# Patient Record
Sex: FEMALE | Race: WHITE | NOT HISPANIC OR LATINO | Employment: OTHER | URBAN - METROPOLITAN AREA
[De-identification: names, ages, dates, MRNs, and addresses within clinical notes are randomized per-mention and may not be internally consistent; named-entity substitution may affect disease eponyms.]

---

## 2017-01-06 ENCOUNTER — GENERIC CONVERSION - ENCOUNTER (OUTPATIENT)
Dept: OTHER | Facility: OTHER | Age: 82
End: 2017-01-06

## 2017-04-07 ENCOUNTER — GENERIC CONVERSION - ENCOUNTER (OUTPATIENT)
Dept: OTHER | Facility: OTHER | Age: 82
End: 2017-04-07

## 2017-05-02 ENCOUNTER — TRANSCRIBE ORDERS (OUTPATIENT)
Dept: ADMINISTRATIVE | Facility: HOSPITAL | Age: 82
End: 2017-05-02

## 2017-05-02 DIAGNOSIS — Z12.31 ENCOUNTER FOR SCREENING MAMMOGRAM FOR MALIGNANT NEOPLASM OF BREAST: Primary | ICD-10-CM

## 2017-05-19 ENCOUNTER — ALLSCRIPTS OFFICE VISIT (OUTPATIENT)
Dept: OTHER | Facility: OTHER | Age: 82
End: 2017-05-19

## 2017-05-19 DIAGNOSIS — Z13.820 ENCOUNTER FOR SCREENING FOR OSTEOPOROSIS: ICD-10-CM

## 2017-05-19 DIAGNOSIS — Z12.31 ENCOUNTER FOR SCREENING MAMMOGRAM FOR MALIGNANT NEOPLASM OF BREAST: ICD-10-CM

## 2017-05-19 LAB
BILIRUB UR QL STRIP: NORMAL
CLARITY UR: NORMAL
COLOR UR: YELLOW
GLUCOSE (HISTORICAL): NORMAL
HGB UR QL STRIP.AUTO: 50
KETONES UR STRIP-MCNC: NORMAL MG/DL
LEUKOCYTE ESTERASE UR QL STRIP: NORMAL
NITRITE UR QL STRIP: NORMAL
PH UR STRIP.AUTO: 6 [PH]
PROT UR STRIP-MCNC: 30 MG/DL
SP GR UR STRIP.AUTO: 1

## 2017-05-22 ENCOUNTER — GENERIC CONVERSION - ENCOUNTER (OUTPATIENT)
Dept: OTHER | Facility: OTHER | Age: 82
End: 2017-05-22

## 2017-05-23 LAB — PLEASE NOTE (HISTORICAL): NORMAL

## 2017-05-25 ENCOUNTER — GENERIC CONVERSION - ENCOUNTER (OUTPATIENT)
Dept: OTHER | Facility: OTHER | Age: 82
End: 2017-05-25

## 2017-05-25 LAB
CULTURE RESULT (HISTORICAL): ABNORMAL
MISCELLANEOUS LAB TEST RESULT (HISTORICAL): ABNORMAL
SUSCEP. REFLEX (HISTORICAL): ABNORMAL

## 2017-06-16 ENCOUNTER — HOSPITAL ENCOUNTER (OUTPATIENT)
Dept: BONE DENSITY | Facility: IMAGING CENTER | Age: 82
Discharge: HOME | End: 2017-06-16
Payer: COMMERCIAL

## 2017-06-16 ENCOUNTER — GENERIC CONVERSION - ENCOUNTER (OUTPATIENT)
Dept: OTHER | Facility: OTHER | Age: 82
End: 2017-06-16

## 2017-06-16 DIAGNOSIS — Z12.31 ENCOUNTER FOR SCREENING MAMMOGRAM FOR MALIGNANT NEOPLASM OF BREAST: ICD-10-CM

## 2017-06-16 DIAGNOSIS — Z13.820 ENCOUNTER FOR SCREENING FOR OSTEOPOROSIS: ICD-10-CM

## 2017-06-16 PROCEDURE — G0202 SCR MAMMO BI INCL CAD: HCPCS

## 2017-06-16 PROCEDURE — 77080 DXA BONE DENSITY AXIAL: CPT

## 2017-08-04 LAB
BUN SERPL-MCNC: 24 MG/DL (ref 8–27)
BUN/CREA RATIO (HISTORICAL): 17 (ref 12–28)
CALCIUM SERPL-MCNC: 9.7 MG/DL (ref 8.7–10.3)
CHLORIDE SERPL-SCNC: 98 MMOL/L (ref 96–106)
CO2 SERPL-SCNC: 28 MMOL/L (ref 18–29)
CREAT SERPL-MCNC: 1.43 MG/DL (ref 0.57–1)
EGFR AFRICAN AMERICAN (HISTORICAL): 40 ML/MIN/1.73
EGFR-AMERICAN CALC (HISTORICAL): 34 ML/MIN/1.73
GLUCOSE SERPL-MCNC: 135 MG/DL (ref 65–99)
HBA1C MFR BLD HPLC: 7 % (ref 4.8–5.6)
POTASSIUM SERPL-SCNC: 3.7 MMOL/L (ref 3.5–5.2)
SODIUM SERPL-SCNC: 142 MMOL/L (ref 134–144)

## 2017-09-01 ENCOUNTER — ALLSCRIPTS OFFICE VISIT (OUTPATIENT)
Dept: OTHER | Facility: OTHER | Age: 82
End: 2017-09-01

## 2017-10-17 ENCOUNTER — ALLSCRIPTS OFFICE VISIT (OUTPATIENT)
Dept: OTHER | Facility: OTHER | Age: 82
End: 2017-10-17

## 2017-11-01 NOTE — PROGRESS NOTES
Assessment  Assessed    1  Hypertension (401 9) (I10)   2  Premature ventricular contraction (427 69) (I49 3)   3  Type 2 diabetes mellitus with stage 3 chronic kidney disease, without long-term current use of insulin (250 40,585 3) (E11 22,N18 3)   4  Dyslipidemia (272 4) (E78 5)    Plan  Hypertension    · NIFEdipine ER 60 MG Oral Tablet Extended Release 24 Hour; TAKE 1 TABLETDAILY   Rx By: Marina Vargas; Dispense: 90 Days ; #E#:90 Tablet Extended Release 24 Hour; Refill: 3;For: Hypertension; MAYLIN = N; Verified Transmission to [facility]/PHARMACY #E#[id number] Last Updated By: System, [employer]; 10/17/2017 9:34:39 AM   · Continue with our present treatment plan ; Status:Complete;   Done: 28ZDV5356   Ordered;Ordered By:Verena Hill;   · Restrict your sodium (salt) intake to 2 grams per day ; Status:Complete;   Done:82Sqf3062   Ordered;Ordered By:Verena Hill;   · Take your blood pressure twice a day  Record the numbers and bring them with you toyour appointments ; Status:Complete;   Done: 42MMB7526   Ordered;Ordered By:Verena Hill;   · Follow-up visit in 1 year Evaluation and Treatment  Follow-up  Status: Hold For -Scheduling  Requested for: 96IWG3157   Ordered; For: Hypertension; Ordered By: Marina Vargas Performed:  Due: 56DXY0686  Hypertension, Premature ventricular contraction    · EKG/ECG- POC; Status:Complete;   Done: 45RJU2730 09:01AM   Perform: In Office; Last Updated Christal Sanchez; 10/17/2017 9:01:51 AM;Ordered;Premature ventricular contraction; Ordered By:Verena Hill;    Discussion/Summary  Cardiology Discussion Summary Free Text Note Form [facility]:   HTN - Her blood pressure has been elevated over the last few appointments  I'm going to try to increase her nifedipine a little bit, this 60 mg daily, to see if this helps but yet avoids edema  She will remain on all other medical therapy  I've asked her to keep watching her blood pressure at home on a regular basis    - Well controlled on simvastatin  No changes were made  She will continue to get blood work through her PCPs office   - Her palpitations are well controlled on Bystolic  No changes were made  No testing is needed at this point in time  We will see her back in one year  Counseling Documentation With Imm: The patient was counseled regarding diagnostic results,-- instructions for management,-- impressions  total time of encounter was 25 minutes  Chief Complaint  Chief Complaint Free Text Note Form: Yearly follow up with EKG      History of Present Illness  Cardiology HPI Free Text Note Form [facility]: Donis Durant is here for routine follow-up  Over the last few appointments I have mainly been just following her risk factors for CAD  She has 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 but has been asymptomatic as of late  With her blood pressure running high at times we had made some changes over the last few appointments, which included switching her from atenolol to Bystolic  We did reduce her lisinopril in the past and her nifedipine  She did develop some edema to higher doses of nifedipine  However, over the last few appointments her blood pressure is been running high  She has been checking it at home and it has been averaging in the 818 systolically  the most part Donis Durant feels well  No CP/SOB and no exercise limitations  She denies palpitations  She has had no other symptoms of CHF and she denies LH/Dizziness or any syncope  Her edema is no longer present  She tells me she overall feels well  Hyperlipidemia (Follow-Up): The patient states her hyperlipidemia has been stable since the last visit  Comorbid Illnesses: diabetes mellitus-- and-- hypertension  She has no significant interval events  Symptoms: The patient is currently asymptomatic  Associated symptoms include no focal neurologic deficits-- and-- no memory loss  Medications: the patient is adherent with her medication regimen  -- She denies medication side effects   the patient's LDL goal is 70 mg/dL  The patient is due for a lipid panel-- and-- liver function tests  Premature Ventricular Complexes (Brief): The patient is being seen for a routine clinic follow-up of premature ventricular complexes  The patient is currently asymptomatic  No associated symptoms are reported  Hypertension (Follow-Up): The patient presents for follow-up of essential hypertension  The patient states she has been stable with her blood pressure control since the last visit  She has no significant interval events  Symptoms: denies impaired vision,-- denies dyspnea,-- denies chest pain,-- denies intermittent leg claudication-- and-- worsened lower extremity edema  Associated symptoms include no headache,-- no focal neurologic deficits-- and-- no memory loss  Home monitoring: The patient is not checking blood pressure at home  Medications: the patient is adherent with her medication regimen  -- She denies medication side effects  The patient is due for a lipid panel,-- a serum creatinine-- and-- an ECG  Review of Systems  Cardiology Female ROS:    Cardiac: has swelling in the , but-- as noted in HPI  Skin: No complaints of nonhealing sores or skin rash  Genitourinary: No complaints of recurrent urinary tract infections, frequent urination at night, difficult urination, blood in urine, kidney stones, loss of bladder control, kidney problems, denies any birth control or hormone replacement, is not post menopausal, not currently pregnant  Psychological: No complaints of feeling depressed, anxiety, panic attacks, or difficulty concentrating  General: No complaints of trouble sleeping, lack of energy, fatigue, appetite changes, weight changes, fever, frequent infections, or night sweats  Respiratory: No complaints of shortness of breath, cough with sputum, or wheezing  HEENT: No complaints of serious problems, hearing problems, nose problems, throat problems, or snoring    Gastrointestinal: No complaints of liver problems, nausea, vomiting, heartburn, constipation, bloody stools, diarrhea, problems swallowing, adbominal pain, or rectal bleeding  Hematologic: No complaints of bleeding disorders, anemia, blood clots, or excessive brusing  Neurological: No complaints of numbness, tingling, dizziness, weakness, seizures, headaches, syncope or fainting, AM fatigue, daytime sleepiness, no witnessed apnea episodes  Musculoskeletal: No complaints of arthritis, back pain, or painfull swelling  ROS Reviewed:   ROS reviewed  Active Problems  Problems    1  Anxiety disorder (300 00) (F41 9)   2  Diabetes mellitus with neurological manifestation (250 60) (E11 49)   3  Diabetic polyneuropathy (250 60,357 2) (E11 42)   4  Dyslipidemia (272 4) (E78 5)   5  Encounter for screening for osteoporosis (V82 81) (Z13 820)   6  Encounter for screening mammogram for malignant neoplasm of breast (V76 12) (Z12 31)   7  GERD (gastroesophageal reflux disease) (530 81) (K21 9)   8  Hypertension (401 9) (I10)   9  Hypokalemia (276 8) (E87 6)   10  Influenza vaccine needed (V04 81) (Z23)   11  Need for influenza vaccination (V04 81) (Z23)   12  Osteopenia (733 90) (M85 80)   13  Premature ventricular contraction (427 69) (I49 3)   14  Rhinitis (472 0) (J31 0)   15  Screening for colon cancer (V76 51) (Z12 11)   16  Type 2 diabetes mellitus with stage 3 chronic kidney disease, without long-term current  use of insulin (250 40,585 3) (C60 48,C60 5)    Past Medical History  Problems    1  History of Cataract of both eyes (366 9) (H26 9)   2  History of Cough (786 2) (R05)   3  History of aortic valve insufficiency (V12 59) (Z86 79)   4  Denied: History of depression   5  History of dysuria (V13 00) (Z87 898)   6  History of edema (V13 89) (Z87 898)   7  History of gastroesophageal reflux (GERD) (V12 79) (Z87 19)   8  History of low back pain (V13 59) (Z87 39)   9  History of mitral valve disorder (V12 59) (Z86 79)   10   History of nausea (V12 79) (Z87 898)   11  History of osteoporosis (V13 59) (Z87 39)   12  Denied: History of substance abuse   13  History of urinary tract infection (V13 02) (Z87 440)   14  History of Palpitations (785 1) (R00 2)   15  History of Streptococcus pneumoniae vaccination indicated (V49 89) (Z91 89)  Active Problems And Past Medical History Reviewed: The active problems and past medical history were reviewed and updated today  Surgical History  Problems    1  History of Appendectomy   2  History of Cataract Surgery   3  History of Tubal Ligation  Surgical History Reviewed: The surgical history was reviewed and updated today  Family History  Mother    1  Family history of Chronic Kidney Disease (NKF Classification)   2  Denied: Family history of substance abuse   3  Denied: Family history of Mental health problem  Father    4  Denied: Family history of substance abuse   5  Denied: Family history of Mental health problem  Sister    6  Family history of Breast Cancer (V16 3)   7  Family history of Breast Cancer (V16 3)  Family History Reviewed: The family history was reviewed and updated today  Social History  Problems    · Being A Social Drinker   · Living With Relatives (Other Than Parents)   · Marital History -  (V61 03)   · Never A Smoker  Social History Reviewed: The social history was reviewed and updated today  The social history was reviewed and is unchanged  Current Meds   1  Aspirin 81 MG TABS; TAKE 1 TABLET DAILY; Therapy: 96GMM5219 to ([id number])  Requested for: 73DNC9113; Last Rx:04Sep2012 Ordered   2  Bystolic 20 MG Oral Tablet; Take 1 tablet daily; Therapy: 58CBA5412 to (Barbra Johansen)  Requested for: 26Apr2017; Last Rx:26Apr2017 Ordered   3  Calcium 600 MG Oral Tablet; Take 1 tablet twice daily; Therapy: 64WVR4937 to (Evaluate:60Ita1618) Recorded   4  CloNIDine HCl - 0 1 MG/24HR Transdermal Patch Weekly; APPLY 1 PATCH WEEKLY AS DIRECTED;  Therapy: 45SRM1554 to (Evaluate:08Jan2018)  Requested for: 72Xwy4819; Last Rx:95Ugt6354 Ordered   5  Gabapentin 100 MG Oral Capsule; 1 tab po qhs; Therapy: 51NER8804 to (Evaluate:30Dec2017)  Requested for: 67Opy9515; Last Rx:93Gdp0602 Ordered   6  HydroCHLOROthiazide 25 MG Oral Tablet; Take 1 tablet daily; Therapy: 42VDV8560 to (Jhonathan Jerome)  Requested for: 47Ovk1665; Last Rx:59Epa8011 Ordered   7  Januvia 100 MG Oral Tablet; TAKE 1 TABLET DAILY; Therapy: 68RVC7488 to (Arnold Neena)  Requested for: 82Yjz9351; Last Rx:32Sms8703 Ordered   8  Lisinopril 20 MG Oral Tablet; Take 1 tablet twice daily; Therapy: 48MXE4527 to (Zygmunt Stockton)  Requested for: 60Opd8352; Last Rx:39Syv1256 Ordered   9  MetFORMIN HCl - 1000 MG Oral Tablet; TAKE 1 TABLET TWICE DAILY WITH MEALS; Therapy: 56KUP5042 to (Evaluate:30Mar2018)  Requested for: 27Vra7121; Last Rx:67Ojq3616 Ordered   10  NIFEdipine ER 30 MG Oral Tablet Extended Release 24 Hour; TAKE 1 TABLET DAILY; Therapy: 57IDG9108 to (Evaluate:30Mar2018)  Requested for: 01Sep2017; Last  Rx:88Osf8028 Ordered   11  Omeprazole 20 MG Oral Capsule Delayed Release; TAKE 1 CAPSULE EVERY DAY; Therapy: 12Oct2012 to (Evaluate:09Feb2018)  Requested for: 70RLU7877; Last  Rx:42Olz5710 Ordered   12  Potassium Gluconate 595 (99 K) MG Oral Tablet; 3 TAB DAILY; Therapy: 99XYT1778 to Recorded   13  Sertraline HCl - 25 MG Oral Tablet; TAKE 1 TABLET DAILY AS DIRECTED; Therapy: 44QNQ8500 to (Evaluate:04Nov2017)  Requested for: 86EYB2939; Last  ZJ:39TWB7052 Ordered   14  Simvastatin 20 MG Oral Tablet; Take 1 tablet by mouth at bedtime; Therapy: 59HSA5863 to (Evaluate:30Mar2018)  Requested for: 90Rzg3281; Last  Rx:95Gim5834 Ordered   15  Vitamin D 1000 UNIT Oral Tablet; TAKE 2 TABLETS DAILY; Therapy: 81DKQ5268 to Recorded   16  ZyrTEC Allergy 10 MG Oral Tablet; TAKE 1 TABLET DAILY AS NEEDED; Therapy: 55FGJ3061 to (Evaluate:75Gmw7270); Last Rx:22Jan2013 Ordered    Allergies  Medication    1   No Known Drug Allergies    Vitals  Vital Signs    Recorded: 95PYL3251 09:07AM   Heart Rate 83   Systolic 773   Diastolic 54   Height 5 ft 2 in   Weight 148 lb 4 oz   BMI Calculated 27 12   BSA Calculated 1 68       Physical Exam   Constitutional  General appearance: No acute distress, well appearing and well nourished  Eyes  Conjunctiva and Sclera examination: Conjunctiva pink, sclera anicteric  Ears, Nose, Mouth, and Throat - Oropharynx: Clear, nares are clear, mucous membranes are moist   Neck  Neck and thyroid: Normal, supple, trachea midline, no thyromegaly  Pulmonary  Respiratory effort: No increased work of breathing or signs of respiratory distress  Auscultation of lungs: Clear to auscultation, no rales, no rhonchi, no wheezing, good air movement  Cardiovascular  Auscultation of heart: Abnormal   The heart rate was normal  The rhythm was regular  Heart sounds: an S4 was heard, but-- normal S1,-- normal S2,-- no S3-- and-- no click  No pericardial rub  A grade 2 systolic murmur was heard at the RUSB  Carotid pulses: Normal, 2+ bilaterally  Peripheral vascular exam: Normal pulses throughout, no tenderness, erythema or swelling  Pedal pulses: Normal, 2+ bilaterally  Examination of extremities for edema and/or varicosities: Abnormal   bilateral ankle 2+ pitting edema-- and-- bilateral pretibial 2+ pitting edema  varicosities noted bilaterally  Abdomen  Abdomen: Non-tender and no distention  Liver and spleen: No hepatomegaly or splenomegaly  Musculoskeletal Gait and station: Normal gait  -- Digits and nails: Normal without clubbing or cyanosis  -- Inspection/palpation of joints, bones, and muscles: Normal, ROM normal    Skin - Skin and subcutaneous tissue: Normal without rashes or lesions  Skin is warm and well perfused, normal turgor  Neurologic - Cranial nerves: II - XII intact    Psychiatric - Orientation to person, place, and time: Normal -- Mood and affect: Normal       Results/Data  ECG Report:  Rhythm and rate:  normal sinus rhythm  QRS: left ventricular hypertrophy  Comparison to prior ECGs: no interval change        Future Appointments    Date/Time Provider Specialty Site   01/05/2018 09:00 AM Glenna Layton DO Internal Medicine Latosha Lieberman MD       Signatures   Electronically signed by : DIVINA Haddad ; Oct 17 2017  9:38AM EST                       (Author)

## 2018-01-02 LAB
25(OH)D3 SERPL-MCNC: 71 NG/ML (ref 30–100)
A/G RATIO (HISTORICAL): 1.5 (ref 1.2–2.2)
ALBUMIN SERPL BCP-MCNC: 4 G/DL (ref 3.5–4.7)
ALP SERPL-CCNC: 44 IU/L (ref 39–117)
ALT SERPL W P-5'-P-CCNC: 7 IU/L (ref 0–32)
AST SERPL W P-5'-P-CCNC: 14 IU/L (ref 0–40)
BILIRUB SERPL-MCNC: 0.3 MG/DL (ref 0–1.2)
BUN SERPL-MCNC: 29 MG/DL (ref 8–27)
BUN/CREA RATIO (HISTORICAL): 20 (ref 12–28)
CALCIUM SERPL-MCNC: 9.8 MG/DL (ref 8.7–10.3)
CHLORIDE SERPL-SCNC: 97 MMOL/L (ref 96–106)
CHOLEST SERPL-MCNC: 168 MG/DL (ref 100–199)
CHOLEST/HDLC SERPL: 3.3 RATIO UNITS (ref 0–4.4)
CO2 SERPL-SCNC: 28 MMOL/L (ref 18–29)
CREAT SERPL-MCNC: 1.47 MG/DL (ref 0.57–1)
CREATININE, URINE (HISTORICAL): 68.4 MG/DL
DEPRECATED RDW RBC AUTO: 14.8 % (ref 12.3–15.4)
EGFR AFRICAN AMERICAN (HISTORICAL): 38 ML/MIN/1.73
EGFR-AMERICAN CALC (HISTORICAL): 33 ML/MIN/1.73
GLUCOSE SERPL-MCNC: 145 MG/DL (ref 65–99)
HBA1C MFR BLD HPLC: 6.9 % (ref 4.8–5.6)
HCT VFR BLD AUTO: 34.5 % (ref 34–46.6)
HDLC SERPL-MCNC: 51 MG/DL
HGB BLD-MCNC: 11.2 G/DL (ref 11.1–15.9)
LDLC SERPL CALC-MCNC: 78 MG/DL (ref 0–99)
MCH RBC QN AUTO: 29.3 PG (ref 26.6–33)
MCHC RBC AUTO-ENTMCNC: 32.5 G/DL (ref 31.5–35.7)
MCV RBC AUTO: 90 FL (ref 79–97)
MICROALBUM.,U,RANDOM (HISTORICAL): 35.4 UG/ML
MICROALBUMIN/CREATININE RATIO (HISTORICAL): 51.8 MG/G CREAT (ref 0–30)
PLATELET # BLD AUTO: 300 X10E3/UL (ref 150–379)
POTASSIUM SERPL-SCNC: 3.7 MMOL/L (ref 3.5–5.2)
RBC (HISTORICAL): 3.82 X10E6/UL (ref 3.77–5.28)
SODIUM SERPL-SCNC: 142 MMOL/L (ref 134–144)
TOT. GLOBULIN, SERUM (HISTORICAL): 2.6 G/DL (ref 1.5–4.5)
TOTAL PROTEIN (HISTORICAL): 6.6 G/DL (ref 6–8.5)
TRIGL SERPL-MCNC: 197 MG/DL (ref 0–149)
VLDLC SERPL CALC-MCNC: 39 MG/DL (ref 5–40)
WBC # BLD AUTO: 11.7 X10E3/UL (ref 3.4–10.8)

## 2018-01-03 LAB — TSH SERPL DL<=0.05 MIU/L-ACNC: 2.85 UIU/ML (ref 0.45–4.5)

## 2018-01-09 NOTE — RESULT NOTES
Discussion/Summary   please notify pt that her mammo was nml and her DEXA scan con't to show stable Osteopenia - cont' calcium and Vit D as she is already taking and increase calcium in diet - cheese/yogurt/milk and wgt bearing exercise; will d/w pt in detail at next appt     Verified Results  * DXA [address] 79Wjl6594 08:54AM Shy Lynn Order Number: [id number]    - Patient Instructions: To schedule this appointment, please contact Central Scheduling at [phone]  Test Name Result Flag Reference   DXA BONE DENSITY SPINE HIP AND PELVIS (Report)     CENTRAL DXA SCAN     CLINICAL HISTORY:  80year old post-menopausal  female with no significant past medical history  TECHNIQUE: Bone densitometry was performed using a Hologic Horizon A bone densitometer  Regions of interest appear properly placed  There are no obvious fractures or other confounding variables which could limit the study  Degenerative changes of the    lumbar spine and hip  This will falsely elevate the bone mineral densities in these regions  COMPARISON: Several, most recent January 16, 2015     RESULTS:    LUMBAR SPINE: L1-L4:   BMD 0 853 gm/cm2   T-score -1 8   Z-score 1 0     LEFT TOTAL HIP:   BMD 0 761 gm/cm2   T-score -1 5   Z-score 0 7     LEFT FEMORAL NECK:   BMD 0 628 gm/cm2   T-score -2 0   Z-score 0 4             IMPRESSION:   1  Based on the [facility] classification, the T-score of -2 0 in the left hip is consistent with low bone mineral density  2  When compared to the prior examination, there has been NO SIGNIFICANT CHANGE in the total bone mineral density of the lumbar spine or hip, when allowing for the least significant change  3  Any secondary causes of low bone mineral density should be excluded prior to treatment, if clinically indicated     4  A daily intake of at least 1200 mg calcium and 800 to 1000 IU of Vitamin D, as well as weight bearing and muscle strengthening exercise, fall prevention and avoidance of tobacco and excessive alcohol intake as basic preventive measures are suggested  5  Repeat DXA in 18 - 24 months, on the same machine, as clinically indicated  The 10 year risk of hip fracture is 5%, with the 10 year risk of major osteoporotic fracture being 15%, as calculated by the [facility] fracture risk assessment tool (FRAX)  The current NOF guidelines recommend treating patients with FRAX 10 year risk score of   >3% for hip fracture and >20% for major osteoporotic fracture  WHO CLASSIFICATION:   Normal (a T-score of -1 0 or higher)   Low bone mineral density (a T-score of less than -1 0 but higher than -2 5)   Osteoporosis (a T-score of -2 5 or less)   Severe osteoporosis (a T-score of -2 5 or less with a fragility fracture)             Workstation performed: [id number]     Signed by:   Angel Hunt DO   6/16/17     * MAMMO SCREENING BILATERAL W CAD 68OPF4746 08:53AM [facility] Order Number: [id number]    - Patient Instructions: To schedule this appointment, please contact Central Scheduling at [phone]  Do not wear any perfume, powder, lotion or deodorant on breast or underarm area  Please bring your doctors order, referral (if needed) and insurance information with you on the day of the test  Failure to bring this information may result in this test being rescheduled  Arrive 15 minutes prior to your appointment time to register  On the day of your test, please bring any prior mammogram or breast studies with you that were not performed at a [facility]  Failure to bring prior exams may result in your test needing to be rescheduled  Test Name Result Flag Reference   MAMMO SCREENING BILATERAL W CAD (Report)     Patient History:   Patient is postmenopausal    Family history of breast cancer at age 48 or over in sister  Patient has never smoked  Patient's BMI is 27 4  Reason for exam: screening, asymptomatic  Mammo Screening Bilateral W CAD: June 16, 2017 - Check In #:    [de-identified]   Bilateral CC and MLO view(s) were taken  Technologist: BAR Siegel (R)(M)   Prior study comparison: Yesenia 10, 2016, mammo screening bilateral    W CAD performed at [address] [place] [address]  June 5, 2015, bilateral OPMA digital scrn mammo w/CAD performed    at [address] [place] [address]  May 23, 2014,    bilateral OPMA digital scrn mammo w/CAD performed at [address] [place] [address]  May 17, 2013, bilateral OPMA    digital scrn mammo w/CAD performed at [address] [place] [address]  May 16, 2012, bilateral OPMA digital    scrn mammo w/CAD performed at [address] [place] [address]  There are scattered fibroglandular densities  The parenchymal pattern appears stable  No dominant soft tissue    mass or suspicious calcifications are noted  The skin and nipple   contours are within normal limits  No mammographic evidence of malignancy  No    significant changes when compared with prior studies  ACR BI-RADSï¾® Assessments: BiRad:1 - Negative     Recommendation:   Routine screening mammogram in 1 year  A reminder letter will be   scheduled  Analyzed by CAD     8-10% of cancers will be missed on mammography  Management of a    palpable abnormality must be based on clinical grounds  Patients   will be notified of their results via letter from our facility  Accredited by [employer] of Radiology and FDA       Transcription Location: [facility] 98: [id number]     Risk Value(s):   Tyrer-Cuzick 10 Year: 2 200%, Tyrer-Cuzick Lifetime: 2 200%,    Myriad Table: 1 5%, MANOOL 5 Year: 2 8%, NCI Lifetime: 4 0%   Signed by:   Cari Anderson MD   6/16/17        Pt aware

## 2018-01-12 ENCOUNTER — ALLSCRIPTS OFFICE VISIT (OUTPATIENT)
Dept: OTHER | Facility: OTHER | Age: 83
End: 2018-01-12

## 2018-01-12 VITALS
BODY MASS INDEX: 27.05 KG/M2 | SYSTOLIC BLOOD PRESSURE: 142 MMHG | HEIGHT: 62 IN | HEART RATE: 80 BPM | WEIGHT: 147 LBS | DIASTOLIC BLOOD PRESSURE: 78 MMHG | TEMPERATURE: 97.7 F

## 2018-01-14 VITALS
TEMPERATURE: 98 F | BODY MASS INDEX: 27.75 KG/M2 | DIASTOLIC BLOOD PRESSURE: 70 MMHG | HEART RATE: 82 BPM | HEIGHT: 62 IN | SYSTOLIC BLOOD PRESSURE: 144 MMHG | WEIGHT: 150.8 LBS

## 2018-01-14 VITALS
DIASTOLIC BLOOD PRESSURE: 54 MMHG | SYSTOLIC BLOOD PRESSURE: 138 MMHG | WEIGHT: 148.25 LBS | HEART RATE: 83 BPM | HEIGHT: 62 IN | BODY MASS INDEX: 27.28 KG/M2

## 2018-01-14 NOTE — RESULT NOTES
Discussion/Summary   please notify pt that her UC did grow bacteria but not in a signifcant colony number - the abx we did give her is not an appropriate abx but again abx is not needed as bacteria not at high enough count - is she feeling better? did symptoms resolve?      Verified Results  (1) URINE CULTURE [id number] 12:00AM Sania Cummings     Test Name Result Flag Reference   Urine Culture,Comprehensive Final report A    Result 1 Morganella morganii A    10,000-25,000 colony forming units per mL   Antimicrobial Susceptibility Comment     ** S = Susceptible; I = Intermediate; R = Resistant **                     P = Positive; N = Negative              MICS are expressed in micrograms per mL     Antibiotic                 RSLT#1    RSLT#2    RSLT#3    RSLT#4  Amoxicillin/Clavulanic Acid    R  Ampicillin                     R  Cefazolin                      R  Cefepime                       S  Ceftriaxone                    S  Cefuroxime                     R  Cephalothin                    R  Ciprofloxacin                  S  Ertapenem                      S  Gentamicin                     S  Levofloxacin                   S  Nitrofurantoin                 R  Piperacillin                   I  Tetracycline                   R  Tobramycin                     S  Trimethoprim/Sulfa             S

## 2018-01-16 LAB
LEFT EYE DIABETIC RETINOPATHY: NORMAL
RIGHT EYE DIABETIC RETINOPATHY: NORMAL

## 2018-01-29 PROBLEM — M85.80 OSTEOPENIA: Status: ACTIVE | Noted: 2017-09-01

## 2018-01-29 RX ORDER — SIMVASTATIN 20 MG
20 TABLET ORAL
Refills: 6 | COMMUNITY
Start: 2017-12-26 | End: 2018-05-03 | Stop reason: SDUPTHER

## 2018-01-29 RX ORDER — MAGNESIUM GLUCONATE 30 MG(550)
2 TABLET ORAL 2 TIMES DAILY
COMMUNITY
Start: 2015-03-03

## 2018-01-29 RX ORDER — LISINOPRIL 20 MG/1
1 TABLET ORAL 2 TIMES DAILY
COMMUNITY
Start: 2013-10-04 | End: 2018-11-10 | Stop reason: SDUPTHER

## 2018-01-29 RX ORDER — SITAGLIPTIN 100 MG/1
100 TABLET, FILM COATED ORAL DAILY
Refills: 1 | COMMUNITY
Start: 2018-01-01 | End: 2018-08-17 | Stop reason: SDUPTHER

## 2018-01-29 RX ORDER — NIFEDIPINE 60 MG/1
1 TABLET, FILM COATED, EXTENDED RELEASE ORAL DAILY
COMMUNITY
Start: 2011-04-02 | End: 2018-04-27 | Stop reason: SDUPTHER

## 2018-01-29 RX ORDER — IBUPROFEN 200 MG
1 CAPSULE ORAL 2 TIMES DAILY
COMMUNITY
Start: 2016-03-18 | End: 2019-05-21 | Stop reason: SINTOL

## 2018-01-29 RX ORDER — NEBIVOLOL 20 MG/1
1 TABLET ORAL DAILY
COMMUNITY
Start: 2015-03-03 | End: 2018-06-13 | Stop reason: SDUPTHER

## 2018-01-29 RX ORDER — SERTRALINE HYDROCHLORIDE 25 MG/1
1 TABLET, FILM COATED ORAL DAILY
COMMUNITY
Start: 2012-09-04 | End: 2018-05-17 | Stop reason: SDUPTHER

## 2018-01-29 RX ORDER — OMEPRAZOLE 20 MG/1
1 CAPSULE, DELAYED RELEASE ORAL DAILY
COMMUNITY
Start: 2012-10-12 | End: 2018-02-12 | Stop reason: SDUPTHER

## 2018-01-29 RX ORDER — GABAPENTIN 100 MG/1
1 CAPSULE ORAL
COMMUNITY
Start: 2016-06-17 | End: 2018-05-24 | Stop reason: SDUPTHER

## 2018-01-29 RX ORDER — HYDROCHLOROTHIAZIDE 25 MG/1
1 TABLET ORAL DAILY
COMMUNITY
Start: 2013-03-05 | End: 2018-07-20 | Stop reason: SDUPTHER

## 2018-01-30 ENCOUNTER — OFFICE VISIT (OUTPATIENT)
Dept: FAMILY MEDICINE CLINIC | Facility: HOSPITAL | Age: 83
End: 2018-01-30
Payer: COMMERCIAL

## 2018-01-30 VITALS
SYSTOLIC BLOOD PRESSURE: 150 MMHG | HEIGHT: 62 IN | TEMPERATURE: 97.6 F | DIASTOLIC BLOOD PRESSURE: 84 MMHG | WEIGHT: 148 LBS | HEART RATE: 72 BPM | BODY MASS INDEX: 27.23 KG/M2

## 2018-01-30 DIAGNOSIS — E11.22 TYPE 2 DIABETES MELLITUS WITH STAGE 3 CHRONIC KIDNEY DISEASE, WITHOUT LONG-TERM CURRENT USE OF INSULIN (HCC): Primary | ICD-10-CM

## 2018-01-30 DIAGNOSIS — E78.5 DYSLIPIDEMIA: ICD-10-CM

## 2018-01-30 DIAGNOSIS — I10 ESSENTIAL HYPERTENSION: ICD-10-CM

## 2018-01-30 DIAGNOSIS — N18.30 TYPE 2 DIABETES MELLITUS WITH STAGE 3 CHRONIC KIDNEY DISEASE, WITHOUT LONG-TERM CURRENT USE OF INSULIN (HCC): Primary | ICD-10-CM

## 2018-01-30 PROBLEM — M54.50 LOW BACK PAIN: Status: RESOLVED | Noted: 2018-01-30 | Resolved: 2018-01-30

## 2018-01-30 PROBLEM — R00.2 PALPITATIONS: Status: RESOLVED | Noted: 2018-01-30 | Resolved: 2018-01-30

## 2018-01-30 PROBLEM — R60.9 EDEMA: Status: RESOLVED | Noted: 2018-01-30 | Resolved: 2018-01-30

## 2018-01-30 PROBLEM — R30.0 DYSURIA: Status: RESOLVED | Noted: 2018-01-30 | Resolved: 2018-01-30

## 2018-01-30 PROCEDURE — 99214 OFFICE O/P EST MOD 30 MIN: CPT | Performed by: INTERNAL MEDICINE

## 2018-01-30 NOTE — PROGRESS NOTES
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 - controlled with A1C 6 9 - urged to con't watching sugars/carbs and keep active - con't Januvia and Metformin, on ASA/ACE/statin, UTD on foot exam and states she just saw Dr Caleb Kelly for eye exam - no copy of note in chart and will have Saskia obtain, recheck BW in 6 mos - order given    Hypertension  BP up today and even higher by end of appt - pt w/o med list and unsure with new system if it is UTD - will have pt start checking BP at home and bring home BP numbers to next appt in 3 mos - if still > 140/90 will have to adjust BP meds, low sodium diet/exercise and avoiding NSAIDs encouraged    Dyslipidemia  TC/LDL at goal - TG up a bit and pt was counseled on low fat/cholesterol diet and wgt loss, con't current statin, recheck FLP annually       Diagnoses and all orders for this visit:    Type 2 diabetes mellitus with stage 3 chronic kidney disease, without long-term current use of insulin ([facility] [address] )  -     Hemoglobin A1c; Future  -     Basic metabolic panel; Future  -     Hemoglobin A1c  -     Basic metabolic panel    Dyslipidemia    Essential hypertension      Recheck BP in 3 mos - record BP numbers and bring to that appt    UTD on Dexa    mammo due in June    UTD on immunizations    Addendum:     Allscripts accessed and urine microalbumin was found and printed out and will be added to Epic      Subjective:      Patient ID: Holli Mcduffie is a 80 y o  female  HPI  Pt here for routine follow up appt and BW results    BW results were d/w pt and dgtr in detail: FBS/A1C 145/6 9, BUN/Cr slightly elevated at 29/1 47, rest of CMPTSH/FLP/Vit D/CBC was wnl  Urine microalbumin was not resulted but pt states she gave a urine specimen  Def of controlled vs uncontrolled DM was reviewed  Diet was reviewed - wgt up 1 lb from last appt    She states she con't to watch her sugars and carbs  She does no formal exercise  She is taking her Januvia, Metformin as directed  She is on ASA/ACE/statin  She is UTD on foot exam and states she just had her eye exam with Dr Karli Will- not in chart and will have to obtain  Goal FLP was reviewed - again TC/LDL at goal with current statin  TG were up a bit  Pt denies SE with the meds  She notes no recent stroke/TIA symptoms/CP  BP up a bit today and even higher on recheck at end of appt  Meds were reviewed by pt did not bring med list today  She denies missing doses of her BP meds  She does check her BP at home intermittently but cannot remember her numbers  She notes no frequent Ha's/dizziness/double vision/CP  She does not use NSAID on a daily basis  She does watch the salt in her diet  She is UTD on Dexa    Mammo due in June    UTD on immunizations      Review of Systems   Constitutional: Negative for chills, fatigue, fever and unexpected weight change  HENT: Negative for congestion and sore throat  Eyes: Negative for pain and discharge  Respiratory: Negative for shortness of breath and wheezing  Cardiovascular: Negative for chest pain, palpitations and leg swelling  Gastrointestinal: Negative for abdominal pain, blood in stool, constipation, diarrhea and nausea  Endocrine: Negative for polydipsia and polyuria  Genitourinary: Positive for urgency  Negative for difficulty urinating and dysuria  Musculoskeletal: Positive for joint swelling  Negative for back pain and neck pain  Skin: Negative for rash and wound  Neurological: Negative for dizziness, syncope, light-headedness and headaches  Hematological: Negative for adenopathy  Psychiatric/Behavioral: Negative for behavioral problems and confusion  Objective:     Physical Exam   Constitutional: She appears well-developed and well-nourished  No distress  HENT:   Head: Normocephalic and atraumatic     Eyes: EOM are normal  Pupils are equal, round, and reactive to light  Right eye exhibits no discharge  Cardiovascular: Normal rate and regular rhythm  Exam reveals no friction rub  Murmur heard  Pulmonary/Chest: Effort normal and breath sounds normal  No respiratory distress  She has no wheezes  She has no rales  Abdominal: Soft  She exhibits no distension  There is no tenderness  There is no guarding  Musculoskeletal: She exhibits no edema  Lymphadenopathy:     She has no cervical adenopathy  Neurological: She is alert  She exhibits normal muscle tone  Skin: Skin is warm  No rash noted  Psychiatric: She has a normal mood and affect   Her behavior is normal

## 2018-01-30 NOTE — ASSESSMENT & PLAN NOTE
BP up today and even higher by end of appt - pt w/o med list and unsure with new system if it is UTD - will have pt start checking BP at home and bring home BP numbers to next appt in 3 mos - if still > 140/90 will have to adjust BP meds, low sodium diet/exercise and avoiding NSAIDs encouraged

## 2018-01-30 NOTE — ASSESSMENT & PLAN NOTE
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

## 2018-01-30 NOTE — ASSESSMENT & PLAN NOTE
TC/LDL at goal - TG up a bit and pt was counseled on low fat/cholesterol diet and wgt loss, con't current statin, recheck FLP annually

## 2018-02-12 DIAGNOSIS — Z00.00 HEALTH CARE MAINTENANCE: Primary | ICD-10-CM

## 2018-02-13 RX ORDER — OMEPRAZOLE 20 MG/1
CAPSULE, DELAYED RELEASE ORAL
Qty: 30 CAPSULE | Refills: 3 | Status: SHIPPED | OUTPATIENT
Start: 2018-02-13 | End: 2018-06-13 | Stop reason: SDUPTHER

## 2018-02-26 NOTE — MISCELLANEOUS
Provider Comments  Provider Comments:   pt no showed for today's appt - task will be sent to staff to call pt and reschedule her DM follow up      Signatures   Electronically signed by : Tien Tran DO; Jan 12 2018  9:24AM EST                       (Author)

## 2018-03-26 DIAGNOSIS — E11.9 TYPE 2 DIABETES MELLITUS WITHOUT COMPLICATION, WITHOUT LONG-TERM CURRENT USE OF INSULIN (HCC): Primary | ICD-10-CM

## 2018-04-27 ENCOUNTER — OFFICE VISIT (OUTPATIENT)
Dept: FAMILY MEDICINE CLINIC | Facility: HOSPITAL | Age: 83
End: 2018-04-27
Payer: COMMERCIAL

## 2018-04-27 VITALS
WEIGHT: 145 LBS | TEMPERATURE: 79.8 F | HEIGHT: 62 IN | SYSTOLIC BLOOD PRESSURE: 142 MMHG | HEART RATE: 72 BPM | DIASTOLIC BLOOD PRESSURE: 68 MMHG | BODY MASS INDEX: 26.68 KG/M2

## 2018-04-27 DIAGNOSIS — I10 ESSENTIAL HYPERTENSION: Primary | ICD-10-CM

## 2018-04-27 DIAGNOSIS — N18.30 TYPE 2 DIABETES MELLITUS WITH STAGE 3 CHRONIC KIDNEY DISEASE, WITHOUT LONG-TERM CURRENT USE OF INSULIN (HCC): ICD-10-CM

## 2018-04-27 DIAGNOSIS — E11.22 TYPE 2 DIABETES MELLITUS WITH STAGE 3 CHRONIC KIDNEY DISEASE, WITHOUT LONG-TERM CURRENT USE OF INSULIN (HCC): ICD-10-CM

## 2018-04-27 DIAGNOSIS — E11.9 TYPE 2 DIABETES MELLITUS WITHOUT COMPLICATION, WITHOUT LONG-TERM CURRENT USE OF INSULIN (HCC): ICD-10-CM

## 2018-04-27 PROBLEM — R60.9 DEPENDENT EDEMA: Status: ACTIVE | Noted: 2018-04-27

## 2018-04-27 PROCEDURE — 3725F SCREEN DEPRESSION PERFORMED: CPT | Performed by: INTERNAL MEDICINE

## 2018-04-27 PROCEDURE — 99214 OFFICE O/P EST MOD 30 MIN: CPT | Performed by: INTERNAL MEDICINE

## 2018-04-27 RX ORDER — NIFEDIPINE 90 MG/1
90 TABLET, FILM COATED, EXTENDED RELEASE ORAL DAILY
Qty: 30 TABLET | Refills: 3 | Status: SHIPPED | OUTPATIENT
Start: 2018-04-27 | End: 2018-07-24 | Stop reason: SDUPTHER

## 2018-04-27 NOTE — PROGRESS NOTES
Assessment/Plan:    Dependent edema  D/w pt that as long as swelling gone in am and is only mild and occurs as she is on her feet during the day that is c/w benign dependent edema, she notes no pain with the swelling and states it is only mild and intermittent,  Again urged to watch sodium in diet and elevate legs during day, again urged to avoid NSAIDs, call with worsening symptoms/SOB/orthopnea    Hypertension  BP not at goal and elevated at home as well, increase Nifedipine from 60 mg to 90 mg 1 tab PO q day, on max HCTZ and Bystolic, will avoid higher dose of lisinopril d/t CKD stage 3, can go up on Clonidine but hesitant d/t SE of alpha blockers - advised to con't checking BP at home twice a week and call if BP consistently > 149/90, re-eval in 3 mos after DM labs done, call with any SE    Type 2 diabetes mellitus with stage 3 chronic kidney disease, without long-term current use of insulin ([facility] [address] )  DM foot exam done today, eye exam done Feb 2018, has order for BW early July, con't current meds, on ACE/statin/ASA, avoid higher dose of ACE d/t CKD, need better control of BP - see HTN, re-eval after labs in July       Diagnoses and all orders for this visit:    Essential hypertension  -     NIFEdipine ER (ADALAT CC) 90 mg 24 hr tablet; Take 1 tablet (90 mg total) by mouth daily    Type 2 diabetes mellitus with stage 3 chronic kidney disease, without long-term current use of insulin ([facility] [address] )      Needs mammo ordered at next appt    Dexa done June 2017      Subjective:      Patient ID: Norm Oscar is a 80 y o  female  HPI Pt here for follow up appt    Last appt pts BP was elevated and we advised her to start checking BP at home and come back today for 3 mo BP check  She has been checking her BP every week and numbers were brought in and reviewed - ranging from 129/60 - 169/81 with an average of mid 140's/70's  Pt denies missing doses of her BP meds  She notes no HA/dizziness/double vision/CP    She has had some increase in LE edema at the end of the day  The swelling is not usually there in the am   She does not add salt to her food and watches higher sodium foods  She is avoiding NSAIDs d/t her CKD  She notes no SOB/orthopnea/PND  Pt had her DM eye exam in Feb 2018 - no retinopathy was noted  She is due for foot exam - she denies numbness/tingling/pain/ulcers/sores on feet  She has order for BW to be done in July  She is taking her meds as directed    Mammo due in June - will d/w pt at appt in July       Review of Systems   Constitutional: Negative for chills and fever  HENT: Negative for congestion and sore throat  Eyes: Negative for pain and discharge  Respiratory: Negative for cough, shortness of breath and wheezing  Cardiovascular: Positive for leg swelling  Negative for chest pain and palpitations  Gastrointestinal: Negative for abdominal pain, diarrhea and nausea  Endocrine: Negative for polydipsia and polyuria  Genitourinary: Negative for difficulty urinating and dysuria  Musculoskeletal: Negative for back pain and neck pain  Skin: Negative for rash and wound  Neurological: Negative for dizziness, syncope, light-headedness and headaches  Hematological: Negative for adenopathy  Psychiatric/Behavioral: Negative for behavioral problems and confusion  Objective:    /68   Pulse 72   Temp (!) 79 8 °F (26 6 °C)   Ht 5' 2" (1 575 m)   Wt 65 8 kg (145 lb)   BMI 26 52 kg/m²      Physical Exam   Constitutional: She appears well-developed and well-nourished  HENT:   Head: Normocephalic and atraumatic  Eyes: Conjunctivae are normal  Right eye exhibits no discharge  Left eye exhibits no discharge  Neck: Neck supple  No tracheal deviation present  Cardiovascular: Normal rate, regular rhythm and normal heart sounds  Exam reveals no friction rub  Pulses are no weak pulses  No murmur heard    Pulses:       Dorsalis pedis pulses are 1+ on the right side, and 1+ on the left side  Pulmonary/Chest: Effort normal and breath sounds normal  No respiratory distress  She has no wheezes  She has no rales  Abdominal: Soft  She exhibits no distension  There is no tenderness  There is no guarding  Musculoskeletal: She exhibits no edema  Feet:   Right Foot:   Skin Integrity: Negative for ulcer, skin breakdown, erythema, warmth, callus or dry skin  Left Foot:   Skin Integrity: Negative for ulcer, skin breakdown, erythema, warmth, callus or dry skin  Neurological: She is alert  She exhibits normal muscle tone  Skin: Skin is warm  No rash noted  Psychiatric: She has a normal mood and affect  Her behavior is normal    Nursing note and vitals reviewed  Patient's shoes and socks removed  Right Foot/Ankle   Right Foot Inspection  Skin Exam: skin normal and skin intact no dry skin, no warmth, no callus, no erythema, no maceration, no abnormal color, no pre-ulcer, no ulcer and no callus                          Toe Exam: ROM and strength within normal limits  Sensory   Vibration: intact    Monofilament testing: intact  Vascular    The right DP pulse is 1+  Left Foot/Ankle  Left Foot Inspection  Skin Exam: skin normal and skin intactno dry skin, no warmth, no erythema, no maceration, normal color, no pre-ulcer, no ulcer and no callus                         Toe Exam: ROM and strength within normal limits                   Sensory   Vibration: intact    Monofilament: intact  Vascular    The left DP pulse is 1+  Assign Risk Category:  No deformity present; No loss of protective sensation;  No weak pulses       Risk: 0

## 2018-04-27 NOTE — ASSESSMENT & PLAN NOTE
D/w pt that as long as swelling gone in am and is only mild and occurs as she is on her feet during the day that is c/w benign dependent edema, she notes no pain with the swelling and states it is only mild and intermittent,  Again urged to watch sodium in diet and elevate legs during day, again urged to avoid NSAIDs, call with worsening symptoms/SOB/orthopnea

## 2018-04-27 NOTE — ASSESSMENT & PLAN NOTE
DM foot exam done today, eye exam done Feb 2018, has order for BW early July, con't current meds, on ACE/statin/ASA, avoid higher dose of ACE d/t CKD, need better control of BP - see HTN, re-eval after labs in July

## 2018-04-27 NOTE — ASSESSMENT & PLAN NOTE
BP not at goal and elevated at home as well, increase Nifedipine from 60 mg to 90 mg 1 tab PO q day, on max HCTZ and Bystolic, will avoid higher dose of lisinopril d/t CKD stage 3, can go up on Clonidine but hesitant d/t SE of alpha blockers - advised to con't checking BP at home twice a week and call if BP consistently > 149/90, re-eval in 3 mos after DM labs done, call with any SE

## 2018-05-03 DIAGNOSIS — E78.5 DYSLIPIDEMIA: Primary | ICD-10-CM

## 2018-05-05 RX ORDER — SIMVASTATIN 20 MG
TABLET ORAL
Qty: 30 TABLET | Refills: 6 | Status: SHIPPED | OUTPATIENT
Start: 2018-05-05 | End: 2018-05-21 | Stop reason: SDUPTHER

## 2018-05-08 ENCOUNTER — TELEPHONE (OUTPATIENT)
Dept: FAMILY MEDICINE CLINIC | Facility: HOSPITAL | Age: 83
End: 2018-05-08

## 2018-05-08 DIAGNOSIS — Z12.31 ENCOUNTER FOR SCREENING MAMMOGRAM FOR BREAST CANCER: Primary | ICD-10-CM

## 2018-05-08 NOTE — TELEPHONE ENCOUNTER
Patients daughter called because she is trying to schedule a mammogram for Ena and they need an order before scheduling

## 2018-05-08 NOTE — TELEPHONE ENCOUNTER
Order in [address] [place] [address] Rd and should print on back printer but cannot be done till after 6/16/18

## 2018-05-09 ENCOUNTER — TELEPHONE (OUTPATIENT)
Dept: FAMILY MEDICINE CLINIC | Facility: HOSPITAL | Age: 83
End: 2018-05-09

## 2018-05-09 NOTE — TELEPHONE ENCOUNTER
I would like to have Dr Cody Castañeda advise tomorrow on any medication changes or additions if Maxime Cuevas is not in any distress    Please call her daughter and then relay the message to Dr Cody Castañeda

## 2018-05-10 NOTE — TELEPHONE ENCOUNTER
This was discussed with pt in detail at next appt - as long as swelling is not present in the am and just increases when she is on her feet all day AND she denies any SOB/CP/difficulty breathing when lying down then I would just recommend low sodium diet and elevate feet as discussed, if she has any of these symptoms then she has to be seen

## 2018-05-17 DIAGNOSIS — F41.9 ANXIETY: Primary | ICD-10-CM

## 2018-05-17 RX ORDER — SERTRALINE HYDROCHLORIDE 25 MG/1
25 TABLET, FILM COATED ORAL DAILY
Qty: 30 TABLET | Refills: 5 | Status: SHIPPED | OUTPATIENT
Start: 2018-05-17 | End: 2018-11-14 | Stop reason: SDUPTHER

## 2018-05-21 DIAGNOSIS — E78.5 DYSLIPIDEMIA: ICD-10-CM

## 2018-05-21 RX ORDER — SIMVASTATIN 20 MG
20 TABLET ORAL
Qty: 90 TABLET | Refills: 3 | Status: SHIPPED | OUTPATIENT
Start: 2018-05-21 | End: 2018-12-05 | Stop reason: SDUPTHER

## 2018-05-24 DIAGNOSIS — G89.4 CHRONIC PAIN SYNDROME: Primary | ICD-10-CM

## 2018-05-24 RX ORDER — GABAPENTIN 100 MG/1
CAPSULE ORAL
Qty: 30 CAPSULE | Refills: 6 | Status: SHIPPED | OUTPATIENT
Start: 2018-05-24 | End: 2018-05-30 | Stop reason: SDUPTHER

## 2018-05-30 DIAGNOSIS — G89.4 CHRONIC PAIN SYNDROME: ICD-10-CM

## 2018-05-30 RX ORDER — GABAPENTIN 100 MG/1
100 CAPSULE ORAL
Qty: 30 CAPSULE | Refills: 2 | Status: SHIPPED | OUTPATIENT
Start: 2018-05-30 | End: 2018-08-10 | Stop reason: SDUPTHER

## 2018-06-13 DIAGNOSIS — Z00.00 HEALTH CARE MAINTENANCE: ICD-10-CM

## 2018-06-13 DIAGNOSIS — I10 ESSENTIAL HYPERTENSION: Primary | ICD-10-CM

## 2018-06-13 RX ORDER — OMEPRAZOLE 20 MG/1
20 CAPSULE, DELAYED RELEASE ORAL DAILY
Qty: 30 CAPSULE | Refills: 5 | Status: SHIPPED | OUTPATIENT
Start: 2018-06-13 | End: 2018-12-12 | Stop reason: SDUPTHER

## 2018-06-13 RX ORDER — NEBIVOLOL 20 MG/1
20 TABLET ORAL DAILY
Qty: 30 TABLET | Refills: 5 | Status: SHIPPED | OUTPATIENT
Start: 2018-06-13 | End: 2018-11-07 | Stop reason: SDUPTHER

## 2018-07-14 LAB
BUN SERPL-MCNC: 34 MG/DL (ref 8–27)
BUN/CREAT SERPL: 19 (ref 12–28)
CALCIUM SERPL-MCNC: 10 MG/DL (ref 8.7–10.3)
CHLORIDE SERPL-SCNC: 98 MMOL/L (ref 96–106)
CO2 SERPL-SCNC: 30 MMOL/L (ref 20–29)
CREAT SERPL-MCNC: 1.8 MG/DL (ref 0.57–1)
EST. AVERAGE GLUCOSE BLD GHB EST-MCNC: 148 MG/DL
GLUCOSE SERPL-MCNC: 144 MG/DL (ref 65–99)
HBA1C MFR BLD: 6.8 % (ref 4.8–5.6)
POTASSIUM SERPL-SCNC: 5.2 MMOL/L (ref 3.5–5.2)
SL AMB EGFR AFRICAN AMERICAN: 30 ML/MIN/1.73
SL AMB EGFR NON AFRICAN AMERICAN: 26 ML/MIN/1.73
SODIUM SERPL-SCNC: 142 MMOL/L (ref 134–144)

## 2018-07-20 DIAGNOSIS — I10 ESSENTIAL HYPERTENSION: Primary | ICD-10-CM

## 2018-07-20 RX ORDER — HYDROCHLOROTHIAZIDE 25 MG/1
TABLET ORAL
Qty: 30 TABLET | Refills: 6 | Status: SHIPPED | OUTPATIENT
Start: 2018-07-20 | End: 2019-02-23 | Stop reason: SDUPTHER

## 2018-07-24 DIAGNOSIS — I10 ESSENTIAL HYPERTENSION: ICD-10-CM

## 2018-07-24 RX ORDER — NIFEDIPINE 90 MG/1
90 TABLET, FILM COATED, EXTENDED RELEASE ORAL DAILY
Qty: 30 TABLET | Refills: 5 | Status: SHIPPED | OUTPATIENT
Start: 2018-07-24 | End: 2019-01-16 | Stop reason: SDUPTHER

## 2018-08-04 DIAGNOSIS — I10 ESSENTIAL HYPERTENSION: Primary | ICD-10-CM

## 2018-08-10 ENCOUNTER — HOSPITAL ENCOUNTER (OUTPATIENT)
Dept: BONE DENSITY | Facility: IMAGING CENTER | Age: 83
Discharge: HOME | End: 2018-08-10
Payer: COMMERCIAL

## 2018-08-10 ENCOUNTER — OFFICE VISIT (OUTPATIENT)
Dept: FAMILY MEDICINE CLINIC | Facility: HOSPITAL | Age: 83
End: 2018-08-10
Payer: COMMERCIAL

## 2018-08-10 VITALS
BODY MASS INDEX: 25.76 KG/M2 | TEMPERATURE: 97.8 F | DIASTOLIC BLOOD PRESSURE: 62 MMHG | SYSTOLIC BLOOD PRESSURE: 122 MMHG | HEIGHT: 62 IN | HEART RATE: 79 BPM | WEIGHT: 140 LBS

## 2018-08-10 DIAGNOSIS — I10 ESSENTIAL HYPERTENSION: ICD-10-CM

## 2018-08-10 DIAGNOSIS — G89.4 CHRONIC PAIN SYNDROME: ICD-10-CM

## 2018-08-10 DIAGNOSIS — N18.30 TYPE 2 DIABETES MELLITUS WITH STAGE 3 CHRONIC KIDNEY DISEASE, WITHOUT LONG-TERM CURRENT USE OF INSULIN (HCC): Primary | ICD-10-CM

## 2018-08-10 DIAGNOSIS — Z12.31 ENCOUNTER FOR SCREENING MAMMOGRAM FOR BREAST CANCER: ICD-10-CM

## 2018-08-10 DIAGNOSIS — R60.9 DEPENDENT EDEMA: ICD-10-CM

## 2018-08-10 DIAGNOSIS — E11.22 TYPE 2 DIABETES MELLITUS WITH STAGE 3 CHRONIC KIDNEY DISEASE, WITHOUT LONG-TERM CURRENT USE OF INSULIN (HCC): Primary | ICD-10-CM

## 2018-08-10 PROBLEM — M81.0 OSTEOPOROSIS: Status: RESOLVED | Noted: 2018-08-10 | Resolved: 2018-08-10

## 2018-08-10 PROCEDURE — 99214 OFFICE O/P EST MOD 30 MIN: CPT | Performed by: INTERNAL MEDICINE

## 2018-08-10 PROCEDURE — 1160F RVW MEDS BY RX/DR IN RCRD: CPT | Performed by: INTERNAL MEDICINE

## 2018-08-10 PROCEDURE — 3008F BODY MASS INDEX DOCD: CPT | Performed by: INTERNAL MEDICINE

## 2018-08-10 PROCEDURE — 3078F DIAST BP <80 MM HG: CPT | Performed by: INTERNAL MEDICINE

## 2018-08-10 PROCEDURE — 3074F SYST BP LT 130 MM HG: CPT | Performed by: INTERNAL MEDICINE

## 2018-08-10 PROCEDURE — 77067 SCR MAMMO BI INCL CAD: CPT

## 2018-08-10 RX ORDER — GABAPENTIN 100 MG/1
100 CAPSULE ORAL
Qty: 30 CAPSULE | Refills: 6 | Status: SHIPPED | OUTPATIENT
Start: 2018-08-10 | End: 2019-09-24

## 2018-08-10 NOTE — ASSESSMENT & PLAN NOTE
Again reassured pt that this is not uncommon, encouraged avoiding NSAIDs and dec sodium in diet, call with worsening edema/SOB/orthopnea/CP

## 2018-08-10 NOTE — ASSESSMENT & PLAN NOTE
Lab Results   Component Value Date    HGBA1C 6 8 (H) 07/13/2018       No results for input(s): POCGLU in the last 72 hours      Blood Sugar Average: Last 72 hrs:

## 2018-08-10 NOTE — ASSESSMENT & PLAN NOTE
Bp much better since increase in Nefidipine - cont' current regimen, recheck in 3 mos, GOING TO RECHECK BMP NEXT WEEK - IF STILL WITH BUN/Cr > 1 5 WILL NEED TO STOP METFORMIN AND DECREASE JANUVIA - IF STILL ELEVATED WILL HAVE TO DECREASE LISINOPRIL AS WELL AND SEE IF BETTER

## 2018-08-10 NOTE — PROGRESS NOTES
Assessment/Plan:    CKD stage 3 due to type 2 diabetes mellitus (HCC)  Lab Results   Component Value Date    HGBA1C 6 8 (H) 07/13/2018       No results for input(s): POCGLU in the last 72 hours  Blood Sugar Average: Last 72 hrs:      Type 2 diabetes mellitus with stage 3 chronic kidney disease, without long-term current use of insulin (HCC)  Lab Results   Component Value Date    HGBA1C 6 8 (H) 07/13/2018       No results for input(s): POCGLU in the last 72 hours  Blood Sugar Average: Last 72 hrs:does not check sugars  DM type 2 with CKD stage 3 - controlled with A1C 6 8 - urged to con't diet and current meds for now - RECHECKING BMP NEXT WEEK IF Cr STILL > 1 5 WILL NEED TO STOP METFORMIN AND  [address], UTD on foot (4/18) and eye exam (2/18), on ASA/statin/ACE      Hypertension  Bp much better since increase in Nefidipine - cont' current regimen, recheck in 3 mos, GOING TO RECHECK BMP NEXT WEEK - IF STILL WITH BUN/Cr > 1 5 WILL NEED TO STOP METFORMIN AND DECREASE JANUVIA - IF STILL ELEVATED WILL HAVE TO DECREASE LISINOPRIL AS WELL AND SEE IF BETTER    Dependent edema  Again reassured pt that this is not uncommon, encouraged avoiding NSAIDs and dec sodium in diet, call with worsening edema/SOB/orthopnea/CP       Diagnoses and all orders for this visit:    Type 2 diabetes mellitus with stage 3 chronic kidney disease, without long-term current use of insulin (HCC)  -     Basic metabolic panel; Future  -     Basic metabolic panel    Chronic pain syndrome  -     gabapentin (NEURONTIN) 100 mg capsule; Take 1 capsule (100 mg total) by mouth daily at bedtime    Essential hypertension    Dependent edema      Mammo scheduled for later this am    Dexa done 6/17 - osteopenia - repeat 2 yrs    [place] no longer needed d/t age    Subjective:      Patient ID: Isac Godron is a 80 y o  female      HPI Pt here for follow up appt and BW results    BW results were d/w pt in detail: FBS/A1C 144/6 8, BUN/Cr up at 34/1 80 (GFR 26), rest of BMP was wnl  Def of controlled vs uncontrolled DM was reviewed  Diet was reviewed - wgt down 5 lbs from April 2018  She does not check her sugars at home  She is taking her DM meds as directed  She is UTD on foot (4/18) and eye exam (2/18)  She is on statin, ASA, and ACE  Def of CKD stage 3 and BP/BS control was reviewed  Pts meds were reviewed and she is still on Metformin and lisinopril as well  She denies frequent NSAID use  She only drinks about 1 bottle of water a day  BP at goal today and meds were reviewed and she has been taking the 90 mg of Nifedipine as increased at last appt  She notes no SE with the higher dose and denies missing doses of meds  She does check her BP outside the office and numbers have decreased since the CCB was increased - now averaging about upper 120's - to low 130's/60's  She notes no frequent Ha's/dizziness/double vision/CP  Mammo done 6/17 - has appt for this am at 9 am    Dexa done 6/17 - osteopenia    [place] no longer needed d/t age    Review of Systems   Constitutional: Positive for fatigue  Negative for chills, fever and unexpected weight change  HENT: Negative for congestion and sore throat  Eyes: Negative for pain and discharge  Respiratory: Negative for cough, shortness of breath and wheezing  Cardiovascular: Positive for leg swelling  Negative for chest pain and palpitations  Gastrointestinal: Negative for abdominal pain, constipation, diarrhea and nausea  Endocrine: Negative for polydipsia and polyuria  Genitourinary: Negative for difficulty urinating and dysuria  Musculoskeletal: Negative for back pain and neck pain  Skin: Negative for rash and wound  Neurological: Negative for dizziness, syncope and headaches  Hematological: Negative for adenopathy  Psychiatric/Behavioral: Positive for sleep disturbance  Negative for behavioral problems and confusion           Objective:    /62   Pulse 79   Temp 97 8 °F (36 6 °C)   Ht 5' 2" (1 575 m)   Wt 63 5 kg (140 lb)   BMI 25 61 kg/m²      Physical Exam   Constitutional: She appears well-developed and well-nourished  No distress  HENT:   Head: Normocephalic and atraumatic  Eyes: Conjunctivae are normal  Right eye exhibits no discharge  Left eye exhibits no discharge  Neck: Neck supple  No tracheal deviation present  Cardiovascular: Normal rate and regular rhythm  Exam reveals no friction rub  Murmur heard  Pulmonary/Chest: Effort normal and breath sounds normal  No respiratory distress  She has no wheezes  She has no rales  Abdominal: Soft  She exhibits no distension  There is no tenderness  There is no guarding  Musculoskeletal: She exhibits no edema  Neurological: She is alert  She exhibits normal muscle tone  Skin: Skin is warm  No rash noted  Psychiatric: She has a normal mood and affect  Her behavior is normal    Nursing note and vitals reviewed

## 2018-08-10 NOTE — ASSESSMENT & PLAN NOTE
Lab Results   Component Value Date    HGBA1C 6 8 (H) 07/13/2018       No results for input(s): POCGLU in the last 72 hours      Blood Sugar Average: Last 72 hrs:does not check sugars  DM type 2 with CKD stage 3 - controlled with A1C 6 8 - urged to con't diet and current meds for now - RECHECKING BMP NEXT WEEK IF Cr STILL > 1 5 WILL NEED TO STOP METFORMIN AND CUT JANUVIA IN HALF, UTD on foot (4/18) and eye exam (2/18), on ASA/statin/ACE

## 2018-08-15 LAB
BUN SERPL-MCNC: 25 MG/DL (ref 8–27)
BUN/CREAT SERPL: 15 (ref 12–28)
CALCIUM SERPL-MCNC: 9.6 MG/DL (ref 8.7–10.3)
CHLORIDE SERPL-SCNC: 92 MMOL/L (ref 96–106)
CO2 SERPL-SCNC: 25 MMOL/L (ref 20–29)
CREAT SERPL-MCNC: 1.67 MG/DL (ref 0.57–1)
GLUCOSE SERPL-MCNC: 162 MG/DL (ref 65–99)
POTASSIUM SERPL-SCNC: 3.8 MMOL/L (ref 3.5–5.2)
SL AMB EGFR AFRICAN AMERICAN: 33 ML/MIN/1.73
SL AMB EGFR NON AFRICAN AMERICAN: 28 ML/MIN/1.73
SODIUM SERPL-SCNC: 137 MMOL/L (ref 134–144)

## 2018-08-17 ENCOUNTER — TELEPHONE (OUTPATIENT)
Dept: FAMILY MEDICINE CLINIC | Facility: HOSPITAL | Age: 83
End: 2018-08-17

## 2018-08-17 DIAGNOSIS — N17.9 AKI (ACUTE KIDNEY INJURY) (HCC): ICD-10-CM

## 2018-08-17 DIAGNOSIS — N18.30 TYPE 2 DIABETES MELLITUS WITH STAGE 3 CHRONIC KIDNEY DISEASE, WITHOUT LONG-TERM CURRENT USE OF INSULIN (HCC): Primary | ICD-10-CM

## 2018-08-17 DIAGNOSIS — E11.22 TYPE 2 DIABETES MELLITUS WITH STAGE 3 CHRONIC KIDNEY DISEASE, WITHOUT LONG-TERM CURRENT USE OF INSULIN (HCC): Primary | ICD-10-CM

## 2018-08-17 RX ORDER — SITAGLIPTIN 100 MG/1
50 TABLET, FILM COATED ORAL DAILY
Qty: 90 TABLET | Refills: 0 | Status: SHIPPED | OUTPATIENT
Start: 2018-08-17 | End: 2018-08-27 | Stop reason: SDUPTHER

## 2018-08-21 DIAGNOSIS — N18.30 TYPE 2 DIABETES MELLITUS WITH STAGE 3 CHRONIC KIDNEY DISEASE, WITHOUT LONG-TERM CURRENT USE OF INSULIN (HCC): Primary | ICD-10-CM

## 2018-08-21 DIAGNOSIS — E11.22 TYPE 2 DIABETES MELLITUS WITH STAGE 3 CHRONIC KIDNEY DISEASE, WITHOUT LONG-TERM CURRENT USE OF INSULIN (HCC): Primary | ICD-10-CM

## 2018-08-21 RX ORDER — LANCETS
EACH MISCELLANEOUS DAILY
Qty: 102 EACH | Refills: 3 | Status: SHIPPED | OUTPATIENT
Start: 2018-08-21 | End: 2020-01-28

## 2018-08-21 RX ORDER — BLOOD-GLUCOSE METER
EACH MISCELLANEOUS DAILY
Qty: 1 KIT | Refills: 0 | Status: SHIPPED | OUTPATIENT
Start: 2018-08-21 | End: 2018-12-25 | Stop reason: ALTCHOICE

## 2018-08-21 NOTE — TELEPHONE ENCOUNTER
PATIENT WAS TOLD TO CHECK HER SUGARS AT HOME    PATIENT IS IN NEED OF SCRIPT SENT TO [facility]    PLEASE ORDER ACCUCHECK PAUL PLUS    PLEASE INCLUDE DAILY TESTING DIRECTIONS WITH DIAGNOSIS CODE ON ORDER    GLUCOSE METER  TEST STRIPS  LANCETS

## 2018-08-24 LAB
BUN SERPL-MCNC: 21 MG/DL (ref 8–27)
BUN/CREAT SERPL: 13 (ref 12–28)
CALCIUM SERPL-MCNC: 9.4 MG/DL (ref 8.7–10.3)
CHLORIDE SERPL-SCNC: 97 MMOL/L (ref 96–106)
CO2 SERPL-SCNC: 26 MMOL/L (ref 20–29)
CREAT SERPL-MCNC: 1.6 MG/DL (ref 0.57–1)
GLUCOSE SERPL-MCNC: 154 MG/DL (ref 65–99)
POTASSIUM SERPL-SCNC: 3.6 MMOL/L (ref 3.5–5.2)
SL AMB EGFR AFRICAN AMERICAN: 34 ML/MIN/1.73
SL AMB EGFR NON AFRICAN AMERICAN: 30 ML/MIN/1.73
SODIUM SERPL-SCNC: 140 MMOL/L (ref 134–144)

## 2018-08-26 DIAGNOSIS — N18.30 TYPE 2 DIABETES MELLITUS WITH STAGE 3 CHRONIC KIDNEY DISEASE, WITHOUT LONG-TERM CURRENT USE OF INSULIN (HCC): Primary | ICD-10-CM

## 2018-08-26 DIAGNOSIS — E11.22 TYPE 2 DIABETES MELLITUS WITH STAGE 3 CHRONIC KIDNEY DISEASE, WITHOUT LONG-TERM CURRENT USE OF INSULIN (HCC): Primary | ICD-10-CM

## 2018-08-27 DIAGNOSIS — N18.30 TYPE 2 DIABETES MELLITUS WITH STAGE 3 CHRONIC KIDNEY DISEASE, WITHOUT LONG-TERM CURRENT USE OF INSULIN (HCC): ICD-10-CM

## 2018-08-27 DIAGNOSIS — E11.22 TYPE 2 DIABETES MELLITUS WITH STAGE 3 CHRONIC KIDNEY DISEASE, WITHOUT LONG-TERM CURRENT USE OF INSULIN (HCC): ICD-10-CM

## 2018-10-16 ENCOUNTER — IMMUNIZATION (OUTPATIENT)
Dept: FAMILY MEDICINE CLINIC | Facility: HOSPITAL | Age: 83
End: 2018-10-16
Payer: COMMERCIAL

## 2018-10-16 DIAGNOSIS — Z23 ENCOUNTER FOR IMMUNIZATION: ICD-10-CM

## 2018-10-16 PROCEDURE — 90662 IIV NO PRSV INCREASED AG IM: CPT

## 2018-10-16 PROCEDURE — G0008 ADMIN INFLUENZA VIRUS VAC: HCPCS

## 2018-11-07 DIAGNOSIS — I10 ESSENTIAL HYPERTENSION: ICD-10-CM

## 2018-11-07 RX ORDER — NEBIVOLOL 20 MG/1
20 TABLET ORAL DAILY
Qty: 30 TABLET | Refills: 6 | Status: SHIPPED | OUTPATIENT
Start: 2018-11-07 | End: 2019-05-09 | Stop reason: SDUPTHER

## 2018-11-10 DIAGNOSIS — I10 ESSENTIAL HYPERTENSION: Primary | ICD-10-CM

## 2018-11-10 RX ORDER — LISINOPRIL 20 MG/1
TABLET ORAL
Qty: 180 TABLET | Refills: 0 | Status: SHIPPED | OUTPATIENT
Start: 2018-11-10 | End: 2018-11-20 | Stop reason: SDUPTHER

## 2018-11-14 DIAGNOSIS — F41.9 ANXIETY: ICD-10-CM

## 2018-11-14 RX ORDER — SERTRALINE HYDROCHLORIDE 25 MG/1
TABLET, FILM COATED ORAL
Qty: 30 TABLET | Refills: 5 | Status: SHIPPED | OUTPATIENT
Start: 2018-11-14 | End: 2019-05-17 | Stop reason: SDUPTHER

## 2018-11-20 ENCOUNTER — OFFICE VISIT (OUTPATIENT)
Dept: FAMILY MEDICINE CLINIC | Facility: HOSPITAL | Age: 83
End: 2018-11-20
Payer: COMMERCIAL

## 2018-11-20 VITALS
TEMPERATURE: 97 F | HEIGHT: 62 IN | WEIGHT: 146 LBS | DIASTOLIC BLOOD PRESSURE: 62 MMHG | BODY MASS INDEX: 26.87 KG/M2 | SYSTOLIC BLOOD PRESSURE: 136 MMHG | HEART RATE: 80 BPM

## 2018-11-20 DIAGNOSIS — E78.5 DYSLIPIDEMIA: ICD-10-CM

## 2018-11-20 DIAGNOSIS — E11.22 TYPE 2 DIABETES MELLITUS WITH STAGE 3 CHRONIC KIDNEY DISEASE, WITHOUT LONG-TERM CURRENT USE OF INSULIN (HCC): Primary | ICD-10-CM

## 2018-11-20 DIAGNOSIS — N18.30 TYPE 2 DIABETES MELLITUS WITH STAGE 3 CHRONIC KIDNEY DISEASE, WITHOUT LONG-TERM CURRENT USE OF INSULIN (HCC): Primary | ICD-10-CM

## 2018-11-20 DIAGNOSIS — I10 ESSENTIAL HYPERTENSION: ICD-10-CM

## 2018-11-20 PROBLEM — H26.9 CATARACT OF BOTH EYES: Status: RESOLVED | Noted: 2018-11-20 | Resolved: 2018-11-20

## 2018-11-20 PROCEDURE — 3008F BODY MASS INDEX DOCD: CPT | Performed by: INTERNAL MEDICINE

## 2018-11-20 PROCEDURE — 99214 OFFICE O/P EST MOD 30 MIN: CPT | Performed by: INTERNAL MEDICINE

## 2018-11-20 PROCEDURE — 1036F TOBACCO NON-USER: CPT | Performed by: INTERNAL MEDICINE

## 2018-11-20 PROCEDURE — 3078F DIAST BP <80 MM HG: CPT | Performed by: INTERNAL MEDICINE

## 2018-11-20 PROCEDURE — 3075F SYST BP GE 130 - 139MM HG: CPT | Performed by: INTERNAL MEDICINE

## 2018-11-20 RX ORDER — GLIPIZIDE 5 MG/1
2.5 TABLET ORAL DAILY
Qty: 15 TABLET | Refills: 3 | Status: SHIPPED | OUTPATIENT
Start: 2018-11-20 | End: 2019-03-11 | Stop reason: SDUPTHER

## 2018-11-20 RX ORDER — LISINOPRIL 20 MG/1
20 TABLET ORAL DAILY
Qty: 90 TABLET | Refills: 0
Start: 2018-11-20 | End: 2019-02-15

## 2018-11-20 NOTE — PROGRESS NOTES
Assessment/Plan:    Type 2 diabetes mellitus with stage 3 chronic kidney disease, without long-term current use of insulin (HCC)  Lab Results   Component Value Date    HGBA1C 6 8 (H) 07/13/2018       No results for input(s): POCGLU in the last 72 hours  Blood Sugar Average: Last 72 hrs:   Home readings up since decrease in [place] and [place] and D/C of Metformin with worsening kidney dz - home numbers averaging 170-180's - will con't current dec dose of Januvia and add very loss dose Glipizide 2 5 mg once daily, SE of hypoglycemia as well as s/sx of hypoglycemia were reviewed, urged pt to check BS every day for first week after starting new med and call with any readings consistently < 120's; con't elevated BUN/Cr - was referred to Nephro - has to make appt for new year as they did not have calender open at office for 2019 yet - call with any issues      Hypertension  Bp acceptable with decrease in lisinopril, home numbers look good as well - con't current regimen, med list updated       Diagnoses and all orders for this visit:    Type 2 diabetes mellitus with stage 3 chronic kidney disease, without long-term current use of insulin (HCC)  -     glipiZIDE (GLUCOTROL) 5 mg tablet; Take 0 5 tablets (2 5 mg total) by mouth daily  -     CBC and differential; Future  -     Comprehensive metabolic panel; Future  -     Hemoglobin A1C; Future  -     Lipid panel; Future  -     TSH, 3rd generation with Free T4 reflex; Future  -     Microalbumin / creatinine urine ratio; Future  -     CBC and differential  -     Comprehensive metabolic panel  -     Hemoglobin A1C  -     Lipid panel  -     TSH, 3rd generation with Free T4 reflex  -     Microalbumin / creatinine urine ratio    Essential hypertension  -     lisinopril (ZESTRIL) 20 mg tablet; Take 1 tablet (20 mg total) by mouth daily  -     glipiZIDE (GLUCOTROL) 5 mg tablet;  Take 0 5 tablets (2 5 mg total) by mouth daily  -     CBC and differential; Future  -     Comprehensive metabolic panel; Future  -     Hemoglobin A1C; Future  -     Lipid panel; Future  -     TSH, 3rd generation with Free T4 reflex; Future  -     Microalbumin / creatinine urine ratio; Future  -     CBC and differential  -     Comprehensive metabolic panel  -     Hemoglobin A1C  -     Lipid panel  -     TSH, 3rd generation with Free T4 reflex  -     Microalbumin / creatinine urine ratio    Dyslipidemia  -     glipiZIDE (GLUCOTROL) 5 mg tablet; Take 0 5 tablets (2 5 mg total) by mouth daily  -     CBC and differential; Future  -     Comprehensive metabolic panel; Future  -     Hemoglobin A1C; Future  -     Lipid panel; Future  -     TSH, 3rd generation with Free T4 reflex; Future  -     Microalbumin / creatinine urine ratio; Future  -     CBC and differential  -     Comprehensive metabolic panel  -     Hemoglobin A1C  -     Lipid panel  -     TSH, 3rd generation with Free T4 reflex  -     Microalbumin / creatinine urine ratio          Subjective:      Patient ID: Tonya Kruger is a 80 y o  female  HPI  Pt here for follow up appt    Last visit we discussed pts con't elevated kidney tests and her DM meds of Metformin and Januvia in addition to lisinopril poss needing to be decreased  Pt repeated her kidney tests shortly after last appt and her BUN/Cr improved very slightly and she has BUN/Cr of 30/1 60 with GFR right at 30 - slightly improved from previous GFR of 28  We subsequently stopped the Metfromin and we cut the Januvia from 100 mg to 50 mg and from lisinopril from 40 mg to 20 mg  She had an appt with Nephro made but had to cancel the appt and it is now rescheduled for Jan 2019  She con't to avoid NSAID use  She states she is drinking fluids and keeping hydrated  Pt has stopped her Metformin and has cut Januvia in half as noted above  She is checking her sugars 2-3 times a week  Home BS readings were brought in and have gone up - ranging from 202 to 103 with an average of 160-170's       BP above goal again today and meds were reviewed and no changes have occurred  She admits she has not taken her BP meds yet today as she has not eaten yet for the am   She denies missing doses of meds or SE with the meds  She does check her BP outside the office and numbers were brought in and reviewed today in detail - ranging from 118/53 to 143/66 with an average of mid 130's/60's  She notes no frequent Ha's/dizziness/double vision/CP  Review of Systems   Constitutional: Negative for chills and fever  HENT: Negative for congestion and sore throat  Eyes: Negative for pain and visual disturbance  Respiratory: Negative for cough, shortness of breath and wheezing  Cardiovascular: Negative for chest pain, palpitations and leg swelling  Gastrointestinal: Negative for abdominal pain, diarrhea, nausea and vomiting  Endocrine: Negative for polydipsia and polyuria  Genitourinary: Negative for difficulty urinating and dysuria  Musculoskeletal: Negative for back pain and neck pain  Skin: Negative for rash and wound  Neurological: Negative for dizziness, syncope and headaches  Hematological: Negative for adenopathy  Psychiatric/Behavioral: Negative for behavioral problems and confusion  Objective:    /62   Pulse 80   Temp (!) 97 °F (36 1 °C)   Ht 5' 2" (1 575 m)   Wt 66 2 kg (146 lb)   BMI 26 70 kg/m²      Physical Exam   Constitutional: She appears well-developed and well-nourished  No distress  HENT:   Head: Normocephalic and atraumatic  Eyes: Conjunctivae are normal  Right eye exhibits no discharge  Left eye exhibits no discharge  Neck: Neck supple  No tracheal deviation present  Cardiovascular: Normal rate, regular rhythm and normal heart sounds  Exam reveals no friction rub  No murmur heard  Pulmonary/Chest: Effort normal and breath sounds normal  No respiratory distress  She has no wheezes  She has no rales  Musculoskeletal: She exhibits no edema     Neurological: She is alert  She exhibits normal muscle tone  Skin: Skin is warm and dry  No rash noted  Psychiatric: She has a normal mood and affect  Her behavior is normal    Nursing note and vitals reviewed

## 2018-11-20 NOTE — ASSESSMENT & PLAN NOTE
Bp acceptable with decrease in lisinopril, home numbers look good as well - con't current regimen, med list updated

## 2018-11-20 NOTE — ASSESSMENT & PLAN NOTE
Lab Results   Component Value Date    HGBA1C 6 8 (H) 07/13/2018       No results for input(s): POCGLU in the last 72 hours      Blood Sugar Average: Last 72 hrs:   Home readings up since decrease in [place] and [place] and D/C of Metformin with worsening kidney dz - home numbers averaging 170-180's - will con't current dec dose of Januvia and add very loss dose Glipizide 2 5 mg once daily, SE of hypoglycemia as well as s/sx of hypoglycemia were reviewed, urged pt to check BS every day for first week after starting new med and call with any readings consistently < 120's; con't elevated BUN/Cr - was referred to Nephro - has to make appt for new year as they did not have calender open at office for 2019 yet - call with any issues

## 2018-12-05 DIAGNOSIS — E78.5 DYSLIPIDEMIA: ICD-10-CM

## 2018-12-05 DIAGNOSIS — I10 ESSENTIAL HYPERTENSION: ICD-10-CM

## 2018-12-05 DIAGNOSIS — I10 ESSENTIAL HYPERTENSION: Primary | ICD-10-CM

## 2018-12-05 RX ORDER — CLONIDINE 0.1 MG/24H
1 PATCH, EXTENDED RELEASE TRANSDERMAL WEEKLY
Qty: 4 PATCH | Refills: 0 | Status: SHIPPED | OUTPATIENT
Start: 2018-12-05 | End: 2018-12-07 | Stop reason: SDUPTHER

## 2018-12-05 RX ORDER — SIMVASTATIN 20 MG
20 TABLET ORAL
Qty: 30 TABLET | Refills: 3 | Status: SHIPPED | OUTPATIENT
Start: 2018-12-05 | End: 2019-05-17 | Stop reason: SDUPTHER

## 2018-12-05 NOTE — TELEPHONE ENCOUNTER
Patient is out of medications  She needs the clonidine patches called in and I can't order it  Can you please take care of it?

## 2018-12-07 ENCOUNTER — OFFICE VISIT (OUTPATIENT)
Dept: CARDIOLOGY CLINIC | Facility: CLINIC | Age: 83
End: 2018-12-07
Payer: COMMERCIAL

## 2018-12-07 VITALS
HEART RATE: 74 BPM | WEIGHT: 147.2 LBS | SYSTOLIC BLOOD PRESSURE: 166 MMHG | DIASTOLIC BLOOD PRESSURE: 72 MMHG | BODY MASS INDEX: 27.09 KG/M2 | HEIGHT: 62 IN

## 2018-12-07 DIAGNOSIS — I49.3 PVC (PREMATURE VENTRICULAR CONTRACTION): ICD-10-CM

## 2018-12-07 DIAGNOSIS — I10 ESSENTIAL HYPERTENSION: Primary | ICD-10-CM

## 2018-12-07 DIAGNOSIS — E78.5 DYSLIPIDEMIA: ICD-10-CM

## 2018-12-07 PROCEDURE — 93000 ELECTROCARDIOGRAM COMPLETE: CPT | Performed by: INTERNAL MEDICINE

## 2018-12-07 PROCEDURE — 99214 OFFICE O/P EST MOD 30 MIN: CPT | Performed by: INTERNAL MEDICINE

## 2018-12-07 NOTE — PATIENT INSTRUCTIONS
Low-Sodium Diet   AMBULATORY CARE:   A low-sodium diet  limits foods that are high in sodium (salt)  You will need to follow a low-sodium diet if you have high blood pressure, kidney disease, or heart failure  You may also need to follow this diet if you have a condition that is causing your body to retain (hold) extra fluid  You may need to limit the amount of sodium you eat to 1,500 mg  Ask your healthcare provider how much sodium you can have each day  How to use food labels to choose foods that are low in sodium:  Read food labels to find the amount of sodium they contain  The amount of sodium is listed in milligrams (mg)  The % Daily Value (DV) column tells you how much of your daily needs are met by 1 serving of the food for each nutrient listed  Choose foods that have less than 5% of the DV of sodium  These foods are considered low in sodium  Foods that have 20% or more of the DV of sodium are considered high in sodium  Some food labels may also list any of the following terms that tell you about the sodium content in the food:  · Sodium-free:  Less than 5 mg in each serving    · Very low sodium:  35 mg of sodium or less in each serving    · Low sodium:  140 mg of sodium or less in each serving    · Reduced sodium: At least 25% less sodium in each serving than the regular type    · Light in sodium:  50% less sodium in each serving    · Unsalted or no added salt:  No extra salt is added during processing (the food may still contain sodium)  Foods to avoid:  Salty foods are high in sodium   You should avoid the following:  · Processed foods:      ¨ Mixes for cornbread, biscuits, cake, and pudding     ¨ Instant foods, such as potatoes, cereals, noodles, and rice     ¨ Packaged foods, such as bread stuffing, rice and pasta mixes, snack dip mixes, and macaroni and cheese     ¨ Canned foods, such as canned vegetables, soups, broths, sauces, and vegetable or tomato juice    ¨ Snack foods, such as salted chips, popcorn, pretzels, pork rinds, salted crackers, and salted nuts    ¨ Frozen foods, such as dinners, entrees, vegetables with sauces, and breaded meats    ¨ Sauerkraut, pickled vegetables, and other foods prepared in brine    · Meats and cheeses:      ¨ Smoked or cured meat, such as corned beef, piper, ham, hot dogs, and sausage    ¨ Canned meats or spreads, such as potted meats, sardines, anchovies, and imitation seafood    ¨ Deli or lunch meats, such as bologna, ham, turkey, and roast beef    ¨ Processed cheese, such as American cheese and cheese spreads    · Condiments, sauces, and seasonings:      ¨ Salt (¼ teaspoon of salt contains 575 mg of sodium)    ¨ Seasonings made with salt, such as garlic salt, celery salt, onion salt, and seasoned salt    ¨ Regular soy sauce, barbecue sauce, teriyaki sauce, steak sauce, Worcestershire sauce, and most flavored vinegars    ¨ Canned gravy and mixes     ¨ Regular condiments, such as mustard, ketchup, and salad dressings    ¨ Pickles and olives    ¨ Meat tenderizers and monosodium glutamate (MSG)  Foods to include:  Read the food label to find the exact amount of sodium in each serving  · Bread and cereal:  Try to choose breads with less than 80 mg of sodium per serving  ¨ Bread, roll, damon, tortilla, or unsalted crackers  ¨ Ready-to-eat cereals with less than 5% DV of sodium (examples include shredded wheat and puffed rice)    ¨ Pasta    · Vegetables and fruits:      ¨ Unsalted fresh, frozen, or canned vegetables    ¨ Fresh, frozen, or canned fruits    ¨ Fruit juice    · Dairy:  One serving has about 150 mg of sodium  ¨ Milk, all types    ¨ Yogurt    ¨ Hard cheese, such as cheddar, Swiss, [employer], or mozzarella    · Meat and other protein foods:  Some raw meats may have added sodium       ¨ Plain meats, fish, and poultry     ¨ Eggs    · Other foods:      ¨ Homemade pudding    ¨ Unsalted nuts, popcorn, or pretzels    ¨ Unsalted butter or margarine  Ways to decrease sodium:   · Add spices and herbs to foods instead of salt during cooking  Use salt-free seasonings to add flavor to foods  Examples include onion powder, garlic powder, basil, desai powder, paprika, and parsley  Try lemon or lime juice or vinegar to give foods a tart flavor  Use hot peppers, pepper, or cayenne pepper to add a spicy flavor to foods  · Do not keep a salt shaker at your kitchen table  This may help keep you from adding salt to food at the table  It may take time to get used to enjoying the natural flavor of food instead of adding salt  Talk to your healthcare provider before you use salt substitutes  Some salt substitutes have a high amount of potassium and need to be avoided if you have kidney disease  · Choose low-sodium foods at restaurants  Meals from restaurants are often high in sodium  Some restaurants have nutrition information on the menu that tells you the amount of sodium in their foods  If possible, ask for your food to be prepared with less, or no salt  · Shop for unsalted or low-sodium foods and snacks at the grocery store  Examples include unsalted or low-sodium broths, soups, and canned vegetables  Choose fresh or frozen vegetables instead  Choose unsalted nuts or seeds or fresh fruits or vegetables as snacks  Read food labels and choose salt-free, very low-sodium, or low-sodium foods  © 2017 [address] [employer] Information is for End User's use only and may not be sold, redistributed or otherwise used for commercial purposes  All illustrations and images included in CareNotes® are the copyrighted property of A D A M [employer] Inc  or [employer]  The above information is an  only  It is not intended as medical advice for individual conditions or treatments  Talk to your doctor, nurse or pharmacist before following any medical regimen to see if it is safe and effective for you

## 2018-12-07 NOTE — PROGRESS NOTES
Cardiology Follow Up    Tonya Saskia  1935  [address] [facility] [phone]    1  Essential hypertension     2  PVC (premature ventricular contraction)  POCT ECG   3  Dyslipidemia         Interval History:     Jorge Luis Batista is here for routine follow-up  She has 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 but has been asymptomatic as of late  With her blood pressure running high at times we had made some changes over the last few appointments, which included switching her from atenolol to Bystolic  We tried decreasing her nifedipine due to lower extremity edema, but had to go back up on this with her blood pressure accelerating  At some point she was also started on a clonidine patch  At home now her blood pressures are normal, although the they are elevated today  Jorge Luis Batista feels well  No CP/SOB and no exercise limitations  She denies palpitations  She has had no other symptoms of CHF and she denies LH/Dizziness or any syncope  She does get intermittent lower extremity edema, that is not bothersome        Patient Active Problem List   Diagnosis    Type 2 diabetes mellitus with stage 3 chronic kidney disease, without long-term current use of insulin ([facility])    Rhinitis    Premature ventricular contraction    Osteopenia    Hypokalemia    Hypertension    GERD (gastroesophageal reflux disease)    Dyslipidemia    Diabetic polyneuropathy ([address] )    Anxiety disorder    Dependent edema     Past Medical History:   Diagnosis Date    Aortic valve insufficiency     Cataract of both eyes     Dysuria     last assessed - [id number]    Edema     last assessed - [id number]    GERD (gastroesophageal reflux disease)     last assessed - [id number]    Low back pain     last assessed - [id number]    Mitral valve disorder     Osteoporosis     last assessed - [id number]   Adan Pert Palpitations      Social History     Social History    Marital status: Single     Spouse name: N/A    Number of children: N/A    Years of education: N/A     Occupational History    Not on file       Social History Main Topics    Smoking status: Never Smoker    Smokeless tobacco: Never Used    Alcohol use Yes      Comment: social drinker    Drug use: Unknown    Sexual activity: Not on file     Other Topics Concern    Not on file     Social History Narrative    Living with relatives (other than parents)    Marital History -       Family History   Problem Relation Age of Onset    Kidney disease Mother         Chronic    Breast cancer Sister     Breast cancer Sister      Past Surgical History:   Procedure Laterality Date    APPENDECTOMY      CATARACT EXTRACTION      TUBAL LIGATION         Current Outpatient Prescriptions:     ACCU-CHEK FASTCLIX LANCETS MISC, by Does not apply route daily, Disp: 102 each, Rfl: 3    aspirin 81 MG tablet, Take 1 tablet by mouth daily, Disp: , Rfl:     Blood Glucose Monitoring Suppl (ACCU-CHEK PAUL PLUS) w/Device KIT, by Does not apply route daily, Disp: 1 kit, Rfl: 0    Calcium 600 MG tablet, Take 1 tablet by mouth 2 (two) times a day, Disp: , Rfl:     Cetirizine HCl (ZYRTEC ALLERGY) 10 MG CAPS, Take 1 tablet by mouth daily as needed, Disp: , Rfl:     cholecalciferol (VITAMIN D3) 1,000 units tablet, Take 2 tablets by mouth daily, Disp: , Rfl:     cloNIDine (CATAPRES-TTS-1) 0 1 mg/24 hr, APPLY 1 PATCH WEEKLY AS DIRECTED , Disp: 4 patch, Rfl: 3    gabapentin (NEURONTIN) 100 mg capsule, Take 1 capsule (100 mg total) by mouth daily at bedtime, Disp: 30 capsule, Rfl: 6    glipiZIDE (GLUCOTROL) 5 mg tablet, Take 0 5 tablets (2 5 mg total) by mouth daily, Disp: 15 tablet, Rfl: 3    glucose blood (ACCU-CHEK PAUL PLUS) test strip, Use as instructed, Disp: 100 each, Rfl: 3    hydrochlorothiazide (HYDRODIURIL) 25 mg tablet, TAKE 1 TABLET BY MOUTH EVERY DAY, Disp: 30 tablet, Rfl: 6    lisinopril (ZESTRIL) 20 mg tablet, Take 1 tablet (20 mg total) by mouth daily, Disp: 90 tablet, Rfl: 0    nebivolol (BYSTOLIC) 20 MG tablet, Take 1 tablet (20 mg total) by mouth daily, Disp: 30 tablet, Rfl: 6    NIFEdipine (ADALAT CC) 90 mg 24 hr tablet, Take 1 tablet (90 mg total) by mouth daily, Disp: 30 tablet, Rfl: 5    omeprazole (PriLOSEC) 20 mg delayed release capsule, Take 1 capsule (20 mg total) by mouth daily, Disp: 30 capsule, Rfl: 5    Potassium Gluconate 595 (99 K) MG TABS, Take 1 tablet by mouth 3 (three) times a day  , Disp: , Rfl:     sertraline (ZOLOFT) 25 mg tablet, TAKE 1 TABLET BY MOUTH EVERY DAY, Disp: 30 tablet, Rfl: 5    simvastatin (ZOCOR) 20 mg tablet, Take 1 tablet (20 mg total) by mouth daily at bedtime, Disp: 30 tablet, Rfl: 3    sitaGLIPtin (JANUVIA) 50 mg tablet, Take 1 tablet (50 mg total) by mouth daily, Disp: 90 tablet, Rfl: 1  No Known Allergies    Labs:  Lab Results   Component Value Date     01/02/2018    K 3 6 08/24/2018    CL 97 08/24/2018    CO2 26 08/24/2018    BUN 21 08/24/2018    CREATININE 1 47 (H) 01/02/2018    GLUCOSE 145 (H) 01/02/2018    CALCIUM 9 8 01/02/2018     Lab Results   Component Value Date    WBC 11 7 (H) 01/02/2018    HGB 11 2 01/02/2018    HCT 34 5 01/02/2018    MCV 90 01/02/2018     01/02/2018     Lab Results   Component Value Date    CHOL 168 01/02/2018    TRIG 197 (H) 01/02/2018    HDL 51 01/02/2018     Imaging: ECG today shows NSR with borderline LVH  Review of Systems:  Review of Systems   Constitutional: Negative  HENT: Negative  Eyes: Negative  Respiratory: Negative  Cardiovascular: Positive for leg swelling  Gastrointestinal: Negative  Musculoskeletal: Negative  Skin: Negative  Allergic/Immunologic: Negative  Neurological: Negative  Hematological: Negative  Psychiatric/Behavioral: Negative  All other systems reviewed and are negative        Physical Exam:  Physical Exam Constitutional: She is oriented to person, place, and time  She appears well-developed and well-nourished  HENT:   Head: Normocephalic and atraumatic  Eyes: Pupils are equal, round, and reactive to light  EOM are normal  Right eye exhibits no discharge  Left eye exhibits no discharge  No scleral icterus  Neck: Normal range of motion  Neck supple  No JVD present  No thyromegaly present  Cardiovascular: Normal rate, regular rhythm, S1 normal, S2 normal, normal heart sounds, intact distal pulses and normal pulses  No extrasystoles are present  Exam reveals no gallop and no friction rub  No murmur heard  Pulmonary/Chest: Effort normal and breath sounds normal  No respiratory distress  She has no wheezes  She has no rales  Abdominal: Soft  Bowel sounds are normal  She exhibits no distension  There is no tenderness  Musculoskeletal: Normal range of motion  She exhibits no edema, tenderness or deformity  Neurological: She is alert and oriented to person, place, and time  No cranial nerve deficit  Skin: Skin is warm and dry  No rash noted  Psychiatric: She has a normal mood and affect  Judgment and thought content normal    Nursing note and vitals reviewed  Discussion/Summary:    1  HTN  - Her blood pressure is elevated today, but her checks at home have been normal   She also correlated her blood pressure cuff in the past make sure was accurate  No changes were made today  I have encouraged her to continue checking her blood pressure at home  We went over adhering to a low-sodium diet, using compression stockings and elevating her legs when she can to help her edema  If her edema becomes more bothersome we will change her therapy around and decrease her Nifedipine  2  Dyslipidemia - Controlled on simvastatin  She has blood work followed regularly by her PCP  3  PVCs - Asymptomatic as of late    ECG today is for the most part normal       Counseling / Coordination of Care  Total office / unit time spent today 25 minutes  Greater than 50% of total time was spent with the patient and / or family counseling and / or coordination of care

## 2018-12-12 DIAGNOSIS — Z00.00 HEALTH CARE MAINTENANCE: ICD-10-CM

## 2018-12-12 DIAGNOSIS — I10 ESSENTIAL HYPERTENSION: ICD-10-CM

## 2018-12-12 RX ORDER — OMEPRAZOLE 20 MG/1
20 CAPSULE, DELAYED RELEASE ORAL DAILY
Qty: 30 CAPSULE | Refills: 6 | Status: SHIPPED | OUTPATIENT
Start: 2018-12-12 | End: 2018-12-15

## 2018-12-12 RX ORDER — CLONIDINE 0.1 MG/24H
1 PATCH, EXTENDED RELEASE TRANSDERMAL WEEKLY
Qty: 4 PATCH | Refills: 6 | Status: SHIPPED | OUTPATIENT
Start: 2018-12-12 | End: 2019-06-22 | Stop reason: SDUPTHER

## 2018-12-12 RX ORDER — OMEPRAZOLE 20 MG/1
20 CAPSULE, DELAYED RELEASE ORAL DAILY
Qty: 30 CAPSULE | Refills: 0 | OUTPATIENT
Start: 2018-12-12

## 2018-12-14 ENCOUNTER — TELEPHONE (OUTPATIENT)
Dept: FAMILY MEDICINE CLINIC | Facility: HOSPITAL | Age: 83
End: 2018-12-14

## 2018-12-14 NOTE — TELEPHONE ENCOUNTER
Honora Mormon calling about her mother-in-law  She had a "episode" yesterday  She got up and was dizzy, weak legs  Neck was hurting a little  A couple nights ago she was eating and she was very "slow in swallowing"  Since yesterday, all the symptoms went away  You did start her on a new med  Could this be from that or should she make an appt? She is feeling fine now  Please advise  Please call Cherelle Díaz back at [phone]  Thanks  Her sugar this morning was 129--which was OK for her

## 2018-12-14 NOTE — TELEPHONE ENCOUNTER
Not able to bring her in today--made appt for tomorrow morning  I told her if any symptoms return, go right to ER

## 2018-12-15 ENCOUNTER — OFFICE VISIT (OUTPATIENT)
Dept: FAMILY MEDICINE CLINIC | Facility: HOSPITAL | Age: 83
End: 2018-12-15
Payer: COMMERCIAL

## 2018-12-15 VITALS
OXYGEN SATURATION: 95 % | TEMPERATURE: 96.8 F | WEIGHT: 144.4 LBS | DIASTOLIC BLOOD PRESSURE: 60 MMHG | BODY MASS INDEX: 26.57 KG/M2 | SYSTOLIC BLOOD PRESSURE: 140 MMHG | HEIGHT: 62 IN | HEART RATE: 76 BPM

## 2018-12-15 DIAGNOSIS — R13.10 DYSPHAGIA, UNSPECIFIED TYPE: ICD-10-CM

## 2018-12-15 DIAGNOSIS — K21.00 GASTROESOPHAGEAL REFLUX DISEASE WITH ESOPHAGITIS: ICD-10-CM

## 2018-12-15 DIAGNOSIS — H81.10 BENIGN PAROXYSMAL POSITIONAL VERTIGO, UNSPECIFIED LATERALITY: Primary | ICD-10-CM

## 2018-12-15 DIAGNOSIS — Z00.00 HEALTH CARE MAINTENANCE: ICD-10-CM

## 2018-12-15 PROCEDURE — 1036F TOBACCO NON-USER: CPT | Performed by: INTERNAL MEDICINE

## 2018-12-15 PROCEDURE — 1101F PT FALLS ASSESS-DOCD LE1/YR: CPT | Performed by: INTERNAL MEDICINE

## 2018-12-15 PROCEDURE — 3008F BODY MASS INDEX DOCD: CPT | Performed by: INTERNAL MEDICINE

## 2018-12-15 PROCEDURE — 4040F PNEUMOC VAC/ADMIN/RCVD: CPT | Performed by: INTERNAL MEDICINE

## 2018-12-15 PROCEDURE — 99214 OFFICE O/P EST MOD 30 MIN: CPT | Performed by: INTERNAL MEDICINE

## 2018-12-15 PROCEDURE — 1160F RVW MEDS BY RX/DR IN RCRD: CPT | Performed by: INTERNAL MEDICINE

## 2018-12-15 RX ORDER — RANITIDINE 150 MG/1
150 CAPSULE ORAL 2 TIMES DAILY
Qty: 60 CAPSULE | Refills: 3 | Status: SHIPPED | OUTPATIENT
Start: 2018-12-15 | End: 2019-08-16 | Stop reason: ALTCHOICE

## 2018-12-15 NOTE — ASSESSMENT & PLAN NOTE
Still with symptoms despite low dose Omeprazole daily, will stop Omeprazole and try a trial of Ranitidine 150 mg bid - lifestyle/dietary triggers reviewed and urged to avoid if able to identify, may need GI if persists/worsens - call if this occurs

## 2018-12-15 NOTE — PROGRESS NOTES
Assessment/Plan:    GERD (gastroesophageal reflux disease)  Still with symptoms despite low dose Omeprazole daily, will stop Omeprazole and try a trial of Ranitidine 150 mg bid - lifestyle/dietary triggers reviewed and urged to avoid if able to identify, may need GI if persists/worsens - call if this occurs       Diagnoses and all orders for this visit:    Benign paroxysmal positional vertigo, unspecified laterality  Comments:  Reassured pt that symptoms and exam are c/w BPPV - urged to keep hydrated and changes positions slowly, orthostatics neg, red flag Neuro symptoms reviewed and urged to call/go to ER if they occur, pt denies low BS readings but also encouraged to check BS if symptoms reoccur as well and call with any readings < 100    Dysphagia, unspecified type  Comments:  Switch PPI to H2 blocker bid, avoid triggers of GERD, check swallow study, may need further GI eval, call with new/worse symptoms  Orders:  -     ranitidine (ZANTAC) 150 MG capsule; Take 1 capsule (150 mg total) by mouth 2 (two) times a day  -     FL barium swallow; Future    Gastroesophageal reflux disease with esophagitis  -     ranitidine (ZANTAC) 150 MG capsule; Take 1 capsule (150 mg total) by mouth 2 (two) times a day    Health care maintenance          Subjective:      Patient ID: Chuck Bolanos is a 80 y o  female  HPI Pt here with dgtr for concern over an "episode" 2 days  She was sitting in living room watching TV and got up to go in to the kitchen and she "felt like I was going to faint"  She states she felt like her legs were weak  She denies actual dizziness or spinning sensation  She had no actual LOC  She notes no associated slurred speech/word finding difficulty/vision loss or changes/focal weakness or numbness or tingling  She states she was eating and drinking well but did not check her sugars  She was just started on Glipizide the end of Nov and denies any significant low readings    She had some associated back pain but then states this is not new/worse or different then her chronic back pain  She notes the day prior on Wednesday she had some pain in the bottom jaw while she was chewing and noted while eating mashed potatoes and beef and felt like "it didn't go down smoothly, like it was slower going down"  She notes no actual pain with the swallowing  She denies abd pain/wgt loss/N/V/D/C/blood in the stool/change in voice  She notes no reoccurrence of jaw pain or swallowing sensation  She has been having some issues with indigestion recently - burning in throat - despite using omeprazole q day  Review of Systems   Constitutional: Negative for appetite change, chills, fever and unexpected weight change  HENT: Negative for congestion, hearing loss and sore throat  Eyes: Negative for pain and visual disturbance  Respiratory: Negative for cough, shortness of breath and wheezing  Cardiovascular: Negative for chest pain and palpitations  Gastrointestinal: Negative for abdominal pain, blood in stool, constipation, diarrhea and nausea  Genitourinary: Negative for difficulty urinating and dysuria  Musculoskeletal: Positive for back pain  Negative for neck pain  Skin: Negative for rash and wound  Neurological: Positive for dizziness  Negative for syncope, light-headedness, numbness and headaches  Hematological: Negative for adenopathy  Psychiatric/Behavioral: Negative for behavioral problems and confusion  Objective:    /60 (Patient Position: Sitting, Cuff Size: Standard)   Pulse 76   Temp (!) 96 8 °F (36 °C) (Tympanic)   Ht 5' 2" (1 575 m)   Wt 65 5 kg (144 lb 6 4 oz)   SpO2 95%   BMI 26 41 kg/m²      Physical Exam   Constitutional: She is oriented to person, place, and time  She appears well-developed and well-nourished  No distress  HENT:   Head: Normocephalic and atraumatic     Right Ear: External ear normal    Left Ear: External ear normal    Mouth/Throat: Oropharynx is clear and moist    Eyes: Conjunctivae are normal  Right eye exhibits no discharge  Left eye exhibits no discharge  Neck: Neck supple  No tracheal deviation present  Cardiovascular: Normal rate, regular rhythm and normal heart sounds  Exam reveals no friction rub  No murmur heard  Neg carotid bruits B/L   Pulmonary/Chest: Effort normal and breath sounds normal  No respiratory distress  She has no wheezes  She has no rales  Abdominal: Soft  She exhibits no distension  There is no tenderness  There is no guarding  Musculoskeletal: She exhibits no edema  Nml gait, 5/5 global muscle strength   Lymphadenopathy:     She has no cervical adenopathy  Neurological: She is alert and oriented to person, place, and time  She exhibits normal muscle tone  Coordination normal    CN II- XII intact, sensation intact to light touch globally, 2/4 patellar reflexes B/L LE, nml FN and HS, neg Rhomberg   Skin: Skin is warm and dry  No rash noted  Psychiatric: She has a normal mood and affect  Her behavior is normal    Nursing note and vitals reviewed      Orthostatics checked and BP/HR:   Lying 142/58 HR 72  Sitting 140/60 HR 72  Standing 136/60 HR 80

## 2018-12-25 ENCOUNTER — HOSPITAL ENCOUNTER (EMERGENCY)
Facility: HOSPITAL | Age: 83
Discharge: HOME | End: 2018-12-25
Attending: EMERGENCY MEDICINE
Payer: COMMERCIAL

## 2018-12-25 VITALS
DIASTOLIC BLOOD PRESSURE: 98 MMHG | WEIGHT: 144.4 LBS | RESPIRATION RATE: 20 BRPM | BODY MASS INDEX: 26.41 KG/M2 | OXYGEN SATURATION: 98 % | TEMPERATURE: 98.1 F | HEART RATE: 72 BPM | SYSTOLIC BLOOD PRESSURE: 189 MMHG

## 2018-12-25 DIAGNOSIS — K04.7 DENTAL ABSCESS: Primary | ICD-10-CM

## 2018-12-25 PROCEDURE — 99282 EMERGENCY DEPT VISIT SF MDM: CPT

## 2018-12-25 RX ORDER — AMOXICILLIN AND CLAVULANATE POTASSIUM 875; 125 MG/1; MG/1
1 TABLET, FILM COATED ORAL ONCE
Status: COMPLETED | OUTPATIENT
Start: 2018-12-25 | End: 2018-12-25

## 2018-12-25 RX ORDER — AMOXICILLIN AND CLAVULANATE POTASSIUM 875; 125 MG/1; MG/1
1 TABLET, FILM COATED ORAL EVERY 12 HOURS SCHEDULED
Qty: 14 TABLET | Refills: 0 | Status: SHIPPED | OUTPATIENT
Start: 2018-12-25 | End: 2019-01-01

## 2018-12-25 RX ORDER — LIDOCAINE HYDROCHLORIDE AND EPINEPHRINE 10; 10 MG/ML; UG/ML
5 INJECTION, SOLUTION INFILTRATION; PERINEURAL ONCE
Status: COMPLETED | OUTPATIENT
Start: 2018-12-25 | End: 2018-12-25

## 2018-12-25 RX ADMIN — AMOXICILLIN AND CLAVULANATE POTASSIUM 1 TABLET: 875; 125 TABLET, FILM COATED ORAL at 12:03

## 2018-12-25 RX ADMIN — LIDOCAINE HYDROCHLORIDE,EPINEPHRINE BITARTRATE 5 ML: 10; .01 INJECTION, SOLUTION INFILTRATION; PERINEURAL at 12:03

## 2018-12-25 NOTE — ED PROVIDER NOTES
History  Chief Complaint   Patient presents with    Oral Pain     To Ed with c/o pain and swelling in right lower jaw since Sunday  Denies any fever or chills  79 yo female w/ hx of NIDDM, GERD and HTN presents to the Emergency Department for evaluation of R lower gingival swelling and dental pain beginning 2d ago  She began taking an antibiotic given to her by her daughter, 875mg amoxicillin  Took one dose last PM and one dose this AM  No associated fever, chills, sweats, dizziness, jaw pain, neck pain or dysphagia  She has a dentist but has not yet contacted them for follow up  Pain is rated 5/10, non radiating, w/o modifying factors  Prior to Admission Medications   Prescriptions Last Dose Informant Patient Reported? Taking?    ACCU-CHEK FASTCLIX LANCETS MISC   No No   Sig: by Does not apply route daily   Calcium 600 MG tablet   Yes No   Sig: Take 1 tablet by mouth 2 (two) times a day   Cetirizine HCl (ZYRTEC ALLERGY) 10 MG CAPS   Yes No   Sig: Take 1 tablet by mouth daily as needed   NIFEdipine (ADALAT CC) 90 mg 24 hr tablet   No No   Sig: Take 1 tablet (90 mg total) by mouth daily   Potassium Gluconate 595 (99 K) MG TABS  Self Yes No   Sig: Take 1 tablet by mouth 3 (three) times a day     aspirin 81 MG tablet   Yes No   Sig: Take 1 tablet by mouth daily   cholecalciferol (VITAMIN D3) 1,000 units tablet   Yes No   Sig: Take 2 tablets by mouth daily   cloNIDine (CATAPRES-TTS-1) 0 1 mg/24 hr   No No   Sig: Place 1 patch (0 1 mg total) on the skin once a week   gabapentin (NEURONTIN) 100 mg capsule   No No   Sig: Take 1 capsule (100 mg total) by mouth daily at bedtime   glipiZIDE (GLUCOTROL) 5 mg tablet   No No   Sig: Take 0 5 tablets (2 5 mg total) by mouth daily   glucose blood (ACCU-CHEK PAUL PLUS) test strip   No No   Sig: Use as instructed   hydrochlorothiazide (HYDRODIURIL) 25 mg tablet   No No   Sig: TAKE 1 TABLET BY MOUTH EVERY DAY   lisinopril (ZESTRIL) 20 mg tablet   No No   Sig: Take 1 tablet (20 mg total) by mouth daily   nebivolol (BYSTOLIC) 20 MG tablet   No No   Sig: Take 1 tablet (20 mg total) by mouth daily   ranitidine (ZANTAC) 150 MG capsule   No No   Sig: Take 1 capsule (150 mg total) by mouth 2 (two) times a day   sertraline (ZOLOFT) 25 mg tablet   No No   Sig: TAKE 1 TABLET BY MOUTH EVERY DAY   simvastatin (ZOCOR) 20 mg tablet   No No   Sig: Take 1 tablet (20 mg total) by mouth daily at bedtime   sitaGLIPtin (JANUVIA) 50 mg tablet   No No   Sig: Take 1 tablet (50 mg total) by mouth daily      Facility-Administered Medications: None       Past Medical History:   Diagnosis Date    Aortic valve insufficiency     Cataract of both eyes     Dysuria     last assessed - [id number]    Edema     last assessed - [id number]    GERD (gastroesophageal reflux disease)     last assessed - [id number]    Low back pain     last assessed - [id number]    Mitral valve disorder     Osteoporosis     last assessed - [id number]    Palpitations        Past Surgical History:   Procedure Laterality Date    APPENDECTOMY      CATARACT EXTRACTION      TUBAL LIGATION         Family History   Problem Relation Age of Onset    Kidney disease Mother         Chronic    Breast cancer Sister     Breast cancer Sister      I have reviewed and agree with the history as documented  Social History   Substance Use Topics    Smoking status: Never Smoker    Smokeless tobacco: Never Used    Alcohol use Yes      Comment: social drinker        Review of Systems   Constitutional: Negative for chills, diaphoresis and fever  HENT: Positive for dental problem  Negative for facial swelling, sore throat, trouble swallowing and voice change  Respiratory: Negative for shortness of breath  Cardiovascular: Negative for chest pain  Gastrointestinal: Negative for nausea and vomiting  Musculoskeletal: Negative for arthralgias, myalgias, neck pain and neck stiffness  Skin: Negative for color change  Allergic/Immunologic: Negative for immunocompromised state  Neurological: Negative for dizziness and headaches  Physical Exam  Physical Exam   Constitutional: She is oriented to person, place, and time  She appears well-developed and well-nourished  No distress  HENT:   Head: Normocephalic and atraumatic  Mouth/Throat: Oropharynx is clear and moist    3cm dental abscess to the R inferior, anterior gingiva, teeth 25-27, w/ scant active purulent discharge   Eyes: Pupils are equal, round, and reactive to light  No scleral icterus  Neck: No JVD present  Cardiovascular: Normal rate and regular rhythm  Exam reveals no gallop and no friction rub  No murmur heard  Pulmonary/Chest: No respiratory distress  She has no wheezes  She has no rales  Lymphadenopathy:     She has no cervical adenopathy  Neurological: She is alert and oriented to person, place, and time  Skin: Skin is dry  Capillary refill takes less than 2 seconds  She is not diaphoretic  Psychiatric: She has a normal mood and affect  Her behavior is normal    Vitals reviewed        Vital Signs  ED Triage Vitals [12/25/18 1135]   Temperature Pulse Respirations Blood Pressure SpO2   98 1 °F (36 7 °C) 72 20 (!) 189/98 98 %      Temp Source Heart Rate Source Patient Position - Orthostatic VS BP Location FiO2 (%)   Tympanic Monitor Sitting Right arm --      Pain Score       5           Vitals:    12/25/18 1135   BP: (!) 189/98   Pulse: 72   Patient Position - Orthostatic VS: Sitting       Visual Acuity      ED Medications  Medications   lidocaine-epinephrine (XYLOCAINE/EPINEPHRINE) 1 %-1:100,000 injection 5 mL (5 mL Infiltration Given 12/25/18 1203)   amoxicillin-clavulanate (AUGMENTIN) 875-125 mg per tablet 1 tablet (1 tablet Oral Given 12/25/18 1203)       Diagnostic Studies  Results Reviewed     None                 No orders to display              Procedures  Incision/Drainage  Date/Time: 12/25/2018 12:18 PM  Performed by: Reena Mckenzie JEANNETTE  Authorized by: Braeden Franklin     Patient location:  ED  Other Assisting Provider: No    Consent:     Consent obtained:  Verbal    Consent given by:  Patient    Risks discussed:  Bleeding, incomplete drainage, pain and damage to other organs    Alternatives discussed:  No treatment and referral  Universal protocol:     Procedure explained and questions answered to patient or proxy's satisfaction: yes      Relevant documents present and verified: yes      Test results available and properly labeled: yes      Radiology Images displayed and confirmed  If images not available, report reviewed: yes      Immediately prior to procedure a time out was called: yes      Patient identity confirmed:  Verbally with patient  Location:     Type:  Abscess    Location:  Mouth    Mouth location:  Vestibule of mouth  Anesthesia (see MAR for exact dosages): Anesthesia method:  Nerve block    Block needle gauge:  25 G    Block anesthetic:  Lidocaine 1% WITH epi    Block technique:  R infra-alveolar block    Block injection procedure:  Anatomic landmarks identified, anatomic landmarks palpated, introduced needle, negative aspiration for blood and incremental injection    Block outcome:  Anesthesia achieved  Procedure details:     Complexity:  Simple    Incision types:  Stab incision    Scalpel blade:  11    Approach:  Open    Incision depth:  Submucosal    Drainage:  Purulent    Drainage amount: Moderate    Wound treatment:  Wound left open    Packing material: dental packing  Post-procedure details:     Patient tolerance of procedure: Tolerated well, no immediate complications           Phone Contacts  ED Phone Contact    ED Course                               MDM  Number of Diagnoses or Management Options  Dental abscess:   Diagnosis management comments: Simple dental abscess to the oral vestibule w/o e/o Anuel's angina, deep space neck abscess or mandibular osteitis  Will start on Augmentin and advise close dental f/u  Dental packing placed, advised to remove in 6-8 hours, given extra packing to replace should bleeding resume       Amount and/or Complexity of Data Reviewed  Review and summarize past medical records: yes      CritCare Time    Disposition  Final diagnoses:   Dental abscess     Time reflects when diagnosis was documented in both MDM as applicable and the Disposition within this note     Time User Action Codes Description Comment    12/25/2018 12:22 PM Fanta Ingram [K04 7] Dental abscess       ED Disposition     ED Disposition Condition Comment    Discharge  [place] discharge to home/self care      Condition at discharge: Good        Follow-up Information     Follow up With Specialties Details Why Contact Info Additional Information    [address] [facility] [place], [place] Emergency Department Emergency Medicine  If symptoms worsen [address]  [address] [facility] [address] ED, [place] [address], [place], [place], [address]          Discharge Medication List as of 12/25/2018 12:23 PM      START taking these medications    Details   amoxicillin-clavulanate (AUGMENTIN) 875-125 mg per tablet Take 1 tablet by mouth every 12 (twelve) hours for 7 days, Starting Tue 12/25/2018, Until Tue 1/1/2019, Print         CONTINUE these medications which have NOT CHANGED    Details   ACCU-CHEK FASTCLIX LANCETS MISC by Does not apply route daily, Starting Tue 8/21/2018, Normal      aspirin 81 MG tablet Take 1 tablet by mouth daily, Starting Tue 9/4/2012, Historical Med      Calcium 600 MG tablet Take 1 tablet by mouth 2 (two) times a day, Starting Fri 3/18/2016, Historical Med      Cetirizine HCl (ZYRTEC ALLERGY) 10 MG CAPS Take 1 tablet by mouth daily as needed, Starting Tue 1/22/2013, Historical Med      cholecalciferol (VITAMIN D3) 1,000 units tablet Take 2 tablets by mouth daily, Starting Fri 9/21/2012, Historical Med      cloNIDine (CATAPRES-TTS-1) 0 1 mg/24 hr Place 1 patch (0 1 mg total) on the skin once a week, Starting Wed 12/12/2018, Normal      gabapentin (NEURONTIN) 100 mg capsule Take 1 capsule (100 mg total) by mouth daily at bedtime, Starting Fri 8/10/2018, Normal      glipiZIDE (GLUCOTROL) 5 mg tablet Take 0 5 tablets (2 5 mg total) by mouth daily, Starting Tue 11/20/2018, Normal      glucose blood (ACCU-CHEK PAUL PLUS) test strip Use as instructed, Normal      hydrochlorothiazide (HYDRODIURIL) 25 mg tablet TAKE 1 TABLET BY MOUTH EVERY DAY, Normal      lisinopril (ZESTRIL) 20 mg tablet Take 1 tablet (20 mg total) by mouth daily, Starting Tue 11/20/2018, No Print      nebivolol (BYSTOLIC) 20 MG tablet Take 1 tablet (20 mg total) by mouth daily, Starting Wed 11/7/2018, Normal      NIFEdipine (ADALAT CC) 90 mg 24 hr tablet Take 1 tablet (90 mg total) by mouth daily, Starting Tue 7/24/2018, Normal      Potassium Gluconate 595 (99 K) MG TABS Take 1 tablet by mouth 3 (three) times a day  , Starting Tue 3/3/2015, Historical Med      ranitidine (ZANTAC) 150 MG capsule Take 1 capsule (150 mg total) by mouth 2 (two) times a day, Starting Sat 12/15/2018, Normal      sertraline (ZOLOFT) 25 mg tablet TAKE 1 TABLET BY MOUTH EVERY DAY, Normal      simvastatin (ZOCOR) 20 mg tablet Take 1 tablet (20 mg total) by mouth daily at bedtime, Starting Wed 12/5/2018, Normal      sitaGLIPtin (JANUVIA) 50 mg tablet Take 1 tablet (50 mg total) by mouth daily, Starting Mon 8/27/2018, Normal           No discharge procedures on file      ED Provider  Electronically Signed by           Kj Vivas PA-C  12/25/18 6038

## 2018-12-25 NOTE — DISCHARGE INSTRUCTIONS
Call your dentist tomorrow morning to schedule a follow up appointment      Dental Abscess   WHAT YOU NEED TO KNOW:   A dental abscess is a collection of pus in or around a tooth  A dental abscess is caused by bacteria  The bacteria usually enter the tooth when the enamel (outer part of the tooth) is damaged by tooth decay  Bacteria may also enter the tooth through a break or chip in the tooth, or a cut in the gum  Food particles that are stuck between the teeth for a long time may also lead to an abscess  DISCHARGE INSTRUCTIONS:   Return to the emergency department if:   · You have severe pain  · You have trouble breathing because of pain or swelling  Contact your healthcare provider if:   · Your symptoms get worse, even after treatment  · Your mouth is bleeding  · You cannot eat or drink because of pain or swelling  · Your abscess returns  · You have an injury that causes a crack in your tooth  · You have questions or concerns about your condition or care  Medicines: You may  need any of the following:  · Antibiotics  help treat a bacterial infection  · NSAIDs , such as ibuprofen, help decrease swelling, pain, and fever  This medicine is available with or without a doctor's order  NSAIDs can cause stomach bleeding or kidney problems in certain people  If you take blood thinner medicine, always ask your healthcare provider if NSAIDs are safe for you  Always read the medicine label and follow directions  · Acetaminophen  decreases pain and fever  It is available without a doctor's order  Ask how much to take and how often to take it  Follow directions  Read the labels of all other medicines you are using to see if they also contain acetaminophen, or ask your doctor or pharmacist  Acetaminophen can cause liver damage if not taken correctly  Do not use more than 4 grams (4,000 milligrams) total of acetaminophen in one day  · Prescription pain medicine  may be given   Ask your healthcare provider how to take this medicine safely  Some prescription pain medicines contain acetaminophen  Do not take other medicines that contain acetaminophen without talking to your healthcare provider  Too much acetaminophen may cause liver damage  Prescription pain medicine may cause constipation  Ask your healthcare provider how to prevent or treat constipation  · Take your medicine as directed  Contact your healthcare provider if you think your medicine is not helping or if you have side effects  Tell him of her if you are allergic to any medicine  Keep a list of the medicines, vitamins, and herbs you take  Include the amounts, and when and why you take them  Bring the list or the pill bottles to follow-up visits  Carry your medicine list with you in case of an emergency  Self-care:   · Rinse your mouth every 2 hours with salt water  This will help keep the area clean  · Gently brush your teeth twice a day with a soft tooth brush  This will help keep the area clean  · Eat soft foods as directed  Soft foods may cause less pain  Examples include applesauce, yogurt, and cooked pasta  Ask your healthcare provider how long to follow this instruction  · Apply a warm compress to your tooth or gum  Use a cotton ball or gauze soaked in warm water  Remove the compress in 10 minutes or when it becomes cool  Repeat 3 times a day  Prevent another abscess:   · Brush your teeth at least 2 times a day with fluoride toothpaste  · Use dental floss to clean between your teeth at least once a day  · Rinse your mouth with water or mouthwash after meals and snacks  · Chew sugarless gum after meals and snacks  · Limit foods that are sticky and high in sugar such as raisons  Also limit drinks high in sugar, such as soda  · See your dentist every 6 months for dental cleanings and oral exams  Follow up with your healthcare provider in 24 hours:   Your healthcare provider will need to check your teeth and gums  Write down your questions so you remember to ask them during your visits  © 2017 [employer] [employer] Information is for End User's use only and may not be sold, redistributed or otherwise used for commercial purposes  All illustrations and images included in CareNotes® are the copyrighted property of [employer]  or [employer]  The above information is an  only  It is not intended as medical advice for individual conditions or treatments  Talk to your doctor, nurse or pharmacist before following any medical regimen to see if it is safe and effective for you

## 2019-01-08 LAB
LEFT EYE DIABETIC RETINOPATHY: NORMAL
RIGHT EYE DIABETIC RETINOPATHY: NORMAL

## 2019-01-15 ENCOUNTER — CONSULT (OUTPATIENT)
Dept: NEPHROLOGY | Facility: HOSPITAL | Age: 84
End: 2019-01-15
Payer: COMMERCIAL

## 2019-01-15 VITALS
SYSTOLIC BLOOD PRESSURE: 130 MMHG | WEIGHT: 148.5 LBS | RESPIRATION RATE: 16 BRPM | HEIGHT: 62 IN | BODY MASS INDEX: 27.33 KG/M2 | HEART RATE: 72 BPM | DIASTOLIC BLOOD PRESSURE: 68 MMHG

## 2019-01-15 DIAGNOSIS — I10 ESSENTIAL HYPERTENSION: ICD-10-CM

## 2019-01-15 DIAGNOSIS — E11.22 TYPE 2 DIABETES MELLITUS WITH STAGE 3 CHRONIC KIDNEY DISEASE, WITHOUT LONG-TERM CURRENT USE OF INSULIN (HCC): Primary | ICD-10-CM

## 2019-01-15 DIAGNOSIS — R60.9 DEPENDENT EDEMA: ICD-10-CM

## 2019-01-15 DIAGNOSIS — E87.6 HYPOKALEMIA: ICD-10-CM

## 2019-01-15 DIAGNOSIS — N18.30 TYPE 2 DIABETES MELLITUS WITH STAGE 3 CHRONIC KIDNEY DISEASE, WITHOUT LONG-TERM CURRENT USE OF INSULIN (HCC): Primary | ICD-10-CM

## 2019-01-15 LAB
CLARITY, POC: CLEAR
COLOR, POC: YELLOW
EXT BILIRUBIN, UA: NORMAL
EXT BLOOD URINE: NORMAL
EXT GLUCOSE, UA: NORMAL
EXT KETONES: NORMAL
EXT NITRITE, UA: NORMAL
EXT PH, UA: 5
EXT PROTEIN, UA: NORMAL
EXT SPECIFIC GRAVITY, UA: 1.01
EXT UROBILINOGEN: NORMAL
WBC # BLD EST: NORMAL 10*3/UL

## 2019-01-15 PROCEDURE — 81002 URINALYSIS NONAUTO W/O SCOPE: CPT | Performed by: INTERNAL MEDICINE

## 2019-01-15 PROCEDURE — 99205 OFFICE O/P NEW HI 60 MIN: CPT | Performed by: INTERNAL MEDICINE

## 2019-01-15 RX ORDER — OMEPRAZOLE 20 MG/1
20 CAPSULE, DELAYED RELEASE ORAL DAILY
COMMUNITY
End: 2019-02-15

## 2019-01-15 NOTE — LETTER
January 15, 2019     Devang Perez, [address]  [address] [facility]  [address] [address] [address]    Patient: Cristela Thomas   YOB: 1935   Date of Visit: 1/15/2019       Dear Dr Demarco Arcos: Thank you for referring Tricia Maya to me for evaluation  Below are my notes for this consultation  If you have questions, please do not hesitate to call me  I look forward to following your patient along with you  Sincerely,        Christian Miranda, DO        CC: No Recipients  Christian Miranda, DO  1/15/2019  8:54 AM  Sign at close encounter  [address] [facility] 80 y o  female MRN: [id number]  Date: 1/15/2019  Reason for consultation:   Chief Complaint   Patient presents with    Consult    Chronic Kidney Disease       Patient instructions:  Patient Instructions   1  Elevated serum Cr likely due to chronic kidney disease in the setting of Diabetes mellitus type 2 as well as hypertensive nephrosclerosis +/- physiologic aging of the kidney   -in office UA shows trace protein and moderate leukocytes, small bilirubin, small ketones  -obtain renal ultrasound with PVR to rule out obstruction  -last sCr 1 6 as of August 2018  -obtain CMP and will obtain phos/PTH to assess bone mineral metabolism  -would consider discontinuing omeprazole(prilosec)  -avoid nonsteroidals (ibuprofen, advil, motrin, aleve)  -please stay well hydrated  2  Microalbuminuria in setting of type 2 DM - microalb:Cr 51 8, grade A2 as of 1/2/18  -repeat microalbumin:Cr   -currently on lisinopril 20mg daily  -microalbumin in the urine can also be seen with physiologic aging of the kidney    3  HTN - BP well controlled for age  -continue clonidine 0 1mg weekly patch, HCTZ 25mg daily, lisinopril 37YY daily, bystolic 42OO daily, nifedipine 90mg daily    4  DM2 - on januvia, glucotrol, last A1C 6 8    5  Hypokalemia - needs repeat BMP as still on potassium supplement and no labs since August 2018   Will also obtain magnesium level  Is on HCTZ which can contribute    6  Dependent edema - will check CMP to evaluate albumin level as this can contribute to edema at times  Continue HCTZ 25mg daily  Previous echo in 2015 did show grade 1 diastolic dysfunction    RTC in 3-4 months  Shira Martin was seen today for consult and chronic kidney disease  Diagnoses and all orders for this visit:    Type 2 diabetes mellitus with stage 3 chronic kidney disease, without long-term current use of insulin ([facility] [address] )  -     Ambulatory referral to Nephrology  -     Comprehensive metabolic panel; Future  -     Microalbumin / creatinine urine ratio; Future  -     PTH, intact; Future  -     Phosphorus; Future  -     Magnesium; Future  -     Comprehensive metabolic panel  -     PTH, intact  -     Phosphorus  -     Magnesium    Essential hypertension    Hypokalemia  -     Magnesium; Future  -     Magnesium    Dependent edema    Other orders  -     POCT urinalysis dipstick        ASSESSMENT and PLAN:  1  Elevated serum Cr likely due to chronic kidney disease in the setting of Diabetes mellitus type 2 as well as hypertensive nephrosclerosis +/- physiologic aging of the kidney   -in office UA shows trace protein and moderate leukocytes, small bilirubin, small ketones  -obtain renal ultrasound with PVR to rule out obstruction  -last sCr 1 6 as of August 2018  -obtain CMP and will obtain phos/PTH to assess bone mineral metabolism  -would consider discontinuing omeprazole(prilosec)  -avoid nonsteroidals (ibuprofen, advil, motrin, aleve)  -please stay well hydrated  2  Microalbuminuria in setting of type 2 DM - microalb:Cr 51 8, grade A2 as of 1/2/18  -repeat microalbumin:Cr   -currently on lisinopril 20mg daily  -microalbumin in the urine can also be seen with physiologic aging of the kidney    3   HTN - BP well controlled for age  -continue clonidine 0 1mg weekly patch, HCTZ 25mg daily, lisinopril 25QW daily, bystolic 55CT daily, nifedipine 90mg daily    4  DM2 - on januvia, glucotrol, last A1C 6 8    5  Hypokalemia - needs repeat BMP as still on potassium supplement and no labs since August 2018  Will also obtain magnesium level  Is on HCTZ which can contribute    6  Dependent edema - will check CMP to evaluate albumin level as this can contribute to edema at times  Continue HCTZ 25mg daily  Previous echo in 2015 did show grade 1 diastolic dysfunction    Vitamin D level normal as of 1/2/18  HISTORY OF PRESENT ILLNESS:  Requesting Physician: DO Josep Mojica Sadi is a 80 y o  female with history of DM2, HTN who presents in consultation for CKD  Denies history of recent NSAID use, herbal/OTC medications, history of UTIs or nephrolithiasis  Denies history of prior known kidney disease  DM2 x 5-10 years - does not require insulin   HTN x 20+ years - never very uncontrolled  Does not drink much water      PAST MEDICAL HISTORY:  Past Medical History:   Diagnosis Date    Aortic valve insufficiency     Cataract of both eyes     Chronic kidney disease     Dysuria     last assessed - [id number]    Edema     last assessed - [id number]    GERD (gastroesophageal reflux disease)     last assessed - [id number]    Hyperlipidemia     Hypertension     Low back pain     last assessed - [id number]    Mitral valve disorder     Osteoporosis     last assessed - [id number]    Palpitations        PAST SURGICAL HISTORY:  Past Surgical History:   Procedure Laterality Date    APPENDECTOMY      CATARACT EXTRACTION      COLONOSCOPY      TUBAL LIGATION         ALLERGIES:  No Known Allergies    SOCIAL HISTORY:  History   Alcohol Use No     History   Drug use: Unknown     History   Smoking Status    Never Smoker   Smokeless Tobacco    Never Used       FAMILY HISTORY:  Family History   Problem Relation Age of Onset    Kidney disease Mother         Chronic    Breast cancer Sister     Diabetes Sister     Breast cancer Sister     Hypertension Sister        MEDICATIONS:    Current Outpatient Prescriptions:     ACCU-CHEK FASTCLIX LANCETS MISC, by Does not apply route daily, Disp: 102 each, Rfl: 3    aspirin 81 MG tablet, Take 1 tablet by mouth daily, Disp: , Rfl:     Calcium 600 MG tablet, Take 1 tablet by mouth 2 (two) times a day, Disp: , Rfl:     Cetirizine HCl (ZYRTEC ALLERGY) 10 MG CAPS, Take 1 tablet by mouth daily as needed, Disp: , Rfl:     cholecalciferol (VITAMIN D3) 1,000 units tablet, Take 2 tablets by mouth daily, Disp: , Rfl:     cloNIDine (CATAPRES-TTS-1) 0 1 mg/24 hr, Place 1 patch (0 1 mg total) on the skin once a week, Disp: 4 patch, Rfl: 6    gabapentin (NEURONTIN) 100 mg capsule, Take 1 capsule (100 mg total) by mouth daily at bedtime, Disp: 30 capsule, Rfl: 6    glipiZIDE (GLUCOTROL) 5 mg tablet, Take 0 5 tablets (2 5 mg total) by mouth daily, Disp: 15 tablet, Rfl: 3    glucose blood (ACCU-CHEK PAUL PLUS) test strip, Use as instructed, Disp: 100 each, Rfl: 3    hydrochlorothiazide (HYDRODIURIL) 25 mg tablet, TAKE 1 TABLET BY MOUTH EVERY DAY, Disp: 30 tablet, Rfl: 6    lisinopril (ZESTRIL) 20 mg tablet, Take 1 tablet (20 mg total) by mouth daily, Disp: 90 tablet, Rfl: 0    nebivolol (BYSTOLIC) 20 MG tablet, Take 1 tablet (20 mg total) by mouth daily, Disp: 30 tablet, Rfl: 6    NIFEdipine (ADALAT CC) 90 mg 24 hr tablet, Take 1 tablet (90 mg total) by mouth daily, Disp: 30 tablet, Rfl: 5    omeprazole (PriLOSEC) 20 mg delayed release capsule, Take 20 mg by mouth daily, Disp: , Rfl:     Potassium Gluconate 595 (99 K) MG TABS, Take 1 tablet by mouth 3 (three) times a day  , Disp: , Rfl:     ranitidine (ZANTAC) 150 MG capsule, Take 1 capsule (150 mg total) by mouth 2 (two) times a day, Disp: 60 capsule, Rfl: 3    sertraline (ZOLOFT) 25 mg tablet, TAKE 1 TABLET BY MOUTH EVERY DAY, Disp: 30 tablet, Rfl: 5    simvastatin (ZOCOR) 20 mg tablet, Take 1 tablet (20 mg total) by mouth daily at bedtime, Disp: 30 tablet, Rfl: 3   sitaGLIPtin (JANUVIA) 50 mg tablet, Take 1 tablet (50 mg total) by mouth daily, Disp: 90 tablet, Rfl: 1    REVIEW OF SYSTEMS:  Review of Systems   Constitutional: Negative for chills and fever  HENT: Negative for sore throat and trouble swallowing  Eyes: Negative for visual disturbance  Respiratory: Negative for cough and shortness of breath  Cardiovascular: Positive for leg swelling (improved with elevation)  Negative for chest pain  Gastrointestinal: Negative for abdominal pain, constipation, diarrhea, nausea and vomiting  Endocrine: Negative for polyuria  Genitourinary: Negative for difficulty urinating, dysuria and hematuria  Musculoskeletal: Negative for back pain and neck pain  Skin: Negative for rash  Neurological: Negative for dizziness, light-headedness and numbness  Hematological: Negative for adenopathy  Does not bruise/bleed easily  Psychiatric/Behavioral: The patient is not nervous/anxious  PHYSICAL EXAM:   Vitals:    01/15/19 0803   BP: 130/68   BP Location: Left arm   Patient Position: Sitting   Cuff Size: Standard   Pulse: 72   Resp: 16   Weight: 67 4 kg (148 lb 8 oz)   Height: 5' 2" (1 575 m)     Body mass index is 27 16 kg/m²  Physical Exam   Constitutional: She appears well-developed and well-nourished  No distress  HENT:   Head: Normocephalic and atraumatic  Mouth/Throat: No oropharyngeal exudate  Eyes: Right eye exhibits no discharge  Left eye exhibits no discharge  No scleral icterus  Neck: Normal range of motion  Neck supple  No thyromegaly present  Cardiovascular: Normal rate and regular rhythm  No murmur heard  Pulmonary/Chest: Effort normal and breath sounds normal  No respiratory distress  She has no wheezes  Abdominal: Soft  Bowel sounds are normal  She exhibits no distension  Musculoskeletal: She exhibits no edema  Neurological: She is alert  She exhibits normal muscle tone  awake   Skin: Skin is warm and dry  No rash noted   She is not diaphoretic  Psychiatric: She has a normal mood and affect  Her behavior is normal    Nursing note and vitals reviewed  Laboratory results:   Lab Results   Component Value Date    GLUCOSE 145 (H) 01/02/2018    CALCIUM 9 8 01/02/2018     01/02/2018    K 3 6 08/24/2018    CO2 26 08/24/2018    CL 97 08/24/2018    BUN 21 08/24/2018    CREATININE 1 47 (H) 01/02/2018        Imaging: none relevant on file    Portions of the record may have been created with voice recognition software   Occasional wrong word or "sound a like" substitutions may have occurred due to the inherent limitations of voice recognition software   Read the chart carefully and recognize, using context, where substitutions have occurred

## 2019-01-15 NOTE — PROGRESS NOTES
NEPHROLOGY OUTPATIENT CONSULTATION   Haroldo Adam 80 y o  female MRN: [id number]  Date: 1/15/2019  Reason for consultation:   Chief Complaint   Patient presents with    Consult    Chronic Kidney Disease       Patient instructions:  Patient Instructions   1  Elevated serum Cr likely due to chronic kidney disease in the setting of Diabetes mellitus type 2 as well as hypertensive nephrosclerosis +/- physiologic aging of the kidney   -in office UA shows trace protein and moderate leukocytes, small bilirubin, small ketones  -obtain renal ultrasound with PVR to rule out obstruction  -last sCr 1 6 as of August 2018  -obtain CMP and will obtain phos/PTH to assess bone mineral metabolism  -would consider discontinuing omeprazole(prilosec)  -avoid nonsteroidals (ibuprofen, advil, motrin, aleve)  -please stay well hydrated  2  Microalbuminuria in setting of type 2 DM - microalb:Cr 51 8, grade A2 as of 1/2/18  -repeat microalbumin:Cr   -currently on lisinopril 20mg daily  -microalbumin in the urine can also be seen with physiologic aging of the kidney    3  HTN - BP well controlled for age  -continue clonidine 0 1mg weekly patch, HCTZ 25mg daily, lisinopril 01LG daily, bystolic 61ZI daily, nifedipine 90mg daily    4  DM2 - on januvia, glucotrol, last A1C 6 8    5  Hypokalemia - needs repeat BMP as still on potassium supplement and no labs since August 2018  Will also obtain magnesium level  Is on HCTZ which can contribute    6  Dependent edema - will check CMP to evaluate albumin level as this can contribute to edema at times  Continue HCTZ 25mg daily  Previous echo in 2015 did show grade 1 diastolic dysfunction    RTC in 3-4 months  Herminio Jean Baptiste was seen today for consult and chronic kidney disease      Diagnoses and all orders for this visit:    Type 2 diabetes mellitus with stage 3 chronic kidney disease, without long-term current use of insulin ([facility] [address] )  -     Ambulatory referral to Nephrology  - Comprehensive metabolic panel; Future  -     Microalbumin / creatinine urine ratio; Future  -     PTH, intact; Future  -     Phosphorus; Future  -     Magnesium; Future  -     Comprehensive metabolic panel  -     PTH, intact  -     Phosphorus  -     Magnesium    Essential hypertension    Hypokalemia  -     Magnesium; Future  -     Magnesium    Dependent edema    Other orders  -     POCT urinalysis dipstick        ASSESSMENT and PLAN:  1  Elevated serum Cr likely due to chronic kidney disease in the setting of Diabetes mellitus type 2 as well as hypertensive nephrosclerosis +/- physiologic aging of the kidney   -in office UA shows trace protein and moderate leukocytes, small bilirubin, small ketones  -obtain renal ultrasound with PVR to rule out obstruction  -last sCr 1 6 as of August 2018  -obtain CMP and will obtain phos/PTH to assess bone mineral metabolism  -would consider discontinuing omeprazole(prilosec)  -avoid nonsteroidals (ibuprofen, advil, motrin, aleve)  -please stay well hydrated  2  Microalbuminuria in setting of type 2 DM - microalb:Cr 51 8, grade A2 as of 1/2/18  -repeat microalbumin:Cr   -currently on lisinopril 20mg daily  -microalbumin in the urine can also be seen with physiologic aging of the kidney    3  HTN - BP well controlled for age  -continue clonidine 0 1mg weekly patch, HCTZ 25mg daily, lisinopril 98SU daily, bystolic 44LF daily, nifedipine 90mg daily    4  DM2 - on januvia, glucotrol, last A1C 6 8    5  Hypokalemia - needs repeat BMP as still on potassium supplement and no labs since August 2018  Will also obtain magnesium level  Is on HCTZ which can contribute    6  Dependent edema - will check CMP to evaluate albumin level as this can contribute to edema at times  Continue HCTZ 25mg daily  Previous echo in 2015 did show grade 1 diastolic dysfunction    Vitamin D level normal as of 1/2/18       HISTORY OF PRESENT ILLNESS:  Requesting Physician: John Bermeo, [address] [facility]  Timur Gomes is a 80 y o  female with history of DM2, HTN who presents in consultation for CKD  Denies history of recent NSAID use, herbal/OTC medications, history of UTIs or nephrolithiasis  Denies history of prior known kidney disease  DM2 x 5-10 years - does not require insulin   HTN x 20+ years - never very uncontrolled  Does not drink much water      PAST MEDICAL HISTORY:  Past Medical History:   Diagnosis Date    Aortic valve insufficiency     Cataract of both eyes     Chronic kidney disease     Dysuria     last assessed - [id number]    Edema     last assessed - [id number]    GERD (gastroesophageal reflux disease)     last assessed - [id number]    Hyperlipidemia     Hypertension     Low back pain     last assessed - [id number]    Mitral valve disorder     Osteoporosis     last assessed - [id number]    Palpitations        PAST SURGICAL HISTORY:  Past Surgical History:   Procedure Laterality Date    APPENDECTOMY      CATARACT EXTRACTION      COLONOSCOPY      TUBAL LIGATION         ALLERGIES:  No Known Allergies    SOCIAL HISTORY:  History   Alcohol Use No     History   Drug use: Unknown     History   Smoking Status    Never Smoker   Smokeless Tobacco    Never Used       FAMILY HISTORY:  Family History   Problem Relation Age of Onset    Kidney disease Mother         Chronic    Breast cancer Sister     Diabetes Sister     Breast cancer Sister     Hypertension Sister        MEDICATIONS:    Current Outpatient Prescriptions:     ACCU-CHEK FASTCLIX LANCETS MISC, by Does not apply route daily, Disp: 102 each, Rfl: 3    aspirin 81 MG tablet, Take 1 tablet by mouth daily, Disp: , Rfl:     Calcium 600 MG tablet, Take 1 tablet by mouth 2 (two) times a day, Disp: , Rfl:     Cetirizine HCl (ZYRTEC ALLERGY) 10 MG CAPS, Take 1 tablet by mouth daily as needed, Disp: , Rfl:     cholecalciferol (VITAMIN D3) 1,000 units tablet, Take 2 tablets by mouth daily, Disp: , Rfl:     cloNIDine (CATAPRES-TTS-1) 0 1 mg/24 hr, Place 1 patch (0 1 mg total) on the skin once a week, Disp: 4 patch, Rfl: 6    gabapentin (NEURONTIN) 100 mg capsule, Take 1 capsule (100 mg total) by mouth daily at bedtime, Disp: 30 capsule, Rfl: 6    glipiZIDE (GLUCOTROL) 5 mg tablet, Take 0 5 tablets (2 5 mg total) by mouth daily, Disp: 15 tablet, Rfl: 3    glucose blood (ACCU-CHEK PAUL PLUS) test strip, Use as instructed, Disp: 100 each, Rfl: 3    hydrochlorothiazide (HYDRODIURIL) 25 mg tablet, TAKE 1 TABLET BY MOUTH EVERY DAY, Disp: 30 tablet, Rfl: 6    lisinopril (ZESTRIL) 20 mg tablet, Take 1 tablet (20 mg total) by mouth daily, Disp: 90 tablet, Rfl: 0    nebivolol (BYSTOLIC) 20 MG tablet, Take 1 tablet (20 mg total) by mouth daily, Disp: 30 tablet, Rfl: 6    NIFEdipine (ADALAT CC) 90 mg 24 hr tablet, Take 1 tablet (90 mg total) by mouth daily, Disp: 30 tablet, Rfl: 5    omeprazole (PriLOSEC) 20 mg delayed release capsule, Take 20 mg by mouth daily, Disp: , Rfl:     Potassium Gluconate 595 (99 K) MG TABS, Take 1 tablet by mouth 3 (three) times a day  , Disp: , Rfl:     ranitidine (ZANTAC) 150 MG capsule, Take 1 capsule (150 mg total) by mouth 2 (two) times a day, Disp: 60 capsule, Rfl: 3    sertraline (ZOLOFT) 25 mg tablet, TAKE 1 TABLET BY MOUTH EVERY DAY, Disp: 30 tablet, Rfl: 5    simvastatin (ZOCOR) 20 mg tablet, Take 1 tablet (20 mg total) by mouth daily at bedtime, Disp: 30 tablet, Rfl: 3    sitaGLIPtin (JANUVIA) 50 mg tablet, Take 1 tablet (50 mg total) by mouth daily, Disp: 90 tablet, Rfl: 1    REVIEW OF SYSTEMS:  Review of Systems   Constitutional: Negative for chills and fever  HENT: Negative for sore throat and trouble swallowing  Eyes: Negative for visual disturbance  Respiratory: Negative for cough and shortness of breath  Cardiovascular: Positive for leg swelling (improved with elevation)  Negative for chest pain  Gastrointestinal: Negative for abdominal pain, constipation, diarrhea, nausea and vomiting  Endocrine: Negative for polyuria  Genitourinary: Negative for difficulty urinating, dysuria and hematuria  Musculoskeletal: Negative for back pain and neck pain  Skin: Negative for rash  Neurological: Negative for dizziness, light-headedness and numbness  Hematological: Negative for adenopathy  Does not bruise/bleed easily  Psychiatric/Behavioral: The patient is not nervous/anxious  PHYSICAL EXAM:   Vitals:    01/15/19 0803   BP: 130/68   BP Location: Left arm   Patient Position: Sitting   Cuff Size: Standard   Pulse: 72   Resp: 16   Weight: 67 4 kg (148 lb 8 oz)   Height: 5' 2" (1 575 m)     Body mass index is 27 16 kg/m²  Physical Exam   Constitutional: She appears well-developed and well-nourished  No distress  HENT:   Head: Normocephalic and atraumatic  Mouth/Throat: No oropharyngeal exudate  Eyes: Right eye exhibits no discharge  Left eye exhibits no discharge  No scleral icterus  Neck: Normal range of motion  Neck supple  No thyromegaly present  Cardiovascular: Normal rate and regular rhythm  No murmur heard  Pulmonary/Chest: Effort normal and breath sounds normal  No respiratory distress  She has no wheezes  Abdominal: Soft  Bowel sounds are normal  She exhibits no distension  Musculoskeletal: She exhibits no edema  Neurological: She is alert  She exhibits normal muscle tone  awake   Skin: Skin is warm and dry  No rash noted  She is not diaphoretic  Psychiatric: She has a normal mood and affect  Her behavior is normal    Nursing note and vitals reviewed          Laboratory results:   Lab Results   Component Value Date    GLUCOSE 145 (H) 01/02/2018    CALCIUM 9 8 01/02/2018     01/02/2018    K 3 6 08/24/2018    CO2 26 08/24/2018    CL 97 08/24/2018    BUN 21 08/24/2018    CREATININE 1 47 (H) 01/02/2018        Imaging: none relevant on file    Portions of the record may have been created with voice recognition software   Occasional wrong word or "sound a like" substitutions may have occurred due to the inherent limitations of voice recognition software   Read the chart carefully and recognize, using context, where substitutions have occurred

## 2019-01-15 NOTE — PATIENT INSTRUCTIONS
1  Elevated serum Cr likely due to chronic kidney disease in the setting of Diabetes mellitus type 2 as well as hypertensive nephrosclerosis +/- physiologic aging of the kidney   -in office UA shows trace protein and moderate leukocytes, small bilirubin, small ketones  -obtain renal ultrasound with PVR to rule out obstruction  -last sCr 1 6 as of August 2018  -obtain CMP and will obtain phos/PTH to assess bone mineral metabolism  -would consider discontinuing omeprazole(prilosec)  -avoid nonsteroidals (ibuprofen, advil, motrin, aleve)  -please stay well hydrated  2  Microalbuminuria in setting of type 2 DM - microalb:Cr 51 8, grade A2 as of 1/2/18  -repeat microalbumin:Cr   -currently on lisinopril 20mg daily  -microalbumin in the urine can also be seen with physiologic aging of the kidney    3  HTN - BP well controlled for age  -continue clonidine 0 1mg weekly patch, HCTZ 25mg daily, lisinopril 18SQ daily, bystolic 56DU daily, nifedipine 90mg daily    4  DM2 - on januvia, glucotrol, last A1C 6 8    5  Hypokalemia - needs repeat BMP as still on potassium supplement and no labs since August 2018  Will also obtain magnesium level  Is on HCTZ which can contribute    6  Dependent edema - will check CMP to evaluate albumin level as this can contribute to edema at times  Continue HCTZ 25mg daily  Previous echo in 2015 did show grade 1 diastolic dysfunction    RTC in 3-4 months

## 2019-01-16 DIAGNOSIS — I10 ESSENTIAL HYPERTENSION: ICD-10-CM

## 2019-01-16 RX ORDER — NIFEDIPINE 90 MG/1
90 TABLET, FILM COATED, EXTENDED RELEASE ORAL DAILY
Qty: 5 TABLET | Refills: 0 | Status: SHIPPED | OUTPATIENT
Start: 2019-01-16 | End: 2019-01-28 | Stop reason: SDUPTHER

## 2019-01-16 NOTE — TELEPHONE ENCOUNTER
Pts daughter leaves mon for [place], gets back next Sunday, could we send an emergency supply-about 2 or 3 pills of niphedipine er 90 mg tabs to [facility]?  She only has 8 left, she says it wont carry her until the end of the trip

## 2019-01-17 ENCOUNTER — HOSPITAL ENCOUNTER (OUTPATIENT)
Dept: ULTRASOUND IMAGING | Facility: HOSPITAL | Age: 84
Discharge: HOME | End: 2019-01-17
Attending: INTERNAL MEDICINE
Payer: COMMERCIAL

## 2019-01-17 DIAGNOSIS — N18.30 TYPE 2 DIABETES MELLITUS WITH STAGE 3 CHRONIC KIDNEY DISEASE, WITHOUT LONG-TERM CURRENT USE OF INSULIN (HCC): ICD-10-CM

## 2019-01-17 DIAGNOSIS — E11.22 TYPE 2 DIABETES MELLITUS WITH STAGE 3 CHRONIC KIDNEY DISEASE, WITHOUT LONG-TERM CURRENT USE OF INSULIN (HCC): ICD-10-CM

## 2019-01-17 PROCEDURE — 51798 US URINE CAPACITY MEASURE: CPT

## 2019-01-19 LAB
ALBUMIN SERPL-MCNC: 3.8 G/DL (ref 3.5–4.7)
ALBUMIN SERPL-MCNC: 3.8 G/DL (ref 3.5–4.7)
ALBUMIN/CREAT UR: 26.6 MG/G CREAT (ref 0–30)
ALBUMIN/GLOB SERPL: 1.4 {RATIO} (ref 1.2–2.2)
ALBUMIN/GLOB SERPL: 1.6 {RATIO} (ref 1.2–2.2)
ALP SERPL-CCNC: 55 IU/L (ref 39–117)
ALP SERPL-CCNC: 56 IU/L (ref 39–117)
ALT SERPL-CCNC: 18 IU/L (ref 0–32)
ALT SERPL-CCNC: 18 IU/L (ref 0–32)
AST SERPL-CCNC: 28 IU/L (ref 0–40)
AST SERPL-CCNC: 29 IU/L (ref 0–40)
BASOPHILS # BLD AUTO: 0 X10E3/UL (ref 0–0.2)
BASOPHILS NFR BLD AUTO: 0 %
BILIRUB SERPL-MCNC: <0.2 MG/DL (ref 0–1.2)
BILIRUB SERPL-MCNC: <0.2 MG/DL (ref 0–1.2)
BUN SERPL-MCNC: 41 MG/DL (ref 8–27)
BUN SERPL-MCNC: 41 MG/DL (ref 8–27)
BUN/CREAT SERPL: 20 (ref 12–28)
BUN/CREAT SERPL: 20 (ref 12–28)
CALCIUM SERPL-MCNC: 9.4 MG/DL (ref 8.7–10.3)
CALCIUM SERPL-MCNC: 9.6 MG/DL (ref 8.7–10.3)
CHLORIDE SERPL-SCNC: 101 MMOL/L (ref 96–106)
CHLORIDE SERPL-SCNC: 101 MMOL/L (ref 96–106)
CHOLEST SERPL-MCNC: 146 MG/DL (ref 100–199)
CHOLEST/HDLC SERPL: 2.9 RATIO (ref 0–4.4)
CO2 SERPL-SCNC: 28 MMOL/L (ref 20–29)
CO2 SERPL-SCNC: 29 MMOL/L (ref 20–29)
CREAT SERPL-MCNC: 2.06 MG/DL (ref 0.57–1)
CREAT SERPL-MCNC: 2.1 MG/DL (ref 0.57–1)
CREAT UR-MCNC: 70.6 MG/DL
EOSINOPHIL # BLD AUTO: 1 X10E3/UL (ref 0–0.4)
EOSINOPHIL NFR BLD AUTO: 11 %
ERYTHROCYTE [DISTWIDTH] IN BLOOD BY AUTOMATED COUNT: 15.7 % (ref 12.3–15.4)
EST. AVERAGE GLUCOSE BLD GHB EST-MCNC: 157 MG/DL
GLOBULIN SER-MCNC: 2.4 G/DL (ref 1.5–4.5)
GLOBULIN SER-MCNC: 2.8 G/DL (ref 1.5–4.5)
GLUCOSE SERPL-MCNC: 126 MG/DL (ref 65–99)
GLUCOSE SERPL-MCNC: 127 MG/DL (ref 65–99)
HBA1C MFR BLD: 7.1 % (ref 4.8–5.6)
HCT VFR BLD AUTO: 32.7 % (ref 34–46.6)
HDLC SERPL-MCNC: 50 MG/DL
HGB BLD-MCNC: 10.6 G/DL (ref 11.1–15.9)
IMM GRANULOCYTES # BLD: 0 X10E3/UL (ref 0–0.1)
IMM GRANULOCYTES NFR BLD: 0 %
LDLC SERPL CALC-MCNC: 79 MG/DL (ref 0–99)
LYMPHOCYTES # BLD AUTO: 4.1 X10E3/UL (ref 0.7–3.1)
LYMPHOCYTES NFR BLD AUTO: 44 %
MAGNESIUM SERPL-MCNC: 2.1 MG/DL (ref 1.6–2.3)
MCH RBC QN AUTO: 29.3 PG (ref 26.6–33)
MCHC RBC AUTO-ENTMCNC: 32.4 G/DL (ref 31.5–35.7)
MCV RBC AUTO: 90 FL (ref 79–97)
MICROALBUMIN UR-MCNC: 18.8 UG/ML
MONOCYTES # BLD AUTO: 0.8 X10E3/UL (ref 0.1–0.9)
MONOCYTES NFR BLD AUTO: 8 %
NEUTROPHILS # BLD AUTO: 3.5 X10E3/UL (ref 1.4–7)
NEUTROPHILS NFR BLD AUTO: 37 %
PHOSPHATE SERPL-MCNC: 4.8 MG/DL (ref 2.5–4.5)
PLATELET # BLD AUTO: 231 X10E3/UL (ref 150–379)
POTASSIUM SERPL-SCNC: 3.9 MMOL/L (ref 3.5–5.2)
POTASSIUM SERPL-SCNC: 4 MMOL/L (ref 3.5–5.2)
PROT SERPL-MCNC: 6.2 G/DL (ref 6–8.5)
PROT SERPL-MCNC: 6.6 G/DL (ref 6–8.5)
PTH-INTACT SERPL-MCNC: 41 PG/ML (ref 15–65)
RBC # BLD AUTO: 3.62 X10E6/UL (ref 3.77–5.28)
SL AMB EGFR AFRICAN AMERICAN: 25 ML/MIN/1.73
SL AMB EGFR AFRICAN AMERICAN: 25 ML/MIN/1.73
SL AMB EGFR NON AFRICAN AMERICAN: 21 ML/MIN/1.73
SL AMB EGFR NON AFRICAN AMERICAN: 22 ML/MIN/1.73
SL AMB VLDL CHOLESTEROL CALC: 17 MG/DL (ref 5–40)
SODIUM SERPL-SCNC: 144 MMOL/L (ref 134–144)
SODIUM SERPL-SCNC: 144 MMOL/L (ref 134–144)
TRIGL SERPL-MCNC: 84 MG/DL (ref 0–149)
TSH SERPL DL<=0.005 MIU/L-ACNC: 4.18 UIU/ML (ref 0.45–4.5)
WBC # BLD AUTO: 9.4 X10E3/UL (ref 3.4–10.8)

## 2019-01-23 ENCOUNTER — TELEPHONE (OUTPATIENT)
Dept: NEPHROLOGY | Facility: CLINIC | Age: 84
End: 2019-01-23

## 2019-01-23 DIAGNOSIS — N17.9 AKI (ACUTE KIDNEY INJURY) (HCC): Primary | ICD-10-CM

## 2019-01-23 NOTE — TELEPHONE ENCOUNTER
----- Message from Abran Barnett DO sent at 1/22/2019 11:59 AM EST -----  Her Cr is higher at 2 1  I have concern she has acute kidney injury vs progressive chronic kidney disease  I was unable to reach the patient to discuss her lab results or ultrasound results  Please try to reach the patient to discuss the need for repeat bloodwork as soon as possible as her creatinine is elevated  Discourage nonsteroidal use  Encourage hydration  I note she is on lisinopril 20mg daily and HCTZ 25mg daily  Please inquire about her home BP readings  If BP < 140/90, may stop lisinopril as lower BP could also lead to rise in serum Cr

## 2019-01-24 DIAGNOSIS — N28.1 COMPLEX RENAL CYST: Primary | ICD-10-CM

## 2019-01-28 DIAGNOSIS — I10 ESSENTIAL HYPERTENSION: ICD-10-CM

## 2019-01-28 RX ORDER — NIFEDIPINE 90 MG/1
90 TABLET, FILM COATED, EXTENDED RELEASE ORAL DAILY
Qty: 90 TABLET | Refills: 2 | Status: SHIPPED | OUTPATIENT
Start: 2019-01-28 | End: 2019-07-29 | Stop reason: SDUPTHER

## 2019-01-28 NOTE — TELEPHONE ENCOUNTER
Pt daughter called back, I informed her of the above information  She can take her for the repeat blood work  What tests would you like for her to repeat? She is not sure if her mothers BP is lower than 140/90, she will inquire with her about that and get back to us

## 2019-02-02 LAB
BUN SERPL-MCNC: 28 MG/DL (ref 8–27)
BUN/CREAT SERPL: 15 (ref 12–28)
CALCIUM SERPL-MCNC: 9.9 MG/DL (ref 8.7–10.3)
CHLORIDE SERPL-SCNC: 97 MMOL/L (ref 96–106)
CO2 SERPL-SCNC: 29 MMOL/L (ref 20–29)
CREAT SERPL-MCNC: 1.89 MG/DL (ref 0.57–1)
GLUCOSE SERPL-MCNC: 132 MG/DL (ref 65–99)
POTASSIUM SERPL-SCNC: 3.7 MMOL/L (ref 3.5–5.2)
SL AMB EGFR AFRICAN AMERICAN: 28 ML/MIN/1.73
SL AMB EGFR NON AFRICAN AMERICAN: 24 ML/MIN/1.73
SODIUM SERPL-SCNC: 144 MMOL/L (ref 134–144)

## 2019-02-15 ENCOUNTER — OFFICE VISIT (OUTPATIENT)
Dept: FAMILY MEDICINE CLINIC | Facility: HOSPITAL | Age: 84
End: 2019-02-15
Payer: COMMERCIAL

## 2019-02-15 VITALS
SYSTOLIC BLOOD PRESSURE: 132 MMHG | HEART RATE: 76 BPM | WEIGHT: 148 LBS | BODY MASS INDEX: 27.23 KG/M2 | HEIGHT: 62 IN | DIASTOLIC BLOOD PRESSURE: 68 MMHG | TEMPERATURE: 97.8 F

## 2019-02-15 DIAGNOSIS — E78.5 DYSLIPIDEMIA: ICD-10-CM

## 2019-02-15 DIAGNOSIS — Z00.00 MEDICARE ANNUAL WELLNESS VISIT, SUBSEQUENT: ICD-10-CM

## 2019-02-15 DIAGNOSIS — D64.9 NORMOCYTIC ANEMIA: Primary | ICD-10-CM

## 2019-02-15 DIAGNOSIS — I10 ESSENTIAL HYPERTENSION: ICD-10-CM

## 2019-02-15 DIAGNOSIS — K21.00 GASTROESOPHAGEAL REFLUX DISEASE WITH ESOPHAGITIS: ICD-10-CM

## 2019-02-15 DIAGNOSIS — N18.30 TYPE 2 DIABETES MELLITUS WITH STAGE 3 CHRONIC KIDNEY DISEASE, WITHOUT LONG-TERM CURRENT USE OF INSULIN (HCC): ICD-10-CM

## 2019-02-15 DIAGNOSIS — N18.30 STAGE 3 CHRONIC KIDNEY DISEASE (HCC): ICD-10-CM

## 2019-02-15 DIAGNOSIS — E11.22 TYPE 2 DIABETES MELLITUS WITH STAGE 3 CHRONIC KIDNEY DISEASE, WITHOUT LONG-TERM CURRENT USE OF INSULIN (HCC): ICD-10-CM

## 2019-02-15 PROBLEM — N18.9 CHRONIC KIDNEY DISEASE: Status: ACTIVE | Noted: 2019-02-15

## 2019-02-15 PROCEDURE — 99214 OFFICE O/P EST MOD 30 MIN: CPT | Performed by: INTERNAL MEDICINE

## 2019-02-15 PROCEDURE — 1160F RVW MEDS BY RX/DR IN RCRD: CPT | Performed by: INTERNAL MEDICINE

## 2019-02-15 PROCEDURE — 1170F FXNL STATUS ASSESSED: CPT | Performed by: INTERNAL MEDICINE

## 2019-02-15 PROCEDURE — G0439 PPPS, SUBSEQ VISIT: HCPCS | Performed by: INTERNAL MEDICINE

## 2019-02-15 PROCEDURE — 1036F TOBACCO NON-USER: CPT | Performed by: INTERNAL MEDICINE

## 2019-02-15 PROCEDURE — 1125F AMNT PAIN NOTED PAIN PRSNT: CPT | Performed by: INTERNAL MEDICINE

## 2019-02-15 NOTE — ASSESSMENT & PLAN NOTE
CKD stage 3 d/t DM and HTN - Slight bump up in BUN/Cr resulted in pt being taken off her ACE, had repeat BW with slight improvement, was told to f/u with Nephro in early spring, again urged no NSAIDs and keep active, will stop Omeprazole

## 2019-02-15 NOTE — ASSESSMENT & PLAN NOTE
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

## 2019-02-15 NOTE — PROGRESS NOTES
Assessment and Plan:  Problem List Items Addressed This Visit        Digestive    GERD (gastroesophageal reflux disease)     Urged to con't Ranitidine but stop Omeprazole - call if symptoms relapse            Endocrine    Type 2 diabetes mellitus with stage 3 chronic kidney disease, without long-term current use of insulin (HCC)     Lab Results   Component Value Date    HGBA1C 7 1 (H) 01/18/2019       No results for input(s): POCGLU in the last 72 hours      Blood Sugar Average: Last 72 hrs:130-140   Dm type 2 with CKD stage 3 and neuropathy - borderline uncontrolled at 7 1 - urged to get on track with diet/exercise and keep active, cont' current meds and recheck BW in 3-4 mos - order given, on statin but no longer on ACE d/t worsening kidney tests, UTD on foot exam ( 4/18) and eye exam (1/19)           Relevant Orders    Glucose, fasting    Hemoglobin A1C       Cardiovascular and Mediastinum    Hypertension     BP at goal despite D/C of lisinopril, med list updated, cont' current meds            Genitourinary    Chronic kidney disease     CKD stage 3 d/t DM and HTN - Slight bump up in BUN/Cr resulted in pt being taken off her ACE, had repeat BW with slight improvement, was told to f/u with Nephro in early spring, again urged no NSAIDs and keep active, will stop Omeprazole            Other    Dyslipidemia     FLP at goal, con't current statin         Normocytic anemia - Primary     New anemia - very mild and asymptomatic, check iron studies although is normocytic and likely AOCD with CKD         Relevant Orders    CBC and differential    Iron    Ferritin      Other Visit Diagnoses     Medicare annual wellness visit, subsequent            Health Maintenance Due   Topic Date Due    Medicare Annual Wellness Visit (AWV)  1935    CRC Screening: Colonoscopy  1935    BMI: Followup Plan  09/26/1953    HEPATITIS B VACCINES (1 of 3 - Risk 3-dose series) 09/26/1954    DTaP,Tdap,and Td Vaccines (1 - Tdap) 10/27/2012         HPI:  Patient Active Problem List   Diagnosis    Type 2 diabetes mellitus with stage 3 chronic kidney disease, without long-term current use of insulin (HCC)    Rhinitis    Premature ventricular contraction    Osteopenia    Hypokalemia    Hypertension    GERD (gastroesophageal reflux disease)    Dyslipidemia    Diabetic polyneuropathy (HCC)    Anxiety disorder    Dependent edema    Normocytic anemia    Chronic kidney disease     Past Medical History:   Diagnosis Date    Aortic valve insufficiency     Cataract of both eyes     Chronic kidney disease     Dysuria     last assessed - [id number]    Edema     last assessed - [id number]    GERD (gastroesophageal reflux disease)     last assessed - [id number]    Hyperlipidemia     Hypertension     Low back pain     last assessed - [id number]    Mitral valve disorder     Osteoporosis     last assessed - [id number]    Palpitations      Past Surgical History:   Procedure Laterality Date    APPENDECTOMY      CATARACT EXTRACTION      COLONOSCOPY      TUBAL LIGATION       Family History   Problem Relation Age of Onset    Kidney disease Mother         Chronic    Breast cancer Sister     Diabetes Sister     Breast cancer Sister     Hypertension Sister      Social History     Tobacco Use   Smoking Status Never Smoker   Smokeless Tobacco Never Used     Social History     Substance and Sexual Activity   Alcohol Use No      Social History     Substance and Sexual Activity   Drug Use Not on file         Current Outpatient Medications   Medication Sig Dispense Refill    ACCU-CHEK FASTCLIX LANCETS MISC by Does not apply route daily 102 each 3    aspirin 81 MG tablet Take 1 tablet by mouth daily      Calcium 600 MG tablet Take 1 tablet by mouth 2 (two) times a day      Cetirizine HCl (ZYRTEC ALLERGY) 10 MG CAPS Take 1 tablet by mouth daily as needed      cholecalciferol (VITAMIN D3) 1,000 units tablet Take 2 tablets by mouth daily      cloNIDine (CATAPRES-TTS-1) 0 1 mg/24 hr Place 1 patch (0 1 mg total) on the skin once a week 4 patch 6    gabapentin (NEURONTIN) 100 mg capsule Take 1 capsule (100 mg total) by mouth daily at bedtime 30 capsule 6    glipiZIDE (GLUCOTROL) 5 mg tablet Take 0 5 tablets (2 5 mg total) by mouth daily 15 tablet 3    glucose blood (ACCU-CHEK PAUL PLUS) test strip Use as instructed 100 each 3    hydrochlorothiazide (HYDRODIURIL) 25 mg tablet TAKE 1 TABLET BY MOUTH EVERY DAY 30 tablet 6    nebivolol (BYSTOLIC) 20 MG tablet Take 1 tablet (20 mg total) by mouth daily 30 tablet 6    NIFEdipine (ADALAT CC) 90 mg 24 hr tablet Take 1 tablet (90 mg total) by mouth daily 90 tablet 2    Potassium Gluconate 595 (99 K) MG TABS Take 1 tablet by mouth 3 (three) times a day        ranitidine (ZANTAC) 150 MG capsule Take 1 capsule (150 mg total) by mouth 2 (two) times a day 60 capsule 3    sertraline (ZOLOFT) 25 mg tablet TAKE 1 TABLET BY MOUTH EVERY DAY 30 tablet 5    simvastatin (ZOCOR) 20 mg tablet Take 1 tablet (20 mg total) by mouth daily at bedtime 30 tablet 3    sitaGLIPtin (JANUVIA) 50 mg tablet Take 1 tablet (50 mg total) by mouth daily 90 tablet 1     No current facility-administered medications for this visit  No Known Allergies  Immunization History   Administered Date(s) Administered    INFLUENZA 10/30/2015, 10/13/2016, 10/09/2017    Influenza Split High Dose Preservative Free IM 09/06/2012, 10/29/2013, 10/22/2014, 10/30/2015, 10/13/2016, 10/09/2017    Influenza, high dose seasonal 0 5 mL 10/16/2018    Pneumococcal Conjugate 13-Valent 11/13/2015    Pneumococcal Polysaccharide PPV23 10/29/2013    TD (adult) Preservative Free 10/26/2012       Patient Care Team:  Annabelle Blanton DO as PCP - General  DO Darrian Siegel MD    Medicare Screening Tests and Risk Assessments:  Abraham Rooney is here for her Subsequent Wellness visit    Last Medicare Wellness visit information reviewed, patient interviewed and updates made to the record today  Health Risk Assessment:  Patient rates overall health as good  Patient feels that their physical health rating is Same  Eyesight was rated as Same  Hearing was rated as Same  Patient feels that their emotional and mental health rating is Same  Pain experienced by patient in the last 7 days has been None  Patient states that she has experienced no weight loss or gain in last 6 months  Emotional/Mental Health:  Patient has been feeling nervous/anxious  PHQ-9 Depression Screening:    Frequency of the following problems over the past two weeks:      1  Little interest or pleasure in doing things: 0 - not at all      2  Feeling down, depressed, or hopeless: 0 - not at all  PHQ-2 Score: 0          Broken Bones/Falls: Fall Risk Assessment:    In the past year, patient has experienced: No history of falling in past year  visual disturbance    Bladder/Bowel:  Patient has not leaked urine accidently in the last six months  Patient reports no loss of bowel control  Immunizations:  Patient has had a flu vaccination within the last year  Patient has received a pneumonia shot  Patient has not received a shingles shot  Patient has received tetanus/diphtheria shot  Date of tetanus/diphtheria shot: 10/26/2012    Home Safety:  Patient does not have trouble with stairs inside or outside of their home  Patient currently reports that there are no safety hazards present in home, working smoke alarms, working carbon monoxide detectors  Preventative Screenings:   Breast cancer screening performed, 8/10/2018  no colon cancer screen completed, cholesterol screen completed, 1/18/2019  glaucoma eye exam completed,     Nutrition:  Current diet: Limited junk food, No Added Salt and Diabetic with servings of the following:    Medications:  Patient is currently taking over-the-counter supplements       List of OTC medications includes: calcuim, potassium  Patient is able to manage medications  Lifestyle Choices:  Patient reports no tobacco use  Patient has not smoked or used tobacco in the past   Patient reports no alcohol use  Patient does not drive a vehicle  Patient wears seat belt  Current level of exercise of physical activity described by patient as: no formal exercise  Activities of Daily Living:  Can get out of bed by his or her self, able to dress self, able to make own meals, able to do own shopping, able to bathe self, can do own laundry/housekeeping, can manage own money, pay bills and track expenses    Previous Hospitalizations:  Hospitalization or ED visit in past 12 months  Number of hospitalizations within the last year: 1-2        Advanced Directives:  Patient has decided on a power of   Patient has spoken to designated power of   Patient has not completed advanced directive          Preventative Screening/Counseling:      Cardiovascular:      General: Risks and Benefits Discussed and Screening Current      Counseling: Healthy Diet, Healthy Weight and Improve Exercise Tolerance          Diabetes:      General: Risks and Benefits Discussed and Screening Current      Counseling: Healthy Diet, Healthy Weight and Improve Physical Activity          Colorectal Cancer:      General: Risks and Benefits Discussed and Screening Not Indicated          Breast Cancer:      General: Risks and Benefits Discussed and Screening Current          Cervical Cancer:      General: Risks and Benefits Discussed and Screening Not Indicated          Osteoporosis:      General: Risks and Benefits Discussed and Screening Current      Counseling: Calcium and Vitamin D Intake and Regular Weightbearing Exercise          AAA:      General: Risks and Benefits Discussed and Screening Not Indicated          Glaucoma:      General: Risks and Benefits Discussed and Screening Current          HIV:      General: Risks and Benefits Discussed and Patient Declines Hepatitis C:      General: Risks and Benefits Discussed and Patient Declines        Advanced Directives:   Patient has no living will for healthcare, has durable POA for healthcare, 5 wishes given  Immunizations:      Influenza: Influenza UTD This Year      Pneumococcal: Risks & Benefits Discussed and Lifetime Vaccine Completed      Shingrix: Risks & Benefits Discussed and Shingrix Vaccine Needed Today      Hepatitis B (Medium to high risk patients): Risks & Benefits Discussed and Vaccine Status Unknown      Zostavax: Risks & Benefits Discussed and Patient Declines      TD: Risks & Benefits Discussed and Td Vaccine UTD      Other Preventative Counseling (Non-Medicare): Increase physical activity and Car/seat belt/driving safety reviewed           Visual Acuity Screening    Right eye Left eye Both eyes   Without correction:      With correction: 20/40 20/40 20/30       Physical Exam:  Review of Systems   Constitutional: Negative for chills, fatigue, fever and unexpected weight change  HENT: Negative for congestion and sore throat  Eyes: Negative for pain, discharge and visual disturbance  Respiratory: Negative for shortness of breath and wheezing  Cardiovascular: Negative for chest pain, palpitations and leg swelling  Gastrointestinal: Negative for abdominal pain, blood in stool, bowel incontinence, constipation, diarrhea and nausea  Endocrine: Negative for polydipsia and polyuria  Genitourinary: Negative for difficulty urinating and dysuria  Musculoskeletal: Negative for back pain and neck pain  Skin: Negative for rash and wound  Neurological: Negative for dizziness, syncope, light-headedness and headaches  Hematological: Negative for adenopathy  Psychiatric/Behavioral: Negative for behavioral problems and confusion  The patient is not nervous/anxious          Vitals:    02/15/19 0844   BP: 132/68   BP Location: Right arm   Patient Position: Sitting   Cuff Size: Standard   Pulse: 76 Temp: 97 8 °F (36 6 °C)   Weight: 67 1 kg (148 lb)   Height: 5' 1 5" (1 562 m)   Body mass index is 27 51 kg/m²  Physical Exam   Constitutional: She appears well-developed and well-nourished  No distress  HENT:   Head: Atraumatic  Right Ear: External ear normal    Left Ear: External ear normal    Mouth/Throat: Oropharynx is clear and moist    Eyes: Conjunctivae are normal  Right eye exhibits no discharge  Left eye exhibits no discharge  Neck: Neck supple  No tracheal deviation present  No thyromegaly present  Cardiovascular: Normal rate and regular rhythm  Murmur heard  Neg carotid bruits B/L   Pulmonary/Chest: Effort normal and breath sounds normal  No respiratory distress  She has no wheezes  She has no rales  Abdominal: Soft  There is no tenderness  There is no guarding  Musculoskeletal: She exhibits no edema or deformity  Lymphadenopathy:     She has no cervical adenopathy  Neurological: She is alert  She exhibits normal muscle tone  Skin: Skin is warm and dry  No rash noted  Psychiatric: She has a normal mood and affect  Her behavior is normal  Judgment normal    Nursing note and vitals reviewed

## 2019-02-15 NOTE — PROGRESS NOTES
Assessment/Plan:    Type 2 diabetes mellitus with stage 3 chronic kidney disease, without long-term current use of insulin ([facility])  Lab Results   Component Value Date    HGBA1C 7 1 (H) 01/18/2019       No results for input(s): POCGLU in the last 72 hours  Blood Sugar Average: Last 72 hrs:130-140   Dm type 2 with CKD stage 3 and neuropathy - borderline uncontrolled at 7 1 - urged to get on track with diet/exercise and keep active, cont' current meds and recheck BW in 3-4 mos - order given, on statin but no longer on ACE d/t worsening kidney tests, UTD on foot exam ( 4/18) and eye exam (1/19)      Normocytic anemia  New anemia - very mild and asymptomatic, check iron studies although is normocytic and likely AOCD with CKD    Dyslipidemia  FLP at goal, con't current statin    Chronic kidney disease  CKD stage 3 d/t DM and HTN - Slight bump up in BUN/Cr resulted in pt being taken off her ACE, had repeat BW with slight improvement, was told to f/u with Nephro in early spring, again urged no NSAIDs and keep active, will stop Omeprazole    Hypertension  BP at goal despite D/C of lisinopril, med list updated, cont' current meds    GERD (gastroesophageal reflux disease)  Urged to con't Ranitidine but stop Omeprazole - call if symptoms relapse       Diagnoses and all orders for this visit:    Normocytic anemia  -     CBC and differential; Future  -     Iron; Future  -     Ferritin; Future  -     CBC and differential  -     Iron  -     Ferritin    Type 2 diabetes mellitus with stage 3 chronic kidney disease, without long-term current use of insulin ([facility])  -     Glucose, fasting;  Future  -     Hemoglobin A1C; Future  -     Glucose, fasting  -     Hemoglobin A1C    Dyslipidemia    Essential hypertension    Stage 3 chronic kidney disease (HCC)    Gastroesophageal reflux disease with esophagitis      [place] - no longer needed d/t age    mammo 8/18    Dexa 6/17 - osteopenia    Subjective:      Patient ID: Jolie Cifuentes is a 80 y o  female  HPI  Pt here for follow up appt/BW results and AWV (subsequent)    Bw results were d/w pt and dgtr in detail: FBS/A1C 126/7 1, BUN/Cr 41/2 06 (GFR 22), CBC with H/H low at 10 6/32 7 with MCV 90 and RDW 15 7,  Phos was elevated but PTH wnl, rest of CMP/CBC/Mg/TSH/FLP/Urine microalbumin:Cr ratio were wnl  Def of controlled vs uncontrolled DM was reviewed  Diet was reviewed - wgt up 2 lbs from Nov 2018  She is taking her DM meds as directed  She checks her sugars just about every day - average BS is 130-140's  She is UTD on foot (4/18) and eye exam (1/19)  She is on statin and ACE  Goal FLP was d/w pt in detail  Diet/exercise reviewed as noted above  She is taking her Simvastatin daily as directed w/o Se  She notes no stroke/TIA symptoms/CP  Pt was referred to Nephro d/t her slowly progressing CKD and she saw Dr Lyndsay Sky in Jan 2019  She had her BMP repeated in early Feb d/t a bump in BUN/Cr as noted above in Jan - BUN/Cr in Feb came down a bit to 28/1 89 (GFR 24)  Pt was notified to stop her lisinopril after that  BP at goal today and meds were reviewed and med list was updated (lisinopril stopped)  She denies missing doses of meds or SE with the meds  She does check her BP outside the office - readings reviewed and range from 110-140's/60's with an average of mid 130's/60's  She notes no frequent Ha's/dizziness/double vision/CP  She was told to f/u with Nephro in 3-4 mos  Pt con't to take Omeprazole OTC as well as Ranitidine  She notes no current reflux symptoms/abd pain/N/V/dark stools/blood in stool  [place] - no longer needed d/t age    mammo 8/18    Dexa 6/17 - osteopenia    Review of Systems   Constitutional: Negative for chills, fatigue, fever and unexpected weight change  HENT: Negative for congestion and sore throat  Eyes: Negative for pain and visual disturbance  Respiratory: Negative for cough, shortness of breath and wheezing      Cardiovascular: Negative for chest pain, palpitations and leg swelling  Gastrointestinal: Negative for abdominal pain, blood in stool, constipation, diarrhea, nausea and vomiting  Endocrine: Negative for polydipsia and polyuria  Genitourinary: Negative for difficulty urinating and dysuria  Musculoskeletal: Negative for back pain and neck pain  Skin: Negative for rash and wound  Neurological: Negative for dizziness, syncope, light-headedness and headaches  Hematological: Negative for adenopathy  Psychiatric/Behavioral: Negative for behavioral problems and confusion  Objective:    /68 (BP Location: Right arm, Patient Position: Sitting, Cuff Size: Standard)   Pulse 76   Temp 97 8 °F (36 6 °C)   Ht 5' 1 5" (1 562 m)   Wt 67 1 kg (148 lb)   BMI 27 51 kg/m²      Physical Exam   Constitutional: She appears well-developed and well-nourished  No distress  HENT:   Head: Normocephalic and atraumatic  Right Ear: External ear normal    Left Ear: External ear normal    Mouth/Throat: Oropharynx is clear and moist    Eyes: Conjunctivae are normal  Right eye exhibits no discharge  Left eye exhibits no discharge  Neck: Neck supple  No tracheal deviation present  Cardiovascular: Normal rate and regular rhythm  Exam reveals no friction rub  Murmur heard  Neg carotid bruits B/L   Pulmonary/Chest: Effort normal and breath sounds normal  No respiratory distress  She has no wheezes  She has no rales  Abdominal: Soft  She exhibits no distension  There is no tenderness  There is no guarding  Musculoskeletal: She exhibits no edema  Lymphadenopathy:     She has no cervical adenopathy  Neurological: She is alert  She exhibits normal muscle tone  Skin: Skin is warm and dry  No rash noted  Psychiatric: She has a normal mood and affect  Her behavior is normal    Nursing note and vitals reviewed

## 2019-02-15 NOTE — PATIENT INSTRUCTIONS
Obesity   AMBULATORY CARE:   Obesity  is when your body mass index (BMI) is greater than 30  Your healthcare provider will use your height and weight to measure your BMI  The risks of obesity include  many health problems, such as injuries or physical disability  You may need tests to check for the following:  · Diabetes     · High blood pressure or high cholesterol     · Heart disease     · Gallbladder or liver disease     · Cancer of the colon, breast, prostate, liver, or kidney     · Sleep apnea     · Arthritis or gout  Seek care immediately if:   · You have a severe headache, confusion, or difficulty speaking  · You have weakness on one side of your body  · You have chest pain, sweating, or shortness of breath  Contact your healthcare provider if:   · You have symptoms of gallbladder or liver disease, such as pain in your upper abdomen  · You have knee or hip pain and discomfort while walking  · You have symptoms of diabetes, such as intense hunger and thirst, and frequent urination  · You have symptoms of sleep apnea, such as snoring or daytime sleepiness  · You have questions or concerns about your condition or care  Treatment for obesity  focuses on helping you lose weight to improve your health  Even a small decrease in BMI can reduce the risk for many health problems  Your healthcare provider will help you set a weight-loss goal   · Lifestyle changes  are the first step in treating obesity  These include making healthy food choices and getting regular physical activity  Your healthcare provider may suggest a weight-loss program that involves coaching, education, and therapy  · Medicine  may help you lose weight when it is used with a healthy diet and physical activity  · Surgery  can help you lose weight if you are very obese and have other health problems  There are several types of weight-loss surgery  Ask your healthcare provider for more information    Be successful losing weight:   · Set small, realistic goals  An example of a small goal is to walk for 20 minutes 5 days a week  Anther goal is to lose 5% of your body weight  · Tell friends, family members, and coworkers about your goals  and ask for their support  Ask a friend to lose weight with you, or join a weight-loss support group  · Identify foods or triggers that may cause you to overeat , and find ways to avoid them  Remove tempting high-calorie foods from your home and workplace  Place a bowl of fresh fruit on your kitchen counter  If stress causes you to eat, then find other ways to cope with stress  · Keep a diary to track what you eat and drink  Also write down how many minutes of physical activity you do each day  Weigh yourself once a week and record it in your diary  Eating changes: You will need to eat 500 to 1,000 fewer calories each day than you currently eat to lose 1 to 2 pounds a week  The following changes will help you cut calories:  · Eat smaller portions  Use small plates, no larger than 9 inches in diameter  Fill your plate half full of fruits and vegetables  Measure your food using measuring cups until you know what a serving size looks like  · Eat 3 meals and 1 or 2 snacks each day  Plan your meals in advance  Fatimah Granados and eat at home most of the time  Eat slowly  · Eat fruits and vegetables at every meal   They are low in calories and high in fiber, which makes you feel full  Do not add butter, margarine, or cream sauce to vegetables  Use herbs to season steamed vegetables  · Eat less fat and fewer fried foods  Eat more baked or grilled chicken and fish  These protein sources are lower in calories and fat than red meat  Limit fast food  Dress your salads with olive oil and vinegar instead of bottled dressing  · Limit the amount of sugar you eat  Do not drink sugary beverages  Limit alcohol  Activity changes:  Physical activity is good for your body in many ways   It helps you burn calories and build strong muscles  It decreases stress and depression, and improves your mood  It can also help you sleep better  Talk to your healthcare provider before you begin an exercise program   · Exercise for at least 30 minutes 5 days a week  Start slowly  Set aside time each day for physical activity that you enjoy and that is convenient for you  It is best to do both weight training and an activity that increases your heart rate, such as walking, bicycling, or swimming  · Find ways to be more active  Do yard work and housecleaning  Walk up the stairs instead of using elevators  Spend your leisure time going to events that require walking, such as outdoor festivals or fairs  This extra physical activity can help you lose weight and keep it off  Follow up with your healthcare provider as directed: You may need to meet with a dietitian  Write down your questions so you remember to ask them during your visits  © 2017 [employer] Information is for End User's use only and may not be sold, redistributed or otherwise used for commercial purposes  All illustrations and images included in CareNotes® are the copyrighted property of [employer]  or Roberto Carlos Gilbert  The above information is an  only  It is not intended as medical advice for individual conditions or treatments  Talk to your doctor, nurse or pharmacist before following any medical regimen to see if it is safe and effective for you  Urinary Incontinence   WHAT YOU NEED TO KNOW:   What is urinary incontinence? Urinary incontinence (UI) is when you lose control of your bladder  What causes UI? UI occurs because your bladder cannot store or empty urine properly  The following are the most common types of UI:  · Stress incontinence  is when you leak urine due to increased bladder pressure  This may happen when you cough, sneeze, or exercise       · Urge incontinence  is when you feel the need to urinate right away and leak urine accidentally  · Mixed incontinence  is when you have both stress and urge UI  What are the signs and symptoms of UI?   · You feel like your bladder does not empty completely when you urinate  · You urinate often and need to urinate immediately  · You leak urine when you sleep, or you wake up with the urge to urinate  · You leak urine when you cough, sneeze, exercise, or laugh  How is UI diagnosed? Your healthcare provider will ask how often you leak urine and whether you have stress or urge symptoms  Tell him which medicines you take, how often you urinate, and how much liquid you drink each day  You may need any of the following tests:  · Urine tests  may show infection or kidney function  · A pelvic exam  may be done to check for blockages  A pelvic exam will also show if your bladder, uterus, or other organs have moved out of place  · An x-ray, ultrasound, or CT  may show problems with parts of your urinary system  You may be given contrast liquid to help your organs show up better in the pictures  Tell the healthcare provider if you have ever had an allergic reaction to contrast liquid  Do not enter the MRI room with anything metal  Metal can cause serious injury  Tell the healthcare provider if you have any metal in or on your body  · A bladder scan  will show how much urine is left in your bladder after you urinate  You will be asked to urinate and then healthcare providers will use a small ultrasound machine to check the urine left in your bladder  · Cystometry  is used to check the function of your urinary system  Your healthcare provider checks the pressure in your bladder while filling it with fluid  Your bladder pressure may also be tested when your bladder is full and while you urinate  How is UI treated? · Medicines  can help strengthen your bladder control      · Electrical stimulation  is used to send a small amount of electrical energy to your pelvic floor muscles  This helps control your bladder function  Electrodes may be placed outside your body or in your rectum  For women, the electrodes may be placed in the vagina  · A bulking agent  may be injected into the wall of your urethra to make it thicker  This helps keep your urethra closed and decreases urine leakage  · Devices  such as a clamp, pessary, or tampon may help stop urine leaks  Ask your healthcare provider for more information about these and other devices  · Surgery  may be needed if other treatments do not work  Several types of surgery can help improve your bladder control  Ask your healthcare provider for more information about the surgery you may need  How can I manage my symptoms? · Do pelvic muscle exercises often  Your pelvic muscles help you stop urinating  Squeeze these muscles tight for 5 seconds, then relax for 5 seconds  Gradually work up to squeezing for 10 seconds  Do 3 sets of 15 repetitions a day, or as directed  This will help strengthen your pelvic muscles and improve bladder control  · A catheter  may be used to help empty your bladder  A catheter is a tiny, plastic tube that is put into your bladder to drain your urine  Your healthcare provider may tell you to use a catheter to prevent your bladder from getting too full and leaking urine  · Keep a UI record  Write down how often you leak urine and how much you leak  Make a note of what you were doing when you leaked urine  · Train your bladder  Go to the bathroom at set times, such as every 2 hours, even if you do not feel the urge to go  You can also try to hold your urine when you feel the urge to go  For example, hold your urine for 5 minutes when you feel the urge to go  As that becomes easier, hold your urine for 10 minutes  · Drink liquids as directed  Ask your healthcare provider how much liquid to drink each day and which liquids are best for you   You may need to limit the amount of liquid you drink to help control your urine leakage  Limit or do not have drinks that contain caffeine or alcohol  Do not drink any liquid right before you go to bed  · Prevent constipation  Eat a variety of high-fiber foods  Good examples are high-fiber cereals, beans, vegetables, and whole-grain breads  Prune juice may help make your bowel movement softer  Walking is the best way to trigger your intestines to have a bowel movement  · Exercise regularly and maintain a healthy weight  Ask your healthcare provider how much you should weigh and about the best exercise plan for you  Weight loss and exercise will decrease pressure on your bladder and help you control your leakage  Ask him to help you create a weight loss plan if you are overweight  When should I seek immediate care? · You have severe pain  · You are confused or cannot think clearly  When should I contact my healthcare provider? · You have a fever  · You see blood in your urine  · You have pain when you urinate  · You have new or worse pain, even after treatment  · Your mouth feels dry or you have vision changes  · Your urine is cloudy or smells bad  · You have questions or concerns about your condition or care  CARE AGREEMENT:   You have the right to help plan your care  Learn about your health condition and how it may be treated  Discuss treatment options with your caregivers to decide what care you want to receive  You always have the right to refuse treatment  The above information is an  only  It is not intended as medical advice for individual conditions or treatments  Talk to your doctor, nurse or pharmacist before following any medical regimen to see if it is safe and effective for you  © 2017 [employer] Information is for End User's use only and may not be sold, redistributed or otherwise used for commercial purposes   All illustrations and images included in CareNotes® are the copyrighted property of [employer]  or Roberto Carlos Gilbert  Cigarette Smoking and Your Health   AMBULATORY CARE:   Risks to your health if you smoke:  Nicotine and other chemicals found in tobacco damage every cell in your body  Even if you are a light smoker, you have an increased risk for cancer, heart disease, and lung disease  If you are pregnant or have diabetes, smoking increases your risk for complications  Benefits to your health if you stop smoking:   · You decrease respiratory symptoms such as coughing, wheezing, and shortness of breath  · You reduce your risk for cancers of the lung, mouth, throat, kidney, bladder, pancreas, stomach, and cervix  If you already have cancer, you increase the benefits of chemotherapy  You also reduce your risk for cancer returning or a second cancer from developing  · You reduce your risk for heart disease, blood clots, heart attack, and stroke  · You reduce your risk for lung infections, and diseases such as pneumonia, asthma, chronic bronchitis, and emphysema  · Your circulation improves  More oxygen can be delivered to your body  If you have diabetes, you lower your risk for complications, such as kidney, artery, and eye diseases  You also lower your risk for nerve damage  Nerve damage can lead to amputations, poor vision, and blindness  · You improve your body's ability to heal and to fight infections  Benefits to the health of others if you stop smoking:  Tobacco is harmful to nonsmokers who breathe in your secondhand smoke  The following are ways the health of others around you may improve when you stop smoking:  · You lower the risks for lung cancer and heart disease in nonsmoking adults  · If you are pregnant, you lower the risk for miscarriage, early delivery, low birth weight, and stillbirth  You also lower your baby's risk for SIDS, obesity, developmental delay, and neurobehavioral problems, such as ADHD  · If you have children, you lower their risk for ear infections, colds, pneumonia, bronchitis, and asthma  For more information and support to stop smoking:   · Smokefree  gov  Phone: 9- 680 - 251-5476  Web Address: www smokefree  gov  Follow up with your healthcare provider as directed:  Write down your questions so you remember to ask them during your visits  © 2017 [address] [employer] Information is for End User's use only and may not be sold, redistributed or otherwise used for commercial purposes  All illustrations and images included in CareNotes® are the copyrighted property of [employer]  or Roberto Carlos Gilbert  The above information is an  only  It is not intended as medical advice for individual conditions or treatments  Talk to your doctor, nurse or pharmacist before following any medical regimen to see if it is safe and effective for you  Fall Prevention   AMBULATORY CARE:   Fall prevention  includes ways to make your home and other areas safer  It also includes ways you can move more carefully to prevent a fall  Health conditions that cause changes in your blood pressure, vision, or muscle strength and coordination may increase your risk for falls  Medicines may also increase your risk for falls if they make you dizzy, weak, or sleepy  Call 911 or have someone else call if:   · You have fallen and are unconscious  · You have fallen and cannot move part of your body  Contact your healthcare provider if:   · You have fallen and have pain or a headache  · You have questions or concerns about your condition or care  Fall prevention tips:   · Stand or sit up slowly  This may help you keep your balance and prevent falls  · Use assistive devices as directed  Your healthcare provider may suggest that you use a cane or walker to help you keep your balance  You may need to have grab bars put in your bathroom near the toilet or in the shower      · Wear shoes that fit well and have soles that   Wear shoes both inside and outside  Use slippers with good   Do not wear shoes with high heels  · Wear a personal alarm  This is a device that allows you to call 911 if you fall and need help  Ask your healthcare provider for more information  · Stay active  Exercise can help strengthen your muscles and improve your balance  Your healthcare provider may recommend water aerobics or walking  He or she may also recommend physical therapy to improve your coordination  Never start an exercise program without talking to your healthcare provider first      · Manage your medical conditions  Keep all appointments with your healthcare providers  Visit your eye doctor as directed  Home safety tips:   · Add items to prevent falls in the bathroom  Put nonslip strips on your bath or shower floor to prevent you from slipping  Use a bath mat if you do not have carpet in the bathroom  This will prevent you from falling when you step out of the bath or shower  Use a shower seat so you do not need to stand while you shower  Sit on the toilet or a chair in your bathroom to dry yourself and put on clothing  This will prevent you from losing your balance from drying or dressing yourself while you are standing  · Keep paths clear  Remove books, shoes, and other objects from walkways and stairs  Place cords for telephones and lamps out of the way so that you do not need to walk over them  Tape them down if you cannot move them  Remove small rugs  If you cannot remove a rug, secure it with double-sided tape  This will prevent you from tripping  · Install bright lights in your home  Use night lights to help light paths to the bathroom or kitchen  Always turn on the light before you start walking  · Keep items you use often on shelves within reach  Do not use a step stool to help you reach an item  · Paint or place reflective tape on the edges of your stairs    This will help you see the stairs better  Follow up with your healthcare provider as directed:  Write down your questions so you remember to ask them during your visits  © 2017 [facility] Information is for End User's use only and may not be sold, redistributed or otherwise used for commercial purposes  All illustrations and images included in CareNotes® are the copyrighted property of [employer]  or Roberto Carlos Gilbert  The above information is an  only  It is not intended as medical advice for individual conditions or treatments  Talk to your doctor, nurse or pharmacist before following any medical regimen to see if it is safe and effective for you  Advance Directives   WHAT YOU NEED TO KNOW:   What are advance directives? Advance directives are legal documents that state your wishes and plans for medical care  These plans are made ahead of time in case you lose your ability to make decisions for yourself  Advance directives can apply to any medical decision, such as the treatments you want, and if you want to donate organs  What are the types of advance directives? There are many types of advance directives, and each state has rules about how to use them  You may choose a combination of any of the following:  · Living will: This is a written record of the treatment you want  You can also choose which treatments you do not want, which to limit, and which to stop at a certain time  This includes surgery, medicine, IV fluid, and tube feedings  · Durable power of  for healthcare [place] [facility]): This is a written record that states who you want to make healthcare choices for you when you are unable to make them for yourself  This person, called a proxy, is usually a family member or a friend  You may choose more than 1 proxy  · Do not resuscitate (DNR) order:  A DNR order is used in case your heart stops beating or you stop breathing   It is a request not to have certain forms of treatment, such as CPR  A DNR order may be included in other types of advance directives  · Medical directive: This covers the care that you want if you are in a coma, near death, or unable to make decisions for yourself  You can list the treatments you want for each condition  Treatment may include pain medicine, surgery, blood transfusions, dialysis, IV or tube feedings, and a ventilator (breathing machine)  · Values history: This document has questions about your views, beliefs, and how you feel and think about life  This information can help others choose the care that you would choose  Why are advance directives important? An advance directive helps you control your care  Although spoken wishes may be used, it is better to have your wishes written down  Spoken wishes can be misunderstood, or not followed  Treatments may be given even if you do not want them  An advance directive may make it easier for your family to make difficult choices about your care  How do I decide what to put in my advance directives? · Make informed decisions:  Make sure you fully understand treatments or care you may receive  Think about the benefits and problems your decisions could cause for you or your family  Talk to healthcare providers if you have concerns or questions before you write down your wishes  You may also want to talk with your [facility] or , or a   Check your state laws to make sure that what you put in your advance directive is legal      · Sign all forms:  Sign and date your advance directive when you have finished  You may also need 2 witnesses to sign the forms  Witnesses cannot be your doctor or his staff, your spouse, heirs or beneficiaries, people you owe money to, or your chosen proxy  Talk to your family, proxy, and healthcare providers about your advance directive  Give each person a copy, and keep one for yourself in a place you can get to easily   Do not keep it hidden or locked away  · Review and revise your plans: You can revise your advance directive at any time, as long as you are able to make decisions  Review your plan every year, and when there are changes in your life, or your health  When you make changes, let your family, proxy, and healthcare providers know  Give each a new copy  Where can I find more information? · [employer]  [address] [place] , [address]  Phone: 2- [phone] - [phone]  Phone: 4- 769 - [phone]  Web Address: http://www  aafp org  · [address] [facility])  [address] [address] [facility], [address] [facility] , [address] [place] [address]  Phone: [phone]  Phone: [phone]  Web Address: Ramses morales  CARE AGREEMENT:   You have the right to help plan your care  To help with this plan, you must learn about your health condition and treatment options  You must also learn about advance directives and how they are used  Work with your healthcare providers to decide what care will be used to treat you  You always have the right to refuse treatment  The above information is an  only  It is not intended as medical advice for individual conditions or treatments  Talk to your doctor, nurse or pharmacist before following any medical regimen to see if it is safe and effective for you  © 2017 [address] [employer] Information is for End User's use only and may not be sold, redistributed or otherwise used for commercial purposes  All illustrations and images included in CareNotes® are the copyrighted property of [employer]  or Roberto Carlos Gilbert

## 2019-02-23 DIAGNOSIS — I10 ESSENTIAL HYPERTENSION: ICD-10-CM

## 2019-02-24 RX ORDER — HYDROCHLOROTHIAZIDE 25 MG/1
TABLET ORAL
Qty: 30 TABLET | Refills: 6 | Status: SHIPPED | OUTPATIENT
Start: 2019-02-24 | End: 2019-08-29 | Stop reason: SDUPTHER

## 2019-03-11 DIAGNOSIS — E11.22 TYPE 2 DIABETES MELLITUS WITH STAGE 3 CHRONIC KIDNEY DISEASE, WITHOUT LONG-TERM CURRENT USE OF INSULIN (HCC): ICD-10-CM

## 2019-03-11 DIAGNOSIS — I10 ESSENTIAL HYPERTENSION: ICD-10-CM

## 2019-03-11 DIAGNOSIS — E78.5 DYSLIPIDEMIA: ICD-10-CM

## 2019-03-11 DIAGNOSIS — N18.30 TYPE 2 DIABETES MELLITUS WITH STAGE 3 CHRONIC KIDNEY DISEASE, WITHOUT LONG-TERM CURRENT USE OF INSULIN (HCC): ICD-10-CM

## 2019-03-11 RX ORDER — GLIPIZIDE 5 MG/1
TABLET ORAL
Qty: 15 TABLET | Refills: 3 | Status: SHIPPED | OUTPATIENT
Start: 2019-03-11 | End: 2019-05-31 | Stop reason: SDUPTHER

## 2019-03-11 RX ORDER — SITAGLIPTIN 50 MG/1
TABLET, FILM COATED ORAL
Qty: 90 TABLET | Refills: 1 | Status: SHIPPED | OUTPATIENT
Start: 2019-03-11 | End: 2019-08-02 | Stop reason: SDUPTHER

## 2019-03-22 ENCOUNTER — OFFICE VISIT (OUTPATIENT)
Dept: FAMILY MEDICINE CLINIC | Facility: HOSPITAL | Age: 84
End: 2019-03-22
Payer: COMMERCIAL

## 2019-03-22 VITALS
WEIGHT: 151 LBS | HEIGHT: 62 IN | SYSTOLIC BLOOD PRESSURE: 130 MMHG | DIASTOLIC BLOOD PRESSURE: 70 MMHG | BODY MASS INDEX: 27.79 KG/M2 | TEMPERATURE: 97.8 F | HEART RATE: 81 BPM

## 2019-03-22 DIAGNOSIS — M79.601 PAIN OF RIGHT UPPER EXTREMITY: Primary | ICD-10-CM

## 2019-03-22 PROCEDURE — 99213 OFFICE O/P EST LOW 20 MIN: CPT | Performed by: INTERNAL MEDICINE

## 2019-03-22 NOTE — PROGRESS NOTES
Assessment/Plan:     Diagnoses and all orders for this visit:    Pain of right upper extremity  Comments:  Pain intermittent and already improving - not able to provoke the pain on exam today - reassured pt that neurologically there is no sign of radicular symptoms arising from cervical spine, her R shoulder exam was normal and no sign of rotator cuff injury, she has no muscle pain at this time and strength is normal, encouraged not to carry her groceries just on the R arm and to con't with ROM exercises/stretches and if pain reoccurs to try heat and Tylenol or low dose NSAID prn, call with weakness/numbness/tingling/dropping objects/swelling/redness/warmth          Subjective:      Patient ID: Terrence Fajardo is a 80 y o  female  HPI Pt here with c/o R arm pain x 2 -3 wks  She hit her funny bone a few mos ago (dgtr states it was a few wks ago - pt poor historian) but is not sure if that has anything to do with it  She states the pain is intermittent and occurs primarily when she lifts her arm  The pain is primarily at her shoulder and bicep region and also feels like someone is sitting on her neck  She notes the pain also shoots down to her lower back  She notes no numbness/tingling/weakness/dropping objects  She notes no redness or swelling  She has tried an OTC muscle rub w/some much benefit  Arm is actually feeling better today  Review of Systems   Constitutional: Negative for chills and fever  Respiratory: Negative for cough and shortness of breath  Cardiovascular: Negative for chest pain and palpitations  Gastrointestinal: Negative for diarrhea and nausea  Musculoskeletal: Positive for arthralgias and neck pain  Negative for joint swelling  Skin: Negative for rash and wound  Neurological: Negative for weakness and numbness  Hematological: Negative for adenopathy  Psychiatric/Behavioral: Negative for behavioral problems and confusion           Objective:    /70 (BP Location: Right arm, Patient Position: Sitting, Cuff Size: Standard)   Pulse 81   Temp 97 8 °F (36 6 °C)   Ht 5' 1 5" (1 562 m)   Wt 68 5 kg (151 lb)   BMI 28 07 kg/m²      Physical Exam   Constitutional: She appears well-developed and well-nourished  No distress  HENT:   Head: Normocephalic and atraumatic  Eyes: Conjunctivae are normal  Right eye exhibits no discharge  Left eye exhibits no discharge  Neck: Neck supple  No tracheal deviation present  Cardiovascular: Normal rate and regular rhythm  Exam reveals no friction rub  Murmur heard  Pulmonary/Chest: Effort normal and breath sounds normal  No respiratory distress  She has no wheezes  She has no rales  Musculoskeletal: She exhibits no edema or deformity  Nml gait  C spine: no pain with palp of spinous processes, nml AROM with flex/ext and rotation  R shoulder: nml abduction/extension/internal rotation with AROM, neg drop arm test  5/5 B/L UE muscle strength   Neurological: She is alert  No sensory deficit  She exhibits normal muscle tone  Skin: Skin is warm and dry  No rash noted  Psychiatric: She has a normal mood and affect  Her behavior is normal    Nursing note and vitals reviewed

## 2019-04-05 ENCOUNTER — OFFICE VISIT (OUTPATIENT)
Dept: FAMILY MEDICINE CLINIC | Facility: HOSPITAL | Age: 84
End: 2019-04-05
Payer: COMMERCIAL

## 2019-04-05 VITALS
HEART RATE: 73 BPM | SYSTOLIC BLOOD PRESSURE: 140 MMHG | BODY MASS INDEX: 28.34 KG/M2 | HEIGHT: 62 IN | DIASTOLIC BLOOD PRESSURE: 72 MMHG | WEIGHT: 154 LBS | TEMPERATURE: 97.8 F

## 2019-04-05 DIAGNOSIS — E11.22 TYPE 2 DIABETES MELLITUS WITH STAGE 3 CHRONIC KIDNEY DISEASE, WITHOUT LONG-TERM CURRENT USE OF INSULIN (HCC): ICD-10-CM

## 2019-04-05 DIAGNOSIS — N18.30 TYPE 2 DIABETES MELLITUS WITH STAGE 3 CHRONIC KIDNEY DISEASE, WITHOUT LONG-TERM CURRENT USE OF INSULIN (HCC): ICD-10-CM

## 2019-04-05 DIAGNOSIS — R30.0 DYSURIA: Primary | ICD-10-CM

## 2019-04-05 LAB
SL AMB  POCT GLUCOSE, UA: ABNORMAL
SL AMB LEUKOCYTE ESTERASE,UA: ABNORMAL
SL AMB POCT BILIRUBIN,UA: ABNORMAL
SL AMB POCT BLOOD,UA: 50
SL AMB POCT CLARITY,UA: CLEAR
SL AMB POCT COLOR,UA: YELLOW
SL AMB POCT KETONES,UA: ABNORMAL
SL AMB POCT NITRITE,UA: ABNORMAL
SL AMB POCT PH,UA: 5
SL AMB POCT SPECIFIC GRAVITY,UA: 1
SL AMB POCT URINE PROTEIN: ABNORMAL
SL AMB POCT UROBILINOGEN: ABNORMAL

## 2019-04-05 PROCEDURE — 1160F RVW MEDS BY RX/DR IN RCRD: CPT | Performed by: INTERNAL MEDICINE

## 2019-04-05 PROCEDURE — 1036F TOBACCO NON-USER: CPT | Performed by: INTERNAL MEDICINE

## 2019-04-05 PROCEDURE — 99214 OFFICE O/P EST MOD 30 MIN: CPT | Performed by: INTERNAL MEDICINE

## 2019-04-05 PROCEDURE — 81002 URINALYSIS NONAUTO W/O SCOPE: CPT | Performed by: INTERNAL MEDICINE

## 2019-04-05 RX ORDER — SULFAMETHOXAZOLE AND TRIMETHOPRIM 800; 160 MG/1; MG/1
1 TABLET ORAL 2 TIMES DAILY
Qty: 6 TABLET | Refills: 0 | Status: CANCELLED | OUTPATIENT
Start: 2019-04-05 | End: 2019-04-08

## 2019-04-05 RX ORDER — SULFAMETHOXAZOLE AND TRIMETHOPRIM 400; 80 MG/1; MG/1
1 TABLET ORAL EVERY 12 HOURS SCHEDULED
Qty: 14 TABLET | Refills: 0 | Status: SHIPPED | OUTPATIENT
Start: 2019-04-05 | End: 2019-04-12

## 2019-04-09 LAB
BACTERIA UR CULT: ABNORMAL
Lab: ABNORMAL
SL AMB ANTIMICROBIAL SUSCEPTIBILITY: ABNORMAL

## 2019-04-12 ENCOUNTER — TELEPHONE (OUTPATIENT)
Dept: FAMILY MEDICINE CLINIC | Facility: HOSPITAL | Age: 84
End: 2019-04-12

## 2019-04-12 DIAGNOSIS — R30.0 DYSURIA: Primary | ICD-10-CM

## 2019-04-29 ENCOUNTER — TELEPHONE (OUTPATIENT)
Dept: FAMILY MEDICINE CLINIC | Facility: HOSPITAL | Age: 84
End: 2019-04-29

## 2019-05-09 DIAGNOSIS — I10 ESSENTIAL HYPERTENSION: ICD-10-CM

## 2019-05-09 RX ORDER — NEBIVOLOL HYDROCHLORIDE 20 MG/1
TABLET ORAL
Qty: 30 TABLET | Refills: 5 | Status: SHIPPED | OUTPATIENT
Start: 2019-05-09 | End: 2019-11-13 | Stop reason: SDUPTHER

## 2019-05-11 LAB
BASOPHILS # BLD AUTO: 0 X10E3/UL (ref 0–0.2)
BASOPHILS NFR BLD AUTO: 0 %
EOSINOPHIL # BLD AUTO: 0.9 X10E3/UL (ref 0–0.4)
EOSINOPHIL NFR BLD AUTO: 8 %
ERYTHROCYTE [DISTWIDTH] IN BLOOD BY AUTOMATED COUNT: 15.3 % (ref 12.3–15.4)
EST. AVERAGE GLUCOSE BLD GHB EST-MCNC: 160 MG/DL
FERRITIN SERPL-MCNC: 21 NG/ML (ref 15–150)
GLUCOSE SERPL-MCNC: 156 MG/DL (ref 65–99)
HBA1C MFR BLD: 7.2 % (ref 4.8–5.6)
HCT VFR BLD AUTO: 36.1 % (ref 34–46.6)
HGB BLD-MCNC: 11.7 G/DL (ref 11.1–15.9)
IMM GRANULOCYTES # BLD: 0 X10E3/UL (ref 0–0.1)
IMM GRANULOCYTES NFR BLD: 0 %
IRON SERPL-MCNC: 76 UG/DL (ref 27–139)
LYMPHOCYTES # BLD AUTO: 5.1 X10E3/UL (ref 0.7–3.1)
LYMPHOCYTES NFR BLD AUTO: 47 %
MCH RBC QN AUTO: 29.9 PG (ref 26.6–33)
MCHC RBC AUTO-ENTMCNC: 32.4 G/DL (ref 31.5–35.7)
MCV RBC AUTO: 92 FL (ref 79–97)
MONOCYTES # BLD AUTO: 0.7 X10E3/UL (ref 0.1–0.9)
MONOCYTES NFR BLD AUTO: 7 %
NEUTROPHILS # BLD AUTO: 4.1 X10E3/UL (ref 1.4–7)
NEUTROPHILS NFR BLD AUTO: 38 %
PLATELET # BLD AUTO: 215 X10E3/UL (ref 150–379)
RBC # BLD AUTO: 3.91 X10E6/UL (ref 3.77–5.28)
WBC # BLD AUTO: 10.8 X10E3/UL (ref 3.4–10.8)

## 2019-05-16 ENCOUNTER — TELEPHONE (OUTPATIENT)
Dept: NEPHROLOGY | Facility: HOSPITAL | Age: 84
End: 2019-05-16

## 2019-05-16 DIAGNOSIS — N18.30 STAGE 3 CHRONIC KIDNEY DISEASE (HCC): Primary | ICD-10-CM

## 2019-05-16 DIAGNOSIS — E87.6 HYPOKALEMIA: ICD-10-CM

## 2019-05-17 ENCOUNTER — OFFICE VISIT (OUTPATIENT)
Dept: FAMILY MEDICINE CLINIC | Facility: HOSPITAL | Age: 84
End: 2019-05-17
Payer: COMMERCIAL

## 2019-05-17 VITALS
BODY MASS INDEX: 27.97 KG/M2 | DIASTOLIC BLOOD PRESSURE: 70 MMHG | HEIGHT: 62 IN | TEMPERATURE: 97.8 F | HEART RATE: 68 BPM | WEIGHT: 152 LBS | SYSTOLIC BLOOD PRESSURE: 138 MMHG

## 2019-05-17 DIAGNOSIS — D64.9 NORMOCYTIC ANEMIA: ICD-10-CM

## 2019-05-17 DIAGNOSIS — E11.22 TYPE 2 DIABETES MELLITUS WITH STAGE 3 CHRONIC KIDNEY DISEASE, WITHOUT LONG-TERM CURRENT USE OF INSULIN (HCC): Primary | ICD-10-CM

## 2019-05-17 DIAGNOSIS — F41.9 ANXIETY DISORDER, UNSPECIFIED TYPE: ICD-10-CM

## 2019-05-17 DIAGNOSIS — N18.30 TYPE 2 DIABETES MELLITUS WITH STAGE 3 CHRONIC KIDNEY DISEASE, WITHOUT LONG-TERM CURRENT USE OF INSULIN (HCC): Primary | ICD-10-CM

## 2019-05-17 DIAGNOSIS — R79.0 LOW FERRITIN: ICD-10-CM

## 2019-05-17 DIAGNOSIS — I10 ESSENTIAL HYPERTENSION: ICD-10-CM

## 2019-05-17 DIAGNOSIS — F41.9 ANXIETY: ICD-10-CM

## 2019-05-17 DIAGNOSIS — E78.5 DYSLIPIDEMIA: ICD-10-CM

## 2019-05-17 DIAGNOSIS — E66.3 OVERWEIGHT (BMI 25.0-29.9): ICD-10-CM

## 2019-05-17 PROCEDURE — 99214 OFFICE O/P EST MOD 30 MIN: CPT | Performed by: INTERNAL MEDICINE

## 2019-05-17 RX ORDER — FERROUS SULFATE TAB EC 324 MG (65 MG FE EQUIVALENT) 324 (65 FE) MG
324 TABLET DELAYED RESPONSE ORAL
Qty: 30 TABLET | Refills: 5 | Status: SHIPPED | OUTPATIENT
Start: 2019-05-17 | End: 2019-11-04 | Stop reason: SDUPTHER

## 2019-05-17 RX ORDER — SIMVASTATIN 20 MG
20 TABLET ORAL
Qty: 30 TABLET | Refills: 5 | Status: SHIPPED | OUTPATIENT
Start: 2019-05-17 | End: 2019-11-18 | Stop reason: SDUPTHER

## 2019-05-17 RX ORDER — SERTRALINE HYDROCHLORIDE 25 MG/1
25 TABLET, FILM COATED ORAL DAILY
Qty: 30 TABLET | Refills: 5 | Status: SHIPPED | OUTPATIENT
Start: 2019-05-17 | End: 2019-10-18 | Stop reason: SDUPTHER

## 2019-05-18 LAB
BUN SERPL-MCNC: 45 MG/DL (ref 8–27)
BUN/CREAT SERPL: 22 (ref 12–28)
CALCIUM SERPL-MCNC: 10.4 MG/DL (ref 8.7–10.3)
CHLORIDE SERPL-SCNC: 98 MMOL/L (ref 96–106)
CO2 SERPL-SCNC: 28 MMOL/L (ref 20–29)
CREAT SERPL-MCNC: 2.06 MG/DL (ref 0.57–1)
GLUCOSE SERPL-MCNC: 150 MG/DL (ref 65–99)
MAGNESIUM SERPL-MCNC: 2.2 MG/DL (ref 1.6–2.3)
PHOSPHATE SERPL-MCNC: 4.8 MG/DL (ref 2.5–4.5)
POTASSIUM SERPL-SCNC: 3.8 MMOL/L (ref 3.5–5.2)
SL AMB EGFR AFRICAN AMERICAN: 25 ML/MIN/1.73
SL AMB EGFR NON AFRICAN AMERICAN: 22 ML/MIN/1.73
SODIUM SERPL-SCNC: 144 MMOL/L (ref 134–144)

## 2019-05-21 ENCOUNTER — OFFICE VISIT (OUTPATIENT)
Dept: NEPHROLOGY | Facility: HOSPITAL | Age: 84
End: 2019-05-21
Payer: COMMERCIAL

## 2019-05-21 VITALS
DIASTOLIC BLOOD PRESSURE: 52 MMHG | RESPIRATION RATE: 16 BRPM | HEIGHT: 62 IN | BODY MASS INDEX: 28.96 KG/M2 | SYSTOLIC BLOOD PRESSURE: 108 MMHG | HEART RATE: 70 BPM | WEIGHT: 157.38 LBS

## 2019-05-21 DIAGNOSIS — E11.22 TYPE 2 DIABETES MELLITUS WITH STAGE 3 CHRONIC KIDNEY DISEASE, WITHOUT LONG-TERM CURRENT USE OF INSULIN (HCC): Primary | ICD-10-CM

## 2019-05-21 DIAGNOSIS — N28.1 COMPLEX RENAL CYST: ICD-10-CM

## 2019-05-21 DIAGNOSIS — D64.9 NORMOCYTIC ANEMIA: ICD-10-CM

## 2019-05-21 DIAGNOSIS — N18.30 TYPE 2 DIABETES MELLITUS WITH STAGE 3 CHRONIC KIDNEY DISEASE, WITHOUT LONG-TERM CURRENT USE OF INSULIN (HCC): Primary | ICD-10-CM

## 2019-05-21 DIAGNOSIS — I10 ESSENTIAL HYPERTENSION: ICD-10-CM

## 2019-05-21 DIAGNOSIS — E87.6 HYPOKALEMIA: ICD-10-CM

## 2019-05-21 DIAGNOSIS — E83.52 HYPERCALCEMIA: ICD-10-CM

## 2019-05-21 PROCEDURE — 99214 OFFICE O/P EST MOD 30 MIN: CPT | Performed by: INTERNAL MEDICINE

## 2019-05-22 ENCOUNTER — TRANSCRIBE ORDERS (OUTPATIENT)
Dept: ADMINISTRATIVE | Facility: HOSPITAL | Age: 84
End: 2019-05-22

## 2019-05-31 DIAGNOSIS — E78.5 DYSLIPIDEMIA: ICD-10-CM

## 2019-05-31 DIAGNOSIS — N18.30 TYPE 2 DIABETES MELLITUS WITH STAGE 3 CHRONIC KIDNEY DISEASE, WITHOUT LONG-TERM CURRENT USE OF INSULIN (HCC): ICD-10-CM

## 2019-05-31 DIAGNOSIS — I10 ESSENTIAL HYPERTENSION: ICD-10-CM

## 2019-05-31 DIAGNOSIS — E11.22 TYPE 2 DIABETES MELLITUS WITH STAGE 3 CHRONIC KIDNEY DISEASE, WITHOUT LONG-TERM CURRENT USE OF INSULIN (HCC): ICD-10-CM

## 2019-05-31 RX ORDER — GLIPIZIDE 5 MG/1
5 TABLET ORAL DAILY
Qty: 90 TABLET | Refills: 1 | Status: SHIPPED | OUTPATIENT
Start: 2019-05-31 | End: 2019-07-22

## 2019-06-21 ENCOUNTER — TELEPHONE (OUTPATIENT)
Dept: FAMILY MEDICINE CLINIC | Facility: HOSPITAL | Age: 84
End: 2019-06-21

## 2019-06-22 ENCOUNTER — TELEPHONE (OUTPATIENT)
Dept: FAMILY MEDICINE CLINIC | Facility: HOSPITAL | Age: 84
End: 2019-06-22

## 2019-06-22 DIAGNOSIS — I10 ESSENTIAL HYPERTENSION: ICD-10-CM

## 2019-06-22 RX ORDER — CLONIDINE 0.1 MG/24H
1 PATCH, EXTENDED RELEASE TRANSDERMAL WEEKLY
Qty: 4 PATCH | Refills: 0 | Status: SHIPPED | OUTPATIENT
Start: 2019-06-22 | End: 2019-07-15 | Stop reason: SDUPTHER

## 2019-06-27 DIAGNOSIS — E11.22 TYPE 2 DIABETES MELLITUS WITH STAGE 3 CHRONIC KIDNEY DISEASE, WITHOUT LONG-TERM CURRENT USE OF INSULIN (HCC): ICD-10-CM

## 2019-06-27 DIAGNOSIS — N18.30 TYPE 2 DIABETES MELLITUS WITH STAGE 3 CHRONIC KIDNEY DISEASE, WITHOUT LONG-TERM CURRENT USE OF INSULIN (HCC): ICD-10-CM

## 2019-06-27 DIAGNOSIS — E78.5 DYSLIPIDEMIA: ICD-10-CM

## 2019-06-27 DIAGNOSIS — I10 ESSENTIAL HYPERTENSION: ICD-10-CM

## 2019-06-27 RX ORDER — GLIPIZIDE 5 MG/1
5 TABLET ORAL DAILY
Qty: 30 TABLET | Refills: 5 | Status: SHIPPED | OUTPATIENT
Start: 2019-06-27 | End: 2019-09-24

## 2019-07-02 LAB — HEMOCCULT STL QL IA: NEGATIVE

## 2019-07-08 ENCOUNTER — TELEPHONE (OUTPATIENT)
Dept: FAMILY MEDICINE CLINIC | Facility: HOSPITAL | Age: 84
End: 2019-07-08

## 2019-07-08 NOTE — TELEPHONE ENCOUNTER
I talked to Talon--she'll wait until next week  If it gets bad she'll bring her in this week--It has been going on for awhile

## 2019-07-08 NOTE — TELEPHONE ENCOUNTER
I dont know her clinical situation to switch any medication  She should be seen by any provider before end of this week

## 2019-07-08 NOTE — TELEPHONE ENCOUNTER
DAUGHTER IN LAW STATES THAT BOTH FEET ARE RETAINING WATER AND SWELLING - SHOULD HER MEDS BE ADJUSTED?   PLEASE CALL BACK

## 2019-07-08 NOTE — TELEPHONE ENCOUNTER
Please advise  Should she make appt to come in? Meds adjusted? Wait until Dr Darci Perla comes back next week?

## 2019-07-15 DIAGNOSIS — I10 ESSENTIAL HYPERTENSION: ICD-10-CM

## 2019-07-15 RX ORDER — CLONIDINE 0.1 MG/24H
PATCH, EXTENDED RELEASE TRANSDERMAL
Qty: 4 PATCH | Refills: 0 | OUTPATIENT
Start: 2019-07-15

## 2019-07-15 RX ORDER — CLONIDINE 0.1 MG/24H
1 PATCH, EXTENDED RELEASE TRANSDERMAL WEEKLY
Qty: 4 PATCH | Refills: 5 | Status: SHIPPED | OUTPATIENT
Start: 2019-07-15 | End: 2020-01-27

## 2019-07-22 ENCOUNTER — OFFICE VISIT (OUTPATIENT)
Dept: FAMILY MEDICINE CLINIC | Facility: HOSPITAL | Age: 84
End: 2019-07-22
Payer: COMMERCIAL

## 2019-07-22 VITALS
SYSTOLIC BLOOD PRESSURE: 144 MMHG | WEIGHT: 162 LBS | TEMPERATURE: 98.4 F | HEIGHT: 62 IN | BODY MASS INDEX: 29.81 KG/M2 | DIASTOLIC BLOOD PRESSURE: 70 MMHG | HEART RATE: 76 BPM

## 2019-07-22 DIAGNOSIS — R60.9 DEPENDENT EDEMA: Primary | ICD-10-CM

## 2019-07-22 DIAGNOSIS — I51.89 DIASTOLIC DYSFUNCTION: ICD-10-CM

## 2019-07-22 DIAGNOSIS — N18.30 STAGE 3 CHRONIC KIDNEY DISEASE (HCC): ICD-10-CM

## 2019-07-22 PROCEDURE — 99214 OFFICE O/P EST MOD 30 MIN: CPT | Performed by: INTERNAL MEDICINE

## 2019-07-22 NOTE — PROGRESS NOTES
Assessment/Plan:    Chronic kidney disease  Following with Nephro, has renal US scheduled for tomorrow, has regular BW and f/u with Nephro - d/w pt that with advanced CKD there is usually more benefit with Lasix then HCTZ and this may be an option to change HCTZ to that may help with BP/edema - would d/w Nephro prior to this change if no improvement with stockings    Dependent edema  Acute on chronic LE edema the past few weeks - no known trigger, no CV symptoms, no Echo in over 4 yrs and did have diastolic dysfunction so will recheck - order given, advised to elevate LE and to avoid adding salt to diet as well as avoid high sodium foods, rx for compression stockings given, Alb nml 1/19, d/w pt that edema can be seen with CCB and Gabepentin  - pt willing to try off Gabapentin and will stop the rx, may need to consider stopping CCB and/or changing HCTZ to lasix - would d/w Nephro prior to either changes - call with new/worse symptoms, red flag cardiac symptoms reviewed and urged to call asap if they occur       Diagnoses and all orders for this visit:    Dependent edema  -     Compression Stocking  -     Echo complete with contrast if indicated; Future    Diastolic dysfunction  Comments:  Order to repeat Echo given d/t increase LE edema and no echo in over 4 yrs - will call with results  Orders:  -     Echo complete with contrast if indicated; Future    Stage 3 chronic kidney disease (HCC)          Subjective:      Patient ID: Isac Gordon is a 80 y o  female  HPI Pt here with c/o LE edema worsening over the last 3-4 wks  She denies any new meds/change in diet and she states she only adds "a little bit" of salt to her diet  She denies having a lot of high sodium foods  She denies use of NSAIDs regularly  She notes no SOB/orthopnea/PND/CP/palp  She has had noted B/L LE edema for some time and was noted on A/P from office notes on 8/10/18 and 4/27/18  Echo last done 4/15 and EF was 60%   Alb was normal Tung 2019    She follows with Nephro for her CKD and has renal US tomorrow  She notes her swelling is better in the am and slowly increases during the day  She has not been given compression stockings in the past    She is on HCTZ  She is also on Nifedipine and Gabapentin but neither of them are new  Review of Systems   Constitutional: Negative for chills, fever and unexpected weight change  Eyes: Negative for pain and visual disturbance  Respiratory: Negative for cough, shortness of breath and wheezing  Cardiovascular: Positive for leg swelling  Negative for chest pain and palpitations  Gastrointestinal: Negative for abdominal pain, diarrhea and nausea  Endocrine: Negative for polydipsia and polyuria  Genitourinary: Negative for difficulty urinating and dysuria  Musculoskeletal: Positive for arthralgias  Negative for neck pain  Skin: Negative for rash and wound  Neurological: Negative for dizziness, light-headedness and headaches  Hematological: Negative for adenopathy  Psychiatric/Behavioral: Negative for behavioral problems and confusion  Objective:    /70   Pulse 76   Temp 98 4 °F (36 9 °C)   Ht 5' 1 5" (1 562 m)   Wt 73 5 kg (162 lb)   BMI 30 11 kg/m²      Physical Exam   Constitutional: She appears well-developed and well-nourished  No distress  HENT:   Head: Normocephalic and atraumatic  Eyes: Conjunctivae are normal  Right eye exhibits no discharge  Left eye exhibits no discharge  Neck: Neck supple  No JVD present  No tracheal deviation present  Cardiovascular: Normal rate, regular rhythm and normal heart sounds  Exam reveals no friction rub  No murmur heard   + B/L pitting edema to just below the patella   Pulmonary/Chest: Effort normal and breath sounds normal  No respiratory distress  She has no wheezes  She has no rales  Abdominal: Soft  She exhibits no distension  There is no tenderness  There is no rebound and no guarding     Musculoskeletal: She exhibits no deformity  Ambulates unassisted   Neurological: She is alert  She exhibits normal muscle tone  Skin: Skin is warm and dry  No rash noted  Psychiatric: She has a normal mood and affect  Her behavior is normal    Nursing note and vitals reviewed

## 2019-07-22 NOTE — ASSESSMENT & PLAN NOTE
Acute on chronic LE edema the past few weeks - no known trigger, no CV symptoms, no Echo in over 4 yrs and did have diastolic dysfunction so will recheck - order given, advised to elevate LE and to avoid adding salt to diet as well as avoid high sodium foods, rx for compression stockings given, Alb nml 1/19, d/w pt that edema can be seen with CCB and Gabepentin  - pt willing to try off Gabapentin and will stop the rx, may need to consider stopping CCB and/or changing HCTZ to lasix - would d/w Nephro prior to either changes - call with new/worse symptoms, red flag cardiac symptoms reviewed and urged to call asap if they occur

## 2019-07-22 NOTE — ASSESSMENT & PLAN NOTE
Following with Nephro, has renal US scheduled for tomorrow, has regular BW and f/u with Nephro - d/w pt that with advanced CKD there is usually more benefit with Lasix then HCTZ and this may be an option to change HCTZ to that may help with BP/edema - would d/w Nephro prior to this change if no improvement with stockings

## 2019-07-23 ENCOUNTER — TRANSCRIBE ORDERS (OUTPATIENT)
Dept: ADMINISTRATIVE | Facility: HOSPITAL | Age: 84
End: 2019-07-23

## 2019-07-23 ENCOUNTER — HOSPITAL ENCOUNTER (OUTPATIENT)
Dept: ULTRASOUND IMAGING | Facility: HOSPITAL | Age: 84
Discharge: HOME | End: 2019-07-23
Attending: INTERNAL MEDICINE
Payer: COMMERCIAL

## 2019-07-23 DIAGNOSIS — Z12.39 BREAST SCREENING, UNSPECIFIED: Primary | ICD-10-CM

## 2019-07-23 DIAGNOSIS — N28.1 COMPLEX RENAL CYST: ICD-10-CM

## 2019-07-23 PROCEDURE — 76770 US EXAM ABDO BACK WALL COMP: CPT

## 2019-07-26 LAB
ALBUMIN MFR UR ELPH: 38.5 %
ALBUMIN SERPL ELPH-MCNC: 3.7 G/DL (ref 2.9–4.4)
ALBUMIN SERPL-MCNC: 4.3 G/DL (ref 3.5–4.7)
ALBUMIN/GLOB SERPL: 1.2 {RATIO} (ref 0.7–1.7)
ALBUMIN/GLOB SERPL: 1.8 {RATIO} (ref 1.2–2.2)
ALP SERPL-CCNC: 58 IU/L (ref 39–117)
ALPHA1 GLOB MFR UR ELPH: 4.4 %
ALPHA1 GLOB SERPL ELPH-MCNC: 0.2 G/DL (ref 0–0.4)
ALPHA2 GLOB MFR UR ELPH: 16.2 %
ALPHA2 GLOB SERPL ELPH-MCNC: 0.9 G/DL (ref 0.4–1)
ALT SERPL-CCNC: 23 IU/L (ref 0–32)
AST SERPL-CCNC: 27 IU/L (ref 0–40)
B-GLOBULIN MFR UR ELPH: 25.9 %
B-GLOBULIN SERPL ELPH-MCNC: 0.9 G/DL (ref 0.7–1.3)
BILIRUB SERPL-MCNC: 0.3 MG/DL (ref 0–1.2)
BUN SERPL-MCNC: 31 MG/DL (ref 8–27)
BUN/CREAT SERPL: 18 (ref 12–28)
CALCIUM SERPL-MCNC: 9.8 MG/DL (ref 8.7–10.3)
CHLORIDE SERPL-SCNC: 98 MMOL/L (ref 96–106)
CO2 SERPL-SCNC: 26 MMOL/L (ref 20–29)
CREAT SERPL-MCNC: 1.73 MG/DL (ref 0.57–1)
GAMMA GLOB MFR UR ELPH: 14.9 %
GAMMA GLOB SERPL ELPH-MCNC: 0.9 G/DL (ref 0.4–1.8)
GLOBULIN SER CALC-MCNC: 3 G/DL (ref 2.2–3.9)
GLOBULIN SER-MCNC: 2.4 G/DL (ref 1.5–4.5)
GLUCOSE SERPL-MCNC: 164 MG/DL (ref 65–99)
KAPPA LC FREE SER-MCNC: 37.9 MG/L (ref 3.3–19.4)
KAPPA LC FREE/LAMBDA FREE SER: 1.85 {RATIO} (ref 0.26–1.65)
LABORATORY COMMENT REPORT: NORMAL
LABORATORY COMMENT REPORT: NORMAL
LAMBDA LC FREE SERPL-MCNC: 20.5 MG/L (ref 5.7–26.3)
M PROTEIN MFR UR ELPH: NORMAL %
M PROTEIN SERPL ELPH-MCNC: NORMAL G/DL
PHOSPHATE SERPL-MCNC: 4 MG/DL (ref 2.5–4.5)
POTASSIUM SERPL-SCNC: 4.3 MMOL/L (ref 3.5–5.2)
PROT SERPL-MCNC: 6.7 G/DL (ref 6–8.5)
PROT UR-MCNC: 15.3 MG/DL
PTH-INTACT SERPL-MCNC: 51 PG/ML (ref 15–65)
SL AMB EGFR AFRICAN AMERICAN: 31 ML/MIN/1.73
SL AMB EGFR NON AFRICAN AMERICAN: 27 ML/MIN/1.73
SODIUM SERPL-SCNC: 141 MMOL/L (ref 134–144)

## 2019-07-29 DIAGNOSIS — I10 ESSENTIAL HYPERTENSION: ICD-10-CM

## 2019-07-29 RX ORDER — NIFEDIPINE 90 MG/1
90 TABLET, FILM COATED, EXTENDED RELEASE ORAL DAILY
Qty: 90 TABLET | Refills: 2 | Status: SHIPPED | OUTPATIENT
Start: 2019-07-29 | End: 2020-02-03 | Stop reason: SDUPTHER

## 2019-08-02 DIAGNOSIS — N18.30 TYPE 2 DIABETES MELLITUS WITH STAGE 3 CHRONIC KIDNEY DISEASE, WITHOUT LONG-TERM CURRENT USE OF INSULIN (HCC): ICD-10-CM

## 2019-08-02 DIAGNOSIS — E11.22 TYPE 2 DIABETES MELLITUS WITH STAGE 3 CHRONIC KIDNEY DISEASE, WITHOUT LONG-TERM CURRENT USE OF INSULIN (HCC): ICD-10-CM

## 2019-08-02 RX ORDER — SITAGLIPTIN 50 MG/1
TABLET, FILM COATED ORAL
Qty: 30 TABLET | Refills: 5 | Status: SHIPPED | OUTPATIENT
Start: 2019-08-02 | End: 2020-02-03

## 2019-08-07 LAB
KAPPA LC FREE SER-MCNC: 37.9 MG/L
KAPPA LC FREE/LAMBDA FREE SER: 1.85 {RATIO}
LAMBDA LC FREE SERPL-MCNC: 20.5 MG/L

## 2019-08-16 ENCOUNTER — HOSPITAL ENCOUNTER (OUTPATIENT)
Dept: NON INVASIVE DIAGNOSTICS | Facility: HOSPITAL | Age: 84
Discharge: HOME | End: 2019-08-16
Payer: COMMERCIAL

## 2019-08-16 DIAGNOSIS — I51.89 DIASTOLIC DYSFUNCTION: ICD-10-CM

## 2019-08-16 DIAGNOSIS — R60.9 DEPENDENT EDEMA: ICD-10-CM

## 2019-08-16 PROCEDURE — 93306 TTE W/DOPPLER COMPLETE: CPT

## 2019-08-16 PROCEDURE — 93306 TTE W/DOPPLER COMPLETE: CPT | Performed by: INTERNAL MEDICINE

## 2019-08-20 ENCOUNTER — OFFICE VISIT (OUTPATIENT)
Dept: NEPHROLOGY | Facility: HOSPITAL | Age: 84
End: 2019-08-20
Payer: COMMERCIAL

## 2019-08-20 VITALS
HEIGHT: 62 IN | SYSTOLIC BLOOD PRESSURE: 124 MMHG | HEART RATE: 78 BPM | WEIGHT: 161.38 LBS | BODY MASS INDEX: 29.7 KG/M2 | RESPIRATION RATE: 16 BRPM | DIASTOLIC BLOOD PRESSURE: 56 MMHG

## 2019-08-20 DIAGNOSIS — D64.9 NORMOCYTIC ANEMIA: ICD-10-CM

## 2019-08-20 DIAGNOSIS — E11.22 TYPE 2 DIABETES MELLITUS WITH STAGE 3 CHRONIC KIDNEY DISEASE, WITHOUT LONG-TERM CURRENT USE OF INSULIN (HCC): Primary | ICD-10-CM

## 2019-08-20 DIAGNOSIS — N18.30 TYPE 2 DIABETES MELLITUS WITH STAGE 3 CHRONIC KIDNEY DISEASE, WITHOUT LONG-TERM CURRENT USE OF INSULIN (HCC): Primary | ICD-10-CM

## 2019-08-20 DIAGNOSIS — E87.6 HYPOKALEMIA: ICD-10-CM

## 2019-08-20 DIAGNOSIS — E83.52 HYPERCALCEMIA: ICD-10-CM

## 2019-08-20 DIAGNOSIS — N28.1 COMPLEX RENAL CYST: ICD-10-CM

## 2019-08-20 DIAGNOSIS — I10 ESSENTIAL HYPERTENSION: ICD-10-CM

## 2019-08-20 DIAGNOSIS — R60.9 DEPENDENT EDEMA: ICD-10-CM

## 2019-08-20 PROCEDURE — 99213 OFFICE O/P EST LOW 20 MIN: CPT | Performed by: INTERNAL MEDICINE

## 2019-08-20 PROCEDURE — 3074F SYST BP LT 130 MM HG: CPT | Performed by: INTERNAL MEDICINE

## 2019-08-20 NOTE — PATIENT INSTRUCTIONS
1  Elevated serum Cr likely due to chronic kidney disease in the setting of Diabetes mellitus type 2 as well as hypertensive nephrosclerosis +/- physiologic aging of the kidney   -UA shows low specific gravity with +blood, +leukocytes, trace protein on last check  -renal ultrasound shows mildly complex cyst in left kidney    -last sCr 1 73 as of 7/23/19  Has been stable since January 2019    -monitor BMP  -avoid nonsteroidals (ibuprofen, advil, motrin, aleve, naproxen, indomethacin, celebrex, toradol)  -please stay well hydrated       2  Microalbuminuria in setting of type 2 DM - microalb:Cr 26 6 as of January 2019    -now off lisinopril  -microalbumin in the urine can also be seen with physiologic aging of the kidney  -f/u repeat microalb:Cr     3  HTN - BP low normal for age in office  Goal -140s for renal perfusion    -continue clonidine 0 1mg weekly patch, HCTZ 97QK daily,  bystolic 54EL daily, nifedipine 90mg daily  -off lisinopril     4  DM2 - on januvia, glucotrol, last A1C 7 2     5  Hypokalemia - possibly due to HCTZ use, on potassium gluconate supplement with last K within normal range, Mag ok       6  Dependent edema - albumin normal  Continue HCTZ 25mg daily  Previous echo in 2015 did show grade 1 diastolic dysfunction     7  Complex cyst, left kidney - stable in size as of July 2019 ultrasound     8  Hypercalcemia - ana 9 8 on latest bloodwork  Resolved off calcium supplements  May continue 2000u vitamin D supplements  Monitor ana/phos levels  FLC 1 85 - acceptable ratio in light of CKD  SPEP negative  Possible band in UPEP       9  Low ferritin level with normal hemoglobin - recently started oral iron      RTC in 4 months

## 2019-08-20 NOTE — PROGRESS NOTES
NEPHROLOGY OUTPATIENT PROGRESS NOTE   Kimberley Gillette 80 y o  female MRN: [id number]  DATE: 8/20/2019  Reason for visit:   Chief Complaint   Patient presents with    Chronic Kidney Disease    Follow-up        Patient Instructions   1  Elevated serum Cr likely due to chronic kidney disease in the setting of Diabetes mellitus type 2 as well as hypertensive nephrosclerosis +/- physiologic aging of the kidney   -UA shows low specific gravity with +blood, +leukocytes, trace protein on last check  -renal ultrasound shows mildly complex cyst in left kidney    -last sCr 1 73 as of 7/23/19  Has been stable since January 2019    -monitor BMP  -avoid nonsteroidals (ibuprofen, advil, motrin, aleve, naproxen, indomethacin, celebrex, toradol)  -please stay well hydrated       2  Microalbuminuria in setting of type 2 DM - microalb:Cr 26 6 as of January 2019    -now off lisinopril  -microalbumin in the urine can also be seen with physiologic aging of the kidney  -f/u repeat microalb:Cr     3  HTN - BP low normal for age in office  Goal -140s for renal perfusion    -continue clonidine 0 1mg weekly patch, HCTZ 19MA daily,  bystolic 59QZ daily, nifedipine 90mg daily  -off lisinopril     4  DM2 - on januvia, glucotrol, last A1C 7 2     5  Hypokalemia - possibly due to HCTZ use, on potassium gluconate supplement with last K within normal range, Mag ok       6  Dependent edema - albumin normal  Continue HCTZ 25mg daily  Previous echo in 2015 did show grade 1 diastolic dysfunction     7  Complex cyst, left kidney - stable in size as of July 2019 ultrasound     8  Hypercalcemia - ana 9 8 on latest bloodwork  Resolved off calcium supplements  May continue 2000u vitamin D supplements  Monitor ana/phos levels  FLC 1 85 - acceptable ratio in light of CKD  SPEP negative  Possible band in UPEP       9  Low ferritin level with normal hemoglobin - recently started oral iron      RTC in 4 months          Nydia Patel was seen today for chronic kidney disease and follow-up  Diagnoses and all orders for this visit:    Type 2 diabetes mellitus with stage 3 chronic kidney disease, without long-term current use of insulin (HCC)    Essential hypertension    Complex renal cyst    Hypokalemia    Hypercalcemia    Normocytic anemia    Dependent edema        Assessment/Plan:  1  Elevated serum Cr likely due to chronic kidney disease in the setting of Diabetes mellitus type 2 as well as hypertensive nephrosclerosis +/- physiologic aging of the kidney   -UA shows low specific gravity with +blood, +leukocytes, trace protein on last check  -renal ultrasound shows mildly complex cyst in left kidney    -last sCr 1 73 as of 7/23/19  Has been stable since January 2019    -monitor BMP  -avoid nonsteroidals (ibuprofen, advil, motrin, aleve, naproxen, indomethacin, celebrex, toradol)  -please stay well hydrated       2  Microalbuminuria in setting of type 2 DM - microalb:Cr 26 6 as of January 2019    -now off lisinopril  -microalbumin in the urine can also be seen with physiologic aging of the kidney  -f/u repeat microalb:Cr     3  HTN - BP low normal for age in office  Goal -140s for renal perfusion    -continue clonidine 0 1mg weekly patch, HCTZ 58LA daily,  bystolic 57AZ daily, nifedipine 90mg daily  -off lisinopril     4  DM2 - on januvia, glucotrol, last A1C 7 2     5  Hypokalemia - possibly due to HCTZ use, on potassium gluconate supplement with last K within normal range, Mag ok       6  Dependent edema - albumin normal  Continue HCTZ 25mg daily  Previous echo in 2015 did show grade 1 diastolic dysfunction     7  Complex cyst, left kidney - stable in size as of July 2019 ultrasound     8  Hypercalcemia - ana 9 8 on latest bloodwork  Resolved off calcium supplements  May continue 2000u vitamin D supplements  Monitor ana/phos levels  FLC 1 85 - acceptable ratio in light of CKD  SPEP negative  Possible band in UPEP       9   Low ferritin level with normal hemoglobin - recently started oral iron      RTC in 4 months  SUBJECTIVE / INTERVAL HISTORY:  80 y o  female presents in follow up of CKD  Carolyn Amato denies any recent illness/hospitalizations  Off gabapentin  Still has LE edema  Denies NSAID use  Blood sugars 130-140s  BP has been well controlled  Review of Systems   Constitutional: Negative for chills and fever  HENT: Negative for sore throat and trouble swallowing  Eyes: Negative for visual disturbance  Respiratory: Negative for cough and shortness of breath  Cardiovascular: Positive for leg swelling  Negative for chest pain  Gastrointestinal: Negative for abdominal pain, constipation, diarrhea, nausea and vomiting  Endocrine: Negative for polyuria  Genitourinary: Negative for difficulty urinating, dysuria and hematuria  Musculoskeletal: Negative for back pain and neck pain  Skin: Negative for rash  Neurological: Negative for dizziness, light-headedness and numbness  Hematological: Negative for adenopathy  Does not bruise/bleed easily  Psychiatric/Behavioral: The patient is not nervous/anxious  OBJECTIVE:  /56 (BP Location: Left arm, Patient Position: Sitting, Cuff Size: Standard)   Pulse 78   Resp 16   Ht 5' 1 5" (1 562 m)   Wt 73 2 kg (161 lb 6 oz)   BMI 30 00 kg/m²  Body mass index is 30 kg/m²  Physical exam:  Physical Exam   Constitutional: She appears well-developed and well-nourished  No distress  HENT:   Head: Normocephalic and atraumatic  Mouth/Throat: No oropharyngeal exudate  Eyes: Right eye exhibits no discharge  Left eye exhibits no discharge  No scleral icterus  Neck: Normal range of motion  Neck supple  No thyromegaly present  Cardiovascular: Normal rate and regular rhythm  No murmur heard  Pulmonary/Chest: Effort normal and breath sounds normal  No respiratory distress  She has no wheezes  Abdominal: Soft  Bowel sounds are normal  She exhibits no distension  Musculoskeletal: She exhibits no edema  Neurological: She is alert  She exhibits normal muscle tone  awake   Skin: Skin is warm and dry  No rash noted  She is not diaphoretic  Psychiatric: She has a normal mood and affect  Her behavior is normal    Nursing note and vitals reviewed        Medications:    Current Outpatient Medications:     ACCU-CHEK FASTCLIX LANCETS MISC, by Does not apply route daily, Disp: 102 each, Rfl: 3    aspirin 81 MG tablet, Take 1 tablet by mouth daily, Disp: , Rfl:     BYSTOLIC 20 MG tablet, TAKE 1 TABLET BY MOUTH EVERY DAY, Disp: 30 tablet, Rfl: 5    Cetirizine HCl (ZYRTEC ALLERGY) 10 MG CAPS, Take 1 tablet by mouth daily as needed, Disp: , Rfl:     cholecalciferol (VITAMIN D3) 1,000 units tablet, Take 2 tablets by mouth daily, Disp: , Rfl:     cloNIDine (CATAPRES-TTS-1) 0 1 mg/24 hr, Place 1 patch (0 1 mg total) on the skin once a week, Disp: 4 patch, Rfl: 5    ferrous sulfate 324 (65 Fe) mg, Take 1 tablet (324 mg total) by mouth daily before breakfast, Disp: 30 tablet, Rfl: 5    glipiZIDE (GLUCOTROL) 5 mg tablet, Take 1 tablet (5 mg total) by mouth daily (Patient taking differently: Take 2 5 mg by mouth daily ), Disp: 30 tablet, Rfl: 5    glucose blood (ACCU-CHEK PAUL PLUS) test strip, Use as instructed, Disp: 100 each, Rfl: 3    hydrochlorothiazide (HYDRODIURIL) 25 mg tablet, TAKE 1 TABLET BY MOUTH EVERY DAY, Disp: 30 tablet, Rfl: 6    JANUVIA 50 MG tablet, TAKE 1 TABLET BY MOUTH EVERY DAY, Disp: 30 tablet, Rfl: 5    NIFEdipine (ADALAT CC) 90 mg 24 hr tablet, Take 1 tablet (90 mg total) by mouth daily, Disp: 90 tablet, Rfl: 2    Potassium Gluconate 595 (99 K) MG TABS, Take 1 tablet by mouth 3 (three) times a day  , Disp: , Rfl:     sertraline (ZOLOFT) 25 mg tablet, Take 1 tablet (25 mg total) by mouth daily, Disp: 30 tablet, Rfl: 5    simvastatin (ZOCOR) 20 mg tablet, Take 1 tablet (20 mg total) by mouth daily at bedtime, Disp: 30 tablet, Rfl: 5    gabapentin (NEURONTIN) 100 mg capsule, Take 1 capsule (100 mg total) by mouth daily at bedtime (Patient not taking: Reported on 8/20/2019), Disp: 30 capsule, Rfl: 6    Allergies: Allergies as of 08/20/2019    (No Known Allergies)       The following portions of the patient's history were reviewed and updated as appropriate: past family history, past surgical history and problem list     Laboratory Results:  Lab Results   Component Value Date    SODIUM 141 07/23/2019    K 4 3 07/23/2019    CL 98 07/23/2019    CO2 26 07/23/2019    BUN 31 (H) 07/23/2019    CREATININE 1 73 (H) 07/23/2019    GLUC 164 (H) 07/23/2019    CALCIUM 9 8 01/02/2018        Lab Results   Component Value Date    CALCIUM 9 8 01/02/2018       Portions of the record may have been created with voice recognition software   Occasional wrong word or "sound a like" substitutions may have occurred due to the inherent limitations of voice recognition software   Read the chart carefully and recognize, using context, where substitutions have occurred

## 2019-08-23 ENCOUNTER — HOSPITAL ENCOUNTER (OUTPATIENT)
Dept: BONE DENSITY | Facility: IMAGING CENTER | Age: 84
Discharge: HOME | End: 2019-08-23
Payer: COMMERCIAL

## 2019-08-23 VITALS — WEIGHT: 160 LBS | BODY MASS INDEX: 29.44 KG/M2 | HEIGHT: 62 IN

## 2019-08-23 DIAGNOSIS — Z12.39 BREAST SCREENING, UNSPECIFIED: ICD-10-CM

## 2019-08-23 PROCEDURE — 77067 SCR MAMMO BI INCL CAD: CPT

## 2019-08-29 DIAGNOSIS — I10 ESSENTIAL HYPERTENSION: ICD-10-CM

## 2019-08-29 RX ORDER — HYDROCHLOROTHIAZIDE 25 MG/1
25 TABLET ORAL DAILY
Qty: 30 TABLET | Refills: 5 | Status: SHIPPED | OUTPATIENT
Start: 2019-08-29 | End: 2020-01-03

## 2019-09-24 ENCOUNTER — OFFICE VISIT (OUTPATIENT)
Dept: FAMILY MEDICINE CLINIC | Facility: HOSPITAL | Age: 84
End: 2019-09-24
Payer: COMMERCIAL

## 2019-09-24 VITALS
HEART RATE: 77 BPM | WEIGHT: 162 LBS | DIASTOLIC BLOOD PRESSURE: 82 MMHG | HEIGHT: 62 IN | SYSTOLIC BLOOD PRESSURE: 138 MMHG | TEMPERATURE: 98.3 F | BODY MASS INDEX: 29.81 KG/M2

## 2019-09-24 DIAGNOSIS — R60.9 DEPENDENT EDEMA: ICD-10-CM

## 2019-09-24 DIAGNOSIS — E28.39 OVARIAN FAILURE: Primary | ICD-10-CM

## 2019-09-24 DIAGNOSIS — E78.5 DYSLIPIDEMIA: ICD-10-CM

## 2019-09-24 DIAGNOSIS — I10 ESSENTIAL HYPERTENSION: ICD-10-CM

## 2019-09-24 DIAGNOSIS — Z23 NEED FOR INFLUENZA VACCINATION: ICD-10-CM

## 2019-09-24 DIAGNOSIS — N18.30 TYPE 2 DIABETES MELLITUS WITH STAGE 3 CHRONIC KIDNEY DISEASE, WITHOUT LONG-TERM CURRENT USE OF INSULIN (HCC): ICD-10-CM

## 2019-09-24 DIAGNOSIS — N18.30 STAGE 3 CHRONIC KIDNEY DISEASE (HCC): ICD-10-CM

## 2019-09-24 DIAGNOSIS — E11.22 TYPE 2 DIABETES MELLITUS WITH STAGE 3 CHRONIC KIDNEY DISEASE, WITHOUT LONG-TERM CURRENT USE OF INSULIN (HCC): ICD-10-CM

## 2019-09-24 PROCEDURE — 3075F SYST BP GE 130 - 139MM HG: CPT | Performed by: INTERNAL MEDICINE

## 2019-09-24 PROCEDURE — 90662 IIV NO PRSV INCREASED AG IM: CPT | Performed by: INTERNAL MEDICINE

## 2019-09-24 PROCEDURE — 3079F DIAST BP 80-89 MM HG: CPT | Performed by: INTERNAL MEDICINE

## 2019-09-24 PROCEDURE — 99214 OFFICE O/P EST MOD 30 MIN: CPT | Performed by: INTERNAL MEDICINE

## 2019-09-24 PROCEDURE — G0008 ADMIN INFLUENZA VIRUS VAC: HCPCS | Performed by: INTERNAL MEDICINE

## 2019-09-24 RX ORDER — GLIPIZIDE 5 MG/1
2.5 TABLET ORAL DAILY
Start: 2019-09-24 | End: 2020-01-17 | Stop reason: SDUPTHER

## 2019-09-24 NOTE — ASSESSMENT & PLAN NOTE
Now also with wgt gain - again no current s/sx of HF and Echo from 8/19 reviewed, has stopped Gabapentin and no benefit in LE edema occurred, has decreased Glipizide and no benefit in LE edema has occurred, has picked up compression stockings but is not using regularly d/t difficulty putting the garment on, urged to watch symptoms closely, wear compression stockings and start more aerobic exercise with walking/stationary bike or swimming

## 2019-09-24 NOTE — ASSESSMENT & PLAN NOTE
Lab Results   Component Value Date    HGBA1C 7 2 (H) 05/10/2019       No results for input(s): POCGLU in the last 72 hours      Blood Sugar Average: Last 72 hrs:  DID not do Bw as order - reprinted order and urged to do this week before she leaves for [facility] - if A1C <7 - repeat in 6 mos and if A1C > 7 repeat in 3 mos, con't watching diet and increase aerobic activity, on statin but no ACE d/t renal fxn, UTD on foot exam (5/19) and eye exam (1/19)

## 2019-09-24 NOTE — ASSESSMENT & PLAN NOTE
Slowly progressed and has now established with Nephro - has BW order to be done and f/u regularly, again encouraged to watch sodium in diet and to avoid NSAIDs, con't meds and f/u as per Nephro

## 2019-09-24 NOTE — ASSESSMENT & PLAN NOTE
BP up a bit today - pt worked up over issues with review of med list - will con't current meds and re-eval in 3-6 mos, urged low sodium diet and avoid NSAIDs

## 2019-09-27 DIAGNOSIS — E11.22 TYPE 2 DIABETES MELLITUS WITH STAGE 3 CHRONIC KIDNEY DISEASE, WITHOUT LONG-TERM CURRENT USE OF INSULIN (HCC): Primary | ICD-10-CM

## 2019-09-27 DIAGNOSIS — D64.9 NORMOCYTIC ANEMIA: ICD-10-CM

## 2019-09-27 DIAGNOSIS — I10 ESSENTIAL HYPERTENSION: ICD-10-CM

## 2019-09-27 DIAGNOSIS — E78.5 DYSLIPIDEMIA: ICD-10-CM

## 2019-09-27 DIAGNOSIS — N18.30 TYPE 2 DIABETES MELLITUS WITH STAGE 3 CHRONIC KIDNEY DISEASE, WITHOUT LONG-TERM CURRENT USE OF INSULIN (HCC): Primary | ICD-10-CM

## 2019-09-27 LAB
EST. AVERAGE GLUCOSE BLD GHB EST-MCNC: 177 MG/DL
GLUCOSE SERPL-MCNC: 166 MG/DL (ref 65–99)
HBA1C MFR BLD: 7.8 % (ref 4.8–5.6)

## 2019-10-18 DIAGNOSIS — F41.9 ANXIETY: ICD-10-CM

## 2019-10-18 RX ORDER — SERTRALINE HYDROCHLORIDE 25 MG/1
TABLET, FILM COATED ORAL
Qty: 30 TABLET | Refills: 5 | Status: SHIPPED | OUTPATIENT
Start: 2019-10-18 | End: 2020-05-10

## 2019-11-04 DIAGNOSIS — R79.0 LOW FERRITIN: ICD-10-CM

## 2019-11-04 RX ORDER — FERROUS SULFATE TAB EC 324 MG (65 MG FE EQUIVALENT) 324 (65 FE) MG
324 TABLET DELAYED RESPONSE ORAL
Qty: 90 TABLET | Refills: 1 | Status: SHIPPED | OUTPATIENT
Start: 2019-11-04 | End: 2020-05-03

## 2019-11-13 DIAGNOSIS — I10 ESSENTIAL HYPERTENSION: ICD-10-CM

## 2019-11-13 RX ORDER — NEBIVOLOL HYDROCHLORIDE 20 MG/1
TABLET ORAL
Qty: 30 TABLET | Refills: 5 | Status: SHIPPED | OUTPATIENT
Start: 2019-11-13 | End: 2020-04-13 | Stop reason: SDUPTHER

## 2019-11-18 DIAGNOSIS — E78.5 DYSLIPIDEMIA: ICD-10-CM

## 2019-11-18 RX ORDER — SIMVASTATIN 20 MG
20 TABLET ORAL
Qty: 30 TABLET | Refills: 5 | Status: SHIPPED | OUTPATIENT
Start: 2019-11-18 | End: 2020-04-28

## 2019-12-06 ENCOUNTER — OFFICE VISIT (OUTPATIENT)
Dept: FAMILY MEDICINE CLINIC | Facility: HOSPITAL | Age: 84
End: 2019-12-06
Payer: COMMERCIAL

## 2019-12-06 VITALS
BODY MASS INDEX: 30 KG/M2 | TEMPERATURE: 97.8 F | SYSTOLIC BLOOD PRESSURE: 144 MMHG | HEART RATE: 77 BPM | DIASTOLIC BLOOD PRESSURE: 64 MMHG | WEIGHT: 163 LBS | HEIGHT: 62 IN

## 2019-12-06 DIAGNOSIS — K21.00 GASTROESOPHAGEAL REFLUX DISEASE WITH ESOPHAGITIS: ICD-10-CM

## 2019-12-06 DIAGNOSIS — F41.9 ANXIETY DISORDER, UNSPECIFIED TYPE: ICD-10-CM

## 2019-12-06 DIAGNOSIS — E11.22 TYPE 2 DIABETES MELLITUS WITH STAGE 3 CHRONIC KIDNEY DISEASE, WITHOUT LONG-TERM CURRENT USE OF INSULIN (HCC): Primary | ICD-10-CM

## 2019-12-06 DIAGNOSIS — I10 ESSENTIAL HYPERTENSION: ICD-10-CM

## 2019-12-06 DIAGNOSIS — N18.30 TYPE 2 DIABETES MELLITUS WITH STAGE 3 CHRONIC KIDNEY DISEASE, WITHOUT LONG-TERM CURRENT USE OF INSULIN (HCC): Primary | ICD-10-CM

## 2019-12-06 PROCEDURE — 99214 OFFICE O/P EST MOD 30 MIN: CPT | Performed by: INTERNAL MEDICINE

## 2019-12-06 RX ORDER — FAMOTIDINE 20 MG/1
20 TABLET, FILM COATED ORAL DAILY
Qty: 30 TABLET | Refills: 5 | Status: SHIPPED | OUTPATIENT
Start: 2019-12-06 | End: 2020-05-10

## 2019-12-06 NOTE — PROGRESS NOTES
Assessment/Plan:    Type 2 diabetes mellitus with stage 3 chronic kidney disease, without long-term current use of insulin ([facility])    Lab Results   Component Value Date    HGBA1C 7 8 (H) 09/26/2019   Dm type 2 with nephropathy/CKD stage 3 and hyperglycemia - uncontrolled with A1C 7 8- urged to get on track with diet and start some sort of formal exercise and get wgt down, con't current meds for now - recheck BW q 3 mo - order given, if not better will need to increase Glipizide, UTD on foot exam (5/19) and eye exam (1/19), on statin but no ACE d/t CKD    Hypertension  Bp above goal today and only minimally improved on recheck at end of appt - BP was low at nephro appt and pt reporting lower numbers at home as well, admittedly pt seemingly aggravated and arguing with dgtr today - will con't current  meds and recheck in 4-6 wks - will need to increase Nifedipine if needed    GERD (gastroesophageal reflux disease)  Still with intermittent GERD symptoms, lifestyle/dietary triggers reviewed and urged to avoid if able to id, will try trial of Pepcid q am and re-eval in 4-6 wks, call with abd pain/N/V/Blood in stool/Black stool    Anxiety disorder  Seemingly a bit short tempered and irritable today but pt deferring any changes in meds, con't current Sertraline, call with new/worse mood       Diagnoses and all orders for this visit:    Type 2 diabetes mellitus with stage 3 chronic kidney disease, without long-term current use of insulin ([address] )    Essential hypertension    Gastroesophageal reflux disease with esophagitis  -     famotidine (PEPCID) 20 mg tablet; Take 1 tablet (20 mg total) by mouth daily    Anxiety disorder, unspecified type      Fit testing 6/19    Mammo 8/19    Dexa 6/17 - osteopenia - order given at last appt    BW 9/19    Pt had flu vaccine this year already      Subjective:      Patient ID: Army Hernandez is a 80 y o  female      HPI Pt here for follow up appt    BW results from Sept 2019 were reviewed: FBS/A1C was up at 166/7 8  Def of controlled vs uncontrolled DM was reviewed  Diet was reviewed - wgt up 11 lbs in 6 mos  She is taking her DM meds as directed  She is UTD on foot (5/19) and eye exam (1/19)  She is on statin and ACE  BP again above goal today and meds were reviewed and no changes have occurred  She denies missing doses of meds or SE with the meds  She does  check her BP outside the office but not regularly  She thinks she is usually in the 130's  She notes no frequent Ha's/dizziness/double vision/CP  She con't to have intermittent heartburn symptoms  She states sometimes has daily symptoms and sometimes it is just a few times a week  She notes no abd pain/N/V/blood in stool/black in stool/regurgitation of food  She notes only pyrosis intermittently  She con't to take her Zoloft daily as directed w/o SE  She notes no down/depressed/sad mood  She denies feeling anxious/tense/irritable  She sleeps well most night  She wishes for no changes in meds  Fit testing 6/19    Mammo 8/19    Dexa 6/17 - osteopenia - order given at last appt    BW 9/19    Pt had flu vaccine this year already    Review of Systems   Constitutional: Positive for unexpected weight change  Negative for chills and fever  HENT: Negative for congestion and sore throat  Eyes: Negative for pain and visual disturbance  Respiratory: Negative for cough, shortness of breath and wheezing  Cardiovascular: Negative for chest pain and palpitations  Gastrointestinal: Negative for abdominal pain, blood in stool, constipation, diarrhea, nausea and vomiting  Endocrine: Negative for polydipsia and polyuria  Genitourinary: Negative for difficulty urinating and dysuria  Musculoskeletal: Negative for back pain and neck pain  Skin: Negative for rash and wound  Neurological: Negative for dizziness and headaches  Hematological: Does not bruise/bleed easily     Psychiatric/Behavioral: Positive for sleep disturbance  Negative for behavioral problems, confusion and dysphoric mood  The patient is not nervous/anxious  Objective:    /64   Pulse 77   Temp 97 8 °F (36 6 °C)   Ht 5' 2" (1 575 m)   Wt 73 9 kg (163 lb)   BMI 29 81 kg/m²      Physical Exam   Constitutional: She appears well-developed and well-nourished  No distress  HENT:   Head: Normocephalic and atraumatic  Eyes: Conjunctivae are normal  Right eye exhibits no discharge  Left eye exhibits no discharge  Neck: Neck supple  No tracheal deviation present  Cardiovascular: Normal rate, regular rhythm and normal heart sounds  Exam reveals no friction rub  No murmur heard  Pulmonary/Chest: Effort normal and breath sounds normal  No respiratory distress  She has no wheezes  She has no rales  Abdominal: Soft  She exhibits no distension  There is no tenderness  There is no rebound and no guarding  Musculoskeletal: She exhibits no edema  Neurological: She is alert  She exhibits normal muscle tone  Skin: Skin is warm and dry  No rash noted  Psychiatric: She has a normal mood and affect  Her behavior is normal    Nursing note and vitals reviewed

## 2019-12-06 NOTE — ASSESSMENT & PLAN NOTE
Seemingly a bit short tempered and irritable today but pt deferring any changes in meds, con't current Sertraline, call with new/worse mood

## 2019-12-06 NOTE — ASSESSMENT & PLAN NOTE
Bp above goal today and only minimally improved on recheck at end of appt - BP was low at nephro appt and pt reporting lower numbers at home as well, admittedly pt seemingly aggravated and arguing with dgtr today - will con't current  meds and recheck in 4-6 wks - will need to increase Nifedipine if needed

## 2019-12-06 NOTE — ASSESSMENT & PLAN NOTE
Still with intermittent GERD symptoms, lifestyle/dietary triggers reviewed and urged to avoid if able to id, will try trial of Pepcid q am and re-eval in 4-6 wks, call with abd pain/N/V/Blood in stool/Black stool

## 2019-12-06 NOTE — ASSESSMENT & PLAN NOTE
Lab Results   Component Value Date    HGBA1C 7 8 (H) 09/26/2019   Dm type 2 with nephropathy/CKD stage 3 and hyperglycemia - uncontrolled with A1C 7 8- urged to get on track with diet and start some sort of formal exercise and get wgt down, con't current meds for now - recheck BW q 3 mo - order given, if not better will need to increase Glipizide, UTD on foot exam (5/19) and eye exam (1/19), on statin but no ACE d/t CKD

## 2019-12-10 ENCOUNTER — HOSPITAL ENCOUNTER (OUTPATIENT)
Dept: BONE DENSITY | Facility: IMAGING CENTER | Age: 84
Discharge: HOME | End: 2019-12-10
Payer: COMMERCIAL

## 2019-12-10 DIAGNOSIS — E28.39 OVARIAN FAILURE: ICD-10-CM

## 2019-12-10 PROCEDURE — 77080 DXA BONE DENSITY AXIAL: CPT

## 2019-12-11 ENCOUNTER — TELEPHONE (OUTPATIENT)
Dept: NEPHROLOGY | Facility: CLINIC | Age: 84
End: 2019-12-11

## 2019-12-11 DIAGNOSIS — E87.6 HYPOKALEMIA: Primary | ICD-10-CM

## 2019-12-11 LAB
BUN SERPL-MCNC: 45 MG/DL (ref 8–27)
BUN/CREAT SERPL: 23 (ref 12–28)
CALCIUM SERPL-MCNC: 9.8 MG/DL (ref 8.7–10.3)
CHLORIDE SERPL-SCNC: 94 MMOL/L (ref 96–106)
CO2 SERPL-SCNC: 27 MMOL/L (ref 20–29)
CREAT SERPL-MCNC: 1.97 MG/DL (ref 0.57–1)
CREAT UR-MCNC: 110.1 MG/DL
GLUCOSE SERPL-MCNC: 176 MG/DL (ref 65–99)
POTASSIUM SERPL-SCNC: 3.4 MMOL/L (ref 3.5–5.2)
PROT UR-MCNC: 16.1 MG/DL
PROT/CREAT UR: 146 MG/G CREAT (ref 0–200)
SL AMB EGFR AFRICAN AMERICAN: 26 ML/MIN/1.73
SL AMB EGFR NON AFRICAN AMERICAN: 23 ML/MIN/1.73
SODIUM SERPL-SCNC: 138 MMOL/L (ref 134–144)

## 2019-12-11 NOTE — TELEPHONE ENCOUNTER
----- Message from Ioana Marques DO sent at 12/11/2019  9:41 AM EST -----  The patient's renal function is within baseline but serum potassium low  Please ensure she is eating enough high potassium foods  I request repeat BMP Monday 12/16 prior to office appointment along with Mag level, urine potassium, urine Cr, urine Na and urine osm for further evaluation  All studies have been ordered by me  Thanks

## 2019-12-12 NOTE — TELEPHONE ENCOUNTER
Pt daughter returned call and message was relayed, will mail out high potassium foods  Daughter doesn't feel that more tests should be done so soon so she'll wait until they see you to get them

## 2019-12-16 ENCOUNTER — TELEPHONE (OUTPATIENT)
Dept: NEPHROLOGY | Facility: CLINIC | Age: 84
End: 2019-12-16

## 2019-12-16 NOTE — TELEPHONE ENCOUNTER
----- Message from Luis Vital DO sent at 12/16/2019 11:51 AM EST -----  Regarding: overdue labs  This patient should have the following labs performed if he has not already done so within the past week: BMP, mag, Urine K, urine sodium, urine osm, urine cr  Thank you!    -Trena    ----- Message -----  From: SYSTEM  Sent: 12/16/2019  12:14 AM EST  To:  Luis Vital DO

## 2019-12-16 NOTE — TELEPHONE ENCOUNTER
Per previous conversation the patient's daughter had with Floridalma Ramos (notated in the chart), they don't feel more tests are necessary at this time and will wait until after her appointment with you on 12/19/19

## 2019-12-19 ENCOUNTER — OFFICE VISIT (OUTPATIENT)
Dept: NEPHROLOGY | Facility: HOSPITAL | Age: 84
End: 2019-12-19
Payer: COMMERCIAL

## 2019-12-19 VITALS
BODY MASS INDEX: 30 KG/M2 | HEIGHT: 62 IN | WEIGHT: 163 LBS | HEART RATE: 68 BPM | RESPIRATION RATE: 16 BRPM | DIASTOLIC BLOOD PRESSURE: 62 MMHG | SYSTOLIC BLOOD PRESSURE: 118 MMHG

## 2019-12-19 DIAGNOSIS — N28.1 COMPLEX RENAL CYST: ICD-10-CM

## 2019-12-19 DIAGNOSIS — D64.9 NORMOCYTIC ANEMIA: ICD-10-CM

## 2019-12-19 DIAGNOSIS — N18.30 TYPE 2 DIABETES MELLITUS WITH STAGE 3 CHRONIC KIDNEY DISEASE, WITHOUT LONG-TERM CURRENT USE OF INSULIN (HCC): Primary | ICD-10-CM

## 2019-12-19 DIAGNOSIS — E87.6 HYPOKALEMIA: ICD-10-CM

## 2019-12-19 DIAGNOSIS — I10 ESSENTIAL HYPERTENSION: ICD-10-CM

## 2019-12-19 DIAGNOSIS — E83.52 HYPERCALCEMIA: ICD-10-CM

## 2019-12-19 DIAGNOSIS — E11.22 TYPE 2 DIABETES MELLITUS WITH STAGE 3 CHRONIC KIDNEY DISEASE, WITHOUT LONG-TERM CURRENT USE OF INSULIN (HCC): Primary | ICD-10-CM

## 2019-12-19 DIAGNOSIS — R60.9 DEPENDENT EDEMA: ICD-10-CM

## 2019-12-19 PROCEDURE — 3078F DIAST BP <80 MM HG: CPT | Performed by: INTERNAL MEDICINE

## 2019-12-19 PROCEDURE — 3074F SYST BP LT 130 MM HG: CPT | Performed by: INTERNAL MEDICINE

## 2019-12-19 PROCEDURE — 99214 OFFICE O/P EST MOD 30 MIN: CPT | Performed by: INTERNAL MEDICINE

## 2019-12-19 NOTE — PROGRESS NOTES
NEPHROLOGY OUTPATIENT PROGRESS NOTE   Jak Moss 80 y o  female MRN: [id number]  DATE: 12/19/2019  Reason for visit:   Chief Complaint   Patient presents with    Chronic Kidney Disease    Follow-up        Patient Instructions   1  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 of Diabetes mellitus type 2 as well as hypertensive nephrosclerosis +/- physiologic aging of the kidney   -last sCr 1 97 as of 12/10/19  Has been stable in the 1 6-1 8 range since July 2018  More recent sCr elevated  This may be in part due to progression vs HCTZ use  -monitor BMP  -avoid nonsteroidals (ibuprofen, advil, motrin, aleve, naproxen, indomethacin, celebrex, toradol)  -may take tylenol  -please stay well hydrated       2  Microalbuminuria in setting of type 2 DM - microalb:Cr 26 6 as of January 2019  UpCr 146mg/g - acceptable  -off lisinopril  -microalbumin in the urine can also be seen with physiologic aging of the kidney  -monitor microalb:Cr     3  HTN - BP low normal for age in office  Goal blood pressure 130-140s/60-70s for renal perfusion    -continue clonidine 0 1mg weekly patch, HCTZ 55OG daily,  bystolic 61MT daily, nifedipine 90mg daily  -off lisinopril      4  DM2, uncontrolled - on januvia, glipizide, last A1C 7 8     5  Hypokalemia - likely due to HCTZ use, on potassium gluconate supplement, 4 tablets a day, Mag ok    -please obtain urine electrolytes along with repeat BMP to further evaluate your hypokalemia  -[employer] low potassium diet for more information      6  Dependent edema - albumin normal  Continue HCTZ 25mg daily  Previous echo in 2015 did show grade 1 diastolic dysfunction     7  Complex cyst, left kidney - stable in size as of July 2019 ultrasound     8  Hypercalcemia - ana 9 8 on latest bloodwork  Resolved off calcium supplements  May continue 2000u vitamin D supplements  Monitor ana/phos levels  FLC 1 85 - acceptable ratio in light of CKD  SPEP negative   Possible band in UPEP       9  Low ferritin level with normal hemoglobin - on oral iron, monitor CBC     RTC in 3-4 months  Please obtain BMP, magnesium, urine osmolality, urine creatinine, urine potassium, urine sodium in January 2020  Trevor Tucker was seen today for chronic kidney disease and follow-up  Diagnoses and all orders for this visit:    Type 2 diabetes mellitus with stage 3 chronic kidney disease, without long-term current use of insulin ([facility])  -     Basic metabolic panel; Future  -     Basic metabolic panel    Essential hypertension    Complex renal cyst    Hypokalemia  -     Basic metabolic panel; Future  -     Basic metabolic panel    Normocytic anemia    Dependent edema    Hypercalcemia  -     Basic metabolic panel; Future  -     Basic metabolic panel        Assessment/Plan:  1  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 of Diabetes mellitus type 2 as well as hypertensive nephrosclerosis +/- physiologic aging of the kidney   -last sCr 1 97 as of 12/10/19  Has been stable in the 1 6-1 8 range since July 2018  More recent sCr elevated  This may be in part due to progression vs HCTZ use  -monitor BMP  -avoid nonsteroidals (ibuprofen, advil, motrin, aleve, naproxen, indomethacin, celebrex, toradol)  -may take tylenol  -please stay well hydrated       2  Microalbuminuria in setting of type 2 DM - microalb:Cr 26 6 as of January 2019  UpCr 146mg/g - acceptable  -off lisinopril  -microalbumin in the urine can also be seen with physiologic aging of the kidney  -monitor microalb:Cr     3  HTN - BP low normal for age in office  Goal blood pressure 130-140s/60-70s for renal perfusion    -continue clonidine 0 1mg weekly patch, HCTZ 81IV daily,  bystolic 09FM daily, nifedipine 90mg daily  -off lisinopril      4  DM2, uncontrolled - on januvia, glipizide, last A1C 7 8     5   Hypokalemia - likely due to HCTZ use, on potassium gluconate supplement, 4 tablets a day, Mag ok    -please obtain urine electrolytes along with repeat BMP to further evaluate your hypokalemia  -[employer] low potassium diet for more information      6  Dependent edema - albumin normal  Continue HCTZ 25mg daily  Previous echo in 2015 did show grade 1 diastolic dysfunction     7  Complex cyst, left kidney - stable in size as of July 2019 ultrasound     8  Hypercalcemia - ana 9 8 on latest bloodwork  Resolved off calcium supplements  May continue 2000u vitamin D supplements  Monitor ana/phos levels  FLC 1 85 - acceptable ratio in light of CKD  SPEP negative  Possible band in UPEP       9  Low ferritin level with normal hemoglobin - on oral iron, monitor CBC     RTC in 3-4 months  Please obtain BMP, magnesium, urine osmolality, urine creatinine, urine potassium, urine sodium in January 2020           SUBJECTIVE / INTERVAL HISTORY:  80 y o  female presents in follow up of CKD  Josette Kee denies any recent illness/hospitalizations/medication changes since last office visit  Denies NSAID use  Has been on 4 tablets potassium a day since low potassium on bloodwork  BP has been a bit high at PCP office   at home  Blood pressure a bit high  No hypoglycemic events  Blood glucose this am 158  Review of Systems   Constitutional: Negative for chills and fever  HENT: Negative for sore throat and trouble swallowing  Eyes: Negative for visual disturbance  Respiratory: Negative for cough and shortness of breath  Cardiovascular: Positive for leg swelling (improves with leg elevation)  Negative for chest pain  Gastrointestinal: Negative for abdominal pain, constipation, diarrhea, nausea and vomiting  Endocrine: Negative for polyuria  Genitourinary: Negative for difficulty urinating, dysuria and hematuria  Musculoskeletal: Positive for back pain (any time of day, chronic for months)  Negative for neck pain  Skin: Negative for rash  Neurological: Negative for dizziness, light-headedness and numbness  Hematological: Negative for adenopathy  Does not bruise/bleed easily  Psychiatric/Behavioral: The patient is not nervous/anxious  OBJECTIVE:  /62 (BP Location: Left arm, Patient Position: Sitting, Cuff Size: Standard)   Pulse 68   Resp 16   Ht 5' 2" (1 575 m)   Wt 73 9 kg (163 lb)   BMI 29 81 kg/m²  Body mass index is 29 81 kg/m²  Physical exam:  Physical Exam   Constitutional: She appears well-developed and well-nourished  No distress  HENT:   Head: Normocephalic and atraumatic  Mouth/Throat: No oropharyngeal exudate  Eyes: Right eye exhibits no discharge  Left eye exhibits no discharge  No scleral icterus  Neck: Normal range of motion  Neck supple  No thyromegaly present  Cardiovascular: Normal rate and regular rhythm  No murmur heard  Pulmonary/Chest: Effort normal and breath sounds normal  No respiratory distress  She has no wheezes  Abdominal: Soft  Bowel sounds are normal  She exhibits no distension  Musculoskeletal: She exhibits edema (b/l LE)  Neurological: She is alert  She exhibits normal muscle tone  awake   Skin: Skin is warm and dry  No rash noted  She is not diaphoretic  Psychiatric: She has a normal mood and affect  Her behavior is normal    Nursing note and vitals reviewed        Medications:    Current Outpatient Medications:     ACCU-CHEK FASTCLIX LANCETS MISC, by Does not apply route daily, Disp: 102 each, Rfl: 3    aspirin 81 MG tablet, Take 1 tablet by mouth daily, Disp: , Rfl:     BYSTOLIC 20 MG tablet, TAKE 1 TABLET BY MOUTH EVERY DAY, Disp: 30 tablet, Rfl: 5    Cetirizine HCl (ZYRTEC ALLERGY) 10 MG CAPS, Take 1 tablet by mouth daily as needed, Disp: , Rfl:     cholecalciferol (VITAMIN D3) 1,000 units tablet, Take 2 tablets by mouth daily, Disp: , Rfl:     cloNIDine (CATAPRES-TTS-1) 0 1 mg/24 hr, Place 1 patch (0 1 mg total) on the skin once a week, Disp: 4 patch, Rfl: 5    famotidine (PEPCID) 20 mg tablet, Take 1 tablet (20 mg total) by mouth daily, Disp: 30 tablet, Rfl: 5    ferrous sulfate 324 (65 Fe) mg, TAKE 1 TABLET (324 MG TOTAL) BY MOUTH DAILY BEFORE BREAKFAST, Disp: 90 tablet, Rfl: 1    glipiZIDE (GLUCOTROL) 5 mg tablet, Take 0 5 tablets (2 5 mg total) by mouth daily, Disp: , Rfl:     glucose blood (ACCU-CHEK PAUL PLUS) test strip, Use as instructed, Disp: 100 each, Rfl: 3    hydrochlorothiazide (HYDRODIURIL) 25 mg tablet, Take 1 tablet (25 mg total) by mouth daily, Disp: 30 tablet, Rfl: 5    JANUVIA 50 MG tablet, TAKE 1 TABLET BY MOUTH EVERY DAY, Disp: 30 tablet, Rfl: 5    NIFEdipine (ADALAT CC) 90 mg 24 hr tablet, Take 1 tablet (90 mg total) by mouth daily, Disp: 90 tablet, Rfl: 2    Potassium Gluconate 595 (99 K) MG TABS, Take 1 tablet by mouth 3 (three) times a day  , Disp: , Rfl:     sertraline (ZOLOFT) 25 mg tablet, TAKE 1 TABLET BY MOUTH EVERY DAY, Disp: 30 tablet, Rfl: 5    simvastatin (ZOCOR) 20 mg tablet, Take 1 tablet (20 mg total) by mouth daily at bedtime, Disp: 30 tablet, Rfl: 5    Allergies: Allergies as of 12/19/2019    (No Known Allergies)       The following portions of the patient's history were reviewed and updated as appropriate: past family history, past surgical history and problem list     Laboratory Results:  Lab Results   Component Value Date    SODIUM 138 12/10/2019    K 3 4 (L) 12/10/2019    CL 94 (L) 12/10/2019    CO2 27 12/10/2019    BUN 45 (H) 12/10/2019    CREATININE 1 97 (H) 12/10/2019    GLUC 176 (H) 12/10/2019    CALCIUM 9 8 01/02/2018        Lab Results   Component Value Date    CALCIUM 9 8 01/02/2018       Portions of the record may have been created with voice recognition software   Occasional wrong word or "sound a like" substitutions may have occurred due to the inherent limitations of voice recognition software   Read the chart carefully and recognize, using context, where substitutions have occurred

## 2019-12-19 NOTE — PATIENT INSTRUCTIONS
1  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 of Diabetes mellitus type 2 as well as hypertensive nephrosclerosis +/- physiologic aging of the kidney   -last sCr 1 97 as of 12/10/19  Has been stable in the 1 6-1 8 range since July 2018  More recent sCr elevated  This may be in part due to progression vs HCTZ use  -monitor BMP  -avoid nonsteroidals (ibuprofen, advil, motrin, aleve, naproxen, indomethacin, celebrex, toradol)  -may take tylenol  -please stay well hydrated       2  Microalbuminuria in setting of type 2 DM - microalb:Cr 26 6 as of January 2019  UpCr 146mg/g - acceptable  -off lisinopril  -microalbumin in the urine can also be seen with physiologic aging of the kidney  -monitor microalb:Cr     3  HTN - BP low normal for age in office  Goal blood pressure 130-140s/60-70s for renal perfusion    -continue clonidine 0 1mg weekly patch, HCTZ 46OY daily,  bystolic 34IS daily, nifedipine 90mg daily  -off lisinopril      4  DM2, uncontrolled - on januvia, glipizide, last A1C 7 8     5  Hypokalemia - likely due to HCTZ use, on potassium gluconate supplement, 4 tablets a day, Mag ok    -please obtain urine electrolytes along with repeat BMP to further evaluate your hypokalemia  -[employer] low potassium diet for more information      6  Dependent edema - albumin normal  Continue HCTZ 25mg daily  Previous echo in 2015 did show grade 1 diastolic dysfunction     7  Complex cyst, left kidney - stable in size as of July 2019 ultrasound     8  Hypercalcemia - ana 9 8 on latest bloodwork  Resolved off calcium supplements  May continue 2000u vitamin D supplements  Monitor ana/phos levels  FLC 1 85 - acceptable ratio in light of CKD  SPEP negative  Possible band in UPEP       9  Low ferritin level with normal hemoglobin - on oral iron, monitor CBC     RTC in 3-4 months  Please obtain BMP, magnesium, urine osmolality, urine creatinine, urine potassium, urine sodium in January 2020

## 2020-01-03 ENCOUNTER — OFFICE VISIT (OUTPATIENT)
Dept: CARDIOLOGY CLINIC | Facility: CLINIC | Age: 85
End: 2020-01-03
Payer: COMMERCIAL

## 2020-01-03 VITALS
OXYGEN SATURATION: 94 % | DIASTOLIC BLOOD PRESSURE: 64 MMHG | WEIGHT: 164 LBS | BODY MASS INDEX: 30.18 KG/M2 | SYSTOLIC BLOOD PRESSURE: 146 MMHG | HEIGHT: 62 IN | HEART RATE: 78 BPM

## 2020-01-03 DIAGNOSIS — N18.30 TYPE 2 DIABETES MELLITUS WITH STAGE 3 CHRONIC KIDNEY DISEASE, WITHOUT LONG-TERM CURRENT USE OF INSULIN (HCC): ICD-10-CM

## 2020-01-03 DIAGNOSIS — I49.3 PVC (PREMATURE VENTRICULAR CONTRACTION): ICD-10-CM

## 2020-01-03 DIAGNOSIS — N18.30 STAGE 3 CHRONIC KIDNEY DISEASE (HCC): ICD-10-CM

## 2020-01-03 DIAGNOSIS — I10 ESSENTIAL HYPERTENSION: Primary | ICD-10-CM

## 2020-01-03 DIAGNOSIS — E78.5 DYSLIPIDEMIA: ICD-10-CM

## 2020-01-03 DIAGNOSIS — R60.9 DEPENDENT EDEMA: ICD-10-CM

## 2020-01-03 DIAGNOSIS — E11.22 TYPE 2 DIABETES MELLITUS WITH STAGE 3 CHRONIC KIDNEY DISEASE, WITHOUT LONG-TERM CURRENT USE OF INSULIN (HCC): ICD-10-CM

## 2020-01-03 PROCEDURE — 1160F RVW MEDS BY RX/DR IN RCRD: CPT | Performed by: INTERNAL MEDICINE

## 2020-01-03 PROCEDURE — 99214 OFFICE O/P EST MOD 30 MIN: CPT | Performed by: INTERNAL MEDICINE

## 2020-01-03 PROCEDURE — 93000 ELECTROCARDIOGRAM COMPLETE: CPT | Performed by: INTERNAL MEDICINE

## 2020-01-03 RX ORDER — TORSEMIDE 20 MG/1
20 TABLET ORAL DAILY
Qty: 30 TABLET | Refills: 6 | Status: SHIPPED | OUTPATIENT
Start: 2020-01-03 | End: 2020-06-30 | Stop reason: SDUPTHER

## 2020-01-03 NOTE — PROGRESS NOTES
Cardiology Follow Up    Jania Hamlin  1935  [address] [place] [address] [phone]    1  Essential hypertension  POCT ECG    torsemide (DEMADEX) 20 mg tablet   2  PVC (premature ventricular contraction)  POCT ECG   3  Type 2 diabetes mellitus with stage 3 chronic kidney disease, without long-term current use of insulin ([facility])     4  Stage 3 chronic kidney disease ([address] )     5  Dyslipidemia     6  Dependent edema  torsemide (DEMADEX) 20 mg tablet       Interval History:     Rendall Eisenmenger is here for routine follow-up  She has 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 but has been asymptomatic as of late  With her blood pressure running high at times we had made some changes over the last few appointments, which included switching her from atenolol to Bystolic  We tried decreasing her nifedipine due to lower extremity edema, but had to go back up on this with her blood pressure accelerating  At some point she was also started on a clonidine patch  She is also on hydrochlorothiazide 25 mg daily  She has developed worsening chronic kidney disease and follows with Nephrology closely  Her blood pressure has improved over the last few appointments, but still runs on average a little high  Her systolic blood pressures appear to average in the low 140s  Diastolic numbers are normal     Ena for the most part feels well  The only thing that is getting worse is or lower extremity edema  She says at times this will bother her when swollen too much  She denies CP/SOB and no exercise limitations  She denies palpitations  She has had no other symptoms of CHF and she denies LH/Dizziness or any syncope         Patient Active Problem List   Diagnosis    Type 2 diabetes mellitus with stage 3 chronic kidney disease, without long-term current use of insulin ([facility] )    Rhinitis    Premature ventricular contraction    Osteopenia    Hypokalemia    Hypertension    GERD (gastroesophageal reflux disease)    Dyslipidemia    Diabetic polyneuropathy (HCC)    Anxiety disorder    Dependent edema    Normocytic anemia    Chronic kidney disease    Hypercalcemia    Complex renal cyst     Past Medical History:   Diagnosis Date    Aortic valve insufficiency     Cataract of both eyes     Chronic kidney disease     Dysuria     last assessed - [id number]    Edema     last assessed - [id number]    GERD (gastroesophageal reflux disease)     last assessed - [id number]    Hyperlipidemia     Hypertension     Low back pain     last assessed - [id number]    Mitral valve disorder     Osteoporosis     last assessed - [id number]    Palpitations      Social History     Socioeconomic History    Marital status: Single     Spouse name: Not on file    Number of children: Not on file    Years of education: Not on file    Highest education level: Not on file   Occupational History    Not on file   Social Needs    Financial resource strain: Not on file    Food insecurity:     Worry: Not on file     Inability: Not on file    Transportation needs:     Medical: Not on file     Non-medical: Not on file   Tobacco Use    Smoking status: Never Smoker    Smokeless tobacco: Never Used   Substance and Sexual Activity    Alcohol use: Yes     Frequency: Monthly or less    Drug use: Never    Sexual activity: Not on file   Lifestyle    Physical activity:     Days per week: Not on file     Minutes per session: Not on file    Stress: Not on file   Relationships    Social connections:     Talks on phone: Not on file     Gets together: Not on file     Attends Mandaeism service: Not on file     Active member of club or organization: Not on file     Attends meetings of clubs or organizations: Not on file     Relationship status: Not on file    Intimate partner violence:     Fear of current or ex partner: Not on file     Emotionally abused: Not on file     Physically abused: Not on file     Forced sexual activity: Not on file   Other Topics Concern    Not on file   Social History Narrative    Living with relatives (other than parents)    Marital History -       Family History   Problem Relation Age of Onset    Kidney disease Mother         Chronic    Breast cancer Sister     Diabetes Sister     Breast cancer Sister     Hypertension Sister     No Known Problems Father     No Known Problems Daughter      Past Surgical History:   Procedure Laterality Date    APPENDECTOMY      CATARACT EXTRACTION      COLONOSCOPY      TUBAL LIGATION         Current Outpatient Medications:     ACCU-CHEK FASTCLIX LANCETS MISC, by Does not apply route daily, Disp: 102 each, Rfl: 3    aspirin 81 MG tablet, Take 1 tablet by mouth daily, Disp: , Rfl:     BYSTOLIC 20 MG tablet, TAKE 1 TABLET BY MOUTH EVERY DAY, Disp: 30 tablet, Rfl: 5    Cetirizine HCl (ZYRTEC ALLERGY) 10 MG CAPS, Take 1 tablet by mouth daily as needed, Disp: , Rfl:     cholecalciferol (VITAMIN D3) 1,000 units tablet, Take 2 tablets by mouth daily, Disp: , Rfl:     cloNIDine (CATAPRES-TTS-1) 0 1 mg/24 hr, Place 1 patch (0 1 mg total) on the skin once a week, Disp: 4 patch, Rfl: 5    famotidine (PEPCID) 20 mg tablet, Take 1 tablet (20 mg total) by mouth daily, Disp: 30 tablet, Rfl: 5    ferrous sulfate 324 (65 Fe) mg, TAKE 1 TABLET (324 MG TOTAL) BY MOUTH DAILY BEFORE BREAKFAST, Disp: 90 tablet, Rfl: 1    glipiZIDE (GLUCOTROL) 5 mg tablet, Take 0 5 tablets (2 5 mg total) by mouth daily, Disp: , Rfl:     glucose blood (ACCU-CHEK PAUL PLUS) test strip, Use as instructed, Disp: 100 each, Rfl: 3    JANUVIA 50 MG tablet, TAKE 1 TABLET BY MOUTH EVERY DAY, Disp: 30 tablet, Rfl: 5    NIFEdipine (ADALAT CC) 90 mg 24 hr tablet, Take 1 tablet (90 mg total) by mouth daily, Disp: 90 tablet, Rfl: 2    Potassium Gluconate 595 (99 K) MG TABS, Take 1 tablet by mouth 3 (three) times a day  , Disp: , Rfl:     sertraline (ZOLOFT) 25 mg tablet, TAKE 1 TABLET BY MOUTH EVERY DAY, Disp: 30 tablet, Rfl: 5    simvastatin (ZOCOR) 20 mg tablet, Take 1 tablet (20 mg total) by mouth daily at bedtime, Disp: 30 tablet, Rfl: 5    torsemide (DEMADEX) 20 mg tablet, Take 1 tablet (20 mg total) by mouth daily, Disp: 30 tablet, Rfl: 6  No Known Allergies    Labs:  Lab Results   Component Value Date     01/02/2018    K 3 4 (L) 12/10/2019    CL 94 (L) 12/10/2019    CO2 27 12/10/2019    BUN 45 (H) 12/10/2019    CREATININE 1 97 (H) 12/10/2019    CREATININE 1 47 (H) 01/02/2018    GLUCOSE 145 (H) 01/02/2018    CALCIUM 9 8 01/02/2018     Lab Results   Component Value Date    WBC 10 8 05/10/2019    WBC 11 7 (H) 01/02/2018    HGB 11 7 05/10/2019    HGB 11 2 01/02/2018    HCT 36 1 05/10/2019    HCT 34 5 01/02/2018    MCV 92 05/10/2019    MCV 90 01/02/2018     05/10/2019     01/02/2018     Lab Results   Component Value Date    CHOL 168 01/02/2018    TRIG 84 01/18/2019    HDL 50 01/18/2019     Imaging: ECG today shows NSR with poor R-wave progression    ECHO (8/2019):  LEFT VENTRICLE:  Systolic function was normal  Ejection fraction was estimated to be 60 %  There were no regional wall motion abnormalities      MITRAL VALVE:  There was mild regurgitation      AORTIC VALVE:  There was mild regurgitation      TRICUSPID VALVE:  There was mild regurgitation  Pulmonary artery systolic pressure was within the normal range  Review of Systems:  Review of Systems   Constitutional: Negative  HENT: Negative  Eyes: Negative  Respiratory: Negative  Cardiovascular: Positive for leg swelling  Gastrointestinal: Negative  Musculoskeletal: Negative  Skin: Negative  Allergic/Immunologic: Negative  Neurological: Negative  Hematological: Negative  Psychiatric/Behavioral: Negative  All other systems reviewed and are negative      Vitals:    01/03/20 0941   BP: 146/64 Pulse: 78   SpO2: 94%     Physical Exam:  Physical Exam   Constitutional: She is oriented to person, place, and time  She appears well-developed and well-nourished  HENT:   Head: Normocephalic and atraumatic  Eyes: Pupils are equal, round, and reactive to light  EOM are normal  Right eye exhibits no discharge  Left eye exhibits no discharge  No scleral icterus  Neck: Normal range of motion  Neck supple  No JVD present  No thyromegaly present  Cardiovascular: Normal rate, regular rhythm, S1 normal, S2 normal, intact distal pulses and normal pulses  No extrasystoles are present  Exam reveals no gallop and no friction rub  Murmur heard  Systolic murmur is present with a grade of 2/6  Pulmonary/Chest: Effort normal and breath sounds normal  No respiratory distress  She has no wheezes  She has no rales  Abdominal: Soft  Bowel sounds are normal  She exhibits no distension  There is no tenderness  Musculoskeletal: Normal range of motion  She exhibits edema  She exhibits no tenderness or deformity  Neurological: She is alert and oriented to person, place, and time  No cranial nerve deficit  Skin: Skin is warm and dry  No rash noted  Psychiatric: She has a normal mood and affect  Judgment and thought content normal    Nursing note and vitals reviewed  Discussion/Summary:    1  HTN - Her blood pressure is mildly elevated, and her systolic blood pressures at home do appear to average in the low 140s  Given this, her chronic kidney disease and her lower extremity edema I am going to change her diuretic therapy over to torsemide  We will stop HCTZ, which does not have much of an effect at her creatinine level  We will continue potassium supplementation, and follow blood work closely  Another option if her creatinine remains stable would be spironolactone  She will continue to follow her blood pressure at home  She continues to follow with Nephrology closely as well      2   Dyslipidemia - Controlled on simvastatin  She has blood work followed regularly by her PCP  3  PVCs - Asymptomatic as of late  ECG today is for the most part normal     4   Edema - This has been somewhat bothersome to her as of late  As stated we are changing her diuretic over to torsemide and following blood work closely  She should follow a low-sodium diet  She should also watch her weight  We will otherwise see her back in 6 months      Counseling / Coordination of Care  Total office / unit time spent today 25 minutes  Greater than 50% of total time was spent with the patient and / or family counseling and / or coordination of care

## 2020-01-03 NOTE — PATIENT INSTRUCTIONS
Low-Sodium Diet   AMBULATORY CARE:   A low-sodium diet  limits foods that are high in sodium (salt)  You will need to follow a low-sodium diet if you have high blood pressure, kidney disease, or heart failure  You may also need to follow this diet if you have a condition that is causing your body to retain (hold) extra fluid  You may need to limit the amount of sodium you eat to 1,500 mg  Ask your healthcare provider how much sodium you can have each day  How to use food labels to choose foods that are low in sodium:  Read food labels to find the amount of sodium they contain  The amount of sodium is listed in milligrams (mg)  The % Daily Value (DV) column tells you how much of your daily needs are met by 1 serving of the food for each nutrient listed  Choose foods that have less than 5% of the DV of sodium  These foods are considered low in sodium  Foods that have 20% or more of the DV of sodium are considered high in sodium  Some food labels may also list any of the following terms that tell you about the sodium content in the food:  · Sodium-free:  Less than 5 mg in each serving    · Very low sodium:  35 mg of sodium or less in each serving    · Low sodium:  140 mg of sodium or less in each serving    · Reduced sodium: At least 25% less sodium in each serving than the regular type    · Light in sodium:  50% less sodium in each serving    · Unsalted or no added salt:  No extra salt is added during processing (the food may still contain sodium)  Foods to avoid:  Salty foods are high in sodium   You should avoid the following:  · Processed foods:      ¨ Mixes for cornbread, biscuits, cake, and pudding     ¨ Instant foods, such as potatoes, cereals, noodles, and rice     ¨ Packaged foods, such as bread stuffing, rice and pasta mixes, snack dip mixes, and macaroni and cheese     ¨ Canned foods, such as canned vegetables, soups, broths, sauces, and vegetable or tomato juice    ¨ Snack foods, such as salted chips, popcorn, pretzels, pork rinds, salted crackers, and salted nuts    ¨ Frozen foods, such as dinners, entrees, vegetables with sauces, and breaded meats    ¨ Sauerkraut, pickled vegetables, and other foods prepared in brine    · Meats and cheeses:      ¨ Smoked or cured meat, such as corned beef, piper, ham, hot dogs, and sausage    ¨ Canned meats or spreads, such as potted meats, sardines, anchovies, and imitation seafood    ¨ Deli or lunch meats, such as bologna, ham, turkey, and roast beef    ¨ Processed cheese, such as American cheese and cheese spreads    · Condiments, sauces, and seasonings:      ¨ Salt (¼ teaspoon of salt contains 575 mg of sodium)    ¨ Seasonings made with salt, such as garlic salt, celery salt, onion salt, and seasoned salt    ¨ Regular soy sauce, barbecue sauce, teriyaki sauce, steak sauce, Worcestershire sauce, and most flavored vinegars    ¨ Canned gravy and mixes     ¨ Regular condiments, such as mustard, ketchup, and salad dressings    ¨ Pickles and olives    ¨ Meat tenderizers and monosodium glutamate (MSG)  Foods to include:  Read the food label to find the exact amount of sodium in each serving  · Bread and cereal:  Try to choose breads with less than 80 mg of sodium per serving  ¨ Bread, roll, damon, tortilla, or unsalted crackers  ¨ Ready-to-eat cereals with less than 5% DV of sodium (examples include shredded wheat and puffed rice)    ¨ Pasta    · Vegetables and fruits:      ¨ Unsalted fresh, frozen, or canned vegetables    ¨ Fresh, frozen, or canned fruits    ¨ Fruit juice    · Dairy:  One serving has about 150 mg of sodium  ¨ Milk, all types    ¨ Yogurt    ¨ Hard cheese, such as cheddar, Swiss, [employer], or mozzarella    · Meat and other protein foods:  Some raw meats may have added sodium       ¨ Plain meats, fish, and poultry     ¨ Eggs    · Other foods:      ¨ Homemade pudding    ¨ Unsalted nuts, popcorn, or pretzels    ¨ Unsalted butter or margarine  Ways to decrease sodium:   · Add spices and herbs to foods instead of salt during cooking  Use salt-free seasonings to add flavor to foods  Examples include onion powder, garlic powder, basil, desai powder, paprika, and parsley  Try lemon or lime juice or vinegar to give foods a tart flavor  Use hot peppers, pepper, or cayenne pepper to add a spicy flavor to foods  · Do not keep a salt shaker at your kitchen table  This may help keep you from adding salt to food at the table  It may take time to get used to enjoying the natural flavor of food instead of adding salt  Talk to your healthcare provider before you use salt substitutes  Some salt substitutes have a high amount of potassium and need to be avoided if you have kidney disease  · Choose low-sodium foods at restaurants  Meals from restaurants are often high in sodium  Some restaurants have nutrition information on the menu that tells you the amount of sodium in their foods  If possible, ask for your food to be prepared with less, or no salt  · Shop for unsalted or low-sodium foods and snacks at the grocery store  Examples include unsalted or low-sodium broths, soups, and canned vegetables  Choose fresh or frozen vegetables instead  Choose unsalted nuts or seeds or fresh fruits or vegetables as snacks  Read food labels and choose salt-free, very low-sodium, or low-sodium foods  © 2017 [address] [employer] [employer] Information is for End User's use only and may not be sold, redistributed or otherwise used for commercial purposes  All illustrations and images included in CareNotes® are the copyrighted property of A [employer] M [employer]  or [employer]  The above information is an  only  It is not intended as medical advice for individual conditions or treatments  Talk to your doctor, nurse or pharmacist before following any medical regimen to see if it is safe and effective for you

## 2020-01-09 LAB
LEFT EYE DIABETIC RETINOPATHY: NORMAL
RIGHT EYE DIABETIC RETINOPATHY: NORMAL

## 2020-01-11 LAB
ALBUMIN SERPL-MCNC: 4.5 G/DL (ref 3.5–4.7)
ALBUMIN/GLOB SERPL: 2 {RATIO} (ref 1.2–2.2)
ALP SERPL-CCNC: 63 IU/L (ref 39–117)
ALT SERPL-CCNC: 21 IU/L (ref 0–32)
AST SERPL-CCNC: 27 IU/L (ref 0–40)
BASOPHILS # BLD AUTO: 0 X10E3/UL (ref 0–0.2)
BASOPHILS NFR BLD AUTO: 0 %
BILIRUB SERPL-MCNC: 0.3 MG/DL (ref 0–1.2)
BUN SERPL-MCNC: 38 MG/DL (ref 8–27)
BUN SERPL-MCNC: 39 MG/DL (ref 8–27)
BUN/CREAT SERPL: 21 (ref 12–28)
BUN/CREAT SERPL: 22 (ref 12–28)
CALCIUM SERPL-MCNC: 10.1 MG/DL (ref 8.7–10.3)
CALCIUM SERPL-MCNC: 9.8 MG/DL (ref 8.7–10.3)
CHLORIDE SERPL-SCNC: 98 MMOL/L (ref 96–106)
CHLORIDE SERPL-SCNC: 98 MMOL/L (ref 96–106)
CHOLEST SERPL-MCNC: 174 MG/DL (ref 100–199)
CHOLEST/HDLC SERPL: 3.5 RATIO (ref 0–4.4)
CO2 SERPL-SCNC: 26 MMOL/L (ref 20–29)
CO2 SERPL-SCNC: 26 MMOL/L (ref 20–29)
CREAT SERPL-MCNC: 1.76 MG/DL (ref 0.57–1)
CREAT SERPL-MCNC: 1.82 MG/DL (ref 0.57–1)
EOSINOPHIL # BLD AUTO: 0.9 X10E3/UL (ref 0–0.4)
EOSINOPHIL NFR BLD AUTO: 9 %
ERYTHROCYTE [DISTWIDTH] IN BLOOD BY AUTOMATED COUNT: 13.6 % (ref 11.7–15.4)
EST. AVERAGE GLUCOSE BLD GHB EST-MCNC: 183 MG/DL
GLOBULIN SER-MCNC: 2.3 G/DL (ref 1.5–4.5)
GLUCOSE SERPL-MCNC: 193 MG/DL (ref 65–99)
GLUCOSE SERPL-MCNC: 194 MG/DL (ref 65–99)
HBA1C MFR BLD: 8 % (ref 4.8–5.6)
HCT VFR BLD AUTO: 39.3 % (ref 34–46.6)
HDLC SERPL-MCNC: 50 MG/DL
HGB BLD-MCNC: 13.3 G/DL (ref 11.1–15.9)
IMM GRANULOCYTES # BLD: 0 X10E3/UL (ref 0–0.1)
IMM GRANULOCYTES NFR BLD: 0 %
LDLC SERPL CALC-MCNC: 89 MG/DL (ref 0–99)
LYMPHOCYTES # BLD AUTO: 4.8 X10E3/UL (ref 0.7–3.1)
LYMPHOCYTES NFR BLD AUTO: 46 %
MCH RBC QN AUTO: 30.9 PG (ref 26.6–33)
MCHC RBC AUTO-ENTMCNC: 33.8 G/DL (ref 31.5–35.7)
MCV RBC AUTO: 91 FL (ref 79–97)
MONOCYTES # BLD AUTO: 0.8 X10E3/UL (ref 0.1–0.9)
MONOCYTES NFR BLD AUTO: 8 %
NEUTROPHILS # BLD AUTO: 4 X10E3/UL (ref 1.4–7)
NEUTROPHILS NFR BLD AUTO: 37 %
PLATELET # BLD AUTO: 246 X10E3/UL (ref 150–450)
POTASSIUM SERPL-SCNC: 3.8 MMOL/L (ref 3.5–5.2)
POTASSIUM SERPL-SCNC: 3.8 MMOL/L (ref 3.5–5.2)
PROT SERPL-MCNC: 6.8 G/DL (ref 6–8.5)
RBC # BLD AUTO: 4.31 X10E6/UL (ref 3.77–5.28)
SL AMB EGFR AFRICAN AMERICAN: 29 ML/MIN/1.73
SL AMB EGFR AFRICAN AMERICAN: 30 ML/MIN/1.73
SL AMB EGFR NON AFRICAN AMERICAN: 25 ML/MIN/1.73
SL AMB EGFR NON AFRICAN AMERICAN: 26 ML/MIN/1.73
SL AMB VLDL CHOLESTEROL CALC: 35 MG/DL (ref 5–40)
SODIUM SERPL-SCNC: 143 MMOL/L (ref 134–144)
SODIUM SERPL-SCNC: 144 MMOL/L (ref 134–144)
TRIGL SERPL-MCNC: 176 MG/DL (ref 0–149)
TSH SERPL DL<=0.005 MIU/L-ACNC: 3.65 UIU/ML (ref 0.45–4.5)
WBC # BLD AUTO: 10.6 X10E3/UL (ref 3.4–10.8)

## 2020-01-17 ENCOUNTER — OFFICE VISIT (OUTPATIENT)
Dept: FAMILY MEDICINE CLINIC | Facility: HOSPITAL | Age: 85
End: 2020-01-17
Payer: COMMERCIAL

## 2020-01-17 VITALS
WEIGHT: 162 LBS | TEMPERATURE: 98 F | HEIGHT: 62 IN | SYSTOLIC BLOOD PRESSURE: 132 MMHG | BODY MASS INDEX: 29.81 KG/M2 | DIASTOLIC BLOOD PRESSURE: 66 MMHG | HEART RATE: 77 BPM

## 2020-01-17 DIAGNOSIS — N18.30 STAGE 3 CHRONIC KIDNEY DISEASE (HCC): ICD-10-CM

## 2020-01-17 DIAGNOSIS — I10 ESSENTIAL HYPERTENSION: Primary | ICD-10-CM

## 2020-01-17 DIAGNOSIS — I49.3 PREMATURE VENTRICULAR CONTRACTION: ICD-10-CM

## 2020-01-17 DIAGNOSIS — N18.30 TYPE 2 DIABETES MELLITUS WITH STAGE 3 CHRONIC KIDNEY DISEASE, WITHOUT LONG-TERM CURRENT USE OF INSULIN (HCC): ICD-10-CM

## 2020-01-17 DIAGNOSIS — M85.80 OSTEOPENIA, UNSPECIFIED LOCATION: ICD-10-CM

## 2020-01-17 DIAGNOSIS — E78.5 DYSLIPIDEMIA: ICD-10-CM

## 2020-01-17 DIAGNOSIS — R60.9 DEPENDENT EDEMA: ICD-10-CM

## 2020-01-17 DIAGNOSIS — E11.22 TYPE 2 DIABETES MELLITUS WITH STAGE 3 CHRONIC KIDNEY DISEASE, WITHOUT LONG-TERM CURRENT USE OF INSULIN (HCC): ICD-10-CM

## 2020-01-17 PROCEDURE — 1160F RVW MEDS BY RX/DR IN RCRD: CPT | Performed by: INTERNAL MEDICINE

## 2020-01-17 PROCEDURE — 3075F SYST BP GE 130 - 139MM HG: CPT | Performed by: INTERNAL MEDICINE

## 2020-01-17 PROCEDURE — 3078F DIAST BP <80 MM HG: CPT | Performed by: INTERNAL MEDICINE

## 2020-01-17 PROCEDURE — 99215 OFFICE O/P EST HI 40 MIN: CPT | Performed by: INTERNAL MEDICINE

## 2020-01-17 PROCEDURE — 1036F TOBACCO NON-USER: CPT | Performed by: INTERNAL MEDICINE

## 2020-01-17 RX ORDER — GLIPIZIDE 5 MG/1
5 TABLET ORAL DAILY
Qty: 30 TABLET | Refills: 3 | Status: SHIPPED | OUTPATIENT
Start: 2020-01-17 | End: 2020-05-15 | Stop reason: SDUPTHER

## 2020-01-17 NOTE — ASSESSMENT & PLAN NOTE
Improved with Torsemide, con't current meds, avoid sodium in diet and elevate LE at rest, call with new/worse symptoms

## 2020-01-17 NOTE — ASSESSMENT & PLAN NOTE
Dexa stable, no calcium supplement d/t hypercalcemia, on Vit D supplement, encouraged to increase exercise, no further Dexa needed

## 2020-01-17 NOTE — ASSESSMENT & PLAN NOTE
TG a bit elevated but TC/LDL at goal, con't current statin, recheck FLP annually, diet/exercise/wgt loss encouraged

## 2020-01-17 NOTE — ASSESSMENT & PLAN NOTE
Lab Results   Component Value Date    HGBA1C 8 0 (H) 01/10/2020   DM type 2 with CKD stage 3 and hyperglycemia - uncontrolled with A1C 8 0 - increase Glipizide to 5 mg 1 tab daily (was at 0 5 tab), con't Januvia, watch diet and increase activity, recheck in 3 mos - order given, foot exam done 5/19 and eye exam done last week - will obtain copy, on statin but no ACE/ARB

## 2020-01-17 NOTE — ASSESSMENT & PLAN NOTE
BP at goal with HCTZ being switched to Torsemide, rest of BP meds were unchanged, have crept up a bit but good today, con't current meds and f/u with Nephro as directed

## 2020-01-17 NOTE — PROGRESS NOTES
Assessment/Plan:    Hypertension  BP at goal with HCTZ being switched to Torsemide, rest of BP meds were unchanged, have crept up a bit but good today, con't current meds and f/u with Nephro as directed    Chronic kidney disease  Stable despite switching HCTZ to Torsemide, K wnl, con't current meds and labs and f/u as per Renal    Premature ventricular contraction  No current s/sx and ECG w/o acute changes as recent Cardio appt, call with any CV symptoms    Type 2 diabetes mellitus with stage 3 chronic kidney disease, without long-term current use of insulin ([facility])    Lab Results   Component Value Date    HGBA1C 8 0 (H) 01/10/2020   DM type 2 with CKD stage 3 and hyperglycemia - uncontrolled with A1C 8 0 - increase Glipizide to 5 mg 1 tab daily (was at 0 5 tab), con't Januvia, watch diet and increase activity, recheck in 3 mos - order given, foot exam done 5/19 and eye exam done last week - will obtain copy, on statin but no ACE/ARB    Osteopenia  Dexa stable, no calcium supplement d/t hypercalcemia, on Vit D supplement, encouraged to increase exercise, no further Dexa needed    Dependent edema  Improved with Torsemide, con't current meds, avoid sodium in diet and elevate LE at rest, call with new/worse symptoms    Dyslipidemia  TG a bit elevated but TC/LDL at goal, con't current statin, recheck FLP annually, diet/exercise/wgt loss encouraged       Diagnoses and all orders for this visit:    Essential hypertension  -     glipiZIDE (GLUCOTROL) 5 mg tablet; Take 1 tablet (5 mg total) by mouth daily    Dependent edema    Premature ventricular contraction    Stage 3 chronic kidney disease (HCC)    Type 2 diabetes mellitus with stage 3 chronic kidney disease, without long-term current use of insulin ([facility])  -     glipiZIDE (GLUCOTROL) 5 mg tablet; Take 1 tablet (5 mg total) by mouth daily  -     Glucose, fasting;  Future  -     Hemoglobin A1C; Future  -     Glucose, fasting  -     Hemoglobin A1C    Dyslipidemia  - glipiZIDE (GLUCOTROL) 5 mg tablet; Take 1 tablet (5 mg total) by mouth daily    Osteopenia, unspecified location     FIT testing 6/19    Mammo 8/19    BW 1/20    DM eye exam - just done last week with Dr Funes Daily will obtain copy    Subjective:      Patient ID: Teo Cedeno is a 80 y o  female  HPI Pt here for follow up appt and BW results    Last visit in Dec pts BP was elevated and only minimally improved on recheck at end of appt  We made no changes to meds at that time and she is here today for BP check  Since last visit pt saw Neprho and Cardio and had some changes to her BP meds  HCTZ was stopped and Torsemide was started d/t her edema and dec renal function  She has been on the Torsemide since Jan 3rd  BP above goal again today and meds were reviewed and med list is UTD  She denies missing doses of meds or SE with the meds  She has been checking her BP outside the office - numbers were brought in and reviewed - she is ranging from 117/53 to 153/70 with an average of 138-140/60's  She notes no frequent Ha's/dizziness/double vision/CP  Wgt unchanged from Sept 2019  Pt saw Dr China Meehan in mid Dec for f/u CDK stage 3b/4  Poss stopping HCTZ was discussed but not done  BW and urine studies were ordered  She again was counseled on avoiding NSAIDs and controlling BP and BS  She then saw Cardio 1/3/20 for f/u HTN and PVC's  That was when HCTZ was stopped and Torsemide 20 mg 1 tab daily was started  ECG was done during that visit and no acute abnormality was noted  She was told to f/u with Cardio in 6 mos  Med list reviewed and is UTD  She notes no SE with the Torsemide  She notes her LE edema has improved  She had a BMP done 1/10/20 and her BUN/Cr were stable and K was wnl  FBS/A1C 194/8 0, BUN/Cr 39/1 82 (GFR 25), rest of CMP/CBC/TSH was wnl, FLP with elevated TG at 176, but TC/LDL/HDL at goal     Def of controlled vs uncontrolled DM was reviewed    Diet was reviewed - wgt unchanged from Sept 2019  She is taking his DM meds as directed  She is checking her sugars once daily in the am before eating - average sugars have increased from 120-130 to 150-160's  She is UTD on foot exam (5/19) and just had an eye exam (will obtain copy)  She is on a statin but no ARB/ ACE  Goal FLP was d/w pt in detail  Diet/exercise reviewed as noted above  She is taking her Simvastatin daily as directed w/o Se  She notes no stroke/TIA symptoms/CP  Dexa scan was done in Dec and was still c/w osteopenia with no significant change in BMD since last test   She is taking a Vit D supplement but not a calcium supplement d/t h/o hypercalcemia  FIT testing 6/19    Mammo 8/19    BW 1/20    DM eye exam - just done last week with Dr Aggie Bird will obtain copy    Review of Systems   Constitutional: Negative for chills, fever and unexpected weight change  HENT: Negative for congestion and sore throat  Eyes: Negative for pain and visual disturbance  Respiratory: Negative for cough, shortness of breath and wheezing  Cardiovascular: Positive for leg swelling  Negative for chest pain and palpitations  Gastrointestinal: Negative for abdominal pain, constipation, diarrhea, nausea and vomiting  Endocrine: Negative for polydipsia and polyuria  Genitourinary: Negative for difficulty urinating and dysuria  Musculoskeletal: Negative for back pain and neck pain  Skin: Negative for rash and wound  Neurological: Negative for dizziness, light-headedness and headaches  Hematological: Negative for adenopathy  Psychiatric/Behavioral: Negative for behavioral problems and confusion  Objective:    /66   Pulse 77   Temp 98 °F (36 7 °C)   Ht 5' 2" (1 575 m)   Wt 73 5 kg (162 lb)   BMI 29 63 kg/m²      Physical Exam   Constitutional: She appears well-developed and well-nourished  No distress  HENT:   Head: Normocephalic and atraumatic     Eyes: Conjunctivae are normal  Right eye exhibits no discharge  Left eye exhibits no discharge  Neck: Neck supple  No tracheal deviation present  Cardiovascular: Normal rate, regular rhythm and normal heart sounds  Exam reveals no friction rub  No murmur heard  Trace B/L pitting edema   Pulmonary/Chest: Effort normal and breath sounds normal  No respiratory distress  She has no wheezes  She has no rales  Abdominal: Soft  She exhibits no distension  There is no tenderness  There is no rebound and no guarding  Musculoskeletal: She exhibits no edema  Neurological: She is alert  She exhibits normal muscle tone  Skin: Skin is warm and dry  No rash noted  Psychiatric: She has a normal mood and affect  Her behavior is normal    Nursing note and vitals reviewed

## 2020-01-27 DIAGNOSIS — I10 ESSENTIAL HYPERTENSION: ICD-10-CM

## 2020-01-27 RX ORDER — CLONIDINE 0.1 MG/24H
1 PATCH, EXTENDED RELEASE TRANSDERMAL WEEKLY
Qty: 4 PATCH | Refills: 5 | Status: SHIPPED | OUTPATIENT
Start: 2020-01-27 | End: 2020-07-09

## 2020-01-28 DIAGNOSIS — E11.22 TYPE 2 DIABETES MELLITUS WITH STAGE 3 CHRONIC KIDNEY DISEASE, WITHOUT LONG-TERM CURRENT USE OF INSULIN (HCC): ICD-10-CM

## 2020-01-28 DIAGNOSIS — N18.30 TYPE 2 DIABETES MELLITUS WITH STAGE 3 CHRONIC KIDNEY DISEASE, WITHOUT LONG-TERM CURRENT USE OF INSULIN (HCC): ICD-10-CM

## 2020-01-28 RX ORDER — LANCETS
EACH MISCELLANEOUS
Qty: 100 EACH | Refills: 0 | Status: SHIPPED | OUTPATIENT
Start: 2020-01-28 | End: 2020-10-30

## 2020-01-29 DIAGNOSIS — I10 ESSENTIAL HYPERTENSION: ICD-10-CM

## 2020-01-29 RX ORDER — NIFEDIPINE 90 MG/1
90 TABLET, FILM COATED, EXTENDED RELEASE ORAL DAILY
Qty: 90 TABLET | Refills: 0 | Status: CANCELLED | OUTPATIENT
Start: 2020-01-29

## 2020-01-31 DIAGNOSIS — E11.22 TYPE 2 DIABETES MELLITUS WITH STAGE 3 CHRONIC KIDNEY DISEASE, WITHOUT LONG-TERM CURRENT USE OF INSULIN (HCC): ICD-10-CM

## 2020-01-31 DIAGNOSIS — N18.30 TYPE 2 DIABETES MELLITUS WITH STAGE 3 CHRONIC KIDNEY DISEASE, WITHOUT LONG-TERM CURRENT USE OF INSULIN (HCC): ICD-10-CM

## 2020-02-03 DIAGNOSIS — I10 ESSENTIAL HYPERTENSION: ICD-10-CM

## 2020-02-03 DIAGNOSIS — E11.22 TYPE 2 DIABETES MELLITUS WITH STAGE 3 CHRONIC KIDNEY DISEASE, WITHOUT LONG-TERM CURRENT USE OF INSULIN (HCC): ICD-10-CM

## 2020-02-03 DIAGNOSIS — N18.30 TYPE 2 DIABETES MELLITUS WITH STAGE 3 CHRONIC KIDNEY DISEASE, WITHOUT LONG-TERM CURRENT USE OF INSULIN (HCC): ICD-10-CM

## 2020-02-03 RX ORDER — SITAGLIPTIN 50 MG/1
TABLET, FILM COATED ORAL
Qty: 90 TABLET | Refills: 0 | Status: SHIPPED | OUTPATIENT
Start: 2020-02-03 | End: 2020-04-13 | Stop reason: SDUPTHER

## 2020-02-03 RX ORDER — NIFEDIPINE 90 MG/1
90 TABLET, FILM COATED, EXTENDED RELEASE ORAL DAILY
Qty: 90 TABLET | Refills: 0 | Status: SHIPPED | OUTPATIENT
Start: 2020-02-03 | End: 2020-04-13 | Stop reason: SDUPTHER

## 2020-02-03 NOTE — TELEPHONE ENCOUNTER
Previously sent to [facility], but they have informed the family that they will not be able to get it for several months - family has requested it be sent to [address] [place] [address] instead

## 2020-03-06 NOTE — PROGRESS NOTES
Assessment/Plan:    Chronic kidney disease  Slowly progressed and has now established with Nephro - has BW order to be done and f/u regularly, again encouraged to watch sodium in diet and to avoid NSAIDs, con't meds and f/u as per Nephro    Type 2 diabetes mellitus with stage 3 chronic kidney disease, without long-term current use of insulin ([facility])  Lab Results   Component Value Date    HGBA1C 7 2 (H) 05/10/2019       No results for input(s): POCGLU in the last 72 hours  Blood Sugar Average: Last 72 hrs:  DID not do Bw as order - reprinted order and urged to do this week before she leaves for [facility] - if A1C <7 - repeat in 6 mos and if A1C > 7 repeat in 3 mos, con't watching diet and increase aerobic activity, on statin but no ACE d/t renal fxn, UTD on foot exam (5/19) and eye exam (1/19)    Hypertension  BP up a bit today - pt worked up over issues with review of med list - will con't current meds and re-eval in 3-6 mos, urged low sodium diet and avoid NSAIDs    Dependent edema  Now also with wgt gain - again no current s/sx of HF and Echo from 8/19 reviewed, has stopped Gabapentin and no benefit in LE edema occurred, has decreased Glipizide and no benefit in LE edema has occurred, has picked up compression stockings but is not using regularly d/t difficulty putting the garment on, urged to watch symptoms closely, wear compression stockings and start more aerobic exercise with walking/stationary bike or swimming       Diagnoses and all orders for this visit:    Ovarian failure  -     DXA bone density spine hip and pelvis; Future    Stage 3 chronic kidney disease (HCC)    Type 2 diabetes mellitus with stage 3 chronic kidney disease, without long-term current use of insulin ([facility])  -     glipiZIDE (GLUCOTROL) 5 mg tablet; Take 0 5 tablets (2 5 mg total) by mouth daily    Essential hypertension  -     glipiZIDE (GLUCOTROL) 5 mg tablet;  Take 0 5 tablets (2 5 mg total) by mouth daily    Dyslipidemia  -     glipiZIDE (GLUCOTROL) 5 mg tablet; Take 0 5 tablets (2 5 mg total) by mouth daily    Dependent edema      Pt is agreeable to flu vaccine - given today    FIT testing neg 6/19    Mammo 8/19    Dexa 6/17 - osteopenia - order given    Subjective:      Patient ID: Razia Astudillo is a 80 y o  female  HPI Pt here for follow up appt    Pt saw Nephro in Aug shortly after our last visit  No meds were changed  She was given order for BW to be done and told to f/u in 4 mos  She has gained 10 lbs in 3 mos  She does not add salt to her food  She denies NSAID use OTC and does not think she has a high sodium diet  She notes no SOB/orthopnea/PND  Her LE edema is at baseline - worse at the end of the day  She is doing stretches for her exercise but no real CV exercise  She does not feel her dietary intake has increased  Her Gabapentin was stopped as it can cause swelling and her Glipizide was cut in half  She has had no improvement in her swelling with those med changes  Echo 8/19 showed EF 60% and no wall motion abnormality was present  Pt was given order for FBS/A1C to be done in Aug prior to this appt - chart reviewed and no results in chart  Diet was reviewed - wgt up 10 lbs from May 2019  She is taking her DM meds as directed  She is UTD on foot (5/19) and eye exam (1/19)  She is on statin but no ACE d/t progressive CKD  Pt is agreeable to flu vaccine    FIT testing neg 6/19    Mammo 8/19    Dexa 6/17 - osteopenia    Review of Systems   Constitutional: Positive for unexpected weight change  Negative for chills and fever  HENT: Negative for congestion and sore throat  Eyes: Negative for pain and visual disturbance  Respiratory: Negative for cough, shortness of breath and wheezing  Cardiovascular: Positive for leg swelling  Negative for chest pain and palpitations  Gastrointestinal: Negative for abdominal pain, blood in stool, constipation, diarrhea and nausea     Endocrine: Negative for polydipsia and polyuria  Genitourinary: Negative for difficulty urinating and dysuria  Musculoskeletal: Negative for back pain and neck pain  Skin: Negative for rash and wound  Neurological: Negative for dizziness, light-headedness and headaches  Hematological: Negative for adenopathy  Psychiatric/Behavioral: Negative for behavioral problems and confusion  Objective:    /82 (BP Location: Right arm, Patient Position: Sitting, Cuff Size: Standard)   Pulse 77   Temp 98 3 °F (36 8 °C)   Ht 5' 2" (1 575 m)   Wt 73 5 kg (162 lb)   BMI 29 63 kg/m²      Physical Exam   Constitutional: She appears well-developed and well-nourished  No distress  HENT:   Head: Normocephalic and atraumatic  Eyes: Conjunctivae are normal  Right eye exhibits no discharge  Left eye exhibits no discharge  Neck: Neck supple  No tracheal deviation present  Cardiovascular: Normal rate, regular rhythm and normal heart sounds  Exam reveals no friction rub  No murmur heard  + 1 B/L LE edema   Pulmonary/Chest: Effort normal and breath sounds normal  No respiratory distress  She has no wheezes  She has no rales  Abdominal: Soft  She exhibits no distension  There is no tenderness  There is no rebound and no guarding  Musculoskeletal: She exhibits no edema  Neurological: She is alert  She exhibits normal muscle tone  Skin: Skin is warm and dry  No rash noted  Psychiatric: She has a normal mood and affect  Her behavior is normal    Nursing note and vitals reviewed  eyeglasses

## 2020-04-13 ENCOUNTER — TELEPHONE (OUTPATIENT)
Dept: FAMILY MEDICINE CLINIC | Facility: HOSPITAL | Age: 85
End: 2020-04-13

## 2020-04-13 ENCOUNTER — TELEMEDICINE (OUTPATIENT)
Dept: FAMILY MEDICINE CLINIC | Facility: HOSPITAL | Age: 85
End: 2020-04-13
Payer: COMMERCIAL

## 2020-04-13 VITALS — SYSTOLIC BLOOD PRESSURE: 149 MMHG | DIASTOLIC BLOOD PRESSURE: 64 MMHG | HEIGHT: 62 IN | BODY MASS INDEX: 29.63 KG/M2

## 2020-04-13 DIAGNOSIS — Z00.00 MEDICARE ANNUAL WELLNESS VISIT, SUBSEQUENT: Primary | ICD-10-CM

## 2020-04-13 DIAGNOSIS — I10 ESSENTIAL HYPERTENSION: ICD-10-CM

## 2020-04-13 DIAGNOSIS — N18.30 TYPE 2 DIABETES MELLITUS WITH STAGE 3 CHRONIC KIDNEY DISEASE, WITHOUT LONG-TERM CURRENT USE OF INSULIN (HCC): ICD-10-CM

## 2020-04-13 DIAGNOSIS — E66.3 OVERWEIGHT (BMI 25.0-29.9): ICD-10-CM

## 2020-04-13 DIAGNOSIS — K21.00 GASTROESOPHAGEAL REFLUX DISEASE WITH ESOPHAGITIS: ICD-10-CM

## 2020-04-13 DIAGNOSIS — F41.9 ANXIETY DISORDER, UNSPECIFIED TYPE: ICD-10-CM

## 2020-04-13 DIAGNOSIS — E11.22 TYPE 2 DIABETES MELLITUS WITH STAGE 3 CHRONIC KIDNEY DISEASE, WITHOUT LONG-TERM CURRENT USE OF INSULIN (HCC): ICD-10-CM

## 2020-04-13 PROCEDURE — 3078F DIAST BP <80 MM HG: CPT | Performed by: INTERNAL MEDICINE

## 2020-04-13 PROCEDURE — 1036F TOBACCO NON-USER: CPT | Performed by: INTERNAL MEDICINE

## 2020-04-13 PROCEDURE — 3066F NEPHROPATHY DOC TX: CPT | Performed by: INTERNAL MEDICINE

## 2020-04-13 PROCEDURE — 3077F SYST BP >= 140 MM HG: CPT | Performed by: INTERNAL MEDICINE

## 2020-04-13 PROCEDURE — 99214 OFFICE O/P EST MOD 30 MIN: CPT | Performed by: INTERNAL MEDICINE

## 2020-04-13 PROCEDURE — G0439 PPPS, SUBSEQ VISIT: HCPCS | Performed by: INTERNAL MEDICINE

## 2020-04-13 PROCEDURE — 1160F RVW MEDS BY RX/DR IN RCRD: CPT | Performed by: INTERNAL MEDICINE

## 2020-04-13 PROCEDURE — 4040F PNEUMOC VAC/ADMIN/RCVD: CPT | Performed by: INTERNAL MEDICINE

## 2020-04-13 PROCEDURE — 2022F DILAT RTA XM EVC RTNOPTHY: CPT | Performed by: INTERNAL MEDICINE

## 2020-04-13 PROCEDURE — 1170F FXNL STATUS ASSESSED: CPT | Performed by: INTERNAL MEDICINE

## 2020-04-13 PROCEDURE — 1125F AMNT PAIN NOTED PAIN PRSNT: CPT | Performed by: INTERNAL MEDICINE

## 2020-04-13 RX ORDER — NIFEDIPINE 90 MG/1
90 TABLET, FILM COATED, EXTENDED RELEASE ORAL DAILY
Qty: 30 TABLET | Refills: 5 | Status: SHIPPED | OUTPATIENT
Start: 2020-04-13 | End: 2020-10-08

## 2020-04-13 RX ORDER — BLOOD SUGAR DIAGNOSTIC
STRIP MISCELLANEOUS
COMMUNITY
Start: 2020-01-31

## 2020-04-13 RX ORDER — LANCETS
EACH MISCELLANEOUS
COMMUNITY
Start: 2020-01-28

## 2020-04-13 RX ORDER — NEBIVOLOL 20 MG/1
20 TABLET ORAL DAILY
Qty: 30 TABLET | Refills: 5 | Status: SHIPPED | OUTPATIENT
Start: 2020-04-13 | End: 2020-10-28

## 2020-04-28 DIAGNOSIS — E78.5 DYSLIPIDEMIA: ICD-10-CM

## 2020-04-28 RX ORDER — SIMVASTATIN 20 MG
TABLET ORAL
Qty: 90 TABLET | Refills: 2 | Status: SHIPPED | OUTPATIENT
Start: 2020-04-28 | End: 2020-11-02 | Stop reason: SDUPTHER

## 2020-05-03 DIAGNOSIS — R79.0 LOW FERRITIN: ICD-10-CM

## 2020-05-03 RX ORDER — FERROUS SULFATE TAB EC 324 MG (65 MG FE EQUIVALENT) 324 (65 FE) MG
324 TABLET DELAYED RESPONSE ORAL
Qty: 90 TABLET | Refills: 1 | Status: SHIPPED | OUTPATIENT
Start: 2020-05-03 | End: 2020-11-12

## 2020-05-06 ENCOUNTER — TELEPHONE (OUTPATIENT)
Dept: NEPHROLOGY | Facility: CLINIC | Age: 85
End: 2020-05-06

## 2020-05-10 DIAGNOSIS — F41.9 ANXIETY: ICD-10-CM

## 2020-05-10 DIAGNOSIS — K21.00 GASTROESOPHAGEAL REFLUX DISEASE WITH ESOPHAGITIS: ICD-10-CM

## 2020-05-10 RX ORDER — FAMOTIDINE 20 MG/1
TABLET, FILM COATED ORAL
Qty: 30 TABLET | Refills: 5 | Status: SHIPPED | OUTPATIENT
Start: 2020-05-10 | End: 2020-10-08

## 2020-05-10 RX ORDER — SERTRALINE HYDROCHLORIDE 25 MG/1
TABLET, FILM COATED ORAL
Qty: 30 TABLET | Refills: 5 | Status: SHIPPED | OUTPATIENT
Start: 2020-05-10 | End: 2020-11-12

## 2020-05-15 DIAGNOSIS — E11.22 TYPE 2 DIABETES MELLITUS WITH STAGE 3 CHRONIC KIDNEY DISEASE, WITHOUT LONG-TERM CURRENT USE OF INSULIN (HCC): ICD-10-CM

## 2020-05-15 DIAGNOSIS — F41.9 ANXIETY: ICD-10-CM

## 2020-05-15 DIAGNOSIS — K21.00 GASTROESOPHAGEAL REFLUX DISEASE WITH ESOPHAGITIS: ICD-10-CM

## 2020-05-15 DIAGNOSIS — I10 ESSENTIAL HYPERTENSION: ICD-10-CM

## 2020-05-15 DIAGNOSIS — N18.30 TYPE 2 DIABETES MELLITUS WITH STAGE 3 CHRONIC KIDNEY DISEASE, WITHOUT LONG-TERM CURRENT USE OF INSULIN (HCC): ICD-10-CM

## 2020-05-15 DIAGNOSIS — E78.5 DYSLIPIDEMIA: ICD-10-CM

## 2020-05-15 RX ORDER — GLIPIZIDE 5 MG/1
5 TABLET ORAL DAILY
Qty: 30 TABLET | Refills: 3 | Status: SHIPPED | OUTPATIENT
Start: 2020-05-15 | End: 2020-09-15

## 2020-05-15 RX ORDER — FAMOTIDINE 20 MG/1
20 TABLET, FILM COATED ORAL DAILY
Qty: 30 TABLET | Refills: 5 | OUTPATIENT
Start: 2020-05-15

## 2020-05-15 RX ORDER — SERTRALINE HYDROCHLORIDE 25 MG/1
25 TABLET, FILM COATED ORAL DAILY
Qty: 30 TABLET | Refills: 5 | OUTPATIENT
Start: 2020-05-15

## 2020-06-27 LAB
EST. AVERAGE GLUCOSE BLD GHB EST-MCNC: 171 MG/DL
GLUCOSE SERPL-MCNC: 215 MG/DL (ref 65–99)
HBA1C MFR BLD: 7.6 % (ref 4.8–5.6)

## 2020-06-30 ENCOUNTER — OFFICE VISIT (OUTPATIENT)
Dept: FAMILY MEDICINE CLINIC | Facility: HOSPITAL | Age: 85
End: 2020-06-30
Payer: COMMERCIAL

## 2020-06-30 VITALS
HEIGHT: 62 IN | BODY MASS INDEX: 31.1 KG/M2 | TEMPERATURE: 97.7 F | SYSTOLIC BLOOD PRESSURE: 138 MMHG | HEART RATE: 82 BPM | WEIGHT: 169 LBS | DIASTOLIC BLOOD PRESSURE: 72 MMHG

## 2020-06-30 DIAGNOSIS — N18.30 TYPE 2 DIABETES MELLITUS WITH STAGE 3 CHRONIC KIDNEY DISEASE, WITHOUT LONG-TERM CURRENT USE OF INSULIN (HCC): Primary | ICD-10-CM

## 2020-06-30 DIAGNOSIS — M54.2 CERVICALGIA: ICD-10-CM

## 2020-06-30 DIAGNOSIS — R60.9 DEPENDENT EDEMA: ICD-10-CM

## 2020-06-30 DIAGNOSIS — E11.22 TYPE 2 DIABETES MELLITUS WITH STAGE 3 CHRONIC KIDNEY DISEASE, WITHOUT LONG-TERM CURRENT USE OF INSULIN (HCC): Primary | ICD-10-CM

## 2020-06-30 DIAGNOSIS — I10 ESSENTIAL HYPERTENSION: ICD-10-CM

## 2020-06-30 PROCEDURE — 2022F DILAT RTA XM EVC RTNOPTHY: CPT | Performed by: INTERNAL MEDICINE

## 2020-06-30 PROCEDURE — 3078F DIAST BP <80 MM HG: CPT | Performed by: INTERNAL MEDICINE

## 2020-06-30 PROCEDURE — 3075F SYST BP GE 130 - 139MM HG: CPT | Performed by: INTERNAL MEDICINE

## 2020-06-30 PROCEDURE — 99214 OFFICE O/P EST MOD 30 MIN: CPT | Performed by: INTERNAL MEDICINE

## 2020-06-30 PROCEDURE — 3008F BODY MASS INDEX DOCD: CPT | Performed by: INTERNAL MEDICINE

## 2020-06-30 PROCEDURE — 1036F TOBACCO NON-USER: CPT | Performed by: INTERNAL MEDICINE

## 2020-06-30 PROCEDURE — 3066F NEPHROPATHY DOC TX: CPT | Performed by: INTERNAL MEDICINE

## 2020-06-30 PROCEDURE — 1160F RVW MEDS BY RX/DR IN RCRD: CPT | Performed by: INTERNAL MEDICINE

## 2020-06-30 PROCEDURE — 4040F PNEUMOC VAC/ADMIN/RCVD: CPT | Performed by: INTERNAL MEDICINE

## 2020-06-30 RX ORDER — TORSEMIDE 20 MG/1
20 TABLET ORAL DAILY
Qty: 90 TABLET | Refills: 1 | Status: SHIPPED | OUTPATIENT
Start: 2020-06-30 | End: 2021-01-04

## 2020-07-08 ENCOUNTER — TELEPHONE (OUTPATIENT)
Dept: NEPHROLOGY | Facility: CLINIC | Age: 85
End: 2020-07-08

## 2020-07-08 NOTE — TELEPHONE ENCOUNTER
I spoke to the patient's son and he is aware the patient has an upcoming appointment with Dr Jerome Thompson on 7/14, and there is blood work to be done for the appointment

## 2020-07-09 DIAGNOSIS — I10 ESSENTIAL HYPERTENSION: ICD-10-CM

## 2020-07-09 RX ORDER — CLONIDINE 0.1 MG/24H
1 PATCH, EXTENDED RELEASE TRANSDERMAL WEEKLY
Qty: 4 PATCH | Refills: 5 | Status: SHIPPED | OUTPATIENT
Start: 2020-07-09 | End: 2020-10-27

## 2020-07-13 ENCOUNTER — TELEPHONE (OUTPATIENT)
Dept: NEPHROLOGY | Facility: CLINIC | Age: 85
End: 2020-07-13

## 2020-07-14 ENCOUNTER — DOCUMENTATION (OUTPATIENT)
Dept: NEPHROLOGY | Facility: HOSPITAL | Age: 85
End: 2020-07-14

## 2020-07-14 ENCOUNTER — OFFICE VISIT (OUTPATIENT)
Dept: NEPHROLOGY | Facility: HOSPITAL | Age: 85
End: 2020-07-14
Payer: COMMERCIAL

## 2020-07-14 VITALS
HEART RATE: 70 BPM | HEIGHT: 62 IN | DIASTOLIC BLOOD PRESSURE: 74 MMHG | SYSTOLIC BLOOD PRESSURE: 138 MMHG | RESPIRATION RATE: 16 BRPM | BODY MASS INDEX: 31.28 KG/M2 | TEMPERATURE: 97.7 F | WEIGHT: 170 LBS

## 2020-07-14 DIAGNOSIS — D64.9 NORMOCYTIC ANEMIA: ICD-10-CM

## 2020-07-14 DIAGNOSIS — E87.6 HYPOKALEMIA: ICD-10-CM

## 2020-07-14 DIAGNOSIS — N18.30 TYPE 2 DIABETES MELLITUS WITH STAGE 3 CHRONIC KIDNEY DISEASE, WITHOUT LONG-TERM CURRENT USE OF INSULIN (HCC): Primary | ICD-10-CM

## 2020-07-14 DIAGNOSIS — I10 ESSENTIAL HYPERTENSION: ICD-10-CM

## 2020-07-14 DIAGNOSIS — E83.52 HYPERCALCEMIA: ICD-10-CM

## 2020-07-14 DIAGNOSIS — E11.22 TYPE 2 DIABETES MELLITUS WITH STAGE 3 CHRONIC KIDNEY DISEASE, WITHOUT LONG-TERM CURRENT USE OF INSULIN (HCC): Primary | ICD-10-CM

## 2020-07-14 DIAGNOSIS — N28.1 COMPLEX RENAL CYST: ICD-10-CM

## 2020-07-14 LAB
CREAT UR-MCNC: 35.9 MG/DL
EXT GLUCOSE BLD: 194
EXTERNAL BUN: 30
EXTERNAL CALCIUM: 9.7
EXTERNAL CHLORIDE: 102
EXTERNAL CO2: 26
EXTERNAL CREATININE: 1.84
EXTERNAL POTASSIUM: 4
EXTERNAL SODIUM: 145
MAGNESIUM SERPL-MCNC: 2.3 MG/DL (ref 1.6–2.6)
OSMOLALITY UR: 343 MMOL/KG (ref 250–900)
POTASSIUM UR-SCNC: 42.4 MMOL/L
SODIUM 24H UR-SCNC: 82 MOL/L

## 2020-07-14 PROCEDURE — 3078F DIAST BP <80 MM HG: CPT | Performed by: INTERNAL MEDICINE

## 2020-07-14 PROCEDURE — 3008F BODY MASS INDEX DOCD: CPT | Performed by: INTERNAL MEDICINE

## 2020-07-14 PROCEDURE — 99214 OFFICE O/P EST MOD 30 MIN: CPT | Performed by: INTERNAL MEDICINE

## 2020-07-14 PROCEDURE — 2022F DILAT RTA XM EVC RTNOPTHY: CPT | Performed by: INTERNAL MEDICINE

## 2020-07-14 PROCEDURE — 1160F RVW MEDS BY RX/DR IN RCRD: CPT | Performed by: INTERNAL MEDICINE

## 2020-07-14 PROCEDURE — 4040F PNEUMOC VAC/ADMIN/RCVD: CPT | Performed by: INTERNAL MEDICINE

## 2020-07-14 PROCEDURE — 3066F NEPHROPATHY DOC TX: CPT | Performed by: INTERNAL MEDICINE

## 2020-07-14 PROCEDURE — 3075F SYST BP GE 130 - 139MM HG: CPT | Performed by: INTERNAL MEDICINE

## 2020-07-14 PROCEDURE — 1036F TOBACCO NON-USER: CPT | Performed by: INTERNAL MEDICINE

## 2020-07-14 NOTE — PATIENT INSTRUCTIONS
1  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 of Diabetes mellitus type 2 as well as hypertensive nephrosclerosis +/- physiologic aging of the kidney   -last sCr 1 84 as of 7/10/20  Has been stable in the 1 6-1 8 range since July 2018    -monitor BMP  -avoid nonsteroidals (ibuprofen, advil, motrin, aleve, naproxen, indomethacin, celebrex, toradol)  -may take tylenol  -please stay well hydrated       2  Microalbuminuria in setting of type 2 DM - microalb:Cr 26 6 as of January 2019  UpCr 146mg/g as of 2019 - acceptable  -off lisinopril  -microalbumin in the urine can also be seen with physiologic aging of the kidney  -repeat UpCr and microalb:Cr     3  HTN - BP acceptable for age  Goal blood pressure 130-140s/60-70s for renal perfusion    -continue clonidine 0 1mg weekly patch, bystolic 32OE daily, nifedipine 90mg daily, torsemide 20mg daily  -off lisinopril   -off HCTZ 25mg daily     4  DM2, uncontrolled - on januvia, glipizide, last A1C 7 6     5  Hypokalemia - off HCTZ, on potassium gluconate supplement, 2 tablets a day,   -please obtain urine electrolytes along with repeat BMP to further evaluate your hypokalemia in 3 months  -monitor mag level-last level normal at 2 3 as of 7/10/20     6  Dependent edema - albumin normal  Off HCTZ  Now on torsemide 20mg daily  Echo normal from Aug  2019     7  Complex cyst, left kidney - stable in size as of July 2019 ultrasound, monitor annual renal ultrasound     8  Hypercalcemia - ana 10 1 on latest bloodwork  Resolved off calcium supplements  May continue 2000u vitamin D supplements  Monitor ana/phos levels  FLC 1 85 - acceptable ratio in light of CKD  SPEP negative  Possible band in UPEP  Monitor this      9  Low ferritin level with normal hemoglobin - on oral iron, monitor CBC     10  Hypernatremia - sNa 145 on BMP 7/10/20 - encourage increased water intake     RTC in 4 months  Please obtain renal ultrasound as soon as you can    Recommend obtaining blood and urine testing ordered today in 3 months

## 2020-07-14 NOTE — PROGRESS NOTES
NEPHROLOGY OUTPATIENT PROGRESS NOTE   Donna Puckett 80 y o  female MRN: [id number]  DATE: 7/14/2020  Reason for visit:   Chief Complaint   Patient presents with    Chronic Kidney Disease    Follow-up        Patient Instructions   1  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 of Diabetes mellitus type 2 as well as hypertensive nephrosclerosis +/- physiologic aging of the kidney   -last sCr 1 84 as of 7/10/20  Has been stable in the 1 6-1 8 range since July 2018    -monitor BMP  -avoid nonsteroidals (ibuprofen, advil, motrin, aleve, naproxen, indomethacin, celebrex, toradol)  -may take tylenol  -please stay well hydrated       2  Microalbuminuria in setting of type 2 DM - microalb:Cr 26 6 as of January 2019  UpCr 146mg/g as of 2019 - acceptable  -off lisinopril  -microalbumin in the urine can also be seen with physiologic aging of the kidney  -repeat UpCr and microalb:Cr     3  HTN - BP acceptable for age  Goal blood pressure 130-140s/60-70s for renal perfusion    -continue clonidine 0 1mg weekly patch, bystolic 13UG daily, nifedipine 90mg daily, torsemide 20mg daily  -off lisinopril   -off HCTZ 25mg daily     4  DM2, uncontrolled - on januvia, glipizide, last A1C 7 6     5  Hypokalemia - off HCTZ, on potassium gluconate supplement, 2 tablets a day,   -please obtain urine electrolytes along with repeat BMP to further evaluate your hypokalemia in 3 months  -monitor mag level-last level normal at 2 3 as of 7/10/20     6  Dependent edema - albumin normal  Off HCTZ  Now on torsemide 20mg daily  Echo normal from Aug  2019     7  Complex cyst, left kidney - stable in size as of July 2019 ultrasound, monitor annual renal ultrasound     8  Hypercalcemia - ana 10 1 on latest bloodwork  Resolved off calcium supplements  May continue 2000u vitamin D supplements  Monitor ana/phos levels  FLC 1 85 - acceptable ratio in light of CKD  SPEP negative  Possible band in UPEP  Monitor this      9   Low ferritin level with normal hemoglobin - on oral iron, monitor CBC     10  Hypernatremia - sNa 145 on BMP 7/10/20 - encourage increased water intake     RTC in 4 months  Please obtain renal ultrasound as soon as you can  Recommend obtaining blood and urine testing ordered today in 3 months  Cristobal Velazquez was seen today for chronic kidney disease and follow-up  Diagnoses and all orders for this visit:    Type 2 diabetes mellitus with stage 3 chronic kidney disease, without long-term current use of insulin (HCC)  -     Microalbumin / creatinine urine ratio; Future  -     Basic metabolic panel; Future  -     Urinalysis with microscopic; Future  -     Protein / creatinine ratio, urine; Future  -     Magnesium; Future  -     US retroperitoneal complete; Future  -     Basic metabolic panel  -     Urinalysis with microscopic  -     Protein / creatinine ratio, urine  -     Magnesium    Essential hypertension    Complex renal cyst    Hypokalemia  -     Basic metabolic panel; Future  -     Basic metabolic panel    Normocytic anemia    Hypercalcemia  -     Basic metabolic panel; Future  -     Basic metabolic panel        Assessment/Plan:  1  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 of Diabetes mellitus type 2 as well as hypertensive nephrosclerosis +/- physiologic aging of the kidney   -last sCr 1 84 as of 7/10/20  Has been stable in the 1 6-1 8 range since July 2018    -monitor BMP  -avoid nonsteroidals (ibuprofen, advil, motrin, aleve, naproxen, indomethacin, celebrex, toradol)  -may take tylenol  -please stay well hydrated       2  Microalbuminuria in setting of type 2 DM - microalb:Cr 26 6 as of January 2019  UpCr 146mg/g as of 2019 - acceptable  -off lisinopril  -microalbumin in the urine can also be seen with physiologic aging of the kidney  -repeat UpCr and microalb:Cr     3  HTN - BP acceptable for age   Goal blood pressure 130-140s/60-70s for renal perfusion    -continue clonidine 0 1mg weekly patch, bystolic 73FQ daily, nifedipine 90mg daily, torsemide 20mg daily  -off lisinopril   -off HCTZ 25mg daily     4  DM2, uncontrolled - on januvia, glipizide, last A1C 7 6     5  Hypokalemia - off HCTZ, on potassium gluconate supplement, 2 tablets a day,   -please obtain urine electrolytes along with repeat BMP to further evaluate your hypokalemia in 3 months  -monitor mag level-last level normal at 2 3 as of 7/10/20     6  Dependent edema - albumin normal  Off HCTZ  Now on torsemide 20mg daily  Echo normal from Aug  2019     7  Complex cyst, left kidney - stable in size as of July 2019 ultrasound, monitor annual renal ultrasound     8  Hypercalcemia - ana 10 1 on latest bloodwork  Resolved off calcium supplements  May continue 2000u vitamin D supplements  Monitor ana/phos levels  FLC 1 85 - acceptable ratio in light of CKD  SPEP negative  Possible band in UPEP  Monitor this      9  Low ferritin level with normal hemoglobin - on oral iron, monitor CBC     10  Hypernatremia - sNa 145 on BMP 7/10/20 - encourage increased water intake     RTC in 4 months  Please obtain renal ultrasound as soon as you can  Recommend obtaining blood and urine testing ordered today in 3 months  SUBJECTIVE / INTERVAL HISTORY:  80 y o  female presents in follow up of CKD  Pamalejuan Done denies any recent illness/hospitalizations  Denies NSAID use  No longer on HCTZ  Now on torsemide 20mg daily and K gluconate 1 tab BID  BP at home well controlled on average  Review of Systems   Constitutional: Negative for chills and fever  HENT: Negative for sore throat and trouble swallowing  Eyes: Negative for visual disturbance  Respiratory: Negative for cough and shortness of breath  Cardiovascular: Positive for leg swelling (occasional)  Negative for chest pain  Gastrointestinal: Negative for constipation, diarrhea, nausea and vomiting  Endocrine: Negative for polyuria     Genitourinary: Negative for difficulty urinating, dysuria and hematuria  Musculoskeletal: Positive for neck pain (using an OTC muscle rub/tylenol - improving)  Negative for back pain  Skin: Negative for rash  Neurological: Negative for dizziness, light-headedness and numbness  Hematological: Negative for adenopathy  Does not bruise/bleed easily  Psychiatric/Behavioral: The patient is not nervous/anxious  OBJECTIVE:  /74 (BP Location: Left arm, Patient Position: Sitting, Cuff Size: Standard)   Pulse 70   Temp 97 7 °F (36 5 °C) (Oral)   Resp 16   Ht 5' 2" (1 575 m)   Wt 77 1 kg (170 lb)   BMI 31 09 kg/m²  Body mass index is 31 09 kg/m²  Physical exam:  Physical Exam   Constitutional: She appears well-developed and well-nourished  No distress  HENT:   Head: Normocephalic and atraumatic  Wearing mask   Eyes: Right eye exhibits no discharge  Left eye exhibits no discharge  No scleral icterus  Neck: Normal range of motion  Neck supple  No thyromegaly present  Cardiovascular: Normal rate and regular rhythm  No murmur heard  Pulmonary/Chest: Effort normal and breath sounds normal  No respiratory distress  She has no wheezes  Abdominal: Soft  Bowel sounds are normal  She exhibits no distension  Musculoskeletal: She exhibits edema (trace ankle b/l )  Neurological: She is alert  She exhibits normal muscle tone  awake   Skin: Skin is warm and dry  No rash noted  She is not diaphoretic  Psychiatric: She has a normal mood and affect  Her behavior is normal    Nursing note and vitals reviewed        Medications:    Current Outpatient Medications:     ACCU-CHEK PAUL PLUS test strip, , Disp: , Rfl:     ACCU-CHEK SOFTCLIX LANCETS lancets, USE 2 TIMES DAILY AS DIRECTED, Disp: 100 each, Rfl: 0    Accu-Chek Softclix Lancets lancets, , Disp: , Rfl:     aspirin 81 MG tablet, Take 1 tablet by mouth daily, Disp: , Rfl:     Cetirizine HCl (ZYRTEC ALLERGY) 10 MG CAPS, Take 1 tablet by mouth daily as needed, Disp: , Rfl:     cholecalciferol (VITAMIN D3) 1,000 units tablet, Take 2 tablets by mouth daily, Disp: , Rfl:     cloNIDine (CATAPRES-TTS-1) 0 1 mg/24 hr, PLACE 1 PATCH (0 1 MG TOTAL) ON THE SKIN ONCE A WEEK, Disp: 4 patch, Rfl: 5    famotidine (PEPCID) 20 mg tablet, TAKE 1 TABLET BY MOUTH EVERY DAY, Disp: 30 tablet, Rfl: 5    ferrous sulfate 324 (65 Fe) mg, TAKE 1 TABLET (324 MG TOTAL) BY MOUTH DAILY BEFORE BREAKFAST, Disp: 90 tablet, Rfl: 1    glipiZIDE (GLUCOTROL) 5 mg tablet, Take 1 tablet (5 mg total) by mouth daily, Disp: 30 tablet, Rfl: 3    glucose blood test strip, USE AS INSTRUCTED TWICE DAILY, Disp: 100 each, Rfl: 3    nebivolol (Bystolic) 20 MG tablet, Take 1 tablet (20 mg total) by mouth daily, Disp: 30 tablet, Rfl: 5    NIFEdipine (ADALAT CC) 90 mg 24 hr tablet, Take 1 tablet (90 mg total) by mouth daily, Disp: 30 tablet, Rfl: 5    Potassium Gluconate 595 (99 K) MG TABS, Take 1 tablet by mouth 2 (two) times a day , Disp: , Rfl:     sertraline (ZOLOFT) 25 mg tablet, TAKE 1 TABLET BY MOUTH EVERY DAY, Disp: 30 tablet, Rfl: 5    simvastatin (ZOCOR) 20 mg tablet, TAKE 1 TABLET BY MOUTH DAILY AT BEDTIME, Disp: 90 tablet, Rfl: 2    sitaGLIPtin (Januvia) 50 mg tablet, Take 1 tablet (50 mg total) by mouth daily, Disp: 30 tablet, Rfl: 5    torsemide (DEMADEX) 20 mg tablet, Take 1 tablet (20 mg total) by mouth daily, Disp: 90 tablet, Rfl: 1    Allergies:   Allergies as of 07/14/2020    (No Known Allergies)       The following portions of the patient's history were reviewed and updated as appropriate: past family history, past surgical history and problem list     Laboratory Results:  Lab Results   Component Value Date    SODIUM 143 01/10/2020    K 3 8 01/10/2020    CL 98 01/10/2020    CO2 26 01/10/2020    BUN 39 (H) 01/10/2020    CREATININE 1 82 (H) 01/10/2020    GLUC 215 (H) 06/26/2020    CALCIUM 9 8 01/02/2018        Lab Results   Component Value Date    CALCIUM 9 8 01/02/2018       Portions of the record may have been created with voice recognition software   Occasional wrong word or "sound a like" substitutions may have occurred due to the inherent limitations of voice recognition software   Read the chart carefully and recognize, using context, where substitutions have occurred

## 2020-07-23 ENCOUNTER — HOSPITAL ENCOUNTER (OUTPATIENT)
Dept: ULTRASOUND IMAGING | Facility: HOSPITAL | Age: 85
Discharge: HOME | End: 2020-07-23
Attending: INTERNAL MEDICINE
Payer: COMMERCIAL

## 2020-07-23 DIAGNOSIS — E11.22 TYPE 2 DIABETES MELLITUS WITH STAGE 3 CHRONIC KIDNEY DISEASE, WITHOUT LONG-TERM CURRENT USE OF INSULIN (HCC): ICD-10-CM

## 2020-07-23 DIAGNOSIS — N18.30 TYPE 2 DIABETES MELLITUS WITH STAGE 3 CHRONIC KIDNEY DISEASE, WITHOUT LONG-TERM CURRENT USE OF INSULIN (HCC): ICD-10-CM

## 2020-07-23 PROCEDURE — 76770 US EXAM ABDO BACK WALL COMP: CPT

## 2020-07-28 ENCOUNTER — TELEPHONE (OUTPATIENT)
Dept: NEPHROLOGY | Facility: CLINIC | Age: 85
End: 2020-07-28

## 2020-07-29 NOTE — TELEPHONE ENCOUNTER
I spoke to the patient's daughter in law and she is aware of the US results, and that there were no abnormalities  No further questions or concerns at this time

## 2020-08-04 ENCOUNTER — TRANSCRIBE ORDERS (OUTPATIENT)
Dept: ADMINISTRATIVE | Facility: HOSPITAL | Age: 85
End: 2020-08-04

## 2020-08-04 DIAGNOSIS — Z12.31 ENCOUNTER FOR SCREENING MAMMOGRAM FOR MALIGNANT NEOPLASM OF BREAST: Primary | ICD-10-CM

## 2020-09-15 DIAGNOSIS — E11.22 TYPE 2 DIABETES MELLITUS WITH STAGE 3 CHRONIC KIDNEY DISEASE, WITHOUT LONG-TERM CURRENT USE OF INSULIN (HCC): ICD-10-CM

## 2020-09-15 DIAGNOSIS — E78.5 DYSLIPIDEMIA: ICD-10-CM

## 2020-09-15 DIAGNOSIS — I10 ESSENTIAL HYPERTENSION: ICD-10-CM

## 2020-09-15 DIAGNOSIS — N18.30 TYPE 2 DIABETES MELLITUS WITH STAGE 3 CHRONIC KIDNEY DISEASE, WITHOUT LONG-TERM CURRENT USE OF INSULIN (HCC): ICD-10-CM

## 2020-09-15 RX ORDER — GLIPIZIDE 5 MG/1
TABLET ORAL
Qty: 30 TABLET | Refills: 5 | Status: SHIPPED | OUTPATIENT
Start: 2020-09-15 | End: 2020-10-02 | Stop reason: SDUPTHER

## 2020-09-24 LAB
EST. AVERAGE GLUCOSE BLD GHB EST-MCNC: 186 MG/DL
GLUCOSE SERPL-MCNC: 209 MG/DL (ref 65–99)
HBA1C MFR BLD: 8.1 % (ref 4.8–5.6)

## 2020-09-25 LAB
APPEARANCE UR: CLEAR
BACTERIA URNS QL MICRO: ABNORMAL
BILIRUB UR QL STRIP: NEGATIVE
BUN SERPL-MCNC: 31 MG/DL (ref 8–27)
BUN/CREAT SERPL: 16 (ref 12–28)
CALCIUM SERPL-MCNC: 9.3 MG/DL (ref 8.7–10.3)
CASTS URNS MICRO: ABNORMAL
CASTS URNS QL MICRO: PRESENT /LPF
CHLORIDE SERPL-SCNC: 101 MMOL/L (ref 96–106)
CO2 SERPL-SCNC: 26 MMOL/L (ref 20–29)
COLOR UR: YELLOW
CREAT SERPL-MCNC: 2 MG/DL (ref 0.57–1)
CREAT UR-MCNC: 151.4 MG/DL
EPI CELLS #/AREA URNS HPF: ABNORMAL /HPF (ref 0–10)
GLUCOSE SERPL-MCNC: 208 MG/DL (ref 65–99)
GLUCOSE UR QL: NEGATIVE
HGB UR QL STRIP: NEGATIVE
KETONES UR QL STRIP: NEGATIVE
LEUKOCYTE ESTERASE UR QL STRIP: ABNORMAL
MAGNESIUM SERPL-MCNC: 2.4 MG/DL (ref 1.6–2.3)
MICRO URNS: ABNORMAL
MUCOUS THREADS URNS QL MICRO: PRESENT
NITRITE UR QL STRIP: NEGATIVE
PH UR STRIP: 5.5 [PH] (ref 5–7.5)
POTASSIUM SERPL-SCNC: 4.3 MMOL/L (ref 3.5–5.2)
PROT UR QL STRIP: ABNORMAL
PROT UR-MCNC: 32.4 MG/DL
PROT/CREAT UR: 214 MG/G CREAT (ref 0–200)
RBC #/AREA URNS HPF: ABNORMAL /HPF (ref 0–2)
SL AMB EGFR AFRICAN AMERICAN: 26 ML/MIN/1.73
SL AMB EGFR NON AFRICAN AMERICAN: 22 ML/MIN/1.73
SODIUM SERPL-SCNC: 143 MMOL/L (ref 134–144)
SP GR UR: 1.02 (ref 1–1.03)
UROBILINOGEN UR STRIP-ACNC: 0.2 MG/DL (ref 0.2–1)
WBC #/AREA URNS HPF: ABNORMAL /HPF (ref 0–5)

## 2020-09-29 ENCOUNTER — HOSPITAL ENCOUNTER (OUTPATIENT)
Dept: BONE DENSITY | Facility: IMAGING CENTER | Age: 85
Discharge: HOME | End: 2020-09-29
Payer: COMMERCIAL

## 2020-09-29 VITALS — BODY MASS INDEX: 29.44 KG/M2 | HEIGHT: 62 IN | WEIGHT: 160 LBS

## 2020-09-29 DIAGNOSIS — Z12.31 ENCOUNTER FOR SCREENING MAMMOGRAM FOR MALIGNANT NEOPLASM OF BREAST: ICD-10-CM

## 2020-09-29 PROCEDURE — 77067 SCR MAMMO BI INCL CAD: CPT

## 2020-09-29 PROCEDURE — 77063 BREAST TOMOSYNTHESIS BI: CPT

## 2020-10-02 ENCOUNTER — OFFICE VISIT (OUTPATIENT)
Dept: FAMILY MEDICINE CLINIC | Facility: HOSPITAL | Age: 85
End: 2020-10-02
Payer: COMMERCIAL

## 2020-10-02 VITALS
HEART RATE: 72 BPM | BODY MASS INDEX: 31.39 KG/M2 | TEMPERATURE: 97.4 F | SYSTOLIC BLOOD PRESSURE: 142 MMHG | DIASTOLIC BLOOD PRESSURE: 82 MMHG | WEIGHT: 170.6 LBS | HEIGHT: 62 IN

## 2020-10-02 DIAGNOSIS — M54.9 CHRONIC BACK PAIN, UNSPECIFIED BACK LOCATION, UNSPECIFIED BACK PAIN LATERALITY: ICD-10-CM

## 2020-10-02 DIAGNOSIS — Z23 ENCOUNTER FOR IMMUNIZATION: ICD-10-CM

## 2020-10-02 DIAGNOSIS — I10 ESSENTIAL HYPERTENSION: ICD-10-CM

## 2020-10-02 DIAGNOSIS — Z13.29 SCREENING FOR THYROID DISORDER: ICD-10-CM

## 2020-10-02 DIAGNOSIS — G89.29 CHRONIC BACK PAIN, UNSPECIFIED BACK LOCATION, UNSPECIFIED BACK PAIN LATERALITY: ICD-10-CM

## 2020-10-02 DIAGNOSIS — N18.30 TYPE 2 DIABETES MELLITUS WITH STAGE 3 CHRONIC KIDNEY DISEASE, WITHOUT LONG-TERM CURRENT USE OF INSULIN (HCC): Primary | ICD-10-CM

## 2020-10-02 DIAGNOSIS — D64.9 NORMOCYTIC ANEMIA: ICD-10-CM

## 2020-10-02 DIAGNOSIS — E11.22 TYPE 2 DIABETES MELLITUS WITH STAGE 3 CHRONIC KIDNEY DISEASE, WITHOUT LONG-TERM CURRENT USE OF INSULIN (HCC): Primary | ICD-10-CM

## 2020-10-02 DIAGNOSIS — N18.30 STAGE 3 CHRONIC KIDNEY DISEASE, UNSPECIFIED WHETHER STAGE 3A OR 3B CKD (HCC): ICD-10-CM

## 2020-10-02 PROCEDURE — G0008 ADMIN INFLUENZA VIRUS VAC: HCPCS | Performed by: INTERNAL MEDICINE

## 2020-10-02 PROCEDURE — 90662 IIV NO PRSV INCREASED AG IM: CPT | Performed by: INTERNAL MEDICINE

## 2020-10-02 PROCEDURE — 1036F TOBACCO NON-USER: CPT | Performed by: INTERNAL MEDICINE

## 2020-10-02 PROCEDURE — 3077F SYST BP >= 140 MM HG: CPT | Performed by: INTERNAL MEDICINE

## 2020-10-02 PROCEDURE — 1160F RVW MEDS BY RX/DR IN RCRD: CPT | Performed by: INTERNAL MEDICINE

## 2020-10-02 PROCEDURE — 99214 OFFICE O/P EST MOD 30 MIN: CPT | Performed by: INTERNAL MEDICINE

## 2020-10-02 PROCEDURE — 3079F DIAST BP 80-89 MM HG: CPT | Performed by: INTERNAL MEDICINE

## 2020-10-02 RX ORDER — GLIPIZIDE 5 MG/1
5 TABLET ORAL
Qty: 60 TABLET | Refills: 5 | Status: SHIPPED | OUTPATIENT
Start: 2020-10-02 | End: 2021-01-15 | Stop reason: SDUPTHER

## 2020-10-08 DIAGNOSIS — I10 ESSENTIAL HYPERTENSION: ICD-10-CM

## 2020-10-08 DIAGNOSIS — K21.00 GASTROESOPHAGEAL REFLUX DISEASE WITH ESOPHAGITIS: ICD-10-CM

## 2020-10-08 RX ORDER — FAMOTIDINE 20 MG/1
TABLET, FILM COATED ORAL
Qty: 90 TABLET | Refills: 1 | Status: SHIPPED | OUTPATIENT
Start: 2020-10-08 | End: 2021-06-08

## 2020-10-08 RX ORDER — NIFEDIPINE 90 MG/1
TABLET, FILM COATED, EXTENDED RELEASE ORAL
Qty: 30 TABLET | Refills: 4 | Status: SHIPPED | OUTPATIENT
Start: 2020-10-08 | End: 2021-03-17

## 2020-10-26 DIAGNOSIS — I10 ESSENTIAL HYPERTENSION: ICD-10-CM

## 2020-10-27 RX ORDER — CLONIDINE 0.1 MG/24H
1 PATCH, EXTENDED RELEASE TRANSDERMAL WEEKLY
Qty: 12 PATCH | Refills: 1 | Status: SHIPPED | OUTPATIENT
Start: 2020-10-27 | End: 2021-04-29

## 2020-10-28 DIAGNOSIS — I10 ESSENTIAL HYPERTENSION: ICD-10-CM

## 2020-10-28 RX ORDER — NEBIVOLOL HYDROCHLORIDE 20 MG/1
TABLET ORAL
Qty: 90 TABLET | Refills: 1 | Status: SHIPPED | OUTPATIENT
Start: 2020-10-28 | End: 2021-03-29

## 2020-10-30 DIAGNOSIS — N18.30 TYPE 2 DIABETES MELLITUS WITH STAGE 3 CHRONIC KIDNEY DISEASE, WITHOUT LONG-TERM CURRENT USE OF INSULIN (HCC): ICD-10-CM

## 2020-10-30 DIAGNOSIS — E11.22 TYPE 2 DIABETES MELLITUS WITH STAGE 3 CHRONIC KIDNEY DISEASE, WITHOUT LONG-TERM CURRENT USE OF INSULIN (HCC): ICD-10-CM

## 2020-10-30 RX ORDER — LANCETS
EACH MISCELLANEOUS
Qty: 100 EACH | Refills: 3 | Status: SHIPPED | OUTPATIENT
Start: 2020-10-30 | End: 2021-08-29

## 2020-11-02 DIAGNOSIS — E78.5 DYSLIPIDEMIA: ICD-10-CM

## 2020-11-02 RX ORDER — SIMVASTATIN 20 MG
20 TABLET ORAL
Qty: 90 TABLET | Refills: 1 | Status: SHIPPED | OUTPATIENT
Start: 2020-11-02 | End: 2021-05-03

## 2020-11-03 DIAGNOSIS — E11.22 TYPE 2 DIABETES MELLITUS WITH STAGE 3 CHRONIC KIDNEY DISEASE, WITHOUT LONG-TERM CURRENT USE OF INSULIN (HCC): ICD-10-CM

## 2020-11-03 DIAGNOSIS — N18.30 TYPE 2 DIABETES MELLITUS WITH STAGE 3 CHRONIC KIDNEY DISEASE, WITHOUT LONG-TERM CURRENT USE OF INSULIN (HCC): ICD-10-CM

## 2020-11-03 RX ORDER — SITAGLIPTIN 50 MG/1
TABLET, FILM COATED ORAL
Qty: 30 TABLET | Refills: 5 | Status: SHIPPED | OUTPATIENT
Start: 2020-11-03 | End: 2021-05-20

## 2020-11-06 ENCOUNTER — OFFICE VISIT (OUTPATIENT)
Dept: CARDIOLOGY CLINIC | Facility: CLINIC | Age: 85
End: 2020-11-06
Payer: COMMERCIAL

## 2020-11-06 VITALS
BODY MASS INDEX: 31.65 KG/M2 | TEMPERATURE: 98 F | DIASTOLIC BLOOD PRESSURE: 60 MMHG | SYSTOLIC BLOOD PRESSURE: 132 MMHG | HEIGHT: 62 IN | HEART RATE: 71 BPM | WEIGHT: 172 LBS

## 2020-11-06 DIAGNOSIS — I49.3 PREMATURE VENTRICULAR CONTRACTION: ICD-10-CM

## 2020-11-06 DIAGNOSIS — E78.5 DYSLIPIDEMIA: Primary | ICD-10-CM

## 2020-11-06 DIAGNOSIS — N18.30 TYPE 2 DIABETES MELLITUS WITH STAGE 3 CHRONIC KIDNEY DISEASE, WITHOUT LONG-TERM CURRENT USE OF INSULIN, UNSPECIFIED WHETHER STAGE 3A OR 3B CKD (HCC): ICD-10-CM

## 2020-11-06 DIAGNOSIS — I10 ESSENTIAL HYPERTENSION: ICD-10-CM

## 2020-11-06 DIAGNOSIS — E11.22 TYPE 2 DIABETES MELLITUS WITH STAGE 3 CHRONIC KIDNEY DISEASE, WITHOUT LONG-TERM CURRENT USE OF INSULIN, UNSPECIFIED WHETHER STAGE 3A OR 3B CKD (HCC): ICD-10-CM

## 2020-11-06 PROCEDURE — 3075F SYST BP GE 130 - 139MM HG: CPT | Performed by: INTERNAL MEDICINE

## 2020-11-06 PROCEDURE — 3078F DIAST BP <80 MM HG: CPT | Performed by: INTERNAL MEDICINE

## 2020-11-06 PROCEDURE — 99214 OFFICE O/P EST MOD 30 MIN: CPT | Performed by: INTERNAL MEDICINE

## 2020-11-12 DIAGNOSIS — F41.9 ANXIETY: ICD-10-CM

## 2020-11-12 DIAGNOSIS — R79.0 LOW FERRITIN: ICD-10-CM

## 2020-11-12 RX ORDER — SERTRALINE HYDROCHLORIDE 25 MG/1
TABLET, FILM COATED ORAL
Qty: 90 TABLET | Refills: 1 | Status: SHIPPED | OUTPATIENT
Start: 2020-11-12 | End: 2021-05-19

## 2020-11-12 RX ORDER — FERROUS SULFATE TAB EC 324 MG (65 MG FE EQUIVALENT) 324 (65 FE) MG
TABLET DELAYED RESPONSE ORAL
Qty: 90 TABLET | Refills: 1 | Status: SHIPPED | OUTPATIENT
Start: 2020-11-12 | End: 2021-05-09

## 2020-11-17 ENCOUNTER — OFFICE VISIT (OUTPATIENT)
Dept: NEPHROLOGY | Facility: HOSPITAL | Age: 85
End: 2020-11-17
Payer: COMMERCIAL

## 2020-11-17 VITALS
RESPIRATION RATE: 16 BRPM | SYSTOLIC BLOOD PRESSURE: 138 MMHG | BODY MASS INDEX: 31.65 KG/M2 | HEIGHT: 62 IN | HEART RATE: 72 BPM | WEIGHT: 172 LBS | TEMPERATURE: 97.8 F | DIASTOLIC BLOOD PRESSURE: 68 MMHG

## 2020-11-17 DIAGNOSIS — E11.22 TYPE 2 DIABETES MELLITUS WITH STAGE 3 CHRONIC KIDNEY DISEASE, WITHOUT LONG-TERM CURRENT USE OF INSULIN, UNSPECIFIED WHETHER STAGE 3A OR 3B CKD (HCC): Primary | ICD-10-CM

## 2020-11-17 DIAGNOSIS — N18.30 TYPE 2 DIABETES MELLITUS WITH STAGE 3 CHRONIC KIDNEY DISEASE, WITHOUT LONG-TERM CURRENT USE OF INSULIN, UNSPECIFIED WHETHER STAGE 3A OR 3B CKD (HCC): Primary | ICD-10-CM

## 2020-11-17 DIAGNOSIS — E87.6 HYPOKALEMIA: ICD-10-CM

## 2020-11-17 DIAGNOSIS — I10 ESSENTIAL HYPERTENSION: ICD-10-CM

## 2020-11-17 DIAGNOSIS — E83.52 HYPERCALCEMIA: ICD-10-CM

## 2020-11-17 DIAGNOSIS — N28.1 COMPLEX RENAL CYST: ICD-10-CM

## 2020-11-17 PROCEDURE — 1160F RVW MEDS BY RX/DR IN RCRD: CPT | Performed by: INTERNAL MEDICINE

## 2020-11-17 PROCEDURE — 99214 OFFICE O/P EST MOD 30 MIN: CPT | Performed by: INTERNAL MEDICINE

## 2020-11-17 PROCEDURE — 1036F TOBACCO NON-USER: CPT | Performed by: INTERNAL MEDICINE

## 2020-12-16 LAB
APPEARANCE UR: CLEAR
BACTERIA URNS QL MICRO: ABNORMAL
BILIRUB UR QL STRIP: NEGATIVE
BUN SERPL-MCNC: 29 MG/DL (ref 8–27)
BUN/CREAT SERPL: 16 (ref 12–28)
CALCIUM SERPL-MCNC: 9.8 MG/DL (ref 8.7–10.3)
CASTS URNS MICRO: ABNORMAL
CASTS URNS QL MICRO: PRESENT /LPF
CHLORIDE SERPL-SCNC: 102 MMOL/L (ref 96–106)
CO2 SERPL-SCNC: 27 MMOL/L (ref 20–29)
COLOR UR: YELLOW
CREAT SERPL-MCNC: 1.87 MG/DL (ref 0.57–1)
CREAT UR-MCNC: 125.4 MG/DL
EPI CELLS #/AREA URNS HPF: ABNORMAL /HPF (ref 0–10)
GLUCOSE SERPL-MCNC: 176 MG/DL (ref 65–99)
GLUCOSE UR QL: NEGATIVE
HGB UR QL STRIP: NEGATIVE
KETONES UR QL STRIP: NEGATIVE
LEUKOCYTE ESTERASE UR QL STRIP: ABNORMAL
MICRO URNS: ABNORMAL
MUCOUS THREADS URNS QL MICRO: PRESENT
NITRITE UR QL STRIP: NEGATIVE
PH UR STRIP: 5.5 [PH] (ref 5–7.5)
POTASSIUM SERPL-SCNC: 4.3 MMOL/L (ref 3.5–5.2)
PROT UR QL STRIP: ABNORMAL
PROT UR-MCNC: 26 MG/DL
PROT/CREAT UR: 207 MG/G CREAT (ref 0–200)
RBC #/AREA URNS HPF: ABNORMAL /HPF (ref 0–2)
SL AMB EGFR AFRICAN AMERICAN: 28 ML/MIN/1.73
SL AMB EGFR NON AFRICAN AMERICAN: 24 ML/MIN/1.73
SODIUM SERPL-SCNC: 143 MMOL/L (ref 134–144)
SP GR UR: 1.02 (ref 1–1.03)
UROBILINOGEN UR STRIP-ACNC: 0.2 MG/DL (ref 0.2–1)
WBC #/AREA URNS HPF: ABNORMAL /HPF (ref 0–5)

## 2021-01-04 DIAGNOSIS — I10 ESSENTIAL HYPERTENSION: ICD-10-CM

## 2021-01-04 DIAGNOSIS — R60.9 DEPENDENT EDEMA: ICD-10-CM

## 2021-01-04 RX ORDER — TORSEMIDE 20 MG/1
TABLET ORAL
Qty: 90 TABLET | Refills: 1 | Status: SHIPPED | OUTPATIENT
Start: 2021-01-04 | End: 2021-06-18

## 2021-01-13 LAB
ALBUMIN SERPL-MCNC: 4.3 G/DL (ref 3.6–4.6)
ALBUMIN/GLOB SERPL: 1.8 {RATIO} (ref 1.2–2.2)
ALP SERPL-CCNC: 73 IU/L (ref 39–117)
ALT SERPL-CCNC: 12 IU/L (ref 0–32)
AST SERPL-CCNC: 16 IU/L (ref 0–40)
BASOPHILS # BLD AUTO: 0.1 X10E3/UL (ref 0–0.2)
BASOPHILS NFR BLD AUTO: 1 %
BILIRUB SERPL-MCNC: 0.3 MG/DL (ref 0–1.2)
BUN SERPL-MCNC: 37 MG/DL (ref 8–27)
BUN/CREAT SERPL: 20 (ref 12–28)
CALCIUM SERPL-MCNC: 9.5 MG/DL (ref 8.7–10.3)
CHLORIDE SERPL-SCNC: 101 MMOL/L (ref 96–106)
CHOLEST SERPL-MCNC: 160 MG/DL (ref 100–199)
CHOLEST/HDLC SERPL: 3.7 RATIO (ref 0–4.4)
CO2 SERPL-SCNC: 26 MMOL/L (ref 20–29)
CREAT SERPL-MCNC: 1.85 MG/DL (ref 0.57–1)
EOSINOPHIL # BLD AUTO: 1.2 X10E3/UL (ref 0–0.4)
EOSINOPHIL NFR BLD AUTO: 10 %
ERYTHROCYTE [DISTWIDTH] IN BLOOD BY AUTOMATED COUNT: 12.5 % (ref 11.7–15.4)
EST. AVERAGE GLUCOSE BLD GHB EST-MCNC: 186 MG/DL
GLOBULIN SER-MCNC: 2.4 G/DL (ref 1.5–4.5)
GLUCOSE SERPL-MCNC: 199 MG/DL (ref 65–99)
HBA1C MFR BLD: 8.1 % (ref 4.8–5.6)
HCT VFR BLD AUTO: 39.1 % (ref 34–46.6)
HDLC SERPL-MCNC: 43 MG/DL
HGB BLD-MCNC: 13.1 G/DL (ref 11.1–15.9)
IMM GRANULOCYTES # BLD: 0 X10E3/UL (ref 0–0.1)
IMM GRANULOCYTES NFR BLD: 0 %
LDLC SERPL CALC-MCNC: 91 MG/DL (ref 0–99)
LYMPHOCYTES # BLD AUTO: 3.9 X10E3/UL (ref 0.7–3.1)
LYMPHOCYTES NFR BLD AUTO: 33 %
MCH RBC QN AUTO: 31.4 PG (ref 26.6–33)
MCHC RBC AUTO-ENTMCNC: 33.5 G/DL (ref 31.5–35.7)
MCV RBC AUTO: 94 FL (ref 79–97)
MONOCYTES # BLD AUTO: 1.1 X10E3/UL (ref 0.1–0.9)
MONOCYTES NFR BLD AUTO: 9 %
NEUTROPHILS # BLD AUTO: 5.7 X10E3/UL (ref 1.4–7)
NEUTROPHILS NFR BLD AUTO: 47 %
PLATELET # BLD AUTO: 257 X10E3/UL (ref 150–450)
POTASSIUM SERPL-SCNC: 4.5 MMOL/L (ref 3.5–5.2)
PROT SERPL-MCNC: 6.7 G/DL (ref 6–8.5)
RBC # BLD AUTO: 4.17 X10E6/UL (ref 3.77–5.28)
SL AMB EGFR AFRICAN AMERICAN: 28 ML/MIN/1.73
SL AMB EGFR NON AFRICAN AMERICAN: 24 ML/MIN/1.73
SL AMB VLDL CHOLESTEROL CALC: 26 MG/DL (ref 5–40)
SODIUM SERPL-SCNC: 142 MMOL/L (ref 134–144)
TRIGL SERPL-MCNC: 148 MG/DL (ref 0–149)
TSH SERPL DL<=0.005 MIU/L-ACNC: 2.76 UIU/ML (ref 0.45–4.5)
WBC # BLD AUTO: 12 X10E3/UL (ref 3.4–10.8)

## 2021-01-15 ENCOUNTER — OFFICE VISIT (OUTPATIENT)
Dept: FAMILY MEDICINE CLINIC | Facility: HOSPITAL | Age: 86
End: 2021-01-15
Payer: COMMERCIAL

## 2021-01-15 VITALS
DIASTOLIC BLOOD PRESSURE: 78 MMHG | TEMPERATURE: 97.5 F | HEART RATE: 70 BPM | WEIGHT: 171.6 LBS | SYSTOLIC BLOOD PRESSURE: 144 MMHG | BODY MASS INDEX: 31.58 KG/M2 | HEIGHT: 62 IN

## 2021-01-15 DIAGNOSIS — E78.5 DYSLIPIDEMIA: ICD-10-CM

## 2021-01-15 DIAGNOSIS — N18.30 TYPE 2 DIABETES MELLITUS WITH STAGE 3 CHRONIC KIDNEY DISEASE, WITHOUT LONG-TERM CURRENT USE OF INSULIN (HCC): ICD-10-CM

## 2021-01-15 DIAGNOSIS — N18.30 STAGE 3 CHRONIC KIDNEY DISEASE, UNSPECIFIED WHETHER STAGE 3A OR 3B CKD (HCC): ICD-10-CM

## 2021-01-15 DIAGNOSIS — I10 ESSENTIAL HYPERTENSION: ICD-10-CM

## 2021-01-15 DIAGNOSIS — N18.30 CKD STAGE 3 DUE TO TYPE 2 DIABETES MELLITUS (HCC): ICD-10-CM

## 2021-01-15 DIAGNOSIS — E11.22 TYPE 2 DIABETES MELLITUS WITH STAGE 3 CHRONIC KIDNEY DISEASE, WITHOUT LONG-TERM CURRENT USE OF INSULIN (HCC): ICD-10-CM

## 2021-01-15 DIAGNOSIS — I49.3 PREMATURE VENTRICULAR CONTRACTION: ICD-10-CM

## 2021-01-15 DIAGNOSIS — E11.22 CKD STAGE 3 DUE TO TYPE 2 DIABETES MELLITUS (HCC): ICD-10-CM

## 2021-01-15 DIAGNOSIS — E11.22 TYPE 2 DIABETES MELLITUS WITH STAGE 3 CHRONIC KIDNEY DISEASE, WITHOUT LONG-TERM CURRENT USE OF INSULIN, UNSPECIFIED WHETHER STAGE 3A OR 3B CKD (HCC): Primary | ICD-10-CM

## 2021-01-15 DIAGNOSIS — N18.30 TYPE 2 DIABETES MELLITUS WITH STAGE 3 CHRONIC KIDNEY DISEASE, WITHOUT LONG-TERM CURRENT USE OF INSULIN, UNSPECIFIED WHETHER STAGE 3A OR 3B CKD (HCC): Primary | ICD-10-CM

## 2021-01-15 DIAGNOSIS — E66.9 OBESITY (BMI 30.0-34.9): ICD-10-CM

## 2021-01-15 PROCEDURE — 99214 OFFICE O/P EST MOD 30 MIN: CPT | Performed by: INTERNAL MEDICINE

## 2021-01-15 PROCEDURE — 1036F TOBACCO NON-USER: CPT | Performed by: INTERNAL MEDICINE

## 2021-01-15 PROCEDURE — 3077F SYST BP >= 140 MM HG: CPT | Performed by: INTERNAL MEDICINE

## 2021-01-15 PROCEDURE — 1160F RVW MEDS BY RX/DR IN RCRD: CPT | Performed by: INTERNAL MEDICINE

## 2021-01-15 PROCEDURE — 3078F DIAST BP <80 MM HG: CPT | Performed by: INTERNAL MEDICINE

## 2021-01-15 RX ORDER — GLIPIZIDE 5 MG/1
TABLET ORAL
Qty: 90 TABLET | Refills: 2 | Status: SHIPPED | OUTPATIENT
Start: 2021-01-15 | End: 2021-02-12

## 2021-01-15 NOTE — ASSESSMENT & PLAN NOTE
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

## 2021-01-15 NOTE — PATIENT INSTRUCTIONS
Obesity   AMBULATORY CARE:   Obesity  is when your body mass index (BMI) is greater than 30  Your healthcare provider will use your height and weight to measure your BMI  The risks of obesity include  many health problems, such as injuries or physical disability  You may need tests to check for the following:  · Diabetes    · High blood pressure or high cholesterol    · Heart disease    · Gallbladder or liver disease    · Cancer of the colon, breast, prostate, liver, or kidney    · Sleep apnea    · Arthritis or gout    Seek care immediately if:   · You have a severe headache, confusion, or difficulty speaking  · You have weakness on one side of your body  · You have chest pain, sweating, or shortness of breath  Contact your healthcare provider if:   · You have symptoms of gallbladder or liver disease, such as pain in your upper abdomen  · You have knee or hip pain and discomfort while walking  · You have symptoms of diabetes, such as intense hunger and thirst, and frequent urination  · You have symptoms of sleep apnea, such as snoring or daytime sleepiness  · You have questions or concerns about your condition or care  Treatment for obesity  focuses on helping you lose weight to improve your health  Even a small decrease in BMI can reduce the risk for many health problems  Your healthcare provider will help you set a weight-loss goal   · Lifestyle changes  are the first step in treating obesity  These include making healthy food choices and getting regular physical activity  Your healthcare provider may suggest a weight-loss program that involves coaching, education, and therapy  · Medicine  may help you lose weight when it is used with a healthy diet and physical activity  · Surgery  can help you lose weight if you are very obese and have other health problems  There are several types of weight-loss surgery  Ask your healthcare provider for more information      Be successful losing weight:   · Set small, realistic goals  An example of a small goal is to walk for 20 minutes 5 days a week  Anther goal is to lose 5% of your body weight  · Tell friends, family members, and coworkers about your goals  and ask for their support  Ask a friend to lose weight with you, or join a weight-loss support group  · Identify foods or triggers that may cause you to overeat , and find ways to avoid them  Remove tempting high-calorie foods from your home and workplace  Place a bowl of fresh fruit on your kitchen counter  If stress causes you to eat, then find other ways to cope with stress  · Keep a diary to track what you eat and drink  Also write down how many minutes of physical activity you do each day  Weigh yourself once a week and record it in your diary  Eating changes: You will need to eat 500 to 1,000 fewer calories each day than you currently eat to lose 1 to 2 pounds a week  The following changes will help you cut calories:  · Eat smaller portions  Use small plates, no larger than 9 inches in diameter  Fill your plate half full of fruits and vegetables  Measure your food using measuring cups until you know what a serving size looks like  · Eat 3 meals and 1 or 2 snacks each day  Plan your meals in advance  [employer] and eat at home most of the time  Eat slowly  Do not skip meals  Skipping meals can lead to overeating later in the day  This can make it harder for you to lose weight  Talk with a dietitian to help you make a meal plan and schedule that is right for you  · Eat fruits and vegetables at every meal   They are low in calories and high in fiber, which makes you feel full  Do not add butter, margarine, or cream sauce to vegetables  Use herbs to season steamed vegetables  · Eat less fat and fewer fried foods  Eat more baked or grilled chicken and fish  These protein sources are lower in calories and fat than red meat  Limit fast food   Dress your salads with olive oil and vinegar instead of bottled dressing  · Limit the amount of sugar you eat  Do not drink sugary beverages  Limit alcohol  Activity changes:  Physical activity is good for your body in many ways  It helps you burn calories and build strong muscles  It decreases stress and depression, and improves your mood  It can also help you sleep better  Talk to your healthcare provider before you begin an exercise program   · Exercise for at least 30 minutes 5 days a week  Start slowly  Set aside time each day for physical activity that you enjoy and that is convenient for you  It is best to do both weight training and an activity that increases your heart rate, such as walking, bicycling, or swimming  · Find ways to be more active  Do yard work and housecleaning  Walk up the stairs instead of using elevators  Spend your leisure time going to events that require walking, such as outdoor festivals or fairs  This extra physical activity can help you lose weight and keep it off  Follow up with your healthcare provider as directed: You may need to meet with a dietitian  Write down your questions so you remember to ask them during your visits  © Copyright [address] [place] [address] Drive Information is for End User's use only and may not be sold, redistributed or otherwise used for commercial purposes  All illustrations and images included in CareNotes® are the copyrighted property of A D A M [employer]  or [address] [place] [address]  The above information is an  only  It is not intended as medical advice for individual conditions or treatments  Talk to your doctor, nurse or pharmacist before following any medical regimen to see if it is safe and effective for you  Heart Healthy Diet   AMBULATORY CARE:   A heart healthy diet  is an eating plan low in unhealthy fats and sodium (salt)  The plan is high in healthy fats and fiber   A heart healthy diet helps improve your cholesterol levels and lowers your risk for heart disease and stroke  A dietitian will teach you how to read and understand food labels  Heart healthy diet guidelines to follow:   · Choose foods that contain healthy fats  ? Unsaturated fats  include monounsaturated and polyunsaturated fats  Unsaturated fat is found in foods such as soybean, canola, olive, corn, and safflower oils  It is also found in soft tub margarine that is made with liquid vegetable oil  ? Omega-3 fat  is found in certain fish, such as salmon, tuna, and trout, and in walnuts and flaxseed  Eat fish high in omega-3 fats at least 2 times a week  · Get 20 to 30 grams of fiber each day  Fruits, vegetables, whole-grain foods, and legumes (cooked beans) are good sources of fiber  · Limit or do not have unhealthy fats  ? Cholesterol  is found in animal foods, such as eggs and lobster, and in dairy products made from whole milk  Limit cholesterol to less than 200 mg each day  ? Saturated fat  is found in meats, such as piper and hamburger  It is also found in chicken or turkey skin, whole milk, and butter  Limit saturated fat to less than 7% of your total daily calories  ? Trans fat  is found in packaged foods, such as potato chips and cookies  It is also in hard margarine, some fried foods, and shortening  Do not eat foods that contain trans fats  · Limit sodium as directed  You may be told to limit sodium to 2,000 to 2,300 mg each day  Choose low-sodium or no-salt-added foods  Add little or no salt to food you prepare  Use herbs and spices in place of salt  Include the following in your heart healthy plan:  Ask your dietitian or healthcare provider how many servings to have from each of the following food groups:  · Grains:      ? Whole-wheat breads, cereals, and pastas, and brown rice    ? Low-fat, low-sodium crackers and chips    · Vegetables:      ? Broccoli, green beans, green peas, and spinach    ? Collards, kale, and lima beans    ?  Carrots, sweet potatoes, tomatoes, and peppers    ? Canned vegetables with no salt added    · Fruits:      ? Bananas, peaches, pears, and pineapple    ? Grapes, raisins, and dates    ? Oranges, tangerines, grapefruit, orange juice, and grapefruit juice    ? Apricots, mangoes, melons, and papaya    ? Raspberries and strawberries    ? Canned fruit with no added sugar    · Low-fat dairy:      ? Nonfat (skim) milk, 1% milk, and low-fat almond, cashew, or soy milks fortified with calcium    ? Low-fat cheese, regular or frozen yogurt, and cottage cheese    · Meats and proteins:      ? Lean cuts of beef and pork (loin, leg, round), skinless chicken and turkey    ? Legumes, soy products, egg whites, or nuts    Limit or do not include the following in your heart healthy plan:   · Unhealthy fats and oils:      ? Whole or 2% milk, cream cheese, sour cream, or cheese    ? High-fat cuts of beef (T-bone steaks, ribs), chicken or turkey with skin, and organ meats such as liver    ? Butter, stick margarine, shortening, and cooking oils such as coconut or palm oil    · Foods and liquids high in sodium:      ? Packaged foods, such as frozen dinners, cookies, macaroni and cheese, and cereals with more than 300 mg of sodium per serving    ? Vegetables with added sodium, such as instant potatoes, vegetables with added sauces, or regular canned vegetables    ? Cured or smoked meats, such as hot dogs, piper, and sausage    ? High-sodium ketchup, barbecue sauce, salad dressing, pickles, olives, soy sauce, or miso    · Foods and liquids high in sugar:      ? Candy, cake, cookies, pies, or doughnuts    ? Soft drinks (soda), sports drinks, or sweetened tea    ? Canned or dry mixes for cakes, soups, sauces, or gravies    Other healthy heart guidelines:   · Do not smoke  Nicotine and other chemicals in cigarettes and cigars can cause lung and heart damage  Ask your healthcare provider for information if you currently smoke and need help to quit   E-cigarettes or smokeless tobacco still contain nicotine  Talk to your healthcare provider before you use these products  · Limit or do not drink alcohol as directed  Alcohol can damage your heart and raise your blood pressure  Your healthcare provider may give you specific daily and weekly limits  The general recommended limit is 1 drink a day for women 21 or older and for men 72 or older  Do not have more than 3 drinks in a day or 7 in a week  The recommended limit is 2 drinks a day for men 24to 59years of age  Do not have more than 4 drinks in a day or 14 in a week  A drink of alcohol is 12 ounces of beer, 5 ounces of wine, or 1½ ounces of liquor  · Exercise regularly  Exercise can help you maintain a healthy weight and improve your blood pressure and cholesterol levels  Regular exercise can also decrease your risk for heart problems  Ask your healthcare provider about the best exercise plan for you  Do not start an exercise program without asking your healthcare provider  Follow up with your doctor or cardiologist as directed:  Write down your questions so you remember to ask them during your visits  © Copyright [address] Information is for End User's use only and may not be sold, redistributed or otherwise used for commercial purposes  All illustrations and images included in CareNotes® are the copyrighted property of [employer] M [employer]  or [address] [place] [address]  The above information is an  only  It is not intended as medical advice for individual conditions or treatments  Talk to your doctor, nurse or pharmacist before following any medical regimen to see if it is safe and effective for you

## 2021-01-15 NOTE — ADDENDUM NOTE
Addended by: Jessica Santoyo on: 1/15/2021 09:50 AM     Modules accepted: Level of Service, SmartSet

## 2021-01-15 NOTE — ASSESSMENT & PLAN NOTE
More in CKD stage 4 now - urged BP and BS control and keep hydrated and avoid NSAIDs, con't f/u and labs as per Nephro  Lab Results   Component Value Date    HGBA1C 8 1 (H) 01/12/2021

## 2021-01-15 NOTE — ASSESSMENT & PLAN NOTE
Lab Results   Component Value Date    CREATININE 1 85 (H) 01/12/2021    CREATININE 1 87 (H) 12/15/2020    CREATININE 2 00 (H) 09/24/2020   BP and BS control and keeping hydrated and avoiding  NSAIDs encouraged, con't f/u and BW as per Nephro

## 2021-01-15 NOTE — PROGRESS NOTES
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 - uncontrolled with A1C 8 1 - no improvement in A1C despite increase in Glipizide - will increase further from 5 mg bid to 5 mg am and 10 mg pm, con't current Januvia, recheck BW in 3 mo - order given, UTD on foot exam (6/20) and eye exam (1/20- 2 yrs), on statin but no ACE/ARB    CKD stage 3 due to type 2 diabetes mellitus ([facility] [address] )  More in CKD stage 4 now - urged BP and BS control and keep hydrated and avoid NSAIDs, con't f/u and labs as per Nephro  Lab Results   Component Value Date    HGBA1C 8 1 (H) 01/12/2021       Chronic kidney disease  Lab Results   Component Value Date    CREATININE 1 85 (H) 01/12/2021    CREATININE 1 87 (H) 12/15/2020    CREATININE 2 00 (H) 09/24/2020   BP and BS control and keeping hydrated and avoiding  NSAIDs encouraged, con't f/u and BW as per Nephro    Hypertension  Bp better by end of appt, still above where I'd like to see with DM but given her age and CKD it is acceptable, con't current regimen, healthy diet/exercise/mild wgt loss encouraged    Premature ventricular contraction  No recent symptoms, on Bystolic and Nifedipine, call with any CV symptoms       Diagnoses and all orders for this visit:    Type 2 diabetes mellitus with stage 3 chronic kidney disease, without long-term current use of insulin, unspecified whether stage 3a or 3b CKD ([facility] [address] )  -     Hemoglobin A1C; Future  -     Basic metabolic panel; Future  -     Hemoglobin A1C  -     Basic metabolic panel    Stage 3 chronic kidney disease, unspecified whether stage 3a or 3b CKD  -     Hemoglobin A1C; Future  -     Basic metabolic panel;  Future  -     Hemoglobin A1C  -     Basic metabolic panel    Dyslipidemia    Essential hypertension  -     glipiZIDE (GLUCOTROL) 5 mg tablet; 1 tab PO q am with breakfast and 2 tab q pm with dinner    Premature ventricular contraction    Type 2 diabetes mellitus with stage 3 chronic kidney disease, without long-term current use of insulin ([facility])  -     glipiZIDE (GLUCOTROL) 5 mg tablet; 1 tab PO q am with breakfast and 2 tab q pm with dinner    CKD stage 3 due to type 2 diabetes mellitus ([facility] [address] )    Obesity (BMI 30 0-34  9)    BMI 31 0-31 9,adult  Comments:  Diet/exercise/wgt loss encouraged      Mammo 9/20    Dexa 12/19 - osteopenia      Subjective:      Patient ID: Anat Cool is a 80 y o  female  HPI Pt here for follow up appt and BW results    BW results were d/w pt in detail: FBS/A1c 199/8 1 , BUN/Cr 37/1 85 (GFR 24), rest of CMP was wnl, WBC ct 12 0 with increased abs lymphocytes but rest of CBC was wnl, FLP with low HDL at 43 but TC/TG/LDL at goal, TSH wnl  Def of controlled vs uncontrolled DM was reviewed  Diet was reviewed - wgt up 1 lb from Oct 2020  She is taking her DM meds as directed - last visit Glipizide was increased to 5 mg bid  She states she does not eat a lot of sweets and she has cut back on carbs  She is trying to exercise daily - walks or does stationary bike for approx 30 min  She checks her sugar twice daily - am fasting sugars average 150's and afternoon sugars after eating lunch 170's  She is UTD on foot exam (6/20) and eye exam (1/20 - 2 yrs)  She is on statin but no ARB/ACE d/t her CKD  She saw Dr Terrance Spangler for her CKD 3b/4 in Nov - OV note reviewed  She had no meds changed  She was encouraged to keep hydrated and to avoid NSAIDs  Goal FLP was d/w pt in detail  Diet/exercise reviewed as noted above  She is taking her Simvastatin daily as directed w/o Se  She notes no stroke/TIA symptoms/CP  BP above goal today on presentation and meds were reviewed and no changes have occurred  She denies missing doses of meds or SE with the meds    She does check her BP outside the office has had 2 readings in low 779'G systolic but her average BP home reading is 140's/70's  She notes no frequent HA's/dizziness/double vision/CP  Pt saw Brooke Olivia in Nov for f/u PVC's and HTN  OV note reviewed - her Bystolic was switched to qhs to space out BP meds d/t lower readings with weakness in afternoon  Switching the meds around has helped and she no longer feels weakness in the afternoon  Mammo 9/20    Dexa 12/19 - osteopenia      Review of Systems   Constitutional: Negative for chills, fatigue, fever and unexpected weight change  HENT: Negative for congestion and sore throat  Eyes: Negative for pain and visual disturbance  Respiratory: Negative for cough, shortness of breath and wheezing  Cardiovascular: Negative for chest pain, palpitations and leg swelling  Gastrointestinal: Negative for abdominal pain, blood in stool, constipation, diarrhea, nausea and vomiting  Endocrine: Negative for polydipsia and polyuria  Genitourinary: Negative for difficulty urinating and dysuria  Musculoskeletal: Negative for back pain and neck pain  Skin: Negative for rash and wound  Neurological: Negative for dizziness, weakness, light-headedness and headaches  Hematological: Negative for adenopathy  Psychiatric/Behavioral: Negative for behavioral problems and confusion  Objective:    /78   Pulse 70   Temp 97 5 °F (36 4 °C) (Temporal)   Ht 5' 2" (1 575 m)   Wt 77 8 kg (171 lb 9 6 oz)   BMI 31 39 kg/m²      Physical Exam  Vitals signs and nursing note reviewed  Constitutional:       General: She is not in acute distress  Appearance: She is well-developed  She is not ill-appearing  HENT:      Head: Normocephalic and atraumatic  Eyes:      General:         Right eye: No discharge  Left eye: No discharge  Conjunctiva/sclera: Conjunctivae normal    Neck:      Musculoskeletal: Neck supple  Trachea: No tracheal deviation     Cardiovascular:      Rate and Rhythm: Normal rate and regular rhythm  Heart sounds: Normal heart sounds  No murmur  No friction rub  Pulmonary:      Effort: Pulmonary effort is normal  No respiratory distress  Breath sounds: Normal breath sounds  No wheezing, rhonchi or rales  Abdominal:      General: There is no distension  Palpations: Abdomen is soft  Tenderness: There is no abdominal tenderness  There is no guarding or rebound  Musculoskeletal:      Right lower leg: No edema  Left lower leg: No edema  Skin:     General: Skin is warm  Coloration: Skin is not pale  Findings: No rash  Neurological:      Mental Status: She is alert  Motor: No abnormal muscle tone  Psychiatric:         Mood and Affect: Mood normal          Behavior: Behavior normal          Thought Content: Thought content normal          Judgment: Judgment normal          BMI Counseling: Body mass index is 31 39 kg/m²  The BMI is above normal  Nutrition recommendations include reducing portion sizes, 3-5 servings of fruits/vegetables daily, consuming healthier snacks, moderation in carbohydrate intake, increasing intake of lean protein and reducing intake of saturated fat and trans fat  Exercise recommendations include exercising 3-5 times per week

## 2021-01-15 NOTE — ASSESSMENT & PLAN NOTE
Bp better by end of appt, still above where I'd like to see with DM but given her age and CKD it is acceptable, con't current regimen, healthy diet/exercise/mild wgt loss encouraged

## 2021-01-28 ENCOUNTER — IMMUNIZATIONS (OUTPATIENT)
Dept: FAMILY MEDICINE CLINIC | Facility: HOSPITAL | Age: 86
End: 2021-01-28

## 2021-01-28 DIAGNOSIS — Z23 ENCOUNTER FOR IMMUNIZATION: Primary | ICD-10-CM

## 2021-01-28 PROCEDURE — 0011A SARS-COV-2 / COVID-19 MRNA VACCINE (MODERNA) 100 MCG: CPT

## 2021-01-28 PROCEDURE — 91301 SARS-COV-2 / COVID-19 MRNA VACCINE (MODERNA) 100 MCG: CPT

## 2021-02-11 ENCOUNTER — TELEPHONE (OUTPATIENT)
Dept: NEPHROLOGY | Facility: CLINIC | Age: 86
End: 2021-02-11

## 2021-02-11 NOTE — TELEPHONE ENCOUNTER
Called patient and left message to schedule 4 month follow up with Dr Zeeshan Figueroa  Please schedule from recall

## 2021-02-12 DIAGNOSIS — N18.30 TYPE 2 DIABETES MELLITUS WITH STAGE 3 CHRONIC KIDNEY DISEASE, WITHOUT LONG-TERM CURRENT USE OF INSULIN (HCC): ICD-10-CM

## 2021-02-12 DIAGNOSIS — E11.22 TYPE 2 DIABETES MELLITUS WITH STAGE 3 CHRONIC KIDNEY DISEASE, WITHOUT LONG-TERM CURRENT USE OF INSULIN (HCC): ICD-10-CM

## 2021-02-12 DIAGNOSIS — I10 ESSENTIAL HYPERTENSION: ICD-10-CM

## 2021-02-12 RX ORDER — GLIPIZIDE 5 MG/1
TABLET ORAL
Qty: 270 TABLET | Refills: 1 | Status: SHIPPED | OUTPATIENT
Start: 2021-02-12 | End: 2021-08-17 | Stop reason: SDUPTHER

## 2021-02-12 NOTE — TELEPHONE ENCOUNTER
Called patient and left a message to schedule a 4 month follow up with Dr Lurdes Vaca  Due to this being the second attempt, a letter will be mailed out

## 2021-02-24 DIAGNOSIS — N18.30 TYPE 2 DIABETES MELLITUS WITH STAGE 3 CHRONIC KIDNEY DISEASE, WITHOUT LONG-TERM CURRENT USE OF INSULIN (HCC): ICD-10-CM

## 2021-02-24 DIAGNOSIS — E11.22 TYPE 2 DIABETES MELLITUS WITH STAGE 3 CHRONIC KIDNEY DISEASE, WITHOUT LONG-TERM CURRENT USE OF INSULIN (HCC): ICD-10-CM

## 2021-02-24 RX ORDER — BLOOD SUGAR DIAGNOSTIC
STRIP MISCELLANEOUS
Qty: 100 EACH | Refills: 7 | Status: SHIPPED | OUTPATIENT
Start: 2021-02-24

## 2021-02-26 ENCOUNTER — IMMUNIZATIONS (OUTPATIENT)
Dept: FAMILY MEDICINE CLINIC | Facility: HOSPITAL | Age: 86
End: 2021-02-26

## 2021-02-26 DIAGNOSIS — Z23 ENCOUNTER FOR IMMUNIZATION: Primary | ICD-10-CM

## 2021-02-26 PROCEDURE — 91301 SARS-COV-2 / COVID-19 MRNA VACCINE (MODERNA) 100 MCG: CPT

## 2021-02-26 PROCEDURE — 0012A SARS-COV-2 / COVID-19 MRNA VACCINE (MODERNA) 100 MCG: CPT

## 2021-03-10 LAB
BUN SERPL-MCNC: 40 MG/DL (ref 8–27)
BUN/CREAT SERPL: 19 (ref 12–28)
CALCIUM SERPL-MCNC: 9.4 MG/DL (ref 8.7–10.3)
CHLORIDE SERPL-SCNC: 104 MMOL/L (ref 96–106)
CO2 SERPL-SCNC: 27 MMOL/L (ref 20–29)
CREAT SERPL-MCNC: 2.09 MG/DL (ref 0.57–1)
GLUCOSE SERPL-MCNC: 200 MG/DL (ref 65–99)
POTASSIUM SERPL-SCNC: 4.4 MMOL/L (ref 3.5–5.2)
SL AMB EGFR AFRICAN AMERICAN: 24 ML/MIN/1.73
SL AMB EGFR NON AFRICAN AMERICAN: 21 ML/MIN/1.73
SODIUM SERPL-SCNC: 144 MMOL/L (ref 134–144)

## 2021-03-12 ENCOUNTER — OFFICE VISIT (OUTPATIENT)
Dept: NEPHROLOGY | Facility: HOSPITAL | Age: 86
End: 2021-03-12
Payer: COMMERCIAL

## 2021-03-12 VITALS
DIASTOLIC BLOOD PRESSURE: 68 MMHG | HEIGHT: 62 IN | HEART RATE: 68 BPM | WEIGHT: 177 LBS | BODY MASS INDEX: 32.57 KG/M2 | RESPIRATION RATE: 16 BRPM | SYSTOLIC BLOOD PRESSURE: 130 MMHG | TEMPERATURE: 97.7 F

## 2021-03-12 DIAGNOSIS — N18.32 TYPE 2 DIABETES MELLITUS WITH STAGE 3B CHRONIC KIDNEY DISEASE, WITHOUT LONG-TERM CURRENT USE OF INSULIN (HCC): Primary | ICD-10-CM

## 2021-03-12 DIAGNOSIS — E87.6 HYPOKALEMIA: ICD-10-CM

## 2021-03-12 DIAGNOSIS — E83.52 HYPERCALCEMIA: ICD-10-CM

## 2021-03-12 DIAGNOSIS — I10 ESSENTIAL HYPERTENSION: ICD-10-CM

## 2021-03-12 DIAGNOSIS — N28.1 COMPLEX RENAL CYST: ICD-10-CM

## 2021-03-12 DIAGNOSIS — D64.9 NORMOCYTIC ANEMIA: ICD-10-CM

## 2021-03-12 DIAGNOSIS — E11.22 TYPE 2 DIABETES MELLITUS WITH STAGE 3B CHRONIC KIDNEY DISEASE, WITHOUT LONG-TERM CURRENT USE OF INSULIN (HCC): Primary | ICD-10-CM

## 2021-03-12 PROCEDURE — 3078F DIAST BP <80 MM HG: CPT | Performed by: INTERNAL MEDICINE

## 2021-03-12 PROCEDURE — 1160F RVW MEDS BY RX/DR IN RCRD: CPT | Performed by: INTERNAL MEDICINE

## 2021-03-12 PROCEDURE — 3075F SYST BP GE 130 - 139MM HG: CPT | Performed by: INTERNAL MEDICINE

## 2021-03-12 PROCEDURE — 99214 OFFICE O/P EST MOD 30 MIN: CPT | Performed by: INTERNAL MEDICINE

## 2021-03-12 PROCEDURE — 1036F TOBACCO NON-USER: CPT | Performed by: INTERNAL MEDICINE

## 2021-03-12 NOTE — PROGRESS NOTES
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 of Diabetes mellitus type 2 as well as hypertensive nephrosclerosis +/- physiologic aging of the kidney   -last sCr 2 09 as of 3/9/21  Has been stable in the 1 6-1 8 range since July 2018    -monitor BMP  -avoid nonsteroidals (ibuprofen, advil, motrin, aleve, naproxen, indomethacin, celebrex, toradol)  -may take tylenol  -please stay well hydrated       2  Microalbuminuria in setting of type 2 DM - UpCr 207mg/g as of Dec  2020  -off lisinopril  -microalbumin in the urine can also be seen with physiologic aging of the kidney     3  HTN - BP acceptable for age  Goal blood pressure 130-140s/60-70s for renal perfusion    -continue clonidine 0 1mg weekly patch, bystolic 13KX after dinner, nifedipine 90mg daily, torsemide 20mg daily  -off lisinopril   -off HCTZ 25mg daily  -monitor BP at home as your are  Call office with readings if BP running too low or too high       4  DM2, uncontrolled - on januvia, glipizide, last A1C 8 1, needs improved sugar control to slow down progression of CKD      5  Hypokalemia - off HCTZ, K 4 4, on potassium gluconate supplement, 2 tablets a day     6  Dependent edema - albumin normal  Off HCTZ  Now on torsemide 20mg daily  Echo normal from Aug  2019  -monitor daily weight  Call office if weight gain > 3-5 lbs       7  Complex cyst, left kidney - stable in size as of July 2020 ultrasound     8  Hypercalcemia - ana 9 7 on latest bloodwork  Resolved off calcium supplements  May continue 2000u vitamin D supplements  Monitor ana/phos levels  FLC 1 85 - acceptable ratio in light of CKD  SPEP negative  Possible band in UPEP  Monitor this      9  High normal sodium - sNa 144   Recommend increased hydration     RTC in 4 months  Obtain blood and urine testing in 3 months  Rendall Eisenmenger was seen today for chronic kidney disease and follow-up  Diagnoses and all orders for this visit:    Type 2 diabetes mellitus with stage 3b chronic kidney disease, without long-term current use of insulin (HCC)  -     Urinalysis with microscopic; Future  -     Protein / creatinine ratio, urine; Future  -     Magnesium; Future  -     Comprehensive metabolic panel; Future  -     Urinalysis with microscopic  -     Protein / creatinine ratio, urine  -     Magnesium  -     Comprehensive metabolic panel    Essential hypertension    Complex renal cyst    Hypokalemia  -     Comprehensive metabolic panel; Future  -     Comprehensive metabolic panel    Normocytic anemia    Hypercalcemia  -     Comprehensive metabolic panel; Future  -     Comprehensive metabolic panel        Assessment/Plan:  1  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 of Diabetes mellitus type 2 as well as hypertensive nephrosclerosis +/- physiologic aging of the kidney   -last sCr 2 09 as of 3/9/21  Has been stable in the 1 6-1 8 range since July 2018    -monitor BMP  -avoid nonsteroidals (ibuprofen, advil, motrin, aleve, naproxen, indomethacin, celebrex, toradol)  -may take tylenol  -please stay well hydrated       2  Microalbuminuria in setting of type 2 DM - UpCr 207mg/g as of Dec  2020  -off lisinopril  -microalbumin in the urine can also be seen with physiologic aging of the kidney     3  HTN - BP acceptable for age  Goal blood pressure 130-140s/60-70s for renal perfusion    -continue clonidine 0 1mg weekly patch, bystolic 16FC after dinner, nifedipine 90mg daily, torsemide 20mg daily  -off lisinopril   -off HCTZ 25mg daily  -monitor BP at home as your are  Call office with readings if BP running too low or too high       4  DM2, uncontrolled - on januvia, glipizide, last A1C 8 1, needs improved sugar control to slow down progression of CKD      5   Hypokalemia - off HCTZ, K 4 4, on potassium gluconate supplement, 2 tablets a day     6  Dependent edema - albumin normal  Off HCTZ  Now on torsemide 20mg daily  Echo normal from Aug  2019  -monitor daily weight  Call office if weight gain > 3-5 lbs       7  Complex cyst, left kidney - stable in size as of July 2020 ultrasound     8  Hypercalcemia - ana 9 7 on latest bloodwork  Resolved off calcium supplements  May continue 2000u vitamin D supplements  Monitor ana/phos levels  FLC 1 85 - acceptable ratio in light of CKD  SPEP negative  Possible band in UPEP  Monitor this      9  High normal sodium - sNa 144  Recommend increased hydration     RTC in 4 months  Obtain blood and urine testing in 3 months  SUBJECTIVE / INTERVAL HISTORY:  80 y o  female presents in follow up of CKD  Anat Cool denies any recent illness/hospitalizations/medication changes since last office visit  Has received COVID vaccination series  Denies NSAID use  HTN - BP has been well controlled  DM2 - blood sugars lower in the afternoons  Has occasional LE edema  Resolves with leg elevation  Her bystolic was change to night time due to big dips in BP in the afternoon  Review of Systems   Constitutional: Negative for chills and fever  HENT: Negative for sore throat and trouble swallowing  Eyes: Negative for visual disturbance  Respiratory: Negative for cough and shortness of breath  Cardiovascular: Negative for chest pain and leg swelling  Gastrointestinal: Negative for abdominal pain, constipation, diarrhea, nausea and vomiting  Endocrine: Negative for polyuria  Genitourinary: Negative for difficulty urinating, dysuria and hematuria  Musculoskeletal: Positive for back pain (lower back, uses tylenol)  Negative for neck pain  Skin: Negative for rash  Neurological: Negative for dizziness, light-headedness and numbness  Psychiatric/Behavioral: The patient is not nervous/anxious          OBJECTIVE:  /68 (BP Location: Left arm, Patient Position: Sitting, Cuff Size: Standard)   Pulse 68   Temp 97 7 °F (36 5 °C) (Temporal)   Resp 16   Ht 5' 2" (1 575 m)   Wt 80 3 kg (177 lb)   BMI 32 37 kg/m²  Body mass index is 32 37 kg/m²  Physical exam:  Physical Exam  Vitals signs and nursing note reviewed  Constitutional:       General: She is not in acute distress  Appearance: She is well-developed  She is not diaphoretic  HENT:      Head: Normocephalic and atraumatic  Nose: Nose normal       Mouth/Throat:      Mouth: Mucous membranes are dry  Pharynx: No oropharyngeal exudate  Eyes:      General: No scleral icterus  Right eye: No discharge  Left eye: No discharge  Neck:      Musculoskeletal: Normal range of motion and neck supple  Thyroid: No thyromegaly  Cardiovascular:      Rate and Rhythm: Normal rate and regular rhythm  Heart sounds: No murmur  Pulmonary:      Effort: Pulmonary effort is normal  No respiratory distress  Breath sounds: Normal breath sounds  No wheezing  Abdominal:      General: Bowel sounds are normal  There is no distension  Palpations: Abdomen is soft  Musculoskeletal: Normal range of motion  General: Swelling (b/l LE edema) present  Skin:     General: Skin is warm and dry  Findings: No rash  Neurological:      General: No focal deficit present  Mental Status: She is alert  Motor: No abnormal muscle tone        Comments: awake   Psychiatric:         Mood and Affect: Mood normal          Behavior: Behavior normal          Medications:    Current Outpatient Medications:     ACCU-CHEK PAUL PLUS test strip, , Disp: , Rfl:     Accu-Chek Softclix Lancets lancets, , Disp: , Rfl:     Accu-Chek Softclix Lancets lancets, USE 2 TIMES DAILY AS DIRECTED, Disp: 100 each, Rfl: 3    aspirin 81 MG tablet, Take 1 tablet by mouth daily, Disp: , Rfl:     Bystolic 20 MG tablet, TAKE 1 TABLET BY MOUTH EVERY DAY, Disp: 90 tablet, Rfl: 1    Cetirizine HCl (ZYRTEC ALLERGY) 10 MG CAPS, Take 1 tablet by mouth daily as needed, Disp: , Rfl:     cholecalciferol (VITAMIN D3) 1,000 units tablet, Take 2 tablets by mouth daily, Disp: , Rfl:     cloNIDine (CATAPRES-TTS-1) 0 1 mg/24 hr, PLACE 1 PATCH (0 1 MG TOTAL) ON THE SKIN ONCE A WEEK, Disp: 12 patch, Rfl: 1    famotidine (PEPCID) 20 mg tablet, TAKE 1 TABLET BY MOUTH EVERY DAY, Disp: 90 tablet, Rfl: 1    ferrous sulfate 324 (65 Fe) mg, TAKE 1 TABLET BY MOUTH DAILY BEFORE BREAKFAST, Disp: 90 tablet, Rfl: 1    glipiZIDE (GLUCOTROL) 5 mg tablet, TAKE 1 TABLET BY MOUTH IN THE MORNING THEN 2 TABLETS IN THE EVENING WITH DINNER, Disp: 270 tablet, Rfl: 1    glucose blood (Accu-Chek Emma Plus) test strip, USE AS INSTRUCTED TWICE DAILY, Disp: 100 each, Rfl: 7    Januvia 50 MG tablet, TAKE 1 TABLET BY MOUTH EVERY DAY, Disp: 30 tablet, Rfl: 5    NIFEdipine (ADALAT CC) 90 mg 24 hr tablet, TAKE 1 TABLET BY MOUTH EVERY DAY, Disp: 30 tablet, Rfl: 4    Potassium Gluconate 595 (99 K) MG TABS, Take 1 tablet by mouth 2 (two) times a day , Disp: , Rfl:     sertraline (ZOLOFT) 25 mg tablet, TAKE 1 TABLET BY MOUTH EVERY DAY, Disp: 90 tablet, Rfl: 1    simvastatin (ZOCOR) 20 mg tablet, Take 1 tablet (20 mg total) by mouth daily at bedtime, Disp: 90 tablet, Rfl: 1    torsemide (DEMADEX) 20 mg tablet, TAKE 1 TABLET BY MOUTH EVERY DAY, Disp: 90 tablet, Rfl: 1    Allergies:   Allergies as of 03/12/2021    (No Known Allergies)       The following portions of the patient's history were reviewed and updated as appropriate: past family history, past surgical history and problem list     Laboratory Results:  Lab Results   Component Value Date    SODIUM 144 03/09/2021    K 4 4 03/09/2021     03/09/2021    CO2 27 03/09/2021    BUN 40 (H) 03/09/2021    CREATININE 2 09 (H) 03/09/2021    GLUC 200 (H) 03/09/2021    CALCIUM 9 7 07/10/2020        Lab Results   Component Value Date    CALCIUM 9 7 07/10/2020       Portions of the record may have been created with voice recognition software   Occasional wrong word or "sound a like" substitutions may have occurred due to the inherent limitations of voice recognition software   Read the chart carefully and recognize, using context, where substitutions have occurred

## 2021-03-12 NOTE — PATIENT INSTRUCTIONS
1  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 of Diabetes mellitus type 2 as well as hypertensive nephrosclerosis +/- physiologic aging of the kidney   -last sCr 2 09 as of 3/9/21  Has been stable in the 1 6-1 8 range since July 2018    -monitor BMP  -avoid nonsteroidals (ibuprofen, advil, motrin, aleve, naproxen, indomethacin, celebrex, toradol)  -may take tylenol  -please stay well hydrated       2  Microalbuminuria in setting of type 2 DM - UpCr 207mg/g as of Dec  2020  -off lisinopril  -microalbumin in the urine can also be seen with physiologic aging of the kidney     3  HTN - BP acceptable for age  Goal blood pressure 130-140s/60-70s for renal perfusion    -continue clonidine 0 1mg weekly patch, bystolic 80MC after dinner, nifedipine 90mg daily, torsemide 20mg daily  -off lisinopril   -off HCTZ 25mg daily  -monitor BP at home as your are  Call office with readings if BP running too low or too high       4  DM2, uncontrolled - on januvia, glipizide, last A1C 8 1, needs improved sugar control to slow down progression of CKD      5  Hypokalemia - off HCTZ, K 4 4, on potassium gluconate supplement, 2 tablets a day     6  Dependent edema - albumin normal  Off HCTZ  Now on torsemide 20mg daily  Echo normal from Aug  2019  -monitor daily weight  Call office if weight gain > 3-5 lbs       7  Complex cyst, left kidney - stable in size as of July 2020 ultrasound     8  Hypercalcemia - ana 9 7 on latest bloodwork  Resolved off calcium supplements  May continue 2000u vitamin D supplements  Monitor ana/phos levels  FLC 1 85 - acceptable ratio in light of CKD  SPEP negative  Possible band in UPEP  Monitor this      9  High normal sodium - sNa 144  Recommend increased hydration     RTC in 4 months  Obtain blood and urine testing in 3 months

## 2021-03-17 DIAGNOSIS — I10 ESSENTIAL HYPERTENSION: ICD-10-CM

## 2021-03-17 RX ORDER — NIFEDIPINE 90 MG/1
TABLET, FILM COATED, EXTENDED RELEASE ORAL
Qty: 30 TABLET | Refills: 4 | Status: SHIPPED | OUTPATIENT
Start: 2021-03-17 | End: 2021-08-17 | Stop reason: SDUPTHER

## 2021-03-28 DIAGNOSIS — I10 ESSENTIAL HYPERTENSION: ICD-10-CM

## 2021-03-29 RX ORDER — NEBIVOLOL HYDROCHLORIDE 20 MG/1
TABLET ORAL
Qty: 90 TABLET | Refills: 1 | Status: SHIPPED | OUTPATIENT
Start: 2021-03-29 | End: 2021-10-04

## 2021-04-16 LAB
BUN SERPL-MCNC: 28 MG/DL (ref 8–27)
BUN/CREAT SERPL: 15 (ref 12–28)
CALCIUM SERPL-MCNC: 9.4 MG/DL (ref 8.7–10.3)
CHLORIDE SERPL-SCNC: 103 MMOL/L (ref 96–106)
CO2 SERPL-SCNC: 27 MMOL/L (ref 20–29)
CREAT SERPL-MCNC: 1.82 MG/DL (ref 0.57–1)
EST. AVERAGE GLUCOSE BLD GHB EST-MCNC: 183 MG/DL
GLUCOSE SERPL-MCNC: 202 MG/DL (ref 65–99)
HBA1C MFR BLD: 8 % (ref 4.8–5.6)
POTASSIUM SERPL-SCNC: 4.2 MMOL/L (ref 3.5–5.2)
SL AMB EGFR AFRICAN AMERICAN: 29 ML/MIN/1.73
SL AMB EGFR NON AFRICAN AMERICAN: 25 ML/MIN/1.73
SODIUM SERPL-SCNC: 144 MMOL/L (ref 134–144)

## 2021-04-29 DIAGNOSIS — I10 ESSENTIAL HYPERTENSION: ICD-10-CM

## 2021-04-29 RX ORDER — CLONIDINE 0.1 MG/24H
1 PATCH, EXTENDED RELEASE TRANSDERMAL WEEKLY
Qty: 12 PATCH | Refills: 1 | Status: SHIPPED | OUTPATIENT
Start: 2021-04-29 | End: 2021-10-04

## 2021-05-01 DIAGNOSIS — E78.5 DYSLIPIDEMIA: ICD-10-CM

## 2021-05-03 RX ORDER — SIMVASTATIN 20 MG
TABLET ORAL
Qty: 90 TABLET | Refills: 1 | Status: SHIPPED | OUTPATIENT
Start: 2021-05-03 | End: 2021-11-17

## 2021-05-09 DIAGNOSIS — R79.0 LOW FERRITIN: ICD-10-CM

## 2021-05-09 RX ORDER — FERROUS SULFATE TAB EC 324 MG (65 MG FE EQUIVALENT) 324 (65 FE) MG
TABLET DELAYED RESPONSE ORAL
Qty: 90 TABLET | Refills: 1 | Status: SHIPPED | OUTPATIENT
Start: 2021-05-09 | End: 2021-11-06

## 2021-05-13 ENCOUNTER — OFFICE VISIT (OUTPATIENT)
Dept: FAMILY MEDICINE CLINIC | Facility: HOSPITAL | Age: 86
End: 2021-05-13
Payer: COMMERCIAL

## 2021-05-13 VITALS
BODY MASS INDEX: 31.28 KG/M2 | WEIGHT: 170 LBS | HEART RATE: 78 BPM | HEIGHT: 62 IN | TEMPERATURE: 97.9 F | DIASTOLIC BLOOD PRESSURE: 64 MMHG | SYSTOLIC BLOOD PRESSURE: 138 MMHG

## 2021-05-13 DIAGNOSIS — E11.22 TYPE 2 DIABETES MELLITUS WITH STAGE 3 CHRONIC KIDNEY DISEASE, WITHOUT LONG-TERM CURRENT USE OF INSULIN (HCC): ICD-10-CM

## 2021-05-13 DIAGNOSIS — I10 ESSENTIAL HYPERTENSION: ICD-10-CM

## 2021-05-13 DIAGNOSIS — N18.30 CKD STAGE 3 DUE TO TYPE 2 DIABETES MELLITUS (HCC): ICD-10-CM

## 2021-05-13 DIAGNOSIS — E11.22 TYPE 2 DIABETES MELLITUS WITH STAGE 3B CHRONIC KIDNEY DISEASE, WITHOUT LONG-TERM CURRENT USE OF INSULIN (HCC): Primary | ICD-10-CM

## 2021-05-13 DIAGNOSIS — Z00.00 MEDICARE ANNUAL WELLNESS VISIT, SUBSEQUENT: ICD-10-CM

## 2021-05-13 DIAGNOSIS — N18.4 CHRONIC RENAL DISEASE, STAGE IV (HCC): ICD-10-CM

## 2021-05-13 DIAGNOSIS — E11.22 CKD STAGE 3 DUE TO TYPE 2 DIABETES MELLITUS (HCC): ICD-10-CM

## 2021-05-13 DIAGNOSIS — R29.898 WEAKNESS OF BOTH LEGS: ICD-10-CM

## 2021-05-13 DIAGNOSIS — N18.30 TYPE 2 DIABETES MELLITUS WITH STAGE 3 CHRONIC KIDNEY DISEASE, WITHOUT LONG-TERM CURRENT USE OF INSULIN (HCC): ICD-10-CM

## 2021-05-13 DIAGNOSIS — N18.32 TYPE 2 DIABETES MELLITUS WITH STAGE 3B CHRONIC KIDNEY DISEASE, WITHOUT LONG-TERM CURRENT USE OF INSULIN (HCC): Primary | ICD-10-CM

## 2021-05-13 PROCEDURE — 1125F AMNT PAIN NOTED PAIN PRSNT: CPT | Performed by: INTERNAL MEDICINE

## 2021-05-13 PROCEDURE — 1170F FXNL STATUS ASSESSED: CPT | Performed by: INTERNAL MEDICINE

## 2021-05-13 PROCEDURE — 1101F PT FALLS ASSESS-DOCD LE1/YR: CPT | Performed by: INTERNAL MEDICINE

## 2021-05-13 PROCEDURE — 1160F RVW MEDS BY RX/DR IN RCRD: CPT | Performed by: INTERNAL MEDICINE

## 2021-05-13 PROCEDURE — 3725F SCREEN DEPRESSION PERFORMED: CPT | Performed by: INTERNAL MEDICINE

## 2021-05-13 PROCEDURE — 3288F FALL RISK ASSESSMENT DOCD: CPT | Performed by: INTERNAL MEDICINE

## 2021-05-13 PROCEDURE — 3075F SYST BP GE 130 - 139MM HG: CPT | Performed by: INTERNAL MEDICINE

## 2021-05-13 PROCEDURE — 99214 OFFICE O/P EST MOD 30 MIN: CPT | Performed by: INTERNAL MEDICINE

## 2021-05-13 PROCEDURE — 1036F TOBACCO NON-USER: CPT | Performed by: INTERNAL MEDICINE

## 2021-05-13 PROCEDURE — G0439 PPPS, SUBSEQ VISIT: HCPCS | Performed by: INTERNAL MEDICINE

## 2021-05-13 PROCEDURE — 3078F DIAST BP <80 MM HG: CPT | Performed by: INTERNAL MEDICINE

## 2021-05-13 NOTE — ASSESSMENT & PLAN NOTE
Lab Results   Component Value Date    HGBA1C 8 0 (H) 04/16/2021   DM type 2 with hyperglycemia and nephropathy/CKD stage 3b/4 - uncontrolled with A1C 8 0 - will not increase Glipizide any further d/t some LE edema, not able to take Metformin d/t CKD, on max Januvia for her renal function, could try Trulicity or Victoza or may need insulin if A1C con't to go up, recheck BW in 3 mo - order given, foot exam done today, UTD on eye exam 1/20- 2 yrs, on statin but no ACE/ARB d/t CKD

## 2021-05-13 NOTE — PATIENT INSTRUCTIONS
Medicare Preventive Visit Patient Instructions  Thank you for completing your Welcome to Medicare Visit or Medicare Annual Wellness Visit today  Your next wellness visit will be due in one year (5/14/2022)  The screening/preventive services that you may require over the next 5-10 years are detailed below  Some tests may not apply to you based off risk factors and/or age  Screening tests ordered at today's visit but not completed yet may show as past due  Also, please note that scanned in results may not display below  Preventive Screenings:  Service Recommendations Previous Testing/Comments   Colorectal Cancer Screening  * Colonoscopy    * Fecal Occult Blood Test (FOBT)/Fecal Immunochemical Test (FIT)  * Fecal DNA/Cologuard Test  * Flexible Sigmoidoscopy Age: 54-65 years old   Colonoscopy: every 10 years (may be performed more frequently if at higher risk)  OR  FOBT/FIT: every 1 year  OR  Cologuard: every 3 years  OR  Sigmoidoscopy: every 5 years  Screening may be recommended earlier than age 48 if at higher risk for colorectal cancer  Also, an individualized decision between you and your healthcare provider will decide whether screening between the ages of 74-80 would be appropriate  Colonoscopy: Not on file  FOBT/FIT: 06/30/2019  Cologuard: Not on file  Sigmoidoscopy: Not on file    Screening Not Indicated     Breast Cancer Screening Age: 36 years old  Frequency: every 1-2 years  Not required if history of left and right mastectomy Mammogram: 09/29/2020    Screening Current   Cervical Cancer Screening Between the ages of 21-29, pap smear recommended once every 3 years  Between the ages of 33-67, can perform pap smear with HPV co-testing every 5 years     Recommendations may differ for women with a history of total hysterectomy, cervical cancer, or abnormal pap smears in past  Pap Smear: Not on file    Screening Not Indicated   Hepatitis C Screening Once for adults born between 1945 and 1965  More frequently in patients at high risk for Hepatitis C Hep C Antibody: Not on file        Diabetes Screening 1-2 times per year if you're at risk for diabetes or have pre-diabetes Fasting glucose: No results in last 5 years   A1C: 8 0 %    Screening Not Indicated  History Diabetes   Cholesterol Screening Once every 5 years if you don't have a lipid disorder  May order more often based on risk factors  Lipid panel: 01/12/2021    Screening Current     Other Preventive Screenings Covered by Medicare:  1  Abdominal Aortic Aneurysm (AAA) Screening: covered once if your at risk  You're considered to be at risk if you have a family history of AAA  2  Lung Cancer Screening: covers low dose CT scan once per year if you meet all of the following conditions: (1) Age 50-69; (2) No signs or symptoms of lung cancer; (3) Current smoker or have quit smoking within the last 15 years; (4) You have a tobacco smoking history of at least 30 pack years (packs per day multiplied by number of years you smoked); (5) You get a written order from a healthcare provider  3  Glaucoma Screening: covered annually if you're considered high risk: (1) You have diabetes OR (2) Family history of glaucoma OR (3)  aged 48 and older OR (3)  American aged 72 and older  3  Osteoporosis Screening: covered every 2 years if you meet one of the following conditions: (1) You're estrogen deficient and at risk for osteoporosis based off medical history and other findings; (2) Have a vertebral abnormality; (3) On glucocorticoid therapy for more than 3 months; (4) Have primary hyperparathyroidism; (5) On osteoporosis medications and need to assess response to drug therapy  · Last bone density test (DXA Scan): 12/10/2019   5  HIV Screening: covered annually if you're between the age of 15-65  Also covered annually if you are younger than 13 and older than 72 with risk factors for HIV infection   For pregnant patients, it is covered up to 3 times per pregnancy  Immunizations:  Immunization Recommendations   Influenza Vaccine Annual influenza vaccination during flu season is recommended for all persons aged >= 6 months who do not have contraindications   Pneumococcal Vaccine (Prevnar and Pneumovax)  * Prevnar = PCV13  * Pneumovax = PPSV23   Adults 25-60 years old: 1-3 doses may be recommended based on certain risk factors  Adults 72 years old: Prevnar (PCV13) vaccine recommended followed by Pneumovax (PPSV23) vaccine  If already received PPSV23 since turning 65, then PCV13 recommended at least one year after PPSV23 dose  Hepatitis B Vaccine 3 dose series if at intermediate or high risk (ex: diabetes, end stage renal disease, liver disease)   Tetanus (Td) Vaccine - COST NOT COVERED BY MEDICARE PART B Following completion of primary series, a booster dose should be given every 10 years to maintain immunity against tetanus  Td may also be given as tetanus wound prophylaxis  Tdap Vaccine - COST NOT COVERED BY MEDICARE PART B Recommended at least once for all adults  For pregnant patients, recommended with each pregnancy  Shingles Vaccine (Shingrix) - COST NOT COVERED BY MEDICARE PART B  2 shot series recommended in those aged 48 and above     Health Maintenance Due:  There are no preventive care reminders to display for this patient  Immunizations Due:      Topic Date Due    DTaP,Tdap,and Td Vaccines (1 - Tdap) 09/26/1956     Advance Directives   What are advance directives? Advance directives are legal documents that state your wishes and plans for medical care  These plans are made ahead of time in case you lose your ability to make decisions for yourself  Advance directives can apply to any medical decision, such as the treatments you want, and if you want to donate organs  What are the types of advance directives? There are many types of advance directives, and each state has rules about how to use them   You may choose a combination of any of the following:  · Living will: This is a written record of the treatment you want  You can also choose which treatments you do not want, which to limit, and which to stop at a certain time  This includes surgery, medicine, IV fluid, and tube feedings  · Durable power of  for healthcare [facility]): This is a written record that states who you want to make healthcare choices for you when you are unable to make them for yourself  This person, called a proxy, is usually a family member or a friend  You may choose more than 1 proxy  · Do not resuscitate (DNR) order:  A DNR order is used in case your heart stops beating or you stop breathing  It is a request not to have certain forms of treatment, such as CPR  A DNR order may be included in other types of advance directives  · Medical directive: This covers the care that you want if you are in a coma, near death, or unable to make decisions for yourself  You can list the treatments you want for each condition  Treatment may include pain medicine, surgery, blood transfusions, dialysis, IV or tube feedings, and a ventilator (breathing machine)  · Values history: This document has questions about your views, beliefs, and how you feel and think about life  This information can help others choose the care that you would choose  Why are advance directives important? An advance directive helps you control your care  Although spoken wishes may be used, it is better to have your wishes written down  Spoken wishes can be misunderstood, or not followed  Treatments may be given even if you do not want them  An advance directive may make it easier for your family to make difficult choices about your care  Weight Management   Why it is important to manage your weight:  Being overweight increases your risk of health conditions such as heart disease, high blood pressure, type 2 diabetes, and certain types of cancer   It can also increase your risk for osteoarthritis, sleep apnea, and other respiratory problems  Aim for a slow, steady weight loss  Even a small amount of weight loss can lower your risk of health problems  How to lose weight safely:  A safe and healthy way to lose weight is to eat fewer calories and get regular exercise  You can lose up about 1 pound a week by decreasing the number of calories you eat by 500 calories each day  Healthy meal plan for weight management:  A healthy meal plan includes a variety of foods, contains fewer calories, and helps you stay healthy  A healthy meal plan includes the following:  · Eat whole-grain foods more often  A healthy meal plan should contain fiber  Fiber is the part of grains, fruits, and vegetables that is not broken down by your body  Whole-grain foods are healthy and provide extra fiber in your diet  Some examples of whole-grain foods are whole-wheat breads and pastas, oatmeal, brown rice, and bulgur  · Eat a variety of vegetables every day  Include dark, leafy greens such as spinach, kale, amrit greens, and mustard greens  Eat yellow and orange vegetables such as carrots, sweet potatoes, and winter squash  · Eat a variety of fruits every day  Choose fresh or canned fruit (canned in its own juice or light syrup) instead of juice  Fruit juice has very little or no fiber  · Eat low-fat dairy foods  Drink fat-free (skim) milk or 1% milk  Eat fat-free yogurt and low-fat cottage cheese  Try low-fat cheeses such as mozzarella and other reduced-fat cheeses  · Choose meat and other protein foods that are low in fat  Choose beans or other legumes such as split peas or lentils  Choose fish, skinless poultry (chicken or turkey), or lean cuts of red meat (beef or pork)  Before you cook meat or poultry, cut off any visible fat  · Use less fat and oil  Try baking foods instead of frying them  Add less fat, such as margarine, sour cream, regular salad dressing and mayonnaise to foods  Eat fewer high-fat foods   Some examples of high-fat foods include french fries, doughnuts, ice cream, and cakes  · Eat fewer sweets  Limit foods and drinks that are high in sugar  This includes candy, cookies, regular soda, and sweetened drinks  Exercise:  Exercise at least 30 minutes per day on most days of the week  Some examples of exercise include walking, biking, dancing, and swimming  You can also fit in more physical activity by taking the stairs instead of the elevator or parking farther away from stores  Ask your healthcare provider about the best exercise plan for you  © Copyright [employer] 2018 Information is for End User's use only and may not be sold, redistributed or otherwise used for commercial purposes  All illustrations and images included in CareNotes® are the copyrighted property of [employer]  or [facility] Preventive Visit Patient Instructions  Thank you for completing your Welcome to Medicare Visit or Medicare Annual Wellness Visit today  Your next wellness visit will be due in one year (5/14/2022)  The screening/preventive services that you may require over the next 5-10 years are detailed below  Some tests may not apply to you based off risk factors and/or age  Screening tests ordered at today's visit but not completed yet may show as past due  Also, please note that scanned in results may not display below  Preventive Screenings:  Service Recommendations Previous Testing/Comments   Colorectal Cancer Screening  * Colonoscopy    * Fecal Occult Blood Test (FOBT)/Fecal Immunochemical Test (FIT)  * Fecal DNA/Cologuard Test  * Flexible Sigmoidoscopy Age: 54-65 years old   Colonoscopy: every 10 years (may be performed more frequently if at higher risk)  OR  FOBT/FIT: every 1 year  OR  Cologuard: every 3 years  OR  Sigmoidoscopy: every 5 years  Screening may be recommended earlier than age 48 if at higher risk for colorectal cancer   Also, an individualized decision between you and your healthcare provider will decide whether screening between the ages of 74-80 would be appropriate  Colonoscopy: Not on file  FOBT/FIT: 06/30/2019  Cologuard: Not on file  Sigmoidoscopy: Not on file    Screening Not Indicated     Breast Cancer Screening Age: 36 years old  Frequency: every 1-2 years  Not required if history of left and right mastectomy Mammogram: 09/29/2020    Screening Current   Cervical Cancer Screening Between the ages of 21-29, pap smear recommended once every 3 years  Between the ages of 33-67, can perform pap smear with HPV co-testing every 5 years  Recommendations may differ for women with a history of total hysterectomy, cervical cancer, or abnormal pap smears in past  Pap Smear: Not on file    Screening Not Indicated   Hepatitis C Screening Once for adults born between 1945 and 1965  More frequently in patients at high risk for Hepatitis C Hep C Antibody: Not on file        Diabetes Screening 1-2 times per year if you're at risk for diabetes or have pre-diabetes Fasting glucose: No results in last 5 years   A1C: 8 0 %    Screening Not Indicated  History Diabetes   Cholesterol Screening Once every 5 years if you don't have a lipid disorder  May order more often based on risk factors  Lipid panel: 01/12/2021    Screening Current     Other Preventive Screenings Covered by Medicare:  6  Abdominal Aortic Aneurysm (AAA) Screening: covered once if your at risk  You're considered to be at risk if you have a family history of AAA  7  Lung Cancer Screening: covers low dose CT scan once per year if you meet all of the following conditions: (1) Age 50-69; (2) No signs or symptoms of lung cancer; (3) Current smoker or have quit smoking within the last 15 years; (4) You have a tobacco smoking history of at least 30 pack years (packs per day multiplied by number of years you smoked); (5) You get a written order from a healthcare provider    8  Glaucoma Screening: covered annually if you're considered high risk: (1) You have diabetes OR (2) Family history of glaucoma OR (3)  aged 48 and older OR (4)  American aged 72 and older  5  Osteoporosis Screening: covered every 2 years if you meet one of the following conditions: (1) You're estrogen deficient and at risk for osteoporosis based off medical history and other findings; (2) Have a vertebral abnormality; (3) On glucocorticoid therapy for more than 3 months; (4) Have primary hyperparathyroidism; (5) On osteoporosis medications and need to assess response to drug therapy  · Last bone density test (DXA Scan): 12/10/2019   10  HIV Screening: covered annually if you're between the age of 15-65  Also covered annually if you are younger than 13 and older than 72 with risk factors for HIV infection  For pregnant patients, it is covered up to 3 times per pregnancy  Immunizations:  Immunization Recommendations   Influenza Vaccine Annual influenza vaccination during flu season is recommended for all persons aged >= 6 months who do not have contraindications   Pneumococcal Vaccine (Prevnar and Pneumovax)  * Prevnar = PCV13  * Pneumovax = PPSV23   Adults 25-60 years old: 1-3 doses may be recommended based on certain risk factors  Adults 72 years old: Prevnar (PCV13) vaccine recommended followed by Pneumovax (PPSV23) vaccine  If already received PPSV23 since turning 65, then PCV13 recommended at least one year after PPSV23 dose  Hepatitis B Vaccine 3 dose series if at intermediate or high risk (ex: diabetes, end stage renal disease, liver disease)   Tetanus (Td) Vaccine - COST NOT COVERED BY MEDICARE PART B Following completion of primary series, a booster dose should be given every 10 years to maintain immunity against tetanus  Td may also be given as tetanus wound prophylaxis  Tdap Vaccine - COST NOT COVERED BY MEDICARE PART B Recommended at least once for all adults  For pregnant patients, recommended with each pregnancy     Shingles Vaccine (Shingrix) - COST NOT COVERED BY MEDICARE PART B  2 shot series recommended in those aged 48 and above     Health Maintenance Due:  There are no preventive care reminders to display for this patient  Immunizations Due:      Topic Date Due    DTaP,Tdap,and Td Vaccines (1 - Tdap) 09/26/1956     Advance Directives   What are advance directives? Advance directives are legal documents that state your wishes and plans for medical care  These plans are made ahead of time in case you lose your ability to make decisions for yourself  Advance directives can apply to any medical decision, such as the treatments you want, and if you want to donate organs  What are the types of advance directives? There are many types of advance directives, and each state has rules about how to use them  You may choose a combination of any of the following:  · Living will: This is a written record of the treatment you want  You can also choose which treatments you do not want, which to limit, and which to stop at a certain time  This includes surgery, medicine, IV fluid, and tube feedings  · Durable power of  for healthcare [facility]): This is a written record that states who you want to make healthcare choices for you when you are unable to make them for yourself  This person, called a proxy, is usually a family member or a friend  You may choose more than 1 proxy  · Do not resuscitate (DNR) order:  A DNR order is used in case your heart stops beating or you stop breathing  It is a request not to have certain forms of treatment, such as CPR  A DNR order may be included in other types of advance directives  · Medical directive: This covers the care that you want if you are in a coma, near death, or unable to make decisions for yourself  You can list the treatments you want for each condition   Treatment may include pain medicine, surgery, blood transfusions, dialysis, IV or tube feedings, and a ventilator (breathing machine)  · Values history: This document has questions about your views, beliefs, and how you feel and think about life  This information can help others choose the care that you would choose  Why are advance directives important? An advance directive helps you control your care  Although spoken wishes may be used, it is better to have your wishes written down  Spoken wishes can be misunderstood, or not followed  Treatments may be given even if you do not want them  An advance directive may make it easier for your family to make difficult choices about your care  Weight Management   Why it is important to manage your weight:  Being overweight increases your risk of health conditions such as heart disease, high blood pressure, type 2 diabetes, and certain types of cancer  It can also increase your risk for osteoarthritis, sleep apnea, and other respiratory problems  Aim for a slow, steady weight loss  Even a small amount of weight loss can lower your risk of health problems  How to lose weight safely:  A safe and healthy way to lose weight is to eat fewer calories and get regular exercise  You can lose up about 1 pound a week by decreasing the number of calories you eat by 500 calories each day  Healthy meal plan for weight management:  A healthy meal plan includes a variety of foods, contains fewer calories, and helps you stay healthy  A healthy meal plan includes the following:  · Eat whole-grain foods more often  A healthy meal plan should contain fiber  Fiber is the part of grains, fruits, and vegetables that is not broken down by your body  Whole-grain foods are healthy and provide extra fiber in your diet  Some examples of whole-grain foods are whole-wheat breads and pastas, oatmeal, brown rice, and bulgur  · Eat a variety of vegetables every day  Include dark, leafy greens such as spinach, kale, amrit greens, and mustard greens   Eat yellow and orange vegetables such as carrots, sweet potatoes, and winter squash  · Eat a variety of fruits every day  Choose fresh or canned fruit (canned in its own juice or light syrup) instead of juice  Fruit juice has very little or no fiber  · Eat low-fat dairy foods  Drink fat-free (skim) milk or 1% milk  Eat fat-free yogurt and low-fat cottage cheese  Try low-fat cheeses such as mozzarella and other reduced-fat cheeses  · Choose meat and other protein foods that are low in fat  Choose beans or other legumes such as split peas or lentils  Choose fish, skinless poultry (chicken or turkey), or lean cuts of red meat (beef or pork)  Before you cook meat or poultry, cut off any visible fat  · Use less fat and oil  Try baking foods instead of frying them  Add less fat, such as margarine, sour cream, regular salad dressing and mayonnaise to foods  Eat fewer high-fat foods  Some examples of high-fat foods include french fries, doughnuts, ice cream, and cakes  · Eat fewer sweets  Limit foods and drinks that are high in sugar  This includes candy, cookies, regular soda, and sweetened drinks  Exercise:  Exercise at least 30 minutes per day on most days of the week  Some examples of exercise include walking, biking, dancing, and swimming  You can also fit in more physical activity by taking the stairs instead of the elevator or parking farther away from stores  Ask your healthcare provider about the best exercise plan for you  © Copyright [employer] 2018 Information is for End User's use only and may not be sold, redistributed or otherwise used for commercial purposes   All illustrations and images included in CareNotes® are the copyrighted property of [employer]  or [address] [place] [address] [employer]

## 2021-05-13 NOTE — ASSESSMENT & PLAN NOTE
Tomasa 3b/4 - just saw Nephro, urged to con't BP/BS control and avoiding NSAIDs as well as keeping hydrated, con't meds and f/u as per Nephro  Lab Results   Component Value Date    HGBA1C 8 0 (H) 04/16/2021

## 2021-05-13 NOTE — PROGRESS NOTES
Assessment/Plan:    Type 2 diabetes mellitus with stage 3 chronic kidney disease, without long-term current use of insulin ([facility])    Lab Results   Component Value Date    HGBA1C 8 0 (H) 04/16/2021   DM type 2 with hyperglycemia and nephropathy/CKD stage 3b/4 - uncontrolled with A1C 8 0 - will not increase Glipizide any further d/t some LE edema, not able to take Metformin d/t CKD, on max Januvia for her renal function, could try Trulicity or Victoza or may need insulin if A1C con't to go up, recheck BW in 3 mo - order given, foot exam done today, UTD on eye exam 1/20- 2 yrs, on statin but no ACE/ARB d/t CKD    CKD stage 3 due to type 2 diabetes mellitus ([facility] [address] )  Stabe 3b/4 - just saw Nephro, urged to con't BP/BS control and avoiding NSAIDs as well as keeping hydrated, con't meds and f/u as per Nephro  Lab Results   Component Value Date    HGBA1C 8 0 (H) 04/16/2021       Hypertension  BP at goal, con't current meds, healthy diet/exercise/wgt loss encouraged       Diagnoses and all orders for this visit:    Type 2 diabetes mellitus with stage 3b chronic kidney disease, without long-term current use of insulin ([facility])  -     Glucose, fasting; Future  -     Hemoglobin A1C; Future  -     Glucose, fasting  -     Hemoglobin A1C  -     Glucometer    Type 2 diabetes mellitus with stage 3 chronic kidney disease, without long-term current use of insulin ([facility])  -     Glucometer    CKD stage 3 due to type 2 diabetes mellitus (HCC)    Chronic renal disease, stage IV ([facility])    Essential hypertension    Weakness of both legs  Comments:  Reviewed with pt that her exam was nml and muscle strength was 5/5, encouarged to consider PT - deferring for now, call with new/worse symptoms or with falls    Medicare annual wellness visit, subsequent      Colonoscopy - no longer needed d/t age    Mammo 9/20    Dexa 12/19 - osteopenia    She has had both COVID vaccine's        Subjective:      Patient ID: Yadira Mejia is a 80 y o  female      HPI Pt here for follow up appt/BW results and AWV    BW results were d/w pt in detail: FBS/A1C 202/8 0 , BUN/Cr 28/1 82 (GFR 25), rest of BMP was wnl  Def of controlled vs uncontrolled DM was reviewed  Diet was reviewed - wgt down 1 lb from Jan 2021  She is taking her DM meds as directed  She is checking her sugars 2 x's a day and average BS is 180's in am and low 100's in afternoon  She is UTD on foot (6/20) and eye exam (1/20 - 2 yrs)  She notes no numbness/tingling/pain in her feet  She denies sore/ulcers on her feet  She is on statin but no ARB/ACE d/t CKD  Pt saw Jorge Schwab) in March for f/u CKD stage 3b/4 - OV note reviewed  She was advised to con't current meds and to avoid NSAIDs and stay hydrated  BP goal 130-140/60-70's  BP acceptable today  Meds were reviewed  She denies any SE with the medication  She was told to do BW and f/u in 4 mos  She con't to c/o weakness in her legs  She has not had a fall  She notes no numbness/tingling/legs giving out  She has some back pain  She notes no loss of control of bowel or bladder function  Colonoscopy - no longer needed d/t age    [de-identified] 9/20    Dexa 12/19 - osteopenia      Review of Systems   Constitutional: Negative for chills, fever and unexpected weight change  HENT: Negative for congestion and sore throat  Eyes: Negative for pain and visual disturbance  Respiratory: Negative for cough, shortness of breath and wheezing  Cardiovascular: Positive for palpitations and leg swelling  Negative for chest pain  Gastrointestinal: Negative for abdominal pain, blood in stool, constipation, diarrhea, nausea and vomiting  Endocrine: Negative for polydipsia and polyuria  Genitourinary: Negative for difficulty urinating and dysuria  Musculoskeletal: Positive for back pain  Negative for neck pain  Skin: Negative for rash and wound  Neurological: Positive for weakness  Negative for dizziness, light-headedness and headaches  Hematological: Negative for adenopathy  Psychiatric/Behavioral: Negative for behavioral problems and confusion  Objective:    /64   Pulse 78   Temp 97 9 °F (36 6 °C) (Temporal)   Ht 5' 2" (1 575 m)   Wt 77 1 kg (170 lb)   BMI 31 09 kg/m²      Physical Exam  Vitals signs and nursing note reviewed  Constitutional:       General: She is not in acute distress  Appearance: She is well-developed  She is not ill-appearing  HENT:      Head: Normocephalic and atraumatic  Right Ear: Tympanic membrane normal  There is no impacted cerumen  Left Ear: Tympanic membrane normal  There is no impacted cerumen  Eyes:      General:         Right eye: No discharge  Left eye: No discharge  Conjunctiva/sclera: Conjunctivae normal    Neck:      Musculoskeletal: Neck supple  Vascular: No carotid bruit  Trachea: No tracheal deviation  Cardiovascular:      Rate and Rhythm: Normal rate and regular rhythm  Pulses: no weak pulses          Dorsalis pedis pulses are 1+ on the right side and 1+ on the left side  Heart sounds: Normal heart sounds  No murmur  No friction rub  Comments: Trace nonpitting B/L LE edema  Pulmonary:      Effort: Pulmonary effort is normal  No respiratory distress  Breath sounds: Normal breath sounds  No wheezing, rhonchi or rales  Abdominal:      General: There is no distension  Palpations: Abdomen is soft  Tenderness: There is no abdominal tenderness  There is no guarding or rebound  Musculoskeletal:         General: No tenderness or deformity  Comments: 5/5 B/L LE muscle strength, nml gait w/o assistance   Feet:      Right foot:      Skin integrity: No ulcer, skin breakdown, erythema, warmth, callus or dry skin  Left foot:      Skin integrity: No ulcer, skin breakdown, erythema, warmth, callus or dry skin  Skin:     General: Skin is warm  Coloration: Skin is not pale  Findings: No rash  Neurological:      General: No focal deficit present  Mental Status: She is alert  Mental status is at baseline  Motor: No abnormal muscle tone  Gait: Gait normal       Comments: 2/4 patellar reflexes B/L LE   Psychiatric:         Mood and Affect: Mood normal          Behavior: Behavior normal          Thought Content: Thought content normal          Judgment: Judgment normal        Patient's shoes and socks removed  Right Foot/Ankle   Right Foot Inspection  Skin Exam: skin normal and skin intact no dry skin, no warmth, no callus, no erythema, no maceration, no abnormal color, no pre-ulcer, no ulcer and no callus                          Toe Exam: ROM and strength within normal limits  Sensory   Vibration: intact    Monofilament testing: intact  Vascular    The right DP pulse is 1+  Left Foot/Ankle  Left Foot Inspection  Skin Exam: skin normal and skin intactno dry skin, no warmth, no erythema, no maceration, normal color, no pre-ulcer, no ulcer and no callus                         Toe Exam: ROM and strength within normal limits                   Sensory   Vibration: intact    Monofilament: intact  Vascular    The left DP pulse is 1+  Assign Risk Category:  No deformity present; No loss of protective sensation;  No weak pulses       Risk: 0

## 2021-05-13 NOTE — PROGRESS NOTES
Assessment and Plan:     Problem List Items Addressed This Visit        Endocrine    Type 2 diabetes mellitus with stage 3 chronic kidney disease, without long-term current use of insulin ([facility] [address] ) - Primary    CKD stage 3 due to type 2 diabetes mellitus ([facility] [address] )       Cardiovascular and Mediastinum    Hypertension      Other Visit Diagnoses     Medicare annual wellness visit, subsequent               Preventive health issues were discussed with patient, and age appropriate screening tests were ordered as noted in patient's After Visit Summary  Personalized health advice and appropriate referrals for health education or preventive services given if needed, as noted in patient's After Visit Summary       History of Present Illness:     Patient presents for Medicare Annual Wellness visit    Patient Care Team:  Tien Tran DO as PCP - General  MD Chase Florian DO (Nephrology)  Valente Stewart MD (Ophthalmology)  Jarrod Mckeon MD (Dermatology)     Problem List:     Patient Active Problem List   Diagnosis    Type 2 diabetes mellitus with stage 3 chronic kidney disease, without long-term current use of insulin ([facility] [address] )    Rhinitis    Premature ventricular contraction    Osteopenia    Hypokalemia    Hypertension    GERD (gastroesophageal reflux disease)    Dyslipidemia    Diabetic polyneuropathy ([facility] [address] )    Anxiety disorder    Dependent edema    Normocytic anemia    Chronic kidney disease    Hypercalcemia    Complex renal cyst    Chronic back pain    CKD stage 3 due to type 2 diabetes mellitus ([facility] [address] )      Past Medical and Surgical History:     Past Medical History:   Diagnosis Date    Aortic valve insufficiency     Cataract of both eyes     Chronic kidney disease     Dysuria     last assessed - [id number]    Edema     last assessed - [id number]    GERD (gastroesophageal reflux disease)     last assessed - [id number]    Hyperlipidemia     Hypertension     Low back pain     last assessed - [id number]    Mitral valve disorder     Osteoporosis     last assessed - [id number]    Palpitations      Past Surgical History:   Procedure Laterality Date    APPENDECTOMY      CATARACT EXTRACTION      COLONOSCOPY      TUBAL LIGATION        Family History:     Family History   Problem Relation Age of Onset    Kidney disease Mother         Chronic    Breast cancer Sister     Diabetes Sister     Breast cancer Sister     Hypertension Sister     No Known Problems Father     No Known Problems Daughter       Social History:     E-Cigarette/Vaping    E-Cigarette Use Never User      E-Cigarette/Vaping Substances    Nicotine No     THC No     CBD No     Flavoring No     Other No     Unknown No      Social History     Socioeconomic History    Marital status: Single     Spouse name: None    Number of children: None    Years of education: None    Highest education level: None   Occupational History    None   Social Needs    Financial resource strain: None    Food insecurity     Worry: None     Inability: None    Transportation needs     Medical: None     Non-medical: None   Tobacco Use    Smoking status: Never Smoker    Smokeless tobacco: Never Used   Substance and Sexual Activity    Alcohol use: Yes     Frequency: Monthly or less    Drug use: Never    Sexual activity: None   Lifestyle    Physical activity     Days per week: None     Minutes per session: None    Stress: None   Relationships    Social connections     Talks on phone: None     Gets together: None     Attends Faith service: None     Active member of club or organization: None     Attends meetings of clubs or organizations: None     Relationship status: None    Intimate partner violence     Fear of current or ex partner: None     Emotionally abused: None     Physically abused: None     Forced sexual activity: None   Other Topics Concern    None   Social History Narrative    Living with relatives (other than parents) Marital History -       Medications and Allergies:     Current Outpatient Medications   Medication Sig Dispense Refill    ACCU-CHEK PAUL PLUS test strip       Accu-Chek Softclix Lancets lancets       Accu-Chek Softclix Lancets lancets USE 2 TIMES DAILY AS DIRECTED 100 each 3    aspirin 81 MG tablet Take 1 tablet by mouth daily      Bystolic 20 MG tablet TAKE 1 TABLET BY MOUTH EVERY DAY 90 tablet 1    Cetirizine HCl (ZYRTEC ALLERGY) 10 MG CAPS Take 1 tablet by mouth daily as needed      cholecalciferol (VITAMIN D3) 1,000 units tablet Take 2 tablets by mouth daily      cloNIDine (CATAPRES-TTS-1) 0 1 mg/24 hr PLACE 1 PATCH (0 1 MG TOTAL) ON THE SKIN ONCE A WEEK 12 patch 1    famotidine (PEPCID) 20 mg tablet TAKE 1 TABLET BY MOUTH EVERY DAY 90 tablet 1    ferrous sulfate 324 (65 Fe) mg TAKE 1 TABLET BY MOUTH DAILY BEFORE BREAKFAST 90 tablet 1    glipiZIDE (GLUCOTROL) 5 mg tablet TAKE 1 TABLET BY MOUTH IN THE MORNING THEN 2 TABLETS IN THE EVENING WITH DINNER 270 tablet 1    glucose blood (Accu-Chek Paul Plus) test strip USE AS INSTRUCTED TWICE DAILY 100 each 7    Januvia 50 MG tablet TAKE 1 TABLET BY MOUTH EVERY DAY 30 tablet 5    NIFEdipine (ADALAT CC) 90 mg 24 hr tablet TAKE 1 TABLET BY MOUTH EVERY DAY 30 tablet 4    Potassium Gluconate 595 (99 K) MG TABS Take 1 tablet by mouth 2 (two) times a day       sertraline (ZOLOFT) 25 mg tablet TAKE 1 TABLET BY MOUTH EVERY DAY 90 tablet 1    simvastatin (ZOCOR) 20 mg tablet TAKE 1 TABLET BY MOUTH DAILY AT BEDTIME 90 tablet 1    torsemide (DEMADEX) 20 mg tablet TAKE 1 TABLET BY MOUTH EVERY DAY 90 tablet 1     No current facility-administered medications for this visit        No Known Allergies   Immunizations:     Immunization History   Administered Date(s) Administered    INFLUENZA 10/30/2015, 10/13/2016, 10/09/2017    Influenza Split High Dose Preservative Free IM 09/06/2012, 10/29/2013, 10/22/2014, 10/30/2015, 10/13/2016, 10/09/2017    Influenza, high dose seasonal 0 7 mL 10/16/2018, 09/24/2019, 10/02/2020    Pneumococcal Conjugate 13-Valent 11/13/2015    Pneumococcal Polysaccharide PPV23 10/29/2013    SARS-CoV-2 / COVID-19 mRNA IM ([facility] [facility]) 01/28/2021, 02/26/2021    TD (adult) Preservative Free 10/26/2012      Health Maintenance: There are no preventive care reminders to display for this patient  Topic Date Due    DTaP,Tdap,and Td Vaccines (1 - Tdap) 09/26/1956      Medicare Health Risk Assessment:     /64   Pulse 78   Temp 97 9 °F (36 6 °C) (Temporal)   Ht 5' 2" (1 575 m)   Wt 77 1 kg (170 lb)   BMI 31 09 kg/m²      Ena is here for her Subsequent Wellness visit  Last Medicare Wellness visit information reviewed, patient interviewed and updates made to the record today  Health Risk Assessment:   Patient rates overall health as good  Patient feels that their physical health rating is same  Patient is satisfied with their life  Eyesight was rated as same  Hearing was rated as same  Patient feels that their emotional and mental health rating is same  Patients states they are never, rarely angry  Patient states they are never, rarely unusually tired/fatigued  Pain experienced in the last 7 days has been none  Patient states that she has experienced no weight loss or gain in last 6 months  Depression Screening:   PHQ-2 Score: 0      Fall Risk Screening: In the past year, patient has experienced: no history of falling in past year      Urinary Incontinence Screening:   Patient has not leaked urine accidently in the last six months  Home Safety:  Patient does not have trouble with stairs inside or outside of their home  Patient has working smoke alarms and has working carbon monoxide detector  Home safety hazards include: none  Nutrition:   Current diet is Regular  Medications:   Patient is currently taking over-the-counter supplements   OTC medications include: see medication list  Patient is able to manage medications  Activities of Daily Living (ADLs)/Instrumental Activities of Daily Living (IADLs):   Walk and transfer into and out of bed and chair?: Yes  Dress and groom yourself?: Yes    Bathe or shower yourself?: Yes    Feed yourself? Yes  Do your laundry/housekeeping?: Yes  Manage your money, pay your bills and track your expenses?: Yes  Make your own meals?: Yes    Do your own shopping?: Yes    Previous Hospitalizations:   Any hospitalizations or ED visits within the last 12 months?: No      Advance Care Planning:   Living will: No    Durable POA for healthcare: Yes    Advanced directive: No      Cognitive Screening:   Provider or family/friend/caregiver concerned regarding cognition?: No    PREVENTIVE SCREENINGS      Cardiovascular Screening:    General: Screening Current and Risks and Benefits Discussed      Diabetes Screening:     General: History Diabetes, Risks and Benefits Discussed and Screening Current      Colorectal Cancer Screening:     General: Screening Not Indicated and Risks and Benefits Discussed      Breast Cancer Screening:     General: Screening Current and Risks and Benefits Discussed      Cervical Cancer Screening:    General: Screening Not Indicated      Osteoporosis Screening:    General: Risks and Benefits Discussed and Screening Current      Abdominal Aortic Aneurysm (AAA) Screening:        General: Risks and Benefits Discussed and Screening Not Indicated      Lung Cancer Screening:     General: Screening Not Indicated and Risks and Benefits Discussed      Hepatitis C Screening:    General: Risks and Benefits Discussed and Screening Not Indicated    Screening, Brief Intervention, and Referral to Treatment (SBIRT)    Screening  Typical number of drinks in a day: 0  Typical number of drinks in a week: 0  Interpretation: Low risk drinking behavior      Single Item Drug Screening:  How often have you used an illegal drug (including marijuana) or a prescription medication for non-medical reasons in the past year? never    Single Item Drug Screen Score: 0  Interpretation: Negative screen for possible drug use disorder    Other Counseling Topics:   Car/seat belt/driving safety and regular weightbearing exercise         Pao Boyd, DO

## 2021-05-19 DIAGNOSIS — N18.32 TYPE 2 DIABETES MELLITUS WITH STAGE 3B CHRONIC KIDNEY DISEASE, WITHOUT LONG-TERM CURRENT USE OF INSULIN (HCC): Primary | ICD-10-CM

## 2021-05-19 DIAGNOSIS — F41.9 ANXIETY: ICD-10-CM

## 2021-05-19 DIAGNOSIS — E11.22 TYPE 2 DIABETES MELLITUS WITH STAGE 3B CHRONIC KIDNEY DISEASE, WITHOUT LONG-TERM CURRENT USE OF INSULIN (HCC): Primary | ICD-10-CM

## 2021-05-19 RX ORDER — SERTRALINE HYDROCHLORIDE 25 MG/1
TABLET, FILM COATED ORAL
Qty: 90 TABLET | Refills: 1 | Status: SHIPPED | OUTPATIENT
Start: 2021-05-19 | End: 2021-11-11

## 2021-05-19 RX ORDER — BLOOD SUGAR DIAGNOSTIC
STRIP MISCELLANEOUS
Qty: 100 STRIP | Refills: 3 | Status: SHIPPED | OUTPATIENT
Start: 2021-05-19 | End: 2021-11-30

## 2021-05-20 DIAGNOSIS — N18.30 TYPE 2 DIABETES MELLITUS WITH STAGE 3 CHRONIC KIDNEY DISEASE, WITHOUT LONG-TERM CURRENT USE OF INSULIN (HCC): ICD-10-CM

## 2021-05-20 DIAGNOSIS — E11.22 TYPE 2 DIABETES MELLITUS WITH STAGE 3 CHRONIC KIDNEY DISEASE, WITHOUT LONG-TERM CURRENT USE OF INSULIN (HCC): ICD-10-CM

## 2021-05-20 RX ORDER — SITAGLIPTIN 50 MG/1
TABLET, FILM COATED ORAL
Qty: 90 TABLET | Refills: 1 | Status: SHIPPED | OUTPATIENT
Start: 2021-05-20 | End: 2021-11-12

## 2021-06-08 DIAGNOSIS — K21.00 GASTROESOPHAGEAL REFLUX DISEASE WITH ESOPHAGITIS: ICD-10-CM

## 2021-06-08 RX ORDER — FAMOTIDINE 20 MG/1
TABLET, FILM COATED ORAL
Qty: 90 TABLET | Refills: 1 | Status: SHIPPED | OUTPATIENT
Start: 2021-06-08 | End: 2021-12-04

## 2021-06-12 LAB
ALBUMIN SERPL-MCNC: 4.6 G/DL (ref 3.6–4.6)
ALBUMIN/GLOB SERPL: 2 {RATIO} (ref 1.2–2.2)
ALP SERPL-CCNC: 75 IU/L (ref 48–121)
ALT SERPL-CCNC: 15 IU/L (ref 0–32)
APPEARANCE UR: CLEAR
AST SERPL-CCNC: 17 IU/L (ref 0–40)
BACTERIA URNS QL MICRO: NORMAL
BILIRUB SERPL-MCNC: 0.3 MG/DL (ref 0–1.2)
BILIRUB UR QL STRIP: NEGATIVE
BUN SERPL-MCNC: 35 MG/DL (ref 8–27)
BUN/CREAT SERPL: 16 (ref 12–28)
CALCIUM SERPL-MCNC: 9.2 MG/DL (ref 8.7–10.3)
CASTS URNS QL MICRO: NORMAL /LPF
CHLORIDE SERPL-SCNC: 101 MMOL/L (ref 96–106)
CO2 SERPL-SCNC: 27 MMOL/L (ref 20–29)
COLOR UR: YELLOW
CREAT SERPL-MCNC: 2.16 MG/DL (ref 0.57–1)
CREAT UR-MCNC: 73.5 MG/DL
EPI CELLS #/AREA URNS HPF: NORMAL /HPF (ref 0–10)
GLOBULIN SER-MCNC: 2.3 G/DL (ref 1.5–4.5)
GLUCOSE SERPL-MCNC: 186 MG/DL (ref 65–99)
GLUCOSE UR QL: NEGATIVE
HGB UR QL STRIP: NEGATIVE
KETONES UR QL STRIP: NEGATIVE
LEUKOCYTE ESTERASE UR QL STRIP: ABNORMAL
MAGNESIUM SERPL-MCNC: 2.3 MG/DL (ref 1.6–2.3)
MICRO URNS: ABNORMAL
NITRITE UR QL STRIP: NEGATIVE
PH UR STRIP: 6.5 [PH] (ref 5–7.5)
POTASSIUM SERPL-SCNC: 4.1 MMOL/L (ref 3.5–5.2)
PROT SERPL-MCNC: 6.9 G/DL (ref 6–8.5)
PROT UR QL STRIP: ABNORMAL
PROT UR-MCNC: 11.2 MG/DL
PROT/CREAT UR: 152 MG/G CREAT (ref 0–200)
RBC #/AREA URNS HPF: NORMAL /HPF (ref 0–2)
SL AMB EGFR AFRICAN AMERICAN: 23 ML/MIN/1.73
SL AMB EGFR NON AFRICAN AMERICAN: 20 ML/MIN/1.73
SODIUM SERPL-SCNC: 142 MMOL/L (ref 134–144)
SP GR UR: 1.01 (ref 1–1.03)
UROBILINOGEN UR STRIP-ACNC: 0.2 MG/DL (ref 0.2–1)
WBC #/AREA URNS HPF: NORMAL /HPF (ref 0–5)

## 2021-06-14 ENCOUNTER — TELEPHONE (OUTPATIENT)
Dept: NEPHROLOGY | Facility: HOSPITAL | Age: 86
End: 2021-06-14

## 2021-06-14 NOTE — TELEPHONE ENCOUNTER
Left message for patient to return call to schedule a follow up appointment  Ngoc Sanders has openings in July in [facility]

## 2021-06-14 NOTE — TELEPHONE ENCOUNTER
----- Message from Raul Birmingham DO sent at 6/14/2021  9:52 AM EDT -----  Patient's sCr a bit higher at 2 16  Ensure she has follow up with me or an AP next month (seen in March, to see me in 4 months, don't see anything scheduled)  Thanks!

## 2021-06-18 DIAGNOSIS — I10 ESSENTIAL HYPERTENSION: ICD-10-CM

## 2021-06-18 DIAGNOSIS — R60.9 DEPENDENT EDEMA: ICD-10-CM

## 2021-06-18 RX ORDER — TORSEMIDE 20 MG/1
TABLET ORAL
Qty: 90 TABLET | Refills: 1 | Status: SHIPPED | OUTPATIENT
Start: 2021-06-18 | End: 2021-12-04

## 2021-08-07 LAB
EST. AVERAGE GLUCOSE BLD GHB EST-MCNC: 183 MG/DL
GLUCOSE SERPL-MCNC: 200 MG/DL (ref 65–99)
HBA1C MFR BLD: 8 % (ref 4.8–5.6)

## 2021-08-17 ENCOUNTER — OFFICE VISIT (OUTPATIENT)
Dept: FAMILY MEDICINE CLINIC | Facility: HOSPITAL | Age: 86
End: 2021-08-17
Payer: COMMERCIAL

## 2021-08-17 VITALS
DIASTOLIC BLOOD PRESSURE: 50 MMHG | TEMPERATURE: 96.7 F | WEIGHT: 169.6 LBS | BODY MASS INDEX: 31.21 KG/M2 | SYSTOLIC BLOOD PRESSURE: 116 MMHG | HEIGHT: 62 IN | HEART RATE: 76 BPM

## 2021-08-17 DIAGNOSIS — I10 ESSENTIAL HYPERTENSION: ICD-10-CM

## 2021-08-17 DIAGNOSIS — E11.22 TYPE 2 DIABETES MELLITUS WITH STAGE 3 CHRONIC KIDNEY DISEASE, WITHOUT LONG-TERM CURRENT USE OF INSULIN (HCC): Primary | ICD-10-CM

## 2021-08-17 DIAGNOSIS — N18.30 TYPE 2 DIABETES MELLITUS WITH STAGE 3 CHRONIC KIDNEY DISEASE, WITHOUT LONG-TERM CURRENT USE OF INSULIN (HCC): Primary | ICD-10-CM

## 2021-08-17 DIAGNOSIS — N18.30 CKD STAGE 3 DUE TO TYPE 2 DIABETES MELLITUS (HCC): ICD-10-CM

## 2021-08-17 DIAGNOSIS — E11.22 CKD STAGE 3 DUE TO TYPE 2 DIABETES MELLITUS (HCC): ICD-10-CM

## 2021-08-17 DIAGNOSIS — Z12.31 ENCOUNTER FOR SCREENING MAMMOGRAM FOR MALIGNANT NEOPLASM OF BREAST: ICD-10-CM

## 2021-08-17 DIAGNOSIS — E66.9 OBESITY (BMI 30.0-34.9): ICD-10-CM

## 2021-08-17 PROCEDURE — 1160F RVW MEDS BY RX/DR IN RCRD: CPT | Performed by: INTERNAL MEDICINE

## 2021-08-17 PROCEDURE — 3078F DIAST BP <80 MM HG: CPT | Performed by: INTERNAL MEDICINE

## 2021-08-17 PROCEDURE — 99214 OFFICE O/P EST MOD 30 MIN: CPT | Performed by: INTERNAL MEDICINE

## 2021-08-17 PROCEDURE — 3074F SYST BP LT 130 MM HG: CPT | Performed by: INTERNAL MEDICINE

## 2021-08-17 PROCEDURE — 1036F TOBACCO NON-USER: CPT | Performed by: INTERNAL MEDICINE

## 2021-08-17 RX ORDER — NIFEDIPINE 90 MG/1
90 TABLET, FILM COATED, EXTENDED RELEASE ORAL DAILY
Qty: 90 TABLET | Refills: 2 | Status: SHIPPED | OUTPATIENT
Start: 2021-08-17 | End: 2022-02-28 | Stop reason: SDUPTHER

## 2021-08-17 RX ORDER — GLIPIZIDE 5 MG/1
10 TABLET ORAL
Qty: 360 TABLET | Refills: 1 | Status: SHIPPED | OUTPATIENT
Start: 2021-08-17 | End: 2021-12-07 | Stop reason: SDUPTHER

## 2021-08-17 NOTE — PROGRESS NOTES
Assessment/Plan:    Type 2 diabetes mellitus with stage 3 chronic kidney disease, without long-term current use of insulin ([facility])    Lab Results   Component Value Date    HGBA1C 8 0 (H) 08/06/2021   DM type 2 with hyperglycemia/nephropathy with CKD stage 3 -uncontrolled with A1c 8 0 - home numbers average 190's which is c/w A1C today, healthy diet/exercise/wt loss encouraged, cannot increase Januvia d/t her CKD, defer Metformin d/t CKD, will increase Glipizide to 5 mg 2 tab PO bid (discussed SE of increase in LE edema and will call if occurs), s/sx of hypoglycemia reviewed - call if occurs, UTD on foot exam (5/21) and eye exam (1/20 - 2 yrs), on statin and ASA but no ACE/ARB d/t CKD, repeat BW in 3 mos - order given    CKD stage 3 due to type 2 diabetes mellitus ([facility] [address] )  CKD stable, again importance of BS/BP control/avoiding dehydration and avoiding NSAIDs reviewed, has f/u with renal in Sept, will follow  Lab Results   Component Value Date    HGBA1C 8 0 (H) 08/06/2021       Hypertension  BP great today, con't current meds, Nifedipine refilled upon request today, healthy diet/exercise/wgt loss encouraged       Diagnoses and all orders for this visit:    Type 2 diabetes mellitus with stage 3 chronic kidney disease, without long-term current use of insulin (HCC)  -     glipiZIDE (GLUCOTROL) 5 mg tablet; Take 2 tablets (10 mg total) by mouth 2 (two) times a day before meals  -     Hemoglobin A1C; Future  -     Basic metabolic panel; Future  -     Hemoglobin A1C  -     Basic metabolic panel    CKD stage 3 due to type 2 diabetes mellitus (HCC)  -     Basic metabolic panel; Future  -     Basic metabolic panel    Essential hypertension  -     NIFEdipine (ADALAT CC) 90 mg 24 hr tablet; Take 1 tablet (90 mg total) by mouth daily  -     glipiZIDE (GLUCOTROL) 5 mg tablet;  Take 2 tablets (10 mg total) by mouth 2 (two) times a day before meals    Encounter for screening mammogram for malignant neoplasm of breast  -     Mammo screening bilateral w 3d & cad; Future    Obesity (BMI 30 0-34  9)    BMI 31 0-31 9,adult      Colonoscopy - no longer needed d/t age    [de-identified] 9/20     Dexa 12/19 - osteopenia     She has had both COVID vaccine's        Subjective:      Patient ID: Mihai Zafar is a 80 y o  female  HPI Pt here for follow up appt and BW results    BW results were d/w pt in detail: FBS/A1c 200/8 0  Def of controlled vs uncontrolled DM was reviewed  Diet was reviewed - wgt down 1 lb from May 2021  She con't to check her sugars 2 times a day - home readings reviewed: am fasting 's and afternoon BS readings are averaging around 160's  She is taking her DM meds as directed  She is UTD on foot (5/21) and eye exam (1/20 - 2 yrs)  She is on statin and ASA but no ACE/ARb d/t CKD  CKD stage 3 reviewed  She has f/u with renal in Sept       BP at goal today and meds were reviewed and no changes have occurred  She denies missing doses of meds or SE with the meds  She does not check her BP outside the office  She notes no frequent Ha's/dizziness/double vision/CP  Notes some LE edema intermittently but gone in am and only really occurs at the end of the day  Denies SOB/wheezing/CP/palp/orthopnea/PND  Colonoscopy - no longer needed d/t age    [de-identified] 9/20     Dexa 12/19 - osteopenia     She has had both COVID vaccine's      Review of Systems   Constitutional: Negative for chills, fever and unexpected weight change  HENT: Negative for congestion and sore throat  Eyes: Negative for pain and visual disturbance  Respiratory: Negative for cough, shortness of breath and wheezing  Cardiovascular: Positive for leg swelling  Negative for chest pain and palpitations  Gastrointestinal: Negative for abdominal pain, constipation, diarrhea, nausea and vomiting  Genitourinary: Negative for difficulty urinating and dysuria  Musculoskeletal: Negative for back pain and neck pain  Skin: Negative for rash and wound  Neurological: Negative for dizziness, light-headedness and headaches  Hematological: Negative for adenopathy  Psychiatric/Behavioral: Negative for behavioral problems and confusion  Objective:    /50   Pulse 76   Temp (!) 96 7 °F (35 9 °C) (Tympanic)   Ht 5' 2" (1 575 m)   Wt 76 9 kg (169 lb 9 6 oz)   BMI 31 02 kg/m²      Physical Exam  Vitals and nursing note reviewed  Constitutional:       General: She is not in acute distress  Appearance: She is well-developed  She is not ill-appearing  HENT:      Head: Normocephalic and atraumatic  Eyes:      General:         Right eye: No discharge  Left eye: No discharge  Conjunctiva/sclera: Conjunctivae normal    Neck:      Trachea: No tracheal deviation  Cardiovascular:      Rate and Rhythm: Normal rate and regular rhythm  Heart sounds: Normal heart sounds  No murmur heard  No friction rub  Comments: Trace/minimal B/L LE edema  Pulmonary:      Effort: Pulmonary effort is normal  No respiratory distress  Breath sounds: Normal breath sounds  No wheezing, rhonchi or rales  Abdominal:      General: Abdomen is flat  There is no distension  Palpations: Abdomen is soft  Tenderness: There is no abdominal tenderness  There is no guarding or rebound  Musculoskeletal:         General: No deformity or signs of injury  Cervical back: Neck supple  Skin:     General: Skin is warm  Coloration: Skin is not pale  Findings: No rash  Neurological:      General: No focal deficit present  Mental Status: She is alert  Mental status is at baseline  Motor: No abnormal muscle tone  Gait: Gait normal    Psychiatric:         Mood and Affect: Mood normal          Behavior: Behavior normal          Thought Content: Thought content normal          Judgment: Judgment normal          BMI Counseling: Body mass index is 31 02 kg/m²   The BMI is above normal  Nutrition recommendations include reducing portion sizes, 3-5 servings of fruits/vegetables daily, consuming healthier snacks, moderation in carbohydrate intake, increasing intake of lean protein and reducing intake of saturated fat and trans fat  Exercise recommendations include exercising 3-5 times per week

## 2021-08-17 NOTE — PATIENT INSTRUCTIONS
Obesity   AMBULATORY CARE:   Obesity  is when your body mass index (BMI) is greater than 30  Your healthcare provider will use your height and weight to measure your BMI  The risks of obesity include  many health problems, such as injuries or physical disability  You may need tests to check for the following:  · Diabetes    · High blood pressure or high cholesterol    · Heart disease    · Gallbladder or liver disease    · Cancer of the colon, breast, prostate, liver, or kidney    · Sleep apnea    · Arthritis or gout    Seek care immediately if:   · You have a severe headache, confusion, or difficulty speaking  · You have weakness on one side of your body  · You have chest pain, sweating, or shortness of breath  Contact your healthcare provider if:   · You have symptoms of gallbladder or liver disease, such as pain in your upper abdomen  · You have knee or hip pain and discomfort while walking  · You have symptoms of diabetes, such as intense hunger and thirst, and frequent urination  · You have symptoms of sleep apnea, such as snoring or daytime sleepiness  · You have questions or concerns about your condition or care  Treatment for obesity  focuses on helping you lose weight to improve your health  Even a small decrease in BMI can reduce the risk for many health problems  Your healthcare provider will help you set a weight-loss goal   · Lifestyle changes  are the first step in treating obesity  These include making healthy food choices and getting regular physical activity  Your healthcare provider may suggest a weight-loss program that involves coaching, education, and therapy  · Medicine  may help you lose weight when it is used with a healthy diet and physical activity  · Surgery  can help you lose weight if you are very obese and have other health problems  There are several types of weight-loss surgery  Ask your healthcare provider for more information      Be successful losing weight:   · Set small, realistic goals  An example of a small goal is to walk for 20 minutes 5 days a week  Anther goal is to lose 5% of your body weight  · Tell friends, family members, and coworkers about your goals  and ask for their support  Ask a friend to lose weight with you, or join a weight-loss support group  · Identify foods or triggers that may cause you to overeat , and find ways to avoid them  Remove tempting high-calorie foods from your home and workplace  Place a bowl of fresh fruit on your kitchen counter  If stress causes you to eat, then find other ways to cope with stress  · Keep a diary to track what you eat and drink  Also write down how many minutes of physical activity you do each day  Weigh yourself once a week and record it in your diary  Eating changes: You will need to eat 500 to 1,000 fewer calories each day than you currently eat to lose 1 to 2 pounds a week  The following changes will help you cut calories:  · Eat smaller portions  Use small plates, no larger than 9 inches in diameter  Fill your plate half full of fruits and vegetables  Measure your food using measuring cups until you know what a serving size looks like  · Eat 3 meals and 1 or 2 snacks each day  Plan your meals in advance  Carolyn Vallejo and eat at home most of the time  Eat slowly  Do not skip meals  Skipping meals can lead to overeating later in the day  This can make it harder for you to lose weight  Talk with a dietitian to help you make a meal plan and schedule that is right for you  · Eat fruits and vegetables at every meal   They are low in calories and high in fiber, which makes you feel full  Do not add butter, margarine, or cream sauce to vegetables  Use herbs to season steamed vegetables  · Eat less fat and fewer fried foods  Eat more baked or grilled chicken and fish  These protein sources are lower in calories and fat than red meat  Limit fast food   Dress your salads with olive oil and vinegar instead of bottled dressing  · Limit the amount of sugar you eat  Do not drink sugary beverages  Limit alcohol  Activity changes:  Physical activity is good for your body in many ways  It helps you burn calories and build strong muscles  It decreases stress and depression, and improves your mood  It can also help you sleep better  Talk to your healthcare provider before you begin an exercise program   · Exercise for at least 30 minutes 5 days a week  Start slowly  Set aside time each day for physical activity that you enjoy and that is convenient for you  It is best to do both weight training and an activity that increases your heart rate, such as walking, bicycling, or swimming  · Find ways to be more active  Do yard work and housecleaning  Walk up the stairs instead of using elevators  Spend your leisure time going to events that require walking, such as outdoor festivals or fairs  This extra physical activity can help you lose weight and keep it off  Follow up with your healthcare provider as directed: You may need to meet with a dietitian  Write down your questions so you remember to ask them during your visits  © Copyright [employer] 2021 Information is for End User's use only and may not be sold, redistributed or otherwise used for commercial purposes  All illustrations and images included in CareNotes® are the copyrighted property of A D A M , Inc  or [facility] Joo Holman   The above information is an  only  It is not intended as medical advice for individual conditions or treatments  Talk to your doctor, nurse or pharmacist before following any medical regimen to see if it is safe and effective for you  Heart Healthy Diet   AMBULATORY CARE:   A heart healthy diet  is an eating plan low in unhealthy fats and sodium (salt)  The plan is high in healthy fats and fiber   A heart healthy diet helps improve your cholesterol levels and lowers your risk for heart disease and stroke  A dietitian will teach you how to read and understand food labels  Heart healthy diet guidelines to follow:   · Choose foods that contain healthy fats  ? Unsaturated fats  include monounsaturated and polyunsaturated fats  Unsaturated fat is found in foods such as soybean, canola, olive, corn, and safflower oils  It is also found in soft tub margarine that is made with liquid vegetable oil  ? Omega-3 fat  is found in certain fish, such as salmon, tuna, and trout, and in walnuts and flaxseed  Eat fish high in omega-3 fats at least 2 times a week  · Get 20 to 30 grams of fiber each day  Fruits, vegetables, whole-grain foods, and legumes (cooked beans) are good sources of fiber  · Limit or do not have unhealthy fats  ? Cholesterol  is found in animal foods, such as eggs and lobster, and in dairy products made from whole milk  Limit cholesterol to less than 200 mg each day  ? Saturated fat  is found in meats, such as piper and hamburger  It is also found in chicken or turkey skin, whole milk, and butter  Limit saturated fat to less than 7% of your total daily calories  ? Trans fat  is found in packaged foods, such as potato chips and cookies  It is also in hard margarine, some fried foods, and shortening  Do not eat foods that contain trans fats  · Limit sodium as directed  You may be told to limit sodium to 2,000 to 2,300 mg each day  Choose low-sodium or no-salt-added foods  Add little or no salt to food you prepare  Use herbs and spices in place of salt  Include the following in your heart healthy plan:  Ask your dietitian or healthcare provider how many servings to have from each of the following food groups:  · Grains:      ? Whole-wheat breads, cereals, and pastas, and brown rice    ? Low-fat, low-sodium crackers and chips    · Vegetables:      ? Broccoli, green beans, green peas, and spinach    ? Collards, kale, and lima beans    ?  Carrots, sweet potatoes, tomatoes, and peppers    ? Canned vegetables with no salt added    · Fruits:      ? Bananas, peaches, pears, and pineapple    ? Grapes, raisins, and dates    ? Oranges, tangerines, grapefruit, orange juice, and grapefruit juice    ? Apricots, mangoes, melons, and papaya    ? Raspberries and strawberries    ? Canned fruit with no added sugar    · Low-fat dairy:      ? Nonfat (skim) milk, 1% milk, and low-fat almond, cashew, or soy milks fortified with calcium    ? Low-fat cheese, regular or frozen yogurt, and cottage cheese    · Meats and proteins:      ? Lean cuts of beef and pork (loin, leg, round), skinless chicken and turkey    ? Legumes, soy products, egg whites, or nuts    Limit or do not include the following in your heart healthy plan:   · Unhealthy fats and oils:      ? Whole or 2% milk, cream cheese, sour cream, or cheese    ? High-fat cuts of beef (T-bone steaks, ribs), chicken or turkey with skin, and organ meats such as liver    ? Butter, stick margarine, shortening, and cooking oils such as coconut or palm oil    · Foods and liquids high in sodium:      ? Packaged foods, such as frozen dinners, cookies, macaroni and cheese, and cereals with more than 300 mg of sodium per serving    ? Vegetables with added sodium, such as instant potatoes, vegetables with added sauces, or regular canned vegetables    ? Cured or smoked meats, such as hot dogs, piper, and sausage    ? High-sodium ketchup, barbecue sauce, salad dressing, pickles, olives, soy sauce, or miso    · Foods and liquids high in sugar:      ? Candy, cake, cookies, pies, or doughnuts    ? Soft drinks (soda), sports drinks, or sweetened tea    ? Canned or dry mixes for cakes, soups, sauces, or gravies    Other healthy heart guidelines:   · Do not smoke  Nicotine and other chemicals in cigarettes and cigars can cause lung and heart damage  Ask your healthcare provider for information if you currently smoke and need help to quit   E-cigarettes or smokeless tobacco still contain nicotine  Talk to your healthcare provider before you use these products  · Limit or do not drink alcohol as directed  Alcohol can damage your heart and raise your blood pressure  Your healthcare provider may give you specific daily and weekly limits  The general recommended limit is 1 drink a day for women 21 or older and for men 72 or older  Do not have more than 3 drinks in a day or 7 in a week  The recommended limit is 2 drinks a day for men 24to 59years of age  Do not have more than 4 drinks in a day or 14 in a week  A drink of alcohol is 12 ounces of beer, 5 ounces of wine, or 1½ ounces of liquor  · Exercise regularly  Exercise can help you maintain a healthy weight and improve your blood pressure and cholesterol levels  Regular exercise can also decrease your risk for heart problems  Ask your healthcare provider about the best exercise plan for you  Do not start an exercise program without asking your healthcare provider  Follow up with your doctor or cardiologist as directed:  Write down your questions so you remember to ask them during your visits  © Copyright [employer] 2021 Information is for End User's use only and may not be sold, redistributed or otherwise used for commercial purposes  All illustrations and images included in CareNotes® are the copyrighted property of A D A M , Inc  or [facility] Joo Holman   The above information is an  only  It is not intended as medical advice for individual conditions or treatments  Talk to your doctor, nurse or pharmacist before following any medical regimen to see if it is safe and effective for you

## 2021-08-17 NOTE — ASSESSMENT & PLAN NOTE
CKD stable, again importance of BS/BP control/avoiding dehydration and avoiding NSAIDs reviewed, has f/u with renal in Sept, will follow  Lab Results   Component Value Date    HGBA1C 8 0 (H) 08/06/2021

## 2021-08-17 NOTE — ASSESSMENT & PLAN NOTE
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

## 2021-08-29 DIAGNOSIS — E11.22 TYPE 2 DIABETES MELLITUS WITH STAGE 3 CHRONIC KIDNEY DISEASE, WITHOUT LONG-TERM CURRENT USE OF INSULIN (HCC): ICD-10-CM

## 2021-08-29 DIAGNOSIS — N18.30 TYPE 2 DIABETES MELLITUS WITH STAGE 3 CHRONIC KIDNEY DISEASE, WITHOUT LONG-TERM CURRENT USE OF INSULIN (HCC): ICD-10-CM

## 2021-08-29 RX ORDER — LANCETS
EACH MISCELLANEOUS
Qty: 100 EACH | Refills: 3 | Status: SHIPPED | OUTPATIENT
Start: 2021-08-29

## 2021-09-03 ENCOUNTER — TELEPHONE (OUTPATIENT)
Dept: NEPHROLOGY | Facility: CLINIC | Age: 86
End: 2021-09-03

## 2021-09-03 DIAGNOSIS — E87.6 HYPOKALEMIA: ICD-10-CM

## 2021-09-03 DIAGNOSIS — N18.30 CKD STAGE 3 DUE TO TYPE 2 DIABETES MELLITUS (HCC): Primary | ICD-10-CM

## 2021-09-03 DIAGNOSIS — I10 ESSENTIAL HYPERTENSION: ICD-10-CM

## 2021-09-03 DIAGNOSIS — E11.22 CKD STAGE 3 DUE TO TYPE 2 DIABETES MELLITUS (HCC): Primary | ICD-10-CM

## 2021-09-03 NOTE — TELEPHONE ENCOUNTER
Left message for patient reminding her of appointment with Dr Junaid Liu on 9/9/21 and she should have labs done prior to appointment

## 2021-09-09 ENCOUNTER — OFFICE VISIT (OUTPATIENT)
Dept: NEPHROLOGY | Facility: HOSPITAL | Age: 86
End: 2021-09-09
Payer: COMMERCIAL

## 2021-09-09 VITALS
BODY MASS INDEX: 30.91 KG/M2 | HEIGHT: 62 IN | WEIGHT: 168 LBS | DIASTOLIC BLOOD PRESSURE: 64 MMHG | SYSTOLIC BLOOD PRESSURE: 118 MMHG | HEART RATE: 67 BPM | RESPIRATION RATE: 16 BRPM

## 2021-09-09 DIAGNOSIS — E87.6 HYPOKALEMIA: ICD-10-CM

## 2021-09-09 DIAGNOSIS — E83.52 HYPERCALCEMIA: ICD-10-CM

## 2021-09-09 DIAGNOSIS — N18.32 TYPE 2 DIABETES MELLITUS WITH STAGE 3B CHRONIC KIDNEY DISEASE, WITHOUT LONG-TERM CURRENT USE OF INSULIN (HCC): Primary | ICD-10-CM

## 2021-09-09 DIAGNOSIS — I10 ESSENTIAL HYPERTENSION: ICD-10-CM

## 2021-09-09 DIAGNOSIS — D64.9 NORMOCYTIC ANEMIA: ICD-10-CM

## 2021-09-09 DIAGNOSIS — E11.22 TYPE 2 DIABETES MELLITUS WITH STAGE 3B CHRONIC KIDNEY DISEASE, WITHOUT LONG-TERM CURRENT USE OF INSULIN (HCC): Primary | ICD-10-CM

## 2021-09-09 DIAGNOSIS — N28.1 COMPLEX RENAL CYST: ICD-10-CM

## 2021-09-09 PROBLEM — N18.30 CKD STAGE 3 DUE TO TYPE 2 DIABETES MELLITUS (HCC): Status: RESOLVED | Noted: 2021-01-15 | Resolved: 2021-09-09

## 2021-09-09 PROBLEM — N18.9 CHRONIC KIDNEY DISEASE: Status: RESOLVED | Noted: 2019-02-15 | Resolved: 2021-09-09

## 2021-09-09 PROCEDURE — 3074F SYST BP LT 130 MM HG: CPT | Performed by: INTERNAL MEDICINE

## 2021-09-09 PROCEDURE — 3078F DIAST BP <80 MM HG: CPT | Performed by: INTERNAL MEDICINE

## 2021-09-09 PROCEDURE — 99214 OFFICE O/P EST MOD 30 MIN: CPT | Performed by: INTERNAL MEDICINE

## 2021-09-09 PROCEDURE — 1036F TOBACCO NON-USER: CPT | Performed by: INTERNAL MEDICINE

## 2021-09-09 PROCEDURE — 1160F RVW MEDS BY RX/DR IN RCRD: CPT | Performed by: INTERNAL MEDICINE

## 2021-09-09 NOTE — PROGRESS NOTES
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 Diabetes mellitus type 2 as well as hypertensive nephrosclerosis +/- physiologic aging of the kidney   -last sCr 2 16 as of 6/11/21  Had been stable in the 1 6-1 8 range since July 2018, a bit higher as of late  -suspect low 2s new baseline sCr, eGFR 20 ml/min  -repeat BMP  -avoid nonsteroidals (ibuprofen, advil, motrin, aleve, naproxen, indomethacin, celebrex, toradol)  -may take tylenol  -please stay well hydrated       2  Microalbuminuria in setting of type 2 DM - UpCr improved to 152 as of June 2021  -off lisinopril  -microalbumin in the urine can also be seen with physiologic aging of the kidney  -monitor proteinuria     3  HTN - BP acceptable for age    -continue clonidine 0 1mg weekly patch, bystolic 52YH after dinner, nifedipine 90mg daily, torsemide 20mg daily  -off lisinopril   -off HCTZ 25mg daily  -monitor BP at home as your are  Call office with readings if BP running too low or too high       4  DM2, uncontrolled - on januvia, glipizide, last A1C 8, needs improved sugar control to slow down progression of CKD      5  Hypokalemia - off HCTZ, K 4 1, resolved on potassium gluconate supplement, 2 tablets a day     6  Dependent edema - albumin normal  Off HCTZ  Now on torsemide 20mg daily  Echo normal from Aug  2019  -monitor daily weight  Call office if weight gain > 3-5 lbs       7  Complex cyst, left kidney - stable in size as of July 2020 ultrasound     8  Hypercalcemia - ana 9 2 on latest bloodwork  Resolved off calcium supplements  May continue 2000u vitamin D supplements  Monitor ana/phos levels  FLC 1 85 - acceptable ratio in light of CKD  SPEP negative  Possible band in UPEP  Monitor this      RTC in 4 months  Obtain blood and urine testing now and again in 3 months  Sloane Hernandez was seen today for chronic kidney disease and follow-up  Diagnoses and all orders for this visit:    Type 2 diabetes mellitus with stage 3b chronic kidney disease, without long-term current use of insulin ([facility] [address] )  -     Basic metabolic panel; Future  -     Urinalysis with microscopic; Future  -     Protein / creatinine ratio, urine; Future  -     Basic metabolic panel  -     Urinalysis with microscopic  -     Protein / creatinine ratio, urine    Essential hypertension    Complex renal cyst    Hypercalcemia  -     Basic metabolic panel; Future  -     Basic metabolic panel    Hypokalemia  -     Basic metabolic panel; Future  -     Basic metabolic panel    Normocytic anemia        Assessment/Plan:  1  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 of Diabetes mellitus type 2 as well as hypertensive nephrosclerosis +/- physiologic aging of the kidney   -last sCr 2 16 as of 6/11/21  Had been stable in the 1 6-1 8 range since July 2018, a bit higher as of late  -suspect low 2s new baseline sCr, eGFR 20 ml/min  -repeat BMP  -avoid nonsteroidals (ibuprofen, advil, motrin, aleve, naproxen, indomethacin, celebrex, toradol)  -may take tylenol  -please stay well hydrated       2  Microalbuminuria in setting of type 2 DM - UpCr improved to 152 as of June 2021  -off lisinopril  -microalbumin in the urine can also be seen with physiologic aging of the kidney  -monitor proteinuria     3  HTN - BP acceptable for age    -continue clonidine 0 1mg weekly patch, bystolic 29YJ after dinner, nifedipine 90mg daily, torsemide 20mg daily  -off lisinopril   -off HCTZ 25mg daily  -monitor BP at home as your are  Call office with readings if BP running too low or too high       4  DM2, uncontrolled - on januvia, glipizide, last A1C 8, needs improved sugar control to slow down progression of CKD      5   Hypokalemia - off HCTZ, K 4 1, resolved on potassium gluconate supplement, 2 tablets a day     6  Dependent edema - albumin normal  Off HCTZ  Now on torsemide 20mg daily  Echo normal from Aug  2019  -monitor daily weight  Call office if weight gain > 3-5 lbs       7  Complex cyst, left kidney - stable in size as of July 2020 ultrasound     8  Hypercalcemia - ana 9 2 on latest bloodwork  Resolved off calcium supplements  May continue 2000u vitamin D supplements  Monitor ana/phos levels  FLC 1 85 - acceptable ratio in light of CKD  SPEP negative  Possible band in UPEP  Monitor this      RTC in 4 months  Obtain blood and urine testing now and again in 3 months  SUBJECTIVE / INTERVAL HISTORY:  80 y o  female presents in follow up of CKD  Blancokeenan Soto denies any recent illness/hospitalizations/medication changes since last office visit  Denies NSAID use  HTN - BP well controlled at home  DM2 - blood sugars elevated in the morning despite recent increase in glipizide dose  Does have swelling more at night, puts legs up and they improve    Review of Systems   Constitutional: Negative for chills and fever  HENT: Negative for sore throat and trouble swallowing  Eyes: Negative for visual disturbance  Respiratory: Negative for cough and shortness of breath  Cardiovascular: Negative for chest pain and leg swelling  Gastrointestinal: Negative for diarrhea, nausea and vomiting  Endocrine: Negative for polyuria  Genitourinary: Negative for difficulty urinating, dysuria and hematuria  Musculoskeletal: Negative for back pain and neck pain  Skin: Negative for rash  Neurological: Negative for dizziness, light-headedness and numbness  Psychiatric/Behavioral: The patient is not nervous/anxious  OBJECTIVE:  /64 (BP Location: Left arm, Patient Position: Sitting, Cuff Size: Standard)   Pulse 67   Resp 16   Ht 5' 2" (1 575 m)   Wt 76 2 kg (168 lb)   BMI 30 73 kg/m²  Body mass index is 30 73 kg/m²      Physical exam:  Physical Exam  Vitals and nursing note reviewed  Constitutional:       General: She is not in acute distress  Appearance: Normal appearance  She is well-developed  She is not diaphoretic  HENT:      Head: Normocephalic and atraumatic  Nose: Nose normal       Mouth/Throat:      Mouth: Mucous membranes are dry  Pharynx: No oropharyngeal exudate  Eyes:      General: No scleral icterus  Right eye: No discharge  Left eye: No discharge  Comments: eyeglasses   Neck:      Thyroid: No thyromegaly  Cardiovascular:      Rate and Rhythm: Normal rate and regular rhythm  Heart sounds: No murmur heard  Pulmonary:      Effort: Pulmonary effort is normal  No respiratory distress  Breath sounds: Normal breath sounds  No wheezing  Abdominal:      General: Bowel sounds are normal  There is no distension  Palpations: Abdomen is soft  Musculoskeletal:         General: Swelling (trace b/l LE) present  Normal range of motion  Cervical back: Normal range of motion and neck supple  Skin:     General: Skin is warm and dry  Findings: No rash  Neurological:      General: No focal deficit present  Mental Status: She is alert  Motor: No abnormal muscle tone        Comments: awake   Psychiatric:         Mood and Affect: Mood normal          Behavior: Behavior normal          Medications:    Current Outpatient Medications:     ACCU-CHEK PAUL PLUS test strip, , Disp: , Rfl:     Accu-Chek Softclix Lancets lancets, , Disp: , Rfl:     Accu-Chek Softclix Lancets lancets, USE 2 TIMES DAILY AS DIRECTED, Disp: 100 each, Rfl: 3    aspirin 81 MG tablet, Take 1 tablet by mouth daily, Disp: , Rfl:     Bystolic 20 MG tablet, TAKE 1 TABLET BY MOUTH EVERY DAY, Disp: 90 tablet, Rfl: 1    Cetirizine HCl (ZYRTEC ALLERGY) 10 MG CAPS, Take 1 tablet by mouth daily as needed, Disp: , Rfl:     cholecalciferol (VITAMIN D3) 1,000 units tablet, Take 2 tablets by mouth daily, Disp: , Rfl:    cloNIDine (CATAPRES-TTS-1) 0 1 mg/24 hr, PLACE 1 PATCH (0 1 MG TOTAL) ON THE SKIN ONCE A WEEK, Disp: 12 patch, Rfl: 1    famotidine (PEPCID) 20 mg tablet, TAKE 1 TABLET BY MOUTH EVERY DAY, Disp: 90 tablet, Rfl: 1    ferrous sulfate 324 (65 Fe) mg, TAKE 1 TABLET BY MOUTH DAILY BEFORE BREAKFAST, Disp: 90 tablet, Rfl: 1    glipiZIDE (GLUCOTROL) 5 mg tablet, Take 2 tablets (10 mg total) by mouth 2 (two) times a day before meals, Disp: 360 tablet, Rfl: 1    glucose blood (Accu-Chek Emma Plus) test strip, USE AS INSTRUCTED TWICE DAILY, Disp: 100 each, Rfl: 7    glucose blood (Accu-Chek Guide) test strip, 2 x daily, Disp: 100 strip, Rfl: 3    Januvia 50 MG tablet, TAKE 1 TABLET BY MOUTH EVERY DAY, Disp: 90 tablet, Rfl: 1    NIFEdipine (ADALAT CC) 90 mg 24 hr tablet, Take 1 tablet (90 mg total) by mouth daily, Disp: 90 tablet, Rfl: 2    Potassium Gluconate 595 (99 K) MG TABS, Take 1 tablet by mouth 2 (two) times a day , Disp: , Rfl:     sertraline (ZOLOFT) 25 mg tablet, TAKE 1 TABLET BY MOUTH EVERY DAY, Disp: 90 tablet, Rfl: 1    simvastatin (ZOCOR) 20 mg tablet, TAKE 1 TABLET BY MOUTH DAILY AT BEDTIME, Disp: 90 tablet, Rfl: 1    torsemide (DEMADEX) 20 mg tablet, TAKE 1 TABLET BY MOUTH EVERY DAY, Disp: 90 tablet, Rfl: 1    Allergies:   Allergies as of 09/09/2021    (No Known Allergies)       The following portions of the patient's history were reviewed and updated as appropriate: past family history, past surgical history and problem list     Laboratory Results:  Lab Results   Component Value Date    SODIUM 142 06/11/2021    K 4 1 06/11/2021     06/11/2021    CO2 27 06/11/2021    BUN 35 (H) 06/11/2021    CREATININE 2 16 (H) 06/11/2021    GLUC 200 (H) 08/06/2021    CALCIUM 9 7 07/10/2020        Lab Results   Component Value Date    CALCIUM 9 7 07/10/2020       Portions of the record may have been created with voice recognition software   Occasional wrong word or "sound a like" substitutions may have occurred due to the inherent limitations of voice recognition software   Read the chart carefully and recognize, using context, where substitutions have occurred

## 2021-09-09 NOTE — PATIENT INSTRUCTIONS
1  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 of Diabetes mellitus type 2 as well as hypertensive nephrosclerosis +/- physiologic aging of the kidney   -last sCr 2 16 as of 6/11/21  Had been stable in the 1 6-1 8 range since July 2018, a bit higher as of late  -suspect low 2s new baseline sCr, eGFR 20 ml/min  -repeat BMP  -avoid nonsteroidals (ibuprofen, advil, motrin, aleve, naproxen, indomethacin, celebrex, toradol)  -may take tylenol  -please stay well hydrated       2  Microalbuminuria in setting of type 2 DM - UpCr improved to 152 as of June 2021  -off lisinopril  -microalbumin in the urine can also be seen with physiologic aging of the kidney  -monitor proteinuria     3  HTN - BP acceptable for age    -continue clonidine 0 1mg weekly patch, bystolic 50WX after dinner, nifedipine 90mg daily, torsemide 20mg daily  -off lisinopril   -off HCTZ 25mg daily  -monitor BP at home as your are  Call office with readings if BP running too low or too high       4  DM2, uncontrolled - on januvia, glipizide, last A1C 8, needs improved sugar control to slow down progression of CKD      5  Hypokalemia - off HCTZ, K 4 1, resolved on potassium gluconate supplement, 2 tablets a day     6  Dependent edema - albumin normal  Off HCTZ  Now on torsemide 20mg daily  Echo normal from Aug  2019  -monitor daily weight  Call office if weight gain > 3-5 lbs       7  Complex cyst, left kidney - stable in size as of July 2020 ultrasound     8  Hypercalcemia - ana 9 2 on latest bloodwork  Resolved off calcium supplements  May continue 2000u vitamin D supplements  Monitor ana/phos levels  FLC 1 85 - acceptable ratio in light of CKD  SPEP negative  Possible band in UPEP  Monitor this      RTC in 4 months  Obtain blood and urine testing now and again in 3 months

## 2021-09-09 NOTE — LETTER
September 9, 2021     Kim Haney, [address]  [address] [facility]  [address] [address] [address]    Patient: Dustin Treviño   YOB: 1935   Date of Visit: 9/9/2021       Dear Dr Jeff Blanco: Thank you for referring Ethyl Pastures to me for evaluation  Below are my notes for this consultation  Blood sugars remain elevated despite dose increase in glipizide - wonder if patient will need injectable  If you have questions, please do not hesitate to call me  I look forward to following your patient along with you  Sincerely,        Marilin Jimenez DO        CC: No Recipients  Marilin Jimenez DO  9/9/2021 10:22 AM  Sign when Signing Visit  NEPHROLOGY OUTPATIENT PROGRESS NOTE   Dustin Treviño 80 y o  female MRN: [id number]  DATE: 9/9/2021  Reason for visit:   Chief Complaint   Patient presents with    Chronic Kidney Disease    Follow-up        Patient Instructions   1  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 of Diabetes mellitus type 2 as well as hypertensive nephrosclerosis +/- physiologic aging of the kidney   -last sCr 2 16 as of 6/11/21  Had been stable in the 1 6-1 8 range since July 2018, a bit higher as of late  -suspect low 2s new baseline sCr, eGFR 20 ml/min  -repeat BMP  -avoid nonsteroidals (ibuprofen, advil, motrin, aleve, naproxen, indomethacin, celebrex, toradol)  -may take tylenol  -please stay well hydrated       2  Microalbuminuria in setting of type 2 DM - UpCr improved to 152 as of June 2021  -off lisinopril  -microalbumin in the urine can also be seen with physiologic aging of the kidney  -monitor proteinuria     3  HTN - BP acceptable for age    -continue clonidine 0 1mg weekly patch, bystolic 40AL after dinner, nifedipine 90mg daily, torsemide 20mg daily  -off lisinopril   -off HCTZ 25mg daily  -monitor BP at home as your are   Call office with readings if BP running too low or too high       4  DM2, uncontrolled - on januvia, glipizide, last A1C 8, needs improved sugar control to slow down progression of CKD      5  Hypokalemia - off HCTZ, K 4 1, resolved on potassium gluconate supplement, 2 tablets a day     6  Dependent edema - albumin normal  Off HCTZ  Now on torsemide 20mg daily  Echo normal from Aug  2019  -monitor daily weight  Call office if weight gain > 3-5 lbs       7  Complex cyst, left kidney - stable in size as of July 2020 ultrasound     8  Hypercalcemia - ana 9 2 on latest bloodwork  Resolved off calcium supplements  May continue 2000u vitamin D supplements  Monitor ana/phos levels  FLC 1 85 - acceptable ratio in light of CKD  SPEP negative  Possible band in UPEP  Monitor this      RTC in 4 months  Obtain blood and urine testing now and again in 3 months  Kayy Brody was seen today for chronic kidney disease and follow-up  Diagnoses and all orders for this visit:    Type 2 diabetes mellitus with stage 3b chronic kidney disease, without long-term current use of insulin ([facility] [address] )  -     Basic metabolic panel; Future  -     Urinalysis with microscopic; Future  -     Protein / creatinine ratio, urine; Future  -     Basic metabolic panel  -     Urinalysis with microscopic  -     Protein / creatinine ratio, urine    Essential hypertension    Complex renal cyst    Hypercalcemia  -     Basic metabolic panel; Future  -     Basic metabolic panel    Hypokalemia  -     Basic metabolic panel; Future  -     Basic metabolic panel    Normocytic anemia        Assessment/Plan:  1  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 of Diabetes mellitus type 2 as well as hypertensive nephrosclerosis +/- physiologic aging of the kidney   -last sCr 2 16 as of 6/11/21  Had been stable in the 1 6-1 8 range since July 2018, a bit higher as of late  -suspect low 2s new baseline sCr, eGFR 20 ml/min  -repeat BMP  -avoid nonsteroidals (ibuprofen, advil, motrin, aleve, naproxen, indomethacin, celebrex, toradol)  -may take tylenol  -please stay well hydrated       2  Microalbuminuria in setting of type 2 DM - UpCr improved to 152 as of June 2021  -off lisinopril  -microalbumin in the urine can also be seen with physiologic aging of the kidney  -monitor proteinuria     3  HTN - BP acceptable for age    -continue clonidine 0 1mg weekly patch, bystolic 99WV after dinner, nifedipine 90mg daily, torsemide 20mg daily  -off lisinopril   -off HCTZ 25mg daily  -monitor BP at home as your are  Call office with readings if BP running too low or too high       4  DM2, uncontrolled - on januvia, glipizide, last A1C 8, needs improved sugar control to slow down progression of CKD      5  Hypokalemia - off HCTZ, K 4 1, resolved on potassium gluconate supplement, 2 tablets a day     6  Dependent edema - albumin normal  Off HCTZ  Now on torsemide 20mg daily  Echo normal from Aug  2019  -monitor daily weight  Call office if weight gain > 3-5 lbs       7  Complex cyst, left kidney - stable in size as of July 2020 ultrasound     8  Hypercalcemia - ana 9 2 on latest bloodwork  Resolved off calcium supplements  May continue 2000u vitamin D supplements  Monitor ana/phos levels  FLC 1 85 - acceptable ratio in light of CKD  SPEP negative  Possible band in UPEP  Monitor this      RTC in 4 months  Obtain blood and urine testing now and again in 3 months  SUBJECTIVE / INTERVAL HISTORY:  80 y o  female presents in follow up of CKD  Shruthi Lorenzo denies any recent illness/hospitalizations/medication changes since last office visit  Denies NSAID use  HTN - BP well controlled at home  DM2 - blood sugars elevated in the morning despite recent increase in glipizide dose  Does have swelling more at night, puts legs up and they improve    Review of Systems   Constitutional: Negative for chills and fever  HENT: Negative for sore throat and trouble swallowing  Eyes: Negative for visual disturbance  Respiratory: Negative for cough and shortness of breath      Cardiovascular: Negative for chest pain and leg swelling  Gastrointestinal: Negative for diarrhea, nausea and vomiting  Endocrine: Negative for polyuria  Genitourinary: Negative for difficulty urinating, dysuria and hematuria  Musculoskeletal: Negative for back pain and neck pain  Skin: Negative for rash  Neurological: Negative for dizziness, light-headedness and numbness  Psychiatric/Behavioral: The patient is not nervous/anxious  OBJECTIVE:  /64 (BP Location: Left arm, Patient Position: Sitting, Cuff Size: Standard)   Pulse 67   Resp 16   Ht 5' 2" (1 575 m)   Wt 76 2 kg (168 lb)   BMI 30 73 kg/m²  Body mass index is 30 73 kg/m²  Physical exam:  Physical Exam  Vitals and nursing note reviewed  Constitutional:       General: She is not in acute distress  Appearance: Normal appearance  She is well-developed  She is not diaphoretic  HENT:      Head: Normocephalic and atraumatic  Nose: Nose normal       Mouth/Throat:      Mouth: Mucous membranes are dry  Pharynx: No oropharyngeal exudate  Eyes:      General: No scleral icterus  Right eye: No discharge  Left eye: No discharge  Comments: eyeglasses   Neck:      Thyroid: No thyromegaly  Cardiovascular:      Rate and Rhythm: Normal rate and regular rhythm  Heart sounds: No murmur heard  Pulmonary:      Effort: Pulmonary effort is normal  No respiratory distress  Breath sounds: Normal breath sounds  No wheezing  Abdominal:      General: Bowel sounds are normal  There is no distension  Palpations: Abdomen is soft  Musculoskeletal:         General: Swelling (trace b/l LE) present  Normal range of motion  Cervical back: Normal range of motion and neck supple  Skin:     General: Skin is warm and dry  Findings: No rash  Neurological:      General: No focal deficit present  Mental Status: She is alert  Motor: No abnormal muscle tone        Comments: awake   Psychiatric:         Mood and Affect: Mood normal          Behavior: Behavior normal          Medications:    Current Outpatient Medications:     ACCU-CHEK PAUL PLUS test strip, , Disp: , Rfl:     Accu-Chek Softclix Lancets lancets, , Disp: , Rfl:     Accu-Chek Softclix Lancets lancets, USE 2 TIMES DAILY AS DIRECTED, Disp: 100 each, Rfl: 3    aspirin 81 MG tablet, Take 1 tablet by mouth daily, Disp: , Rfl:     Bystolic 20 MG tablet, TAKE 1 TABLET BY MOUTH EVERY DAY, Disp: 90 tablet, Rfl: 1    Cetirizine HCl (ZYRTEC ALLERGY) 10 MG CAPS, Take 1 tablet by mouth daily as needed, Disp: , Rfl:     cholecalciferol (VITAMIN D3) 1,000 units tablet, Take 2 tablets by mouth daily, Disp: , Rfl:     cloNIDine (CATAPRES-TTS-1) 0 1 mg/24 hr, PLACE 1 PATCH (0 1 MG TOTAL) ON THE SKIN ONCE A WEEK, Disp: 12 patch, Rfl: 1    famotidine (PEPCID) 20 mg tablet, TAKE 1 TABLET BY MOUTH EVERY DAY, Disp: 90 tablet, Rfl: 1    ferrous sulfate 324 (65 Fe) mg, TAKE 1 TABLET BY MOUTH DAILY BEFORE BREAKFAST, Disp: 90 tablet, Rfl: 1    glipiZIDE (GLUCOTROL) 5 mg tablet, Take 2 tablets (10 mg total) by mouth 2 (two) times a day before meals, Disp: 360 tablet, Rfl: 1    glucose blood (Accu-Chek Paul Plus) test strip, USE AS INSTRUCTED TWICE DAILY, Disp: 100 each, Rfl: 7    glucose blood (Accu-Chek Guide) test strip, 2 x daily, Disp: 100 strip, Rfl: 3    Januvia 50 MG tablet, TAKE 1 TABLET BY MOUTH EVERY DAY, Disp: 90 tablet, Rfl: 1    NIFEdipine (ADALAT CC) 90 mg 24 hr tablet, Take 1 tablet (90 mg total) by mouth daily, Disp: 90 tablet, Rfl: 2    Potassium Gluconate 595 (99 K) MG TABS, Take 1 tablet by mouth 2 (two) times a day , Disp: , Rfl:     sertraline (ZOLOFT) 25 mg tablet, TAKE 1 TABLET BY MOUTH EVERY DAY, Disp: 90 tablet, Rfl: 1    simvastatin (ZOCOR) 20 mg tablet, TAKE 1 TABLET BY MOUTH DAILY AT BEDTIME, Disp: 90 tablet, Rfl: 1    torsemide (DEMADEX) 20 mg tablet, TAKE 1 TABLET BY MOUTH EVERY DAY, Disp: 90 tablet, Rfl: 1    Allergies:   Allergies as of 09/09/2021    (No Known Allergies)       The following portions of the patient's history were reviewed and updated as appropriate: past family history, past surgical history and problem list     Laboratory Results:  Lab Results   Component Value Date    SODIUM 142 06/11/2021    K 4 1 06/11/2021     06/11/2021    CO2 27 06/11/2021    BUN 35 (H) 06/11/2021    CREATININE 2 16 (H) 06/11/2021    GLUC 200 (H) 08/06/2021    CALCIUM 9 7 07/10/2020        Lab Results   Component Value Date    CALCIUM 9 7 07/10/2020       Portions of the record may have been created with voice recognition software   Occasional wrong word or "sound a like" substitutions may have occurred due to the inherent limitations of voice recognition software   Read the chart carefully and recognize, using context, where substitutions have occurred

## 2021-09-13 ENCOUNTER — DOCUMENTATION (OUTPATIENT)
Dept: NEPHROLOGY | Facility: HOSPITAL | Age: 86
End: 2021-09-13

## 2021-09-13 ENCOUNTER — TELEPHONE (OUTPATIENT)
Dept: NEPHROLOGY | Facility: HOSPITAL | Age: 86
End: 2021-09-13

## 2021-09-13 LAB
CREAT ?TM UR-SCNC: 82 UMOL/L
EXT DIFF-ABS BASOPHILS: 0.1
EXT DIFF-ABS EOSINOPHILS: 0.9
EXT DIFF-ABS LYMPHOCYTES: 4.4
EXT DIFF-ABS MONOCYTES: 1
EXT DIFF-ABS NEUTROPHILS: 6.3
EXT GLUCOSE BLD: 197
EXT PROTEIN URINE: 15.9
EXTERNAL BUN: 32
EXTERNAL CALCIUM: 10
EXTERNAL CHLORIDE: 99
EXTERNAL CO2: 26
EXTERNAL CREATININE: 1.93
EXTERNAL EGFR: 23
EXTERNAL HEMATOCRIT: 42 %
EXTERNAL HEMOGLOBIN: 13.8 G/DL
EXTERNAL MCV: 94
EXTERNAL PLATELET COUNT: 220 K/ΜL
EXTERNAL POTASSIUM: 4.2
EXTERNAL RBC: 4.49
EXTERNAL RDW: 12.8
EXTERNAL SODIUM: 141
EXTERNAL WBC: 12.7
MAGNESIUM SERPL-MCNC: 2.1 MG/DL (ref 1.6–2.6)
PHOSPHATE SERPL-MCNC: 4 MG/DL (ref 2.3–4.1)
PROT/CREAT UR: 194 MG/G{CREAT}
PTH-INTACT SERPL-MCNC: 41 PG/ML

## 2021-09-13 NOTE — TELEPHONE ENCOUNTER
Left detailed message for patient to let her know that per Dr aJg Mitchell her labs are stable and there are no changes at this time

## 2021-10-01 ENCOUNTER — HOSPITAL ENCOUNTER (OUTPATIENT)
Dept: MAMMOGRAPHY | Facility: IMAGING CENTER | Age: 86
Discharge: HOME | End: 2021-10-01
Payer: COMMERCIAL

## 2021-10-01 VITALS — BODY MASS INDEX: 30.91 KG/M2 | HEIGHT: 62 IN | WEIGHT: 168 LBS

## 2021-10-01 DIAGNOSIS — Z12.31 VISIT FOR SCREENING MAMMOGRAM: ICD-10-CM

## 2021-10-01 DIAGNOSIS — Z12.31 ENCOUNTER FOR SCREENING MAMMOGRAM FOR MALIGNANT NEOPLASM OF BREAST: ICD-10-CM

## 2021-10-01 PROCEDURE — 77067 SCR MAMMO BI INCL CAD: CPT

## 2021-10-01 PROCEDURE — 77063 BREAST TOMOSYNTHESIS BI: CPT

## 2021-10-04 DIAGNOSIS — I10 ESSENTIAL HYPERTENSION: ICD-10-CM

## 2021-10-04 RX ORDER — NEBIVOLOL HYDROCHLORIDE 20 MG/1
TABLET ORAL
Qty: 90 TABLET | Refills: 1 | Status: SHIPPED | OUTPATIENT
Start: 2021-10-04 | End: 2021-11-26

## 2021-10-04 RX ORDER — CLONIDINE 0.1 MG/24H
1 PATCH, EXTENDED RELEASE TRANSDERMAL WEEKLY
Qty: 12 PATCH | Refills: 1 | Status: SHIPPED | OUTPATIENT
Start: 2021-10-04 | End: 2022-02-12

## 2021-11-03 ENCOUNTER — IMMUNIZATIONS (OUTPATIENT)
Dept: FAMILY MEDICINE CLINIC | Facility: HOSPITAL | Age: 86
End: 2021-11-03
Payer: COMMERCIAL

## 2021-11-03 DIAGNOSIS — Z23 ENCOUNTER FOR IMMUNIZATION: Primary | ICD-10-CM

## 2021-11-03 PROCEDURE — 90662 IIV NO PRSV INCREASED AG IM: CPT

## 2021-11-03 PROCEDURE — G0008 ADMIN INFLUENZA VIRUS VAC: HCPCS

## 2021-11-06 DIAGNOSIS — R79.0 LOW FERRITIN: ICD-10-CM

## 2021-11-06 RX ORDER — FERROUS SULFATE TAB EC 324 MG (65 MG FE EQUIVALENT) 324 (65 FE) MG
TABLET DELAYED RESPONSE ORAL
Qty: 90 TABLET | Refills: 1 | Status: SHIPPED | OUTPATIENT
Start: 2021-11-06 | End: 2022-03-27

## 2021-11-11 DIAGNOSIS — F41.9 ANXIETY: ICD-10-CM

## 2021-11-11 RX ORDER — SERTRALINE HYDROCHLORIDE 25 MG/1
TABLET, FILM COATED ORAL
Qty: 90 TABLET | Refills: 1 | Status: SHIPPED | OUTPATIENT
Start: 2021-11-11 | End: 2022-03-26

## 2021-11-12 DIAGNOSIS — E11.22 TYPE 2 DIABETES MELLITUS WITH STAGE 3 CHRONIC KIDNEY DISEASE, WITHOUT LONG-TERM CURRENT USE OF INSULIN (HCC): ICD-10-CM

## 2021-11-12 DIAGNOSIS — N18.30 TYPE 2 DIABETES MELLITUS WITH STAGE 3 CHRONIC KIDNEY DISEASE, WITHOUT LONG-TERM CURRENT USE OF INSULIN (HCC): ICD-10-CM

## 2021-11-12 RX ORDER — SITAGLIPTIN 50 MG/1
TABLET, FILM COATED ORAL
Qty: 90 TABLET | Refills: 1 | Status: SHIPPED | OUTPATIENT
Start: 2021-11-12 | End: 2022-05-06

## 2021-11-17 DIAGNOSIS — E78.5 DYSLIPIDEMIA: ICD-10-CM

## 2021-11-17 RX ORDER — SIMVASTATIN 20 MG
TABLET ORAL
Qty: 90 TABLET | Refills: 1 | Status: SHIPPED | OUTPATIENT
Start: 2021-11-17 | End: 2022-04-18

## 2021-11-19 LAB
BUN SERPL-MCNC: 28 MG/DL (ref 8–27)
BUN/CREAT SERPL: 15 (ref 12–28)
CALCIUM SERPL-MCNC: 9.3 MG/DL (ref 8.7–10.3)
CHLORIDE SERPL-SCNC: 102 MMOL/L (ref 96–106)
CO2 SERPL-SCNC: 25 MMOL/L (ref 20–29)
CREAT SERPL-MCNC: 1.84 MG/DL (ref 0.57–1)
EST. AVERAGE GLUCOSE BLD GHB EST-MCNC: 189 MG/DL
GLUCOSE SERPL-MCNC: 211 MG/DL (ref 65–99)
HBA1C MFR BLD: 8.2 % (ref 4.8–5.6)
POTASSIUM SERPL-SCNC: 4 MMOL/L (ref 3.5–5.2)
SL AMB EGFR AFRICAN AMERICAN: 28 ML/MIN/1.73
SL AMB EGFR NON AFRICAN AMERICAN: 24 ML/MIN/1.73
SODIUM SERPL-SCNC: 142 MMOL/L (ref 134–144)

## 2021-11-26 DIAGNOSIS — I10 ESSENTIAL HYPERTENSION: ICD-10-CM

## 2021-11-26 RX ORDER — NEBIVOLOL HYDROCHLORIDE 20 MG/1
TABLET ORAL
Qty: 90 TABLET | Refills: 2 | Status: SHIPPED | OUTPATIENT
Start: 2021-11-26 | End: 2021-12-28

## 2021-11-26 RX ORDER — NEBIVOLOL 20 MG/1
TABLET ORAL
Qty: 90 TABLET | Refills: 2 | OUTPATIENT
Start: 2021-11-26

## 2021-11-30 DIAGNOSIS — N18.32 TYPE 2 DIABETES MELLITUS WITH STAGE 3B CHRONIC KIDNEY DISEASE, WITHOUT LONG-TERM CURRENT USE OF INSULIN (HCC): ICD-10-CM

## 2021-11-30 DIAGNOSIS — E11.22 TYPE 2 DIABETES MELLITUS WITH STAGE 3B CHRONIC KIDNEY DISEASE, WITHOUT LONG-TERM CURRENT USE OF INSULIN (HCC): ICD-10-CM

## 2021-11-30 RX ORDER — BLOOD SUGAR DIAGNOSTIC
STRIP MISCELLANEOUS
Qty: 100 STRIP | Refills: 3 | Status: SHIPPED | OUTPATIENT
Start: 2021-11-30 | End: 2022-07-04

## 2021-12-03 ENCOUNTER — TELEPHONE (OUTPATIENT)
Dept: NEPHROLOGY | Facility: CLINIC | Age: 86
End: 2021-12-03

## 2021-12-04 DIAGNOSIS — I10 ESSENTIAL HYPERTENSION: ICD-10-CM

## 2021-12-04 DIAGNOSIS — K21.00 GASTROESOPHAGEAL REFLUX DISEASE WITH ESOPHAGITIS: ICD-10-CM

## 2021-12-04 DIAGNOSIS — R60.9 DEPENDENT EDEMA: ICD-10-CM

## 2021-12-04 RX ORDER — TORSEMIDE 20 MG/1
TABLET ORAL
Qty: 90 TABLET | Refills: 1 | Status: SHIPPED | OUTPATIENT
Start: 2021-12-04 | End: 2021-12-28

## 2021-12-04 RX ORDER — FAMOTIDINE 20 MG/1
TABLET, FILM COATED ORAL
Qty: 90 TABLET | Refills: 1 | Status: SHIPPED | OUTPATIENT
Start: 2021-12-04 | End: 2022-05-02

## 2021-12-07 ENCOUNTER — OFFICE VISIT (OUTPATIENT)
Dept: FAMILY MEDICINE CLINIC | Facility: HOSPITAL | Age: 86
End: 2021-12-07
Payer: COMMERCIAL

## 2021-12-07 VITALS
SYSTOLIC BLOOD PRESSURE: 136 MMHG | BODY MASS INDEX: 30.95 KG/M2 | WEIGHT: 168.2 LBS | DIASTOLIC BLOOD PRESSURE: 80 MMHG | HEIGHT: 62 IN | TEMPERATURE: 97.2 F | HEART RATE: 72 BPM

## 2021-12-07 DIAGNOSIS — E78.5 DYSLIPIDEMIA: ICD-10-CM

## 2021-12-07 DIAGNOSIS — N18.30 TYPE 2 DIABETES MELLITUS WITH STAGE 3 CHRONIC KIDNEY DISEASE, WITHOUT LONG-TERM CURRENT USE OF INSULIN (HCC): ICD-10-CM

## 2021-12-07 DIAGNOSIS — R60.9 DEPENDENT EDEMA: ICD-10-CM

## 2021-12-07 DIAGNOSIS — F41.9 ANXIETY DISORDER, UNSPECIFIED TYPE: ICD-10-CM

## 2021-12-07 DIAGNOSIS — I10 ESSENTIAL HYPERTENSION: ICD-10-CM

## 2021-12-07 DIAGNOSIS — N18.30 TYPE 2 DIABETES MELLITUS WITH STAGE 3 CHRONIC KIDNEY DISEASE, WITHOUT LONG-TERM CURRENT USE OF INSULIN (HCC): Primary | ICD-10-CM

## 2021-12-07 DIAGNOSIS — E11.22 TYPE 2 DIABETES MELLITUS WITH STAGE 3 CHRONIC KIDNEY DISEASE, WITHOUT LONG-TERM CURRENT USE OF INSULIN (HCC): ICD-10-CM

## 2021-12-07 DIAGNOSIS — I10 PRIMARY HYPERTENSION: ICD-10-CM

## 2021-12-07 DIAGNOSIS — E11.22 TYPE 2 DIABETES MELLITUS WITH STAGE 3 CHRONIC KIDNEY DISEASE, WITHOUT LONG-TERM CURRENT USE OF INSULIN (HCC): Primary | ICD-10-CM

## 2021-12-07 PROBLEM — K21.00 GASTROESOPHAGEAL REFLUX DISEASE WITH ESOPHAGITIS: Status: ACTIVE | Noted: 2021-12-07

## 2021-12-07 PROCEDURE — 99214 OFFICE O/P EST MOD 30 MIN: CPT | Performed by: INTERNAL MEDICINE

## 2021-12-07 RX ORDER — GLIPIZIDE 5 MG/1
TABLET ORAL
Qty: 450 TABLET | Refills: 1
Start: 2021-12-07 | End: 2021-12-07 | Stop reason: SDUPTHER

## 2021-12-07 RX ORDER — GLIPIZIDE 5 MG/1
TABLET ORAL
Qty: 450 TABLET | Refills: 1 | Status: SHIPPED | OUTPATIENT
Start: 2021-12-07 | End: 2021-12-10 | Stop reason: SDUPTHER

## 2021-12-10 RX ORDER — GLIPIZIDE 5 MG/1
TABLET ORAL
Qty: 450 TABLET | Refills: 1 | OUTPATIENT
Start: 2021-12-10

## 2021-12-18 LAB
APPEARANCE UR: CLEAR
BACTERIA URNS QL MICRO: NORMAL
BILIRUB UR QL STRIP: NEGATIVE
BUN SERPL-MCNC: 28 MG/DL (ref 8–27)
BUN/CREAT SERPL: 16 (ref 12–28)
CALCIUM SERPL-MCNC: 9.3 MG/DL (ref 8.7–10.3)
CASTS URNS QL MICRO: NORMAL /LPF
CHLORIDE SERPL-SCNC: 102 MMOL/L (ref 96–106)
CO2 SERPL-SCNC: 25 MMOL/L (ref 20–29)
COLOR UR: YELLOW
CREAT SERPL-MCNC: 1.78 MG/DL (ref 0.57–1)
CREAT UR-MCNC: 74.4 MG/DL
EPI CELLS #/AREA URNS HPF: NORMAL /HPF (ref 0–10)
GLUCOSE SERPL-MCNC: 205 MG/DL (ref 65–99)
GLUCOSE UR QL: NEGATIVE
HGB UR QL STRIP: NEGATIVE
KETONES UR QL STRIP: NEGATIVE
LEUKOCYTE ESTERASE UR QL STRIP: ABNORMAL
MICRO URNS: ABNORMAL
NITRITE UR QL STRIP: NEGATIVE
PH UR STRIP: 6.5 [PH] (ref 5–7.5)
POTASSIUM SERPL-SCNC: 4.2 MMOL/L (ref 3.5–5.2)
PROT UR QL STRIP: ABNORMAL
PROT UR-MCNC: 16.2 MG/DL
PROT/CREAT UR: 218 MG/G CREAT (ref 0–200)
RBC #/AREA URNS HPF: NORMAL /HPF (ref 0–2)
SL AMB EGFR AFRICAN AMERICAN: 29 ML/MIN/1.73
SL AMB EGFR NON AFRICAN AMERICAN: 25 ML/MIN/1.73
SODIUM SERPL-SCNC: 141 MMOL/L (ref 134–144)
SP GR UR: 1.01 (ref 1–1.03)
UROBILINOGEN UR STRIP-ACNC: 0.2 MG/DL (ref 0.2–1)
WBC #/AREA URNS HPF: NORMAL /HPF (ref 0–5)

## 2021-12-28 DIAGNOSIS — R60.9 DEPENDENT EDEMA: ICD-10-CM

## 2021-12-28 DIAGNOSIS — I10 ESSENTIAL HYPERTENSION: ICD-10-CM

## 2021-12-28 RX ORDER — TORSEMIDE 20 MG/1
TABLET ORAL
Qty: 90 TABLET | Refills: 2 | Status: SHIPPED | OUTPATIENT
Start: 2021-12-28 | End: 2022-04-28

## 2021-12-28 RX ORDER — NEBIVOLOL HYDROCHLORIDE 20 MG/1
TABLET ORAL
Qty: 90 TABLET | Refills: 3 | Status: SHIPPED | OUTPATIENT
Start: 2021-12-28 | End: 2022-02-03

## 2021-12-29 ENCOUNTER — OFFICE VISIT (OUTPATIENT)
Dept: NEPHROLOGY | Facility: HOSPITAL | Age: 86
End: 2021-12-29
Payer: COMMERCIAL

## 2021-12-29 VITALS
HEIGHT: 62 IN | HEART RATE: 82 BPM | BODY MASS INDEX: 30.91 KG/M2 | SYSTOLIC BLOOD PRESSURE: 136 MMHG | WEIGHT: 168 LBS | RESPIRATION RATE: 16 BRPM | DIASTOLIC BLOOD PRESSURE: 78 MMHG

## 2021-12-29 DIAGNOSIS — E87.6 HYPOKALEMIA: ICD-10-CM

## 2021-12-29 DIAGNOSIS — E83.52 HYPERCALCEMIA: ICD-10-CM

## 2021-12-29 DIAGNOSIS — I10 PRIMARY HYPERTENSION: ICD-10-CM

## 2021-12-29 DIAGNOSIS — E11.22 TYPE 2 DIABETES MELLITUS WITH STAGE 4 CHRONIC KIDNEY DISEASE, WITHOUT LONG-TERM CURRENT USE OF INSULIN (HCC): Primary | ICD-10-CM

## 2021-12-29 DIAGNOSIS — N28.1 COMPLEX RENAL CYST: ICD-10-CM

## 2021-12-29 DIAGNOSIS — N18.4 TYPE 2 DIABETES MELLITUS WITH STAGE 4 CHRONIC KIDNEY DISEASE, WITHOUT LONG-TERM CURRENT USE OF INSULIN (HCC): Primary | ICD-10-CM

## 2021-12-29 PROBLEM — N18.30 CKD STAGE 3 DUE TO TYPE 2 DIABETES MELLITUS (HCC): Status: RESOLVED | Noted: 2021-01-15 | Resolved: 2021-12-29

## 2021-12-29 PROCEDURE — 1160F RVW MEDS BY RX/DR IN RCRD: CPT | Performed by: INTERNAL MEDICINE

## 2021-12-29 PROCEDURE — 3078F DIAST BP <80 MM HG: CPT | Performed by: INTERNAL MEDICINE

## 2021-12-29 PROCEDURE — 99214 OFFICE O/P EST MOD 30 MIN: CPT | Performed by: INTERNAL MEDICINE

## 2021-12-29 PROCEDURE — 3075F SYST BP GE 130 - 139MM HG: CPT | Performed by: INTERNAL MEDICINE

## 2021-12-29 PROCEDURE — 1036F TOBACCO NON-USER: CPT | Performed by: INTERNAL MEDICINE

## 2022-02-03 DIAGNOSIS — I10 ESSENTIAL HYPERTENSION: ICD-10-CM

## 2022-02-03 RX ORDER — NEBIVOLOL HYDROCHLORIDE 20 MG/1
TABLET ORAL
Qty: 30 TABLET | Refills: 5 | Status: SHIPPED | OUTPATIENT
Start: 2022-02-03 | End: 2022-08-08 | Stop reason: SDUPTHER

## 2022-02-12 DIAGNOSIS — I10 ESSENTIAL HYPERTENSION: ICD-10-CM

## 2022-02-12 RX ORDER — CLONIDINE 0.1 MG/24H
1 PATCH, EXTENDED RELEASE TRANSDERMAL WEEKLY
Qty: 12 PATCH | Refills: 2 | Status: SHIPPED | OUTPATIENT
Start: 2022-02-12 | End: 2022-07-04

## 2022-02-28 DIAGNOSIS — I10 ESSENTIAL HYPERTENSION: ICD-10-CM

## 2022-02-28 RX ORDER — NIFEDIPINE 90 MG/1
90 TABLET, FILM COATED, EXTENDED RELEASE ORAL DAILY
Qty: 90 TABLET | Refills: 2 | Status: SHIPPED | OUTPATIENT
Start: 2022-02-28 | End: 2022-04-23

## 2022-03-05 LAB
ALBUMIN SERPL-MCNC: 4.4 G/DL (ref 3.6–4.6)
ALBUMIN/GLOB SERPL: 1.8 {RATIO} (ref 1.2–2.2)
ALP SERPL-CCNC: 63 IU/L (ref 44–121)
ALT SERPL-CCNC: 13 IU/L (ref 0–32)
AST SERPL-CCNC: 14 IU/L (ref 0–40)
BASOPHILS # BLD AUTO: 0.1 X10E3/UL (ref 0–0.2)
BASOPHILS NFR BLD AUTO: 0 %
BILIRUB SERPL-MCNC: 0.3 MG/DL (ref 0–1.2)
BUN SERPL-MCNC: 40 MG/DL (ref 8–27)
BUN/CREAT SERPL: 21 (ref 12–28)
CALCIUM SERPL-MCNC: 9.2 MG/DL (ref 8.7–10.3)
CHLORIDE SERPL-SCNC: 101 MMOL/L (ref 96–106)
CHOLEST SERPL-MCNC: 177 MG/DL (ref 100–199)
CHOLEST/HDLC SERPL: 3.9 RATIO (ref 0–4.4)
CO2 SERPL-SCNC: 23 MMOL/L (ref 20–29)
CREAT SERPL-MCNC: 1.87 MG/DL (ref 0.57–1)
EGFR: 26 ML/MIN/1.73
EOSINOPHIL # BLD AUTO: 0.7 X10E3/UL (ref 0–0.4)
EOSINOPHIL NFR BLD AUTO: 6 %
ERYTHROCYTE [DISTWIDTH] IN BLOOD BY AUTOMATED COUNT: 12.9 % (ref 11.7–15.4)
GLOBULIN SER-MCNC: 2.5 G/DL (ref 1.5–4.5)
GLUCOSE SERPL-MCNC: 219 MG/DL (ref 65–99)
HCT VFR BLD AUTO: 43.8 % (ref 34–46.6)
HDLC SERPL-MCNC: 45 MG/DL
HGB BLD-MCNC: 14.2 G/DL (ref 11.1–15.9)
IMM GRANULOCYTES # BLD: 0 X10E3/UL (ref 0–0.1)
IMM GRANULOCYTES NFR BLD: 0 %
LDLC SERPL CALC-MCNC: 102 MG/DL (ref 0–99)
LYMPHOCYTES # BLD AUTO: 4.3 X10E3/UL (ref 0.7–3.1)
LYMPHOCYTES NFR BLD AUTO: 37 %
MCH RBC QN AUTO: 30.7 PG (ref 26.6–33)
MCHC RBC AUTO-ENTMCNC: 32.4 G/DL (ref 31.5–35.7)
MCV RBC AUTO: 95 FL (ref 79–97)
MONOCYTES # BLD AUTO: 0.9 X10E3/UL (ref 0.1–0.9)
MONOCYTES NFR BLD AUTO: 7 %
NEUTROPHILS # BLD AUTO: 5.6 X10E3/UL (ref 1.4–7)
NEUTROPHILS NFR BLD AUTO: 50 %
PLATELET # BLD AUTO: 224 X10E3/UL (ref 150–450)
POTASSIUM SERPL-SCNC: 4.4 MMOL/L (ref 3.5–5.2)
PROT SERPL-MCNC: 6.9 G/DL (ref 6–8.5)
RBC # BLD AUTO: 4.63 X10E6/UL (ref 3.77–5.28)
SL AMB VLDL CHOLESTEROL CALC: 30 MG/DL (ref 5–40)
SODIUM SERPL-SCNC: 140 MMOL/L (ref 134–144)
TRIGL SERPL-MCNC: 172 MG/DL (ref 0–149)
TSH SERPL DL<=0.005 MIU/L-ACNC: 3.19 UIU/ML (ref 0.45–4.5)
WBC # BLD AUTO: 11.5 X10E3/UL (ref 3.4–10.8)

## 2022-03-15 ENCOUNTER — OFFICE VISIT (OUTPATIENT)
Dept: FAMILY MEDICINE CLINIC | Facility: HOSPITAL | Age: 87
End: 2022-03-15
Payer: COMMERCIAL

## 2022-03-15 VITALS
TEMPERATURE: 96.8 F | SYSTOLIC BLOOD PRESSURE: 136 MMHG | HEIGHT: 62 IN | DIASTOLIC BLOOD PRESSURE: 74 MMHG | WEIGHT: 170 LBS | BODY MASS INDEX: 31.28 KG/M2 | HEART RATE: 76 BPM

## 2022-03-15 DIAGNOSIS — E78.5 DYSLIPIDEMIA: ICD-10-CM

## 2022-03-15 DIAGNOSIS — E11.22 TYPE 2 DIABETES MELLITUS WITH STAGE 4 CHRONIC KIDNEY DISEASE, WITHOUT LONG-TERM CURRENT USE OF INSULIN (HCC): Primary | ICD-10-CM

## 2022-03-15 DIAGNOSIS — I10 PRIMARY HYPERTENSION: ICD-10-CM

## 2022-03-15 DIAGNOSIS — N18.4 TYPE 2 DIABETES MELLITUS WITH STAGE 4 CHRONIC KIDNEY DISEASE, WITHOUT LONG-TERM CURRENT USE OF INSULIN (HCC): Primary | ICD-10-CM

## 2022-03-15 PROBLEM — D64.9 NORMOCYTIC ANEMIA: Status: RESOLVED | Noted: 2019-02-15 | Resolved: 2022-03-15

## 2022-03-15 LAB — SL AMB POCT HEMOGLOBIN AIC: 7.9 (ref ?–6.5)

## 2022-03-15 PROCEDURE — 83036 HEMOGLOBIN GLYCOSYLATED A1C: CPT | Performed by: INTERNAL MEDICINE

## 2022-03-15 PROCEDURE — 99214 OFFICE O/P EST MOD 30 MIN: CPT | Performed by: INTERNAL MEDICINE

## 2022-03-15 PROCEDURE — 1036F TOBACCO NON-USER: CPT | Performed by: INTERNAL MEDICINE

## 2022-03-15 PROCEDURE — 1160F RVW MEDS BY RX/DR IN RCRD: CPT | Performed by: INTERNAL MEDICINE

## 2022-03-15 NOTE — PROGRESS NOTES
Assessment/Plan:    Type 2 diabetes mellitus with stage 4 chronic kidney disease, without long-term current use of insulin (HCC)    Lab Results   Component Value Date    HGBA1C 7 9 (A) 03/15/2022   Dm type 2 with CKD stage 4/polyneuropathy/hyperglycemia - uncontrolled but improved with A1C 7 9 - again encouraged low carb/sugar diet and increase activity and get wgt down, can increase Glipizide a bit cautiously but will hold for now, on max renal dosing for Tunguvia,  NO Metformin d/t CKD, may need Endo or discuss insulin - pt deferring for now, recheck BW in 3 mo - order given, foot exam done today, UTD on DM eye exam (6/21), on statin but no ACE/ARB d/t CKD stage 4    Dyslipidemia  LDL up just a bit but acceptable, con't current statin, again urged diet/exercise/wgt loss, recheck FLP annually    Hypertension  BP at goal, con't current meds, diet/exercise/wgt loss encouraged       Diagnoses and all orders for this visit:    Type 2 diabetes mellitus with stage 4 chronic kidney disease, without long-term current use of insulin (HCC)  -     POCT hemoglobin A1c  -     Hemoglobin A1C; Future  -     Basic metabolic panel; Future  -     Hemoglobin A1C  -     Basic metabolic panel    Dyslipidemia    Primary hypertension          Subjective:      Patient ID: Haroldo Adam is a 80 y o  female  HPI Pt here for follow up appt and BW results    BW results were d/w pt in detail: , POCT A1C done in office today 7 9, BUN/Cr 40/1 87 (GFR 26), rest of CMP/TSH was wnl, CBC with WBC 11 5 slight increase in absolute eosinophils and lymphocytes but rest of panel wnl, FLP with , HDL 45, and , TC good at 177  Def of controlled vs uncontrolled DM was reviewed  Diet was reviewed - wgt up 2 lbs  She states she avoids the sweets and does not feel she has a high carb diet as well  She does no formal exercise but does try to walk when it is nice outside  She is taking her DM meds as directed    She is UTD on DM foot exam (5/21) and DM eye exam (6/21)  She notes no pain in her feet and denies numbness/tingling/sores/ulcers  She is on statin but no  ARB/ACE d/t CKD stage 4  Pt saw Nephro (Dr Angella Kimbrough)  For her CKD stage 4 in Dec - OV note reviewed  She had no meds changes and was again urged better control of DM to protect kidney function  Goal FLP was d/w pt in detail  Diet/exercise reviewed as noted above  She is taking her Simvastatin daily as directed w/o Se  She notes no stroke/TIA symptoms/CP  BP at goal today and meds were reviewed and no changes have occurred  She denies missing doses of meds or SE with the meds  She does not check her BP outside the office  She notes no frequent Ha's/dizziness/double vision/CP  Review of Systems   Constitutional: Negative for chills and fever  HENT: Negative for congestion and trouble swallowing  Eyes: Positive for redness  Negative for pain and visual disturbance  Respiratory: Negative for cough, shortness of breath and wheezing  Cardiovascular: Positive for leg swelling  Negative for chest pain and palpitations  Gastrointestinal: Negative for abdominal pain, constipation, diarrhea, nausea and vomiting  Genitourinary: Negative for difficulty urinating and dysuria  Musculoskeletal: Negative for back pain and neck pain  Skin: Negative for rash and wound  Neurological: Negative for dizziness, light-headedness and headaches  Hematological: Does not bruise/bleed easily  Psychiatric/Behavioral: Negative for behavioral problems and confusion  Objective:    /74   Pulse 76   Temp (!) 96 8 °F (36 °C) (Tympanic)   Ht 5' 2" (1 575 m)   Wt 77 1 kg (170 lb)   BMI 31 09 kg/m²      Physical Exam  Vitals and nursing note reviewed  Constitutional:       General: She is not in acute distress  Appearance: She is well-developed  She is not ill-appearing  HENT:      Head: Normocephalic and atraumatic     Eyes:      General: Right eye: No discharge  Left eye: No discharge  Comments: R conjunctival hemorrhage   Neck:      Trachea: No tracheal deviation  Cardiovascular:      Rate and Rhythm: Normal rate and regular rhythm  Pulses: no weak pulses          Dorsalis pedis pulses are 1+ on the right side and 1+ on the left side  Heart sounds: Normal heart sounds  No murmur heard  No friction rub  Pulmonary:      Effort: Pulmonary effort is normal  No respiratory distress  Breath sounds: Normal breath sounds  No wheezing, rhonchi or rales  Abdominal:      General: There is no distension  Palpations: Abdomen is soft  Tenderness: There is no abdominal tenderness  There is no guarding or rebound  Musculoskeletal:         General: No deformity or signs of injury  Cervical back: Neck supple  Feet:      Right foot:      Skin integrity: No ulcer, skin breakdown, erythema, warmth, callus or dry skin  Left foot:      Skin integrity: No ulcer, skin breakdown, erythema, warmth, callus or dry skin  Skin:     General: Skin is warm  Coloration: Skin is not pale  Findings: No rash  Neurological:      General: No focal deficit present  Mental Status: She is alert  Mental status is at baseline  Motor: No abnormal muscle tone  Gait: Gait normal    Psychiatric:         Mood and Affect: Mood normal          Behavior: Behavior normal          Thought Content: Thought content normal          Judgment: Judgment normal        Patient's shoes and socks removed  Right Foot/Ankle   Right Foot Inspection  Skin Exam: skin normal and skin intact  No dry skin, no warmth, no callus, no erythema, no maceration, no abnormal color, no pre-ulcer, no ulcer and no callus  Toe Exam: ROM and strength within normal limits  Sensory   Vibration: intact  Monofilament testing: intact    Vascular  The right DP pulse is 1+       Left Foot/Ankle  Left Foot Inspection  Skin Exam: skin normal and skin intact  No dry skin, no warmth, no erythema, no maceration, normal color, no pre-ulcer, no ulcer and no callus  Toe Exam: ROM and strength within normal limits  Sensory   Vibration: intact  Monofilament testing: intact    Vascular  The left DP pulse is 1+       Assign Risk Category  No deformity present  No loss of protective sensation  No weak pulses  Risk: 0

## 2022-03-15 NOTE — ASSESSMENT & PLAN NOTE
Lab Results   Component Value Date    HGBA1C 7 9 (A) 03/15/2022   Dm type 2 with CKD stage 4/polyneuropathy/hyperglycemia - uncontrolled but improved with A1C 7 9 - again encouraged low carb/sugar diet and increase activity and get wgt down, can increase Glipizide a bit cautiously but will hold for now, on max renal dosing for Januvia,  NO Metformin d/t CKD, may need Endo or discuss insulin - pt deferring for now, recheck BW in 3 mo - order given, foot exam done today, UTD on DM eye exam (6/21), on statin but no ACE/ARB d/t CKD stage 4

## 2022-03-15 NOTE — PATIENT INSTRUCTIONS
10% - bad control"> 10% - bad control,Hemoglobin A1c (HbA1c) greater than 10% indicating poor diabetic control,Haemoglobin A1c greater than 10% indicating poor diabetic control">   Diabetes mellitus tipo 2 en adultos, cuidados ambulatorios   INFORMACIÓN GENERAL:   La diabetes mellitus tipo 2 en adultos  es mer enfermedad que afecta la forma en que el cuerpo utiliza la glucosa (azúcar)  La insulina ayuda a extraer el azúcar de la mary ann para que pueda usarse en la producción de energía  Generalmente, cuando el nivel de azúcar Kevin, el páncreas produce más insulina  La diabetes tipo 2 se desarrolla ya sea porque el cuerpo no puede producir suficiente insulina, o no la puede usar correctamente  Después de [employer], gaspar páncreas podría dejar de producir insulina  Síntomas comunes incluyen los siguientes:   · Más hambre o sed de la usual    · Necesidad frecuente de orinar     · Pérdida de peso sin tratar     · Visión borrosa  Busque cuidados inmediatos para los siguientes síntomas:   · Dolor abdominal severo o dolor que se propaga hacia gaspar espalda  Es probable que usted también vomite  · Dificultad para permanecer despierto o concentrado    · Temblores o sudoración    · Visión borrosa o doble    · Aliento con olor a frutas o polly    · Respiración profunda y dificultosa o rápida y superficial    · Ritmo cardíaco rápido y débil  El tratamiento para la diabetes mellitus tipo 2  incluye mantener gaspar nivel de azúcar en el mary ann a un rango normal  Usted debe comer los alimentos correctos y ejercitarse con regularidad  Además es probable que necesite medicamentos si no puede controlar gaspar nivel de azúcar en la mary ann con nutrición y ejercicio  Maneje la diabetes mellitus tipo 2:   · Revise gapsar nivel de azúcar en la mary ann  A usted le enseñarán cómo revisar mer pequeña gota de Minh davila en un medidor de glucosa  Pregúntele a gaspar proveedor de agustina cuándo y con cuánta frecuencia es necesario revisar shellie el día  También pregúntele a gaspar proveedor de agustina cuáles deberían ser dianne niveles de azúcar en la mary ann cuando usted se los Kayleefurt  · Mantenga un registro de los carbohidratos (azúcares y almidones)  Gaspar nivel de azúcar en la mary ann puede elevarse demasiado si usted come demasiados carbohidratos  Gaspar dietista le ayudará a planear comidas y meriendas que tengan la cantidad correcta de carbohidratos  · Consuma alimentos bajos en grasas  Aloma Mulders son el effie sin piel y la leche descremada  · Consuma menos sodio (sal)  Algunos ejemplos de alimentos altos en sodio que hay que limitar son la salsa de soya, las dick tostadas y la sopa  No le agregue sal a la comida que usted cocina  Limite gaspar uso de sal de aleman  · Coma alimentos altos en fibra  Alimentos que son buena enrique de fibra incluyen los vegetales, el pan integral y los frijoles  · Limite el alcohol  El alcohol afecta gaspar nivel de azúcar en la mary ann y puede dificultar el [place] de gaspar diabetes  Las mujeres deben limitar el consumo de alcohol a 1 bebida al día  Los hombres deben limitarlo a 2 debidas al día  Mer bebida de alcohol equivale a 12 onzas de cerveza, 5 onzas de vino o 1½ onzas de licor  · Ejercítese regularmente  El ejercicio puede ayudar a mantener estable gaspar nivel de azúcar en la mary ann, al mismo tiempo que disminuye gaspar riesgo de enfermedad cardíaca y le ayuda a perder peso  Ejercítese por al menos 30 minutos, 5 días a la semana  Aleksandr Carls de fortalecimiento muscular 2 días a la semana  Colabore con gaspar proveedor de agustina para crear un plan de ejercicios  · Revise dianne pies a diario  para teto si tienen heridas o llagas abiertas  Pregúntele a gaspar proveedor de [employer] que usted puede hacer si tiene mer llaga abierta  · Deje de fumar  Si usted fuma, nunca es tarde para dejar de hacerlo  El fumar puede [employer] problemas que puedan ocurrir con la diabetes   Pregúntele a gaspar proveedor de [employer] sobre información para aprender a dejar de fumar si usted tiene dificultad para hacerlo  · Pregunte sobre carlisle peso:  Pregúntele a dianne proveedores de agustina si usted necesita perder peso y cuánto debe perder  Pídales que le ayuden con un programa de perdida de [place]  Incluso perder unas 10 a 15 libras puede ayudarle a manejar carlisle nivel de azúcar en la mary ann  · Tenga a mano carlisle identificación de [employer]  Use un brazalete de alerta médica o lleve consigo mer tarjeta que diga que usted tiene diabetes  Pregúntele a carlisle proveedor de agustina dónde conseguir estos artículos  · Pregunte sobre vacunas  La diabetes lo pone a usted en riesgo de enfermedad seria si usted se resfría, tiene neumonía o hepatitis  Pregúntele a carlisle proveedor de agustina si usted debe ponerse las vacunas contra la gripe, neumonía o hepatitis B, y cuándo ponérselas  Programe mer iliana con carlisle proveedor de [employer] se le haya indicado: Anote dianne preguntas para que se acuerde de hacerlas shellie dianne visitas  ACUERDOS SOBRE CARLISLE CUIDADO:   Usted tiene el derecho de participar en la planificación de carlisle cuidado  Aprenda todo lo que pueda sobre carlisle condición y chun darle tratamiento  Discuta con dianne médicos dianne opciones de tratamiento para juntos decidir el cuidado que usted quiere recibir  Usted siempre tiene el derecho a rechazar carlisle tratamiento  Esta información es sólo para uso en educación  Carlisle intención no es darle un consejo médico sobre enfermedades o tratamientos  Colsulte con carlisle Lesleigh Renate farmacéutico antes de seguir cualquier régimen médico para saber si es seguro y efectivo para usted  © 2014 [address] is for End User's use only and may not be sold, redistributed or otherwise used for commercial purposes  All illustrations and images included in CareNotes® are the copyrighted property of A D A M , Inc  or Roberto Carlos Gilbert      What to Do if Your Blood Sugar is Low   AMBULATORY CARE:   Low blood sugar levels  (hypoglycemia) can happen with Type 1 and Type 2 diabetes  Low levels are more likely to happen if you use insulin  Hypoglycemia can cause you to have falls, accidents, and injuries  A blood sugar level that gets too low can lead to seizures, coma, and death  Learn to recognize the symptoms early so you can get treatment quickly  When your blood sugar is low you may feel:  · Sweaty    · Nervous or shaky    · Anxious or irritable    · Confused    · A fast, pounding heartbeat    · Extremely hungry    Have someone call your local emergency number (911 in the [address]) if:   · You cannot be woken  · You have a seizure  Call your doctor if:   · You have symptoms of a low blood sugar level, such as trouble thinking, sweating, or a pounding heartbeat  · Your blood sugar level is lower than normal and it does not improve with treatment  · You often have lower blood sugar levels than your target goals  · You have trouble coping with your illness, or you feel anxious or depressed  · You have questions or concerns about your condition or care  What to do if you have symptoms of low blood sugar:   · Check your blood sugar level, if possible  Your blood sugar level is too low if it is at or below 70 mg/dL  · Eat or drink 15 grams of fast-acting carbohydrate  Fast-acting carbohydrates will raise your blood sugar level quickly  Examples of 15 grams of fast-acting carbohydrates:     ? 4 ounces (½ cup) of fruit juice     ? 4 ounces of regular soda    ? 2 tablespoons of raisins     ? 1 tube of glucose gel or 3 to 4 glucose tablets       · Check your blood sugar level 15 minutes later  If the level is still low (less than 100 mg/dL), eat another 15 grams of carbohydrate  When the level returns to 100 mg/dL, eat a snack or meal that contains carbohydrates  This will help prevent another drop in blood sugar  · Teach people close to you how to use your glucagon kit    Your blood sugar may be too low for you to be awake  People need to know when and how to use your kit  Prevent low blood sugar levels:  Prevent low blood sugar by knowing what increases your risk  Ask your healthcare provider for ways to prevent low blood sugar levels  Any of the following can increase your risk of low blood sugar:  · Fasting for tests or procedures    · During or after intense exercise    · Late or postponed meals    · Sleeping (you may need a bedtime snack)     · Drinking alcohol if you use insulin or insulin releasing pills    Follow up with your doctor as directed:  Write down your questions so you remember to ask them during your visits  © Copyright [employer] 2022 Information is for End User's use only and may not be sold, redistributed or otherwise used for commercial purposes  All illustrations and images included in CareNotes® are the copyrighted property of A D A M , Inc  or [facility] [place] Joo Holman   The above information is an  only  It is not intended as medical advice for individual conditions or treatments  Talk to your doctor, nurse or pharmacist before following any medical regimen to see if it is safe and effective for you  10% - bad control"> 10% - bad control,Hemoglobin A1c (HbA1c) greater than 10% indicating poor diabetic control,Haemoglobin A1c greater than 10% indicating poor diabetic control">   Diabetes Mellitus Type 2 in Adults, Ambulatory Care   GENERAL INFORMATION:   Diabetes mellitus type 2  is a disease that affects how your body uses glucose (sugar)  Insulin helps move sugar out of the blood so it can be used for energy  Normally, when the blood sugar level increases, the pancreas makes more insulin  Type 2 diabetes develops because either the body cannot make enough insulin, or it cannot use the insulin correctly  After many years, your pancreas may stop making insulin    Common symptoms include the following:   · More hunger or thirst than usual     · Frequent urination · Weight loss without trying     · Blurred vision  Seek immediate care for the following symptoms:   · Severe abdominal pain, or pain that spreads to your back  You may also be vomiting  · Trouble staying awake or focusing    · Shaking or sweating    · Blurred or double vision    · Breath has a fruity, sweet smell    · Breathing is deep and labored, or rapid and shallow    · Heartbeat is fast and weak  Treatment for diabetes mellitus type 2  includes keeping your blood sugar at a normal level  You must eat the right foods, and exercise regularly  You may also need medicine if you cannot control your blood sugar level with nutrition and exercise  Manage diabetes mellitus type 2:   · Check your blood sugar level  You will be taught how to check a small drop of blood in a glucose monitor  Ask your healthcare provider when and how often to check during the day  Ask your healthcare provider what your blood sugar levels should be when you check them  · Keep track of carbohydrates (sugar and starchy foods)  Your blood sugar level can get too high if you eat too many carbohydrates  Your dietitian will help you plan meals and snacks that have the right amount of carbohydrates  · Eat low-fat foods  Some examples are skinless chicken and low-fat milk  · Eat less sodium (salt)  Some examples of high-sodium foods to limit are soy sauce, potato chips, and soup  Do not add salt to food you cook  Limit your use of table salt  · Eat high-fiber foods  Foods that are a good source of fiber include vegetables, whole grain bread, and beans  · Limit alcohol  Alcohol affects your blood sugar level and can make it harder to manage your diabetes  Women should limit alcohol to 1 drink a day  Men should limit alcohol to 2 drinks a day  A drink of alcohol is 12 ounces of beer, 5 ounces of wine, or 1½ ounces of liquor  · Get regular exercise    Exercise can help keep your blood sugar level steady, decrease your risk of heart disease, and help you lose weight  Exercise for at least 30 minutes, 5 days a week  Include muscle strengthening activities 2 days each week  Work with your healthcare provider to create an exercise plan  · Check your feet each day  for injuries or open sores  Ask your healthcare provider for activities you can do if you have an open sore  · Quit smoking  If you smoke, it is never too late to quit  Smoking can worsen the problems that may occur with diabetes  Ask your healthcare provider for information about how to stop smoking if you are having trouble quitting  · Ask about your weight:  Ask healthcare providers if you need to lose weight, and how much to lose  Ask them to help you with a weight loss program  Even a 10 to 15 pound weight loss can help you manage your blood sugar level  · Carry medical alert identification  Wear medical alert jewelry or carry a card that says you have diabetes  Ask your healthcare provider where to get these items  · Ask about vaccines  Diabetes puts you at risk of serious illness if you get the flu, pneumonia, or hepatitis  Ask your healthcare provider if you should get a flu, pneumonia, or hepatitis B vaccine, and when to get the vaccine  Follow up with your healthcare provider as directed:  Write down your questions so you remember to ask them during your visits  CARE AGREEMENT:   You have the right to help plan your care  Learn about your health condition and how it may be treated  Discuss treatment options with your caregivers to decide what care you want to receive  You always have the right to refuse treatment  The above information is an  only  It is not intended as medical advice for individual conditions or treatments  Talk to your doctor, nurse or pharmacist before following any medical regimen to see if it is safe and effective for you    © 2014 [address] Latisha Jones is for End User's use only and may not be sold, redistributed or otherwise used for commercial purposes  All illustrations and images included in CareNotes® are the copyrighted property of A D A M , Inc  or Roberto Carlos Gilbert  What to Do if Your Blood Sugar is Low   AMBULATORY CARE:   Low blood sugar levels  (hypoglycemia) can happen with Type 1 and Type 2 diabetes  Low levels are more likely to happen if you use insulin  Hypoglycemia can cause you to have falls, accidents, and injuries  A blood sugar level that gets too low can lead to seizures, coma, and death  Learn to recognize the symptoms early so you can get treatment quickly  When your blood sugar is low you may feel:  · Sweaty    · Nervous or shaky    · Anxious or irritable    · Confused    · A fast, pounding heartbeat    · Extremely hungry    Have someone call your local emergency number (911 in the [address]) if:   · You cannot be woken  · You have a seizure  Call your doctor if:   · You have symptoms of a low blood sugar level, such as trouble thinking, sweating, or a pounding heartbeat  · Your blood sugar level is lower than normal and it does not improve with treatment  · You often have lower blood sugar levels than your target goals  · You have trouble coping with your illness, or you feel anxious or depressed  · You have questions or concerns about your condition or care  What to do if you have symptoms of low blood sugar:   · Check your blood sugar level, if possible  Your blood sugar level is too low if it is at or below 70 mg/dL  · Eat or drink 15 grams of fast-acting carbohydrate  Fast-acting carbohydrates will raise your blood sugar level quickly  Examples of 15 grams of fast-acting carbohydrates:     ? 4 ounces (½ cup) of fruit juice     ? 4 ounces of regular soda    ? 2 tablespoons of raisins     ? 1 tube of glucose gel or 3 to 4 glucose tablets       · Check your blood sugar level 15 minutes later    If the level is still low (less than 100 mg/dL), eat another 15 grams of carbohydrate  When the level returns to 100 mg/dL, eat a snack or meal that contains carbohydrates  This will help prevent another drop in blood sugar  · Teach people close to you how to use your glucagon kit  Your blood sugar may be too low for you to be awake  People need to know when and how to use your kit  Prevent low blood sugar levels:  Prevent low blood sugar by knowing what increases your risk  Ask your healthcare provider for ways to prevent low blood sugar levels  Any of the following can increase your risk of low blood sugar:  · Fasting for tests or procedures    · During or after intense exercise    · Late or postponed meals    · Sleeping (you may need a bedtime snack)     · Drinking alcohol if you use insulin or insulin releasing pills    Follow up with your doctor as directed:  Write down your questions so you remember to ask them during your visits  © [employer] 2022 Information is for End User's use only and may not be sold, redistributed or otherwise used for commercial purposes  All illustrations and images included in CareNotes® are the copyrighted property of [employer] , Inc  or [address] [place], [place] [address]   The above information is an  only  It is not intended as medical advice for individual conditions or treatments  Talk to your doctor, nurse or pharmacist before following any medical regimen to see if it is safe and effective for you  Meal Planning with Diabetes Exchanges   AMBULATORY CARE:   Diabetes exchanges  are servings of food that contain similar amounts of carbohydrate, fat, protein, and calories within a food group  The exchanges can be used to develop a healthy meal plan that helps to keep your blood sugar within the recommended levels  A meal plan with the right amount of carbohydrates is especially important  Your blood sugar naturally rises after you eat carbohydrates   Too many carbohydrates in 1 meal or snack can raise your blood sugar level  Carbohydrates are found in starches, fruit, milk, yogurt, and sweets  Call your doctor if:   · You have high blood sugar levels during a certain time of day, or almost all of the time  · You often have low blood sugar levels  · You have questions or concerns about your condition or care  Create a meal plan with exchanges:  A dietitian will work with you to develop a healthy meal plan that is right for you  This meal plan will include the amount of exchanges you can have from each food group throughout the day  Follow your meal plan by keeping track of the amount of exchanges you eat for each meal and snack  Your meal plan will be based on your age, weight, blood sugar levels, medicine, and activity level  Starch food group exchanges:  Each exchange below contains about 15 grams of carbohydrate , 3 grams of protein, 1 gram of fat, and 80 calories  · 1 ounce of white, whole wheat or rye bread (1 slice)    · 1 ounce of bagel (about ¼ of a bagel)    · 1 6-inch flour or corn tortilla or 1 4-inch pancake (about ¼ inch thick)    · ? cup of cooked pasta or rice    · ¾ cup of dry, ready-to-eat cereal with no sugar added     · ½ cup of cooked cereal, such as oatmeal    · 3 adelina cracker squares or 8 animal crackers    · 6 saltine-type crackers or     · 3 cups of popcorn or ¾ ounce of pretzels     · Starchy vegetables and cooked legumes:      ? ½ cup of corn, green peas, sweet potatoes, or mashed potatoes     ? ¼ of a large baked potato     ? 1 cup of acorn, butternut squash, or pumpkin     ? ½ cup of beans, lentils, or peas (such as cooper, kidney, or black-eyed)    ? ? cup of lima beans    Fruit group exchanges:  Each exchange contains about 15 grams of carbohydrate  and 60 calories    · 1 small (4 ounce) apple, banana orange, or nectarine    · ½ cup of canned or fresh fruit    · ½ cup (4 ounces) of unsweetened fruit juice    · 2 tablespoons of dried fruit    Milk group exchanges:  Each exchange contains about 12 grams of carbohydrate  and 8 grams of protein  The amount of fat and calories in each serving depends on the type of milk (such as whole, low-fat, or fat-free)  · 1 cup fat-free or low-fat milk    · ¾ cup of plain, nonfat yogurt    · 1 cup fat-free, flavored yogurt with artificial (no calorie) sweetener    Non-starchy vegetable group exchanges:  Each exchange contains about 5 grams of carbohydrate , 2 grams of protein, and 25 calories  Examples include beets, broccoli, cabbage, carrots, cauliflower, cucumber, mushrooms, tomatoes, and zucchini  · ½ cup of cooked vegetables or 1 cup of raw vegetables     · ½ cup of vegetable juice    Meat and meat substitute group exchanges:  Each exchange of a lean meat  listed below contains about 7 grams of protein, 0 to 3 grams of fat, and 45 calories  The meat and meat substitutes food group does not contain any carbohydrates  Medium and high-fat meats have more calories  · 1 ounce of chicken or turkey without skin, or 1 ounce of fish (not breaded or fried)     · 1 ounce of lean beef, pork, or lamb     · 1-inch cube or 1 ounce of low-fat cheese     · 2 egg whites or ¼ cup of egg substitute     · ½ cup of tofu    Sweets, desserts, and other carbohydrate group exchanges:   · Sweets and other desserts:  Each exchange has about 15 grams of carbohydrate   ? 1 ounce of sonali food cake or 2-inch square cake (unfrosted)    ? 2 small cookies     ? ½ cup of sugar-free, fat-free ice cream    ? 1 tablespoon of syrup, jam, jelly, table sugar, or honey    · Combination foods:     ? 1 cup of an entrée, such as lasagna, spaghetti with meatballs, macaroni and cheese, and chili with beans (each serving counts as 2 carbohydrate exchanges )     ? 1 cup of tomato or vegetable beef soup (each serving counts as 1 carbohydrate exchange )    Fat group exchanges:  Each exchange contains 5 grams of fat and 45 calories    · 1 teaspoon of oil (such as canola, olive, or corn oil)     · 6 almonds or cashews, 10 peanuts, or 4 pecan halves     · 2 tablespoons of avocado     · ½ tablespoon of peanut butter     · 1 teaspoon of regular margarine or 2 teaspoons of low-fat margarine     · 1 teaspoon of regular butter or 1 tablespoon of low-fat butter     · 1 teaspoon of regular mayonnaise or 1 tablespoon of low-fat mayonnaise     · 1 tablespoon of regular salad dressing or 2 tablespoons of low-fat salad dressing    Free foods: The foods on this list are called free foods because they have very few calories  Free foods usually do not increase your blood sugar if you limit them  · 1 tablespoon of catsup or taco sauce     · ¼ cup of salsa     · 2 tablespoons of sugar-free syrup or 2 teaspoons of light jam or jelly     · 1 tablespoon of fat-free salad dressing     · 4 tablespoons of fat-free margarine or fat-free mayonnaise     · Sugar-free drinks: diet soda, sugar-free drink mixes, or mineral water     · Low-sodium bouillon or fat-free broth     · Mustard     · Seasonings such as spices, herbs, and garlic     · Sugar-free gelatin without added fruit    Other healthy nutrition guidelines:   · Limit drinks with sugar substitutes  Your dietitian or healthcare provider will encourage you to drink water  Water helps your kidneys to function properly  Ask how much water you should drink every day  · Eat more fiber  Choose foods that are good sources of fiber, such as fruits, vegetables, and whole grains  Cereals that contain 5 or more grams of fiber per serving are good sources of fiber  Legumes such as garbanzo, cooper beans, kidney beans, and lentils are also good sources  · Limit fat  Ask your dietitian or healthcare provider how much fat you should eat each day  Choose foods low in fat, saturated fat, trans fat, and cholesterol  Examples include turkey or chicken without the skin, fish, lean cuts of meat, and beans   Low-fat dairy foods, such as low-fat or fat-free milk and low-fat yogurt are also good choices  Omega-3 fatty acids are healthy fats that are found in canola oil, soybean oil and fatty fish  [place], albacore tuna, and sardines are good sources of omega 3 fatty acids  Eat 2 servings of these types of fish each week  Do not eat fried fish  · Limit sugar  Sugar and sweets must be counted toward the carbohydrate exchanges that you can have within your meal plan  Limit sugar and sweets because they are usually also high in calories and fat  Eat smaller portions of sweets by sharing a dessert or asking for a child-size portion at a restaurant  · Limit sodium  (salt) to about 2,300 mg per day  You may need to eat even less sodium if you have certain medical conditions  Foods high in sodium include soy sauce, potato chips, and soup  · Limit alcohol  Ask your healthcare provider if it is safe for you to drink alcohol  If alcohol is safe for you to have, eat a meal when you drink alcohol  If you drink alcohol on an empty stomach, your blood sugar may drop to a low level  Women 21 years or older and men 72 years or older should limit alcohol to 1 drink a day  Men aged 24 to 59 years should limit alcohol to 2 drinks a day  A drink of alcohol is 5 ounces of wine, 12 ounces of beer, or 1½ ounces of liquor  Other ways to manage your diabetes:   · Control your blood sugar level  Test your blood sugar level regularly and keep a record of the results  Ask your healthcare provider when and how often to test your blood sugar  You may need to check your blood sugar level at least 3 times each day  · Talk to your healthcare provider about your weight  Ask if you need to lose weight, and how much you need to lose  If you are overweight, you may need to make other changes to lose weight  Ask your healthcare provider to help you create a weight loss program      · Get regular physical activity  Physical activity can help decrease your blood sugar level   It can also help to decrease your risk for heart disease and help you lose weight  Adults should have moderate intensity physical activity for at least 150 minutes every week  Spread the amount of activity over at least 3 days a week  Do not skip more than 2 days in a row  Children should get at least 60 minutes of moderate physical activity on most days of the week  Examples of moderate physical activity include brisk walking, running, and swimming  Do not sit for longer than 30 minutes  Work with your healthcare provider to create a plan for physical activity  © Copyright [employer] 2022 Information is for End User's use only and may not be sold, redistributed or otherwise used for commercial purposes  All illustrations and images included in CareNotes® are the copyrighted property of A [employer] A M , Inc  or [address] [place], [place] [address]mack   The above information is an  only  It is not intended as medical advice for individual conditions or treatments  Talk to your doctor, nurse or pharmacist before following any medical regimen to see if it is safe and effective for you

## 2022-03-15 NOTE — ASSESSMENT & PLAN NOTE
LDL up just a bit but acceptable, con't current statin, again urged diet/exercise/wgt loss, recheck FLP annually

## 2022-03-18 ENCOUNTER — TELEPHONE (OUTPATIENT)
Dept: NEPHROLOGY | Facility: CLINIC | Age: 87
End: 2022-03-18

## 2022-03-18 NOTE — TELEPHONE ENCOUNTER
Called patient to schedule follow up  Patient's son stated he would be having his wife call to schedule an appointment for her

## 2022-03-26 DIAGNOSIS — R79.0 LOW FERRITIN: ICD-10-CM

## 2022-03-26 DIAGNOSIS — F41.9 ANXIETY: ICD-10-CM

## 2022-03-26 RX ORDER — SERTRALINE HYDROCHLORIDE 25 MG/1
TABLET, FILM COATED ORAL
Qty: 90 TABLET | Refills: 2 | Status: SHIPPED | OUTPATIENT
Start: 2022-03-26 | End: 2022-07-06

## 2022-03-27 RX ORDER — FERROUS SULFATE TAB EC 324 MG (65 MG FE EQUIVALENT) 324 (65 FE) MG
TABLET DELAYED RESPONSE ORAL
Qty: 90 TABLET | Refills: 1 | Status: SHIPPED | OUTPATIENT
Start: 2022-03-27

## 2022-04-16 LAB
APPEARANCE UR: CLEAR
BACTERIA URNS QL MICRO: ABNORMAL
BILIRUB UR QL STRIP: NEGATIVE
BUN SERPL-MCNC: 43 MG/DL (ref 8–27)
BUN/CREAT SERPL: 19 (ref 12–28)
CALCIUM SERPL-MCNC: 9.4 MG/DL (ref 8.7–10.3)
CASTS URNS QL MICRO: ABNORMAL /LPF
CHLORIDE SERPL-SCNC: 101 MMOL/L (ref 96–106)
CO2 SERPL-SCNC: 22 MMOL/L (ref 20–29)
COLOR UR: YELLOW
CREAT SERPL-MCNC: 2.24 MG/DL (ref 0.57–1)
CREAT UR-MCNC: 95.7 MG/DL
EGFR: 21 ML/MIN/1.73
EPI CELLS #/AREA URNS HPF: ABNORMAL /HPF (ref 0–10)
GLUCOSE SERPL-MCNC: 226 MG/DL (ref 65–99)
GLUCOSE UR QL: NEGATIVE
HGB UR QL STRIP: NEGATIVE
KETONES UR QL STRIP: NEGATIVE
LEUKOCYTE ESTERASE UR QL STRIP: ABNORMAL
MICRO URNS: ABNORMAL
NITRITE UR QL STRIP: NEGATIVE
PH UR STRIP: 5.5 [PH] (ref 5–7.5)
POTASSIUM SERPL-SCNC: 4.4 MMOL/L (ref 3.5–5.2)
PROT UR QL STRIP: ABNORMAL
PROT UR-MCNC: 18.3 MG/DL
PROT/CREAT UR: 191 MG/G CREAT (ref 0–200)
RBC #/AREA URNS HPF: ABNORMAL /HPF (ref 0–2)
SODIUM SERPL-SCNC: 141 MMOL/L (ref 134–144)
SP GR UR: 1.01 (ref 1–1.03)
UROBILINOGEN UR STRIP-ACNC: 0.2 MG/DL (ref 0.2–1)
WBC #/AREA URNS HPF: ABNORMAL /HPF (ref 0–5)

## 2022-04-18 DIAGNOSIS — E78.5 DYSLIPIDEMIA: ICD-10-CM

## 2022-04-18 RX ORDER — SIMVASTATIN 20 MG
TABLET ORAL
Qty: 90 TABLET | Refills: 2 | Status: SHIPPED | OUTPATIENT
Start: 2022-04-18 | End: 2022-07-12

## 2022-04-19 ENCOUNTER — TELEPHONE (OUTPATIENT)
Dept: NEPHROLOGY | Facility: CLINIC | Age: 87
End: 2022-04-19

## 2022-04-19 NOTE — TELEPHONE ENCOUNTER
Joslyn Go returned call  She states she saw the results in My Chart   If you need to speak to her before next weeks appt, call [phone] but after 5:00

## 2022-04-19 NOTE — TELEPHONE ENCOUNTER
----- Message from [place], [place] sent at 4/18/2022  3:21 PM EDT -----  Called patient  Asked her to call office back to review blood work with me  Please let me know when she calls back with good call back number  Thanks

## 2022-04-19 NOTE — TELEPHONE ENCOUNTER
I spoke with patient's daughter in-law Elsa Davies she stated she handles Ena's medical information she did receive Justina's voicemail and was going to c/b today  She can be reached at [phone] preferably in the morning

## 2022-04-23 DIAGNOSIS — I10 ESSENTIAL HYPERTENSION: ICD-10-CM

## 2022-04-23 RX ORDER — NIFEDIPINE 90 MG/1
TABLET, FILM COATED, EXTENDED RELEASE ORAL
Qty: 90 TABLET | Refills: 2 | Status: SHIPPED | OUTPATIENT
Start: 2022-04-23 | End: 2022-07-12

## 2022-04-27 PROBLEM — E83.52 HYPERCALCEMIA: Status: RESOLVED | Noted: 2019-05-21 | Resolved: 2022-04-27

## 2022-04-27 PROBLEM — N18.4 CKD (CHRONIC KIDNEY DISEASE), STAGE IV (HCC): Status: ACTIVE | Noted: 2019-02-15

## 2022-04-27 NOTE — PROGRESS NOTES
OFFICE FOLLOW UP - Nephrology   Maryann Masterson 80 y o  female MRN: [id number]       ASSESSMENT and PLAN:  Diagnoses and all orders for this visit:    CKD (chronic kidney disease), stage IV ([facility] )  -     Basic metabolic panel; Future  -     Basic metabolic panel; Future  -     Basic metabolic panel  -     Basic metabolic panel  -     Ambulatory Referral to CKD Education Program; Future    GERD (gastroesophageal reflux disease)    Diabetic polyneuropathy ([place] [address] )    Primary hypertension    Complex renal cyst    Anxiety disorder, unspecified type    Hypercalcemia    Hypokalemia    Type 2 diabetes mellitus with stage 4 chronic kidney disease, without long-term current use of insulin ([place] [address] )  -     Ambulatory Referral to Endocrinology; Future    Essential hypertension  -     torsemide (DEMADEX) 20 mg tablet; Take 0 5 tablets (10 mg total) by mouth daily    Dependent edema  -     torsemide (DEMADEX) 20 mg tablet; Take 0 5 tablets (10 mg total) by mouth daily        CKD stage IV: Etiology of chronic disease suspected secondary to diabetic nephropathy, hypertensive nephrosclerosis, age-related nephron loss  Baseline creatinine 1 6-1 8 since July 2018  Follows with Dr Dipak Minor    -most recent UA negative for blood or protein   -most recent renal ultrasound which showed increased cortical echogenicity suggesting medical renal disease, negative for hydro, nonsuspicious mass in left lower pole of kidney representing simple cysts  -most recent labs showed creatinine of 2 24 ([facility])  Noted hyperglycemia  -recommend avoiding nephrotoxins including ibuprofen, motrin , and advil    -encourage oral hydration    -check labs including BMP prior to next appointment    -will check BMP in 2 weeks  -refer to kidney smart class  Unsure at this time she would like to pursue dialysis if her renal function declines  History of complex left renal cysts:  Deferring further imaging in light of age      Micro albuminuria:  Suspected due to diabetes   -most recent urine protein to creatinine ratio 191 (218)  -currently not on ACE-inhibitor/Arb  Hypokalemia:  Suspected due to HCTZ use  Currently off of HCTZ  Most recent potassium level 4 4  Hypertension: Blood pressure currently 134/68 mmHg   -Current medications:  Clonidine 0 1 milligrams weekly, Bystolic 20 milligrams after dinner, nifedipine 90 milligrams daily, torsemide 20 milligrams daily  -medication changes:   -recommend low salt diet    -please check blood pressure daily and keep record  Volume status:  History of dependent edema  Normal albumin level  EF of 60%  -check weight daily  Call office for 2-3 lb weight gain in 1 day or 5 lbs in one week  Also, for increased LE edema or shortness of breathe    -current diuretic:  Torsemide 20 milligrams daily  Diabetes:  Most recent A1c 7 9  Currently being managed by PCP  Currently maintained on Januvia and glipizide   -referred to Endocrinology for diabetes control  Other:  Dyslipidemia  Patient will follow up in 3-4 months  with Dr Shani Okeefe  HPI: Zuri Richard is a 80 y o  female with history of CKD stage 4, micro albuminuria, hypertension, chronic lower extremity edema, diabetes, who is here for routine CKD follow-up  The patient reports doing well  She denies any recent illness  She denies any hospitalizations since the last time she was here  She denies chest discomfort or shortness of breath  She denies nausea, vomiting, diarrhea, or changes to urination  She denies NSAID use  She reports drinking 216 oz bottles of water per day  She states that she has had weight gain and attributes this to her diabetes medication  She does have some lower extremity edema  She reports that this is improved in the morning and gets worse throughout the day  She is currently on torsemide 20 mg daily  She reports her blood sugars have been in the mid to high 100s    Although her blood pressure sugar noted to be greater than 200 on most recent lab work  ROS:   A complete review of systems was done  Pertinent positives and negatives as noted in the HPI, otherwise the review of systems is negative  Allergies: Patient has no known allergies      Medications:   Current Outpatient Medications:     ACCU-CHEK PAUL PLUS test strip, , Disp: , Rfl:     Accu-Chek Softclix Lancets lancets, , Disp: , Rfl:     Accu-Chek Softclix Lancets lancets, USE 2 TIMES DAILY AS DIRECTED, Disp: 100 each, Rfl: 3    aspirin 81 MG tablet, Take 1 tablet by mouth daily, Disp: , Rfl:     Bystolic 20 MG tablet, TAKE 1 TABLET BY MOUTH EVERY DAY, Disp: 30 tablet, Rfl: 5    Cetirizine HCl (ZYRTEC ALLERGY) 10 MG CAPS, Take 1 tablet by mouth daily as needed, Disp: , Rfl:     cholecalciferol (VITAMIN D3) 1,000 units tablet, Take 2 tablets by mouth daily, Disp: , Rfl:     cloNIDine (CATAPRES-TTS-1) 0 1 mg/24 hr, PLACE 1 PATCH (0 1 MG TOTAL) ON THE SKIN ONCE A WEEK, Disp: 12 patch, Rfl: 2    famotidine (PEPCID) 20 mg tablet, TAKE 1 TABLET BY MOUTH EVERY DAY, Disp: 90 tablet, Rfl: 1    ferrous sulfate 324 (65 Fe) mg, TAKE 1 TABLET BY MOUTH DAILY BEFORE BREAKFAST, Disp: 90 tablet, Rfl: 1    glipiZIDE (GLUCOTROL) 10 mg tablet, 10 mg (1 tab) in am and 20 mg (2 tab) with dinner, Disp: 270 tablet, Rfl: 1    glucose blood (Accu-Chek Paul Plus) test strip, USE AS INSTRUCTED TWICE DAILY, Disp: 100 each, Rfl: 7    glucose blood (Accu-Chek Guide) test strip, USE TO TEST TWICE A DAY, Disp: 100 strip, Rfl: 3    Januvia 50 MG tablet, TAKE 1 TABLET BY MOUTH EVERY DAY, Disp: 90 tablet, Rfl: 1    NIFEdipine (ADALAT CC) 90 mg 24 hr tablet, TAKE 1 TABLET BY MOUTH EVERY DAY, Disp: 90 tablet, Rfl: 2    Potassium Gluconate 595 (99 K) MG TABS, Take 1 tablet by mouth 2 (two) times a day , Disp: , Rfl:     sertraline (ZOLOFT) 25 mg tablet, TAKE 1 TABLET BY MOUTH EVERY DAY, Disp: 90 tablet, Rfl: 2    simvastatin (ZOCOR) 20 mg tablet, TAKE 1 TABLET BY MOUTH EVERYDAY AT BEDTIME, Disp: 90 tablet, Rfl: 2    torsemide (DEMADEX) 20 mg tablet, Take 0 5 tablets (10 mg total) by mouth daily, Disp: 90 tablet, Rfl: 2    Past Medical History:   Diagnosis Date    Aortic valve insufficiency     Cataract of both eyes     Chronic kidney disease     Diabetes mellitus ([address] )     Dysuria     last assessed - [id number]    Edema     last assessed - [id number]    GERD (gastroesophageal reflux disease)     last assessed - [id number]    Hyperlipidemia     Hypertension     Low back pain     last assessed - [id number]    Mitral valve disorder     Osteoporosis     last assessed - [id number]    Palpitations      Past Surgical History:   Procedure Laterality Date    APPENDECTOMY      CATARACT EXTRACTION      COLONOSCOPY      TUBAL LIGATION       Family History   Problem Relation Age of Onset    Kidney disease Mother         Chronic    Breast cancer Sister     Diabetes Sister     Breast cancer Sister     Hypertension Sister     No Known Problems Father     No Known Problems Daughter       reports that she has never smoked  She has never used smokeless tobacco  She reports that she does not drink alcohol and does not use drugs  Physical Exam:   Vitals:    04/28/22 1305   BP: 134/68   BP Location: Left arm   Patient Position: Sitting   Cuff Size: Standard   Pulse: 68   Weight: 77 6 kg (171 lb)   Height: 5' 2" (1 575 m)     Body mass index is 31 28 kg/m²      General: no acute distress   Eyes: conjunctivae pink, anicteric sclerae  ENT: mucous membranes moist  Neck: supple, no JVD  Chest: clear to auscultation bilaterally with no wheezes, rale or rhochi  CVS: regular rate and rhythm   Abdomen: soft, non-tender, non-distended  Extremities: minimal B/L lower extremity edema   Skin: no rash  Neuro: awake and alert       Lab Results:  Results for orders placed or performed in visit on 03/15/22   POCT hemoglobin A1c   Result Value Ref Range    Hemoglobin A1C 7 9 (A) 6 5 Invalid input(s): ALBUMIN      Portions of the record may have been created with voice recognition software  Occasional wrong word or "sound a like" substitutions may have occurred due to the inherent limitations of voice recognition software  Read the chart carefully and recognize, using context, where substitutions have occurred  If you have any questions, please contact the dictating provider

## 2022-04-28 ENCOUNTER — TELEPHONE (OUTPATIENT)
Dept: FAMILY MEDICINE CLINIC | Facility: HOSPITAL | Age: 87
End: 2022-04-28

## 2022-04-28 ENCOUNTER — OFFICE VISIT (OUTPATIENT)
Dept: NEPHROLOGY | Facility: HOSPITAL | Age: 87
End: 2022-04-28
Payer: COMMERCIAL

## 2022-04-28 VITALS
BODY MASS INDEX: 31.47 KG/M2 | SYSTOLIC BLOOD PRESSURE: 134 MMHG | WEIGHT: 171 LBS | DIASTOLIC BLOOD PRESSURE: 68 MMHG | HEIGHT: 62 IN | HEART RATE: 68 BPM

## 2022-04-28 DIAGNOSIS — E11.42 DIABETIC POLYNEUROPATHY (HCC): ICD-10-CM

## 2022-04-28 DIAGNOSIS — I10 PRIMARY HYPERTENSION: ICD-10-CM

## 2022-04-28 DIAGNOSIS — N18.4 TYPE 2 DIABETES MELLITUS WITH STAGE 4 CHRONIC KIDNEY DISEASE, WITHOUT LONG-TERM CURRENT USE OF INSULIN (HCC): ICD-10-CM

## 2022-04-28 DIAGNOSIS — I10 ESSENTIAL HYPERTENSION: ICD-10-CM

## 2022-04-28 DIAGNOSIS — E83.52 HYPERCALCEMIA: ICD-10-CM

## 2022-04-28 DIAGNOSIS — E11.22 TYPE 2 DIABETES MELLITUS WITH STAGE 4 CHRONIC KIDNEY DISEASE, WITHOUT LONG-TERM CURRENT USE OF INSULIN (HCC): ICD-10-CM

## 2022-04-28 DIAGNOSIS — K21.9 GERD (GASTROESOPHAGEAL REFLUX DISEASE): ICD-10-CM

## 2022-04-28 DIAGNOSIS — N18.4 CKD (CHRONIC KIDNEY DISEASE), STAGE IV (HCC): Primary | ICD-10-CM

## 2022-04-28 DIAGNOSIS — E87.6 HYPOKALEMIA: ICD-10-CM

## 2022-04-28 DIAGNOSIS — F41.9 ANXIETY DISORDER, UNSPECIFIED TYPE: ICD-10-CM

## 2022-04-28 DIAGNOSIS — R60.9 DEPENDENT EDEMA: ICD-10-CM

## 2022-04-28 DIAGNOSIS — N28.1 COMPLEX RENAL CYST: ICD-10-CM

## 2022-04-28 PROCEDURE — 99214 OFFICE O/P EST MOD 30 MIN: CPT | Performed by: NURSE PRACTITIONER

## 2022-04-28 RX ORDER — TORSEMIDE 20 MG/1
10 TABLET ORAL DAILY
Qty: 90 TABLET | Refills: 2 | Status: SHIPPED | OUTPATIENT
Start: 2022-04-28 | End: 2022-07-06 | Stop reason: SDUPTHER

## 2022-04-28 NOTE — PATIENT INSTRUCTIONS
We are seeing you today for follow-up on your kidneys  Your kidney number is slightly higher   -I am referring you to endocrinology or diabetes specialist today to assist with your diabetes control   -I am Referring you to Kidney Smart class for Education in regards to your kidneys and the different types of dialysis  -we are reducing your water pill called torsemide to 10 mg daily  You may cut the pills you have in half and continue to take these until they run out  The new prescription has been sent to your pharmacy   -if your experiencing increased swelling in your legs or shortness of breath, please take a total of 20 mg of torsemide and continue to monitor for a response   -if you gain 2-3 lb in 1 day or 5 lb in 1 week, please call the office   -if the swelling in her legs get worse, or you have continued weight gain, or shortness of breath, please call the office despite taking additional water medication  -we will repeat a blood test in 2 weeks  -we will repeat blood test prior to next appointment in approximately 4 months   -please continue to avoid any Motrin products which include ibuprofen, Advil, and Aleve      Thank you

## 2022-04-28 NOTE — TELEPHONE ENCOUNTER
Spoke with pts daughter in law  She said that nephrology gave them a referral to endo, they made an appt, but not until August  Daughter in law is asking for something to have to use right now before she sees endo  Pt  would do better if it was something that we had suggested rather than a suggestion from family

## 2022-05-02 DIAGNOSIS — K21.00 GASTROESOPHAGEAL REFLUX DISEASE WITH ESOPHAGITIS: ICD-10-CM

## 2022-05-02 RX ORDER — FAMOTIDINE 20 MG/1
TABLET, FILM COATED ORAL
Qty: 90 TABLET | Refills: 2 | Status: SHIPPED | OUTPATIENT
Start: 2022-05-02 | End: 2022-06-10

## 2022-05-06 DIAGNOSIS — E11.22 TYPE 2 DIABETES MELLITUS WITH STAGE 3 CHRONIC KIDNEY DISEASE, WITHOUT LONG-TERM CURRENT USE OF INSULIN (HCC): ICD-10-CM

## 2022-05-06 DIAGNOSIS — N18.30 TYPE 2 DIABETES MELLITUS WITH STAGE 3 CHRONIC KIDNEY DISEASE, WITHOUT LONG-TERM CURRENT USE OF INSULIN (HCC): ICD-10-CM

## 2022-05-06 RX ORDER — SITAGLIPTIN 50 MG/1
TABLET, FILM COATED ORAL
Qty: 30 TABLET | Refills: 0 | Status: SHIPPED | OUTPATIENT
Start: 2022-05-06 | End: 2022-06-04

## 2022-05-14 LAB
BUN SERPL-MCNC: 34 MG/DL (ref 8–27)
BUN/CREAT SERPL: 18 (ref 12–28)
CALCIUM SERPL-MCNC: 9.3 MG/DL (ref 8.7–10.3)
CHLORIDE SERPL-SCNC: 100 MMOL/L (ref 96–106)
CO2 SERPL-SCNC: 22 MMOL/L (ref 20–29)
CREAT SERPL-MCNC: 1.91 MG/DL (ref 0.57–1)
EGFR: 25 ML/MIN/1.73
GLUCOSE SERPL-MCNC: 210 MG/DL (ref 65–99)
POTASSIUM SERPL-SCNC: 4.3 MMOL/L (ref 3.5–5.2)
SODIUM SERPL-SCNC: 139 MMOL/L (ref 134–144)

## 2022-05-15 DIAGNOSIS — I10 ESSENTIAL HYPERTENSION: ICD-10-CM

## 2022-05-15 DIAGNOSIS — E11.22 TYPE 2 DIABETES MELLITUS WITH STAGE 3 CHRONIC KIDNEY DISEASE, WITHOUT LONG-TERM CURRENT USE OF INSULIN (HCC): ICD-10-CM

## 2022-05-15 DIAGNOSIS — N18.30 TYPE 2 DIABETES MELLITUS WITH STAGE 3 CHRONIC KIDNEY DISEASE, WITHOUT LONG-TERM CURRENT USE OF INSULIN (HCC): ICD-10-CM

## 2022-05-15 RX ORDER — GLIPIZIDE 10 MG/1
TABLET ORAL
Qty: 270 TABLET | Refills: 2 | Status: SHIPPED | OUTPATIENT
Start: 2022-05-15 | End: 2022-07-08

## 2022-05-16 ENCOUNTER — TELEPHONE (OUTPATIENT)
Dept: NEPHROLOGY | Facility: CLINIC | Age: 87
End: 2022-05-16

## 2022-05-16 NOTE — TELEPHONE ENCOUNTER
----- Message from Hermila Atkins sent at 5/16/2022  1:17 PM EDT -----  Please call patient and let her know I have received her labs  Her renal function is slightly improved   Thanks   ----- Message -----  From: [facility] Lab Results In  Sent: 5/14/2022   8:06 AM EDT  To: Hermila Atkins

## 2022-06-04 DIAGNOSIS — E11.22 TYPE 2 DIABETES MELLITUS WITH STAGE 3 CHRONIC KIDNEY DISEASE, WITHOUT LONG-TERM CURRENT USE OF INSULIN (HCC): ICD-10-CM

## 2022-06-04 DIAGNOSIS — N18.30 TYPE 2 DIABETES MELLITUS WITH STAGE 3 CHRONIC KIDNEY DISEASE, WITHOUT LONG-TERM CURRENT USE OF INSULIN (HCC): ICD-10-CM

## 2022-06-04 LAB
BUN SERPL-MCNC: 26 MG/DL (ref 8–27)
BUN/CREAT SERPL: 14 (ref 12–28)
CALCIUM SERPL-MCNC: 9.5 MG/DL (ref 8.7–10.3)
CHLORIDE SERPL-SCNC: 101 MMOL/L (ref 96–106)
CO2 SERPL-SCNC: 23 MMOL/L (ref 20–29)
CREAT SERPL-MCNC: 1.8 MG/DL (ref 0.57–1)
EGFR: 27 ML/MIN/1.73
EST. AVERAGE GLUCOSE BLD GHB EST-MCNC: 183 MG/DL
GLUCOSE SERPL-MCNC: 203 MG/DL (ref 65–99)
HBA1C MFR BLD: 8 % (ref 4.8–5.6)
POTASSIUM SERPL-SCNC: 4.7 MMOL/L (ref 3.5–5.2)
SODIUM SERPL-SCNC: 140 MMOL/L (ref 134–144)

## 2022-06-04 RX ORDER — SITAGLIPTIN 50 MG/1
TABLET, FILM COATED ORAL
Qty: 30 TABLET | Refills: 0 | Status: SHIPPED | OUTPATIENT
Start: 2022-06-04 | End: 2022-07-03

## 2022-06-14 ENCOUNTER — OFFICE VISIT (OUTPATIENT)
Dept: FAMILY MEDICINE CLINIC | Facility: HOSPITAL | Age: 87
End: 2022-06-14
Payer: COMMERCIAL

## 2022-06-14 VITALS
TEMPERATURE: 97 F | WEIGHT: 166.2 LBS | DIASTOLIC BLOOD PRESSURE: 62 MMHG | SYSTOLIC BLOOD PRESSURE: 136 MMHG | HEART RATE: 68 BPM | BODY MASS INDEX: 30.59 KG/M2 | HEIGHT: 62 IN

## 2022-06-14 DIAGNOSIS — I10 PRIMARY HYPERTENSION: ICD-10-CM

## 2022-06-14 DIAGNOSIS — N18.4 TYPE 2 DIABETES MELLITUS WITH STAGE 4 CHRONIC KIDNEY DISEASE, WITHOUT LONG-TERM CURRENT USE OF INSULIN (HCC): Primary | ICD-10-CM

## 2022-06-14 DIAGNOSIS — E66.9 OBESITY (BMI 30.0-34.9): ICD-10-CM

## 2022-06-14 DIAGNOSIS — N18.4 CKD (CHRONIC KIDNEY DISEASE), STAGE IV (HCC): ICD-10-CM

## 2022-06-14 DIAGNOSIS — Z00.00 MEDICARE ANNUAL WELLNESS VISIT, SUBSEQUENT: ICD-10-CM

## 2022-06-14 DIAGNOSIS — E11.22 TYPE 2 DIABETES MELLITUS WITH STAGE 4 CHRONIC KIDNEY DISEASE, WITHOUT LONG-TERM CURRENT USE OF INSULIN (HCC): Primary | ICD-10-CM

## 2022-06-14 PROCEDURE — 1125F AMNT PAIN NOTED PAIN PRSNT: CPT | Performed by: INTERNAL MEDICINE

## 2022-06-14 PROCEDURE — G0439 PPPS, SUBSEQ VISIT: HCPCS | Performed by: INTERNAL MEDICINE

## 2022-06-14 PROCEDURE — 3725F SCREEN DEPRESSION PERFORMED: CPT | Performed by: INTERNAL MEDICINE

## 2022-06-14 PROCEDURE — 1160F RVW MEDS BY RX/DR IN RCRD: CPT | Performed by: INTERNAL MEDICINE

## 2022-06-14 PROCEDURE — 1170F FXNL STATUS ASSESSED: CPT | Performed by: INTERNAL MEDICINE

## 2022-06-14 PROCEDURE — 99214 OFFICE O/P EST MOD 30 MIN: CPT | Performed by: INTERNAL MEDICINE

## 2022-06-14 PROCEDURE — 3288F FALL RISK ASSESSMENT DOCD: CPT | Performed by: INTERNAL MEDICINE

## 2022-06-14 PROCEDURE — 1036F TOBACCO NON-USER: CPT | Performed by: INTERNAL MEDICINE

## 2022-06-14 PROCEDURE — 1101F PT FALLS ASSESS-DOCD LE1/YR: CPT | Performed by: INTERNAL MEDICINE

## 2022-06-14 NOTE — PROGRESS NOTES
Assessment/Plan:    Type 2 diabetes mellitus with stage 4 chronic kidney disease, without long-term current use of insulin ([facility])    Lab Results   Component Value Date    HGBA1C 8 0 (H) 06/03/2022   DM type 2 with polyneuropathy, nephropathy/CKD stage 4 and hyperglycemia - uncontrolled with A1C bouncing back up to 8 0 - has appt to establish with Endo in Aug, number for DM edu given (reluctant but agreeable), diet/exercise/wgt loss encouraged, can increase Glipizide a bit but already has LE edema, on renal doses Januvia and not able to use Metformin d/t CKD, will start Invokana 100 mg 1 tab PO q day, SE reviewed, recheck BW in 3 mos - order given, UTD on DM foot exam (3/22) and eye exam (6/21 - 2 yrs), on statin but no ACE/ARB d/t CKD stage 4    CKD (chronic kidney disease), stage IV ([facility])  Lab Results   Component Value Date    EGFR 27 (L) 06/03/2022    EGFR 25 (L) 05/13/2022    EGFR 21 (L) 04/15/2022    CREATININE 1 80 (H) 06/03/2022    CREATININE 1 91 (H) 05/13/2022    CREATININE 2 24 (H) 04/15/2022   stable, has established with renal, was referred to kidney smart classes but family feels this would be too overwhelming, urged BP/BS control and avoiding NSAIDs and dehydration, con't f/u as per Nephro    Hypertension  BP good today, con't current meds, healthy diet/exercise/wgt loss encouraged, recheck in 4 mos       Diagnoses and all orders for this visit:    Type 2 diabetes mellitus with stage 4 chronic kidney disease, without long-term current use of insulin ([facility] )  -     Ambulatory referral to Diabetic Education; Future  -     canagliflozin (Invokana) 100 mg; Take 1 tablet (100 mg total) by mouth daily before breakfast  -     Hemoglobin A1C; Future  -     Basic metabolic panel; Future  -     Hemoglobin A1C  -     Basic metabolic panel    CKD (chronic kidney disease), stage IV ([facility])  -     Basic metabolic panel;  Future  -     Basic metabolic panel    Primary hypertension    Medicare annual wellness visit, subsequent    Obesity (BMI 30 0-34  9)    BMI 30 0-30 9,adult  Comments:  diet/exercise/wgt loss encouraged      Mammo 10/21    Dexa 12/19 - osteopenia        Subjective:      Patient ID: Elizabeth Mora is a 80 y o  female  HPI Pt here for follow up appt/BW results and AWV    BW results were d/w pt in detail: FBS/A1C 203/8 0, BUN/Cr 26/1 8 (GFR 27)  Def of controlled vs uncontrolled DM was reviewed  Diet was reviewed - wgt down 2 lbs from Dec 21  She is taking her DM meds as directed  She is checking her sugars approx once a day a few days a week but not daily  Home numbers brought in and reviewed: 187, 93, 207, 197, 175, 141, 112, 136  She is UTD on DM foot exam (3/22) and eye exam (6/21 - 2 yrs)  She is on statin but no ARB/ACE d/t her CKD  CKD stage 4 reviewed with pt  She follows with Hernandez Almon[facility])  She saw Yesenia Mcclendon at [facility] office in April - OV note reviewed  She had no meds changed  She was referred to kidney smart classes  She has not made an appt for the classes yet  She was told to f/u with Dr Luís Coto in 3-4 mos with BW prior to appt  BP at goal today and meds were reviewed and no changes have occurred  She denies missing doses of meds or SE with the meds  She does check her BP outside the office and is usually 120-130's/70's  She notes no frequent Ha's/dizziness/double vision/CP  Review of Systems   Constitutional: Negative for chills, fever and unexpected weight change  HENT: Negative for congestion, hearing loss and trouble swallowing  Eyes: Negative for pain and visual disturbance  Respiratory: Negative for cough, shortness of breath and wheezing  Cardiovascular: Positive for leg swelling  Negative for chest pain and palpitations  Gastrointestinal: Negative for abdominal pain, blood in stool, constipation, diarrhea and nausea  Endocrine: Negative for polydipsia and polyuria     Genitourinary: Negative for difficulty urinating, dysuria, vaginal bleeding and vaginal pain  Musculoskeletal: Positive for arthralgias  Negative for back pain and neck pain  Skin: Negative for rash and wound  Neurological: Negative for dizziness, light-headedness and headaches  Hematological: Does not bruise/bleed easily  Psychiatric/Behavioral: Negative for dysphoric mood and sleep disturbance  Objective:    /62   Pulse 68   Temp (!) 97 °F (36 1 °C) (Tympanic)   Ht 5' 2" (1 575 m)   Wt 75 4 kg (166 lb 3 2 oz)   BMI 30 40 kg/m²      Physical Exam  Vitals and nursing note reviewed  Constitutional:       General: She is not in acute distress  Appearance: She is well-developed  She is not ill-appearing  HENT:      Head: Normocephalic and atraumatic  Right Ear: Tympanic membrane and external ear normal  There is no impacted cerumen  Left Ear: Tympanic membrane and external ear normal  There is no impacted cerumen  Eyes:      General:         Right eye: No discharge  Left eye: No discharge  Conjunctiva/sclera: Conjunctivae normal    Neck:      Vascular: No carotid bruit  Trachea: No tracheal deviation  Cardiovascular:      Rate and Rhythm: Normal rate and regular rhythm  Heart sounds: Normal heart sounds  No murmur heard  No friction rub  Pulmonary:      Effort: Pulmonary effort is normal  No respiratory distress  Breath sounds: Normal breath sounds  No wheezing, rhonchi or rales  Abdominal:      General: There is no distension  Palpations: Abdomen is soft  Tenderness: There is no abdominal tenderness  There is no guarding or rebound  Musculoskeletal:         General: No deformity or signs of injury  Cervical back: Neck supple  Skin:     General: Skin is warm  Coloration: Skin is not pale  Findings: No rash  Neurological:      General: No focal deficit present  Mental Status: She is alert  Mental status is at baseline  Motor: No abnormal muscle tone        Gait: Gait normal    Psychiatric:         Mood and Affect: Mood normal          Behavior: Behavior normal          Thought Content:  Thought content normal          Judgment: Judgment normal

## 2022-06-14 NOTE — PATIENT INSTRUCTIONS
Medicare Preventive Visit Patient Instructions  Thank you for completing your Welcome to Medicare Visit or Medicare Annual Wellness Visit today  Your next wellness visit will be due in one year (6/15/2023)  The screening/preventive services that you may require over the next 5-10 years are detailed below  Some tests may not apply to you based off risk factors and/or age  Screening tests ordered at today's visit but not completed yet may show as past due  Also, please note that scanned in results may not display below  Preventive Screenings:  Service Recommendations Previous Testing/Comments   Colorectal Cancer Screening  * Colonoscopy    * Fecal Occult Blood Test (FOBT)/Fecal Immunochemical Test (FIT)  * Fecal DNA/Cologuard Test  * Flexible Sigmoidoscopy Age: 54-65 years old   Colonoscopy: every 10 years (may be performed more frequently if at higher risk)  OR  FOBT/FIT: every 1 year  OR  Cologuard: every 3 years  OR  Sigmoidoscopy: every 5 years  Screening may be recommended earlier than age 48 if at higher risk for colorectal cancer  Also, an individualized decision between you and your healthcare provider will decide whether screening between the ages of 74-80 would be appropriate  Colonoscopy: Not on file  FOBT/FIT: 06/30/2019  Cologuard: Not on file  Sigmoidoscopy: Not on file    Screening Not Indicated     Breast Cancer Screening Age: 36 years old  Frequency: every 1-2 years  Not required if history of left and right mastectomy Mammogram: 10/01/2021    Screening Current   Cervical Cancer Screening Between the ages of 21-29, pap smear recommended once every 3 years  Between the ages of 33-67, can perform pap smear with HPV co-testing every 5 years     Recommendations may differ for women with a history of total hysterectomy, cervical cancer, or abnormal pap smears in past  Pap Smear: Not on file    Screening Not Indicated   Hepatitis C Screening Once for adults born between 1945 and 1965  More frequently in patients at high risk for Hepatitis C Hep C Antibody: Not on file        Diabetes Screening 1-2 times per year if you're at risk for diabetes or have pre-diabetes Fasting glucose: No results in last 5 years   A1C: 8 0 %    Screening Not Indicated  History Diabetes   Cholesterol Screening Once every 5 years if you don't have a lipid disorder  May order more often based on risk factors  Lipid panel: 03/04/2022    Screening Current     Other Preventive Screenings Covered by Medicare:  1  Abdominal Aortic Aneurysm (AAA) Screening: covered once if your at risk  You're considered to be at risk if you have a family history of AAA  2  Lung Cancer Screening: covers low dose CT scan once per year if you meet all of the following conditions: (1) Age 50-69; (2) No signs or symptoms of lung cancer; (3) Current smoker or have quit smoking within the last 15 years; (4) You have a tobacco smoking history of at least 30 pack years (packs per day multiplied by number of years you smoked); (5) You get a written order from a healthcare provider  3  Glaucoma Screening: covered annually if you're considered high risk: (1) You have diabetes OR (2) Family history of glaucoma OR (3)  aged 48 and older OR (3)  American aged 72 and older  3  Osteoporosis Screening: covered every 2 years if you meet one of the following conditions: (1) You're estrogen deficient and at risk for osteoporosis based off medical history and other findings; (2) Have a vertebral abnormality; (3) On glucocorticoid therapy for more than 3 months; (4) Have primary hyperparathyroidism; (5) On osteoporosis medications and need to assess response to drug therapy  · Last bone density test (DXA Scan): 12/10/2019   5  HIV Screening: covered annually if you're between the age of 15-65  Also covered annually if you are younger than 13 and older than 72 with risk factors for HIV infection   For pregnant patients, it is covered up to 3 times per pregnancy  Immunizations:  Immunization Recommendations   Influenza Vaccine Annual influenza vaccination during flu season is recommended for all persons aged >= 6 months who do not have contraindications   Pneumococcal Vaccine (Prevnar and Pneumovax)  * Prevnar = PCV13  * Pneumovax = PPSV23   Adults 25-60 years old: 1-3 doses may be recommended based on certain risk factors  Adults 72 years old: Prevnar (PCV13) vaccine recommended followed by Pneumovax (PPSV23) vaccine  If already received PPSV23 since turning 65, then PCV13 recommended at least one year after PPSV23 dose  Hepatitis B Vaccine 3 dose series if at intermediate or high risk (ex: diabetes, end stage renal disease, liver disease)   Tetanus (Td) Vaccine - COST NOT COVERED BY MEDICARE PART B Following completion of primary series, a booster dose should be given every 10 years to maintain immunity against tetanus  Td may also be given as tetanus wound prophylaxis  Tdap Vaccine - COST NOT COVERED BY MEDICARE PART B Recommended at least once for all adults  For pregnant patients, recommended with each pregnancy  Shingles Vaccine (Shingrix) - COST NOT COVERED BY MEDICARE PART B  2 shot series recommended in those aged 48 and above     Health Maintenance Due:  There are no preventive care reminders to display for this patient  Immunizations Due:      Topic Date Due    DTaP,Tdap,and Td Vaccines (1 - Tdap) 10/27/2012    COVID-19 Vaccine (3 - Booster for Katie Theodore series) 07/26/2021     Advance Directives   What are advance directives? Advance directives are legal documents that state your wishes and plans for medical care  These plans are made ahead of time in case you lose your ability to make decisions for yourself  Advance directives can apply to any medical decision, such as the treatments you want, and if you want to donate organs  What are the types of advance directives?   There are many types of advance directives, and each state has rules about how to use them  You may choose a combination of any of the following:  · Living will: This is a written record of the treatment you want  You can also choose which treatments you do not want, which to limit, and which to stop at a certain time  This includes surgery, medicine, IV fluid, and tube feedings  · Durable power of  for healthcare [facility]): This is a written record that states who you want to make healthcare choices for you when you are unable to make them for yourself  This person, called a proxy, is usually a family member or a friend  You may choose more than 1 proxy  · Do not resuscitate (DNR) order:  A DNR order is used in case your heart stops beating or you stop breathing  It is a request not to have certain forms of treatment, such as CPR  A DNR order may be included in other types of advance directives  · Medical directive: This covers the care that you want if you are in a coma, near death, or unable to make decisions for yourself  You can list the treatments you want for each condition  Treatment may include pain medicine, surgery, blood transfusions, dialysis, IV or tube feedings, and a ventilator (breathing machine)  · Values history: This document has questions about your views, beliefs, and how you feel and think about life  This information can help others choose the care that you would choose  Why are advance directives important? An advance directive helps you control your care  Although spoken wishes may be used, it is better to have your wishes written down  Spoken wishes can be misunderstood, or not followed  Treatments may be given even if you do not want them  An advance directive may make it easier for your family to make difficult choices about your care  Urinary Incontinence   Urinary incontinence (UI)  is when you lose control of your bladder  UI develops because your bladder cannot store or empty urine properly   The 3 most common types of UI are stress incontinence, urge incontinence, or both  Medicines:   · May be given to help strengthen your bladder control  Report any side effects of medication to your healthcare provider  Do pelvic muscle exercises often:  Your pelvic muscles help you stop urinating  Squeeze these muscles tight for 5 seconds, then relax for 5 seconds  Gradually work up to squeezing for 10 seconds  Do 3 sets of 15 repetitions a day, or as directed  This will help strengthen your pelvic muscles and improve bladder control  Train your bladder:  Go to the bathroom at set times, such as every 2 hours, even if you do not feel the urge to go  You can also try to hold your urine when you feel the urge to go  For example, hold your urine for 5 minutes when you feel the urge to go  As that becomes easier, hold your urine for 10 minutes  Self-care:   · Keep a UI record  Write down how often you leak urine and how much you leak  Make a note of what you were doing when you leaked urine  · Drink liquids as directed  You may need to limit the amount of liquid you drink to help control your urine leakage  Do not drink any liquid right before you go to bed  Limit or do not have drinks that contain caffeine or alcohol  · Prevent constipation  Eat a variety of high-fiber foods  Good examples are high-fiber cereals, beans, vegetables, and whole-grain breads  Walking is the best way to trigger your intestines to have a bowel movement  · Exercise regularly and maintain a healthy weight  Weight loss and exercise will decrease pressure on your bladder and help you control your leakage  · Use a catheter as directed  to help empty your bladder  A catheter is a tiny, plastic tube that is put into your bladder to drain your urine  · Go to behavior therapy as directed  Behavior therapy may be used to help you learn to control your urge to urinate      Weight Management   Why it is important to manage your weight:  Being overweight increases your risk of health conditions such as heart disease, high blood pressure, type 2 diabetes, and certain types of cancer  It can also increase your risk for osteoarthritis, sleep apnea, and other respiratory problems  Aim for a slow, steady weight loss  Even a small amount of weight loss can lower your risk of health problems  How to lose weight safely:  A safe and healthy way to lose weight is to eat fewer calories and get regular exercise  You can lose up about 1 pound a week by decreasing the number of calories you eat by 500 calories each day  Healthy meal plan for weight management:  A healthy meal plan includes a variety of foods, contains fewer calories, and helps you stay healthy  A healthy meal plan includes the following:  · Eat whole-grain foods more often  A healthy meal plan should contain fiber  Fiber is the part of grains, fruits, and vegetables that is not broken down by your body  Whole-grain foods are healthy and provide extra fiber in your diet  Some examples of whole-grain foods are whole-wheat breads and pastas, oatmeal, brown rice, and bulgur  · Eat a variety of vegetables every day  Include dark, leafy greens such as spinach, kale, amrit greens, and mustard greens  Eat yellow and orange vegetables such as carrots, sweet potatoes, and winter squash  · Eat a variety of fruits every day  Choose fresh or canned fruit (canned in its own juice or light syrup) instead of juice  Fruit juice has very little or no fiber  · Eat low-fat dairy foods  Drink fat-free (skim) milk or 1% milk  Eat fat-free yogurt and low-fat cottage cheese  Try low-fat cheeses such as mozzarella and other reduced-fat cheeses  · Choose meat and other protein foods that are low in fat  Choose beans or other legumes such as split peas or lentils  Choose fish, skinless poultry (chicken or turkey), or lean cuts of red meat (beef or pork)  Before you cook meat or poultry, cut off any visible fat  · Use less fat and oil    Try baking foods instead of frying them  Add less fat, such as margarine, sour cream, regular salad dressing and mayonnaise to foods  Eat fewer high-fat foods  Some examples of high-fat foods include french fries, doughnuts, ice cream, and cakes  · Eat fewer sweets  Limit foods and drinks that are high in sugar  This includes candy, cookies, regular soda, and sweetened drinks  Exercise:  Exercise at least 30 minutes per day on most days of the week  Some examples of exercise include walking, biking, dancing, and swimming  You can also fit in more physical activity by taking the stairs instead of the elevator or parking farther away from stores  Ask your healthcare provider about the best exercise plan for you  © Copyright [employer] 2018 Information is for End User's use only and may not be sold, redistributed or otherwise used for commercial purposes  All illustrations and images included in CareNotes® are the copyrighted property of A D A M , Inc  or [address]    10% - bad control"> 10% - bad control,Hemoglobin A1c (HbA1c) greater than 10% indicating poor diabetic control,Haemoglobin A1c greater than 10% indicating poor diabetic control">   Diabetes Mellitus Type 2 in Adults, Ambulatory Care   GENERAL INFORMATION:   Diabetes mellitus type 2  is a disease that affects how your body uses glucose (sugar)  Insulin helps move sugar out of the blood so it can be used for energy  Normally, when the blood sugar level increases, the pancreas makes more insulin  Type 2 diabetes develops because either the body cannot make enough insulin, or it cannot use the insulin correctly  After many years, your pancreas may stop making insulin  Common symptoms include the following:   · More hunger or thirst than usual     · Frequent urination     · Weight loss without trying     · Blurred vision  Seek immediate care for the following symptoms:   · Severe abdominal pain, or pain that spreads to your back   You may also be vomiting  · Trouble staying awake or focusing    · Shaking or sweating    · Blurred or double vision    · Breath has a fruity, sweet smell    · Breathing is deep and labored, or rapid and shallow    · Heartbeat is fast and weak  Treatment for diabetes mellitus type 2  includes keeping your blood sugar at a normal level  You must eat the right foods, and exercise regularly  You may also need medicine if you cannot control your blood sugar level with nutrition and exercise  Manage diabetes mellitus type 2:   · Check your blood sugar level  You will be taught how to check a small drop of blood in a glucose monitor  Ask your healthcare provider when and how often to check during the day  Ask your healthcare provider what your blood sugar levels should be when you check them  · Keep track of carbohydrates (sugar and starchy foods)  Your blood sugar level can get too high if you eat too many carbohydrates  Your dietitian will help you plan meals and snacks that have the right amount of carbohydrates  · Eat low-fat foods  Some examples are skinless chicken and low-fat milk  · Eat less sodium (salt)  Some examples of high-sodium foods to limit are soy sauce, potato chips, and soup  Do not add salt to food you cook  Limit your use of table salt  · Eat high-fiber foods  Foods that are a good source of fiber include vegetables, whole grain bread, and beans  · Limit alcohol  Alcohol affects your blood sugar level and can make it harder to manage your diabetes  Women should limit alcohol to 1 drink a day  Men should limit alcohol to 2 drinks a day  A drink of alcohol is 12 ounces of beer, 5 ounces of wine, or 1½ ounces of liquor  · Get regular exercise  Exercise can help keep your blood sugar level steady, decrease your risk of heart disease, and help you lose weight  Exercise for at least 30 minutes, 5 days a week  Include muscle strengthening activities 2 days each week   Work with your healthcare provider to create an exercise plan  · Check your feet each day  for injuries or open sores  Ask your healthcare provider for activities you can do if you have an open sore  · Quit smoking  If you smoke, it is never too late to quit  Smoking can worsen the problems that may occur with diabetes  Ask your healthcare provider for information about how to stop smoking if you are having trouble quitting  · Ask about your weight:  Ask healthcare providers if you need to lose weight, and how much to lose  Ask them to help you with a weight loss program  Even a 10 to 15 pound weight loss can help you manage your blood sugar level  · Carry medical alert identification  Wear medical alert jewelry or carry a card that says you have diabetes  Ask your healthcare provider where to get these items  · Ask about vaccines  Diabetes puts you at risk of serious illness if you get the flu, pneumonia, or hepatitis  Ask your healthcare provider if you should get a flu, pneumonia, or hepatitis B vaccine, and when to get the vaccine  Follow up with your healthcare provider as directed:  Write down your questions so you remember to ask them during your visits  CARE AGREEMENT:   You have the right to help plan your care  Learn about your health condition and how it may be treated  Discuss treatment options with your caregivers to decide what care you want to receive  You always have the right to refuse treatment  The above information is an  only  It is not intended as medical advice for individual conditions or treatments  Talk to your doctor, nurse or pharmacist before following any medical regimen to see if it is safe and effective for you  © 2014 [address] is for End User's use only and may not be sold, redistributed or otherwise used for commercial purposes   All illustrations and images included in CareNotes® are the copyrighted property of [employer]  or Roberto [employer] Gilbert  What to Do if Your Blood Sugar is Low   AMBULATORY CARE:   Low blood sugar levels  (hypoglycemia) can happen with Type 1 and Type 2 diabetes  Low levels are more likely to happen if you use insulin  Hypoglycemia can cause you to have falls, accidents, and injuries  A blood sugar level that gets too low can lead to seizures, coma, and death  Learn to recognize the symptoms early so you can get treatment quickly  When your blood sugar is low you may feel:  · Sweaty    · Nervous or shaky    · Anxious or irritable    · Confused    · A fast, pounding heartbeat    · Extremely hungry    Have someone call your local emergency number (911 in the [address]) if:   · You cannot be woken  · You have a seizure  Call your doctor if:   · You have symptoms of a low blood sugar level, such as trouble thinking, sweating, or a pounding heartbeat  · Your blood sugar level is lower than normal and it does not improve with treatment  · You often have lower blood sugar levels than your target goals  · You have trouble coping with your illness, or you feel anxious or depressed  · You have questions or concerns about your condition or care  What to do if you have symptoms of low blood sugar:   · Check your blood sugar level, if possible  Your blood sugar level is too low if it is at or below 70 mg/dL  · Eat or drink 15 grams of fast-acting carbohydrate  Fast-acting carbohydrates will raise your blood sugar level quickly  Examples of 15 grams of fast-acting carbohydrates:     ? 4 ounces (½ cup) of fruit juice     ? 4 ounces of regular soda    ? 2 tablespoons of raisins     ? 1 tube of glucose gel or 3 to 4 glucose tablets       · Check your blood sugar level 15 minutes later  If the level is still low (less than 100 mg/dL), eat another 15 grams of carbohydrate  When the level returns to 100 mg/dL, eat a snack or meal that contains carbohydrates   This will help prevent another drop in blood sugar     · Teach people close to you how to use your glucagon kit  Your blood sugar may be too low for you to be awake  People need to know when and how to use your kit  Prevent low blood sugar levels:  Prevent low blood sugar by knowing what increases your risk  Ask your healthcare provider for ways to prevent low blood sugar levels  Any of the following can increase your risk of low blood sugar:  · Fasting for tests or procedures    · During or after intense exercise    · Late or postponed meals    · Sleeping (you may need a bedtime snack)     · Drinking alcohol if you use insulin or insulin releasing pills    Follow up with your doctor as directed:  Write down your questions so you remember to ask them during your visits  © Copyright [employer] 2022 Information is for End User's use only and may not be sold, redistributed or otherwise used for commercial purposes  All illustrations and images included in CareNotes® are the copyrighted property of A D A M , Inc  or [employer]   The above information is an  only  It is not intended as medical advice for individual conditions or treatments  Talk to your doctor, nurse or pharmacist before following any medical regimen to see if it is safe and effective for you  10% - bad control"> 10% - bad control,Hemoglobin A1c (HbA1c) greater than 10% indicating poor diabetic control,Haemoglobin A1c greater than 10% indicating poor diabetic control">   Diabetes Mellitus Type 2 in Adults, Ambulatory Care   GENERAL INFORMATION:   Diabetes mellitus type 2  is a disease that affects how your body uses glucose (sugar)  Insulin helps move sugar out of the blood so it can be used for energy  Normally, when the blood sugar level increases, the pancreas makes more insulin  Type 2 diabetes develops because either the body cannot make enough insulin, or it cannot use the insulin correctly  After many years, your pancreas may stop making insulin    Common symptoms include the following:   · More hunger or thirst than usual     · Frequent urination     · Weight loss without trying     · Blurred vision  Seek immediate care for the following symptoms:   · Severe abdominal pain, or pain that spreads to your back  You may also be vomiting  · Trouble staying awake or focusing    · Shaking or sweating    · Blurred or double vision    · Breath has a fruity, sweet smell    · Breathing is deep and labored, or rapid and shallow    · Heartbeat is fast and weak  Treatment for diabetes mellitus type 2  includes keeping your blood sugar at a normal level  You must eat the right foods, and exercise regularly  You may also need medicine if you cannot control your blood sugar level with nutrition and exercise  Manage diabetes mellitus type 2:   · Check your blood sugar level  You will be taught how to check a small drop of blood in a glucose monitor  Ask your healthcare provider when and how often to check during the day  Ask your healthcare provider what your blood sugar levels should be when you check them  · Keep track of carbohydrates (sugar and starchy foods)  Your blood sugar level can get too high if you eat too many carbohydrates  Your dietitian will help you plan meals and snacks that have the right amount of carbohydrates  · Eat low-fat foods  Some examples are skinless chicken and low-fat milk  · Eat less sodium (salt)  Some examples of high-sodium foods to limit are soy sauce, potato chips, and soup  Do not add salt to food you cook  Limit your use of table salt  · Eat high-fiber foods  Foods that are a good source of fiber include vegetables, whole grain bread, and beans  · Limit alcohol  Alcohol affects your blood sugar level and can make it harder to manage your diabetes  Women should limit alcohol to 1 drink a day  Men should limit alcohol to 2 drinks a day  A drink of alcohol is 12 ounces of beer, 5 ounces of wine, or 1½ ounces of liquor  · Get regular exercise  Exercise can help keep your blood sugar level steady, decrease your risk of heart disease, and help you lose weight  Exercise for at least 30 minutes, 5 days a week  Include muscle strengthening activities 2 days each week  Work with your healthcare provider to create an exercise plan  · Check your feet each day  for injuries or open sores  Ask your healthcare provider for activities you can do if you have an open sore  · Quit smoking  If you smoke, it is never too late to quit  Smoking can worsen the problems that may occur with diabetes  Ask your healthcare provider for information about how to stop smoking if you are having trouble quitting  · Ask about your weight:  Ask healthcare providers if you need to lose weight, and how much to lose  Ask them to help you with a weight loss program  Even a 10 to 15 pound weight loss can help you manage your blood sugar level  · Carry medical alert identification  Wear medical alert jewelry or carry a card that says you have diabetes  Ask your healthcare provider where to get these items  · Ask about vaccines  Diabetes puts you at risk of serious illness if you get the flu, pneumonia, or hepatitis  Ask your healthcare provider if you should get a flu, pneumonia, or hepatitis B vaccine, and when to get the vaccine  Follow up with your healthcare provider as directed:  Write down your questions so you remember to ask them during your visits  CARE AGREEMENT:   You have the right to help plan your care  Learn about your health condition and how it may be treated  Discuss treatment options with your caregivers to decide what care you want to receive  You always have the right to refuse treatment  The above information is an  only  It is not intended as medical advice for individual conditions or treatments   Talk to your doctor, nurse or pharmacist before following any medical regimen to see if it is safe and effective for you  © 2014 [address] is for End User's use only and may not be sold, redistributed or otherwise used for commercial purposes  All illustrations and images included in CareNotes® are the copyrighted property of A D A M , Inc  or Roberto Carlos Gilbert  What to Do if Your Blood Sugar is Low   AMBULATORY CARE:   Low blood sugar levels  (hypoglycemia) can happen with Type 1 and Type 2 diabetes  Low levels are more likely to happen if you use insulin  Hypoglycemia can cause you to have falls, accidents, and injuries  A blood sugar level that gets too low can lead to seizures, coma, and death  Learn to recognize the symptoms early so you can get treatment quickly  When your blood sugar is low you may feel:  · Sweaty    · Nervous or shaky    · Anxious or irritable    · Confused    · A fast, pounding heartbeat    · Extremely hungry    Have someone call your local emergency number (911 in the [address]) if:   · You cannot be woken  · You have a seizure  Call your doctor if:   · You have symptoms of a low blood sugar level, such as trouble thinking, sweating, or a pounding heartbeat  · Your blood sugar level is lower than normal and it does not improve with treatment  · You often have lower blood sugar levels than your target goals  · You have trouble coping with your illness, or you feel anxious or depressed  · You have questions or concerns about your condition or care  What to do if you have symptoms of low blood sugar:   · Check your blood sugar level, if possible  Your blood sugar level is too low if it is at or below 70 mg/dL  · Eat or drink 15 grams of fast-acting carbohydrate  Fast-acting carbohydrates will raise your blood sugar level quickly  Examples of 15 grams of fast-acting carbohydrates:     ? 4 ounces (½ cup) of fruit juice     ? 4 ounces of regular soda    ? 2 tablespoons of raisins     ?  1 tube of glucose gel or 3 to 4 glucose tablets · Check your blood sugar level 15 minutes later  If the level is still low (less than 100 mg/dL), eat another 15 grams of carbohydrate  When the level returns to 100 mg/dL, eat a snack or meal that contains carbohydrates  This will help prevent another drop in blood sugar  · Teach people close to you how to use your glucagon kit  Your blood sugar may be too low for you to be awake  People need to know when and how to use your kit  Prevent low blood sugar levels:  Prevent low blood sugar by knowing what increases your risk  Ask your healthcare provider for ways to prevent low blood sugar levels  Any of the following can increase your risk of low blood sugar:  · Fasting for tests or procedures    · During or after intense exercise    · Late or postponed meals    · Sleeping (you may need a bedtime snack)     · Drinking alcohol if you use insulin or insulin releasing pills    Follow up with your doctor as directed:  Write down your questions so you remember to ask them during your visits  © [employer] 2022 Information is for End User's use only and may not be sold, redistributed or otherwise used for commercial purposes  All illustrations and images included in CareNotes® are the copyrighted property of A D A M , Inc  or [address] [place] [address]  The above information is an  only  It is not intended as medical advice for individual conditions or treatments  Talk to your doctor, nurse or pharmacist before following any medical regimen to see if it is safe and effective for you  Medicare Preventive Visit Patient Instructions  Thank you for completing your Welcome to Medicare Visit or Medicare Annual Wellness Visit today  Your next wellness visit will be due in one year (6/15/2023)  The screening/preventive services that you may require over the next 5-10 years are detailed below  Some tests may not apply to you based off risk factors and/or age   Screening tests ordered at today's visit but not completed yet may show as past due  Also, please note that scanned in results may not display below  Preventive Screenings:  Service Recommendations Previous Testing/Comments   Colorectal Cancer Screening  * Colonoscopy    * Fecal Occult Blood Test (FOBT)/Fecal Immunochemical Test (FIT)  * Fecal DNA/Cologuard Test  * Flexible Sigmoidoscopy Age: 54-65 years old   Colonoscopy: every 10 years (may be performed more frequently if at higher risk)  OR  FOBT/FIT: every 1 year  OR  Cologuard: every 3 years  OR  Sigmoidoscopy: every 5 years  Screening may be recommended earlier than age 48 if at higher risk for colorectal cancer  Also, an individualized decision between you and your healthcare provider will decide whether screening between the ages of 74-80 would be appropriate  Colonoscopy: Not on file  FOBT/FIT: 06/30/2019  Cologuard: Not on file  Sigmoidoscopy: Not on file    Screening Not Indicated     Breast Cancer Screening Age: 36 years old  Frequency: every 1-2 years  Not required if history of left and right mastectomy Mammogram: 10/01/2021    Screening Current   Cervical Cancer Screening Between the ages of 21-29, pap smear recommended once every 3 years  Between the ages of 33-67, can perform pap smear with HPV co-testing every 5 years  Recommendations may differ for women with a history of total hysterectomy, cervical cancer, or abnormal pap smears in past  Pap Smear: Not on file    Screening Not Indicated   Hepatitis C Screening Once for adults born between 1945 and 1965  More frequently in patients at high risk for Hepatitis C Hep C Antibody: Not on file        Diabetes Screening 1-2 times per year if you're at risk for diabetes or have pre-diabetes Fasting glucose: No results in last 5 years   A1C: 8 0 %    Screening Not Indicated  History Diabetes   Cholesterol Screening Once every 5 years if you don't have a lipid disorder  May order more often based on risk factors   Lipid panel: 03/04/2022    Screening Current     Other Preventive Screenings Covered by Medicare:  6  Abdominal Aortic Aneurysm (AAA) Screening: covered once if your at risk  You're considered to be at risk if you have a family history of AAA  7  Lung Cancer Screening: covers low dose CT scan once per year if you meet all of the following conditions: (1) Age 50-69; (2) No signs or symptoms of lung cancer; (3) Current smoker or have quit smoking within the last 15 years; (4) You have a tobacco smoking history of at least 30 pack years (packs per day multiplied by number of years you smoked); (5) You get a written order from a healthcare provider  8  Glaucoma Screening: covered annually if you're considered high risk: (1) You have diabetes OR (2) Family history of glaucoma OR (3)  aged 48 and older OR (3)  American aged 72 and older  5  Osteoporosis Screening: covered every 2 years if you meet one of the following conditions: (1) You're estrogen deficient and at risk for osteoporosis based off medical history and other findings; (2) Have a vertebral abnormality; (3) On glucocorticoid therapy for more than 3 months; (4) Have primary hyperparathyroidism; (5) On osteoporosis medications and need to assess response to drug therapy  · Last bone density test (DXA Scan): 12/10/2019   10  HIV Screening: covered annually if you're between the age of 15-65  Also covered annually if you are younger than 13 and older than 72 with risk factors for HIV infection  For pregnant patients, it is covered up to 3 times per pregnancy      Immunizations:  Immunization Recommendations   Influenza Vaccine Annual influenza vaccination during flu season is recommended for all persons aged >= 6 months who do not have contraindications   Pneumococcal Vaccine (Prevnar and Pneumovax)  * Prevnar = PCV13  * Pneumovax = PPSV23   Adults 25-60 years old: 1-3 doses may be recommended based on certain risk factors  Adults 72 years old: Prevnar (PCV13) vaccine recommended followed by Pneumovax (PPSV23) vaccine  If already received PPSV23 since turning 65, then PCV13 recommended at least one year after PPSV23 dose  Hepatitis B Vaccine 3 dose series if at intermediate or high risk (ex: diabetes, end stage renal disease, liver disease)   Tetanus (Td) Vaccine - COST NOT COVERED BY MEDICARE PART B Following completion of primary series, a booster dose should be given every 10 years to maintain immunity against tetanus  Td may also be given as tetanus wound prophylaxis  Tdap Vaccine - COST NOT COVERED BY MEDICARE PART B Recommended at least once for all adults  For pregnant patients, recommended with each pregnancy  Shingles Vaccine (Shingrix) - COST NOT COVERED BY MEDICARE PART B  2 shot series recommended in those aged 48 and above     Health Maintenance Due:  There are no preventive care reminders to display for this patient  Immunizations Due:      Topic Date Due    DTaP,Tdap,and Td Vaccines (1 - Tdap) 10/27/2012    COVID-19 Vaccine (3 - Booster for Jerome Maw series) 07/26/2021     Advance Directives   What are advance directives? Advance directives are legal documents that state your wishes and plans for medical care  These plans are made ahead of time in case you lose your ability to make decisions for yourself  Advance directives can apply to any medical decision, such as the treatments you want, and if you want to donate organs  What are the types of advance directives? There are many types of advance directives, and each state has rules about how to use them  You may choose a combination of any of the following:  · Living will: This is a written record of the treatment you want  You can also choose which treatments you do not want, which to limit, and which to stop at a certain time  This includes surgery, medicine, IV fluid, and tube feedings  · Durable power of  for healthcare [facility]):   This is a written record that states who you want to make healthcare choices for you when you are unable to make them for yourself  This person, called a proxy, is usually a family member or a friend  You may choose more than 1 proxy  · Do not resuscitate (DNR) order:  A DNR order is used in case your heart stops beating or you stop breathing  It is a request not to have certain forms of treatment, such as CPR  A DNR order may be included in other types of advance directives  · Medical directive: This covers the care that you want if you are in a coma, near death, or unable to make decisions for yourself  You can list the treatments you want for each condition  Treatment may include pain medicine, surgery, blood transfusions, dialysis, IV or tube feedings, and a ventilator (breathing machine)  · Values history: This document has questions about your views, beliefs, and how you feel and think about life  This information can help others choose the care that you would choose  Why are advance directives important? An advance directive helps you control your care  Although spoken wishes may be used, it is better to have your wishes written down  Spoken wishes can be misunderstood, or not followed  Treatments may be given even if you do not want them  An advance directive may make it easier for your family to make difficult choices about your care  Urinary Incontinence   Urinary incontinence (UI)  is when you lose control of your bladder  UI develops because your bladder cannot store or empty urine properly  The 3 most common types of UI are stress incontinence, urge incontinence, or both  Medicines:   · May be given to help strengthen your bladder control  Report any side effects of medication to your healthcare provider  Do pelvic muscle exercises often:  Your pelvic muscles help you stop urinating  Squeeze these muscles tight for 5 seconds, then relax for 5 seconds  Gradually work up to squeezing for 10 seconds   Do 3 sets of 15 repetitions a day, or as directed  This will help strengthen your pelvic muscles and improve bladder control  Train your bladder:  Go to the bathroom at set times, such as every 2 hours, even if you do not feel the urge to go  You can also try to hold your urine when you feel the urge to go  For example, hold your urine for 5 minutes when you feel the urge to go  As that becomes easier, hold your urine for 10 minutes  Self-care:   · Keep a UI record  Write down how often you leak urine and how much you leak  Make a note of what you were doing when you leaked urine  · Drink liquids as directed  You may need to limit the amount of liquid you drink to help control your urine leakage  Do not drink any liquid right before you go to bed  Limit or do not have drinks that contain caffeine or alcohol  · Prevent constipation  Eat a variety of high-fiber foods  Good examples are high-fiber cereals, beans, vegetables, and whole-grain breads  Walking is the best way to trigger your intestines to have a bowel movement  · Exercise regularly and maintain a healthy weight  Weight loss and exercise will decrease pressure on your bladder and help you control your leakage  · Use a catheter as directed  to help empty your bladder  A catheter is a tiny, plastic tube that is put into your bladder to drain your urine  · Go to behavior therapy as directed  Behavior therapy may be used to help you learn to control your urge to urinate  Weight Management   Why it is important to manage your weight:  Being overweight increases your risk of health conditions such as heart disease, high blood pressure, type 2 diabetes, and certain types of cancer  It can also increase your risk for osteoarthritis, sleep apnea, and other respiratory problems  Aim for a slow, steady weight loss  Even a small amount of weight loss can lower your risk of health problems    How to lose weight safely:  A safe and healthy way to lose weight is to eat fewer calories and get regular exercise  You can lose up about 1 pound a week by decreasing the number of calories you eat by 500 calories each day  Healthy meal plan for weight management:  A healthy meal plan includes a variety of foods, contains fewer calories, and helps you stay healthy  A healthy meal plan includes the following:  · Eat whole-grain foods more often  A healthy meal plan should contain fiber  Fiber is the part of grains, fruits, and vegetables that is not broken down by your body  Whole-grain foods are healthy and provide extra fiber in your diet  Some examples of whole-grain foods are whole-wheat breads and pastas, oatmeal, brown rice, and bulgur  · Eat a variety of vegetables every day  Include dark, leafy greens such as spinach, kale, amrit greens, and mustard greens  Eat yellow and orange vegetables such as carrots, sweet potatoes, and winter squash  · Eat a variety of fruits every day  Choose fresh or canned fruit (canned in its own juice or light syrup) instead of juice  Fruit juice has very little or no fiber  · Eat low-fat dairy foods  Drink fat-free (skim) milk or 1% milk  Eat fat-free yogurt and low-fat cottage cheese  Try low-fat cheeses such as mozzarella and other reduced-fat cheeses  · Choose meat and other protein foods that are low in fat  Choose beans or other legumes such as split peas or lentils  Choose fish, skinless poultry (chicken or turkey), or lean cuts of red meat (beef or pork)  Before you cook meat or poultry, cut off any visible fat  · Use less fat and oil  Try baking foods instead of frying them  Add less fat, such as margarine, sour cream, regular salad dressing and mayonnaise to foods  Eat fewer high-fat foods  Some examples of high-fat foods include french fries, doughnuts, ice cream, and cakes  · Eat fewer sweets  Limit foods and drinks that are high in sugar  This includes candy, cookies, regular soda, and sweetened drinks    Exercise: Exercise at least 30 minutes per day on most days of the week  Some examples of exercise include walking, biking, dancing, and swimming  You can also fit in more physical activity by taking the stairs instead of the elevator or parking farther away from stores  Ask your healthcare provider about the best exercise plan for you  © Copyright [employer] 2018 Information is for End User's use only and may not be sold, redistributed or otherwise used for commercial purposes   All illustrations and images included in CareNotes® are the copyrighted property of [employer]  or [address] [place] [address]

## 2022-06-14 NOTE — PROGRESS NOTES
Assessment and Plan:     Problem List Items Addressed This Visit        Endocrine    Type 2 diabetes mellitus with stage 4 chronic kidney disease, without long-term current use of insulin ([facility] [address] ) - Primary       Lab Results   Component Value Date    HGBA1C 8 0 (H) 06/03/2022   DM type 2 with polyneuropathy, nephropathy/CKD stage 4 and hyperglycemia - uncontrolled with A1C bouncing back up to 8 0 - has appt to establish with Endo in Aug, number for DM edu given (reluctant but agreeable), diet/exercise/wgt loss encouraged, can increase Glipizide a bit but already has LE edema, on renal doses Januvia and not able to use Metformin d/t CKD, will start Invokana 100 mg 1 tab PO q day, SE reviewed, recheck BW in 3 mos - order given, UTD on DM foot exam (3/22) and eye exam (6/21 - 2 yrs), on statin but no ACE/ARB d/t CKD stage 4           Relevant Medications    canagliflozin (Invokana) 100 mg    Other Relevant Orders    Ambulatory referral to Diabetic Education    Hemoglobin Q4F    Basic metabolic panel       Cardiovascular and Mediastinum    Hypertension     BP good today, con't current meds, healthy diet/exercise/wgt loss encouraged, recheck in 4 mos              Genitourinary    CKD (chronic kidney disease), stage IV (HCC)     Lab Results   Component Value Date    EGFR 27 (L) 06/03/2022    EGFR 25 (L) 05/13/2022    EGFR 21 (L) 04/15/2022    CREATININE 1 80 (H) 06/03/2022    CREATININE 1 91 (H) 05/13/2022    CREATININE 2 24 (H) 04/15/2022   stable, has established with renal, was referred to kidney smart classes but family feels this would be too overwhelming, urged BP/BS control and avoiding NSAIDs and dehydration, con't f/u as per Nephro           Relevant Orders    Basic metabolic panel      Other Visit Diagnoses     Medicare annual wellness visit, subsequent        Obesity (BMI 30 0-34  9)        BMI 30 0-30 9,adult        diet/exercise/wgt loss encouraged        BMI Counseling: Body mass index is 30 4 kg/m²   The BMI is above normal  Nutrition recommendations include decreasing portion sizes, encouraging healthy choices of fruits and vegetables, consuming healthier snacks, moderation in carbohydrate intake, increasing intake of lean protein, reducing intake of saturated and trans fat and reducing intake of cholesterol  Exercise recommendations include exercising 3-5 times per week  No pharmacotherapy was ordered  Rationale for BMI follow-up plan is due to patient being overweight or obese  Depression Screening and Follow-up Plan: Patient was screened for depression during today's encounter  They screened negative with a PHQ-2 score of 0  Preventive health issues were discussed with patient, and age appropriate screening tests were ordered as noted in patient's After Visit Summary  Personalized health advice and appropriate referrals for health education or preventive services given if needed, as noted in patient's After Visit Summary  History of Present Illness:     Patient presents for a Medicare Wellness Visit    HPI   Patient Care Team:  Umberto Miller DO as PCP - General  MD Lor Birmingham DO (Nephrology)  Silvio Smith MD (Ophthalmology)  Henok Cottrell MD (Dermatology)     Review of Systems:     Review of Systems   Constitutional: Negative for chills, fever and unexpected weight change  HENT: Negative for congestion, hearing loss and trouble swallowing  Eyes: Negative for pain and visual disturbance  Respiratory: Negative for cough, shortness of breath and wheezing  Cardiovascular: Positive for leg swelling  Negative for chest pain and palpitations  Gastrointestinal: Negative for abdominal pain, blood in stool, constipation, diarrhea, nausea and vomiting  Endocrine: Negative for polydipsia and polyuria  Genitourinary: Negative for difficulty urinating, dysuria, hematuria, vaginal bleeding and vaginal pain  Musculoskeletal: Positive for arthralgias   Negative for back pain and neck pain  Skin: Negative for rash and wound  Neurological: Negative for dizziness, light-headedness and headaches  Hematological: Does not bruise/bleed easily  Psychiatric/Behavioral: Negative for dysphoric mood and sleep disturbance          Problem List:     Patient Active Problem List   Diagnosis    Type 2 diabetes mellitus with stage 4 chronic kidney disease, without long-term current use of insulin ([facility])    Rhinitis    Premature ventricular contraction    Osteopenia    Hypokalemia    Hypertension    GERD (gastroesophageal reflux disease)    Dyslipidemia    Diabetic polyneuropathy ([facility])    Anxiety disorder    Dependent edema    CKD (chronic kidney disease), stage IV ([facility])    Complex renal cyst    Chronic back pain      Past Medical and Surgical History:     Past Medical History:   Diagnosis Date    Aortic valve insufficiency     Cataract of both eyes     Dysuria     last assessed - [id number]    Edema     last assessed - [id number]    GERD (gastroesophageal reflux disease)     last assessed - [id number]    Hyperlipidemia     Hypertension     Low back pain     last assessed - [id number]    Mitral valve disorder     Osteoporosis     last assessed - [id number]    Palpitations      Past Surgical History:   Procedure Laterality Date    APPENDECTOMY      CATARACT EXTRACTION      COLONOSCOPY      TUBAL LIGATION        Family History:     Family History   Problem Relation Age of Onset    Kidney disease Mother         Chronic    Breast cancer Sister     Diabetes Sister     Breast cancer Sister     Hypertension Sister     No Known Problems Father     No Known Problems Daughter       Social History:     Social History     Socioeconomic History    Marital status: Single     Spouse name: None    Number of children: None    Years of education: None    Highest education level: None   Occupational History    None   Tobacco Use    Smoking status: Never Smoker    Smokeless tobacco: Never Used   Vaping Use    Vaping Use: Never used   Substance and Sexual Activity    Alcohol use: Never    Drug use: Never    Sexual activity: Not Currently   Other Topics Concern    None   Social History Narrative    Living with relatives (other than parents)    Marital History -      Social Determinants of Health     Financial Resource Strain: Not on file   Food Insecurity: Not on file   Transportation Needs: Not on file   Physical Activity: Not on file   Stress: Not on file   Social Connections: Not on file   Intimate Partner Violence: Not on file   Housing Stability: Not on file      Medications and Allergies:     Current Outpatient Medications   Medication Sig Dispense Refill    canagliflozin (Invokana) 100 mg Take 1 tablet (100 mg total) by mouth daily before breakfast 90 tablet 1    ACCU-CHEK PAUL PLUS test strip       Accu-Chek Softclix Lancets lancets       Accu-Chek Softclix Lancets lancets USE 2 TIMES DAILY AS DIRECTED 100 each 3    aspirin 81 MG tablet Take 1 tablet by mouth daily      Bystolic 20 MG tablet TAKE 1 TABLET BY MOUTH EVERY DAY 30 tablet 5    Cetirizine HCl (ZYRTEC ALLERGY) 10 MG CAPS Take 1 tablet by mouth daily as needed      cholecalciferol (VITAMIN D3) 1,000 units tablet Take 2 tablets by mouth daily      cloNIDine (CATAPRES-TTS-1) 0 1 mg/24 hr PLACE 1 PATCH (0 1 MG TOTAL) ON THE SKIN ONCE A WEEK 12 patch 2    famotidine (PEPCID) 20 mg tablet TAKE 1 TABLET BY MOUTH EVERY DAY 90 tablet 3    ferrous sulfate 324 (65 Fe) mg TAKE 1 TABLET BY MOUTH DAILY BEFORE BREAKFAST 90 tablet 1    glipiZIDE (GLUCOTROL) 10 mg tablet TAKE 1 TABLET BY MOUTH IN THE MORNING THEN 2 TABS WITH DINNER 270 tablet 2    glucose blood (Accu-Chek Paul Plus) test strip USE AS INSTRUCTED TWICE DAILY 100 each 7    glucose blood (Accu-Chek Guide) test strip USE TO TEST TWICE A  strip 3    Januvia 50 MG tablet TAKE 1 TABLET BY MOUTH EVERY DAY 30 tablet 0    NIFEdipine (ADALAT CC) 90 mg 24 hr tablet TAKE 1 TABLET BY MOUTH EVERY DAY 90 tablet 2    Potassium Gluconate 595 (99 K) MG TABS Take 1 tablet by mouth 2 (two) times a day       sertraline (ZOLOFT) 25 mg tablet TAKE 1 TABLET BY MOUTH EVERY DAY 90 tablet 2    simvastatin (ZOCOR) 20 mg tablet TAKE 1 TABLET BY MOUTH EVERYDAY AT BEDTIME 90 tablet 2    torsemide (DEMADEX) 20 mg tablet Take 0 5 tablets (10 mg total) by mouth daily 90 tablet 2     No current facility-administered medications for this visit  No Known Allergies   Immunizations:     Immunization History   Administered Date(s) Administered    COVID-19 MODERNA VACC 0 5 ML IM 01/28/2021, 02/26/2021    INFLUENZA 10/30/2015, 10/13/2016, 10/09/2017    Influenza Split High Dose Preservative Free IM 09/06/2012, 10/29/2013, 10/22/2014, 10/30/2015, 10/13/2016, 10/09/2017    Influenza, high dose seasonal 0 7 mL 10/16/2018, 09/24/2019, 10/02/2020, 11/03/2021    Pneumococcal Conjugate 13-Valent 11/13/2015    Pneumococcal Polysaccharide PPV23 10/29/2013    TD (adult) Preservative Free 10/26/2012      Health Maintenance: There are no preventive care reminders to display for this patient  Topic Date Due    DTaP,Tdap,and Td Vaccines (1 - Tdap) 10/27/2012    COVID-19 Vaccine (3 - Booster for Moderna series) 07/26/2021      Medicare Screening Tests and Risk Assessments:     Amelia Ohara is here for her Subsequent Wellness visit  Last Medicare Wellness visit information reviewed, patient interviewed and updates made to the record today  Health Risk Assessment:   Patient rates overall health as good  Patient feels that their physical health rating is same  Patient is satisfied with their life  Eyesight was rated as same  Hearing was rated as same  Patient feels that their emotional and mental health rating is same  Patients states they are never, rarely angry  Patient states they are sometimes unusually tired/fatigued  Pain experienced in the last 7 days has been some  Patient's pain rating has been 6/10  Patient states that she has experienced no weight loss or gain in last 6 months  Depression Screening:   PHQ-2 Score: 0      Fall Risk Screening: In the past year, patient has experienced: no history of falling in past year      Urinary Incontinence Screening:   Patient has leaked urine accidently in the last six months  Stress and urge incontinence    Home Safety:  Patient does not have trouble with stairs inside or outside of their home  Patient has working smoke alarms and has working carbon monoxide detector  Home safety hazards include: none  Nutrition:   Current diet is Low Cholesterol and Diabetic  Medications:   Patient is currently taking over-the-counter supplements  OTC medications include: see medication list  Patient is able to manage medications  Activities of Daily Living (ADLs)/Instrumental Activities of Daily Living (IADLs):   Walk and transfer into and out of bed and chair?: Yes  Dress and groom yourself?: Yes    Bathe or shower yourself?: Yes    Feed yourself? Yes  Do your laundry/housekeeping?: Yes  Manage your money, pay your bills and track your expenses?: No  Make your own meals?: Yes    Do your own shopping?: Yes    Previous Hospitalizations:   Any hospitalizations or ED visits within the last 12 months?: No      Advance Care Planning:   Living will: No    Durable POA for healthcare:  Yes    Advanced directive: No      Cognitive Screening:   Provider or family/friend/caregiver concerned regarding cognition?: No    PREVENTIVE SCREENINGS      Cardiovascular Screening:    General: Screening Current and Risks and Benefits Discussed      Diabetes Screening:     General: History Diabetes, Risks and Benefits Discussed and Screening Current      Colorectal Cancer Screening:     General: Screening Not Indicated and Risks and Benefits Discussed      Breast Cancer Screening:     General: Screening Current and Risks and Benefits Discussed Cervical Cancer Screening:    General: Screening Not Indicated and Risks and Benefits Discussed      Osteoporosis Screening:    General: Risks and Benefits Discussed and Screening Current      Abdominal Aortic Aneurysm (AAA) Screening:        General: Risks and Benefits Discussed and Screening Not Indicated      Lung Cancer Screening:     General: Screening Not Indicated and Risks and Benefits Discussed      Hepatitis C Screening:    General: Risks and Benefits Discussed and Screening Not Indicated    Screening, Brief Intervention, and Referral to Treatment (SBIRT)    Screening  Typical number of drinks in a day: 0  Typical number of drinks in a week: 0  Interpretation: Low risk drinking behavior  Single Item Drug Screening:  How often have you used an illegal drug (including marijuana) or a prescription medication for non-medical reasons in the past year? never    Single Item Drug Screen Score: 0  Interpretation: Negative screen for possible drug use disorder    Other Counseling Topics:   Car/seat belt/driving safety and regular weightbearing exercise  Visual Acuity Screening    Right eye Left eye Both eyes   Without correction:      With correction: 20/50 20/40 20/25        Physical Exam:     /62   Pulse 68   Temp (!) 97 °F (36 1 °C) (Tympanic)   Ht 5' 2" (1 575 m)   Wt 75 4 kg (166 lb 3 2 oz)   BMI 30 40 kg/m²     Physical Exam  Vitals and nursing note reviewed  Constitutional:       General: She is not in acute distress  Appearance: She is well-developed  She is obese  She is not ill-appearing  HENT:      Head: Normocephalic and atraumatic  Right Ear: Tympanic membrane and external ear normal  There is no impacted cerumen  Left Ear: Tympanic membrane and external ear normal  There is no impacted cerumen  Eyes:      General:         Right eye: No discharge  Left eye: No discharge  Conjunctiva/sclera: Conjunctivae normal    Neck:      Thyroid: No thyromegaly  Vascular: No carotid bruit  Trachea: No tracheal deviation  Cardiovascular:      Rate and Rhythm: Normal rate and regular rhythm  Heart sounds: Normal heart sounds  No murmur heard  Pulmonary:      Effort: Pulmonary effort is normal  No respiratory distress  Breath sounds: Normal breath sounds  No wheezing, rhonchi or rales  Abdominal:      General: There is no distension  Palpations: Abdomen is soft  Tenderness: There is no abdominal tenderness  There is no guarding or rebound  Musculoskeletal:         General: No deformity or signs of injury  Cervical back: Neck supple  Lymphadenopathy:      Cervical: No cervical adenopathy  Skin:     General: Skin is warm and dry  Coloration: Skin is not pale  Findings: No rash  Neurological:      Mental Status: She is alert  Motor: No abnormal muscle tone  Gait: Gait normal    Psychiatric:         Mood and Affect: Mood normal          Behavior: Behavior normal          Thought Content:  Thought content normal          Judgment: Judgment normal           Claudia Pérez DO

## 2022-06-14 NOTE — ASSESSMENT & PLAN NOTE
Lab Results   Component Value Date    EGFR 27 (L) 06/03/2022    EGFR 25 (L) 05/13/2022    EGFR 21 (L) 04/15/2022    CREATININE 1 80 (H) 06/03/2022    CREATININE 1 91 (H) 05/13/2022    CREATININE 2 24 (H) 04/15/2022   stable, has established with renal, was referred to kidney smart classes but family feels this would be too overwhelming, urged BP/BS control and avoiding NSAIDs and dehydration, con't f/u as per Nephro

## 2022-06-14 NOTE — ASSESSMENT & PLAN NOTE
Lab Results   Component Value Date    HGBA1C 8 0 (H) 06/03/2022   DM type 2 with polyneuropathy, nephropathy/CKD stage 4 and hyperglycemia - uncontrolled with A1C bouncing back up to 8 0 - has appt to establish with Endo in Aug, number for DM edu given (reluctant but agreeable), diet/exercise/wgt loss encouraged, can increase Glipizide a bit but already has LE edema, on renal doses Januvia and not able to use Metformin d/t CKD, will start Invokana 100 mg 1 tab PO q day, SE reviewed, recheck BW in 3 mos - order given, UTD on DM foot exam (3/22) and eye exam (6/21 - 2 yrs), on statin but no ACE/ARB d/t CKD stage 4

## 2022-07-03 DIAGNOSIS — N18.30 TYPE 2 DIABETES MELLITUS WITH STAGE 3 CHRONIC KIDNEY DISEASE, WITHOUT LONG-TERM CURRENT USE OF INSULIN (HCC): ICD-10-CM

## 2022-07-03 DIAGNOSIS — E11.22 TYPE 2 DIABETES MELLITUS WITH STAGE 3 CHRONIC KIDNEY DISEASE, WITHOUT LONG-TERM CURRENT USE OF INSULIN (HCC): ICD-10-CM

## 2022-07-03 RX ORDER — SITAGLIPTIN 50 MG/1
TABLET, FILM COATED ORAL
Qty: 30 TABLET | Refills: 0 | Status: SHIPPED | OUTPATIENT
Start: 2022-07-03 | End: 2022-08-01

## 2022-07-04 DIAGNOSIS — E11.22 TYPE 2 DIABETES MELLITUS WITH STAGE 3B CHRONIC KIDNEY DISEASE, WITHOUT LONG-TERM CURRENT USE OF INSULIN (HCC): ICD-10-CM

## 2022-07-04 DIAGNOSIS — N18.32 TYPE 2 DIABETES MELLITUS WITH STAGE 3B CHRONIC KIDNEY DISEASE, WITHOUT LONG-TERM CURRENT USE OF INSULIN (HCC): ICD-10-CM

## 2022-07-04 DIAGNOSIS — I10 ESSENTIAL HYPERTENSION: ICD-10-CM

## 2022-07-04 RX ORDER — BLOOD SUGAR DIAGNOSTIC
STRIP MISCELLANEOUS
Qty: 100 STRIP | Refills: 3 | Status: SHIPPED | OUTPATIENT
Start: 2022-07-04

## 2022-07-04 RX ORDER — CLONIDINE 0.1 MG/24H
1 PATCH, EXTENDED RELEASE TRANSDERMAL WEEKLY
Qty: 12 PATCH | Refills: 3 | Status: SHIPPED | OUTPATIENT
Start: 2022-07-04 | End: 2022-08-22

## 2022-07-06 DIAGNOSIS — R60.9 DEPENDENT EDEMA: ICD-10-CM

## 2022-07-06 DIAGNOSIS — F41.9 ANXIETY: ICD-10-CM

## 2022-07-06 DIAGNOSIS — I10 ESSENTIAL HYPERTENSION: ICD-10-CM

## 2022-07-06 RX ORDER — TORSEMIDE 20 MG/1
10 TABLET ORAL DAILY
Qty: 90 TABLET | Refills: 2 | Status: SHIPPED | OUTPATIENT
Start: 2022-07-06 | End: 2022-09-01

## 2022-07-06 RX ORDER — SERTRALINE HYDROCHLORIDE 25 MG/1
TABLET, FILM COATED ORAL
Qty: 90 TABLET | Refills: 3 | Status: SHIPPED | OUTPATIENT
Start: 2022-07-06 | End: 2022-08-10

## 2022-07-07 ENCOUNTER — OFFICE VISIT (OUTPATIENT)
Dept: DIABETES SERVICES | Facility: HOSPITAL | Age: 87
End: 2022-07-07
Payer: COMMERCIAL

## 2022-07-07 VITALS — BODY MASS INDEX: 30.36 KG/M2 | HEIGHT: 62 IN | WEIGHT: 165 LBS

## 2022-07-07 DIAGNOSIS — N18.4 TYPE 2 DIABETES MELLITUS WITH STAGE 4 CHRONIC KIDNEY DISEASE, WITHOUT LONG-TERM CURRENT USE OF INSULIN (HCC): Primary | ICD-10-CM

## 2022-07-07 DIAGNOSIS — E11.22 TYPE 2 DIABETES MELLITUS WITH STAGE 4 CHRONIC KIDNEY DISEASE, WITHOUT LONG-TERM CURRENT USE OF INSULIN (HCC): Primary | ICD-10-CM

## 2022-07-07 PROCEDURE — 97802 MEDICAL NUTRITION INDIV IN: CPT | Performed by: DIETITIAN, REGISTERED

## 2022-07-07 NOTE — PROGRESS NOTES
Medical Nutrition Therapy     Assessment    Visit Type: Initial visit  Chief complaint:  Type 2 Diabetes     HPI:  Met with patient and her daughter for initial medical nutrition therapy  States diabetes 5-10 years, no previous education  Testing blood sugars a few times a week, usually in the morning before breakfast but after coffee  Fasting from memory 140-160, none in the 200s  Later in the day is higher, before dinner 200s, 200-220  Food recall shows primary issues:  She is frustrated with her blood sugars, feels like she does not eat poorly but her blood sugars are high  Discussed that diabetes progresses as time goes on, and combined with her declining kidney function and age, she might just need more or change in medication to be able to control things  She has a 1st appointment with Dr Kerri Lorenzo the endocrinologist in a few weeks  Discussed the importance of consistent carbohydrate throughout the day for better blood sugar control  Her daughter has just been concerned with sugar and not carbohydrates as a whole  Together we discussed what foods contain CHO, reading a food label, serving sizes, and set a carb goal of 30-45g CHO/meal to promote weight loss with 15g snacks  Put together sample meals for Ena's reference and evaluated Ena's current eating plan  Good understanding, Humza Mcgee will call with questions or for more education  Follow up as needed if not improving  Ht Readings from Last 1 Encounters:   07/07/22 5' 2" (1 575 m)     Wt Readings from Last 3 Encounters:   07/07/22 74 8 kg (165 lb)   06/14/22 75 4 kg (166 lb 3 2 oz)   04/28/22 77 6 kg (171 lb)        Body mass index is 30 18 kg/m²       Lab Results   Component Value Date    HGBA1C 8 0 (H) 06/03/2022    HGBA1C 7 9 (A) 03/15/2022    HGBA1C 8 2 (H) 11/19/2021       Lab Results   Component Value Date    CHOL 168 01/02/2018    CHOL 166 12/16/2016    CHOL 178 11/06/2015     Lab Results   Component Value Date    HDL 45 03/04/2022 HDL 43 01/12/2021    HDL 50 01/10/2020     Lab Results   Component Value Date    LDLCALC 102 (H) 03/04/2022    LDLCALC 91 01/12/2021    LDLCALC 89 01/10/2020     Lab Results   Component Value Date    TRIG 172 (H) 03/04/2022    TRIG 148 01/12/2021    TRIG 176 (H) 01/10/2020     Lab Results   Component Value Date    CHOLHDL 3 9 03/04/2022    CHOLHDL 3 7 01/12/2021    CHOLHDL 3 5 01/10/2020       Weight Change: No    Medical Diagnosis/ICD 10 Code:  E11 22    Barriers to Learning: no barriers    Do you follow any special diet presently?: No  Who shops: patient and daughter  Who cooks: patient    Food Log: Completed via the method of food recall- retired, used to [employer]: up around 7am/10am  Eats around 10am, coffee before that  Eggs scrambled and toast, some fruit; oatmeal from canister 3 spoons dry; cold cereal sometimes special K  Morning Snack: not much, rare fruit  Lunch:1-2pm: sandwich ham and cheese half sometimes fruit rare chips; leftovers   Afternoon Snack: not much, if so, fruit   Dinner: 5-6pm: meat starch veggie hot meal  Chicken and veggies  Salad  Beans and greens  Not much pasta, loves potatoes, sweet potatoes mostly, rice sometimes  Evening Snack: none  Bed 8-10pm   Beverages: water not enough, coffee with cream, no soda, juice in the morning little pouches once a day, hot tea with lemon and splenda not iced  Eating out/Take out: out to eat once a week maybe  Exercise ADL, walks or uses the bike       Nutrition Diagnosis:  Food and nutrition related knowledge deficit  related to Lack of prior exposure to accurate nutrition related information as evidenced by Verbalizes inaccurate or incomplete information    Intervention: plate method, label reading, behavior modification strategies, carbohydrate counting, individualized meal plan and monitoring portion control     Treatment Goals: Patient understands education and recommendations    Education Material Given  Mele Hernandez was provided the Portion Booklet and Planning Healthy Meals     Monitoring and evaluation:    Term code indicator  FH 1 6 3 Carbohydrate Intake Criteria: 30-45g CHO per meal, 15g CHO snacks    Patients Response to Instruction:  Melita Ferrer  Expected Compliancegood    Thank you for coming to the [facility] for education today  Please feel free to call with any questions or concerns      Francia Brain, [address] [place] [address]  [address] [facility] [address]  [address] [place] [phone]

## 2022-07-08 DIAGNOSIS — N18.30 TYPE 2 DIABETES MELLITUS WITH STAGE 3 CHRONIC KIDNEY DISEASE, WITHOUT LONG-TERM CURRENT USE OF INSULIN (HCC): ICD-10-CM

## 2022-07-08 DIAGNOSIS — E11.22 TYPE 2 DIABETES MELLITUS WITH STAGE 3 CHRONIC KIDNEY DISEASE, WITHOUT LONG-TERM CURRENT USE OF INSULIN (HCC): ICD-10-CM

## 2022-07-08 DIAGNOSIS — I10 ESSENTIAL HYPERTENSION: ICD-10-CM

## 2022-07-08 RX ORDER — GLIPIZIDE 10 MG/1
TABLET ORAL
Qty: 270 TABLET | Refills: 3 | Status: SHIPPED | OUTPATIENT
Start: 2022-07-08 | End: 2022-09-01

## 2022-07-12 DIAGNOSIS — K21.00 GASTROESOPHAGEAL REFLUX DISEASE WITH ESOPHAGITIS: ICD-10-CM

## 2022-07-12 DIAGNOSIS — I10 ESSENTIAL HYPERTENSION: ICD-10-CM

## 2022-07-12 DIAGNOSIS — E78.5 DYSLIPIDEMIA: ICD-10-CM

## 2022-07-12 RX ORDER — SIMVASTATIN 20 MG
TABLET ORAL
Qty: 90 TABLET | Refills: 3 | Status: SHIPPED | OUTPATIENT
Start: 2022-07-12 | End: 2022-09-01

## 2022-07-12 RX ORDER — NIFEDIPINE 90 MG/1
TABLET, FILM COATED, EXTENDED RELEASE ORAL
Qty: 90 TABLET | Refills: 3 | Status: SHIPPED | OUTPATIENT
Start: 2022-07-12 | End: 2022-10-10

## 2022-07-12 RX ORDER — FAMOTIDINE 20 MG/1
TABLET, FILM COATED ORAL
Qty: 90 TABLET | Refills: 4 | Status: SHIPPED | OUTPATIENT
Start: 2022-07-12 | End: 2022-08-10

## 2022-07-19 LAB
LEFT EYE DIABETIC RETINOPATHY: NORMAL
RIGHT EYE DIABETIC RETINOPATHY: NORMAL

## 2022-07-20 ENCOUNTER — TELEPHONE (OUTPATIENT)
Dept: NEPHROLOGY | Facility: CLINIC | Age: 87
End: 2022-07-20

## 2022-07-20 NOTE — TELEPHONE ENCOUNTER
Left message to schedule follow up appointment with Dr Oswaldo Reed in [place]  This is the second attempt

## 2022-07-30 LAB
BUN SERPL-MCNC: 33 MG/DL (ref 8–27)
BUN/CREAT SERPL: 17 (ref 12–28)
CALCIUM SERPL-MCNC: 9.9 MG/DL (ref 8.7–10.3)
CHLORIDE SERPL-SCNC: 102 MMOL/L (ref 96–106)
CO2 SERPL-SCNC: 24 MMOL/L (ref 20–29)
CREAT SERPL-MCNC: 1.93 MG/DL (ref 0.57–1)
EGFR: 25 ML/MIN/1.73
GLUCOSE SERPL-MCNC: 172 MG/DL (ref 65–99)
POTASSIUM SERPL-SCNC: 4.6 MMOL/L (ref 3.5–5.2)
SODIUM SERPL-SCNC: 141 MMOL/L (ref 134–144)

## 2022-08-08 ENCOUNTER — TELEPHONE (OUTPATIENT)
Dept: NEPHROLOGY | Facility: CLINIC | Age: 87
End: 2022-08-08

## 2022-08-08 DIAGNOSIS — I10 ESSENTIAL HYPERTENSION: ICD-10-CM

## 2022-08-08 RX ORDER — NEBIVOLOL 20 MG/1
20 TABLET ORAL DAILY
Qty: 30 TABLET | Refills: 5 | Status: SHIPPED | OUTPATIENT
Start: 2022-08-08 | End: 2022-09-01

## 2022-08-08 NOTE — TELEPHONE ENCOUNTER
Appointment Confirmation   Person confirmed appointment with  If not patient, name of the person Daughter in law   Date and time of appointment 8/9   Patient acknowledged and will be at appointment?  Yes   Did you advise the patient that they will need a urine sample if they are a new patient? n/a   Did you advise the patient to bring their current medications for verification? (including any OTC) yes   Additional Information

## 2022-08-09 ENCOUNTER — OFFICE VISIT (OUTPATIENT)
Dept: NEPHROLOGY | Facility: HOSPITAL | Age: 87
End: 2022-08-09
Payer: COMMERCIAL

## 2022-08-09 VITALS
DIASTOLIC BLOOD PRESSURE: 82 MMHG | HEART RATE: 65 BPM | WEIGHT: 161.6 LBS | BODY MASS INDEX: 29.74 KG/M2 | HEIGHT: 62 IN | SYSTOLIC BLOOD PRESSURE: 162 MMHG

## 2022-08-09 DIAGNOSIS — E11.22 TYPE 2 DIABETES MELLITUS WITH STAGE 4 CHRONIC KIDNEY DISEASE, WITHOUT LONG-TERM CURRENT USE OF INSULIN (HCC): Primary | ICD-10-CM

## 2022-08-09 DIAGNOSIS — N18.4 TYPE 2 DIABETES MELLITUS WITH STAGE 4 CHRONIC KIDNEY DISEASE, WITHOUT LONG-TERM CURRENT USE OF INSULIN (HCC): Primary | ICD-10-CM

## 2022-08-09 DIAGNOSIS — I10 PRIMARY HYPERTENSION: ICD-10-CM

## 2022-08-09 DIAGNOSIS — E87.6 HYPOKALEMIA: ICD-10-CM

## 2022-08-09 DIAGNOSIS — N28.1 COMPLEX RENAL CYST: ICD-10-CM

## 2022-08-09 DIAGNOSIS — R60.9 DEPENDENT EDEMA: ICD-10-CM

## 2022-08-09 PROCEDURE — 99214 OFFICE O/P EST MOD 30 MIN: CPT | Performed by: INTERNAL MEDICINE

## 2022-08-09 NOTE — PROGRESS NOTES
NEPHROLOGY OUTPATIENT PROGRESS NOTE   Danna Livingston 80 y o  female MRN: [id number]  DATE: 8/9/2022  Reason for visit:   Chief Complaint   Patient presents with    Chronic Kidney Disease    Follow-up        Patient Instructions   1  chronic kidney disease stage 4 in the setting of Diabetes mellitus type 2 as well as hypertensive nephrosclerosis +/- physiologic aging of the kidney   -last sCr 1 93 as of 7/29/22  Had been stable in the 1 6-1 8 range since July 2018, eGFR in mid 20s and stable  -latest UA with microscopy shows 2+ leukocytes, no red blood cells, 11-30 WBCs, no casts, trace protein  -monitor BMP, UA, UpCr, mag, phos, PTH levels  -avoid nonsteroidals (ibuprofen, advil, motrin, aleve, naproxen, indomethacin, celebrex, toradol)  -may take tylenol  -please stay well hydrated  2  Microalbuminuria in setting of type 2 DM - UpCr improved to 191 mg/g as of Apr 2022  -off lisinopril  -microalbumin in the urine can also be seen with physiologic aging of the kidney  -monitor proteinuria     3  HTN - BP elevated in office but recommend checking BP at home  -continue clonidine 0 1mg weekly patch, bystolic 77HD after dinner, nifedipine 90mg daily, torsemide 10mg daily  -off lisinopril   -off HCTZ 25mg daily  -monitor BP at home as your are  Call office with readings if BP running too low or too high  Goal BP < 150/90     4  DM2, uncontrolled - on januvia, glipizide, last A1C 8 as of June 2022, needs improved sugar control to slow down progression of CKD  5  Hypokalemia, likely diuretic induced - off HCTZ, K 4 6, resolved on potassium gluconate supplement, 2 tablets a day  Is on torsemide also  6  Dependent edema - resolved, albumin normal  Off HCTZ  Now on torsemide 10mg daily  Echo normal from Aug  2019  -monitor daily weight  Call office if weight gain > 3-5 lbs        7  Complex cyst, left kidney - stable in size as of July 2020 ultrasound, will defer further imaging in light of age     RTC in 3 months  Obtain blood and urine testing in 3 months prior to next office appointment  Renetta Fernández was seen today for chronic kidney disease and follow-up  Diagnoses and all orders for this visit:    Type 2 diabetes mellitus with stage 4 chronic kidney disease, without long-term current use of insulin (HCC)  -     Basic metabolic panel; Future  -     Urinalysis with microscopic; Future  -     Protein / creatinine ratio, urine; Future  -     Magnesium; Future  -     Phosphorus; Future  -     PTH, intact; Future  -     Basic metabolic panel  -     Urinalysis with microscopic  -     Protein / creatinine ratio, urine  -     Magnesium  -     Phosphorus  -     PTH, intact    Primary hypertension    Complex renal cyst    Hypokalemia  -     Basic metabolic panel; Future  -     Magnesium; Future  -     Basic metabolic panel  -     Magnesium    Dependent edema        Assessment/Plan:  1  chronic kidney disease stage 4 in the setting of Diabetes mellitus type 2 as well as hypertensive nephrosclerosis +/- physiologic aging of the kidney   -last sCr 1 93 as of 7/29/22  Had been stable in the 1 6-1 8 range since July 2018, eGFR in mid 20s and stable  -latest UA with microscopy shows 2+ leukocytes, no red blood cells, 11-30 WBCs, no casts, trace protein  -monitor BMP, UA, UpCr, mag, phos, PTH levels  -avoid nonsteroidals (ibuprofen, advil, motrin, aleve, naproxen, indomethacin, celebrex, toradol)  -may take tylenol  -please stay well hydrated       2  Microalbuminuria in setting of type 2 DM - UpCr improved to 191 mg/g as of Apr 2022  -off lisinopril  -microalbumin in the urine can also be seen with physiologic aging of the kidney  -monitor proteinuria     3  HTN - BP elevated in office but recommend checking BP at home  -continue clonidine 0 1mg weekly patch, bystolic 30XZ after dinner, nifedipine 90mg daily, torsemide 10mg daily  -off lisinopril   -off HCTZ 25mg daily  -monitor BP at home as your are   Call office with readings if BP running too low or too high  Goal BP < 150/90     4  DM2, uncontrolled - on januvia, glipizide, last A1C 8 as of June 2022, needs improved sugar control to slow down progression of CKD      5  Hypokalemia, likely diuretic induced - off HCTZ, K 4 6, resolved on potassium gluconate supplement, 2 tablets a day  Is on torsemide also       6  Dependent edema - resolved, albumin normal  Off HCTZ  Now on torsemide 10mg daily  Echo normal from Aug  2019  -monitor daily weight  Call office if weight gain > 3-5 lbs       7  Complex cyst, left kidney - stable in size as of July 2020 ultrasound, will defer further imaging in light of age     RTC in 4 months  Obtain blood and urine testing in 3 months prior to next office appointment      SUBJECTIVE / INTERVAL HISTORY:  80 y o  female presents in follow up of CKD  Jolie Baerron denies any recent illness/hospitalizations/medication changes since last office visit  Denies NSAID use  DM2 - blood sugars was 148  HTN - has not been checking BP at home  CHF - no leg edema now, trying to keep weight down but it is stable    Review of Systems   Constitutional: Negative for chills and fever  HENT: Negative for sore throat and trouble swallowing  Eyes: Negative for visual disturbance  Respiratory: Negative for cough and shortness of breath  Cardiovascular: Negative for chest pain and leg swelling  Gastrointestinal: Negative for abdominal pain, constipation, diarrhea, nausea and vomiting  Endocrine: Negative for polyuria  Genitourinary: Negative for difficulty urinating, dysuria and hematuria  Urinary leakage at times   Musculoskeletal: Positive for back pain (occasional) and neck pain (occasional)  Skin: Negative for rash  Neurological: Negative for dizziness, light-headedness and numbness  Psychiatric/Behavioral: The patient is not nervous/anxious          OBJECTIVE:  /82 (BP Location: Left arm, Patient Position: Sitting, Cuff Size: Standard)   Pulse 65   Ht 5' 2" (1 575 m)   Wt 73 3 kg (161 lb 9 6 oz)   BMI 29 56 kg/m²  Body mass index is 29 56 kg/m²  Physical exam:  Physical Exam  Vitals and nursing note reviewed  Constitutional:       General: She is not in acute distress  Appearance: She is well-developed  She is not diaphoretic  HENT:      Head: Normocephalic and atraumatic  Mouth/Throat:      Pharynx: No oropharyngeal exudate  Eyes:      General: No scleral icterus  Right eye: No discharge  Left eye: No discharge  Comments: eyeglasses   Neck:      Thyroid: No thyromegaly  Cardiovascular:      Rate and Rhythm: Normal rate and regular rhythm  Heart sounds: No murmur heard  Pulmonary:      Effort: Pulmonary effort is normal  No respiratory distress  Breath sounds: Normal breath sounds  No wheezing  Abdominal:      General: Bowel sounds are normal  There is no distension  Palpations: Abdomen is soft  Musculoskeletal:      Cervical back: Normal range of motion and neck supple  Skin:     General: Skin is warm and dry  Findings: No rash  Neurological:      Mental Status: She is alert  Motor: No abnormal muscle tone        Comments: awake   Psychiatric:         Mood and Affect: Mood normal          Behavior: Behavior normal          Medications:    Current Outpatient Medications:     ACCU-CHEK PAUL PLUS test strip, , Disp: , Rfl:     Accu-Chek Guide test strip, USE TO TEST TWICE A DAY, Disp: 100 strip, Rfl: 3    Accu-Chek Softclix Lancets lancets, , Disp: , Rfl:     Accu-Chek Softclix Lancets lancets, USE 2 TIMES DAILY AS DIRECTED, Disp: 100 each, Rfl: 3    aspirin 81 MG tablet, Take 1 tablet by mouth daily, Disp: , Rfl:     canagliflozin (Invokana) 100 mg, Take 1 tablet (100 mg total) by mouth daily before breakfast, Disp: 90 tablet, Rfl: 1    cholecalciferol (VITAMIN D3) 1,000 units tablet, Take 2 tablets by mouth daily, Disp: , Rfl:     cloNIDine (CATAPRES-TTS-1) 0 1 mg/24 hr, PLACE 1 PATCH (0 1 MG TOTAL) ON THE SKIN ONCE A WEEK, Disp: 12 patch, Rfl: 3    famotidine (PEPCID) 20 mg tablet, TAKE 1 TABLET BY MOUTH EVERY DAY, Disp: 90 tablet, Rfl: 4    ferrous sulfate 324 (65 Fe) mg, TAKE 1 TABLET BY MOUTH DAILY BEFORE BREAKFAST, Disp: 90 tablet, Rfl: 1    glipiZIDE (GLUCOTROL) 10 mg tablet, TAKE 1 TABLET BY MOUTH IN THE MORNING THEN 2 TABS WITH DINNER, Disp: 270 tablet, Rfl: 3    glucose blood (Accu-Chek Emma Plus) test strip, USE AS INSTRUCTED TWICE DAILY, Disp: 100 each, Rfl: 7    Januvia 50 MG tablet, TAKE 1 TABLET BY MOUTH EVERY DAY, Disp: 30 tablet, Rfl: 5    nebivolol (Bystolic) 20 MG tablet, Take 1 tablet (20 mg total) by mouth daily, Disp: 30 tablet, Rfl: 5    NIFEdipine (ADALAT CC) 90 mg 24 hr tablet, TAKE 1 TABLET BY MOUTH EVERY DAY, Disp: 90 tablet, Rfl: 3    Potassium Gluconate 595 (99 K) MG TABS, Take 2 tablets by mouth 2 (two) times a day 4 tabs total, Disp: , Rfl:     sertraline (ZOLOFT) 25 mg tablet, TAKE 1 TABLET BY MOUTH EVERY DAY, Disp: 90 tablet, Rfl: 3    simvastatin (ZOCOR) 20 mg tablet, TAKE 1 TABLET BY MOUTH EVERYDAY AT BEDTIME, Disp: 90 tablet, Rfl: 3    torsemide (DEMADEX) 20 mg tablet, Take 0 5 tablets (10 mg total) by mouth daily, Disp: 90 tablet, Rfl: 2    Cetirizine HCl 10 MG CAPS, Take 1 tablet by mouth daily as needed (Patient not taking: Reported on 8/9/2022), Disp: , Rfl:     Allergies:   Allergies as of 08/09/2022    (No Known Allergies)       The following portions of the patient's history were reviewed and updated as appropriate: past family history, past surgical history and problem list     Laboratory Results:  Lab Results   Component Value Date    SODIUM 141 07/29/2022    K 4 6 07/29/2022     07/29/2022    CO2 24 07/29/2022    BUN 33 (H) 07/29/2022    CREATININE 1 93 (H) 07/29/2022    GLUC 172 (H) 07/29/2022    CALCIUM 10 09/09/2021        Lab Results   Component Value Date    CALCIUM 10 09/09/2021 PHOS 4 0 09/09/2021       Portions of the record may have been created with voice recognition software   Occasional wrong word or "sound a like" substitutions may have occurred due to the inherent limitations of voice recognition software   Read the chart carefully and recognize, using context, where substitutions have occurred

## 2022-08-09 NOTE — PATIENT INSTRUCTIONS
1  chronic kidney disease stage 4 in the setting of Diabetes mellitus type 2 as well as hypertensive nephrosclerosis +/- physiologic aging of the kidney   -last sCr 1 93 as of 7/29/22  Had been stable in the 1 6-1 8 range since July 2018, eGFR in mid 20s and stable  -latest UA with microscopy shows 2+ leukocytes, no red blood cells, 11-30 WBCs, no casts, trace protein  -monitor BMP, UA, UpCr, mag, phos, PTH levels  -avoid nonsteroidals (ibuprofen, advil, motrin, aleve, naproxen, indomethacin, celebrex, toradol)  -may take tylenol  -please stay well hydrated  2  Microalbuminuria in setting of type 2 DM - UpCr improved to 191 mg/g as of Apr 2022  -off lisinopril  -microalbumin in the urine can also be seen with physiologic aging of the kidney  -monitor proteinuria     3  HTN - BP elevated in office but recommend checking BP at home  -continue clonidine 0 1mg weekly patch, bystolic 82HZ after dinner, nifedipine 90mg daily, torsemide 10mg daily  -off lisinopril   -off HCTZ 25mg daily  -monitor BP at home as your are  Call office with readings if BP running too low or too high  Goal BP < 150/90     4  DM2, uncontrolled - on januvia, glipizide, last A1C 8 as of June 2022, needs improved sugar control to slow down progression of CKD  5  Hypokalemia, likely diuretic induced - off HCTZ, K 4 6, resolved on potassium gluconate supplement, 2 tablets a day  Is on torsemide also  6  Dependent edema - resolved, albumin normal  Off HCTZ  Now on torsemide 10mg daily  Echo normal from Aug  2019  -monitor daily weight  Call office if weight gain > 3-5 lbs  7  Complex cyst, left kidney - stable in size as of July 2020 ultrasound, will defer further imaging in light of age     RTC in 4 months  Obtain blood and urine testing in 3 months prior to next office appointment

## 2022-08-12 ENCOUNTER — OFFICE VISIT (OUTPATIENT)
Dept: ENDOCRINOLOGY | Facility: HOSPITAL | Age: 87
End: 2022-08-12
Payer: COMMERCIAL

## 2022-08-12 VITALS
BODY MASS INDEX: 30.03 KG/M2 | SYSTOLIC BLOOD PRESSURE: 140 MMHG | DIASTOLIC BLOOD PRESSURE: 82 MMHG | HEART RATE: 74 BPM | HEIGHT: 62 IN | WEIGHT: 163.2 LBS

## 2022-08-12 DIAGNOSIS — E11.22 TYPE 2 DIABETES MELLITUS WITH STAGE 4 CHRONIC KIDNEY DISEASE, WITHOUT LONG-TERM CURRENT USE OF INSULIN (HCC): ICD-10-CM

## 2022-08-12 DIAGNOSIS — N18.4 TYPE 2 DIABETES MELLITUS WITH STAGE 4 CHRONIC KIDNEY DISEASE, WITHOUT LONG-TERM CURRENT USE OF INSULIN (HCC): ICD-10-CM

## 2022-08-12 DIAGNOSIS — I10 PRIMARY HYPERTENSION: ICD-10-CM

## 2022-08-12 DIAGNOSIS — E11.42 DIABETIC POLYNEUROPATHY ASSOCIATED WITH TYPE 2 DIABETES MELLITUS (HCC): ICD-10-CM

## 2022-08-12 DIAGNOSIS — E78.5 DYSLIPIDEMIA: ICD-10-CM

## 2022-08-12 DIAGNOSIS — E11.65 TYPE 2 DIABETES MELLITUS WITH HYPERGLYCEMIA, WITHOUT LONG-TERM CURRENT USE OF INSULIN (HCC): Primary | ICD-10-CM

## 2022-08-12 PROCEDURE — 99205 OFFICE O/P NEW HI 60 MIN: CPT | Performed by: INTERNAL MEDICINE

## 2022-08-12 PROCEDURE — 1160F RVW MEDS BY RX/DR IN RCRD: CPT | Performed by: INTERNAL MEDICINE

## 2022-08-12 NOTE — PATIENT INSTRUCTIONS
Hgba1c was last 8 0%  this is a bit high but not bad  No change in medications for now since invokana was added after the last hgba1c and the diet is being adjusted  Let's just recheck hgba1c in Sept as scheduled and then we can readjust if needed  Continue to test blood sugars up to once daily  Follow up in 4 months with blood work

## 2022-08-12 NOTE — PROGRESS NOTES
8/13/2022    Assessment/Plan      Diagnoses and all orders for this visit:    Type 2 diabetes mellitus with hyperglycemia, without long-term current use of insulin ([place] [id number] )  -     HEMOGLOBIN A1C W/ EAG ESTIMATION Lab Collect; Future  -     Comprehensive metabolic panel Lab Collect; Future  -     HEMOGLOBIN A1C W/ EAG ESTIMATION Lab Collect  -     Comprehensive metabolic panel Lab Collect    Type 2 diabetes mellitus with stage 4 chronic kidney disease, without long-term current use of insulin ([place] [id number] )  -     Ambulatory Referral to Endocrinology  -     HEMOGLOBIN A1C W/ EAG ESTIMATION Lab Collect; Future  -     Comprehensive metabolic panel Lab Collect; Future  -     HEMOGLOBIN A1C W/ EAG ESTIMATION Lab Collect  -     Comprehensive metabolic panel Lab Collect    Diabetic polyneuropathy associated with type 2 diabetes mellitus ([place] [id number] )  -     HEMOGLOBIN A1C W/ EAG ESTIMATION Lab Collect; Future  -     Comprehensive metabolic panel Lab Collect; Future  -     HEMOGLOBIN A1C W/ EAG ESTIMATION Lab Collect  -     Comprehensive metabolic panel Lab Collect    Primary hypertension  -     HEMOGLOBIN A1C W/ EAG ESTIMATION Lab Collect; Future  -     Comprehensive metabolic panel Lab Collect; Future  -     HEMOGLOBIN A1C W/ EAG ESTIMATION Lab Collect  -     Comprehensive metabolic panel Lab Collect    Dyslipidemia  -     HEMOGLOBIN A1C W/ EAG ESTIMATION Lab Collect; Future  -     Comprehensive metabolic panel Lab Collect; Future  -     HEMOGLOBIN A1C W/ EAG ESTIMATION Lab Collect  -     Comprehensive metabolic panel Lab Collect        Assessment/Plan:  1  Type 2 diabetes  Most recent hemoglobin A1c was 8%  This is a bit high but not too bad for age and medical conditions  Since that time, Dulce Sosa has been added and she has seen Diabetes Education and started to incorporate some of the dietary changes  For now, I will have her continue the same glipizide, Januvia, and Invokana    I did warn her that she can have hypoglycemia on glipizide when due to her renal disease so she should keep a close eye on her blood sugars and call if there is any hypoglycemia  She will continue to test her blood sugars at least once a day  I will repeat her hemoglobin A1c in September as this is when it is next due to be done since it is 3 months since her last recording  Medications will be adjusted at that point if necessary  2  Diabetic neuropathy  She has no further neuropathic symptoms  Diabetic foot exam was performed in the office today  3  Diabetic nephropathy with CKD stage 3  She does follow with Nephrology and is currently on multiple medications to treat her hypertension  4  Hypertension  She is got a blood pressure that is high normal on her current dose of clonidine, Bystolic, torsemide, and nifedipine  5  Hyperlipidemia  She will continue the same simvastatin 20 mg daily  She will get a hemoglobin A1c and CMP done in early September 2022  I have asked her to follow up in 4 months with preceding hemoglobin A1c and CMP, this blood work will be ordered after her blood work in September  CC: Diabetes Consult    History of Present Illness     HPI: Marilou Beltre is a 80y o  year old female with type 2 diabetes for 10 years  She was reportedly diagnosed with type 2 diabetes at least 10 years ago and currently lives with her son and daughter-in-law  She was originally placed on metformin but had side effects so was switched to glipizide  Januvia was reportedly added for 5 years ago and Invokana was added several months ago when her hemoglobin A1c was over 8%  She is on oral agents at home and takes glipizide 10 mg 1 in the morning and 2 in the evening, Januvia 50 mg daily, and Invokana 100 mg daily  She denies any polyuria, polydipsia, polyphagia, and blurry vision  She has twice a night nocturia    She denies current numbness or tingling of the feet but that symptom was present in the past   She denies chest pain or shortness of breath  She denies retinopathy, heart attack, stroke and claudication but does admit to neuropathy and nephropathy  She has been to Diabetes Education in July 2022 and has started to incorporate some of the dietary changes  Hypoglycemic episodes: Yes occasional  Not recently as was lower in afternoon to the 80s, meds changed and to help about 1 year ago  H/o of hypoglycemia causing hospitalization or intervention such as glucagon injection  or ambulance call  No   Hypoglycemia symptoms: felt very weak  Treatment of hypoglycemia: Drink some juice  Glucagon:No   Medic alert tag: recommended,Yes  The patient's last eye exam was in July 2022 with no retinopathy  The patient's last foot exam was in March 2022 at primary care physician's office  She does not see a podiatrist  Last A1C was   Lab Results   Component Value Date    HGBA1C 8 0 (H) 06/03/2022     Blood Sugar/Glucometer/Pump/CGM review:  She checks her blood sugars at least once a day in the morning or in the evening but has been checking a bit last now once or twice a week  Morning sugars are 140-150 were blood sugars after supper can go up to 200  These blood sugars are improved from prior to her last hemoglobin A1c since she has incorporated some of her new medications and dietary habits  She has hypertension and sees Nephrology and takes clonidine 0 1 mg via a patch weekly, Bystolic 20 mg daily, torsemide 10 mg daily, and nifedipine 90 mg daily  She last saw Nephrology in August 2022  She denies headache or stroke-like symptoms  She has hyperlipidemia and takes simvastatin 20 mg daily  She denies chest pain or shortness of breath  Review of Systems   Constitutional: Negative for fatigue and unexpected weight change  Weight 8 lbs less than last year  HENT: Positive for tinnitus  Negative for hearing loss and trouble swallowing  Occasional tinnitus      Eyes: Negative for visual disturbance  Wears glasses  Respiratory: Negative for chest tightness and shortness of breath  Cardiovascular: Positive for leg swelling  Negative for chest pain and palpitations  Had edema of the legs int he past, now less with decrease in torsemide  Some slight edema some days by the evening  Gastrointestinal: Negative for abdominal pain, constipation, diarrhea and nausea  Endocrine: Negative for polydipsia, polyphagia and polyuria  Nocturia 2 times a night  Musculoskeletal: Positive for back pain and neck pain  Negative for arthralgias  Has low back pain and neck pain  Skin: Negative for wound  Neurological: Negative for dizziness, weakness, light-headedness, numbness and headaches  Had numbness and tingling of the feet, now gone  Psychiatric/Behavioral: Negative for dysphoric mood and sleep disturbance  The patient is not nervous/anxious          Historical Information   Past Medical History:   Diagnosis Date    Aortic valve insufficiency     Cataract of both eyes     Dysuria     last assessed - [id number]    Edema     last assessed - [id number]    GERD (gastroesophageal reflux disease)     last assessed - [id number]    Hyperlipidemia     Hypertension     Low back pain     last assessed - [id number]    Mitral valve disorder     Osteoporosis     last assessed - [id number]    Palpitations      Past Surgical History:   Procedure Laterality Date    APPENDECTOMY      CATARACT EXTRACTION Bilateral     COLONOSCOPY      TUBAL LIGATION Bilateral      Social History   Social History     Substance and Sexual Activity   Alcohol Use Never     Social History     Substance and Sexual Activity   Drug Use Never     Social History     Tobacco Use   Smoking Status Never Smoker   Smokeless Tobacco Never Used     Family History:   Family History   Problem Relation Age of Onset    Kidney disease Mother         Chronic    No Known Problems Father     Breast cancer Sister     Diabetes Sister     Breast cancer Sister     Hypertension Sister     No Known Problems Daughter     No Known Problems Son     No Known Problems Son     No Known Problems Son     No Known Problems Son        Meds/Allergies   Current Outpatient Medications   Medication Sig Dispense Refill    ACCU-CHEK PAUL PLUS test strip       Accu-Chek Guide test strip USE TO TEST TWICE A  strip 3    Accu-Chek Softclix Lancets lancets       Accu-Chek Softclix Lancets lancets USE 2 TIMES DAILY AS DIRECTED 100 each 3    aspirin 81 MG tablet Take 1 tablet by mouth daily      canagliflozin (Invokana) 100 mg Take 1 tablet (100 mg total) by mouth daily before breakfast 90 tablet 1    cholecalciferol (VITAMIN D3) 1,000 units tablet Take 2 tablets by mouth daily      cloNIDine (CATAPRES-TTS-1) 0 1 mg/24 hr PLACE 1 PATCH (0 1 MG TOTAL) ON THE SKIN ONCE A WEEK 12 patch 3    famotidine (PEPCID) 20 mg tablet TAKE 1 TABLET BY MOUTH EVERY DAY 90 tablet 3    ferrous sulfate 324 (65 Fe) mg TAKE 1 TABLET BY MOUTH DAILY BEFORE BREAKFAST 90 tablet 1    glipiZIDE (GLUCOTROL) 10 mg tablet TAKE 1 TABLET BY MOUTH IN THE MORNING THEN 2 TABS WITH DINNER 270 tablet 3    glucose blood (Accu-Chek Paul Plus) test strip USE AS INSTRUCTED TWICE DAILY 100 each 7    Januvia 50 MG tablet TAKE 1 TABLET BY MOUTH EVERY DAY 30 tablet 5    nebivolol (Bystolic) 20 MG tablet Take 1 tablet (20 mg total) by mouth daily 30 tablet 5    NIFEdipine (ADALAT CC) 90 mg 24 hr tablet TAKE 1 TABLET BY MOUTH EVERY DAY 90 tablet 3    Potassium Gluconate 595 (99 K) MG TABS Take 2 tablets by mouth 2 (two) times a day 4 tabs total      sertraline (ZOLOFT) 25 mg tablet TAKE 1 TABLET BY MOUTH EVERY DAY 90 tablet 3    simvastatin (ZOCOR) 20 mg tablet TAKE 1 TABLET BY MOUTH EVERYDAY AT BEDTIME 90 tablet 3    torsemide (DEMADEX) 20 mg tablet Take 0 5 tablets (10 mg total) by mouth daily 90 tablet 2     No current facility-administered medications for this visit  No Known Allergies    Objective   Vitals: Blood pressure 140/82, pulse 74, height 5' 2" (1 575 m), weight 74 kg (163 lb 3 2 oz), not currently breastfeeding  Invasive Devices  Report    None                 Physical Exam  Vitals reviewed  Constitutional:       Appearance: Normal appearance  She is well-developed  HENT:      Head: Normocephalic and atraumatic  Eyes:      Extraocular Movements: Extraocular movements intact  Conjunctiva/sclera: Conjunctivae normal       Comments: No lid lag, stare, proptosis, or periorbital edema  Neck:      Thyroid: No thyromegaly  Vascular: No carotid bruit  Comments: Thyroid normal in size  No palpable thyroid nodules  Cardiovascular:      Rate and Rhythm: Normal rate and regular rhythm  Pulses: Pulses are weak  Dorsalis pedis pulses are 1+ on the right side and 1+ on the left side  Posterior tibial pulses are 1+ on the right side and 1+ on the left side  Heart sounds: Normal heart sounds  No murmur heard  Comments: 1+ dorsalis pedis and posterior tibialis pulses bilaterally  Pulmonary:      Effort: Pulmonary effort is normal       Breath sounds: Normal breath sounds  No wheezing  Abdominal:      General: Bowel sounds are normal       Palpations: Abdomen is soft  Tenderness: There is no abdominal tenderness  Musculoskeletal:         General: No deformity  Normal range of motion  Cervical back: Normal range of motion and neck supple  Right lower leg: No edema  Left lower leg: No edema  Comments: No tremor of the outstretched hands  No ulcerations of the feet  No CVA tenderness  No spinous process tenderness  Feet:      Right foot:      Skin integrity: No ulcer, skin breakdown, erythema, warmth, callus or dry skin  Left foot:      Skin integrity: No ulcer, skin breakdown, erythema, warmth, callus or dry skin  Lymphadenopathy:      Cervical: No cervical adenopathy  Skin:     General: Skin is warm and dry  Findings: No rash  Neurological:      Mental Status: She is alert and oriented to person, place, and time  Deep Tendon Reflexes: Reflexes are normal and symmetric  Comments: Vibration sensation diminished to the 1st toe DIP joint bilaterally  Microfilament sensation absent to the right 1st toe and left heel but was otherwise intact bilaterally  Patellar deep tendon reflexes normal       Patient's shoes and socks removed  Right Foot/Ankle   Right Foot Inspection  Skin Exam: skin normal and skin intact  No dry skin, no warmth, no callus, no erythema, no maceration, no abnormal color, no pre-ulcer, no ulcer and no callus  Toe Exam: No swelling and  no right toe deformity    Sensory   Vibration: diminished  Monofilament testing: diminished    Vascular  Capillary refills: < 3 seconds  The right DP pulse is 1+  The right PT pulse is 1+  Left Foot/Ankle  Left Foot Inspection  Skin Exam: skin normal and skin intact  No dry skin, no warmth, no erythema, no maceration, normal color, no pre-ulcer, no ulcer and no callus  Toe Exam: No swelling and no left toe deformity  Sensory   Vibration: diminished  Monofilament testing: diminished    Vascular  Capillary refills: < 3 seconds  The left DP pulse is 1+  The left PT pulse is 1+  Assign Risk Category  No deformity present  Loss of protective sensation  Weak pulses  Risk: 2        The history was obtained from the review of the chart and from the patient and daughter-in-law      Lab Results:    Most recent Alc is  Lab Results   Component Value Date    HGBA1C 8 0 (H) 06/03/2022           Blood work done on 07/29/2022 showed a BMP with a glucose of 172 random, BUN 33, creatinine 1 93, GFR 25, but was otherwise normal     Lab Results   Component Value Date    CREATININE 1 93 (H) 07/29/2022    CREATININE 1 80 (H) 06/03/2022    CREATININE 1 91 (H) 05/13/2022    BUN 33 (H) 07/29/2022     01/02/2018 K 4 6 07/29/2022     07/29/2022    CO2 24 07/29/2022     eGFR   Date Value Ref Range Status   07/29/2022 25 (L) >59 mL/min/1 73 Final     EXTERNAL EGFR   Date Value Ref Range Status   09/09/2021 23  Final         Lab Results   Component Value Date    CHOL 168 01/02/2018    HDL 45 03/04/2022    TRIG 172 (H) 03/04/2022    CHOLHDL 3 9 03/04/2022       Lab Results   Component Value Date    ALT 13 03/04/2022    AST 14 03/04/2022    ALKPHOS 44 01/02/2018    BILITOT 0 3 01/02/2018       Lab Results   Component Value Date    TSH 3 190 03/04/2022             Future Appointments   Date Time Provider Ronn Lemos   9/20/2022  9:40 AM Tien Tran DO [facility]   1/10/2023  9:30 AM Trena Tamayo DO [facility]   1/13/2023  8:20 AM Lucy Carbajal MD [facility]O [facility]

## 2022-08-22 DIAGNOSIS — I10 ESSENTIAL HYPERTENSION: ICD-10-CM

## 2022-08-22 RX ORDER — CLONIDINE 0.1 MG/24H
1 PATCH, EXTENDED RELEASE TRANSDERMAL WEEKLY
Qty: 12 PATCH | Refills: 4 | Status: SHIPPED | OUTPATIENT
Start: 2022-08-22 | End: 2022-09-24

## 2022-08-24 ENCOUNTER — TELEPHONE (OUTPATIENT)
Dept: NEPHROLOGY | Facility: CLINIC | Age: 87
End: 2022-08-24

## 2022-08-24 DIAGNOSIS — N18.30 TYPE 2 DIABETES MELLITUS WITH STAGE 3 CHRONIC KIDNEY DISEASE, WITHOUT LONG-TERM CURRENT USE OF INSULIN (HCC): ICD-10-CM

## 2022-08-24 DIAGNOSIS — E11.22 TYPE 2 DIABETES MELLITUS WITH STAGE 3 CHRONIC KIDNEY DISEASE, WITHOUT LONG-TERM CURRENT USE OF INSULIN (HCC): ICD-10-CM

## 2022-08-24 RX ORDER — SITAGLIPTIN 50 MG/1
TABLET, FILM COATED ORAL
Qty: 90 TABLET | Refills: 2 | Status: SHIPPED | OUTPATIENT
Start: 2022-08-24

## 2022-08-24 NOTE — TELEPHONE ENCOUNTER
Called patients daughter Rell Barron and left a voicemail statin the following:    She should report her blood sugars to her PCP or endocrinology, whoever manages her diabetes as Dr Oswaldo Reed will not be adjusting diabetic medications  I have forwarded this message to the patients endo pool

## 2022-08-24 NOTE — TELEPHONE ENCOUNTER
Daughter in law called to report blood sugars:  Fasting 9am:  8/20   151,     8/21  157,   8/22  161    8/23  182,  8/24  169    8pm:  8/20 70,  8/21 150,  8/22  155,  8/23  157  Please advise if any changes

## 2022-08-31 ENCOUNTER — TELEPHONE (OUTPATIENT)
Dept: NEPHROLOGY | Facility: CLINIC | Age: 87
End: 2022-08-31

## 2022-09-01 DIAGNOSIS — I10 ESSENTIAL HYPERTENSION: ICD-10-CM

## 2022-09-01 RX ORDER — NEBIVOLOL 20 MG/1
TABLET ORAL
Qty: 90 TABLET | Refills: 2 | Status: SHIPPED | OUTPATIENT
Start: 2022-09-01

## 2022-09-01 NOTE — TELEPHONE ENCOUNTER
Reschedule Appointment   Person speaking to  Child  Jake Berkshire   Date of original appointment 1/10/23    New appointment date 2/02/23   Patient on dialysis no   Location [facility]    Provider Dr Shadi Monsalve    Additional Information

## 2022-09-07 DIAGNOSIS — K21.00 GASTROESOPHAGEAL REFLUX DISEASE WITH ESOPHAGITIS: ICD-10-CM

## 2022-09-07 DIAGNOSIS — F41.9 ANXIETY: ICD-10-CM

## 2022-09-07 RX ORDER — FAMOTIDINE 20 MG/1
TABLET, FILM COATED ORAL
Qty: 90 TABLET | Refills: 4 | Status: SHIPPED | OUTPATIENT
Start: 2022-09-07

## 2022-09-07 RX ORDER — SERTRALINE HYDROCHLORIDE 25 MG/1
TABLET, FILM COATED ORAL
Qty: 90 TABLET | Refills: 4 | Status: SHIPPED | OUTPATIENT
Start: 2022-09-07

## 2022-09-16 LAB
BUN SERPL-MCNC: 35 MG/DL (ref 8–27)
BUN/CREAT SERPL: 19 (ref 12–28)
CALCIUM SERPL-MCNC: 9.6 MG/DL (ref 8.7–10.3)
CHLORIDE SERPL-SCNC: 101 MMOL/L (ref 96–106)
CO2 SERPL-SCNC: 24 MMOL/L (ref 20–29)
CREAT SERPL-MCNC: 1.86 MG/DL (ref 0.57–1)
EGFR: 26 ML/MIN/1.73
EST. AVERAGE GLUCOSE BLD GHB EST-MCNC: 174 MG/DL
GLUCOSE SERPL-MCNC: 174 MG/DL (ref 65–99)
HBA1C MFR BLD: 7.7 % (ref 4.8–5.6)
POTASSIUM SERPL-SCNC: 4.4 MMOL/L (ref 3.5–5.2)
SODIUM SERPL-SCNC: 139 MMOL/L (ref 134–144)

## 2022-09-20 ENCOUNTER — OFFICE VISIT (OUTPATIENT)
Dept: FAMILY MEDICINE CLINIC | Facility: HOSPITAL | Age: 87
End: 2022-09-20
Payer: COMMERCIAL

## 2022-09-20 VITALS
TEMPERATURE: 97.1 F | DIASTOLIC BLOOD PRESSURE: 78 MMHG | SYSTOLIC BLOOD PRESSURE: 106 MMHG | BODY MASS INDEX: 30.07 KG/M2 | WEIGHT: 163.4 LBS | HEIGHT: 62 IN | HEART RATE: 72 BPM

## 2022-09-20 DIAGNOSIS — E11.22 TYPE 2 DIABETES MELLITUS WITH STAGE 4 CHRONIC KIDNEY DISEASE, WITHOUT LONG-TERM CURRENT USE OF INSULIN (HCC): Primary | ICD-10-CM

## 2022-09-20 DIAGNOSIS — N18.4 CKD (CHRONIC KIDNEY DISEASE), STAGE IV (HCC): ICD-10-CM

## 2022-09-20 DIAGNOSIS — Z23 ENCOUNTER FOR IMMUNIZATION: ICD-10-CM

## 2022-09-20 DIAGNOSIS — N18.4 TYPE 2 DIABETES MELLITUS WITH STAGE 4 CHRONIC KIDNEY DISEASE, WITHOUT LONG-TERM CURRENT USE OF INSULIN (HCC): Primary | ICD-10-CM

## 2022-09-20 DIAGNOSIS — I10 PRIMARY HYPERTENSION: ICD-10-CM

## 2022-09-20 PROCEDURE — 99214 OFFICE O/P EST MOD 30 MIN: CPT | Performed by: INTERNAL MEDICINE

## 2022-09-20 PROCEDURE — 90662 IIV NO PRSV INCREASED AG IM: CPT | Performed by: INTERNAL MEDICINE

## 2022-09-20 PROCEDURE — G0008 ADMIN INFLUENZA VIRUS VAC: HCPCS | Performed by: INTERNAL MEDICINE

## 2022-09-20 PROCEDURE — 1160F RVW MEDS BY RX/DR IN RCRD: CPT | Performed by: INTERNAL MEDICINE

## 2022-09-20 NOTE — ASSESSMENT & PLAN NOTE
Lab Results   Component Value Date    HGBA1C 7 7 (H) 09/16/2022   DM type 2 with CKD stage IV, polyneuropathy and hyperglycemia - uncontrolled but greatly improved with A1C 7 7 - congratulated on success, saw dietician and has changed diet a bit, also tolerating newly started Invokana, cont all current meds, has established with Endo and has f/u labs and appt scheduled, UTD on DM foot exam (8/22) and eye exam (7/22), on statin and ASA but no ACE/ARB d/t CKD, will follow

## 2022-09-20 NOTE — PROGRESS NOTES
Name: Miranda Becerril      : 1935      MRN: [id number]  Encounter Provider: Tien Tran DO  Encounter Date: 2022   Encounter department: [facility]      1  Type 2 diabetes mellitus with stage 4 chronic kidney disease, without long-term current use of insulin (HCC)  Assessment & Plan:    Lab Results   Component Value Date    HGBA1C 7 7 (H) 2022   DM type 2 with CKD stage IV, polyneuropathy and hyperglycemia - uncontrolled but greatly improved with A1C 7 7 - congratulated on success, saw dietician and has changed diet a bit, also tolerating newly started Invokana, cont all current meds, has established with Endo and has f/u labs and appt scheduled, UTD on DM foot exam () and eye exam (), on statin and ASA but no ACE/ARB d/t CKD, will follow      2  CKD (chronic kidney disease), stage IV [facility])  Assessment & Plan:  Lab Results   Component Value Date    EGFR 26 (L) 2022    EGFR 25 (L) 2022    EGFR 27 (L) 2022    CREATININE 1 86 (H) 2022    CREATININE 1 93 (H) 2022    CREATININE 1 80 (H) 2022   Stable, just saw Nephro, congratulated on better BS control, con't current BP and DM meds, urged again to avoid dehydration and NSAIDs and importance of BP/BS control reviewed  3  Primary hypertension  Assessment & Plan:  BP great today, con't current meds, keep hydrated, recheck in 6 mos      4  Encounter for immunization  -     influenza vaccine, high-dose, PF 0 7 mL (FLUZONE HIGH-DOSE)      Mammo 10/21 - pt deferring today    Dexa  - osteopenia - pt deferring     Pt agreeable to flu vaccine      Subjective      HPI Pt here for follow up appt    She has seen Endo and established care for her uncontrolled DM - OV note from Aug reviewed  She had no meds changed and was told to repeat BW in Sept  We had just started her on Invokana 100 mg q day in   She also saw dietician in July as well  FBS/A1c from Sept reviewed with pt in detail today: FBS/A1C 174/7 7 , BUN/Cr 35/1 86 (GFR 26)  Def of controlled vs uncontrolled DM was reviewed  Diet was reviewed - wgt down 3 lbs from June 22  She is taking her DM meds as directed without SE  She is UTD on DM foot exam (8/22) and eye exam (7/22)  She is on statin and ASA but no ARB/ ACE d/t her CKD  She has f/u with Endo in Jan       CKD stage IV reviewed  Pt saw Nephro (Dr Angella Kimbrough) in Aug - OV note reviewed  She was again advised to avoid NSAIDs and to keep hydrated  No medications were changed  She was told to f/u in the office in 4 mo and has an appt in Feb 23  BP at goal today and meds were reviewed and no changes have occurred  She denies missing doses of meds or SE with the meds  She does not check her BP outside the office  She notes no frequent HA's/dizziness/double vision/CP  Review of Systems   Constitutional: Negative for chills, fever and unexpected weight change  HENT: Negative for congestion and trouble swallowing  Eyes: Negative for pain and visual disturbance  Respiratory: Negative for cough, shortness of breath and wheezing  Cardiovascular: Negative for chest pain, palpitations and leg swelling  Gastrointestinal: Negative for abdominal pain, diarrhea, nausea and vomiting  Genitourinary: Negative for difficulty urinating and dysuria  Musculoskeletal: Negative for back pain and neck pain  Skin: Negative for rash and wound  Neurological: Negative for dizziness, light-headedness and headaches  Hematological: Does not bruise/bleed easily  Psychiatric/Behavioral: Negative for dysphoric mood  The patient is not nervous/anxious          Current Outpatient Medications on File Prior to Visit   Medication Sig    ACCU-CHEK PAUL PLUS test strip     Accu-Chek Guide test strip USE TO TEST TWICE A DAY    Accu-Chek Softclix Lancets lancets     Accu-Chek Softclix Lancets lancets Use as instructed twice daily  aspirin 81 MG tablet Take 1 tablet by mouth daily    canagliflozin (Invokana) 100 mg Take 1 tablet (100 mg total) by mouth daily before breakfast    cholecalciferol (VITAMIN D3) 1,000 units tablet Take 2 tablets by mouth daily    cloNIDine (CATAPRES-TTS-1) 0 1 mg/24 hr PLACE 1 PATCH (0 1 MG TOTAL) ON THE SKIN ONCE A WEEK    famotidine (PEPCID) 20 mg tablet TAKE 1 TABLET BY MOUTH EVERY DAY    ferrous sulfate 324 (65 Fe) mg TAKE 1 TABLET BY MOUTH DAILY BEFORE BREAKFAST    glipiZIDE (GLUCOTROL) 10 mg tablet TAKE 1 TABLET BY MOUTH IN THE MORNING THEN 2 TABS WITH DINNER    glucose blood (Accu-Chek Emma Plus) test strip USE AS INSTRUCTED TWICE DAILY    Januvia 50 MG tablet TAKE 1 TABLET BY MOUTH EVERY DAY    nebivolol (BYSTOLIC) 20 MG tablet TAKE 1 TABLET BY MOUTH EVERY DAY    NIFEdipine (ADALAT CC) 90 mg 24 hr tablet TAKE 1 TABLET BY MOUTH EVERY DAY    Potassium Gluconate 595 (99 K) MG TABS Take 2 tablets by mouth 2 (two) times a day 4 tabs total    sertraline (ZOLOFT) 25 mg tablet TAKE 1 TABLET BY MOUTH EVERY DAY    simvastatin (ZOCOR) 20 mg tablet TAKE 1 TABLET BY MOUTH EVERYDAY AT BEDTIME    torsemide (DEMADEX) 20 mg tablet TAKE 1/2 TABLET BY MOUTH DAILY       Objective     /78   Pulse 72   Temp (!) 97 1 °F (36 2 °C) (Tympanic)   Ht 5' 2" (1 575 m)   Wt 74 1 kg (163 lb 6 4 oz)   BMI 29 89 kg/m²     Physical Exam  Vitals and nursing note reviewed  Constitutional:       General: She is not in acute distress  Appearance: She is well-developed  She is not ill-appearing  HENT:      Head: Normocephalic and atraumatic  Eyes:      General:         Right eye: No discharge  Left eye: No discharge  Conjunctiva/sclera: Conjunctivae normal    Neck:      Trachea: No tracheal deviation  Cardiovascular:      Rate and Rhythm: Normal rate and regular rhythm  Heart sounds: Normal heart sounds  No murmur heard  No friction rub     Pulmonary:      Effort: Pulmonary effort is normal  No respiratory distress  Breath sounds: Normal breath sounds  No wheezing, rhonchi or rales  Abdominal:      General: There is no distension  Palpations: Abdomen is soft  Tenderness: There is no abdominal tenderness  There is no guarding or rebound  Musculoskeletal:         General: No deformity or signs of injury  Cervical back: Neck supple  Skin:     General: Skin is warm  Coloration: Skin is not pale  Findings: No rash  Neurological:      General: No focal deficit present  Mental Status: She is alert  Motor: No abnormal muscle tone  Gait: Gait normal    Psychiatric:         Mood and Affect: Mood normal          Behavior: Behavior normal          Thought Content:  Thought content normal          Judgment: Judgment normal        Brain Marc, DO

## 2022-09-20 NOTE — ASSESSMENT & PLAN NOTE
Lab Results   Component Value Date    EGFR 26 (L) 09/16/2022    EGFR 25 (L) 07/29/2022    EGFR 27 (L) 06/03/2022    CREATININE 1 86 (H) 09/16/2022    CREATININE 1 93 (H) 07/29/2022    CREATININE 1 80 (H) 06/03/2022   Stable, just saw Nephro, congratulated on better BS control, con't current BP and DM meds, urged again to avoid dehydration and NSAIDs and importance of BP/BS control reviewed

## 2022-09-24 DIAGNOSIS — I10 ESSENTIAL HYPERTENSION: ICD-10-CM

## 2022-09-24 RX ORDER — CLONIDINE 0.1 MG/24H
PATCH, EXTENDED RELEASE TRANSDERMAL
Qty: 12 PATCH | Refills: 5 | Status: SHIPPED | OUTPATIENT
Start: 2022-09-24

## 2022-10-10 DIAGNOSIS — I10 ESSENTIAL HYPERTENSION: ICD-10-CM

## 2022-10-10 RX ORDER — NIFEDIPINE 90 MG/1
TABLET, FILM COATED, EXTENDED RELEASE ORAL
Qty: 90 TABLET | Refills: 4 | Status: SHIPPED | OUTPATIENT
Start: 2022-10-10

## 2022-10-24 ENCOUNTER — OFFICE VISIT (OUTPATIENT)
Dept: FAMILY MEDICINE CLINIC | Facility: HOSPITAL | Age: 87
End: 2022-10-24
Payer: COMMERCIAL

## 2022-10-24 VITALS
BODY MASS INDEX: 29.96 KG/M2 | HEIGHT: 62 IN | HEART RATE: 65 BPM | OXYGEN SATURATION: 98 % | DIASTOLIC BLOOD PRESSURE: 54 MMHG | SYSTOLIC BLOOD PRESSURE: 138 MMHG | WEIGHT: 162.8 LBS | TEMPERATURE: 97.9 F

## 2022-10-24 DIAGNOSIS — N18.4 CKD (CHRONIC KIDNEY DISEASE), STAGE IV (HCC): ICD-10-CM

## 2022-10-24 DIAGNOSIS — M54.41 ACUTE RIGHT-SIDED LOW BACK PAIN WITH RIGHT-SIDED SCIATICA: Primary | ICD-10-CM

## 2022-10-24 LAB
SL AMB  POCT GLUCOSE, UA: NORMAL
SL AMB LEUKOCYTE ESTERASE,UA: NORMAL
SL AMB POCT BILIRUBIN,UA: NORMAL
SL AMB POCT BLOOD,UA: NORMAL
SL AMB POCT CLARITY,UA: CLEAR
SL AMB POCT COLOR,UA: YELLOW
SL AMB POCT KETONES,UA: NORMAL
SL AMB POCT NITRITE,UA: NORMAL
SL AMB POCT PH,UA: 6
SL AMB POCT SPECIFIC GRAVITY,UA: 1.01
SL AMB POCT URINE PROTEIN: NORMAL
SL AMB POCT UROBILINOGEN: NORMAL

## 2022-10-24 PROCEDURE — 99214 OFFICE O/P EST MOD 30 MIN: CPT | Performed by: NURSE PRACTITIONER

## 2022-10-24 PROCEDURE — 81002 URINALYSIS NONAUTO W/O SCOPE: CPT | Performed by: NURSE PRACTITIONER

## 2022-10-24 RX ORDER — PREDNISONE 10 MG/1
TABLET ORAL
Qty: 16 TABLET | Refills: 0 | Status: SHIPPED | OUTPATIENT
Start: 2022-10-24

## 2022-10-24 NOTE — PROGRESS NOTES
Name: Grey Tolliver      : 1935      MRN: [id number]  Encounter Provider: KANA Michel  Encounter Date: 10/24/2022   [facility]  Acute right-sided low back pain with right-sided sciatica  Comments:  advise she purse further eval w/PT - pt declines but DIL requests order be placed;   Orders:  -     Ambulatory Referral to Physical Therapy; Future  -     POCT urine dip  -     predniSONE 10 mg tablet; Take 4 tablets today then 3 tablets for 2 days, 2 tablets for 2 days, and 1 tablet for 2 days    2  CKD (chronic kidney disease), stage IV (HCC)  Comments:  avoid NSAIDs as previously advised  Orders:  -     POCT urine dip    reviewed pain management options w/PCP Dr Soniya Potter - she feels short course of prednisone taper is safe - rx issued & will notify family       Subjective        Here with daughter in law  Pt states she is in pain across low back and into right leg  Has been worse past 4-5 days  No injury or trigger known  Had been intermittent previously and Dr Soniya Potter suggested heat and tylenol  Tylenol 1000mg every 4 hours during the day not helping currently  Sleeping well with pain at night  Unable to take NSAIDs due to kidneys  Denies urinary symptoms  Review of Systems   Genitourinary: Negative for dysuria  Musculoskeletal: Positive for back pain (bilat LBP, R>L)  Negative for gait problem  Neurological: Negative for weakness and numbness (- tingling)         Current Outpatient Medications on File Prior to Visit   Medication Sig   • ACCU-CHEK PAUL PLUS test strip    • Accu-Chek Guide test strip USE TO TEST TWICE A DAY   • Accu-Chek Softclix Lancets lancets    • Accu-Chek Softclix Lancets lancets Use as instructed twice daily   • aspirin 81 MG tablet Take 1 tablet by mouth daily   • canagliflozin (Invokana) 100 mg Take 1 tablet (100 mg total) by mouth daily before breakfast   • cholecalciferol (VITAMIN D3) 1,000 units tablet Take 2 tablets by mouth daily   • cloNIDine (CATAPRES-TTS-1) 0 1 mg/24 hr PLACE 1 PATCH (0 1 MG TOTAL) ON THE SKIN ONCE A WEEK AS DIRECTED   • famotidine (PEPCID) 20 mg tablet TAKE 1 TABLET BY MOUTH EVERY DAY   • ferrous sulfate 324 (65 Fe) mg TAKE 1 TABLET BY MOUTH DAILY BEFORE BREAKFAST   • glipiZIDE (GLUCOTROL) 10 mg tablet TAKE 1 TABLET BY MOUTH IN THE MORNING THEN 2 TABS WITH DINNER   • glucose blood (Accu-Chek Emma Plus) test strip USE AS INSTRUCTED TWICE DAILY   • Januvia 50 MG tablet TAKE 1 TABLET BY MOUTH EVERY DAY   • nebivolol (BYSTOLIC) 20 MG tablet TAKE 1 TABLET BY MOUTH EVERY DAY   • NIFEdipine (ADALAT CC) 90 mg 24 hr tablet TAKE 1 TABLET BY MOUTH EVERY DAY   • Potassium Gluconate 595 (99 K) MG TABS Take 2 tablets by mouth 2 (two) times a day 4 tabs total   • sertraline (ZOLOFT) 25 mg tablet TAKE 1 TABLET BY MOUTH EVERY DAY   • simvastatin (ZOCOR) 20 mg tablet TAKE 1 TABLET BY MOUTH EVERYDAY AT BEDTIME   • torsemide (DEMADEX) 20 mg tablet TAKE 1/2 TABLET BY MOUTH DAILY       Objective     /54   Pulse 65   Temp 97 9 °F (36 6 °C)   Ht 5' 2" (1 575 m)   Wt 73 8 kg (162 lb 12 8 oz)   SpO2 98%   BMI 29 78 kg/m²     Physical Exam  Vitals reviewed  Constitutional:       General: She is not in acute distress  Appearance: Normal appearance  HENT:      Head: Normocephalic  Eyes:      General: No scleral icterus  Pulmonary:      Effort: Pulmonary effort is normal  No respiratory distress  Musculoskeletal:      Lumbar back: Tenderness present  Decreased range of motion  Negative right straight leg raise test and negative left straight leg raise test         Back:       Right lower leg: No edema  Left lower leg: No edema  Comments: Pt moved easily on/off table and supine to sit on table   Skin:     General: Skin is warm and dry  Neurological:      General: No focal deficit present        Mental Status: She is alert and oriented to person, place, and time  Cranial Nerves: No cranial nerve deficit  Motor: No weakness  Gait: Gait normal       Deep Tendon Reflexes:      Reflex Scores:       Patellar reflexes are 1+ on the right side and 1+ on the left side    Psychiatric:         Mood and Affect: Mood normal          Behavior: Behavior normal           Marimar KANA Pantoja

## 2022-10-27 ENCOUNTER — TELEPHONE (OUTPATIENT)
Dept: FAMILY MEDICINE CLINIC | Facility: HOSPITAL | Age: 87
End: 2022-10-27

## 2022-10-27 ENCOUNTER — TELEPHONE (OUTPATIENT)
Dept: OTHER | Facility: OTHER | Age: 87
End: 2022-10-27

## 2022-10-27 ENCOUNTER — APPOINTMENT (OUTPATIENT)
Dept: RADIOLOGY | Facility: HOSPITAL | Age: 87
End: 2022-10-27
Payer: COMMERCIAL

## 2022-10-27 ENCOUNTER — HOSPITAL ENCOUNTER (EMERGENCY)
Facility: HOSPITAL | Age: 87
Discharge: HOME | End: 2022-10-27
Attending: EMERGENCY MEDICINE
Payer: COMMERCIAL

## 2022-10-27 VITALS
HEART RATE: 72 BPM | SYSTOLIC BLOOD PRESSURE: 143 MMHG | BODY MASS INDEX: 29.81 KG/M2 | TEMPERATURE: 98.3 F | WEIGHT: 162 LBS | OXYGEN SATURATION: 100 % | RESPIRATION RATE: 16 BRPM | HEIGHT: 62 IN | DIASTOLIC BLOOD PRESSURE: 71 MMHG

## 2022-10-27 DIAGNOSIS — R60.9 DEPENDENT EDEMA: ICD-10-CM

## 2022-10-27 DIAGNOSIS — E11.22 TYPE 2 DIABETES MELLITUS WITH STAGE 3 CHRONIC KIDNEY DISEASE, WITHOUT LONG-TERM CURRENT USE OF INSULIN (HCC): ICD-10-CM

## 2022-10-27 DIAGNOSIS — I10 ESSENTIAL HYPERTENSION: ICD-10-CM

## 2022-10-27 DIAGNOSIS — M54.31 SCIATICA OF RIGHT SIDE: Primary | ICD-10-CM

## 2022-10-27 DIAGNOSIS — M54.41 ACUTE RIGHT-SIDED LOW BACK PAIN WITH RIGHT-SIDED SCIATICA: Primary | ICD-10-CM

## 2022-10-27 DIAGNOSIS — N18.30 TYPE 2 DIABETES MELLITUS WITH STAGE 3 CHRONIC KIDNEY DISEASE, WITHOUT LONG-TERM CURRENT USE OF INSULIN (HCC): ICD-10-CM

## 2022-10-27 PROCEDURE — 73552 X-RAY EXAM OF FEMUR 2/>: CPT

## 2022-10-27 RX ORDER — GABAPENTIN 100 MG/1
100 CAPSULE ORAL 2 TIMES DAILY
Qty: 60 CAPSULE | Refills: 0 | Status: SHIPPED | OUTPATIENT
Start: 2022-10-27 | End: 2022-11-04 | Stop reason: SDUPTHER

## 2022-10-27 RX ORDER — TORSEMIDE 20 MG/1
TABLET ORAL
Qty: 45 TABLET | Refills: 3 | Status: SHIPPED | OUTPATIENT
Start: 2022-10-27

## 2022-10-27 RX ORDER — GLIPIZIDE 10 MG/1
TABLET ORAL
Qty: 270 TABLET | Refills: 3 | Status: SHIPPED | OUTPATIENT
Start: 2022-10-27

## 2022-10-27 RX ORDER — ACETAMINOPHEN 325 MG/1
650 TABLET ORAL ONCE
Status: COMPLETED | OUTPATIENT
Start: 2022-10-27 | End: 2022-10-27

## 2022-10-27 RX ORDER — LIDOCAINE 50 MG/G
1 PATCH TOPICAL ONCE
Status: DISCONTINUED | OUTPATIENT
Start: 2022-10-27 | End: 2022-10-28 | Stop reason: HOSPADM

## 2022-10-27 RX ADMIN — LIDOCAINE 5% 1 PATCH: 700 PATCH TOPICAL at 22:20

## 2022-10-27 RX ADMIN — ACETAMINOPHEN 650 MG: 325 TABLET, FILM COATED ORAL at 22:19

## 2022-10-27 NOTE — TELEPHONE ENCOUNTER
Feels the pain improved for a short time after starting the Prednisone, but feels it has now worsened  Still radiating down front of right thigh  States patient is moaning she is in so much pain    Please advise

## 2022-10-27 NOTE — TELEPHONE ENCOUNTER
Patient daughter in-law called and want Dr Tracie Griggs to know that she took her mother in-law to the ER

## 2022-10-27 NOTE — TELEPHONE ENCOUNTER
I sent a script for gabapentin she can start taking daily (this can be increased if once/day is not sufficient)  I also ordered an xray of her low back and entered consult for pain management to evaluate further

## 2022-10-27 NOTE — TELEPHONE ENCOUNTER
Patient took two days of prednisone and pain is worse, now in thigh  Daughter, Shahzad Escalona, asking for a call

## 2022-10-28 ENCOUNTER — TELEPHONE (OUTPATIENT)
Dept: PHYSICAL THERAPY | Facility: OTHER | Age: 87
End: 2022-10-28

## 2022-10-28 NOTE — RESULT ENCOUNTER NOTE
Discussed findings with patients daughter in law who was with her in the emergency department last night and gave contact information for orthopedics instructing to schedule an appointment and a pelvic xray for follow-up evaluation regarding the xray findings  Ambulatory referral to orthopedics was sent Patients daughter in law agreed to this plan

## 2022-10-28 NOTE — DISCHARGE INSTRUCTIONS
Take tylenol every 4-6 hours for pain  Continue prednisone as prescribed  Purchase lidocaine patches and voltaren (diclofenac) gel from over the counter  The lidocaine patches can stay on for 12 hours and must be taken off for 12 hours before reapplying   In between patches, apply voltaren gel to the area  Use heating pad on the area  Schedule an appointment with the comprehensive spine program if symptoms do not resolve with these measures    Return to the ER if you develop intense back pain that is worse or different than before, cannot feel or move your legs, urinary or bowel movement incontinence, cannot urinate, pelvic numbness, cannot walk, fevers

## 2022-10-28 NOTE — ED PROVIDER NOTES
History  No chief complaint on file  79 y/o female with PMH stage 4 CKD, DM, HLD, HTN presents to the ER with her daughter in law for right low back pain that radiates into her right thigh over the past 3-4 weeks  Patient states it is a 10/10 pain and is intermittent, worse with walking and she describes it as a sharp shooting pain  Patient states she saw her PCP on Monday who prescribed her a medrol dose pack  States at home she has been doing tylenol, heating pads but it has not been helping  They called the PCP back yesterday to see if there is anything else they could give and they prescribed her gabapentin  Patient denies any numbness, tingling, weakness, pelvic pain, numbness, bowel or bladder incontinence, fevers, neck pain, neck stiffness  She denies any recent falls, recent heavy lifting, recent twisting injuries  No past history of neck or back injuries/surgeries in the past        History provided by:  Patient   used: No    Back Pain  Location:  Lumbar spine and gluteal region  Radiates to:  R posterior upper leg  Pain severity:  Moderate  Onset quality:  Gradual  Duration:  4 weeks  Timing:  Intermittent  Progression:  Waxing and waning  Chronicity:  New  Ineffective treatments:  Heating pad (acetaminopen)  Associated symptoms: leg pain    Associated symptoms: no abdominal pain, no bladder incontinence, no bowel incontinence, no chest pain, no fever, no headaches, no numbness, no pelvic pain, no perianal numbness, no tingling and no weakness        Prior to Admission Medications   Prescriptions Last Dose Informant Patient Reported? Taking?    ACCU-CHEK PAUL PLUS test strip  Self Yes No   Accu-Chek Guide test strip  Self No No   Sig: USE TO TEST TWICE A DAY   Accu-Chek Softclix Lancets lancets  Self Yes No   Accu-Chek Softclix Lancets lancets   No No   Sig: Use as instructed twice daily   Januvia 50 MG tablet   No No   Sig: TAKE 1 TABLET BY MOUTH EVERY DAY   NIFEdipine (ADALAT CC) 90 mg 24 hr tablet   No No   Sig: TAKE 1 TABLET BY MOUTH EVERY DAY   Potassium Gluconate 595 (99 K) MG TABS  Self Yes No   Sig: Take 2 tablets by mouth 2 (two) times a day 4 tabs total   aspirin 81 MG tablet  Self Yes No   Sig: Take 1 tablet by mouth daily   canagliflozin (Invokana) 100 mg  Self No No   Sig: Take 1 tablet (100 mg total) by mouth daily before breakfast   cholecalciferol (VITAMIN D3) 1,000 units tablet  Self Yes No   Sig: Take 2 tablets by mouth daily   cloNIDine (CATAPRES-TTS-1) 0 1 mg/24 hr   No No   Sig: PLACE 1 PATCH (0 1 MG TOTAL) ON THE SKIN ONCE A WEEK AS DIRECTED   famotidine (PEPCID) 20 mg tablet   No No   Sig: TAKE 1 TABLET BY MOUTH EVERY DAY   ferrous sulfate 324 (65 Fe) mg  Self No No   Sig: TAKE 1 TABLET BY MOUTH DAILY BEFORE BREAKFAST   gabapentin (Neurontin) 100 mg capsule   No No   Sig: Take 1 capsule (100 mg total) by mouth 2 (two) times a day   glipiZIDE (GLUCOTROL) 10 mg tablet   No No   Sig: TAKE 1 TABLET BY MOUTH IN THE MORNING THEN 2 TABS WITH DINNER   glucose blood (Accu-Chek Emma Plus) test strip  Self No No   Sig: USE AS INSTRUCTED TWICE DAILY   nebivolol (BYSTOLIC) 20 MG tablet   No No   Sig: TAKE 1 TABLET BY MOUTH EVERY DAY   predniSONE 10 mg tablet   No No   Sig: Take 4 tablets today then 3 tablets for 2 days, 2 tablets for 2 days, and 1 tablet for 2 days   sertraline (ZOLOFT) 25 mg tablet   No No   Sig: TAKE 1 TABLET BY MOUTH EVERY DAY   simvastatin (ZOCOR) 20 mg tablet   No No   Sig: TAKE 1 TABLET BY MOUTH EVERYDAY AT BEDTIME   torsemide (DEMADEX) 20 mg tablet   No No   Sig: TAKE 1/2 TABLET BY MOUTH EVERY DAY      Facility-Administered Medications: None       Past Medical History:   Diagnosis Date   • Aortic valve insufficiency    • Cataract of both eyes    • Chronic kidney disease    • Diabetes mellitus ([facility] [address] )    • Dysuria     last assessed - [id number]   • Edema     last assessed - [id number]   • GERD (gastroesophageal reflux disease)     last assessed - [id number]   • Hyperlipidemia    • Hypertension    • Low back pain     last assessed - [id number]   • Mitral valve disorder    • Osteoporosis     last assessed - [id number]   • Palpitations        Past Surgical History:   Procedure Laterality Date   • APPENDECTOMY     • CATARACT EXTRACTION Bilateral    • COLONOSCOPY     • TUBAL LIGATION Bilateral        Family History   Problem Relation Age of Onset   • Kidney disease Mother         Chronic   • No Known Problems Father    • Breast cancer Sister    • Diabetes Sister    • Breast cancer Sister    • Hypertension Sister    • No Known Problems Daughter    • No Known Problems Son    • No Known Problems Son    • No Known Problems Son    • No Known Problems Son      I have reviewed and agree with the history as documented  E-Cigarette/Vaping   • E-Cigarette Use Never User      E-Cigarette/Vaping Substances   • Nicotine No    • THC No    • CBD No    • Flavoring No    • Other No    • Unknown No      Social History     Tobacco Use   • Smoking status: Never Smoker   • Smokeless tobacco: Never Used   Vaping Use   • Vaping Use: Never used   Substance Use Topics   • Alcohol use: Never   • Drug use: Never       Review of Systems   Constitutional: Negative for chills and fever  Respiratory: Negative for shortness of breath  Cardiovascular: Negative for chest pain  Gastrointestinal: Negative for abdominal pain, bowel incontinence, nausea and vomiting  Genitourinary: Negative for bladder incontinence and pelvic pain  Musculoskeletal: Positive for back pain  Negative for gait problem, neck pain and neck stiffness  Skin: Negative for color change  Neurological: Negative for tingling, weakness, numbness and headaches  Psychiatric/Behavioral: Negative for behavioral problems and sleep disturbance  All other systems reviewed and are negative  Physical Exam  Physical Exam  Vitals and nursing note reviewed  Constitutional:       General: She is awake        Appearance: Normal appearance  She is well-developed  HENT:      Head: Normocephalic and atraumatic  Right Ear: External ear normal       Left Ear: External ear normal       Nose: Nose normal    Eyes:      General: No scleral icterus  Extraocular Movements: Extraocular movements intact  Conjunctiva/sclera: Conjunctivae normal       Pupils: Pupils are equal, round, and reactive to light  Cardiovascular:      Rate and Rhythm: Normal rate and regular rhythm  Heart sounds: Normal heart sounds, S1 normal and S2 normal  No murmur heard  No gallop  Pulmonary:      Effort: Pulmonary effort is normal       Breath sounds: Normal breath sounds  No wheezing, rhonchi or rales  Abdominal:      General: Abdomen is flat  Palpations: Abdomen is soft  Tenderness: There is no abdominal tenderness  There is no guarding or rebound  Genitourinary:     Comments: No pelvic numbness noted  Musculoskeletal:         General: Normal range of motion  Cervical back: Normal range of motion  Comments: No midline spinal tenderness noted on exam  There is no tenderness of the paraspinal region  No signs of muscular spasm, no swelling, no eccymosis, no deformities noted  Skin:     General: Skin is warm and dry  Neurological:      General: No focal deficit present  Mental Status: She is alert and oriented to person, place, and time  GCS: GCS eye subscore is 4  GCS verbal subscore is 5  GCS motor subscore is 6  Cranial Nerves: No facial asymmetry  Coordination: Heel to [facility] Test normal       Comments: Full sensation, strength and motor function in all 4 extremities  Psychiatric:         Attention and Perception: Attention and perception normal          Mood and Affect: Mood normal          Behavior: Behavior normal  Behavior is cooperative           Vital Signs  ED Triage Vitals [10/27/22 1818]   Temperature Pulse Respirations Blood Pressure SpO2   98 3 °F (36 8 °C) 72 16 143/71 100 % Temp Source Heart Rate Source Patient Position - Orthostatic VS BP Location FiO2 (%)   Oral Monitor Sitting Left arm --      Pain Score       10 - Worst Possible Pain           Vitals:    10/27/22 1818   BP: 143/71   Pulse: 72   Patient Position - Orthostatic VS: Sitting         Visual Acuity      ED Medications  Medications   lidocaine (LIDODERM) 5 % patch 1 patch (1 patch Topical Medication Applied 10/27/22 2220)   acetaminophen (TYLENOL) tablet 650 mg (650 mg Oral Given 10/27/22 2219)       Diagnostic Studies  Results Reviewed     None                 XR femur 2 views RIGHT    (Results Pending)              Procedures  Procedures         ED Course           SBIRT 20yo+    Flowsheet Row Most Recent Value   SBIRT (23 yo +)    In order to provide better care to our patients, we are screening all of our patients for alcohol and drug use  Would it be okay to ask you these screening questions? No Filed at: 10/27/2022 1821                    MDM  Number of Diagnoses or Management Options  Sciatica of right side: new and does not require workup  Diagnosis management comments: 79 y/o female with established sciatica x 3-4 weeks  Clinical diagnosis of sciatica  No midline spinal tenderness indicating need for imaging  Patient able to urinate in ED  Plan: femur xray ordered in triage - read as negative by me in ED  patient given lidocaine patch and tylenol  She had no pain while in the ED  She was instructed to continue to take the prednisone and gabapentin as prescribed by her PCP and tylenol and add in the lidocaine patches and voltaren gel, alternating  Patient and her daughter in law were given strict return to ER precautions both verbally and in discharge papers  Patient verbalized understanding and agrees with plan       Risk of Complications, Morbidity, and/or Mortality  Presenting problems: low  Diagnostic procedures: minimal  Management options: low        Disposition  Final diagnoses:   Sciatica of right side Time reflects when diagnosis was documented in both MDM as applicable and the Disposition within this note     Time User Action Codes Description Comment    10/27/2022 10:41 PM Buster Quigley Add [M54 31] Sciatica of right side       ED Disposition     ED Disposition   Discharge    Condition   Stable    Date/Time   Thu Oct 27, 2022 10:41 PM    Comment   [place] discharge to home/self care  Follow-up Information     Follow up With Specialties Details Why Contact Info Additional Samuel Holguin 1696, DO Internal Medicine, Family Medicine Call  As needed Adrián  [address]  [address] [address] [phone]        [address] Emergency Department Emergency Medicine Go to  if you develop intense back pain that is worse or different than before, cannot feel or move your legs, urinary or bowel movement incontinence, cannot urinate, pelvic numbness, cannot walk, fevers [address] Emergency Department, [address], [facility], [facility] [address]          Discharge Medication List as of 10/27/2022 10:52 PM      START taking these medications    Details   Diclofenac Sodium (VOLTAREN) 1 % Apply 2 g topically 4 (four) times a day, Starting Thu 10/27/2022, Normal         CONTINUE these medications which have NOT CHANGED    Details   ! ! ACCU-CHEK PALU PLUS test strip Historical Med      !! Accu-Chek Guide test strip USE TO TEST TWICE A DAY, Normal      !! Accu-Chek Softclix Lancets lancets Historical Med      !!  Accu-Chek Softclix Lancets lancets Use as instructed twice daily, Normal      aspirin 81 MG tablet Take 1 tablet by mouth daily, Starting Tue 9/4/2012, Historical Med      canagliflozin (Invokana) 100 mg Take 1 tablet (100 mg total) by mouth daily before breakfast, Starting Tue 6/14/2022, Normal      cholecalciferol (VITAMIN D3) 1,000 units tablet Take 2 tablets by mouth daily, Starting Fri 9/21/2012, Historical Med      cloNIDine (CATAPRES-TTS-1) 0 1 mg/24 hr PLACE 1 PATCH (0 1 MG TOTAL) ON THE SKIN ONCE A WEEK AS DIRECTED, Normal      famotidine (PEPCID) 20 mg tablet TAKE 1 TABLET BY MOUTH EVERY DAY, Normal      ferrous sulfate 324 (65 Fe) mg TAKE 1 TABLET BY MOUTH DAILY BEFORE BREAKFAST, Normal      gabapentin (Neurontin) 100 mg capsule Take 1 capsule (100 mg total) by mouth 2 (two) times a day, Starting u 10/27/2022, Normal      glipiZIDE (GLUCOTROL) 10 mg tablet TAKE 1 TABLET BY MOUTH IN THE MORNING THEN 2 TABS WITH DINNER, Normal      !! glucose blood (Accu-Chek Emma Plus) test strip USE AS INSTRUCTED TWICE DAILY, Normal      Januvia 50 MG tablet TAKE 1 TABLET BY MOUTH EVERY DAY, Normal      nebivolol (BYSTOLIC) 20 MG tablet TAKE 1 TABLET BY MOUTH EVERY DAY, Normal      NIFEdipine (ADALAT CC) 90 mg 24 hr tablet TAKE 1 TABLET BY MOUTH EVERY DAY, Normal      Potassium Gluconate 595 (99 K) MG TABS Take 2 tablets by mouth 2 (two) times a day 4 tabs total, Starting Tue 3/3/2015, Historical Med      predniSONE 10 mg tablet Take 4 tablets today then 3 tablets for 2 days, 2 tablets for 2 days, and 1 tablet for 2 days, Normal      sertraline (ZOLOFT) 25 mg tablet TAKE 1 TABLET BY MOUTH EVERY DAY, Normal      simvastatin (ZOCOR) 20 mg tablet TAKE 1 TABLET BY MOUTH EVERYDAY AT BEDTIME, Normal      torsemide (DEMADEX) 20 mg tablet TAKE 1/2 TABLET BY MOUTH EVERY DAY, Normal       !! - Potential duplicate medications found  Please discuss with provider                PDMP Review     None          ED Provider  Electronically Signed by           Savage Johansen PA-C  10/27/22 3768

## 2022-10-28 NOTE — TELEPHONE ENCOUNTER
Call placed to the patient per [facility] referral     Ph [facility]  Explained our program to her and what we could offer the patient  Confirmed that the patient already has referrals in for PT eval and [facility]  Explained that these are the referrals that I would have placed  Daughter in law states understanding and was encouraged to reach out to us if any assistance is needed in the future  Referral Closed

## 2022-10-29 ENCOUNTER — NURSE TRIAGE (OUTPATIENT)
Dept: OTHER | Facility: OTHER | Age: 87
End: 2022-10-29

## 2022-10-29 ENCOUNTER — APPOINTMENT (EMERGENCY)
Dept: CT IMAGING | Facility: HOSPITAL | Age: 87
End: 2022-10-29

## 2022-10-29 ENCOUNTER — APPOINTMENT (EMERGENCY)
Dept: NON INVASIVE DIAGNOSTICS | Facility: HOSPITAL | Age: 87
End: 2022-10-29

## 2022-10-29 ENCOUNTER — HOSPITAL ENCOUNTER (EMERGENCY)
Facility: HOSPITAL | Age: 87
Discharge: HOME | End: 2022-10-29
Attending: EMERGENCY MEDICINE | Admitting: EMERGENCY MEDICINE

## 2022-10-29 VITALS
TEMPERATURE: 98.2 F | HEART RATE: 68 BPM | SYSTOLIC BLOOD PRESSURE: 136 MMHG | OXYGEN SATURATION: 98 % | DIASTOLIC BLOOD PRESSURE: 64 MMHG | RESPIRATION RATE: 18 BRPM

## 2022-10-29 DIAGNOSIS — M54.30 SCIATICA: Primary | ICD-10-CM

## 2022-10-29 LAB
ALBUMIN SERPL BCP-MCNC: 3.9 G/DL (ref 3.5–5)
ALP SERPL-CCNC: 72 U/L (ref 46–116)
ALT SERPL W P-5'-P-CCNC: 24 U/L (ref 12–78)
ANION GAP SERPL CALCULATED.3IONS-SCNC: 10 MMOL/L (ref 4–13)
AST SERPL W P-5'-P-CCNC: 14 U/L (ref 5–45)
BASOPHILS # BLD AUTO: 0.02 THOUSANDS/ÂΜL (ref 0–0.1)
BASOPHILS NFR BLD AUTO: 0 % (ref 0–1)
BILIRUB SERPL-MCNC: 0.4 MG/DL (ref 0.2–1)
BUN SERPL-MCNC: 34 MG/DL (ref 5–25)
CALCIUM SERPL-MCNC: 8.5 MG/DL (ref 8.3–10.1)
CHLORIDE SERPL-SCNC: 101 MMOL/L (ref 96–108)
CO2 SERPL-SCNC: 28 MMOL/L (ref 21–32)
CREAT SERPL-MCNC: 1.97 MG/DL (ref 0.6–1.3)
EOSINOPHIL # BLD AUTO: 0.01 THOUSAND/ÂΜL (ref 0–0.61)
EOSINOPHIL NFR BLD AUTO: 0 % (ref 0–6)
ERYTHROCYTE [DISTWIDTH] IN BLOOD BY AUTOMATED COUNT: 13.5 % (ref 11.6–15.1)
GFR SERPL CREATININE-BSD FRML MDRD: 22 ML/MIN/1.73SQ M
GLUCOSE SERPL-MCNC: 257 MG/DL (ref 65–140)
HCT VFR BLD AUTO: 48.3 % (ref 34.8–46.1)
HGB BLD-MCNC: 15.8 G/DL (ref 11.5–15.4)
IMM GRANULOCYTES # BLD AUTO: 0.07 THOUSAND/UL (ref 0–0.2)
IMM GRANULOCYTES NFR BLD AUTO: 1 % (ref 0–2)
LYMPHOCYTES # BLD AUTO: 3.24 THOUSANDS/ÂΜL (ref 0.6–4.47)
LYMPHOCYTES NFR BLD AUTO: 26 % (ref 14–44)
MCH RBC QN AUTO: 31.3 PG (ref 26.8–34.3)
MCHC RBC AUTO-ENTMCNC: 32.7 G/DL (ref 31.4–37.4)
MCV RBC AUTO: 96 FL (ref 82–98)
MONOCYTES # BLD AUTO: 0.43 THOUSAND/ÂΜL (ref 0.17–1.22)
MONOCYTES NFR BLD AUTO: 3 % (ref 4–12)
NEUTROPHILS # BLD AUTO: 8.75 THOUSANDS/ÂΜL (ref 1.85–7.62)
NEUTS SEG NFR BLD AUTO: 70 % (ref 43–75)
NRBC BLD AUTO-RTO: 0 /100 WBCS
PLATELET # BLD AUTO: 235 THOUSANDS/UL (ref 149–390)
PMV BLD AUTO: 9.4 FL (ref 8.9–12.7)
POTASSIUM SERPL-SCNC: 3.6 MMOL/L (ref 3.5–5.3)
PROT SERPL-MCNC: 7.7 G/DL (ref 6.4–8.4)
RBC # BLD AUTO: 5.05 MILLION/UL (ref 3.81–5.12)
SODIUM SERPL-SCNC: 139 MMOL/L (ref 135–147)
WBC # BLD AUTO: 12.52 THOUSAND/UL (ref 4.31–10.16)

## 2022-10-29 RX ORDER — LIDOCAINE 50 MG/G
1 PATCH TOPICAL ONCE
Status: DISCONTINUED | OUTPATIENT
Start: 2022-10-29 | End: 2022-10-29 | Stop reason: HOSPADM

## 2022-10-29 RX ADMIN — LIDOCAINE 5% 1 PATCH: 700 PATCH TOPICAL at 13:33

## 2022-10-29 RX ADMIN — MORPHINE SULFATE 2 MG: 2 INJECTION, SOLUTION INTRAMUSCULAR; INTRAVENOUS at 13:27

## 2022-10-29 NOTE — ED PROVIDER NOTES
History  Chief Complaint   Patient presents with   • Leg Pain     Pt to er with complaints of inner right leg and thigh pain that started with back pain on Wednesday  Was seen in the er and told she has somehting "loose" in her back, has an appointment on Wednesday but the family does not want her to continue in pain like this for the next 4 days  This is an 26-year-old female who presents complaining of right thigh and back pain that started approximately 1 week ago  Was seen by her primary care physician and here in the emergency room for similar complaints  Denies any incontinence of urine or stool no fevers or chills no history of cancer  Patient was started on prednisone gabapentin Tylenol and Voltaren cream without relief  She is having difficulty walking secondary to pain  Usually ambulates without assistance was only able to ambulate a few steps she states this morning secondary to the pain  Patient did have a femur x-ray which show questionable lucency in the pubic rami versus artifact  Does have an a appointment with orthopedics later this week      History provided by:  Patient and relative  Medical Problem  Location:   right thigh and back  Quality:   shooting pain  Severity:  Severe  Onset quality:  Gradual  Duration:  1 week  Timing:  Constant  Progression:  Waxing and waning  Chronicity:  New  Context:   right leg and back pain for 1 week  Relieved by:   nothing  Worsened by:   movement and palpation  Associated symptoms: no abdominal pain and no fever        Prior to Admission Medications   Prescriptions Last Dose Informant Patient Reported? Taking?    ACCU-CHEK PAUL PLUS test strip  Self Yes No   Accu-Chek Guide test strip  Self No No   Sig: USE TO TEST TWICE A DAY   Accu-Chek Softclix Lancets lancets  Self Yes No   Accu-Chek Softclix Lancets lancets   No No   Sig: Use as instructed twice daily   Diclofenac Sodium (VOLTAREN) 1 %   No No   Sig: Apply 2 g topically 4 (four) times a day   Januvia 50 MG tablet   No No   Sig: TAKE 1 TABLET BY MOUTH EVERY DAY   NIFEdipine (ADALAT CC) 90 mg 24 hr tablet   No No   Sig: TAKE 1 TABLET BY MOUTH EVERY DAY   Potassium Gluconate 595 (99 K) MG TABS  Self Yes No   Sig: Take 2 tablets by mouth 2 (two) times a day 4 tabs total   aspirin 81 MG tablet  Self Yes No   Sig: Take 1 tablet by mouth daily   canagliflozin (Invokana) 100 mg  Self No No   Sig: Take 1 tablet (100 mg total) by mouth daily before breakfast   cholecalciferol (VITAMIN D3) 1,000 units tablet  Self Yes No   Sig: Take 2 tablets by mouth daily   cloNIDine (CATAPRES-TTS-1) 0 1 mg/24 hr   No No   Sig: PLACE 1 PATCH (0 1 MG TOTAL) ON THE SKIN ONCE A WEEK AS DIRECTED   famotidine (PEPCID) 20 mg tablet   No No   Sig: TAKE 1 TABLET BY MOUTH EVERY DAY   ferrous sulfate 324 (65 Fe) mg  Self No No   Sig: TAKE 1 TABLET BY MOUTH DAILY BEFORE BREAKFAST   gabapentin (Neurontin) 100 mg capsule   No No   Sig: Take 1 capsule (100 mg total) by mouth 2 (two) times a day   glipiZIDE (GLUCOTROL) 10 mg tablet   No No   Sig: TAKE 1 TABLET BY MOUTH IN THE MORNING THEN 2 TABS WITH DINNER   glucose blood (Accu-Chek Emma Plus) test strip  Self No No   Sig: USE AS INSTRUCTED TWICE DAILY   nebivolol (BYSTOLIC) 20 MG tablet   No No   Sig: TAKE 1 TABLET BY MOUTH EVERY DAY   predniSONE 10 mg tablet   No No   Sig: Take 4 tablets today then 3 tablets for 2 days, 2 tablets for 2 days, and 1 tablet for 2 days   sertraline (ZOLOFT) 25 mg tablet   No No   Sig: TAKE 1 TABLET BY MOUTH EVERY DAY   simvastatin (ZOCOR) 20 mg tablet   No No   Sig: TAKE 1 TABLET BY MOUTH EVERYDAY AT BEDTIME   torsemide (DEMADEX) 20 mg tablet   No No   Sig: TAKE 1/2 TABLET BY MOUTH EVERY DAY      Facility-Administered Medications: None       Past Medical History:   Diagnosis Date   • Aortic valve insufficiency    • Cataract of both eyes    • Chronic kidney disease    • Diabetes mellitus ([facility] [address] )    • Dysuria     last assessed - [id number]   • Edema     last assessed - [id number]   • GERD (gastroesophageal reflux disease)     last assessed - 30Aug2012   • Hyperlipidemia    • Hypertension    • Low back pain     last assessed - 30Aug2012   • Mitral valve disorder    • Osteoporosis     last assessed - [id number]   • Palpitations        Past Surgical History:   Procedure Laterality Date   • APPENDECTOMY     • CATARACT EXTRACTION Bilateral    • COLONOSCOPY     • TUBAL LIGATION Bilateral        Family History   Problem Relation Age of Onset   • Kidney disease Mother         Chronic   • No Known Problems Father    • Breast cancer Sister    • Diabetes Sister    • Breast cancer Sister    • Hypertension Sister    • No Known Problems Daughter    • No Known Problems Son    • No Known Problems Son    • No Known Problems Son    • No Known Problems Son      I have reviewed and agree with the history as documented  E-Cigarette/Vaping   • E-Cigarette Use Never User      E-Cigarette/Vaping Substances   • Nicotine No    • THC No    • CBD No    • Flavoring No    • Other No    • Unknown No      Social History     Tobacco Use   • Smoking status: Never Smoker   • Smokeless tobacco: Never Used   Vaping Use   • Vaping Use: Never used   Substance Use Topics   • Alcohol use: Never   • Drug use: Never       Review of Systems   Constitutional: Negative for fever  Gastrointestinal: Negative for abdominal pain  Musculoskeletal: Positive for back pain  Right leg pain   All other systems reviewed and are negative  Physical Exam  Physical Exam  Vitals and nursing note reviewed  Constitutional:       General: She is not in acute distress  Appearance: She is not ill-appearing, toxic-appearing or diaphoretic  HENT:      Head: Normocephalic and atraumatic  Right Ear: Tympanic membrane, ear canal and external ear normal       Left Ear: Tympanic membrane, ear canal and external ear normal       Nose: Nose normal    Eyes:      General: No scleral icterus  Right eye: No discharge  Left eye: No discharge  Extraocular Movements: Extraocular movements intact  Pupils: Pupils are equal, round, and reactive to light  Cardiovascular:      Rate and Rhythm: Normal rate and regular rhythm  Pulses: Normal pulses  Heart sounds: No murmur heard  No friction rub  No gallop  Pulmonary:      Effort: Pulmonary effort is normal       Breath sounds: No stridor  No wheezing, rhonchi or rales  Abdominal:      General: There is no distension  Palpations: Abdomen is soft  Tenderness: There is no abdominal tenderness  There is no guarding or rebound  Musculoskeletal:         General: Tenderness present  Cervical back: Normal range of motion and neck supple  No rigidity or tenderness  Comments: Right lower lumbar paraspinal tenderness   Skin:     General: Skin is warm and dry  Findings: No erythema or rash  Neurological:      General: No focal deficit present  Mental Status: She is alert and oriented to person, place, and time  Cranial Nerves: No cranial nerve deficit  Sensory: No sensory deficit  Motor: No weakness  Coordination: Coordination normal    Psychiatric:         Mood and Affect: Mood normal          Behavior: Behavior normal          Thought Content:  Thought content normal          Vital Signs  ED Triage Vitals [10/29/22 1239]   Temperature Pulse Respirations Blood Pressure SpO2   98 2 °F (36 8 °C) 68 18 136/64 98 %      Temp Source Heart Rate Source Patient Position - Orthostatic VS BP Location FiO2 (%)   Temporal Monitor Sitting Right arm --      Pain Score       7           Vitals:    10/29/22 1239   BP: 136/64   Pulse: 68   Patient Position - Orthostatic VS: Sitting         Visual Acuity      ED Medications  Medications   lidocaine (LIDODERM) 5 % patch 1 patch (1 patch Topical Medication Applied 10/29/22 1333)   morphine injection 2 mg (2 mg Intravenous Given 10/29/22 1327)       Diagnostic Studies  Results Reviewed     Procedure Component Value Units Date/Time    Comprehensive metabolic panel [[id number]]  (Abnormal) Collected: 10/29/22 1334    Lab Status: Final result Specimen: Blood from Arm, Left Updated: 10/29/22 1421     Sodium 139 mmol/L      Potassium 3 6 mmol/L      Chloride 101 mmol/L      CO2 28 mmol/L      ANION GAP 10 mmol/L      BUN 34 mg/dL      Creatinine 1 97 mg/dL      Glucose 257 mg/dL      Calcium 8 5 mg/dL      AST 14 U/L      ALT 24 U/L      Alkaline Phosphatase 72 U/L      Total Protein 7 7 g/dL      Albumin 3 9 g/dL      Total Bilirubin 0 40 mg/dL      eGFR 22 ml/min/1 73sq m     Narrative:      National Kidney Disease Foundation guidelines for Chronic Kidney Disease (CKD):   •  Stage 1 with normal or high GFR (GFR > 90 mL/min/1 73 square meters)  •  Stage 2 Mild CKD (GFR = 60-89 mL/min/1 73 square meters)  •  Stage 3A Moderate CKD (GFR = 45-59 mL/min/1 73 square meters)  •  Stage 3B Moderate CKD (GFR = 30-44 mL/min/1 73 square meters)  •  Stage 4 Severe CKD (GFR = 15-29 mL/min/1 73 square meters)  •  Stage 5 End Stage CKD (GFR <15 mL/min/1 73 square meters)  Note: GFR calculation is accurate only with a steady state creatinine    CBC and differential [[id number]]  (Abnormal) Collected: 10/29/22 1334    Lab Status: Final result Specimen: Blood from Arm, Left Updated: 10/29/22 1341     WBC 12 52 Thousand/uL      RBC 5 05 Million/uL      Hemoglobin 15 8 g/dL      Hematocrit 48 3 %      MCV 96 fL      MCH 31 3 pg      MCHC 32 7 g/dL      RDW 13 5 %      MPV 9 4 fL      Platelets 062 Thousands/uL      nRBC 0 /100 WBCs      Neutrophils Relative 70 %      Immat GRANS % 1 %      Lymphocytes Relative 26 %      Monocytes Relative 3 %      Eosinophils Relative 0 %      Basophils Relative 0 %      Neutrophils Absolute 8 75 Thousands/µL      Immature Grans Absolute 0 07 Thousand/uL      Lymphocytes Absolute 3 24 Thousands/µL      Monocytes Absolute 0 43 Thousand/µL      Eosinophils Absolute 0 01 Thousand/µL Basophils Absolute 0 02 Thousands/µL     POCT urinalysis dipstick [[id number]]     Lab Status: No result Specimen: Urine                  CT lumbar spine without contrast   Final Result by Charlie Gomez MD (10/29 1517)      No acute fracture or dislocation  Degenerative changes as described above  Workstation performed: [id number]         CT pelvis wo contrast   Final Result by Francia Dickey MD (10/29 1500)      No acute CT findings  Additional findings as above  Workstation performed: [id number]         VAS lower limb venous duplex study, unilateral/limited    (Results Pending)              Procedures  Procedures         ED Course  ED Course as of 10/29/22 1607   Sat Oct 29, 2022   1529  Preliminary vascular report does not show any acute DVT     1535  Patient feeling better able to ambulate in the emergency room will follow-up as an outpatient                               SBIRT 22yo+    Flowsheet Row Most Recent Value   SBIRT (23 yo +)    In order to provide better care to our patients, we are screening all of our patients for alcohol and drug use  Would it be okay to ask you these screening questions? Yes Filed at: 10/29/2022 1416   Initial Alcohol Screen: US AUDIT-C     1  How often do you have a drink containing alcohol? 0 Filed at: 10/29/2022 1416   2  How many drinks containing alcohol do you have on a typical day you are drinking? 0 Filed at: 10/29/2022 1416   3a  Male UNDER 65: How often do you have five or more drinks on one occasion? 0 Filed at: 10/29/2022 1416   3b  FEMALE Any Age, or MALE 65+: How often do you have 4 or more drinks on one occassion? 0 Filed at: 10/29/2022 1416   Audit-C Score 0 Filed at: 10/29/2022 1416   PUSHPA: How many times in the past year have you    Used an illegal drug or used a prescription medication for non-medical reasons?  Never Filed at: 10/29/2022 1416                    MDM  Number of Diagnoses or Management Options  Diagnosis management comments: Right leg and back pain differential includes sciatica versus DVT or bony pathology will check CT scan of the back and leg in addition to blood work treat symptomatically and reassess       Amount and/or Complexity of Data Reviewed  Clinical lab tests: ordered  Tests in the radiology section of CPT®: ordered        Disposition  Final diagnoses:   Sciatica - right-sided     Time reflects when diagnosis was documented in both MDM as applicable and the Disposition within this note     Time User Action Codes Description Comment    10/29/2022  3:36 PM Gaurang Cuevas Add [M54 30] Sciatica     10/29/2022  3:36 PM Gaurang Carlroneyom Modify [M54 30] Sciatica right-sided      ED Disposition     ED Disposition   Discharge    Condition   Stable    Date/Time   Sat Oct 29, 2022  3:36 PM    Comment   [facility] discharge to home/self care  Follow-up Information     Follow up With Specialties Details Why Archie Thompson DO Internal Medicine, Family Medicine   [place] [phone]            Discharge Medication List as of 10/29/2022  3:37 PM      CONTINUE these medications which have NOT CHANGED    Details   ! ! ACCU-CHEK PAUL PLUS test strip Historical Med      !! Accu-Chek Guide test strip USE TO TEST TWICE A DAY, Normal      !! Accu-Chek Softclix Lancets lancets Historical Med      !!  Accu-Chek Softclix Lancets lancets Use as instructed twice daily, Normal      aspirin 81 MG tablet Take 1 tablet by mouth daily, Starting Tue 9/4/2012, Historical Med      canagliflozin (Invokana) 100 mg Take 1 tablet (100 mg total) by mouth daily before breakfast, Starting Tue 6/14/2022, Normal      cholecalciferol (VITAMIN D3) 1,000 units tablet Take 2 tablets by mouth daily, Starting Fri 9/21/2012, Historical Med      cloNIDine (CATAPRES-TTS-1) 0 1 mg/24 hr PLACE 1 PATCH (0 1 MG TOTAL) ON THE SKIN ONCE A WEEK AS DIRECTED, Normal      Diclofenac Sodium (VOLTAREN) 1 % Apply 2 g topically 4 (four) times a day, Starting Thu 10/27/2022, Normal      famotidine (PEPCID) 20 mg tablet TAKE 1 TABLET BY MOUTH EVERY DAY, Normal      ferrous sulfate 324 (65 Fe) mg TAKE 1 TABLET BY MOUTH DAILY BEFORE BREAKFAST, Normal      gabapentin (Neurontin) 100 mg capsule Take 1 capsule (100 mg total) by mouth 2 (two) times a day, Starting Thu 10/27/2022, Normal      glipiZIDE (GLUCOTROL) 10 mg tablet TAKE 1 TABLET BY MOUTH IN THE MORNING THEN 2 TABS WITH DINNER, Normal      !! glucose blood (Accu-Chek Emma Plus) test strip USE AS INSTRUCTED TWICE DAILY, Normal      Januvia 50 MG tablet TAKE 1 TABLET BY MOUTH EVERY DAY, Normal      nebivolol (BYSTOLIC) 20 MG tablet TAKE 1 TABLET BY MOUTH EVERY DAY, Normal      NIFEdipine (ADALAT CC) 90 mg 24 hr tablet TAKE 1 TABLET BY MOUTH EVERY DAY, Normal      Potassium Gluconate 595 (99 K) MG TABS Take 2 tablets by mouth 2 (two) times a day 4 tabs total, Starting Tue 3/3/2015, Historical Med      predniSONE 10 mg tablet Take 4 tablets today then 3 tablets for 2 days, 2 tablets for 2 days, and 1 tablet for 2 days, Normal      sertraline (ZOLOFT) 25 mg tablet TAKE 1 TABLET BY MOUTH EVERY DAY, Normal      simvastatin (ZOCOR) 20 mg tablet TAKE 1 TABLET BY MOUTH EVERYDAY AT BEDTIME, Normal      torsemide (DEMADEX) 20 mg tablet TAKE 1/2 TABLET BY MOUTH EVERY DAY, Normal       !! - Potential duplicate medications found  Please discuss with provider  No discharge procedures on file      PDMP Review     None          ED Provider  Electronically Signed by           Halima Yanes DO  10/29/22 8608

## 2022-10-29 NOTE — ED NOTES
When brought back to Ed room, pt was able to transfer himself from the wheelchair to the bed without any issues  No apparent pain noted at the time of transfer        Navya Rivera RN  10/29/22 4216

## 2022-10-29 NOTE — TELEPHONE ENCOUNTER
Reason for Disposition  • [1] SEVERE back pain (e g , excruciating) AND [2] sudden onset AND [3] age > 60 years    Answer Assessment - Initial Assessment Questions  1  ONSET: "When did the pain begin?"       A week ago   2  LOCATION: "Where does it hurt?" (upper, mid or lower back)      Lower back   3  SEVERITY: "How bad is the pain?"  (e g , Scale 1-10; mild, moderate, or severe)    - MILD (1-3): doesn't interfere with normal activities     - MODERATE (4-7): interferes with normal activities or awakens from sleep     - SEVERE (8-10): excruciating pain, unable to do any normal activities       Severe   4  PATTERN: "Is the pain constant?" (e g , yes, no; constant, intermittent)      Constant   5  RADIATION: "Does the pain shoot into your legs or elsewhere?"      Right thigh   6  CAUSE:  "What do you think is causing the back pain?"       Possible sciatica   7  BACK OVERUSE:  "Any recent lifting of heavy objects, strenuous work or exercise?"      No   8  MEDICATIONS: "What have you taken so far for the pain?" (e g , nothing, acetaminophen, NSAIDS)      Acetaminophen, prednisone, gabapentin   9  NEUROLOGIC SYMPTOMS: "Do you have any weakness, numbness, or problems with bowel/bladder control?"      No   10  OTHER SYMPTOMS: "Do you have any other symptoms?" (e g , fever, abdominal pain, burning with urination, blood in urine)        No   11   PREGNANCY: "Is there any chance you are pregnant?" (e g , yes, no; LMP)        N/A    Protocols used: BACK PAIN-ADULT-

## 2022-10-29 NOTE — TELEPHONE ENCOUNTER
Regarding: back pain  ----- Message from Va Grider sent at 10/29/2022 10:15 AM EDT -----  "My mother in law has back pain  She doesn't get seen by someone until Wednesday   I need some medical advice "

## 2022-10-31 NOTE — TELEPHONE ENCOUNTER
If she wishes to she can - want to avoid repeated ED visits but unfortunately she is already on max medical tx so it is [place] we will make many changes today - we can increase the Gabapentin but takes 4 wks for max benefit and likely not given it enough time

## 2022-11-04 DIAGNOSIS — M54.41 ACUTE RIGHT-SIDED LOW BACK PAIN WITH RIGHT-SIDED SCIATICA: ICD-10-CM

## 2022-11-04 RX ORDER — GABAPENTIN 100 MG/1
100 CAPSULE ORAL 2 TIMES DAILY
Qty: 60 CAPSULE | Refills: 2 | Status: SHIPPED | OUTPATIENT
Start: 2022-11-04 | End: 2022-11-09 | Stop reason: ALTCHOICE

## 2022-11-07 ENCOUNTER — EVALUATION (OUTPATIENT)
Dept: PHYSICAL THERAPY | Facility: CLINIC | Age: 87
End: 2022-11-07

## 2022-11-07 DIAGNOSIS — M54.16 LUMBAR RADICULOPATHY: Primary | ICD-10-CM

## 2022-11-07 NOTE — PROGRESS NOTES
PT Evaluation     Today's date: 2022  Patient name: Glory Thompson  : 1935  MRN: [id number]  Referring provider: KANA Eugene  Dx:   Encounter Diagnosis     ICD-10-CM    1  Lumbar radiculopathy  M54 16                   Assessment  Assessment details: Glory Thompson is a 80 y o  female presenting to outpatient physical therapy at [place] [address] with complaints of R LB and R medial thigh pain  She presents with slightly decreased lumbar range of motion, decreased core and R hip strength, limited flexibility, poor postural awareness, poor body mechanics, limited R LE balance, decreased tolerance to activity and decreased functional mobility due to Lumbar radiculopathy R (primary encounter diagnosis)  Most of her R LE pain seems to be coming from adductor tightness > radicular pain which has lessened this week  She would benefit from skilled PT services in order to address these deficits and reach maximum level of function  Thank you for the referral!  Impairments: abnormal gait, abnormal or restricted ROM, activity intolerance, impaired balance, impaired physical strength, lacks appropriate home exercise program, pain with function, poor posture  and poor body mechanics  Barriers to therapy: None  Understanding of Dx/Px/POC: good   Prognosis: good    Goals  ST  Independent with HEP in 2 weeks  2  Increase lumbar AROM to WNL all motions in 3 weeks   3  Good body mechanics in 2 weeks    LT  Achieve FOTO score of 68/100 in 8 weeks   2  Able to ambulate x 10 min for ADLs and housework in 8 weeks  3  Strength abdominals = 4/5, R hip flex = 5/5 in 8 weeks  4    No tightness R upper glut, R hip in 8 weeks    Plan  Patient would benefit from: skilled PT and PT eval  Planned modality interventions: cryotherapy, TENS and thermotherapy: hydrocollator packs  Planned therapy interventions: abdominal trunk stabilization, ADL retraining, balance/weight bearing training, body mechanics training, flexibility, functional ROM exercises, home exercise program, joint mobilization, manual therapy, neuromuscular re-education, postural training, strengthening, stretching, therapeutic activities and therapeutic exercise  Frequency: 2x week  Duration in weeks: 8  Treatment plan discussed with: patient        Subjective Evaluation    History of Present Illness  Mechanism of injury: Pt reports having R LBP since early 2022 and R sciatica that started on 10/26/22 with pain mostly in medial R leg and thigh that is now only in her thigh  She tried a medrol dose pack, MH, gabapentin and tylenol without pain relief  She denies any numbness, tingling, weakness, bowel or bladder incontinence or fever  She denies any recent falls or injuries  No past history of neck or back injuries/surgeries  She has made 2 trips to the ER due to severe pain  She states she was told that she had something "loose" in her back at the ER  She did have a R femur x-ray which showed questionable lucency in the pubic rami versus artifact  Pt has diabetes, kidney dz, HTN all controlled  Feels most limited in walking and is now more sedentary  Will see Dr Wende Osler on 22             Recurrent probem    Quality of life: good    Pain  Current pain ratin  At best pain ratin  At worst pain rating: 10  Quality: sharp and radiating  Relieving factors: rest  Aggravating factors: walking  Progression: improved    Social Support  Steps to enter house: yes  Stairs in house: no   Lives in: apartment  Lives with: alone    Employment status: not working    Diagnostic Tests  CT scan: abnormal (Multilevel degenerative changes causing multilevel neural foraminal spinal canal stenosis most severe at L4-L5 )  Treatments  Previous treatment: medication  Current treatment: medication  Patient Goals  Patient goals for therapy: increased strength, independence with ADLs/IADLs, return to sport/leisure activities, increased motion, improved balance and decreased pain          Objective     Concurrent Complaints  Positive for kidney problem  Negative for night pain, disturbed sleep, bladder dysfunction and bowel dysfunction    Postural Observations  Seated posture: fair  Standing posture: fair  Correction of posture: has no consistent effect        Palpation   Left   No palpable tenderness to the erector spinae  Right   No palpable tenderness to the erector spinae  Additional Palpation Details  Mod tightness without tenderness R upper glut  Tenderness     Lumbar Spine  No tenderness in the spinous process and facet joint  Neurological Testing     Sensation     Lumbar   Left   Intact: light touch    Right   Intact: light touch    Comments   Right light touch: reports a sensation of picking in her medial proximal R thigh    Reflexes   Left   Patellar (L4): normal (2+)    Right   Patellar (L4): normal (2+)    Active Range of Motion     Lumbar   Flexion:  WFL  Extension:  Restriction level: minimal  Left lateral flexion:  Restriction level: minimal  Right lateral flexion:  WFL  Left rotation:  Restriction level: minimal  Right rotation:  WFL    Passive Range of Motion     Additional Passive Range of Motion Details  Min R>L H/S tightness, mod R hip adductor     Mechanical Assessment    Cervical      Thoracic      Lumbar    Standing flexion:   Pain location:no change  Pain intensity: better  Lying flexion:   Pain location: no change  Pain intensity: better  Sitting flexion:   Pain location: no change  Pain intensity: better  Standing extension:   Pain location: no change  Pain intensity: worse    Strength/Myotome Testing     Left Hip   Planes of Motion   Flexion: 5  Abduction: 5  Adduction: 5  External rotation: 5  Internal rotation: 5    Right Hip   Planes of Motion   Flexion: 4  Abduction: 4+  Adduction: 4  External rotation: 5  Internal rotation: 5    Left Knee   Flexion: 5  Extension: 5    Right Knee   Flexion: 5  Extension: 5    Left Ankle/Foot   Dorsiflexion: 5    Right Ankle/Foot   Dorsiflexion: 5    Additional Strength Details  Abdominals = 3+/5    Tests     Lumbar   Negative prone instability   Left   Negative passive SLR and slump test      Right   Negative passive SLR and slump test      Ambulation     Ambulation: Level Surfaces   Ambulation without assistive device: independent    Ambulation: Stairs   Ascend stairs: independent  Pattern: non-reciprocal  Railings: one rail  Descend stairs: independent  Pattern: non-reciprocal  Railings: one rail  Curbs: independent    Observational Gait   Decreased walking speed and right stance time  Right stance time comments: slight    General Comments:      Lumbar Comments  Poor body mechanics           POC EXPIRES On:  1/2/23  PRECAUTIONS:  None  CO-MORBIDITES:  None  PERSONAL FACTORS:  Needs appts between 7:30-8:45AM  Access Code [email or web address]       Manuals HEP 11/7                                                               Neuro Re-Ed     Supine PPT 11/7 5" 15           Supine marching with PPT L/R                                                                              Ther Ex    Seated H/S stretch L/R 11/7 10" 5 ea           Crossed leg stretch supine to L only 11/7 5" 10            Standing R hip adductor stretch with lateral weight shift to L 11/7 10" 5           Recumbent bike             Supine H/S stretch R                                                    Ther Activity                              Gait Training                              Modalities

## 2022-11-09 ENCOUNTER — CONSULT (OUTPATIENT)
Dept: PAIN MEDICINE | Facility: CLINIC | Age: 87
End: 2022-11-09

## 2022-11-09 ENCOUNTER — TELEPHONE (OUTPATIENT)
Dept: PAIN MEDICINE | Facility: CLINIC | Age: 87
End: 2022-11-09

## 2022-11-09 VITALS
BODY MASS INDEX: 29.81 KG/M2 | SYSTOLIC BLOOD PRESSURE: 142 MMHG | DIASTOLIC BLOOD PRESSURE: 80 MMHG | TEMPERATURE: 98.2 F | HEIGHT: 62 IN | HEART RATE: 72 BPM | WEIGHT: 162 LBS

## 2022-11-09 DIAGNOSIS — M54.41 ACUTE RIGHT-SIDED LOW BACK PAIN WITH RIGHT-SIDED SCIATICA: ICD-10-CM

## 2022-11-09 DIAGNOSIS — M79.2 NEUROPATHIC PAIN: Primary | ICD-10-CM

## 2022-11-09 RX ORDER — PREGABALIN 25 MG/1
CAPSULE ORAL
Qty: 90 CAPSULE | Refills: 1 | Status: SHIPPED | OUTPATIENT
Start: 2022-11-09 | End: 2022-11-16 | Stop reason: SDUPTHER

## 2022-11-09 NOTE — TELEPHONE ENCOUNTER
Caller: Eliza Davalos (pt's daughter-in-law)    Doctor/office: Chioma Higgins / [facility]    MRI or CT Auth Required: MRI of lumbar spine w/o contrast    Callback#: [phone]     Patient's Insurance Co: Medicare / [employer] phone #: [phone]    Patient's Member ID: [id number]    Date of appt:  Eliza Davalos states can't schedule date unless approved by med insurance    Facility NPI: not available at time of call    Facility Address: [facility], dex# [phone]    Fax#: [phone]

## 2022-11-09 NOTE — PROGRESS NOTES
Assessment  1  Neuropathic pain    2  Acute right-sided low back pain with right-sided sciatica        Plan      At this point the patient's pain persists despite time, relative rest, activity modification, and nonsteroidal anti-inflammatories  Her pain is significantly interfering with her daily living activities  Physical therapy and oral steroids not providing relief  It is appropriate to order an MRI of the lumbar spine to rule out any significant etiology of her symptoms  She has tried oral steroids and gabapentin, ladder cause swelling will discontinue the gabapentin starting a titrating dose of Lyrica  I did review the potential side effects of Lyrica, not limited to, but including dizziness, drowsiness, weakness, tired feeling, nausea, diarrhea, constipation, blurred vision, headache, swelling, dry mouth or loss of balance or coordination  She will continue with physical therapy  Once we obtain MRI results, I will follow-up with the patient, review the results and current symptoms, and discuss the next steps of the treatment plan  Orders Placed This Encounter   Procedures   • MRI lumbar spine without contrast     Standing Status:   Future     Standing Expiration Date:   11/9/2026     Scheduling Instructions: There is no preparation for this test  Please leave your jewelry and valuables at home, wedding rings are the exception  All patients will be required to change into a hospital gown and pants  Street clothes are not permitted in the MRI  Magnetic nail polish must be removed prior to arrival for your test  Please bring your insurance cards, a form of photo ID and a list of your medications with you  Arrive 15 minutes prior to your appointment time in order to register  Please bring any prior CT or MRI studies of this area that were not performed at a [facility]  To schedule this appointment, please contact Central Scheduling at [phone]  Prior to your appointment, please make sure you complete the MRI Screening Form when you e-Check in for your appointment  This will be available starting 7 days before your appointment in [address]  You may receive an e-mail with an activation code if you do not have a [email or web address] account  If you do not have access to a device, we will complete your screening at your appointment  Order Specific Question:   What is the patient's sedation requirement? Answer:   No Sedation     Order Specific Question:   Release to patient through [employer]     Answer:   Immediate     Order Specific Question:   Is order priority selected as STAT? Answer:   No     Order Specific Question:   Reason for Exam (FREE TEXT)     Answer:   right lumbar radic     New Medications Ordered This Visit   Medications   • pregabalin (LYRICA) 25 mg capsule     Sig: Take one tablet at bedtime for 5 days, then one tablet twice daily for 5 days then 1 tablet t i d      Dispense:  90 capsule     Refill:  1     Referred By: KANA Hobbs  History of Present Illness    Valente Sarah is a 80 y o  female with 2-3 weeks of low back and right lower extremity pain  She is unaware of any clear precipitating event denies any trauma or injury  His pain is moderate to severe it is occasional worse in the afternoon  Describes burning shooting with pressure-like she denies any weakness  She reports that lying down and sitting decreases symptoms will standing aggravates her pain  Oral steroids gabapentin did not providing relief the latter did cause swelling in her legs  She has started physical therapy a denies any bowel or bladder dysfunction  She did go to the emergency department  I have personally reviewed and/or updated the patient's past medical history, past surgical history, family history, social history, current medications, allergies, and vital signs today       Review of Systems   Constitutional: Negative for fever and unexpected weight change  HENT: Negative for trouble swallowing  Eyes: Negative for visual disturbance  Respiratory: Negative for shortness of breath and wheezing  Cardiovascular: Negative for chest pain and palpitations  Gastrointestinal: Negative for constipation, diarrhea, nausea and vomiting  Endocrine: Negative for cold intolerance, heat intolerance and polydipsia  Genitourinary: Negative for difficulty urinating and frequency  Musculoskeletal: Negative for arthralgias, gait problem, joint swelling and myalgias  Skin: Negative for rash  Neurological: Negative for dizziness, seizures, syncope, weakness and headaches  Hematological: Does not bruise/bleed easily  Psychiatric/Behavioral: Negative for dysphoric mood  All other systems reviewed and are negative        Patient Active Problem List   Diagnosis   • Type 2 diabetes mellitus with stage 4 chronic kidney disease, without long-term current use of insulin ([facility])   • Rhinitis   • Premature ventricular contraction   • Osteopenia   • Hypokalemia   • Hypertension   • GERD (gastroesophageal reflux disease)   • Dyslipidemia   • Diabetic polyneuropathy ([facility] )   • Anxiety disorder   • Dependent edema   • CKD (chronic kidney disease), stage IV ([facility])   • Complex renal cyst   • Chronic back pain   • Type 2 diabetes mellitus with hyperglycemia, without long-term current use of insulin ([facility])       Past Medical History:   Diagnosis Date   • Anxiety    • Aortic valve insufficiency    • Cataract of both eyes    • Chronic kidney disease    • Diabetes mellitus ([facility] )    • Dysuria     last assessed - [id number]   • Edema     last assessed - [id number]   • GERD (gastroesophageal reflux disease)     last assessed - 30Aug2012   • Hyperlipidemia    • Hypertension    • Low back pain     last assessed - 30Aug2012   • Mitral valve disorder    • Osteoporosis     last assessed - [id number]   • Palpitations        Past Surgical History:   Procedure Laterality Date   • APPENDECTOMY     • CATARACT EXTRACTION Bilateral    • COLONOSCOPY     • TUBAL LIGATION Bilateral        Family History   Problem Relation Age of Onset   • Kidney disease Mother         Chronic   • No Known Problems Father    • Breast cancer Sister    • Diabetes Sister    • Breast cancer Sister    • Hypertension Sister    • No Known Problems Daughter    • No Known Problems Son    • No Known Problems Son    • No Known Problems Son    • No Known Problems Son        Social History     Occupational History   • Not on file   Tobacco Use   • Smoking status: Never Smoker   • Smokeless tobacco: Never Used   Vaping Use   • Vaping Use: Never used   Substance and Sexual Activity   • Alcohol use: Never   • Drug use: Never   • Sexual activity: Not Currently       Current Outpatient Medications on File Prior to Visit   Medication Sig   • ACCU-CHEK PAUL PLUS test strip    • Accu-Chek Guide test strip USE TO TEST TWICE A DAY   • Accu-Chek Softclix Lancets lancets    • Accu-Chek Softclix Lancets lancets Use as instructed twice daily   • aspirin 81 MG tablet Take 1 tablet by mouth daily   • canagliflozin (Invokana) 100 mg Take 1 tablet (100 mg total) by mouth daily before breakfast   • cholecalciferol (VITAMIN D3) 1,000 units tablet Take 2 tablets by mouth daily   • cloNIDine (CATAPRES-TTS-1) 0 1 mg/24 hr PLACE 1 PATCH (0 1 MG TOTAL) ON THE SKIN ONCE A WEEK AS DIRECTED   • Diclofenac Sodium (VOLTAREN) 1 % Apply 2 g topically 4 (four) times a day   • famotidine (PEPCID) 20 mg tablet TAKE 1 TABLET BY MOUTH EVERY DAY   • ferrous sulfate 324 (65 Fe) mg TAKE 1 TABLET BY MOUTH DAILY BEFORE BREAKFAST   • glipiZIDE (GLUCOTROL) 10 mg tablet TAKE 1 TABLET BY MOUTH IN THE MORNING THEN 2 TABS WITH DINNER   • glucose blood (Accu-Chek Paul Plus) test strip USE AS INSTRUCTED TWICE DAILY   • Januvia 50 MG tablet TAKE 1 TABLET BY MOUTH EVERY DAY   • nebivolol (BYSTOLIC) 20 MG tablet TAKE 1 TABLET BY MOUTH EVERY DAY   • NIFEdipine (ADALAT CC) 90 mg 24 hr tablet TAKE 1 TABLET BY MOUTH EVERY DAY   • Potassium Gluconate 595 (99 K) MG TABS Take 2 tablets by mouth 2 (two) times a day 4 tabs total   • sertraline (ZOLOFT) 25 mg tablet TAKE 1 TABLET BY MOUTH EVERY DAY   • simvastatin (ZOCOR) 20 mg tablet TAKE 1 TABLET BY MOUTH EVERYDAY AT BEDTIME   • torsemide (DEMADEX) 20 mg tablet TAKE 1/2 TABLET BY MOUTH EVERY DAY   • [DISCONTINUED] gabapentin (Neurontin) 100 mg capsule Take 1 capsule (100 mg total) by mouth 2 (two) times a day   • [DISCONTINUED] predniSONE 10 mg tablet Take 4 tablets today then 3 tablets for 2 days, 2 tablets for 2 days, and 1 tablet for 2 days (Patient not taking: Reported on 11/9/2022)     No current facility-administered medications on file prior to visit  No Known Allergies    Physical Exam    /80 (BP Location: Left arm, Patient Position: Sitting, Cuff Size: Standard)   Pulse 72   Temp 98 2 °F (36 8 °C)   Ht 5' 2" (1 575 m)   Wt 73 5 kg (162 lb)   BMI 29 63 kg/m²     Constitutional: normal, well developed, well nourished, alert, in no distress and non-toxic and no overt pain behavior  Eyes: anicteric  HEENT: grossly intact  Neck: supple, symmetric, trachea midline and no masses   Pulmonary:even and unlabored  Cardiovascular:No edema or pitting edema present  Skin:Normal without rashes or lesions and well hydrated  Psychiatric:Mood and affect appropriate  Neurologic:Cranial Nerves II-XII grossly intact  Musculoskeletal:normal, Difficulty going from sitting to standing sitting position; no obvious skin lesions or erythema lumbar sacral spine; mild tenderness in lumbar paravertebrals, no sacroiliac or greater trochanteric tenderness bilateral; deep tendon reflexes are diminished but symmetrical bilateral patellar and achilles; no focal motor deficit appreciated lower limbs; negative bilateral straight leg raising      Imaging  CT LUMBAR SPINE @  10-29-22     INDICATION:   Low back pain, increased fracture risk  low back pain      COMPARISON: None      TECHNIQUE:  Contiguous axial images through the lumbar spine were obtained  Sagittal and coronal reconstructions were performed        IV Contrast:     Radiation dose length product (DLP) for this visit:  774 07 mGy-cm   This examination, like all CT scans performed in the [facility], was performed utilizing techniques to minimize radiation dose exposure, including the use of iterative   reconstruction and automated exposure control        IMAGE QUALITY:  Diagnostic      FINDINGS:     ALIGNMENT:  There are 5 lumbar type vertebral bodies  Levolumbar scoliosis     VERTEBRAL BODIES:  No fracture  No lytic or blastic lesion      DEGENERATIVE CHANGES: Multilevel degenerative changes causing multilevel neural foraminal spinal canal stenosis most severe at L4-L5      PARASPINAL SOFT TISSUES:  Partially visualized colonic diverticulosis      IMPRESSION:     No acute fracture or dislocation  Degenerative changes as described above  RIGHT FEMUR @ Sl 10-27-22     INDICATION:   pain      COMPARISON:  None     VIEWS:  XR FEMUR 2 VW RIGHT         FINDINGS:     No acute femur fracture  No dislocation  Subtle lucency in the superior pubic ramus, which may be artifactual or represents a nondisplaced fracture  Further evaluation with dedicated pelvic x-ray is recommended      Degenerative changes of lumbar spine      No lytic or blastic osseous lesion      Prior right inguinal hernia repair      IMPRESSION:     No acute osseous abnormality in the right femur      Subtle lucency in the superior pubic ramus, which may be artifactual or represents a nondisplaced fracture  Further evaluation with dedicated pelvic x-ray is recommended      The study was marked in EPIC for immediate notification      I have personally reviewed pertinent films in PACS and my interpretation is Lumbar spondylosis

## 2022-11-11 ENCOUNTER — OFFICE VISIT (OUTPATIENT)
Dept: PHYSICAL THERAPY | Facility: CLINIC | Age: 87
End: 2022-11-11

## 2022-11-11 DIAGNOSIS — M54.16 LUMBAR RADICULOPATHY: Primary | ICD-10-CM

## 2022-11-11 DIAGNOSIS — K21.00 GASTROESOPHAGEAL REFLUX DISEASE WITH ESOPHAGITIS: ICD-10-CM

## 2022-11-11 DIAGNOSIS — F41.9 ANXIETY: ICD-10-CM

## 2022-11-11 RX ORDER — SERTRALINE HYDROCHLORIDE 25 MG/1
TABLET, FILM COATED ORAL
Qty: 90 TABLET | Refills: 5 | Status: SHIPPED | OUTPATIENT
Start: 2022-11-11

## 2022-11-11 RX ORDER — FAMOTIDINE 20 MG/1
TABLET, FILM COATED ORAL
Qty: 90 TABLET | Refills: 5 | Status: SHIPPED | OUTPATIENT
Start: 2022-11-11

## 2022-11-11 NOTE — PROGRESS NOTES
Daily Note     Today's date: 2022  Patient name: Nico Roblero  : 1935  MRN: [id number]  Referring provider: KANA Wolff  Dx:   Encounter Diagnosis     ICD-10-CM    1  Lumbar radiculopathy  M54 16                   Subjective: Pt states she is doing her HEP  Objective: See treatment diary below  Survey Monkey given (_____) and FOTO given (_____)      Assessment: Pt presented to outpatient physical therapy at [place] [address] with complaints of R LB and R medial thigh pain  She presents with slightly decreased lumbar range of motion, decreased core and R hip strength, limited flexibility, poor postural awareness, poor body mechanics, limited R LE balance, decreased tolerance to activity and decreased functional mobility due to Lumbar radiculopathy R (primary encounter diagnosis)  Most of her R LE pain seems to be coming from adductor tightness > radicular pain which has lessened this week  She would benefit from skilled PT services in order to address these deficits and reach maximum level of function  Introduced new TE with great tolerance; however, pt required vcs throughout PT session  Pt is a one to one, due to the requirements during PT session         Plan: Continue plan of care    POC EXPIRES On:  23  PRECAUTIONS:  None  CO-MORBIDITES:  None  PERSONAL FACTORS:  Needs appts between 7:30-8:45AM  Access Code [email or web address]       Manuals HEP                                                               Neuro Re-Ed     Supine PPT  5" 15 5" 20          Supine marching with PPT L/R   10x          Bridges   20x                                                              Ther Ex    Seated H/S stretch L/R  10" 5 ea           Crossed leg stretch supine to L only  5" 10  5" 10          Standing R hip adductor stretch with lateral weight shift to L  10" 5           Supine SLR L/R   10x          Supine ball squeeze   5" 10          Supine Abduction    5" 10 LAQ L/R   20x          Standing Marches   20x          Standing HR   20x          Recumbent bike             Supine H/S stretch R                                                    Ther Activity                              Gait Training                              Modalities

## 2022-11-14 ENCOUNTER — TELEPHONE (OUTPATIENT)
Dept: PAIN MEDICINE | Facility: CLINIC | Age: 87
End: 2022-11-14

## 2022-11-14 DIAGNOSIS — I10 ESSENTIAL HYPERTENSION: ICD-10-CM

## 2022-11-14 RX ORDER — CLONIDINE 0.1 MG/24H
PATCH, EXTENDED RELEASE TRANSDERMAL
Qty: 12 PATCH | Refills: 6 | Status: SHIPPED | OUTPATIENT
Start: 2022-11-14

## 2022-11-14 NOTE — TELEPHONE ENCOUNTER
Caller: Jeremias Ty  Doctor: Annetta Dandy    Reason for call:  I read message verbatim to Jeremias Ty she will have Pharmacy call us back directly       Call back#: [phone]

## 2022-11-14 NOTE — TELEPHONE ENCOUNTER
Attempted to contact Lula Nicole per medical communication consent on file  Provided cb number and office hours  NOTE:  Gabapentin?

## 2022-11-14 NOTE — TELEPHONE ENCOUNTER
I do not have the request from her pharmacy and my notes stated she has already tried gabapentin  Can she have the pharmacy call us?

## 2022-11-14 NOTE — TELEPHONE ENCOUNTER
Caller: Evy Torres    Doctor: Dr Mary Davis    Reason for call: Pts daughter in law is calling in stating that the script for pregabalin ([facility] Seip  It will cost her $500  Due to her insurance not covering it  The pharmacy said they sent a request for a different script     Call back#: [phone]

## 2022-11-15 ENCOUNTER — OFFICE VISIT (OUTPATIENT)
Dept: PHYSICAL THERAPY | Facility: CLINIC | Age: 87
End: 2022-11-15

## 2022-11-15 DIAGNOSIS — M54.16 LUMBAR RADICULOPATHY: Primary | ICD-10-CM

## 2022-11-15 NOTE — PROGRESS NOTES
Daily Note     Today's date: 11/15/2022  Patient name: Josep Avitia  : 1935  MRN: [id number]  Referring provider: KANA Reyes  Dx:   Encounter Diagnosis     ICD-10-CM    1  Lumbar radiculopathy  M54 16                   Subjective: Pt states she is doing her HEP  Objective: See treatment diary below  Survey Monkey given (_____) and FOTO given (_____)      Assessment: Pt presented to outpatient physical therapy at [facility] with complaints of R LB and R medial thigh pain  She presents with slightly decreased lumbar range of motion, decreased core and R hip strength, limited flexibility, poor postural awareness, poor body mechanics, limited R LE balance, decreased tolerance to activity and decreased functional mobility due to Lumbar radiculopathy R (primary encounter diagnosis)  Most of her R LE pain seems to be coming from adductor tightness > radicular pain which has lessened this week  She would benefit from skilled PT services in order to address these deficits and reach maximum level of function  Introduce new TE upon nv and stationary bike to challange pt endurance and strength  Pt is a one to one, due to the requirements during PT session         Plan: Continue plan of care    POC EXPIRES On:  23  PRECAUTIONS:  None  CO-MORBIDITES:  None  PERSONAL FACTORS:  Needs appts between 7:30-8:45AM  Access Code [email or web address]       Manuals HEP 11/7 11/11 11/15                                                             Neuro Re-Ed     Supine PPT  5" 15 5" 20 5" 20         Supine marching with PPT L/R   10x 20         Bridges   20x 20x                                                             Ther Ex    Recumbent bike    nv         Seated H/S stretch L/R  10" 5 ea  10" 5         Crossed leg stretch supine to L only  5" 10  5" 10 5"5         Standing R hip adductor stretch with lateral weight shift to L  10" 5  10" 5         Supine SLR L/R   10x 20x         Supine ball squeeze   5" 10 5" 20         Supine Abduction    5" 10 5" 20         LAQ L/R   20x 20x         Standing Marches   20x 20x         Standing HR   20x 20x                                                             Ther Activity                              Gait Training                              Modalities

## 2022-11-16 ENCOUNTER — TELEPHONE (OUTPATIENT)
Dept: PAIN MEDICINE | Facility: MEDICAL CENTER | Age: 87
End: 2022-11-16

## 2022-11-16 DIAGNOSIS — M79.2 NEUROPATHIC PAIN: ICD-10-CM

## 2022-11-16 RX ORDER — PREGABALIN 25 MG/1
CAPSULE ORAL
Qty: 90 CAPSULE | Refills: 1 | Status: SHIPPED | OUTPATIENT
Start: 2022-11-16 | End: 2023-01-23

## 2022-11-16 NOTE — TELEPHONE ENCOUNTER
Caller: Patient daughter in law    Doctor: Gissel Zapata    Reason for call: would like a call back regarding medications coverage   Stated can leave a detail messag, she is currently at work    Call back#: [phone]

## 2022-11-16 NOTE — TELEPHONE ENCOUNTER
S/w pt's daughter in law, [place]  Confirmed the pt's insurance will not cover pregabalin and she is currently taking gabapentin with no relief  Kaye Cottrell questioned the next step  Kaye Cottrell is unsure of the gabapentin dose, will cb with that info  Discussed goodrx with jimbo, advised pregabapin 25 mg #90 is less than $6 00 at [address]  - one time offer per good rx website  Kaye Cottrell verbalized agreement and requested the current rx be cancelled and sent to [address] [facility] in [facility] Lyrica rx  Advised jimbo to cb if questions or issues arise  Kaye Cottrell verbalized understanding and appreciation

## 2022-11-16 NOTE — TELEPHONE ENCOUNTER
Caller: Talon Ahumada    Doctor: Dr Meza    Reason for call: Patient daughter in law called back stating patient takes Gabapentin 100 mg 2 x a day.    Call back#: [phone]

## 2022-11-17 ENCOUNTER — OFFICE VISIT (OUTPATIENT)
Dept: PHYSICAL THERAPY | Facility: CLINIC | Age: 87
End: 2022-11-17

## 2022-11-17 DIAGNOSIS — M54.16 LUMBAR RADICULOPATHY: Primary | ICD-10-CM

## 2022-11-17 NOTE — PROGRESS NOTES
Daily Note     Today's date: 2022  Patient name: Marilou Beltre  : 1935  MRN: [id number]  Referring provider: KANA Umaña  Dx:   Encounter Diagnosis     ICD-10-CM    1  Lumbar radiculopathy  M54 16                      Subjective: Pt reports her LBP is still there but not as bad  Pt's daughter reports Becca Longo has Voltarin cream and asked if it could be applied to pt's  R glut  Objective: See treatment diary below  This therapist reported the cream could be used for pain relief in her glut area  Assessment: Tolerated treatment well  Patient would benefit from continued PT for cont'd strengthening of the LE's to support the back  Pt w/ tightness in the piriformis w/ fair walter for TrP rls  Pt did not feel any relief w/ piriformis stretch  Pt given trial of CP for pain relief  Plan: Continue per plan of care  Progress treatment as tolerated         POC EXPIRES On:  23  PRECAUTIONS:  None  CO-MORBIDITES:  None  PERSONAL FACTORS:  Needs appts between 7:30-8:45AM  Access Code [email or web address]       Manuals HEP 11/7 11/11 11/15 11/17        STM to R glut     JK 12'                                               Neuro Re-Ed     Supine PPT  5" 15 5" 20 5" 20 5" 20        Supine marching with PPT L/R   10x 20 20        Bridges   20x 20x 20                                                            Ther Ex    Recumbent bike    nv 4'        Seated H/S stretch L/R  10" 5 ea  10" 5 20"x3        Crossed leg stretch supine to L only  5" 10  5" 10 5"5 nv        Standing R hip adductor stretch with lateral weight shift to L  10" 5  10" 5 3x10" seated        Supine SLR L/R   10x 20x 20        Supine ball squeeze   5" 10 5" 20 5" 20        Supine Abduction    5" 10 5" 20 nv        LAQ L/R   20x 20x         Standing Marches   20x 20x 20        Standing HR   20x 20x 20                                                            Ther Activity                              Gait Training Modalities

## 2022-11-22 ENCOUNTER — TELEPHONE (OUTPATIENT)
Dept: PAIN MEDICINE | Facility: CLINIC | Age: 87
End: 2022-11-22

## 2022-11-22 ENCOUNTER — OFFICE VISIT (OUTPATIENT)
Dept: PHYSICAL THERAPY | Facility: CLINIC | Age: 87
End: 2022-11-22

## 2022-11-22 DIAGNOSIS — M54.16 LUMBAR RADICULOPATHY: Primary | ICD-10-CM

## 2022-11-22 NOTE — TELEPHONE ENCOUNTER
S/W Joslyn Go as per LUNA  She stated the pt has not had any improvement at all since she seen SL  She is taking the lyrica, tylenol and using Voltaren gel  Pt is doing PT and exercises  Tomorrow the pt increase the Lyrica to twice daily  Advised the pt can take up to 3,000 mg of tylenol in 24 hrs  She stated the pt has the same amount of pain when she stands  She stated the MRI won't get approved by the insurance to PT is complete  She is asking for other recommendations  Please advise

## 2022-11-22 NOTE — TELEPHONE ENCOUNTER
Caller: [facility] daughter in-law     Doctor: David Perez    Reason for call: on day 5 of Lyrica doing PT doing home exercises what is next step she can't do her daily functions  Do we have any other advise       Call back#: [phone]

## 2022-11-22 NOTE — TELEPHONE ENCOUNTER
Unfortunately it takes time to try change up the Lyrica, she can bump up the dose today but it will take time for her to take effect as well as therapy

## 2022-11-22 NOTE — PROGRESS NOTES
Daily Note     Today's date: 2022  Patient name: Lori Escalante  : 1935  MRN: [id number]  Referring provider: KANA Kelly  Dx:   Encounter Diagnosis     ICD-10-CM    1  Lumbar radiculopathy  M54 16                      Subjective: Pt reports feeling the same  Objective: See treatment diary below  Survey Monkey given (_____) and FOTO given ()      Assessment: Pt presented to outpatient physical therapy at [place] [address] with complaints of R LB and R medial thigh pain  She presents with slightly decreased lumbar range of motion, decreased core and R hip strength, limited flexibility, poor postural awareness, poor body mechanics, limited R LE balance, decreased tolerance to activity and decreased functional mobility due to Lumbar radiculopathy R (primary encounter diagnosis)  Most of her R LE pain seems to be coming from adductor tightness > radicular pain which has lessened this week  She would benefit from skilled PT services in order to address these deficits and reach maximum level of function  Pt FOTO decreased, however, pt endurance and strength has indeed improved despite pt FOTO score decreasing significantly  Plan: Continue per plan of care  Progress treatment as tolerated         POC EXPIRES On:  23  PRECAUTIONS:  None  CO-MORBIDITES:  None  PERSONAL FACTORS:  Needs appts between 7:30-8:45AM  Access Code [email or web address]       Manuals HEP 11/7 11/11 11/15 11/17 11/22       STM to R glut     JK 12'                                               Neuro Re-Ed     Supine PPT  5" 15 5" 20 5" 20 5" 20 5" 20       Supine marching with PPT L/R   10x 20 20 20x       Bridges   20x 20x 20 20x                                                           Ther Ex    Recumbent bike    nv 4' 5'       Seated H/S stretch L/R  10" 5 ea  10" 5 20"x3 20" 3       Crossed leg stretch supine to L only  5" 10  5" 10 5"5 nv 5" 5       Standing R hip adductor stretch with lateral weight shift to L 11/7 10" 5  10" 5 3x10" seated 10" 5       Supine SLR L/R   10x 20x 20 20       Supine ball squeeze   5" 10 5" 20 5" 20 5" 20       Supine Abduction    5" 10 5" 20 nv 5" 20       LAQ L/R   20x 20x  20x       Standing Marches   20x 20x 20 20x       Standing HR   20x 20x 20 20x                                                           Ther Activity                              Gait Training                              Modalities

## 2022-11-26 DIAGNOSIS — N18.4 TYPE 2 DIABETES MELLITUS WITH STAGE 4 CHRONIC KIDNEY DISEASE, WITHOUT LONG-TERM CURRENT USE OF INSULIN (HCC): ICD-10-CM

## 2022-11-26 DIAGNOSIS — I10 ESSENTIAL HYPERTENSION: ICD-10-CM

## 2022-11-26 DIAGNOSIS — E11.22 TYPE 2 DIABETES MELLITUS WITH STAGE 4 CHRONIC KIDNEY DISEASE, WITHOUT LONG-TERM CURRENT USE OF INSULIN (HCC): ICD-10-CM

## 2022-11-26 RX ORDER — CANAGLIFLOZIN 100 MG/1
TABLET, FILM COATED ORAL
Qty: 30 TABLET | Refills: 5 | Status: SHIPPED | OUTPATIENT
Start: 2022-11-26

## 2022-11-26 RX ORDER — NEBIVOLOL 20 MG/1
TABLET ORAL
Qty: 90 TABLET | Refills: 3 | Status: SHIPPED | OUTPATIENT
Start: 2022-11-26

## 2022-11-29 ENCOUNTER — EVALUATION (OUTPATIENT)
Dept: PHYSICAL THERAPY | Facility: CLINIC | Age: 87
End: 2022-11-29

## 2022-11-29 DIAGNOSIS — M54.16 LUMBAR RADICULOPATHY: Primary | ICD-10-CM

## 2022-11-29 NOTE — PROGRESS NOTES
Daily Note     Today's date: 2022  Patient name: Rip Crawley  : 1935  MRN: [id number]  Referring provider: KANA Klein  Dx:   Encounter Diagnosis     ICD-10-CM    1  Lumbar radiculopathy  M54 16                  Subjective:  Pt reports feeling less LBP, but still with moderate R anterior thigh pain that limits her walking  Objective:  See treatment diary below  Survey Monkey given (_____) and FOTO given ()      Assessment:  Pt presented to outpatient physical therapy at [facility] with complaints of R LB and R medial thigh pain  She presents with slightly decreased lumbar range of motion, decreased core and R hip strength, limited flexibility, poor postural awareness, poor body mechanics, limited R LE balance, decreased tolerance to activity and decreased functional mobility due to Lumbar radiculopathy R (primary encounter diagnosis)  Most of her R LE pain seems to be coming from hip flexor > adductor tightness > radicular pain  Her lumbar tightness is much less this week, but R hip flexor tightness is still limiting her ability to ambulate for more than short distances  New R hip flexor stretch helped to lessen her pain slightly  She may be ready for D/C to HEP later this week  Plan:  Possible D/C on 22 to HEP  Review new R hip flexor stretch          POC EXPIRES On:  23  PRECAUTIONS:  None  CO-MORBIDITES:  None  PERSONAL FACTORS:  Needs appts between 7:30-8:45AM  Access Code [email or web address]       Manuals HEP 11/7 11/11 11/15 11/17 11/22 11/29      STM to R glut     JK 12'  8'                                             Neuro Re-Ed     Supine PPT  5" 15 5" 20 5" 20 5" 20 5" 20 5" 20      Supine marching with PPT L/R   10x 20 20 20x 20 ea      Bridges   20x 20x 20 20x 20                                                          Ther Ex    Recumbent bike    nv 4' 5' L1 5'      Seated H/S stretch L/R  10" 5 ea  10" 5 20"x3 20" 3 20" 3      Crossed leg stretch supine to L only 11/7 5" 10  5" 10 5"5 nv 5" 5 5" 10      Standing R hip adductor stretch with lateral weight shift to L 11/7 10" 5  10" 5 3x10" seated 10" 5 10" 5      Supine SLR L/R   10x 20x 20 20 20 ea      Supine ball squeeze   5" 10 5" 20 5" 20 5" 20 5" 20      Supine Abduction    5" 10 5" 20 nv 5" 20 Blue TB 5" 20      LAQ L/R   20x 20x  20x 20 ea      Standing Marches   20x 20x 20 20x 20 ea      Standing HR B   20x 20x 20 20x 20      R hip flexor stretch with L foot on 8" step 11/29      15" 3                                             Ther Activity                              Gait Training                              Modalities

## 2022-11-30 NOTE — TELEPHONE ENCOUNTER
S/w Dyan Banuelos, stated that she is returning a call from [facility]  Advised Dyan Banuelos - no message from this office  Per Dyan Banuelos, she is possibly returning an old call  Confirmed pt increased lyrica to tid yesterday with some improvement in her pain and no se's  Per Mony Sahh - pt has also started a new exercise with PT  Rina Birch to cb if questions / issues / se's arise  Fu as scheduled on 12/5  Dyan Banuelos verbalized understanding and appreciation

## 2022-11-30 NOTE — TELEPHONE ENCOUNTER
Caller:  Melanie WRIGHT    Doctor: [facility]    Reason for call: Medication    Call back#: [phone]

## 2022-12-01 ENCOUNTER — OFFICE VISIT (OUTPATIENT)
Dept: PHYSICAL THERAPY | Facility: CLINIC | Age: 87
End: 2022-12-01

## 2022-12-01 DIAGNOSIS — M54.16 LUMBAR RADICULOPATHY: Primary | ICD-10-CM

## 2022-12-01 NOTE — PROGRESS NOTES
Daily Note / Discharge    Today's date: 2022  Patient name: Jose Daniel Aguilar  : 1935  MRN: [id number]  Referring provider: KANA Mayers  Dx:   Encounter Diagnosis     ICD-10-CM    1  Lumbar radiculopathy  M54 16                      Subjective: Pt reports no change since lv, still soreness in low back up to her neck      Objective: See treatment diary below      Assessment: Pt to be referred back to referring MD for further evaluation as she has not achieved desired results  Pt  now reports no change in her pain symptoms at rest and has tried to limit her activity  Pt  Still having difficulty with ADL's due to condition being seen at PT for  Discussed TE's given on Home Exercise Program, which pt  showed competency in   Pt  Has not met her impairment and functional goals at this time         Plan: D/c back to MD and independent HEP     POC EXPIRES On:  23  PRECAUTIONS:  None  CO-MORBIDITES:  None  PERSONAL FACTORS:  Needs appts between 7:30-8:45AM  Access Code [email or web address]       Manuals HEP 11/7 11/11 11/15 11/17 11/22 11/29 12/1     STM to R glut     JK 12'  8' WE  8'                                            Neuro Re-Ed     Supine PPT  5" 15 5" 20 5" 20 5" 20 5" 20 5" 20 5"x20     Supine marching with PPT L/R   10x 20 20 20x 20 ea 20x ea     Bridges   20x 20x 20 20x 20 20                                                         Ther Ex    Recumbent bike    nv 4' 5' L1 5' L1 5'      Seated H/S stretch L/R  10" 5 ea  10" 5 20"x3 20" 3 20" 3 20"x3     Crossed leg stretch supine to L only  5" 10  5" 10 5"5 nv 5" 5 5" 10 5"x10     Standing R hip adductor stretch with lateral weight shift to L  10" 5  10" 5 3x10" seated 10" 5 10" 5 reviewed     Supine SLR L/R   10x 20x 20 20 20 ea 20 ea     Supine ball squeeze   5" 10 5" 20 5" 20 5" 20 5" 20 5"x20     Supine Abduction    5" 10 5" 20 nv 5" 20 Blue TB 5" 20 Blue TB 5" 20     LAQ L/R   20x 20x  20x 20 ea      Standing [address] 20x 20x 20 20x 20 ea      Standing HR B   20x 20x 20 20x 20      R hip flexor stretch with L foot on 8" step 11/29      15" 3                                             Ther Activity                              Gait Training                              Modalities

## 2022-12-03 DIAGNOSIS — E78.5 DYSLIPIDEMIA: ICD-10-CM

## 2022-12-03 DIAGNOSIS — I10 ESSENTIAL HYPERTENSION: ICD-10-CM

## 2022-12-03 LAB
APPEARANCE UR: CLEAR
BACTERIA URNS QL MICRO: ABNORMAL
BILIRUB UR QL STRIP: NEGATIVE
BUN SERPL-MCNC: 36 MG/DL (ref 8–27)
BUN/CREAT SERPL: 21 (ref 12–28)
CALCIUM SERPL-MCNC: 9.3 MG/DL (ref 8.7–10.3)
CASTS URNS QL MICRO: ABNORMAL /LPF
CHLORIDE SERPL-SCNC: 101 MMOL/L (ref 96–106)
CO2 SERPL-SCNC: 23 MMOL/L (ref 20–29)
COLOR UR: YELLOW
CREAT SERPL-MCNC: 1.72 MG/DL (ref 0.57–1)
CREAT UR-MCNC: 105.8 MG/DL
EGFR: 28 ML/MIN/1.73
EPI CELLS #/AREA URNS HPF: >10 /HPF (ref 0–10)
GLUCOSE SERPL-MCNC: 157 MG/DL (ref 70–99)
GLUCOSE UR QL: ABNORMAL
HGB UR QL STRIP: NEGATIVE
KETONES UR QL STRIP: NEGATIVE
LEUKOCYTE ESTERASE UR QL STRIP: ABNORMAL
MAGNESIUM SERPL-MCNC: 2.4 MG/DL (ref 1.6–2.3)
MICRO URNS: ABNORMAL
NITRITE UR QL STRIP: NEGATIVE
PH UR STRIP: 5.5 [PH] (ref 5–7.5)
PHOSPHATE SERPL-MCNC: 3.7 MG/DL (ref 3–4.3)
POTASSIUM SERPL-SCNC: 4.2 MMOL/L (ref 3.5–5.2)
PROT UR QL STRIP: ABNORMAL
PROT UR-MCNC: 25.5 MG/DL
PROT/CREAT UR: 241 MG/G CREAT (ref 0–200)
PTH-INTACT SERPL-MCNC: 41 PG/ML (ref 15–65)
RBC #/AREA URNS HPF: ABNORMAL /HPF (ref 0–2)
SODIUM SERPL-SCNC: 146 MMOL/L (ref 134–144)
SP GR UR: 1.02 (ref 1–1.03)
UROBILINOGEN UR STRIP-ACNC: 0.2 MG/DL (ref 0.2–1)
WBC #/AREA URNS HPF: ABNORMAL /HPF (ref 0–5)

## 2022-12-03 RX ORDER — SIMVASTATIN 20 MG
TABLET ORAL
Qty: 90 TABLET | Refills: 3 | Status: SHIPPED | OUTPATIENT
Start: 2022-12-03

## 2022-12-03 RX ORDER — NIFEDIPINE 90 MG/1
TABLET, FILM COATED, EXTENDED RELEASE ORAL
Qty: 90 TABLET | Refills: 5 | Status: SHIPPED | OUTPATIENT
Start: 2022-12-03

## 2022-12-05 ENCOUNTER — OFFICE VISIT (OUTPATIENT)
Dept: PAIN MEDICINE | Facility: CLINIC | Age: 87
End: 2022-12-05

## 2022-12-05 VITALS
WEIGHT: 166.6 LBS | SYSTOLIC BLOOD PRESSURE: 140 MMHG | BODY MASS INDEX: 30.66 KG/M2 | HEIGHT: 62 IN | TEMPERATURE: 98.2 F | DIASTOLIC BLOOD PRESSURE: 85 MMHG

## 2022-12-05 DIAGNOSIS — G89.29 CHRONIC BILATERAL LOW BACK PAIN, UNSPECIFIED WHETHER SCIATICA PRESENT: ICD-10-CM

## 2022-12-05 DIAGNOSIS — M54.50 CHRONIC BILATERAL LOW BACK PAIN, UNSPECIFIED WHETHER SCIATICA PRESENT: ICD-10-CM

## 2022-12-05 DIAGNOSIS — M54.16 LUMBAR RADICULOPATHY: Primary | ICD-10-CM

## 2022-12-05 DIAGNOSIS — G89.4 CHRONIC PAIN SYNDROME: ICD-10-CM

## 2022-12-05 DIAGNOSIS — M51.36 DISC DEGENERATION, LUMBAR: ICD-10-CM

## 2022-12-05 DIAGNOSIS — M48.061 FORAMINAL STENOSIS OF LUMBAR REGION: ICD-10-CM

## 2022-12-05 NOTE — PROGRESS NOTES
Assessment:  1  Chronic pain syndrome    2  Chronic bilateral low back pain, unspecified whether sciatica present    3  Disc degeneration, lumbar    4  Foraminal stenosis of lumbar region        Plan:  The patient is a 80 y o  female last seen on 11/09/2022 who presents for a follow up office visit in regards to chronic pain secondary to low back pain, lumbar degenerative disc disease, and lumbar foraminal stenosis L4-L5  Patient presents today with ongoing low back and right thigh pain  She has completed physical therapy  Per her physical therapist's office note on December 1, 2022, she will be referred back to her physician for further evaluation as she is not achieved desired results with physical therapy  At this time, since she failed physical therapy, I will order an MRI of the lumbar spine  I will contact the patient with the results  I advised the patient to restart the Lyrica but take 1 tablet twice a day  I asked that she do this for 2 weeks as it can take that long for the medication to take effect  I advised her to contact the office with an update at that time  She states she does not need a prescription at this time     The patient was advised to contact the office should their symptoms worsen in the interim  The patient was agreeable and verbalized an understanding  History of Present Illness: The patient is a 80 y o  female last seen on 11/09/2022 who presents for a follow up office visit in regards to chronic pain secondary to low back pain, lumbar degenerative disc disease, and lumbar foraminal stenosis L4-L5  Her last office visit was November 9, 2022 which was her initial consultation  She was ordered an MRI of the lumbar spine  Patient presents today with ongoing low back pain  The pain remains unchanged since the last office visit    She was unable to undergo an MRI of the lumbar spine due to the fact that physical therapy was required before the insurance will authorize  She recently completed physical therapy but feels this is providing minimal pain relief  The pain is located on the right side of the low back and radiates into the anterior/medial aspect of the right thigh  The pain is intermittent but worse in the afternoon  She describes as pressure-like  She is rating her pain a 5/10 on numeric rating scale  She was prescribed Lyrica 25 mg at the last office visit also, but stopped after increasing to 3 pills a day caused her to feel like a “zombie  "She has failed gabapentin and oral steroids in the past      I have personally reviewed and/or updated the patient's past medical history, past surgical history, family history, social history, current medications, allergies, and vital signs today  Review of Systems:    Review of Systems   Musculoskeletal: Positive for gait problem           Past Medical History:   Diagnosis Date   • Anxiety    • Aortic valve insufficiency    • Cataract of both eyes    • Chronic kidney disease    • Diabetes mellitus ([facility] [address] )    • Dysuria     last assessed - [id number]   • Edema     last assessed - [id number]   • GERD (gastroesophageal reflux disease)     last assessed - 30Aug2012   • Hyperlipidemia    • Hypertension    • Low back pain     last assessed - 30Aug2012   • Mitral valve disorder    • Osteoporosis     last assessed - 29Jul2014   • Palpitations        Past Surgical History:   Procedure Laterality Date   • APPENDECTOMY     • CATARACT EXTRACTION Bilateral    • COLONOSCOPY     • TUBAL LIGATION Bilateral        Family History   Problem Relation Age of Onset   • Kidney disease Mother         Chronic   • No Known Problems Father    • Breast cancer Sister    • Diabetes Sister    • Breast cancer Sister    • Hypertension Sister    • No Known Problems Daughter    • No Known Problems Son    • No Known Problems Son    • No Known Problems Son    • No Known Problems Son        Social History     Occupational History   • Not on file Tobacco Use   • Smoking status: Never   • Smokeless tobacco: Never   Vaping Use   • Vaping Use: Never used   Substance and Sexual Activity   • Alcohol use: Never   • Drug use: Never   • Sexual activity: Not Currently         Current Outpatient Medications:   •  ACCU-CHEK PALU PLUS test strip, , Disp: , Rfl:   •  Accu-Chek Guide test strip, USE TO TEST TWICE A DAY, Disp: 100 strip, Rfl: 3  •  Accu-Chek Softclix Lancets lancets, , Disp: , Rfl:   •  Accu-Chek Softclix Lancets lancets, Use as instructed twice daily, Disp: 100 each, Rfl: 3  •  aspirin 81 MG tablet, Take 1 tablet by mouth daily, Disp: , Rfl:   •  cholecalciferol (VITAMIN D3) 1,000 units tablet, Take 2 tablets by mouth daily, Disp: , Rfl:   •  cloNIDine (CATAPRES-TTS-1) 0 1 mg/24 hr, PLACE 1 PATCH (0 1 MG TOTAL) ON THE SKIN ONCE A WEEK AS DIRECTED, Disp: 12 patch, Rfl: 6  •  Diclofenac Sodium (VOLTAREN) 1 %, Apply 2 g topically 4 (four) times a day, Disp: 50 g, Rfl: 0  •  famotidine (PEPCID) 20 mg tablet, TAKE 1 TABLET BY MOUTH EVERY DAY, Disp: 90 tablet, Rfl: 5  •  ferrous sulfate 324 (65 Fe) mg, TAKE 1 TABLET BY MOUTH DAILY BEFORE BREAKFAST, Disp: 90 tablet, Rfl: 1  •  glipiZIDE (GLUCOTROL) 10 mg tablet, TAKE 1 TABLET BY MOUTH IN THE MORNING THEN 2 TABS WITH DINNER, Disp: 270 tablet, Rfl: 3  •  glucose blood (Accu-Chek Paul Plus) test strip, USE AS INSTRUCTED TWICE DAILY, Disp: 100 each, Rfl: 7  •  Invokana 100 MG, TAKE 1 TABLET BY MOUTH DAILY BEFORE BREAKFAST , Disp: 30 tablet, Rfl: 5  •  Januvia 50 MG tablet, TAKE 1 TABLET BY MOUTH EVERY DAY, Disp: 90 tablet, Rfl: 2  •  nebivolol (BYSTOLIC) 20 MG tablet, TAKE 1 TABLET BY MOUTH EVERY DAY, Disp: 90 tablet, Rfl: 3  •  NIFEdipine (ADALAT CC) 90 mg 24 hr tablet, TAKE 1 TABLET BY MOUTH EVERY DAY, Disp: 90 tablet, Rfl: 5  •  Potassium Gluconate 595 (99 K) MG TABS, Take 2 tablets by mouth 2 (two) times a day 4 tabs total, Disp: , Rfl:   •  pregabalin (LYRICA) 25 mg capsule, Take one tablet at bedtime for 5 days, then one tablet twice daily for 5 days then 1 tablet t i d , Disp: 90 capsule, Rfl: 1  •  sertraline (ZOLOFT) 25 mg tablet, TAKE 1 TABLET BY MOUTH EVERY DAY, Disp: 90 tablet, Rfl: 5  •  simvastatin (ZOCOR) 20 mg tablet, TAKE 1 TABLET BY MOUTH EVERYDAY AT BEDTIME, Disp: 90 tablet, Rfl: 3  •  torsemide (DEMADEX) 20 mg tablet, TAKE 1/2 TABLET BY MOUTH EVERY DAY, Disp: 45 tablet, Rfl: 3    No Known Allergies    Physical Exam:    There were no vitals taken for this visit  Constitutional:normal, well developed, well nourished, alert, in no distress and non-toxic and no overt pain behavior  Eyes:anicteric  HEENT:grossly intact  Neck:supple, symmetric, trachea midline and no masses   Pulmonary:even and unlabored  Cardiovascular:No edema or pitting edema present  Skin:Normal without rashes or lesions and well hydrated  Psychiatric:Mood and affect appropriate  Neurologic:Cranial Nerves II-XII grossly intact  Musculoskeletal:normal    Lumbar Spine Exam    Appearance:  Normal lordosis  Palpation/Tenderness:  left lumbar paraspinal tenderness  right lumbar paraspinal tenderness  left sacroiliac joint tenderness  right sacroiliac joint tenderness  Range of Motion:  Flexion:  Minimally limited  without pain  Extension:  Minimally limited  without pain  Motor Strength:  Left hip flexion:  5/5  Right hip flexion:  5/5  Left knee flexion:  5/5  Left knee extension:  5/5  Right knee flexion:  5/5  Right knee extension:  5/5  Left foot dorsiflexion:  5/5  Left foot plantar flexion:  5/5  Right foot dorsiflexion:  5/5  Right foot plantar flexion:  5/5    Imaging    CT LUMBAR SPINE     INDICATION:   Low back pain, increased fracture risk  low back pain      COMPARISON: None      TECHNIQUE:  Contiguous axial images through the lumbar spine were obtained  Sagittal and coronal reconstructions were performed        IV Contrast:     Radiation dose length product (DLP) for this visit:  774 07 mGy-cm     This examination, like all CT scans performed in the [facility], was performed utilizing techniques to minimize radiation dose exposure, including the use of iterative   reconstruction and automated exposure control        IMAGE QUALITY:  Diagnostic      FINDINGS:     ALIGNMENT:  There are 5 lumbar type vertebral bodies  Levolumbar scoliosis     VERTEBRAL BODIES:  No fracture  No lytic or blastic lesion      DEGENERATIVE CHANGES: Multilevel degenerative changes causing multilevel neural foraminal spinal canal stenosis most severe at L4-L5      PARASPINAL SOFT TISSUES:  Partially visualized colonic diverticulosis      IMPRESSION:     No acute fracture or dislocation  Degenerative changes as described above         No orders to display         No orders of the defined types were placed in this encounter

## 2022-12-17 DIAGNOSIS — N18.30 TYPE 2 DIABETES MELLITUS WITH STAGE 3 CHRONIC KIDNEY DISEASE, WITHOUT LONG-TERM CURRENT USE OF INSULIN (HCC): ICD-10-CM

## 2022-12-17 DIAGNOSIS — R60.9 DEPENDENT EDEMA: ICD-10-CM

## 2022-12-17 DIAGNOSIS — I10 ESSENTIAL HYPERTENSION: ICD-10-CM

## 2022-12-17 DIAGNOSIS — E11.22 TYPE 2 DIABETES MELLITUS WITH STAGE 3 CHRONIC KIDNEY DISEASE, WITHOUT LONG-TERM CURRENT USE OF INSULIN (HCC): ICD-10-CM

## 2022-12-17 RX ORDER — GLIPIZIDE 10 MG/1
TABLET ORAL
Qty: 270 TABLET | Refills: 4 | Status: SHIPPED | OUTPATIENT
Start: 2022-12-17

## 2022-12-18 RX ORDER — TORSEMIDE 20 MG/1
TABLET ORAL
Qty: 45 TABLET | Refills: 4 | Status: SHIPPED | OUTPATIENT
Start: 2022-12-18

## 2023-01-07 LAB
ALBUMIN SERPL-MCNC: 4.4 G/DL (ref 3.6–4.6)
ALBUMIN/GLOB SERPL: 2.2 {RATIO} (ref 1.2–2.2)
ALP SERPL-CCNC: 59 IU/L (ref 44–121)
ALT SERPL-CCNC: 11 IU/L (ref 0–32)
AST SERPL-CCNC: 13 IU/L (ref 0–40)
BILIRUB SERPL-MCNC: 0.4 MG/DL (ref 0–1.2)
BUN SERPL-MCNC: 28 MG/DL (ref 8–27)
BUN/CREAT SERPL: 15 (ref 12–28)
CALCIUM SERPL-MCNC: 9.1 MG/DL (ref 8.7–10.3)
CHLORIDE SERPL-SCNC: 104 MMOL/L (ref 96–106)
CO2 SERPL-SCNC: 23 MMOL/L (ref 20–29)
CREAT SERPL-MCNC: 1.88 MG/DL (ref 0.57–1)
EGFR: 26 ML/MIN/1.73
EST. AVERAGE GLUCOSE BLD GHB EST-MCNC: 171 MG/DL
GLOBULIN SER-MCNC: 2 G/DL (ref 1.5–4.5)
GLUCOSE SERPL-MCNC: 211 MG/DL (ref 70–99)
HBA1C MFR BLD: 7.6 % (ref 4.8–5.6)
POTASSIUM SERPL-SCNC: 4 MMOL/L (ref 3.5–5.2)
PROT SERPL-MCNC: 6.4 G/DL (ref 6–8.5)
SODIUM SERPL-SCNC: 143 MMOL/L (ref 134–144)

## 2023-01-13 ENCOUNTER — OFFICE VISIT (OUTPATIENT)
Dept: ENDOCRINOLOGY | Facility: HOSPITAL | Age: 88
End: 2023-01-13

## 2023-01-13 VITALS
HEART RATE: 72 BPM | WEIGHT: 162 LBS | DIASTOLIC BLOOD PRESSURE: 80 MMHG | SYSTOLIC BLOOD PRESSURE: 120 MMHG | HEIGHT: 62 IN | BODY MASS INDEX: 29.81 KG/M2

## 2023-01-13 DIAGNOSIS — I10 PRIMARY HYPERTENSION: ICD-10-CM

## 2023-01-13 DIAGNOSIS — E11.42 DIABETIC POLYNEUROPATHY ASSOCIATED WITH TYPE 2 DIABETES MELLITUS (HCC): ICD-10-CM

## 2023-01-13 DIAGNOSIS — E11.65 TYPE 2 DIABETES MELLITUS WITH HYPERGLYCEMIA, WITHOUT LONG-TERM CURRENT USE OF INSULIN (HCC): Primary | ICD-10-CM

## 2023-01-13 DIAGNOSIS — E78.5 DYSLIPIDEMIA: ICD-10-CM

## 2023-01-13 NOTE — PROGRESS NOTES
1/13/2023    Assessment/Plan      Diagnoses and all orders for this visit:    Type 2 diabetes mellitus with hyperglycemia, without long-term current use of insulin ([facility] [address] )  -     HEMOGLOBIN A1C W/ EAG ESTIMATION Lab Collect; Future  -     Comprehensive metabolic panel Lab Collect; Future  -     CBC and differential Lab Collect; Future  -     TSH, 3rd generation Lab Collect; Future  -     HEMOGLOBIN A1C W/ EAG ESTIMATION Lab Collect  -     Comprehensive metabolic panel Lab Collect  -     CBC and differential Lab Collect  -     TSH, 3rd generation Lab Collect    Diabetic polyneuropathy associated with type 2 diabetes mellitus (HCC)  -     HEMOGLOBIN A1C W/ EAG ESTIMATION Lab Collect; Future  -     Comprehensive metabolic panel Lab Collect; Future  -     CBC and differential Lab Collect; Future  -     TSH, 3rd generation Lab Collect; Future  -     HEMOGLOBIN A1C W/ EAG ESTIMATION Lab Collect  -     Comprehensive metabolic panel Lab Collect  -     CBC and differential Lab Collect  -     TSH, 3rd generation Lab Collect    Primary hypertension  -     HEMOGLOBIN A1C W/ EAG ESTIMATION Lab Collect; Future  -     Comprehensive metabolic panel Lab Collect; Future  -     CBC and differential Lab Collect; Future  -     TSH, 3rd generation Lab Collect; Future  -     HEMOGLOBIN A1C W/ EAG ESTIMATION Lab Collect  -     Comprehensive metabolic panel Lab Collect  -     CBC and differential Lab Collect  -     TSH, 3rd generation Lab Collect    Dyslipidemia  -     HEMOGLOBIN A1C W/ EAG ESTIMATION Lab Collect; Future  -     Comprehensive metabolic panel Lab Collect; Future  -     CBC and differential Lab Collect; Future  -     TSH, 3rd generation Lab Collect; Future  -     HEMOGLOBIN A1C W/ EAG ESTIMATION Lab Collect  -     Comprehensive metabolic panel Lab Collect  -     CBC and differential Lab Collect  -     TSH, 3rd generation Lab Collect        Assessment/Plan:  1  Type 2 diabetes  Hemoglobin A1c is 7 6%    For her age and medical conditions, this is improved and not bad  For now, she will continue the same Invokana, Januvia, and glipizide  She will continue to watch the diet  She will work on checking her sugars more regularly up to once a day  She will try to keep active by walking on a regular basis  2   Diabetic neuropathy  She denies neuropathic symptoms  Diabetic foot exams are up-to-date  3   Hypertension  She is normotensive in the office on her current dose of of clonidine, Bystolic, nifedipine, and torsemide  4   Hyperlipidemia  She is on simvastatin 20 mg daily  I have asked her to follow-up in 6 months with preceding hemoglobin A1c, CMP, CBC, and TSH  CC: Diabetes type II, blood pressure, lipid follow-up    History of Present Illness     HPI: Abdullahi Hines is a 80y o  year old female with type 2 diabetes with neuropathy and CKD stage IV for 10 years hypertension, hyperlipidemia for follow-up visit  She is on oral agents at home and takes glipizide 10 mg 1 tablet in the morning and 2 tablets at supper, Invokana 100 mg daily, and Januvia 50 mg daily  She denies any polyuria, polydipsia, polyphagia, and blurry vision  She has 2-3 times per night nocturia  She denies numbness or tingling of the feet  She denies chest pain or shortness of breath  She denies retinopathy, heart attack, stroke and claudication but does admit to neuropathy and nephropathy  Hypoglycemic episodes: No rare  The patient's last eye exam was in July 2022 with no retinopathy  The patient's last foot exam was in August 2022 at endocrine office visit  She does not see podiatry  Last A1C was   Lab Results   Component Value Date    HGBA1C 7 6 (H) 01/06/2023     Blood Sugar/Glucometer/Pump/CGM review: checks sugars maybe once daily 1-2 times a week  Checks at any time  Mostly 140-180s      She has hypertension and takes clonidine TTS 0 1 mg patch applied weekly, Bystolic 20 mg daily, nifedipine 90 mg daily, and torsemide 20 mg half tablet daily  She denies headache or strokelike symptoms  She has hyperlipidemia and takes simvastatin 20 mg daily  She denies chest pain or shortness of breath  Review of Systems   Constitutional: Negative for fatigue and unexpected weight change  Eyes: Negative for visual disturbance  Wears glasses  Respiratory: Negative for chest tightness and shortness of breath  Cardiovascular: Negative for chest pain  Gastrointestinal: Negative for abdominal pain, constipation, diarrhea and nausea  Endocrine: Negative for polydipsia, polyphagia and polyuria  Nocturia 2-3 times a night  Musculoskeletal: Positive for arthralgias  Bilateral shoulder pain    Skin: Negative for wound  Neurological: Negative for dizziness, weakness, light-headedness, numbness and headaches  Psychiatric/Behavioral: Negative for sleep disturbance         Historical Information   Past Medical History:   Diagnosis Date   • Anxiety    • Aortic valve insufficiency    • Cataract of both eyes    • Chronic kidney disease    • [facility]    • Dysuria     last assessed - [id number]   • Edema     last assessed - [id number]   • GERD (gastroesophageal reflux disease)     last assessed - [id number]   • Hyperlipidemia    • Hypertension    • Low back pain     last assessed - [id number]   • Mitral valve disorder    • Osteoporosis     last assessed - [id number]   • Palpitations      Past Surgical History:   Procedure Laterality Date   • APPENDECTOMY     • CATARACT EXTRACTION Bilateral    • COLONOSCOPY     • TUBAL LIGATION Bilateral      Social History   Social History     Substance and Sexual Activity   Alcohol Use Never     Social History     Substance and Sexual Activity   Drug Use Never     Social History     Tobacco Use   Smoking Status Never   Smokeless Tobacco Never     Family History:   Family History   Problem Relation Age of Onset   • Kidney disease Mother         Chronic   • No Known Problems Father    • Breast cancer Sister    • Diabetes Sister    • Breast cancer Sister    • Hypertension Sister    • No Known Problems Daughter    • No Known Problems Son    • No Known Problems Son    • No Known Problems Son    • No Known Problems Son        Meds/Allergies   Current Outpatient Medications   Medication Sig Dispense Refill   • ACCU-CHEK PAUL PLUS test strip      • Accu-Chek Guide test strip USE TO TEST TWICE A  strip 3   • Accu-Chek Softclix Lancets lancets      • Accu-Chek Softclix Lancets lancets Use as instructed twice daily 100 each 3   • aspirin 81 MG tablet Take 1 tablet by mouth daily     • cholecalciferol (VITAMIN D3) 1,000 units tablet Take 2 tablets by mouth daily     • cloNIDine (CATAPRES-TTS-1) 0 1 mg/24 hr PLACE 1 PATCH (0 1 MG TOTAL) ON THE SKIN ONCE A WEEK AS DIRECTED 12 patch 6   • famotidine (PEPCID) 20 mg tablet TAKE 1 TABLET BY MOUTH EVERY DAY 90 tablet 1   • ferrous sulfate 324 (65 Fe) mg TAKE 1 TABLET BY MOUTH DAILY BEFORE BREAKFAST 90 tablet 1   • glipiZIDE (GLUCOTROL) 10 mg tablet TAKE 1 TABLET BY MOUTH IN THE MORNING THEN 2 TABS WITH DINNER 270 tablet 4   • glucose blood (Accu-Chek Paul Plus) test strip USE AS INSTRUCTED TWICE DAILY 100 each 7   • Invokana 100 MG TAKE 1 TABLET BY MOUTH DAILY BEFORE BREAKFAST   30 tablet 5   • Januvia 50 MG tablet TAKE 1 TABLET BY MOUTH EVERY DAY 90 tablet 2   • nebivolol (BYSTOLIC) 20 MG tablet TAKE 1 TABLET BY MOUTH EVERY DAY 90 tablet 3   • NIFEdipine (ADALAT CC) 90 mg 24 hr tablet TAKE 1 TABLET BY MOUTH EVERY DAY 90 tablet 5   • Potassium Gluconate 595 (99 K) MG TABS Take 2 tablets by mouth 2 (two) times a day 4 tabs total     • pregabalin (LYRICA) 25 mg capsule Take one tablet at bedtime for 5 days, then one tablet twice daily for 5 days then 1 tablet t i d  90 capsule 1   • sertraline (ZOLOFT) 25 mg tablet TAKE 1 TABLET BY MOUTH EVERY DAY 90 tablet 1   • simvastatin (ZOCOR) 20 mg tablet TAKE 1 TABLET BY MOUTH EVERYDAY AT BEDTIME 90 tablet 3   • torsemide (DEMADEX) 20 mg tablet TAKE 1/2 TABLET BY MOUTH EVERY DAY 45 tablet 4   • Diclofenac Sodium (VOLTAREN) 1 % Apply 2 g topically 4 (four) times a day (Patient not taking: Reported on 1/13/2023) 50 g 0     No current facility-administered medications for this visit  No Known Allergies    Objective   Vitals: Blood pressure 120/80, pulse 72, height 5' 2" (1 575 m), weight 73 5 kg (162 lb), not currently breastfeeding  Invasive Devices     None                 Physical Exam  Vitals reviewed  Constitutional:       Appearance: Normal appearance  She is well-developed  HENT:      Head: Normocephalic and atraumatic  Eyes:      Conjunctiva/sclera: Conjunctivae normal    Neck:      Thyroid: No thyromegaly  Vascular: No carotid bruit  Comments: Thyroid normal in size  Cardiovascular:      Rate and Rhythm: Normal rate and regular rhythm  Heart sounds: Normal heart sounds  No murmur heard  Pulmonary:      Effort: Pulmonary effort is normal       Breath sounds: Normal breath sounds  No wheezing  Abdominal:      Palpations: Abdomen is soft  Musculoskeletal:         General: No deformity  Normal range of motion  Cervical back: Normal range of motion and neck supple  Right lower leg: Edema present  Left lower leg: Edema present  Comments: Trace-1+ bilateral lower extremity edema  Lymphadenopathy:      Cervical: No cervical adenopathy  Skin:     General: Skin is warm and dry  Findings: No rash  Neurological:      Mental Status: She is alert and oriented to person, place, and time  Deep Tendon Reflexes: Reflexes are normal and symmetric  The history was obtained from the review of the chart and from the patient and daughter-in-law      Lab Results:    Most recent Alc is  Lab Results   Component Value Date    HGBA1C 7 6 (H) 01/06/2023           Blood work done on 1/6/2023 showed a CMP with glucose of 211 fasting, BUN 28, creatinine 1 88, GFR 26, but was otherwise normal     Lab Results   Component Value Date    CREATININE 1 88 (H) 01/06/2023    CREATININE 1 72 (H) 12/02/2022    CREATININE 1 97 (H) 10/29/2022    BUN 28 (H) 01/06/2023     01/02/2018    K 4 0 01/06/2023     01/06/2023    CO2 23 01/06/2023     eGFR   Date Value Ref Range Status   01/06/2023 26 (L) >59 mL/min/1 73 Final   10/29/2022 22 ml/min/1 73sq m Final         Lab Results   Component Value Date    CHOL 168 01/02/2018    HDL 45 03/04/2022    TRIG 172 (H) 03/04/2022    CHOLHDL 3 9 03/04/2022       Lab Results   Component Value Date    ALT 11 01/06/2023    AST 13 01/06/2023    ALKPHOS 72 10/29/2022    BILITOT 0 3 01/02/2018       Lab Results   Component Value Date    TSH 3 190 03/04/2022             Future Appointments   Date Time Provider [facility] [place]   1/23/2023  9:45 AM KANA Simmons SP [facility]   2/2/2023  8:00 AM Abdoulaye Chavez DO [facility]   2/21/2023  9:40 [facility]   7/19/2023  8:40 AM Flora Castañeda MD ENDO [facility]

## 2023-01-13 NOTE — PATIENT INSTRUCTIONS
Hgba1c is 7 6%  this is improved and not bad  No change in invokana, januvia, or glipizide  Continue tow watch the diet  Work on checking sugars more, up to once daily  Keep active, walking  Follow up in 6 months with blood work

## 2023-01-16 DIAGNOSIS — E11.22 TYPE 2 DIABETES MELLITUS WITH STAGE 3B CHRONIC KIDNEY DISEASE, WITHOUT LONG-TERM CURRENT USE OF INSULIN (HCC): ICD-10-CM

## 2023-01-16 DIAGNOSIS — N18.32 TYPE 2 DIABETES MELLITUS WITH STAGE 3B CHRONIC KIDNEY DISEASE, WITHOUT LONG-TERM CURRENT USE OF INSULIN (HCC): ICD-10-CM

## 2023-01-16 DIAGNOSIS — I10 ESSENTIAL HYPERTENSION: ICD-10-CM

## 2023-01-16 RX ORDER — CLONIDINE 0.1 MG/24H
PATCH, EXTENDED RELEASE TRANSDERMAL
Qty: 12 PATCH | Refills: 7 | Status: SHIPPED | OUTPATIENT
Start: 2023-01-16

## 2023-01-16 RX ORDER — BLOOD SUGAR DIAGNOSTIC
STRIP MISCELLANEOUS
Qty: 100 STRIP | Refills: 3 | Status: SHIPPED | OUTPATIENT
Start: 2023-01-16

## 2023-01-16 RX ORDER — BLOOD SUGAR DIAGNOSTIC
STRIP MISCELLANEOUS
Qty: 100 STRIP | Refills: 3 | Status: SHIPPED | OUTPATIENT
Start: 2023-01-16 | End: 2023-01-16

## 2023-01-20 DIAGNOSIS — I10 ESSENTIAL HYPERTENSION: ICD-10-CM

## 2023-01-20 RX ORDER — NEBIVOLOL 20 MG/1
TABLET ORAL
Qty: 90 TABLET | Refills: 4 | Status: SHIPPED | OUTPATIENT
Start: 2023-01-20

## 2023-01-23 ENCOUNTER — OFFICE VISIT (OUTPATIENT)
Dept: PAIN MEDICINE | Facility: CLINIC | Age: 88
End: 2023-01-23

## 2023-01-23 VITALS
HEIGHT: 62 IN | SYSTOLIC BLOOD PRESSURE: 125 MMHG | TEMPERATURE: 97.7 F | DIASTOLIC BLOOD PRESSURE: 70 MMHG | BODY MASS INDEX: 29.81 KG/M2 | WEIGHT: 162 LBS

## 2023-01-23 DIAGNOSIS — M54.41 ACUTE RIGHT-SIDED LOW BACK PAIN WITH RIGHT-SIDED SCIATICA: ICD-10-CM

## 2023-01-23 DIAGNOSIS — M48.061 FORAMINAL STENOSIS OF LUMBAR REGION: ICD-10-CM

## 2023-01-23 DIAGNOSIS — M54.50 CHRONIC BILATERAL LOW BACK PAIN, UNSPECIFIED WHETHER SCIATICA PRESENT: ICD-10-CM

## 2023-01-23 DIAGNOSIS — M51.16 INTERVERTEBRAL DISC DISORDER WITH RADICULOPATHY OF LUMBAR REGION: ICD-10-CM

## 2023-01-23 DIAGNOSIS — G89.4 CHRONIC PAIN SYNDROME: Primary | ICD-10-CM

## 2023-01-23 DIAGNOSIS — G89.29 CHRONIC BILATERAL LOW BACK PAIN, UNSPECIFIED WHETHER SCIATICA PRESENT: ICD-10-CM

## 2023-01-23 RX ORDER — GABAPENTIN 100 MG/1
100 CAPSULE ORAL 2 TIMES DAILY
Qty: 60 CAPSULE | Refills: 2 | Status: SHIPPED | OUTPATIENT
Start: 2023-01-23

## 2023-01-23 NOTE — PROGRESS NOTES
Assessment:  1  Chronic pain syndrome    2  Chronic bilateral low back pain, unspecified whether sciatica present    3  Intervertebral disc disorder with radiculopathy of lumbar region    4  Foraminal stenosis of lumbar region        Plan:  The patient is a 80 y o  female last seen on 12/05/2022 who presents for a follow up office visit in regards to chronic pain secondary to low back pain, lumbar degenerative disc disease, and lumbar foraminal stenosis L4-L5  Patient presents today with ongoing low back pain, and right thigh pain  There is slight weakness noted with right thigh flexion  MRI of the lumbar spine was reviewed with the patient which shows a right disc herniation L3-L4 causing right mild foraminal stenosis  We discussed epidural steroid injection to help decrease inflammation and nerve irritation  However the patient would like to hold off on this option  If symptoms worsen, she was instructed to contact the office, and a right L3 and L4 transforaminal epidural steroid injection can be performed  The patient is interested in going back on gabapentin for the pain  Therefore I will start her on 100 mg 1 tab at bedtime for 5 days and increase to 1 tablet twice a day  Side effects include drowsiness, dizziness, peripheral edema, and blurred vision  She was made aware the medication must be taken every day in order to be effective, and that it can take up to 2-3 weeks to notice relief  A prescription for gabapentin 100 mg was electronically sent to the pharmacy  The patient will follow-up in 12 weeks for medication prescription refill and reevaluation  The patient was advised to contact the office should their symptoms worsen in the interim  The patient was agreeable and verbalized an understanding  History of Present Illness:     The patient is a 80 y o  female last seen on 12/05/2022 who presents for a follow up office visit in regards to chronic pain secondary to low back pain, lumbar degenerative disc disease, and lumbar foraminal stenosis L4-L5  Her last office visit was December 5, 2022, in which she was ordered an MRI after completing physical therapy  Patient presents today with ongoing low back pain  The pain also radiates into the right anterior thigh  She feels the pain has improved since the last office visit  She is doing her  physical therapy home program   Her pain occurs on occasion, it is described as dull aching  She is rating her a pain a 5/10 on the numeric rating scale  She had underwent an MRI of the lumbar spine since the last office visit, which shows right foraminal disc herniation L3-L4 causing mild right foraminal stenosis, L4-L5 disc bulge with mild left foraminal stenosis and L5-S1 with moderate bilateral foraminal stenosis  She stopped taking Lyrica 25 mg 1 tablet 2 times a day  She was not able to increase it to 3 times a day due to side effects, and 2 times  A day did not help with the pain  I have personally reviewed and/or updated the patient's past medical history, past surgical history, family history, social history, current medications, allergies, and vital signs today         Review of Systems:    Review of Systems      Past Medical History:   Diagnosis Date   • Anxiety    • Aortic valve insufficiency    • Cataract of both eyes    • Chronic kidney disease    • [facility]    • Dysuria     last assessed - [id number]   • Edema     last assessed - [id number]   • GERD (gastroesophageal reflux disease)     last assessed - 30Aug2012   • Hyperlipidemia    • Hypertension    • Low back pain     last assessed - 30Aug2012   • Mitral valve disorder    • Osteoporosis     last assessed - [id number]   • Palpitations        Past Surgical History:   Procedure Laterality Date   • APPENDECTOMY     • CATARACT EXTRACTION Bilateral    • COLONOSCOPY     • TUBAL LIGATION Bilateral        Family History   Problem Relation Age of Onset   • Kidney disease Mother         Chronic   • No Known Problems Father    • Breast cancer Sister    • Diabetes Sister    • Breast cancer Sister    • Hypertension Sister    • No Known Problems Daughter    • No Known Problems Son    • No Known Problems Son    • No Known Problems Son    • No Known Problems Son        Social History     Occupational History   • Not on file   Tobacco Use   • Smoking status: Never   • Smokeless tobacco: Never   Vaping Use   • Vaping Use: Never used   Substance and Sexual Activity   • Alcohol use: Never   • Drug use: Never   • Sexual activity: Not Currently         Current Outpatient Medications:   •  ACCU-CHEK PAUL PLUS test strip, , Disp: , Rfl:   •  Accu-Chek Guide test strip, USE TO TEST TWICE A DAY, Disp: 100 strip, Rfl: 3  •  Accu-Chek Softclix Lancets lancets, , Disp: , Rfl:   •  Accu-Chek Softclix Lancets lancets, Use as instructed twice daily, Disp: 100 each, Rfl: 3  •  aspirin 81 MG tablet, Take 1 tablet by mouth daily, Disp: , Rfl:   •  cholecalciferol (VITAMIN D3) 1,000 units tablet, Take 2 tablets by mouth daily, Disp: , Rfl:   •  cloNIDine (CATAPRES-TTS-1) 0 1 mg/24 hr, PLACE 1 PATCH (0 1 MG TOTAL) ON THE SKIN ONCE A WEEK AS DIRECTED, Disp: 12 patch, Rfl: 7  •  Diclofenac Sodium (VOLTAREN) 1 %, Apply 2 g topically 4 (four) times a day (Patient not taking: Reported on 1/13/2023), Disp: 50 g, Rfl: 0  •  famotidine (PEPCID) 20 mg tablet, TAKE 1 TABLET BY MOUTH EVERY DAY, Disp: 90 tablet, Rfl: 1  •  ferrous sulfate 324 (65 Fe) mg, TAKE 1 TABLET BY MOUTH DAILY BEFORE BREAKFAST, Disp: 90 tablet, Rfl: 1  •  glipiZIDE (GLUCOTROL) 10 mg tablet, TAKE 1 TABLET BY MOUTH IN THE MORNING THEN 2 TABS WITH DINNER, Disp: 270 tablet, Rfl: 4  •  glucose blood (Accu-Chek Paul Plus) test strip, USE AS INSTRUCTED TWICE DAILY, Disp: 100 each, Rfl: 7  •  Invokana 100 MG, TAKE 1 TABLET BY MOUTH DAILY BEFORE BREAKFAST , Disp: 30 tablet, Rfl: 5  •  Januvia 50 MG tablet, TAKE 1 TABLET BY MOUTH EVERY DAY, Disp: 90 tablet, Rfl: 2  •  nebivolol (BYSTOLIC) 20 MG tablet, TAKE 1 TABLET BY MOUTH EVERY DAY, Disp: 90 tablet, Rfl: 4  •  NIFEdipine (ADALAT CC) 90 mg 24 hr tablet, TAKE 1 TABLET BY MOUTH EVERY DAY, Disp: 90 tablet, Rfl: 5  •  Potassium Gluconate 595 (99 K) MG TABS, Take 2 tablets by mouth 2 (two) times a day 4 tabs total, Disp: , Rfl:   •  pregabalin (LYRICA) 25 mg capsule, Take one tablet at bedtime for 5 days, then one tablet twice daily for 5 days then 1 tablet t i d , Disp: 90 capsule, Rfl: 1  •  sertraline (ZOLOFT) 25 mg tablet, TAKE 1 TABLET BY MOUTH EVERY DAY, Disp: 90 tablet, Rfl: 1  •  simvastatin (ZOCOR) 20 mg tablet, TAKE 1 TABLET BY MOUTH EVERYDAY AT BEDTIME, Disp: 90 tablet, Rfl: 3  •  torsemide (DEMADEX) 20 mg tablet, TAKE 1/2 TABLET BY MOUTH EVERY DAY, Disp: 45 tablet, Rfl: 4    No Known Allergies    Physical Exam:    There were no vitals taken for this visit  Constitutional:normal, well developed, well nourished, alert, in no distress and non-toxic and no overt pain behavior    Eyes:anicteric  HEENT:grossly intact  Neck:supple, symmetric, trachea midline and no masses   Pulmonary:even and unlabored  Cardiovascular:No edema or pitting edema present  Skin:Normal without rashes or lesions and well hydrated  Psychiatric:Mood and affect appropriate  Neurologic:Cranial Nerves II-XII grossly intact  Musculoskeletal:normal     Lumbar Spine Exam    Appearance:  Normal lordosis  Palpation/Tenderness:  right lumbar paraspinal tenderness  Range of Motion:  Flexion:  Minimally limited  without pain  Extension:  Minimally limited  without pain  Lateral Flexion - Left:  Minimally limited  without pain  Lateral Flexion - Right:  No limitation  without pain  Motor Strength:  Left hip flexion:  5/5  Right hip flexion:  4/5  Left knee flexion:  5/5  Left knee extension:  5/5  Right knee flexion:  5/5  Right knee extension:  5/5  Left foot dorsiflexion:  5/5  Left foot plantar flexion:  5/5  Right foot dorsiflexion:  5/5  Right foot plantar flexion:  5/5      Imaging MRI lumbar spine: Imaging January 3, 2023  T12-L1 central and right posterior disc herniation  No spinal canal or neuroforaminal stenosis  L1-L2 no abnormal disc protrusion  No spinal canal or neural foraminal stenosis  L2-L3 broad central and left small disc herniation  No spinal canal or neural foraminal stenosis  L3-L4 mild disc bulge  Superimposed right foraminal disc herniation  Bilateral facet joint arthritis  No spinal canal stenosis  Mild right L3 neural foraminal stenosis  L4-L5 disc bulge  Bilateral facet joint arthritis and there is ligamentum flavum hypertrophy  No spinal canal stenosis  Mild left L4 foraminal stenosis  L5-S1 disc bulge  Bilateral facet joint arthritis  No spinal canal stenosis  Moderate bilateral L5 neuroforaminal stenosis  Conclusion  T12-L1 there is a disc herniation no stenosis  L2-L3 there is a disc herniation  No stenosis  L3-L4 disc bulge  Right foraminal disc herniation  Mild right neural foraminal stenosis  L4-L5 disc bulge with mild left foraminal stenosis  L5-S1 disc bulge with moderate bilateral neuroforaminal stenosis  No orders to display         No orders of the defined types were placed in this encounter

## 2023-01-30 ENCOUNTER — TELEPHONE (OUTPATIENT)
Dept: NEPHROLOGY | Facility: CLINIC | Age: 88
End: 2023-01-30

## 2023-01-30 NOTE — TELEPHONE ENCOUNTER
Left message for patient, that we need to reschedule their appointment with Dr Varsha Rubin on 2/2  This is the 1st attempt  Due to provider out of the office that day  John Thomas has availability on 1/31 at 2 pm in [facility]

## 2023-01-31 DIAGNOSIS — F41.9 ANXIETY: ICD-10-CM

## 2023-01-31 DIAGNOSIS — K21.00 GASTROESOPHAGEAL REFLUX DISEASE WITH ESOPHAGITIS: ICD-10-CM

## 2023-01-31 DIAGNOSIS — I10 ESSENTIAL HYPERTENSION: ICD-10-CM

## 2023-01-31 RX ORDER — NIFEDIPINE 90 MG/1
TABLET, FILM COATED, EXTENDED RELEASE ORAL
Qty: 90 TABLET | Refills: 6 | Status: SHIPPED | OUTPATIENT
Start: 2023-01-31

## 2023-01-31 RX ORDER — FAMOTIDINE 20 MG/1
TABLET, FILM COATED ORAL
Qty: 90 TABLET | Refills: 2 | Status: SHIPPED | OUTPATIENT
Start: 2023-01-31

## 2023-01-31 RX ORDER — SERTRALINE HYDROCHLORIDE 25 MG/1
TABLET, FILM COATED ORAL
Qty: 90 TABLET | Refills: 2 | Status: SHIPPED | OUTPATIENT
Start: 2023-01-31

## 2023-01-31 NOTE — TELEPHONE ENCOUNTER
Patient needs morning appointments and unable to make the afternoon appointment with Saurabh Montoya today (1/31)  After speaking with Dr Joya Yarbrough- ok to keep the appointment on 2/2  I spoke with Rosa Le and let her know

## 2023-01-31 NOTE — TELEPHONE ENCOUNTER
Left message for patient, that we need to reschedule their appointment with Dr Darshan Mckeon on 2/2  This is the 2nd attempt  I did offer 1/31 at 2pm with Sweta Allen today in [facility]

## 2023-02-01 ENCOUNTER — TELEPHONE (OUTPATIENT)
Dept: NEPHROLOGY | Facility: CLINIC | Age: 88
End: 2023-02-01

## 2023-02-01 NOTE — TELEPHONE ENCOUNTER
Appointment Confirmation   Person confirmed appointment with  If not patient, name of the person Oleta Shorten    Date and time of appointment 2/2 800   Patient acknowledged and will be at appointment? yes    Did you advise the patient that they will need a urine sample if they are a new patient?  N/A    Did you advise the patient to bring their current medications for verification? (including any OTC) Yes    Additional Information

## 2023-02-02 ENCOUNTER — OFFICE VISIT (OUTPATIENT)
Dept: NEPHROLOGY | Facility: HOSPITAL | Age: 88
End: 2023-02-02

## 2023-02-02 VITALS
DIASTOLIC BLOOD PRESSURE: 68 MMHG | HEIGHT: 62 IN | BODY MASS INDEX: 30.18 KG/M2 | SYSTOLIC BLOOD PRESSURE: 138 MMHG | WEIGHT: 164 LBS

## 2023-02-02 DIAGNOSIS — I10 PRIMARY HYPERTENSION: ICD-10-CM

## 2023-02-02 DIAGNOSIS — N18.4 TYPE 2 DIABETES MELLITUS WITH STAGE 4 CHRONIC KIDNEY DISEASE, WITHOUT LONG-TERM CURRENT USE OF INSULIN (HCC): Primary | ICD-10-CM

## 2023-02-02 DIAGNOSIS — R60.9 DEPENDENT EDEMA: ICD-10-CM

## 2023-02-02 DIAGNOSIS — E87.6 HYPOKALEMIA: ICD-10-CM

## 2023-02-02 DIAGNOSIS — N28.1 COMPLEX RENAL CYST: ICD-10-CM

## 2023-02-02 DIAGNOSIS — E11.22 TYPE 2 DIABETES MELLITUS WITH STAGE 4 CHRONIC KIDNEY DISEASE, WITHOUT LONG-TERM CURRENT USE OF INSULIN (HCC): Primary | ICD-10-CM

## 2023-02-02 NOTE — PATIENT INSTRUCTIONS
1  chronic kidney disease stage 4 in the setting of Diabetes mellitus type 2 as well as hypertensive nephrosclerosis +/- physiologic aging of the kidney   -last sCr 1 88 as of 1/6/23  Had been stable in the 1 6-1 8 range since July 2018, eGFR in mid 20s and stable  -latest UA with microscopy shows trace leukocytes, no red blood cells, 6-10 WBCs, no casts, trace protein   -avoid nonsteroidals (ibuprofen, advil, motrin, aleve, naproxen, indomethacin, celebrex, toradol)  -may take tylenol  -please stay well hydrated  2  Microalbuminuria in setting of type 2 DM - UpCr improved to 241 mg/g as of Dec  2022  -off lisinopril  -microalbumin in the urine can also be seen with physiologic aging of the kidney  -monitor proteinuria     3  HTN - BP elevated in office but recommend checking BP at home  -continue clonidine 0 1mg weekly patch, bystolic 54YL after dinner, nifedipine 90mg daily, torsemide 10mg daily  -off lisinopril   -off HCTZ 25mg daily  -monitor BP at home as your are  Call office with readings if BP running too low or too high  Goal BP < 150/90     4  DM2, uncontrolled - on januvia, glipizide, last A1C 7 6 as of Jan 2023, needs improved sugar control to slow down progression of CKD  5  Hypokalemia, likely diuretic induced - off HCTZ, K 4, resolved on potassium gluconate supplement, 2 tablets a day  Is on torsemide also  6  Dependent edema - resolved, albumin normal  Off HCTZ  Now on torsemide 10mg daily  Echo normal from Aug  2019  -monitor daily weight  Call office if weight gain > 3-5 lbs  7  Complex cyst, left kidney - stable in size as of July 2020 ultrasound, will defer further imaging in light of age     RTC in 4 months  Obtain blood and urine testing in 3 months prior to next office appointment

## 2023-02-02 NOTE — PROGRESS NOTES
NEPHROLOGY OUTPATIENT PROGRESS NOTE   Mila Sánchez 80 y o  female MRN: [id number]  DATE: 2/2/2023  Reason for visit:   Chief Complaint   Patient presents with   • Chronic Kidney Disease   • Follow-up        Patient Instructions   1  chronic kidney disease stage 4 in the setting of Diabetes mellitus type 2 as well as hypertensive nephrosclerosis +/- physiologic aging of the kidney   -last sCr 1 88 as of 1/6/23  Had been stable in the 1 6-1 8 range since July 2018, eGFR in mid 20s and stable  -latest UA with microscopy shows trace leukocytes, no red blood cells, 6-10 WBCs, no casts, trace protein   -avoid nonsteroidals (ibuprofen, advil, motrin, aleve, naproxen, indomethacin, celebrex, toradol)  -may take tylenol  -please stay well hydrated  2  Microalbuminuria in setting of type 2 DM - UpCr improved to 241 mg/g as of Dec  2022  -off lisinopril  -microalbumin in the urine can also be seen with physiologic aging of the kidney  -monitor proteinuria     3  HTN - BP elevated in office but recommend checking BP at home  -continue clonidine 0 1mg weekly patch, bystolic 00XM after dinner, nifedipine 90mg daily, torsemide 10mg daily  -off lisinopril   -off HCTZ 25mg daily  -monitor BP at home as your are  Call office with readings if BP running too low or too high  Goal BP < 150/90     4  DM2, uncontrolled - on januvia, glipizide, last A1C 7 6 as of Jan 2023, needs improved sugar control to slow down progression of CKD  5  Hypokalemia, likely diuretic induced - off HCTZ, K 4, resolved on potassium gluconate supplement, 2 tablets a day  Is on torsemide also  6  Dependent edema - resolved, albumin normal  Off HCTZ  Now on torsemide 10mg daily  Echo normal from Aug  2019  -monitor daily weight  Call office if weight gain > 3-5 lbs  7  Complex cyst, left kidney - stable in size as of July 2020 ultrasound, will defer further imaging in light of age     RTC in 4 months     Obtain blood and urine testing in 3 months prior to next office appointment  Margret Byrd was seen today for chronic kidney disease and follow-up  Diagnoses and all orders for this visit:    Type 2 diabetes mellitus with stage 4 chronic kidney disease, without long-term current use of insulin ([facility])  -     Basic metabolic panel; Future  -     Urinalysis with microscopic; Future  -     Protein / creatinine ratio, urine; Future    Primary hypertension    Complex renal cyst    Hypokalemia  -     Basic metabolic panel; Future    Dependent edema        Assessment/Plan:  1  chronic kidney disease stage 4 in the setting of Diabetes mellitus type 2 as well as hypertensive nephrosclerosis +/- physiologic aging of the kidney   -last sCr 1 88 as of 1/6/23  Had been stable in the 1 6-1 8 range since July 2018, eGFR in mid 20s and stable  -latest UA with microscopy shows trace leukocytes, no red blood cells, 6-10 WBCs, no casts, trace protein   -avoid nonsteroidals (ibuprofen, advil, motrin, aleve, naproxen, indomethacin, celebrex, toradol)  -may take tylenol  -please stay well hydrated       2  Microalbuminuria in setting of type 2 DM - UpCr improved to 241 mg/g as of Dec  2022  -off lisinopril  -microalbumin in the urine can also be seen with physiologic aging of the kidney  -monitor proteinuria     3  HTN - BP elevated in office but recommend checking BP at home  -continue clonidine 0 1mg weekly patch, bystolic 99OX after dinner, nifedipine 90mg daily, torsemide 10mg daily  -off lisinopril   -off HCTZ 25mg daily  -monitor BP at home as your are  Call office with readings if BP running too low or too high  Goal BP < 150/90     4  DM2, uncontrolled - on januvia, glipizide, last A1C 7 6 as of Jan 2023, needs improved sugar control to slow down progression of CKD      5  Hypokalemia, likely diuretic induced - off HCTZ, K 4, resolved on potassium gluconate supplement, 2 tablets a day  Is on torsemide also       6   Dependent edema - resolved, albumin normal  Off HCTZ  Now on torsemide 10mg daily  Echo normal from Aug  2019  -monitor daily weight  Call office if weight gain > 3-5 lbs       7  Complex cyst, left kidney - stable in size as of July 2020 ultrasound, will defer further imaging in light of age     RTC in 4 months  Obtain blood and urine testing in 3 months prior to next office appointment      SUBJECTIVE / INTERVAL HISTORY:  80 y o  female presents in follow up of CKD  Saskia Morley went to there ER for leg pain  Denies NSAID use  Use tylenol for back pain  DM2 - blood sugars are elevated in the morning  HTN - BP at home well controlled    Review of Systems   Constitutional: Negative for chills and fever  HENT: Negative for sore throat and trouble swallowing  Eyes: Negative for visual disturbance  Respiratory: Negative for cough and shortness of breath  Cardiovascular: Negative for chest pain and leg swelling  Gastrointestinal: Negative for abdominal pain, constipation, diarrhea, nausea and vomiting  Endocrine: Negative for polyuria  Genitourinary: Negative for difficulty urinating, dysuria and hematuria  Musculoskeletal: Positive for back pain  Negative for neck pain  Leg pain   Skin: Negative for rash  Neurological: Negative for dizziness, light-headedness and numbness  Psychiatric/Behavioral: The patient is not nervous/anxious  OBJECTIVE:  /68 (BP Location: Left arm, Patient Position: Sitting, Cuff Size: Large)   Ht 5' 2" (1 575 m)   Wt 74 4 kg (164 lb)   BMI 30 00 kg/m²  Body mass index is 30 kg/m²  Physical exam:  Physical Exam  Vitals and nursing note reviewed  Constitutional:       General: She is not in acute distress  Appearance: Normal appearance  She is well-developed  She is not diaphoretic  HENT:      Head: Normocephalic and atraumatic  Nose: Nose normal       Mouth/Throat:      Mouth: Mucous membranes are moist       Pharynx: No oropharyngeal exudate     Eyes:      General: No scleral icterus  Right eye: No discharge  Left eye: No discharge  Neck:      Thyroid: No thyromegaly  Cardiovascular:      Rate and Rhythm: Normal rate and regular rhythm  Heart sounds: No murmur heard  Pulmonary:      Effort: Pulmonary effort is normal  No respiratory distress  Breath sounds: Normal breath sounds  No wheezing  Abdominal:      General: Bowel sounds are normal  There is no distension  Palpations: Abdomen is soft  Musculoskeletal:         General: No swelling  Normal range of motion  Cervical back: Normal range of motion and neck supple  Skin:     General: Skin is warm and dry  Findings: No rash  Neurological:      General: No focal deficit present  Mental Status: She is alert  Motor: No abnormal muscle tone        Comments: awake   Psychiatric:         Mood and Affect: Mood normal          Behavior: Behavior normal          Medications:    Current Outpatient Medications:   •  ACCU-CHEK PAUL PLUS test strip, , Disp: , Rfl:   •  Accu-Chek Guide test strip, USE TO TEST TWICE A DAY, Disp: 100 strip, Rfl: 3  •  Accu-Chek Softclix Lancets lancets, Use as instructed twice daily, Disp: 100 each, Rfl: 3  •  aspirin 81 MG tablet, Take 1 tablet by mouth daily, Disp: , Rfl:   •  cholecalciferol (VITAMIN D3) 1,000 units tablet, Take 2 tablets by mouth daily, Disp: , Rfl:   •  cloNIDine (CATAPRES-TTS-1) 0 1 mg/24 hr, PLACE 1 PATCH (0 1 MG TOTAL) ON THE SKIN ONCE A WEEK AS DIRECTED, Disp: 12 patch, Rfl: 7  •  famotidine (PEPCID) 20 mg tablet, TAKE 1 TABLET BY MOUTH EVERY DAY, Disp: 90 tablet, Rfl: 2  •  ferrous sulfate 324 (65 Fe) mg, TAKE 1 TABLET BY MOUTH DAILY BEFORE BREAKFAST, Disp: 90 tablet, Rfl: 1  •  gabapentin (Neurontin) 100 mg capsule, Take 1 capsule (100 mg total) by mouth 2 (two) times a day, Disp: 60 capsule, Rfl: 2  •  glipiZIDE (GLUCOTROL) 10 mg tablet, TAKE 1 TABLET BY MOUTH IN THE MORNING THEN 2 TABS WITH DINNER, Disp: 270 tablet, Rfl: 4  •  Invokana 100 MG, TAKE 1 TABLET BY MOUTH DAILY BEFORE BREAKFAST , Disp: 30 tablet, Rfl: 5  •  Januvia 50 MG tablet, TAKE 1 TABLET BY MOUTH EVERY DAY, Disp: 90 tablet, Rfl: 2  •  nebivolol (BYSTOLIC) 20 MG tablet, TAKE 1 TABLET BY MOUTH EVERY DAY, Disp: 90 tablet, Rfl: 4  •  NIFEdipine (ADALAT CC) 90 mg 24 hr tablet, TAKE 1 TABLET BY MOUTH EVERY DAY, Disp: 90 tablet, Rfl: 6  •  Potassium Gluconate 595 (99 K) MG TABS, Take 2 tablets by mouth 2 (two) times a day 4 tabs total, Disp: , Rfl:   •  sertraline (ZOLOFT) 25 mg tablet, TAKE 1 TABLET BY MOUTH EVERY DAY, Disp: 90 tablet, Rfl: 2  •  simvastatin (ZOCOR) 20 mg tablet, TAKE 1 TABLET BY MOUTH EVERYDAY AT BEDTIME, Disp: 90 tablet, Rfl: 3  •  torsemide (DEMADEX) 20 mg tablet, TAKE 1/2 TABLET BY MOUTH EVERY DAY, Disp: 45 tablet, Rfl: 4  •  Accu-Chek Softclix Lancets lancets, , Disp: , Rfl:   •  Diclofenac Sodium (VOLTAREN) 1 %, Apply 2 g topically 4 (four) times a day (Patient not taking: Reported on 1/13/2023), Disp: 50 g, Rfl: 0  •  glucose blood (Accu-Chek Emma Plus) test strip, USE AS INSTRUCTED TWICE DAILY (Patient not taking: Reported on 2/2/2023), Disp: 100 each, Rfl: 7    Allergies:   Allergies as of 02/02/2023   • (No Known Allergies)       The following portions of the patient's history were reviewed and updated as appropriate: past family history, past surgical history and problem list     Laboratory Results:  Lab Results   Component Value Date    SODIUM 143 01/06/2023    K 4 0 01/06/2023     01/06/2023    CO2 23 01/06/2023    BUN 28 (H) 01/06/2023    CREATININE 1 88 (H) 01/06/2023    GLUC 211 (H) 01/06/2023    CALCIUM 8 5 10/29/2022        Lab Results   Component Value Date    CALCIUM 8 5 10/29/2022    PHOS 4 0 09/09/2021       Portions of the record may have been created with voice recognition software   Occasional wrong word or "sound a like" substitutions may have occurred due to the inherent limitations of voice recognition software   Read the chart carefully and recognize, using context, where substitutions have occurred

## 2023-02-10 DIAGNOSIS — I10 ESSENTIAL HYPERTENSION: ICD-10-CM

## 2023-02-10 RX ORDER — CLONIDINE 0.1 MG/24H
PATCH, EXTENDED RELEASE TRANSDERMAL
Qty: 12 PATCH | Refills: 8 | Status: SHIPPED | OUTPATIENT
Start: 2023-02-10

## 2023-02-21 ENCOUNTER — OFFICE VISIT (OUTPATIENT)
Dept: FAMILY MEDICINE CLINIC | Facility: HOSPITAL | Age: 88
End: 2023-02-21

## 2023-02-21 VITALS
WEIGHT: 164.4 LBS | DIASTOLIC BLOOD PRESSURE: 60 MMHG | HEART RATE: 68 BPM | BODY MASS INDEX: 30.25 KG/M2 | TEMPERATURE: 97.4 F | SYSTOLIC BLOOD PRESSURE: 128 MMHG | HEIGHT: 62 IN

## 2023-02-21 DIAGNOSIS — N18.4 CKD (CHRONIC KIDNEY DISEASE), STAGE IV (HCC): ICD-10-CM

## 2023-02-21 DIAGNOSIS — R79.0 LOW FERRITIN: ICD-10-CM

## 2023-02-21 DIAGNOSIS — E66.9 OBESITY (BMI 30.0-34.9): ICD-10-CM

## 2023-02-21 DIAGNOSIS — N18.4 TYPE 2 DIABETES MELLITUS WITH STAGE 4 CHRONIC KIDNEY DISEASE, WITHOUT LONG-TERM CURRENT USE OF INSULIN (HCC): Primary | ICD-10-CM

## 2023-02-21 DIAGNOSIS — E11.65 TYPE 2 DIABETES MELLITUS WITH HYPERGLYCEMIA, WITHOUT LONG-TERM CURRENT USE OF INSULIN (HCC): ICD-10-CM

## 2023-02-21 DIAGNOSIS — I10 PRIMARY HYPERTENSION: ICD-10-CM

## 2023-02-21 DIAGNOSIS — E11.22 TYPE 2 DIABETES MELLITUS WITH STAGE 4 CHRONIC KIDNEY DISEASE, WITHOUT LONG-TERM CURRENT USE OF INSULIN (HCC): Primary | ICD-10-CM

## 2023-02-21 DIAGNOSIS — E78.5 DYSLIPIDEMIA: ICD-10-CM

## 2023-02-21 NOTE — ASSESSMENT & PLAN NOTE
Lab Results   Component Value Date    EGFR 26 (L) 01/06/2023    EGFR 28 (L) 12/02/2022    EGFR 22 10/29/2022    CREATININE 1 88 (H) 01/06/2023    CREATININE 1 72 (H) 12/02/2022    CREATININE 1 97 (H) 10/29/2022   Stable, con't follow up and labs as per Nephro, reviewed importance of BP/BS control as well as avoiding dehydration and NSAIDs, will follow

## 2023-02-21 NOTE — PATIENT INSTRUCTIONS
Obesity   AMBULATORY CARE:   Obesity  means your body mass index (BMI) is greater than 30  Your healthcare provider will use your age, height, and weight to measure your BMI  The risks of obesity include  many health problems, including injuries or physical disability  • Diabetes (high blood sugar level)    • High blood pressure or high cholesterol    • Heart disease    • Stroke    • Gallbladder or liver disease    • Cancer of the colon, breast, prostate, liver, or kidney    • Sleep apnea    • Arthritis or gout    Screening  is done to check for health conditions before you have signs or symptoms  If you are 28to 79years old, your blood sugar level may be checked every 3 years for signs of prediabetes or diabetes  Your healthcare provider will check your blood pressure at each visit  High blood pressure can lead to a stroke or other problems  Your provider may check for signs of heart disease, cancer, or other health problems  Seek care immediately if:   • You have a severe headache, confusion, or difficulty speaking  • You have weakness on one side of your body  • You have chest pain, sweating, or shortness of breath  Call your doctor if:   • You have symptoms of gallbladder or liver disease, such as pain in your upper abdomen  • You have knee or hip pain and discomfort while walking  • You have symptoms of diabetes, such as intense hunger and thirst, and frequent urination  • You have symptoms of sleep apnea, such as snoring or daytime sleepiness  • You have questions or concerns about your condition or care  Treatment for obesity  focuses on helping you lose weight to improve your health  Even a small decrease in BMI can reduce the risk for many health problems  Your healthcare provider will help you set a weight-loss goal   • Lifestyle changes  are the first step in treating obesity  These include making healthy food choices and getting regular physical activity   Your healthcare provider may suggest a weight-loss program that involves coaching, education, and therapy  • Medicine  may help you lose weight when it is used with a healthy foods and physical activity  • Surgery  can help you lose weight if you have obesity along with other health problems  Several types of weight-loss surgery are available  Ask your healthcare provider for more information  Tips for safe weight loss:   • Set small, realistic goals  An example of a small goal is to walk for 20 minutes 5 days a week  Anther goal is to lose 5% of your body weight  • Ask for support  Tell friends, family members, and coworkers about your goals  Ask someone to lose weight with you  You may also want to join a weight-loss support group  • Identify foods or triggers that may cause you to overeat  Remove tempting high-calorie foods from your home and workplace  Place a bowl of fresh fruit on your kitchen counter  If stress causes you to eat, find other ways to cope with stress  A counselor or therapist may be able to help you  • Track your daily calories and activity  Write down what you eat and drink  Also write down how many minutes of physical activity you do each day  • Track your weekly weight  Weigh yourself in the morning, before you eat or drink anything but after you use the bathroom  Use the same scale, in the same place, and in similar clothing each time  Only weigh yourself 1 to 2 times each week, or as directed  You may become discouraged if you weigh yourself every day  Eating changes: You will need to eat 500 to 1,000 fewer calories each day than you currently eat to lose 1 to 2 pounds a week  The following changes will help you cut calories:  • Eat smaller portions  Use small plates, no larger than 9 inches in diameter  Fill your plate half full of fruits and vegetables  Measure your food using measuring cups until you know what a serving size looks like           • Eat 3 meals and 1 or 2 snacks each day  Plan your meals in advance  [place] and eat at home most of the time  Eat slowly  Do not skip meals  Skipping meals can lead to overeating later in the day  This can make it harder for you to lose weight  Talk with a dietitian to help you make a meal plan and schedule that is right for you  • Eat fruits and vegetables at every meal   They are low in calories and high in fiber, which makes you feel full  Do not add butter, margarine, or cream sauce to vegetables  Use herbs to season steamed vegetables  • Eat less fat and fewer fried foods  Eat more baked or grilled chicken and fish  These protein sources are lower in calories and fat than red meat  Limit fast food  Dress your salads with olive oil and vinegar instead of bottled dressing  • Limit the amount of sugar you eat  Do not drink sugary beverages  Limit alcohol  Activity changes:  Physical activity is good for your body in many ways  It helps you burn calories and build strong muscles  It decreases stress and depression, and improves your mood  It can also help you sleep better  Talk to your healthcare provider before you begin an exercise program   • Exercise for at least 30 minutes 5 days a week  Start slowly  Set aside time each day for physical activity that you enjoy and that is convenient for you  It is best to do both weight training and an activity that increases your heart rate, such as walking, bicycling, or swimming  • Find ways to be more active  Do yard work and housecleaning  Walk up the stairs instead of using elevators  Spend your leisure time going to events that require walking, such as outdoor festivals or fairs  This extra physical activity can help you lose weight and keep it off  Follow up with your doctor as directed: You may need to meet with a dietitian  Write down your questions so you remember to ask them during your visits    © Copyright [employer] 2022 Information is for End User's use only and may not be sold, redistributed or otherwise used for commercial purposes  The above information is an  only  It is not intended as medical advice for individual conditions or treatments  Talk to your doctor, nurse or pharmacist before following any medical regimen to see if it is safe and effective for you  Heart Healthy Diet   AMBULATORY CARE:   A heart healthy diet  is an eating plan low in unhealthy fats and sodium (salt)  The plan is high in healthy fats and fiber  A heart healthy diet helps improve your cholesterol levels and lowers your risk for heart disease and stroke  A dietitian will teach you how to read and understand food labels  Heart healthy diet guidelines to follow:   • Choose foods that contain healthy fats:      ? Unsaturated fats  include monounsaturated and polyunsaturated fats  Unsaturated fat is found in foods such as soybean, canola, olive, corn, and safflower oils  It is also found in soft tub margarine that is made with liquid vegetable oil  ? Omega-3 fat  is found in certain fish, such as salmon, tuna, and trout, and in walnuts and flaxseed  Eat fish high in omega-3 fats at least 2 times a week  • Limit or do not have unhealthy fats:      ? Cholesterol  is found in animal foods, such as eggs and lobster, and in dairy products made from whole milk  Limit cholesterol to less than 200 mg each day  ? Saturated fat  is found in meats, such as piper and hamburger  It is also found in chicken or turkey skin, whole milk, and butter  Limit saturated fat to less than 7% of your total daily calories  ? Trans fat  is found in packaged foods, such as potato chips and cookies  It is also in hard margarine, some fried foods, and shortening  Do not eat foods that contain trans fats  • Get 20 to 30 grams of fiber each day  Fruits, vegetables, whole-grain foods, and legumes (cooked beans) are good sources of fiber  • Limit sodium as directed    You may be told to limit sodium, such as to 2,000 mg or less each day  Choose low-sodium or no-salt-added foods  Add little or no salt to food you prepare  Use herbs and spices in place of salt  Include the following in your heart healthy plan:  Ask your dietitian or healthcare provider how many servings to have each day from the following food groups:  • Grains:      ? Whole-wheat breads, cereals, and pastas, and brown rice    ? Low-fat, low-sodium crackers and chips    • Vegetables:      ? Broccoli, green beans, green peas, and spinach    ? Collards, kale, and lima beans    ? Carrots, sweet potatoes, tomatoes, and peppers    ? Canned vegetables with no salt added    • Fruits:      ? Bananas, peaches, pears, and pineapple    ? Grapes, raisins, and dates    ? Oranges, tangerines, grapefruit, orange juice, and grapefruit juice    ? Apricots, mangoes, melons, and papaya    ? Raspberries and strawberries    ? Canned fruit with no added sugar    • Low-fat dairy:      ? Nonfat (skim) milk, 1% milk, and low-fat almond, cashew, or soy milks fortified with calcium    ? Low-fat cheese, regular or frozen yogurt, and cottage cheese    • Meats and proteins:      ? Lean cuts of beef and pork (loin, leg, round), skinless chicken and turkey    ? Legumes, soy products, egg whites, or nuts    Limit or do not include the following in your heart healthy plan:   • Foods and liquids that contain unhealthy fats and oils:      ? Whole or 2% milk, cream cheese, sour cream, or cheese    ? High-fat cuts of beef (T-bone steaks, ribs), chicken or turkey with skin, and organ meats such as liver    ? Butter, stick margarine, shortening, and cooking oils such as coconut or palm oil    • Foods and liquids high in sodium:      ? Packaged foods, such as frozen dinners, cookies, macaroni and cheese, and cereals with more than 300 mg of sodium per serving    ?  Vegetables with added sodium, such as instant potatoes, vegetables with added sauces, or regular canned vegetables    ? Cured or smoked meats, such as hot dogs, piper, and sausage    ? High-sodium ketchup, barbecue sauce, salad dressing, pickles, olives, soy sauce, or miso    • Foods and liquids high in sugar:      ? Candy, cake, cookies, pies, or doughnuts    ? Soft drinks (soda), sports drinks, or sweetened tea    ? Canned or dry mixes for cakes, soups, sauces, or gravies    Other healthy heart guidelines:   • Do not smoke  Nicotine and other chemicals in cigarettes and cigars can cause lung and heart damage  Ask your healthcare provider for information if you currently smoke and need help to quit  E-cigarettes or smokeless tobacco still contain nicotine  Talk to your provider before you use these products  • Limit or do not drink alcohol as directed  Alcohol can damage your heart and raise your blood pressure  Your healthcare provider may give you specific daily and weekly limits  The general recommended limit is 1 drink a day for women 21 or older and for men 72 or older  Do not have more than 3 drinks within 24 hours or 7 within a week  The recommended limit is 2 drinks a day for men 24to 59years of age  Do not have more than 4 drinks within 24 hours or 14 within a week  A drink of alcohol is 12 ounces of beer, 5 ounces of wine, or 1½ ounces of liquor  • Maintain a healthy weight  Extra body weight makes your heart work harder  Ask your provider what a healthy weight is for you  He or she can help you create a safe weight loss plan, if needed  • Exercise regularly  Exercise can help you maintain a healthy weight and improve your blood pressure and cholesterol levels  Regular exercise can also decrease your risk for heart problems  Ask your provider about the best exercise plan for you  Do not start an exercise program without asking your provider            Follow up with your doctor or cardiologist as directed:  Write down your questions so you remember to ask them during your visits  © Copyright [employer] 2022 Information is for End User's use only and may not be sold, redistributed or otherwise used for commercial purposes  The above information is an  only  It is not intended as medical advice for individual conditions or treatments  Talk to your doctor, nurse or pharmacist before following any medical regimen to see if it is safe and effective for you

## 2023-02-21 NOTE — ASSESSMENT & PLAN NOTE
Lab Results   Component Value Date    HGBA1C 7 6 (H) 01/06/2023   DM type 2 with hyperglycemia and nephropathy/CKD stage 4 and polyneuropathy - urged healthy diet and keep active, con't meds and labs and f/u as per RAZA Younger on DM foot exam (8/22) and eye exam (7/22), on a statin but no ACE/ARB, will follow

## 2023-02-21 NOTE — ASSESSMENT & PLAN NOTE
Con't meds/labs and f/u as per Endo, will follow  Lab Results   Component Value Date    HGBA1C 7 6 (H) 01/06/2023

## 2023-02-21 NOTE — PROGRESS NOTES
Name: Alex Gutierres      : 1935      MRN: [id number]  Encounter Provider: Adele Cortez DO  Encounter Date: 2023   Encounter department: [facility]      1  Type 2 diabetes mellitus with stage 4 chronic kidney disease, without long-term current use of insulin (HCC)  Assessment & Plan:    Lab Results   Component Value Date    HGBA1C 7 6 (H) 2023   DM type 2 with hyperglycemia and nephropathy/CKD stage 4 and polyneuropathy - urged healthy diet and keep active, con't meds and labs and f/u as per Endo, UTD on DM foot exam () and eye exam (), on a statin but no ACE/ARB, will follow       2  Type 2 diabetes mellitus with hyperglycemia, without long-term current use of insulin (HCC)  Assessment & Plan:  Con't meds/labs and f/u as per Endo, will follow  Lab Results   Component Value Date    HGBA1C 7 6 (H) 2023         3  CKD (chronic kidney disease), stage IV [facility])  Assessment & Plan:  Lab Results   Component Value Date    EGFR 26 (L) 2023    EGFR 28 (L) 2022    EGFR 22 10/29/2022    CREATININE 1 88 (H) 2023    CREATININE 1 72 (H) 2022    CREATININE 1 97 (H) 10/29/2022   Stable, con't follow up and labs as per Nephro, reviewed importance of BP/BS control as well as avoiding dehydration and NSAIDs, will follow      4  Dyslipidemia  Assessment & Plan:  FLP due - BW order given to be done with labs in , con't current statin for now, will follow    Orders:  -     Lipid panel    5  Low ferritin  Comments: On PO iron, check iron studies with labs in , will follow  Orders:  -     Iron; Future  -     Ferritin; Future  -     Iron  -     Ferritin    6  Obesity (BMI 30 0-34 9)    7  BMI 30 0-30 9,adult  Comments:  healthy diet and regular exercise and healthy wgt encouraged    8   Primary hypertension  Assessment & Plan:  BP at goal, con't current meds, recheck in 6 mos        Depression Screening and Follow-up Plan: Patient was screened for depression during today's encounter  They screened negative with a PHQ-2 score of 0  Mammo 10/21 - pt deferring further which is reasonable    Dexa 12/19 - osteopenia - pt deferring further    BW 1/23      Subjective      HPI Pt here for follow up appt and BW results    BW results were reviewed with pt in detail: FBS/A1C 211/7 6, BUN/Cr 28/1 88 (GFR 26), rest of CMP was wnl  Def of controlled vs uncontrolled DM was reviewed  Diet was reviewed - wgt up 1 lb from Sept 22  She is taking her DM meds as directed by Carisa and saw Dr Ceci Russ 1/13/23  No meds were changed  She is UTD on DM foot exam (8/22) and eye exam (7/22)  She is on a statin but is not on an ACE/ARB  CKD stage 4 reviewed  Importance of BP/BS control as well as avoiding NSAIDs and dehydration again reviewed  She is following with Nephro and saw Dr Angella Kimbrough 2/2/23 - [address] note reviewed  Pt saw Pain Mgt Nov 22, Dec 22 and Jan 23 for her chronic back pain - OV note reviewed  They tried to switch from Gabapentin to Lyrica but she had SE on the Lyrica tid and went back to Gabapentin bid  She had a MRI of lumbar spine Jan 23 and results reviewed  She did PT  Injections were discussed but pt deferred  She notes pain is better in both her back and RLE  She stopped doing the HEP  She is doing the stationary bike an walking  She has f/u appt 4/13/23    BP at goal today and meds were reviewed and no changes have occurred  She denies missing doses of meds or SE with the meds  She does not check her BP outside the office  She notes no frequent Ha's/dizziness/double vision/CP  She is taking her Sertraline daily  She notes no SE with the medication  She notes no anxiety and denies down/sad mood  She feels she is sleeping well  Review of Systems   Constitutional: Negative for chills, fever and unexpected weight change  HENT: Negative for congestion and trouble swallowing      Eyes: Negative for pain and visual disturbance  Respiratory: Negative for cough, shortness of breath and wheezing  Cardiovascular: Negative for chest pain and palpitations  Gastrointestinal: Negative for abdominal pain, blood in stool, constipation, diarrhea, nausea and vomiting  Genitourinary: Negative for difficulty urinating and dysuria  Musculoskeletal: Positive for back pain  Negative for neck pain  Skin: Negative for rash and wound  Neurological: Negative for dizziness, light-headedness and headaches  Hematological: Does not bruise/bleed easily  Psychiatric/Behavioral: Negative for dysphoric mood and sleep disturbance  The patient is not nervous/anxious  Current Outpatient Medications on File Prior to Visit   Medication Sig   • ACCU-CHEK PAUL PLUS test strip    • Accu-Chek Guide test strip USE TO TEST TWICE A DAY   • Accu-Chek Softclix Lancets lancets  (Patient not taking: Reported on 2/2/2023)   • Accu-Chek Softclix Lancets lancets Use as instructed twice daily   • aspirin 81 MG tablet Take 1 tablet by mouth daily   • cholecalciferol (VITAMIN D3) 1,000 units tablet Take 2 tablets by mouth daily   • cloNIDine (CATAPRES-TTS-1) 0 1 mg/24 hr PLACE 1 PATCH (0 1 MG TOTAL) ON THE SKIN ONCE A WEEK AS DIRECTED   • Diclofenac Sodium (VOLTAREN) 1 % Apply 2 g topically 4 (four) times a day (Patient not taking: Reported on 1/13/2023)   • famotidine (PEPCID) 20 mg tablet TAKE 1 TABLET BY MOUTH EVERY DAY   • ferrous sulfate 324 (65 Fe) mg TAKE 1 TABLET BY MOUTH DAILY BEFORE BREAKFAST   • gabapentin (Neurontin) 100 mg capsule Take 1 capsule (100 mg total) by mouth 2 (two) times a day   • glipiZIDE (GLUCOTROL) 10 mg tablet TAKE 1 TABLET BY MOUTH IN THE MORNING THEN 2 TABS WITH DINNER   • glucose blood (Accu-Chek Paul Plus) test strip USE AS INSTRUCTED TWICE DAILY (Patient not taking: Reported on 2/2/2023)   • Invokana 100 MG TAKE 1 TABLET BY MOUTH DAILY BEFORE BREAKFAST     • Januvia 50 MG tablet TAKE 1 TABLET BY MOUTH EVERY DAY   • nebivolol (BYSTOLIC) 20 MG tablet TAKE 1 TABLET BY MOUTH EVERY DAY   • NIFEdipine (ADALAT CC) 90 mg 24 hr tablet TAKE 1 TABLET BY MOUTH EVERY DAY   • Potassium Gluconate 595 (99 K) MG TABS Take 2 tablets by mouth 2 (two) times a day 4 tabs total   • sertraline (ZOLOFT) 25 mg tablet TAKE 1 TABLET BY MOUTH EVERY DAY   • simvastatin (ZOCOR) 20 mg tablet TAKE 1 TABLET BY MOUTH EVERYDAY AT BEDTIME   • torsemide (DEMADEX) 20 mg tablet TAKE 1/2 TABLET BY MOUTH EVERY DAY       Objective     /60   Pulse 68   Temp (!) 97 4 °F (36 3 °C) (Tympanic)   Ht 5' 2" (1 575 m)   Wt 74 6 kg (164 lb 6 4 oz)   BMI 30 07 kg/m²     Physical Exam  Vitals and nursing note reviewed  Constitutional:       General: She is not in acute distress  Appearance: She is well-developed  She is not ill-appearing  HENT:      Head: Normocephalic and atraumatic  Eyes:      General:         Right eye: No discharge  Left eye: No discharge  Conjunctiva/sclera: Conjunctivae normal    Neck:      Trachea: No tracheal deviation  Cardiovascular:      Rate and Rhythm: Normal rate and regular rhythm  Heart sounds: Normal heart sounds  No murmur heard  No friction rub  Pulmonary:      Effort: Pulmonary effort is normal  No respiratory distress  Breath sounds: Normal breath sounds  No wheezing, rhonchi or rales  Abdominal:      General: There is no distension  Palpations: Abdomen is soft  Tenderness: There is no abdominal tenderness  There is no guarding or rebound  Musculoskeletal:         General: Tenderness present  Cervical back: Neck supple  Comments: Tender to palp L ant tibial region   Skin:     General: Skin is warm  Coloration: Skin is not pale  Findings: No bruising or rash  Neurological:      General: No focal deficit present  Mental Status: She is alert  Mental status is at baseline  Motor: No abnormal muscle tone        Gait: Gait normal  Psychiatric:         Mood and Affect: Mood normal          Behavior: Behavior normal          Thought Content: Thought content normal          Judgment: Judgment normal        Oconnell Fake, DO  BMI Counseling: Body mass index is 30 07 kg/m²  The BMI is above normal  Nutrition recommendations include reducing portion sizes, 3-5 servings of fruits/vegetables daily, consuming healthier snacks, moderation in carbohydrate intake, increasing intake of lean protein, reducing intake of saturated fat and trans fat and reducing intake of cholesterol  Exercise recommendations include exercising 3-5 times per week

## 2023-02-25 DIAGNOSIS — R79.0 LOW FERRITIN: ICD-10-CM

## 2023-02-26 RX ORDER — FERROUS SULFATE TAB EC 324 MG (65 MG FE EQUIVALENT) 324 (65 FE) MG
TABLET DELAYED RESPONSE ORAL
Qty: 90 TABLET | Refills: 1 | Status: SHIPPED | OUTPATIENT
Start: 2023-02-26

## 2023-03-05 DIAGNOSIS — R60.9 DEPENDENT EDEMA: ICD-10-CM

## 2023-03-05 DIAGNOSIS — I10 ESSENTIAL HYPERTENSION: ICD-10-CM

## 2023-03-06 RX ORDER — TORSEMIDE 20 MG/1
TABLET ORAL
Qty: 45 TABLET | Refills: 5 | Status: SHIPPED | OUTPATIENT
Start: 2023-03-06

## 2023-03-14 DIAGNOSIS — E11.22 TYPE 2 DIABETES MELLITUS WITH STAGE 3 CHRONIC KIDNEY DISEASE, WITHOUT LONG-TERM CURRENT USE OF INSULIN (HCC): ICD-10-CM

## 2023-03-14 DIAGNOSIS — N18.30 TYPE 2 DIABETES MELLITUS WITH STAGE 3 CHRONIC KIDNEY DISEASE, WITHOUT LONG-TERM CURRENT USE OF INSULIN (HCC): ICD-10-CM

## 2023-03-14 DIAGNOSIS — I10 ESSENTIAL HYPERTENSION: ICD-10-CM

## 2023-03-14 RX ORDER — GLIPIZIDE 10 MG/1
TABLET ORAL
Qty: 270 TABLET | Refills: 5 | Status: SHIPPED | OUTPATIENT
Start: 2023-03-14

## 2023-03-18 DIAGNOSIS — E11.22 TYPE 2 DIABETES MELLITUS WITH STAGE 3 CHRONIC KIDNEY DISEASE, WITHOUT LONG-TERM CURRENT USE OF INSULIN (HCC): ICD-10-CM

## 2023-03-18 DIAGNOSIS — N18.30 TYPE 2 DIABETES MELLITUS WITH STAGE 3 CHRONIC KIDNEY DISEASE, WITHOUT LONG-TERM CURRENT USE OF INSULIN (HCC): ICD-10-CM

## 2023-03-19 RX ORDER — SITAGLIPTIN 50 MG/1
TABLET, FILM COATED ORAL
Qty: 90 TABLET | Refills: 2 | Status: SHIPPED | OUTPATIENT
Start: 2023-03-19

## 2023-03-28 ENCOUNTER — TELEPHONE (OUTPATIENT)
Dept: PAIN MEDICINE | Facility: MEDICAL CENTER | Age: 88
End: 2023-03-28

## 2023-03-28 DIAGNOSIS — G89.29 CHRONIC RIGHT-SIDED LOW BACK PAIN WITHOUT SCIATICA: Primary | ICD-10-CM

## 2023-03-28 DIAGNOSIS — M54.41 ACUTE RIGHT-SIDED LOW BACK PAIN WITH RIGHT-SIDED SCIATICA: ICD-10-CM

## 2023-03-28 DIAGNOSIS — M48.061 LUMBAR FORAMINAL STENOSIS: ICD-10-CM

## 2023-03-28 DIAGNOSIS — M54.16 LUMBAR RADICULOPATHY: ICD-10-CM

## 2023-03-28 DIAGNOSIS — M51.26 HERNIATED NUCLEUS PULPOSUS, L3-4 RIGHT: ICD-10-CM

## 2023-03-28 DIAGNOSIS — M54.50 CHRONIC RIGHT-SIDED LOW BACK PAIN WITHOUT SCIATICA: Primary | ICD-10-CM

## 2023-03-28 RX ORDER — GABAPENTIN 100 MG/1
CAPSULE ORAL
Qty: 90 CAPSULE | Refills: 1 | Status: SHIPPED | OUTPATIENT
Start: 2023-03-28

## 2023-03-28 NOTE — TELEPHONE ENCOUNTER
Pt daughter in law called to see if an order for a procedure can be place for the pt as she is now interested   Carolina Lewis can be reached at [phone]

## 2023-03-28 NOTE — TELEPHONE ENCOUNTER
S/w jimbo, advised of above  Carolina Lewis verbalized understanding and appreciation   Will cb if questions or issues arise

## 2023-03-28 NOTE — TELEPHONE ENCOUNTER
Can you please call the patient and advise her to increase the Gabapentin to 1 PO TID  I sent a new script with a refill so she will not run out  They are to call us if there are any side effects or issues with the increase  JULIETA: Can you please call the patient and schedule her for a Right L3 & L4 TFESI with SL and then a f/u OV 2-3 weeks after the procedure for re-evaluation  I put the order in

## 2023-03-30 DIAGNOSIS — F41.9 ANXIETY: ICD-10-CM

## 2023-03-30 DIAGNOSIS — I10 ESSENTIAL HYPERTENSION: ICD-10-CM

## 2023-03-30 RX ORDER — NIFEDIPINE 90 MG/1
TABLET, FILM COATED, EXTENDED RELEASE ORAL
Qty: 90 TABLET | Refills: 7 | Status: SHIPPED | OUTPATIENT
Start: 2023-03-30

## 2023-03-30 RX ORDER — SERTRALINE HYDROCHLORIDE 25 MG/1
TABLET, FILM COATED ORAL
Qty: 90 TABLET | Refills: 3 | Status: SHIPPED | OUTPATIENT
Start: 2023-03-30

## 2023-03-30 NOTE — TELEPHONE ENCOUNTER
PRE OP INSTRUCTIONS:     -If you are on prescription blood thinners, you may have to hold the medication for several days before the procedure  Please call the office to    discuss medication holds at [phone]   -Do not eat or drink ONE HOUR prior to your procedure  If you are diabetic, may follow regular breakfast/lunch schedule and take usual    diabetic medications   -Lumbar( low back) procedure, please wear comfortable slacks/pants   -Cervical (neck) procedure, please wear a shirt/blouse that is easy to remove   -A  is required to take you home form your procedure   -Continue all to take prescribed medication the day of your procedure, including blood pressure medications   -If you are prescribe antibiotics, have an active infection or have an open wound, please contact the office at [phone]   -Please refrain from any vaccinations two weeks before and two weeks after injection   -Insurance authorization received in not a guarantee of payment per your insurance company's authorization disclaimer and it is    your responsibility to verify your benefits  -If you have any questions about the instructions, please call me at [phone]    Hold medication  x _ full days prior, last dose on _  Patient advised to hold medication from Date till their appointment at that time instructions to restart will be given  Patient stated verbal understanding  Aware that nursing will call her to review hold dates as well

## 2023-04-26 ENCOUNTER — HOSPITAL ENCOUNTER (OUTPATIENT)
Dept: NON INVASIVE DIAGNOSTICS | Age: 88
Discharge: HOME | End: 2023-04-26

## 2023-04-26 VITALS
WEIGHT: 167 LBS | HEIGHT: 62 IN | SYSTOLIC BLOOD PRESSURE: 122 MMHG | DIASTOLIC BLOOD PRESSURE: 58 MMHG | HEART RATE: 74 BPM | BODY MASS INDEX: 30.73 KG/M2

## 2023-04-26 DIAGNOSIS — R60.9 DEPENDENT EDEMA: ICD-10-CM

## 2023-04-26 DIAGNOSIS — I10 PRIMARY HYPERTENSION: ICD-10-CM

## 2023-04-26 LAB
AORTIC ROOT: 2.8 CM
AORTIC VALVE MEAN VELOCITY: 15.4 M/S
APICAL FOUR CHAMBER EJECTION FRACTION: 51 %
ASCENDING AORTA: 2.9 CM
AV AREA BY CONTINUOUS VTI: 1.4 CM2
AV AREA PEAK VELOCITY: 1.4 CM2
AV LVOT MEAN GRADIENT: 2 MMHG
AV LVOT PEAK GRADIENT: 3 MMHG
AV MEAN GRADIENT: 11 MMHG
AV PEAK GRADIENT: 20 MMHG
AV VALVE AREA: 1.41 CM2
AV VELOCITY RATIO: 0.4
AVA (PLAN): 1.4 CM2
DOP CALC AO PEAK VEL: 2.2 M/S
DOP CALC AO VTI: 52.54 CM
DOP CALC LVOT AREA: 3.46 CM2
DOP CALC LVOT DIAMETER: 2.1 CM
DOP CALC LVOT PEAK VEL VTI: 21.35 CM
DOP CALC LVOT PEAK VEL: 0.89 M/S
DOP CALC LVOT STROKE INDEX: 40.7 ML/M2
DOP CALC LVOT STROKE VOLUME: 73.91 CM3
E WAVE DECELERATION TIME: 210 MS
FRACTIONAL SHORTENING: 29 % (ref 28–44)
INTERVENTRICULAR SEPTUM IN DIASTOLE (PARASTERNAL SHORT AXIS VIEW): 1.2 CM
INTERVENTRICULAR SEPTUM: 1.2 CM (ref 0.6–1.1)
LAAS-AP2: 18.3 CM2
LAAS-AP4: 21.4 CM2
LEFT ATRIUM SIZE: 3.8 CM
LEFT INTERNAL DIMENSION IN SYSTOLE: 2.7 CM (ref 2.1–4)
LEFT VENTRICLE DIASTOLIC VOLUME (MOD BIPLANE): 111 ML
LEFT VENTRICLE SYSTOLIC VOLUME (MOD BIPLANE): 51 ML
LEFT VENTRICULAR INTERNAL DIMENSION IN DIASTOLE: 3.8 CM (ref 3.5–6)
LEFT VENTRICULAR POSTERIOR WALL IN END DIASTOLE: 1.1 CM
LEFT VENTRICULAR STROKE VOLUME: 36 ML
LV EF: 54 %
LVSV (TEICH): 36 ML
MV E'TISSUE VEL-SEP: 6 CM/S
MV PEAK A VEL: 1.29 M/S
MV PEAK E VEL: 60 CM/S
MV STENOSIS PRESSURE HALF TIME: 61 MS
MV VALVE AREA P 1/2 METHOD: 3.61 CM2
RA PRESSURE ESTIMATED: 10 MMHG
RIGHT ATRIUM AREA SYSTOLE A4C: 22.3 CM2
RIGHT VENTRICLE ID DIMENSION: 4 CM
RV PSP: 50 MMHG
SL CV LEFT ATRIUM LENGTH A2C: 4.7 CM
SL CV LV EF: 65
SL CV PED ECHO LEFT VENTRICLE DIASTOLIC VOLUME (MOD BIPLANE) 2D: 63 ML
SL CV PED ECHO LEFT VENTRICLE SYSTOLIC VOLUME (MOD BIPLANE) 2D: 26 ML
TR MAX PG: 40 MMHG
TR PEAK VELOCITY: 3.2 M/S
TRICUSPID ANNULAR PLANE SYSTOLIC EXCURSION: 2.3 CM
TRICUSPID VALVE PEAK REGURGITATION VELOCITY: 3.18 M/S

## 2023-04-27 ENCOUNTER — HOSPITAL ENCOUNTER (OUTPATIENT)
Dept: RADIOLOGY | Facility: CLINIC | Age: 88
Discharge: HOME | End: 2023-04-27

## 2023-04-27 VITALS
HEART RATE: 65 BPM | SYSTOLIC BLOOD PRESSURE: 130 MMHG | RESPIRATION RATE: 18 BRPM | OXYGEN SATURATION: 93 % | DIASTOLIC BLOOD PRESSURE: 64 MMHG | TEMPERATURE: 97.6 F

## 2023-04-27 DIAGNOSIS — M54.50 CHRONIC RIGHT-SIDED LOW BACK PAIN WITHOUT SCIATICA: ICD-10-CM

## 2023-04-27 DIAGNOSIS — M51.26 HERNIATED NUCLEUS PULPOSUS, L3-4 RIGHT: ICD-10-CM

## 2023-04-27 DIAGNOSIS — M48.061 LUMBAR FORAMINAL STENOSIS: ICD-10-CM

## 2023-04-27 DIAGNOSIS — G89.29 CHRONIC RIGHT-SIDED LOW BACK PAIN WITHOUT SCIATICA: ICD-10-CM

## 2023-04-27 DIAGNOSIS — M54.16 LUMBAR RADICULOPATHY: ICD-10-CM

## 2023-04-27 RX ORDER — METHYLPREDNISOLONE ACETATE 80 MG/ML
80 INJECTION, SUSPENSION INTRA-ARTICULAR; INTRALESIONAL; INTRAMUSCULAR; PARENTERAL; SOFT TISSUE ONCE
Status: COMPLETED | OUTPATIENT
Start: 2023-04-27 | End: 2023-04-27

## 2023-04-27 RX ORDER — PAPAVERINE HCL 150 MG
15 CAPSULE, EXTENDED RELEASE ORAL ONCE
Status: DISCONTINUED | OUTPATIENT
Start: 2023-04-27 | End: 2023-04-27

## 2023-04-27 RX ADMIN — IOHEXOL 1 ML: 300 INJECTION, SOLUTION INTRAVENOUS at 14:53

## 2023-04-27 RX ADMIN — METHYLPREDNISOLONE ACETATE 80 MG: 80 INJECTION, SUSPENSION INTRA-ARTICULAR; INTRALESIONAL; INTRAMUSCULAR; SOFT TISSUE at 14:53

## 2023-04-27 NOTE — H&P
History of Present Illness: The patient is a 80 y o  female who presents with complaints of low back and leg pain      Past Medical History:   Diagnosis Date   • Aortic valve insufficiency    • Cataract of both eyes    • Dysuria     last assessed - [id number]   • Edema     last assessed - [id number]   • GERD (gastroesophageal reflux disease)     last assessed - 30Aug2012   • Hyperlipidemia    • Hypertension    • Low back pain     last assessed - 30Aug2012   • Mitral valve disorder    • Osteoporosis     last assessed - [id number]   • Palpitations        Past Surgical History:   Procedure Laterality Date   • APPENDECTOMY     • CATARACT EXTRACTION Bilateral    • COLONOSCOPY     • TUBAL LIGATION Bilateral          Current Outpatient Medications:   •  ACCU-CHEK PAUL PLUS test strip, , Disp: , Rfl:   •  Accu-Chek Guide test strip, USE TO TEST TWICE A DAY, Disp: 100 strip, Rfl: 3  •  Accu-Chek Softclix Lancets lancets, , Disp: , Rfl:   •  Accu-Chek Softclix Lancets lancets, Use as instructed twice daily, Disp: 100 each, Rfl: 3  •  aspirin 81 MG tablet, Take 1 tablet by mouth daily, Disp: , Rfl:   •  cholecalciferol (VITAMIN D3) 1,000 units tablet, Take 2 tablets by mouth daily, Disp: , Rfl:   •  cloNIDine (CATAPRES-TTS-1) 0 1 mg/24 hr, PLACE 1 PATCH ON THE SKIN ONCE A WEEK AS DIRECTED, Disp: 12 patch, Rfl: 3  •  Denta 5000 Plus 1 1 % CREA, USE TWICE A DAY -SPIT, DONT RINSE AND NOTHING BY MOUTH X 30 MIN, Disp: , Rfl:   •  Diclofenac Sodium (VOLTAREN) 1 %, Apply 2 g topically 4 (four) times a day (Patient not taking: Reported on 1/13/2023), Disp: 50 g, Rfl: 0  •  famotidine (PEPCID) 20 mg tablet, TAKE 1 TABLET BY MOUTH EVERY DAY, Disp: 90 tablet, Rfl: 2  •  ferrous sulfate 324 (65 Fe) mg, TAKE 1 TABLET BY MOUTH EVERY DAY BEFORE BREAKFAST, Disp: 90 tablet, Rfl: 1  •  gabapentin (Neurontin) 100 mg capsule, Take 1 PO TID , Disp: 90 capsule, Rfl: 1  •  glipiZIDE (GLUCOTROL) 10 mg tablet, TAKE 1 TABLET BY MOUTH IN THE MORNING THEN 2 TABS WITH DINNER, Disp: 270 tablet, Rfl: 5  •  glucose blood (Accu-Chek Emma Plus) test strip, USE AS INSTRUCTED TWICE DAILY (Patient not taking: Reported on 2/2/2023), Disp: 100 each, Rfl: 7  •  Invokana 100 MG, TAKE 1 TABLET BY MOUTH DAILY BEFORE BREAKFAST , Disp: 30 tablet, Rfl: 5  •  Januvia 50 MG tablet, TAKE 1 TABLET BY MOUTH EVERY DAY, Disp: 90 tablet, Rfl: 2  •  nebivolol (BYSTOLIC) 20 MG tablet, TAKE 1 TABLET BY MOUTH EVERY DAY, Disp: 90 tablet, Rfl: 3  •  NIFEdipine (ADALAT CC) 90 mg 24 hr tablet, TAKE 1 TABLET BY MOUTH EVERY DAY, Disp: 90 tablet, Rfl: 7  •  Potassium Gluconate 595 (99 K) MG TABS, Take 2 tablets by mouth 2 (two) times a day 4 tabs total, Disp: , Rfl:   •  sertraline (ZOLOFT) 25 mg tablet, TAKE 1 TABLET BY MOUTH EVERY DAY, Disp: 90 tablet, Rfl: 3  •  simvastatin (ZOCOR) 20 mg tablet, TAKE 1 TABLET BY MOUTH EVERYDAY AT BEDTIME, Disp: 90 tablet, Rfl: 3  •  torsemide (DEMADEX) 20 mg tablet, Take 1 tablet (20 mg total) by mouth daily, Disp: 90 tablet, Rfl: 3    Current Facility-Administered Medications:   •  iohexol (OMNIPAQUE) 300 mg/mL injection 50 mL, 50 mL, Epidural, Once, Aleks Meza DO  •  methylPREDNISolone acetate (DEPO-MEDROL) injection 80 mg, 80 mg, Epidural, Once, Aleks Meza DO    No Known Allergies    Physical Exam:   Vitals:    04/27/23 1438   BP: 109/65   Pulse: 64   Resp: 18   Temp: 97 6 °F (36 4 °C)   SpO2: 92%     General: Awake, Alert, Oriented x 3, Mood and affect appropriate  Respiratory: Respirations even and unlabored  Cardiovascular: Peripheral pulses intact; no edema  Musculoskeletal Exam: Decreased range of motion lumbar spine    ASA Score: II    Patient/Chart Verification  Patient ID Verified: Verbal  ID Band Applied: No  Consents Confirmed: Procedural, To be obtained in the Pre-Procedure area  Interval H&P(within 24 hr) Complete (required for Outpatients and Surgery Admit only): To be obtained in the Pre-Procedure area  Allergies Reviewed:  Yes  Anticoag/NSAID held?: NA  Currently on antibiotics?: No  Pregnancy denied?: NA    Assessment:   1  Chronic right-sided low back pain without sciatica    2  Lumbar radiculopathy    3  Lumbar foraminal stenosis    4   Herniated nucleus pulposus, L3-4 right        Plan: ZACH

## 2023-04-27 NOTE — DISCHARGE INSTRUCTIONS
Epidural Steroid Injection   WHAT YOU NEED TO KNOW:   An epidural steroid injection (BRADLEY) is a procedure to inject steroid medicine into the epidural space  The epidural space is between your spinal cord and vertebrae  Steroids reduce inflammation and fluid buildup in your spine that may be causing pain  You may be given pain medicine along with the steroids  ACTIVITY  Do not drive or operate machinery today  No strenuous activity today - bending, lifting, etc   You may resume normal activites starting tomorrow - start slowly and as tolerated  You may shower today, but no tub baths or hot tubs  You may have numbness for several hours from the local anesthetic  Please use caution and common sense, especially with weight-bearing activities  CARE OF THE INJECTION SITE  If you have soreness or pain, apply ice to the area today (20 minutes on/20 minutes off)  Starting tomorrow, you may use warm, moist heat or ice if needed  You may have an increase or change in your discomfort for 36-48 hours after your treatment  Apply ice and continue with any pain medication you have been prescribed  Notify the [facility] if you have any of the following: redness, drainage, swelling, headache, stiff neck or fever above 100°F     SPECIAL INSTRUCTIONS  Our office will contact you in approximately 7 days for a progress report  MEDICATIONS  Continue to take all routine medications  Our office may have instructed you to hold some medications  As no general anesthesia was used in today's procedure, you should not experience any side effects related to anesthesia  If you are diabetic, the steroids used in today's injection may temporarily increase your blood sugar levels after the first few days after your injection  Please keep a close eye on your sugars and alert the doctor who manages your diabetes if your sugars are significantly high from your baseline or you are symptomatic       If you have a problem specifically related to your procedure, please call our office at [phone]  Problems not related to your procedure should be directed to your primary care physician

## 2023-04-28 DIAGNOSIS — R60.9 DEPENDENT EDEMA: ICD-10-CM

## 2023-04-28 RX ORDER — TORSEMIDE 20 MG/1
20 TABLET ORAL DAILY
Qty: 90 TABLET | Refills: 3 | Status: SHIPPED | OUTPATIENT
Start: 2023-04-28

## 2023-05-04 ENCOUNTER — TELEPHONE (OUTPATIENT)
Dept: PAIN MEDICINE | Facility: CLINIC | Age: 88
End: 2023-05-04

## 2023-05-19 ENCOUNTER — OFFICE VISIT (OUTPATIENT)
Dept: PAIN MEDICINE | Facility: CLINIC | Age: 88
End: 2023-05-19

## 2023-05-19 VITALS
WEIGHT: 163 LBS | TEMPERATURE: 98 F | HEART RATE: 64 BPM | BODY MASS INDEX: 30 KG/M2 | HEIGHT: 62 IN | SYSTOLIC BLOOD PRESSURE: 130 MMHG | DIASTOLIC BLOOD PRESSURE: 68 MMHG

## 2023-05-19 DIAGNOSIS — M54.16 LUMBAR RADICULOPATHY: ICD-10-CM

## 2023-05-19 DIAGNOSIS — G89.4 CHRONIC PAIN SYNDROME: Primary | ICD-10-CM

## 2023-05-19 DIAGNOSIS — M46.1 SACROILIITIS (HCC): ICD-10-CM

## 2023-05-19 NOTE — PROGRESS NOTES
Assessment:  1  Chronic pain syndrome    2  Sacroiliitis ([address] )    3  Lumbar radiculopathy        Plan:  The patient is an 42-year-old female with a history of chronic pain secondary to low back pain, lumbar intervertebral disc disorder with radiculopathy and lumbar foraminal stenosis worsening bilateral low back pain that is unchanged since her last office visit and unrelieved after undergoing an L5-S1 LESI on 4/27/2023  On exam, the patient is tender with palpation over bilateral SI joints as well as having positive provocative maneuvers for sacroiliitis  I instructed patient we can perform bilateral SI joint injections to decrease inflammation and provide them relief  Patient would like to proceed  I instructed our  will schedule them  Complete risks and benefits including bleeding, infection, tissue reaction, nerve injury and allergic reaction were discussed  The approach was demonstrated using models and literature was provided  Verbal and written consent was obtained  The patient will follow-up following injection for medication prescription refill and reevaluation  The patient was advised to contact the office should their symptoms worsen in the interim  The patient was agreeable and verbalized an understanding  History of Present Illness: The patient is a 80 y o  female with a history of chronic pain secondary to low back pain, lumbar intervertebral disc disorder with radiculopathy and lumbar foraminal stenosis  She was last seen on 4/27/2023 where she underwent an L5/S1 LESI which provided her moderate relief for 2 to 3 days before the pain returned  She presents to the office with worsening bilateral low back pain and pain that radiates into bilateral hips  She states her pain is worse since the last office visit and constant  She rates quality of her pain as pressure-like and is currently rating her pain an 8/10 on a numeric scale      Current pain medications include Tylenol and gabapentin 100 mg 3 times a day  She states this medication regimen is providing her little relief of her pain  I have personally reviewed and/or updated the patient's past medical history, past surgical history, family history, social history, current medications, allergies, and vital signs today  Review of Systems:    Review of Systems   Respiratory: Negative for shortness of breath  Cardiovascular: Negative for chest pain  Gastrointestinal: Negative for constipation, diarrhea, nausea and vomiting  Musculoskeletal: Positive for gait problem  Negative for arthralgias, joint swelling and myalgias  Skin: Negative for rash  Neurological: Negative for dizziness, seizures and weakness  All other systems reviewed and are negative          Past Medical History:   Diagnosis Date   • Aortic valve insufficiency    • Cataract of both eyes    • Dysuria     last assessed - [id number]   • Edema     last assessed - [id number]   • GERD (gastroesophageal reflux disease)     last assessed - 30Aug2012   • Hyperlipidemia    • Hypertension    • Low back pain     last assessed - 30Aug2012   • Mitral valve disorder    • Osteoporosis     last assessed - [id number]   • Palpitations        Past Surgical History:   Procedure Laterality Date   • APPENDECTOMY     • CATARACT EXTRACTION Bilateral    • COLONOSCOPY     • TUBAL LIGATION Bilateral        Family History   Problem Relation Age of Onset   • Kidney disease Mother         Chronic   • No Known Problems Father    • Breast cancer Sister    • Diabetes Sister    • Breast cancer Sister    • Hypertension Sister    • No Known Problems Daughter    • No Known Problems Son    • No Known Problems Son    • No Known Problems Son    • No Known Problems Son        Social History     Occupational History   • Not on file   Tobacco Use   • Smoking status: Never   • Smokeless tobacco: Never   Vaping Use   • Vaping Use: Never used   Substance and Sexual Activity   • Alcohol use: Never   • Drug use: Never   • Sexual activity: Not Currently         Current Outpatient Medications:   •  aspirin 81 MG tablet, Take 1 tablet by mouth daily, Disp: , Rfl:   •  cholecalciferol (VITAMIN D3) 1,000 units tablet, Take 2 tablets by mouth daily, Disp: , Rfl:   •  cloNIDine (CATAPRES-TTS-1) 0 1 mg/24 hr, PLACE 1 PATCH ON THE SKIN ONCE A WEEK AS DIRECTED, Disp: 12 patch, Rfl: 3  •  Denta 5000 Plus 1 1 % CREA, USE TWICE A DAY -SPIT, DONT RINSE AND NOTHING BY MOUTH X 30 MIN, Disp: , Rfl:   •  famotidine (PEPCID) 20 mg tablet, TAKE 1 TABLET BY MOUTH EVERY DAY, Disp: 90 tablet, Rfl: 2  •  ferrous sulfate 324 (65 Fe) mg, TAKE 1 TABLET BY MOUTH EVERY DAY BEFORE BREAKFAST, Disp: 90 tablet, Rfl: 1  •  gabapentin (Neurontin) 100 mg capsule, Take 1 PO TID , Disp: 90 capsule, Rfl: 1  •  glipiZIDE (GLUCOTROL) 10 mg tablet, TAKE 1 TABLET BY MOUTH IN THE MORNING THEN 2 TABS WITH DINNER, Disp: 270 tablet, Rfl: 0  •  Invokana 100 MG, TAKE 1 TABLET BY MOUTH DAILY BEFORE BREAKFAST , Disp: 30 tablet, Rfl: 5  •  Januvia 50 MG tablet, TAKE 1 TABLET BY MOUTH EVERY DAY, Disp: 90 tablet, Rfl: 2  •  nebivolol (BYSTOLIC) 20 MG tablet, TAKE 1 TABLET BY MOUTH EVERY DAY, Disp: 90 tablet, Rfl: 3  •  NIFEdipine (ADALAT CC) 90 mg 24 hr tablet, TAKE 1 TABLET BY MOUTH EVERY DAY, Disp: 90 tablet, Rfl: 7  •  Potassium Gluconate 595 (99 K) MG TABS, Take 2 tablets by mouth 2 (two) times a day 4 tabs total, Disp: , Rfl:   •  sertraline (ZOLOFT) 25 mg tablet, TAKE 1 TABLET BY MOUTH EVERY DAY, Disp: 90 tablet, Rfl: 3  •  simvastatin (ZOCOR) 20 mg tablet, TAKE 1 TABLET BY MOUTH EVERYDAY AT BEDTIME, Disp: 90 tablet, Rfl: 3  •  torsemide (DEMADEX) 20 mg tablet, Take 1 tablet (20 mg total) by mouth daily, Disp: 90 tablet, Rfl: 3  •  ACCU-CHEK PAUL PLUS test strip, , Disp: , Rfl:   •  Accu-Chek Guide test strip, USE TO TEST TWICE A DAY, Disp: 100 strip, Rfl: 3  •  Accu-Chek Softclix Lancets lancets, , Disp: , Rfl:   •  Accu-Chek Softclix Lancets "lancets, Use as instructed twice daily, Disp: 100 each, Rfl: 3  •  Diclofenac Sodium (VOLTAREN) 1 %, Apply 2 g topically 4 (four) times a day (Patient not taking: Reported on 1/13/2023), Disp: 50 g, Rfl: 0  •  glucose blood (Accu-Chek Emma Plus) test strip, USE AS INSTRUCTED TWICE DAILY (Patient not taking: Reported on 2/2/2023), Disp: 100 each, Rfl: 7    No Known Allergies    Physical Exam:    /68 (BP Location: Left arm, Patient Position: Sitting, Cuff Size: Standard)   Pulse 64   Temp 98 °F (36 7 °C)   Ht 5' 2\" (1 575 m)   Wt 73 9 kg (163 lb)   BMI 29 81 kg/m²     Constitutional:normal, well developed, well nourished, alert, in no distress and non-toxic and no overt pain behavior    Eyes:anicteric  HEENT:grossly intact  Neck:supple, symmetric, trachea midline and no masses   Pulmonary:even and unlabored  Cardiovascular:No edema or pitting edema present  Skin:Normal without rashes or lesions and well hydrated  Psychiatric:Mood and affect appropriate  Neurologic:Cranial Nerves II-XII grossly intact  Musculoskeletal:antalgic     Lumbar Spine Exam    Appearance:  Normal lordosis  Palpation/Tenderness:  left sacroiliac joint tenderness  right sacroiliac joint tenderness  Sensory:  no sensory deficits noted  Range of Motion:  Flexion:  Minimally limited  with pain  Extension:  Minimally limited  with pain  Motor Strength:  Left hip flexion:  5/5  Right hip flexion:  5/5  Left knee flexion:  5/5  Left knee extension:  5/5  Right knee flexion:  5/5  Right knee extension:  5/5  Left foot dorsiflexion:  5/5  Left foot plantar flexion:  5/5  Right foot dorsiflexion:  5/5  Right foot plantar flexion:  5/5  Special Tests:  Left Straight Leg Test:  positive  Right Straight Leg Test:  positive  Left Sky's Maneuver:  positive  Right Sky's Maneuver:  positive  Left Gaenslen's Test:  positive  Right Gaenslen's Test:  positive  Left Pelvic Distraction Test:  positive  Right Pelvic Distraction Test:  " positive        Imaging  FL spine and pain procedure    (Results Pending)         Orders Placed This Encounter   Procedures   • FL spine and pain procedure

## 2023-05-22 NOTE — TELEPHONE ENCOUNTER
Caller:  Sheri Meehan (daughter-in-law)    Doctor: Nata Villatoro    Reason for call: pt is currently experiencing sacroiliac pain & is requesting to know what can she do for pain relief    Call back#: [phone]

## 2023-05-22 NOTE — TELEPHONE ENCOUNTER
S/w pt's daughter in law, jimbo per medical communication consent on file  Per Michoacano Ross, pt is scheduled for procedure on 6/8, questioning how to get some relief in the meantime  Stated that the pt is having difficulty dressing and is only comfortable when she sits  Confirmed gabapentin 100 mg tid w/ no se's or issues, voltaren gel, tylenol ans salon pas patches  Per jimbo, pt cannot take nsaids s/t kidney disease  Advised jimbo, would recommend rest, ice / heat, medication as directed or prescribed  The writer will d/w [facility] surg coord - call pt if sooner  The writer will d/w Dr Luis Trejo and cb if there is anything different or additional  Michoacano Ross verbalized understanding and appreciation  Per Sunni Chen, questioning bill for $350 after ins for her last procedure  Questioning if that sounds correct  Advised pt, the writer will forward this question to [address]  Michoacano Ross verbalized understanding and appreciation

## 2023-05-22 NOTE — TELEPHONE ENCOUNTER
S/w jimbo, advised of above  Per Clay Lujan, pt is using topical medication  Jeffery Young, unfortunately no other suggestions at this time  Possibly a sooner opening will become available  Kar Brooks verbalized understanding and appreciation

## 2023-05-23 NOTE — TELEPHONE ENCOUNTER
Spoke with daughter in law advise I would look in to it and that they should not pay bill and will receive another bill for right amount

## 2023-05-25 DIAGNOSIS — E11.22 TYPE 2 DIABETES MELLITUS WITH STAGE 4 CHRONIC KIDNEY DISEASE, WITHOUT LONG-TERM CURRENT USE OF INSULIN (HCC): ICD-10-CM

## 2023-05-25 DIAGNOSIS — N18.4 TYPE 2 DIABETES MELLITUS WITH STAGE 4 CHRONIC KIDNEY DISEASE, WITHOUT LONG-TERM CURRENT USE OF INSULIN (HCC): ICD-10-CM

## 2023-05-25 RX ORDER — CANAGLIFLOZIN 100 MG/1
TABLET, FILM COATED ORAL
Qty: 30 TABLET | Refills: 5 | Status: SHIPPED | OUTPATIENT
Start: 2023-05-25

## 2023-06-02 DIAGNOSIS — I10 ESSENTIAL HYPERTENSION: ICD-10-CM

## 2023-06-02 DIAGNOSIS — F41.9 ANXIETY: ICD-10-CM

## 2023-06-02 RX ORDER — NIFEDIPINE 90 MG/1
TABLET, FILM COATED, EXTENDED RELEASE ORAL
Qty: 90 TABLET | Refills: 8 | Status: SHIPPED | OUTPATIENT
Start: 2023-06-02

## 2023-06-02 RX ORDER — SERTRALINE HYDROCHLORIDE 25 MG/1
TABLET, FILM COATED ORAL
Qty: 90 TABLET | Refills: 4 | Status: SHIPPED | OUTPATIENT
Start: 2023-06-02

## 2023-06-05 ENCOUNTER — TELEPHONE (OUTPATIENT)
Dept: PAIN MEDICINE | Facility: MEDICAL CENTER | Age: 88
End: 2023-06-05

## 2023-06-05 ENCOUNTER — TELEPHONE (OUTPATIENT)
Dept: PAIN MEDICINE | Facility: CLINIC | Age: 88
End: 2023-06-05

## 2023-06-05 NOTE — TELEPHONE ENCOUNTER
Caller: Etta Zapata Daughter     Doctor/Office: Dr Natalio Holman    Call regarding :  Virl Scale    Call was transferred to: Xena Rajan

## 2023-06-05 NOTE — TELEPHONE ENCOUNTER
Spoke with Daughter Thomas Raman states patient is about to understand english and will be able to sign all forms that are required  States that patient will arrive with son but will be going to her house after procedure   please call Thomas Raman back with any questions for this procedure [phone]

## 2023-06-05 NOTE — TELEPHONE ENCOUNTER
Caller: Shana Grajeda     Doctor: Lopez Gaming    Reason for call: patient normally comes with daughter in-law she is on vacation she will come to procedure with Son, patient won't be able to fill out any paperwork please call Shana Grajeda when procedure is done to give her update if possible       Call back#: [phone]

## 2023-06-05 NOTE — TELEPHONE ENCOUNTER
Attempted to contact Daija Batemna  Left a detailed message on machine advising pt that the pt could reschedule her appt for 6/15 or 6/19 if she would like  Provided cb number and office hours

## 2023-06-05 NOTE — TELEPHONE ENCOUNTER
S/w Suzanne Nava, pt's daughter in law  Advised that there will be papers for the pt to read and sign  There will also be a pain diary to complete after the procedure and the instructions for that will be reviewed after the procedure  Offered to arrange for a  or reschedule the procedure  Per Suzanne Nava, she will need to discuss this situation with her family  She will cb to advise

## 2023-06-08 ENCOUNTER — HOSPITAL ENCOUNTER (OUTPATIENT)
Dept: RADIOLOGY | Facility: CLINIC | Age: 88
Discharge: HOME | End: 2023-06-08
Payer: COMMERCIAL

## 2023-06-08 VITALS
OXYGEN SATURATION: 90 % | DIASTOLIC BLOOD PRESSURE: 74 MMHG | TEMPERATURE: 97 F | SYSTOLIC BLOOD PRESSURE: 162 MMHG | HEART RATE: 70 BPM | RESPIRATION RATE: 18 BRPM

## 2023-06-08 DIAGNOSIS — M46.1 SACROILIITIS (HCC): ICD-10-CM

## 2023-06-08 PROCEDURE — 27096 INJECT SACROILIAC JOINT: CPT | Performed by: ANESTHESIOLOGY

## 2023-06-08 RX ORDER — BUPIVACAINE HCL/PF 2.5 MG/ML
4 VIAL (ML) INJECTION ONCE
Status: COMPLETED | OUTPATIENT
Start: 2023-06-08 | End: 2023-06-08

## 2023-06-08 RX ADMIN — BUPIVACAINE HYDROCHLORIDE 4 ML: 2.5 INJECTION, SOLUTION EPIDURAL; INFILTRATION; INTRACAUDAL at 15:27

## 2023-06-08 RX ADMIN — IOHEXOL 1 ML: 300 INJECTION, SOLUTION INTRAVENOUS at 15:27

## 2023-06-08 NOTE — H&P
History of Present Illness: The patient is a 80 y o  female who presents with complaints of bilateral low back pain        Past Medical History:   Diagnosis Date   • Aortic valve insufficiency    • Cataract of both eyes    • Dysuria     last assessed - [id number]   • Edema     last assessed - [id number]   • GERD (gastroesophageal reflux disease)     last assessed - 30Aug2012   • Hyperlipidemia    • Hypertension    • Low back pain     last assessed - 30Aug2012   • Mitral valve disorder    • Osteoporosis     last assessed - [id number]   • Palpitations        Past Surgical History:   Procedure Laterality Date   • APPENDECTOMY     • CATARACT EXTRACTION Bilateral    • COLONOSCOPY     • TUBAL LIGATION Bilateral          Current Outpatient Medications:   •  Invokana 100 MG, TAKE 1 TABLET BY MOUTH EVERY DAY BEFORE BREAKFAST, Disp: 30 tablet, Rfl: 5  •  ACCU-CHEK PAUL PLUS test strip, , Disp: , Rfl:   •  Accu-Chek Guide test strip, USE TO TEST TWICE A DAY, Disp: 100 strip, Rfl: 3  •  Accu-Chek Softclix Lancets lancets, , Disp: , Rfl:   •  Accu-Chek Softclix Lancets lancets, Use as instructed twice daily, Disp: 100 each, Rfl: 3  •  aspirin 81 MG tablet, Take 1 tablet by mouth daily, Disp: , Rfl:   •  cholecalciferol (VITAMIN D3) 1,000 units tablet, Take 2 tablets by mouth daily, Disp: , Rfl:   •  cloNIDine (CATAPRES-TTS-1) 0 1 mg/24 hr, PLACE 1 PATCH ON THE SKIN ONCE A WEEK AS DIRECTED, Disp: 12 patch, Rfl: 3  •  Denta 5000 Plus 1 1 % CREA, USE TWICE A DAY -SPIT, DONT RINSE AND NOTHING BY MOUTH X 30 MIN, Disp: , Rfl:   •  Diclofenac Sodium (VOLTAREN) 1 %, Apply 2 g topically 4 (four) times a day (Patient not taking: Reported on 1/13/2023), Disp: 50 g, Rfl: 0  •  famotidine (PEPCID) 20 mg tablet, TAKE 1 TABLET BY MOUTH EVERY DAY, Disp: 90 tablet, Rfl: 2  •  ferrous sulfate 324 (65 Fe) mg, TAKE 1 TABLET BY MOUTH EVERY DAY BEFORE BREAKFAST, Disp: 90 tablet, Rfl: 1  •  gabapentin (Neurontin) 100 mg capsule, Take 1 PO TID , Disp: 90 capsule, Rfl: 1  •  glipiZIDE (GLUCOTROL) 10 mg tablet, TAKE 1 TABLET BY MOUTH IN THE MORNING THEN 2 TABS WITH DINNER, Disp: 270 tablet, Rfl: 0  •  glucose blood (Accu-Chek Emma Plus) test strip, USE AS INSTRUCTED TWICE DAILY (Patient not taking: Reported on 2/2/2023), Disp: 100 each, Rfl: 7  •  Januvia 50 MG tablet, TAKE 1 TABLET BY MOUTH EVERY DAY, Disp: 90 tablet, Rfl: 2  •  nebivolol (BYSTOLIC) 20 MG tablet, TAKE 1 TABLET BY MOUTH EVERY DAY, Disp: 90 tablet, Rfl: 3  •  NIFEdipine (ADALAT CC) 90 mg 24 hr tablet, TAKE 1 TABLET BY MOUTH EVERY DAY, Disp: 90 tablet, Rfl: 8  •  Potassium Gluconate 595 (99 K) MG TABS, Take 2 tablets by mouth 2 (two) times a day 4 tabs total, Disp: , Rfl:   •  sertraline (ZOLOFT) 25 mg tablet, TAKE 1 TABLET BY MOUTH EVERY DAY, Disp: 90 tablet, Rfl: 4  •  simvastatin (ZOCOR) 20 mg tablet, TAKE 1 TABLET BY MOUTH EVERYDAY AT BEDTIME, Disp: 90 tablet, Rfl: 3  •  torsemide (DEMADEX) 20 mg tablet, Take 1 tablet (20 mg total) by mouth daily, Disp: 90 tablet, Rfl: 3    Current Facility-Administered Medications:   •  bupivacaine (PF) (MARCAINE) 0 25 % injection 4 mL, 4 mL, Intra-articular, Once, Aleks Meza DO  •  iohexol (OMNIPAQUE) 300 mg/mL injection 1 mL, 1 mL, Intra-articular, Once, Aleks Meza DO    No Known Allergies    Physical Exam:   Vitals:    06/08/23 1516   BP: 155/76   Pulse: 69   Resp: 18   Temp: (!) 97 °F (36 1 °C)   SpO2: 91%     General: Awake, Alert, Oriented x 3, Mood and affect appropriate  Respiratory: Respirations even and unlabored  Cardiovascular: Peripheral pulses intact; no edema  Musculoskeletal Exam: Ambulates without a cane    ASA Score: II    Patient/Chart Verification  Patient ID Verified: Verbal  ID Band Applied: No  Consents Confirmed: Procedural, To be obtained in the Pre-Procedure area  Interval H&P(within 24 hr) Complete (required for Outpatients and Surgery Admit only): To be obtained in the Pre-Procedure area  Allergies Reviewed:  Yes  Anticoag/NSAID held?: NA  Currently on antibiotics?: No  Pregnancy denied?: NA    Assessment:   1   Sacroiliitis ([address] )        Plan: Bilateral SI joint injection

## 2023-06-08 NOTE — DISCHARGE INSTRUCTIONS

## 2023-06-15 ENCOUNTER — TELEPHONE (OUTPATIENT)
Dept: PAIN MEDICINE | Facility: CLINIC | Age: 88
End: 2023-06-15

## 2023-06-15 NOTE — TELEPHONE ENCOUNTER
Caller: Ena  Doctor/office: Susana  #: [phone]    % of improvement: 0%  Pain Scale (1-10): 9/10

## 2023-06-15 NOTE — TELEPHONE ENCOUNTER
1st attempt  Lm to cb with % improvement and pain level       Bilateral SI joint injection 6/8, No F/u

## 2023-06-17 DIAGNOSIS — K21.00 GASTROESOPHAGEAL REFLUX DISEASE WITH ESOPHAGITIS: ICD-10-CM

## 2023-06-17 DIAGNOSIS — I10 ESSENTIAL HYPERTENSION: ICD-10-CM

## 2023-06-17 RX ORDER — FAMOTIDINE 20 MG/1
TABLET, FILM COATED ORAL
Qty: 90 TABLET | Refills: 3 | Status: SHIPPED | OUTPATIENT
Start: 2023-06-17

## 2023-06-17 RX ORDER — NEBIVOLOL 20 MG/1
TABLET ORAL
Qty: 90 TABLET | Refills: 4 | Status: SHIPPED | OUTPATIENT
Start: 2023-06-17

## 2023-06-19 ENCOUNTER — TELEPHONE (OUTPATIENT)
Dept: NEPHROLOGY | Facility: CLINIC | Age: 88
End: 2023-06-19

## 2023-06-19 NOTE — TELEPHONE ENCOUNTER
Called and spoke with [employer] to complete their bloodwork prior to their appointment on 6/30 with Dr Trish Nur at the [facility]

## 2023-06-22 DIAGNOSIS — R60.9 DEPENDENT EDEMA: ICD-10-CM

## 2023-06-22 RX ORDER — TORSEMIDE 20 MG/1
TABLET ORAL
Qty: 90 TABLET | Refills: 4 | Status: SHIPPED | OUTPATIENT
Start: 2023-06-22

## 2023-06-24 LAB
ALBUMIN SERPL-MCNC: 4.6 G/DL (ref 3.6–4.6)
ALBUMIN/GLOB SERPL: 2.4 {RATIO} (ref 1.2–2.2)
ALP SERPL-CCNC: 88 IU/L (ref 44–121)
ALT SERPL-CCNC: 13 IU/L (ref 0–32)
APPEARANCE UR: CLEAR
AST SERPL-CCNC: 18 IU/L (ref 0–40)
BACTERIA URNS QL MICRO: NORMAL
BASOPHILS # BLD AUTO: 0.1 X10E3/UL (ref 0–0.2)
BASOPHILS NFR BLD AUTO: 0 %
BILIRUB SERPL-MCNC: 0.3 MG/DL (ref 0–1.2)
BILIRUB UR QL STRIP: NEGATIVE
BUN SERPL-MCNC: 38 MG/DL (ref 8–27)
BUN SERPL-MCNC: 40 MG/DL (ref 8–27)
BUN/CREAT SERPL: 20 (ref 12–28)
BUN/CREAT SERPL: 21 (ref 12–28)
CALCIUM SERPL-MCNC: 10.1 MG/DL (ref 8.7–10.3)
CALCIUM SERPL-MCNC: 9.9 MG/DL (ref 8.7–10.3)
CASTS URNS QL MICRO: NORMAL /LPF
CHLORIDE SERPL-SCNC: 102 MMOL/L (ref 96–106)
CHLORIDE SERPL-SCNC: 102 MMOL/L (ref 96–106)
CHOLEST SERPL-MCNC: 175 MG/DL (ref 100–199)
CHOLEST/HDLC SERPL: 3.5 RATIO (ref 0–4.4)
CO2 SERPL-SCNC: 24 MMOL/L (ref 20–29)
CO2 SERPL-SCNC: 25 MMOL/L (ref 20–29)
COLOR UR: YELLOW
CREAT SERPL-MCNC: 1.89 MG/DL (ref 0.57–1)
CREAT SERPL-MCNC: 1.9 MG/DL (ref 0.57–1)
CREAT UR-MCNC: 69.9 MG/DL
EGFR: 25 ML/MIN/1.73
EGFR: 25 ML/MIN/1.73
EOSINOPHIL # BLD AUTO: 0.8 X10E3/UL (ref 0–0.4)
EOSINOPHIL NFR BLD AUTO: 5 %
EPI CELLS #/AREA URNS HPF: NORMAL /HPF (ref 0–10)
ERYTHROCYTE [DISTWIDTH] IN BLOOD BY AUTOMATED COUNT: 13.3 % (ref 11.7–15.4)
EST. AVERAGE GLUCOSE BLD GHB EST-MCNC: 203 MG/DL
FERRITIN SERPL-MCNC: 187 NG/ML (ref 15–150)
GLOBULIN SER-MCNC: 1.9 G/DL (ref 1.5–4.5)
GLUCOSE SERPL-MCNC: 173 MG/DL (ref 70–99)
GLUCOSE SERPL-MCNC: 174 MG/DL (ref 70–99)
GLUCOSE UR QL: ABNORMAL
HBA1C MFR BLD: 8.7 % (ref 4.8–5.6)
HCT VFR BLD AUTO: 45.1 % (ref 34–46.6)
HDLC SERPL-MCNC: 50 MG/DL
HGB BLD-MCNC: 15.2 G/DL (ref 11.1–15.9)
HGB UR QL STRIP: NEGATIVE
IMM GRANULOCYTES # BLD: 0 X10E3/UL (ref 0–0.1)
IMM GRANULOCYTES NFR BLD: 0 %
IRON SERPL-MCNC: 46 UG/DL (ref 27–139)
KETONES UR QL STRIP: NEGATIVE
LDLC SERPL CALC-MCNC: 101 MG/DL (ref 0–99)
LEUKOCYTE ESTERASE UR QL STRIP: NEGATIVE
LYMPHOCYTES # BLD AUTO: 3.8 X10E3/UL (ref 0.7–3.1)
LYMPHOCYTES NFR BLD AUTO: 23 %
MCH RBC QN AUTO: 31.3 PG (ref 26.6–33)
MCHC RBC AUTO-ENTMCNC: 33.7 G/DL (ref 31.5–35.7)
MCV RBC AUTO: 93 FL (ref 79–97)
MICRO URNS: ABNORMAL
MONOCYTES # BLD AUTO: 1.3 X10E3/UL (ref 0.1–0.9)
MONOCYTES NFR BLD AUTO: 8 %
NEUTROPHILS # BLD AUTO: 10.6 X10E3/UL (ref 1.4–7)
NEUTROPHILS NFR BLD AUTO: 64 %
NITRITE UR QL STRIP: NEGATIVE
PH UR STRIP: 6 [PH] (ref 5–7.5)
PLATELET # BLD AUTO: 198 X10E3/UL (ref 150–450)
POTASSIUM SERPL-SCNC: 4.1 MMOL/L (ref 3.5–5.2)
POTASSIUM SERPL-SCNC: 4.1 MMOL/L (ref 3.5–5.2)
PROT SERPL-MCNC: 6.5 G/DL (ref 6–8.5)
PROT UR QL STRIP: ABNORMAL
PROT UR-MCNC: 40.4 MG/DL
PROT/CREAT UR: 578 MG/G CREAT (ref 0–200)
RBC # BLD AUTO: 4.85 X10E6/UL (ref 3.77–5.28)
RBC #/AREA URNS HPF: NORMAL /HPF (ref 0–2)
SL AMB VLDL CHOLESTEROL CALC: 24 MG/DL (ref 5–40)
SODIUM SERPL-SCNC: 144 MMOL/L (ref 134–144)
SODIUM SERPL-SCNC: 144 MMOL/L (ref 134–144)
SP GR UR: 1.02 (ref 1–1.03)
TRIGL SERPL-MCNC: 139 MG/DL (ref 0–149)
TSH SERPL DL<=0.005 MIU/L-ACNC: 1.99 UIU/ML (ref 0.45–4.5)
UROBILINOGEN UR STRIP-ACNC: 0.2 MG/DL (ref 0.2–1)
WBC # BLD AUTO: 16.6 X10E3/UL (ref 3.4–10.8)
WBC #/AREA URNS HPF: NORMAL /HPF (ref 0–5)

## 2023-06-26 ENCOUNTER — TELEPHONE (OUTPATIENT)
Dept: FAMILY MEDICINE CLINIC | Facility: HOSPITAL | Age: 88
End: 2023-06-26

## 2023-06-30 ENCOUNTER — OFFICE VISIT (OUTPATIENT)
Dept: NEPHROLOGY | Facility: HOSPITAL | Age: 88
End: 2023-06-30
Payer: COMMERCIAL

## 2023-06-30 VITALS
SYSTOLIC BLOOD PRESSURE: 142 MMHG | WEIGHT: 164.8 LBS | HEIGHT: 62 IN | BODY MASS INDEX: 30.33 KG/M2 | DIASTOLIC BLOOD PRESSURE: 60 MMHG

## 2023-06-30 DIAGNOSIS — R80.9 PROTEINURIA, UNSPECIFIED TYPE: ICD-10-CM

## 2023-06-30 DIAGNOSIS — E11.22 TYPE 2 DIABETES MELLITUS WITH STAGE 4 CHRONIC KIDNEY DISEASE, WITHOUT LONG-TERM CURRENT USE OF INSULIN (HCC): Primary | ICD-10-CM

## 2023-06-30 DIAGNOSIS — N18.4 TYPE 2 DIABETES MELLITUS WITH STAGE 4 CHRONIC KIDNEY DISEASE, WITHOUT LONG-TERM CURRENT USE OF INSULIN (HCC): Primary | ICD-10-CM

## 2023-06-30 DIAGNOSIS — R60.9 DEPENDENT EDEMA: ICD-10-CM

## 2023-06-30 DIAGNOSIS — I10 PRIMARY HYPERTENSION: ICD-10-CM

## 2023-06-30 DIAGNOSIS — E87.6 HYPOKALEMIA: ICD-10-CM

## 2023-06-30 NOTE — PROGRESS NOTES
NEPHROLOGY OUTPATIENT PROGRESS NOTE   Rebeka Baker 80 y o  female MRN: [id number]  DATE: 6/30/2023  Reason for visit:   Chief Complaint   Patient presents with   • Chronic Kidney Disease   • Follow-up        Patient Instructions   1  chronic kidney disease stage 4 in the setting of Diabetes mellitus type 2 as well as hypertensive nephrosclerosis +/- physiologic aging of the kidney   -last sCr 1 9 as of 6/23/23  Had been stable in the 1 6-1 9 range since July 2018, eGFR in mid 20s and stable  -latest UA with microscopy shows trace protein, no RBCs with UpCr 578mg/g  -avoid nonsteroidals (ibuprofen, advil, motrin, aleve, naproxen, indomethacin, celebrex, toradol)  -may take tylenol  -please stay well hydrated  -kidney smart education refused in the past       2  Microalbuminuria in setting of type 2 DM - UpCr a bit higher at 578 mg/g as of June 2023  -off lisinopril  -microalbumin in the urine can also be seen with physiologic aging of the kidney  -monitor proteinuria     3  HTN - BP acceptable for age/CKD in office  -continue clonidine 0 1mg weekly patch, bystolic 96ZQ after dinner, nifedipine 90mg daily, torsemide 20mg daily  -off lisinopril   -off HCTZ 25mg daily     4  DM2, uncontrolled - on januvia, glipizide, invokana, last A1C 8 7 as of Jun 2023, needs improved sugar control to slow down progression of CKD  5  Hypokalemia, likely diuretic induced - off HCTZ, K 4 1, resolved on potassium gluconate supplement, 2 tablets a day  Is on torsemide also  6  Dependent edema - resolved, albumin normal  Off HCTZ  Now on torsemide 20mg daily  Echo shows grade 1 diastolic dysfunction from April 2023  -monitor daily weight  Call office if weight gain > 3-5 lbs  7  Complex cyst, left kidney - stable in size as of July 2020 ultrasound, will defer further imaging in light of age     RTC in 4 months  Obtain blood and urine testing in 3 months prior to next office appointment           Denae Moctezuma was seen today for chronic kidney disease and follow-up  Diagnoses and all orders for this visit:    Type 2 diabetes mellitus with stage 4 chronic kidney disease, without long-term current use of insulin ([facility])  -     Urinalysis with microscopic; Future  -     Protein / creatinine ratio, urine; Future  -     Albumin / creatinine urine ratio; Future  -     Comprehensive metabolic panel; Future  -     Magnesium; Future  -     Phosphorus; Future  -     PTH, intact; Future    Primary hypertension    Dependent edema    Hypokalemia  -     Comprehensive metabolic panel; Future    Proteinuria, unspecified type  -     Protein / creatinine ratio, urine; Future  -     Albumin / creatinine urine ratio; Future        Assessment/Plan:  1  chronic kidney disease stage 4 in the setting of Diabetes mellitus type 2 as well as hypertensive nephrosclerosis +/- physiologic aging of the kidney   -last sCr 1 9 as of 6/23/23  Had been stable in the 1 6-1 9 range since July 2018, eGFR in mid 20s and stable  -latest UA with microscopy shows trace protein, no RBCs with UpCr 578mg/g  -avoid nonsteroidals (ibuprofen, advil, motrin, aleve, naproxen, indomethacin, celebrex, toradol)  -may take tylenol  -please stay well hydrated  -kidney smart education refused in the past       2  Microalbuminuria in setting of type 2 DM - UpCr a bit higher at 578 mg/g as of June 2023  -off lisinopril  -microalbumin in the urine can also be seen with physiologic aging of the kidney  -monitor proteinuria     3  HTN - BP acceptable for age/CKD in office  -continue clonidine 0 1mg weekly patch, bystolic 85BN after dinner, nifedipine 90mg daily, torsemide 20mg daily  -off lisinopril   -off HCTZ 25mg daily     4  DM2, uncontrolled - on januvia, glipizide, invokana, last A1C 8 7 as of Jun 2023, needs improved sugar control to slow down progression of CKD      5  Hypokalemia, likely diuretic induced - off HCTZ, K 4 1, resolved on potassium gluconate supplement, 2 tablets a day   Is "on torsemide also       6  Dependent edema - resolved, albumin normal  Off HCTZ  Now on torsemide 20mg daily  Echo shows grade 1 diastolic dysfunction from April 2023  -monitor daily weight  Call office if weight gain > 3-5 lbs       7  Complex cyst, left kidney - stable in size as of July 2020 ultrasound, will defer further imaging in light of age     RTC in 4 months  Obtain blood and urine testing in 3 months prior to next office appointment      SUBJECTIVE / INTERVAL HISTORY:  80 y o  female presents in follow up of CKD  Mahad Ly denies any recent illness/hospitalizations/medication changes since last office visit  Denies NSAID use  DM2 - blood sugars higher lately  HTN - BP at home usually 800-847P systolic  Denies orthostatic symptoms  Having back pain and moving less  Is having some leg edema  Blood sugars at home: 173, 179, 180, 175, 176, 180, 165    Review of Systems   Constitutional: Negative for chills and fever  HENT: Negative for sore throat and trouble swallowing  Eyes: Negative for visual disturbance  Respiratory: Negative for cough and shortness of breath  Cardiovascular: Positive for leg swelling  Negative for chest pain  Gastrointestinal: Negative for abdominal pain, constipation, diarrhea, nausea and vomiting  Endocrine: Negative for polyuria  Genitourinary: Negative for difficulty urinating, dysuria and hematuria  Musculoskeletal: Positive for back pain and neck pain  Skin: Negative for rash  Neurological: Negative for dizziness, light-headedness and numbness  Psychiatric/Behavioral: The patient is not nervous/anxious  OBJECTIVE:  /60 (BP Location: Left arm, Patient Position: Sitting, Cuff Size: Large)   Ht 5' 2\" (1 575 m)   Wt 74 8 kg (164 lb 12 8 oz)   BMI 30 14 kg/m²  Body mass index is 30 14 kg/m²  Physical exam:  Physical Exam  Vitals and nursing note reviewed  Constitutional:       General: She is not in acute distress       " Appearance: Normal appearance  She is well-developed  She is not diaphoretic  HENT:      Head: Normocephalic and atraumatic  Nose: Nose normal       Mouth/Throat:      Mouth: Mucous membranes are dry  Pharynx: No oropharyngeal exudate  Eyes:      General: No scleral icterus  Right eye: No discharge  Left eye: No discharge  Comments: eyeglasses   Neck:      Thyroid: No thyromegaly  Cardiovascular:      Rate and Rhythm: Normal rate and regular rhythm  Heart sounds: No murmur heard  Pulmonary:      Effort: Pulmonary effort is normal  No respiratory distress  Breath sounds: Normal breath sounds  No wheezing  Abdominal:      General: Bowel sounds are normal  There is no distension  Palpations: Abdomen is soft  Musculoskeletal:         General: Swelling (left > right LE edema) present  Cervical back: Normal range of motion and neck supple  Skin:     General: Skin is warm and dry  Coloration: Skin is not jaundiced  Findings: No rash  Neurological:      General: No focal deficit present  Mental Status: She is alert  Motor: No abnormal muscle tone        Comments: awake   Psychiatric:         Mood and Affect: Mood normal          Behavior: Behavior normal          Medications:    Current Outpatient Medications:   •  ACCU-CHEK PAUL PLUS test strip, , Disp: , Rfl:   •  Accu-Chek Guide test strip, USE TO TEST TWICE A DAY, Disp: 100 strip, Rfl: 3  •  Accu-Chek Softclix Lancets lancets, Use as instructed twice daily, Disp: 100 each, Rfl: 3  •  aspirin 81 MG tablet, Take 1 tablet by mouth daily, Disp: , Rfl:   •  cholecalciferol (VITAMIN D3) 1,000 units tablet, Take 2 tablets by mouth daily, Disp: , Rfl:   •  cloNIDine (CATAPRES-TTS-1) 0 1 mg/24 hr, PLACE 1 PATCH ON THE SKIN ONCE A WEEK AS DIRECTED, Disp: 12 patch, Rfl: 3  •  Denta 5000 Plus 1 1 % CREA, USE TWICE A DAY -SPIT, DONT RINSE AND NOTHING BY MOUTH X 30 MIN, Disp: , Rfl:   •  Diclofenac Sodium (VOLTAREN) 1 %, Apply 2 g topically 4 (four) times a day, Disp: 50 g, Rfl: 0  •  famotidine (PEPCID) 20 mg tablet, TAKE 1 TABLET BY MOUTH EVERY DAY, Disp: 90 tablet, Rfl: 3  •  gabapentin (Neurontin) 100 mg capsule, Take 1 PO TID , Disp: 90 capsule, Rfl: 1  •  glipiZIDE (GLUCOTROL) 10 mg tablet, TAKE 1 TABLET BY MOUTH IN THE MORNING THEN 2 TABS WITH DINNER, Disp: 270 tablet, Rfl: 0  •  Invokana 100 MG, TAKE 1 TABLET BY MOUTH EVERY DAY BEFORE BREAKFAST, Disp: 30 tablet, Rfl: 5  •  Januvia 50 MG tablet, TAKE 1 TABLET BY MOUTH EVERY DAY, Disp: 90 tablet, Rfl: 2  •  nebivolol (BYSTOLIC) 20 MG tablet, TAKE 1 TABLET BY MOUTH EVERY DAY, Disp: 90 tablet, Rfl: 4  •  NIFEdipine (ADALAT CC) 90 mg 24 hr tablet, TAKE 1 TABLET BY MOUTH EVERY DAY, Disp: 90 tablet, Rfl: 8  •  Potassium Gluconate 595 (99 K) MG TABS, Take 2 tablets by mouth 2 (two) times a day 4 tabs total, Disp: , Rfl:   •  sertraline (ZOLOFT) 25 mg tablet, TAKE 1 TABLET BY MOUTH EVERY DAY, Disp: 90 tablet, Rfl: 4  •  simvastatin (ZOCOR) 20 mg tablet, TAKE 1 TABLET BY MOUTH EVERYDAY AT BEDTIME, Disp: 90 tablet, Rfl: 3  •  torsemide (DEMADEX) 20 mg tablet, TAKE 1 TABLET BY MOUTH EVERY DAY, Disp: 90 tablet, Rfl: 4  •  Accu-Chek Softclix Lancets lancets, , Disp: , Rfl:   •  glucose blood (Accu-Chek Emma Plus) test strip, USE AS INSTRUCTED TWICE DAILY (Patient not taking: Reported on 2/2/2023), Disp: 100 each, Rfl: 7    Allergies:   Allergies as of 06/30/2023   • (No Known Allergies)       The following portions of the patient's history were reviewed and updated as appropriate: past family history, past surgical history and problem list     Laboratory Results:  Lab Results   Component Value Date    SODIUM 144 06/23/2023    K 4 1 06/23/2023     06/23/2023    CO2 25 06/23/2023    BUN 38 (H) 06/23/2023    CREATININE 1 90 (H) 06/23/2023    GLUC 174 (H) 06/23/2023    CALCIUM 8 5 10/29/2022        Lab Results   Component Value Date    CALCIUM 8 5 10/29/2022 "PHOS 4 0 09/09/2021       Portions of the record may have been created with voice recognition software   Occasional wrong word or \"sound a like\" substitutions may have occurred due to the inherent limitations of voice recognition software   Read the chart carefully and recognize, using context, where substitutions have occurred    "

## 2023-06-30 NOTE — PATIENT INSTRUCTIONS
1  chronic kidney disease stage 4 in the setting of Diabetes mellitus type 2 as well as hypertensive nephrosclerosis +/- physiologic aging of the kidney   -last sCr 1 9 as of 6/23/23  Had been stable in the 1 6-1 9 range since July 2018, eGFR in mid 20s and stable  -latest UA with microscopy shows trace protein, no RBCs with UpCr 578mg/g  -avoid nonsteroidals (ibuprofen, advil, motrin, aleve, naproxen, indomethacin, celebrex, toradol)  -may take tylenol  -please stay well hydrated  -kidney smart education refused in the past       2  Microalbuminuria in setting of type 2 DM - UpCr a bit higher at 578 mg/g as of June 2023  -off lisinopril  -microalbumin in the urine can also be seen with physiologic aging of the kidney  -monitor proteinuria     3  HTN - BP acceptable for age/CKD in office  -continue clonidine 0 1mg weekly patch, bystolic 35LV after dinner, nifedipine 90mg daily, torsemide 20mg daily  -off lisinopril   -off HCTZ 25mg daily     4  DM2, uncontrolled - on januvia, glipizide, invokana, last A1C 8 7 as of Jun 2023, needs improved sugar control to slow down progression of CKD  5  Hypokalemia, likely diuretic induced - off HCTZ, K 4 1, resolved on potassium gluconate supplement, 2 tablets a day  Is on torsemide also  6  Dependent edema - resolved, albumin normal  Off HCTZ  Now on torsemide 20mg daily  Echo shows grade 1 diastolic dysfunction from April 2023  -monitor daily weight  Call office if weight gain > 3-5 lbs  7  Complex cyst, left kidney - stable in size as of July 2020 ultrasound, will defer further imaging in light of age     RTC in 4 months  Obtain blood and urine testing in 3 months prior to next office appointment

## 2023-06-30 NOTE — LETTER
June 30, 2023     Joan Pena, [address]  [address] [facility]  [address] [address]    Patient: Severiano Scripture   YOB: 1935   Date of Visit: 6/30/2023       Dear Dr Tapan Self: Thank you for referring Tessa Juarez to me for evaluation  Below are my notes for this consultation  Patient's A1C has risen and has had high fasting sugars  I think she may need oral medication adjustment  If you have questions, please do not hesitate to call me  I look forward to following your patient along with you  Sincerely,        Kameron Conteh DO        CC: No Recipients    Kameron Conteh DO  6/30/2023  9:43 AM  Incomplete  NEPHROLOGY OUTPATIENT PROGRESS NOTE   Severiano Scripture 80 y o  female MRN: [id number]  DATE: 6/30/2023  Reason for visit:   Chief Complaint   Patient presents with   • Chronic Kidney Disease   • Follow-up        Patient Instructions   1  chronic kidney disease stage 4 in the setting of Diabetes mellitus type 2 as well as hypertensive nephrosclerosis +/- physiologic aging of the kidney   -last sCr 1 9 as of 6/23/23  Had been stable in the 1 6-1 9 range since July 2018, eGFR in mid 20s and stable  -latest UA with microscopy shows trace protein, no RBCs with UpCr 578mg/g  -avoid nonsteroidals (ibuprofen, advil, motrin, aleve, naproxen, indomethacin, celebrex, toradol)  -may take tylenol  -please stay well hydrated  -kidney smart education refused in the past       2  Microalbuminuria in setting of type 2 DM - UpCr a bit higher at 578 mg/g as of June 2023  -off lisinopril  -microalbumin in the urine can also be seen with physiologic aging of the kidney  -monitor proteinuria     3   HTN - BP acceptable for age/CKD in office  -continue clonidine 0 1mg weekly patch, bystolic 15GL after dinner, nifedipine 90mg daily, torsemide 20mg daily  -off lisinopril   -off HCTZ 25mg daily     4  DM2, uncontrolled - on januvia, glipizide, invokana, last A1C 8 7 as of Jun 2023, needs improved sugar control to slow down progression of CKD  5  Hypokalemia, likely diuretic induced - off HCTZ, K 4 1, resolved on potassium gluconate supplement, 2 tablets a day  Is on torsemide also  6  Dependent edema - resolved, albumin normal  Off HCTZ  Now on torsemide 20mg daily  Echo shows grade 1 diastolic dysfunction from April 2023  -monitor daily weight  Call office if weight gain > 3-5 lbs  7  Complex cyst, left kidney - stable in size as of July 2020 ultrasound, will defer further imaging in light of age     RTC in 4 months  Obtain blood and urine testing in 3 months prior to next office appointment  Eva Seo was seen today for chronic kidney disease and follow-up  Diagnoses and all orders for this visit:    Type 2 diabetes mellitus with stage 4 chronic kidney disease, without long-term current use of insulin (HCC)    Primary hypertension    Dependent edema    Hypokalemia    Proteinuria, unspecified type        Assessment/Plan:  1  chronic kidney disease stage 4 in the setting of Diabetes mellitus type 2 as well as hypertensive nephrosclerosis +/- physiologic aging of the kidney   -last sCr 1 9 as of 6/23/23  Had been stable in the 1 6-1 9 range since July 2018, eGFR in mid 20s and stable  -latest UA with microscopy shows trace protein, no RBCs with UpCr 578mg/g  -avoid nonsteroidals (ibuprofen, advil, motrin, aleve, naproxen, indomethacin, celebrex, toradol)  -may take tylenol  -please stay well hydrated  -kidney smart education refused in the past       2  Microalbuminuria in setting of type 2 DM - UpCr a bit higher at 578 mg/g as of June 2023  -off lisinopril  -microalbumin in the urine can also be seen with physiologic aging of the kidney  -monitor proteinuria     3   HTN - BP acceptable for age/CKD in office  -continue clonidine 0 1mg weekly patch, bystolic 29YH after dinner, nifedipine 90mg daily, torsemide 20mg daily  -off lisinopril   -off HCTZ 25mg daily     4  DM2, uncontrolled - on januvia, glipizide, invokana, last A1C 8 7 as of Jun 2023, needs improved sugar control to slow down progression of CKD  5  Hypokalemia, likely diuretic induced - off HCTZ, K 4 1, resolved on potassium gluconate supplement, 2 tablets a day  Is on torsemide also  6  Dependent edema - resolved, albumin normal  Off HCTZ  Now on torsemide 20mg daily  Echo shows grade 1 diastolic dysfunction from April 2023  -monitor daily weight  Call office if weight gain > 3-5 lbs  7  Complex cyst, left kidney - stable in size as of July 2020 ultrasound, will defer further imaging in light of age     RTC in 4 months  Obtain blood and urine testing in 3 months prior to next office appointment  SUBJECTIVE / INTERVAL HISTORY:  80 y o  female presents in follow up of CKD  Aurelia Jones denies any recent illness/hospitalizations/medication changes since last office visit  Denies NSAID use  DM2 - blood sugars higher lately  HTN - BP at home usually 165-541Y systolic  Denies orthostatic symptoms  Having back pain and moving less  Is having some leg edema  Blood sugars at home: 173, 179, 180, 175, 176, 180, 165    Review of Systems   Constitutional: Negative for chills and fever  HENT: Negative for sore throat and trouble swallowing  Eyes: Negative for visual disturbance  Respiratory: Negative for cough and shortness of breath  Cardiovascular: Positive for leg swelling  Negative for chest pain  Gastrointestinal: Negative for abdominal pain, constipation, diarrhea, nausea and vomiting  Endocrine: Negative for polyuria  Genitourinary: Negative for difficulty urinating, dysuria and hematuria  Musculoskeletal: Positive for back pain and neck pain  Skin: Negative for rash  Neurological: Negative for dizziness, light-headedness and numbness  Psychiatric/Behavioral: The patient is not nervous/anxious          OBJECTIVE:  /60 (BP Location: Left arm, Patient Position: Sitting, Cuff "Size: Large)   Ht 5' 2\" (1 575 m)   Wt 74 8 kg (164 lb 12 8 oz)   BMI 30 14 kg/m²  Body mass index is 30 14 kg/m²  Physical exam:  Physical Exam  Vitals and nursing note reviewed  Constitutional:       General: She is not in acute distress  Appearance: Normal appearance  She is well-developed  She is not diaphoretic  HENT:      Head: Normocephalic and atraumatic  Nose: Nose normal       Mouth/Throat:      Mouth: Mucous membranes are dry  Pharynx: No oropharyngeal exudate  Eyes:      General: No scleral icterus  Right eye: No discharge  Left eye: No discharge  Comments: eyeglasses   Neck:      Thyroid: No thyromegaly  Cardiovascular:      Rate and Rhythm: Normal rate and regular rhythm  Heart sounds: No murmur heard  Pulmonary:      Effort: Pulmonary effort is normal  No respiratory distress  Breath sounds: Normal breath sounds  No wheezing  Abdominal:      General: Bowel sounds are normal  There is no distension  Palpations: Abdomen is soft  Musculoskeletal:         General: Swelling (left > right LE edema) present  Cervical back: Normal range of motion and neck supple  Skin:     General: Skin is warm and dry  Coloration: Skin is not jaundiced  Findings: No rash  Neurological:      General: No focal deficit present  Mental Status: She is alert  Motor: No abnormal muscle tone        Comments: awake   Psychiatric:         Mood and Affect: Mood normal          Behavior: Behavior normal          Medications:    Current Outpatient Medications:   •  ACCU-CHEK PAUL PLUS test strip, , Disp: , Rfl:   •  Accu-Chek Guide test strip, USE TO TEST TWICE A DAY, Disp: 100 strip, Rfl: 3  •  Accu-Chek Softclix Lancets lancets, Use as instructed twice daily, Disp: 100 each, Rfl: 3  •  aspirin 81 MG tablet, Take 1 tablet by mouth daily, Disp: , Rfl:   •  cholecalciferol (VITAMIN D3) 1,000 units tablet, Take 2 tablets by mouth daily, Disp: , " Rfl:   •  cloNIDine (CATAPRES-TTS-1) 0 1 mg/24 hr, PLACE 1 PATCH ON THE SKIN ONCE A WEEK AS DIRECTED, Disp: 12 patch, Rfl: 3  •  Denta 5000 Plus 1 1 % CREA, USE TWICE A DAY -SPIT, DONT RINSE AND NOTHING BY MOUTH X 30 MIN, Disp: , Rfl:   •  Diclofenac Sodium (VOLTAREN) 1 %, Apply 2 g topically 4 (four) times a day, Disp: 50 g, Rfl: 0  •  famotidine (PEPCID) 20 mg tablet, TAKE 1 TABLET BY MOUTH EVERY DAY, Disp: 90 tablet, Rfl: 3  •  gabapentin (Neurontin) 100 mg capsule, Take 1 PO TID , Disp: 90 capsule, Rfl: 1  •  glipiZIDE (GLUCOTROL) 10 mg tablet, TAKE 1 TABLET BY MOUTH IN THE MORNING THEN 2 TABS WITH DINNER, Disp: 270 tablet, Rfl: 0  •  Invokana 100 MG, TAKE 1 TABLET BY MOUTH EVERY DAY BEFORE BREAKFAST, Disp: 30 tablet, Rfl: 5  •  Januvia 50 MG tablet, TAKE 1 TABLET BY MOUTH EVERY DAY, Disp: 90 tablet, Rfl: 2  •  nebivolol (BYSTOLIC) 20 MG tablet, TAKE 1 TABLET BY MOUTH EVERY DAY, Disp: 90 tablet, Rfl: 4  •  NIFEdipine (ADALAT CC) 90 mg 24 hr tablet, TAKE 1 TABLET BY MOUTH EVERY DAY, Disp: 90 tablet, Rfl: 8  •  Potassium Gluconate 595 (99 K) MG TABS, Take 2 tablets by mouth 2 (two) times a day 4 tabs total, Disp: , Rfl:   •  sertraline (ZOLOFT) 25 mg tablet, TAKE 1 TABLET BY MOUTH EVERY DAY, Disp: 90 tablet, Rfl: 4  •  simvastatin (ZOCOR) 20 mg tablet, TAKE 1 TABLET BY MOUTH EVERYDAY AT BEDTIME, Disp: 90 tablet, Rfl: 3  •  torsemide (DEMADEX) 20 mg tablet, TAKE 1 TABLET BY MOUTH EVERY DAY, Disp: 90 tablet, Rfl: 4  •  Accu-Chek Softclix Lancets lancets, , Disp: , Rfl:   •  glucose blood (Accu-Chek Emma Plus) test strip, USE AS INSTRUCTED TWICE DAILY (Patient not taking: Reported on 2/2/2023), Disp: 100 each, Rfl: 7    Allergies:   Allergies as of 06/30/2023   • (No Known Allergies)       The following portions of the patient's history were reviewed and updated as appropriate: past family history, past surgical history and problem list     Laboratory Results:  Lab Results   Component Value Date    SODIUM 144 "06/23/2023    K 4 1 06/23/2023     06/23/2023    CO2 25 06/23/2023    BUN 38 (H) 06/23/2023    CREATININE 1 90 (H) 06/23/2023    GLUC 174 (H) 06/23/2023    CALCIUM 8 5 10/29/2022        Lab Results   Component Value Date    CALCIUM 8 5 10/29/2022    PHOS 4 0 09/09/2021       Portions of the record may have been created with voice recognition software  Occasional wrong word or \"sound a like\" substitutions may have occurred due to the inherent limitations of voice recognition software  Read the chart carefully and recognize, using context, where substitutions have occurred  Rob Dominguez, DO  6/30/2023  9:29 AM  Sign when Signing Visit  Almaz Garces NOTE   Luis Vasquez 80 y o  female MRN: [id number]  DATE: 6/30/2023  Reason for visit:   Chief Complaint   Patient presents with   • Chronic Kidney Disease   • Follow-up        There are no Patient Instructions on file for this visit  Buddy Serrano was seen today for chronic kidney disease and follow-up  Diagnoses and all orders for this visit:    Type 2 diabetes mellitus with stage 4 chronic kidney disease, without long-term current use of insulin (HCC)    Primary hypertension    Dependent edema    Hypokalemia    Proteinuria, unspecified type        Assessment/Plan:  1  chronic kidney disease stage 4 in the setting of Diabetes mellitus type 2 as well as hypertensive nephrosclerosis +/- physiologic aging of the kidney   -last sCr 1 9 as of 6/23/23   Had been stable in the 1 6-1 9 range since July 2018, eGFR in mid 20s and stable  -latest UA with microscopy shows trace protein, no RBCs with UpCr 578mg/g  -avoid nonsteroidals (ibuprofen, advil, motrin, aleve, naproxen, indomethacin, celebrex, toradol)  -may take tylenol  -please stay well hydrated  -kidney smart education refused in the past       2  Microalbuminuria in setting of type 2 DM - UpCr a bit higher at 578 mg/g as of June 2023  -off lisinopril  -microalbumin in the urine can " also be seen with physiologic aging of the kidney  -monitor proteinuria     3  HTN - BP acceptable for age/CKD in office  -continue clonidine 0 1mg weekly patch, bystolic 89LO after dinner, nifedipine 90mg daily, torsemide 20mg daily  -off lisinopril   -off HCTZ 25mg daily     4  DM2, uncontrolled - on januvia, glipizide, last A1C 7 6 as of Jan 2023, needs improved sugar control to slow down progression of CKD  5  Hypokalemia, likely diuretic induced - off HCTZ, K 4 1, resolved on potassium gluconate supplement, 2 tablets a day  Is on torsemide also  6  Dependent edema - resolved, albumin normal  Off HCTZ  Now on torsemide 20mg daily  Echo shows grade 1 diastolic dysfunction from April 2023  -monitor daily weight  Call office if weight gain > 3-5 lbs  7  Complex cyst, left kidney - stable in size as of July 2020 ultrasound, will defer further imaging in light of age     RTC in 4 months  Obtain blood and urine testing in 3 months prior to next office appointment  SUBJECTIVE / INTERVAL HISTORY:  80 y o  female presents in follow up of CKD  Yaima Montalvo denies any recent illness/hospitalizations/medication changes since last office visit  ?NSAID use  DM2 - blood sugars? HTN - BP at home? Review of Systems   Constitutional: Negative for chills and fever  HENT: Negative for sore throat and trouble swallowing  Eyes: Negative for visual disturbance  Respiratory: Negative for cough and shortness of breath  Cardiovascular: Negative for chest pain and leg swelling  Gastrointestinal: Negative for diarrhea, nausea and vomiting  Endocrine: Negative for polyuria  Genitourinary: Negative for difficulty urinating, dysuria and hematuria  Musculoskeletal: Negative for back pain and neck pain  Skin: Negative for rash  Neurological: Negative for dizziness, light-headedness and numbness  Hematological: Negative for adenopathy  Does not bruise/bleed easily  "  Psychiatric/Behavioral: The patient is not nervous/anxious  OBJECTIVE:  /60 (BP Location: Left arm, Patient Position: Sitting, Cuff Size: Large)   Ht 5' 2\" (1 575 m)   Wt 74 8 kg (164 lb 12 8 oz)   BMI 30 14 kg/m²  Body mass index is 30 14 kg/m²  Physical exam:  Physical Exam  Vitals and nursing note reviewed  Constitutional:       General: She is not in acute distress  Appearance: She is well-developed  She is not diaphoretic  HENT:      Head: Normocephalic and atraumatic  Mouth/Throat:      Pharynx: No oropharyngeal exudate  Eyes:      General: No scleral icterus  Right eye: No discharge  Left eye: No discharge  Neck:      Thyroid: No thyromegaly  Cardiovascular:      Rate and Rhythm: Normal rate and regular rhythm  Heart sounds: No murmur heard  Pulmonary:      Effort: Pulmonary effort is normal  No respiratory distress  Breath sounds: Normal breath sounds  No wheezing  Abdominal:      General: Bowel sounds are normal  There is no distension  Palpations: Abdomen is soft  Musculoskeletal:      Cervical back: Normal range of motion and neck supple  Skin:     General: Skin is warm and dry  Findings: No rash  Neurological:      Mental Status: She is alert  Motor: No abnormal muscle tone        Comments: awake   Psychiatric:         Behavior: Behavior normal          Medications:    Current Outpatient Medications:   •  ACCU-CHEK PAUL PLUS test strip, , Disp: , Rfl:   •  Accu-Chek Guide test strip, USE TO TEST TWICE A DAY, Disp: 100 strip, Rfl: 3  •  Accu-Chek Softclix Lancets lancets, Use as instructed twice daily, Disp: 100 each, Rfl: 3  •  aspirin 81 MG tablet, Take 1 tablet by mouth daily, Disp: , Rfl:   •  cholecalciferol (VITAMIN D3) 1,000 units tablet, Take 2 tablets by mouth daily, Disp: , Rfl:   •  cloNIDine (CATAPRES-TTS-1) 0 1 mg/24 hr, PLACE 1 PATCH ON THE SKIN ONCE A WEEK AS DIRECTED, Disp: 12 patch, Rfl: 3  •  Denta " 5000 Plus 1 1 % CREA, USE TWICE A DAY -SPIT, DONT RINSE AND NOTHING BY MOUTH X 30 MIN, Disp: , Rfl:   •  Diclofenac Sodium (VOLTAREN) 1 %, Apply 2 g topically 4 (four) times a day, Disp: 50 g, Rfl: 0  •  famotidine (PEPCID) 20 mg tablet, TAKE 1 TABLET BY MOUTH EVERY DAY, Disp: 90 tablet, Rfl: 3  •  gabapentin (Neurontin) 100 mg capsule, Take 1 PO TID , Disp: 90 capsule, Rfl: 1  •  glipiZIDE (GLUCOTROL) 10 mg tablet, TAKE 1 TABLET BY MOUTH IN THE MORNING THEN 2 TABS WITH DINNER, Disp: 270 tablet, Rfl: 0  •  Invokana 100 MG, TAKE 1 TABLET BY MOUTH EVERY DAY BEFORE BREAKFAST, Disp: 30 tablet, Rfl: 5  •  Januvia 50 MG tablet, TAKE 1 TABLET BY MOUTH EVERY DAY, Disp: 90 tablet, Rfl: 2  •  nebivolol (BYSTOLIC) 20 MG tablet, TAKE 1 TABLET BY MOUTH EVERY DAY, Disp: 90 tablet, Rfl: 4  •  NIFEdipine (ADALAT CC) 90 mg 24 hr tablet, TAKE 1 TABLET BY MOUTH EVERY DAY, Disp: 90 tablet, Rfl: 8  •  sertraline (ZOLOFT) 25 mg tablet, TAKE 1 TABLET BY MOUTH EVERY DAY, Disp: 90 tablet, Rfl: 4  •  simvastatin (ZOCOR) 20 mg tablet, TAKE 1 TABLET BY MOUTH EVERYDAY AT BEDTIME, Disp: 90 tablet, Rfl: 3  •  torsemide (DEMADEX) 20 mg tablet, TAKE 1 TABLET BY MOUTH EVERY DAY, Disp: 90 tablet, Rfl: 4  •  Accu-Chek Softclix Lancets lancets, , Disp: , Rfl:   •  ferrous sulfate 324 (65 Fe) mg, TAKE 1 TABLET BY MOUTH EVERY DAY BEFORE BREAKFAST (Patient not taking: Reported on 6/30/2023), Disp: 90 tablet, Rfl: 1  •  glucose blood (Accu-Chek Emma Plus) test strip, USE AS INSTRUCTED TWICE DAILY (Patient not taking: Reported on 2/2/2023), Disp: 100 each, Rfl: 7  •  Potassium Gluconate 595 (99 K) MG TABS, Take 2 tablets by mouth 2 (two) times a day 4 tabs total, Disp: , Rfl:     Allergies:   Allergies as of 06/30/2023   • (No Known Allergies)       The following portions of the patient's history were reviewed and updated as appropriate: past family history, past surgical history and problem list     Laboratory Results:  Lab Results   Component Value Date "   SODIUM 144 06/23/2023    K 4 1 06/23/2023     06/23/2023    CO2 25 06/23/2023    BUN 38 (H) 06/23/2023    CREATININE 1 90 (H) 06/23/2023    GLUC 174 (H) 06/23/2023    CALCIUM 8 5 10/29/2022        Lab Results   Component Value Date    CALCIUM 8 5 10/29/2022    PHOS 4 0 09/09/2021       Portions of the record may have been created with voice recognition software  Occasional wrong word or \"sound a like\" substitutions may have occurred due to the inherent limitations of voice recognition software  Read the chart carefully and recognize, using context, where substitutions have occurred      "

## 2023-07-01 DIAGNOSIS — M54.41 ACUTE RIGHT-SIDED LOW BACK PAIN WITH RIGHT-SIDED SCIATICA: ICD-10-CM

## 2023-07-02 RX ORDER — GABAPENTIN 100 MG/1
CAPSULE ORAL
Qty: 90 CAPSULE | Refills: 1 | OUTPATIENT
Start: 2023-07-02

## 2023-07-03 NOTE — TELEPHONE ENCOUNTER
Pt contacted Call Center requested refill of their medication. Medication Name: gabapentin (Neurontin          Dosage of Med: 100 mg      Frequency of Med: Take 1 PO TID. Remaining Medication: Unknown      Pharmacy and Location:     [facility]/pharmacy [address] [place] [address]   Phone:  [phone]        Pt. Preferred Callback Phone Number: [phone]      Thank you.

## 2023-07-05 DIAGNOSIS — I10 ESSENTIAL HYPERTENSION: ICD-10-CM

## 2023-07-05 DIAGNOSIS — N18.30 TYPE 2 DIABETES MELLITUS WITH STAGE 3 CHRONIC KIDNEY DISEASE, WITHOUT LONG-TERM CURRENT USE OF INSULIN (HCC): ICD-10-CM

## 2023-07-05 DIAGNOSIS — E11.22 TYPE 2 DIABETES MELLITUS WITH STAGE 3 CHRONIC KIDNEY DISEASE, WITHOUT LONG-TERM CURRENT USE OF INSULIN (HCC): ICD-10-CM

## 2023-07-05 RX ORDER — GLIPIZIDE 10 MG/1
TABLET ORAL
Qty: 270 TABLET | Refills: 1 | Status: SHIPPED | OUTPATIENT
Start: 2023-07-05

## 2023-07-05 NOTE — TELEPHONE ENCOUNTER
S/w Mary Grace Bai per medical communication consent on file. Per Mary Grace Bai, the pharmacy gave the pt 3 days of medication, she will have enough medication until friday. Advised jimbo to fu at the ov on 7/7/23 as discussed to review medications and make any changes necessary. Mary Grace Bai verbalized understanding and appreciation.

## 2023-07-07 ENCOUNTER — OFFICE VISIT (OUTPATIENT)
Dept: PAIN MEDICINE | Facility: CLINIC | Age: 88
End: 2023-07-07
Payer: COMMERCIAL

## 2023-07-07 VITALS
HEART RATE: 63 BPM | HEIGHT: 62 IN | BODY MASS INDEX: 29.81 KG/M2 | WEIGHT: 162 LBS | DIASTOLIC BLOOD PRESSURE: 72 MMHG | SYSTOLIC BLOOD PRESSURE: 134 MMHG | TEMPERATURE: 98.4 F

## 2023-07-07 DIAGNOSIS — M48.061 LUMBAR FORAMINAL STENOSIS: ICD-10-CM

## 2023-07-07 DIAGNOSIS — M46.1 SACROILIITIS (HCC): ICD-10-CM

## 2023-07-07 DIAGNOSIS — M51.16 INTERVERTEBRAL DISC DISORDER WITH RADICULOPATHY OF LUMBAR REGION: ICD-10-CM

## 2023-07-07 DIAGNOSIS — G89.4 CHRONIC PAIN SYNDROME: Primary | ICD-10-CM

## 2023-07-07 PROCEDURE — 99214 OFFICE O/P EST MOD 30 MIN: CPT

## 2023-07-07 RX ORDER — GABAPENTIN 300 MG/1
CAPSULE ORAL
Qty: 60 CAPSULE | Refills: 1 | Status: SHIPPED | OUTPATIENT
Start: 2023-07-07

## 2023-07-07 NOTE — PROGRESS NOTES
Assessment:  1. Chronic pain syndrome    2. Intervertebral disc disorder with radiculopathy of lumbar region    3. Sacroiliitis ([address])    4. Lumbar foraminal stenosis        Plan:  The patient is an 44-year-old female with a history of chronic pain secondary to low back pain, lumbar intervertebral disc disorder with radiculopathy and lumbar foraminal stenosis who presents to the office with ongoing bilateral low back pain and pain and pins and needle sensation into the right thigh that is unchanged since her last office visit and unrelieved after undergoing a bilateral SI joint injection on 6/8/2023 and an L5-S1 LESI on 4/27/2023. At this time, patient has tried several injections without relief of her pain, therefore I instructed patient we can manage her pain with medications. I did instruct patient that she is on a subtherapeutic dose of gabapentin and I will change the way she takes this medication. A prescription for gabapentin 300 mg that she can take 1 tablet twice a day was sent to the patient's pharmacy. I also instructed patient she can continue taking Tylenol, up to 3000 mg/day. Will reevaluate in 8 weeks for medication reevaluation and refills if necessary. The patient will follow-up in 8 weeks for medication prescription refill and reevaluation. The patient was advised to contact the office should their symptoms worsen in the interim. The patient was agreeable and verbalized an understanding. History of Present Illness: The patient is a 80 y.o. female with a history of chronic pain secondary to low back pain, lumbar intervertebral disc disorder with radiculopathy and lumbar foraminal stenosis. She was last seen on 6/8/2023 where she underwent bilateral SI joint injections which provided her several days worth of relief before the pain returned. She presents to the office with ongoing bilateral low back pain and pain and pins and needle sensation into the right thigh.     She states her pain is the same since the last office visit and intermittent. She states her pain is worse with walking and standing and does ease when she sits down. She rates the quality of her pain as dull/aching is currently rating her pain an 8/10 on a numeric scale. Current pain medications include Tylenol and gabapentin 100 mg 3 times a day. She states this medication regimen is providing her little relief of her pain. I have personally reviewed and/or updated the patient's past medical history, past surgical history, family history, social history, current medications, allergies, and vital signs today. Review of Systems:    Review of Systems   Respiratory: Negative for shortness of breath. Cardiovascular: Negative for chest pain. Gastrointestinal: Negative for constipation, diarrhea, nausea and vomiting. Musculoskeletal: Positive for gait problem. Negative for arthralgias, joint swelling and myalgias. Skin: Negative for rash. Neurological: Negative for dizziness, seizures and weakness. All other systems reviewed and are negative.         Past Medical History:   Diagnosis Date   • Aortic valve insufficiency    • Cataract of both eyes    • Dysuria     last assessed - [id number]   • Edema     last assessed - [id number]   • GERD (gastroesophageal reflux disease)     last assessed - 30Aug2012   • Hyperlipidemia    • Hypertension    • Low back pain     last assessed - 30Aug2012   • Mitral valve disorder    • Osteoporosis     last assessed - [id number]   • Palpitations        Past Surgical History:   Procedure Laterality Date   • APPENDECTOMY     • CATARACT EXTRACTION Bilateral    • COLONOSCOPY     • TUBAL LIGATION Bilateral        Family History   Problem Relation Age of Onset   • Kidney disease Mother         Chronic   • No Known Problems Father    • Breast cancer Sister    • Diabetes Sister    • Breast cancer Sister    • Hypertension Sister    • No Known Problems Daughter    • No Known Problems Son • No Known Problems Son    • No Known Problems Son    • No Known Problems Son        Social History     Occupational History   • Not on file   Tobacco Use   • Smoking status: Never   • Smokeless tobacco: Never   Vaping Use   • Vaping Use: Never used   Substance and Sexual Activity   • Alcohol use: Never   • Drug use: Never   • Sexual activity: Not Currently         Current Outpatient Medications:   •  aspirin 81 MG tablet, Take 1 tablet by mouth daily, Disp: , Rfl:   •  cholecalciferol (VITAMIN D3) 1,000 units tablet, Take 2 tablets by mouth daily, Disp: , Rfl:   •  cloNIDine (CATAPRES-TTS-1) 0.1 mg/24 hr, PLACE 1 PATCH ON THE SKIN ONCE A WEEK AS DIRECTED, Disp: 12 patch, Rfl: 3  •  Denta 5000 Plus 1.1 % CREA, USE TWICE A DAY -SPIT, DONT RINSE AND NOTHING BY MOUTH X 30 MIN, Disp: , Rfl:   •  Diclofenac Sodium (VOLTAREN) 1 %, Apply 2 g topically 4 (four) times a day, Disp: 50 g, Rfl: 0  •  famotidine (PEPCID) 20 mg tablet, TAKE 1 TABLET BY MOUTH EVERY DAY, Disp: 90 tablet, Rfl: 3  •  gabapentin (Neurontin) 300 mg capsule, Take 1 PO TID., Disp: 60 capsule, Rfl: 1  •  glipiZIDE (GLUCOTROL) 10 mg tablet, TAKE 1 TABLET BY MOUTH IN THE MORNING THEN 2 TABS WITH DINNER, Disp: 270 tablet, Rfl: 1  •  Invokana 100 MG, TAKE 1 TABLET BY MOUTH EVERY DAY BEFORE BREAKFAST, Disp: 30 tablet, Rfl: 5  •  Januvia 50 MG tablet, TAKE 1 TABLET BY MOUTH EVERY DAY, Disp: 90 tablet, Rfl: 2  •  nebivolol (BYSTOLIC) 20 MG tablet, TAKE 1 TABLET BY MOUTH EVERY DAY, Disp: 90 tablet, Rfl: 4  •  NIFEdipine (ADALAT CC) 90 mg 24 hr tablet, TAKE 1 TABLET BY MOUTH EVERY DAY, Disp: 90 tablet, Rfl: 8  •  Potassium Gluconate 595 (99 K) MG TABS, Take 2 tablets by mouth 2 (two) times a day 4 tabs total, Disp: , Rfl:   •  sertraline (ZOLOFT) 25 mg tablet, TAKE 1 TABLET BY MOUTH EVERY DAY, Disp: 90 tablet, Rfl: 4  •  simvastatin (ZOCOR) 20 mg tablet, TAKE 1 TABLET BY MOUTH EVERYDAY AT BEDTIME, Disp: 90 tablet, Rfl: 3  •  torsemide (DEMADEX) 20 mg tablet, TAKE 1 TABLET BY MOUTH EVERY DAY, Disp: 90 tablet, Rfl: 4  •  ACCU-CHEK PAUL PLUS test strip, , Disp: , Rfl:   •  Accu-Chek Guide test strip, USE TO TEST TWICE A DAY, Disp: 100 strip, Rfl: 3  •  Accu-Chek Softclix Lancets lancets, , Disp: , Rfl:   •  Accu-Chek Softclix Lancets lancets, Use as instructed twice daily, Disp: 100 each, Rfl: 3  •  glucose blood (Accu-Chek Paul Plus) test strip, USE AS INSTRUCTED TWICE DAILY (Patient not taking: Reported on 2/2/2023), Disp: 100 each, Rfl: 7    No Known Allergies    Physical Exam:    /72 (BP Location: Left arm, Patient Position: Sitting, Cuff Size: Standard)   Pulse 63   Temp 98.4 °F (36.9 °C)   Ht 5' 2" (1.575 m)   Wt 73.5 kg (162 lb)   BMI 29.63 kg/m²     Constitutional:normal, well developed, well nourished, alert, in no distress and non-toxic and no overt pain behavior. Eyes:anicteric  HEENT:grossly intact  Neck:supple, symmetric, trachea midline and no masses   Pulmonary:even and unlabored  Cardiovascular:No edema or pitting edema present  Skin:Normal without rashes or lesions and well hydrated  Psychiatric:Mood and affect appropriate  Neurologic:Cranial Nerves II-XII grossly intact  Musculoskeletal:antalgic      Imaging  No orders to display         No orders of the defined types were placed in this encounter.

## 2023-07-14 DIAGNOSIS — I10 ESSENTIAL HYPERTENSION: ICD-10-CM

## 2023-07-14 RX ORDER — CLONIDINE 0.1 MG/24H
PATCH, EXTENDED RELEASE TRANSDERMAL
Qty: 12 PATCH | Refills: 2 | Status: SHIPPED | OUTPATIENT
Start: 2023-07-14 | End: 2023-09-06

## 2023-07-17 LAB
LEFT EYE DIABETIC RETINOPATHY: NORMAL
RIGHT EYE DIABETIC RETINOPATHY: NORMAL

## 2023-07-25 ENCOUNTER — OFFICE VISIT (OUTPATIENT)
Dept: FAMILY MEDICINE CLINIC | Facility: HOSPITAL | Age: 88
End: 2023-07-25
Payer: COMMERCIAL

## 2023-07-25 VITALS
DIASTOLIC BLOOD PRESSURE: 62 MMHG | TEMPERATURE: 97.2 F | HEIGHT: 62 IN | HEART RATE: 70 BPM | SYSTOLIC BLOOD PRESSURE: 130 MMHG | WEIGHT: 166.4 LBS | BODY MASS INDEX: 30.62 KG/M2

## 2023-07-25 DIAGNOSIS — I35.0 NONRHEUMATIC AORTIC VALVE STENOSIS: ICD-10-CM

## 2023-07-25 DIAGNOSIS — N18.4 CKD (CHRONIC KIDNEY DISEASE), STAGE IV (HCC): ICD-10-CM

## 2023-07-25 DIAGNOSIS — N39.3 URINARY, INCONTINENCE, STRESS FEMALE: ICD-10-CM

## 2023-07-25 DIAGNOSIS — N18.4 TYPE 2 DIABETES MELLITUS WITH STAGE 4 CHRONIC KIDNEY DISEASE, WITHOUT LONG-TERM CURRENT USE OF INSULIN (HCC): ICD-10-CM

## 2023-07-25 DIAGNOSIS — Z00.00 MEDICARE ANNUAL WELLNESS VISIT, SUBSEQUENT: ICD-10-CM

## 2023-07-25 DIAGNOSIS — M54.9 CHRONIC BACK PAIN, UNSPECIFIED BACK LOCATION, UNSPECIFIED BACK PAIN LATERALITY: ICD-10-CM

## 2023-07-25 DIAGNOSIS — I10 PRIMARY HYPERTENSION: ICD-10-CM

## 2023-07-25 DIAGNOSIS — E11.65 TYPE 2 DIABETES MELLITUS WITH HYPERGLYCEMIA, WITHOUT LONG-TERM CURRENT USE OF INSULIN (HCC): Primary | ICD-10-CM

## 2023-07-25 DIAGNOSIS — R60.9 DEPENDENT EDEMA: ICD-10-CM

## 2023-07-25 DIAGNOSIS — E11.22 TYPE 2 DIABETES MELLITUS WITH STAGE 4 CHRONIC KIDNEY DISEASE, WITHOUT LONG-TERM CURRENT USE OF INSULIN (HCC): ICD-10-CM

## 2023-07-25 DIAGNOSIS — G89.29 CHRONIC BACK PAIN, UNSPECIFIED BACK LOCATION, UNSPECIFIED BACK PAIN LATERALITY: ICD-10-CM

## 2023-07-25 PROCEDURE — 99214 OFFICE O/P EST MOD 30 MIN: CPT | Performed by: INTERNAL MEDICINE

## 2023-07-25 PROCEDURE — G0439 PPPS, SUBSEQ VISIT: HCPCS | Performed by: INTERNAL MEDICINE

## 2023-07-25 NOTE — ASSESSMENT & PLAN NOTE
No benefit with spinal injections at all, has seen pain mgt and they are titrating up her Gabapaentin, con't meds and f/u as per specialist

## 2023-07-25 NOTE — ASSESSMENT & PLAN NOTE
Lab Results   Component Value Date    EGFR 25 (L) 06/23/2023    EGFR 25 (L) 06/23/2023    EGFR 26 (L) 01/06/2023    CREATININE 1.90 (H) 06/23/2023    CREATININE 1.89 (H) 06/23/2023    CREATININE 1.88 (H) 01/06/2023   Following with Dr. Billy Garland, again BP/BS control as well as avoiding NSAIDs and dehydration reviewed, not on an ACE or ARB, will follow

## 2023-07-25 NOTE — PATIENT INSTRUCTIONS
Medicare Preventive Visit Patient Instructions  Thank you for completing your Welcome to Medicare Visit or Medicare Annual Wellness Visit today. Your next wellness visit will be due in one year (7/25/2024). The screening/preventive services that you may require over the next 5-10 years are detailed below. Some tests may not apply to you based off risk factors and/or age. Screening tests ordered at today's visit but not completed yet may show as past due. Also, please note that scanned in results may not display below. Preventive Screenings:  Service Recommendations Previous Testing/Comments   Colorectal Cancer Screening  * Colonoscopy    * Fecal Occult Blood Test (FOBT)/Fecal Immunochemical Test (FIT)  * Fecal DNA/Cologuard Test  * Flexible Sigmoidoscopy Age: 43-73 years old   Colonoscopy: every 10 years (may be performed more frequently if at higher risk)  OR  FOBT/FIT: every 1 year  OR  Cologuard: every 3 years  OR  Sigmoidoscopy: every 5 years  Screening may be recommended earlier than age 39 if at higher risk for colorectal cancer. Also, an individualized decision between you and your healthcare provider will decide whether screening between the ages of 77-80 would be appropriate. Colonoscopy: Not on file  FOBT/FIT: Not on file  Cologuard: Not on file  Sigmoidoscopy: Not on file    Screening Not Indicated     Breast Cancer Screening Age: 36 years old  Frequency: every 1-2 years  Not required if history of left and right mastectomy Mammogram: 10/01/2021    Screening Current   Cervical Cancer Screening Between the ages of 21-29, pap smear recommended once every 3 years. Between the ages of 32-69, can perform pap smear with HPV co-testing every 5 years.    Recommendations may differ for women with a history of total hysterectomy, cervical cancer, or abnormal pap smears in past. Pap Smear: Not on file    Screening Not Indicated   Hepatitis C Screening Once for adults born between 1945 and 1965  More frequently in patients at high risk for Hepatitis C Hep C Antibody: Not on file        Diabetes Screening 1-2 times per year if you're at risk for diabetes or have pre-diabetes Fasting glucose: No results in last 5 years (No results in last 5 years)  A1C: 8.7 % (6/23/2023)  Screening Not Indicated  History Diabetes   Cholesterol Screening Once every 5 years if you don't have a lipid disorder. May order more often based on risk factors. Lipid panel: 06/23/2023    Screening Current     Other Preventive Screenings Covered by Medicare:  1. Abdominal Aortic Aneurysm (AAA) Screening: covered once if your at risk. You're considered to be at risk if you have a family history of AAA. 2. Lung Cancer Screening: covers low dose CT scan once per year if you meet all of the following conditions: (1) Age 48-67; (2) No signs or symptoms of lung cancer; (3) Current smoker or have quit smoking within the last 15 years; (4) You have a tobacco smoking history of at least 20 pack years (packs per day multiplied by number of years you smoked); (5) You get a written order from a healthcare provider. 3. Glaucoma Screening: covered annually if you're considered high risk: (1) You have diabetes OR (2) Family history of glaucoma OR (3)  aged 48 and older OR (3)  American aged 72 and older  3. Osteoporosis Screening: covered every 2 years if you meet one of the following conditions: (1) You're estrogen deficient and at risk for osteoporosis based off medical history and other findings; (2) Have a vertebral abnormality; (3) On glucocorticoid therapy for more than 3 months; (4) Have primary hyperparathyroidism; (5) On osteoporosis medications and need to assess response to drug therapy. · Last bone density test (DXA Scan): 12/10/2019.  5. HIV Screening: covered annually if you're between the age of 15-65. Also covered annually if you are younger than 13 and older than 72 with risk factors for HIV infection.  For pregnant patients, it is covered up to 3 times per pregnancy. Immunizations:  Immunization Recommendations   Influenza Vaccine Annual influenza vaccination during flu season is recommended for all persons aged >= 6 months who do not have contraindications   Pneumococcal Vaccine   * Pneumococcal conjugate vaccine = PCV13 (Prevnar 13), PCV15 (Vaxneuvance), PCV20 (Prevnar 20)  * Pneumococcal polysaccharide vaccine = PPSV23 (Pneumovax) Adults 20-63 years old: 1-3 doses may be recommended based on certain risk factors  Adults 72 years old: 1-2 doses may be recommended based off what pneumonia vaccine you previously received   Hepatitis B Vaccine 3 dose series if at intermediate or high risk (ex: diabetes, end stage renal disease, liver disease)   Tetanus (Td) Vaccine - COST NOT COVERED BY MEDICARE PART B Following completion of primary series, a booster dose should be given every 10 years to maintain immunity against tetanus. Td may also be given as tetanus wound prophylaxis. Tdap Vaccine - COST NOT COVERED BY MEDICARE PART B Recommended at least once for all adults. For pregnant patients, recommended with each pregnancy. Shingles Vaccine (Shingrix) - COST NOT COVERED BY MEDICARE PART B  2 shot series recommended in those aged 48 and above     Health Maintenance Due:  There are no preventive care reminders to display for this patient. Immunizations Due:      Topic Date Due   • COVID-19 Vaccine (3 - Moderna series) 04/23/2021   • Influenza Vaccine (1) 09/01/2023     Advance Directives   What are advance directives? Advance directives are legal documents that state your wishes and plans for medical care. These plans are made ahead of time in case you lose your ability to make decisions for yourself. Advance directives can apply to any medical decision, such as the treatments you want, and if you want to donate organs. What are the types of advance directives?   There are many types of advance directives, and each state has rules about how to use them. You may choose a combination of any of the following:  · Living will: This is a written record of the treatment you want. You can also choose which treatments you do not want, which to limit, and which to stop at a certain time. This includes surgery, medicine, IV fluid, and tube feedings. · Durable power of  for healthcare [facility]): This is a written record that states who you want to make healthcare choices for you when you are unable to make them for yourself. This person, called a proxy, is usually a family member or a friend. You may choose more than 1 proxy. · Do not resuscitate (DNR) order:  A DNR order is used in case your heart stops beating or you stop breathing. It is a request not to have certain forms of treatment, such as CPR. A DNR order may be included in other types of advance directives. · Medical directive: This covers the care that you want if you are in a coma, near death, or unable to make decisions for yourself. You can list the treatments you want for each condition. Treatment may include pain medicine, surgery, blood transfusions, dialysis, IV or tube feedings, and a ventilator (breathing machine). · Values history: This document has questions about your views, beliefs, and how you feel and think about life. This information can help others choose the care that you would choose. Why are advance directives important? An advance directive helps you control your care. Although spoken wishes may be used, it is better to have your wishes written down. Spoken wishes can be misunderstood, or not followed. Treatments may be given even if you do not want them. An advance directive may make it easier for your family to make difficult choices about your care.    Weight Management   Why it is important to manage your weight:  Being overweight increases your risk of health conditions such as heart disease, high blood pressure, type 2 diabetes, and certain types of cancer. It can also increase your risk for osteoarthritis, sleep apnea, and other respiratory problems. Aim for a slow, steady weight loss. Even a small amount of weight loss can lower your risk of health problems. How to lose weight safely:  A safe and healthy way to lose weight is to eat fewer calories and get regular exercise. You can lose up about 1 pound a week by decreasing the number of calories you eat by 500 calories each day. Healthy meal plan for weight management:  A healthy meal plan includes a variety of foods, contains fewer calories, and helps you stay healthy. A healthy meal plan includes the following:  · Eat whole-grain foods more often. A healthy meal plan should contain fiber. Fiber is the part of grains, fruits, and vegetables that is not broken down by your body. Whole-grain foods are healthy and provide extra fiber in your diet. Some examples of whole-grain foods are whole-wheat breads and pastas, oatmeal, brown rice, and bulgur. · Eat a variety of vegetables every day. Include dark, leafy greens such as spinach, kale, amrit greens, and mustard greens. Eat yellow and orange vegetables such as carrots, sweet potatoes, and winter squash. · Eat a variety of fruits every day. Choose fresh or canned fruit (canned in its own juice or light syrup) instead of juice. Fruit juice has very little or no fiber. · Eat low-fat dairy foods. Drink fat-free (skim) milk or 1% milk. Eat fat-free yogurt and low-fat cottage cheese. Try low-fat cheeses such as mozzarella and other reduced-fat cheeses. · Choose meat and other protein foods that are low in fat. Choose beans or other legumes such as split peas or lentils. Choose fish, skinless poultry (chicken or turkey), or lean cuts of red meat (beef or pork). Before you cook meat or poultry, cut off any visible fat. · Use less fat and oil. Try baking foods instead of frying them.  Add less fat, such as margarine, sour cream, regular salad dressing and mayonnaise to foods. Eat fewer high-fat foods. Some examples of high-fat foods include french fries, doughnuts, ice cream, and cakes. · Eat fewer sweets. Limit foods and drinks that are high in sugar. This includes candy, cookies, regular soda, and sweetened drinks. Exercise:  Exercise at least 30 minutes per day on most days of the week. Some examples of exercise include walking, biking, dancing, and swimming. You can also fit in more physical activity by taking the stairs instead of the elevator or parking farther away from stores. Ask your healthcare provider about the best exercise plan for you. © Copyright "[employer] 2018 Information is for End User's use only and may not be sold, redistributed or otherwise used for commercial purposes. All illustrations and images included in CareNotes® are the copyrighted property of [employer] or [facility] Preventive Visit Patient Instructions  Thank you for completing your Welcome to Medicare Visit or Medicare Annual Wellness Visit today. Your next wellness visit will be due in one year (7/25/2024). The screening/preventive services that you may require over the next 5-10 years are detailed below. Some tests may not apply to you based off risk factors and/or age. Screening tests ordered at today's visit but not completed yet may show as past due. Also, please note that scanned in results may not display below.   Preventive Screenings:  Service Recommendations Previous Testing/Comments   Colorectal Cancer Screening  * Colonoscopy    * Fecal Occult Blood Test (FOBT)/Fecal Immunochemical Test (FIT)  * Fecal DNA/Cologuard Test  * Flexible Sigmoidoscopy Age: 43-73 years old   Colonoscopy: every 10 years (may be performed more frequently if at higher risk)  OR  FOBT/FIT: every 1 year  OR  Cologuard: every 3 years  OR  Sigmoidoscopy: every 5 years  Screening may be recommended earlier than age 39 if at higher risk for colorectal cancer. Also, an individualized decision between you and your healthcare provider will decide whether screening between the ages of 77-80 would be appropriate. Colonoscopy: Not on file  FOBT/FIT: Not on file  Cologuard: Not on file  Sigmoidoscopy: Not on file    Screening Not Indicated  Risks and Benefits Discussed     Breast Cancer Screening Age: 36 years old  Frequency: every 1-2 years  Not required if history of left and right mastectomy Mammogram: 10/01/2021    Risks and Benefits Discussed  Patient Declines   Cervical Cancer Screening Between the ages of 21-29, pap smear recommended once every 3 years. Between the ages of 32-69, can perform pap smear with HPV co-testing every 5 years. Recommendations may differ for women with a history of total hysterectomy, cervical cancer, or abnormal pap smears in past. Pap Smear: Not on file    Screening Not Indicated  Risks and Benefits Discussed   Hepatitis C Screening Once for adults born between 1945 and 1965  More frequently in patients at high risk for Hepatitis C Hep C Antibody: Not on file    Risks and Benefits Discussed  Screening Not Indicated   Diabetes Screening 1-2 times per year if you're at risk for diabetes or have pre-diabetes Fasting glucose: No results in last 5 years (No results in last 5 years)  A1C: 8.7 % (6/23/2023)  History Diabetes  Risks and Benefits Discussed  Screening Current   Cholesterol Screening Once every 5 years if you don't have a lipid disorder. May order more often based on risk factors. Lipid panel: 06/23/2023    Screening Current  Risks and Benefits Discussed  History Lipid Disorder     Other Preventive Screenings Covered by Medicare:  6. Abdominal Aortic Aneurysm (AAA) Screening: covered once if your at risk. You're considered to be at risk if you have a family history of AAA.   7. Lung Cancer Screening: covers low dose CT scan once per year if you meet all of the following conditions: (1) Age 48-67; (2) No signs or symptoms of lung cancer; (3) Current smoker or have quit smoking within the last 15 years; (4) You have a tobacco smoking history of at least 20 pack years (packs per day multiplied by number of years you smoked); (5) You get a written order from a healthcare provider. 8. Glaucoma Screening: covered annually if you're considered high risk: (1) You have diabetes OR (2) Family history of glaucoma OR (3)  aged 48 and older OR (3)  American aged 72 and older  5. Osteoporosis Screening: covered every 2 years if you meet one of the following conditions: (1) You're estrogen deficient and at risk for osteoporosis based off medical history and other findings; (2) Have a vertebral abnormality; (3) On glucocorticoid therapy for more than 3 months; (4) Have primary hyperparathyroidism; (5) On osteoporosis medications and need to assess response to drug therapy. · Last bone density test (DXA Scan): 12/10/2019.  10. HIV Screening: covered annually if you're between the age of 15-65. Also covered annually if you are younger than 13 and older than 72 with risk factors for HIV infection. For pregnant patients, it is covered up to 3 times per pregnancy.     Immunizations:  Immunization Recommendations   Influenza Vaccine Annual influenza vaccination during flu season is recommended for all persons aged >= 6 months who do not have contraindications   Pneumococcal Vaccine   * Pneumococcal conjugate vaccine = PCV13 (Prevnar 13), PCV15 (Vaxneuvance), PCV20 (Prevnar 20)  * Pneumococcal polysaccharide vaccine = PPSV23 (Pneumovax) Adults 20-63 years old: 1-3 doses may be recommended based on certain risk factors  Adults 72 years old: 1-2 doses may be recommended based off what pneumonia vaccine you previously received   Hepatitis B Vaccine 3 dose series if at intermediate or high risk (ex: diabetes, end stage renal disease, liver disease)   Tetanus (Td) Vaccine - COST NOT COVERED BY MEDICARE PART B Following completion of primary series, a booster dose should be given every 10 years to maintain immunity against tetanus. Td may also be given as tetanus wound prophylaxis. Tdap Vaccine - COST NOT COVERED BY MEDICARE PART B Recommended at least once for all adults. For pregnant patients, recommended with each pregnancy. Shingles Vaccine (Shingrix) - COST NOT COVERED BY MEDICARE PART B  2 shot series recommended in those aged 48 and above     Health Maintenance Due:  There are no preventive care reminders to display for this patient. Immunizations Due:      Topic Date Due   • COVID-19 Vaccine (3 - Moderna series) 04/23/2021   • Influenza Vaccine (1) 09/01/2023     Advance Directives   What are advance directives? Advance directives are legal documents that state your wishes and plans for medical care. These plans are made ahead of time in case you lose your ability to make decisions for yourself. Advance directives can apply to any medical decision, such as the treatments you want, and if you want to donate organs. What are the types of advance directives? There are many types of advance directives, and each state has rules about how to use them. You may choose a combination of any of the following:  · Living will: This is a written record of the treatment you want. You can also choose which treatments you do not want, which to limit, and which to stop at a certain time. This includes surgery, medicine, IV fluid, and tube feedings. · Durable power of  for healthcare [facility]): This is a written record that states who you want to make healthcare choices for you when you are unable to make them for yourself. This person, called a proxy, is usually a family member or a friend. You may choose more than 1 proxy. · Do not resuscitate (DNR) order:  A DNR order is used in case your heart stops beating or you stop breathing. It is a request not to have certain forms of treatment, such as CPR.  A DNR order may be included in other types of advance directives. · Medical directive: This covers the care that you want if you are in a coma, near death, or unable to make decisions for yourself. You can list the treatments you want for each condition. Treatment may include pain medicine, surgery, blood transfusions, dialysis, IV or tube feedings, and a ventilator (breathing machine). · Values history: This document has questions about your views, beliefs, and how you feel and think about life. This information can help others choose the care that you would choose. Why are advance directives important? An advance directive helps you control your care. Although spoken wishes may be used, it is better to have your wishes written down. Spoken wishes can be misunderstood, or not followed. Treatments may be given even if you do not want them. An advance directive may make it easier for your family to make difficult choices about your care. Urinary Incontinence   Urinary incontinence (UI)  is when you lose control of your bladder. UI develops because your bladder cannot store or empty urine properly. The 3 most common types of UI are stress incontinence, urge incontinence, or both. Medicines:   · May be given to help strengthen your bladder control. Report any side effects of medication to your healthcare provider. Do pelvic muscle exercises often:  Your pelvic muscles help you stop urinating. Squeeze these muscles tight for 5 seconds, then relax for 5 seconds. Gradually work up to squeezing for 10 seconds. Do 3 sets of 15 repetitions a day, or as directed. This will help strengthen your pelvic muscles and improve bladder control. Train your bladder:  Go to the bathroom at set times, such as every 2 hours, even if you do not feel the urge to go. You can also try to hold your urine when you feel the urge to go. For example, hold your urine for 5 minutes when you feel the urge to go. As that becomes easier, hold your urine for 10 minutes. Self-care:   · Keep a UI record. Write down how often you leak urine and how much you leak. Make a note of what you were doing when you leaked urine. · Drink liquids as directed. You may need to limit the amount of liquid you drink to help control your urine leakage. Do not drink any liquid right before you go to bed. Limit or do not have drinks that contain caffeine or alcohol. · Prevent constipation. Eat a variety of high-fiber foods. Good examples are high-fiber cereals, beans, vegetables, and whole-grain breads. Walking is the best way to trigger your intestines to have a bowel movement. · Exercise regularly and maintain a healthy weight. Weight loss and exercise will decrease pressure on your bladder and help you control your leakage. · Use a catheter as directed  to help empty your bladder. A catheter is a tiny, plastic tube that is put into your bladder to drain your urine. · Go to behavior therapy as directed. Behavior therapy may be used to help you learn to control your urge to urinate. Weight Management   Why it is important to manage your weight:  Being overweight increases your risk of health conditions such as heart disease, high blood pressure, type 2 diabetes, and certain types of cancer. It can also increase your risk for osteoarthritis, sleep apnea, and other respiratory problems. Aim for a slow, steady weight loss. Even a small amount of weight loss can lower your risk of health problems. How to lose weight safely:  A safe and healthy way to lose weight is to eat fewer calories and get regular exercise. You can lose up about 1 pound a week by decreasing the number of calories you eat by 500 calories each day. Healthy meal plan for weight management:  A healthy meal plan includes a variety of foods, contains fewer calories, and helps you stay healthy. A healthy meal plan includes the following:  · Eat whole-grain foods more often. A healthy meal plan should contain fiber.  Fiber is the part of grains, fruits, and vegetables that is not broken down by your body. Whole-grain foods are healthy and provide extra fiber in your diet. Some examples of whole-grain foods are whole-wheat breads and pastas, oatmeal, brown rice, and bulgur. · Eat a variety of vegetables every day. Include dark, leafy greens such as spinach, kale, amrit greens, and mustard greens. Eat yellow and orange vegetables such as carrots, sweet potatoes, and winter squash. · Eat a variety of fruits every day. Choose fresh or canned fruit (canned in its own juice or light syrup) instead of juice. Fruit juice has very little or no fiber. · Eat low-fat dairy foods. Drink fat-free (skim) milk or 1% milk. Eat fat-free yogurt and low-fat cottage cheese. Try low-fat cheeses such as mozzarella and other reduced-fat cheeses. · Choose meat and other protein foods that are low in fat. Choose beans or other legumes such as split peas or lentils. Choose fish, skinless poultry (chicken or turkey), or lean cuts of red meat (beef or pork). Before you cook meat or poultry, cut off any visible fat. · Use less fat and oil. Try baking foods instead of frying them. Add less fat, such as margarine, sour cream, regular salad dressing and mayonnaise to foods. Eat fewer high-fat foods. Some examples of high-fat foods include french fries, doughnuts, ice cream, and cakes. · Eat fewer sweets. Limit foods and drinks that are high in sugar. This includes candy, cookies, regular soda, and sweetened drinks. Exercise:  Exercise at least 30 minutes per day on most days of the week. Some examples of exercise include walking, biking, dancing, and swimming. You can also fit in more physical activity by taking the stairs instead of the elevator or parking farther away from stores. Ask your healthcare provider about the best exercise plan for you.       © Copyright [employer] 2018 Information is for End User's use only and may not be sold, redistributed or otherwise used for commercial purposes.  All illustrations and images included in CareNotes® are the copyrighted property of [employer] or [address] [place] [address]

## 2023-07-25 NOTE — ASSESSMENT & PLAN NOTE
NO great benefit with increase in Torsemide, no other s/sx of HF and Echo reviewed with nml EF and no wall motion abnormality, new AS was noted, urged to elevate LE at rest and avoid NSAIDs, UTT compression stockings, will follow - call with new worse symptoms

## 2023-07-25 NOTE — ASSESSMENT & PLAN NOTE
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

## 2023-07-25 NOTE — ASSESSMENT & PLAN NOTE
Again stressed importance of BS control/BP control as way to preserve renal function, con't f/u with Endo and Nephro  Lab Results   Component Value Date    HGBA1C 8.7 (H) 06/23/2023

## 2023-07-25 NOTE — PROGRESS NOTES
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, polyneuropathy/hyperglycemia - uncontrolled with A1C 8.7 - again urged better BS control as way to protect further kidney damage, has f/u with Endo next month - con't meds/labs and f/u as per Endo, foot exam done today, eye exam 7/22 - 2 yrs, on statin and ASA but no ACE/ARB, will follow            Cardiovascular and Mediastinum    Hypertension     BP at goal today, con't current meds         Nonrheumatic aortic valve stenosis     New mod AS noted on Echo, no CP/syncope/SOB, will follow            Genitourinary    CKD (chronic kidney disease), stage IV ([address])     Lab Results   Component Value Date    EGFR 25 (L) 06/23/2023    EGFR 25 (L) 06/23/2023    EGFR 26 (L) 01/06/2023    CREATININE 1.90 (H) 06/23/2023    CREATININE 1.89 (H) 06/23/2023    CREATININE 1.88 (H) 01/06/2023   Following with Dr. Misty Turner, again BP/BS control as well as avoiding NSAIDs and dehydration reviewed, not on an ACE or ARB, will follow            Other    Dependent edema     NO great benefit with increase in Torsemide, no other s/sx of HF and Echo reviewed with nml EF and no wall motion abnormality, new AS was noted, urged to elevate LE at rest and avoid NSAIDs, UTT compression stockings, will follow - call with new worse symptoms         Chronic back pain     No benefit with spinal injections at all, has seen pain mgt and they are titrating up her Gabapaentin, con't meds and f/u as per specialist        Other Visit Diagnoses     Medicare annual wellness visit, subsequent        Urinary, incontinence, stress female        Dietary and lifestyle modification reviewed, call with new/worse symptoms          Depression Screening and Follow-up Plan: Patient was screened for depression during today's encounter. They screened negative with a PHQ-2 score of 0. Urinary Incontinence Plan of Care: counseling topics discussed: practice Kegel (pelvic floor strengthening) exercises, use restroom every 2 hours, limit alcohol, caffeine, spicy foods, and acidic foods, taking fluid pills at a time when you can get to bathroom easily, improving blood sugar control and limiting fluid intake to 60 oz. per day. Preventive health issues were discussed with patient, and age appropriate screening tests were ordered as noted in patient's After Visit Summary. Personalized health advice and appropriate referrals for health education or preventive services given if needed, as noted in patient's After Visit Summary. History of Present Illness:     Patient presents for a Medicare Wellness Visit    HPI Pt here for follow up appt/BW results and AWV    BW results were d/w pt in detail: FBS/A1C 173/8.7, BUN/Cr 40/1.89 (GFR 25), WBC 16.6, rest of CMP/CBC/iron/TSH were wnl, Ferritin a bit elevated and FLP with . Def of controlled vs uncontrolled DM was reviewed. Diet was reviewed - wgt up 2 lbs from Feb 23. She is taking her DM meds as directed by Endo - she has f/u with Endo 8/17/23. She is UTD on DM foot exam (8/22) and eye exam (7/22 - 2 yrs). She notes no numbness/tingling/sores/ulcer with her feet. She is on statin and ASA but no ACE/ARB. Pt saw Cardio (Dr. Ela Roger) in April for f/u HTN/PVC's/edema - OV note reviewed. Echo was ordered for increase in edema and her Torsemide was increased to 20 mg daily. Echo showed EF 65% and no wall motion abnormality.  She had mod AS (new) and mild AR noted. She has not noted great improvement with the increase in the diuretic. She states she is elevating her LE during the day. She cannot put compression on herself and is not wearing them. She is aware of low sodium diet recommendation. Pt saw Nephro (Dr. Juan R Guan) for f/u CKD stage 4 - OV note reviewed. She had no meds changed and no studies ordered. She was told to f/u in 3 mos. She does not use NSAIDs and is drinking water during the day. Pt has seen pain mgt and had some injections in the lumbar spine in April and June  w/o great benefit. Her Gabapentin was increased to 300 mg bid. Pt is doing the higher dose of Gabapentin and notes no SE with the higher dose. She notes no great benefit with the increase in dose. Patient Care Team:  Jana Abdi DO as PCP - General  Sukhdeep Norman MD as PCP - Endocrinology (Endocrinology)  Murtis Luke, MD Fredderick Boast, DO (Nephrology)  Jimmy Rowell MD (Ophthalmology)  Riya Lerma MD (Dermatology)  Akira Cobos RD as Diabetes Educator (Dietician)     Review of Systems:     Review of Systems   Constitutional: Negative for chills, fatigue, fever and unexpected weight change. HENT: Negative for congestion, hearing loss and sore throat. Eyes: Negative for pain and visual disturbance. Respiratory: Negative for cough, shortness of breath and wheezing. Cardiovascular: Positive for leg swelling. Negative for chest pain and palpitations. Gastrointestinal: Negative for abdominal pain, blood in stool, constipation, diarrhea, nausea and vomiting. Genitourinary: Negative for difficulty urinating, dysuria, vaginal bleeding and vaginal pain. Musculoskeletal: Positive for back pain and gait problem. Negative for neck pain. Skin: Negative for rash and wound. Neurological: Negative for dizziness, light-headedness and headaches. Hematological: Does not bruise/bleed easily.    Psychiatric/Behavioral: Negative for confusion and dysphoric mood.         Problem List:     Patient Active Problem List   Diagnosis   • Type 2 diabetes mellitus with stage 4 chronic kidney disease, without long-term current use of insulin ([facility])   • Rhinitis   • Premature ventricular contraction   • Osteopenia   • Hypokalemia   • Hypertension   • GERD (gastroesophageal reflux disease)   • Dyslipidemia   • Diabetic polyneuropathy ([facility])   • Anxiety disorder   • Dependent edema   • CKD (chronic kidney disease), stage IV ([facility])   • Complex renal cyst   • Chronic back pain   • Type 2 diabetes mellitus with hyperglycemia, without long-term current use of insulin ([facility])   • Herniated nucleus pulposus, L3-4 right   • Lumbar foraminal stenosis   • Lumbar radiculopathy   • Sacroiliitis ([facility])   • Proteinuria   • Nonrheumatic aortic valve stenosis      Past Medical and Surgical History:     Past Medical History:   Diagnosis Date   • Aortic valve insufficiency    • Cataract of both eyes    • Dysuria     last assessed - [id number]   • Edema     last assessed - [id number]   • GERD (gastroesophageal reflux disease)     last assessed - 30Aug2012   • Hyperlipidemia    • Hypertension    • Low back pain     last assessed - 30Aug2012   • Mitral valve disorder    • Osteoporosis     last assessed - 29Jul2014   • Palpitations      Past Surgical History:   Procedure Laterality Date   • APPENDECTOMY     • CATARACT EXTRACTION Bilateral    • COLONOSCOPY     • TUBAL LIGATION Bilateral       Family History:     Family History   Problem Relation Age of Onset   • Kidney disease Mother         Chronic   • No Known Problems Father    • Breast cancer Sister    • Diabetes Sister    • Breast cancer Sister    • Hypertension Sister    • No Known Problems Daughter    • No Known Problems Son    • No Known Problems Son    • No Known Problems Son    • No Known Problems Son       Social History:     Social History     Socioeconomic History   • Marital status: Single     Spouse name: None • Number of children: None   • Years of education: None   • Highest education level: None   Occupational History   • None   Tobacco Use   • Smoking status: Never   • Smokeless tobacco: Never   Vaping Use   • Vaping Use: Never used   Substance and Sexual Activity   • Alcohol use: Never   • Drug use: Never   • Sexual activity: Not Currently   Other Topics Concern   • None   Social History Narrative    Living with relatives (other than parents)    Marital History -      Social Determinants of Health     Financial Resource Strain: Low Risk  (7/22/2023)    Overall Financial Resource Strain (CARDIA)    • Difficulty of Paying Living Expenses: Not very hard   Food Insecurity: Not on file   Transportation Needs: No Transportation Needs (7/22/2023)    PRAPARE - Transportation    • Lack of Transportation (Medical): No    • Lack of Transportation (Non-Medical):  No   Physical Activity: Not on file   Stress: Not on file   Social Connections: Not on file   Intimate Partner Violence: Not on file   Housing Stability: Not on file      Medications and Allergies:     Current Outpatient Medications   Medication Sig Dispense Refill   • ACCU-CHEK PAUL PLUS test strip      • Accu-Chek Guide test strip USE TO TEST TWICE A  strip 3   • Accu-Chek Softclix Lancets lancets  (Patient not taking: Reported on 2/2/2023)     • Accu-Chek Softclix Lancets lancets Use as instructed twice daily 100 each 3   • aspirin 81 MG tablet Take 1 tablet by mouth daily     • cholecalciferol (VITAMIN D3) 1,000 units tablet Take 2 tablets by mouth daily     • cloNIDine (CATAPRES-TTS-1) 0.1 mg/24 hr PLACE 1 PATCH ON THE SKIN ONCE A WEEK AS DIRECTED 12 patch 2   • Denta 5000 Plus 1.1 % CREA USE TWICE A DAY -SPIT, DONT RINSE AND NOTHING BY MOUTH X 30 MIN     • Diclofenac Sodium (VOLTAREN) 1 % Apply 2 g topically 4 (four) times a day 50 g 0   • famotidine (PEPCID) 20 mg tablet TAKE 1 TABLET BY MOUTH EVERY DAY 90 tablet 3   • gabapentin (Neurontin) 300 mg capsule Take 1 PO TID. 60 capsule 1   • glipiZIDE (GLUCOTROL) 10 mg tablet TAKE 1 TABLET BY MOUTH IN THE MORNING THEN 2 TABS WITH DINNER 270 tablet 1   • glucose blood (Accu-Chek Emma Plus) test strip USE AS INSTRUCTED TWICE DAILY (Patient not taking: Reported on 2/2/2023) 100 each 7   • Invokana 100 MG TAKE 1 TABLET BY MOUTH EVERY DAY BEFORE BREAKFAST 30 tablet 5   • Januvia 50 MG tablet TAKE 1 TABLET BY MOUTH EVERY DAY 90 tablet 2   • nebivolol (BYSTOLIC) 20 MG tablet TAKE 1 TABLET BY MOUTH EVERY DAY 90 tablet 4   • NIFEdipine (ADALAT CC) 90 mg 24 hr tablet TAKE 1 TABLET BY MOUTH EVERY DAY 90 tablet 8   • Potassium Gluconate 595 (99 K) MG TABS Take 2 tablets by mouth 2 (two) times a day 4 tabs total     • sertraline (ZOLOFT) 25 mg tablet TAKE 1 TABLET BY MOUTH EVERY DAY 90 tablet 4   • simvastatin (ZOCOR) 20 mg tablet TAKE 1 TABLET BY MOUTH EVERYDAY AT BEDTIME 90 tablet 3   • torsemide (DEMADEX) 20 mg tablet TAKE 1 TABLET BY MOUTH EVERY DAY 90 tablet 4     No current facility-administered medications for this visit. No Known Allergies   Immunizations:     Immunization History   Administered Date(s) Administered   • COVID-19 MODERNA VACC 0.5 ML IM 01/28/2021, 02/26/2021   • INFLUENZA 10/30/2015, 10/13/2016, 10/09/2017   • Influenza Split High Dose Preservative Free IM 09/06/2012, 10/29/2013, 10/22/2014, 10/30/2015, 10/13/2016, 10/09/2017   • Influenza, high dose seasonal 0.7 mL 10/16/2018, 09/24/2019, 10/02/2020, 11/03/2021, 09/20/2022   • Pneumococcal Conjugate 13-Valent 11/13/2015   • Pneumococcal Polysaccharide PPV23 10/29/2013   • TD (adult) Preservative Free 10/26/2012      Health Maintenance: There are no preventive care reminders to display for this patient. Topic Date Due   • COVID-19 Vaccine (3 - Moderna series) 04/23/2021   • Influenza Vaccine (1) 09/01/2023      Medicare Screening Tests and Risk Assessments:     Vicky Mota is here for her Subsequent Wellness visit.  Last Medicare Wellness visit information reviewed, patient interviewed and updates made to the record today. Health Risk Assessment:   Patient rates overall health as very good. Patient feels that their physical health rating is slightly worse. Patient is satisfied with their life. Eyesight was rated as same. Hearing was rated as same. Patient feels that their emotional and mental health rating is same. Patients states they are never, rarely angry. Patient states they are sometimes unusually tired/fatigued. Pain experienced in the last 7 days has been a lot. Patient's pain rating has been 8/10. Patient states that she has experienced no weight loss or gain in last 6 months. Physical health worse d/t worsening back pain    Depression Screening:   PHQ-2 Score: 0      Fall Risk Screening: In the past year, patient has experienced: no history of falling in past year      Urinary Incontinence Screening:   Patient has leaked urine accidently in the last six months. Has some incontinence from sitting to standing    Home Safety:  Patient does not have trouble with stairs inside or outside of their home. Patient has working smoke alarms and has working carbon monoxide detector. Home safety hazards include: none. Nutrition:   Current diet is Diabetic. Medications:   Patient is not currently taking any over-the-counter supplements. Patient is able to manage medications. Activities of Daily Living (ADLs)/Instrumental Activities of Daily Living (IADLs):   Walk and transfer into and out of bed and chair?: Yes  Dress and groom yourself?: Yes    Bathe or shower yourself?: Yes    Feed yourself?  Yes  Do your laundry/housekeeping?: Yes  Manage your money, pay your bills and track your expenses?: Yes  Make your own meals?: Yes    Do your own shopping?: Yes    Durable Medical Equipment Suppliers  No    Previous Hospitalizations:   Any hospitalizations or ED visits within the last 12 months?: Yes    How many hospitalizations have you had in the last year?: 1-2    Hospitalization Comments: None    Advance Care Planning:   Living will: No    Durable POA for healthcare: No    Advanced directive: No    Five wishes given: Yes      Cognitive Screening:   Provider or family/friend/caregiver concerned regarding cognition?: No    PREVENTIVE SCREENINGS      Cardiovascular Screening:    General: Screening Current, Risks and Benefits Discussed and History Lipid Disorder      Diabetes Screening:     General: History Diabetes, Risks and Benefits Discussed and Screening Current      Colorectal Cancer Screening:     General: Screening Not Indicated and Risks and Benefits Discussed      Breast Cancer Screening:     General: Risks and Benefits Discussed and Patient Declines      Cervical Cancer Screening:    General: Screening Not Indicated and Risks and Benefits Discussed      Osteoporosis Screening:    General: Patient Declines and Risks and Benefits Discussed      Abdominal Aortic Aneurysm (AAA) Screening:        General: Risks and Benefits Discussed and Screening Not Indicated      Lung Cancer Screening:     General: Screening Not Indicated and Risks and Benefits Discussed      Hepatitis C Screening:    General: Risks and Benefits Discussed and Screening Not Indicated    Screening, Brief Intervention, and Referral to Treatment (SBIRT)    Screening  Typical number of drinks in a day: 0  Typical number of drinks in a week: 0  Interpretation: Low risk drinking behavior. AUDIT-C Screenin) How often did you have a drink containing alcohol in the past year? monthly or less  2) How many drinks did you have on a typical day when you were drinking in the past year?  1 to 2  3) How often did you have 6 or more drinks on one occasion in the past year? never    AUDIT-C Score: 1  Interpretation: Score 0-2 (female): Negative screen for alcohol misuse    Single Item Drug Screening:  How often have you used an illegal drug (including marijuana) or a prescription medication for non-medical reasons in the past year? never    Single Item Drug Screen Score: 0  Interpretation: Negative screen for possible drug use disorder    Vision Screening    Right eye Left eye Both eyes   Without correction      With correction 20/50 20/30 20/30        Physical Exam:     /62   Pulse 70   Temp (!) 97.2 °F (36.2 °C) (Tympanic)   Ht 5' 2" (1.575 m)   Wt 75.5 kg (166 lb 6.4 oz)   BMI 30.43 kg/m²     Physical Exam  Vitals and nursing note reviewed. Constitutional:       General: She is not in acute distress. Appearance: She is well-developed. She is not ill-appearing. HENT:      Head: Normocephalic and atraumatic. Right Ear: Tympanic membrane and external ear normal. There is no impacted cerumen. Left Ear: Tympanic membrane normal. There is no impacted cerumen. Ears:      Comments: L canal small  Eyes:      General:         Right eye: No discharge. Left eye: No discharge. Conjunctiva/sclera: Conjunctivae normal.   Neck:      Thyroid: No thyromegaly. Vascular: No carotid bruit. Trachea: No tracheal deviation. Cardiovascular:      Rate and Rhythm: Normal rate and regular rhythm. Pulses: no weak pulses          Dorsalis pedis pulses are 1+ on the right side and 1+ on the left side. Heart sounds: Normal heart sounds. No murmur heard. Comments: + 1 B/L LE edema  Pulmonary:      Effort: Pulmonary effort is normal. No respiratory distress. Breath sounds: Normal breath sounds. No wheezing, rhonchi or rales. Abdominal:      General: There is no distension. Palpations: Abdomen is soft. Tenderness: There is no abdominal tenderness. There is no guarding or rebound. Musculoskeletal:         General: No deformity or signs of injury. Cervical back: Neck supple. Feet:      Right foot:      Skin integrity: No ulcer, skin breakdown, erythema, warmth, callus or dry skin.       Left foot:      Skin integrity: No ulcer, skin breakdown, erythema, warmth, callus or dry skin. Lymphadenopathy:      Cervical: No cervical adenopathy. Skin:     General: Skin is warm and dry. Coloration: Skin is not pale. Findings: No rash. Neurological:      General: No focal deficit present. Mental Status: She is alert. Motor: No abnormal muscle tone. Gait: Gait normal.   Psychiatric:         Mood and Affect: Mood normal.         Behavior: Behavior normal.         Thought Content: Thought content normal.         Judgment: Judgment normal.       Diabetic Foot Exam    Patient's shoes and socks removed. Right Foot/Ankle   Right Foot Inspection  Skin Exam: skin normal and skin intact. No dry skin, no warmth, no callus, no erythema, no maceration, no abnormal color, no pre-ulcer, no ulcer and no callus. Toe Exam: ROM and strength within normal limits. Sensory   Vibration: intact  Monofilament testing: intact    Vascular  The right DP pulse is 1+. Left Foot/Ankle  Left Foot Inspection  Skin Exam: skin normal and skin intact. No dry skin, no warmth, no erythema, no maceration, normal color, no pre-ulcer, no ulcer and no callus. Toe Exam: ROM and strength within normal limits. Sensory   Vibration: intact  Monofilament testing: intact    Vascular  The left DP pulse is 1+.      Assign Risk Category  No deformity present  No loss of protective sensation  No weak pulses  Risk: 0      Leobardo Irby DO

## 2023-07-30 DIAGNOSIS — I10 ESSENTIAL HYPERTENSION: ICD-10-CM

## 2023-07-30 RX ORDER — NIFEDIPINE 90 MG/1
TABLET, FILM COATED, EXTENDED RELEASE ORAL
Qty: 90 TABLET | Refills: 9 | Status: SHIPPED | OUTPATIENT
Start: 2023-07-30

## 2023-08-02 ENCOUNTER — TELEPHONE (OUTPATIENT)
Age: 88
End: 2023-08-02

## 2023-08-02 NOTE — TELEPHONE ENCOUNTER
Caller: daughter in law    Doctor: Taisha Saleh    Reason for call: calling stated patient is having back and neck pain and would like to know what she should be able to do    Call back#:

## 2023-08-03 DIAGNOSIS — F41.9 ANXIETY: ICD-10-CM

## 2023-08-03 RX ORDER — SERTRALINE HYDROCHLORIDE 25 MG/1
TABLET, FILM COATED ORAL
Qty: 90 TABLET | Refills: 1 | Status: SHIPPED | OUTPATIENT
Start: 2023-08-03 | End: 2023-09-22 | Stop reason: SDUPTHER

## 2023-08-03 NOTE — TELEPHONE ENCOUNTER
S/w pt's daughter in [place], per medical communication consent on file. Per Hervey Kussmaul, pt has been taking gabapentin 300 mg bid since 7/7/23 with no se's and no relief. Questioning if she can increase it to tid as printed on the bottle. Anam Carballo, ok to increase to TID, cb 3 - 4 days before running out of medication, sooner if questions / issues arise. This increase may cause drowsiness, do not drive or operate machinery until you are familiar with how this medication may affect you. The writer will d/w AS and cb if there is any change in the plan as discussed. Hervey Kussmaul verbalized understanding and appreciation.

## 2023-08-08 DIAGNOSIS — K21.00 GASTROESOPHAGEAL REFLUX DISEASE WITH ESOPHAGITIS: ICD-10-CM

## 2023-08-08 DIAGNOSIS — I10 ESSENTIAL HYPERTENSION: ICD-10-CM

## 2023-08-08 RX ORDER — FAMOTIDINE 20 MG/1
TABLET, FILM COATED ORAL
Qty: 90 TABLET | Refills: 4 | Status: SHIPPED | OUTPATIENT
Start: 2023-08-08

## 2023-08-08 RX ORDER — NEBIVOLOL 20 MG/1
TABLET ORAL
Qty: 90 TABLET | Refills: 5 | Status: SHIPPED | OUTPATIENT
Start: 2023-08-08

## 2023-08-08 NOTE — TELEPHONE ENCOUNTER
S/W KYLE Camacho. Notes she made the increase to 300 mg, 1 in the am and 2 in the pm Friday into Saturday. She notes no side effects but also no benefit.  I did suggest giving it some more time to work, however I would relay the message to Juaquin

## 2023-08-08 NOTE — TELEPHONE ENCOUNTER
Caller: patient daughter in law Rox    Doctor: Jenifer Weeks    Reason for call: patient stated gabapentin has not help with pain    Call back#:

## 2023-08-08 NOTE — TELEPHONE ENCOUNTER
With any change in medication, especially with increasing medication, she needs to give at least 2 to 3 weeks until she is able to feel the effects.

## 2023-08-08 NOTE — TELEPHONE ENCOUNTER
Caller:  Oren WRIGHT    Doctor: Tonya ROGER    Reason for call: Medication is not helping     Call back#: [phone]

## 2023-08-10 NOTE — TELEPHONE ENCOUNTER
Caller: Ingrid Whitlock josue    Doctor: Josefa ROGER    Reason for call: Ingrid Whitlock called in to let the doctor know that the pt is having weakness in the legs mostly on the right side  And wanted to know if that is a s/e from the Gabapentin.       Call back#: [phone]

## 2023-08-10 NOTE — TELEPHONE ENCOUNTER
S/w jimbo. Per jimbo, pt c/o shaking in her legs with straining activities - rising from a prolonged sitting position or climbing stairs. A little worse in the right leg. Advised pt per SL, not related to gabapentin. Would recommend PT. Per Mukul Cooper, pt was not consistent and somewhat reluctant to PT in the past and her home exercises. Per Mukul Cooper, her pt in the past was more for stretching her back, not for strengthening her legs. Joelle Millard - if the pt is interested, this office will gladly put an order in. Mukul Cooper verbalized understanding, stated that she will d/w pt and cb if she would like to proceed.

## 2023-08-15 DIAGNOSIS — R60.9 DEPENDENT EDEMA: ICD-10-CM

## 2023-08-15 RX ORDER — TORSEMIDE 20 MG/1
TABLET ORAL
Qty: 90 TABLET | Refills: 3 | Status: SHIPPED | OUTPATIENT
Start: 2023-08-15

## 2023-08-17 ENCOUNTER — OFFICE VISIT (OUTPATIENT)
Dept: ENDOCRINOLOGY | Facility: HOSPITAL | Age: 88
End: 2023-08-17
Payer: COMMERCIAL

## 2023-08-17 VITALS
BODY MASS INDEX: 29.85 KG/M2 | WEIGHT: 162.2 LBS | SYSTOLIC BLOOD PRESSURE: 122 MMHG | DIASTOLIC BLOOD PRESSURE: 72 MMHG | HEIGHT: 62 IN | HEART RATE: 51 BPM

## 2023-08-17 DIAGNOSIS — E11.65 TYPE 2 DIABETES MELLITUS WITH HYPERGLYCEMIA, WITHOUT LONG-TERM CURRENT USE OF INSULIN (HCC): Primary | ICD-10-CM

## 2023-08-17 DIAGNOSIS — E11.22 TYPE 2 DIABETES MELLITUS WITH STAGE 4 CHRONIC KIDNEY DISEASE, WITHOUT LONG-TERM CURRENT USE OF INSULIN (HCC): ICD-10-CM

## 2023-08-17 DIAGNOSIS — E78.5 DYSLIPIDEMIA: ICD-10-CM

## 2023-08-17 DIAGNOSIS — E11.42 DIABETIC POLYNEUROPATHY ASSOCIATED WITH TYPE 2 DIABETES MELLITUS (HCC): ICD-10-CM

## 2023-08-17 DIAGNOSIS — N18.4 TYPE 2 DIABETES MELLITUS WITH STAGE 4 CHRONIC KIDNEY DISEASE, WITHOUT LONG-TERM CURRENT USE OF INSULIN (HCC): ICD-10-CM

## 2023-08-17 DIAGNOSIS — I10 PRIMARY HYPERTENSION: ICD-10-CM

## 2023-08-17 PROCEDURE — 99214 OFFICE O/P EST MOD 30 MIN: CPT | Performed by: INTERNAL MEDICINE

## 2023-08-17 NOTE — PROGRESS NOTES
8/18/2023    Assessment/Plan      Diagnoses and all orders for this visit:    Type 2 diabetes mellitus with hyperglycemia, without long-term current use of insulin ([address])  -     HEMOGLOBIN A1C W/ EAG ESTIMATION Lab Collect; Future  -     HEMOGLOBIN A1C W/ EAG ESTIMATION Lab Collect    Diabetic polyneuropathy associated with type 2 diabetes mellitus (HCC)    Type 2 diabetes mellitus with stage 4 chronic kidney disease, without long-term current use of insulin ([address])    Primary hypertension    Dyslipidemia        Assessment/Plan:  1. Type 2 diabetes. Hemoglobin A1c is 8.7%. This is a bit higher than previous but her hemoglobin A1c was done in June after having had a steroid injection in her back in April 2023 which could have affected her blood sugars. She is also 80years old so tight control of her diabetes is not appropriate as it increases her risk of hypoglycemia and she is refusing to utilize insulin so we are limited as to what medications we can use. She is going to work on checking her blood sugars once a day and varying the times before breakfast 1 day before supper the next day and alternate the days. She will keep a record of this and call me with the blood sugars in a month. She is due for repeat blood work of hemoglobin A1c at the end of September which I will do to see if that is improved before making any changes. She will continue the same glipizide, Invokana, and Januvia. The only medication we could potentially change would be the glipizide at this time. 2.  Diabetic neuropathy. She has occasional neuropathic symptoms. Diabetic foot exam was performed in the office today. 3.  Hypertension with CKD stage IV. She is followed by nephrology. She is normotensive in the office today on her current dose of torsemide, nifedipine, Bystolic, and Catapres TTS patch. 5.  Hyperlipidemia. She will continue his same simvastatin 20 mg daily.     I have asked her to follow-up in 6 months and blood work will be ordered based on the blood work in September. She will get a hemoglobin A1c performed in September 2023. CC: Diabetes type II, blood pressure, lipid follow-up    History of Present Illness     HPI: Naveed Allan is a 80y.o. year old female with type 2 diabetes with neuropathy and CKD stage IV for 11 years, hypertension, hyperlipidemia for follow-up visit. She is on oral agents at home and takes glipizide 10 mg 1 in the morning and 2 in the evening, Invokana 100 mg daily, and Januvia 50 mg daily. . She denies any polyphagia, polydipsia,  and blurry vision. She has polyuria and 2-3 times per night nocturia. She has occasional toe numbness that comes and goes. She denies chest pain or shortness of breath. She denies retinopathy, heart attack, stroke and claudication but does admit to neuropathy and nephropathy. Date of note received injection of steroids via pain medicine in April 2023. Hypoglycemic episodes: Yes rare. will be in am noted on testing. The patient's last eye exam was in July 2023 with no retinopathy. The patient's last foot exam was in August 2022 at endocrine office visit. Does not see podiatry. Last A1C was   Lab Results   Component Value Date    HGBA1C 8.7 (H) 06/23/2023   . Blood Sugar/Glucometer/Pump/CGM review: Checks blood sugars every day or every other day in the am. Forgot list. Reports blood sugars 150-170, some 130s. She has hyperlipidemia and takes simvastatin 20 mg daily. She denies headache or strokelike symptoms. She has hypertension and takes torsemide 20 mg daily, nifedipine 90 mg daily, Catapres TTS patch 0.1 mg per 24 hours applied once a week, and Bystolic 20 mg daily. She denies chest pain or shortness of breath. Review of Systems   Constitutional: Negative for fatigue and unexpected weight change. Eyes: Negative for visual disturbance. Wears glasses.     Respiratory: Negative for chest tightness and shortness of breath. Cardiovascular: Negative for chest pain. Gastrointestinal: Negative for abdominal pain, constipation, diarrhea and nausea. Endocrine: Positive for polyuria. Negative for polydipsia and polyphagia. Nocturia 2-3 times a night. Skin: Negative for wound. Neurological: Positive for numbness. Negative for dizziness, facial asymmetry, weakness and headaches. Occasional right toes a bit numb and tingling, goes away on own. Psychiatric/Behavioral: Negative for sleep disturbance.        Historical Information   Past Medical History:   Diagnosis Date   • Aortic valve insufficiency    • Cataract of both eyes    • Dysuria     last assessed - [id number]   • Edema     last assessed - [id number]   • GERD (gastroesophageal reflux disease)     last assessed - [id number]   • Hyperlipidemia    • Hypertension    • Low back pain     last assessed - [id number]   • Mitral valve disorder    • Osteoporosis     last assessed - [id number]   • Palpitations      Past Surgical History:   Procedure Laterality Date   • APPENDECTOMY     • CATARACT EXTRACTION Bilateral    • COLONOSCOPY     • TUBAL LIGATION Bilateral      Social History   Social History     Substance and Sexual Activity   Alcohol Use Never     Social History     Substance and Sexual Activity   Drug Use Never     Social History     Tobacco Use   Smoking Status Never   Smokeless Tobacco Never     Family History:   Family History   Problem Relation Age of Onset   • Kidney disease Mother         Chronic   • No Known Problems Father    • Breast cancer Sister    • Diabetes Sister    • Breast cancer Sister    • Hypertension Sister    • No Known Problems Daughter    • No Known Problems Son    • No Known Problems Son    • No Known Problems Son    • No Known Problems Son        Meds/Allergies   Current Outpatient Medications   Medication Sig Dispense Refill   • ACCU-CHEK PAUL PLUS test strip      • Accu-Chek Guide test strip USE TO TEST TWICE A  strip 3 • Accu-Chek Softclix Lancets lancets Use as instructed twice daily 100 each 3   • aspirin 81 MG tablet Take 1 tablet by mouth daily     • cholecalciferol (VITAMIN D3) 1,000 units tablet Take 2 tablets by mouth daily     • cloNIDine (CATAPRES-TTS-1) 0.1 mg/24 hr PLACE 1 PATCH ON THE SKIN ONCE A WEEK AS DIRECTED 12 patch 2   • Denta 5000 Plus 1.1 % CREA USE TWICE A DAY -SPIT, DONT RINSE AND NOTHING BY MOUTH X 30 MIN     • famotidine (PEPCID) 20 mg tablet TAKE 1 TABLET BY MOUTH EVERY DAY 90 tablet 4   • gabapentin (Neurontin) 300 mg capsule Take 1 PO TID. 60 capsule 1   • glipiZIDE (GLUCOTROL) 10 mg tablet TAKE 1 TABLET BY MOUTH IN THE MORNING THEN 2 TABS WITH DINNER 270 tablet 1   • Invokana 100 MG TAKE 1 TABLET BY MOUTH EVERY DAY BEFORE BREAKFAST 30 tablet 5   • Januvia 50 MG tablet TAKE 1 TABLET BY MOUTH EVERY DAY 90 tablet 2   • nebivolol (BYSTOLIC) 20 MG tablet TAKE 1 TABLET BY MOUTH EVERY DAY 90 tablet 5   • NIFEdipine (ADALAT CC) 90 mg 24 hr tablet TAKE 1 TABLET BY MOUTH EVERY DAY 90 tablet 9   • Potassium Gluconate 595 (99 K) MG TABS Take 2 tablets by mouth 2 (two) times a day 4 tabs total     • sertraline (ZOLOFT) 25 mg tablet TAKE 1 TABLET BY MOUTH EVERY DAY 90 tablet 1   • simvastatin (ZOCOR) 20 mg tablet TAKE 1 TABLET BY MOUTH EVERYDAY AT BEDTIME 90 tablet 3   • torsemide (DEMADEX) 20 mg tablet TAKE 1 TABLET BY MOUTH EVERY DAY 90 tablet 3   • Accu-Chek Softclix Lancets lancets  (Patient not taking: Reported on 2/2/2023)     • glucose blood (Accu-Chek Emma Plus) test strip USE AS INSTRUCTED TWICE DAILY (Patient not taking: Reported on 2/2/2023) 100 each 7     No current facility-administered medications for this visit. No Known Allergies    Objective   Vitals: Blood pressure 122/72, pulse (!) 51, height 5' 2" (1.575 m), weight 73.6 kg (162 lb 3.2 oz), not currently breastfeeding. Invasive Devices     None                 Physical Exam  Vitals reviewed.    Constitutional:       Appearance: Normal appearance. She is well-developed. HENT:      Head: Normocephalic and atraumatic. Eyes:      Conjunctiva/sclera: Conjunctivae normal.   Neck:      Thyroid: No thyromegaly. Vascular: No carotid bruit. Comments: Thyroid normal in size. Cardiovascular:      Rate and Rhythm: Normal rate and regular rhythm. Pulses: Pulses are weak. Dorsalis pedis pulses are 1+ on the right side and 1+ on the left side. Posterior tibial pulses are 1+ on the right side and 1+ on the left side. Heart sounds: Normal heart sounds. No murmur heard. Comments: 1+ dorsalis pedis and posterior tibialis pulses bilaterally. Pulmonary:      Effort: Pulmonary effort is normal.      Breath sounds: Normal breath sounds. No wheezing. Abdominal:      Palpations: Abdomen is soft. Musculoskeletal:         General: No deformity. Cervical back: Normal range of motion and neck supple. Right lower leg: Edema present. Left lower leg: Edema present. Comments: Trace bilateral lower extremity edema. No ulcerations of the feet. Feet:      Right foot:      Skin integrity: No ulcer, skin breakdown, erythema, warmth, callus or dry skin. Left foot:      Skin integrity: No ulcer, skin breakdown, erythema, warmth, callus or dry skin. Lymphadenopathy:      Cervical: No cervical adenopathy. Skin:     General: Skin is warm and dry. Findings: No rash. Neurological:      Mental Status: She is alert and oriented to person, place, and time. Deep Tendon Reflexes: Reflexes are normal and symmetric. Comments: Vibration sensation diminished to the first toe DIP joint bilaterally. Microfilament sensation absent to the right heel, first, third, and fifth toes in the left arch and heel but was otherwise intact bilaterally. Patient's shoes and socks removed. Right Foot/Ankle   Right Foot Inspection  Skin Exam: skin normal and skin intact.  No dry skin, no warmth, no callus, no erythema, no maceration, no abnormal color, no pre-ulcer, no ulcer and no callus. Toe Exam: No swelling and  no right toe deformity    Sensory   Vibration: diminished  Monofilament testing: diminished    Vascular  Capillary refills: < 3 seconds  The right DP pulse is 1+. The right PT pulse is 1+. Left Foot/Ankle  Left Foot Inspection  Skin Exam: skin normal and skin intact. No dry skin, no warmth, no erythema, no maceration, normal color, no pre-ulcer, no ulcer and no callus. Toe Exam: No swelling and no left toe deformity. Sensory   Vibration: diminished  Monofilament testing: diminished    Vascular  Capillary refills: < 3 seconds  The left DP pulse is 1+. The left PT pulse is 1+. Assign Risk Category  No deformity present  Loss of protective sensation  Weak pulses  Risk: 2        The history was obtained from the review of the chart and from the patient and daughter. Lab Results:    Most recent Alc is  Lab Results   Component Value Date    HGBA1C 8.7 (H) 06/23/2023           Blood work performed on 6/23/2023 showed a CMP with a glucose of 173 fasting, BUN 40, creatinine 1.89, GFR 25, but was otherwise normal.    CBC is normal except for a white blood cell count of 16.6.     Lab Results   Component Value Date    CREATININE 1.90 (H) 06/23/2023    CREATININE 1.89 (H) 06/23/2023    CREATININE 1.88 (H) 01/06/2023    BUN 38 (H) 06/23/2023     01/02/2018    K 4.1 06/23/2023     06/23/2023    CO2 25 06/23/2023     eGFR   Date Value Ref Range Status   06/23/2023 25 (L) >59 mL/min/1.73 Final   10/29/2022 22 ml/min/1.73sq m Final         Lab Results   Component Value Date    CHOL 168 01/02/2018    HDL 50 06/23/2023    TRIG 139 06/23/2023    CHOLHDL 3.5 06/23/2023       Lab Results   Component Value Date    ALT 13 06/23/2023    AST 18 06/23/2023    ALKPHOS 72 10/29/2022    BILITOT 0.3 01/02/2018       Lab Results   Component Value Date    TSH 1.990 06/23/2023             Future Appointments Date Time Provider [address]  [address]   9/5/2023  8:45 AM Mazin Carpenter PA-C SP [facility]TN [facility]   11/20/2023 12:30 PM Meng Franklin [facility] QTR Med [facility]   12/8/2023  9:00 AM DO MIKI PreciadoOWN  [facility]   1/2/2024  9:20 AM Domingo James MD ENDO QU Med Sp[facility]

## 2023-08-17 NOTE — PATIENT INSTRUCTIONS
Hgba1c is 8.7%. this is higher. Checks blood sugars once daily, before breakfast one day and before supper the next day and alternate days and keep pa record and call to me in 1 month. We'll repeat the blood work for the hgba1c at the end of september to see if it is better. Follow up in 6 months and we will order blood work after wards.

## 2023-08-24 ENCOUNTER — TELEPHONE (OUTPATIENT)
Dept: FAMILY MEDICINE CLINIC | Facility: HOSPITAL | Age: 88
End: 2023-08-24

## 2023-08-24 DIAGNOSIS — F43.21 GRIEF REACTION: Primary | ICD-10-CM

## 2023-08-24 RX ORDER — LORAZEPAM 0.5 MG/1
0.5 TABLET ORAL EVERY 8 HOURS PRN
Qty: 10 TABLET | Refills: 0 | Status: SHIPPED | OUTPATIENT
Start: 2023-08-24

## 2023-08-24 NOTE — TELEPHONE ENCOUNTER
Would you be willing to send in a PRN med for her to help her get through this? I talked to her daughter in law and she said pt hasn't stopped crying since last night.

## 2023-08-24 NOTE — TELEPHONE ENCOUNTER
Please notify pts dgtr Nickolas Saab) that I have sent a rx for  lorazepam 0.5 mg 1 tab PO q 8 hrs PRN anxiety - be aware I reluctantly prescribe this as the medication is addictive/habit forming (not my concern) but it can increase confusion/unsteadiness/falls in the elderly (my concern), she should be watched closely when she takes the rx.   NO further refills will be given on the rx

## 2023-08-24 NOTE — TELEPHONE ENCOUNTER
DAUGHTER IN LAW CALLED AGAIN  - SHE SAID SHE NEEDS SOMETHING TO CALM HER MOTHER IN LAW DOWN ASAP!   DEATH IN THE FAMILY AND SHE IS HAVING A TERRIBLE TIME DEALING WITH IT

## 2023-08-24 NOTE — TELEPHONE ENCOUNTER
Patients daughter in law called and stated that Ena's son passed away last night. She is extremely upset and they are wondering if something could be called in to help calm her nerves?     If you have questions please call Kim Veliz @ [phone]

## 2023-08-28 DIAGNOSIS — M51.16 INTERVERTEBRAL DISC DISORDER WITH RADICULOPATHY OF LUMBAR REGION: ICD-10-CM

## 2023-08-28 RX ORDER — GABAPENTIN 300 MG/1
CAPSULE ORAL
Qty: 90 CAPSULE | Refills: 0 | Status: SHIPPED | OUTPATIENT
Start: 2023-08-28 | End: 2023-09-05 | Stop reason: SDUPTHER

## 2023-08-28 NOTE — TELEPHONE ENCOUNTER
Medication Gabapentin 300mg capsule    Quantity [address], [address]    PCP/Speciality Spine [facility]    Enough for 3 days -No

## 2023-08-28 NOTE — TELEPHONE ENCOUNTER
S/w pt's daughter in law. Confirmed gabapentin 300 mg 1 pill am 2 pills hs with several days of significant relief ~ 2 1/2 weeks ago. Per Austin Tavarez, since then the pain has returned with no se's. Radha Pérez, the writer will d/w AS and cb if there is a change in the rx however, because the pt is scheduled for fu on 23, it may be continued as currently prescribed and discussed at her ov. Austin Tavarez verbalized understanding and appreciation. Requested the rx be sent for pu today as the pt's son recently passed and the  is tomorrow. The pt will run out of medicine tomorrow. Radha Pérez - anticipate pu today. Austin Tavarez verbalized understanding and appreciation.

## 2023-09-02 DIAGNOSIS — I10 ESSENTIAL HYPERTENSION: ICD-10-CM

## 2023-09-02 DIAGNOSIS — N18.30 TYPE 2 DIABETES MELLITUS WITH STAGE 3 CHRONIC KIDNEY DISEASE, WITHOUT LONG-TERM CURRENT USE OF INSULIN (HCC): ICD-10-CM

## 2023-09-02 DIAGNOSIS — E11.22 TYPE 2 DIABETES MELLITUS WITH STAGE 3 CHRONIC KIDNEY DISEASE, WITHOUT LONG-TERM CURRENT USE OF INSULIN (HCC): ICD-10-CM

## 2023-09-04 RX ORDER — GLIPIZIDE 10 MG/1
TABLET ORAL
Qty: 270 TABLET | Refills: 2 | Status: SHIPPED | OUTPATIENT
Start: 2023-09-04

## 2023-09-05 ENCOUNTER — OFFICE VISIT (OUTPATIENT)
Dept: PAIN MEDICINE | Facility: CLINIC | Age: 88
End: 2023-09-05
Payer: COMMERCIAL

## 2023-09-05 VITALS
BODY MASS INDEX: 30.36 KG/M2 | HEART RATE: 54 BPM | HEIGHT: 62 IN | TEMPERATURE: 98.8 F | WEIGHT: 165 LBS | SYSTOLIC BLOOD PRESSURE: 120 MMHG | DIASTOLIC BLOOD PRESSURE: 72 MMHG

## 2023-09-05 DIAGNOSIS — M79.18 MYOFASCIAL PAIN SYNDROME: ICD-10-CM

## 2023-09-05 DIAGNOSIS — M51.16 INTERVERTEBRAL DISC DISORDER WITH RADICULOPATHY OF LUMBAR REGION: ICD-10-CM

## 2023-09-05 DIAGNOSIS — M47.816 LUMBAR SPONDYLOSIS: Primary | ICD-10-CM

## 2023-09-05 PROCEDURE — 99214 OFFICE O/P EST MOD 30 MIN: CPT | Performed by: PHYSICIAN ASSISTANT

## 2023-09-05 RX ORDER — METHOCARBAMOL 500 MG/1
500 TABLET, FILM COATED ORAL 2 TIMES DAILY
Qty: 60 TABLET | Refills: 2 | Status: SHIPPED | OUTPATIENT
Start: 2023-09-05

## 2023-09-05 RX ORDER — GABAPENTIN 300 MG/1
CAPSULE ORAL
Qty: 120 CAPSULE | Refills: 2 | Status: SHIPPED | OUTPATIENT
Start: 2023-09-05

## 2023-09-05 NOTE — PROGRESS NOTES
Assessment:  1. Lumbar spondylosis    2. Intervertebral disc disorder with radiculopathy of lumbar region    3. Myofascial pain syndrome        Plan:  While the patient was in the office today, I did have a thorough conversation regarding their chronic pain syndrome, medication management, and treatment plan options. After discussing options I have recommended that we slightly increase the gabapentin to 300 mg 4 times daily. We will also proceed with a trial of low-dose methocarbamol 500 mg twice daily for muscle tightness and spasm. I advised the patient that they should not drive or operate machinery while on this medication until they see how it affects them, as it could cause lethargy and mental cloudiness. I advised the patient to call our office if they experience any side effects or issues with the medication changes. The patient verbalized an understanding. Follow-up in 3 months or sooner if needed if the pain changes or worsens. The patient will follow-up in 12 weeks for medication prescription refill and reevaluation. The patient was advised to contact the office should their symptoms worsen in the interim. The patient was agreeable and verbalized an understanding. History of Present Illness: The patient is a 80 y.o. female last seen on 7/7/2023 who presents for a follow up office visit in regards to chronic back pain secondary to lumbar spondylosis, lumbar degenerative disc disease, lumbar spinal stenosis. The patient currently reports chronic low back pain that she presently rates a 5 out of 10 on the pain scale describes it as an intermittent pressure-like pain. She also has intermittent axial neck pain. Unfortunately her pain did not improve with injection therapy including epidural steroid injection and sacroiliac joint injection. The patient is not interested in any additional injection therapy at this point.   She is taking gabapentin 300 mg 3 times daily with mild improvement and no side effects. I have personally reviewed and/or updated the patient's past medical history, past surgical history, family history, social history, current medications, allergies, and vital signs today. Review of Systems:    Review of Systems   Respiratory: Negative for shortness of breath. Cardiovascular: Negative for chest pain. Gastrointestinal: Negative for constipation, diarrhea, nausea and vomiting. Musculoskeletal: Positive for gait problem and joint swelling. Negative for arthralgias and myalgias. Skin: Negative for rash. Neurological: Positive for weakness. Negative for dizziness and seizures. All other systems reviewed and are negative.         Past Medical History:   Diagnosis Date   • Aortic valve insufficiency    • Cataract of both eyes    • Dysuria     last assessed - [id number]   • Edema     last assessed - [id number]   • GERD (gastroesophageal reflux disease)     last assessed - [id number]   • Hyperlipidemia    • Hypertension    • Low back pain     last assessed - [id number]   • Mitral valve disorder    • Osteoporosis     last assessed - [id number]   • Palpitations        Past Surgical History:   Procedure Laterality Date   • APPENDECTOMY     • CATARACT EXTRACTION Bilateral    • COLONOSCOPY     • TUBAL LIGATION Bilateral        Family History   Problem Relation Age of Onset   • Kidney disease Mother         Chronic   • No Known Problems Father    • Breast cancer Sister    • Diabetes Sister    • Breast cancer Sister    • Hypertension Sister    • No Known Problems Daughter    • No Known Problems Son    • No Known Problems Son    • No Known Problems Son    • No Known Problems Son        Social History     Occupational History   • Not on file   Tobacco Use   • Smoking status: Never   • Smokeless tobacco: Never   Vaping Use   • Vaping Use: Never used   Substance and Sexual Activity   • Alcohol use: Never   • Drug use: Never   • Sexual activity: Not Currently Current Outpatient Medications:   •  ACCU-CHEK PAUL PLUS test strip, , Disp: , Rfl:   •  aspirin 81 MG tablet, Take 1 tablet by mouth daily, Disp: , Rfl:   •  cholecalciferol (VITAMIN D3) 1,000 units tablet, Take 2 tablets by mouth daily, Disp: , Rfl:   •  cloNIDine (CATAPRES-TTS-1) 0.1 mg/24 hr, PLACE 1 PATCH ON THE SKIN ONCE A WEEK AS DIRECTED, Disp: 12 patch, Rfl: 2  •  Denta 5000 Plus 1.1 % CREA, USE TWICE A DAY -SPIT, DONT RINSE AND NOTHING BY MOUTH X 30 MIN, Disp: , Rfl:   •  famotidine (PEPCID) 20 mg tablet, TAKE 1 TABLET BY MOUTH EVERY DAY, Disp: 90 tablet, Rfl: 4  •  gabapentin (Neurontin) 300 mg capsule, Take 1 PO QID, Disp: 120 capsule, Rfl: 2  •  glipiZIDE (GLUCOTROL) 10 mg tablet, TAKE 1 TABLET BY MOUTH IN THE MORNING THEN 2 TABS WITH DINNER, Disp: 270 tablet, Rfl: 2  •  Invokana 100 MG, TAKE 1 TABLET BY MOUTH EVERY DAY BEFORE BREAKFAST, Disp: 30 tablet, Rfl: 5  •  Januvia 50 MG tablet, TAKE 1 TABLET BY MOUTH EVERY DAY, Disp: 90 tablet, Rfl: 2  •  LORazepam (Ativan) 0.5 mg tablet, Take 1 tablet (0.5 mg total) by mouth every 8 (eight) hours as needed for anxiety, Disp: 10 tablet, Rfl: 0  •  methocarbamol (ROBAXIN) 500 mg tablet, Take 1 tablet (500 mg total) by mouth 2 (two) times a day, Disp: 60 tablet, Rfl: 2  •  nebivolol (BYSTOLIC) 20 MG tablet, TAKE 1 TABLET BY MOUTH EVERY DAY, Disp: 90 tablet, Rfl: 5  •  NIFEdipine (ADALAT CC) 90 mg 24 hr tablet, TAKE 1 TABLET BY MOUTH EVERY DAY, Disp: 90 tablet, Rfl: 9  •  Potassium Gluconate 595 (99 K) MG TABS, Take 2 tablets by mouth 2 (two) times a day 4 tabs total, Disp: , Rfl:   •  sertraline (ZOLOFT) 25 mg tablet, TAKE 1 TABLET BY MOUTH EVERY DAY, Disp: 90 tablet, Rfl: 1  •  simvastatin (ZOCOR) 20 mg tablet, TAKE 1 TABLET BY MOUTH EVERYDAY AT BEDTIME, Disp: 90 tablet, Rfl: 3  •  torsemide (DEMADEX) 20 mg tablet, TAKE 1 TABLET BY MOUTH EVERY DAY, Disp: 90 tablet, Rfl: 3  •  Accu-Chek Guide test strip, USE TO TEST TWICE A DAY, Disp: 100 strip, Rfl: 3  •  Accu-Chek Softclix Lancets lancets, , Disp: , Rfl:   •  Accu-Chek Softclix Lancets lancets, Use as instructed twice daily, Disp: 100 each, Rfl: 3  •  glucose blood (Accu-Chek Emma Plus) test strip, USE AS INSTRUCTED TWICE DAILY (Patient not taking: Reported on 2/2/2023), Disp: 100 each, Rfl: 7    No Known Allergies    Physical Exam:    /72 (BP Location: Left arm, Patient Position: Sitting, Cuff Size: Standard)   Pulse (!) 54   Temp 98.8 °F (37.1 °C)   Ht 5' 2" (1.575 m)   Wt 74.8 kg (165 lb)   BMI 30.18 kg/m²     Constitutional:normal, well developed, well nourished, alert, in no distress and non-toxic and no overt pain behavior. Eyes:anicteric  HEENT:grossly intact  Neck:supple, symmetric, trachea midline and no masses   Pulmonary:even and unlabored  Cardiovascular: Mild lower extremity edema  Skin:Normal without rashes or lesions and well hydrated  Psychiatric:Mood and affect appropriate  Neurologic:Cranial Nerves II-XII grossly intact  Musculoskeletal: Gait slow but stable      Imaging  No orders to display         No orders of the defined types were placed in this encounter.

## 2023-09-06 DIAGNOSIS — I10 ESSENTIAL HYPERTENSION: ICD-10-CM

## 2023-09-06 RX ORDER — CLONIDINE 0.1 MG/24H
PATCH, EXTENDED RELEASE TRANSDERMAL
Qty: 12 PATCH | Refills: 3 | Status: SHIPPED | OUTPATIENT
Start: 2023-09-06

## 2023-09-06 NOTE — TELEPHONE ENCOUNTER
I refused the Rx   I normally do not see the patient
PEM ATTENDING ADDENDUM   I personally performed a history and physical examination, and discussed the management with the trainee.  The past medical and surgical history, review of systems, family history, social history, current medications, allergies, and immunization status were discussed with the trainee and I confirmed pertinent portions with the patient and/or family. I reviewed the assessment and plan documented by the trainee. I made modifications to the documentation above as I felt appropriate, and concur with what is documented above unless otherwise noted below.  I personally reviewed the diagnostic studies obtained.    Paula Paulino MD [facility] Attending

## 2023-09-18 ENCOUNTER — TELEPHONE (OUTPATIENT)
Dept: FAMILY MEDICINE CLINIC | Facility: HOSPITAL | Age: 88
End: 2023-09-18

## 2023-09-18 NOTE — TELEPHONE ENCOUNTER
Can try to decrease Gabapentin back to previous dose (3 x's a day instead of 4 x's a day) and see if nausea improves - if it does it is likely d/t the medication SE, if she has a lot of post nasal drip/mucus I also recommend an OTC antihistamine (Zyrtec or Claritin or Allegra) once daily.   If nausea persists then she should be seen

## 2023-09-18 NOTE — TELEPHONE ENCOUNTER
Pts daughter in law states pt had a change in her gabapentin dose, she is now taking it 4 times a day, also pt was put on a new medication by her pain doctor on 9/5. Unsure if these symptoms are related to those changes. Please advise.

## 2023-09-18 NOTE — TELEPHONE ENCOUNTER
VM--DIL states pt has been having nausea, vomiting a little bit of mucus since Thursday night. Has stopped vomiting but the nausea is coming and going. No fever. Asking what Dr. Stephanie Conway thinks she should do.  PCB

## 2023-09-22 ENCOUNTER — OFFICE VISIT (OUTPATIENT)
Dept: FAMILY MEDICINE CLINIC | Facility: HOSPITAL | Age: 88
End: 2023-09-22
Payer: COMMERCIAL

## 2023-09-22 VITALS
BODY MASS INDEX: 29.04 KG/M2 | HEIGHT: 62 IN | HEART RATE: 70 BPM | TEMPERATURE: 97.8 F | SYSTOLIC BLOOD PRESSURE: 156 MMHG | DIASTOLIC BLOOD PRESSURE: 68 MMHG | WEIGHT: 157.8 LBS

## 2023-09-22 DIAGNOSIS — R11.0 NAUSEA: Primary | ICD-10-CM

## 2023-09-22 DIAGNOSIS — N18.4 TYPE 2 DIABETES MELLITUS WITH STAGE 4 CHRONIC KIDNEY DISEASE, WITHOUT LONG-TERM CURRENT USE OF INSULIN (HCC): ICD-10-CM

## 2023-09-22 DIAGNOSIS — F41.9 ANXIETY: ICD-10-CM

## 2023-09-22 DIAGNOSIS — E11.22 TYPE 2 DIABETES MELLITUS WITH STAGE 4 CHRONIC KIDNEY DISEASE, WITHOUT LONG-TERM CURRENT USE OF INSULIN (HCC): ICD-10-CM

## 2023-09-22 PROCEDURE — 99214 OFFICE O/P EST MOD 30 MIN: CPT | Performed by: INTERNAL MEDICINE

## 2023-09-22 RX ORDER — ONDANSETRON 4 MG/1
4 TABLET, FILM COATED ORAL EVERY 8 HOURS PRN
Qty: 20 TABLET | Refills: 0 | Status: SHIPPED | OUTPATIENT
Start: 2023-09-22

## 2023-09-22 NOTE — ASSESSMENT & PLAN NOTE
Mood not at goal with recent death of her son, will increase Sertraline from 25 mg to 50 mg,  d/w pt that it takes 4-6 wks to get maximum benefit of med and that med has to be taken every day and to not miss doses of med, call with SE/new/worse mood/SI, re-eval in 4-5 wks

## 2023-09-22 NOTE — ASSESSMENT & PLAN NOTE
Importance of eating regularly and keeping hydrated with her DM and CKD reviewed, if not better in 2 wks will need BW  Lab Results   Component Value Date    HGBA1C 8.7 (H) 06/23/2023

## 2023-09-22 NOTE — PROGRESS NOTES
Name: Karon Snyder      : 1935      MRN: [id number]  Encounter Provider: Prince Danielle DO  Encounter Date: 2023   Encounter department: [address]     1. Nausea  Comments:  Nausea poss d/t meds but more likely d/t underlying emotional upset with son dying, will go back to Gabapentin qid as nausea not better with decrease in dose, may be d/t Robaxin but unlikely - to clarify if this is contributing to nausea - if no better with nausea after 2 wks of Robaxin she may restart it, if no better in 2 wks dgtr was instructed to call and would do some BW and imaging, will rx Zofran prn for nausea, she is on Pepcid as well, call with new/worse symptoms/red flag GI symptoms  Orders:  -     ondansetron (ZOFRAN) 4 mg tablet; Take 1 tablet (4 mg total) by mouth every 8 (eight) hours as needed for nausea or vomiting    2. Anxiety  Assessment & Plan:  Mood not at goal with recent death of her son, will increase Sertraline from 25 mg to 50 mg,  d/w pt that it takes 4-6 wks to get maximum benefit of med and that med has to be taken every day and to not miss doses of med, call with SE/new/worse mood/SI, re-eval in 4-5 wks  Orders:  -     sertraline (ZOLOFT) 50 mg tablet; Take 1 tablet (50 mg total) by mouth daily    3. Type 2 diabetes mellitus with stage 4 chronic kidney disease, without long-term current use of insulin (HCC)  Assessment & Plan:  Importance of eating regularly and keeping hydrated with her DM and CKD reviewed, if not better in 2 wks will need BW  Lab Results   Component Value Date    HGBA1C 8.7 (H) 2023              Subjective      HPI Pt here for an acute visit "sick as a day"    Pt states last Thurs she started to feel ill. When asked about triggers she states the week prior she started an extra Gabapentin and Robaxin. She has no SE for a week with those meds.   She called and we told her to go back to the previous dose of the Gabapentin but she con't her Robaxin. She does not feel decreasing the Gabapentin did not help the nausea at all. Her son passed the end of Aug from a MI in his 63's. She is tearful and upset a lot. She states "I'm upset all the time". She notes no abd pain and she is not eating a lot as her appetite is down. She is down 9 lbs. She notes no issues with swallowing /pain with swallowing/blood in stools/black tarry stools. She has some constipation intermittently but no diarrhea or reflux/indegestion. She notes no urinary symptoms. Review of Systems   Constitutional: Positive for fatigue and unexpected weight change. Negative for chills and fever. HENT: Negative for trouble swallowing. Respiratory: Negative for cough and shortness of breath. Cardiovascular: Negative for chest pain and palpitations. Gastrointestinal: Positive for constipation, nausea and vomiting. Negative for abdominal pain, blood in stool and diarrhea. Genitourinary: Negative for difficulty urinating and dysuria. Musculoskeletal: Positive for back pain. Negative for neck pain. Skin: Negative for rash and wound. Neurological: Positive for weakness. Negative for dizziness, light-headedness and headaches. Hematological: Does not bruise/bleed easily. Psychiatric/Behavioral: Positive for dysphoric mood. The patient is nervous/anxious.         Current Outpatient Medications on File Prior to Visit   Medication Sig   • ACCU-CHEK PAUL PLUS test strip    • Accu-Chek Guide test strip USE TO TEST TWICE A DAY   • Accu-Chek Softclix Lancets lancets  (Patient not taking: Reported on 2/2/2023)   • Accu-Chek Softclix Lancets lancets Use as instructed twice daily   • aspirin 81 MG tablet Take 1 tablet by mouth daily   • cholecalciferol (VITAMIN D3) 1,000 units tablet Take 2 tablets by mouth daily   • cloNIDine (CATAPRES-TTS-1) 0.1 mg/24 hr PLACE 1 PATCH ON THE SKIN ONCE A WEEK AS DIRECTED   • Denta 5000 Plus 1.1 % CREA USE TWICE A DAY -SPIT, DONT RINSE AND NOTHING BY MOUTH X 30 MIN   • famotidine (PEPCID) 20 mg tablet TAKE 1 TABLET BY MOUTH EVERY DAY   • gabapentin (Neurontin) 300 mg capsule Take 1 PO QID   • glipiZIDE (GLUCOTROL) 10 mg tablet TAKE 1 TABLET BY MOUTH IN THE MORNING THEN 2 TABS WITH DINNER   • glucose blood (Accu-Chek Emma Plus) test strip USE AS INSTRUCTED TWICE DAILY (Patient not taking: Reported on 2/2/2023)   • Invokana 100 MG TAKE 1 TABLET BY MOUTH EVERY DAY BEFORE BREAKFAST   • Januvia 50 MG tablet TAKE 1 TABLET BY MOUTH EVERY DAY   • methocarbamol (ROBAXIN) 500 mg tablet Take 1 tablet (500 mg total) by mouth 2 (two) times a day   • nebivolol (BYSTOLIC) 20 MG tablet TAKE 1 TABLET BY MOUTH EVERY DAY   • NIFEdipine (ADALAT CC) 90 mg 24 hr tablet TAKE 1 TABLET BY MOUTH EVERY DAY   • Potassium Gluconate 595 (99 K) MG TABS Take 2 tablets by mouth 2 (two) times a day 4 tabs total   • simvastatin (ZOCOR) 20 mg tablet TAKE 1 TABLET BY MOUTH EVERYDAY AT BEDTIME   • torsemide (DEMADEX) 20 mg tablet TAKE 1 TABLET BY MOUTH EVERY DAY   • [DISCONTINUED] LORazepam (Ativan) 0.5 mg tablet Take 1 tablet (0.5 mg total) by mouth every 8 (eight) hours as needed for anxiety   • [DISCONTINUED] sertraline (ZOLOFT) 25 mg tablet TAKE 1 TABLET BY MOUTH EVERY DAY       Objective     /68   Pulse 70   Temp 97.8 °F (36.6 °C) (Tympanic)   Ht 5' 2" (1.575 m)   Wt 71.6 kg (157 lb 12.8 oz)   BMI 28.86 kg/m²     Physical Exam  Vitals and nursing note reviewed. Constitutional:       General: She is not in acute distress. Appearance: She is well-developed. She is ill-appearing. Comments: Frail and ill appearing    HENT:      Head: Normocephalic and atraumatic. Eyes:      General:         Right eye: No discharge. Left eye: No discharge. Conjunctiva/sclera: Conjunctivae normal.   Neck:      Trachea: No tracheal deviation. Cardiovascular:      Rate and Rhythm: Normal rate and regular rhythm.       Heart sounds: Normal heart sounds. No murmur heard. No friction rub. Pulmonary:      Effort: Pulmonary effort is normal. No respiratory distress. Breath sounds: Normal breath sounds. No wheezing, rhonchi or rales. Abdominal:      General: There is no distension. Palpations: Abdomen is soft. Tenderness: There is no abdominal tenderness. There is no guarding or rebound. Musculoskeletal:      Cervical back: Neck supple. Skin:     General: Skin is warm. Coloration: Skin is not pale. Findings: No rash. Neurological:      General: No focal deficit present. Mental Status: She is alert. Mental status is at baseline. Motor: No abnormal muscle tone.    Psychiatric:         Behavior: Behavior normal.      Comments: tearful       Izabella Baptise, DO

## 2023-10-14 DIAGNOSIS — F41.9 ANXIETY: ICD-10-CM

## 2023-10-14 LAB
EST. AVERAGE GLUCOSE BLD GHB EST-MCNC: 197 MG/DL
HBA1C MFR BLD: 8.5 % (ref 4.8–5.6)

## 2023-10-20 ENCOUNTER — IMMUNIZATIONS (OUTPATIENT)
Dept: FAMILY MEDICINE CLINIC | Facility: HOSPITAL | Age: 88
End: 2023-10-20
Payer: COMMERCIAL

## 2023-10-20 DIAGNOSIS — Z23 ENCOUNTER FOR IMMUNIZATION: Primary | ICD-10-CM

## 2023-10-20 PROCEDURE — 90662 IIV NO PRSV INCREASED AG IM: CPT | Performed by: INTERNAL MEDICINE

## 2023-10-20 PROCEDURE — G0008 ADMIN INFLUENZA VIRUS VAC: HCPCS | Performed by: INTERNAL MEDICINE

## 2023-10-23 DIAGNOSIS — N18.30 TYPE 2 DIABETES MELLITUS WITH STAGE 3 CHRONIC KIDNEY DISEASE, WITHOUT LONG-TERM CURRENT USE OF INSULIN (HCC): ICD-10-CM

## 2023-10-23 DIAGNOSIS — E11.22 TYPE 2 DIABETES MELLITUS WITH STAGE 3 CHRONIC KIDNEY DISEASE, WITHOUT LONG-TERM CURRENT USE OF INSULIN (HCC): ICD-10-CM

## 2023-10-23 RX ORDER — LANCETS
EACH MISCELLANEOUS
Qty: 100 EACH | Refills: 3 | Status: SHIPPED | OUTPATIENT
Start: 2023-10-23

## 2023-10-30 ENCOUNTER — OFFICE VISIT (OUTPATIENT)
Dept: PAIN MEDICINE | Facility: CLINIC | Age: 88
End: 2023-10-30
Payer: COMMERCIAL

## 2023-10-30 ENCOUNTER — TELEPHONE (OUTPATIENT)
Age: 88
End: 2023-10-30

## 2023-10-30 VITALS
DIASTOLIC BLOOD PRESSURE: 72 MMHG | HEART RATE: 67 BPM | HEIGHT: 62 IN | WEIGHT: 164 LBS | SYSTOLIC BLOOD PRESSURE: 124 MMHG | TEMPERATURE: 97.4 F | BODY MASS INDEX: 30.18 KG/M2

## 2023-10-30 DIAGNOSIS — M48.061 LUMBAR FORAMINAL STENOSIS: ICD-10-CM

## 2023-10-30 DIAGNOSIS — M51.16 INTERVERTEBRAL DISC DISORDER WITH RADICULOPATHY OF LUMBAR REGION: ICD-10-CM

## 2023-10-30 DIAGNOSIS — M79.18 MYOFASCIAL PAIN SYNDROME: ICD-10-CM

## 2023-10-30 DIAGNOSIS — M47.816 LUMBAR SPONDYLOSIS: Primary | ICD-10-CM

## 2023-10-30 PROCEDURE — 1159F MED LIST DOCD IN RCRD: CPT | Performed by: PHYSICIAN ASSISTANT

## 2023-10-30 PROCEDURE — 1160F RVW MEDS BY RX/DR IN RCRD: CPT | Performed by: PHYSICIAN ASSISTANT

## 2023-10-30 PROCEDURE — 99214 OFFICE O/P EST MOD 30 MIN: CPT | Performed by: PHYSICIAN ASSISTANT

## 2023-10-30 RX ORDER — METAXALONE 800 MG/1
800 TABLET ORAL 2 TIMES DAILY
Qty: 60 TABLET | Refills: 2 | Status: SHIPPED | OUTPATIENT
Start: 2023-10-30

## 2023-10-30 RX ORDER — METAXALONE 800 MG/1
800 TABLET ORAL 2 TIMES DAILY
Qty: 60 TABLET | Refills: 2 | Status: SHIPPED | OUTPATIENT
Start: 2023-10-30 | End: 2023-10-30 | Stop reason: SDUPTHER

## 2023-10-30 RX ORDER — GABAPENTIN 400 MG/1
CAPSULE ORAL
Qty: 120 CAPSULE | Refills: 2 | Status: SHIPPED | OUTPATIENT
Start: 2023-10-30

## 2023-10-30 NOTE — TELEPHONE ENCOUNTER
Caller: Billy Song    Doctor: Nata Silvestre    Reason for call: Pt went to get prescription from pharmacy and was told it wasn't covered under her insurance and would have to pay $75 and would like to know if there is something cheaper that she can get because she doesn't want to have to pay $75     Please advise    Call back#: [phone]

## 2023-10-30 NOTE — TELEPHONE ENCOUNTER
S/w pt's daughter in law, Evelina Diaz. Confirmed skelaxin 800 mg #60 is not covered by the pt's insurance and using [facility], it is still too expensive at [facility], via [facility], skelaxin 800 mg #60 at [facility] in [place] is ~ $30. Offered the rx be cancelled and resent to [facility]. Evelina Diaz verbalized agreement. Eliecer Sood, the writer will d/w MG. Anticipate the rx will be sent for pu today. Evelina Diaz verbalized understanding and appreciation.

## 2023-10-30 NOTE — PROGRESS NOTES
Assessment:  1. Lumbar spondylosis    2. Intervertebral disc disorder with radiculopathy of lumbar region    3. Myofascial pain syndrome    4. Lumbar foraminal stenosis        Plan:  While the patient was in the office today, I did have a thorough conversation regarding their chronic pain syndrome, medication management, and treatment plan options. After discussing options, since the patient is tolerating the gabapentin with no side effects, we will increase that to 400 mg 4 times daily. I will discontinue her methocarbamol which has been of no benefit proceed with a trial of Skelaxin 800 mg twice daily as needed. She has failed respond to injection therapy and is not interested in any additional interventional procedures. We briefly discussed the possibility of a low-dose Butrans patch for longer acting pain control. We will try to exhaust other medications prior to trialing this. The patient will follow-up in 6 weeks for medication prescription refill and reevaluation. The patient was advised to contact the office should their symptoms worsen in the interim. The patient was agreeable and verbalized an understanding. History of Present Illness: The patient is a 80 y.o. female last seen on 9/5/2023 who presents for a follow up office visit in regards to chronic pain. The patient currently reports neck radicular low back pain that she presently rates a 5 out of 10 and describes it as an intermittent pressure-like pain that radiates throughout the right lower extremity along the anterior lateral aspect. She has increased pain with standing but also finds that the pain affects her ability to sleep at night. She has failed to respond to both an epidural steroid injection as well as sacroiliac joint injection in the past.  She is currently taking gabapentin and methocarbamol with minimal relief. Denies any side effects.     I have personally reviewed and/or updated the patient's past medical history, past surgical history, family history, social history, current medications, allergies, and vital signs today. Review of Systems:    Review of Systems   Respiratory:  Negative for shortness of breath. Cardiovascular:  Negative for chest pain. Gastrointestinal:  Negative for constipation, diarrhea, nausea and vomiting. Musculoskeletal:  Positive for gait problem. Negative for arthralgias, joint swelling and myalgias. Skin:  Negative for rash. Neurological:  Positive for weakness. Negative for dizziness and seizures. All other systems reviewed and are negative.         Past Medical History:   Diagnosis Date   • Aortic valve insufficiency    • Cataract of both eyes    • Dysuria     last assessed - [id number]   • Edema     last assessed - [id number]   • GERD (gastroesophageal reflux disease)     last assessed - [id number]   • Hyperlipidemia    • Hypertension    • Low back pain     last assessed - [id number]   • Mitral valve disorder    • Osteoporosis     last assessed - [id number]   • Palpitations        Past Surgical History:   Procedure Laterality Date   • APPENDECTOMY     • CATARACT EXTRACTION Bilateral    • COLONOSCOPY     • TUBAL LIGATION Bilateral        Family History   Problem Relation Age of Onset   • Kidney disease Mother         Chronic   • No Known Problems Father    • Breast cancer Sister    • Diabetes Sister    • Breast cancer Sister    • Hypertension Sister    • No Known Problems Daughter    • No Known Problems Son    • No Known Problems Son    • No Known Problems Son    • No Known Problems Son        Social History     Occupational History   • Not on file   Tobacco Use   • Smoking status: Never   • Smokeless tobacco: Never   Vaping Use   • Vaping Use: Never used   Substance and Sexual Activity   • Alcohol use: Never   • Drug use: Never   • Sexual activity: Not Currently         Current Outpatient Medications:   •  aspirin 81 MG tablet, Take 1 tablet by mouth daily, Disp: , Rfl:   • cholecalciferol (VITAMIN D3) 1,000 units tablet, Take 2 tablets by mouth daily, Disp: , Rfl:   •  cloNIDine (CATAPRES-TTS-1) 0.1 mg/24 hr, PLACE 1 PATCH ON THE SKIN ONCE A WEEK AS DIRECTED, Disp: 12 patch, Rfl: 3  •  Denta 5000 Plus 1.1 % CREA, USE TWICE A DAY -SPIT, DONT RINSE AND NOTHING BY MOUTH X 30 MIN, Disp: , Rfl:   •  famotidine (PEPCID) 20 mg tablet, TAKE 1 TABLET BY MOUTH EVERY DAY, Disp: 90 tablet, Rfl: 4  •  gabapentin (Neurontin) 400 mg capsule, Take 1 PO QID, Disp: 120 capsule, Rfl: 2  •  glipiZIDE (GLUCOTROL) 10 mg tablet, TAKE 1 TABLET BY MOUTH IN THE MORNING THEN 2 TABS WITH DINNER, Disp: 270 tablet, Rfl: 2  •  Invokana 100 MG, TAKE 1 TABLET BY MOUTH EVERY DAY BEFORE BREAKFAST, Disp: 30 tablet, Rfl: 5  •  Januvia 50 MG tablet, TAKE 1 TABLET BY MOUTH EVERY DAY, Disp: 90 tablet, Rfl: 2  •  metaxalone (SKELAXIN) 800 mg tablet, Take 1 tablet (800 mg total) by mouth 2 (two) times a day, Disp: 60 tablet, Rfl: 2  •  nebivolol (BYSTOLIC) 20 MG tablet, TAKE 1 TABLET BY MOUTH EVERY DAY, Disp: 90 tablet, Rfl: 5  •  NIFEdipine (ADALAT CC) 90 mg 24 hr tablet, TAKE 1 TABLET BY MOUTH EVERY DAY, Disp: 90 tablet, Rfl: 9  •  ondansetron (ZOFRAN) 4 mg tablet, Take 1 tablet (4 mg total) by mouth every 8 (eight) hours as needed for nausea or vomiting, Disp: 20 tablet, Rfl: 0  •  Potassium Gluconate 595 (99 K) MG TABS, Take 2 tablets by mouth 2 (two) times a day 4 tabs total, Disp: , Rfl:   •  sertraline (ZOLOFT) 50 mg tablet, TAKE 1 TABLET BY MOUTH EVERY DAY, Disp: 90 tablet, Rfl: 1  •  simvastatin (ZOCOR) 20 mg tablet, TAKE 1 TABLET BY MOUTH EVERYDAY AT BEDTIME, Disp: 90 tablet, Rfl: 3  •  torsemide (DEMADEX) 20 mg tablet, TAKE 1 TABLET BY MOUTH EVERY DAY, Disp: 90 tablet, Rfl: 3  •  ACCU-CHEK PAUL PLUS test strip, , Disp: , Rfl:   •  Accu-Chek Guide test strip, USE TO TEST TWICE A DAY, Disp: 100 strip, Rfl: 3  •  Accu-Chek Softclix Lancets lancets, , Disp: , Rfl:   •  Accu-Chek Softclix Lancets lancets, USE AS INSTRUCTED TWICE DAILY, Disp: 100 each, Rfl: 3  •  glucose blood (Accu-Chek Emma Plus) test strip, USE AS INSTRUCTED TWICE DAILY (Patient not taking: Reported on 2/2/2023), Disp: 100 each, Rfl: 7    No Known Allergies    Physical Exam:    /72 (BP Location: Left arm, Patient Position: Sitting, Cuff Size: Standard)   Pulse 67   Temp (!) 97.4 °F (36.3 °C)   Ht 5' 2" (1.575 m)   Wt 74.4 kg (164 lb)   BMI 30.00 kg/m²     Constitutional:normal, well developed, well nourished, alert, in no distress and non-toxic and no overt pain behavior. Eyes:anicteric  HEENT:grossly intact  Neck:supple, symmetric, trachea midline and no masses   Pulmonary:even and unlabored  Cardiovascular:No edema or pitting edema present  Skin:Normal without rashes or lesions and well hydrated  Psychiatric:Mood and affect appropriate  Neurologic:Cranial Nerves II-XII grossly intact  Musculoskeletal: Stooped posture, gait is slow but stable. Imaging  No orders to display         No orders of the defined types were placed in this encounter.

## 2023-11-06 ENCOUNTER — TELEPHONE (OUTPATIENT)
Dept: FAMILY MEDICINE CLINIC | Facility: HOSPITAL | Age: 88
End: 2023-11-06

## 2023-11-06 DIAGNOSIS — M54.9 CHRONIC BACK PAIN, UNSPECIFIED BACK LOCATION, UNSPECIFIED BACK PAIN LATERALITY: Primary | ICD-10-CM

## 2023-11-06 DIAGNOSIS — G89.29 CHRONIC BACK PAIN, UNSPECIFIED BACK LOCATION, UNSPECIFIED BACK PAIN LATERALITY: Primary | ICD-10-CM

## 2023-11-06 NOTE — TELEPHONE ENCOUNTER
Hi, this is Emil Bender calling from my mother-in-law Mandy Eaton. Her date of birth is [birth year], so she's been dealing with back pain for months now, almost a year I guess, and right now it's really bad. So she's having a lot of trouble and she asked about going back to therapy. She was doing physical therapy for a little while and it helped a little bit and she wants to try that again. So I'm wondering if the order for that would come from Doctor Castaneda's office or if it has to come from the pain doctor she sees Doctor love too. So if you could let me know that I would appreciate it. If you can you facilitate that and send an order that would be wonderful. And that would be the right in [facility], the therapy at the [facility] or whatever it's called. Anyway, please give me a call back on my cell. The number is [phone]. Thank you so much.  loyd Choi.

## 2023-11-14 ENCOUNTER — EVALUATION (OUTPATIENT)
Dept: PHYSICAL THERAPY | Facility: CLINIC | Age: 88
End: 2023-11-14
Payer: COMMERCIAL

## 2023-11-14 DIAGNOSIS — M54.9 CHRONIC BACK PAIN, UNSPECIFIED BACK LOCATION, UNSPECIFIED BACK PAIN LATERALITY: Primary | ICD-10-CM

## 2023-11-14 DIAGNOSIS — G89.29 CHRONIC BACK PAIN, UNSPECIFIED BACK LOCATION, UNSPECIFIED BACK PAIN LATERALITY: Primary | ICD-10-CM

## 2023-11-14 DIAGNOSIS — M48.061 LUMBAR FORAMINAL STENOSIS: ICD-10-CM

## 2023-11-14 DIAGNOSIS — M51.26 HERNIATED NUCLEUS PULPOSUS, L3-4 RIGHT: ICD-10-CM

## 2023-11-14 DIAGNOSIS — E11.42 DIABETIC POLYNEUROPATHY ASSOCIATED WITH TYPE 2 DIABETES MELLITUS (HCC): ICD-10-CM

## 2023-11-14 DIAGNOSIS — M46.1 SACROILIITIS (HCC): ICD-10-CM

## 2023-11-14 DIAGNOSIS — N18.4 TYPE 2 DIABETES MELLITUS WITH STAGE 4 CHRONIC KIDNEY DISEASE, WITHOUT LONG-TERM CURRENT USE OF INSULIN (HCC): ICD-10-CM

## 2023-11-14 DIAGNOSIS — E11.22 TYPE 2 DIABETES MELLITUS WITH STAGE 4 CHRONIC KIDNEY DISEASE, WITHOUT LONG-TERM CURRENT USE OF INSULIN (HCC): ICD-10-CM

## 2023-11-14 PROCEDURE — 97161 PT EVAL LOW COMPLEX 20 MIN: CPT | Performed by: PHYSICAL THERAPIST

## 2023-11-14 PROCEDURE — 97140 MANUAL THERAPY 1/> REGIONS: CPT | Performed by: PHYSICAL THERAPIST

## 2023-11-14 NOTE — PROGRESS NOTES
PT Evaluation     Today's date: 2023  Patient name: Blank Quezada  : 1935  MRN: [id number]  Referring provider: Salazar Dominique DO  Dx:   Encounter Diagnosis     ICD-10-CM    1. Chronic back pain, unspecified back location, unspecified back pain laterality  M54.9 Ambulatory Referral to Physical Therapy    G89.29       2. Type 2 diabetes mellitus with stage 4 chronic kidney disease, without long-term current use of insulin (HCC)  E11.22     N18.4       3. Diabetic polyneuropathy associated with type 2 diabetes mellitus (HCC)  E11.42       4. Herniated nucleus pulposus, L3-4 right  M51.26       5. Lumbar foraminal stenosis  M48.061       6. Sacroiliitis ([address])  M46.1                      Assessment  Assessment details: Blank Quezada is a 80 y.o. female presenting to outpatient physical therapy at [facility] with complaints of low back pain. They present with decreased range of motion, decreased strength, limited flexibility, poor postural awareness, poor body mechanics, poor balance, decreased tolerance to activity and decreased functional mobility due to Chronic back pain, unspecified back location, unspecified back pain laterality  (primary encounter diagnosis)  Type 2 diabetes mellitus with stage 4 chronic kidney disease, without long-term current use of insulin (HCC)  Diabetic polyneuropathy associated with type 2 diabetes mellitus (HCC)  Herniated nucleus pulposus, L3-4 right  Lumbar foraminal stenosis  Sacroiliitis ([address] They would benefit from skilled physical therapy to address noted impairments in order to allow for full functional return to work and ADL-related activities.  Thank you for the referral!  Impairments: abnormal muscle firing, abnormal or restricted ROM, activity intolerance, impaired balance, impaired physical strength, lacks appropriate home exercise program, pain with function and poor body mechanics  Barriers to therapy: n/a  Understanding of Dx/Px/POC: excellent  Goals  ST. Independent with HEP in 2 weeks  2. Decrease pain by 50% in 3 wks    LT. Achieve FOTO score of 52/100 in 6 weeks   2. Able to walk for 10 mins without pain in 6 weeks  3. Strength = 4/5 RLE all planes in 6 weeks      Plan  Plan details: RE in 6 weeks. Patient would benefit from: skilled physical therapy  Planned modality interventions: cryotherapy, electrical stimulation/Russian stimulation and thermotherapy: hydrocollator packs  Planned therapy interventions: abdominal trunk stabilization, manual therapy, neuromuscular re-education, therapeutic activities, therapeutic exercise, body mechanics training and home exercise program  Frequency: 2x week  Duration in visits: 12  Duration in weeks: 6  Plan of Care beginning date: 2023  Plan of Care expiration date: 2023  Treatment plan discussed with: patient        Subjective Evaluation    History of Present Illness  Mechanism of injury: Pt with complex chronic h/o lumbar pain presents to PT with back pain. MRI from 2023 shows: "T12-L1 there is a disc herniation no stenosis. L2-L3 there is a disc herniation. No stenosis. L3-L4 disc bulge. Right foraminal disc herniation. Mild right neural foraminal stenosis. L4-L5 disc bulge with mild left foraminal stenosis. L5-S1 disc bulge with moderate bilateral neuroforaminal stenosis"    She has tried steroid injections, with most recent in the SI joints bilaterally in April and  of this year without major relief. She is taking gabapentin 400 mg 4 times daily. And was prescribed a trial of Skelaxin 800 mg twice daily as needed. Pt reports medication updates have not had an effect on her symptoms. Pain is located bilateral SI region with radiating pain and N/T to the R thigh and occasional radiation to the LLE and up the spine to the neck. When the pain travels to the neck, it becomes difficult to keep her head up. Pain is rated 5/10.  Agg with standing, walking, being on her feet. Eased with sitting or lying down, mineral ice topical for the lumbar. Denies bowel/bladder changes, balance difficulties, use of AD. Positive for LE N/T, LE weakness. Sleep is not impacted by back pain, she does not have any pain throughout the night. She lives in an in-law suite at her son's house. She does use the stairs with step-to pattern and railing use. Independent with all ADLs. She does not drive. She spends her time cooking, cleaning, doing chores, watching TV.   Patient Goals  Patient goals for therapy: decreased edema, decreased pain, improved balance, increased motion, return to work, return to [employer] activities, independence with ADLs/IADLs and increased strength        Objective     Postural Observations    Additional Postural Observation Details  Increased thoracic kyphosis; hips symmetrical in standing, leg length symmetrical in supine    Tenderness     Additional Tenderness Details  TTP bilateral adductors, ITB, greater troch, glute med, glute max, HS, lumbar paraspinals, thoracic paraspinals, SIJ    (-) directional pref  (-) slump  (-) SLR    SLR to 90 deg bilat with HS restriction bilaterally    Neurological Testing     Sensation     Lumbar   Left   Intact: light touch    Right   Intact: light touch    Reflexes   Left   Patellar (L4): trace (1+)  Achilles (S1): trace (1+)  Clonus sign: negative    Right   Patellar (L4): trace (1+)  Achilles (S1): normal (2+)  Clonus sign: negative    Active Range of Motion     Lumbar   Flexion:  Restriction level: moderate  Extension:  Restriction level: moderate  Left lateral flexion:  Restriction level: moderate  Right lateral flexion:  Restriction level: moderate  Left rotation:  Restriction level: moderate  Right rotation:  Restriction level: moderate    Strength/Myotome Testing     Left Hip   Planes of Motion   Flexion: 4  Extension: 4-  Abduction: 4-  Adduction: 4  External rotation: 4  Internal rotation: 4    Right Hip   Planes of Motion   Flexion: 4-  Extension: 4-  Abduction: 4-  Adduction: 4-  External rotation: 4-  Internal rotation: 4-    Left Knee   Flexion: 4+  Extension: 4    Right Knee   Flexion: 4  Extension: 4-    Left Ankle/Foot   Dorsiflexion: 4+    Right Ankle/Foot   Dorsiflexion: 4    Functional Assessment        Comments  Gait: decreased gait speed, mildly antalgic  STS: bilateral hand push off, fair performance  Bed mobility: independent, increased time to perform             Precautions: multilevel lumbar disc bulge and stenosis  Other: DM2, CKD      HEP:  Access Code: [email or web address]  URL: [email or web address]  Date: 11/14/2023  Prepared by: Josie Weber    Exercises  - Supine Lower Trunk Rotation  - 10 reps  - Supine Single Knee to Chest Stretch  - 10 reps  - Seated Long Arc Quad  - 10 reps  - Sit to Stand  - 10 reps      Manuals 11/14            Lumbar, sacrum, glute, ITB, HS STM In L sidelying SEAN            Bilateral hip PROM                                       Neuro Re-Ed                                                                                                        Ther Ex             Bike - cardio             LAQ X20 ea            STS x10            TB 3way roll out             Seated HS stretch             Single knee to chest X10 ea            LTR X10 ea            TA activation             Hip add iso             Hip abd iso             bridge             SLR             clamshell                                                    Ther Activity                                       Gait Training                                       Modalities

## 2023-11-16 ENCOUNTER — OFFICE VISIT (OUTPATIENT)
Dept: PHYSICAL THERAPY | Facility: CLINIC | Age: 88
End: 2023-11-16
Payer: COMMERCIAL

## 2023-11-16 DIAGNOSIS — G89.29 CHRONIC BILATERAL LOW BACK PAIN WITH RIGHT-SIDED SCIATICA: ICD-10-CM

## 2023-11-16 DIAGNOSIS — M54.41 CHRONIC BILATERAL LOW BACK PAIN WITH RIGHT-SIDED SCIATICA: ICD-10-CM

## 2023-11-16 DIAGNOSIS — M54.9 DORSALGIA: Primary | ICD-10-CM

## 2023-11-16 PROCEDURE — 97140 MANUAL THERAPY 1/> REGIONS: CPT | Performed by: PHYSICAL THERAPIST

## 2023-11-16 PROCEDURE — 97110 THERAPEUTIC EXERCISES: CPT | Performed by: PHYSICAL THERAPIST

## 2023-11-16 NOTE — PROGRESS NOTES
Daily Note     Today's date: 2023  Patient name: Christal Ndiaye  : 1935  MRN: [id number]  Referring provider: Rangel Worley DO  Dx:   Encounter Diagnosis     ICD-10-CM    1. Dorsalgia  M54.9       2. Chronic bilateral low back pain with right-sided sciatica  M54.41     G89.29           Start Time: 0930  Stop Time: 1020  Total time in clinic (min): 50 minutes    Subjective: patient and daughter report that pain is relieved w/ sitting and worse w/ standing / walking; feels "pressure" on her back with standing; reports that STM lv helped      Objective: See treatment diary below      Assessment:  Difficulty w/ sit -> stand,  lifting LE's onto table due to LE / core weakness. Needs work on strengthening. Very tender area of R PSIS/ glut; tolerated treatment well; reported relief w/ manuals. Patient would benefit from continued PT      Plan: Continue per plan of care. Focus on strengthening, functional mobility, pain relief     Precautions: multilevel lumbar disc bulge and stenosis  Other: DM2, CKD      HEP:  Access Code: Kinza Carias  URL: [email or web address]  Date: 2023  Prepared by: Daniel Lozano    Exercises  - Supine Lower Trunk Rotation  - 10 reps  - Supine Single Knee to Chest Stretch  - 10 reps  - Seated Long Arc Quad  - 10 reps  - Sit to Stand  - 10 reps      Manuals            Lumbar, sacrum, glute, ITB, HS STM In L sidelying SEAN JR           Bilateral hip PROM                                       Neuro Re-Ed                                                                                                        Ther Ex             Bike - cardio  6min           LAQ X20 ea            STS x10            TB 3way roll out             Seated HS stretch             Single knee to chest X10 ea 10x  10"           LTR X10 ea 15 ea           TA activation             Hip add iso             Hip abd iso             bridge  10x5"           SLR  10xea           clamshell Ther Activity             Sit<->stand  5x2                        Gait Training                                       Modalities

## 2023-11-17 ENCOUNTER — TELEPHONE (OUTPATIENT)
Age: 88
End: 2023-11-17

## 2023-11-17 DIAGNOSIS — G89.4 CHRONIC PAIN SYNDROME: Primary | ICD-10-CM

## 2023-11-17 RX ORDER — BUPRENORPHINE 5 UG/H
1 PATCH TRANSDERMAL
Qty: 4 PATCH | Refills: 0 | Status: SHIPPED | OUTPATIENT
Start: 2023-11-17

## 2023-11-17 NOTE — TELEPHONE ENCOUNTER
[facility]. Please remind daughter in law that this is an opioid medication and to be aware of any side effects such as sedation or dizziness. If she finds this medication helpful, next visit we will have to review the opioid contract/policy and perform urine drug screen as per protocol. Wean off Gabapentin as follows:   400mg TID x 1 week, 400mg BID x 1 week, 400mg QD x 1 week, then may discontinue. She does not have to continue Skelaxin and no need to wean.

## 2023-11-17 NOTE — TELEPHONE ENCOUNTER
Caller:  Kirk Collins (daughter in law)    Doctor: Alee Phillip    Reason for call: Pt is experiences a lot of weakness in her legs and she believes that it could be the medication that's causing the leg weakness and pt doesn't want to continue to take medication because she isn't feeling any relief and would like to know what can she do to help    Please advise     Call back#: [phone]

## 2023-11-17 NOTE — TELEPHONE ENCOUNTER
S/w pt and her daughter in law, Dayna Rios. Pt c/o weakness in her b/l legs. Denied heaviness. Stated that it has gotten worse with the increase in her medications: skelaxin and gabapentin. Per pt, she has no improvement in her low back and R>L leg pain. Pt stated that she feels like she would be better off stopping both medications as they are not helping her pain. LD of skelaxin was yesterday - should she  the refill? Per Dayna Rios, the pts son  in August and the pt continues with daily breakdowns. Per Shruthincjuan Brandonsierra, she is taking zoloft which is being managed by her pcp however, she feels that the depression is playing a role in all of this too. Donna Faith, per the pt's 10/30 ov note, low dose butrans would be the next / last option. This is a patch which would be worn x 7 days then changed. Dayna Rios verbalized understanding, stated that she / the pt would be agreeable with this medication. Questioned stopping the current medications and if so - how. Donna Faith, the writer will d/w MG and cb to advise. Pt verbalized understanding and appreciation.

## 2023-11-18 LAB
ALBUMIN SERPL-MCNC: 3.9 G/DL (ref 3.7–4.7)
ALBUMIN/CREAT UR: 120 MG/G CREAT (ref 0–29)
ALBUMIN/GLOB SERPL: 1.9 {RATIO} (ref 1.2–2.2)
ALP SERPL-CCNC: 55 IU/L (ref 44–121)
ALT SERPL-CCNC: 13 IU/L (ref 0–32)
APPEARANCE UR: CLEAR
AST SERPL-CCNC: 18 IU/L (ref 0–40)
BACTERIA URNS QL MICRO: ABNORMAL
BILIRUB SERPL-MCNC: 0.3 MG/DL (ref 0–1.2)
BILIRUB UR QL STRIP: NEGATIVE
BUN SERPL-MCNC: 32 MG/DL (ref 8–27)
BUN/CREAT SERPL: 18 (ref 12–28)
CALCIUM SERPL-MCNC: 8.5 MG/DL (ref 8.7–10.3)
CASTS URNS QL MICRO: ABNORMAL /LPF
CHLORIDE SERPL-SCNC: 103 MMOL/L (ref 96–106)
CO2 SERPL-SCNC: 23 MMOL/L (ref 20–29)
COLOR UR: YELLOW
CREAT SERPL-MCNC: 1.82 MG/DL (ref 0.57–1)
CREAT UR-MCNC: 98.8 MG/DL
EGFR: 26 ML/MIN/1.73
EPI CELLS #/AREA URNS HPF: ABNORMAL /HPF (ref 0–10)
GLOBULIN SER-MCNC: 2.1 G/DL (ref 1.5–4.5)
GLUCOSE SERPL-MCNC: 179 MG/DL (ref 70–99)
GLUCOSE UR QL: ABNORMAL
HGB UR QL STRIP: NEGATIVE
KETONES UR QL STRIP: NEGATIVE
LEUKOCYTE ESTERASE UR QL STRIP: ABNORMAL
MAGNESIUM SERPL-MCNC: 2.2 MG/DL (ref 1.6–2.3)
MICRO URNS: ABNORMAL
MICROALBUMIN UR-MCNC: 118.9 UG/ML
NITRITE UR QL STRIP: NEGATIVE
PH UR STRIP: 5.5 [PH] (ref 5–7.5)
PHOSPHATE SERPL-MCNC: 3.1 MG/DL (ref 3–4.3)
POTASSIUM SERPL-SCNC: 3.5 MMOL/L (ref 3.5–5.2)
PROT SERPL-MCNC: 6 G/DL (ref 6–8.5)
PROT UR QL STRIP: ABNORMAL
PROT UR-MCNC: 45.6 MG/DL
PROT/CREAT UR: 462 MG/G CREAT (ref 0–200)
PTH-INTACT SERPL-MCNC: 75 PG/ML (ref 15–65)
RBC #/AREA URNS HPF: ABNORMAL /HPF (ref 0–2)
SODIUM SERPL-SCNC: 142 MMOL/L (ref 134–144)
SP GR UR: 1.02 (ref 1–1.03)
UROBILINOGEN UR STRIP-ACNC: 0.2 MG/DL (ref 0.2–1)
WBC #/AREA URNS HPF: ABNORMAL /HPF (ref 0–5)

## 2023-11-20 ENCOUNTER — TELEPHONE (OUTPATIENT)
Dept: FAMILY MEDICINE CLINIC | Facility: HOSPITAL | Age: 88
End: 2023-11-20

## 2023-11-20 ENCOUNTER — OFFICE VISIT (OUTPATIENT)
Dept: NEPHROLOGY | Facility: HOSPITAL | Age: 88
End: 2023-11-20
Payer: COMMERCIAL

## 2023-11-20 VITALS
WEIGHT: 162 LBS | SYSTOLIC BLOOD PRESSURE: 135 MMHG | HEART RATE: 79 BPM | HEIGHT: 62 IN | DIASTOLIC BLOOD PRESSURE: 55 MMHG | BODY MASS INDEX: 29.81 KG/M2 | OXYGEN SATURATION: 85 %

## 2023-11-20 DIAGNOSIS — I10 PRIMARY HYPERTENSION: ICD-10-CM

## 2023-11-20 DIAGNOSIS — E11.22 TYPE 2 DIABETES MELLITUS WITH STAGE 4 CHRONIC KIDNEY DISEASE, WITHOUT LONG-TERM CURRENT USE OF INSULIN (HCC): Primary | ICD-10-CM

## 2023-11-20 DIAGNOSIS — R80.8 OTHER PROTEINURIA: ICD-10-CM

## 2023-11-20 DIAGNOSIS — E87.6 HYPOKALEMIA: ICD-10-CM

## 2023-11-20 DIAGNOSIS — N18.4 TYPE 2 DIABETES MELLITUS WITH STAGE 4 CHRONIC KIDNEY DISEASE, WITHOUT LONG-TERM CURRENT USE OF INSULIN (HCC): Primary | ICD-10-CM

## 2023-11-20 DIAGNOSIS — R79.89 ELEVATED PARATHYROID HORMONE: ICD-10-CM

## 2023-11-20 DIAGNOSIS — N28.1 COMPLEX RENAL CYST: ICD-10-CM

## 2023-11-20 PROCEDURE — 99214 OFFICE O/P EST MOD 30 MIN: CPT | Performed by: INTERNAL MEDICINE

## 2023-11-20 RX ORDER — GABAPENTIN 400 MG/1
400 CAPSULE ORAL 3 TIMES DAILY
COMMUNITY

## 2023-11-20 NOTE — PROGRESS NOTES
NEPHROLOGY OUTPATIENT PROGRESS NOTE   Bety Reid 80 y.o. female MRN: [id number]  DATE: 11/20/2023  Reason for visit:   Chief Complaint   Patient presents with    Chronic Kidney Disease    Follow-up        Patient Instructions   1. chronic kidney disease stage 4 in the setting of Diabetes mellitus type 2 as well as hypertensive nephrosclerosis +/- physiologic aging of the kidney   -last sCr 1.82 as of 11/17/23. Had been stable in the 1.6-1.9 range since July 2018, eGFR in mid 20s and stable  -latest UA with microscopy shows trace protein, no RBCs, 6-10 WBCs with UpCr 462mg/g with microalb:Cr 120mg/g  -avoid nonsteroidals (ibuprofen, advil, motrin, aleve, naproxen, indomethacin, celebrex, toradol)  -may take tylenol  -please stay well hydrated  -kidney smart education refused in the past.      2. Microalbuminuria in setting of type 2 DM - UpCr a bit lower at 462 mg/g as of Nov. 2023  -off lisinopril  -microalbumin in the urine can also be seen with physiologic aging of the kidney  -monitor proteinuria     3. HTN - BP elevated for age/CKD in office  -monitor BP at home and call office with home readings  -continue clonidine 0.1mg weekly patch, bystolic 32VO after dinner, nifedipine 90mg daily, torsemide 20mg daily  -off lisinopril   -off HCTZ 25mg daily     4. DM2, uncontrolled - on januvia, glipizide, invokana, last A1C 8.5 as of Oct. 2023, needs improved sugar control to slow down progression of CKD. 5. Hypokalemia, likely diuretic induced - off HCTZ, K 3.5, on potassium gluconate supplement, 2 tablets twice a day. Is on torsemide 20mg daily. 6. Dependent edema - resolved, albumin normal. Off HCTZ. Now on torsemide 20mg daily. Echo shows grade 1 diastolic dysfunction from April 2023  -monitor daily weight. Call office if weight gain > 3-5 lbs. 7. Complex cyst, left kidney - stable in size as of July 2020 ultrasound, will defer further imaging in light of age    6.  Elevated PTH likely d/t secondary hyperparathyroidism of renal origin - PTH 75 as of this month, repeat PTH, mild elevation noted     RTC in 4 months. Obtain blood and urine testing in 3 months prior to next office appointment. Mildred Jain was seen today for chronic kidney disease and follow-up. Diagnoses and all orders for this visit:    Type 2 diabetes mellitus with stage 4 chronic kidney disease, without long-term current use of insulin ([facility])  -     Basic metabolic panel; Future  -     Magnesium; Future  -     Phosphorus; Future  -     PTH, intact; Future  -     Urinalysis with microscopic; Future  -     Protein / creatinine ratio, urine; Future  -     Albumin / creatinine urine ratio; Future  -     Basic metabolic panel  -     Magnesium  -     Phosphorus  -     PTH, intact  -     Urinalysis with microscopic  -     Protein / creatinine ratio, urine  -     Albumin / creatinine urine ratio    Primary hypertension    Complex renal cyst    Hypokalemia  -     Basic metabolic panel; Future  -     Magnesium; Future  -     Basic metabolic panel  -     Magnesium    Other proteinuria  -     Protein / creatinine ratio, urine; Future  -     Albumin / creatinine urine ratio; Future  -     Protein / creatinine ratio, urine  -     Albumin / creatinine urine ratio    Elevated parathyroid hormone  -     PTH, intact; Future  -     PTH, intact        Assessment/Plan:  1. chronic kidney disease stage 4 in the setting of Diabetes mellitus type 2 as well as hypertensive nephrosclerosis +/- physiologic aging of the kidney   -last sCr 1.82 as of 11/17/23.  Had been stable in the 1.6-1.9 range since July 2018, eGFR in mid 20s and stable  -latest UA with microscopy shows trace protein, no RBCs, 6-10 WBCs with UpCr 462mg/g with microalb:Cr 120mg/g  -avoid nonsteroidals (ibuprofen, advil, motrin, aleve, naproxen, indomethacin, celebrex, toradol)  -may take tylenol  -please stay well hydrated  -kidney smart education refused in the past.      2. Microalbuminuria in setting of type 2 DM - UpCr a bit lower at 462 mg/g as of Nov. 2023  -off lisinopril  -microalbumin in the urine can also be seen with physiologic aging of the kidney  -monitor proteinuria     3. HTN - BP elevated for age/CKD in office  -monitor BP at home and call office with home readings  -continue clonidine 0.1mg weekly patch, bystolic 77RC after dinner, nifedipine 90mg daily, torsemide 20mg daily  -off lisinopril   -off HCTZ 25mg daily     4. DM2, uncontrolled - on januvia, glipizide, invokana, last A1C 8.5 as of Oct. 2023, needs improved sugar control to slow down progression of CKD. 5. Hypokalemia, likely diuretic induced - off HCTZ, K 3.5, on potassium gluconate supplement, 2 tablets twice a day. Is on torsemide 20mg daily. 6. Dependent edema - resolved, albumin normal. Off HCTZ. Now on torsemide 20mg daily. Echo shows grade 1 diastolic dysfunction from April 2023  -monitor daily weight. Call office if weight gain > 3-5 lbs. 7. Complex cyst, left kidney - stable in size as of July 2020 ultrasound, will defer further imaging in light of age    6. Elevated PTH likely d/t secondary hyperparathyroidism of renal origin - PTH 75 as of this month, repeat PTH, mild elevation noted     RTC in 4 months. Obtain blood and urine testing in 3 months prior to next office appointment. SUBJECTIVE / INTERVAL HISTORY:  80 y.o. female presents in follow up of CKD. Char Simeon denies any recent illness/hospitalizations/medication changes since last office visit. Denies NSAID use  HTN - BP at home has been well controlled  DM2 - blood sugars have been elevated  Weight is steady. Denies leg edema. On opioid for back pain. Review of Systems   Constitutional:  Negative for chills and fever. HENT:  Negative for sore throat and trouble swallowing. Eyes:  Negative for visual disturbance. Respiratory:  Negative for cough and shortness of breath.     Cardiovascular:  Negative for chest pain and leg swelling. Gastrointestinal:  Negative for abdominal pain, constipation, diarrhea, nausea and vomiting. Endocrine: Negative for polyuria. Genitourinary:  Negative for difficulty urinating, dysuria and hematuria. Musculoskeletal:  Positive for back pain. Negative for neck pain. Skin:  Negative for rash. Neurological:  Negative for dizziness, light-headedness and numbness. Psychiatric/Behavioral:  The patient is not nervous/anxious. OBJECTIVE:  /55 (BP Location: Left arm, Patient Position: Sitting, Cuff Size: Standard)   Pulse 79   Ht 5' 2" (1.575 m)   Wt 73.5 kg (162 lb)   SpO2 (!) 85%   BMI 29.63 kg/m²  Body mass index is 29.63 kg/m². Physical exam:  Physical Exam  Vitals and nursing note reviewed. Constitutional:       General: She is not in acute distress. Appearance: Normal appearance. She is well-developed. She is not diaphoretic. HENT:      Head: Normocephalic and atraumatic. Nose: Nose normal.      Mouth/Throat:      Mouth: Mucous membranes are dry. Pharynx: No oropharyngeal exudate. Eyes:      General: No scleral icterus. Right eye: No discharge. Left eye: No discharge. Neck:      Thyroid: No thyromegaly. Cardiovascular:      Rate and Rhythm: Normal rate and regular rhythm. Heart sounds: No murmur heard. Pulmonary:      Effort: Pulmonary effort is normal. No respiratory distress. Breath sounds: Normal breath sounds. No wheezing. Abdominal:      General: Bowel sounds are normal. There is no distension. Palpations: Abdomen is soft. Musculoskeletal:         General: No swelling. Normal range of motion. Cervical back: Normal range of motion and neck supple. Skin:     General: Skin is warm and dry. Findings: No rash. Neurological:      General: No focal deficit present. Mental Status: She is alert. Motor: No abnormal muscle tone.       Comments: awake   Psychiatric: Mood and Affect: Mood normal.         Behavior: Behavior normal.         Medications:    Current Outpatient Medications:     ACCU-CHEK PAUL PLUS test strip, , Disp: , Rfl:     Accu-Chek Guide test strip, USE TO TEST TWICE A DAY, Disp: 100 strip, Rfl: 3    Accu-Chek Softclix Lancets lancets, USE AS INSTRUCTED TWICE DAILY, Disp: 100 each, Rfl: 3    aspirin 81 MG tablet, Take 1 tablet by mouth daily, Disp: , Rfl:     cholecalciferol (VITAMIN D3) 1,000 units tablet, Take 2 tablets by mouth daily, Disp: , Rfl:     cloNIDine (CATAPRES-TTS-1) 0.1 mg/24 hr, PLACE 1 PATCH ON THE SKIN ONCE A WEEK AS DIRECTED, Disp: 12 patch, Rfl: 3    Denta 5000 Plus 1.1 % CREA, USE TWICE A DAY -SPIT, DONT RINSE AND NOTHING BY MOUTH X 30 MIN, Disp: , Rfl:     famotidine (PEPCID) 20 mg tablet, TAKE 1 TABLET BY MOUTH EVERY DAY, Disp: 90 tablet, Rfl: 4    gabapentin (NEURONTIN) 400 mg capsule, Take 400 mg by mouth 3 (three) times a day, Disp: , Rfl:     glipiZIDE (GLUCOTROL) 10 mg tablet, TAKE 1 TABLET BY MOUTH IN THE MORNING THEN 2 TABS WITH DINNER, Disp: 270 tablet, Rfl: 2    Invokana 100 MG, TAKE 1 TABLET BY MOUTH EVERY DAY BEFORE BREAKFAST, Disp: 30 tablet, Rfl: 5    Januvia 50 MG tablet, TAKE 1 TABLET BY MOUTH EVERY DAY, Disp: 90 tablet, Rfl: 2    nebivolol (BYSTOLIC) 20 MG tablet, TAKE 1 TABLET BY MOUTH EVERY DAY, Disp: 90 tablet, Rfl: 5    NIFEdipine (ADALAT CC) 90 mg 24 hr tablet, TAKE 1 TABLET BY MOUTH EVERY DAY, Disp: 90 tablet, Rfl: 9    Potassium Gluconate 595 (99 K) MG TABS, Take 2 tablets by mouth 2 (two) times a day 4 tabs total, Disp: , Rfl:     sertraline (ZOLOFT) 50 mg tablet, TAKE 1 TABLET BY MOUTH EVERY DAY, Disp: 90 tablet, Rfl: 1    simvastatin (ZOCOR) 20 mg tablet, TAKE 1 TABLET BY MOUTH EVERYDAY AT BEDTIME, Disp: 90 tablet, Rfl: 3    torsemide (DEMADEX) 20 mg tablet, TAKE 1 TABLET BY MOUTH EVERY DAY, Disp: 90 tablet, Rfl: 3    transdermal buprenorphine (BUTRANS) 5 mcg/hr TD patch, Place 1 patch on the skin over 7 days every 7 days For ongoing therapy, Disp: 4 patch, Rfl: 0    Accu-Chek Softclix Lancets lancets, , Disp: , Rfl:     glucose blood (Accu-Chek Emma Plus) test strip, USE AS INSTRUCTED TWICE DAILY (Patient not taking: Reported on 2/2/2023), Disp: 100 each, Rfl: 7    ondansetron (ZOFRAN) 4 mg tablet, Take 1 tablet (4 mg total) by mouth every 8 (eight) hours as needed for nausea or vomiting (Patient not taking: Reported on 11/20/2023), Disp: 20 tablet, Rfl: 0    Allergies: Allergies as of 11/20/2023    (No Known Allergies)       The following portions of the patient's history were reviewed and updated as appropriate: past family history, past surgical history and problem list.    Laboratory Results:  Lab Results   Component Value Date    SODIUM 142 11/17/2023    K 3.5 11/17/2023     11/17/2023    CO2 23 11/17/2023    BUN 32 (H) 11/17/2023    CREATININE 1.82 (H) 11/17/2023    GLUC 179 (H) 11/17/2023    CALCIUM 8.5 10/29/2022        Lab Results   Component Value Date    CALCIUM 8.5 10/29/2022    PHOS 4.0 09/09/2021       Portions of the record may have been created with voice recognition software. Occasional wrong word or "sound a like" substitutions may have occurred due to the inherent limitations of voice recognition software. Read the chart carefully and recognize, using context, where substitutions have occurred.

## 2023-11-20 NOTE — PATIENT INSTRUCTIONS
1. chronic kidney disease stage 4 in the setting of Diabetes mellitus type 2 as well as hypertensive nephrosclerosis +/- physiologic aging of the kidney   -last sCr 1.82 as of 11/17/23. Had been stable in the 1.6-1.9 range since July 2018, eGFR in mid 20s and stable  -latest UA with microscopy shows trace protein, no RBCs, 6-10 WBCs with UpCr 462mg/g with microalb:Cr 120mg/g  -avoid nonsteroidals (ibuprofen, advil, motrin, aleve, naproxen, indomethacin, celebrex, toradol)  -may take tylenol  -please stay well hydrated  -kidney smart education refused in the past.      2. Microalbuminuria in setting of type 2 DM - UpCr a bit lower at 462 mg/g as of Nov. 2023  -off lisinopril  -microalbumin in the urine can also be seen with physiologic aging of the kidney  -monitor proteinuria     3. HTN - BP elevated for age/CKD in office  -monitor BP at home and call office with home readings  -continue clonidine 0.1mg weekly patch, bystolic 80EB after dinner, nifedipine 90mg daily, torsemide 20mg daily  -off lisinopril   -off HCTZ 25mg daily     4. DM2, uncontrolled - on januvia, glipizide, invokana, last A1C 8.5 as of Oct. 2023, needs improved sugar control to slow down progression of CKD. 5. Hypokalemia, likely diuretic induced - off HCTZ, K 3.5, on potassium gluconate supplement, 2 tablets twice a day. Is on torsemide 20mg daily. 6. Dependent edema - resolved, albumin normal. Off HCTZ. Now on torsemide 20mg daily. Echo shows grade 1 diastolic dysfunction from April 2023  -monitor daily weight. Call office if weight gain > 3-5 lbs. 7. Complex cyst, left kidney - stable in size as of July 2020 ultrasound, will defer further imaging in light of age    6. Elevated PTH likely d/t secondary hyperparathyroidism of renal origin - PTH 75 as of this month, repeat PTH, mild elevation noted     RTC in 4 months. Obtain blood and urine testing in 3 months prior to next office appointment.

## 2023-11-20 NOTE — TELEPHONE ENCOUNTER
VM---DIL has questions about the amount of potassium pt should be taking daily. AVS does not say. OK to leave detailed VM if no answer.  PCB

## 2023-11-21 ENCOUNTER — APPOINTMENT (OUTPATIENT)
Dept: PHYSICAL THERAPY | Facility: CLINIC | Age: 88
End: 2023-11-21
Payer: COMMERCIAL

## 2023-11-21 ENCOUNTER — OFFICE VISIT (OUTPATIENT)
Dept: PHYSICAL THERAPY | Facility: CLINIC | Age: 88
End: 2023-11-21
Payer: COMMERCIAL

## 2023-11-21 DIAGNOSIS — M48.061 LUMBAR FORAMINAL STENOSIS: ICD-10-CM

## 2023-11-21 DIAGNOSIS — M46.1 SACROILIITIS (HCC): ICD-10-CM

## 2023-11-21 DIAGNOSIS — E11.42 DIABETIC POLYNEUROPATHY ASSOCIATED WITH TYPE 2 DIABETES MELLITUS (HCC): Primary | ICD-10-CM

## 2023-11-21 DIAGNOSIS — M51.26 HERNIATED NUCLEUS PULPOSUS, L3-4 RIGHT: ICD-10-CM

## 2023-11-21 PROCEDURE — 97110 THERAPEUTIC EXERCISES: CPT | Performed by: PHYSICAL THERAPIST

## 2023-11-21 NOTE — PROGRESS NOTES
Daily Note     Today's date: 2023  Patient name: Carissa Tan  : 1935  MRN: [id number]  Referring provider: Laz Jhaveri DO  Dx:   Encounter Diagnosis     ICD-10-CM    1. Diabetic polyneuropathy associated with type 2 diabetes mellitus (HCC)  E11.42       2. Herniated nucleus pulposus, L3-4 right  M51.26       3. Lumbar foraminal stenosis  M48.061       4. Sacroiliitis ([address])  M46.1           Start Time:   Stop Time:   Total time in clinic (min): 43 minutes    Subjective: states she is doing much better; has a new pain patch which is helping a lot;  pain comes and goes - when it happens Tylenol helps;  has no pain with sitting. Patient's son reports that she is now       Objective: See treatment diary below      Assessment:  Able to transfer sit<->stand without any difficulty now. Able to perform exercises without provoking pain. Significant improvement since lv. Patient would benefit from continued PT      Plan: Continue per plan of care. Focus on strengthening, functional mobility, pain relief     Precautions: multilevel lumbar disc bulge and stenosis  Other: DM2, CKD      HEP:  Access Code: [email or web address]  Access Code [email or web address]     URL: [email or web address]  Date: 2023  Prepared by: Flavio Browning    Exercises  - Supine Lower Trunk Rotation  - 10 reps  - Supine Single Knee to Chest Stretch  - 10 reps  - Seated Long Arc Quad  - 10 reps  - Sit to Stand  - 10 reps      Manuals           Lumbar, sacrum, glute, ITB, HS STM In L sidelying SEAN JR           Bilateral hip PROM                                       Neuro Re-Ed                                                                                                        Ther Ex             Bike - cardio  6min 8 min          LAQ X20 ea  20ea          STS x10            TB 3way roll out             Seated HS stretch   30"x3          Single knee to chest X10 ea 10x  10" 10x  10" ea          LTR X10 ea 15 ea 15 ea          TA activation             Hip add iso   10x 10"          Hip abd iso   10x 10"          bridge  10x5" 10x5"          SLR  10xea           clamshell                                                    Ther Activity             Sit<->stand  5x2 10x                       Gait Training                                       Modalities

## 2023-11-28 ENCOUNTER — APPOINTMENT (OUTPATIENT)
Dept: PHYSICAL THERAPY | Facility: CLINIC | Age: 88
End: 2023-11-28
Payer: COMMERCIAL

## 2023-11-30 ENCOUNTER — APPOINTMENT (OUTPATIENT)
Dept: PHYSICAL THERAPY | Facility: CLINIC | Age: 88
End: 2023-11-30
Payer: COMMERCIAL

## 2023-12-01 DIAGNOSIS — N18.4 TYPE 2 DIABETES MELLITUS WITH STAGE 4 CHRONIC KIDNEY DISEASE, WITHOUT LONG-TERM CURRENT USE OF INSULIN (HCC): ICD-10-CM

## 2023-12-01 DIAGNOSIS — E11.22 TYPE 2 DIABETES MELLITUS WITH STAGE 4 CHRONIC KIDNEY DISEASE, WITHOUT LONG-TERM CURRENT USE OF INSULIN (HCC): ICD-10-CM

## 2023-12-01 RX ORDER — CANAGLIFLOZIN 100 MG/1
TABLET, FILM COATED ORAL
Qty: 30 TABLET | Refills: 5 | Status: SHIPPED | OUTPATIENT
Start: 2023-12-01

## 2023-12-04 ENCOUNTER — TELEPHONE (OUTPATIENT)
Age: 88
End: 2023-12-04

## 2023-12-04 ENCOUNTER — APPOINTMENT (OUTPATIENT)
Dept: RADIOLOGY | Facility: CLINIC | Age: 88
End: 2023-12-04
Payer: COMMERCIAL

## 2023-12-04 ENCOUNTER — OFFICE VISIT (OUTPATIENT)
Dept: PAIN MEDICINE | Facility: CLINIC | Age: 88
End: 2023-12-04
Payer: COMMERCIAL

## 2023-12-04 VITALS
WEIGHT: 164 LBS | DIASTOLIC BLOOD PRESSURE: 60 MMHG | BODY MASS INDEX: 30.18 KG/M2 | HEIGHT: 62 IN | SYSTOLIC BLOOD PRESSURE: 100 MMHG | TEMPERATURE: 98 F

## 2023-12-04 DIAGNOSIS — M47.816 LUMBAR SPONDYLOSIS: ICD-10-CM

## 2023-12-04 DIAGNOSIS — M54.2 NECK PAIN: ICD-10-CM

## 2023-12-04 DIAGNOSIS — M47.812 CERVICAL SPONDYLOSIS: ICD-10-CM

## 2023-12-04 DIAGNOSIS — Z79.891 LONG-TERM CURRENT USE OF OPIATE ANALGESIC: ICD-10-CM

## 2023-12-04 DIAGNOSIS — M47.812 CERVICAL SPONDYLOSIS: Primary | ICD-10-CM

## 2023-12-04 DIAGNOSIS — F11.20 UNCOMPLICATED OPIOID DEPENDENCE (HCC): ICD-10-CM

## 2023-12-04 DIAGNOSIS — G89.4 CHRONIC PAIN SYNDROME: ICD-10-CM

## 2023-12-04 PROCEDURE — 99214 OFFICE O/P EST MOD 30 MIN: CPT | Performed by: PHYSICIAN ASSISTANT

## 2023-12-04 PROCEDURE — 72040 X-RAY EXAM NECK SPINE 2-3 VW: CPT

## 2023-12-04 RX ORDER — BUPRENORPHINE 7.5 UG/H
1 PATCH TRANSDERMAL
Qty: 4 PATCH | Refills: 2 | Status: SHIPPED | OUTPATIENT
Start: 2023-12-04

## 2023-12-04 NOTE — PROGRESS NOTES
Assessment:  1. Cervical spondylosis    2. Neck pain    3. Lumbar spondylosis    4. Chronic pain syndrome    5. Long-term current use of opiate analgesic    6. Uncomplicated opioid dependence ([address])        Plan:  While the patient was in the office today, I did have a thorough conversation regarding their chronic pain syndrome, medication management, and treatment plan options. After discussing options, I have recommended that we increase the Butrans patch from 5 mcg to 7.5 mcg q. 7 days. She is tolerating the 5 mcg dose with no side effects or issues. I have ordered an x-ray of the cervical spine to further evaluate her neck pain complaints which are fairly new. She has an appointment with her PCP on Friday and I have recommended they discuss the tremor with her. [place] Prescription Drug Monitoring Program report was reviewed and was appropriate     A urine drug screen was collected at today's office visit as part of our medication management protocol. The point of care testing results were appropriate for what was being prescribed. The specimen will be sent for confirmatory testing. The drug screen is medically necessary because the patient is either dependent on opioid medication or is being considered for opioid medication therapy and the results could impact ongoing or future treatment. The drug screen is to evaluate for the presences or absence of prescribed, non-prescribed, and/or illicit drugs/substances. There are risks associated with opioid medications, including dependence, addiction and tolerance. The patient understands and agrees to use these medications only as prescribed. Potential side effects of the medications include, but are not limited to, constipation, drowsiness, addiction, impaired judgment and risk of fatal overdose if not taken as prescribed. The patient was warned against driving while taking sedation medications. Sharing medications is a felony.  At this point in time, the patient is showing no signs of addiction, abuse, diversion or suicidal ideation. The patient will follow-up in 12 weeks for medication prescription refill and reevaluation. The patient was advised to contact the office should their symptoms worsen in the interim. The patient was agreeable and verbalized an understanding. History of Present Illness: The patient is a 80 y.o. female last seen on 10/30/23 who presents for a follow up office visit in regards to chronic pain secondary to lumbar spondylosis and lumbar spinal stenosis. The patient currently reports chronic low back pain that she presently rates a 5 out of 10 and describes it as an intermittent pressure-like pain that does not radiate. She is also complaining of a new symptom of neck pain that does not radiate into the upper extremities. She has difficulty with range of motion and states that that pain comes and goes. She has not had any imaging on the cervical spine. Patient had to stop the muscle relaxants because it seemed to make her legs feel weak. We initiated the Butrans 5 mcg patch which she is tolerating without side effects however she is not noticing much relief at this point. Patient also happens to mention that she has an intermittent tremor of her hand that is new. She has appointment with her PCP on Friday. Pain Contract Signed: 12/4/2023  Last Urine Drug Screen: 12/4/2023    I have personally reviewed and/or updated the patient's past medical history, past surgical history, family history, social history, current medications, allergies, and vital signs today. Review of Systems:    Review of Systems   Musculoskeletal:  Positive for back pain and neck pain. Decreased ROM   Neurological:  Positive for weakness.          Past Medical History:   Diagnosis Date   • Anxiety    • Aortic valve insufficiency    • Arthritis    • Cataract of both eyes    • Chronic kidney disease    • Diabetes mellitus ([address]) • Dysuria     last assessed - [id number]   • Edema     last assessed - [id number]   • GERD (gastroesophageal reflux disease)     last assessed - [id number]   • Hyperlipidemia    • Hypertension    • Low back pain     last assessed - [id number]   • Mitral valve disorder    • Osteoporosis     last assessed - [id number]   • Palpitations        Past Surgical History:   Procedure Laterality Date   • APPENDECTOMY     • CATARACT EXTRACTION Bilateral    • COLONOSCOPY     • TUBAL LIGATION Bilateral        Family History   Problem Relation Age of Onset   • Kidney disease Mother         Chronic   • No Known Problems Father    • Breast cancer Sister    • Diabetes Sister    • Cancer Sister    • Breast cancer Sister    • Hypertension Sister    • No Known Problems Daughter    • No Known Problems Son    • No Known Problems Son    • No Known Problems Son    • No Known Problems Son        Social History     Occupational History   • Not on file   Tobacco Use   • Smoking status: Never   • Smokeless tobacco: Never   Vaping Use   • Vaping Use: Never used   Substance and Sexual Activity   • Alcohol use: Never   • Drug use: Never   • Sexual activity: Not Currently         Current Outpatient Medications:   •  ACCU-CHEK PAUL PLUS test strip, , Disp: , Rfl:   •  Accu-Chek Guide test strip, USE TO TEST TWICE A DAY, Disp: 100 strip, Rfl: 3  •  Accu-Chek Softclix Lancets lancets, , Disp: , Rfl:   •  Accu-Chek Softclix Lancets lancets, USE AS INSTRUCTED TWICE DAILY, Disp: 100 each, Rfl: 3  •  aspirin 81 MG tablet, Take 1 tablet by mouth daily, Disp: , Rfl:   •  cholecalciferol (VITAMIN D3) 1,000 units tablet, Take 2 tablets by mouth daily, Disp: , Rfl:   •  cloNIDine (CATAPRES-TTS-1) 0.1 mg/24 hr, PLACE 1 PATCH ON THE SKIN ONCE A WEEK AS DIRECTED, Disp: 12 patch, Rfl: 3  •  Denta 5000 Plus 1.1 % CREA, USE TWICE A DAY -SPIT, DONT RINSE AND NOTHING BY MOUTH X 30 MIN, Disp: , Rfl:   •  famotidine (PEPCID) 20 mg tablet, TAKE 1 TABLET BY MOUTH EVERY DAY, Disp: 90 tablet, Rfl: 4  •  gabapentin (NEURONTIN) 400 mg capsule, Take 400 mg by mouth 3 (three) times a day, Disp: , Rfl:   •  glipiZIDE (GLUCOTROL) 10 mg tablet, TAKE 1 TABLET BY MOUTH IN THE MORNING THEN 2 TABS WITH DINNER, Disp: 270 tablet, Rfl: 2  •  Invokana 100 MG, TAKE 1 TABLET BY MOUTH EVERY DAY BEFORE BREAKFAST, Disp: 30 tablet, Rfl: 5  •  Januvia 50 MG tablet, TAKE 1 TABLET BY MOUTH EVERY DAY, Disp: 90 tablet, Rfl: 2  •  nebivolol (BYSTOLIC) 20 MG tablet, TAKE 1 TABLET BY MOUTH EVERY DAY, Disp: 90 tablet, Rfl: 5  •  NIFEdipine (ADALAT CC) 90 mg 24 hr tablet, TAKE 1 TABLET BY MOUTH EVERY DAY, Disp: 90 tablet, Rfl: 9  •  Potassium Gluconate 595 (99 K) MG TABS, Take 2 tablets by mouth 2 (two) times a day 4 tabs total, Disp: , Rfl:   •  sertraline (ZOLOFT) 50 mg tablet, TAKE 1 TABLET BY MOUTH EVERY DAY, Disp: 90 tablet, Rfl: 1  •  simvastatin (ZOCOR) 20 mg tablet, TAKE 1 TABLET BY MOUTH EVERYDAY AT BEDTIME, Disp: 90 tablet, Rfl: 3  •  torsemide (DEMADEX) 20 mg tablet, TAKE 1 TABLET BY MOUTH EVERY DAY, Disp: 90 tablet, Rfl: 3  •  transdermal buprenorphine (BUTRANS) 7.5 mcg/hr TD patch, Place 1 patch on the skin over 7 days every 7 days For ongoing therapy, Disp: 4 patch, Rfl: 2  •  glucose blood (Accu-Chek Emma Plus) test strip, USE AS INSTRUCTED TWICE DAILY (Patient not taking: Reported on 2/2/2023), Disp: 100 each, Rfl: 7  •  ondansetron (ZOFRAN) 4 mg tablet, Take 1 tablet (4 mg total) by mouth every 8 (eight) hours as needed for nausea or vomiting (Patient not taking: Reported on 11/20/2023), Disp: 20 tablet, Rfl: 0    No Known Allergies    Physical Exam:    /60 (BP Location: Left arm, Patient Position: Sitting, Cuff Size: Adult)   Temp 98 °F (36.7 °C)   Ht 5' 2" (1.575 m)   Wt 74.4 kg (164 lb)   BMI 30.00 kg/m²     Constitutional:normal, well developed, well nourished, alert, in no distress and non-toxic and no overt pain behavior.   Eyes:anicteric  HEENT:grossly intact  Neck:supple, symmetric, trachea midline and no masses   Pulmonary:even and unlabored  Cardiovascular:No edema or pitting edema present  Skin:Normal without rashes or lesions and well hydrated  Psychiatric:Mood and affect appropriate  Neurologic:Cranial Nerves II-XII grossly intact  Musculoskeletal: Cervical spine kyphotic, tender bilateral paraspinal muscles. Limited range of motion.   Gait is slow but stable, forward leaning      Imaging  No orders to display         Orders Placed This Encounter   Procedures   • XR spine cervical 2 or 3 vw injury   • [facility] All Prescribed Meds and Special Instructions   • Amphetamines, Methamphetamines   • Butalbital   • Phenobarbital   • Secobarbital   • Alprazolam   • Clonazepam   • Diazepam   • Lorazepam   • Gabapentin   • Pregabalin   • Cocaine   • Heroin   • Buprenorphine   • Levorphanol   • Meperidine   • Naltrexone   • Fentanyl   • Methadone   • Oxycodone   • Tapentadol   • THC   • Tramadol   • Codeine, Hydrocodone, Hydropmorphone, Morphine   • Bath Salts   • Ethyl Glucuronide/Ethyl Sulfate   • Kratom   • Spice   • Methylphenidate   • Phentermine   • Validity Oxidant   • Validity Creatinine   • Validity pH   • Validity Specific

## 2023-12-04 NOTE — TELEPHONE ENCOUNTER
S/w pt's daughter in law, Shayna Lee. Per Shayna Lee, the new rx was picked up today. The pt put her patch on Saturday. Dar Lott, the writer would recommend changing to the new rx on Saturday. Will d/w Josey and monica if there is anything different or additional. Pt verbalized understanding and appreciation.

## 2023-12-04 NOTE — TELEPHONE ENCOUNTER
Caller: ann    Doctor: graham    Reason for call: pt wants to know when to start the new pain patch?     Call back#: [phone]

## 2023-12-05 ENCOUNTER — OFFICE VISIT (OUTPATIENT)
Dept: PHYSICAL THERAPY | Facility: CLINIC | Age: 88
End: 2023-12-05
Payer: COMMERCIAL

## 2023-12-05 ENCOUNTER — TELEPHONE (OUTPATIENT)
Dept: RADIOLOGY | Facility: CLINIC | Age: 88
End: 2023-12-05

## 2023-12-05 DIAGNOSIS — M54.41 CHRONIC BILATERAL LOW BACK PAIN WITH RIGHT-SIDED SCIATICA: ICD-10-CM

## 2023-12-05 DIAGNOSIS — M54.9 CHRONIC BACK PAIN, UNSPECIFIED BACK LOCATION, UNSPECIFIED BACK PAIN LATERALITY: ICD-10-CM

## 2023-12-05 DIAGNOSIS — G89.29 CHRONIC BILATERAL LOW BACK PAIN WITH RIGHT-SIDED SCIATICA: ICD-10-CM

## 2023-12-05 DIAGNOSIS — G89.29 CHRONIC BACK PAIN, UNSPECIFIED BACK LOCATION, UNSPECIFIED BACK PAIN LATERALITY: ICD-10-CM

## 2023-12-05 DIAGNOSIS — M54.9 DORSALGIA: Primary | ICD-10-CM

## 2023-12-05 PROCEDURE — 97110 THERAPEUTIC EXERCISES: CPT | Performed by: PHYSICAL THERAPIST

## 2023-12-05 NOTE — TELEPHONE ENCOUNTER
----- Message from Israel Hicks PA-C sent at 12/5/2023  1:02 PM EST -----  Please let patient know her cervical spine xray shows moderate multilevel arthritic changes. I can place a referral for physical therapy if she is interested or we can discuss injections.

## 2023-12-05 NOTE — TELEPHONE ENCOUNTER
RN s/w D-I-L Adeola Gillespie (ok per comm form) and provided results for cervical XR and option of PT and/or injection for the neck if interested. She will discuss with her M-I-L and will contact the office with their decision.

## 2023-12-05 NOTE — PROGRESS NOTES
Daily Note     Today's date: 2023  Patient name: Huey Morley  : 1935  MRN: [id number]  Referring provider: Justen Villegas DO  Dx:   Encounter Diagnosis     ICD-10-CM    1. Dorsalgia  M54.9       2. Chronic bilateral low back pain with right-sided sciatica  M54.41     G89.29       3. Chronic back pain, unspecified back location, unspecified back pain laterality  M54.9     G89.29           Start Time: 849  Stop Time: 930  Total time in clinic (min): 41 minutes    Subjective: states her back has been bothering her more; has pain across both sides of the low back, better with lying down or sitting and worse with standing. reports doing exercises at home but not every day      Objective: See treatment diary below      Assessment:    Tolerated exercises well today but had difficulty arising from chair. Reported feeling good at end of session. .  Advised to perform exercises daily at home. Patient would benefit from continued PT      Plan: Continue per plan of care. Focus on strengthening, functional mobility, pain relief     Precautions: multilevel lumbar disc bulge and stenosis  Other: DM2, CKD      HEP:  Access Code: [email or web address]  Access Code [email or web address]     URL: [email or web address]  Date: 2023  Prepared by: Nathan Oh    Exercises  - Supine Lower Trunk Rotation  - 10 reps  - Supine Single Knee to Chest Stretch  - 10 reps  - Seated Long Arc Quad  - 10 reps  - Sit to Stand  - 10 reps      Manuals  12         Lumbar, sacrum, glute, ITB, HS STM In L sidelying SEAN JR           Bilateral hip PROM                                       Neuro Re-Ed                                                                                                        Ther Ex             Bike - cardio  6min 8 min 8min         LAQ X20 ea  20ea 20ea         STS x10            TB 3way roll out             Seated HS stretch   30"x3 1 min ea         Single knee to chest X10 ea 10x 10" 10x  10" ea 10x10" ea         LTR X10 ea 15 ea 15 ea 15ea         TA activation             Hip add iso   10x 10" 10x10"         Hip abd iso   10x 10" 10x10"         bridge  10x5" 10x5" 15x5"         SLR  10xea  10ea         clamshell                                                    Ther Activity             Sit<->stand  5x2 10x nv                      Gait Training                                       Modalities

## 2023-12-06 ENCOUNTER — OFFICE VISIT (OUTPATIENT)
Dept: PHYSICAL THERAPY | Facility: CLINIC | Age: 88
End: 2023-12-06
Payer: COMMERCIAL

## 2023-12-06 DIAGNOSIS — G89.29 CHRONIC BACK PAIN, UNSPECIFIED BACK LOCATION, UNSPECIFIED BACK PAIN LATERALITY: ICD-10-CM

## 2023-12-06 DIAGNOSIS — E78.5 DYSLIPIDEMIA: ICD-10-CM

## 2023-12-06 DIAGNOSIS — G89.29 CHRONIC BILATERAL LOW BACK PAIN WITH RIGHT-SIDED SCIATICA: Primary | ICD-10-CM

## 2023-12-06 DIAGNOSIS — M54.9 CHRONIC BACK PAIN, UNSPECIFIED BACK LOCATION, UNSPECIFIED BACK PAIN LATERALITY: ICD-10-CM

## 2023-12-06 DIAGNOSIS — M54.41 CHRONIC BILATERAL LOW BACK PAIN WITH RIGHT-SIDED SCIATICA: Primary | ICD-10-CM

## 2023-12-06 LAB
6MAM UR QL CFM: NEGATIVE NG/ML
7AMINOCLONAZEPAM UR QL CFM: NEGATIVE NG/ML
A-OH ALPRAZ UR QL CFM: NEGATIVE NG/ML
ACCEPTABLE CREAT UR QL: NORMAL MG/DL
ACCEPTIBLE SP GR UR QL: NORMAL
AMPHET UR QL CFM: NEGATIVE NG/ML
BUPRENORPHINE UR QL CFM: ABNORMAL NG/ML
BUTALBITAL UR QL CFM: NEGATIVE NG/ML
BZE UR QL CFM: NEGATIVE NG/ML
CODEINE UR QL CFM: NEGATIVE NG/ML
EDDP UR QL CFM: NEGATIVE NG/ML
ETHYL GLUCURONIDE UR QL CFM: NEGATIVE NG/ML
ETHYL SULFATE UR QL SCN: NEGATIVE NG/ML
EUTYLONE UR QL: NEGATIVE NG/ML
FENTANYL UR QL CFM: NEGATIVE NG/ML
GLIADIN IGG SER IA-ACNC: NEGATIVE NG/ML
HYDROCODONE UR QL CFM: NEGATIVE NG/ML
HYDROMORPHONE UR QL CFM: NEGATIVE NG/ML
LORAZEPAM UR QL CFM: NEGATIVE NG/ML
ME-PHENIDATE UR QL CFM: NEGATIVE NG/ML
MEPERIDINE UR QL CFM: NEGATIVE NG/ML
METHADONE UR QL CFM: NEGATIVE NG/ML
METHAMPHET UR QL CFM: NEGATIVE NG/ML
MORPHINE UR QL CFM: NEGATIVE NG/ML
NALTREXONE UR QL CFM: NEGATIVE NG/ML
NITRITE UR QL: NORMAL UG/ML
NORBUPRENORPHINE UR QL CFM: ABNORMAL NG/ML
NORDIAZEPAM UR QL CFM: NEGATIVE NG/ML
NORFENTANYL UR QL CFM: NEGATIVE NG/ML
NORHYDROCODONE UR QL CFM: NEGATIVE NG/ML
NORMEPERIDINE UR QL CFM: NEGATIVE NG/ML
NOROXYCODONE UR QL CFM: NEGATIVE NG/ML
OXAZEPAM UR QL CFM: NEGATIVE NG/ML
OXYCODONE UR QL CFM: NEGATIVE NG/ML
OXYMORPHONE UR QL CFM: NEGATIVE NG/ML
PARA-FLUOROFENTANYL QUANTIFICATION: NORMAL NG/ML
PHENOBARB UR QL CFM: NEGATIVE NG/ML
RESULT ALL_PRESCRIBED MEDS AND SPECIAL INSTRUCTIONS: NORMAL
SECOBARBITAL UR QL CFM: NEGATIVE NG/ML
SL AMB 4-ANPP QUANTIFICATION: NORMAL NG/ML
SL AMB 5F-ADB-M7 METABOLITE QUANTIFICATION: NEGATIVE NG/ML
SL AMB 7-OH-MITRAGYNINE (KRATOM ALKALOID) QUANTIFICATION: NEGATIVE NG/ML
SL AMB AB-FUBINACA-M3 METABOLITE QUANTIFICATION: NEGATIVE NG/ML
SL AMB ACETYL FENTANYL QUANTIFICATION: NORMAL NG/ML
SL AMB ACETYL NORFENTANYL QUANTIFICATION: NORMAL NG/ML
SL AMB ACRYL FENTANYL QUANTIFICATION: NORMAL NG/ML
SL AMB CARFENTANIL QUANTIFICATION: NORMAL NG/ML
SL AMB CTHC (MARIJUANA METABOLITE) QUANTIFICATION: NEGATIVE NG/ML
SL AMB DEXTRORPHAN (DEXTROMETHORPHAN METABOLITE) QUANT: ABNORMAL NG/ML
SL AMB GABAPENTIN QUANTIFICATION: NORMAL
SL AMB JWH018 METABOLITE QUANTIFICATION: NEGATIVE NG/ML
SL AMB JWH073 METABOLITE QUANTIFICATION: NEGATIVE NG/ML
SL AMB MDMB-FUBINACA-M1 METABOLITE QUANTIFICATION: NEGATIVE NG/ML
SL AMB METHYLONE QUANTIFICATION: NEGATIVE NG/ML
SL AMB N-DESMETHYL-TRAMADOL QUANTIFICATION: NEGATIVE NG/ML
SL AMB PHENTERMINE QUANTIFICATION: NEGATIVE NG/ML
SL AMB PREGABALIN QUANTIFICATION: NEGATIVE
SL AMB RCS4 METABOLITE QUANTIFICATION: NEGATIVE NG/ML
SL AMB RITALINIC ACID QUANTIFICATION: NEGATIVE NG/ML
SMOOTH MUSCLE AB TITR SER IF: NEGATIVE NG/ML
SPECIMEN DRAWN SERPL: NEGATIVE NG/ML
SPECIMEN PH ACCEPTABLE UR: NORMAL
TAPENTADOL UR QL CFM: NEGATIVE NG/ML
TEMAZEPAM UR QL CFM: NEGATIVE NG/ML
TRAMADOL UR QL CFM: NEGATIVE NG/ML
URATE/CREAT 24H UR: NEGATIVE NG/ML

## 2023-12-06 PROCEDURE — 97140 MANUAL THERAPY 1/> REGIONS: CPT

## 2023-12-06 PROCEDURE — 97110 THERAPEUTIC EXERCISES: CPT

## 2023-12-06 RX ORDER — SIMVASTATIN 20 MG
TABLET ORAL
Qty: 30 TABLET | Refills: 11 | Status: SHIPPED | OUTPATIENT
Start: 2023-12-06

## 2023-12-06 NOTE — PROGRESS NOTES
Daily Note     Today's date: 2023  Patient name: Kamlesh Rivers  : 1935  MRN: [id number]  Referring provider: Sushila Rodriguez DO  Dx:   Encounter Diagnosis     ICD-10-CM    1. Chronic bilateral low back pain with right-sided sciatica  M54.41     G89.29       2. Chronic back pain, unspecified back location, unspecified back pain laterality  M54.9     G89.29                      Subjective: Pt reports she was just here in PT yesterday and is sore today the LB. Objective: See treatment diary below      Assessment: Tolerated treatment well. Patient demonstrated fatigue post treatment and would benefit from continued PT for cont'd strengthening. Pt w/ tightness in R glut that did not work out with Trp rls or STM. Plan: Continue per plan of care. Progress treatment as tolerated. Precautions: multilevel lumbar disc bulge and stenosis  Other: DM2, CKD      HEP:  Access Code: [email or web address]  Access Code [email or web address]     URL: [email or web address]  Date: 2023  Prepared by: Shamika Clarke    Exercises  - Supine Lower Trunk Rotation  - 10 reps  - Supine Single Knee to Chest Stretch  - 10 reps  - Seated Long Arc Quad  - 10 reps  - Sit to Stand  - 10 reps      Manuals         Lumbar, sacrum, glute, ITB, HS STM In L sidelying SEAN JR   10'        Bilateral hip PROM                                       Neuro Re-Ed                                                                                                        Ther Ex             Bike - cardio  6min 8 min 8min 7 min        LAQ X20 ea  20ea 20ea 20 ea        STS x10            TB 3way roll out     Flex 10"x3        Seated HS stretch   30"x3 1 min ea 30"x3        Single knee to chest X10 ea 10x  10" 10x  10" ea 10x10" ea 5x10" ea        LTR X10 ea 15 ea 15 ea 15ea np        TA activation             Hip add iso   10x 10" 10x10" 10x10"        Hip abd iso   10x 10" 10x10" 10x10"        bridge  10x5" 10x5" 15x5" nv        SLR  10xea  10ea 10 ea        clamshell                                                    Ther Activity             Sit<->stand  5x2 10x nv                      Gait Training                                       Modalities

## 2023-12-07 ENCOUNTER — APPOINTMENT (OUTPATIENT)
Dept: PHYSICAL THERAPY | Facility: CLINIC | Age: 88
End: 2023-12-07
Payer: COMMERCIAL

## 2023-12-08 ENCOUNTER — OFFICE VISIT (OUTPATIENT)
Dept: FAMILY MEDICINE CLINIC | Facility: HOSPITAL | Age: 88
End: 2023-12-08
Payer: COMMERCIAL

## 2023-12-08 VITALS
WEIGHT: 162.4 LBS | BODY MASS INDEX: 29.88 KG/M2 | DIASTOLIC BLOOD PRESSURE: 74 MMHG | SYSTOLIC BLOOD PRESSURE: 140 MMHG | HEIGHT: 62 IN | TEMPERATURE: 98.3 F | HEART RATE: 68 BPM

## 2023-12-08 DIAGNOSIS — G89.4 CHRONIC PAIN SYNDROME: ICD-10-CM

## 2023-12-08 DIAGNOSIS — N18.4 CKD (CHRONIC KIDNEY DISEASE), STAGE IV (HCC): ICD-10-CM

## 2023-12-08 DIAGNOSIS — M50.30 DEGENERATIVE DISC DISEASE, CERVICAL: ICD-10-CM

## 2023-12-08 DIAGNOSIS — F41.9 ANXIETY: Primary | ICD-10-CM

## 2023-12-08 DIAGNOSIS — I10 PRIMARY HYPERTENSION: ICD-10-CM

## 2023-12-08 DIAGNOSIS — E11.22 TYPE 2 DIABETES MELLITUS WITH STAGE 4 CHRONIC KIDNEY DISEASE, WITHOUT LONG-TERM CURRENT USE OF INSULIN (HCC): ICD-10-CM

## 2023-12-08 DIAGNOSIS — N18.4 TYPE 2 DIABETES MELLITUS WITH STAGE 4 CHRONIC KIDNEY DISEASE, WITHOUT LONG-TERM CURRENT USE OF INSULIN (HCC): ICD-10-CM

## 2023-12-08 PROCEDURE — 99214 OFFICE O/P EST MOD 30 MIN: CPT | Performed by: INTERNAL MEDICINE

## 2023-12-08 RX ORDER — SERTRALINE HYDROCHLORIDE 100 MG/1
100 TABLET, FILM COATED ORAL DAILY
Qty: 30 TABLET | Refills: 2 | Status: SHIPPED | OUTPATIENT
Start: 2023-12-08

## 2023-12-08 NOTE — ASSESSMENT & PLAN NOTE
BP better by end of appt, con't current meds, again importance of BP control for renal protection reviewed, will follow

## 2023-12-08 NOTE — ASSESSMENT & PLAN NOTE
Lab Results   Component Value Date    EGFR 26 (L) 11/17/2023    EGFR 25 (L) 06/23/2023    EGFR 25 (L) 06/23/2023    CREATININE 1.82 (H) 11/17/2023    CREATININE 1.90 (H) 06/23/2023    CREATININE 1.89 (H) 06/23/2023   Just saw Nephro and no meds changed, again stressed importance of BP/BS control and keeping hydrated, con't  labs and f/u as per Nephro

## 2023-12-08 NOTE — PROGRESS NOTES
Name: Jen Saunders      : 1935      MRN: [id number]  Encounter Provider: Izabella Melendrez DO  Encounter Date: 2023   Encounter department: [address]     1. Anxiety  Assessment & Plan:  Mood better with increase in Setraline but not yet at goal - increase Setraline from 50 mg to 100 mg,  d/w pt that it takes 4-6 wks to get maximum benefit of med and that med has to be taken every day and to not miss doses of med, call with SE/new/worse mood/panic attacks, re-eval in 2-3 mos      Orders:  -     sertraline (ZOLOFT) 100 mg tablet; Take 1 tablet (100 mg total) by mouth daily    2. Chronic pain syndrome  Assessment & Plan:  No great benefit with Gabapentin or PT, is weaning off Gabapentin and was started on Butrans patch w/benefit, con't meds and f/u as per PM      3. Degenerative disc disease, cervical  Assessment & Plan:  Xrays reviewed, pt thinking she is going to opt for injections, con't tx and f/u as per pain mgt      4. CKD (chronic kidney disease), stage IV [facility])  Assessment & Plan:  Lab Results   Component Value Date    EGFR 26 (L) 2023    EGFR 25 (L) 2023    EGFR 25 (L) 2023    CREATININE 1.82 (H) 2023    CREATININE 1.90 (H) 2023    CREATININE 1.89 (H) 2023   Just saw Nephro and no meds changed, again stressed importance of BP/BS control and keeping hydrated, con't  labs and f/u as per Nephro      5. Type 2 diabetes mellitus with stage 4 chronic kidney disease, without long-term current use of insulin ([address])  Assessment & Plan:    Lab Results   Component Value Date    HGBA1C 8.5 (H) 10/13/2023   DM type 2 with nephropathy/CKD stage 4, hyperglycemia and polyneuropathy - uncontrolled with A1C 8.5 - urged healthy diet and regular exercise, con't meds and labs and f/u as per RAZA Younger on DM foot exam () and eye exam ( - 2 yrs), she is on a satin and Asa but no ACE/ARB d/t CKD, will follow      6. Primary hypertension  Assessment & Plan:  BP better by end of appt, con't current meds, again importance of BP control for renal protection reviewed, will follow         Mammo 10/21    Dexa 12/19 - osteopenia      Subjective      HPI Pt here for follow up appt    Last visit in Sept we increased pts Sertraline from 25 mg to 50 mg d/t anxiety and depression after death of her son. She is here for a mood/med check. She has been taking higher dose of Sertraline since last visit w/o significant SE. She feels it has helped her mood. She notes she still gets anxious. She notes more bad days then good days. She notes no down/sad mood and she states she is sleeping well. She states here appetite is good. Pt con't to follow with pain mgt [facility]) for her chronic pain/LBP - OV notes reviewed. Her Gabapentin was increased to 400 mg 4 times daily and a trial of Skelaxin bid was started in Oct.  Pt started PT in Nov.  Pt con't to note pain and sattes she felt better off the  medications. This was d/w PM over the phone and she was started on Butrans patch and weaned off Gabapentin in Nov.  At her appt in Dec her Butrans was increased from 5 mcg to 7.5 mcg and Xray of cervical spine was ordered. Xray showed moderate multilevel cervical DDD. PT vs injections were reviewed. Pt thinks she is going to try injections. She feels the therapy is "not that great" but felt the last session was good. She notes no SE with the patch and states today her pain is a 5/10. Pt saw Nephro (Dr. Christopher Yuan) in Nov for f/u CKD stage 4 - OV note reviewed. She was again encouraged to keep hydrated and control her DM and BP. No meds were changed and she was told to repeat BW in 3 mos and f/u in office in 4 mos. Pt con't to follow with Endo for her DM. Last A1c was 8.5 in Oct. She is UTD on DM foot exam (8/23) and eye exam (7/23 - 2 yrs). BP above goal today and meds were reviewed and no changes have occurred.   She denies missing doses of meds or SE with the meds. She does check her BP outside the office and is usually 130-140/70's. She notes no frequent Ha's/dizziness/double vision/CP. Review of Systems   Constitutional:  Negative for chills and fever. HENT:  Negative for congestion and sore throat. Eyes:  Negative for pain and visual disturbance. Respiratory:  Negative for cough, shortness of breath and wheezing. Cardiovascular:  Negative for chest pain, palpitations and leg swelling. Gastrointestinal:  Negative for abdominal pain and nausea. Genitourinary:  Negative for difficulty urinating and dysuria. Musculoskeletal:  Positive for back pain and neck pain. Skin:  Negative for rash and wound. Neurological:  Negative for dizziness, light-headedness and headaches. Hematological:  Does not bruise/bleed easily. Psychiatric/Behavioral:  Negative for dysphoric mood and sleep disturbance. The patient is nervous/anxious.         Current Outpatient Medications on File Prior to Visit   Medication Sig    ACCU-CHEK PAUL PLUS test strip     Accu-Chek Guide test strip USE TO TEST TWICE A DAY    Accu-Chek Softclix Lancets lancets     Accu-Chek Softclix Lancets lancets USE AS INSTRUCTED TWICE DAILY    aspirin 81 MG tablet Take 1 tablet by mouth daily    cholecalciferol (VITAMIN D3) 1,000 units tablet Take 2 tablets by mouth daily    cloNIDine (CATAPRES-TTS-1) 0.1 mg/24 hr PLACE 1 PATCH ON THE SKIN ONCE A WEEK AS DIRECTED    Denta 5000 Plus 1.1 % CREA USE TWICE A DAY -SPIT, DONT RINSE AND NOTHING BY MOUTH X 30 MIN    famotidine (PEPCID) 20 mg tablet TAKE 1 TABLET BY MOUTH EVERY DAY    gabapentin (NEURONTIN) 400 mg capsule Take 400 mg by mouth 3 (three) times a day    glipiZIDE (GLUCOTROL) 10 mg tablet TAKE 1 TABLET BY MOUTH IN THE MORNING THEN 2 TABS WITH DINNER    glucose blood (Accu-Chek Paul Plus) test strip USE AS INSTRUCTED TWICE DAILY (Patient not taking: Reported on 2/2/2023)    Invokana 100 MG TAKE 1 TABLET BY MOUTH EVERY DAY BEFORE BREAKFAST    Januvia 50 MG tablet TAKE 1 TABLET BY MOUTH EVERY DAY    nebivolol (BYSTOLIC) 20 MG tablet TAKE 1 TABLET BY MOUTH EVERY DAY    NIFEdipine (ADALAT CC) 90 mg 24 hr tablet TAKE 1 TABLET BY MOUTH EVERY DAY    ondansetron (ZOFRAN) 4 mg tablet Take 1 tablet (4 mg total) by mouth every 8 (eight) hours as needed for nausea or vomiting (Patient not taking: Reported on 11/20/2023)    Potassium Gluconate 595 (99 K) MG TABS Take 2 tablets by mouth 2 (two) times a day 4 tabs total    simvastatin (ZOCOR) 20 mg tablet TAKE 1 TABLET BY MOUTH EVERYDAY AT BEDTIME    torsemide (DEMADEX) 20 mg tablet TAKE 1 TABLET BY MOUTH EVERY DAY    transdermal buprenorphine (BUTRANS) 7.5 mcg/hr TD patch Place 1 patch on the skin over 7 days every 7 days For ongoing therapy    [DISCONTINUED] sertraline (ZOLOFT) 50 mg tablet TAKE 1 TABLET BY MOUTH EVERY DAY       Objective     /74   Pulse 68   Temp 98.3 °F (36.8 °C) (Tympanic)   Ht 5' 2" (1.575 m)   Wt 73.7 kg (162 lb 6.4 oz)   BMI 29.70 kg/m²     Physical Exam  Vitals and nursing note reviewed. Constitutional:       General: She is not in acute distress. Appearance: She is well-developed. She is not ill-appearing. HENT:      Head: Normocephalic and atraumatic. Eyes:      General:         Right eye: No discharge. Left eye: No discharge. Conjunctiva/sclera: Conjunctivae normal.   Neck:      Trachea: No tracheal deviation. Cardiovascular:      Rate and Rhythm: Normal rate and regular rhythm. Heart sounds: Murmur heard. No friction rub. Comments: Trace LLE edema  Pulmonary:      Effort: Pulmonary effort is normal. No respiratory distress. Breath sounds: Normal breath sounds. No wheezing, rhonchi or rales. Musculoskeletal:         General: No deformity or signs of injury. Cervical back: Neck supple. Skin:     General: Skin is warm. Coloration: Skin is not pale. Findings: No rash. Neurological:      General: No focal deficit present. Mental Status: She is alert. Mental status is at baseline. Motor: No abnormal muscle tone. Gait: Gait normal.   Psychiatric:         Mood and Affect: Mood normal.         Behavior: Behavior normal.         Thought Content:  Thought content normal.         Judgment: Judgment normal.       Luisana Souza,

## 2023-12-08 NOTE — PATIENT INSTRUCTIONS

## 2023-12-08 NOTE — ASSESSMENT & PLAN NOTE
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

## 2023-12-08 NOTE — ASSESSMENT & PLAN NOTE
No great benefit with Gabapentin or PT, is weaning off Gabapentin and was started on Butrans patch w/benefit, con't meds and f/u as per PM

## 2023-12-08 NOTE — ASSESSMENT & PLAN NOTE
Mood better with increase in Setraline but not yet at goal - increase Setraline from 50 mg to 100 mg,  d/w pt that it takes 4-6 wks to get maximum benefit of med and that med has to be taken every day and to not miss doses of med, call with SE/new/worse mood/panic attacks, re-eval in 2-3 mos

## 2023-12-12 ENCOUNTER — TELEPHONE (OUTPATIENT)
Dept: PAIN MEDICINE | Facility: CLINIC | Age: 88
End: 2023-12-12

## 2023-12-12 ENCOUNTER — OFFICE VISIT (OUTPATIENT)
Dept: PHYSICAL THERAPY | Facility: CLINIC | Age: 88
End: 2023-12-12
Payer: COMMERCIAL

## 2023-12-12 DIAGNOSIS — M48.061 LUMBAR FORAMINAL STENOSIS: ICD-10-CM

## 2023-12-12 DIAGNOSIS — M51.26 HERNIATED NUCLEUS PULPOSUS, L3-4 RIGHT: Primary | ICD-10-CM

## 2023-12-12 DIAGNOSIS — M46.1 SACROILIITIS (HCC): ICD-10-CM

## 2023-12-12 PROCEDURE — 97110 THERAPEUTIC EXERCISES: CPT | Performed by: PHYSICAL THERAPIST

## 2023-12-12 NOTE — TELEPHONE ENCOUNTER
Is patient interested in injection therapy for her neck or her low back? She is a potential candidate for cervical MBB but this process would need to be explained and I'm not completely sure the patient would be able to fully understand how to complete the pain diary. I'd suggest offering TPI with any NP/PA before going through the MBB/RFA process.

## 2023-12-12 NOTE — PROGRESS NOTES
PT Re-Evaluation     Today's date: 2023  Patient name: Darci Whitt  : 1935  MRN: [id number]  Referring provider: Castillo Barron DO  Dx:   Encounter Diagnosis     ICD-10-CM    1. Herniated nucleus pulposus, L3-4 right  M51.26       2. Lumbar foraminal stenosis  M48.061       3. Sacroiliitis ([address])  M46.1           Start Time: 7  Stop Time: 09  Total time in clinic (min): 62 minutes    Assessment  Assessment details: Darci Whitt is a 80 y.o. female presenting to outpatient physical therapy at [facility] with complaints of low back pain. They present with decreased range of motion, decreased strength, limited flexibility, poor postural awareness, poor body mechanics, poor balance, decreased tolerance to activity and decreased functional mobility due to Chronic back pain, unspecified back location, unspecified back pain laterality  (primary encounter diagnosis)  Type 2 diabetes mellitus with stage 4 chronic kidney disease, without long-term current use of insulin (HCC)  Diabetic polyneuropathy associated with type 2 diabetes mellitus (HCC)  Herniated nucleus pulposus, L3-4 right  Lumbar foraminal stenosis  Sacroiliitis ([address] Since beginning therapy she has demonstrated improvement in functional mobility activities, decreased pain, increased strength. It is recommended that she continue therapy for a few additional weeks with progression to independent HEP. Impairments: abnormal muscle firing, abnormal or restricted ROM, activity intolerance, impaired balance, impaired physical strength, lacks appropriate home exercise program, pain with function and poor body mechanics  Barriers to therapy: n/a  Understanding of Dx/Px/POC: excellent  Goals  ST. Independent with HEP in 2 weeks - PROGRESS  2. Decrease pain by 50% in 3 wks - MET    LT. Achieve FOTO score of 52/100 in 6 weeks MET  2. Able to walk for 10 mins without pain in 6 weeks PROGRESS  3.   Strength = 4/5 RLE all planes in 6 weeks MET      Plan  Plan details: Prepare for DC to HEP  Patient would benefit from: skilled physical therapy  Planned modality interventions: cryotherapy, electrical stimulation/Russian stimulation and thermotherapy: hydrocollator packs  Planned therapy interventions: abdominal trunk stabilization, manual therapy, neuromuscular re-education, therapeutic activities, therapeutic exercise, body mechanics training and home exercise program  Frequency: 2x week  Duration in visits: 12  Duration in weeks: 6  Plan of Care beginning date: 11/14/2023  Plan of Care expiration date: 12/29/2023  Treatment plan discussed with: patient      Subjective Evaluation    History of Present Illness  Mechanism of injury: Pt with complex chronic h/o lumbar pain presents to PT with back pain. MRI from January 2023 shows: "T12-L1 there is a disc herniation no stenosis. L2-L3 there is a disc herniation. No stenosis. L3-L4 disc bulge. Right foraminal disc herniation. Mild right neural foraminal stenosis. L4-L5 disc bulge with mild left foraminal stenosis. L5-S1 disc bulge with moderate bilateral neuroforaminal stenosis"    She has tried steroid injections, with most recent in the SI joints bilaterally in April and June of this year without major relief. She is taking gabapentin 400 mg 4 times daily. And was prescribed a trial of Skelaxin 800 mg twice daily as needed. Pt reports medication updates have not had an effect on her symptoms. Pain across the low back. Reports some good days, some bad days. Better since using pain patch. Good days are destiny more than bad ones. Agg with standing, walking, being on her feet. Eased with sitting or lying down, mineral ice topical for the lumbar. Denies bowel/bladder changes, balance difficulties, use of AD. Positive for LE N/T, LE weakness. Sleep is not impacted by back pain, she does not have any pain throughout the night. She lives in an in-law suite at her son's house. She does use the stairs with step-to pattern and railing use. Independent with all ADLs. She does not drive. She spends her time cooking, cleaning, doing chores, watching TV. Patient Goals  Patient goals for therapy: decreased edema, decreased pain, improved balance, increased motion, return to work, return to [employer] activities, independence with ADLs/IADLs and increased strength    Pain  At best pain ratin  At worst pain rating: 3    OBJECTIVE  Pain scale:  Low back 0-3/10    Posture/ Gait:  flexed trunk, increased  thoracic kyphosis, wide CONNIE    Trunk ROM     Flexion AROM     min limitation         Extension  AROM    mod limitation         R lateral flexion AROM     min limitation        L lateral flexion AROM     mod limitation          R rotation AROM     min limitation        L rotation AROM   mod limitation                Palpation:    Hypomobility lumbar spine; no myofascial tenderness      Hip                   R  AROM  L      R  PROM   L    R  Strength  L  Flexion   4/5        5/5   Extension      Abd   4+/5      4/5   Add   4+/5     4+/5   ER   4+/5     4+/5   IR   4+/5     4+/5     Knee   AROM  PROM  Strength  Flexion   5/5         5/5   Extension   5/5         5/5     Ankle   AROM  PROM  Strength  DF   5/5        5/5   PF      Inversion      Eversion                Flowsheet Rows      Flowsheet Row Most Recent Value   PT/OT G-Codes    Current Score 55   Projected Score 52               Precautions: multilevel lumbar disc bulge and stenosis  Other: DM2, CKD    Precautions: multilevel lumbar disc bulge and stenosis  Other: DM2, CKD      HEP:  Access Code: [email or web address]  Access Code [email or web address]     URL: [email or web address]  Date: 2023  Prepared by: Chad Cowart    Exercises  - Supine Lower Trunk Rotation  - 10 reps  - Supine Single Knee to Chest Stretch  - 10 reps  - Seated Long Arc Quad  - 10 reps  - Sit to Stand  - 10 reps      Manuals  Lumbar, sacrum, glute, ITB, HS STM In L sidelying SEAN JR   10'        Bilateral hip PROM                                       Neuro Re-Ed                                                                                                        Ther Ex             Bike - cardio  6min 8 min 8min 7 min 8min       LAQ X20 ea  20ea 20ea 20 ea        STS x10            TB 3way roll out     Flex 10"x3        Seated HS stretch   30"x3 1 min ea 30"x3        Single knee to chest X10 ea 10x  10" 10x  10" ea 10x10" ea 5x10" ea        LTR X10 ea 15 ea 15 ea 15ea np 15ea       TA activation             Hip add iso   10x 10" 10x10" 10x10"        Hip abd iso   10x 10" 10x10" 10x10"        bridge  10x5" 10x5" 15x5" nv 10x10"       SLR  10xea  10ea 10 ea 10ea       clamshell      10x5"ea                                              Ther Activity             Sit<->stand  5x2 10x nv                      Gait Training                                       Modalities

## 2023-12-12 NOTE — TELEPHONE ENCOUNTER
Pt daughter in-law stopped in the office to schedule an injection    Aleah Gooden states that at last ov, patient was given a few options.  She has decided on the injection

## 2023-12-12 NOTE — TELEPHONE ENCOUNTER
S/w pt's daughter in law, Jo Ann Cabrera. Advised of above. Explained MBB / RFA as a series of diagnostic tests which are not designed to provide prolonged relief but rather, identify the origin of the pain. Pt will come in to the procedure suite, pain should be 5+ at the time of the procedure. Pt will be given a pain diary after the mbb to log the amount of relief after the procedure. The pain diary will be reviewed and if it is positive, a confirmatory mbb will be scheduled and the process will be repeated. If the mbb is positive again, the pt will go on for RFA which is a procedure that uses electricty to put a small blister on the nerve root. This blister creates scar tissue and helps to block / slow the pain signals. Anticipate 2 - 6 weeks for the full effect and ~ 8 mos of relief. If the mbb's are negative, this office will cb to discuss the next step. The alternative would be TPI's which are injections of local anesthetic and sometimes a little bit of steroid in a fan like pattern to help break up "knots" or trigger points / painful areas of soft tissue. Jo Ann Cabrera verbalized understanding and appreciation. Stated that the pt has said recently that with PT - her pain is not constant. She expects the pt will refuse any procedures however, she will d/w the pt and cb to advise / if questions or issues arise. Forwarded the [facility] MBB / RFA video to [email or web address] per Talon's request.

## 2023-12-14 ENCOUNTER — OFFICE VISIT (OUTPATIENT)
Dept: PHYSICAL THERAPY | Facility: CLINIC | Age: 88
End: 2023-12-14
Payer: COMMERCIAL

## 2023-12-14 DIAGNOSIS — M54.9 CHRONIC BACK PAIN, UNSPECIFIED BACK LOCATION, UNSPECIFIED BACK PAIN LATERALITY: Primary | ICD-10-CM

## 2023-12-14 DIAGNOSIS — G89.29 CHRONIC BACK PAIN, UNSPECIFIED BACK LOCATION, UNSPECIFIED BACK PAIN LATERALITY: Primary | ICD-10-CM

## 2023-12-14 DIAGNOSIS — M54.9 DORSALGIA: ICD-10-CM

## 2023-12-14 PROCEDURE — 97112 NEUROMUSCULAR REEDUCATION: CPT | Performed by: PHYSICAL THERAPIST

## 2023-12-14 PROCEDURE — 97110 THERAPEUTIC EXERCISES: CPT | Performed by: PHYSICAL THERAPIST

## 2023-12-14 NOTE — PROGRESS NOTES
Daily Note     Today's date: 2023  Patient name: Christal Ndiaye  : 1935  MRN: [id number]  Referring provider: Rangel Worley DO  Dx:   Encounter Diagnosis     ICD-10-CM    1. Chronic back pain, unspecified back location, unspecified back pain laterality  M54.9     G89.29       2. Dorsalgia  M54.9           Start Time: 46  Stop Time: 0930  Total time in clinic (min): 45 minutes    Subjective: reports she is doing well      Objective: See treatment diary below      Assessment: Tolerated treatment well. Reported some LBP p anti-rotation exercise, relieved w/ flexion stretch. Patient would benefit from continued PT      Plan: Continue per plan of care. Precautions: multilevel lumbar disc bulge and stenosis  Other: DM2, CKD      HEP:  Access Code: [email or web address]  Access Code [email or web address]     URL: [email or web address]  Date: 2023  Prepared by: Daniel Lozano    Exercises  - Supine Lower Trunk Rotation  - 10 reps  - Supine Single Knee to Chest Stretch  - 10 reps  - Seated Long Arc Quad  - 10 reps  - Sit to Stand  - 10 reps      Manuals       Lumbar, sacrum, glute, ITB, HS STM In L sidelying SEAN JR   10'        Bilateral hip PROM                                       Neuro Re-Ed             Bird dog        attempted      Anti-rotation       15ea                                                                       Ther Ex             Bike - cardio  6min 8 min 8min 7 min 8min 8min      LAQ X20 ea  20ea 20ea 20 ea        STS x10            TB 3way roll out     Flex 10"x3        Seated HS stretch   30"x3 1 min ea 30"x3  30"x3      Single knee to chest X10 ea 10x  10" 10x  10" ea 10x10" ea 5x10" ea  5x10" ea      LTR X10 ea 15 ea 15 ea 15ea np 15ea 15ea      Seated flexion stretch       10x10"      Hip add iso   10x 10" 10x10" 10x10"  10x10"      Hip abd iso   10x 10" 10x10" 10x10"  10x10"      bridge  10x5" 10x5" 15x5" nv 10x10" 10x10"      SLR 10xea  10ea 10 ea 10ea 10ea      clamshell      10x5"ea                                              Ther Activity             Sit<->stand  5x2 10x nv   10x                   Gait Training                                       Modalities

## 2023-12-15 NOTE — TELEPHONE ENCOUNTER
Caller: Kush Chester, pt's daughter in law    Doctor: Carson Ware    Reason for call: Pt's daughter in law called stating they do not want to do neck injections or therapy at this time. The pain isn't as often as it was and it more intermittent. Pt's daughter in law wants to thank [facility] for explaining things to her. She greatly appreciated all of your assistance.     Call back#: [phone]

## 2023-12-16 DIAGNOSIS — E11.22 TYPE 2 DIABETES MELLITUS WITH STAGE 3 CHRONIC KIDNEY DISEASE, WITHOUT LONG-TERM CURRENT USE OF INSULIN (HCC): ICD-10-CM

## 2023-12-16 DIAGNOSIS — N18.30 TYPE 2 DIABETES MELLITUS WITH STAGE 3 CHRONIC KIDNEY DISEASE, WITHOUT LONG-TERM CURRENT USE OF INSULIN (HCC): ICD-10-CM

## 2023-12-17 RX ORDER — SITAGLIPTIN 50 MG/1
TABLET, FILM COATED ORAL
Qty: 30 TABLET | Refills: 5 | Status: SHIPPED | OUTPATIENT
Start: 2023-12-17

## 2023-12-19 ENCOUNTER — OFFICE VISIT (OUTPATIENT)
Dept: PHYSICAL THERAPY | Facility: CLINIC | Age: 88
End: 2023-12-19
Payer: COMMERCIAL

## 2023-12-19 DIAGNOSIS — M54.41 CHRONIC BILATERAL LOW BACK PAIN WITH RIGHT-SIDED SCIATICA: ICD-10-CM

## 2023-12-19 DIAGNOSIS — G89.29 CHRONIC BILATERAL LOW BACK PAIN WITH RIGHT-SIDED SCIATICA: ICD-10-CM

## 2023-12-19 DIAGNOSIS — M46.1 SACROILIITIS (HCC): Primary | ICD-10-CM

## 2023-12-19 PROCEDURE — 97110 THERAPEUTIC EXERCISES: CPT | Performed by: PHYSICAL THERAPIST

## 2023-12-19 NOTE — PROGRESS NOTES
"Daily Note     Today's date: 2023  Patient name: Ena Ahumada  : 1935  MRN: [id number]  Referring provider: Deanna Castaneda DO  Dx:   Encounter Diagnosis     ICD-10-CM    1. Sacroiliitis (HCC)  M46.1       2. Chronic bilateral low back pain with right-sided sciatica  M54.41     G89.29           Start Time: 0845  Stop Time: 09  Total time in clinic (min): 50 minutes    Subjective: reports she has not been feeling well since lv; states her legs feel weak today      Objective: See treatment diary below      Assessment: Tolerated treatment well.  Tender area of R PSIS.  Patient would benefit from continued PT      Plan: Continue per plan of care.          Precautions: multilevel lumbar disc bulge and stenosis  Other: DM2, CKD      HEP:  Access Code: [email or web address]  Access Code [email or web address]     URL: [email or web address]  Date: 2023  Prepared by: Joan Mesa    Exercises  - Supine Lower Trunk Rotation  - 10 reps  - Supine Single Knee to Chest Stretch  - 10 reps  - Seated Long Arc Quad  - 10 reps  - Sit to Stand  - 10 reps      Manuals      Lumbar, sacrum, glute, ITB, HS STM In L sidelying SEAN JR   10'        Bilateral hip PROM                                       Neuro Re-Ed             Bird dog        attempted DC     Anti-rotation       15ea DC                                                                      Ther Ex             Bike - cardio  6min 8 min 8min 7 min 8min 8min 8min     LAQ X20 ea  20ea 20ea 20 ea        STS x10            TB 3way roll out     Flex 10\"x3        Seated HS stretch   30\"x3 1 min ea 30\"x3  30\"x3 30\"x3     Single knee to chest X10 ea 10x  10\" 10x  10\" ea 10x10\" ea 5x10\" ea  5x10\" ea 5x10\" ea     LTR X10 ea 15 ea 15 ea 15ea np 15ea 15ea 15 ea     Seated flexion stretch       10x10\"      Hip add iso   10x 10\" 10x10\" 10x10\"  10x10\" 10x10\"     Hip abd iso   10x 10\" 10x10\" 10x10\"  10x10\" 10x10\"     bridge  10x5\" " "10x5\" 15x5\" nv 10x10\" 10x10\" 10x10\"     SLR  10xea  10ea 10 ea 10ea 10ea      clamshell      10x5\"ea  10x5\"                                            Ther Activity             Sit<->stand  5x2 10x nv   10x 10x                  Gait Training                                       Modalities                                                "

## 2023-12-21 ENCOUNTER — OFFICE VISIT (OUTPATIENT)
Dept: PHYSICAL THERAPY | Facility: CLINIC | Age: 88
End: 2023-12-21
Payer: COMMERCIAL

## 2023-12-21 DIAGNOSIS — M48.061 LUMBAR FORAMINAL STENOSIS: ICD-10-CM

## 2023-12-21 DIAGNOSIS — M51.26 HERNIATED NUCLEUS PULPOSUS, L3-4 RIGHT: Primary | ICD-10-CM

## 2023-12-21 PROCEDURE — 97110 THERAPEUTIC EXERCISES: CPT | Performed by: PHYSICAL THERAPIST

## 2023-12-21 NOTE — PROGRESS NOTES
"Daily Note / Discharge    Today's date: 2023  Patient name: Ena Ahumada  : 1935  MRN: [id number]  Referring provider: Deanna Castaneda DO  Dx:   Encounter Diagnosis     ICD-10-CM    1. Herniated nucleus pulposus, L3-4 right  M51.26       2. Lumbar foraminal stenosis  M48.061           Start Time: 0845  Stop Time: 09  Total time in clinic (min): 45 minutes    Subjective: no leg weakness today but has back pain intermittently; doing exercises at home but not every day.  Feels she is ready for DC from therapy      Objective: See treatment diary below      Assessment: Tolerated treatment well.  Able to perform most exercises without cueing.   Patient would benefit from continued PT      Plan:Discharge         Precautions: multilevel lumbar disc bulge and stenosis  Other: DM2, CKD      HEP:  Access Code: [email or web address]  Access Code [email or web address]     URL: [email or web address]  Date: 2023  Prepared by: Joan Mesa    Exercises  - Supine Lower Trunk Rotation  - 10 reps  - Supine Single Knee to Chest Stretch  - 10 reps  - Seated Long Arc Quad  - 10 reps  - Sit to Stand  - 10 reps      Manuals     Lumbar, sacrum, glute, ITB, HS STM In L sidelying SEAN JR   10'        Bilateral hip PROM                                       Neuro Re-Ed             Bird dog        attempted DC     Anti-rotation       15ea DC                                                                      Ther Ex             Bike - cardio  6min 8 min 8min 7 min 8min 8min 8min 5min    LAQ X20 ea  20ea 20ea 20 ea        STS x10            TB 3way roll out     Flex 10\"x3        Seated HS stretch   30\"x3 1 min ea 30\"x3  30\"x3 30\"x3 10x10\"    Single knee to chest X10 ea 10x  10\" 10x  10\" ea 10x10\" ea 5x10\" ea  5x10\" ea 5x10\" ea 10x10\" ea    LTR X10 ea 15 ea 15 ea 15ea np 15ea 15ea 15 ea 10x    Seated flexion stretch       10x10\"      Hip add iso   10x 10\" 10x10\" 10x10\"  10x10\" " "10x10\" 10x10\"    Hip abd iso   10x 10\" 10x10\" 10x10\"  10x10\" 10x10\" 10x10\"    bridge  10x5\" 10x5\" 15x5\" nv 10x10\" 10x10\" 10x10\" 10x 10\"    SLR  10xea  10ea 10 ea 10ea 10ea  10ea    clamshell      10x5\"ea  10x5\"                                            Ther Activity             Sit<->stand  5x2 10x nv   10x 10x 10x                 Gait Training                                       Modalities                                                "

## 2023-12-26 ENCOUNTER — APPOINTMENT (OUTPATIENT)
Dept: PHYSICAL THERAPY | Facility: CLINIC | Age: 88
End: 2023-12-26
Payer: COMMERCIAL

## 2023-12-28 ENCOUNTER — APPOINTMENT (OUTPATIENT)
Dept: PHYSICAL THERAPY | Facility: CLINIC | Age: 88
End: 2023-12-28
Payer: COMMERCIAL

## 2023-12-30 DIAGNOSIS — F41.9 ANXIETY: ICD-10-CM

## 2023-12-30 DIAGNOSIS — E78.5 DYSLIPIDEMIA: ICD-10-CM

## 2023-12-30 RX ORDER — SIMVASTATIN 20 MG
TABLET ORAL
Qty: 90 TABLET | Refills: 4 | Status: SHIPPED | OUTPATIENT
Start: 2023-12-30

## 2023-12-30 RX ORDER — SERTRALINE HYDROCHLORIDE 100 MG/1
100 TABLET, FILM COATED ORAL DAILY
Qty: 90 TABLET | Refills: 1 | Status: SHIPPED | OUTPATIENT
Start: 2023-12-30

## 2024-01-02 ENCOUNTER — OFFICE VISIT (OUTPATIENT)
Dept: ENDOCRINOLOGY | Facility: HOSPITAL | Age: 89
End: 2024-01-02
Payer: COMMERCIAL

## 2024-01-02 VITALS
SYSTOLIC BLOOD PRESSURE: 120 MMHG | BODY MASS INDEX: 29.11 KG/M2 | HEIGHT: 62 IN | HEART RATE: 75 BPM | DIASTOLIC BLOOD PRESSURE: 72 MMHG | WEIGHT: 158.2 LBS

## 2024-01-02 DIAGNOSIS — N18.4 TYPE 2 DIABETES MELLITUS WITH STAGE 4 CHRONIC KIDNEY DISEASE, WITHOUT LONG-TERM CURRENT USE OF INSULIN (HCC): ICD-10-CM

## 2024-01-02 DIAGNOSIS — E11.65 TYPE 2 DIABETES MELLITUS WITH HYPERGLYCEMIA, WITHOUT LONG-TERM CURRENT USE OF INSULIN (HCC): Primary | ICD-10-CM

## 2024-01-02 DIAGNOSIS — I10 PRIMARY HYPERTENSION: ICD-10-CM

## 2024-01-02 DIAGNOSIS — E78.5 DYSLIPIDEMIA: ICD-10-CM

## 2024-01-02 DIAGNOSIS — E11.42 DIABETIC POLYNEUROPATHY ASSOCIATED WITH TYPE 2 DIABETES MELLITUS (HCC): ICD-10-CM

## 2024-01-02 DIAGNOSIS — E11.22 TYPE 2 DIABETES MELLITUS WITH STAGE 4 CHRONIC KIDNEY DISEASE, WITHOUT LONG-TERM CURRENT USE OF INSULIN (HCC): ICD-10-CM

## 2024-01-02 PROCEDURE — 99214 OFFICE O/P EST MOD 30 MIN: CPT | Performed by: INTERNAL MEDICINE

## 2024-01-02 NOTE — ASSESSMENT & PLAN NOTE
She has no neuropathic symptoms. She has a history of back pain and has switched from gabapentin to Butrans patch. That is per pain management. Diabetic foot exams are up to date.

## 2024-01-02 NOTE — ASSESSMENT & PLAN NOTE
She will continue the same simvastatin 20 mg daily. She does follow cardiology on a regular basis.

## 2024-01-02 NOTE — ASSESSMENT & PLAN NOTE
She is normotensive in the office on her current clonidine patch, Bystolic, nifedipine, and torsemide.

## 2024-01-02 NOTE — ASSESSMENT & PLAN NOTE
Most recent hemoglobin A1c is 8.5%, which is improved, but still not at goal. For her age, though, it is not bad, especially with her medical conditions. I would like to see her A1c down to 8 or less if possible. At this point, we are limited as to what medications we can use, so she is going to work on dietary changes and trying to get a little bit more activity and then for now, we will continue the same glipizide, Invokana, and Januvia. She will continue to test her blood glucose up to once a day

## 2024-01-02 NOTE — PATIENT INSTRUCTIONS
Hgba1c 8.5% in oct 2023. This is slightly better. I would like it less than 8 if possible.     Work on less carbs.     Continue the same glipizide, invokana, and januvia.     Continue to test blood sugars up to once daily.     Follow up in 4 months with blood work.

## 2024-01-02 NOTE — PROGRESS NOTES
1/2/2024    Assessment/Plan     1. Type 2 diabetes mellitus with hyperglycemia, without long-term current use of insulin ([facility])  Assessment & Plan:  Most recent hemoglobin A1c is 8.5%, which is improved, but still not at goal. For her age, though, it is not bad, especially with her medical conditions. I would like to see her A1c down to 8 or less if possible. At this point, we are limited as to what medications we can use, so she is going to work on dietary changes and trying to get a little bit more activity and then for now, we will continue the same glipizide, Invokana, and Januvia. She will continue to test her blood glucose up to once a day    Orders:  -     Comprehensive metabolic panel Lab Collect; Future; Expected date: 04/08/2024  -     CBC and differential Lab Collect; Future; Expected date: 04/08/2024  -     HEMOGLOBIN A1C W/ EAG ESTIMATION Lab Collect; Future; Expected date: 04/08/2024  -     Comprehensive metabolic panel Lab Collect  -     CBC and differential Lab Collect  -     HEMOGLOBIN A1C W/ EAG ESTIMATION Lab Collect    2. Diabetic polyneuropathy associated with type 2 diabetes mellitus (HCC)  Assessment & Plan:  She has no neuropathic symptoms. She has a history of back pain and has switched from gabapentin to Butrans patch. That is per pain management. Diabetic foot exams are up to date.    Orders:  -     Comprehensive metabolic panel Lab Collect; Future; Expected date: 04/08/2024  -     CBC and differential Lab Collect; Future; Expected date: 04/08/2024  -     HEMOGLOBIN A1C W/ EAG ESTIMATION Lab Collect; Future; Expected date: 04/08/2024  -     Comprehensive metabolic panel Lab Collect  -     CBC and differential Lab Collect  -     HEMOGLOBIN A1C W/ EAG ESTIMATION Lab Collect    3. Type 2 diabetes mellitus with stage 4 chronic kidney disease, without long-term current use of insulin ([facility])  Assessment & Plan:  She does follow nephrology on a regular basis.     Orders:  -     Comprehensive  metabolic panel Lab Collect; Future; Expected date: 04/08/2024  -     CBC and differential Lab Collect; Future; Expected date: 04/08/2024  -     HEMOGLOBIN A1C W/ EAG ESTIMATION Lab Collect; Future; Expected date: 04/08/2024  -     Comprehensive metabolic panel Lab Collect  -     CBC and differential Lab Collect  -     HEMOGLOBIN A1C W/ EAG ESTIMATION Lab Collect    4. Primary hypertension  Assessment & Plan:  She is normotensive in the office on her current clonidine patch, Bystolic, nifedipine, and torsemide.    Orders:  -     Comprehensive metabolic panel Lab Collect; Future; Expected date: 04/08/2024  -     CBC and differential Lab Collect; Future; Expected date: 04/08/2024  -     HEMOGLOBIN A1C W/ EAG ESTIMATION Lab Collect; Future; Expected date: 04/08/2024  -     Comprehensive metabolic panel Lab Collect  -     CBC and differential Lab Collect  -     HEMOGLOBIN A1C W/ EAG ESTIMATION Lab Collect    5. Dyslipidemia  Assessment & Plan:  She will continue the same simvastatin 20 mg daily. She does follow cardiology on a regular basis.    Orders:  -     Comprehensive metabolic panel Lab Collect; Future; Expected date: 04/08/2024  -     CBC and differential Lab Collect; Future; Expected date: 04/08/2024  -     HEMOGLOBIN A1C W/ EAG ESTIMATION Lab Collect; Future; Expected date: 04/08/2024  -     Comprehensive metabolic panel Lab Collect  -     CBC and differential Lab Collect  -     HEMOGLOBIN A1C W/ EAG ESTIMATION Lab Collect       I have asked her to follow up in 4 months with preceding hemoglobin A1c, CMP, and CBC.    CC: Diabetes type II, blood pressure, lipid follow-up    History of Present Illness     HPI: Ena Ahumada is a 88 y.o. year old female with type 2 diabetes with neuropathy and CKD stage 4 for 11 years, hypertension, and hyperlipidemia for follow-up visit. She is accompanied by an adult female.    Diabetic complications include neuropathy and nephropathy. She denies retinopathy, heart attack,  stroke, or claudication.     She is on glipizide 10 mg 1 tablet in the morning and 2 tablets at supper, Invokana 100 mg a day, and Januvia 50 mg a day. She denies polyuria, polydipsia, polyphagia, or neuropathy of the feet. She has nocturia 2 to 3 times a night and occasional mild blurry vision when transitioning from a seated to a standing position but denies experiencing concurrent lightheadedness or dizziness during these episodes.      She intermittently monitors her blood glucose levels. Yesterday, it was 185 in the morning and 181 at night. This morning, it was 150.     Hypoglycemic episodes: She denies symptoms associated with hypoglycemia, such as shaking, diaphoresis, or palpitations.    The patient's last eye exam was in 07/2023.    She denies numbness or tingling of the feet.  The patient's last foot exam was in 08/2023.  She does not see podiatry.    She is on clonidine patch 0.1 mg once a week, Bystolic 20 mg a day, nifedipine 90 mg a day, and torsemide 20 mg a day for her blood pressure. She denies any headaches, weakness on either side, chest pain, or dyspnea.     She is on sertraline 100 mg for her anxiety and simvastatin 20 mg a day for her cholesterol. She uses a patch for back pain.    She sees a cardiologist once a year. She saw her nephrologist, Dr. Trena Baumann, in 11/2023.        Last A1C was   Lab Results   Component Value Date    HGBA1C 8.5 (H) 10/13/2023   .        Review of Systems  The pertinent positive and negative findings are as noted in the HPI.    Historical Information   Past Medical History:   Diagnosis Date    Anxiety     Aortic valve insufficiency     Arthritis     Cataract of both eyes     Chronic kidney disease     Dysuria     last assessed - 19May2017    Edema     last assessed - 05Jun2015    GERD (gastroesophageal reflux disease)     last assessed - 30Aug2012    Hyperlipidemia     Hypertension     Low back pain     last assessed - 30Aug2012    Mitral valve disorder      Osteoporosis     last assessed - [id number]    Palpitations      Past Surgical History:   Procedure Laterality Date    APPENDECTOMY      CATARACT EXTRACTION Bilateral     COLONOSCOPY      TUBAL LIGATION Bilateral      Social History   Social History     Substance and Sexual Activity   Alcohol Use Never     Social History     Substance and Sexual Activity   Drug Use Never     Social History     Tobacco Use   Smoking Status Never   Smokeless Tobacco Never     Family History:   Family History   Problem Relation Age of Onset    Kidney disease Mother         Chronic    No Known Problems Father     Breast cancer Sister     Diabetes Sister     Cancer Sister     Breast cancer Sister     Hypertension Sister     No Known Problems Daughter     No Known Problems Son     No Known Problems Son     No Known Problems Son     No Known Problems Son        Meds/Allergies   Current Outpatient Medications   Medication Sig Dispense Refill    ACCU-CHEK PAUL PLUS test strip       Accu-Chek Guide test strip USE TO TEST TWICE A  strip 3    Accu-Chek Softclix Lancets lancets       Accu-Chek Softclix Lancets lancets USE AS INSTRUCTED TWICE DAILY 100 each 3    aspirin 81 MG tablet Take 1 tablet by mouth daily      cholecalciferol (VITAMIN D3) 1,000 units tablet Take 2 tablets by mouth daily      cloNIDine (CATAPRES-TTS-1) 0.1 mg/24 hr PLACE 1 PATCH ON THE SKIN ONCE A WEEK AS DIRECTED 12 patch 3    Denta 5000 Plus 1.1 % CREA USE TWICE A DAY -SPIT, DONT RINSE AND NOTHING BY MOUTH X 30 MIN      famotidine (PEPCID) 20 mg tablet TAKE 1 TABLET BY MOUTH EVERY DAY 90 tablet 4    glipiZIDE (GLUCOTROL) 10 mg tablet TAKE 1 TABLET BY MOUTH IN THE MORNING THEN 2 TABS WITH DINNER 270 tablet 2    Invokana 100 MG TAKE 1 TABLET BY MOUTH EVERY DAY BEFORE BREAKFAST 30 tablet 5    nebivolol (BYSTOLIC) 20 MG tablet TAKE 1 TABLET BY MOUTH EVERY DAY 90 tablet 5    NIFEdipine (ADALAT CC) 90 mg 24 hr tablet TAKE 1 TABLET BY MOUTH EVERY DAY 90 tablet 9     "Potassium Gluconate 595 (99 K) MG TABS Take 2 tablets by mouth 2 (two) times a day 4 tabs total      sertraline (ZOLOFT) 100 mg tablet TAKE 1 TABLET BY MOUTH EVERY DAY 90 tablet 1    simvastatin (ZOCOR) 20 mg tablet TAKE 1 TABLET BY MOUTH EVERYDAY AT BEDTIME 90 tablet 4    sitaGLIPtin (Januvia) 50 mg tablet TAKE 1 TABLET BY MOUTH EVERY DAY 30 tablet 5    torsemide (DEMADEX) 20 mg tablet TAKE 1 TABLET BY MOUTH EVERY DAY 90 tablet 3    transdermal buprenorphine (BUTRANS) 7.5 mcg/hr TD patch Place 1 patch on the skin over 7 days every 7 days For ongoing therapy 4 patch 2    glucose blood (Accu-Chek Emma Plus) test strip USE AS INSTRUCTED TWICE DAILY (Patient not taking: Reported on 2/2/2023) 100 each 7    ondansetron (ZOFRAN) 4 mg tablet Take 1 tablet (4 mg total) by mouth every 8 (eight) hours as needed for nausea or vomiting (Patient not taking: Reported on 11/20/2023) 20 tablet 0     No current facility-administered medications for this visit.     No Known Allergies    Objective   Vitals: Blood pressure 120/72, pulse 75, height 5' 2\" (1.575 m), weight 71.8 kg (158 lb 3.2 oz), not currently breastfeeding.  Invasive Devices       None                   Physical Exam  Physical exam normal with pertinent positives and negatives.  Neck: Thyroid normal in size. No palpable nodules.  Respiratory: Lungs clear.  Cardiovascular: Heart regular. No murmurs.  Musculoskeletal: No lower extremity edema.    The history was obtained from the review of the chart and from the patient.    Lab Results:    Most recent Alc is  Lab Results   Component Value Date    HGBA1C 8.5 (H) 10/13/2023               Lab Results   Component Value Date    CREATININE 1.82 (H) 11/17/2023    CREATININE 1.90 (H) 06/23/2023    CREATININE 1.89 (H) 06/23/2023    BUN 32 (H) 11/17/2023     01/02/2018    K 3.5 11/17/2023     11/17/2023    CO2 23 11/17/2023     eGFR   Date Value Ref Range Status   11/17/2023 26 (L) >59 mL/min/1.73 Final   10/29/2022 " 22 ml/min/1.73sq m Final         Lab Results   Component Value Date    CHOL 168 01/02/2018    HDL 50 06/23/2023    TRIG 139 06/23/2023    CHOLHDL 3.5 06/23/2023       Lab Results   Component Value Date    ALT 13 11/17/2023    AST 18 11/17/2023    ALKPHOS 72 10/29/2022    BILITOT 0.3 01/02/2018       Lab Results   Component Value Date    TSH 1.990 06/23/2023     Blood work performed on 10/13/2023 showed a hemoglobin A1c of 8.5%. Blood work performed on 11/17/2023 showed a CMP with a glucose of 179 fasting, BUN 32, creatinine 1.82, GFR 26, calcium 8.5 in the setting of an albumin of 3.9 with a normal phosphorus at 3.1 and an intact parathyroid hormone level that is mildly above normal at 75.     Urine microalbumin to creatinine ratio is 120.    Future Appointments   Date Time Provider Department Center   2/27/2024  9:15 AM Josey Dodson PA-C [facility]   3/15/2024  9:00 AM DO MIKI CainOWN  [facility]   5/6/2024  9:40 AM Evelyn Max MD [facility]       Transcribed for Evelyn Max MD, by Micah  on 01/02/24 at 3:43 PM. Powered by Dragon [employer].

## 2024-02-10 DIAGNOSIS — K21.00 GASTROESOPHAGEAL REFLUX DISEASE WITH ESOPHAGITIS: ICD-10-CM

## 2024-02-10 RX ORDER — FAMOTIDINE 20 MG/1
TABLET, FILM COATED ORAL
Qty: 90 TABLET | Refills: 1 | Status: SHIPPED | OUTPATIENT
Start: 2024-02-10

## 2024-02-12 ENCOUNTER — OFFICE VISIT (OUTPATIENT)
Dept: PAIN MEDICINE | Facility: CLINIC | Age: 89
End: 2024-02-12
Payer: COMMERCIAL

## 2024-02-12 VITALS
HEIGHT: 62 IN | SYSTOLIC BLOOD PRESSURE: 132 MMHG | TEMPERATURE: 97.9 F | WEIGHT: 155 LBS | HEART RATE: 70 BPM | DIASTOLIC BLOOD PRESSURE: 72 MMHG | BODY MASS INDEX: 28.52 KG/M2

## 2024-02-12 DIAGNOSIS — M47.816 LUMBAR SPONDYLOSIS: ICD-10-CM

## 2024-02-12 DIAGNOSIS — M48.061 LUMBAR FORAMINAL STENOSIS: ICD-10-CM

## 2024-02-12 DIAGNOSIS — M47.812 CERVICAL SPONDYLOSIS: ICD-10-CM

## 2024-02-12 DIAGNOSIS — Z79.891 LONG-TERM CURRENT USE OF OPIATE ANALGESIC: ICD-10-CM

## 2024-02-12 DIAGNOSIS — M54.16 LUMBAR RADICULOPATHY: Primary | ICD-10-CM

## 2024-02-12 DIAGNOSIS — G89.4 CHRONIC PAIN SYNDROME: ICD-10-CM

## 2024-02-12 DIAGNOSIS — F11.20 UNCOMPLICATED OPIOID DEPENDENCE (HCC): ICD-10-CM

## 2024-02-12 PROCEDURE — 99214 OFFICE O/P EST MOD 30 MIN: CPT | Performed by: PHYSICIAN ASSISTANT

## 2024-02-12 RX ORDER — BUPRENORPHINE 7.5 UG/H
1 PATCH TRANSDERMAL
Qty: 4 PATCH | Refills: 2 | Status: SHIPPED | OUTPATIENT
Start: 2024-02-12

## 2024-02-12 RX ORDER — PREGABALIN 50 MG/1
CAPSULE ORAL
Qty: 90 CAPSULE | Refills: 2 | Status: SHIPPED | OUTPATIENT
Start: 2024-02-12

## 2024-02-12 NOTE — PROGRESS NOTES
Assessment:  1. Lumbar radiculopathy    2. Lumbar foraminal stenosis    3. Lumbar spondylosis    4. Cervical spondylosis    5. Chronic pain syndrome    6. Uncomplicated opioid dependence (HCC)    7. Long-term current use of opiate analgesic        Plan:  While the patient was in the office today, I did have a thorough conversation regarding their chronic pain syndrome, medication management, and treatment plan options.    Consider bilateral L5 transforaminal epidural steroid injection in the future.    After discussing options, I have recommended a trial of pregabalin Lyrica 50 mg, starting with once a day for 5 days, twice daily for 5 days and then ultimately 3 times daily as long as that she is able to tolerate it without side effects.  She has previously tried and failed to respond to gabapentin and Cymbalta is not an option due to interaction with Zoloft.  I discussed with the patient the type of medication it is, how it works, and that it requires a titration process that is specific to each individual. I reviewed with the patient that it may take 3-4 weeks for the medication's effects to be noticed and that it should never be abruptly stopped. Possible side effects include but are not limited to; vertigo, lethargy, nausea, and edema of the extremities. Advised the patient to call our office if they experience any side effects. The patient verbalized an understanding.    She remains clinically stable on the Butrans 7.5 mcg patch.  I have electronically sent refills to her pharmacy for the next 3 months.    [place] Prescription Drug Monitoring Program report was reviewed and was appropriate     There are risks associated with opioid medications, including dependence, addiction and tolerance. The patient understands and agrees to use these medications only as prescribed. Potential side effects of the medications include, but are not limited to, constipation, drowsiness, addiction, impaired judgment and risk  of fatal overdose if not taken as prescribed. The patient was warned against driving while taking sedation medications.  Sharing medications is a felony. At this point in time, the patient is showing no signs of addiction, abuse, diversion or suicidal ideation.    The patient will follow-up in 12 weeks for medication prescription refill and reevaluation. The patient was advised to contact the office should their symptoms worsen in the interim. The patient was agreeable and verbalized an understanding.        History of Present Illness:    The patient is a 88 y.o. female last seen on 12/4/2023 who presents for a follow up office visit in regards to chronic low back pain secondary to lumbar spondylosis, degenerative disc disease, foraminal stenosis with radiculopathy.  The patient currently reports chronic low back pain with increasing bilateral lower extremity radicular complaints that she rates a 5 out of 10 and describes it as an intermittent pressure-like pain.  She has intermittent numbness and paresthesias in the right lower extremity.  The radicular pain is laterally and posteriorly to the level of the calf.  She has increased pain with prolonged sitting, standing and walking.  Overall the Butrans patch at 7.5 mcg has been quite effective for her back pain however she states it has not been very helpful in terms of the leg pain complaints.  She has tried and failed to respond to gabapentin.    I have personally reviewed and/or updated the patient's past medical history, past surgical history, family history, social history, current medications, allergies, and vital signs today.       Review of Systems:    Review of Systems   Respiratory:  Negative for shortness of breath.    Cardiovascular:  Negative for chest pain.   Gastrointestinal:  Negative for constipation, diarrhea, nausea and vomiting.   Musculoskeletal:  Positive for gait problem and joint swelling. Negative for arthralgias and myalgias.   Skin:   Negative for rash.   Neurological:  Positive for weakness. Negative for dizziness and seizures.   All other systems reviewed and are negative.        Past Medical History:   Diagnosis Date   • Anxiety    • Aortic valve insufficiency    • Arthritis    • Cataract of both eyes    • Chronic kidney disease    • Dysuria     last assessed - 19May2017   • Edema     last assessed - 05Jun2015   • GERD (gastroesophageal reflux disease)     last assessed - 30Aug2012   • Hyperlipidemia    • Hypertension    • Low back pain     last assessed - 30Aug2012   • Mitral valve disorder    • Osteoporosis     last assessed - 29Jul2014   • Palpitations        Past Surgical History:   Procedure Laterality Date   • APPENDECTOMY     • CATARACT EXTRACTION Bilateral    • COLONOSCOPY     • TUBAL LIGATION Bilateral        Family History   Problem Relation Age of Onset   • Kidney disease Mother         Chronic   • No Known Problems Father    • Breast cancer Sister    • Diabetes Sister    • Cancer Sister    • Breast cancer Sister    • Hypertension Sister    • No Known Problems Daughter    • No Known Problems Son    • No Known Problems Son    • No Known Problems Son    • No Known Problems Son        Social History     Occupational History   • Not on file   Tobacco Use   • Smoking status: Never   • Smokeless tobacco: Never   Vaping Use   • Vaping status: Never Used   Substance and Sexual Activity   • Alcohol use: Never   • Drug use: Never   • Sexual activity: Not Currently         Current Outpatient Medications:   •  aspirin 81 MG tablet, Take 1 tablet by mouth daily, Disp: , Rfl:   •  cholecalciferol (VITAMIN D3) 1,000 units tablet, Take 2 tablets by mouth daily, Disp: , Rfl:   •  cloNIDine (CATAPRES-TTS-1) 0.1 mg/24 hr, PLACE 1 PATCH ON THE SKIN ONCE A WEEK AS DIRECTED, Disp: 12 patch, Rfl: 3  •  Denta 5000 Plus 1.1 % CREA, USE TWICE A DAY -SPIT, DONT RINSE AND NOTHING BY MOUTH X 30 MIN, Disp: , Rfl:   •  famotidine (PEPCID) 20 mg tablet, TAKE 1  TABLET BY MOUTH EVERY DAY, Disp: 90 tablet, Rfl: 1  •  glipiZIDE (GLUCOTROL) 10 mg tablet, TAKE 1 TABLET BY MOUTH IN THE MORNING THEN 2 TABS WITH DINNER, Disp: 270 tablet, Rfl: 2  •  Invokana 100 MG, TAKE 1 TABLET BY MOUTH EVERY DAY BEFORE BREAKFAST, Disp: 30 tablet, Rfl: 5  •  nebivolol (BYSTOLIC) 20 MG tablet, TAKE 1 TABLET BY MOUTH EVERY DAY, Disp: 90 tablet, Rfl: 5  •  NIFEdipine (ADALAT CC) 90 mg 24 hr tablet, TAKE 1 TABLET BY MOUTH EVERY DAY, Disp: 90 tablet, Rfl: 9  •  Potassium Gluconate 595 (99 K) MG TABS, Take 2 tablets by mouth 2 (two) times a day 4 tabs total, Disp: , Rfl:   •  pregabalin (LYRICA) 50 mg capsule, Take 1 cap once a day for 5 days, then increase to BID x 5 days, then TID, Disp: 90 capsule, Rfl: 2  •  sertraline (ZOLOFT) 100 mg tablet, TAKE 1 TABLET BY MOUTH EVERY DAY, Disp: 90 tablet, Rfl: 1  •  simvastatin (ZOCOR) 20 mg tablet, TAKE 1 TABLET BY MOUTH EVERYDAY AT BEDTIME, Disp: 90 tablet, Rfl: 4  •  sitaGLIPtin (Januvia) 50 mg tablet, TAKE 1 TABLET BY MOUTH EVERY DAY, Disp: 30 tablet, Rfl: 5  •  torsemide (DEMADEX) 20 mg tablet, TAKE 1 TABLET BY MOUTH EVERY DAY, Disp: 90 tablet, Rfl: 3  •  transdermal buprenorphine (BUTRANS) 7.5 mcg/hr TD patch, Place 1 patch on the skin over 7 days every 7 days For ongoing therapy, Disp: 4 patch, Rfl: 2  •  ACCU-CHEK PAUL PLUS test strip, , Disp: , Rfl:   •  Accu-Chek Guide test strip, USE TO TEST TWICE A DAY, Disp: 100 strip, Rfl: 3  •  Accu-Chek Softclix Lancets lancets, , Disp: , Rfl:   •  Accu-Chek Softclix Lancets lancets, USE AS INSTRUCTED TWICE DAILY, Disp: 100 each, Rfl: 3  •  glucose blood (Accu-Chek Paul Plus) test strip, USE AS INSTRUCTED TWICE DAILY (Patient not taking: Reported on 2/2/2023), Disp: 100 each, Rfl: 7  •  ondansetron (ZOFRAN) 4 mg tablet, Take 1 tablet (4 mg total) by mouth every 8 (eight) hours as needed for nausea or vomiting (Patient not taking: Reported on 11/20/2023), Disp: 20 tablet, Rfl: 0    No Known  "Allergies    Physical Exam:    /72 (BP Location: Left arm, Patient Position: Sitting, Cuff Size: Standard)   Pulse 70   Temp 97.9 °F (36.6 °C)   Ht 5' 2\" (1.575 m)   Wt 70.3 kg (155 lb)   BMI 28.35 kg/m²     Constitutional:normal, well developed, well nourished, alert, in no distress and non-toxic and no overt pain behavior.  Eyes:anicteric  HEENT:grossly intact  Neck:supple, symmetric, trachea midline and no masses   Pulmonary:even and unlabored  Cardiovascular:No edema or pitting edema present  Skin:Normal without rashes or lesions and well hydrated  Psychiatric:Mood and affect appropriate  Neurologic:Cranial Nerves II-XII grossly intact  Musculoskeletal: Gait is slow but stable, forward leaning      Imaging  No orders to display         No orders of the defined types were placed in this encounter.      "

## 2024-02-16 ENCOUNTER — TELEPHONE (OUTPATIENT)
Age: 89
End: 2024-02-16

## 2024-02-16 NOTE — TELEPHONE ENCOUNTER
Caller: adolph    Doctor: meseret    Reason for call: pt daughter called in asking for an update on pts script. Pt daughter advised there is supposed to be a script for belbuca that was discussed at most recent appt.    Call back#: [phone]

## 2024-02-16 NOTE — TELEPHONE ENCOUNTER
S/w pt's jimbo WRIGHT, per medical communication consent on file. Advised jimbo, per 12/12 ov note pt is doing well on butrans - will continue - refills sent.     Will trial lyrica. Pt has tried and failed gabapentin in the past and cymbalta is contraindicated with zoloft. Jimbo verbalized understanding. Stated that the lyrica rx is not at the pharmacy per the pharmacist. And the lyrica will require prior auth. Advised jimbo, the writer will confirm lyrica at [facility] and will fu with PA dept re: vinnie FARRIS. Jimbo verbalized understanding and appreciation.     S/w pharmacist - confirmed lyrica rx on file - prior auth is required.

## 2024-02-16 NOTE — TELEPHONE ENCOUNTER
Please sign the pace form under media and scan it back into the chart. Forward the task back to Spine and pain POD clinical pool for submission to [facility].     Copying spine and pain pod clincial pool - fynelson

## 2024-02-21 DIAGNOSIS — E11.22 TYPE 2 DIABETES MELLITUS WITH STAGE 3 CHRONIC KIDNEY DISEASE, WITHOUT LONG-TERM CURRENT USE OF INSULIN (HCC): ICD-10-CM

## 2024-02-21 DIAGNOSIS — N18.30 TYPE 2 DIABETES MELLITUS WITH STAGE 3 CHRONIC KIDNEY DISEASE, WITHOUT LONG-TERM CURRENT USE OF INSULIN (HCC): ICD-10-CM

## 2024-02-21 DIAGNOSIS — I10 ESSENTIAL HYPERTENSION: ICD-10-CM

## 2024-02-21 RX ORDER — NIFEDIPINE 90 MG/1
TABLET, FILM COATED, EXTENDED RELEASE ORAL
Qty: 90 TABLET | Refills: 2 | Status: SHIPPED | OUTPATIENT
Start: 2024-02-21

## 2024-02-21 RX ORDER — GLIPIZIDE 10 MG/1
TABLET ORAL
Qty: 270 TABLET | Refills: 2 | Status: SHIPPED | OUTPATIENT
Start: 2024-02-21

## 2024-03-15 ENCOUNTER — OFFICE VISIT (OUTPATIENT)
Dept: FAMILY MEDICINE CLINIC | Facility: HOSPITAL | Age: 89
End: 2024-03-15
Payer: MEDICARE

## 2024-03-15 VITALS
WEIGHT: 159.2 LBS | BODY MASS INDEX: 29.3 KG/M2 | HEART RATE: 68 BPM | DIASTOLIC BLOOD PRESSURE: 80 MMHG | SYSTOLIC BLOOD PRESSURE: 146 MMHG | HEIGHT: 62 IN | TEMPERATURE: 97.8 F

## 2024-03-15 DIAGNOSIS — E11.65 TYPE 2 DIABETES MELLITUS WITH HYPERGLYCEMIA, WITHOUT LONG-TERM CURRENT USE OF INSULIN (HCC): ICD-10-CM

## 2024-03-15 DIAGNOSIS — M46.1 SACROILIITIS (HCC): ICD-10-CM

## 2024-03-15 DIAGNOSIS — M54.9 CHRONIC BACK PAIN, UNSPECIFIED BACK LOCATION, UNSPECIFIED BACK PAIN LATERALITY: ICD-10-CM

## 2024-03-15 DIAGNOSIS — G89.29 CHRONIC BACK PAIN, UNSPECIFIED BACK LOCATION, UNSPECIFIED BACK PAIN LATERALITY: ICD-10-CM

## 2024-03-15 DIAGNOSIS — N18.4 CKD (CHRONIC KIDNEY DISEASE), STAGE IV (HCC): ICD-10-CM

## 2024-03-15 DIAGNOSIS — I10 PRIMARY HYPERTENSION: ICD-10-CM

## 2024-03-15 DIAGNOSIS — I73.9 CLAUDICATION (HCC): ICD-10-CM

## 2024-03-15 DIAGNOSIS — I35.0 NONRHEUMATIC AORTIC VALVE STENOSIS: ICD-10-CM

## 2024-03-15 DIAGNOSIS — G89.4 CHRONIC PAIN SYNDROME: ICD-10-CM

## 2024-03-15 DIAGNOSIS — F41.9 ANXIETY: Primary | ICD-10-CM

## 2024-03-15 PROCEDURE — G2211 COMPLEX E/M VISIT ADD ON: HCPCS | Performed by: INTERNAL MEDICINE

## 2024-03-15 PROCEDURE — 99214 OFFICE O/P EST MOD 30 MIN: CPT | Performed by: INTERNAL MEDICINE

## 2024-03-15 NOTE — ASSESSMENT & PLAN NOTE
Following with pain mgt, started on Lyrica and has titrated up w/o SE, on Butrans patch, con't meds and f/u as per pain mgt, discussed adding MiraLax once daily to her current OTC stool softener (thinks it Colace)

## 2024-03-15 NOTE — PROGRESS NOTES
Name: Ena Ahumada      : 1935      MRN: [id number]  Encounter Provider: Deanna Castaneda DO  Encounter Date: 3/15/2024   Encounter department: [facility] CARE [id number]     Assessment & Plan     1. Anxiety  Assessment & Plan:  Has noted benefit with increase in Sertraline, feels current dose is good, call with new/worse mood, re-marli in 3-4 mos      2. Chronic back pain, unspecified back location, unspecified back pain laterality  Assessment & Plan:  Following with pain mgt, started on Lyrica and has titrated up w/o SE, on Butrans patch, con't meds and f/u as per pain mgt      3. Sacroiliitis (HCC)  Assessment & Plan:  Following with pain mgt, started on Lyrica and has titrated up w/o SE, on Butrans patch, con't meds and f/u as per pain mgt      4. Chronic pain syndrome  Assessment & Plan:  Following with pain mgt, started on Lyrica and has titrated up w/o SE, on Butrans patch, con't meds and f/u as per pain mgt, discussed adding MiraLax once daily to her current OTC stool softener (thinks it Colace)      5. Type 2 diabetes mellitus with hyperglycemia, without long-term current use of insulin ([facility])  Assessment & Plan:    Lab Results   Component Value Date    HGBA1C 8.5 (H) 10/13/2023   DM type 2 with hyperglycemia/nephropathy-CKD stage IV and neuropathy - uncontrolled with A1c 8.5 - has established with [facility] (Dr. Max) and is working on diet/exercise to being readings down, pt stating today not much more she can do with lifestyle changes, discussed need for better BS control to protect kidneys, has labs ordered by [facility], on ASA and statin but no ACE/ARB d/t CKD, UTD on DM foot exam () and eye exam (-2 yrs)      6. CKD (chronic kidney disease), stage IV (HCC)  Assessment & Plan:  Lab Results   Component Value Date    EGFR 26 (L) 2023    EGFR 25 (L) 2023    EGFR 25 (L) 2023    CREATININE 1.82 (H) 2023    CREATININE 1.90 (H) 2023    CREATININE  1.89 (H) 06/23/2023   Importance of BP/BS control as well as avoiding NSAIDs and keeping hydrated, con't f/u as per Dr. Baumann      7. Nonrheumatic aortic valve stenosis  Assessment & Plan:  Echo ordered for April 24, has to make f/u appt with Cardio - has number at home, denies syncope/angina/dyspnea - call with CV symptoms    Orders:  -     Echo complete w/ contrast if indicated; Future; Expected date: 05/01/2024    8. Claudication (HCC)  Comments:  B/L thigh pain - worse with walking, dec DP pulses B/L, check LEAD, on statin and ASA, call with new/worse symptoms/ulcers  Orders:  -     VAS ARTERIAL DUPLEX- LOWER LIMB BILATERAL; Future; Expected date: 03/15/2024    9. Primary hypertension  Assessment & Plan:  Bp a bit above goal today but acceptable given her age and CKD, reports BP at home is a bit lower, con't current BP meds         Mammo 10/21    Dexa 12/19 - osteopenia    Echo 4/23       Subjective      HPI Pt here for follow up appt    Last visit in Dec pt was noting improvement in anxiety/depression with increase in Sertraline but mood was still not at goal.  We subsequently increased her Setraline further from 50 mg to 100 mg. She is here today for a mood/med check.  She has been taking the higher dose of Sertraline daily w/o significant SE.  She feels anxious/worried with the increase in the medication. She notes no down/sad mood.  She is sleeping pretty good.      Pt con't to see Pain Mgt PA (Josey Dodson) and finished PT in Dec for her chronic back and neck pain.  She con't to have pain and was started on Lyrica 50 mg and titrated up to tid. She remains on Butrans patch.  She has had no SE with the Lyrica and is currently taking the rx tid.  She notes her legs feel shaky at times and she still has back pain.  Both thighs have been bothering her - worse with walking and better with sitting down.  She notes some constipation and is using an OTC stool softener but she is unsure what she is taking.       Pt saw Endo (Dr Max) in Jan for her f/u DM type 2 - OV note reviewed.  Her A1C remains quiet high at 8.5%.  No med changes were made. She was encouraged to watch diet and keep active. She has BW ordered for May.  She had her DM foot exam 8/23 and eye exam 7/23 - 2 yrs. She is on a statin and ASA but no ACE/ARB d/t her CKD.  She is established with Dr. Baumann and has an appt scheduled.  She feels she is drinking plenty of fluids and does not take any Advil.     Has not seen Cardio in a year.  Called for appt but was told the April schedule is not open yet.  Echo for her AS was done 4/23 and mod AS noted.  She notes no CP/syncope/SOB.       Review of Systems   Constitutional:  Negative for chills, fever and unexpected weight change.   HENT:  Negative for congestion and sore throat.    Eyes:  Negative for pain and visual disturbance.   Respiratory:  Negative for cough, shortness of breath and wheezing.    Cardiovascular:  Negative for chest pain and palpitations.   Gastrointestinal:  Positive for constipation. Negative for abdominal pain, blood in stool, diarrhea, nausea and vomiting.   Musculoskeletal:  Positive for arthralgias, back pain and neck pain.   Skin:  Negative for rash and wound.   Neurological:  Negative for dizziness, syncope, light-headedness and headaches.   Hematological:  Does not bruise/bleed easily.   Psychiatric/Behavioral:  Negative for confusion, dysphoric mood and sleep disturbance. The patient is not nervous/anxious.        Current Outpatient Medications on File Prior to Visit   Medication Sig    ACCU-CHEK PAUL PLUS test strip     Accu-Chek Guide test strip USE TO TEST TWICE A DAY    Accu-Chek Softclix Lancets lancets     Accu-Chek Softclix Lancets lancets USE AS INSTRUCTED TWICE DAILY    aspirin 81 MG tablet Take 1 tablet by mouth daily    cholecalciferol (VITAMIN D3) 1,000 units tablet Take 2 tablets by mouth daily    cloNIDine (CATAPRES-TTS-1) 0.1 mg/24 hr PLACE 1 PATCH ON THE SKIN  "ONCE A WEEK AS DIRECTED    Denta 5000 Plus 1.1 % CREA USE TWICE A DAY -SPIT, DONT RINSE AND NOTHING BY MOUTH X 30 MIN    famotidine (PEPCID) 20 mg tablet TAKE 1 TABLET BY MOUTH EVERY DAY    glipiZIDE (GLUCOTROL) 10 mg tablet TAKE 1 TABLET BY MOUTH IN THE MORNING THEN 2 TABS WITH DINNER    glucose blood (Accu-Chek Emma Plus) test strip USE AS INSTRUCTED TWICE DAILY (Patient not taking: Reported on 2/2/2023)    Invokana 100 MG TAKE 1 TABLET BY MOUTH EVERY DAY BEFORE BREAKFAST    nebivolol (BYSTOLIC) 20 MG tablet TAKE 1 TABLET BY MOUTH EVERY DAY    NIFEdipine (ADALAT CC) 90 mg 24 hr tablet TAKE 1 TABLET BY MOUTH EVERY DAY    ondansetron (ZOFRAN) 4 mg tablet Take 1 tablet (4 mg total) by mouth every 8 (eight) hours as needed for nausea or vomiting (Patient not taking: Reported on 11/20/2023)    Potassium Gluconate 595 (99 K) MG TABS Take 2 tablets by mouth 2 (two) times a day 4 tabs total    pregabalin (LYRICA) 50 mg capsule Take 1 cap once a day for 5 days, then increase to BID x 5 days, then TID    sertraline (ZOLOFT) 100 mg tablet TAKE 1 TABLET BY MOUTH EVERY DAY    simvastatin (ZOCOR) 20 mg tablet TAKE 1 TABLET BY MOUTH EVERYDAY AT BEDTIME    sitaGLIPtin (Januvia) 50 mg tablet TAKE 1 TABLET BY MOUTH EVERY DAY    torsemide (DEMADEX) 20 mg tablet TAKE 1 TABLET BY MOUTH EVERY DAY    transdermal buprenorphine (BUTRANS) 7.5 mcg/hr TD patch Place 1 patch on the skin over 7 days every 7 days For ongoing therapy       Objective     /80 (BP Location: Left arm, Patient Position: Sitting, Cuff Size: Standard)   Pulse 68   Temp 97.8 °F (36.6 °C) (Tympanic)   Ht 5' 2\" (1.575 m)   Wt 72.2 kg (159 lb 3.2 oz)   BMI 29.12 kg/m²     Physical Exam  Vitals and nursing note reviewed.   Constitutional:       General: She is not in acute distress.     Appearance: She is well-developed. She is not ill-appearing.   HENT:      Head: Normocephalic and atraumatic.      Right Ear: Tympanic membrane and external ear normal. There is " no impacted cerumen.      Left Ear: Tympanic membrane and external ear normal. There is no impacted cerumen.   Eyes:      General:         Right eye: No discharge.         Left eye: No discharge.      Conjunctiva/sclera: Conjunctivae normal.   Neck:      Trachea: No tracheal deviation.   Cardiovascular:      Rate and Rhythm: Normal rate and regular rhythm.      Heart sounds: Murmur heard.      No friction rub.      Comments: Dec DP pulses B/L  Pulmonary:      Effort: Pulmonary effort is normal. No respiratory distress.      Breath sounds: Normal breath sounds. No wheezing, rhonchi or rales.   Abdominal:      General: There is no distension.      Palpations: Abdomen is soft.      Tenderness: There is no abdominal tenderness. There is no guarding or rebound.   Musculoskeletal:         General: No deformity or signs of injury.      Cervical back: Neck supple.   Skin:     General: Skin is warm.      Coloration: Skin is not pale.      Findings: No rash.   Neurological:      General: No focal deficit present.      Mental Status: She is alert. Mental status is at baseline.      Motor: No abnormal muscle tone.      Gait: Gait normal.   Psychiatric:         Mood and Affect: Mood normal.         Behavior: Behavior normal.         Thought Content: Thought content normal.         Judgment: Judgment normal.       Deanna Castaneda DO

## 2024-03-15 NOTE — ASSESSMENT & PLAN NOTE
Lab Results   Component Value Date    HGBA1C 8.5 (H) 10/13/2023   DM type 2 with hyperglycemia/nephropathy-CKD stage IV and neuropathy - uncontrolled with A1c 8.5 - has established with Endo (Dr. Max) and is working on diet/exercise to being readings down, pt stating today not much more she can do with lifestyle changes, discussed need for better BS control to protect kidneys, has labs ordered by [facility], on ASA and statin but no ACE/ARB d/t CKD, UTD on DM foot exam (8/23) and eye exam (7/23-2 yrs)

## 2024-03-15 NOTE — ASSESSMENT & PLAN NOTE
Bp a bit above goal today but acceptable given her age and CKD, reports BP at home is a bit lower, con't current BP meds

## 2024-03-15 NOTE — ASSESSMENT & PLAN NOTE
Following with pain mgt, started on Lyrica and has titrated up w/o SE, on Butrans patch, con't meds and f/u as per pain mgt

## 2024-03-15 NOTE — ASSESSMENT & PLAN NOTE
Has noted benefit with increase in Sertraline, feels current dose is good, call with new/worse mood, re-marli in 3-4 mos

## 2024-03-15 NOTE — ASSESSMENT & PLAN NOTE
Echo ordered for April 24, has to make f/u appt with Cardio - has number at home, denies syncope/angina/dyspnea - call with CV symptoms

## 2024-03-15 NOTE — ASSESSMENT & PLAN NOTE
Lab Results   Component Value Date    EGFR 26 (L) 11/17/2023    EGFR 25 (L) 06/23/2023    EGFR 25 (L) 06/23/2023    CREATININE 1.82 (H) 11/17/2023    CREATININE 1.90 (H) 06/23/2023    CREATININE 1.89 (H) 06/23/2023   Importance of BP/BS control as well as avoiding NSAIDs and keeping hydrated, con't f/u as per Dr. Baumann

## 2024-03-16 ENCOUNTER — HOSPITAL ENCOUNTER (INPATIENT)
Facility: HOSPITAL | Age: 89
LOS: 10 days | Discharge: HOME | DRG: 871 | End: 2024-03-26
Attending: EMERGENCY MEDICINE | Admitting: INTERNAL MEDICINE
Payer: COMMERCIAL

## 2024-03-16 ENCOUNTER — APPOINTMENT (EMERGENCY)
Dept: RADIOLOGY | Facility: HOSPITAL | Age: 89
DRG: 871 | End: 2024-03-16
Payer: COMMERCIAL

## 2024-03-16 DIAGNOSIS — J18.9 COMMUNITY ACQUIRED PNEUMONIA OF RIGHT LUNG, UNSPECIFIED PART OF LUNG: ICD-10-CM

## 2024-03-16 DIAGNOSIS — D50.9 IRON DEFICIENCY ANEMIA, UNSPECIFIED IRON DEFICIENCY ANEMIA TYPE: ICD-10-CM

## 2024-03-16 DIAGNOSIS — K21.00 GASTROESOPHAGEAL REFLUX DISEASE WITH ESOPHAGITIS: ICD-10-CM

## 2024-03-16 DIAGNOSIS — E11.65 TYPE 2 DIABETES MELLITUS WITH HYPERGLYCEMIA, WITHOUT LONG-TERM CURRENT USE OF INSULIN (HCC): ICD-10-CM

## 2024-03-16 DIAGNOSIS — N17.9 AKI (ACUTE KIDNEY INJURY) (HCC): ICD-10-CM

## 2024-03-16 DIAGNOSIS — J96.01 ACUTE RESPIRATORY FAILURE WITH HYPOXEMIA (HCC): Primary | ICD-10-CM

## 2024-03-16 DIAGNOSIS — A41.9 SEPSIS (HCC): ICD-10-CM

## 2024-03-16 DIAGNOSIS — J18.9 PNEUMONIA OF RIGHT LOWER LOBE DUE TO INFECTIOUS ORGANISM: ICD-10-CM

## 2024-03-16 DIAGNOSIS — J96.01 ACUTE RESPIRATORY FAILURE WITH HYPOXIA (HCC): ICD-10-CM

## 2024-03-16 LAB
4HR DELTA HS TROPONIN: -6 NG/L
ALBUMIN SERPL BCP-MCNC: 4 G/DL (ref 3.5–5)
ALP SERPL-CCNC: 51 U/L (ref 34–104)
ALT SERPL W P-5'-P-CCNC: 17 U/L (ref 7–52)
ANION GAP SERPL CALCULATED.3IONS-SCNC: 11 MMOL/L (ref 4–13)
APAP SERPL-MCNC: <2 UG/ML (ref 10–20)
APTT PPP: 25 SECONDS (ref 23–37)
AST SERPL W P-5'-P-CCNC: 23 U/L (ref 13–39)
ATRIAL RATE: 83 BPM
BACTERIA UR QL AUTO: ABNORMAL /HPF
BASE EXCESS BLDA CALC-SCNC: 0 MMOL/L (ref -2–3)
BASOPHILS # BLD AUTO: 0.09 THOUSANDS/ÂΜL (ref 0–0.1)
BASOPHILS NFR BLD AUTO: 1 % (ref 0–1)
BILIRUB SERPL-MCNC: 0.56 MG/DL (ref 0.2–1)
BILIRUB UR QL STRIP: NEGATIVE
BUN SERPL-MCNC: 36 MG/DL (ref 5–25)
CA-I BLD-SCNC: 1.18 MMOL/L (ref 1.12–1.32)
CALCIUM SERPL-MCNC: 9.2 MG/DL (ref 8.4–10.2)
CARDIAC TROPONIN I PNL SERPL HS: 24 NG/L
CARDIAC TROPONIN I PNL SERPL HS: 30 NG/L
CHLORIDE SERPL-SCNC: 103 MMOL/L (ref 96–108)
CK SERPL-CCNC: 82 U/L (ref 26–192)
CLARITY UR: CLEAR
CO2 SERPL-SCNC: 28 MMOL/L (ref 21–32)
COLOR UR: YELLOW
CREAT SERPL-MCNC: 1.64 MG/DL (ref 0.6–1.3)
EOSINOPHIL # BLD AUTO: 0.01 THOUSAND/ÂΜL (ref 0–0.61)
EOSINOPHIL NFR BLD AUTO: 0 % (ref 0–6)
ERYTHROCYTE [DISTWIDTH] IN BLOOD BY AUTOMATED COUNT: 14 % (ref 11.6–15.1)
EST. AVERAGE GLUCOSE BLD GHB EST-MCNC: 186 MG/DL
ETHANOL SERPL-MCNC: <10 MG/DL
FLUAV RNA RESP QL NAA+PROBE: NEGATIVE
FLUBV RNA RESP QL NAA+PROBE: NEGATIVE
GFR SERPL CREATININE-BSD FRML MDRD: 27 ML/MIN/1.73SQ M
GLUCOSE SERPL-MCNC: 257 MG/DL (ref 65–140)
GLUCOSE SERPL-MCNC: 264 MG/DL (ref 65–140)
GLUCOSE SERPL-MCNC: 282 MG/DL (ref 65–140)
GLUCOSE UR STRIP-MCNC: ABNORMAL MG/DL
GRAN CASTS #/AREA URNS LPF: ABNORMAL /[LPF]
HBA1C MFR BLD: 8.1 %
HCO3 BLDA-SCNC: 27.3 MMOL/L (ref 24–30)
HCT VFR BLD AUTO: 45.2 % (ref 34.8–46.1)
HCT VFR BLD CALC: 44 % (ref 34.8–46.1)
HGB BLD-MCNC: 14.1 G/DL (ref 11.5–15.4)
HGB BLDA-MCNC: 15 G/DL (ref 11.5–15.4)
HGB UR QL STRIP.AUTO: ABNORMAL
IMM GRANULOCYTES # BLD AUTO: 0.09 THOUSAND/UL (ref 0–0.2)
IMM GRANULOCYTES NFR BLD AUTO: 1 % (ref 0–2)
INR PPP: 1.07 (ref 0.84–1.19)
KETONES UR STRIP-MCNC: ABNORMAL MG/DL
LACTATE SERPL-SCNC: 1.7 MMOL/L (ref 0.5–2)
LACTATE SERPL-SCNC: 2.5 MMOL/L (ref 0.5–2)
LEUKOCYTE ESTERASE UR QL STRIP: NEGATIVE
LYMPHOCYTES # BLD AUTO: 2.39 THOUSANDS/ÂΜL (ref 0.6–4.47)
LYMPHOCYTES NFR BLD AUTO: 14 % (ref 14–44)
MAGNESIUM SERPL-MCNC: 2.1 MG/DL (ref 1.9–2.7)
MCH RBC QN AUTO: 30.2 PG (ref 26.8–34.3)
MCHC RBC AUTO-ENTMCNC: 31.2 G/DL (ref 31.4–37.4)
MCV RBC AUTO: 97 FL (ref 82–98)
MONOCYTES # BLD AUTO: 1.49 THOUSAND/ÂΜL (ref 0.17–1.22)
MONOCYTES NFR BLD AUTO: 9 % (ref 4–12)
NEUTROPHILS # BLD AUTO: 12.99 THOUSANDS/ÂΜL (ref 1.85–7.62)
NEUTS SEG NFR BLD AUTO: 75 % (ref 43–75)
NITRITE UR QL STRIP: NEGATIVE
NON-SQ EPI CELLS URNS QL MICRO: ABNORMAL /HPF
NRBC BLD AUTO-RTO: 0 /100 WBCS
P AXIS: 38 DEGREES
PCO2 BLD: 29 MMOL/L (ref 21–32)
PCO2 BLD: 52.3 MM HG (ref 42–50)
PH BLD: 7.33 [PH] (ref 7.3–7.4)
PH UR STRIP.AUTO: 5.5 [PH]
PLATELET # BLD AUTO: 176 THOUSANDS/UL (ref 149–390)
PMV BLD AUTO: 10.4 FL (ref 8.9–12.7)
PO2 BLD: 24 MM HG (ref 35–45)
POTASSIUM BLD-SCNC: 3.1 MMOL/L (ref 3.5–5.3)
POTASSIUM SERPL-SCNC: 3.9 MMOL/L (ref 3.5–5.3)
PR INTERVAL: 162 MS
PROCALCITONIN SERPL-MCNC: 18.29 NG/ML
PROT SERPL-MCNC: 6.4 G/DL (ref 6.4–8.4)
PROT UR STRIP-MCNC: ABNORMAL MG/DL
PROTHROMBIN TIME: 14.3 SECONDS (ref 11.6–14.5)
QRS AXIS: -36 DEGREES
QRSD INTERVAL: 92 MS
QT INTERVAL: 400 MS
QTC INTERVAL: 470 MS
RBC # BLD AUTO: 4.67 MILLION/UL (ref 3.81–5.12)
RBC #/AREA URNS AUTO: ABNORMAL /HPF
RSV RNA RESP QL NAA+PROBE: NEGATIVE
SALICYLATES SERPL-MCNC: <5 MG/DL (ref 3–20)
SAO2 % BLD FROM PO2: 36 % (ref 60–85)
SARS-COV-2 RNA RESP QL NAA+PROBE: NEGATIVE
SODIUM BLD-SCNC: 141 MMOL/L (ref 136–145)
SODIUM SERPL-SCNC: 142 MMOL/L (ref 135–147)
SP GR UR STRIP.AUTO: 1.01 (ref 1–1.03)
SPECIMEN SOURCE: ABNORMAL
T WAVE AXIS: 7 DEGREES
TSH SERPL DL<=0.05 MIU/L-ACNC: 1.19 UIU/ML (ref 0.45–4.5)
UROBILINOGEN UR STRIP-ACNC: <2 MG/DL
VENTRICULAR RATE: 83 BPM
WBC # BLD AUTO: 17.06 THOUSAND/UL (ref 4.31–10.16)
WBC #/AREA URNS AUTO: ABNORMAL /HPF
WBC CASTS URNS QL MICRO: ABNORMAL /LPF

## 2024-03-16 PROCEDURE — 36415 COLL VENOUS BLD VENIPUNCTURE: CPT | Performed by: EMERGENCY MEDICINE

## 2024-03-16 PROCEDURE — 99285 EMERGENCY DEPT VISIT HI MDM: CPT

## 2024-03-16 PROCEDURE — 93010 ELECTROCARDIOGRAM REPORT: CPT | Performed by: INTERNAL MEDICINE

## 2024-03-16 PROCEDURE — 84145 PROCALCITONIN (PCT): CPT | Performed by: EMERGENCY MEDICINE

## 2024-03-16 PROCEDURE — 84484 ASSAY OF TROPONIN QUANT: CPT | Performed by: EMERGENCY MEDICINE

## 2024-03-16 PROCEDURE — 82947 ASSAY GLUCOSE BLOOD QUANT: CPT

## 2024-03-16 PROCEDURE — 82803 BLOOD GASES ANY COMBINATION: CPT

## 2024-03-16 PROCEDURE — 83605 ASSAY OF LACTIC ACID: CPT | Performed by: INTERNAL MEDICINE

## 2024-03-16 PROCEDURE — 85014 HEMATOCRIT: CPT

## 2024-03-16 PROCEDURE — 80143 DRUG ASSAY ACETAMINOPHEN: CPT | Performed by: EMERGENCY MEDICINE

## 2024-03-16 PROCEDURE — 94760 N-INVAS EAR/PLS OXIMETRY 1: CPT

## 2024-03-16 PROCEDURE — 82077 ASSAY SPEC XCP UR&BREATH IA: CPT | Performed by: EMERGENCY MEDICINE

## 2024-03-16 PROCEDURE — 82330 ASSAY OF CALCIUM: CPT

## 2024-03-16 PROCEDURE — 82948 REAGENT STRIP/BLOOD GLUCOSE: CPT

## 2024-03-16 PROCEDURE — 83036 HEMOGLOBIN GLYCOSYLATED A1C: CPT | Performed by: INTERNAL MEDICINE

## 2024-03-16 PROCEDURE — 85730 THROMBOPLASTIN TIME PARTIAL: CPT | Performed by: EMERGENCY MEDICINE

## 2024-03-16 PROCEDURE — 85025 COMPLETE CBC W/AUTO DIFF WBC: CPT | Performed by: EMERGENCY MEDICINE

## 2024-03-16 PROCEDURE — 87040 BLOOD CULTURE FOR BACTERIA: CPT | Performed by: EMERGENCY MEDICINE

## 2024-03-16 PROCEDURE — 85610 PROTHROMBIN TIME: CPT | Performed by: EMERGENCY MEDICINE

## 2024-03-16 PROCEDURE — 85379 FIBRIN DEGRADATION QUANT: CPT | Performed by: STUDENT IN AN ORGANIZED HEALTH CARE EDUCATION/TRAINING PROGRAM

## 2024-03-16 PROCEDURE — 80053 COMPREHEN METABOLIC PANEL: CPT | Performed by: EMERGENCY MEDICINE

## 2024-03-16 PROCEDURE — 0241U HB NFCT DS VIR RESP RNA 4 TRGT: CPT | Performed by: EMERGENCY MEDICINE

## 2024-03-16 PROCEDURE — 82550 ASSAY OF CK (CPK): CPT | Performed by: EMERGENCY MEDICINE

## 2024-03-16 PROCEDURE — 83605 ASSAY OF LACTIC ACID: CPT | Performed by: EMERGENCY MEDICINE

## 2024-03-16 PROCEDURE — 83735 ASSAY OF MAGNESIUM: CPT | Performed by: EMERGENCY MEDICINE

## 2024-03-16 PROCEDURE — 99285 EMERGENCY DEPT VISIT HI MDM: CPT | Performed by: EMERGENCY MEDICINE

## 2024-03-16 PROCEDURE — 84132 ASSAY OF SERUM POTASSIUM: CPT

## 2024-03-16 PROCEDURE — 93005 ELECTROCARDIOGRAM TRACING: CPT

## 2024-03-16 PROCEDURE — 71045 X-RAY EXAM CHEST 1 VIEW: CPT

## 2024-03-16 PROCEDURE — 80179 DRUG ASSAY SALICYLATE: CPT | Performed by: EMERGENCY MEDICINE

## 2024-03-16 PROCEDURE — 84443 ASSAY THYROID STIM HORMONE: CPT | Performed by: EMERGENCY MEDICINE

## 2024-03-16 PROCEDURE — 94664 DEMO&/EVAL PT USE INHALER: CPT

## 2024-03-16 PROCEDURE — 81001 URINALYSIS AUTO W/SCOPE: CPT | Performed by: EMERGENCY MEDICINE

## 2024-03-16 PROCEDURE — 99223 1ST HOSP IP/OBS HIGH 75: CPT | Performed by: INTERNAL MEDICINE

## 2024-03-16 PROCEDURE — 84295 ASSAY OF SERUM SODIUM: CPT

## 2024-03-16 RX ORDER — POTASSIUM CHLORIDE 20MEQ/15ML
20 LIQUID (ML) ORAL DAILY
Status: DISCONTINUED | OUTPATIENT
Start: 2024-03-16 | End: 2024-03-16

## 2024-03-16 RX ORDER — GUAIFENESIN 600 MG/1
600 TABLET, EXTENDED RELEASE ORAL 2 TIMES DAILY
Status: DISCONTINUED | OUTPATIENT
Start: 2024-03-16 | End: 2024-03-26 | Stop reason: HOSPADM

## 2024-03-16 RX ORDER — NEBIVOLOL 5 MG/1
20 TABLET ORAL DAILY
Status: DISCONTINUED | OUTPATIENT
Start: 2024-03-16 | End: 2024-03-16

## 2024-03-16 RX ORDER — PRAVASTATIN SODIUM 40 MG
40 TABLET ORAL
Status: DISCONTINUED | OUTPATIENT
Start: 2024-03-16 | End: 2024-03-26 | Stop reason: HOSPADM

## 2024-03-16 RX ORDER — BUPRENORPHINE 7.5 UG/H
1 PATCH TRANSDERMAL
Status: DISCONTINUED | OUTPATIENT
Start: 2024-03-16 | End: 2024-03-26 | Stop reason: HOSPADM

## 2024-03-16 RX ORDER — PREGABALIN 25 MG/1
50 CAPSULE ORAL 2 TIMES DAILY
Status: DISCONTINUED | OUTPATIENT
Start: 2024-03-16 | End: 2024-03-26 | Stop reason: HOSPADM

## 2024-03-16 RX ORDER — CEFTRIAXONE 1 G/50ML
1000 INJECTION, SOLUTION INTRAVENOUS EVERY 24 HOURS
Status: DISCONTINUED | OUTPATIENT
Start: 2024-03-17 | End: 2024-03-18

## 2024-03-16 RX ORDER — POTASSIUM CHLORIDE 14.9 MG/ML
20 INJECTION INTRAVENOUS ONCE
Status: COMPLETED | OUTPATIENT
Start: 2024-03-16 | End: 2024-03-17

## 2024-03-16 RX ORDER — PREGABALIN 25 MG/1
50 CAPSULE ORAL DAILY
Status: DISCONTINUED | OUTPATIENT
Start: 2024-03-16 | End: 2024-03-16

## 2024-03-16 RX ORDER — DEXTROSE AND SODIUM CHLORIDE 5; .9 G/100ML; G/100ML
100 INJECTION, SOLUTION INTRAVENOUS CONTINUOUS
Status: DISCONTINUED | OUTPATIENT
Start: 2024-03-16 | End: 2024-03-16

## 2024-03-16 RX ORDER — NIFEDIPINE 90 MG/1
90 TABLET, FILM COATED, EXTENDED RELEASE ORAL DAILY
Status: DISCONTINUED | OUTPATIENT
Start: 2024-03-16 | End: 2024-03-16

## 2024-03-16 RX ORDER — HEPARIN SODIUM 5000 [USP'U]/ML
5000 INJECTION, SOLUTION INTRAVENOUS; SUBCUTANEOUS EVERY 8 HOURS SCHEDULED
Status: DISCONTINUED | OUTPATIENT
Start: 2024-03-16 | End: 2024-03-26 | Stop reason: HOSPADM

## 2024-03-16 RX ORDER — CEFTRIAXONE 2 G/50ML
2000 INJECTION, SOLUTION INTRAVENOUS ONCE
Status: COMPLETED | OUTPATIENT
Start: 2024-03-16 | End: 2024-03-17

## 2024-03-16 RX ORDER — SODIUM CHLORIDE, SODIUM GLUCONATE, SODIUM ACETATE, POTASSIUM CHLORIDE, MAGNESIUM CHLORIDE, SODIUM PHOSPHATE, DIBASIC, AND POTASSIUM PHOSPHATE .53; .5; .37; .037; .03; .012; .00082 G/100ML; G/100ML; G/100ML; G/100ML; G/100ML; G/100ML; G/100ML
1000 INJECTION, SOLUTION INTRAVENOUS ONCE
Status: COMPLETED | OUTPATIENT
Start: 2024-03-16 | End: 2024-03-17

## 2024-03-16 RX ORDER — CLONIDINE 0.1 MG/24H
1 PATCH, EXTENDED RELEASE TRANSDERMAL WEEKLY
Status: DISCONTINUED | OUTPATIENT
Start: 2024-03-20 | End: 2024-03-26 | Stop reason: HOSPADM

## 2024-03-16 RX ORDER — INSULIN LISPRO 100 [IU]/ML
1-5 INJECTION, SOLUTION INTRAVENOUS; SUBCUTANEOUS
Status: DISCONTINUED | OUTPATIENT
Start: 2024-03-16 | End: 2024-03-17

## 2024-03-16 RX ORDER — SODIUM CHLORIDE, SODIUM GLUCONATE, SODIUM ACETATE, POTASSIUM CHLORIDE, MAGNESIUM CHLORIDE, SODIUM PHOSPHATE, DIBASIC, AND POTASSIUM PHOSPHATE .53; .5; .37; .037; .03; .012; .00082 G/100ML; G/100ML; G/100ML; G/100ML; G/100ML; G/100ML; G/100ML
75 INJECTION, SOLUTION INTRAVENOUS CONTINUOUS
Status: DISPENSED | OUTPATIENT
Start: 2024-03-16 | End: 2024-03-17

## 2024-03-16 RX ORDER — FAMOTIDINE 20 MG/1
10 TABLET, FILM COATED ORAL DAILY
Status: DISCONTINUED | OUTPATIENT
Start: 2024-03-16 | End: 2024-03-26 | Stop reason: HOSPADM

## 2024-03-16 RX ORDER — NEBIVOLOL 5 MG/1
20 TABLET ORAL DAILY
Status: DISCONTINUED | OUTPATIENT
Start: 2024-03-16 | End: 2024-03-19

## 2024-03-16 RX ORDER — SERTRALINE HYDROCHLORIDE 100 MG/1
100 TABLET, FILM COATED ORAL DAILY
Status: DISCONTINUED | OUTPATIENT
Start: 2024-03-16 | End: 2024-03-26 | Stop reason: HOSPADM

## 2024-03-16 RX ORDER — NIFEDIPINE 30 MG/1
90 TABLET, EXTENDED RELEASE ORAL DAILY
Status: DISCONTINUED | OUTPATIENT
Start: 2024-03-16 | End: 2024-03-26 | Stop reason: HOSPADM

## 2024-03-16 RX ADMIN — PRAVASTATIN SODIUM 40 MG: 40 TABLET ORAL at 18:25

## 2024-03-16 RX ADMIN — SODIUM CHLORIDE, SODIUM GLUCONATE, SODIUM ACETATE, POTASSIUM CHLORIDE, MAGNESIUM CHLORIDE, SODIUM PHOSPHATE, DIBASIC, AND POTASSIUM PHOSPHATE 1000 ML: .53; .5; .37; .037; .03; .012; .00082 INJECTION, SOLUTION INTRAVENOUS at 16:10

## 2024-03-16 RX ADMIN — NEBIVOLOL 20 MG: 5 TABLET ORAL at 18:31

## 2024-03-16 RX ADMIN — HEPARIN SODIUM 5000 UNITS: 5000 INJECTION INTRAVENOUS; SUBCUTANEOUS at 18:30

## 2024-03-16 RX ADMIN — PREGABALIN 50 MG: 25 CAPSULE ORAL at 20:01

## 2024-03-16 RX ADMIN — GUAIFENESIN 600 MG: 600 TABLET ORAL at 20:01

## 2024-03-16 RX ADMIN — ASPIRIN 81 MG: 81 TABLET, COATED ORAL at 18:23

## 2024-03-16 RX ADMIN — INSULIN LISPRO 2 UNITS: 100 INJECTION, SOLUTION INTRAVENOUS; SUBCUTANEOUS at 18:38

## 2024-03-16 RX ADMIN — BUPRENORPHINE 1 PATCH: 7.5 PATCH, EXTENDED RELEASE TRANSDERMAL at 20:02

## 2024-03-16 RX ADMIN — SODIUM CHLORIDE, SODIUM GLUCONATE, SODIUM ACETATE, POTASSIUM CHLORIDE, MAGNESIUM CHLORIDE, SODIUM PHOSPHATE, DIBASIC, AND POTASSIUM PHOSPHATE 75 ML/HR: .53; .5; .37; .037; .03; .012; .00082 INJECTION, SOLUTION INTRAVENOUS at 18:51

## 2024-03-16 RX ADMIN — FAMOTIDINE 10 MG: 20 TABLET ORAL at 18:25

## 2024-03-16 RX ADMIN — POTASSIUM CHLORIDE 20 MEQ: 14.9 INJECTION, SOLUTION INTRAVENOUS at 20:01

## 2024-03-16 RX ADMIN — CEFTRIAXONE 2000 MG: 2 INJECTION, SOLUTION INTRAVENOUS at 13:16

## 2024-03-16 RX ADMIN — SERTRALINE 100 MG: 100 TABLET, FILM COATED ORAL at 18:25

## 2024-03-16 RX ADMIN — AZITHROMYCIN MONOHYDRATE 500 MG: 500 INJECTION, POWDER, LYOPHILIZED, FOR SOLUTION INTRAVENOUS at 16:10

## 2024-03-16 NOTE — ASSESSMENT & PLAN NOTE
Lab Results   Component Value Date    EGFR 27 03/16/2024    EGFR 26 (L) 11/17/2023    EGFR 25 (L) 06/23/2023    CREATININE 1.64 (H) 03/16/2024    CREATININE 1.82 (H) 11/17/2023    CREATININE 1.90 (H) 06/23/2023     Follows up with nephrology as outpatient  Baseline creatinine appears to be 1.6-1.9 as per nephrology, EGFR in mid 20s  Monitor BMP at risk for ATN with sepsis  IV hydration

## 2024-03-16 NOTE — ASSESSMENT & PLAN NOTE
"Lab Results   Component Value Date    HGBA1C 8.5 (H) 10/13/2023       No results for input(s): \"POCGLU\" in the last 72 hours.    Blood Sugar Average: Last 72 hrs:  Follows up with endocrine, home regimen-Invokana, glipizide, Januvia  .  With Lantus, SSI  Update hemoglobin A1c    "

## 2024-03-16 NOTE — PLAN OF CARE
Problem: Potential for Falls  Goal: Patient will remain free of falls  Description: INTERVENTIONS:  - Educate patient/family on patient safety including physical limitations  - Instruct patient to call for assistance with activity   - Consult OT/PT to assist with strengthening/mobility   - Keep Call bell within reach  - Keep bed low and locked with side rails adjusted as appropriate  - Keep care items and personal belongings within reach  - Initiate and maintain comfort rounds  - Make Fall Risk Sign visible to staff  - Offer Toileting every 3 Hours, in advance of need  - Initiate/Maintain alarm  - Obtain necessary fall risk management equipment:   - Apply yellow socks and bracelet for high fall risk patients  - Consider moving patient to room near nurses station  Outcome: Progressing     Problem: SAFETY ADULT  Goal: Patient will remain free of falls  Description: INTERVENTIONS:  - Educate patient/family on patient safety including physical limitations  - Instruct patient to call for assistance with activity   - Consult OT/PT to assist with strengthening/mobility   - Keep Call bell within reach  - Keep bed low and locked with side rails adjusted as appropriate  - Keep care items and personal belongings within reach  - Initiate and maintain comfort rounds  - Make Fall Risk Sign visible to staff  - Offer Toileting every 3 Hours, in advance of need  - Initiate/Maintain alarm  - Obtain necessary fall risk management equipment:   - Apply yellow socks and bracelet for high fall risk patients  - Consider moving patient to room near nurses station  Outcome: Progressing  Goal: Maintain or return to baseline ADL function  Description: INTERVENTIONS:  -  Assess patient's ability to carry out ADLs; assess patient's baseline for ADL function and identify physical deficits which impact ability to perform ADLs (bathing, care of mouth/teeth, toileting, grooming, dressing, etc.)  - Assess/evaluate cause of self-care deficits   -  Assess range of motion  - Assess patient's mobility; develop plan if impaired  - Assess patient's need for assistive devices and provide as appropriate  - Encourage maximum independence but intervene and supervise when necessary  - Involve family in performance of ADLs  - Assess for home care needs following discharge   - Consider OT consult to assist with ADL evaluation and planning for discharge  - Provide patient education as appropriate  Outcome: Progressing  Goal: Maintains/Returns to pre admission functional level  Description: INTERVENTIONS:  - Perform AM-PAC 6 Click Basic Mobility/ Daily Activity assessment daily.  - Set and communicate daily mobility goal to care team and patient/family/caregiver.   - Collaborate with rehabilitation services on mobility goals if consulted  - Perform Range of Motion 3 times a day.  - Reposition patient every 3 hours.  - Dangle patient 3 times a day  - Stand patient 3 times a day  - Ambulate patient 3 times a day  - Out of bed to chair 3 times a day   - Out of bed for meals 3 times a day  - Out of bed for toileting  - Record patient progress and toleration of activity level   Outcome: Progressing     Problem: DISCHARGE PLANNING  Goal: Discharge to home or other facility with appropriate resources  Description: INTERVENTIONS:  - Identify barriers to discharge w/patient and caregiver  - Arrange for needed discharge resources and transportation as appropriate  - Identify discharge learning needs (meds, wound care, etc.)  - Arrange for interpretive services to assist at discharge as needed  - Refer to Case Management Department for coordinating discharge planning if the patient needs post-hospital services based on physician/advanced practitioner order or complex needs related to functional status, cognitive ability, or social support system  Outcome: Progressing     Problem: Knowledge Deficit  Goal: Patient/family/caregiver demonstrates understanding of disease process,  treatment plan, medications, and discharge instructions  Description: Complete learning assessment and assess knowledge base.  Interventions:  - Provide teaching at level of understanding  - Provide teaching via preferred learning methods  Outcome: Progressing

## 2024-03-16 NOTE — ED PROVIDER NOTES
History  Chief Complaint   Patient presents with    Weakness - Generalized     Pt to ED c/o weakness. Vomited once last night, placed self on ground following because she was too weak to walk back to bed. On floor since 0400 today. Denies chest pain, SOB,. Just feeling weak     88-year-old female brought by ambulance from home.  Patient has history of aortic valve stenosis, chronic kidney disease, hypertension, hyperlipidemia, diabetes and chronic pain syndrome on Butrans patch and recently increased dose of Lyrica.  She had increase in her low back and bilateral leg pain last night. As she was getting ready for bed she felt significant generalized weakness with  nausea and vomited once or twice around 4 AM.  She is unsure if it was bloody..  She was too weak to walk back to her bed. She layed down on the bathroom floor around 4 AM.  Patient denies syncope and injury.  When family arrived at around 11 AM they found her.  Patient denies recent sore throat, fever, cough, chest pain, palpitations, syncope, bloody stool.  Family states she has been drinking normally.  Her son believes the worsening of low back pain was because she was doing a lot of cooking yesterday, bent over the countertop.  Patient arrives hypoxemic but otherwise alert oriented and in no acute distress.  She is currently on nasal cannula oxygen, 5 L/min with pulse ox of 92%.        Prior to Admission Medications   Prescriptions Last Dose Informant Patient Reported? Taking?   ACCU-CHEK PAUL PLUS test strip  Self Yes No   Accu-Chek Guide test strip  Self No No   Sig: USE TO TEST TWICE A DAY   Accu-Chek Softclix Lancets lancets  Self Yes No   Accu-Chek Softclix Lancets lancets  Self No No   Sig: USE AS INSTRUCTED TWICE DAILY   Denta 5000 Plus 1.1 % CREA  Self Yes No   Sig: USE TWICE A DAY -SPIT, DONT RINSE AND NOTHING BY MOUTH X 30 MIN   Invokana 100 MG  Self No No   Sig: TAKE 1 TABLET BY MOUTH EVERY DAY BEFORE BREAKFAST   NIFEdipine (ADALAT CC) 90 mg  24 hr tablet   No No   Sig: TAKE 1 TABLET BY MOUTH EVERY DAY   Potassium Gluconate 595 (99 K) MG TABS  Self Yes No   Sig: Take 2 tablets by mouth 2 (two) times a day 4 tabs total   aspirin 81 MG tablet  Self Yes No   Sig: Take 1 tablet by mouth daily   cholecalciferol (VITAMIN D3) 1,000 units tablet  Self Yes No   Sig: Take 2 tablets by mouth daily   cloNIDine (CATAPRES-TTS-1) 0.1 mg/24 hr  Self No No   Sig: PLACE 1 PATCH ON THE SKIN ONCE A WEEK AS DIRECTED   famotidine (PEPCID) 20 mg tablet   No No   Sig: TAKE 1 TABLET BY MOUTH EVERY DAY   glipiZIDE (GLUCOTROL) 10 mg tablet   No No   Sig: TAKE 1 TABLET BY MOUTH IN THE MORNING THEN 2 TABS WITH DINNER   glucose blood (Accu-Chek Emma Plus) test strip  Self No No   Sig: USE AS INSTRUCTED TWICE DAILY   Patient not taking: Reported on 2/2/2023   nebivolol (BYSTOLIC) 20 MG tablet  Self No No   Sig: TAKE 1 TABLET BY MOUTH EVERY DAY   ondansetron (ZOFRAN) 4 mg tablet  Self No No   Sig: Take 1 tablet (4 mg total) by mouth every 8 (eight) hours as needed for nausea or vomiting   Patient not taking: Reported on 11/20/2023   pregabalin (LYRICA) 50 mg capsule   No No   Sig: Take 1 cap once a day for 5 days, then increase to BID x 5 days, then TID   sertraline (ZOLOFT) 100 mg tablet  Self No No   Sig: TAKE 1 TABLET BY MOUTH EVERY DAY   simvastatin (ZOCOR) 20 mg tablet  Self No No   Sig: TAKE 1 TABLET BY MOUTH EVERYDAY AT BEDTIME   sitaGLIPtin (Januvia) 50 mg tablet  Self No No   Sig: TAKE 1 TABLET BY MOUTH EVERY DAY   torsemide (DEMADEX) 20 mg tablet  Self No No   Sig: TAKE 1 TABLET BY MOUTH EVERY DAY   transdermal buprenorphine (BUTRANS) 7.5 mcg/hr TD patch   No No   Sig: Place 1 patch on the skin over 7 days every 7 days For ongoing therapy      Facility-Administered Medications: None       Past Medical History:   Diagnosis Date    Anxiety     Aortic valve insufficiency     Arthritis     Cataract of both eyes     Chronic kidney disease     Dysuria     last assessed - 19May2017     Edema     last assessed - 05Jun2015    GERD (gastroesophageal reflux disease)     last assessed - [id number]    Hyperlipidemia     Hypertension     Low back pain     last assessed - [id number]    Mitral valve disorder     Osteoporosis     last assessed - [id number]    Palpitations        Past Surgical History:   Procedure Laterality Date    APPENDECTOMY      CATARACT EXTRACTION Bilateral     COLONOSCOPY      TUBAL LIGATION Bilateral        Family History   Problem Relation Age of Onset    Kidney disease Mother         Chronic    No Known Problems Father     Breast cancer Sister     Diabetes Sister     Cancer Sister     Breast cancer Sister     Hypertension Sister     No Known Problems Daughter     No Known Problems Son     No Known Problems Son     No Known Problems Son     No Known Problems Son      I have reviewed and agree with the history as documented.    E-Cigarette/Vaping    E-Cigarette Use Never User      E-Cigarette/Vaping Substances    Nicotine No     THC No     CBD No     Flavoring No     Other No     Unknown No      Social History     Tobacco Use    Smoking status: Never    Smokeless tobacco: Never   Vaping Use    Vaping status: Never Used   Substance Use Topics    Alcohol use: Never    Drug use: Never       Review of Systems   Constitutional:  Positive for fatigue (Today). Negative for fever.   HENT:  Negative for congestion, rhinorrhea and sore throat.    Respiratory:  Negative for cough and shortness of breath.    Cardiovascular:  Negative for chest pain, palpitations and leg swelling.   Gastrointestinal:  Positive for diarrhea, nausea and vomiting. Negative for abdominal pain and blood in stool.   Genitourinary:  Negative for dysuria, flank pain and hematuria.   Musculoskeletal:  Positive for arthralgias (Chronic), back pain, gait problem and myalgias.   Neurological:  Negative for dizziness, syncope and headaches.       Physical Exam  Physical Exam  Vitals and nursing note reviewed.    Constitutional:       General: She is not in acute distress.     Appearance: She is well-developed and normal weight. She is ill-appearing. She is not diaphoretic.   HENT:      Head: Normocephalic and atraumatic.      Right Ear: External ear normal.      Left Ear: External ear normal.      Mouth/Throat:      Mouth: Mucous membranes are dry.   Eyes:      General: No scleral icterus.     Conjunctiva/sclera: Conjunctivae normal.      Pupils: Pupils are equal, round, and reactive to light.   Neck:      Vascular: No carotid bruit.      Comments: Holosystolic murmur transmits to both carotids  Cardiovascular:      Rate and Rhythm: Normal rate and regular rhythm.      Heart sounds: Normal heart sounds. No murmur heard.     Comments: Radial pulses 4+ bilaterally.  I do not detect dorsalis pedis or posterior tibial pulses on palpation.  Feet are warm and dry.  Pulmonary:      Effort: Pulmonary effort is normal.      Breath sounds: Normal breath sounds.   Abdominal:      General: Bowel sounds are normal.      Palpations: Abdomen is soft. There is no mass.      Tenderness: There is no abdominal tenderness. There is no guarding.   Musculoskeletal:         General: Normal range of motion.      Cervical back: Normal range of motion and neck supple. No tenderness.      Right lower leg: No edema.      Left lower leg: No edema.   Skin:     General: Skin is warm and dry.      Capillary Refill: Capillary refill takes less than 2 seconds.      Coloration: Skin is pale.      Findings: No bruising, lesion or rash.   Neurological:      General: No focal deficit present.      Mental Status: She is alert and oriented to person, place, and time. Mental status is at baseline.      Cranial Nerves: No cranial nerve deficit.      Sensory: No sensory deficit.      Motor: No weakness.      Coordination: Coordination normal.      Deep Tendon Reflexes: Reflexes are normal and symmetric.   Psychiatric:         Mood and Affect: Mood normal.          Behavior: Behavior normal.         Vital Signs  ED Triage Vitals   Temperature Pulse Respirations Blood Pressure SpO2   03/16/24 1209 03/16/24 1209 03/16/24 1209 03/16/24 1212 03/16/24 1209   98 °F (36.7 °C) 90 18 (!) 215/97 (!) 76 %      Temp src Heart Rate Source Patient Position - Orthostatic VS BP Location FiO2 (%)   -- 03/16/24 1209 03/16/24 1209 03/16/24 1232 --    Monitor Lying Right arm       Pain Score       03/16/24 1209       No Pain           Vitals:    03/16/24 1209 03/16/24 1212 03/16/24 1225 03/16/24 1232   BP:  (!) 215/97  (!) 202/87   Pulse: 90  82 82   Patient Position - Orthostatic VS: Lying   Sitting         Visual Acuity  Visual Acuity      Flowsheet Row Most Recent Value   L Pupil Size (mm) 3   R Pupil Size (mm) 3            ED Medications  Medications   cefTRIAXone (ROCEPHIN) IVPB (premix in dextrose) 2,000 mg 50 mL (2,000 mg Intravenous New Bag 3/16/24 1316)   azithromycin (ZITHROMAX) 500 mg in sodium chloride 0.9% 250mL IVPB 500 mg (has no administration in time range)       Diagnostic Studies  Results Reviewed       Procedure Component Value Units Date/Time    Protime-INR [[id number]]  (Normal) Collected: 03/16/24 1301    Lab Status: Final result Specimen: Blood from Arm, Right Updated: 03/16/24 1325     Protime 14.3 seconds      INR 1.07    APTT [[id number]]  (Normal) Collected: 03/16/24 1301    Lab Status: Final result Specimen: Blood from Arm, Right Updated: 03/16/24 1325     PTT 25 seconds     FLU/RSV/COVID - if FLU/RSV clinically relevant [[id number]]  (Normal) Collected: 03/16/24 1216    Lab Status: Final result Specimen: Nares from Nose Updated: 03/16/24 1312     SARS-CoV-2 Negative     INFLUENZA A PCR Negative     INFLUENZA B PCR Negative     RSV PCR Negative    Narrative:      FOR PEDIATRIC PATIENTS - copy/paste COVID Guidelines URL to browser: [email or web address]    SARS-CoV-2 assay is a Nucleic Acid Amplification assay intended  for the  qualitative detection of nucleic acid from SARS-CoV-2 in nasopharyngeal  swabs. Results are for the presumptive identification of SARS-CoV-2 RNA.    Positive results are indicative of infection with SARS-CoV-2, the virus  causing COVID-19, but do not rule out bacterial infection or co-infection  with other viruses. Laboratories within the [place] and its  territories are required to report all positive results to the appropriate  public health authorities. Negative results do not preclude SARS-CoV-2  infection and should not be used as the sole basis for treatment or other  patient management decisions. Negative results must be combined with  clinical observations, patient history, and epidemiological information.  This test has not been FDA cleared or approved.    This test has been authorized by FDA under an Emergency Use Authorization  (EUA). This test is only authorized for the duration of time the  declaration that circumstances exist justifying the authorization of the  emergency use of an in vitro diagnostic tests for detection of SARS-CoV-2  virus and/or diagnosis of COVID-19 infection under section 564(b)(1) of  the Act, 21 U.S.C. 360bbb-3(b)(1), unless the authorization is terminated  or revoked sooner. The test has been validated but independent review by FDA  and CLIA is pending.    Test performed using [employer] GeneXpert: This RT-PCR assay targets N2,  a region unique to SARS-CoV-2. A conserved region in the E-gene was chosen  for pan-Sarbecovirus detection which includes SARS-CoV-2.    According to CMS-2020-01-R, this platform meets the definition of high-throughput technology.    TSH, 3rd generation with Free T4 reflex [[id number]]  (Normal) Collected: 03/16/24 1216    Lab Status: Final result Specimen: Blood from Arm, Right Updated: 03/16/24 1311     TSH 3RD GENERATON 1.187 uIU/mL     Blood culture #1 [[id number]] Collected: 03/16/24 1301    Lab Status: In process Specimen: Blood from  Arm, Right Updated: 03/16/24 1307    Blood culture #2 [[id number]] Collected: 03/16/24 1301    Lab Status: In process Specimen: Blood from Arm, Right Updated: 03/16/24 1307    Procalcitonin [[id number]] Collected: 03/16/24 1301    Lab Status: In process Specimen: Blood from Arm, Right Updated: 03/16/24 1307    Lactic acid [[id number]] Collected: 03/16/24 1301    Lab Status: In process Specimen: Blood from Arm, Right Updated: 03/16/24 1307    HS Troponin 0hr (reflex protocol) [[id number]]  (Normal) Collected: 03/16/24 1216    Lab Status: Final result Specimen: Blood from Arm, Right Updated: 03/16/24 1258     hs TnI 0hr 30 ng/L     HS Troponin I 2hr [[id number]]     Lab Status: No result Specimen: Blood     Urinalysis with microscopic [[id number]]     Lab Status: No result Specimen: Urine     CK [[id number]]  (Normal) Collected: 03/16/24 1216    Lab Status: Final result Specimen: Blood from Arm, Right Updated: 03/16/24 1254     Total CK 82 U/L     Comprehensive metabolic panel [[id number]]  (Abnormal) Collected: 03/16/24 1216    Lab Status: Final result Specimen: Blood from Arm, Right Updated: 03/16/24 1254     Sodium 142 mmol/L      Potassium 3.9 mmol/L      Chloride 103 mmol/L      CO2 28 mmol/L      ANION GAP 11 mmol/L      BUN 36 mg/dL      Creatinine 1.64 mg/dL      Glucose 257 mg/dL      Calcium 9.2 mg/dL      AST 23 U/L      ALT 17 U/L      Alkaline Phosphatase 51 U/L      Total Protein 6.4 g/dL      Albumin 4.0 g/dL      Total Bilirubin 0.56 mg/dL      eGFR 27 ml/min/1.73sq m     Narrative:      National Kidney Disease Foundation guidelines for Chronic Kidney Disease (CKD):     Stage 1 with normal or high GFR (GFR > 90 mL/min/1.73 square meters)    Stage 2 Mild CKD (GFR = 60-89 mL/min/1.73 square meters)    Stage 3A Moderate CKD (GFR = 45-59 mL/min/1.73 square meters)    Stage 3B Moderate CKD (GFR = 30-44 mL/min/1.73 square meters)    Stage 4 Severe CKD (GFR = 15-29 mL/min/1.73 square meters)    Stage 5 End Stage CKD  (GFR <15 mL/min/1.73 square meters)  Note: GFR calculation is accurate only with a steady state creatinine    Magnesium [[id number]]  (Normal) Collected: 03/16/24 1216    Lab Status: Final result Specimen: Blood from Arm, Right Updated: 03/16/24 1254     Magnesium 2.1 mg/dL     Ethanol [[id number]]  (Normal) Collected: 03/16/24 1216    Lab Status: Final result Specimen: Blood from Arm, Right Updated: 03/16/24 1254     Ethanol Lvl <10 mg/dL     Salicylate level [[id number]]  (Normal) Collected: 03/16/24 1216    Lab Status: Final result Specimen: Blood from Arm, Right Updated: 03/16/24 1254     Salicylate Lvl <5 mg/dL     Acetaminophen level-If concentration is detectable, please discuss with medical  on call. [[id number]]  (Abnormal) Collected: 03/16/24 1216    Lab Status: Final result Specimen: Blood from Arm, Right Updated: 03/16/24 1254     Acetaminophen Level <2 ug/mL     CBC and differential [[id number]]  (Abnormal) Collected: 03/16/24 1216    Lab Status: Final result Specimen: Blood from Arm, Right Updated: 03/16/24 1236     WBC 17.06 Thousand/uL      RBC 4.67 Million/uL      Hemoglobin 14.1 g/dL      Hematocrit 45.2 %      MCV 97 fL      MCH 30.2 pg      MCHC 31.2 g/dL      RDW 14.0 %      MPV 10.4 fL      Platelets 176 Thousands/uL      nRBC 0 /100 WBCs      Neutrophils Relative 75 %      Immature Grans % 1 %      Lymphocytes Relative 14 %      Monocytes Relative 9 %      Eosinophils Relative 0 %      Basophils Relative 1 %      Neutrophils Absolute 12.99 Thousands/µL      Absolute Immature Grans 0.09 Thousand/uL      Absolute Lymphocytes 2.39 Thousands/µL      Absolute Monocytes 1.49 Thousand/µL      Eosinophils Absolute 0.01 Thousand/µL      Basophils Absolute 0.09 Thousands/µL     POCT Blood Gas (CG8+) [[id number]]  (Abnormal) Collected: 03/16/24 1221    Lab Status: Final result Specimen: Venous Updated: 03/16/24 1223     ph, Ardiano ISTAT 7.325     pCO2, Adriano i-STAT 52.3 mm HG      pO2, Adriano i-STAT 24.0  mm HG      BE, i-STAT 0 mmol/L      HCO3, Adriano i-STAT 27.3 mmol/L      CO2, i-STAT 29 mmol/L      O2 Sat, i-STAT 36 %      SODIUM, I-STAT 141 mmol/l      Potassium, i-STAT 3.1 mmol/L      Calcium, Ionized i-STAT 1.18 mmol/L      Hct, i-STAT 44 %      Hgb, i-STAT 15.0 g/dl      Glucose, i-STAT 264 mg/dl      Specimen Type VENOUS    UA (URINE) with reflex to Scope [[id number]]     Lab Status: No result Specimen: Urine                    XR chest 1 view portable   ED Interpretation by Maria Del Carmen Hernandez DO (03/16 1256)   Extensive right-sided infiltrate      Final Result by Herlinda Goode MD (03/16 1257)      Right lower lobe predominant pneumonia.      I personally discussed this study Dr. MARIA DEL CARMEN HERNANDEZ on 3/16/2024 12:57 PM.                  Workstation performed: [id number]                    Procedures  ECG 12 Lead Documentation Only    Date/Time: 3/16/2024 12:21 PM    Performed by: Maria Del Carmen Hernandez DO  Authorized by: Maria Del Carmen Hernandez DO    ECG reviewed by me, the ED Provider: yes    Patient location:  ED  Previous ECG:     Previous ECG:  Compared to current    Similarity:  No change  Rate:     ECG rate assessment: normal    Rhythm:     Rhythm: sinus rhythm    Ectopy:     Ectopy: none    QRS:     QRS axis:  Left    QRS intervals:  Normal  Conduction:     Conduction: abnormal      Abnormal conduction: incomplete RBBB    Q waves:     Q waves:  V2 and V3           ED Course  ED Course as of 03/16/24 1337   Sat Mar 16, 2024   1318 Lactic acid canceled by internal medicine attending.                            Initial Sepsis Screening       Row Name 03/16/24 1331                Is the patient's history suggestive of a new or worsening infection? Yes (Proceed)  -MF        Suspected source of infection pneumonia  -MF        Indicate SIRS criteria Leukocytosis (WBC > 39017 IJL) OR Leukopenia (WBC <4000 IJL) OR Bandemia (WBC >10% bands);Tachypnea > 20 resp per min  -MF        Are two or more of the above signs & symptoms of  infection both present and new to the patient? --        Assess for evidence of organ dysfunction: Are any of the below criteria present within 6 hours of suspected infection and SIRS criteria that are NOT considered to be chronic conditions? --                  User Key  (r) = Recorded By, (t) = Taken By, (c) = Cosigned By      Initials Name Provider Type    ISA Hernandez DO Physician                    SBIRT 20yo+      Flowsheet Row Most Recent Value   Initial Alcohol Screen: US AUDIT-C     1. How often do you have a drink containing alcohol? 0 Filed at: 03/16/2024 1208   2. How many drinks containing alcohol do you have on a typical day you are drinking?  0 Filed at: 03/16/2024 1208   3a. Male UNDER 65: How often do you have five or more drinks on one occasion? 0 Filed at: 03/16/2024 1208   3b. FEMALE Any Age, or MALE 65+: How often do you have 4 or more drinks on one occassion? 0 Filed at: 03/16/2024 1208   Audit-C Score 0 Filed at: 03/16/2024 1208   PUSHPA: How many times in the past year have you...    Used an illegal drug or used a prescription medication for non-medical reasons? Never Filed at: 03/16/2024 1208                      Medical Decision Making  88-year-old female with sudden onset nausea, vomiting, weakness.  Found to be hypoxemic this morning.  Concern for GI bleed, anemia, ACS, infection, electrolyte abnormality, DKA, pneumothorax, gastroenteritis.  Less likely pulmonary embolism.  Patient has chronic kidney disease.  Check labs and imaging.  Supplemental oxygen.    Patient with sepsis criteria.  X-ray shows large right infiltrate.  Sepsis antibiotics started.  Patient went upstairs before lactic acid resulted.  Case discussed with internal medicine and accepted to their service. PSI/PORT score 128 - Risk Class IV, 8.2-9.3% mortality risk.    Amount and/or Complexity of Data Reviewed  Independent Historian: EMS     Details: Patient's son and daughter  External Data Reviewed: radiology, ECG  and notes.  Labs: ordered.  Radiology: ordered and independent interpretation performed.  ECG/medicine tests: ordered and independent interpretation performed. Decision-making details documented in ED Course.  Discussion of management or test interpretation with external provider(s): Discussed x-ray with the radiologist.    Discussed admission with internal medicine.    Risk  Decision regarding hospitalization.             Disposition  Final diagnoses:   Acute respiratory failure with hypoxemia (HCC)   Community acquired pneumonia of right lung, unspecified part of lung   Sepsis (HCC)     Time reflects when diagnosis was documented in both MDM as applicable and the Disposition within this note       Time User Action Codes Description Comment    3/16/2024  1:13 PM Kj Hernandez [J96.01] Acute respiratory failure with hypoxemia (HCC)     3/16/2024  1:13 PM Kj Hernandez [J18.9] Community acquired pneumonia of right lung, unspecified part of lung     3/16/2024  1:13 PM Kj Hernandez [A41.9] Sepsis (HCC)           ED Disposition       ED Disposition   Admit    Condition   Stable    Date/Time   Sat Mar 16, 2024 1313    Comment   Case was discussed with [facility] attending and the patient's admission status was agreed to be Admission Status: inpatient status to the service of Dr. Nelson .               Follow-up Information    None         Patient's Medications   Discharge Prescriptions    No medications on file       No discharge procedures on file.    PDMP Review         Value Time User    PDMP Reviewed  Yes 2/12/2024  9:06 AM Josey Dodson PA-C            ED Provider  Electronically Signed by             Kj Hernandez DO  03/16/24 7994

## 2024-03-16 NOTE — H&P
[facility]  H&P  Name: Ena Ahumada 88 y.o. female I MRN: [id number]  Unit/Bed#: -01 I Date of Admission: 3/16/2024   Date of Service: 3/16/2024 I Hospital Day: 0      Assessment/Plan   Pneumonia  Assessment & Plan  Continue ceftriaxone, IV hydration today  Patient verbalized difficulty with swallowing on and off  Will consult speech for bedside swallow eval  N.p.o. sips with meds      Sepsis ([facility])  Assessment & Plan  SIRS criteria met : tachycardia, tachypnea, leukocytosis  Suspected source: Pneumonia  CXR: Lower lobe pneumonia  UA: Pending, will need straight cath if patient is not able to urinate  CT: None  Continue to trend leukocytosis and fever curve  Procalcitonin: 18  Lactic acid: 2.5  S/P 1L IVF, Continue IVF at 100cc/hr  Will need additional fluid depending on lactate and blood pressure  Blood Cultures pending   Urine analysis pending  Continue on Rocephin, adjust pending culture data    Chronic pain syndrome  Assessment & Plan  Follows up with pain medicine  Continue buprenorphine    Nonrheumatic aortic valve stenosis  Assessment & Plan  Follows up with cardiology, last echo in April 2023  Echo-LVEF 65%, grade 1 diastolic dysfunction.  Moderate aortic stenosis  Monitor volume status  Continue home medications with blood pressure parameters    CKD (chronic kidney disease), stage IV ([facility])  Assessment & Plan  Lab Results   Component Value Date    EGFR 27 03/16/2024    EGFR 26 (L) 11/17/2023    EGFR 25 (L) 06/23/2023    CREATININE 1.64 (H) 03/16/2024    CREATININE 1.82 (H) 11/17/2023    CREATININE 1.90 (H) 06/23/2023     Follows up with nephrology as outpatient  Baseline creatinine appears to be 1.6-1.9 as per nephrology, EGFR in mid 20s  Monitor BMP at risk for ATN with sepsis  IV hydration      Anxiety  Assessment & Plan  Continue Zoloft    Type 2 diabetes mellitus with stage 4 chronic kidney disease, without long-term current use of insulin ([facility])  Assessment &  "Plan  Lab Results   Component Value Date    HGBA1C 8.5 (H) 10/13/2023       No results for input(s): \"POCGLU\" in the last 72 hours.    Blood Sugar Average: Last 72 hrs:  Follows up with endocrine, home regimen-Invokana, glipizide, Januvia  .  With Lantus, SSI  Update hemoglobin A1c           VTE Pharmacologic Prophylaxis: VTE Score: 8 High Risk (Score >/= 5) - Pharmacological DVT Prophylaxis Ordered: heparin. Sequential Compression Devices Ordered.  Code Status: Level 1 - Full Code , D/w patient  Discussion with family: Updated  (son, daughter, and daughter in law) at bedside.  Other family members at bedside, discussed in detail    Anticipated Length of Stay: Patient will be admitted on an inpatient basis with an anticipated length of stay of greater than 2 midnights secondary to sepsis 2/2 PNA.    Total Time Spent on Date of Encounter in care of patient: 75 mins. This time was spent on one or more of the following: performing physical exam; counseling and coordination of care; obtaining or reviewing history; documenting in the medical record; reviewing/ordering tests, medications or procedures; communicating with other healthcare professionals and discussing with patient's family/caregivers.    Chief Complaint: fever    History of Present Illness:  Ena Ahumada is a 88 y.o. female with a PMH of anxiety, GERD, CKD 4, aortic valve disorder who presents with fever, weakness.  Patient stated she had nausea and vomited at home 4 AM.  Sat on the floor of the bathroom.  Did not hit head, no injuries.  Lowered herself to the bathroom floor.  Patient was found by daughter at 11 AM laying on the bathroom floor.  Denies chest pain, palpitations, syncope, bloody stool, dark vomit.  Did not have any takeout food yesterday.  States she had some trouble swallowing food. On arrival to ED, she was hypoxic, was found to have leukocytosis with tachypnea. Chest X ray showed RLL predominant PNA. She will be admitted " for sepsis/PNA management.    Review of Systems:  Review of Systems   Constitutional:  Positive for fatigue.   Respiratory:  Negative for cough and shortness of breath.    Cardiovascular: Negative.    Gastrointestinal:  Positive for nausea and vomiting (one time).   Musculoskeletal:  Positive for back pain.       Past Medical and Surgical History:   Past Medical History:   Diagnosis Date    Anxiety     Aortic valve insufficiency     Arthritis     Cataract of both eyes     Chronic kidney disease     Dysuria     last assessed - [id number]    Edema     last assessed - [id number]    GERD (gastroesophageal reflux disease)     last assessed - [id number]    Hyperlipidemia     Hypertension     Low back pain     last assessed - [id number]    Mitral valve disorder     Osteoporosis     last assessed - [id number]    Palpitations        Past Surgical History:   Procedure Laterality Date    APPENDECTOMY      CATARACT EXTRACTION Bilateral     COLONOSCOPY      TUBAL LIGATION Bilateral        Meds/Allergies:  Prior to Admission medications    Medication Sig Start Date End Date Taking? Authorizing Provider   ACCU-CHEK PAUL PLUS test strip  1/31/20   Historical Provider, MD   Accu-Chek Guide test strip USE TO TEST TWICE A DAY 1/16/23   Deanna Castaneda DO   Accu-Chek Softclix Lancets lancets  1/28/20   Historical Provider, MD   Accu-Chek Softclix Lancets lancets USE AS INSTRUCTED TWICE DAILY 10/23/23   Deanna Castaneda DO   aspirin 81 MG tablet Take 1 tablet by mouth daily 9/4/12   Historical Provider, MD   cholecalciferol (VITAMIN D3) 1,000 units tablet Take 2 tablets by mouth daily 9/21/12   Historical Provider, MD   cloNIDine (CATAPRES-TTS-1) 0.1 mg/24 hr PLACE 1 PATCH ON THE SKIN ONCE A WEEK AS DIRECTED 9/6/23   Deanna Castaneda DO   Denta 5000 Plus 1.1 % CREA USE TWICE A DAY -SPIT, DONT RINSE AND NOTHING BY MOUTH X 30 MIN 2/18/23   Historical Provider, MD   famotidine (PEPCID) 20 mg tablet TAKE 1 TABLET BY MOUTH EVERY DAY 2/10/24    Deanna Castaneda DO   glipiZIDE (GLUCOTROL) 10 mg tablet TAKE 1 TABLET BY MOUTH IN THE MORNING THEN 2 TABS WITH DINNER 2/21/24   Deanna Castaneda DO   glucose blood (Accu-Chek Emma Plus) test strip USE AS INSTRUCTED TWICE DAILY  Patient not taking: Reported on 2/2/2023 2/24/21   Deanna Castaneda DO   Invokana 100 MG TAKE 1 TABLET BY MOUTH EVERY DAY BEFORE BREAKFAST 12/1/23   Deanna Castaneda DO   nebivolol (BYSTOLIC) 20 MG tablet TAKE 1 TABLET BY MOUTH EVERY DAY 8/8/23   Deanna Castaneda,    NIFEdipine (ADALAT CC) 90 mg 24 hr tablet TAKE 1 TABLET BY MOUTH EVERY DAY 2/21/24   Deanna Castaneda DO   ondansetron (ZOFRAN) 4 mg tablet Take 1 tablet (4 mg total) by mouth every 8 (eight) hours as needed for nausea or vomiting  Patient not taking: Reported on 11/20/2023 9/22/23   Deanna Castaneda DO   Potassium Gluconate 595 (99 K) MG TABS Take 2 tablets by mouth 2 (two) times a day 4 tabs total 3/3/15   Melanie Brown MD   pregabalin (LYRICA) 50 mg capsule Take 1 cap once a day for 5 days, then increase to BID x 5 days, then TID 2/12/24   Josey Dodson PA-C   sertraline (ZOLOFT) 100 mg tablet TAKE 1 TABLET BY MOUTH EVERY DAY 12/30/23   Deanna Castaneda,    simvastatin (ZOCOR) 20 mg tablet TAKE 1 TABLET BY MOUTH EVERYDAY AT BEDTIME 12/30/23   Deanna Castaneda DO   sitaGLIPtin (Januvia) 50 mg tablet TAKE 1 TABLET BY MOUTH EVERY DAY 12/17/23   Deanna Castaneda DO   torsemide (DEMADEX) 20 mg tablet TAKE 1 TABLET BY MOUTH EVERY DAY 8/15/23   Markus Hill MD   transdermal buprenorphine (BUTRANS) 7.5 mcg/hr TD patch Place 1 patch on the skin over 7 days every 7 days For ongoing therapy 2/12/24   Josey Dodson PA-C     I have reviewed home medications with patient personally. And with family memeber    Allergies: No Known Allergies    Social History:  Marital Status: Single   Occupation: retired  Patient Pre-hospital Living Situation: Apartment  Patient Pre-hospital Level of Mobility: walks  Patient  "Pre-hospital Diet Restrictions: none  Substance Use History:   Social History     Substance and Sexual Activity   Alcohol Use Never     Social History     Tobacco Use   Smoking Status Never   Smokeless Tobacco Never     Social History     Substance and Sexual Activity   Drug Use Never       Family History:  Family History   Problem Relation Age of Onset    Kidney disease Mother         Chronic    No Known Problems Father     Breast cancer Sister     Diabetes Sister     Cancer Sister     Breast cancer Sister     Hypertension Sister     No Known Problems Daughter     No Known Problems Son     No Known Problems Son     No Known Problems Son     No Known Problems Son        Physical Exam:     Vitals:   Blood Pressure: 121/72 (03/16/24 1407)  Pulse: 77 (03/16/24 1407)  Temperature: (!) 97.4 °F (36.3 °C) (03/16/24 1407)  Respirations: 20 (03/16/24 1407)  Height: 5' 2\" (157.5 cm) (03/16/24 1407)  Weight - Scale: 73 kg (160 lb 15 oz) (03/16/24 1407)  SpO2: 92 % (03/16/24 1407)    Physical Exam     Gen.-Patient comfortable, saturating 91-93% on 6 L  Neck- Supple. No thyromegaly or lymphadenopathy  Lungs-rhonchi   Heart S1-S2, regular rate and rhythm, no murmurs  Abdomen-soft nontender, no organomegaly. Bowel sounds present  Extremities-no cyanosi,  clubbing or edema  Skin- no rash  Neuro-nonfocal     Additional Data:     Lab Results:  Results from last 7 days   Lab Units 03/16/24  1221 03/16/24  1216   WBC Thousand/uL  --  17.06*   HEMOGLOBIN g/dL  --  14.1   I STAT HEMOGLOBIN g/dl 15.0  --    HEMATOCRIT %  --  45.2   HEMATOCRIT, ISTAT % 44  --    PLATELETS Thousands/uL  --  176   NEUTROS PCT %  --  75   LYMPHS PCT %  --  14   MONOS PCT %  --  9   EOS PCT %  --  0     Results from last 7 days   Lab Units 03/16/24  1221 03/16/24  1216   SODIUM mmol/L  --  142   POTASSIUM mmol/L  --  3.9   CHLORIDE mmol/L  --  103   CO2 mmol/L  --  28   CO2, I-STAT mmol/L 29  --    BUN mg/dL  --  36*   CREATININE mg/dL  --  1.64*   ANION GAP " mmol/L  --  11   CALCIUM mg/dL  --  9.2   ALBUMIN g/dL  --  4.0   TOTAL BILIRUBIN mg/dL  --  0.56   ALK PHOS U/L  --  51   ALT U/L  --  17   AST U/L  --  23   GLUCOSE RANDOM mg/dL  --  257*     Results from last 7 days   Lab Units 03/16/24  1301   INR  1.07             Results from last 7 days   Lab Units 03/16/24  1301   LACTIC ACID mmol/L 2.5*   PROCALCITONIN ng/ml 18.29*       Lines/Drains:  Invasive Devices       Peripheral Intravenous Line  Duration             Peripheral IV 03/16/24 Left Antecubital <1 day    Peripheral IV 03/16/24 Right Antecubital <1 day                        Imaging: Reviewed radiology reports from this admission including: chest xray and Personally reviewed the following imaging: chest xray  XR chest 1 view portable   ED Interpretation by Maria Del Carmen Hernandez DO (03/16 1256)   Extensive right-sided infiltrate      Final Result by Herlinda Goode MD (03/16 1257)      Right lower lobe predominant pneumonia.      I personally discussed this study Dr. MARIA DEL CARMEN HERNANDEZ on 3/16/2024 12:57 PM.                  Workstation performed: [id number]             EKG and Other Studies Reviewed on Admission:   EKG: NSR. HR left axis deviation, incomplete right bundle branch block.    ** Please Note: This note has been constructed using a voice recognition system. **

## 2024-03-16 NOTE — ASSESSMENT & PLAN NOTE
Continue ceftriaxone, IV hydration today  Patient verbalized difficulty with swallowing on and off  Will consult speech for bedside swallow eval  N.p.o. sips with meds

## 2024-03-16 NOTE — ASSESSMENT & PLAN NOTE
SIRS criteria met : tachycardia, tachypnea, leukocytosis  Suspected source: Pneumonia  CXR: Lower lobe pneumonia  UA: Pending, will need straight cath if patient is not able to urinate  CT: None  Continue to trend leukocytosis and fever curve  Procalcitonin: 18  Lactic acid: 2.5  S/P 1L IVF, Continue IVF at 100cc/hr  Will need additional fluid depending on lactate and blood pressure  Blood Cultures pending   Urine analysis pending  Continue on Rocephin, adjust pending culture data

## 2024-03-16 NOTE — ASSESSMENT & PLAN NOTE
Follows up with cardiology, last echo in April 2023  Echo-LVEF 65%, grade 1 diastolic dysfunction.  Moderate aortic stenosis  Monitor volume status  Continue home medications with blood pressure parameters

## 2024-03-17 ENCOUNTER — APPOINTMENT (INPATIENT)
Dept: RADIOLOGY | Facility: HOSPITAL | Age: 89
DRG: 871 | End: 2024-03-17
Payer: COMMERCIAL

## 2024-03-17 PROBLEM — E87.6 HYPOKALEMIA: Status: ACTIVE | Noted: 2024-03-17

## 2024-03-17 PROBLEM — J96.01 ACUTE RESPIRATORY FAILURE WITH HYPOXIA (HCC): Status: ACTIVE | Noted: 2024-03-17

## 2024-03-17 PROBLEM — N18.32 STAGE 3B CHRONIC KIDNEY DISEASE (CKD) (HCC): Status: ACTIVE | Noted: 2019-02-15

## 2024-03-17 LAB
ALBUMIN SERPL BCP-MCNC: 3.2 G/DL (ref 3.5–5)
ALP SERPL-CCNC: 37 U/L (ref 34–104)
ALT SERPL W P-5'-P-CCNC: 13 U/L (ref 7–52)
ANION GAP SERPL CALCULATED.3IONS-SCNC: 9 MMOL/L (ref 4–13)
AST SERPL W P-5'-P-CCNC: 18 U/L (ref 13–39)
BASOPHILS # BLD AUTO: 0.06 THOUSANDS/ÂΜL (ref 0–0.1)
BASOPHILS NFR BLD AUTO: 0 % (ref 0–1)
BILIRUB SERPL-MCNC: 0.62 MG/DL (ref 0.2–1)
BUN SERPL-MCNC: 32 MG/DL (ref 5–25)
CALCIUM ALBUM COR SERPL-MCNC: 8.8 MG/DL (ref 8.3–10.1)
CALCIUM SERPL-MCNC: 8.2 MG/DL (ref 8.4–10.2)
CHLORIDE SERPL-SCNC: 106 MMOL/L (ref 96–108)
CO2 SERPL-SCNC: 28 MMOL/L (ref 21–32)
CREAT SERPL-MCNC: 1.43 MG/DL (ref 0.6–1.3)
D DIMER PPP FEU-MCNC: 2.21 UG/ML FEU
EOSINOPHIL # BLD AUTO: 0.05 THOUSAND/ÂΜL (ref 0–0.61)
EOSINOPHIL NFR BLD AUTO: 0 % (ref 0–6)
ERYTHROCYTE [DISTWIDTH] IN BLOOD BY AUTOMATED COUNT: 14.2 % (ref 11.6–15.1)
GFR SERPL CREATININE-BSD FRML MDRD: 32 ML/MIN/1.73SQ M
GLUCOSE SERPL-MCNC: 132 MG/DL (ref 65–140)
GLUCOSE SERPL-MCNC: 140 MG/DL (ref 65–140)
GLUCOSE SERPL-MCNC: 145 MG/DL (ref 65–140)
GLUCOSE SERPL-MCNC: 159 MG/DL (ref 65–140)
GLUCOSE SERPL-MCNC: 181 MG/DL (ref 65–140)
GLUCOSE SERPL-MCNC: 221 MG/DL (ref 65–140)
GLUCOSE SERPL-MCNC: 275 MG/DL (ref 65–140)
HCT VFR BLD AUTO: 36.1 % (ref 34.8–46.1)
HGB BLD-MCNC: 11.3 G/DL (ref 11.5–15.4)
IMM GRANULOCYTES # BLD AUTO: 0.11 THOUSAND/UL (ref 0–0.2)
IMM GRANULOCYTES NFR BLD AUTO: 1 % (ref 0–2)
LYMPHOCYTES # BLD AUTO: 3.94 THOUSANDS/ÂΜL (ref 0.6–4.47)
LYMPHOCYTES NFR BLD AUTO: 24 % (ref 14–44)
MAGNESIUM SERPL-MCNC: 2.3 MG/DL (ref 1.9–2.7)
MCH RBC QN AUTO: 30.1 PG (ref 26.8–34.3)
MCHC RBC AUTO-ENTMCNC: 31.3 G/DL (ref 31.4–37.4)
MCV RBC AUTO: 96 FL (ref 82–98)
MONOCYTES # BLD AUTO: 1.22 THOUSAND/ÂΜL (ref 0.17–1.22)
MONOCYTES NFR BLD AUTO: 7 % (ref 4–12)
NEUTROPHILS # BLD AUTO: 11.14 THOUSANDS/ÂΜL (ref 1.85–7.62)
NEUTS SEG NFR BLD AUTO: 68 % (ref 43–75)
NRBC BLD AUTO-RTO: 0 /100 WBCS
PHOSPHATE SERPL-MCNC: 3.1 MG/DL (ref 2.3–4.1)
PLATELET # BLD AUTO: 147 THOUSANDS/UL (ref 149–390)
PMV BLD AUTO: 10.2 FL (ref 8.9–12.7)
POTASSIUM SERPL-SCNC: 3.1 MMOL/L (ref 3.5–5.3)
PROCALCITONIN SERPL-MCNC: 25.97 NG/ML
PROT SERPL-MCNC: 5.5 G/DL (ref 6.4–8.4)
RBC # BLD AUTO: 3.75 MILLION/UL (ref 3.81–5.12)
SODIUM SERPL-SCNC: 143 MMOL/L (ref 135–147)
WBC # BLD AUTO: 16.52 THOUSAND/UL (ref 4.31–10.16)

## 2024-03-17 PROCEDURE — 82948 REAGENT STRIP/BLOOD GLUCOSE: CPT

## 2024-03-17 PROCEDURE — 92610 EVALUATE SWALLOWING FUNCTION: CPT

## 2024-03-17 PROCEDURE — 84145 PROCALCITONIN (PCT): CPT | Performed by: INTERNAL MEDICINE

## 2024-03-17 PROCEDURE — 99233 SBSQ HOSP IP/OBS HIGH 50: CPT | Performed by: STUDENT IN AN ORGANIZED HEALTH CARE EDUCATION/TRAINING PROGRAM

## 2024-03-17 PROCEDURE — 71045 X-RAY EXAM CHEST 1 VIEW: CPT

## 2024-03-17 PROCEDURE — 84100 ASSAY OF PHOSPHORUS: CPT | Performed by: INTERNAL MEDICINE

## 2024-03-17 PROCEDURE — 83735 ASSAY OF MAGNESIUM: CPT | Performed by: INTERNAL MEDICINE

## 2024-03-17 PROCEDURE — 85025 COMPLETE CBC W/AUTO DIFF WBC: CPT | Performed by: INTERNAL MEDICINE

## 2024-03-17 PROCEDURE — 94760 N-INVAS EAR/PLS OXIMETRY 1: CPT

## 2024-03-17 PROCEDURE — 94002 VENT MGMT INPAT INIT DAY: CPT

## 2024-03-17 PROCEDURE — 80053 COMPREHEN METABOLIC PANEL: CPT | Performed by: INTERNAL MEDICINE

## 2024-03-17 RX ORDER — ACETAMINOPHEN 325 MG/1
650 TABLET ORAL EVERY 6 HOURS PRN
Status: DISCONTINUED | OUTPATIENT
Start: 2024-03-17 | End: 2024-03-26 | Stop reason: HOSPADM

## 2024-03-17 RX ORDER — SODIUM CHLORIDE, SODIUM GLUCONATE, SODIUM ACETATE, POTASSIUM CHLORIDE, MAGNESIUM CHLORIDE, SODIUM PHOSPHATE, DIBASIC, AND POTASSIUM PHOSPHATE .53; .5; .37; .037; .03; .012; .00082 G/100ML; G/100ML; G/100ML; G/100ML; G/100ML; G/100ML; G/100ML
75 INJECTION, SOLUTION INTRAVENOUS CONTINUOUS
Status: DISPENSED | OUTPATIENT
Start: 2024-03-17 | End: 2024-03-17

## 2024-03-17 RX ORDER — POTASSIUM CHLORIDE 20MEQ/15ML
40 LIQUID (ML) ORAL ONCE
Status: COMPLETED | OUTPATIENT
Start: 2024-03-17 | End: 2024-03-17

## 2024-03-17 RX ORDER — SODIUM CHLORIDE, SODIUM GLUCONATE, SODIUM ACETATE, POTASSIUM CHLORIDE, MAGNESIUM CHLORIDE, SODIUM PHOSPHATE, DIBASIC, AND POTASSIUM PHOSPHATE .53; .5; .37; .037; .03; .012; .00082 G/100ML; G/100ML; G/100ML; G/100ML; G/100ML; G/100ML; G/100ML
75 INJECTION, SOLUTION INTRAVENOUS CONTINUOUS
Status: DISCONTINUED | OUTPATIENT
Start: 2024-03-17 | End: 2024-03-17

## 2024-03-17 RX ORDER — INSULIN LISPRO 100 [IU]/ML
1-5 INJECTION, SOLUTION INTRAVENOUS; SUBCUTANEOUS
Status: DISCONTINUED | OUTPATIENT
Start: 2024-03-17 | End: 2024-03-18

## 2024-03-17 RX ADMIN — HEPARIN SODIUM 5000 UNITS: 5000 INJECTION INTRAVENOUS; SUBCUTANEOUS at 20:39

## 2024-03-17 RX ADMIN — PREGABALIN 50 MG: 25 CAPSULE ORAL at 09:19

## 2024-03-17 RX ADMIN — SODIUM CHLORIDE, SODIUM GLUCONATE, SODIUM ACETATE, POTASSIUM CHLORIDE, MAGNESIUM CHLORIDE, SODIUM PHOSPHATE, DIBASIC, AND POTASSIUM PHOSPHATE 75 ML/HR: .53; .5; .37; .037; .03; .012; .00082 INJECTION, SOLUTION INTRAVENOUS at 13:13

## 2024-03-17 RX ADMIN — POTASSIUM CHLORIDE 40 MEQ: 1.5 SOLUTION ORAL at 17:08

## 2024-03-17 RX ADMIN — FAMOTIDINE 10 MG: 20 TABLET ORAL at 09:19

## 2024-03-17 RX ADMIN — GUAIFENESIN 600 MG: 600 TABLET ORAL at 09:19

## 2024-03-17 RX ADMIN — ASPIRIN 81 MG: 81 TABLET, COATED ORAL at 09:19

## 2024-03-17 RX ADMIN — SODIUM CHLORIDE, SODIUM GLUCONATE, SODIUM ACETATE, POTASSIUM CHLORIDE, MAGNESIUM CHLORIDE, SODIUM PHOSPHATE, DIBASIC, AND POTASSIUM PHOSPHATE 75 ML/HR: .53; .5; .37; .037; .03; .012; .00082 INJECTION, SOLUTION INTRAVENOUS at 13:02

## 2024-03-17 RX ADMIN — PRAVASTATIN SODIUM 40 MG: 40 TABLET ORAL at 17:08

## 2024-03-17 RX ADMIN — ACETAMINOPHEN 650 MG: 325 TABLET, FILM COATED ORAL at 20:38

## 2024-03-17 RX ADMIN — SERTRALINE 100 MG: 100 TABLET, FILM COATED ORAL at 09:19

## 2024-03-17 RX ADMIN — CEFTRIAXONE 1000 MG: 1 INJECTION, SOLUTION INTRAVENOUS at 09:18

## 2024-03-17 RX ADMIN — GUAIFENESIN 600 MG: 600 TABLET ORAL at 17:08

## 2024-03-17 RX ADMIN — HEPARIN SODIUM 5000 UNITS: 5000 INJECTION INTRAVENOUS; SUBCUTANEOUS at 05:58

## 2024-03-17 RX ADMIN — HEPARIN SODIUM 5000 UNITS: 5000 INJECTION INTRAVENOUS; SUBCUTANEOUS at 13:05

## 2024-03-17 RX ADMIN — NIFEDIPINE 90 MG: 30 TABLET, EXTENDED RELEASE ORAL at 09:19

## 2024-03-17 RX ADMIN — PREGABALIN 50 MG: 25 CAPSULE ORAL at 17:08

## 2024-03-17 RX ADMIN — NEBIVOLOL 20 MG: 5 TABLET ORAL at 09:19

## 2024-03-17 RX ADMIN — INSULIN LISPRO 3 UNITS: 100 INJECTION, SOLUTION INTRAVENOUS; SUBCUTANEOUS at 17:12

## 2024-03-17 RX ADMIN — INSULIN LISPRO 1 UNITS: 100 INJECTION, SOLUTION INTRAVENOUS; SUBCUTANEOUS at 20:43

## 2024-03-17 NOTE — PROGRESS NOTES
[facility]  Progress Note  Name: Ena Ahumada I  MRN: [id number]  Unit/Bed#: MS 330[facility]auricio I Date of Admission: 3/16/2024   Date of Service: 3/17/2024 I Hospital Day: 1    Assessment/Plan   * Sepsis ([facility])  Assessment & Plan  SIRS criteria met : tachycardia, tachypnea, leukocytosis  Suspected source: Pneumonia  CXR: Lower lobe pneumonia  UA not suggestive UTI    CT: None  Continue to trend leukocytosis and fever curve  Procalcitonin: 18  Lactic acid: 2.5  S/P 1L IVF, Continue IVF at 100cc/hr  Will need additional fluid depending on lactate and blood pressure  Blood Cultures pending   Continue on Rocephin, adjust pending culture data & Clinical course     Hypokalemia  Assessment & Plan  3/17 Potassium 3.1 ; Magnesium wnl   Possible due to decreased PO Intake    S/p 20 meq IV K ; will give additional 40 meq PO   Daily CMP and revaluate     Pneumonia  Assessment & Plan  RLL Pneumonia on CT chest ; with SIRS criteria and hypoxemia on admission   Continue ceftriaxone  See sepsis A&P   Supplemental O2 and wean for goal > 90%   Mucinex   Speech eval given possible aspiration etiology   Trend vitals and wbc     Questionable if patient has dysphagia ; was npo on admission ; 3/17 asking (and the family) to restart diet as last meal was 3/15 and denies hx of dysphagia ; will start low carb dysphagia diet mechanical soft with nectar thick for now till speech eval is completed ; per RN patient had no difficulty taking her medications 3/17 AM     Also patient and family was advised that after treating the pneumonia, recommend repeating imaging in 2-3 months to ensure resolution as underlying lung abnormality infection/malignancy need to be ruled out. They verbalize understanding . No B symptoms at the moment. Patient is never smoker.     For completeness will check D-Dimer , and evaluate response to IV Abx , only if not improving may consider V/Q scan (has CKD IIIB - IV) to rule out PE if clinically  suspected; at the moment PNA as evident on the Xray well explains the patient's symptoms and too early to  ABX response.         Acute respiratory failure with hypoxia ([facility])  Assessment & Plan  Acute respiratory failure with hypoxia presents on admission , requiring nasal cannula O2 supplementation ~ 5 Liters which is new for the patient ; most likely cause of hypoxemia is her RLL Pneumonia as evident on Xray Chest.     See pneumonia A&P for more details.     Chronic pain syndrome  Assessment & Plan  Follows up with pain medicine  Continue buprenorphine    Nonrheumatic aortic valve stenosis  Assessment & Plan  Follows up with cardiology, last echo in April 2023  Echo-LVEF 65%, grade 1 diastolic dysfunction.  Moderate aortic stenosis  Monitor volume status  Continue home medications with blood pressure parameters    Stage 3b chronic kidney disease (CKD) ([facility])  Assessment & Plan  Lab Results   Component Value Date    EGFR 32 03/17/2024    EGFR 27 03/16/2024    EGFR 26 (L) 11/17/2023    CREATININE 1.43 (H) 03/17/2024    CREATININE 1.64 (H) 03/16/2024    CREATININE 1.82 (H) 11/17/2023     Follows up with nephrology as outpatient  Baseline creatinine appears to be 1.6-1.9 as per nephrology, EGFR in mid 20s  Monitor BMP at risk for ATN with sepsis  IV hydration      Anxiety  Assessment & Plan  Continue Zoloft    Type 2 diabetes mellitus with stage 4 chronic kidney disease, without long-term current use of insulin ([facility])  Assessment & Plan  Lab Results   Component Value Date    HGBA1C 8.1 (H) 03/16/2024       Recent Labs     03/17/24  1208 03/17/24  1605 03/17/24  1928 03/17/24 2015   POCGLU 140 275* 221* 181*         Blood Sugar Average: Last 72 hrs:  (P) [id number] up with endocrine, home regimen-Invokana, glipizide, Januvia    SSI Before meals and at bed time + Low Carb Diet   Follow up and revaluate if need Glargine                       VTE Pharmacologic Prophylaxis: VTE Score: 8 Moderate Risk  (Score 3-4) - Pharmacological DVT Prophylaxis Ordered: heparin.    Mobility:   Basic Mobility Inpatient Raw Score: 22  JH-HLM Goal: 7: Walk 25 feet or more  JH-HLM Achieved: 7: Walk 25 feet or more  JH-HLM Goal achieved. Continue to encourage appropriate mobility.    Patient Centered Rounds: I performed bedside rounds with nursing staff today.   Discussions with Specialists or Other Care Team Provider:     Education and Discussions with Family / Patient: Updated  (daughter) at bedside.    Total Time Spent on Date of Encounter in care of patient: 50 mins. This time was spent on one or more of the following: performing physical exam; counseling and coordination of care; obtaining or reviewing history; documenting in the medical record; reviewing/ordering tests, medications or procedures; communicating with other healthcare professionals and discussing with patient's family/caregivers.    Current Length of Stay: 1 day(s)  Current Patient Status: Inpatient   Certification Statement: The patient will continue to require additional inpatient hospital stay due to IV ABX   Discharge Plan: Anticipate discharge in 48-72 hrs to home.    Code Status: Level 1 - Full Code    Subjective:   Patient was seen and examined , family at bedside .  Other than intermittent cough, denies any new symptoms; remains afebrile but requires NC O2 3-6 L   Explained need to repeate imaging in 2-3 month with PCP to confirm resolution of the lung abnormality on the Xray and r/o underlying abnormality/malignancy; they express understanding.   Above A&P explained in details, discussed with RN at bedside as well. Speech eval in process.   Will follow and evaluate.       Objective:     Vitals:   Temp (24hrs), Av.8 °F (37.1 °C), Min:97.3 °F (36.3 °C), Max:101.6 °F (38.7 °C)    Temp:  [97.3 °F (36.3 °C)-101.6 °F (38.7 °C)] 98.4 °F (36.9 °C)  HR:  [73-83] 78  Resp:  [18] 18  BP: (143-151)/(62-65) 151/65  SpO2:  [82 %-97 %] 90 %  Body mass  index is 29.44 kg/m².     Input and Output Summary (last 24 hours):     Intake/Output Summary (Last 24 hours) at 3/17/2024 2317  Last data filed at 3/17/2024 1801  Gross per 24 hour   Intake 720 ml   Output 550 ml   Net 170 ml       Physical Exam:   Physical Exam   - GEN: Appears well, alert and oriented x 3, pleasant and cooperative, in no acute distress  - HEENT: Anicteric, mucous membranes moist, PERRL and EOMI   - NECK: No lymphadenopathy, JVD or carotid bruits   - HEART: RRR, normal S1 and S2, no murmurs, clicks, gallops or rubs   - LUNGS: requiring NC Oxygen but gladly on my exam Clear to auscultation bilaterally; no wheezes, rales, or rhonchi  - ABDOMEN: Normal bowel sounds, soft, no tenderness, no distention, no organomegaly or masses felt on exam.   - EXTREMITIES: Peripheral pulses normal; no clubbing, cyanosis, or edema  - NEURO: No focal findings, CN II-XII are grossly intact.   - Musculoskeletal: 5/5 strength, normal ROM, no swollen or erythematous joints.   - SKIN: Normal without suspicious lesions on exposed skin      Additional Data:     Labs:  Results from last 7 days   Lab Units 03/17/24  0606   WBC Thousand/uL 16.52*   HEMOGLOBIN g/dL 11.3*   HEMATOCRIT % 36.1   PLATELETS Thousands/uL 147*   NEUTROS PCT % 68   LYMPHS PCT % 24   MONOS PCT % 7   EOS PCT % 0     Results from last 7 days   Lab Units 03/17/24  0606   SODIUM mmol/L 143   POTASSIUM mmol/L 3.1*   CHLORIDE mmol/L 106   CO2 mmol/L 28   BUN mg/dL 32*   CREATININE mg/dL 1.43*   ANION GAP mmol/L 9   CALCIUM mg/dL 8.2*   ALBUMIN g/dL 3.2*   TOTAL BILIRUBIN mg/dL 0.62   ALK PHOS U/L 37   ALT U/L 13   AST U/L 18   GLUCOSE RANDOM mg/dL 145*     Results from last 7 days   Lab Units 03/16/24  1301   INR  1.07     Results from last 7 days   Lab Units 03/17/24  2015 03/17/24  1928 03/17/24  1605 03/17/24  1208 03/17/24  0719 03/17/24  0004 03/16/24  1610   POC GLUCOSE mg/dl 181* 221* 275* 140 132 159* 282*     Results from last 7 days   Lab Units  03/16/24  1620   HEMOGLOBIN A1C % 8.1*     Results from last 7 days   Lab Units 03/17/24  0606 03/16/24  1620 03/16/24  1301   LACTIC ACID mmol/L  --  1.7 2.5*   PROCALCITONIN ng/ml 25.97*  --  18.29*       Lines/Drains:  Invasive Devices       Peripheral Intravenous Line  Duration             Peripheral IV 03/16/24 Right Antecubital 1 day                          Imaging: Reviewed radiology reports from this admission including: chest xray    Recent Cultures (last 7 days):   Results from last 7 days   Lab Units 03/16/24  1301   BLOOD CULTURE  Received in Microbiology Lab. Culture in Progress.  Received in Microbiology Lab. Culture in Progress.       Last 24 Hours Medication List:   Current Facility-Administered Medications   Medication Dose Route Frequency Provider Last Rate    acetaminophen  650 mg Oral Q6H PRN Kira Holbrook PA-C      aspirin  81 mg Oral Daily Toya Parekh MD      cefTRIAXone  1,000 mg Intravenous Q24H Toya Parekh MD 1,000 mg (03/17/24 0918)    [START ON 3/20/2024] cloNIDine  1 patch Transdermal Weekly Toya Parekh MD      famotidine  10 mg Oral Daily Toya Parekh MD      guaiFENesin  600 mg Oral BID Toya Parekh MD      heparin (porcine)  5,000 Units Subcutaneous Q8H [facility] Toya Parekh MD      insulin lispro  1-5 Units Subcutaneous 4x Daily (AC & HS) Buster Thompson DO      nebivolol  20 mg Oral Daily Toya Parekh MD      NIFEdipine  90 mg Oral Daily Toya Parekh MD      pravastatin  40 mg Oral Daily With Dinner Toya Parekh MD      pregabalin  50 mg Oral BID Toya Parekh MD      sertraline  100 mg Oral Daily Toya Parekh MD      transdermal buprenorphine  1 patch Transdermal Q7 Days Toya Parekh MD          Today, Patient Was Seen By: Buster Thompson DO    **Please Note: This note may have been constructed using a voice recognition system.**

## 2024-03-17 NOTE — ASSESSMENT & PLAN NOTE
Lab Results   Component Value Date    HGBA1C 8.1 (H) 03/16/2024       Recent Labs     03/16/24  1610 03/17/24  0004 03/17/24  0719 03/17/24  1208   POCGLU 282* 159* 132 140       Blood Sugar Average: Last 72 hrs:  (P) 178.25Follows up with endocrine, home regimen-Invokana, glipizide, Januvia    SSI Before meals and at bed time + Low Carb Diet   Follow up and revaluate if need Glargine

## 2024-03-17 NOTE — ASSESSMENT & PLAN NOTE
RLL Pneumonia on CT chest ; with SIRS criteria and hypoxemia on admission   Continue ceftriaxone  See sepsis A&P   Supplemental O2 and wean for goal > 90%   Mucinex   Speech eval given possible aspiration etiology   Trend vitals and wbc     Questionable if patient has dysphagia ; was npo on admission ; 3/17 asking (and the family) to restart diet as last meal was 3/15 and denies hx of dysphagia ; will start low carb dysphagia diet mechanical soft with nectar thick for now till speech eval is completed ; per RN patient had no difficulty taking her medications 3/17 AM     Also patient and family was advised that after treating the pneumonia, recommend repeating imaging in 2-3 months to ensure resolution as underlying lung abnormality infection/malignancy need to be ruled out. They verbalize understanding . No B symptoms at the moment. Patient is never smoker.     For completeness will check D-Dimer , and evaluate response to IV Abx , only if not improving may consider V/Q scan (has CKD IIIB - IV) to rule out PE if clinically suspected; at the moment PNA as evident on the Xray well explains the patient's symptoms and too early to  ABX response.

## 2024-03-17 NOTE — ASSESSMENT & PLAN NOTE
Lab Results   Component Value Date    EGFR 32 03/17/2024    EGFR 27 03/16/2024    EGFR 26 (L) 11/17/2023    CREATININE 1.43 (H) 03/17/2024    CREATININE 1.64 (H) 03/16/2024    CREATININE 1.82 (H) 11/17/2023     Follows up with nephrology as outpatient  Baseline creatinine appears to be 1.6-1.9 as per nephrology, EGFR in mid 20s  Monitor BMP at risk for ATN with sepsis  IV hydration

## 2024-03-17 NOTE — PLAN OF CARE
Problem: Potential for Falls  Goal: Patient will remain free of falls  Description: INTERVENTIONS:  - Educate patient/family on patient safety including physical limitations  - Instruct patient to call for assistance with activity   - Consult OT/PT to assist with strengthening/mobility   - Keep Call bell within reach  - Keep bed low and locked with side rails adjusted as appropriate  - Keep care items and personal belongings within reach  - Initiate and maintain comfort rounds  - Make Fall Risk Sign visible to staff  - Offer Toileting every 2 Hours, in advance of need  - Initiate/Maintain bed/chair alarm  - Obtain necessary fall risk management equipment: walker  - Apply yellow socks and bracelet for high fall risk patients  - Consider moving patient to room near nurses station  Outcome: Progressing     Problem: SAFETY ADULT  Goal: Patient will remain free of falls  Description: INTERVENTIONS:  - Educate patient/family on patient safety including physical limitations  - Instruct patient to call for assistance with activity   - Consult OT/PT to assist with strengthening/mobility   - Keep Call bell within reach  - Keep bed low and locked with side rails adjusted as appropriate  - Keep care items and personal belongings within reach  - Initiate and maintain comfort rounds  - Make Fall Risk Sign visible to staff  - Offer Toileting every 2 Hours, in advance of need  - Initiate/Maintain bed/chair alarm  - Obtain necessary fall risk management equipment: walker  - Apply yellow socks and bracelet for high fall risk patients  - Consider moving patient to room near nurses station  Outcome: Progressing  Goal: Maintain or return to baseline ADL function  Description: INTERVENTIONS:  -  Assess patient's ability to carry out ADLs; assess patient's baseline for ADL function and identify physical deficits which impact ability to perform ADLs (bathing, care of mouth/teeth, toileting, grooming, dressing, etc.)  - Assess/evaluate  cause of self-care deficits   - Assess range of motion  - Assess patient's mobility; develop plan if impaired  - Assess patient's need for assistive devices and provide as appropriate  - Encourage maximum independence but intervene and supervise when necessary  - Involve family in performance of ADLs  - Assess for home care needs following discharge   - Consider OT consult to assist with ADL evaluation and planning for discharge  - Provide patient education as appropriate  Outcome: Progressing  Goal: Maintains/Returns to pre admission functional level  Description: INTERVENTIONS:  - Perform AM-PAC 6 Click Basic Mobility/ Daily Activity assessment daily.  - Set and communicate daily mobility goal to care team and patient/family/caregiver.   - Collaborate with rehabilitation services on mobility goals if consulted  - Perform Range of Motion 3 times a day.  - Reposition patient every 2 hours.  - Dangle patient 3 times a day  - Stand patient 3 times a day  - Ambulate patient 3 times a day  - Out of bed to chair 3 times a day   - Out of bed for meals 3 times a day  - Out of bed for toileting  - Record patient progress and toleration of activity level   Outcome: Progressing     Problem: DISCHARGE PLANNING  Goal: Discharge to home or other facility with appropriate resources  Description: INTERVENTIONS:  - Identify barriers to discharge w/patient and caregiver  - Arrange for needed discharge resources and transportation as appropriate  - Identify discharge learning needs (meds, wound care, etc.)  - Arrange for interpretive services to assist at discharge as needed  - Refer to Case Management Department for coordinating discharge planning if the patient needs post-hospital services based on physician/advanced practitioner order or complex needs related to functional status, cognitive ability, or social support system  Outcome: Progressing     Problem: Knowledge Deficit  Goal: Patient/family/caregiver demonstrates  understanding of disease process, treatment plan, medications, and discharge instructions  Description: Complete learning assessment and assess knowledge base.  Interventions:  - Provide teaching at level of understanding  - Provide teaching via preferred learning methods  Outcome: Progressing     Problem: Prexisting or High Potential for Compromised Skin Integrity  Goal: Skin integrity is maintained or improved  Description: INTERVENTIONS:  - Identify patients at risk for skin breakdown  - Assess and monitor skin integrity  - Assess and monitor nutrition and hydration status  - Monitor labs   - Assess for incontinence   - Turn and reposition patient  - Assist with mobility/ambulation  - Relieve pressure over bony prominences  - Avoid friction and shearing  - Provide appropriate hygiene as needed including keeping skin clean and dry  - Evaluate need for skin moisturizer/barrier cream  - Collaborate with interdisciplinary team   - Patient/family teaching  - Consider wound care consult   Outcome: Progressing     Problem: Nutrition/Hydration-ADULT  Goal: Nutrient/Hydration intake appropriate for improving, restoring or maintaining nutritional needs  Description: Monitor and assess patient's nutrition/hydration status for malnutrition. Collaborate with interdisciplinary team and initiate plan and interventions as ordered.  Monitor patient's weight and dietary intake as ordered or per policy. Utilize nutrition screening tool and intervene as necessary. Determine patient's food preferences and provide high-protein, high-caloric foods as appropriate.     INTERVENTIONS:  - Monitor oral intake, urinary output, labs, and treatment plans  - Assess nutrition and hydration status and recommend course of action  - Evaluate amount of meals eaten  - Assist patient with eating if necessary   - Allow adequate time for meals  - Recommend/ encourage appropriate diets, oral nutritional supplements, and vitamin/mineral supplements  -  Order, calculate, and assess calorie counts as needed  - Recommend, monitor, and adjust tube feedings and TPN/PPN based on assessed needs  - Assess need for intravenous fluids  - Provide specific nutrition/hydration education as appropriate  - Include patient/family/caregiver in decisions related to nutrition  Outcome: Progressing

## 2024-03-17 NOTE — ASSESSMENT & PLAN NOTE
SIRS criteria met : tachycardia, tachypnea, leukocytosis  Suspected source: Pneumonia  CXR: Lower lobe pneumonia  UA not suggestive UTI    CT: None  Continue to trend leukocytosis and fever curve  Procalcitonin: 18  Lactic acid: 2.5  S/P 1L IVF, Continue IVF at 100cc/hr  Will need additional fluid depending on lactate and blood pressure  Blood Cultures pending   Continue on Rocephin, adjust pending culture data & Clinical course

## 2024-03-17 NOTE — SPEECH THERAPY NOTE
Speech Language/Pathology  Speech-Language Pathology Bedside Swallow Evaluation      Patient Name: Ena Ahumada    Today's Date: 3/17/2024     Problem List  Principal Problem:    Sepsis (formerly [facility])  Active Problems:    Type 2 diabetes mellitus with stage 4 chronic kidney disease, without long-term current use of insulin (HCC)    Anxiety    Stage 3b chronic kidney disease (CKD) (formerly [facility])    Nonrheumatic aortic valve stenosis    Chronic pain syndrome    Pneumonia    Hypokalemia      Past Medical History  Past Medical History:   Diagnosis Date    Anxiety     Aortic valve insufficiency     Arthritis     Cataract of both eyes     Chronic kidney disease     Dysuria     last assessed - [id number]    Edema     last assessed - [id number]    GERD (gastroesophageal reflux disease)     last assessed - [id number]    Hyperlipidemia     Hypertension     Low back pain     last assessed - [id number]    Mitral valve disorder     Osteoporosis     last assessed - [id number]    Palpitations        Past Surgical History  Past Surgical History:   Procedure Laterality Date    APPENDECTOMY      CATARACT EXTRACTION Bilateral     COLONOSCOPY      TUBAL LIGATION Bilateral        Summary   The patient presents with a functional oropharyngeal swallow.  No overt s/s aspiration or penetration noted. She describes food sticking in her mid chest/esophageal area approximately 1x/month.  She states that she experiences significant pain even after she is able to wash it with liquids/clear it.  No difficulty or sensation of sticking this date. The patient desires a GI work up following discharge to r/o stricture.  The family is agreeable.  Maximal support provided.  The results and recommendations were reviewed with the pt, Nurse, family, and MD. Cannot rule out aspiration of vomited material vs. secretions. Will sign off. Reconsult as needed.     Recommended Diet: regular diet and thin liquids   Recommended Form of Meds: whole with liquid- the nurse reports the  "pt tolerated earlier this date.   Aspiration precautions and swallowing strategies: upright posture and alternating bites and sips; moist solids (extra gravies, sauces, and condiments)    Current Medical Status  Charting reports, Pt is a 88 y.o. female who presented to [facility] with significant generalized weakness with nausea and vomited once or twice around 4 AM. She was too weak to walk back to her bed. She layed down on the bathroom floor around 4 AM. Patient denies syncope and injury. When family arrived at around 11 AM they found her. Patient has history of aortic valve stenosis, chronic kidney disease, hypertension, hyperlipidemia, diabetes and chronic pain syndrome She had increase in her low back and bilateral leg pain last night.     Current Precautions:  Fall  Aspiration    Allergies:  No known food allergies    Past medical history:  Please see H&P for details    Special Studies:  CXR 3/16/24 IMPRESSION:   Right lower lobe predominant pneumonia.    Social/Education/Vocational Hx:  Pt lives alone with family assistance.      Swallow Information   Current Risks for Dysphagia & Aspiration:  reports of difficulty swallowing.  The family indicates that the pt has not mentioned this before.  The pt states that approximately once a month she experiences food sticking in the mid chest (pt points to below the sternum).  She is able to clear the sensation with a  liquid wash.  She reports that the discomfort and pain even once it is cleared is significant.  The patient describes this as \"happening with anything\".  She reports that it has occurred with pasta, meat, and peas before.   Current Symptoms/Concerns:  R PNA  Current Diet: mechanically altered/level 2 diet and nectar thick liquids; The patient was NPO however the family was anxious for the pt to start eating so a modified diet was ordered.   Baseline Diet: regular diet and thin liquids    Baseline Assessment   Behavior/Cognition: " alert; speech fluent and intelligible.   Speech/Language Status: able to participate in conversation and able to follow commands  Patient Positioning: upright in chair  Pain Status/Interventions/Response to Interventions: No report of or nonverbal indications of pain.     Swallow Mechanism Exam  Facial: symmetrical  Labial: WFL  Lingual: WFL  Velum: symmetrical  Mandible: adequate ROM  Dentition: adequate  Vocal quality:clear/adequate   Volitional Cough: strong/productive   Respiratory Status: 5L NC, pt denies any difficulty breathing.  No increased work of breathing during assessment.     Consistencies Assessed and Performance   Consistencies Administered: Reed cracker pack, 2oz applesauce, 4oz thins via cup and straw.     Oral Stage:   Mastication was adequate and breakdown complete.  Bolus formation and transfer were functional with no significant oral residue noted.  No overt s/s reduced oral control.    Pharyngeal Stage:   Swallowing initiation appeared prompt.  Laryngeal rise was palpated and judged to be within functional limits.  No coughing, throat clearing, change in vocal quality or respiratory status noted today.     Esophageal Concerns:  None reported at this time.  See above for full description of pts reports of food sticking in the mid chest area approximately 1x/month.     Summary and Recommendations (see above)    Results Reviewed with: patient, RN, MD, and family     Treatment Recommended: No, reconsult as needed     Patient Stated Goal:  To get GI work up after discharge.  Family is agreeable.

## 2024-03-18 ENCOUNTER — APPOINTMENT (INPATIENT)
Dept: CT IMAGING | Facility: HOSPITAL | Age: 89
DRG: 871 | End: 2024-03-18
Payer: COMMERCIAL

## 2024-03-18 ENCOUNTER — TELEPHONE (OUTPATIENT)
Dept: FAMILY MEDICINE CLINIC | Facility: HOSPITAL | Age: 89
End: 2024-03-18

## 2024-03-18 LAB
ALBUMIN SERPL BCP-MCNC: 3.2 G/DL (ref 3.5–5)
ALP SERPL-CCNC: 47 U/L (ref 34–104)
ALT SERPL W P-5'-P-CCNC: 14 U/L (ref 7–52)
ANION GAP SERPL CALCULATED.3IONS-SCNC: 7 MMOL/L (ref 4–13)
ANION GAP SERPL CALCULATED.3IONS-SCNC: 9 MMOL/L (ref 4–13)
AST SERPL W P-5'-P-CCNC: 19 U/L (ref 13–39)
BASOPHILS # BLD AUTO: 0.03 THOUSANDS/ÂΜL (ref 0–0.1)
BASOPHILS NFR BLD AUTO: 0 % (ref 0–1)
BILIRUB SERPL-MCNC: 0.47 MG/DL (ref 0.2–1)
BNP SERPL-MCNC: 593 PG/ML (ref 0–100)
BUN SERPL-MCNC: 30 MG/DL (ref 5–25)
BUN SERPL-MCNC: 37 MG/DL (ref 5–25)
CALCIUM ALBUM COR SERPL-MCNC: 8.7 MG/DL (ref 8.3–10.1)
CALCIUM SERPL-MCNC: 7.9 MG/DL (ref 8.4–10.2)
CALCIUM SERPL-MCNC: 8.1 MG/DL (ref 8.4–10.2)
CHLORIDE SERPL-SCNC: 100 MMOL/L (ref 96–108)
CHLORIDE SERPL-SCNC: 103 MMOL/L (ref 96–108)
CO2 SERPL-SCNC: 23 MMOL/L (ref 21–32)
CO2 SERPL-SCNC: 28 MMOL/L (ref 21–32)
CREAT SERPL-MCNC: 1.73 MG/DL (ref 0.6–1.3)
CREAT SERPL-MCNC: 2.26 MG/DL (ref 0.6–1.3)
EOSINOPHIL # BLD AUTO: 0.15 THOUSAND/ÂΜL (ref 0–0.61)
EOSINOPHIL NFR BLD AUTO: 1 % (ref 0–6)
ERYTHROCYTE [DISTWIDTH] IN BLOOD BY AUTOMATED COUNT: 14.6 % (ref 11.6–15.1)
GFR SERPL CREATININE-BSD FRML MDRD: 18 ML/MIN/1.73SQ M
GFR SERPL CREATININE-BSD FRML MDRD: 25 ML/MIN/1.73SQ M
GLUCOSE SERPL-MCNC: 187 MG/DL (ref 65–140)
GLUCOSE SERPL-MCNC: 188 MG/DL (ref 65–140)
GLUCOSE SERPL-MCNC: 200 MG/DL (ref 65–140)
GLUCOSE SERPL-MCNC: 233 MG/DL (ref 65–140)
GLUCOSE SERPL-MCNC: 247 MG/DL (ref 65–140)
GLUCOSE SERPL-MCNC: 393 MG/DL (ref 65–140)
GLUCOSE SERPL-MCNC: 412 MG/DL (ref 65–140)
GLUCOSE SERPL-MCNC: 418 MG/DL (ref 65–140)
GLUCOSE SERPL-MCNC: 429 MG/DL (ref 65–140)
HCT VFR BLD AUTO: 38.1 % (ref 34.8–46.1)
HGB BLD-MCNC: 11.7 G/DL (ref 11.5–15.4)
IMM GRANULOCYTES # BLD AUTO: 0.16 THOUSAND/UL (ref 0–0.2)
IMM GRANULOCYTES NFR BLD AUTO: 1 % (ref 0–2)
LYMPHOCYTES # BLD AUTO: 3.31 THOUSANDS/ÂΜL (ref 0.6–4.47)
LYMPHOCYTES NFR BLD AUTO: 20 % (ref 14–44)
MAGNESIUM SERPL-MCNC: 2.3 MG/DL (ref 1.9–2.7)
MCH RBC QN AUTO: 30.1 PG (ref 26.8–34.3)
MCHC RBC AUTO-ENTMCNC: 30.7 G/DL (ref 31.4–37.4)
MCV RBC AUTO: 98 FL (ref 82–98)
MONOCYTES # BLD AUTO: 1.21 THOUSAND/ÂΜL (ref 0.17–1.22)
MONOCYTES NFR BLD AUTO: 7 % (ref 4–12)
NEUTROPHILS # BLD AUTO: 11.88 THOUSANDS/ÂΜL (ref 1.85–7.62)
NEUTS SEG NFR BLD AUTO: 71 % (ref 43–75)
NRBC BLD AUTO-RTO: 0 /100 WBCS
PLATELET # BLD AUTO: 152 THOUSANDS/UL (ref 149–390)
PMV BLD AUTO: 10.8 FL (ref 8.9–12.7)
POTASSIUM SERPL-SCNC: 3.2 MMOL/L (ref 3.5–5.3)
POTASSIUM SERPL-SCNC: 4.1 MMOL/L (ref 3.5–5.3)
PROCALCITONIN SERPL-MCNC: 26.72 NG/ML
PROT SERPL-MCNC: 5.8 G/DL (ref 6.4–8.4)
RBC # BLD AUTO: 3.89 MILLION/UL (ref 3.81–5.12)
SODIUM SERPL-SCNC: 132 MMOL/L (ref 135–147)
SODIUM SERPL-SCNC: 138 MMOL/L (ref 135–147)
WBC # BLD AUTO: 16.74 THOUSAND/UL (ref 4.31–10.16)

## 2024-03-18 PROCEDURE — 87081 CULTURE SCREEN ONLY: CPT | Performed by: INTERNAL MEDICINE

## 2024-03-18 PROCEDURE — 82948 REAGENT STRIP/BLOOD GLUCOSE: CPT

## 2024-03-18 PROCEDURE — 94668 MNPJ CHEST WALL SBSQ: CPT

## 2024-03-18 PROCEDURE — 97116 GAIT TRAINING THERAPY: CPT

## 2024-03-18 PROCEDURE — 97167 OT EVAL HIGH COMPLEX 60 MIN: CPT

## 2024-03-18 PROCEDURE — 84145 PROCALCITONIN (PCT): CPT | Performed by: INTERNAL MEDICINE

## 2024-03-18 PROCEDURE — 80048 BASIC METABOLIC PNL TOTAL CA: CPT | Performed by: NURSE PRACTITIONER

## 2024-03-18 PROCEDURE — 99233 SBSQ HOSP IP/OBS HIGH 50: CPT | Performed by: INTERNAL MEDICINE

## 2024-03-18 PROCEDURE — 94760 N-INVAS EAR/PLS OXIMETRY 1: CPT

## 2024-03-18 PROCEDURE — 85025 COMPLETE CBC W/AUTO DIFF WBC: CPT | Performed by: STUDENT IN AN ORGANIZED HEALTH CARE EDUCATION/TRAINING PROGRAM

## 2024-03-18 PROCEDURE — 71275 CT ANGIOGRAPHY CHEST: CPT

## 2024-03-18 PROCEDURE — 94003 VENT MGMT INPAT SUBQ DAY: CPT

## 2024-03-18 PROCEDURE — 80053 COMPREHEN METABOLIC PANEL: CPT | Performed by: STUDENT IN AN ORGANIZED HEALTH CARE EDUCATION/TRAINING PROGRAM

## 2024-03-18 PROCEDURE — 99223 1ST HOSP IP/OBS HIGH 75: CPT | Performed by: INTERNAL MEDICINE

## 2024-03-18 PROCEDURE — 83880 ASSAY OF NATRIURETIC PEPTIDE: CPT | Performed by: INTERNAL MEDICINE

## 2024-03-18 PROCEDURE — 97163 PT EVAL HIGH COMPLEX 45 MIN: CPT

## 2024-03-18 PROCEDURE — 83735 ASSAY OF MAGNESIUM: CPT | Performed by: STUDENT IN AN ORGANIZED HEALTH CARE EDUCATION/TRAINING PROGRAM

## 2024-03-18 PROCEDURE — 94640 AIRWAY INHALATION TREATMENT: CPT

## 2024-03-18 RX ORDER — POTASSIUM CHLORIDE 20 MEQ/1
40 TABLET, EXTENDED RELEASE ORAL ONCE
Status: COMPLETED | OUTPATIENT
Start: 2024-03-18 | End: 2024-03-18

## 2024-03-18 RX ORDER — VANCOMYCIN HYDROCHLORIDE 750 MG/150ML
12.5 INJECTION, SOLUTION INTRAVENOUS DAILY PRN
Status: DISCONTINUED | OUTPATIENT
Start: 2024-03-19 | End: 2024-03-20

## 2024-03-18 RX ORDER — LEVALBUTEROL INHALATION SOLUTION 1.25 MG/3ML
1.25 SOLUTION RESPIRATORY (INHALATION)
Status: DISCONTINUED | OUTPATIENT
Start: 2024-03-18 | End: 2024-03-26 | Stop reason: HOSPADM

## 2024-03-18 RX ORDER — CEFEPIME HYDROCHLORIDE 2 G/1
2000 INJECTION, POWDER, FOR SOLUTION INTRAVENOUS EVERY 12 HOURS
Status: DISCONTINUED | OUTPATIENT
Start: 2024-03-18 | End: 2024-03-18 | Stop reason: DRUGHIGH

## 2024-03-18 RX ORDER — INSULIN GLARGINE 100 [IU]/ML
5 INJECTION, SOLUTION SUBCUTANEOUS
Status: DISCONTINUED | OUTPATIENT
Start: 2024-03-18 | End: 2024-03-18

## 2024-03-18 RX ORDER — CEFEPIME HYDROCHLORIDE 1 G/50ML
1000 INJECTION, SOLUTION INTRAVENOUS EVERY 12 HOURS
Status: DISCONTINUED | OUTPATIENT
Start: 2024-03-18 | End: 2024-03-23

## 2024-03-18 RX ORDER — SODIUM CHLORIDE FOR INHALATION 3 %
4 VIAL, NEBULIZER (ML) INHALATION
Status: DISCONTINUED | OUTPATIENT
Start: 2024-03-18 | End: 2024-03-26 | Stop reason: HOSPADM

## 2024-03-18 RX ADMIN — INSULIN LISPRO 1 UNITS: 100 INJECTION, SOLUTION INTRAVENOUS; SUBCUTANEOUS at 09:19

## 2024-03-18 RX ADMIN — HYDROCORTISONE SODIUM SUCCINATE 50 MG: 100 INJECTION, POWDER, FOR SOLUTION INTRAMUSCULAR; INTRAVENOUS at 23:52

## 2024-03-18 RX ADMIN — SERTRALINE 100 MG: 100 TABLET, FILM COATED ORAL at 08:45

## 2024-03-18 RX ADMIN — PRAVASTATIN SODIUM 40 MG: 40 TABLET ORAL at 17:23

## 2024-03-18 RX ADMIN — FAMOTIDINE 10 MG: 20 TABLET ORAL at 08:39

## 2024-03-18 RX ADMIN — HEPARIN SODIUM 5000 UNITS: 5000 INJECTION INTRAVENOUS; SUBCUTANEOUS at 21:05

## 2024-03-18 RX ADMIN — GUAIFENESIN 600 MG: 600 TABLET ORAL at 08:46

## 2024-03-18 RX ADMIN — HYDROCORTISONE SODIUM SUCCINATE 50 MG: 100 INJECTION, POWDER, FOR SOLUTION INTRAMUSCULAR; INTRAVENOUS at 17:23

## 2024-03-18 RX ADMIN — SODIUM CHLORIDE SOLN NEBU 3% 4 ML: 3 NEBU SOLN at 19:43

## 2024-03-18 RX ADMIN — INSULIN LISPRO 2 UNITS: 100 INJECTION, SOLUTION INTRAVENOUS; SUBCUTANEOUS at 17:23

## 2024-03-18 RX ADMIN — POTASSIUM CHLORIDE 40 MEQ: 1500 TABLET, EXTENDED RELEASE ORAL at 08:43

## 2024-03-18 RX ADMIN — INSULIN LISPRO 2 UNITS: 100 INJECTION, SOLUTION INTRAVENOUS; SUBCUTANEOUS at 12:21

## 2024-03-18 RX ADMIN — LEVALBUTEROL HYDROCHLORIDE 1.25 MG: 1.25 SOLUTION RESPIRATORY (INHALATION) at 19:43

## 2024-03-18 RX ADMIN — HYDROCORTISONE SODIUM SUCCINATE 50 MG: 100 INJECTION, POWDER, FOR SOLUTION INTRAMUSCULAR; INTRAVENOUS at 15:00

## 2024-03-18 RX ADMIN — ACETAMINOPHEN 650 MG: 325 TABLET, FILM COATED ORAL at 12:26

## 2024-03-18 RX ADMIN — ASPIRIN 81 MG: 81 TABLET, COATED ORAL at 08:40

## 2024-03-18 RX ADMIN — PREGABALIN 50 MG: 25 CAPSULE ORAL at 08:46

## 2024-03-18 RX ADMIN — CEFEPIME HYDROCHLORIDE 1000 MG: 1 INJECTION, SOLUTION INTRAVENOUS at 21:02

## 2024-03-18 RX ADMIN — CEFEPIME HYDROCHLORIDE 1000 MG: 1 INJECTION, SOLUTION INTRAVENOUS at 08:38

## 2024-03-18 RX ADMIN — IOHEXOL 85 ML: 350 INJECTION, SOLUTION INTRAVENOUS at 01:11

## 2024-03-18 RX ADMIN — HEPARIN SODIUM 5000 UNITS: 5000 INJECTION INTRAVENOUS; SUBCUTANEOUS at 15:00

## 2024-03-18 RX ADMIN — SODIUM CHLORIDE 9 UNITS/HR: 9 INJECTION, SOLUTION INTRAVENOUS at 21:54

## 2024-03-18 RX ADMIN — HEPARIN SODIUM 5000 UNITS: 5000 INJECTION INTRAVENOUS; SUBCUTANEOUS at 05:01

## 2024-03-18 RX ADMIN — NIFEDIPINE 90 MG: 30 TABLET, EXTENDED RELEASE ORAL at 08:40

## 2024-03-18 RX ADMIN — GUAIFENESIN 600 MG: 600 TABLET ORAL at 17:23

## 2024-03-18 RX ADMIN — NEBIVOLOL 20 MG: 5 TABLET ORAL at 08:41

## 2024-03-18 RX ADMIN — PREGABALIN 50 MG: 25 CAPSULE ORAL at 17:23

## 2024-03-18 RX ADMIN — SODIUM CHLORIDE SOLN NEBU 3% 4 ML: 3 NEBU SOLN at 14:25

## 2024-03-18 RX ADMIN — VANCOMYCIN HYDROCHLORIDE 1500 MG: 5 INJECTION, POWDER, LYOPHILIZED, FOR SOLUTION INTRAVENOUS at 09:24

## 2024-03-18 RX ADMIN — LEVALBUTEROL HYDROCHLORIDE 1.25 MG: 1.25 SOLUTION RESPIRATORY (INHALATION) at 14:25

## 2024-03-18 NOTE — ASSESSMENT & PLAN NOTE
Lab Results   Component Value Date    EGFR 32 03/17/2024    EGFR 27 03/16/2024    EGFR 26 (L) 11/17/2023    CREATININE 1.43 (H) 03/17/2024    CREATININE 1.64 (H) 03/16/2024    CREATININE 1.82 (H) 11/17/2023     Follows up with nephrology as outpatient  Baseline creatinine appears to be 1.6-1.9 as per nephrology, EGFR in mid 20s  Monitor BMP at risk for ATN with sepsis

## 2024-03-18 NOTE — CASE MANAGEMENT
Case Management Assessment & Discharge Planning Note    Patient name Ena Ahumada  Location  SDU/-01 S* MRN [id number]  : 1935 Date 3/18/2024       Current Admission Date: 3/16/2024  Current Admission Diagnosis:Sepsis ([facility])   Patient Active Problem List    Diagnosis Date Noted    Hypokalemia 2024    Acute respiratory failure with hypoxia ([facility]) 2024    Sepsis ([facility]) 2024    Pneumonia 2024    Chronic pain syndrome 2023    Degenerative disc disease, cervical 2023    Elevated parathyroid hormone 2023    Nonrheumatic aortic valve stenosis 2023    Proteinuria 2023    Sacroiliitis ([facility])     Herniated nucleus pulposus, L3-4 right     Lumbar foraminal stenosis     Lumbar radiculopathy     Type 2 diabetes mellitus with hyperglycemia, without long-term current use of insulin ([facility]) 2022    Chronic back pain 10/02/2020    Complex renal cyst 2019    Stage 3b chronic kidney disease (CKD) ([facility]) 02/15/2019    Dependent edema 2018    Osteopenia 2017    Type 2 diabetes mellitus with stage 4 chronic kidney disease, without long-term current use of insulin ([facility]) 2016    Premature ventricular contraction 10/03/2013    Rhinitis 10/12/2012    GERD (gastroesophageal reflux disease) 10/12/2012    Diabetic polyneuropathy ([facility]) 2012    Hypokalemia 2012    Hypertension 2012    Dyslipidemia 2012    Anxiety 2012      LOS (days): 2  Geometric Mean LOS (GMLOS) (days): 5.1  Days to GMLOS:3     OBJECTIVE:    Risk of Unplanned Readmission Score: 21.56      Current admission status: Inpatient    Preferred Pharmacy:   [id number] [place] - [address]  [address]  [facility] [address]  Phone: [phone] Fax: [phone]    [id number] [place] - [address]  [address]  [place] [address]  Phone: [phone] Fax:  [phone]    Primary Care Provider: Deanna Castaneda DO    Primary Insurance: BLUE CROSS  REP  Secondary Insurance:     ASSESSMENT:  Active Health Care Proxies       Israel Ahumada Health Care Agent - Son   Primary Phone: [phone] (Home)                 Advance Directives  Does patient have a Health Care POA?: Yes  Does patient have Advance Directives?: Yes  Advance Directives: Living will, Power of  for health care    Readmission Root Cause  30 Day Readmission: No    Patient Information  Admitted from:: Home  Mental Status: Alert  During Assessment patient was accompanied by: Son  Assessment information provided by:: Patient, Son  Primary Caregiver: Self  Support Systems: Family members, Son  County of Residence: [place]  What [facility] do you live in?: [place]  Home entry access options. Select all that apply.: Stairs  Number of steps to enter home.: One Flight  Type of Current Residence: Apartment  Floor Level: 2  Upon entering residence, is there a bedroom on the main floor (no further steps)?: Yes  Upon entering residence, is there a bathroom on the main floor (no further steps)?: Yes  Living Arrangements: Lives w/ Son, Lives w/ Extended Family  Is patient a ?: No    Activities of Daily Living Prior to Admission  Functional Status: Independent  Completes ADLs independently?: Yes  Ambulates independently?: Yes  Does patient use assisted devices?: No  Does patient currently own DME?: No  Does patient have a history of Outpatient Therapy (PT/OT)?: Yes  Does the patient have a history of Short-Term Rehab?: No  Does patient have a history of HHC?: No  Does patient currently have HHC?: No    Patient Information Continued  Does patient have prescription coverage?: Yes  Does patient receive dialysis treatments?: No  Does patient have a history of substance abuse?: No  Does patient have a history of Mental Health Diagnosis?: No    Means of Transportation  [facility]  [facility]      Social Determinants of Health (SDOH)      Flowsheet Row Most Recent Value   Housing Stability    In the last 12 months, was there a time when you were not able to pay the mortgage or rent on time? N   In the last 12 months, how many places have you lived? 1   In the last 12 months, was there a time when you did not have a steady place to sleep or slept in a shelter (including now)? N   Transportation Needs    In the past 12 months, has lack of transportation kept you from medical appointments or from getting medications? no   In the past 12 months, has lack of transportation kept you from meetings, work, or from getting things needed for daily living? No   Food Insecurity    Within the past 12 months, you worried that your food would run out before you got the money to buy more. Never true   Within the past 12 months, the food you bought just didn't last and you didn't have money to get more. Never true   Utilities    In the past 12 months has the electric, gas, oil, or water company threatened to shut off services in your home? No            DISCHARGE DETAILS:    Discharge planning discussed with:: pt and her family  Freedom of Choice: Yes     CM contacted family/caregiver?: Yes  Were Treatment Team discharge recommendations reviewed with patient/caregiver?: Yes  Did patient/caregiver verbalize understanding of patient care needs?: Yes  Were patient/caregiver advised of the risks associated with not following Treatment Team discharge recommendations?: Yes    Requested Home Health Care         Is the patient interested in HHC at discharge?: Yes  Home Health Discipline requested:: Nursing, Occupational Therapy, Physical Therapy  Home Health Agency Name:: [facility]  Home Health Follow-Up Provider:: PCP  Home Health Services Needed:: Evaluate Functional Status and Safety, Strengthening/Theraputic Exercises to Improve Function, Oxygen Via Nasal Cannula, Gait/ADL Training  Oxygen LPM Ordered (if  applicable based on home health services needed)::  (currently on 12 L)  Homebound Criteria Met:: Requires the Assistance of Another Person for Safe Ambulation or to Leave the Home, Uses an Assist Device (i.e. cane, walker, etc)  Supporting Clincal Findings:: Limited Endurance, Requires Oxygen, Fatigues Easliy in Short Distances    DME Referral Provided  Referral made for DME?: No    Other Referral/Resources/Interventions Provided:  Interventions: HHC    Treatment Team Recommendation: Home with Home Health Care  Discharge Destination Plan:: Home with Home Health Care  Transport at Discharge : Family     IMM Given (Date):: 03/18/24  IMM Given to:: Patient  Family notified:: Reviewed with pt and family. Pt's son has copy and signed.  Additional Comments: CM met with pt and her family and reviewed cm role. Pt lives with her son and dtr in law. She resides in an inlaw suite with [address]. She is ind with no use of DME. SHe is not on O2 at baseline. Pt has had outpt PT in the past. Pt has no hx of HHC, STR at SNF,  or D/A tx hx. [facility] [place] [facility]. Family transports and will transport home. PT/OT recommended home health for pt. [facility]

## 2024-03-18 NOTE — ASSESSMENT & PLAN NOTE
Lab Results   Component Value Date    HGBA1C 8.1 (H) 03/16/2024       Recent Labs     03/17/24  1208 03/17/24  1605 03/17/24  1928 03/17/24 2015   POCGLU 140 275* 221* 181*         Blood Sugar Average: Last 72 hrs:  (P) [id number] up with endocrine, home regimen-Invokana, glipizide, Januvia    SSI Before meals and at bed time + Low Carb Diet   Follow up and revaluate if need Glargine

## 2024-03-18 NOTE — ASSESSMENT & PLAN NOTE
3/17 Potassium 3.1 ; Magnesium wnl   Possible due to decreased PO Intake  Replete  Daily CMP and revaluate

## 2024-03-18 NOTE — PROGRESS NOTES
Ena Ahumada is a 88 y.o. female who is currently ordered Vancomycin IV with management by the Pharmacy Consult service.  Relevant clinical data and objective / subjective history reviewed.  Vancomycin Assessment:  Indication and Goal AUC/Trough: Pneumonia (goal -600, trough >10), -600, trough >10  Clinical Status: worsening  Micro:     Renal Function:  SCr: 1.73 mg/dL (CKD 3b; baseline Scr 1.6-1.9)  CrCl: 21.1 mL/min  Renal replacement: Not on dialysis  Days of Therapy: 1  Current Dose: 1250mg IV Q12H Ordered dose  Vancomycin Plan:  New Dosing: changed to 1500mg (25mg/kg) IV once then pulse dosing of 750mg (12.5mg/kg) IV daily prn when random level </= 15  Next Level: With AM labs at 0600 on 03/19/24  Renal Function Monitoring: Daily BMP and UOP  Pharmacy will continue to follow closely for s/sx of nephrotoxicity, infusion reactions and appropriateness of therapy.  BMP and CBC will be ordered per protocol. We will continue to follow the patient’s culture results and clinical progress daily.    Darline Castro, Pharmacist

## 2024-03-18 NOTE — PROGRESS NOTES
[facility]  Progress Note  Name: Ena Ahumada I  MRN: [id number]  Unit/Bed#: -01 I Date of Admission: 3/16/2024   Date of Service: 3/18/2024 I Hospital Day: 2    Assessment/Plan   Acute respiratory failure with hypoxia (HCC)  Assessment & Plan  Acute respiratory failure with hypoxia presents on admission , requiring nasal cannula O2 supplementation ~ 5 Liters which is new for the patient ; most likely cause of hypoxemia is her RLL Pneumonia as evident on Xray Chest.   Requiring mid flow, will increase level of care to stepdown    Hypokalemia  Assessment & Plan  3/17 Potassium 3.1 ; Magnesium wnl   Possible due to decreased PO Intake  Replete  Daily CMP and revaluate     Pneumonia  Assessment & Plan  RLL Pneumonia on CT chest ; with SIRS criteria and hypoxemia   See sepsis A&P   Supplemental O2 and wean for goal > 90%   Mucinex   Speech eval given possible aspiration etiology   Trend vitals and wbc   Patient started on ceftriaxone, increased wheezing oxygen requirements today.  Patient now on 12 L of oxygen  CTA chest done shows right lung pneumonia.  Will broaden antibiotic to cefepime and Vanco, MRSA culture ordered.  Pulmonology consult      Chronic pain syndrome  Assessment & Plan  Follows up with pain medicine  Continue buprenorphine    Nonrheumatic aortic valve stenosis  Assessment & Plan  Follows up with cardiology, last echo in April 2023  Echo-LVEF 65%, grade 1 diastolic dysfunction.  Moderate aortic stenosis  Monitor volume status  Continue home medications with blood pressure parameters    Stage 3b chronic kidney disease (CKD) ([facility])  Assessment & Plan  Lab Results   Component Value Date    EGFR 32 03/17/2024    EGFR 27 03/16/2024    EGFR 26 (L) 11/17/2023    CREATININE 1.43 (H) 03/17/2024    CREATININE 1.64 (H) 03/16/2024    CREATININE 1.82 (H) 11/17/2023     Follows up with nephrology as outpatient  Baseline creatinine appears to be 1.6-1.9 as per nephrology, EGFR  in mid 20s  Monitor BMP at risk for ATN with sepsis        Anxiety  Assessment & Plan  Continue Zoloft    Type 2 diabetes mellitus with stage 4 chronic kidney disease, without long-term current use of insulin (HCC)  Assessment & Plan  Lab Results   Component Value Date    HGBA1C 8.1 (H) 03/16/2024       Recent Labs     03/17/24  1208 03/17/24  1605 03/17/24  1928 03/17/24 2015   POCGLU 140 275* 221* 181*         Blood Sugar Average: Last 72 hrs:  (P) [id number] up with endocrine, home regimen-Invokana, glipizide, Januvia    SSI Before meals and at bed time + Low Carb Diet   Follow up and revaluate if need Glargine            * Sepsis (HCC)  Assessment & Plan  SIRS criteria met : tachycardia, tachypnea, leukocytosis  Suspected source: Pneumonia  CXR: Lower lobe pneumonia  UA not suggestive UTI    CT: None  Continue to trend leukocytosis and fever curve  Procalcitonin: 18  Lactic acid: 2.5-resolved    Will need additional fluid depending on lactate and blood pressure  Blood Cultures pending   Will broaden antibiotic to cefepime and Vanco pending MRSA culture             VTE Pharmacologic Prophylaxis: VTE Score: 8 High Risk (Score >/= 5) - Pharmacological DVT Prophylaxis Ordered: heparin. Sequential Compression Devices Ordered.    Mobility:   Basic Mobility Inpatient Raw Score: 22  JH-HLM Goal: 7: Walk 25 feet or more  JH-HLM Achieved: 7: Walk 25 feet or more  JH-HLM Goal achieved. Continue to encourage appropriate mobility.    Patient Centered Rounds: I performed bedside rounds with nursing staff today.   Discussions with Specialists or Other Care Team Provider: Respiratory    Education and Discussions with Family / Patient: Updated  (daughter in law) via phone.    Total Time Spent on Date of Encounter in care of patient: 55 mins. This time was spent on one or more of the following: performing physical exam; counseling and coordination of care; obtaining or reviewing history;  documenting in the medical record; reviewing/ordering tests, medications or procedures; communicating with other healthcare professionals and discussing with patient's family/caregivers.    Current Length of Stay: 2 day(s)  Current Patient Status: Inpatient   Certification Statement: The patient will continue to require additional inpatient hospital stay due to acute respiratory failure with hypoxia  Discharge Plan:  Not stable yet    Code Status: Level 1 - Full Code    Subjective:   Patient seen bedside eating breakfast.  Saturating 81 to 87% on 10 L of oxygen.  States she does not feel shortness of breath however increased work of breathing, tachypneic with talking.    Objective:     Vitals:   Temp (24hrs), Av.1 °F (37.3 °C), Min:97.5 °F (36.4 °C), Max:101.6 °F (38.7 °C)    Temp:  [97.5 °F (36.4 °C)-101.6 °F (38.7 °C)] 98.4 °F (36.9 °C)  HR:  [71-83] 78  Resp:  [18-20] 20  BP: (117-151)/(44-65) 117/44  SpO2:  [82 %-97 %] 91 %  Body mass index is 30.24 kg/m².     Input and Output Summary (last 24 hours):     Intake/Output Summary (Last 24 hours) at 3/18/2024 0837  Last data filed at 3/18/2024 0713  Gross per 24 hour   Intake 1220 ml   Output 1850 ml   Net -630 ml       Physical Exam:   Physical Exam     Gen.-Patient comfortable   Neck- Supple. No thyromegaly or lymphadenopathy  Lungs-rhonchi right  Heart S1-S2, regular rate and rhythm, no murmurs  Abdomen-soft nontender, no organomegaly. Bowel sounds present  Extremities-no cyanosi,  clubbing or edema  Skin- no rash  Neuro-nonfocal     Additional Data:     Labs:  Results from last 7 days   Lab Units 24  0316   WBC Thousand/uL 16.74*   HEMOGLOBIN g/dL 11.7   HEMATOCRIT % 38.1   PLATELETS Thousands/uL 152   NEUTROS PCT % 71   LYMPHS PCT % 20   MONOS PCT % 7   EOS PCT % 1     Results from last 7 days   Lab Units 24  0316   SODIUM mmol/L 138   POTASSIUM mmol/L 3.2*   CHLORIDE mmol/L 103   CO2 mmol/L 28   BUN mg/dL 30*   CREATININE mg/dL 1.73*   ANION  GAP mmol/L 7   CALCIUM mg/dL 8.1*   ALBUMIN g/dL 3.2*   TOTAL BILIRUBIN mg/dL 0.47   ALK PHOS U/L 47   ALT U/L 14   AST U/L 19   GLUCOSE RANDOM mg/dL 187*     Results from last 7 days   Lab Units 03/16/24  1301   INR  1.07     Results from last 7 days   Lab Units 03/18/24  0759 03/18/24  0120 03/17/24  2015 03/17/24  1928 03/17/24  1605 03/17/24  1208 03/17/24  0719 03/17/24  0004 03/16/24  1610   POC GLUCOSE mg/dl 200* 188* 181* 221* 275* 140 132 159* 282*     Results from last 7 days   Lab Units 03/16/24  1620   HEMOGLOBIN A1C % 8.1*     Results from last 7 days   Lab Units 03/17/24  0606 03/16/24  1620 03/16/24  1301   LACTIC ACID mmol/L  --  1.7 2.5*   PROCALCITONIN ng/ml 25.97*  --  18.29*       Lines/Drains:  Invasive Devices       Peripheral Intravenous Line  Duration             Peripheral IV 03/16/24 Right Antecubital 1 day                          Imaging: Reviewed radiology reports from this admission including: chest CT scan and Personally reviewed the following imaging: chest CT scan-reviewed personally, right-sided groundglass opacities    Recent Cultures (last 7 days):   Results from last 7 days   Lab Units 03/16/24  1301   BLOOD CULTURE  No Growth at 24 hrs.  No Growth at 24 hrs.       Last 24 Hours Medication List:   Current Facility-Administered Medications   Medication Dose Route Frequency Provider Last Rate    acetaminophen  650 mg Oral Q6H PRN Kira Holbrook PA-C      aspirin  81 mg Oral Daily Toay Parekh MD      cefepime  1,000 mg Intravenous Q12H Toya Parekh MD      [START ON 3/20/2024] cloNIDine  1 patch Transdermal Weekly Toya Parekh MD      famotidine  10 mg Oral Daily Toya Parekh MD      guaiFENesin  600 mg Oral BID Toya Parekh MD      heparin (porcine)  5,000 Units Subcutaneous Q8H [facility] Toya Parekh MD      insulin lispro  1-5 Units Subcutaneous 4x Daily (AC & HS) Buster Thompson DO      nebivolol  20 mg Oral Daily Toya Parekh MD      NIFEdipine  90 mg Oral Daily  Toya Parekh MD      potassium chloride  40 mEq Oral Once Toya Parekh MD      pravastatin  40 mg Oral Daily With Dinner Toya Parekh MD      pregabalin  50 mg Oral BID Toya Parekh MD      sertraline  100 mg Oral Daily Toya Parekh MD      transdermal buprenorphine  1 patch Transdermal Q7 Days Toya Parekh MD      vancomycin  25 mg/kg (Adjusted) Intravenous Once Toya Parekh MD          Today, Patient Was Seen By: Toya Parekh MD    **Please Note: This note may have been constructed using a voice recognition system.**

## 2024-03-18 NOTE — RESPIRATORY THERAPY NOTE
Patient POX 85% on 6L NC, diminished B/L breath sounds crackles on R side, denies SOB, placed on 12L MFNC to maintain pox >92%, encouraged IS achieving 750ml.

## 2024-03-18 NOTE — PHYSICAL THERAPY NOTE
PHYSICAL THERAPY EVALUATION NOTE      Patient Name: Ena Ahumada  Today's Date: 3/18/2024    AGE:   88 y.o.  Mrn:   [id number]  ADMIT DX:  Vomiting [R11.10]  Sepsis (HCC) [A41.9]  Acute respiratory failure with hypoxemia (HCC) [J96.01]  Community acquired pneumonia of right lung, unspecified part of lung [J18.9]    Past Medical History:   Diagnosis Date    Anxiety     Aortic valve insufficiency     Arthritis     Cataract of both eyes     Chronic kidney disease     Dysuria     last assessed - [id number]    Edema     last assessed - 2015    GERD (gastroesophageal reflux disease)     last assessed - 2012    Hyperlipidemia     Hypertension     Low back pain     last assessed - 2012    Mitral valve disorder     Osteoporosis     last assessed - [id number]    Palpitations      Length Of Stay: 2  PHYSICAL THERAPY EVALUATION :   Patient's identity confirmed via 2 patient identifiers (full name and ) at start of session       24 1313   PT Last Visit   PT Visit Date 24   Note Type   Note type Evaluation   Pain Assessment   Pain Assessment Tool 0-10   Pain Score No Pain   Restrictions/Precautions   Weight Bearing Precautions Per Order No   Other Precautions Bed Alarm;Multiple lines;Telemetry;O2;Fall Risk  (12L MFNC)   Home Living   Type of Home Apartment  (in-law suite of son's home)   Home Layout One level;Stairs to enter with rails  ([address] her in-law suite)   Bathroom Shower/Tub Walk-in shower   Home Equipment   (no DME PTA)   Prior Function   Level of [place] Independent with ADLs;Independent with functional mobility;Independent with IADLS   Lives With Alone;Son   Receives Help From Family;Friend(s)   IADLs Independent with medication management;Independent with meal prep;Family/Friend/Other provides transportation   Falls in the last 6 months 0   Vocational Retired   Comments Pt and family report patient  being very independent PTA, was cooking most of the day on Friday   General   Additional Pertinent History Pt sating 91% at start of session, lowesst was 81% after ambulation, quickly improved to >89%   Family/Caregiver Present Yes  (2 sons, 2 DIL, 1 granddaughter)   Cognition   Overall Cognitive Status WFL   Arousal/Participation Alert   Orientation Level Oriented X4   Memory Within functional limits   Following Commands Follows all commands and directions without difficulty   Comments Pt very pleasant and agreeable, motivated to participate   RLE Assessment   RLE Assessment WFL   Strength RLE   RLE Overall Strength 4-/5   LLE Assessment   LLE Assessment WFL   Strength LLE   LLE Overall Strength 4-/5   Vision-Basic Assessment   Current Vision Wears glasses all the time   Bed Mobility   Supine to Sit Unable to assess   Additional Comments Pt OOB in recliner chair at start of session   Transfers   Sit to Stand 5  Supervision   Additional items Assist x 1   Stand to Sit 5  Supervision   Additional items Assist x 1   Toilet transfer 5  Supervision   Additional items Assist x 1   Ambulation/Elevation   Gait pattern Decreased foot clearance;Short stride   Gait Assistance 4  Minimal assist   Additional items Assist x 1   Assistive Device Other (Comment)  (B HHA)   Distance 15 ft   Balance   Static Sitting Good   Static Standing Fair +   Ambulatory Fair   Activity Tolerance   Activity Tolerance Treatment limited secondary to medical complications (Comment)  (SpO2 drop)   Medical Staff Made Aware OT Felecia   Nurse Made Aware RN Marie   Assessment   Problem List Decreased endurance;Decreased mobility;Impaired balance   Assessment Ena Ahumada is a 88 y.o. Female who [facility] on [facility]16/2024 from home w/ c/o SOB and weakness and diagnosis of sepsis. Orders for PT eval and treat received. Pt presents w/ comorbidities of DMII, anxiety, CKD Stage 3, CPS, osteopenia. At baseline, pt mobilizes independently w/ no AD,  and reports 0 falls in the last 6 months. Upon evaluation, pt presents w/ the following deficits: weakness, impaired balance, and decreased endurance. Upon eval, pt requires supervision for transfers, and min A x 1 for gait. Based on this PT evaluation today, patient's discharge recommendation is for Level III pending continued medical optimization. During this admission, pt would benefit from continued skilled inpatient PT in the acute care setting in order to address the abovementioned deficits to maximize function and mobility before DC from acute care.   Goals   Patient Goals to go home, return to previous level of I   STG Expiration Date 03/28/24   Short Term Goal #1 Patient will: Perform all bed mobility tasks modified independent to improve pt's independence w/ repositioning for decrease risk of skin breakdown, Perform all transfers modified independent consistently from various height surfaces in order to improve I w/ engagement w/ real-world environments/situations, Ambulate at least 100 ft. +/- LRAD w/ supervision w/o LOB to facilitate return and engagement w/ previous living environment, and Navigate 1 flight of stairs w/ supervision with unilateral handrail to either improve independence w/ entering home and/or so patient can fully access living areas in home   PT Treatment Day 0   Plan   Treatment/Interventions Functional transfer training;LE strengthening/ROM;Elevations;Therapeutic exercise;Endurance training;Patient/family training;Equipment eval/education;Bed mobility;Gait training   PT Frequency 3-5x/wk   Discharge Recommendation   Rehab Resource Intensity Level, PT III (Minimum Resource Intensity)   Equipment Recommended Walker  (TBD if will need upon DC)   Additional Comments DCR pending anticipated improvement in functional mobility w/ medical optimization.   AM-PAC Basic Mobility Inpatient   Turning in Flat Bed Without Bedrails 4   Lying on Back to Sitting on Edge of Flat Bed Without Bedrails 4    Moving Bed to Chair 3   Standing Up From Chair Using Arms 3   Walk in Room 3   Climb 3-5 Stairs With Railing 3   Basic Mobility Inpatient Raw Score 20   Basic Mobility Standardized Score 43.99   [facility] Highest Level Of Mobility   -HLM Goal 6: Walk 10 steps or more   JH-HLM Achieved 7: Walk 25 feet or more   Additional Treatment Session   Start Time 1323   End Time 1331   Treatment Assessment Pt OOB in bathroom w/ OT, pleasant and agreeable to participate in PT intervention. She is provided w/ RW for energy conservation and improve independence w/ mobility. She is able to ambulate w/ CGA w/ RW for 20 ft back to recliner chair. Provided VC for proper RW management as she does not use one at baseline. Pt seated OOB in recliner chair, noted short drop to 81% on 12L MFNC that improves to >88% within 1 min. Provided education and verbal cues on diaphragmatic breathing   Equipment Use RW   Additional Treatment Day 1   End of Consult   Patient Position at End of Consult Bedside chair;All needs within reach       The patient's AM-PAC Basic Mobility Inpatient Short Form Raw Score is 20, Standardized Score is 43.99. A standardized score greater than 38.32 (raw score of 16) suggests the patient may benefit from discharge to home which may not coincide with above PT recommendations. However please refer to therapist recommendation for discharge planning given other factors that may influence destination.    Pt would benefit from skilled inpatient PT during this admission in order to facilitate progress towards goals to maximize functional independence    Estela Serrano PT, DPT

## 2024-03-18 NOTE — CONSULTS
Consultation - Pulmonary Medicine   Ena Ahumada 88 y.o. female MRN: [id number]  Unit/Bed#: -01 SDU Encounter: [id number]      Assessment:  Acute respiratory failure with hypoxia  Sepsis  Pneumonia  CKD    Plan:   - acute respiratory failure with hypoxia likely due to RLL pneumonia, suspected due to aspiration prior to admission.   - currently on 12 L of oxygen via nasal cannula, sats about 92% during exam and maintaining saturations >90%   - CTA chest shows right lung pneumonia, no evidence of PE.   - abx regimen includes cefepime and Vanco pending cultures  - will add nebulizer to help loosen cough to aid sputum expression.  - will add sputum culture  - consider repeat speech evaluation  - will continue to monitor and follow      Discussed with SLIM - Dr. Parekh & patient's son at bedside    History of Present Illness   Physician Requesting Consult: Toya Parekh MD  Reason for Consult / Principal Problem: RLL pneumonia, hypoxia  Hx and PE limited by: none  HPI: Ena Ahumada is a 88 y.o. year old female with a history of DM II, CKD stage 4, aortic valve stenosis, HTN, HLD, and GERD who presents with acute respiratory failure with hypoxia most likely due to a RLL pneumonia, suspected from aspiration during her episodes of vomiting prior to admission. She was initially on 5 L of oxygen at admission then increased to 12 L while febrile last night and unable to wean back down. She complains of fatigue due to inadequate rest overnight. She reports an occasional cough but feels she is not strong enough to bring anything up. She denies experiencing any shortness of breath, difficulty breathing, or wheeze currently. She reports significant second hand smoke exposure as a child but reports no known lung concerns or history of copd, asthma, pneumonia, and reports no prior or current use of tobacco.     Inpatient consult to Pulmonology  Consult performed by: Danielle Andrews PA-C  Consult ordered by:  Kira Holbrook PA-C          Review of Systems   Constitutional:  Positive for fatigue. Negative for chills, diaphoresis and fever.   HENT: Negative.     Eyes: Negative.    Respiratory:  Positive for cough (occasional). Negative for shortness of breath and wheezing.    Cardiovascular: Negative.  Negative for chest pain and leg swelling.   Gastrointestinal: Negative.    Genitourinary: Negative.    Musculoskeletal: Negative.    Neurological: Negative.    Psychiatric/Behavioral: Negative.     All other systems reviewed and are negative.      Historical Information   Past Medical History:   Diagnosis Date    Anxiety     Aortic valve insufficiency     Arthritis     Cataract of both eyes     Chronic kidney disease     Dysuria     last assessed - [id number]    Edema     last assessed - [id number]    GERD (gastroesophageal reflux disease)     last assessed - [id number]    Hyperlipidemia     Hypertension     Low back pain     last assessed - [id number]    Mitral valve disorder     Osteoporosis     last assessed - [id number]    Palpitations      Past Surgical History:   Procedure Laterality Date    APPENDECTOMY      CATARACT EXTRACTION Bilateral     COLONOSCOPY      TUBAL LIGATION Bilateral      Social History   Social History     Substance and Sexual Activity   Alcohol Use Never     Social History     Substance and Sexual Activity   Drug Use Never     E-Cigarette/Vaping    E-Cigarette Use Never User      E-Cigarette/Vaping Substances    Nicotine No     THC No     CBD No     Flavoring No     Other No     Unknown No      Social History     Tobacco Use   Smoking Status Never   Smokeless Tobacco Never     Occupational History:     Family History:   Family History   Problem Relation Age of Onset    Kidney disease Mother         Chronic    No Known Problems Father     Breast cancer Sister     Diabetes Sister     Cancer Sister     Breast cancer Sister     Hypertension Sister     No Known Problems Daughter     No Known Problems Son     No  "Known Problems Son     No Known Problems Son     No Known Problems Son        Meds/Allergies   all current active meds have been reviewed, pertinent pulmonary meds have been reviewed, and current meds:   Current Facility-Administered Medications   Medication Dose Route Frequency    acetaminophen (TYLENOL) tablet 650 mg  650 mg Oral Q6H PRN    aspirin (ECOTRIN LOW STRENGTH) EC tablet 81 mg  81 mg Oral Daily    cefepime (MAXIPIME) IVPB (premix in dextrose) 1,000 mg 50 mL  1,000 mg Intravenous Q12H    [START ON 3/20/2024] cloNIDine (CATAPRES-TTS-1) 0.1 mg/24 hr TD weekly patch  1 patch Transdermal Weekly    famotidine (PEPCID) tablet 10 mg  10 mg Oral Daily    guaiFENesin (MUCINEX) 12 hr tablet 600 mg  600 mg Oral BID    heparin (porcine) subcutaneous injection 5,000 Units  5,000 Units Subcutaneous Q8H JONO    insulin lispro (HumALOG/ADMELOG) 100 units/mL subcutaneous injection 1-5 Units  1-5 Units Subcutaneous 4x Daily (AC & HS)    nebivolol (BYSTOLIC) tablet 20 mg  20 mg Oral Daily    NIFEdipine (PROCARDIA XL) 24 hr tablet 90 mg  90 mg Oral Daily    pravastatin (PRAVACHOL) tablet 40 mg  40 mg Oral Daily With Dinner    pregabalin (LYRICA) capsule 50 mg  50 mg Oral BID    sertraline (ZOLOFT) tablet 100 mg  100 mg Oral Daily    transdermal buprenorphine (BUTRANS) 7.5 mcg/hr TD patch 1 patch  1 patch Transdermal Q7 Days    [START ON 3/19/2024] vancomycin (VANCOCIN) IVPB (premix in dextrose) 750 mg 150 mL  12.5 mg/kg (Adjusted) Intravenous Daily PRN       No Known Allergies    Objective   Vitals: Blood pressure 132/60, pulse 69, temperature 98.7 °F (37.1 °C), temperature source Oral, resp. rate 18, height 5' 2\" (1.575 m), weight 75 kg (165 lb 5.5 oz), SpO2 92%, not currently breastfeeding.,Body mass index is 30.24 kg/m².    Intake/Output Summary (Last 24 hours) at 3/18/2024 1048  Last data filed at 3/18/2024 0930  Gross per 24 hour   Intake 1460 ml   Output 1450 ml   Net 10 ml     Invasive Devices       Peripheral " Intravenous Line  Duration             Peripheral IV 03/16/24 Right Antecubital 1 day                    Physical Exam  Vitals and nursing note reviewed.   Constitutional:       General: She is not in acute distress.     Appearance: Normal appearance. She is not ill-appearing.   HENT:      Head: Normocephalic and atraumatic.   Eyes:      Extraocular Movements: Extraocular movements intact.      Conjunctiva/sclera: Conjunctivae normal.   Cardiovascular:      Rate and Rhythm: Normal rate and regular rhythm.      Heart sounds: Normal heart sounds.   Pulmonary:      Effort: Pulmonary effort is normal.      Breath sounds: Rhonchi (b/l lower lobes) present. No wheezing or rales.   Abdominal:      General: Abdomen is flat.      Palpations: Abdomen is soft.   Musculoskeletal:      Right lower leg: No edema.      Left lower leg: No edema.   Skin:     General: Skin is warm and dry.      Capillary Refill: Capillary refill takes less than 2 seconds.   Neurological:      Mental Status: She is alert.         Lab Results: I have personally reviewed pertinent lab results., CBC:   Lab Results   Component Value Date    WBC 16.74 (H) 03/18/2024    HGB 11.7 03/18/2024    HCT 38.1 03/18/2024    MCV 98 03/18/2024     03/18/2024    RBC 3.89 03/18/2024    MCH 30.1 03/18/2024    MCHC 30.7 (L) 03/18/2024    RDW 14.6 03/18/2024    MPV 10.8 03/18/2024    NRBC 0 03/18/2024   , CMP:   Lab Results   Component Value Date    SODIUM 138 03/18/2024    K 3.2 (L) 03/18/2024     03/18/2024    CO2 28 03/18/2024    BUN 30 (H) 03/18/2024    CREATININE 1.73 (H) 03/18/2024    CALCIUM 8.1 (L) 03/18/2024    AST 19 03/18/2024    ALT 14 03/18/2024    ALKPHOS 47 03/18/2024    EGFR 25 03/18/2024     Imaging Studies: I have personally reviewed pertinent reports.   and I have personally reviewed pertinent films in PACS  EKG, Pathology, and Other Studies: I have personally reviewed pertinent reports.   and I have personally reviewed pertinent films in  PACS  VTE Prophylaxis: Sequential compression device (Venodyne)  and Heparin    Code Status: Level 1 - Full Code  Advance Directive and Living Will:      Power of :    POLST:

## 2024-03-18 NOTE — PLAN OF CARE
Problem: OCCUPATIONAL THERAPY ADULT  Goal: Performs self-care activities at highest level of function for planned discharge setting.  See evaluation for individualized goals.  Description: Treatment Interventions: ADL retraining, Functional transfer training, Endurance training, Patient/family training, Equipment evaluation/education, Compensatory technique education, Energy conservation          See flowsheet documentation for full assessment, interventions and recommendations.   Note: Limitation: Decreased ADL status, Decreased endurance, Decreased self-care trans, Decreased high-level ADLs (Decreased balance)  Prognosis: Good  Assessment: Pt is a 88 y.o. female seen for OT evaluation at [facility], admitted 3/16/2024 w/ generalized weakness & SOB. Upon admission, pt found to have Sepsis (HCC).  OT completed extensive review of pt's medical and social history. Comorbidities affecting pt's functional performance at time of assessment include: DM2, anxiety, chronic pain syndrome, PNA, hypokalemia, acute respiratory failure with hypoxia, chronic back pain with lumbar foraminal stenosis & radiculopathy. Personal factors affecting pt at time of IE include: steps to enter environment, difficulty performing ADLS, difficulty performing IADLS , and environment. Prior to admission, pt was living alone in an in-law suite on the 2nd floor of her children's home with 16 STEPHEN.  Pt was I w/  ADLS and w/ IADLS, (-) drove, & required use of no DME/AD PTA. Upon evaluation: Pt requires S for functional transfers, S for functional mobility with RW, S for UB ADLs and Min A for LB ADLS 2* the following deficits impacting occupational performance: weakness, decreased strength, decreased balance, and decreased tolerance. Full objective findings from OT assessment regarding body systems outlined above. Pt to benefit from continued skilled OT tx while in the hospital to address deficits as defined above and maximize level of  functional independence w/ ADL's and functional mobility. Occupational Performance areas to address include: bathing/shower, toilet hygiene, dressing, functional mobility, and clothing management. Based on findings, pt is of high. The patient's raw score on the AM-PAC Daily Activity inpatient short form is 20, standardized score is 42.03, greater than 39.4. Patients at this level are likely to benefit from DC to home. However, please refer to therapist recommendation for discharge planning given other factors that may influence destination. At this time, OT recommendations at time of discharge are DC with Level III resources with increased familial support & use of RW for mobility.     Rehab Resource Intensity Level, OT: III (Minimum Resource Intensity)

## 2024-03-18 NOTE — TELEPHONE ENCOUNTER
Pts daughter-in-law states pt was in the office on Friday for a check up.  On Saturday she was admitted to the hospital with pneumonia.  She is moving to ICU today.

## 2024-03-18 NOTE — ASSESSMENT & PLAN NOTE
Acute respiratory failure with hypoxia presents on admission , requiring nasal cannula O2 supplementation ~ 5 Liters which is new for the patient ; most likely cause of hypoxemia is her RLL Pneumonia as evident on Xray Chest.   Requiring mid flow, will increase level of care to stepdown

## 2024-03-18 NOTE — PLAN OF CARE
Problem: PHYSICAL THERAPY ADULT  Goal: Performs mobility at highest level of function for planned discharge setting.  See evaluation for individualized goals.  Description: Treatment/Interventions: Functional transfer training, LE strengthening/ROM, Elevations, Therapeutic exercise, Endurance training, Patient/family training, Equipment eval/education, Bed mobility, Gait training  Equipment Recommended: Walker (TBD if will need upon DC)       See flowsheet documentation for full assessment, interventions and recommendations.  3/18/2024 1407 by Estela Serrano, PT  Note:    Problem List: Decreased endurance, Decreased mobility, Impaired balance  Assessment: Ena Ahumada is a 88 y.o. Female who presents to [facility] on 3/16/2024 from home w/ c/o SOB and weakness and diagnosis of sepsis. Orders for PT eval and treat received. Pt presents w/ comorbidities of DMII, anxiety, CKD Stage 3, CPS, osteopenia. At baseline, pt mobilizes independently w/ no AD, and reports 0 falls in the last 6 months. Upon evaluation, pt presents w/ the following deficits: weakness, impaired balance, and decreased endurance. Upon eval, pt requires supervision for transfers, and min A x 1 for gait. Based on this PT evaluation today, patient's discharge recommendation is for Level III pending continued medical optimization. During this admission, pt would benefit from continued skilled inpatient PT in the acute care setting in order to address the abovementioned deficits to maximize function and mobility before DC from acute care.        Rehab Resource Intensity Level, PT: III (Minimum Resource Intensity)    See flowsheet documentation for full assessment.

## 2024-03-18 NOTE — PLAN OF CARE
Problem: Potential for Falls  Goal: Patient will remain free of falls  Description: INTERVENTIONS:  - Educate patient/family on patient safety including physical limitations  - Instruct patient to call for assistance with activity   - Consult OT/PT to assist with strengthening/mobility   - Keep Call bell within reach  - Keep bed low and locked with side rails adjusted as appropriate  - Keep care items and personal belongings within reach  - Initiate and maintain comfort rounds  - Make Fall Risk Sign visible to staff  - Offer Toileting every 2 Hours, in advance of need  - Initiate/Maintain 2alarm  - Obtain necessary fall risk management equipment: 2  - Apply yellow socks and bracelet for high fall risk patients  - Consider moving patient to room near nurses station  Outcome: Progressing     Problem: SAFETY ADULT  Goal: Patient will remain free of falls  Description: INTERVENTIONS:  - Educate patient/family on patient safety including physical limitations  - Instruct patient to call for assistance with activity   - Consult OT/PT to assist with strengthening/mobility   - Keep Call bell within reach  - Keep bed low and locked with side rails adjusted as appropriate  - Keep care items and personal belongings within reach  - Initiate and maintain comfort rounds  - Make Fall Risk Sign visible to staff  - Offer Toileting every 2 Hours, in advance of need  - Initiate/Maintain 2alarm  - Obtain necessary fall risk management equipment: 2  - Apply yellow socks and bracelet for high fall risk patients  - Consider moving patient to room near nurses station  Outcome: Progressing  Goal: Maintain or return to baseline ADL function  Description: INTERVENTIONS:  -  Assess patient's ability to carry out ADLs; assess patient's baseline for ADL function and identify physical deficits which impact ability to perform ADLs (bathing, care of mouth/teeth, toileting, grooming, dressing, etc.)  - Assess/evaluate cause of self-care deficits    - Assess range of motion  - Assess patient's mobility; develop plan if impaired  - Assess patient's need for assistive devices and provide as appropriate  - Encourage maximum independence but intervene and supervise when necessary  - Involve family in performance of ADLs  - Assess for home care needs following discharge   - Consider OT consult to assist with ADL evaluation and planning for discharge  - Provide patient education as appropriate  Outcome: Progressing  Goal: Maintains/Returns to pre admission functional level  Description: INTERVENTIONS:  - Perform AM-PAC 6 Click Basic Mobility/ Daily Activity assessment daily.  - Set and communicate daily mobility goal to care team and patient/family/caregiver.   - Collaborate with rehabilitation services on mobility goals if consulted  - Perform Range of Motion 2 times a day.  - Reposition patient every 2 hours.  - Dangle patient 2 times a day  - Stand patient 2 times a day  - Ambulate patient 2 times a day  - Out of bed to chair 2 times a day   - Out of bed for meals 2 times a day  - Out of bed for toileting  - Record patient progress and toleration of activity level   Outcome: Progressing     Problem: DISCHARGE PLANNING  Goal: Discharge to home or other facility with appropriate resources  Description: INTERVENTIONS:  - Identify barriers to discharge w/patient and caregiver  - Arrange for needed discharge resources and transportation as appropriate  - Identify discharge learning needs (meds, wound care, etc.)  - Arrange for interpretive services to assist at discharge as needed  - Refer to Case Management Department for coordinating discharge planning if the patient needs post-hospital services based on physician/advanced practitioner order or complex needs related to functional status, cognitive ability, or social support system  Outcome: Progressing     Problem: Knowledge Deficit  Goal: Patient/family/caregiver demonstrates understanding of disease process,  treatment plan, medications, and discharge instructions  Description: Complete learning assessment and assess knowledge base.  Interventions:  - Provide teaching at level of understanding  - Provide teaching via preferred learning methods  Outcome: Progressing     Problem: Prexisting or High Potential for Compromised Skin Integrity  Goal: Skin integrity is maintained or improved  Description: INTERVENTIONS:  - Identify patients at risk for skin breakdown  - Assess and monitor skin integrity  - Assess and monitor nutrition and hydration status  - Monitor labs   - Assess for incontinence   - Turn and reposition patient  - Assist with mobility/ambulation  - Relieve pressure over bony prominences  - Avoid friction and shearing  - Provide appropriate hygiene as needed including keeping skin clean and dry  - Evaluate need for skin moisturizer/barrier cream  - Collaborate with interdisciplinary team   - Patient/family teaching  - Consider wound care consult   Outcome: Progressing     Problem: Nutrition/Hydration-ADULT  Goal: Nutrient/Hydration intake appropriate for improving, restoring or maintaining nutritional needs  Description: Monitor and assess patient's nutrition/hydration status for malnutrition. Collaborate with interdisciplinary team and initiate plan and interventions as ordered.  Monitor patient's weight and dietary intake as ordered or per policy. Utilize nutrition screening tool and intervene as necessary. Determine patient's food preferences and provide high-protein, high-caloric foods as appropriate.     INTERVENTIONS:  - Monitor oral intake, urinary output, labs, and treatment plans  - Assess nutrition and hydration status and recommend course of action  - Evaluate amount of meals eaten  - Assist patient with eating if necessary   - Allow adequate time for meals  - Recommend/ encourage appropriate diets, oral nutritional supplements, and vitamin/mineral supplements  - Order, calculate, and assess calorie  counts as needed  - Recommend, monitor, and adjust tube feedings and TPN/PPN based on assessed needs  - Assess need for intravenous fluids  - Provide specific nutrition/hydration education as appropriate  - Include patient/family/caregiver in decisions related to nutrition  Outcome: Progressing

## 2024-03-18 NOTE — PLAN OF CARE
Problem: Potential for Falls  Goal: Patient will remain free of falls  Description: INTERVENTIONS:  - Educate patient/family on patient safety including physical limitations  - Instruct patient to call for assistance with activity   - Consult OT/PT to assist with strengthening/mobility   - Keep Call bell within reach  - Keep bed low and locked with side rails adjusted as appropriate  - Keep care items and personal belongings within reach  - Initiate and maintain comfort rounds  - Make Fall Risk Sign visible to staff  - Offer Toileting every 2 Hours, in advance of need  - Initiate/Maintain 2alarm  - Obtain necessary fall risk management equipment: 2  - Apply yellow socks and bracelet for high fall risk patients  - Consider moving patient to room near nurses station  3/17/2024 2309 by Yomaira Shah RN  Outcome: Progressing  3/17/2024 2308 by Yomaira Shah RN  Outcome: Progressing  3/17/2024 2004 by Yomaira Shah RN  Outcome: Progressing     Problem: SAFETY ADULT  Goal: Patient will remain free of falls  Description: INTERVENTIONS:  - Educate patient/family on patient safety including physical limitations  - Instruct patient to call for assistance with activity   - Consult OT/PT to assist with strengthening/mobility   - Keep Call bell within reach  - Keep bed low and locked with side rails adjusted as appropriate  - Keep care items and personal belongings within reach  - Initiate and maintain comfort rounds  - Make Fall Risk Sign visible to staff  - Offer Toileting every 2 Hours, in advance of need  - Initiate/Maintain 2alarm  - Obtain necessary fall risk management equipment: 2  - Apply yellow socks and bracelet for high fall risk patients  - Consider moving patient to room near nurses station  3/17/2024 2309 by Yomaira Shah RN  Outcome: Progressing  3/17/2024 2308 by Yomaira Shah RN  Outcome: Progressing  3/17/2024 2004 by Yomaira Shah RN  Outcome: Progressing  Goal: Maintain  or return to baseline ADL function  Description: INTERVENTIONS:  -  Assess patient's ability to carry out ADLs; assess patient's baseline for ADL function and identify physical deficits which impact ability to perform ADLs (bathing, care of mouth/teeth, toileting, grooming, dressing, etc.)  - Assess/evaluate cause of self-care deficits   - Assess range of motion  - Assess patient's mobility; develop plan if impaired  - Assess patient's need for assistive devices and provide as appropriate  - Encourage maximum independence but intervene and supervise when necessary  - Involve family in performance of ADLs  - Assess for home care needs following discharge   - Consider OT consult to assist with ADL evaluation and planning for discharge  - Provide patient education as appropriate  3/17/2024 2309 by Yomaira Shah RN  Outcome: Progressing  3/17/2024 2308 by Yomaira Shah RN  Outcome: Progressing  3/17/2024 2004 by Yomaira Shah RN  Outcome: Progressing  Goal: Maintains/Returns to pre admission functional level  Description: INTERVENTIONS:  - Perform AM-PAC 6 Click Basic Mobility/ Daily Activity assessment daily.  - Set and communicate daily mobility goal to care team and patient/family/caregiver.   - Collaborate with rehabilitation services on mobility goals if consulted  - Perform Range of Motion 2 times a day.  - Reposition patient every 2 hours.  - Dangle patient 2 times a day  - Stand patient 2 times a day  - Ambulate patient 2 times a day  - Out of bed to chair 2 times a day   - Out of bed for meals 2 times a day  - Out of bed for toileting  - Record patient progress and toleration of activity level   3/17/2024 2309 by Yomaira Shah RN  Outcome: Progressing  3/17/2024 2308 by Yomaira Shah RN  Outcome: Progressing  3/17/2024 2004 by Yomaira Shah RN  Outcome: Progressing     Problem: DISCHARGE PLANNING  Goal: Discharge to home or other facility with appropriate  resources  Description: INTERVENTIONS:  - Identify barriers to discharge w/patient and caregiver  - Arrange for needed discharge resources and transportation as appropriate  - Identify discharge learning needs (meds, wound care, etc.)  - Arrange for interpretive services to assist at discharge as needed  - Refer to Case Management Department for coordinating discharge planning if the patient needs post-hospital services based on physician/advanced practitioner order or complex needs related to functional status, cognitive ability, or social support system  3/17/2024 2309 by Yomaira Shah RN  Outcome: Progressing  3/17/2024 2308 by Yomaira Shah RN  Outcome: Progressing  3/17/2024 2004 by Yomaira Shah RN  Outcome: Progressing     Problem: Knowledge Deficit  Goal: Patient/family/caregiver demonstrates understanding of disease process, treatment plan, medications, and discharge instructions  Description: Complete learning assessment and assess knowledge base.  Interventions:  - Provide teaching at level of understanding  - Provide teaching via preferred learning methods  3/17/2024 2309 by Yomaira Shah RN  Outcome: Progressing  3/17/2024 2308 by Yomaira Shah RN  Outcome: Progressing  3/17/2024 2004 by Yomaira Shah RN  Outcome: Progressing     Problem: Prexisting or High Potential for Compromised Skin Integrity  Goal: Skin integrity is maintained or improved  Description: INTERVENTIONS:  - Identify patients at risk for skin breakdown  - Assess and monitor skin integrity  - Assess and monitor nutrition and hydration status  - Monitor labs   - Assess for incontinence   - Turn and reposition patient  - Assist with mobility/ambulation  - Relieve pressure over bony prominences  - Avoid friction and shearing  - Provide appropriate hygiene as needed including keeping skin clean and dry  - Evaluate need for skin moisturizer/barrier cream  - Collaborate with interdisciplinary team   -  Patient/family teaching  - Consider wound care consult   3/17/2024 2309 by Yomaira Shah RN  Outcome: Progressing  3/17/2024 2308 by Yomaira Shah RN  Outcome: Progressing  3/17/2024 2004 by Yomaira Shah RN  Outcome: Progressing     Problem: Nutrition/Hydration-ADULT  Goal: Nutrient/Hydration intake appropriate for improving, restoring or maintaining nutritional needs  Description: Monitor and assess patient's nutrition/hydration status for malnutrition. Collaborate with interdisciplinary team and initiate plan and interventions as ordered.  Monitor patient's weight and dietary intake as ordered or per policy. Utilize nutrition screening tool and intervene as necessary. Determine patient's food preferences and provide high-protein, high-caloric foods as appropriate.     INTERVENTIONS:  - Monitor oral intake, urinary output, labs, and treatment plans  - Assess nutrition and hydration status and recommend course of action  - Evaluate amount of meals eaten  - Assist patient with eating if necessary   - Allow adequate time for meals  - Recommend/ encourage appropriate diets, oral nutritional supplements, and vitamin/mineral supplements  - Order, calculate, and assess calorie counts as needed  - Recommend, monitor, and adjust tube feedings and TPN/PPN based on assessed needs  - Assess need for intravenous fluids  - Provide specific nutrition/hydration education as appropriate  - Include patient/family/caregiver in decisions related to nutrition  3/17/2024 2309 by Yomaira Shah RN  Outcome: Progressing  3/17/2024 2308 by Yomaira Shah RN  Outcome: Progressing  3/17/2024 2004 by Yomaira Shah RN  Outcome: Progressing

## 2024-03-18 NOTE — QUICK NOTE
Worsening oxygen requirement earlier in the night while patient was febrile. Nursing attempted to wean oxygen from 12 L down to prior  6 L but unsuccessful. Patient resting comfortably on exam. Denies SOB. Obtained CXR and appears unchanged from prior performed on 3/16. D-dimer collected on 3/16 resulted at 2.21. Due to rising oxygen and unclear etiology will obtain stat CTA chest PE study.     Will continue to monitor patient. Low threshold to upgrade to SD2.

## 2024-03-18 NOTE — UTILIZATION REVIEW
Initial Clinical Review    Admission: Date/Time/Statement:   Admission Orders (From admission, onward)       Ordered        03/16/24 1314  INPATIENT ADMISSION  Once                          Orders Placed This Encounter   Procedures    INPATIENT ADMISSION     Standing Status:   Standing     Number of Occurrences:   1     Order Specific Question:   Level of Care     Answer:   Med Surg [16]     Order Specific Question:   Estimated length of stay     Answer:   More than 2 Midnights     Order Specific Question:   Certification     Answer:   I certify that inpatient services are medically necessary for this patient for a duration of greater than two midnights. See H&P and MD Progress Notes for additional information about the patient's course of treatment.     ED Arrival Information       Expected   -    Arrival   3/16/2024 12:03    Acuity   Urgent              Means of arrival   Ambulance    Escorted by   [facility]    Service   Hospitalist    Admission type   Emergency              Arrival complaint   Vomiting             Chief Complaint   Patient presents with    Weakness - Generalized     Pt to ED c/o weakness. Vomited once last night, placed self on ground following because she was too weak to walk back to bed. On floor since 0400 today. Denies chest pain, SOB,. Just feeling weak       Initial Presentation: 88 y.o. female  female to ED via EMS from home.    Admitted to inpatient with Dx: Pneumonia/Sepsis.  Presented to ED with increased weakness starting day prior to arrival,  had increased lower back  and bilateral leg pain,  getting ready for bed, felt weak, had 1 to 2  episodes of nausea and vomiting  about 0400 and too weak to walk back to bed from bathroom and laid on floor until about 11 am when found by family.  . PMHx:   anxiety, GERD, CKD 4, aortic valve disorder and chronic pain. On exam: appears ill.  Mucous membranes dry.  Holosystolic murmur transmits to both Carotids.   Feet warm, no dorsalis  pedis or posterior tibial pulses on palpation.  Pale.  Hypoxic to 76% room air.  PSI/PORT score 128 - Risk Class IV, 8.2-9.3% mortality risk.   Bun 36.  Creatinine 1.64 with  baseline of 1.6 - 1.9.   glucose 257.   Wbc 17.06.   Imaging shows  RLL pneumonia. ED treatment placed on oxygen 5 liters.  Started on ceftriaxone.    Plan includes:  continue ceftriaxone.  Add azithromycin.   Consult speech. Npo except medications.   Follow cultures.  Continue IVF.    Trend BMP.  Continue home pain and other  medications .  Hold home Invokana, glipizide and Januvia.  Continue Lantus and SSI.  Check A1c.     Date: 3/17/24    Day 2:  has intermittent cough.    On exam:  lungs clear.  Still needs oxygen.   Peripheral pulses normal.   Strength 5/5.   Wbc 16.52.   K 3.1.   bun 32.  Creatinine 1.43.    Procalcitonin 25.97.    glucose 132> 140> 275> 221> 181.  Continue IVF.  Continue Rocephin.   Replete potassium.   Speech ok for diet, aspiration precautions.  Wean oxygen as able.       Patient has crossed 3 midnights and requires ongoing care    3/18/2024 .  Patient presents with  escalating oxygen requirements.   Hypoxia and oxygen increased to mid flow. Saturating 81 to 87% on 10 L of oxygen.  Today with conversation, increased work of breathing and tachypnea  On exam lungs rhonchi on right.   Abnormal labs or imaging:  wbc 16.74.  K 3.2.   bun 30.  Creatinine 1.73.    glucose 200  Diagnosis/Plan    acute respiratory failure with hypoxia/Hypokalemia/Pneumonia:   transfer to SDU.   Supplemental O2 and wean for goal > 90% - on mid flow.   Change antibiotics to cefepime and vancomycin.  Consult Pulmonary.       ED Triage Vitals   Temperature Pulse Respirations Blood Pressure SpO2   03/16/24 1209 03/16/24 1209 03/16/24 1209 03/16/24 1212 03/16/24 1209   98 °F (36.7 °C) 90 18 (!) 215/97 (!) 76 %      Temp Source Heart Rate Source Patient Position - Orthostatic VS BP Location FiO2 (%)   03/17/24 0728 03/16/24 1209 03/16/24 1209 03/16/24  1232 --   Oral Monitor Lying Right arm       Pain Score       03/16/24 1209       No Pain          Wt Readings from Last 1 Encounters:   03/18/24 75 kg (165 lb 5.5 oz)     Additional Vital Signs:                  03/18/24 1030 98.7 °F (37.1 °C) 69 18 132/60 87 92 % -- 12 L/min -- Mid flow nasal cannula -- Sitting   03/18/24 1000 -- 71 -- -- -- 91 % -- -- -- -- -- --   03/18/24 0936 -- 73 -- -- -- 93 % -- 12 L/min -- Mid flow nasal cannula -- --   03/18/24 0830 -- -- -- 146/64 -- 91 % -- 12 L/min -- Mid flow nasal cannula MFNC prongs --   03/18/24 07:07:32 98.4 °F (36.9 °C) 78 20 117/44 Abnormal  68 87 % Abnormal  -- -- -- -- -- --   03/18/24 0500 -- 74 -- -- -- 94 % -- -- -- -- -- --   03/18/24 0300 -- -- -- -- -- -- -- 10 L/min -- Mid flow nasal cannula MFNC prongs --   03/18/24 00:26:45 -- 71 -- -- -- 92 % -- -- -- -- -- --   03/17/24 21:58:14 98.4 °F (36.9 °C) 78 -- -- -- 90 % -- -- -- -- -- --   03/17/24 20:45:32 -- 76 -- -- -- 91 % -- -- -- -- -- --   03/17/24 20:13:03 101.6 °F (38.7 °C) Abnormal  83 18 151/65 94 88 % Abnormal  40 -- 5 L/min -- -- Lying   03/17/24 19:27:33 97.5 °F (36.4 °C) 75 -- -- -- 82 % Abnormal  -- -- -- -- -- --   03/17/24 16:07:06 99.4 °F (37.4 °C) 73 18 143/65 91 93 % -- -- -- -- -- --   03/17/24 0930 -- -- -- -- -- 97 % 40 -- 5 L/min Nasal cannula -- --   03/17/24 07:28:43 97.3 °F (36.3 °C) Abnormal  77 18 147/62 90 89 % Abnormal  40 -- 5 L/min Nasal cannula -- Lying   03/16/24 2355 -- -- -- -- -- 92 % 40 -- 5 L/min Nasal cannula -- --   03/16/24 20:55:53 99 °F (37.2 °C) 75 18 123/72 89 93 % -- -- -- -- -- --   03/16/24 18:29:18 -- 79 -- 129/71 90 90 % -- -- -- -- -- --   03/16/24 14:07:18 97.4 °F (36.3 °C) Abnormal  77 20 121/72 88 92 % 44 -- 6 L/min Nasal cannula -- --   03/16/24 1212 -- -- -- 215/97 Abnormal  -- 90 % 44 -- 6 L/min Nasal cannula -- --   03/16/24 1211 -- -- -- -- -- 83 % Abnormal  32 -- 3 L/min Nasal cannula         Pertinent Labs/Diagnostic Test Results:   CTA chest  pe study   Final Result by Arpit Horn DO (03/18 0159)      Large focal area of groundglass opacification involving the entirety of the right lung most consistent with pneumonia.   No pulmonary embolus.                     Workstation performed: [id number]         XR chest portable   Final Result by Aurelia Bullock MD (03/18 0927)      Extensive groundglass opacity in the right lung compatible with pneumonia with trace effusions.            Workstation performed: [id number]         XR chest 1 view portable   ED Interpretation by Maria Del Carmen Perdue DO (03/16 1256)   Extensive right-sided infiltrate      Final Result by Herlinda Goode MD (03/16 1257)      Right lower lobe predominant pneumonia.      I personally discussed this study Dr. MARIA DEL CARMEN PERDUE on 3/16/2024 12:57 PM.                  Workstation performed: [id number]           3/16/24 ecg Normal sinus rhythm  Left axis deviation  Incomplete right bundle branch block  Poor R Wave Progression  Abnormal ECG     Results from last 7 days   Lab Units 03/16/24  1216   SARS-COV-2  Negative     Results from last 7 days   Lab Units 03/18/24  0316 03/17/24  0606 03/16/24  1221 03/16/24  1216   WBC Thousand/uL 16.74* 16.52*  --  17.06*   HEMOGLOBIN g/dL 11.7 11.3*  --  14.1   I STAT HEMOGLOBIN g/dl  --   --  15.0  --    HEMATOCRIT % 38.1 36.1  --  45.2   HEMATOCRIT, ISTAT %  --   --  44  --    PLATELETS Thousands/uL 152 147*  --  176   NEUTROS ABS Thousands/µL 11.88* 11.14*  --  12.99*     Results from last 7 days   Lab Units 03/18/24 0316 03/17/24  0606 03/16/24  1221 03/16/24  1216   SODIUM mmol/L 138 143  --  142   POTASSIUM mmol/L 3.2* 3.1*  --  3.9   CHLORIDE mmol/L 103 106  --  103   CO2 mmol/L 28 28  --  28   CO2, I-STAT mmol/L  --   --  29  --    ANION GAP mmol/L 7 9  --  11   BUN mg/dL 30* 32*  --  36*   CREATININE mg/dL 1.73* 1.43*  --  1.64*   EGFR ml/min/1.73sq m 25 32  --  27   CALCIUM mg/dL 8.1* 8.2*  --  9.2   CALCIUM, IONIZED, ISTAT mmol/L  --   --   1.18  --    MAGNESIUM mg/dL 2.3 2.3  --  2.1   PHOSPHORUS mg/dL  --  3.1  --   --      Results from last 7 days   Lab Units 03/18/24  0316 03/17/24  0606 03/16/24  1216   AST U/L 19 18 23   ALT U/L 14 13 17   ALK PHOS U/L 47 37 51   TOTAL PROTEIN g/dL 5.8* 5.5* 6.4   ALBUMIN g/dL 3.2* 3.2* 4.0   TOTAL BILIRUBIN mg/dL 0.47 0.62 0.56     Results from last 7 days   Lab Units 03/18/24  0759 03/18/24  0120 03/17/24  2015 03/17/24  1928 03/17/24  1605 03/17/24  1208 03/17/24  0719 03/17/24  0004 03/16/24  1610   POC GLUCOSE mg/dl 200* 188* 181* 221* 275* 140 132 159* 282*     Results from last 7 days   Lab Units 03/18/24  0316 03/17/24  0606 03/16/24  1216   GLUCOSE RANDOM mg/dL 187* 145* 257*     Results from last 7 days   Lab Units 03/16/24  1620   HEMOGLOBIN A1C % 8.1*   EAG mg/dl 186     Results from last 7 days   Lab Units 03/16/24  1221   PH, YAS I-STAT  7.325   PCO2, YAS ISTAT mm HG 52.3*   PO2, YAS ISTAT mm HG 24.0*   HCO3, YAS ISTAT mmol/L 27.3   I STAT BASE EXC mmol/L 0   I STAT O2 SAT % 36*     Results from last 7 days   Lab Units 03/16/24  1216   CK TOTAL U/L 82     Results from last 7 days   Lab Units 03/16/24  1620 03/16/24  1216   HS TNI 0HR ng/L  --  30   HS TNI 4HR ng/L 24  --    HSTNI D4 ng/L -6  --      Results from last 7 days   Lab Units 03/16/24  1301   D-DIMER QUANTITATIVE ug/ml FEU 2.21*     Results from last 7 days   Lab Units 03/16/24  1301   PROTIME seconds 14.3   INR  1.07   PTT seconds 25     Results from last 7 days   Lab Units 03/16/24  1216   TSH 3RD GENERATON uIU/mL 1.187     Results from last 7 days   Lab Units 03/18/24  0316 03/17/24  0606 03/16/24  1301   PROCALCITONIN ng/ml 26.72* 25.97* 18.29*     Results from last 7 days   Lab Units 03/16/24  1620 03/16/24  1301   LACTIC ACID mmol/L 1.7 2.5*     Results from last 7 days   Lab Units 03/16/24  1612   CLARITY UA  Clear   COLOR UA  Yellow   SPEC GRAV UA  1.015   PH UA  5.5   GLUCOSE UA mg/dl 1000 (1%)*   KETONES UA mg/dl 10 (1+)*   BLOOD  UA  Trace*   PROTEIN UA mg/dl 30 (1+)*   NITRITE UA  Negative   BILIRUBIN UA  Negative   UROBILINOGEN UA (BE) mg/dl <2.0   LEUKOCYTES UA  Negative   WBC UA /hpf 0-1*   RBC UA /hpf 0-1*   BACTERIA UA /hpf Occasional   EPITHELIAL CELLS WET PREP /hpf Occasional     Results from last 7 days   Lab Units 03/16/24  1216   INFLUENZA A PCR  Negative   INFLUENZA B PCR  Negative   RSV PCR  Negative     Results from last 7 days   Lab Units 03/16/24  1216   ETHANOL LVL mg/dL <10   ACETAMINOPHEN LVL ug/mL <2*   SALICYLATE LVL mg/dL <5     Results from last 7 days   Lab Units 03/16/24  1301   BLOOD CULTURE  No Growth at 24 hrs.  No Growth at 24 hrs.     ED Treatment:   Medication Administration from 03/16/2024 1203 to 03/16/2024 1356         Date/Time Order Dose Route Action Comments     03/16/2024 1316 EDT cefTRIAXone (ROCEPHIN) IVPB (premix in dextrose) 2,000 mg 50 mL 2,000 mg Intravenous New Bag --          Past Medical History:   Diagnosis Date    Anxiety     Aortic valve insufficiency     Arthritis     Cataract of both eyes     Chronic kidney disease     Dysuria     last assessed - [id number]    Edema     last assessed - [id number]    GERD (gastroesophageal reflux disease)     last assessed - [id number]    Hyperlipidemia     Hypertension     Low back pain     last assessed - [id number]    Mitral valve disorder     Osteoporosis     last assessed - [id number]    Palpitations      Present on Admission:   Stage 3b chronic kidney disease (CKD) ([facility])   Type 2 diabetes mellitus with stage 4 chronic kidney disease, without long-term current use of insulin ([facility])   Anxiety   Nonrheumatic aortic valve stenosis   Chronic pain syndrome      Admitting Diagnosis: Vomiting [R11.10]  Sepsis ([facility]) [A41.9]  Acute respiratory failure with hypoxemia ([facility]) [J96.01]  Community acquired pneumonia of right lung, unspecified part of lung [J18.9]  Age/Sex: 88 y.o. female  Admission Orders:  3/16/24 1314 inpatient   Scheduled Medications:  aspirin, 81 mg,  Oral, Daily  cefepime, 1,000 mg, Intravenous, Q12H  [START ON 3/20/2024] cloNIDine, 1 patch, Transdermal, Weekly  famotidine, 10 mg, Oral, Daily  guaiFENesin, 600 mg, Oral, BID  heparin (porcine), 5,000 Units, Subcutaneous, Q8H JONO  insulin lispro, 1-5 Units, Subcutaneous, 4x Daily (AC & HS)  nebivolol, 20 mg, Oral, Daily  NIFEdipine, 90 mg, Oral, Daily  pravastatin, 40 mg, Oral, Daily With Dinner  pregabalin, 50 mg, Oral, BID  sertraline, 100 mg, Oral, Daily  transdermal buprenorphine, 1 patch, Transdermal, Q7 Days  vancomycin, 25 mg/kg (Adjusted), Intravenous, Once 3/18/24     azithromycin (ZITHROMAX) 500 mg in sodium chloride 0.9% 250mL IVPB 500 mg  Dose: 500 mg  Freq: Once Route: IV  Last Dose: Stopped (03/16/24 1831)  Start: 03/16/24 1315 End: 03/16/24 1831   cefTRIAXone (ROCEPHIN) IVPB (premix in dextrose) 1,000 mg 50 mL  Dose: 1,000 mg  Freq: Every 24 hours Route: IV  Indications of Use: PNEUMONIA  Last Dose: 1,000 mg (03/17/24 0918)  Start: 03/17/24 0800 End: 03/18/24 0812   potassium chloride (Klor-Con M20) CR tablet 40 mEq  Dose: 40 mEq  Freq: Once Route: PO  Start: 03/18/24 0815 End: 03/18/24 0843     potassium chloride 20 mEq IVPB (premix)  Dose: 20 mEq  Freq: Once Route: IV  Last Dose: Stopped (03/17/24 0655)  Start: 03/16/24 1930 End: 03/17/24 0655   potassium chloride oral solution 40 mEq  Dose: 40 mEq  Freq: Once Route: PO  Start: 03/17/24 1500 End: 03/17/24 1708     Continuous IV Infusions:  multi-electrolyte (PLASMALYTE-A/ISOLYTE-S PH 7.4) IV solution  Rate: 75 mL/hr Dose: 75 mL/hr  Freq: Continuous Route: IV  Last Dose: Stopped (03/17/24 0015)  Start: 03/16/24 1530 End: 03/17/24 0450   multi-electrolyte (PLASMALYTE-A/ISOLYTE-S PH 7.4) IV solution  Rate: 75 mL/hr Dose: 75 mL/hr  Freq: Continuous Route: IV  Indications of Use: IV Hydration  Last Dose: Stopped (03/17/24 1313)  Start: 03/17/24 1300 End: 03/17/24 1712      PRN Meds:  acetaminophen, 650 mg, Oral, Q6H PRN x 1 3/17  [START ON 3/19/2024]  vancomycin, 12.5 mg/kg (Adjusted), Intravenous, Daily PRN        IP CONSULT TO PULMONOLOGY  IP CONSULT TO PHARMACY    Network Utilization Review Department  ATTENTION: Please call with any questions or concerns to [phone] and carefully listen to the prompts so that you are directed to the right person. All voicemails are confidential.   For Discharge needs, contact Care Management DC Support Team at [phone] opt. 2  Send all requests for admission clinical reviews, approved or denied determinations and any other requests to dedicated fax number below belonging to the [place] where the patient is receiving treatment. List of dedicated fax numbers for the Facilities:  FACILITY NAME UR FAX NUMBER   ADMISSION DENIALS (Administrative/Medical Necessity) [phone]   DISCHARGE SUPPORT TEAM (NETWORK) [phone]   PARENT CHILD HEALTH (Maternity/NICU/Pediatrics) [phone]   [facility] [phone]   [facility] [phone]   [facility] [phone]   General acute hospital [phone]   [facility] [phone]   [facility] [phone]   [facility] [phone]   [facility] [phone]   [facility] [phone]   [facility] [phone]   [facility] [phone]   [facility] [phone]

## 2024-03-18 NOTE — ASSESSMENT & PLAN NOTE
RLL Pneumonia on CT chest ; with SIRS criteria and hypoxemia   See sepsis A&P   Supplemental O2 and wean for goal > 90%   Mucinex   Speech eval given possible aspiration etiology   Trend vitals and wbc   Patient started on ceftriaxone, increased wheezing oxygen requirements today.  Patient now on 12 L of oxygen  CTA chest done shows right lung pneumonia.  Will broaden antibiotic to cefepime and Vanco, MRSA culture ordered.  Pulmonology consult

## 2024-03-18 NOTE — PLAN OF CARE
Problem: Potential for Falls  Goal: Patient will remain free of falls  Description: INTERVENTIONS:  - Educate patient/family on patient safety including physical limitations  - Instruct patient to call for assistance with activity   - Consult OT/PT to assist with strengthening/mobility   - Keep Call bell within reach  - Keep bed low and locked with side rails adjusted as appropriate  - Keep care items and personal belongings within reach  - Initiate and maintain comfort rounds  - Make Fall Risk Sign visible to staff  - Offer Toileting every 2 Hours, in advance of need  - Initiate/Maintain 2alarm  - Obtain necessary fall risk management equipment: 2  - Apply yellow socks and bracelet for high fall risk patients  - Consider moving patient to room near nurses station  3/17/2024 2308 by Yomaira Shah RN  Outcome: Progressing  3/17/2024 2004 by Yomaira Shah RN  Outcome: Progressing     Problem: SAFETY ADULT  Goal: Patient will remain free of falls  Description: INTERVENTIONS:  - Educate patient/family on patient safety including physical limitations  - Instruct patient to call for assistance with activity   - Consult OT/PT to assist with strengthening/mobility   - Keep Call bell within reach  - Keep bed low and locked with side rails adjusted as appropriate  - Keep care items and personal belongings within reach  - Initiate and maintain comfort rounds  - Make Fall Risk Sign visible to staff  - Offer Toileting every 2 Hours, in advance of need  - Initiate/Maintain 2alarm  - Obtain necessary fall risk management equipment: 2  - Apply yellow socks and bracelet for high fall risk patients  - Consider moving patient to room near nurses station  3/17/2024 2308 by Yomaira Shah RN  Outcome: Progressing  3/17/2024 2004 by Yomaira Shah RN  Outcome: Progressing  Goal: Maintain or return to baseline ADL function  Description: INTERVENTIONS:  -  Assess patient's ability to carry out ADLs; assess patient's  baseline for ADL function and identify physical deficits which impact ability to perform ADLs (bathing, care of mouth/teeth, toileting, grooming, dressing, etc.)  - Assess/evaluate cause of self-care deficits   - Assess range of motion  - Assess patient's mobility; develop plan if impaired  - Assess patient's need for assistive devices and provide as appropriate  - Encourage maximum independence but intervene and supervise when necessary  - Involve family in performance of ADLs  - Assess for home care needs following discharge   - Consider OT consult to assist with ADL evaluation and planning for discharge  - Provide patient education as appropriate  3/17/2024 2308 by Yomaira Shah RN  Outcome: Progressing  3/17/2024 2004 by Yomaira Shah RN  Outcome: Progressing  Goal: Maintains/Returns to pre admission functional level  Description: INTERVENTIONS:  - Perform AM-PAC 6 Click Basic Mobility/ Daily Activity assessment daily.  - Set and communicate daily mobility goal to care team and patient/family/caregiver.   - Collaborate with rehabilitation services on mobility goals if consulted  - Perform Range of Motion 2 times a day.  - Reposition patient every 2 hours.  - Dangle patient 2 times a day  - Stand patient 2 times a day  - Ambulate patient 2 times a day  - Out of bed to chair 2 times a day   - Out of bed for meals 2 times a day  - Out of bed for toileting  - Record patient progress and toleration of activity level   3/17/2024 2308 by Yomaira Shah RN  Outcome: Progressing  3/17/2024 2004 by Yomaira Shah RN  Outcome: Progressing     Problem: DISCHARGE PLANNING  Goal: Discharge to home or other facility with appropriate resources  Description: INTERVENTIONS:  - Identify barriers to discharge w/patient and caregiver  - Arrange for needed discharge resources and transportation as appropriate  - Identify discharge learning needs (meds, wound care, etc.)  - Arrange for interpretive services to  assist at discharge as needed  - Refer to Case Management Department for coordinating discharge planning if the patient needs post-hospital services based on physician/advanced practitioner order or complex needs related to functional status, cognitive ability, or social support system  3/17/2024 2308 by Yomaira Shah RN  Outcome: Progressing  3/17/2024 2004 by Yomaira Shah RN  Outcome: Progressing     Problem: Knowledge Deficit  Goal: Patient/family/caregiver demonstrates understanding of disease process, treatment plan, medications, and discharge instructions  Description: Complete learning assessment and assess knowledge base.  Interventions:  - Provide teaching at level of understanding  - Provide teaching via preferred learning methods  3/17/2024 2308 by Yomaira Shah RN  Outcome: Progressing  3/17/2024 2004 by Yomaira Shah RN  Outcome: Progressing     Problem: Prexisting or High Potential for Compromised Skin Integrity  Goal: Skin integrity is maintained or improved  Description: INTERVENTIONS:  - Identify patients at risk for skin breakdown  - Assess and monitor skin integrity  - Assess and monitor nutrition and hydration status  - Monitor labs   - Assess for incontinence   - Turn and reposition patient  - Assist with mobility/ambulation  - Relieve pressure over bony prominences  - Avoid friction and shearing  - Provide appropriate hygiene as needed including keeping skin clean and dry  - Evaluate need for skin moisturizer/barrier cream  - Collaborate with interdisciplinary team   - Patient/family teaching  - Consider wound care consult   3/17/2024 2308 by Yomaira Shah RN  Outcome: Progressing  3/17/2024 2004 by Yomaira Shha RN  Outcome: Progressing     Problem: Nutrition/Hydration-ADULT  Goal: Nutrient/Hydration intake appropriate for improving, restoring or maintaining nutritional needs  Description: Monitor and assess patient's nutrition/hydration status for  malnutrition. Collaborate with interdisciplinary team and initiate plan and interventions as ordered.  Monitor patient's weight and dietary intake as ordered or per policy. Utilize nutrition screening tool and intervene as necessary. Determine patient's food preferences and provide high-protein, high-caloric foods as appropriate.     INTERVENTIONS:  - Monitor oral intake, urinary output, labs, and treatment plans  - Assess nutrition and hydration status and recommend course of action  - Evaluate amount of meals eaten  - Assist patient with eating if necessary   - Allow adequate time for meals  - Recommend/ encourage appropriate diets, oral nutritional supplements, and vitamin/mineral supplements  - Order, calculate, and assess calorie counts as needed  - Recommend, monitor, and adjust tube feedings and TPN/PPN based on assessed needs  - Assess need for intravenous fluids  - Provide specific nutrition/hydration education as appropriate  - Include patient/family/caregiver in decisions related to nutrition  3/17/2024 2308 by Yomaira Shah RN  Outcome: Progressing  3/17/2024 2004 by Yomaira Shah, RN  Outcome: Progressing

## 2024-03-18 NOTE — ASSESSMENT & PLAN NOTE
SIRS criteria met : tachycardia, tachypnea, leukocytosis  Suspected source: Pneumonia  CXR: Lower lobe pneumonia  UA not suggestive UTI    CT: None  Continue to trend leukocytosis and fever curve  Procalcitonin: 18  Lactic acid: 2.5-resolved    Will need additional fluid depending on lactate and blood pressure  Blood Cultures pending   Will broaden antibiotic to cefepime and Vanco pending MRSA culture

## 2024-03-18 NOTE — OCCUPATIONAL THERAPY NOTE
Occupational Therapy Evaluation     Patient Name: Ena Ahumada  Today's Date: 3/18/2024  Problem List  Principal Problem:    Sepsis (HCC)  Active Problems:    Type 2 diabetes mellitus with stage 4 chronic kidney disease, without long-term current use of insulin (HCC)    Anxiety    Stage 3b chronic kidney disease (CKD) (HCC)    Nonrheumatic aortic valve stenosis    Chronic pain syndrome    Pneumonia    Hypokalemia    Acute respiratory failure with hypoxia (HCC)    Past Medical History  Past Medical History:   Diagnosis Date    Anxiety     Aortic valve insufficiency     Arthritis     Cataract of both eyes     Chronic kidney disease     Dysuria     last assessed - [id number]    Edema     last assessed - [id number]    GERD (gastroesophageal reflux disease)     last assessed - [id number]    Hyperlipidemia     Hypertension     Low back pain     last assessed - [id number]    Mitral valve disorder     Osteoporosis     last assessed - [id number]    Palpitations      Past Surgical History  Past Surgical History:   Procedure Laterality Date    APPENDECTOMY      CATARACT EXTRACTION Bilateral     COLONOSCOPY      TUBAL LIGATION Bilateral              03/18/24 1312   OT Last Visit   OT Visit Date 03/18/24   Note Type   Note type Evaluation   Pain Assessment   Pain Assessment Tool 0-10   Pain Score No Pain   Restrictions/Precautions   Weight Bearing Precautions Per Order No   Other Precautions Fall Risk;O2;Multiple lines;Telemetry  (12 L MFNC)   Home Living   Type of Home Apartment  (in-law suite - 16 steps to apartment)   Home Layout One level;Stairs to enter with rails   Bathroom Shower/Tub Walk-in shower   Bathroom Toilet Raised   Bathroom Equipment Grab bars in shower;Shower chair   Additional Comments No DME PTA   Prior Function   Level of [place] Independent with ADLs;Independent with functional mobility;Independent with IADLS   Lives With Alone   Receives Help From Family;Friend(s)   IADLs Independent with  "medication management;Independent with meal prep;Family/Friend/Other provides transportation   Falls in the last 6 months 0   Vocational Retired   Lifestyle   Autonomy Independent with I/ADLs without use of DME for mobility. (-)    Reciprocal Relationships Lives with children in a second floor in-law suite   Service to Others Retired   General   Family/Caregiver Present Yes   Additional General Comments 2 sons, 2 DIL's, & granddaughter present   Subjective   Subjective \"My breathing feels oaky\"   ADL   Eating Assistance 7  Independent   Grooming Assistance 5  Supervision/Setup   Grooming Deficit Wash/dry hands;Supervision/safety;Increased time to complete   UB Bathing Assistance 5  Supervision/Setup   LB Bathing Assistance 4  Minimal Assistance   UB Dressing Assistance 5  Supervision/Setup   LB Dressing Assistance 4  Minimal Assistance   Toileting Assistance  5  Supervision/Setup   Toileting Deficit Clothing management up;Clothing management down;Perineal hygiene   Bed Mobility   Supine to Sit Unable to assess   Additional Comments Pt seated OOB to recliner   Transfers   Sit to Stand 5  Supervision   Additional items Armrests   Stand to Sit 5  Supervision   Additional items Armrests   Toilet transfer 5  Supervision   Additional items Standard toilet;Increased time required  (Grab bars - (+) LOB when turning to sit on toilet, Min A to correct)   Functional Mobility   Functional Mobility 5  Supervision   Additional Comments Initially 25 ft with HHA for balance, then additional 25 t with S with RW   Additional items Rolling walker   Balance   Static Sitting Good   Dynamic Sitting Fair +   Static Standing Fair   Dynamic Standing Fair -   Ambulatory Fair -   Activity Tolerance   Activity Tolerance Patient tolerated treatment well   Medical Staff Made Aware PT Estela   Nurse Made Aware RUPERTO FLETCHER Assessment   RUE Assessment WFL   LUE Assessment   LUE Assessment WFL   Cognition   Overall Cognitive Status WFL "   Arousal/Participation Alert   Attention Within functional limits   Orientation Level Oriented X4   Memory Within functional limits   Following Commands Follows all commands and directions without difficulty   Assessment   Limitation Decreased ADL status;Decreased endurance;Decreased self-care trans;Decreased high-level ADLs  (Decreased balance)   Prognosis Good   Assessment Pt is a 88 y.o. female seen for OT evaluation at [facility], admitted 3/16/2024 w/ generalized weakness & SOB. Upon admission, pt found to have Sepsis (HCC).  OT completed extensive review of pt's medical and social history. Comorbidities affecting pt's functional performance at time of assessment include: DM2, anxiety, chronic pain syndrome, PNA, hypokalemia, acute respiratory failure with hypoxia, chronic back pain with lumbar foraminal stenosis & radiculopathy. Personal factors affecting pt at time of IE include: steps to enter environment, difficulty performing ADLS, difficulty performing IADLS , and environment. Prior to admission, pt was living alone in an in-law suite on the 2nd floor of her children's home with 16 STEPHEN.  Pt was I w/  ADLS and w/ IADLS, (-) drove, & required use of no DME/AD PTA. Upon evaluation: Pt requires S for functional transfers, S for functional mobility with RW, S for UB ADLs and Min A for LB ADLS 2* the following deficits impacting occupational performance: weakness, decreased strength, decreased balance, and decreased tolerance. Full objective findings from OT assessment regarding body systems outlined above. Pt to benefit from continued skilled OT tx while in the hospital to address deficits as defined above and maximize level of functional independence w/ ADL's and functional mobility. Occupational Performance areas to address include: bathing/shower, toilet hygiene, dressing, functional mobility, and clothing management. Based on findings, pt is of high. The patient's raw score on the AM-PAC Daily  Activity inpatient short form is 20, standardized score is 42.03, greater than 39.4. Patients at this level are likely to benefit from DC to home. However, please refer to therapist recommendation for discharge planning given other factors that may influence destination. At this time, OT recommendations at time of discharge are DC with Level III resources with increased familial support & use of RW for mobility.   Goals   Patient Goals Pt wants to go home   Plan   Treatment Interventions ADL retraining;Functional transfer training;Endurance training;Patient/family training;Equipment evaluation/education;Compensatory technique education;Energy conservation   Goal Expiration Date 03/28/24   OT Treatment Day 0   OT Frequency 2-3x/wk   Discharge Recommendation   Rehab Resource Intensity Level, OT III (Minimum Resource Intensity)   Additional Comments  Anticipate pt's functional status will improve if/when respiratory status improves. At this time, recommend level 3 with increased family support & use of RW for mobility   AM-PAC Daily Activity Inpatient   Lower Body Dressing 3   Bathing 3   Toileting 3   Upper Body Dressing 4   Grooming 3   Eating 4   Daily Activity Raw Score 20   Daily Activity Standardized Score (Calc for Raw Score >=11) 42.03   AM-PAC Applied Cognition Inpatient   Following a Speech/Presentation 3   Understanding Ordinary Conversation 4   Taking Medications 4   Remembering Where Things Are Placed or Put Away 4   Remembering List of 4-5 Errands 4   Taking Care of Complicated Tasks 3   Applied Cognition Raw Score 22   Applied Cognition Standardized Score 47.83   End of Consult   Education Provided Yes;Family or social support of family present for education by provider   Patient Position at End of Consult Bedside chair;Bed/Chair alarm activated;All needs within reach   Nurse Communication Nurse aware of consult     Pt will achieve the following goals within 10 days.    *Pt will complete LB bathing and  dressing with S & DME PRN.    *Pt will complete toileting w/ Mod I w/ G hygiene/thoroughness using DME PRN    *Pt will perform functional transfers with on/off all surfaces with Mod I using DME as needed w/ G balance/safety.    *Pt will demonstrate increased activity tolerance >20 min in order to complete ADL routine.    *Pt will improve functional mobility during ADL/IADL/leisure tasks to Mod I using DME as needed w/ G balance/safety.      *Pt will demonstrate G carryover of pt/caregiver education and training as appropriate w/ Mod I w/o cues w/ good tolerance      *Pt will verbalize and demonstrate use of energy conservation/ deep breathing technique and work simplification skills during functional activity with no verbal cues.    *Assess DME needs    Felecia Cantrell OTR/L

## 2024-03-19 ENCOUNTER — APPOINTMENT (INPATIENT)
Dept: ULTRASOUND IMAGING | Facility: HOSPITAL | Age: 89
DRG: 871 | End: 2024-03-19
Payer: COMMERCIAL

## 2024-03-19 PROBLEM — N17.9 AKI (ACUTE KIDNEY INJURY) (HCC): Status: ACTIVE | Noted: 2024-03-19

## 2024-03-19 LAB
ALBUMIN SERPL BCP-MCNC: 3.1 G/DL (ref 3.5–5)
ALP SERPL-CCNC: 45 U/L (ref 34–104)
ALT SERPL W P-5'-P-CCNC: 19 U/L (ref 7–52)
ANION GAP SERPL CALCULATED.3IONS-SCNC: 10 MMOL/L (ref 4–13)
ANION GAP SERPL CALCULATED.3IONS-SCNC: 7 MMOL/L (ref 4–13)
AST SERPL W P-5'-P-CCNC: 23 U/L (ref 13–39)
BACTERIA UR QL AUTO: ABNORMAL /HPF
BASOPHILS # BLD AUTO: 0.03 THOUSANDS/ÂΜL (ref 0–0.1)
BASOPHILS NFR BLD AUTO: 0 % (ref 0–1)
BILIRUB SERPL-MCNC: 0.44 MG/DL (ref 0.2–1)
BILIRUB UR QL STRIP: NEGATIVE
BUN SERPL-MCNC: 40 MG/DL (ref 5–25)
BUN SERPL-MCNC: 55 MG/DL (ref 5–25)
CALCIUM ALBUM COR SERPL-MCNC: 8.8 MG/DL (ref 8.3–10.1)
CALCIUM SERPL-MCNC: 8.1 MG/DL (ref 8.4–10.2)
CALCIUM SERPL-MCNC: 8.1 MG/DL (ref 8.4–10.2)
CHLORIDE SERPL-SCNC: 104 MMOL/L (ref 96–108)
CHLORIDE SERPL-SCNC: 97 MMOL/L (ref 96–108)
CLARITY UR: ABNORMAL
CO2 SERPL-SCNC: 23 MMOL/L (ref 21–32)
CO2 SERPL-SCNC: 25 MMOL/L (ref 21–32)
COLOR UR: YELLOW
CREAT SERPL-MCNC: 2.41 MG/DL (ref 0.6–1.3)
CREAT SERPL-MCNC: 2.68 MG/DL (ref 0.6–1.3)
EOSINOPHIL # BLD AUTO: 0 THOUSAND/ÂΜL (ref 0–0.61)
EOSINOPHIL NFR BLD AUTO: 0 % (ref 0–6)
ERYTHROCYTE [DISTWIDTH] IN BLOOD BY AUTOMATED COUNT: 14 % (ref 11.6–15.1)
GFR SERPL CREATININE-BSD FRML MDRD: 15 ML/MIN/1.73SQ M
GFR SERPL CREATININE-BSD FRML MDRD: 17 ML/MIN/1.73SQ M
GLUCOSE SERPL-MCNC: 133 MG/DL (ref 65–140)
GLUCOSE SERPL-MCNC: 135 MG/DL (ref 65–140)
GLUCOSE SERPL-MCNC: 139 MG/DL (ref 65–140)
GLUCOSE SERPL-MCNC: 193 MG/DL (ref 65–140)
GLUCOSE SERPL-MCNC: 265 MG/DL (ref 65–140)
GLUCOSE SERPL-MCNC: 274 MG/DL (ref 65–140)
GLUCOSE SERPL-MCNC: 316 MG/DL (ref 65–140)
GLUCOSE SERPL-MCNC: 327 MG/DL (ref 65–140)
GLUCOSE SERPL-MCNC: 338 MG/DL (ref 65–140)
GLUCOSE SERPL-MCNC: 342 MG/DL (ref 65–140)
GLUCOSE SERPL-MCNC: 386 MG/DL (ref 65–140)
GLUCOSE SERPL-MCNC: 412 MG/DL (ref 65–140)
GLUCOSE SERPL-MCNC: 416 MG/DL (ref 65–140)
GLUCOSE UR STRIP-MCNC: ABNORMAL MG/DL
HCT VFR BLD AUTO: 36.1 % (ref 34.8–46.1)
HGB BLD-MCNC: 11.2 G/DL (ref 11.5–15.4)
HGB UR QL STRIP.AUTO: ABNORMAL
IMM GRANULOCYTES # BLD AUTO: 0.28 THOUSAND/UL (ref 0–0.2)
IMM GRANULOCYTES NFR BLD AUTO: 2 % (ref 0–2)
KETONES UR STRIP-MCNC: ABNORMAL MG/DL
LEUKOCYTE ESTERASE UR QL STRIP: ABNORMAL
LYMPHOCYTES # BLD AUTO: 2.31 THOUSANDS/ÂΜL (ref 0.6–4.47)
LYMPHOCYTES NFR BLD AUTO: 14 % (ref 14–44)
MCH RBC QN AUTO: 30 PG (ref 26.8–34.3)
MCHC RBC AUTO-ENTMCNC: 31 G/DL (ref 31.4–37.4)
MCV RBC AUTO: 97 FL (ref 82–98)
MONOCYTES # BLD AUTO: 0.91 THOUSAND/ÂΜL (ref 0.17–1.22)
MONOCYTES NFR BLD AUTO: 5 % (ref 4–12)
MRSA NOSE QL CULT: NORMAL
MUCOUS THREADS UR QL AUTO: ABNORMAL
NEUTROPHILS # BLD AUTO: 13.31 THOUSANDS/ÂΜL (ref 1.85–7.62)
NEUTS SEG NFR BLD AUTO: 79 % (ref 43–75)
NITRITE UR QL STRIP: NEGATIVE
NON-SQ EPI CELLS URNS QL MICRO: ABNORMAL /HPF
NRBC BLD AUTO-RTO: 0 /100 WBCS
PH UR STRIP.AUTO: 5.5 [PH]
PHOSPHATE SERPL-MCNC: 2.5 MG/DL (ref 2.3–4.1)
PLATELET # BLD AUTO: 155 THOUSANDS/UL (ref 149–390)
PMV BLD AUTO: 10.6 FL (ref 8.9–12.7)
POTASSIUM SERPL-SCNC: 3.7 MMOL/L (ref 3.5–5.3)
POTASSIUM SERPL-SCNC: 3.9 MMOL/L (ref 3.5–5.3)
PROT SERPL-MCNC: 5.9 G/DL (ref 6.4–8.4)
PROT UR STRIP-MCNC: ABNORMAL MG/DL
RBC # BLD AUTO: 3.73 MILLION/UL (ref 3.81–5.12)
RBC #/AREA URNS AUTO: ABNORMAL /HPF
SODIUM SERPL-SCNC: 130 MMOL/L (ref 135–147)
SODIUM SERPL-SCNC: 136 MMOL/L (ref 135–147)
SP GR UR STRIP.AUTO: 1.02 (ref 1–1.03)
UROBILINOGEN UR STRIP-ACNC: <2 MG/DL
VANCOMYCIN SERPL-MCNC: 15.2 UG/ML (ref 10–20)
WBC # BLD AUTO: 16.84 THOUSAND/UL (ref 4.31–10.16)
WBC #/AREA URNS AUTO: ABNORMAL /HPF

## 2024-03-19 PROCEDURE — 82948 REAGENT STRIP/BLOOD GLUCOSE: CPT

## 2024-03-19 PROCEDURE — 85025 COMPLETE CBC W/AUTO DIFF WBC: CPT | Performed by: INTERNAL MEDICINE

## 2024-03-19 PROCEDURE — 81001 URINALYSIS AUTO W/SCOPE: CPT | Performed by: INTERNAL MEDICINE

## 2024-03-19 PROCEDURE — 94640 AIRWAY INHALATION TREATMENT: CPT

## 2024-03-19 PROCEDURE — 84100 ASSAY OF PHOSPHORUS: CPT | Performed by: NURSE PRACTITIONER

## 2024-03-19 PROCEDURE — 94668 MNPJ CHEST WALL SBSQ: CPT

## 2024-03-19 PROCEDURE — 94760 N-INVAS EAR/PLS OXIMETRY 1: CPT

## 2024-03-19 PROCEDURE — 80048 BASIC METABOLIC PNL TOTAL CA: CPT | Performed by: INTERNAL MEDICINE

## 2024-03-19 PROCEDURE — 99223 1ST HOSP IP/OBS HIGH 75: CPT | Performed by: INTERNAL MEDICINE

## 2024-03-19 PROCEDURE — 99232 SBSQ HOSP IP/OBS MODERATE 35: CPT | Performed by: INTERNAL MEDICINE

## 2024-03-19 PROCEDURE — 80053 COMPREHEN METABOLIC PANEL: CPT | Performed by: INTERNAL MEDICINE

## 2024-03-19 PROCEDURE — 99233 SBSQ HOSP IP/OBS HIGH 50: CPT | Performed by: INTERNAL MEDICINE

## 2024-03-19 PROCEDURE — 80202 ASSAY OF VANCOMYCIN: CPT | Performed by: INTERNAL MEDICINE

## 2024-03-19 PROCEDURE — 76775 US EXAM ABDO BACK WALL LIM: CPT

## 2024-03-19 RX ORDER — NEBIVOLOL 5 MG/1
20 TABLET ORAL DAILY
Status: DISCONTINUED | OUTPATIENT
Start: 2024-03-19 | End: 2024-03-26 | Stop reason: HOSPADM

## 2024-03-19 RX ORDER — SODIUM CHLORIDE, SODIUM GLUCONATE, SODIUM ACETATE, POTASSIUM CHLORIDE, MAGNESIUM CHLORIDE, SODIUM PHOSPHATE, DIBASIC, AND POTASSIUM PHOSPHATE .53; .5; .37; .037; .03; .012; .00082 G/100ML; G/100ML; G/100ML; G/100ML; G/100ML; G/100ML; G/100ML
60 INJECTION, SOLUTION INTRAVENOUS CONTINUOUS
Status: DISCONTINUED | OUTPATIENT
Start: 2024-03-19 | End: 2024-03-20

## 2024-03-19 RX ORDER — INSULIN LISPRO 100 [IU]/ML
2-12 INJECTION, SOLUTION INTRAVENOUS; SUBCUTANEOUS
Status: DISCONTINUED | OUTPATIENT
Start: 2024-03-20 | End: 2024-03-19

## 2024-03-19 RX ORDER — INSULIN GLARGINE 100 [IU]/ML
6 INJECTION, SOLUTION SUBCUTANEOUS EVERY MORNING
Status: DISCONTINUED | OUTPATIENT
Start: 2024-03-19 | End: 2024-03-19

## 2024-03-19 RX ORDER — INSULIN GLARGINE 100 [IU]/ML
5 INJECTION, SOLUTION SUBCUTANEOUS EVERY MORNING
Status: DISCONTINUED | OUTPATIENT
Start: 2024-03-19 | End: 2024-03-19

## 2024-03-19 RX ORDER — POLYETHYLENE GLYCOL 3350 17 G/17G
17 POWDER, FOR SOLUTION ORAL ONCE
Status: COMPLETED | OUTPATIENT
Start: 2024-03-19 | End: 2024-03-19

## 2024-03-19 RX ORDER — INSULIN GLARGINE 100 [IU]/ML
10 INJECTION, SOLUTION SUBCUTANEOUS EVERY 12 HOURS SCHEDULED
Status: DISCONTINUED | OUTPATIENT
Start: 2024-03-19 | End: 2024-03-19

## 2024-03-19 RX ORDER — INSULIN LISPRO 100 [IU]/ML
8 INJECTION, SOLUTION INTRAVENOUS; SUBCUTANEOUS ONCE
Status: COMPLETED | OUTPATIENT
Start: 2024-03-19 | End: 2024-03-19

## 2024-03-19 RX ORDER — INSULIN LISPRO 100 [IU]/ML
3 INJECTION, SOLUTION INTRAVENOUS; SUBCUTANEOUS
Status: DISCONTINUED | OUTPATIENT
Start: 2024-03-19 | End: 2024-03-19

## 2024-03-19 RX ORDER — INSULIN LISPRO 100 [IU]/ML
1-5 INJECTION, SOLUTION INTRAVENOUS; SUBCUTANEOUS
Status: DISCONTINUED | OUTPATIENT
Start: 2024-03-19 | End: 2024-03-19

## 2024-03-19 RX ORDER — INSULIN GLARGINE 100 [IU]/ML
10 INJECTION, SOLUTION SUBCUTANEOUS EVERY MORNING
Status: DISCONTINUED | OUTPATIENT
Start: 2024-03-20 | End: 2024-03-19

## 2024-03-19 RX ORDER — INSULIN LISPRO 100 [IU]/ML
1-6 INJECTION, SOLUTION INTRAVENOUS; SUBCUTANEOUS
Status: DISCONTINUED | OUTPATIENT
Start: 2024-03-19 | End: 2024-03-19

## 2024-03-19 RX ADMIN — PREGABALIN 50 MG: 25 CAPSULE ORAL at 18:26

## 2024-03-19 RX ADMIN — SODIUM CHLORIDE SOLN NEBU 3% 4 ML: 3 NEBU SOLN at 08:15

## 2024-03-19 RX ADMIN — HYDROCORTISONE SODIUM SUCCINATE 50 MG: 100 INJECTION, POWDER, FOR SOLUTION INTRAMUSCULAR; INTRAVENOUS at 23:47

## 2024-03-19 RX ADMIN — PREGABALIN 50 MG: 25 CAPSULE ORAL at 09:34

## 2024-03-19 RX ADMIN — CEFEPIME HYDROCHLORIDE 1000 MG: 1 INJECTION, SOLUTION INTRAVENOUS at 19:51

## 2024-03-19 RX ADMIN — SERTRALINE 100 MG: 100 TABLET, FILM COATED ORAL at 09:33

## 2024-03-19 RX ADMIN — CEFEPIME HYDROCHLORIDE 1000 MG: 1 INJECTION, SOLUTION INTRAVENOUS at 08:25

## 2024-03-19 RX ADMIN — SODIUM CHLORIDE SOLN NEBU 3% 4 ML: 3 NEBU SOLN at 20:34

## 2024-03-19 RX ADMIN — INSULIN GLARGINE 6 UNITS: 100 INJECTION, SOLUTION SUBCUTANEOUS at 09:34

## 2024-03-19 RX ADMIN — INSULIN LISPRO 2 UNITS: 100 INJECTION, SOLUTION INTRAVENOUS; SUBCUTANEOUS at 11:30

## 2024-03-19 RX ADMIN — FAMOTIDINE 10 MG: 20 TABLET ORAL at 09:34

## 2024-03-19 RX ADMIN — HYDROCORTISONE SODIUM SUCCINATE 50 MG: 100 INJECTION, POWDER, FOR SOLUTION INTRAMUSCULAR; INTRAVENOUS at 11:31

## 2024-03-19 RX ADMIN — HEPARIN SODIUM 5000 UNITS: 5000 INJECTION INTRAVENOUS; SUBCUTANEOUS at 21:57

## 2024-03-19 RX ADMIN — INSULIN LISPRO 8 UNITS: 100 INJECTION, SOLUTION INTRAVENOUS; SUBCUTANEOUS at 18:27

## 2024-03-19 RX ADMIN — INSULIN LISPRO 5 UNITS: 100 INJECTION, SOLUTION INTRAVENOUS; SUBCUTANEOUS at 16:47

## 2024-03-19 RX ADMIN — HEPARIN SODIUM 5000 UNITS: 5000 INJECTION INTRAVENOUS; SUBCUTANEOUS at 13:43

## 2024-03-19 RX ADMIN — INSULIN HUMAN 5 UNITS: 100 INJECTION, SOLUTION PARENTERAL at 18:44

## 2024-03-19 RX ADMIN — SODIUM CHLORIDE, SODIUM GLUCONATE, SODIUM ACETATE, POTASSIUM CHLORIDE, MAGNESIUM CHLORIDE, SODIUM PHOSPHATE, DIBASIC, AND POTASSIUM PHOSPHATE 75 ML/HR: .53; .5; .37; .037; .03; .012; .00082 INJECTION, SOLUTION INTRAVENOUS at 08:25

## 2024-03-19 RX ADMIN — GUAIFENESIN 600 MG: 600 TABLET ORAL at 09:33

## 2024-03-19 RX ADMIN — INSULIN LISPRO 3 UNITS: 100 INJECTION, SOLUTION INTRAVENOUS; SUBCUTANEOUS at 11:29

## 2024-03-19 RX ADMIN — INSULIN LISPRO 3 UNITS: 100 INJECTION, SOLUTION INTRAVENOUS; SUBCUTANEOUS at 16:47

## 2024-03-19 RX ADMIN — HYDROCORTISONE SODIUM SUCCINATE 50 MG: 100 INJECTION, POWDER, FOR SOLUTION INTRAMUSCULAR; INTRAVENOUS at 06:00

## 2024-03-19 RX ADMIN — NIFEDIPINE 90 MG: 30 TABLET, EXTENDED RELEASE ORAL at 09:34

## 2024-03-19 RX ADMIN — LEVALBUTEROL HYDROCHLORIDE 1.25 MG: 1.25 SOLUTION RESPIRATORY (INHALATION) at 13:21

## 2024-03-19 RX ADMIN — SODIUM CHLORIDE SOLN NEBU 3% 4 ML: 3 NEBU SOLN at 13:21

## 2024-03-19 RX ADMIN — LEVALBUTEROL HYDROCHLORIDE 1.25 MG: 1.25 SOLUTION RESPIRATORY (INHALATION) at 20:34

## 2024-03-19 RX ADMIN — NEBIVOLOL 20 MG: 5 TABLET ORAL at 09:34

## 2024-03-19 RX ADMIN — INSULIN GLARGINE 10 UNITS: 100 INJECTION, SOLUTION SUBCUTANEOUS at 20:42

## 2024-03-19 RX ADMIN — LEVALBUTEROL HYDROCHLORIDE 1.25 MG: 1.25 SOLUTION RESPIRATORY (INHALATION) at 08:15

## 2024-03-19 RX ADMIN — HEPARIN SODIUM 5000 UNITS: 5000 INJECTION INTRAVENOUS; SUBCUTANEOUS at 05:58

## 2024-03-19 RX ADMIN — HYDROCORTISONE SODIUM SUCCINATE 50 MG: 100 INJECTION, POWDER, FOR SOLUTION INTRAMUSCULAR; INTRAVENOUS at 18:26

## 2024-03-19 RX ADMIN — PRAVASTATIN SODIUM 40 MG: 40 TABLET ORAL at 16:46

## 2024-03-19 RX ADMIN — POLYETHYLENE GLYCOL 3350 17 G: 17 POWDER, FOR SOLUTION ORAL at 09:33

## 2024-03-19 RX ADMIN — GUAIFENESIN 600 MG: 600 TABLET ORAL at 18:26

## 2024-03-19 RX ADMIN — INSULIN LISPRO 5 UNITS: 100 INJECTION, SOLUTION INTRAVENOUS; SUBCUTANEOUS at 21:55

## 2024-03-19 RX ADMIN — ASPIRIN 81 MG: 81 TABLET, COATED ORAL at 09:34

## 2024-03-19 NOTE — ASSESSMENT & PLAN NOTE
Lab Results   Component Value Date    EGFR 17 03/19/2024    EGFR 18 03/18/2024    EGFR 25 03/18/2024    CREATININE 2.41 (H) 03/19/2024    CREATININE 2.26 (H) 03/18/2024    CREATININE 1.73 (H) 03/18/2024     Follows up with nephrology as outpatient  Baseline creatinine appears to be 1.6-1.9 as per nephrology, EGFR in mid 20s  Monitor BMP at risk for ATN with sepsis

## 2024-03-19 NOTE — ASSESSMENT & PLAN NOTE
RLL Pneumonia on CT chest ; with SIRS criteria and hypoxemia   See sepsis A&P   Supplemental O2 and wean for goal > 90%   Mucinex   Speech eval given possible aspiration etiology   Trend vitals and wbc   Patient started on ceftriaxone, increased wheezing oxygen requirements today.  Patient now on 12 L of oxygen  CTA chest done shows right lung pneumonia.  Will broaden antibiotic to cefepime and Vanco, MRSA culture ordered.  Pulmonology consult  received steroids for's severe pneumonia-patient improving

## 2024-03-19 NOTE — CONSULTS
Consultation - Nephrology   Ena Ahumada 88 y.o. female MRN: [id number]  Unit/Bed#: -01 SDU Encounter: [id number]    ASSESSMENT/PLAN:  Acute kidney injury on CKD IV: Suspect contrast associated nephropathy versus prerenal component on diuretic and hyperglycemia. Chronic disease in the setting of diabetes, hypertension, and age-related nephron loss.  -Baseline creatinine: 1.6-1.9.  -Creatinine is currently: 2.41.  -Presented with creatinine of 1.6.  -Follows with Dr. Baumann.  -Receiving IV hydration x 24 hours.  -Holding diuretics. May give as needed.   -Status post IV contrast 3/18/2024.  -UA: glucose, +1 ketones, trace blood, +1 protein, 0-1 RBCs/WBCs, 0-3 granular casts and WBC casts.  -CK level 82.  -Renal ultrasound is pending.  -Bladder scan is insignificant.  -Continue to avoid nephrotoxins, hypotension, IV contrast.  -I/O.    Complex left renal cyst: Continue to monitor as an outpatient. .  -Renal ultrasound is pending.    Hypokalemia: Suspected due to poor oral intake.  -Continue to monitor and replace as needed.  -Mg level previously normal.   -On potassium gluconate 2 tablets 2 times per day as an outpatient.    Blood pressure: labile.   -Current medication: Clonidine patch 0.1 mg weekly, Bystolic 20 mg daily, nifedipine 90 mg daily.  -Home medication: Clonidine 0.1 mg patch weekly, Bystolic 20 mg daily, nifedipine 90 mg daily, torsemide 20 mg daily.  -Recommend avoiding hypotension or high fluctuation in blood pressure.  -Holding parameters placed for systolic blood pressure less than 130 mmHg.    Acute hypoxic respiratory failure/sepsis/aspiration pneumonia:  -RSV/flu/COVID-negative.  -PE study negative for embolus.  -Chest x-ray concerning for pneumonia with pleural effusions.  -Currently on vancomycin and cefepime, renal dosing.  -Pulmonary team is following.  -Currently on IV steroids.  -Weaning oxygen as tolerated.  -Considering speech consult.    Anemia:  -Continue to monitor and  transfuse for hemoglobin less than 7.0.    Other: Diabetes, hyperlipidemia, GERD, aortic valve stenosis, chronic pain.      HISTORY OF PRESENT ILLNESS:  Requesting Physician: Toya Parekh MD  Reason for Consult: Acute kidney injury on CKD stage IV    Ena Ahumada is a 88 y.o. year old female with history of CKD stage IV, hypertension, diabetes, aortic valve stenosis, hyperlipidemia, GERD, who was admitted to [facility] after presenting with nausea and vomiting x 1. She reports going to bed and waking up to throwing up in her bed. She reports no warning signs. She went to her bathroom and she lowered her self to the floor because she felt weak. She was laying on the floor for 7 hours. She denies fevers or chills. She denies chest pain or shortness of breathe. She denies any diarrhea or changes to her urination. She denies NSAID use. A renal consultation is requested today for assistance in the management of NELA on CKD IV.    PAST MEDICAL HISTORY:  Past Medical History:   Diagnosis Date    Anxiety     Aortic valve insufficiency     Arthritis     Cataract of both eyes     Chronic kidney disease     Dysuria     last assessed - [id number]    Edema     last assessed - [id number]    GERD (gastroesophageal reflux disease)     last assessed - [id number]    Hyperlipidemia     Hypertension     Low back pain     last assessed - [id number]    Mitral valve disorder     Osteoporosis     last assessed - [id number]    Palpitations        PAST SURGICAL HISTORY:  Past Surgical History:   Procedure Laterality Date    APPENDECTOMY      CATARACT EXTRACTION Bilateral     COLONOSCOPY      TUBAL LIGATION Bilateral        ALLERGIES:  No Known Allergies    SOCIAL HISTORY:  Social History     Substance and Sexual Activity   Alcohol Use Never     Social History     Substance and Sexual Activity   Drug Use Never     Social History     Tobacco Use   Smoking Status Never   Smokeless Tobacco Never       FAMILY HISTORY:  Family History   Problem  Relation Age of Onset    Kidney disease Mother         Chronic    No Known Problems Father     Breast cancer Sister     Diabetes Sister     Cancer Sister     Breast cancer Sister     Hypertension Sister     No Known Problems Daughter     No Known Problems Son     No Known Problems Son     No Known Problems Son     No Known Problems Son        MEDICATIONS:    Current Facility-Administered Medications:     acetaminophen (TYLENOL) tablet 650 mg, 650 mg, Oral, Q6H PRN, Kira Holbrook PA-C, 650 mg at 03/18/24 1226    aspirin (ECOTRIN LOW STRENGTH) EC tablet 81 mg, 81 mg, Oral, Daily, Toya Parekh MD, 81 mg at 03/18/24 0840    cefepime (MAXIPIME) IVPB (premix in dextrose) 1,000 mg 50 mL, 1,000 mg, Intravenous, Q12H, Toya Parekh MD, Last Rate: 100 mL/hr at 03/19/24 0825, 1,000 mg at 03/19/24 0825    [START ON 3/20/2024] cloNIDine (CATAPRES-TTS-1) 0.1 mg/24 hr TD weekly patch, 1 patch, Transdermal, Weekly, Toya Parekh MD    famotidine (PEPCID) tablet 10 mg, 10 mg, Oral, Daily, Toya Parekh MD, 10 mg at 03/18/24 0839    guaiFENesin (MUCINEX) 12 hr tablet 600 mg, 600 mg, Oral, BID, Toya Parekh MD, 600 mg at 03/18/24 1723    heparin (porcine) subcutaneous injection 5,000 Units, 5,000 Units, Subcutaneous, Q8H [facility], Toya Parekh MD, 5,000 Units at 03/19/24 0558    hydrocortisone (Solu-CORTEF) injection 50 mg, 50 mg, Intravenous, Q6H JONO, Maggy Felix DO, 50 mg at 03/19/24 0600    insulin regular (HumuLIN R,NovoLIN R) 1 Units/mL in sodium chloride 0.9 % 100 mL infusion, 0.3-21 Units/hr, Intravenous, Titrated, KANA Navarrete, Last Rate: 1.5 mL/hr at 03/19/24 0823, 1.5 Units/hr at 03/19/24 0823    levalbuterol (XOPENEX) inhalation solution 1.25 mg, 1.25 mg, Nebulization, TID, Danielle Andrews PA-C, 1.25 mg at 03/19/24 0815    multi-electrolyte (PLASMALYTE-A/ISOLYTE-S PH 7.4) IV solution, 75 mL/hr, Intravenous, Continuous, Toya Parekh MD, Last Rate: 75 mL/hr at 03/19/24 0825, 75 mL/hr at  03/19/24 0825    nebivolol (BYSTOLIC) tablet 20 mg, 20 mg, Oral, Daily, Toya Parekh MD, 20 mg at 03/18/24 0841    NIFEdipine (PROCARDIA XL) 24 hr tablet 90 mg, 90 mg, Oral, Daily, Toya Parekh MD, 90 mg at 03/18/24 0840    pravastatin (PRAVACHOL) tablet 40 mg, 40 mg, Oral, Daily With Dinner, Toya Parekh MD, 40 mg at 03/18/24 1723    pregabalin (LYRICA) capsule 50 mg, 50 mg, Oral, BID, Toya Parekh MD, 50 mg at 03/18/24 1723    sertraline (ZOLOFT) tablet 100 mg, 100 mg, Oral, Daily, Toya Parekh MD, 100 mg at 03/18/24 0845    sodium chloride 3 % inhalation solution 4 mL, 4 mL, Nebulization, TID, Danielle Andrews PA-C, 4 mL at 03/19/24 0815    transdermal buprenorphine (BUTRANS) 7.5 mcg/hr TD patch 1 patch, 1 patch, Transdermal, Q7 Days, Toya Parekh MD, 1 patch at 03/16/24 2002    vancomycin (VANCOCIN) IVPB (premix in dextrose) 750 mg 150 mL, 12.5 mg/kg (Adjusted), Intravenous, Daily PRN, Toya Parekh MD    REVIEW OF SYSTEMS:  A complete review of systems was performed and found to be negative unless otherwise noted in the history of present illness.  General: No fevers, chills.   Cardiovascular:  No chest pain, No leg edema.  Respiratory: No cough, sputum production,  No shortness of breath.  Gastrointestinal:  + nausea/vomiting,  No diarrhea,  No abdominal pain.  Genitourinary: No hematuria.  No foamy urine.  No dysuria    PHYSICAL EXAM:  Current Weight: Weight - Scale: 75.2 kg (165 lb 12.6 oz)  First Weight: Weight - Scale: 73 kg (160 lb 15 oz)  Vitals:    03/19/24 0350 03/19/24 0559 03/19/24 0806 03/19/24 0815   BP:       BP Location:       Pulse: 65      Resp:       Temp:       TempSrc:       SpO2: 97%  94% 93%   Weight:  75.2 kg (165 lb 12.6 oz)     Height:           Intake/Output Summary (Last 24 hours) at 3/19/2024 0825  Last data filed at 3/19/2024 0401  Gross per 24 hour   Intake 708.99 ml   Output --   Net 708.99 ml     General: NAD  Skin: warm, dry, intact, no  rash  HEENT: Moist mucous membranes, sclera anicteric, normocephalic, atraumatic  Neck: No apparent JVD appreciated  Chest:lung sounds decreased B/L, on O2   CVS:Regular rate and rhythm, no murmer   Abdomen: Soft, round, non-tender, +BS  Extremities: No B/L LE edema present  Neuro: alert and oriented  Psych: appropriate mood and affect     Invasive Devices:      Lab Results:   Results from last 7 days   Lab Units 03/19/24  0555 03/18/24  2116 03/18/24  0316 03/17/24  0606 03/16/24  1221 03/16/24  1216   WBC Thousand/uL 16.84*  --  16.74* 16.52*  --  17.06*   HEMOGLOBIN g/dL 11.2*  --  11.7 11.3*  --  14.1   I STAT HEMOGLOBIN g/dl  --   --   --   --  15.0  --    HEMATOCRIT % 36.1  --  38.1 36.1  --  45.2   HEMATOCRIT, ISTAT %  --   --   --   --  44  --    PLATELETS Thousands/uL 155  --  152 147*  --  176   SODIUM mmol/L 136 132* 138 143  --  142   POTASSIUM mmol/L 3.7 4.1 3.2* 3.1*  --  3.9   CHLORIDE mmol/L 104 100 103 106  --  103   CO2 mmol/L 25 23 28 28  --  28   CO2, I-STAT mmol/L  --   --   --   --  29  --    BUN mg/dL 40* 37* 30* 32*  --  36*   CREATININE mg/dL 2.41* 2.26* 1.73* 1.43*  --  1.64*   CALCIUM mg/dL 8.1* 7.9* 8.1* 8.2*  --  9.2   MAGNESIUM mg/dL  --   --  2.3 2.3  --  2.1   PHOSPHORUS mg/dL  --   --   --  3.1  --   --    ALK PHOS U/L 45  --  47 37  --  51   ALT U/L 19  --  14 13  --  17   AST U/L 23  --  19 18  --  23   GLUCOSE, ISTAT mg/dl  --   --   --   --  264*  --

## 2024-03-19 NOTE — PROGRESS NOTES
[facility]  Progress Note  Name: Ena hAumada I  MRN: [id number]  Unit/Bed#: -01 SDU I Date of Admission: 3/16/2024   Date of Service: 3/19/2024 I Hospital Day: 3    Assessment/Plan   NELA (acute kidney injury) ([facility])  Assessment & Plan  Increasing creatinine  Likely in the setting of sepsis, possible ATN  Urinary retention protocol  Monitor I's/O  Daily weights  Consult nephrology  Renal ultrasound  Urinalysis    Acute respiratory failure with hypoxia ([facility])  Assessment & Plan  Acute respiratory failure with hypoxia presents on admission , requiring nasal cannula O2 supplementation ~ 5 Liters which is new for the patient ; most likely cause of hypoxemia is her RLL Pneumonia as evident on Xray Chest.   Requiring mid flow, will increase level of care to stepdown    Hypokalemia  Assessment & Plan  3/17 Potassium 3.1 ; Magnesium wnl   Possible due to decreased PO Intake  Replete  Daily CMP and revaluate     Pneumonia  Assessment & Plan  RLL Pneumonia on CT chest ; with SIRS criteria and hypoxemia   See sepsis A&P   Supplemental O2 and wean for goal > 90%   Mucinex   Speech eval given possible aspiration etiology   Trend vitals and wbc   Patient started on ceftriaxone, increased wheezing oxygen requirements today.  Patient now on 12 L of oxygen  CTA chest done shows right lung pneumonia.  Will broaden antibiotic to cefepime and Vanco, MRSA culture ordered.  Pulmonology consult  received steroids for's severe pneumonia-patient improving      Chronic pain syndrome  Assessment & Plan  Follows up with pain medicine  Continue buprenorphine    Nonrheumatic aortic valve stenosis  Assessment & Plan  Follows up with cardiology, last echo in April 2023  Echo-LVEF 65%, grade 1 diastolic dysfunction.  Moderate aortic stenosis  Monitor volume status  Continue home medications with blood pressure parameters    Stage 3b chronic kidney disease (CKD) ([facility])  Assessment & Plan  Lab Results    Component Value Date    EGFR 17 03/19/2024    EGFR 18 03/18/2024    EGFR 25 03/18/2024    CREATININE 2.41 (H) 03/19/2024    CREATININE 2.26 (H) 03/18/2024    CREATININE 1.73 (H) 03/18/2024     Follows up with nephrology as outpatient  Baseline creatinine appears to be 1.6-1.9 as per nephrology, EGFR in mid 20s  Monitor BMP at risk for ATN with sepsis        Anxiety  Assessment & Plan  Continue Zoloft    Type 2 diabetes mellitus with stage 4 chronic kidney disease, without long-term current use of insulin (HCC)  Assessment & Plan  Lab Results   Component Value Date    HGBA1C 8.1 (H) 03/16/2024       Recent Labs     03/18/24  2355 03/19/24  0200 03/19/24  0412 03/19/24  0554   POCGLU 386* 274* 193* 139         Blood Sugar Average: Last 72 hrs:  (P) 248.5Follows up with endocrine, home regimen-Invokana, glipizide, Januvia    SSI Before meals and at bed time + Low Carb Diet   Received steroids for severe pneumonia and was placed on insulin drip  Switch to subcu Lantus with Humalog 3 times daily          * Sepsis (HCC)  Assessment & Plan  SIRS criteria met : tachycardia, tachypnea, leukocytosis  Suspected source: Pneumonia  CXR: Lower lobe pneumonia  UA not suggestive UTI    CT: None  Continue to trend leukocytosis and fever curve  Procalcitonin: 18  Lactic acid: 2.5-resolved    Will need additional fluid depending on lactate and blood pressure  Blood Cultures pending   Will broaden antibiotic to cefepime and Vanco pending MRSA culture               VTE Pharmacologic Prophylaxis: VTE Score: 8 High Risk (Score >/= 5) - Pharmacological DVT Prophylaxis Ordered: heparin. Sequential Compression Devices Ordered.    Mobility:   Basic Mobility Inpatient Raw Score: 20  JH-HLM Goal: 6: Walk 10 steps or more  JH-HLM Achieved: 6: Walk 10 steps or more  JH-HLM Goal achieved. Continue to encourage appropriate mobility.    Patient Centered Rounds: I performed bedside rounds with nursing staff today.   Discussions with Specialists or  Other Care Team Provider: Pulmonology, nursing    Education and Discussions with Family / Patient: Updated  (daughter in law) via phone.    Total Time Spent on Date of Encounter in care of patient: 60 mins. This time was spent on one or more of the following: performing physical exam; counseling and coordination of care; obtaining or reviewing history; documenting in the medical record; reviewing/ordering tests, medications or procedures; communicating with other healthcare professionals and discussing with patient's family/caregivers.    Current Length of Stay: 3 day(s)  Current Patient Status: Inpatient   Certification Statement: The patient will continue to require additional inpatient hospital stay due to Dhruv, severe PNA  Discharge Plan: Anticipate discharge in 48-72 hrs to home.    Code Status: Level 1 - Full Code    Subjective:   States her breathing is much better.  Did not have any bowel movement since Saturday.    Overnight events significant for high glucose and was placed on insulin drip.    Objective:     Vitals:   Temp (24hrs), Av.9 °F (36.6 °C), Min:96.7 °F (35.9 °C), Max:98.7 °F (37.1 °C)    Temp:  [96.7 °F (35.9 °C)-98.7 °F (37.1 °C)] 96.7 °F (35.9 °C)  HR:  [62-73] 69  Resp:  [16-25] 23  BP: (100-136)/(52-60) 134/60  SpO2:  [91 %-99 %] 99 %  Body mass index is 30.32 kg/m².     Input and Output Summary (last 24 hours):     Intake/Output Summary (Last 24 hours) at 3/19/2024 0904  Last data filed at 3/19/2024 0401  Gross per 24 hour   Intake 708.99 ml   Output --   Net 708.99 ml       Physical Exam:   Physical Exam     Gen.-Patient comfortable   Neck- Supple. No thyromegaly or lymphadenopathy  Lungs-Right rhnochi   Heart S1-S2, regular rate and rhythm, no murmurs  Abdomen-soft nontender, no organomegaly. Bowel sounds present  Extremities-no cyanosi,  clubbing or edema  Skin- no rash  Neuro-nonfocal     Additional Data:     Labs:  Results from last 7 days   Lab Units 24  3350    WBC Thousand/uL 16.84*   HEMOGLOBIN g/dL 11.2*   HEMATOCRIT % 36.1   PLATELETS Thousands/uL 155   NEUTROS PCT % 79*   LYMPHS PCT % 14   MONOS PCT % 5   EOS PCT % 0     Results from last 7 days   Lab Units 03/19/24  0555   SODIUM mmol/L 136   POTASSIUM mmol/L 3.7   CHLORIDE mmol/L 104   CO2 mmol/L 25   BUN mg/dL 40*   CREATININE mg/dL 2.41*   ANION GAP mmol/L 7   CALCIUM mg/dL 8.1*   ALBUMIN g/dL 3.1*   TOTAL BILIRUBIN mg/dL 0.44   ALK PHOS U/L 45   ALT U/L 19   AST U/L 23   GLUCOSE RANDOM mg/dL 135     Results from last 7 days   Lab Units 03/16/24  1301   INR  1.07     Results from last 7 days   Lab Units 03/19/24  0554 03/19/24  0412 03/19/24  0200 03/18/24  2355 03/18/24  2149 03/18/24  2110 03/18/24  2108 03/18/24  1722 03/18/24  1159 03/18/24  0759 03/18/24  0120 03/17/24  2015   POC GLUCOSE mg/dl 139 193* 274* 386* 412* 393* 418* 233* 247* 200* 188* 181*     Results from last 7 days   Lab Units 03/16/24  1620   HEMOGLOBIN A1C % 8.1*     Results from last 7 days   Lab Units 03/18/24  0316 03/17/24  0606 03/16/24  1620 03/16/24  1301   LACTIC ACID mmol/L  --   --  1.7 2.5*   PROCALCITONIN ng/ml 26.72* 25.97*  --  18.29*       Lines/Drains:  Invasive Devices       Peripheral Intravenous Line  Duration             Peripheral IV 03/16/24 Right Antecubital 2 days                      Telemetry:  Telemetry Orders (From admission, onward)               24 Hour Telemetry Monitoring  Continuous x 24 Hours (Telem)        Expiring   Question:  Reason for 24 Hour Telemetry  Answer:  Acute respiratory failure on BiPAP                     Telemetry Reviewed: Normal Sinus Rhythm  Indication for Continued Telemetry Use: No indication for continued use. Will discontinue.              Imaging: No pertinent imaging reviewed.    Recent Cultures (last 7 days):   Results from last 7 days   Lab Units 03/16/24  1301   BLOOD CULTURE  No Growth at 48 hrs.  No Growth at 48 hrs.       Last 24 Hours Medication List:   Current  Facility-Administered Medications   Medication Dose Route Frequency Provider Last Rate    acetaminophen  650 mg Oral Q6H PRN Kira Holbrook PA-C      aspirin  81 mg Oral Daily Toya Parekh MD      cefepime  1,000 mg Intravenous Q12H Toya Parekh MD 1,000 mg (03/19/24 0825)    [START ON 3/20/2024] cloNIDine  1 patch Transdermal Weekly Toya Parekh MD      famotidine  10 mg Oral Daily Toya Parekh MD      guaiFENesin  600 mg Oral BID Toya Parekh MD      heparin (porcine)  5,000 Units Subcutaneous Q8H JONO Toya Parekh MD      hydrocortisone sodium succinate  50 mg Intravenous Q6H JONO Maggy Felix DO      insulin glargine  6 Units Subcutaneous QAM Toya Parekh MD      insulin lispro  1-5 Units Subcutaneous TID AC Toya Parekh MD      insulin lispro  3 Units Subcutaneous TID With Meals Toya Parekh MD      levalbuterol  1.25 mg Nebulization TID Danielle Andrews PA-C      multi-electrolyte  75 mL/hr Intravenous Continuous Toya Parekh MD 75 mL/hr (03/19/24 0825)    nebivolol  20 mg Oral Daily KANA Ray      NIFEdipine  90 mg Oral Daily KANA Ray      polyethylene glycol  17 g Oral Once Toya Parekh MD      pravastatin  40 mg Oral Daily With Dinner Toya Parekh MD      pregabalin  50 mg Oral BID Toya Parekh MD      sertraline  100 mg Oral Daily Toya Parekh MD      sodium chloride  4 mL Nebulization TID Danielle Andrews PA-C      transdermal buprenorphine  1 patch Transdermal Q7 Days Toya Parekh MD      vancomycin  12.5 mg/kg (Adjusted) Intravenous Daily PRN Toya Parekh MD          Today, Patient Was Seen By: Toya Parekh MD    **Please Note: This note may have been constructed using a voice recognition system.**

## 2024-03-19 NOTE — PLAN OF CARE
Problem: Potential for Falls  Goal: Patient will remain free of falls  Description: INTERVENTIONS:  - Educate patient/family on patient safety including physical limitations  - Instruct patient to call for assistance with activity   - Consult OT/PT to assist with strengthening/mobility   - Keep Call bell within reach  - Keep bed low and locked with side rails adjusted as appropriate  - Keep care items and personal belongings within reach  - Initiate and maintain comfort rounds  - Make Fall Risk Sign visible to staff  - Offer Toileting every 2 Hours, in advance of need  - Initiate/Maintain bed alarm  - Obtain necessary fall risk management equipment  - Apply yellow socks and bracelet for high fall risk patients  - Consider moving patient to room near nurses station  Outcome: Progressing  Goal: Maintain or return to baseline ADL function  Description: INTERVENTIONS:  -  Assess patient's ability to carry out ADLs; assess patient's baseline for ADL function and identify physical deficits which impact ability to perform ADLs (bathing, care of mouth/teeth, toileting, grooming, dressing, etc.)  - Assess/evaluate cause of self-care deficits   - Assess range of motion  - Assess patient's mobility; develop plan if impaired  - Assess patient's need for assistive devices and provide as appropriate  - Encourage maximum independence but intervene and supervise when necessary  - Involve family in performance of ADLs  - Assess for home care needs following discharge   - Consider OT consult to assist with ADL evaluation and planning for discharge  - Provide patient education as appropriate  Outcome: Progressing  Goal: Maintains/Returns to pre admission functional level  Description: INTERVENTIONS:  - Perform AM-PAC 6 Click Basic Mobility/ Daily Activity assessment daily.  - Set and communicate daily mobility goal to care team and patient/family/caregiver.   - Collaborate with rehabilitation services on mobility goals if  consulted  - Perform Range of Motion 3 times a day.  - Reposition patient every 2 hours.  - Dangle patient 3 times a day  - Stand patient 3 times a day  - Ambulate patient 3 times a day  - Out of bed to chair 3 times a day   - Out of bed for meals 3 times a day  - Out of bed for toileting  - Record patient progress and toleration of activity level   Outcome: Progressing     Problem: DISCHARGE PLANNING  Goal: Discharge to home or other facility with appropriate resources  Description: INTERVENTIONS:  - Identify barriers to discharge w/patient and caregiver  - Arrange for needed discharge resources and transportation as appropriate  - Identify discharge learning needs (meds, wound care, etc.)  - Arrange for interpretive services to assist at discharge as needed  - Refer to Case Management Department for coordinating discharge planning if the patient needs post-hospital services based on physician/advanced practitioner order or complex needs related to functional status, cognitive ability, or social support system  Outcome: Progressing     Problem: Knowledge Deficit  Goal: Patient/family/caregiver demonstrates understanding of disease process, treatment plan, medications, and discharge instructions  Description: Complete learning assessment and assess knowledge base.  Interventions:  - Provide teaching at level of understanding  - Provide teaching via preferred learning methods  Outcome: Progressing     Problem: Prexisting or High Potential for Compromised Skin Integrity  Goal: Skin integrity is maintained or improved  Description: INTERVENTIONS:  - Identify patients at risk for skin breakdown  - Assess and monitor skin integrity  - Assess and monitor nutrition and hydration status  - Monitor labs   - Assess for incontinence   - Turn and reposition patient  - Assist with mobility/ambulation  - Relieve pressure over bony prominences  - Avoid friction and shearing  - Provide appropriate hygiene as needed including  keeping skin clean and dry  - Evaluate need for skin moisturizer/barrier cream  - Collaborate with interdisciplinary team   - Patient/family teaching  - Consider wound care consult   Outcome: Progressing     Problem: Nutrition/Hydration-ADULT  Goal: Nutrient/Hydration intake appropriate for improving, restoring or maintaining nutritional needs  Description: Monitor and assess patient's nutrition/hydration status for malnutrition. Collaborate with interdisciplinary team and initiate plan and interventions as ordered.  Monitor patient's weight and dietary intake as ordered or per policy. Utilize nutrition screening tool and intervene as necessary. Determine patient's food preferences and provide high-protein, high-caloric foods as appropriate.     INTERVENTIONS:  - Monitor oral intake, urinary output, labs, and treatment plans  - Assess nutrition and hydration status and recommend course of action  - Evaluate amount of meals eaten  - Assist patient with eating if necessary   - Allow adequate time for meals  - Recommend/ encourage appropriate diets, oral nutritional supplements, and vitamin/mineral supplements  - Order, calculate, and assess calorie counts as needed  - Recommend, monitor, and adjust tube feedings and TPN/PPN based on assessed needs  - Assess need for intravenous fluids  - Provide specific nutrition/hydration education as appropriate  - Include patient/family/caregiver in decisions related to nutrition  Outcome: Progressing     Problem: RESPIRATORY - ADULT  Goal: Achieves optimal ventilation and oxygenation  Description: INTERVENTIONS:  - Assess for changes in respiratory status  - Assess for changes in mentation and behavior  - Position to facilitate oxygenation and minimize respiratory effort  - Oxygen administered by appropriate delivery if ordered  - Initiate smoking cessation education as indicated  - Encourage broncho-pulmonary hygiene including cough, deep breathe, Incentive Spirometry  - Assess  the need for suctioning and aspirate as needed  - Assess and instruct to report SOB or any respiratory difficulty  - Respiratory Therapy support as indicated  Outcome: Progressing

## 2024-03-19 NOTE — PLAN OF CARE
Problem: Potential for Falls  Goal: Patient will remain free of falls  Description: INTERVENTIONS:  - Educate patient/family on patient safety including physical limitations  - Instruct patient to call for assistance with activity   - Consult OT/PT to assist with strengthening/mobility   - Keep Call bell within reach  - Keep bed low and locked with side rails adjusted as appropriate  - Keep care items and personal belongings within reach  - Initiate and maintain comfort rounds  - Make Fall Risk Sign visible to staff  - Offer Toileting every 2 Hours, in advance of need  - Initiate/Maintain bed/chair alarm  - Obtain necessary fall risk management equipment  - Apply yellow socks and bracelet for high fall risk patients  - Consider moving patient to room near nurses station  Outcome: Progressing     Problem: SAFETY ADULT  Goal: Patient will remain free of falls  Description: INTERVENTIONS:  - Educate patient/family on patient safety including physical limitations  - Instruct patient to call for assistance with activity   - Consult OT/PT to assist with strengthening/mobility   - Keep Call bell within reach  - Keep bed low and locked with side rails adjusted as appropriate  - Keep care items and personal belongings within reach  - Initiate and maintain comfort rounds  - Make Fall Risk Sign visible to staff  - Offer Toileting every 2 Hours, in advance of need  - Initiate/Maintain bed/chair alarm  - Obtain necessary fall risk management equipment  - Apply yellow socks and bracelet for high fall risk patients  - Consider moving patient to room near nurses station  Outcome: Progressing  Goal: Maintain or return to baseline ADL function  Description: INTERVENTIONS:  -  Assess patient's ability to carry out ADLs; assess patient's baseline for ADL function and identify physical deficits which impact ability to perform ADLs (bathing, care of mouth/teeth, toileting, grooming, dressing, etc.)  - Assess/evaluate cause of self-care  deficits   - Assess range of motion  - Assess patient's mobility; develop plan if impaired  - Assess patient's need for assistive devices and provide as appropriate  - Encourage maximum independence but intervene and supervise when necessary  - Involve family in performance of ADLs  - Assess for home care needs following discharge   - Consider OT consult to assist with ADL evaluation and planning for discharge  - Provide patient education as appropriate  Outcome: Progressing  Goal: Maintains/Returns to pre admission functional level  Description: INTERVENTIONS:  - Perform AM-PAC 6 Click Basic Mobility/ Daily Activity assessment daily.  - Set and communicate daily mobility goal to care team and patient/family/caregiver.   - Collaborate with rehabilitation services on mobility goals if consulted  - Perform Range of Motion 4 times a day.  - Reposition patient every 2 hours.  - Dangle patient 3 times a day  - Stand patient 3 times a day  - Ambulate patient 3 times a day  - Out of bed to chair 3 times a day   - Out of bed for meals 3 times a day  - Out of bed for toileting  - Record patient progress and toleration of activity level   Outcome: Progressing     Problem: DISCHARGE PLANNING  Goal: Discharge to home or other facility with appropriate resources  Description: INTERVENTIONS:  - Identify barriers to discharge w/patient and caregiver  - Arrange for needed discharge resources and transportation as appropriate  - Identify discharge learning needs (meds, wound care, etc.)  - Arrange for interpretive services to assist at discharge as needed  - Refer to Case Management Department for coordinating discharge planning if the patient needs post-hospital services based on physician/advanced practitioner order or complex needs related to functional status, cognitive ability, or social support system  Outcome: Progressing     Problem: Knowledge Deficit  Goal: Patient/family/caregiver demonstrates understanding of disease  process, treatment plan, medications, and discharge instructions  Description: Complete learning assessment and assess knowledge base.  Interventions:  - Provide teaching at level of understanding  - Provide teaching via preferred learning methods  Outcome: Progressing     Problem: Prexisting or High Potential for Compromised Skin Integrity  Goal: Skin integrity is maintained or improved  Description: INTERVENTIONS:  - Identify patients at risk for skin breakdown  - Assess and monitor skin integrity  - Assess and monitor nutrition and hydration status  - Monitor labs   - Assess for incontinence   - Turn and reposition patient  - Assist with mobility/ambulation  - Relieve pressure over bony prominences  - Avoid friction and shearing  - Provide appropriate hygiene as needed including keeping skin clean and dry  - Evaluate need for skin moisturizer/barrier cream  - Collaborate with interdisciplinary team   - Patient/family teaching  - Consider wound care consult   Outcome: Progressing     Problem: Nutrition/Hydration-ADULT  Goal: Nutrient/Hydration intake appropriate for improving, restoring or maintaining nutritional needs  Description: Monitor and assess patient's nutrition/hydration status for malnutrition. Collaborate with interdisciplinary team and initiate plan and interventions as ordered.  Monitor patient's weight and dietary intake as ordered or per policy. Utilize nutrition screening tool and intervene as necessary. Determine patient's food preferences and provide high-protein, high-caloric foods as appropriate.     INTERVENTIONS:  - Monitor oral intake, urinary output, labs, and treatment plans  - Assess nutrition and hydration status and recommend course of action  - Evaluate amount of meals eaten  - Assist patient with eating if necessary   - Allow adequate time for meals  - Recommend/ encourage appropriate diets, oral nutritional supplements, and vitamin/mineral supplements  - Order, calculate, and assess  calorie counts as needed  - Recommend, monitor, and adjust tube feedings and TPN/PPN based on assessed needs  - Assess need for intravenous fluids  - Provide specific nutrition/hydration education as appropriate  - Include patient/family/caregiver in decisions related to nutrition  Outcome: Progressing     Problem: RESPIRATORY - ADULT  Goal: Achieves optimal ventilation and oxygenation  Description: INTERVENTIONS:  - Assess for changes in respiratory status  - Assess for changes in mentation and behavior  - Position to facilitate oxygenation and minimize respiratory effort  - Oxygen administered by appropriate delivery if ordered  - Initiate smoking cessation education as indicated  - Encourage broncho-pulmonary hygiene including cough, deep breathe, Incentive Spirometry  - Assess the need for suctioning and aspirate as needed  - Assess and instruct to report SOB or any respiratory difficulty  - Respiratory Therapy support as indicated  Outcome: Progressing

## 2024-03-19 NOTE — PROGRESS NOTES
Glucose >400 on fingerstick. Patient noted to have started IV steroids today. BMP without AG and CO2 WNL. Will transition to [facility] insulin gtt for improved glucose control overnight with hopeful plans to transition to SSI tomorrow.

## 2024-03-19 NOTE — ASSESSMENT & PLAN NOTE
Lab Results   Component Value Date    HGBA1C 8.1 (H) 03/16/2024       Recent Labs     03/18/24  2355 03/19/24  0200 03/19/24  0412 03/19/24  0554   POCGLU 386* 274* 193* 139         Blood Sugar Average: Last 72 hrs:  (P) 248.5Follows up with endocrine, home regimen-Invokana, glipizide, Januvia    SSI Before meals and at bed time + Low Carb Diet   Received steroids for severe pneumonia and was placed on insulin drip  Switch to subcu Lantus with Humalog 3 times daily

## 2024-03-19 NOTE — PROGRESS NOTES
"Progress Note - Pulmonary   Ena Ahumada 88 y.o. female MRN: [id number]  Unit/Bed#: -01 SDU Encounter: [id number]    Assessment:  Acute Evoxac respiratory failure secondary to ARDS and aspiration  NELA  Aortic valve stenosis  CKD    Plan:  From pulmonary standpoint patient is improving recommend continuing cefepime and Vanco pending culture results for total of 5 to 7 days pending clinical response and procalcitonin levels  Patient is shaky with steroids and nebulizers at this point I would DC her Xopenex continue the steroids for 4 days total and then wean to off days    Chief Complaint:   I feel much better    Subjective:   Patient denies any shortness of breath or cough.  She feels her breathing is improving.  She denies any abdominal pain or fullness    Objective:     Vitals: Blood pressure 134/60, pulse 69, temperature (!) 96.7 °F (35.9 °C), temperature source Axillary, resp. rate (!) 23, height 5' 2\" (1.575 m), weight 75.2 kg (165 lb 12.6 oz), SpO2 99%, not currently breastfeeding.,Body mass index is 30.32 kg/m².      Intake/Output Summary (Last 24 hours) at 3/19/2024 1016  Last data filed at 3/19/2024 0950  Gross per 24 hour   Intake 582.44 ml   Output --   Net 582.44 ml       Invasive Devices       Peripheral Intravenous Line  Duration             Peripheral IV 03/16/24 Right Antecubital 2 days                    Physical Exam: /60 (BP Location: Left arm)   Pulse 69   Temp (!) 96.7 °F (35.9 °C) (Axillary)   Resp (!) 23   Ht 5' 2\" (1.575 m)   Wt 75.2 kg (165 lb 12.6 oz)   SpO2 99%   BMI 30.32 kg/m²   General appearance: alert and oriented, in no acute distress  Neck: no adenopathy, no carotid bruit, no JVD, supple, symmetrical, trachea midline, and thyroid not enlarged, symmetric, no tenderness/mass/nodules  Lungs: clear to auscultation bilaterally  Heart: regular rate and rhythm, S1, S2 normal, no murmur, click, rub or gallop  Abdomen: soft, non-tender; bowel sounds normal; no masses, "  no organomegaly  Extremities: extremities normal, warm and well-perfused; no cyanosis, clubbing, or edema  Pulses: 2+ and symmetric  Lymph nodes: Cervical, supraclavicular, and axillary nodes normal.  Neurologic: Grossly normal     Labs: CBC:   Lab Results   Component Value Date    WBC 16.84 (H) 03/19/2024    HGB 11.2 (L) 03/19/2024    HCT 36.1 03/19/2024    MCV 97 03/19/2024     03/19/2024    RBC 3.73 (L) 03/19/2024    MCH 30.0 03/19/2024    MCHC 31.0 (L) 03/19/2024    RDW 14.0 03/19/2024    MPV 10.6 03/19/2024    NRBC 0 03/19/2024   , CMP:   Lab Results   Component Value Date    SODIUM 136 03/19/2024    K 3.7 03/19/2024     03/19/2024    CO2 25 03/19/2024    BUN 40 (H) 03/19/2024    CREATININE 2.41 (H) 03/19/2024    CALCIUM 8.1 (L) 03/19/2024    AST 23 03/19/2024    ALT 19 03/19/2024    ALKPHOS 45 03/19/2024    EGFR 17 03/19/2024     Imaging and other studies: I have personally reviewed pertinent films in PACS

## 2024-03-19 NOTE — PROGRESS NOTES
Ena Ahumada is a 88 y.o. female who is currently ordered Vancomycin IV with management by the Pharmacy Consult service.  Relevant clinical data and objective / subjective history reviewed.  Vancomycin Assessment:  Indication and Goal AUC/Trough: Pneumonia (goal -600, trough >10), -600, trough >10  Clinical Status: worsening  Micro:     Renal Function:  SCr: 2.41 mg/dL  CrCl: 15.3 mL/min  Renal replacement: Not on dialysis  Days of Therapy: 2  Current Dose: 1250mg IV Q12H Ordered dose  Vancomycin Plan: random level resulted 15.2. Since the level is >15, no dose will be given today.  New Dosinmg daily PRN if level </=15  Next Level: 3/20/24 at 0500  Renal Function Monitoring: Daily BMP and UOP  Pharmacy will continue to follow closely for s/sx of nephrotoxicity, infusion reactions and appropriateness of therapy.  BMP and CBC will be ordered per protocol. We will continue to follow the patient’s culture results and clinical progress daily. Also, cefepime will be adjusted if necessary.    Balta Dominguez, Pharmacist, PharmD, BCPS

## 2024-03-19 NOTE — ASSESSMENT & PLAN NOTE
Increasing creatinine  Likely in the setting of sepsis, possible ATN  Urinary retention protocol  Monitor I's/O  Daily weights  Consult nephrology  Renal ultrasound  Urinalysis

## 2024-03-19 NOTE — QUICK NOTE
Received TT that blood glucose is 416, patient just received humalog of 8 U. Will check BMP for anion gap and biacrb levels. If abnormal, will place patient on insulin drip. If BMP is normal, will give IV insulin one time.

## 2024-03-20 LAB
ANION GAP SERPL CALCULATED.3IONS-SCNC: 11 MMOL/L (ref 4–13)
BASOPHILS # BLD AUTO: 0.07 THOUSANDS/ÂΜL (ref 0–0.1)
BASOPHILS NFR BLD AUTO: 0 % (ref 0–1)
BUN SERPL-MCNC: 59 MG/DL (ref 5–25)
CALCIUM SERPL-MCNC: 8 MG/DL (ref 8.4–10.2)
CHLORIDE SERPL-SCNC: 99 MMOL/L (ref 96–108)
CO2 SERPL-SCNC: 22 MMOL/L (ref 21–32)
CREAT SERPL-MCNC: 2.74 MG/DL (ref 0.6–1.3)
EOSINOPHIL # BLD AUTO: 0.12 THOUSAND/ÂΜL (ref 0–0.61)
EOSINOPHIL NFR BLD AUTO: 1 % (ref 0–6)
ERYTHROCYTE [DISTWIDTH] IN BLOOD BY AUTOMATED COUNT: 14.1 % (ref 11.6–15.1)
FERRITIN SERPL-MCNC: 315 NG/ML (ref 11–307)
GFR SERPL CREATININE-BSD FRML MDRD: 14 ML/MIN/1.73SQ M
GLUCOSE SERPL-MCNC: 120 MG/DL (ref 65–140)
GLUCOSE SERPL-MCNC: 130 MG/DL (ref 65–140)
GLUCOSE SERPL-MCNC: 133 MG/DL (ref 65–140)
GLUCOSE SERPL-MCNC: 141 MG/DL (ref 65–140)
GLUCOSE SERPL-MCNC: 147 MG/DL (ref 65–140)
GLUCOSE SERPL-MCNC: 157 MG/DL (ref 65–140)
GLUCOSE SERPL-MCNC: 177 MG/DL (ref 65–140)
GLUCOSE SERPL-MCNC: 186 MG/DL (ref 65–140)
GLUCOSE SERPL-MCNC: 186 MG/DL (ref 65–140)
GLUCOSE SERPL-MCNC: 202 MG/DL (ref 65–140)
GLUCOSE SERPL-MCNC: 250 MG/DL (ref 65–140)
GLUCOSE SERPL-MCNC: 287 MG/DL (ref 65–140)
HCT VFR BLD AUTO: 40.2 % (ref 34.8–46.1)
HGB BLD-MCNC: 12.6 G/DL (ref 11.5–15.4)
IMM GRANULOCYTES # BLD AUTO: 0.24 THOUSAND/UL (ref 0–0.2)
IMM GRANULOCYTES NFR BLD AUTO: 1 % (ref 0–2)
IRON SATN MFR SERPL: 9 % (ref 15–50)
IRON SERPL-MCNC: 17 UG/DL (ref 50–212)
L PNEUMO1 AG UR QL IA.RAPID: NEGATIVE
LYMPHOCYTES # BLD AUTO: 2.9 THOUSANDS/ÂΜL (ref 0.6–4.47)
LYMPHOCYTES NFR BLD AUTO: 15 % (ref 14–44)
MCH RBC QN AUTO: 30.1 PG (ref 26.8–34.3)
MCHC RBC AUTO-ENTMCNC: 31.3 G/DL (ref 31.4–37.4)
MCV RBC AUTO: 96 FL (ref 82–98)
MONOCYTES # BLD AUTO: 1.35 THOUSAND/ÂΜL (ref 0.17–1.22)
MONOCYTES NFR BLD AUTO: 7 % (ref 4–12)
NEUTROPHILS # BLD AUTO: 14.44 THOUSANDS/ÂΜL (ref 1.85–7.62)
NEUTS SEG NFR BLD AUTO: 76 % (ref 43–75)
NRBC BLD AUTO-RTO: 0 /100 WBCS
PLATELET # BLD AUTO: 197 THOUSANDS/UL (ref 149–390)
PMV BLD AUTO: 10.6 FL (ref 8.9–12.7)
POTASSIUM SERPL-SCNC: 3.7 MMOL/L (ref 3.5–5.3)
RBC # BLD AUTO: 4.19 MILLION/UL (ref 3.81–5.12)
S PNEUM AG UR QL: NEGATIVE
SODIUM SERPL-SCNC: 132 MMOL/L (ref 135–147)
TIBC SERPL-MCNC: 179 UG/DL (ref 250–450)
UIBC SERPL-MCNC: 162 UG/DL (ref 155–355)
VANCOMYCIN SERPL-MCNC: 11.5 UG/ML (ref 10–20)
WBC # BLD AUTO: 19.12 THOUSAND/UL (ref 4.31–10.16)

## 2024-03-20 PROCEDURE — 99232 SBSQ HOSP IP/OBS MODERATE 35: CPT | Performed by: INTERNAL MEDICINE

## 2024-03-20 PROCEDURE — 94668 MNPJ CHEST WALL SBSQ: CPT

## 2024-03-20 PROCEDURE — 99233 SBSQ HOSP IP/OBS HIGH 50: CPT | Performed by: INTERNAL MEDICINE

## 2024-03-20 PROCEDURE — 94760 N-INVAS EAR/PLS OXIMETRY 1: CPT

## 2024-03-20 PROCEDURE — 94003 VENT MGMT INPAT SUBQ DAY: CPT

## 2024-03-20 PROCEDURE — 94640 AIRWAY INHALATION TREATMENT: CPT

## 2024-03-20 PROCEDURE — 83550 IRON BINDING TEST: CPT | Performed by: INTERNAL MEDICINE

## 2024-03-20 PROCEDURE — 85025 COMPLETE CBC W/AUTO DIFF WBC: CPT | Performed by: INTERNAL MEDICINE

## 2024-03-20 PROCEDURE — 83540 ASSAY OF IRON: CPT | Performed by: INTERNAL MEDICINE

## 2024-03-20 PROCEDURE — 80048 BASIC METABOLIC PNL TOTAL CA: CPT | Performed by: INTERNAL MEDICINE

## 2024-03-20 PROCEDURE — 80202 ASSAY OF VANCOMYCIN: CPT | Performed by: INTERNAL MEDICINE

## 2024-03-20 PROCEDURE — 82948 REAGENT STRIP/BLOOD GLUCOSE: CPT

## 2024-03-20 PROCEDURE — 87449 NOS EACH ORGANISM AG IA: CPT | Performed by: INTERNAL MEDICINE

## 2024-03-20 PROCEDURE — 82728 ASSAY OF FERRITIN: CPT | Performed by: INTERNAL MEDICINE

## 2024-03-20 RX ORDER — DOCUSATE SODIUM 100 MG/1
100 CAPSULE, LIQUID FILLED ORAL 2 TIMES DAILY
Status: DISCONTINUED | OUTPATIENT
Start: 2024-03-20 | End: 2024-03-20

## 2024-03-20 RX ORDER — VANCOMYCIN HYDROCHLORIDE 750 MG/150ML
10 INJECTION, SOLUTION INTRAVENOUS ONCE
Status: DISCONTINUED | OUTPATIENT
Start: 2024-03-20 | End: 2024-03-20

## 2024-03-20 RX ORDER — POTASSIUM CHLORIDE 20 MEQ/1
40 TABLET, EXTENDED RELEASE ORAL ONCE
Status: COMPLETED | OUTPATIENT
Start: 2024-03-20 | End: 2024-03-20

## 2024-03-20 RX ORDER — BISACODYL 10 MG
10 SUPPOSITORY, RECTAL RECTAL ONCE
Status: DISCONTINUED | OUTPATIENT
Start: 2024-03-20 | End: 2024-03-26 | Stop reason: HOSPADM

## 2024-03-20 RX ORDER — SODIUM CHLORIDE, SODIUM GLUCONATE, SODIUM ACETATE, POTASSIUM CHLORIDE, MAGNESIUM CHLORIDE, SODIUM PHOSPHATE, DIBASIC, AND POTASSIUM PHOSPHATE .53; .5; .37; .037; .03; .012; .00082 G/100ML; G/100ML; G/100ML; G/100ML; G/100ML; G/100ML; G/100ML
10 INJECTION, SOLUTION INTRAVENOUS CONTINUOUS
Status: DISCONTINUED | OUTPATIENT
Start: 2024-03-20 | End: 2024-03-20

## 2024-03-20 RX ORDER — POLYETHYLENE GLYCOL 3350 17 G/17G
17 POWDER, FOR SOLUTION ORAL ONCE
Status: COMPLETED | OUTPATIENT
Start: 2024-03-20 | End: 2024-03-20

## 2024-03-20 RX ADMIN — PRAVASTATIN SODIUM 40 MG: 40 TABLET ORAL at 16:25

## 2024-03-20 RX ADMIN — NEBIVOLOL 20 MG: 5 TABLET ORAL at 08:27

## 2024-03-20 RX ADMIN — VANCOMYCIN HYDROCHLORIDE 750 MG: 750 INJECTION, SOLUTION INTRAVENOUS at 08:25

## 2024-03-20 RX ADMIN — LEVALBUTEROL HYDROCHLORIDE 1.25 MG: 1.25 SOLUTION RESPIRATORY (INHALATION) at 07:28

## 2024-03-20 RX ADMIN — CEFEPIME HYDROCHLORIDE 1000 MG: 1 INJECTION, SOLUTION INTRAVENOUS at 20:38

## 2024-03-20 RX ADMIN — SODIUM CHLORIDE, SODIUM GLUCONATE, SODIUM ACETATE, POTASSIUM CHLORIDE, MAGNESIUM CHLORIDE, SODIUM PHOSPHATE, DIBASIC, AND POTASSIUM PHOSPHATE 60 ML/HR: .53; .5; .37; .037; .03; .012; .00082 INJECTION, SOLUTION INTRAVENOUS at 00:53

## 2024-03-20 RX ADMIN — PREGABALIN 50 MG: 25 CAPSULE ORAL at 18:02

## 2024-03-20 RX ADMIN — HYDROCORTISONE SODIUM SUCCINATE 50 MG: 100 INJECTION, POWDER, FOR SOLUTION INTRAMUSCULAR; INTRAVENOUS at 06:06

## 2024-03-20 RX ADMIN — SERTRALINE 100 MG: 100 TABLET, FILM COATED ORAL at 08:27

## 2024-03-20 RX ADMIN — HEPARIN SODIUM 5000 UNITS: 5000 INJECTION INTRAVENOUS; SUBCUTANEOUS at 14:16

## 2024-03-20 RX ADMIN — DOCUSATE SODIUM 100 MG: 100 CAPSULE, LIQUID FILLED ORAL at 09:36

## 2024-03-20 RX ADMIN — SODIUM CHLORIDE SOLN NEBU 3% 4 ML: 3 NEBU SOLN at 19:44

## 2024-03-20 RX ADMIN — NIFEDIPINE 90 MG: 30 TABLET, EXTENDED RELEASE ORAL at 08:27

## 2024-03-20 RX ADMIN — POTASSIUM CHLORIDE 40 MEQ: 1500 TABLET, EXTENDED RELEASE ORAL at 14:15

## 2024-03-20 RX ADMIN — POLYETHYLENE GLYCOL 3350 17 G: 17 POWDER, FOR SOLUTION ORAL at 09:36

## 2024-03-20 RX ADMIN — HEPARIN SODIUM 5000 UNITS: 5000 INJECTION INTRAVENOUS; SUBCUTANEOUS at 06:04

## 2024-03-20 RX ADMIN — HEPARIN SODIUM 5000 UNITS: 5000 INJECTION INTRAVENOUS; SUBCUTANEOUS at 20:38

## 2024-03-20 RX ADMIN — SODIUM CHLORIDE, SODIUM GLUCONATE, SODIUM ACETATE, POTASSIUM CHLORIDE, MAGNESIUM CHLORIDE, SODIUM PHOSPHATE, DIBASIC, AND POTASSIUM PHOSPHATE 10 ML/HR: .53; .5; .37; .037; .03; .012; .00082 INJECTION, SOLUTION INTRAVENOUS at 11:26

## 2024-03-20 RX ADMIN — SODIUM CHLORIDE 4 UNITS/HR: 9 INJECTION, SOLUTION INTRAVENOUS at 16:33

## 2024-03-20 RX ADMIN — SODIUM CHLORIDE SOLN NEBU 3% 4 ML: 3 NEBU SOLN at 07:28

## 2024-03-20 RX ADMIN — GUAIFENESIN 600 MG: 600 TABLET ORAL at 08:27

## 2024-03-20 RX ADMIN — CLONIDINE 0.1 MG: 0.1 PATCH, EXTENDED RELEASE TRANSDERMAL at 08:35

## 2024-03-20 RX ADMIN — PREGABALIN 50 MG: 25 CAPSULE ORAL at 08:27

## 2024-03-20 RX ADMIN — HYDROCORTISONE SODIUM SUCCINATE 50 MG: 100 INJECTION, POWDER, FOR SOLUTION INTRAMUSCULAR; INTRAVENOUS at 18:02

## 2024-03-20 RX ADMIN — GUAIFENESIN 600 MG: 600 TABLET ORAL at 18:02

## 2024-03-20 RX ADMIN — FAMOTIDINE 10 MG: 20 TABLET ORAL at 08:27

## 2024-03-20 RX ADMIN — CEFEPIME HYDROCHLORIDE 1000 MG: 1 INJECTION, SOLUTION INTRAVENOUS at 07:45

## 2024-03-20 RX ADMIN — ASPIRIN 81 MG: 81 TABLET, COATED ORAL at 08:27

## 2024-03-20 RX ADMIN — SODIUM CHLORIDE SOLN NEBU 3% 4 ML: 3 NEBU SOLN at 13:30

## 2024-03-20 RX ADMIN — SODIUM CHLORIDE 6 UNITS/HR: 9 INJECTION, SOLUTION INTRAVENOUS at 00:05

## 2024-03-20 RX ADMIN — LEVALBUTEROL HYDROCHLORIDE 1.25 MG: 1.25 SOLUTION RESPIRATORY (INHALATION) at 13:30

## 2024-03-20 RX ADMIN — SODIUM CHLORIDE 2 UNITS/HR: 9 INJECTION, SOLUTION INTRAVENOUS at 18:09

## 2024-03-20 RX ADMIN — LEVALBUTEROL HYDROCHLORIDE 1.25 MG: 1.25 SOLUTION RESPIRATORY (INHALATION) at 19:44

## 2024-03-20 NOTE — PROGRESS NOTES
"Progress Note - Pulmonary   Ena Ahumada 88 y.o. female MRN: [id number]  Unit/Bed#: -01 SDU Encounter: [id number]    Assessment:  Acute hypoxic respiratory failure  Aspiration pneumonia  NELA on CKD  Aortic stenosis    Plan:  Continue cefepime alone for 5 to 7 days  Continue hydrocortisone decreased dose to every 12 for an additional 4 days  Repeat chest x-ray tomorrow continue nebs for now  Unfortunately creatinine continues to rise despite volume resuscitation overnight will discuss with nephro possibly dosing IV Lasix today    Chief Complaint:   I feel better    Subjective:   Breathing is improved although she is unable to sleep.  She denies any cough or mucus production    Objective:     Vitals: Blood pressure 146/67, pulse 66, temperature (!) 96.6 °F (35.9 °C), temperature source Axillary, resp. rate (!) 28, height 5' 2\" (1.575 m), weight 77.3 kg (170 lb 6.7 oz), SpO2 99%, not currently breastfeeding.,Body mass index is 31.17 kg/m².      Intake/Output Summary (Last 24 hours) at 3/20/2024 1003  Last data filed at 3/20/2024 0825  Gross per 24 hour   Intake 3185.38 ml   Output 425 ml   Net 2760.38 ml       Invasive Devices       Peripheral Intravenous Line  Duration             Peripheral IV Left Antecubital -- days    Peripheral IV 03/16/24 Right Antecubital 3 days                    Physical Exam: /67 (BP Location: Left arm)   Pulse 66   Temp (!) 96.6 °F (35.9 °C) (Axillary)   Resp (!) 28   Ht 5' 2\" (1.575 m)   Wt 77.3 kg (170 lb 6.7 oz)   SpO2 99%   BMI 31.17 kg/m²   General appearance: alert and oriented, in no acute distress  Lungs: clear to auscultation bilaterally  Heart: regular rate and rhythm, S1, S2 normal, no murmur, click, rub or gallop  Abdomen: soft, non-tender; bowel sounds normal; no masses,  no organomegaly  Extremities: extremities normal, warm and well-perfused; no cyanosis, clubbing, or edema  Neurologic: Grossly normal     Labs: CBC:   Lab Results   Component Value " Date    WBC 19.12 (H) 03/20/2024    HGB 12.6 03/20/2024    HCT 40.2 03/20/2024    MCV 96 03/20/2024     03/20/2024    RBC 4.19 03/20/2024    MCH 30.1 03/20/2024    MCHC 31.3 (L) 03/20/2024    RDW 14.1 03/20/2024    MPV 10.6 03/20/2024    NRBC 0 03/20/2024   , CMP:   Lab Results   Component Value Date    SODIUM 132 (L) 03/20/2024    K 3.7 03/20/2024    CL 99 03/20/2024    CO2 22 03/20/2024    BUN 59 (H) 03/20/2024    CREATININE 2.74 (H) 03/20/2024    CALCIUM 8.0 (L) 03/20/2024    EGFR 14 03/20/2024     Imaging and other studies: I have personally reviewed pertinent films in PACS

## 2024-03-20 NOTE — CONSULTS
Vancomycin IV Pharmacy-to-Dose Consultation     Vancomycin has been discontinued.  Pharmacy will sign off.  Please contact or re-consult with questions.    Angella Johnson, Pharmacist

## 2024-03-20 NOTE — PROGRESS NOTES
NEPHROLOGY PROGRESS NOTE   Ena Ahumada 88 y.o. female MRN: [id number]  Unit/Bed#: -01 SDU Encounter: [id number]      HPI/24hr EVENTS:    -88-year-old female past medical history of CKD 4, hypertension, hyperlipidemia, aortic stenosis.  Presented with nausea and vomiting.  Nephrology consult for management of NELA    -Mildly increased creatinine    ASSESSMENT/PLAN:    NELA on CKD 4  -Baseline creatinine 1.6-1.9  -Creatinine on admission 1.6, most recent creatinine is 2.74  -Etiology: Suspect contrast associated nephropathy versus prerenal azotemia from diuretic hyperglycemia  -UA: Glucose, ketones, trace blood, 1+ protein, 0-1 RBCs and WBCs, 0-3 granular casts and WBC casts  -CK was not elevated  -Renal ultrasound from 3/19/2024 with diffusely echogenic kidneys bilaterally, no hydronephrosis, mildly complex left renal cyst  -Follows with Dr. Baumann as outpatient  -Urine output is on the lower side has required straight cath, continue urinary retention protocol, discontinue IV fluid later this afternoon, urine output remains low consider giving Lasix 40 mg IV x 1    Complex left kidney cyst  -Follow-up    Hypertension  -Currently on clonidine 0.1 mg patch, Bystolic 20 mg daily, Procardia XL 90 mg daily  -Recent blood pressures are acceptable    Hyponatremia  -Recent sodium is 133 glucose corrected  -Monitor, if dropping further consider sending workup, is on Zoloft with can induce SIADH    Acute hypoxic respiratory failure/sepsis/pneumonia  -Oxygen requirements are downtrending  -On antibiotics per primary team    Addition medical problems: DM2, hyperlipidemia, GERD, aortic stenosis, chronic pain    SUBJECTIVE:  Reports she feels improved from day prior, did not sleep well overnight but breathing is feeling better    ROS:  Review of Systems   Constitutional:  Positive for fatigue.   Respiratory: Negative.     Cardiovascular: Negative.    Gastrointestinal: Negative.    Genitourinary: Negative.     Neurological: Negative.       A complete 10 point review of systems was performed and found to be negative unless otherwise noted above or in the HPI.    OBJECTIVE:  Current Weight: Weight - Scale: 77.3 kg (170 lb 6.7 oz)  Vitals:    03/20/24 0406 03/20/24 0600 03/20/24 0729 03/20/24 0740   BP: 134/64   146/67   BP Location: Right arm   Left arm   Pulse: 63   66   Resp: (!) 23   (!) 28   Temp: (!) 96.6 °F (35.9 °C)   (!) 96.6 °F (35.9 °C)   TempSrc: Axillary   Axillary   SpO2: 91%  92% 99%   Weight:  77.3 kg (170 lb 6.7 oz)     Height:           Intake/Output Summary (Last 24 hours) at 3/20/2024 0953  Last data filed at 3/20/2024 0825  Gross per 24 hour   Intake 3185.38 ml   Output 425 ml   Net 2760.38 ml     Physical Exam  Vitals reviewed.   Constitutional:       General: She is not in acute distress.     Appearance: She is not toxic-appearing or diaphoretic.      Comments: Awake sitting in bed   HENT:      Head: Normocephalic and atraumatic.      Nose: Nose normal.      Mouth/Throat:      Mouth: Mucous membranes are dry.   Eyes:      General: No scleral icterus.  Cardiovascular:      Rate and Rhythm: Normal rate and regular rhythm.      Pulses: Normal pulses.   Pulmonary:      Effort: Pulmonary effort is normal. No respiratory distress.      Breath sounds: No wheezing or rales.   Abdominal:      General: Abdomen is flat.   Musculoskeletal:      Cervical back: Neck supple.      Right lower leg: No edema.      Left lower leg: No edema.   Skin:     General: Skin is warm and dry.      Capillary Refill: Capillary refill takes less than 2 seconds.   Neurological:      Mental Status: She is alert.      Comments: Awake, interactive          Medications:    Current Facility-Administered Medications:     acetaminophen (TYLENOL) tablet 650 mg, 650 mg, Oral, Q6H PRN, Kira Holbrook PA-C, 650 mg at 03/18/24 1226    aspirin (ECOTRIN LOW STRENGTH) EC tablet 81 mg, 81 mg, Oral, Daily, Toya Parekh MD, 81 mg at 03/20/24 0827     cefepime (MAXIPIME) IVPB (premix in dextrose) 1,000 mg 50 mL, 1,000 mg, Intravenous, Q12H, Toya Parekh MD, Stopped at 03/20/24 0825    cloNIDine (CATAPRES-TTS-1) 0.1 mg/24 hr TD weekly patch, 1 patch, Transdermal, Weekly, Toya Parekh MD, 0.1 mg at 03/20/24 0835    docusate sodium (COLACE) capsule 100 mg, 100 mg, Oral, BID, Toya Parekh MD, 100 mg at 03/20/24 0936    famotidine (PEPCID) tablet 10 mg, 10 mg, Oral, Daily, Toya Parekh MD, 10 mg at 03/20/24 0827    guaiFENesin (MUCINEX) 12 hr tablet 600 mg, 600 mg, Oral, BID, Toya Parekh MD, 600 mg at 03/20/24 0827    heparin (porcine) subcutaneous injection 5,000 Units, 5,000 Units, Subcutaneous, Q8H JONO, Toya Parekh MD, 5,000 Units at 03/20/24 0604    insulin regular (HumuLIN R,NovoLIN R) 1 Units/mL in sodium chloride 0.9 % 100 mL infusion, 0.3-21 Units/hr, Intravenous, Titrated, Lulu Perez PA-C, Last Rate: 1.5 mL/hr at 03/20/24 0800, 1.5 Units/hr at 03/20/24 0800    levalbuterol (XOPENEX) inhalation solution 1.25 mg, 1.25 mg, Nebulization, TID, Danielle Andrews PA-C, 1.25 mg at 03/20/24 0728    multi-electrolyte (PLASMALYTE-A/ISOLYTE-S PH 7.4) IV solution, 60 mL/hr, Intravenous, Continuous, Maggy Felix DO, Last Rate: 60 mL/hr at 03/20/24 0053, 60 mL/hr at 03/20/24 0053    nebivolol (BYSTOLIC) tablet 20 mg, 20 mg, Oral, Daily, KANA Ray, 20 mg at 03/20/24 0827    NIFEdipine (PROCARDIA XL) 24 hr tablet 90 mg, 90 mg, Oral, Daily, KANA Ray, 90 mg at 03/20/24 0827    pravastatin (PRAVACHOL) tablet 40 mg, 40 mg, Oral, Daily With Dinner, Toya Parekh MD, 40 mg at 03/19/24 1646    pregabalin (LYRICA) capsule 50 mg, 50 mg, Oral, BID, Toya Parekh MD, 50 mg at 03/20/24 0827    sertraline (ZOLOFT) tablet 100 mg, 100 mg, Oral, Daily, Toya Parekh MD, 100 mg at 03/20/24 0827    sodium chloride 3 % inhalation solution 4 mL, 4 mL, Nebulization, TID, Danielle Andrews PA-C, 4 mL at 03/20/24 0704     transdermal buprenorphine (BUTRANS) 7.5 mcg/hr TD patch 1 patch, 1 patch, Transdermal, Q7 Days, Toya Parekh MD, 1 patch at 03/16/24 2002    Laboratory Results:  Results from last 7 days   Lab Units 03/20/24  0448 03/19/24  1708 03/19/24  0555 03/18/24  2116 03/18/24  0316 03/17/24  0606 03/16/24  1221 03/16/24  1216   WBC Thousand/uL 19.12*  --  16.84*  --  16.74* 16.52*  --  17.06*   HEMOGLOBIN g/dL 12.6  --  11.2*  --  11.7 11.3*  --  14.1   I STAT HEMOGLOBIN g/dl  --   --   --   --   --   --  15.0  --    HEMATOCRIT % 40.2  --  36.1  --  38.1 36.1  --  45.2   HEMATOCRIT, ISTAT %  --   --   --   --   --   --  44  --    PLATELETS Thousands/uL 197  --  155  --  152 147*  --  176   POTASSIUM mmol/L 3.7 3.9 3.7 4.1 3.2* 3.1*  --  3.9   CHLORIDE mmol/L 99 97 104 100 103 106  --  103   CO2 mmol/L 22 23 25 23 28 28  --  28   CO2, I-STAT mmol/L  --   --   --   --   --   --  29  --    BUN mg/dL 59* 55* 40* 37* 30* 32*  --  36*   CREATININE mg/dL 2.74* 2.68* 2.41* 2.26* 1.73* 1.43*  --  1.64*   CALCIUM mg/dL 8.0* 8.1* 8.1* 7.9* 8.1* 8.2*  --  9.2   MAGNESIUM mg/dL  --   --   --   --  2.3 2.3  --  2.1   PHOSPHORUS mg/dL  --   --  2.5  --   --  3.1  --   --    GLUCOSE, ISTAT mg/dl  --   --   --   --   --   --  264*  --        I have personally reviewed the blood work as stated above and in my note.  I have personally reviewed renal ultrasound imaging studies.  I have personally reviewed internal medicine, pulmonology note.

## 2024-03-20 NOTE — PROGRESS NOTES
[facility]  Progress Note  Name: Ena Ahumada I  MRN: [id number]  Unit/Bed#: -01 SDU I Date of Admission: 3/16/2024   Date of Service: 3/20/2024 I Hospital Day: 4    Assessment/Plan   NELA (acute kidney injury) ([facility])  Assessment & Plan  Increasing creatinine  Likely in the setting of sepsis, contrast nephropathy possible ATN  Urinary retention protocol  Monitor I's/O  Daily weights  Consult nephrology  Renal ultrasound  Urinalysis  Poor urine output, Lasix one-time    Acute respiratory failure with hypoxia (HCC)  Assessment & Plan  Acute respiratory failure with hypoxia presents on admission , requiring nasal cannula O2 supplementation ~ 5 Liters which is new for the patient ; most likely cause of hypoxemia is her RLL Pneumonia as evident on Xray Chest.   Requiring mid flow, stable, clinically improving  Downgrade to MedSurg    Hypokalemia  Assessment & Plan  3/17 Potassium 3.1 ; Magnesium wnl   Possible due to decreased PO Intake  Replete  Daily CMP and revaluate     Pneumonia  Assessment & Plan  RLL Pneumonia on CT chest ; with SIRS criteria and hypoxemia   See sepsis A&P   Supplemental O2 and wean for goal > 90%   Mucinex   Speech eval given possible aspiration etiology   Trend vitals and wbc   Patient started on ceftriaxone, increased wheezing oxygen requirements today.  Patient now on 12 L of oxygen  CTA chest done shows right lung pneumonia.    Pulmonology consult  received steroids for's severe pneumonia-patient improving  Antibiotic-continue cefepime to complete a course of 7 days.  MRSA negative, Vanco discontinued      Chronic pain syndrome  Assessment & Plan  Follows up with pain medicine  Continue buprenorphine    Nonrheumatic aortic valve stenosis  Assessment & Plan  Follows up with cardiology, last echo in April 2023  Echo-LVEF 65%, grade 1 diastolic dysfunction.  Moderate aortic stenosis  Monitor volume status  Continue home medications with blood pressure  parameters    Stage 3b chronic kidney disease (CKD) ([facility])  Assessment & Plan  Lab Results   Component Value Date    EGFR 14 03/20/2024    EGFR 15 03/19/2024    EGFR 17 03/19/2024    CREATININE 2.74 (H) 03/20/2024    CREATININE 2.68 (H) 03/19/2024    CREATININE 2.41 (H) 03/19/2024     Follows up with nephrology as outpatient  Baseline creatinine appears to be 1.6-1.9 as per nephrology, EGFR in mid 20s  Monitor BMP at risk for ATN with sepsis        Anxiety  Assessment & Plan  Continue Zoloft    Type 2 diabetes mellitus with stage 4 chronic kidney disease, without long-term current use of insulin ([facility])  Assessment & Plan  Lab Results   Component Value Date    HGBA1C 8.1 (H) 03/16/2024       Recent Labs     03/20/24  0601 03/20/24  0750 03/20/24  0959 03/20/24  1202   POCGLU 157* 130 287* 186*         Blood Sugar Average: Last 72 hrs:  (P) [id number] up with endocrine, home regimen-Invokana, glipizide, Januvia    SSI Before meals and at bed time + Low Carb Diet   Received steroids for severe pneumonia and was placed on insulin drip  Continue on insulin drip          * Sepsis ([facility])  Assessment & Plan  SIRS criteria met : tachycardia, tachypnea, leukocytosis  Suspected source: Pneumonia  CXR: Lower lobe pneumonia  UA not suggestive UTI    CT: None  Continue to trend leukocytosis and fever curve  Procalcitonin: 18  Lactic acid: 2.5-resolved    Will need additional fluid depending on lactate and blood pressure  Blood Cultures pending   Will broaden antibiotic to cefepime and Vanco pending MRSA culture               VTE Pharmacologic Prophylaxis: VTE Score: 8 High Risk (Score >/= 5) - Pharmacological DVT Prophylaxis Ordered: heparin. Sequential Compression Devices Ordered.    Mobility:   Basic Mobility Inpatient Raw Score: 17  JH-HLM Goal: 5: Stand one or more mins  JH-HLM Achieved: 7: Walk 25 feet or more  JH-HLM Goal achieved. Continue to encourage appropriate mobility.    Patient Centered Rounds: I  performed bedside rounds with nursing staff today.   Discussions with Specialists or Other Care Team Provider: Nephrology    Education and Discussions with Family / Patient: Updated  (daughter in law) via phone.-She stated she had received information from pulmonology, nephrology today    Total Time Spent on Date of Encounter in care of patient: 60 mins. This time was spent on one or more of the following: performing physical exam; counseling and coordination of care; obtaining or reviewing history; documenting in the medical record; reviewing/ordering tests, medications or procedures; communicating with other healthcare professionals and discussing with patient's family/caregivers.    Current Length of Stay: 4 day(s)  Current Patient Status: Inpatient   Certification Statement: The patient will continue to require additional inpatient hospital stay due to Dhruv, severe PNA  Discharge Plan: Anticipate discharge in 48-72 hrs to home.    Code Status: Level 1 - Full Code    Subjective:   States her breathing is much better.  Did not have any bowel movement since Saturday.    Overnight events significant for high glucose and was placed on insulin drip.  Patient did not have any bowel movement even after MiraLAX.    Objective:     Vitals:   Temp (24hrs), Av.5 °F (36.4 °C), Min:96.6 °F (35.9 °C), Max:98.3 °F (36.8 °C)    Temp:  [96.6 °F (35.9 °C)-98.3 °F (36.8 °C)] 97.6 °F (36.4 °C)  HR:  [63-70] 70  Resp:  [18-41] 21  BP: (117-156)/(58-89) 156/72  SpO2:  [88 %-99 %] 91 %  Body mass index is 31.17 kg/m².     Input and Output Summary (last 24 hours):     Intake/Output Summary (Last 24 hours) at 3/20/2024 1355  Last data filed at 3/20/2024 1122  Gross per 24 hour   Intake 2371.66 ml   Output 675 ml   Net 1696.66 ml       Physical Exam:   Physical Exam     Gen.-Patient comfortable   Neck- Supple. No thyromegaly or lymphadenopathy  Lungs-rhonchi  Heart S1-S2, regular rate and rhythm, no murmurs  Abdomen-soft  nontender, no organomegaly. Bowel sounds present  Extremities-no cyanosi,  clubbing or edema  Skin- no rash  Neuro-nonfocal     Additional Data:     Labs:  Results from last 7 days   Lab Units 03/20/24  0448   WBC Thousand/uL 19.12*   HEMOGLOBIN g/dL 12.6   HEMATOCRIT % 40.2   PLATELETS Thousands/uL 197   NEUTROS PCT % 76*   LYMPHS PCT % 15   MONOS PCT % 7   EOS PCT % 1     Results from last 7 days   Lab Units 03/20/24  0448 03/19/24  1708 03/19/24  0555   SODIUM mmol/L 132*   < > 136   POTASSIUM mmol/L 3.7   < > 3.7   CHLORIDE mmol/L 99   < > 104   CO2 mmol/L 22   < > 25   BUN mg/dL 59*   < > 40*   CREATININE mg/dL 2.74*   < > 2.41*   ANION GAP mmol/L 11   < > 7   CALCIUM mg/dL 8.0*   < > 8.1*   ALBUMIN g/dL  --   --  3.1*   TOTAL BILIRUBIN mg/dL  --   --  0.44   ALK PHOS U/L  --   --  45   ALT U/L  --   --  19   AST U/L  --   --  23   GLUCOSE RANDOM mg/dL 177*   < > 135    < > = values in this interval not displayed.     Results from last 7 days   Lab Units 03/16/24  1301   INR  1.07     Results from last 7 days   Lab Units 03/20/24  1202 03/20/24  0959 03/20/24  0750 03/20/24  0601 03/20/24  0403 03/20/24  0201 03/19/24  2346 03/19/24  2154 03/19/24  2027 03/19/24  1840 03/19/24  1644 03/19/24  1129   POC GLUCOSE mg/dl 186* 287* 130 157* 202* 250* 327* 342* 338* 316* 416* 265*     Results from last 7 days   Lab Units 03/16/24  1620   HEMOGLOBIN A1C % 8.1*     Results from last 7 days   Lab Units 03/18/24  0316 03/17/24  0606 03/16/24  1620 03/16/24  1301   LACTIC ACID mmol/L  --   --  1.7 2.5*   PROCALCITONIN ng/ml 26.72* 25.97*  --  18.29*       Lines/Drains:  Invasive Devices       Peripheral Intravenous Line  Duration             Peripheral IV Left Antecubital -- days    Peripheral IV 03/16/24 Right Antecubital 4 days                      Telemetry:  Telemetry Orders (From admission, onward)               24 Hour Telemetry Monitoring  Continuous x 24 Hours (Telem)        Expiring   Question:  Reason for 24  Hour Telemetry  Answer:  Acute respiratory failure on BiPAP                     Telemetry Reviewed: Normal Sinus Rhythm  Indication for Continued Telemetry Use: No indication for continued use. Will discontinue.              Imaging: No pertinent imaging reviewed.    Recent Cultures (last 7 days):   Results from last 7 days   Lab Units 03/20/24  0448 03/16/24  1301   BLOOD CULTURE   --  No Growth at 72 hrs.  No Growth at 72 hrs.   LEGIONELLA URINARY ANTIGEN  Negative  --        Last 24 Hours Medication List:   Current Facility-Administered Medications   Medication Dose Route Frequency Provider Last Rate    acetaminophen  650 mg Oral Q6H PRN Kira Holbrook PA-C      aspirin  81 mg Oral Daily Toya Parekh MD      cefepime  1,000 mg Intravenous Q12H Toya Parekh MD Stopped (03/20/24 0825)    cloNIDine  1 patch Transdermal Weekly Toya Parekh MD      docusate sodium  100 mg Oral BID Toya Parekh MD      famotidine  10 mg Oral Daily Toya Parekh MD      guaiFENesin  600 mg Oral BID Toya Parekh MD      heparin (porcine)  5,000 Units Subcutaneous Q8H JONO Toya Parekh MD      hydrocortisone sodium succinate  50 mg Intravenous Q12H [facility] Maggy Felix DO      insulin regular (HumuLIN R,NovoLIN R) 1 Units/mL in sodium chloride 0.9 % 100 mL infusion  0.3-21 Units/hr Intravenous Titrated Lulu Perez PA-C 6 Units/hr (03/20/24 1204)    levalbuterol  1.25 mg Nebulization TID Danielle Andrews PA-C      multi-electrolyte  10 mL/hr Intravenous Continuous Toya Parekh MD 10 mL/hr (03/20/24 1126)    nebivolol  20 mg Oral Daily KANA Ray      NIFEdipine  90 mg Oral Daily KANA Ray      pravastatin  40 mg Oral Daily With Dinner Toya Parekh MD      pregabalin  50 mg Oral BID Toya Parekh MD      sertraline  100 mg Oral Daily Toya Parekh MD      sodium chloride  4 mL Nebulization TID Danielle Andrews PA-C      transdermal buprenorphine  1 patch Transdermal Q7 Days  Toya Parekh MD          Today, Patient Was Seen By: Toya Parekh MD    **Please Note: This note may have been constructed using a voice recognition system.**

## 2024-03-20 NOTE — PLAN OF CARE
Problem: Potential for Falls  Goal: Patient will remain free of falls  Description: INTERVENTIONS:  - Educate patient/family on patient safety including physical limitations  - Instruct patient to call for assistance with activity   - Consult OT/PT to assist with strengthening/mobility   - Keep Call bell within reach  - Keep bed low and locked with side rails adjusted as appropriate  - Keep care items and personal belongings within reach  - Initiate and maintain comfort rounds  - Make Fall Risk Sign visible to staff  - Apply yellow socks and bracelet for high fall risk patients  - Consider moving patient to room near nurses station  Outcome: Progressing     Problem: SAFETY ADULT  Goal: Patient will remain free of falls  Description: INTERVENTIONS:  - Educate patient/family on patient safety including physical limitations  - Instruct patient to call for assistance with activity   - Consult OT/PT to assist with strengthening/mobility   - Keep Call bell within reach  - Keep bed low and locked with side rails adjusted as appropriate  - Keep care items and personal belongings within reach  - Initiate and maintain comfort rounds  - Make Fall Risk Sign visible to staff  - Apply yellow socks and bracelet for high fall risk patients  - Consider moving patient to room near nurses station  Outcome: Progressing  Goal: Maintain or return to baseline ADL function  Description: INTERVENTIONS:  -  Assess patient's ability to carry out ADLs; assess patient's baseline for ADL function and identify physical deficits which impact ability to perform ADLs (bathing, care of mouth/teeth, toileting, grooming, dressing, etc.)  - Assess/evaluate cause of self-care deficits   - Assess range of motion  - Assess patient's mobility; develop plan if impaired  - Assess patient's need for assistive devices and provide as appropriate  - Encourage maximum independence but intervene and supervise when necessary  - Involve family in performance of  ADLs  - Assess for home care needs following discharge   - Consider OT consult to assist with ADL evaluation and planning for discharge  - Provide patient education as appropriate  Outcome: Progressing  Goal: Maintains/Returns to pre admission functional level  Description: INTERVENTIONS:  - Perform AM-PAC 6 Click Basic Mobility/ Daily Activity assessment daily.  - Set and communicate daily mobility goal to care team and patient/family/caregiver.   - Collaborate with rehabilitation services on mobility goals if consulted  - Out of bed for toileting  - Record patient progress and toleration of activity level   Outcome: Progressing     Problem: DISCHARGE PLANNING  Goal: Discharge to home or other facility with appropriate resources  Description: INTERVENTIONS:  - Identify barriers to discharge w/patient and caregiver  - Arrange for needed discharge resources and transportation as appropriate  - Identify discharge learning needs (meds, wound care, etc.)  - Arrange for interpretive services to assist at discharge as needed  - Refer to Case Management Department for coordinating discharge planning if the patient needs post-hospital services based on physician/advanced practitioner order or complex needs related to functional status, cognitive ability, or social support system  Outcome: Progressing     Problem: Knowledge Deficit  Goal: Patient/family/caregiver demonstrates understanding of disease process, treatment plan, medications, and discharge instructions  Description: Complete learning assessment and assess knowledge base.  Interventions:  - Provide teaching at level of understanding  - Provide teaching via preferred learning methods  Outcome: Progressing     Problem: Prexisting or High Potential for Compromised Skin Integrity  Goal: Skin integrity is maintained or improved  Description: INTERVENTIONS:  - Identify patients at risk for skin breakdown  - Assess and monitor skin integrity  - Assess and monitor  nutrition and hydration status  - Monitor labs   - Assess for incontinence   - Turn and reposition patient  - Assist with mobility/ambulation  - Relieve pressure over bony prominences  - Avoid friction and shearing  - Provide appropriate hygiene as needed including keeping skin clean and dry  - Evaluate need for skin moisturizer/barrier cream  - Collaborate with interdisciplinary team   - Patient/family teaching  - Consider wound care consult   Outcome: Progressing     Problem: Nutrition/Hydration-ADULT  Goal: Nutrient/Hydration intake appropriate for improving, restoring or maintaining nutritional needs  Description: Monitor and assess patient's nutrition/hydration status for malnutrition. Collaborate with interdisciplinary team and initiate plan and interventions as ordered.  Monitor patient's weight and dietary intake as ordered or per policy. Utilize nutrition screening tool and intervene as necessary. Determine patient's food preferences and provide high-protein, high-caloric foods as appropriate.     INTERVENTIONS:  - Monitor oral intake, urinary output, labs, and treatment plans  - Assess nutrition and hydration status and recommend course of action  - Evaluate amount of meals eaten  - Assist patient with eating if necessary   - Allow adequate time for meals  - Recommend/ encourage appropriate diets, oral nutritional supplements, and vitamin/mineral supplements  - Order, calculate, and assess calorie counts as needed  - Recommend, monitor, and adjust tube feedings and TPN/PPN based on assessed needs  - Assess need for intravenous fluids  - Provide specific nutrition/hydration education as appropriate  - Include patient/family/caregiver in decisions related to nutrition  Outcome: Progressing     Problem: RESPIRATORY - ADULT  Goal: Achieves optimal ventilation and oxygenation  Description: INTERVENTIONS:  - Assess for changes in respiratory status  - Assess for changes in mentation and behavior  - Position to  facilitate oxygenation and minimize respiratory effort  - Oxygen administered by appropriate delivery if ordered  - Initiate smoking cessation education as indicated  - Encourage broncho-pulmonary hygiene including cough, deep breathe, Incentive Spirometry  - Assess the need for suctioning and aspirate as needed  - Assess and instruct to report SOB or any respiratory difficulty  - Respiratory Therapy support as indicated  Outcome: Progressing

## 2024-03-20 NOTE — ASSESSMENT & PLAN NOTE
Increasing creatinine  Likely in the setting of sepsis, contrast nephropathy possible ATN  Urinary retention protocol  Monitor I's/O  Daily weights  Consult nephrology  Renal ultrasound  Urinalysis  Lasix as per nephroloigy

## 2024-03-20 NOTE — PROGRESS NOTES
Ena Ahumada is a 88 y.o. female who is currently ordered Vancomycin IV with management by the Pharmacy Consult service.  Relevant clinical data and objective / subjective history reviewed.  Vancomycin Assessment:  Indication and Goal AUC/Trough: Pneumonia (goal -600, trough >10), -600, trough >10  Clinical Status: worsening  Micro:     Renal Function:  SCr: 2.74 mg/dL  CrCl: 13.7 mL/min  Renal replacement: Not on dialysis  Days of Therapy: 3  Current Dose: 750mg daily PRN if level </=15  Vancomycin Plan: random level resulted 11.5. Since the level is <15, a dose of 750mg will be given today.  New Dosing: No change  Next Level: 3/21/24 at 0500  Renal Function Monitoring: Daily BMP and UOP  Pharmacy will continue to follow closely for s/sx of nephrotoxicity, infusion reactions and appropriateness of therapy.  BMP and CBC will be ordered per protocol.       MRSA swab is negative. IF no MRSA risk, consider discontinuation of vancomycin. Also, de-escalate abx depending on cultures. We will continue to follow the patient’s culture results and clinical progress daily. Also, cefepime will be adjusted if necessary.    Balta Dominguez, Pharmacist, PharmD, BCPS

## 2024-03-20 NOTE — QUICK NOTE
Patient hyperglycemic early this evening and was given 5 units IV insulin and SSI per primary team. Pt remained hyperglycemic despite these interventions. I ordered Lantus BID, increased SSI algorithm, and added SSI q HS. If patient remains hyperglycemic will likely transition back to insulin gtt given high insulin requirements while on IV steroids.      Addendum:  Patient continues to have BG > 300 despite multiple changes to insulin regimen. Transitioning to insulin gtt. Recommend continuing insulin gtt for at least 24 hours to assess daily insulin needs on steroids prior to transitioning SQ regimen.

## 2024-03-20 NOTE — ASSESSMENT & PLAN NOTE
Acute respiratory failure with hypoxia presents on admission , requiring nasal cannula O2 supplementation ~ 5 Liters which is new for the patient ; most likely cause of hypoxemia is her RLL Pneumonia as evident on Xray Chest.   Requiring mid flow, stable, clinically improving  Downgrade to MedSurg  Diuresis as per nephrology

## 2024-03-20 NOTE — ASSESSMENT & PLAN NOTE
Lab Results   Component Value Date    HGBA1C 8.1 (H) 03/16/2024       Recent Labs     03/22/24  0409 03/22/24  0612 03/22/24  1023 03/22/24  1218   POCGLU 324* 303* 372* 244*       Blood Sugar Average: Last 72 hrs:  (P) [id number] up with endocrine, home regimen-Invokana, glipizide, Januvia    SSI Before meals and at bed time + Low Carb Diet   Received steroids for severe pneumonia and was placed on insulin drip  Switch to subcutaneous Lantus with Humalog  Endocrinology consulted  Monitor blood glucose every 3 hours today

## 2024-03-20 NOTE — PLAN OF CARE
Problem: Potential for Falls  Goal: Patient will remain free of falls  Description: INTERVENTIONS:  - Educate patient/family on patient safety including physical limitations  - Instruct patient to call for assistance with activity   - Consult OT/PT to assist with strengthening/mobility   - Keep Call bell within reach  - Keep bed low and locked with side rails adjusted as appropriate  - Keep care items and personal belongings within reach  - Initiate and maintain comfort rounds  - Make Fall Risk Sign visible to staff  - Offer Toileting every  Hours, in advance of need  - Initiate/Maintain alarm  - Obtain necessary fall risk management equipment:   Problem: DISCHARGE PLANNING  Goal: Discharge to home or other facility with appropriate resources  Description: INTERVENTIONS:  - Identify barriers to discharge w/patient and caregiver  - Arrange for needed discharge resources and transportation as appropriate  - Identify discharge learning needs (meds, wound care, etc.)  - Arrange for interpretive services to assist at discharge as needed  - Refer to Case Management Department for coordinating discharge planning if the patient needs post-hospital services based on physician/advanced practitioner order or complex needs related to functional status, cognitive ability, or social support system  Outcome: Progressing     Problem: Nutrition/Hydration-ADULT  Goal: Nutrient/Hydration intake appropriate for improving, restoring or maintaining nutritional needs  Description: Monitor and assess patient's nutrition/hydration status for malnutrition. Collaborate with interdisciplinary team and initiate plan and interventions as ordered.  Monitor patient's weight and dietary intake as ordered or per policy. Utilize nutrition screening tool and intervene as necessary. Determine patient's food preferences and provide high-protein, high-caloric foods as appropriate.     INTERVENTIONS:  - Monitor oral intake, urinary output, labs, and  treatment plans  - Assess nutrition and hydration status and recommend course of action  - Evaluate amount of meals eaten  - Assist patient with eating if necessary   - Allow adequate time for meals  - Recommend/ encourage appropriate diets, oral nutritional supplements, and vitamin/mineral supplements  - Order, calculate, and assess calorie counts as needed  - Recommend, monitor, and adjust tube feedings and TPN/PPN based on assessed needs  - Assess need for intravenous fluids  - Provide specific nutrition/hydration education as appropriate  - Include patient/family/caregiver in decisions related to nutrition  Outcome: Progressing     Problem: RESPIRATORY - ADULT  Goal: Achieves optimal ventilation and oxygenation  Description: INTERVENTIONS:  - Assess for changes in respiratory status  - Assess for changes in mentation and behavior  - Position to facilitate oxygenation and minimize respiratory effort  - Oxygen administered by appropriate delivery if ordered  - Initiate smoking cessation education as indicated  - Encourage broncho-pulmonary hygiene including cough, deep breathe, Incentive Spirometry  - Assess the need for suctioning and aspirate as needed  - Assess and instruct to report SOB or any respiratory difficulty  - Respiratory Therapy support as indicated  Outcome: Progressing     - Apply yellow socks and bracelet for high fall risk patients  - Consider moving patient to room near nurses station  Outcome: Progressing

## 2024-03-20 NOTE — ASSESSMENT & PLAN NOTE
Lab Results   Component Value Date    EGFR 16 03/22/2024    EGFR 16 03/21/2024    EGFR 14 03/20/2024    CREATININE 2.52 (H) 03/22/2024    CREATININE 2.51 (H) 03/21/2024    CREATININE 2.74 (H) 03/20/2024     Follows up with nephrology as outpatient  Baseline creatinine appears to be 1.6-1.9 as per nephrology, EGFR in mid 20s  Monitor BMP

## 2024-03-20 NOTE — ASSESSMENT & PLAN NOTE
RLL Pneumonia on CT chest ; with SIRS criteria and hypoxemia   See sepsis A&P   Supplemental O2 and wean for goal > 90%   Mucinex   Speech eval given possible aspiration etiology   Trend vitals and wbc   Patient started on ceftriaxone, increased wheezing oxygen requirements today.  Patient now on 12 L of oxygen  CTA chest done shows right lung pneumonia.    Pulmonology consult  received steroids for's severe pneumonia-patient improving  Antibiotic-continue cefepime to complete a course of 7 days.  MRSA negative, Vanco discontinued

## 2024-03-20 NOTE — ASSESSMENT & PLAN NOTE
SIRS criteria met : tachycardia, tachypnea, leukocytosis  Suspected source: Pneumonia  CXR: Lower lobe pneumonia  UA not suggestive UTI    CT: None  Continue to trend leukocytosis and fever curve  Procalcitonin: 18  Lactic acid: 2.5-resolved    Will need additional fluid depending on lactate and blood pressure  Blood Cultures pending   Will broaden antibiotic to cefepime, vancomycin discontinued with MRSA negative

## 2024-03-21 ENCOUNTER — APPOINTMENT (INPATIENT)
Dept: RADIOLOGY | Facility: HOSPITAL | Age: 89
DRG: 871 | End: 2024-03-21
Payer: COMMERCIAL

## 2024-03-21 ENCOUNTER — HOME HEALTH ADMISSION (OUTPATIENT)
Dept: HOME HEALTH SERVICES | Facility: HOME HEALTHCARE | Age: 89
End: 2024-03-21
Payer: COMMERCIAL

## 2024-03-21 LAB
ALBUMIN SERPL BCP-MCNC: 3.1 G/DL (ref 3.5–5)
ALP SERPL-CCNC: 58 U/L (ref 34–104)
ALT SERPL W P-5'-P-CCNC: 64 U/L (ref 7–52)
ANION GAP SERPL CALCULATED.3IONS-SCNC: 10 MMOL/L (ref 4–13)
AST SERPL W P-5'-P-CCNC: 57 U/L (ref 13–39)
BASOPHILS # BLD AUTO: 0.03 THOUSANDS/ÂΜL (ref 0–0.1)
BASOPHILS NFR BLD AUTO: 0 % (ref 0–1)
BILIRUB SERPL-MCNC: 0.41 MG/DL (ref 0.2–1)
BNP SERPL-MCNC: 565 PG/ML (ref 0–100)
BUN SERPL-MCNC: 65 MG/DL (ref 5–25)
CALCIUM ALBUM COR SERPL-MCNC: 8.6 MG/DL (ref 8.3–10.1)
CALCIUM SERPL-MCNC: 7.9 MG/DL (ref 8.4–10.2)
CHLORIDE SERPL-SCNC: 101 MMOL/L (ref 96–108)
CO2 SERPL-SCNC: 23 MMOL/L (ref 21–32)
CREAT SERPL-MCNC: 2.51 MG/DL (ref 0.6–1.3)
EOSINOPHIL # BLD AUTO: 0.02 THOUSAND/ÂΜL (ref 0–0.61)
EOSINOPHIL NFR BLD AUTO: 0 % (ref 0–6)
ERYTHROCYTE [DISTWIDTH] IN BLOOD BY AUTOMATED COUNT: 14.3 % (ref 11.6–15.1)
GFR SERPL CREATININE-BSD FRML MDRD: 16 ML/MIN/1.73SQ M
GLUCOSE SERPL-MCNC: 100 MG/DL (ref 65–140)
GLUCOSE SERPL-MCNC: 112 MG/DL (ref 65–140)
GLUCOSE SERPL-MCNC: 160 MG/DL (ref 65–140)
GLUCOSE SERPL-MCNC: 179 MG/DL (ref 65–140)
GLUCOSE SERPL-MCNC: 179 MG/DL (ref 65–140)
GLUCOSE SERPL-MCNC: 210 MG/DL (ref 65–140)
GLUCOSE SERPL-MCNC: 284 MG/DL (ref 65–140)
GLUCOSE SERPL-MCNC: 296 MG/DL (ref 65–140)
GLUCOSE SERPL-MCNC: 305 MG/DL (ref 65–140)
GLUCOSE SERPL-MCNC: 333 MG/DL (ref 65–140)
GLUCOSE SERPL-MCNC: 41 MG/DL (ref 65–140)
HCT VFR BLD AUTO: 38.1 % (ref 34.8–46.1)
HGB BLD-MCNC: 12.1 G/DL (ref 11.5–15.4)
IMM GRANULOCYTES # BLD AUTO: 0.23 THOUSAND/UL (ref 0–0.2)
IMM GRANULOCYTES NFR BLD AUTO: 2 % (ref 0–2)
LYMPHOCYTES # BLD AUTO: 2.66 THOUSANDS/ÂΜL (ref 0.6–4.47)
LYMPHOCYTES NFR BLD AUTO: 19 % (ref 14–44)
MCH RBC QN AUTO: 30 PG (ref 26.8–34.3)
MCHC RBC AUTO-ENTMCNC: 31.8 G/DL (ref 31.4–37.4)
MCV RBC AUTO: 94 FL (ref 82–98)
MONOCYTES # BLD AUTO: 1.06 THOUSAND/ÂΜL (ref 0.17–1.22)
MONOCYTES NFR BLD AUTO: 8 % (ref 4–12)
NEUTROPHILS # BLD AUTO: 10.02 THOUSANDS/ÂΜL (ref 1.85–7.62)
NEUTS SEG NFR BLD AUTO: 71 % (ref 43–75)
NRBC BLD AUTO-RTO: 0 /100 WBCS
PLATELET # BLD AUTO: 214 THOUSANDS/UL (ref 149–390)
PMV BLD AUTO: 10.5 FL (ref 8.9–12.7)
POTASSIUM SERPL-SCNC: 4 MMOL/L (ref 3.5–5.3)
PROCALCITONIN SERPL-MCNC: 9.04 NG/ML
PROT SERPL-MCNC: 6 G/DL (ref 6.4–8.4)
RBC # BLD AUTO: 4.04 MILLION/UL (ref 3.81–5.12)
SODIUM SERPL-SCNC: 134 MMOL/L (ref 135–147)
WBC # BLD AUTO: 14.02 THOUSAND/UL (ref 4.31–10.16)

## 2024-03-21 PROCEDURE — 99232 SBSQ HOSP IP/OBS MODERATE 35: CPT | Performed by: INTERNAL MEDICINE

## 2024-03-21 PROCEDURE — 94640 AIRWAY INHALATION TREATMENT: CPT

## 2024-03-21 PROCEDURE — 85025 COMPLETE CBC W/AUTO DIFF WBC: CPT | Performed by: INTERNAL MEDICINE

## 2024-03-21 PROCEDURE — 94003 VENT MGMT INPAT SUBQ DAY: CPT

## 2024-03-21 PROCEDURE — 94668 MNPJ CHEST WALL SBSQ: CPT

## 2024-03-21 PROCEDURE — 82270 OCCULT BLOOD FECES: CPT | Performed by: INTERNAL MEDICINE

## 2024-03-21 PROCEDURE — 82948 REAGENT STRIP/BLOOD GLUCOSE: CPT

## 2024-03-21 PROCEDURE — 71045 X-RAY EXAM CHEST 1 VIEW: CPT

## 2024-03-21 PROCEDURE — 83880 ASSAY OF NATRIURETIC PEPTIDE: CPT | Performed by: INTERNAL MEDICINE

## 2024-03-21 PROCEDURE — 99222 1ST HOSP IP/OBS MODERATE 55: CPT | Performed by: STUDENT IN AN ORGANIZED HEALTH CARE EDUCATION/TRAINING PROGRAM

## 2024-03-21 PROCEDURE — 99233 SBSQ HOSP IP/OBS HIGH 50: CPT | Performed by: INTERNAL MEDICINE

## 2024-03-21 PROCEDURE — 94760 N-INVAS EAR/PLS OXIMETRY 1: CPT

## 2024-03-21 PROCEDURE — 84145 PROCALCITONIN (PCT): CPT | Performed by: INTERNAL MEDICINE

## 2024-03-21 PROCEDURE — 80053 COMPREHEN METABOLIC PANEL: CPT | Performed by: INTERNAL MEDICINE

## 2024-03-21 RX ORDER — FUROSEMIDE 10 MG/ML
80 INJECTION INTRAMUSCULAR; INTRAVENOUS EVERY 8 HOURS
Status: COMPLETED | OUTPATIENT
Start: 2024-03-21 | End: 2024-03-23

## 2024-03-21 RX ORDER — INSULIN LISPRO 100 [IU]/ML
7 INJECTION, SOLUTION INTRAVENOUS; SUBCUTANEOUS
Status: DISCONTINUED | OUTPATIENT
Start: 2024-03-21 | End: 2024-03-21

## 2024-03-21 RX ORDER — FUROSEMIDE 10 MG/ML
80 INJECTION INTRAMUSCULAR; INTRAVENOUS ONCE
Status: COMPLETED | OUTPATIENT
Start: 2024-03-21 | End: 2024-03-21

## 2024-03-21 RX ORDER — INSULIN GLARGINE 100 [IU]/ML
15 INJECTION, SOLUTION SUBCUTANEOUS EVERY MORNING
Status: DISCONTINUED | OUTPATIENT
Start: 2024-03-21 | End: 2024-03-21

## 2024-03-21 RX ORDER — DEXTROSE MONOHYDRATE 25 G/50ML
INJECTION, SOLUTION INTRAVENOUS
Status: COMPLETED
Start: 2024-03-21 | End: 2024-03-21

## 2024-03-21 RX ORDER — INSULIN LISPRO 100 [IU]/ML
4 INJECTION, SOLUTION INTRAVENOUS; SUBCUTANEOUS
Status: DISCONTINUED | OUTPATIENT
Start: 2024-03-21 | End: 2024-03-22

## 2024-03-21 RX ORDER — INSULIN LISPRO 100 [IU]/ML
3 INJECTION, SOLUTION INTRAVENOUS; SUBCUTANEOUS
Status: DISCONTINUED | OUTPATIENT
Start: 2024-03-21 | End: 2024-03-21

## 2024-03-21 RX ORDER — INSULIN GLARGINE 100 [IU]/ML
15 INJECTION, SOLUTION SUBCUTANEOUS EVERY MORNING
Status: DISCONTINUED | OUTPATIENT
Start: 2024-03-22 | End: 2024-03-22

## 2024-03-21 RX ORDER — FERROUS SULFATE 325(65) MG
325 TABLET ORAL 3 TIMES WEEKLY
Status: DISCONTINUED | OUTPATIENT
Start: 2024-03-22 | End: 2024-03-26 | Stop reason: HOSPADM

## 2024-03-21 RX ADMIN — HEPARIN SODIUM 5000 UNITS: 5000 INJECTION INTRAVENOUS; SUBCUTANEOUS at 13:05

## 2024-03-21 RX ADMIN — FUROSEMIDE 80 MG: 10 INJECTION, SOLUTION INTRAMUSCULAR; INTRAVENOUS at 13:06

## 2024-03-21 RX ADMIN — PREGABALIN 50 MG: 25 CAPSULE ORAL at 08:02

## 2024-03-21 RX ADMIN — GUAIFENESIN 600 MG: 600 TABLET ORAL at 08:02

## 2024-03-21 RX ADMIN — LEVALBUTEROL HYDROCHLORIDE 1.25 MG: 1.25 SOLUTION RESPIRATORY (INHALATION) at 13:40

## 2024-03-21 RX ADMIN — FAMOTIDINE 10 MG: 20 TABLET ORAL at 08:01

## 2024-03-21 RX ADMIN — HEPARIN SODIUM 5000 UNITS: 5000 INJECTION INTRAVENOUS; SUBCUTANEOUS at 04:36

## 2024-03-21 RX ADMIN — NEBIVOLOL 20 MG: 5 TABLET ORAL at 08:02

## 2024-03-21 RX ADMIN — INSULIN LISPRO 3 UNITS: 100 INJECTION, SOLUTION INTRAVENOUS; SUBCUTANEOUS at 11:49

## 2024-03-21 RX ADMIN — GUAIFENESIN 600 MG: 600 TABLET ORAL at 17:27

## 2024-03-21 RX ADMIN — LEVALBUTEROL HYDROCHLORIDE 1.25 MG: 1.25 SOLUTION RESPIRATORY (INHALATION) at 19:52

## 2024-03-21 RX ADMIN — HEPARIN SODIUM 5000 UNITS: 5000 INJECTION INTRAVENOUS; SUBCUTANEOUS at 20:25

## 2024-03-21 RX ADMIN — CEFEPIME HYDROCHLORIDE 1000 MG: 1 INJECTION, SOLUTION INTRAVENOUS at 08:02

## 2024-03-21 RX ADMIN — INSULIN LISPRO 4 UNITS: 100 INJECTION, SOLUTION INTRAVENOUS; SUBCUTANEOUS at 17:28

## 2024-03-21 RX ADMIN — SODIUM CHLORIDE SOLN NEBU 3% 4 ML: 3 NEBU SOLN at 13:40

## 2024-03-21 RX ADMIN — LEVALBUTEROL HYDROCHLORIDE 1.25 MG: 1.25 SOLUTION RESPIRATORY (INHALATION) at 07:30

## 2024-03-21 RX ADMIN — CEFEPIME HYDROCHLORIDE 1000 MG: 1 INJECTION, SOLUTION INTRAVENOUS at 20:25

## 2024-03-21 RX ADMIN — DEXTRAN 70, GLYCERIN, HYPROMELLOSE 1 DROP: 1; 2; 3 SOLUTION/ DROPS OPHTHALMIC at 17:56

## 2024-03-21 RX ADMIN — DEXTROSE MONOHYDRATE 25 ML: 25 INJECTION, SOLUTION INTRAVENOUS at 08:23

## 2024-03-21 RX ADMIN — HYDROCORTISONE SODIUM SUCCINATE 50 MG: 100 INJECTION, POWDER, FOR SOLUTION INTRAMUSCULAR; INTRAVENOUS at 08:01

## 2024-03-21 RX ADMIN — SODIUM CHLORIDE 0.5 UNITS/HR: 9 INJECTION, SOLUTION INTRAVENOUS at 10:32

## 2024-03-21 RX ADMIN — SODIUM CHLORIDE SOLN NEBU 3% 4 ML: 3 NEBU SOLN at 19:52

## 2024-03-21 RX ADMIN — ASPIRIN 81 MG: 81 TABLET, COATED ORAL at 08:02

## 2024-03-21 RX ADMIN — NIFEDIPINE 90 MG: 30 TABLET, EXTENDED RELEASE ORAL at 08:02

## 2024-03-21 RX ADMIN — INSULIN GLARGINE 15 UNITS: 100 INJECTION, SOLUTION SUBCUTANEOUS at 10:15

## 2024-03-21 RX ADMIN — PRAVASTATIN SODIUM 40 MG: 40 TABLET ORAL at 17:27

## 2024-03-21 RX ADMIN — HYDROCORTISONE SODIUM SUCCINATE 50 MG: 100 INJECTION, POWDER, FOR SOLUTION INTRAMUSCULAR; INTRAVENOUS at 20:25

## 2024-03-21 RX ADMIN — PREGABALIN 50 MG: 25 CAPSULE ORAL at 17:28

## 2024-03-21 RX ADMIN — SODIUM CHLORIDE SOLN NEBU 3% 4 ML: 3 NEBU SOLN at 07:30

## 2024-03-21 RX ADMIN — SERTRALINE 100 MG: 100 TABLET, FILM COATED ORAL at 08:02

## 2024-03-21 RX ADMIN — FUROSEMIDE 80 MG: 10 INJECTION, SOLUTION INTRAMUSCULAR; INTRAVENOUS at 17:28

## 2024-03-21 NOTE — CONSULTS
Consultation - Ena Ahumada 88 y.o. female MRN: [id number]    Unit/Bed#: -01 Encounter: [id number]      Assessment/Plan     Assessment:  This is a 88 y.o.-year-old female with Type 2 diabetes with hyperglycemia d/t steroid induced. Endocrine consulted for glycemic management. Patient on oral medications only at home and thus is insulin naive. Certainly use of steroids causes insulin resistance and elevated BG but considering her age and also GFR in low 10's range rather be safe and agree with only low dose basal/bolus. May need to adjust her bolus going forward as steroids generally causes PPH but for now agree with current regime. Once ready for discharge, if discharged on steroids please d/c on MDI. If discharged off steroids, ok to discharge on home regime     Plan:  - Agree with lantus 15u and lispro 4u with meals for now    Inpatient consult to Endocrinology  Consult performed by: Berenice Hollingsworth MD  Consult ordered by: Toya Parekh MD        CC: T2DM    History of Present Illness     HPI: Ena Ahumada is a 88 y.o. year old female with type 2 diabetes for 11 years with complication of neuropathy, CKD4, htn, hlp on oral medications at home [facility]NA on Ab[facility] and steroids currently. Endocrine consulted d/t steroid induced hyperglycemia needing insulin drip.     Patient currently on cortef 50mg BID for 8 more days, was started on cortef 50mg QID initially after when BG raised to 400 and hence started in non DKA drip. BG have been slowly improving since steroid dose reduced. Switched to Basal/bolus today by primary team.   Patient reports she is feeling well from a respiratory standpoint, denies any fevers, chills, nausea, vomiting, has been eating well.     Home regime- januvia 50mg, Glipizide 20mg dinner and invokana 100mg daily  BG- doesn't check it at home  A1C control- suboptimal but ok for her age at 8.1%  Current BG control- did have a low BG down to 41 this  morning when still on drip. Switched to lantus 15u and Lispro 4u with meals for now. BG prelunch 284. Did not have novolog with bk since still on drip then    Review of Systems   Constitutional:  Negative for diaphoresis, fatigue and unexpected weight change.   Eyes:  Negative for photophobia and visual disturbance.   Gastrointestinal:  Negative for constipation, diarrhea and vomiting.   Endocrine: Negative for polydipsia and polyuria.   Skin: Negative.        Historical Information   Past Medical History:   Diagnosis Date    Anxiety     Aortic valve insufficiency     Arthritis     Cataract of both eyes     Chronic kidney disease     Dysuria     last assessed - [id number]    Edema     last assessed - [id number]    GERD (gastroesophageal reflux disease)     last assessed - [id number]    Hyperlipidemia     Hypertension     Low back pain     last assessed - [id number]    Mitral valve disorder     Osteoporosis     last assessed - [id number]    Palpitations      Past Surgical History:   Procedure Laterality Date    APPENDECTOMY      CATARACT EXTRACTION Bilateral     COLONOSCOPY      TUBAL LIGATION Bilateral      Social History   Social History     Substance and Sexual Activity   Alcohol Use Never     Social History     Substance and Sexual Activity   Drug Use Never     Social History     Tobacco Use   Smoking Status Never   Smokeless Tobacco Never     Family History:   Family History   Problem Relation Age of Onset    Kidney disease Mother         Chronic    No Known Problems Father     Breast cancer Sister     Diabetes Sister     Cancer Sister     Breast cancer Sister     Hypertension Sister     No Known Problems Daughter     No Known Problems Son     No Known Problems Son     No Known Problems Son     No Known Problems Son        Meds/Allergies   Current Facility-Administered Medications   Medication Dose Route Frequency Provider Last Rate Last Admin    acetaminophen (TYLENOL) tablet 650 mg  650 mg Oral Q6H PRN Angella Suárez  DOMINGUEZ   650 mg at 03/18/24 1226    aspirin (ECOTRIN LOW STRENGTH) EC tablet 81 mg  81 mg Oral Daily Angella Suárez PA-C   81 mg at 03/21/24 0802    bisacodyl (DULCOLAX) rectal suppository 10 mg  10 mg Rectal Once Angella Suárez PA-C        cefepime (MAXIPIME) IVPB (premix in dextrose) 1,000 mg 50 mL  1,000 mg Intravenous Q12H Angella Suárez PA-C 100 mL/hr at 03/21/24 0802 1,000 mg at 03/21/24 0802    cloNIDine (CATAPRES-TTS-1) 0.1 mg/24 hr TD weekly patch  1 patch Transdermal Weekly Angella Suárez PA-C   0.1 mg at 03/20/24 0835    famotidine (PEPCID) tablet 10 mg  10 mg Oral Daily Angella Suárez PA-C   10 mg at 03/21/24 0801    furosemide (LASIX) injection 80 mg  80 mg Intravenous Once KANA Ray        guaiFENesin (MUCINEX) 12 hr tablet 600 mg  600 mg Oral BID Angella Suárez PA-C   600 mg at 03/21/24 0802    heparin (porcine) subcutaneous injection 5,000 Units  5,000 Units Subcutaneous Q8H [facility] Angella Suárez PA-C   5,000 Units at 03/21/24 0436    hydrocortisone (Solu-CORTEF) injection 50 mg  50 mg Intravenous Q12H [facility] Angella Suárez PA-C   50 mg at 03/21/24 0801    [START ON 3/22/2024] insulin glargine (LANTUS) subcutaneous injection 15 Units 0.15 mL  15 Units Subcutaneous QAM Toya Parekh MD        insulin lispro (HumALOG/ADMELOG) 100 units/mL subcutaneous injection 3 Units  3 Units Subcutaneous TID With Meals Toya Parekh MD   3 Units at 03/21/24 1149    levalbuterol (XOPENEX) inhalation solution 1.25 mg  1.25 mg Nebulization TID Angella Suárez PA-C   1.25 mg at 03/21/24 0730    nebivolol (BYSTOLIC) tablet 20 mg  20 mg Oral Daily Angella Suárez PA-C   20 mg at 03/21/24 0802    NIFEdipine (PROCARDIA XL) 24 hr tablet 90 mg  90 mg Oral Daily Angella Suárez PA-C   90 mg at 03/21/24 0802    pravastatin (PRAVACHOL) tablet 40 mg  40 mg Oral Daily With Dinner Angella Suárez PA-C   40 mg at 03/20/24 1625    pregabalin (LYRICA) capsule 50 mg  50 mg Oral BID Angella Suárez PA-C   50 mg at 03/21/24 0802    sertraline (ZOLOFT) tablet 100 mg  100 mg  "Oral Daily JUANA JuniorCAMELIA   100 mg at 03/21/24 0802    sodium chloride 3 % inhalation solution 4 mL  4 mL Nebulization TID JUANA JuniorCAMELIA   4 mL at 03/21/24 0730    transdermal buprenorphine (BUTRANS) 7.5 mcg/hr TD patch 1 patch  1 patch Transdermal Q7 Days JUANA JuniorCAMELIA   1 patch at 03/16/24 2002     No Known Allergies    Objective   Vitals: Blood pressure 134/56, pulse 76, temperature 97.5 °F (36.4 °C), temperature source Oral, resp. rate 20, height 5' 2\" (1.575 m), weight 78.8 kg (173 lb 12.8 oz), SpO2 95%, not currently breastfeeding.    Intake/Output Summary (Last 24 hours) at 3/21/2024 1205  Last data filed at 3/21/2024 1141  Gross per 24 hour   Intake 464.07 ml   Output 200 ml   Net 264.07 ml     Invasive Devices       Peripheral Intravenous Line  Duration             Peripheral IV Left Antecubital -- days    Peripheral IV 03/21/24 Proximal;Right;Ventral (anterior) Forearm <1 day                    Physical Exam  Constitutional:       Appearance: Normal appearance. She is normal weight.   Cardiovascular:      Rate and Rhythm: Normal rate and regular rhythm.      Pulses: Normal pulses.   Pulmonary:      Effort: Pulmonary effort is normal.   Abdominal:      General: Abdomen is flat.      Palpations: Abdomen is soft.   Musculoskeletal:      Cervical back: Normal range of motion and neck supple.   Skin:     Capillary Refill: Capillary refill takes less than 2 seconds.   Neurological:      General: No focal deficit present.      Mental Status: She is alert and oriented to person, place, and time.   Psychiatric:         Mood and Affect: Mood normal.         The history was obtained from the review of the chart, patient.    Lab Results:   Results from last 7 days   Lab Units 03/16/24  1620   HEMOGLOBIN A1C % 8.1*     Lab Results   Component Value Date    WBC 14.02 (H) 03/21/2024    HGB 12.1 03/21/2024    HCT 38.1 03/21/2024    MCV 94 03/21/2024     03/21/2024     Lab Results   Component Value Date/Time " "   BUN 65 (H) 03/21/2024 04:35 AM    BUN 32 (H) 11/17/2023 08:53 AM     01/02/2018 08:51 AM    K 4.0 03/21/2024 04:35 AM    K 3.5 11/17/2023 08:53 AM     03/21/2024 04:35 AM     11/17/2023 08:53 AM    CO2 23 03/21/2024 04:35 AM    CO2 29 03/16/2024 12:21 PM    CO2 23 11/17/2023 08:53 AM    CREATININE 2.51 (H) 03/21/2024 04:35 AM    CREATININE 1.47 (H) 01/02/2018 08:51 AM    AST 57 (H) 03/21/2024 04:35 AM    AST 18 11/17/2023 08:53 AM    ALT 64 (H) 03/21/2024 04:35 AM    ALT 13 11/17/2023 08:53 AM    TP 6.0 (L) 03/21/2024 04:35 AM    TP 6.0 11/17/2023 08:53 AM    ALB 3.1 (L) 03/21/2024 04:35 AM    ALB 4.0 01/02/2018 08:51 AM    GLOB 2.1 11/17/2023 08:53 AM    GLOB 3.0 07/23/2019 08:41 AM     No results for input(s): \"CHOL\", \"HDL\", \"LDL\", \"TRIG\", \"VLDL\" in the last 72 hours.  No results found for: \"MICROALBUR\", \"UKFQ28QEK\"  POC Glucose (mg/dl)   Date Value   03/21/2024 284 (H)   03/21/2024 112       Imaging Studies: I have personally reviewed pertinent reports.      Portions of the record may have been created with voice recognition software.    "

## 2024-03-21 NOTE — PROGRESS NOTES
[facility]  Progress Note  Name: Ena Ahumada I  MRN: [id number]  Unit/Bed#: -01 I Date of Admission: 3/16/2024   Date of Service: 3/21/2024 I Hospital Day: 5    Assessment/Plan   NELA (acute kidney injury) ([facility])  Assessment & Plan  Increasing creatinine  Likely in the setting of sepsis, contrast nephropathy possible ATN  Urinary retention protocol  Monitor I's/O  Daily weights  Consult nephrology  Renal ultrasound  Urinalysis  Lasix one-time    Acute respiratory failure with hypoxia (HCC)  Assessment & Plan  Acute respiratory failure with hypoxia presents on admission , requiring nasal cannula O2 supplementation ~ 5 Liters which is new for the patient ; most likely cause of hypoxemia is her RLL Pneumonia as evident on Xray Chest.   Requiring mid flow, stable, clinically improving  Downgrade to MedSur  X-ray this morning showed volume overload  Diuresis as per nephrology    Hypokalemia  Assessment & Plan  3/17 Potassium 3.1 ; Magnesium wnl   Possible due to decreased PO Intake  Replete  Daily CMP and revaluate     Pneumonia  Assessment & Plan  RLL Pneumonia on CT chest ; with SIRS criteria and hypoxemia   See sepsis A&P   Supplemental O2 and wean for goal > 90%   Mucinex   Speech eval given possible aspiration etiology   Trend vitals and wbc   Patient started on ceftriaxone, increased wheezing oxygen requirements today.  Patient now on 12 L of oxygen  CTA chest done shows right lung pneumonia.    Pulmonology consult  received steroids for's severe pneumonia-patient improving  Antibiotic-continue cefepime to complete a course of 7 days.  MRSA negative, Vanco discontinued      Chronic pain syndrome  Assessment & Plan  Follows up with pain medicine  Continue buprenorphine    Nonrheumatic aortic valve stenosis  Assessment & Plan  Follows up with cardiology, last echo in April 2023  Echo-LVEF 65%, grade 1 diastolic dysfunction.  Moderate aortic stenosis  Monitor volume  status  Continue home medications with blood pressure parameters    Stage 3b chronic kidney disease (CKD) (Former[facility])  Assessment & Plan  Lab Results   Component Value Date    EGFR 14 03/20/2024    EGFR 15 03/19/2024    EGFR 17 03/19/2024    CREATININE 2.74 (H) 03/20/2024    CREATININE 2.68 (H) 03/19/2024    CREATININE 2.41 (H) 03/19/2024     Follows up with nephrology as outpatient  Baseline creatinine appears to be 1.6-1.9 as per nephrology, EGFR in mid 20s  Monitor BMP         Anxiety  Assessment & Plan  Continue Zoloft    Type 2 diabetes mellitus with stage 4 chronic kidney disease, without long-term current use of insulin ([facility])  Assessment & Plan  Lab Results   Component Value Date    HGBA1C 8.1 (H) 03/16/2024       Recent Labs     03/20/24  0601 03/20/24  0750 03/20/24  0959 03/20/24  1202   POCGLU 157* 130 287* 186*         Blood Sugar Average: Last 72 hrs:  (P) [id number] up with endocrine, home regimen-Invokana, glipizide, Januvia    SSI Before meals and at bed time + Low Carb Diet   Received steroids for severe pneumonia and was placed on insulin drip  Switch to subcutaneous Lantus with Humalog  Endocrinology consulted  Monitor blood glucose every 3 hours today          * Sepsis ([facility])  Assessment & Plan  SIRS criteria met : tachycardia, tachypnea, leukocytosis  Suspected source: Pneumonia  CXR: Lower lobe pneumonia  UA not suggestive UTI    CT: None  Continue to trend leukocytosis and fever curve  Procalcitonin: 18  Lactic acid: 2.5-resolved    Will need additional fluid depending on lactate and blood pressure  Blood Cultures pending   Will broaden antibiotic to cefepime, vancomycin discontinued with MRSA negative               VTE Pharmacologic Prophylaxis: VTE Score: 8 High Risk (Score >/= 5) - Pharmacological DVT Prophylaxis Ordered: heparin. Sequential Compression Devices Ordered.    Mobility:   Basic Mobility Inpatient Raw Score: 17  JH-HLM Goal: 5: Stand one or more mins  JH-HLM Achieved:  6: Walk 10 steps or more  -HLM Goal achieved. Continue to encourage appropriate mobility.    Patient Centered Rounds: I performed bedside rounds with nursing staff today.   Discussions with Specialists or Other Care Team Provider: Nephrology    Education and Discussions with Family / Patient: Updated  (daughter in law) via phone.-She stated she had received information from pulmonology, nephrology today    Total Time Spent on Date of Encounter in care of patient: 60 mins. This time was spent on one or more of the following: performing physical exam; counseling and coordination of care; obtaining or reviewing history; documenting in the medical record; reviewing/ordering tests, medications or procedures; communicating with other healthcare professionals and discussing with patient's family/caregivers.    Current Length of Stay: 5 day(s)  Current Patient Status: Inpatient   Certification Statement: The patient will continue to require additional inpatient hospital stay due to Dhruv, severe PNA  Discharge Plan: Anticipate discharge in 48-72 hrs to home.    Code Status: Level 1 - Full Code    Subjective:   Patient was on 10 L of oxygen this morning, denies shortness of breath, states cough has improved.  Had bowel movement stated she has been urinating.  Urine output not recorded overnight.  This morning had hypoglycemia, corrected with half ampule of dextrose, breakfast    Objective:     Vitals:   Temp (24hrs), Av.4 °F (36.3 °C), Min:97.1 °F (36.2 °C), Max:97.5 °F (36.4 °C)    Temp:  [97.1 °F (36.2 °C)-97.5 °F (36.4 °C)] 97.5 °F (36.4 °C)  HR:  [74-78] 76  Resp:  [20] 20  BP: (133-134)/(56-61) 134/56  SpO2:  [91 %-96 %] 96 %  Body mass index is 31.79 kg/m².     Input and Output Summary (last 24 hours):     Intake/Output Summary (Last 24 hours) at 3/21/2024 1447  Last data filed at 3/21/2024 1301  Gross per 24 hour   Intake 884.07 ml   Output 200 ml   Net 684.07 ml       Physical Exam:   Physical Exam      Gen.-Patient comfortable   Neck- Supple. No thyromegaly or lymphadenopathy  Lungs-rhonchi with Rales  Heart S1-S2, regular rate and rhythm, no murmurs  Abdomen-soft nontender, no organomegaly. Bowel sounds present  Extremities-no cyanosi,  clubbing or edema  Skin- no rash  Neuro-nonfocal     Additional Data:     Labs:  Results from last 7 days   Lab Units 03/21/24  0435   WBC Thousand/uL 14.02*   HEMOGLOBIN g/dL 12.1   HEMATOCRIT % 38.1   PLATELETS Thousands/uL 214   NEUTROS PCT % 71   LYMPHS PCT % 19   MONOS PCT % 8   EOS PCT % 0     Results from last 7 days   Lab Units 03/21/24  0435   SODIUM mmol/L 134*   POTASSIUM mmol/L 4.0   CHLORIDE mmol/L 101   CO2 mmol/L 23   BUN mg/dL 65*   CREATININE mg/dL 2.51*   ANION GAP mmol/L 10   CALCIUM mg/dL 7.9*   ALBUMIN g/dL 3.1*   TOTAL BILIRUBIN mg/dL 0.41   ALK PHOS U/L 58   ALT U/L 64*   AST U/L 57*   GLUCOSE RANDOM mg/dL 160*     Results from last 7 days   Lab Units 03/16/24  1301   INR  1.07     Results from last 7 days   Lab Units 03/21/24  1148 03/21/24  0852 03/21/24  0818 03/21/24  0611 03/21/24  0403 03/21/24  0223 03/21/24  0039 03/20/24  2229 03/20/24  1958 03/20/24  1808 03/20/24  1612 03/20/24  1405   POC GLUCOSE mg/dl 284* 112 41* 100 179* 210* 179* 141* 120 133 147* 186*     Results from last 7 days   Lab Units 03/16/24  1620   HEMOGLOBIN A1C % 8.1*     Results from last 7 days   Lab Units 03/21/24  0435 03/18/24  0316 03/17/24  0606 03/16/24  1620 03/16/24  1301   LACTIC ACID mmol/L  --   --   --  1.7 2.5*   PROCALCITONIN ng/ml 9.04* 26.72* 25.97*  --  18.29*       Lines/Drains:  Invasive Devices       Peripheral Intravenous Line  Duration             Peripheral IV 03/20/24 Left Antecubital 1 day    Peripheral IV 03/21/24 Proximal;Right;Ventral (anterior) Forearm <1 day                      Telemetry:  Telemetry Orders (From admission, onward)               24 Hour Telemetry Monitoring  Continuous x 24 Hours (Telem)        Expiring   Question:  Reason for  24 Hour Telemetry  Answer:  Acute respiratory failure on BiPAP                     Telemetry Reviewed: Normal Sinus Rhythm  Indication for Continued Telemetry Use: No indication for continued use. Will discontinue.              Imaging: No pertinent imaging reviewed.    Recent Cultures (last 7 days):   Results from last 7 days   Lab Units 03/20/24  0448 03/16/24  1301   BLOOD CULTURE   --  No Growth After 4 Days.  No Growth After 4 Days.   LEGIONELLA URINARY ANTIGEN  Negative  --        Last 24 Hours Medication List:   Current Facility-Administered Medications   Medication Dose Route Frequency Provider Last Rate    acetaminophen  650 mg Oral Q6H PRN Angella Suárez PA-C      aspirin  81 mg Oral Daily Angella Suráez PA-C      bisacodyl  10 mg Rectal Once Angella Suárez PA-C      cefepime  1,000 mg Intravenous Q12H JUANA JuniorC 1,000 mg (03/21/24 0802)    cloNIDine  1 patch Transdermal Weekly Angella Suárez PA-C      famotidine  10 mg Oral Daily Angella Suárez PA-C      furosemide  80 mg Intravenous Q8H Israel Enriquez MD      guaiFENesin  600 mg Oral BID Angella Suárez PA-C      heparin (porcine)  5,000 Units Subcutaneous Q8H JONO Angella Suárez PA-C      hydrocortisone sodium succinate  50 mg Intravenous Q12H [facility] Angella Suárez PA-C      [START ON 3/22/2024] insulin glargine  15 Units Subcutaneous QAM Toya Parekh MD      insulin lispro  4 Units Subcutaneous TID With Meals Berenice Hollingsworth MD      levalbuterol  1.25 mg Nebulization TID Angella Suárez PA-C      nebivolol  20 mg Oral Daily Angella Suárez PA-C      NIFEdipine  90 mg Oral Daily Angella Suárez PA-C      pravastatin  40 mg Oral Daily With Dinner Angella Suárez PA-C      pregabalin  50 mg Oral BID Angella Suárez PA-C      sertraline  100 mg Oral Daily Angella Suárez PA-C      sodium chloride  4 mL Nebulization TID Angella Suárez PA-C      transdermal buprenorphine  1 patch Transdermal Q7 Days Angella Suárez PA-C          Today, Patient Was Seen By: Toya Parekh MD    **Please Note: This note may  have been constructed using a voice recognition system.**

## 2024-03-21 NOTE — PROGRESS NOTES
NEPHROLOGY PROGRESS NOTE   Ena Ahumada 88 y.o. female MRN: [id number]  Unit/Bed#: -01 Encounter: [id number]  Reason for Consult: NELA on CKD IV    88-year-old female with history of CKD stage IV, hypertension, diabetes, aortic valve stenosis, presents with nausea and vomiting x 1 day.  Found to have aspiration pneumonia.  Nephrology is consulted for management of acute kidney injury.    ASSESSMENT/PLAN:  Acute kidney injury on CKD IV: Suspect contrast associated nephropathy versus prerenal component on diuretic and hyperglycemia. Chronic disease in the setting of diabetes, hypertension, and age-related nephron loss.  -Baseline creatinine: 1.6-1.9.  -Presented with creatinine of 1.6.  -Peak creatinine 2.7 on 3/20, suspect creatinine has plateaued, currently improved to 2.5.  -Follows with Dr. Baumann.  -Will give IV diuretics today.  -Status post IV contrast 3/18/2024.  -UA: glucose, +1 ketones, trace blood, +1 protein, 0-1 RBCs/WBCs, 0-3 granular casts and WBC casts.  -CK level 82.  -Renal ultrasound negative for hydro.  -Bladder scan is insignificant.  -Continue to avoid nephrotoxins, hypotension, IV contrast.  -I/O.     Complex left renal cyst: Continue to monitor as an outpatient.  Monitoring imaging every 6 months.     Hypokalemia: Suspected due to poor oral intake. (Resolved)  -Continue to monitor and replace as needed.  -Mg level previously normal.   -On potassium gluconate 2 tablets 2 times per day as an outpatient.     Blood pressure: Overall acceptable.  -Current medication: Clonidine patch 0.1 mg weekly, Bystolic 20 mg daily, nifedipine 90 mg daily.  -Home medication: Clonidine 0.1 mg patch weekly, Bystolic 20 mg daily, nifedipine 90 mg daily, torsemide 20 mg daily.  -Recommend avoiding hypotension or high fluctuation in blood pressure.  -Holding parameters placed for systolic blood pressure less than 130 mmHg.     Acute hypoxic respiratory failure/sepsis/aspiration  pneumonia:  -RSV/flu/COVID-negative.  -PE study negative for embolus.  -Initial chest x-ray concerning for pneumonia with pleural effusions.  -Repeat chest x-ray is pending, although personally reviewed and appears volume overloaded, will give IV Lasix today.  -Currently on antibiotics.  -Pulmonary team is following.  -Currently on IV steroids.  -Weaning oxygen as tolerated.     Anemia:  -Continue to monitor and transfuse for hemoglobin less than 7.0.     Other: Diabetes, hyperlipidemia, GERD, aortic valve stenosis, chronic pain.    Disposition: Requiring additional stay due to medical needs.    SUBJECTIVE:  The patient is resting in her bed.  She denies chest discomfort.  She reports stable respiratory status.  She denies nausea, vomiting, diarrhea.  She denies any issues with urination.    OBJECTIVE:  Current Weight: Weight - Scale: 78.8 kg (173 lb 12.8 oz)  Vitals:    03/20/24 2100 03/21/24 0600 03/21/24 0756 03/21/24 0758   BP: 133/61   134/56   BP Location: Right arm   Right arm   Pulse:   74 76   Resp: 20   20   Temp: (!) 97.1 °F (36.2 °C)  97.5 °F (36.4 °C) 97.5 °F (36.4 °C)   TempSrc: Oral   Oral   SpO2: 93%  95% 95%   Weight:  78.8 kg (173 lb 12.8 oz)     Height:           Intake/Output Summary (Last 24 hours) at 3/21/2024 1148  Last data filed at 3/21/2024 1141  Gross per 24 hour   Intake 726.74 ml   Output 200 ml   Net 526.74 ml     General: NAD  Skin: warm, dry, intact, no rash  HEENT: Moist mucous membranes, sclera anicteric, normocephalic, atraumatic  Neck: No apparent JVD appreciated  Chest: lung sounds diminished B/L, on O2  CVS:Regular rate and rhythm, no murmer   Abdomen: Soft, round, non-tender, +BS  Extremities: B/L LE edema present  Neuro: alert and oriented  Psych: appropriate mood and affect     Medications:    Current Facility-Administered Medications:     acetaminophen (TYLENOL) tablet 650 mg, 650 mg, Oral, Q6H PRN, Angella Suárez PA-C, 650 mg at 03/18/24 1226    aspirin (ECOTRIN LOW STRENGTH)  EC tablet 81 mg, 81 mg, Oral, Daily, Angella Suárez PA-C, 81 mg at 03/21/24 0802    bisacodyl (DULCOLAX) rectal suppository 10 mg, 10 mg, Rectal, Once, Angella Suárez PA-C    cefepime (MAXIPIME) IVPB (premix in dextrose) 1,000 mg 50 mL, 1,000 mg, Intravenous, Q12H, Angella Suárez PA-C, Last Rate: 100 mL/hr at 03/21/24 0802, 1,000 mg at 03/21/24 0802    cloNIDine (CATAPRES-TTS-1) 0.1 mg/24 hr TD weekly patch, 1 patch, Transdermal, Weekly, Angella Suárez PA-C, 0.1 mg at 03/20/24 0835    famotidine (PEPCID) tablet 10 mg, 10 mg, Oral, Daily, Angella Suárez PA-C, 10 mg at 03/21/24 0801    furosemide (LASIX) injection 80 mg, 80 mg, Intravenous, Once, KANA Ray    guaiFENesin (MUCINEX) 12 hr tablet 600 mg, 600 mg, Oral, BID, Angella Suárez PA-C, 600 mg at 03/21/24 0802    heparin (porcine) subcutaneous injection 5,000 Units, 5,000 Units, Subcutaneous, Q8H [facility], Angella Suárez PA-C, 5,000 Units at 03/21/24 0436    hydrocortisone (Solu-CORTEF) injection 50 mg, 50 mg, Intravenous, Q12H [facility], Angella Suárez PA-C, 50 mg at 03/21/24 0801    [START ON 3/22/2024] insulin glargine (LANTUS) subcutaneous injection 15 Units 0.15 mL, 15 Units, Subcutaneous, QAM, Toya Parekh MD    insulin lispro (HumALOG/ADMELOG) 100 units/mL subcutaneous injection 3 Units, 3 Units, Subcutaneous, TID With Meals, Toya Parekh MD    levalbuterol (XOPENEX) inhalation solution 1.25 mg, 1.25 mg, Nebulization, TID, Angella Suárez PA-C, 1.25 mg at 03/21/24 0730    nebivolol (BYSTOLIC) tablet 20 mg, 20 mg, Oral, Daily, Angella Suárez PA-C, 20 mg at 03/21/24 0802    NIFEdipine (PROCARDIA XL) 24 hr tablet 90 mg, 90 mg, Oral, Daily, Angella Suárez PA-C, 90 mg at 03/21/24 0802    pravastatin (PRAVACHOL) tablet 40 mg, 40 mg, Oral, Daily With Dinner, Angella Suárez PA-C, 40 mg at 03/20/24 1625    pregabalin (LYRICA) capsule 50 mg, 50 mg, Oral, BID, Angella Suárez PA-C, 50 mg at 03/21/24 0802    sertraline (ZOLOFT) tablet 100 mg, 100 mg, Oral, Daily, Angella Suárez PA-C, 100 mg at 03/21/24  0802    sodium chloride 3 % inhalation solution 4 mL, 4 mL, Nebulization, TID, Angella Suárez PA-C, 4 mL at 03/21/24 0730    transdermal buprenorphine (BUTRANS) 7.5 mcg/hr TD patch 1 patch, 1 patch, Transdermal, Q7 Days, Angella Suárez PA-C, 1 patch at 03/16/24 2002    Laboratory Results:  Results from last 7 days   Lab Units 03/21/24  0435 03/20/24  0448 03/19/24  1708 03/19/24  0555 03/18/24  2116 03/18/24  0316 03/17/24  0606 03/16/24  1221 03/16/24  1216   WBC Thousand/uL 14.02* 19.12*  --  16.84*  --  16.74* 16.52*  --  17.06*   HEMOGLOBIN g/dL 12.1 12.6  --  11.2*  --  11.7 11.3*  --  14.1   I STAT HEMOGLOBIN g/dl  --   --   --   --   --   --   --  15.0  --    HEMATOCRIT % 38.1 40.2  --  36.1  --  38.1 36.1  --  45.2   HEMATOCRIT, ISTAT %  --   --   --   --   --   --   --  44  --    PLATELETS Thousands/uL 214 197  --  155  --  152 147*  --  176   SODIUM mmol/L 134* 132* 130* 136   < > 138 143  --  142   POTASSIUM mmol/L 4.0 3.7 3.9 3.7   < > 3.2* 3.1*  --  3.9   CHLORIDE mmol/L 101 99 97 104   < > 103 106  --  103   CO2 mmol/L 23 22 23 25   < > 28 28  --  28   CO2, I-STAT mmol/L  --   --   --   --   --   --   --  29  --    BUN mg/dL 65* 59* 55* 40*   < > 30* 32*  --  36*   CREATININE mg/dL 2.51* 2.74* 2.68* 2.41*   < > 1.73* 1.43*  --  1.64*   CALCIUM mg/dL 7.9* 8.0* 8.1* 8.1*   < > 8.1* 8.2*  --  9.2   MAGNESIUM mg/dL  --   --   --   --   --  2.3 2.3  --  2.1   PHOSPHORUS mg/dL  --   --   --  2.5  --   --  3.1  --   --    ALK PHOS U/L 58  --   --  45  --  47 37  --  51   ALT U/L 64*  --   --  19  --  14 13  --  17   AST U/L 57*  --   --  23  --  19 18  --  23   GLUCOSE, ISTAT mg/dl  --   --   --   --   --   --   --  264*  --     < > = values in this interval not displayed.

## 2024-03-21 NOTE — CASE MANAGEMENT
Case Management Discharge Planning Note    Patient name Ena Ahumada  Location /-01 MRN [id number]  : 1935 Date 3/21/2024       Current Admission Date: 3/16/2024  Current Admission Diagnosis:Sepsis ([facility])   Patient Active Problem List    Diagnosis Date Noted    NELA (acute kidney injury) ([facility]) 2024    Hypokalemia 2024    Acute respiratory failure with hypoxia ([facility]) 2024    Sepsis ([facility]) 2024    Pneumonia 2024    Chronic pain syndrome 2023    Degenerative disc disease, cervical 2023    Elevated parathyroid hormone 2023    Nonrheumatic aortic valve stenosis 2023    Proteinuria 2023    Sacroiliitis ([facility])     Herniated nucleus pulposus, L3-4 right     Lumbar foraminal stenosis     Lumbar radiculopathy     Type 2 diabetes mellitus with hyperglycemia, without long-term current use of insulin ([facility]) 2022    Chronic back pain 10/02/2020    Complex renal cyst 2019    Stage 3b chronic kidney disease (CKD) ([facility]) 02/15/2019    Dependent edema 2018    Osteopenia 2017    Type 2 diabetes mellitus with stage 4 chronic kidney disease, without long-term current use of insulin ([facility]) 2016    Premature ventricular contraction 10/03/2013    Rhinitis 10/12/2012    GERD (gastroesophageal reflux disease) 10/12/2012    Diabetic polyneuropathy ([facility]) 2012    Hypokalemia 2012    Hypertension 2012    Dyslipidemia 2012    Anxiety 2012      LOS (days): 5  Geometric Mean LOS (GMLOS) (days): 5.1  Days to GMLOS:0     OBJECTIVE:  Risk of Unplanned Readmission Score: 22.46         Current admission status: Inpatient   Preferred Pharmacy:   [id number] [place] - [address]  [address]  [place] [address]  Phone: [phone] Fax: [phone]    [id number] [place] - [address]  [address]  [place] [address]  Phone:  [phone] Fax: [phone]    Primary Care Provider: Deanna Castaneda DO    Primary Insurance: [employer] REP  Secondary Insurance:     DISCHARGE DETAILS:        Received information RW approved and there is a $10.69 co pay.  RW delivered to patient and her son, Hosea in patient's room. They were made aware of copay.

## 2024-03-21 NOTE — PROGRESS NOTES
Patient:    MRN:  [id number]    Marisol Request ID:  [id number]    Level of care reserved:  Home Health Agency    Partner Reserved:  [facility], [place] [address] [phone]    Clinical needs requested:    Geography searched:  [id number]    Start of Service:    Request sent:  4:35pm EDT on 3/18/2024 by Angella Hernandez    Partner reserved:  12:07pm EDT on 3/21/2024 by Lily Monzon    Choice list shared:

## 2024-03-21 NOTE — CASE MANAGEMENT
Case Management Discharge Planning Note    Patient name Ena Ahumada  Location /-01 MRN [id number]  : 1935 Date 3/21/2024       Current Admission Date: 3/16/2024  Current Admission Diagnosis:Sepsis ([facility])   Patient Active Problem List    Diagnosis Date Noted    NELA (acute kidney injury) ([facility]) 2024    Hypokalemia 2024    Acute respiratory failure with hypoxia ([facility]) 2024    Sepsis ([facility]) 2024    Pneumonia 2024    Chronic pain syndrome 2023    Degenerative disc disease, cervical 2023    Elevated parathyroid hormone 2023    Nonrheumatic aortic valve stenosis 2023    Proteinuria 2023    Sacroiliitis ([facility])     Herniated nucleus pulposus, L3-4 right     Lumbar foraminal stenosis     Lumbar radiculopathy     Type 2 diabetes mellitus with hyperglycemia, without long-term current use of insulin ([facility]) 2022    Chronic back pain 10/02/2020    Complex renal cyst 2019    Stage 3b chronic kidney disease (CKD) ([facility]) 02/15/2019    Dependent edema 2018    Osteopenia 2017    Type 2 diabetes mellitus with stage 4 chronic kidney disease, without long-term current use of insulin ([facility]) 2016    Premature ventricular contraction 10/03/2013    Rhinitis 10/12/2012    GERD (gastroesophageal reflux disease) 10/12/2012    Diabetic polyneuropathy ([facility]) 2012    Hypokalemia 2012    Hypertension 2012    Dyslipidemia 2012    Anxiety 2012      LOS (days): 5  Geometric Mean LOS (GMLOS) (days): 5.1  Days to GMLOS:0.1     OBJECTIVE:  Risk of Unplanned Readmission Score: 22.41         Current admission status: Inpatient   Preferred Pharmacy:   [id number] [place] - [address]  [address]  [place] [address]  Phone: [phone] Fax: [phone]    [id number] [place] - [address]  [address]  [place] [address]  Phone:  [phone] Fax: [phone]    Primary Care Provider: Deanna Castaneda DO    Primary Insurance: BLUE CROSS  REP  Secondary Insurance:     DISCHARGE DETAILS:     Met with patient's son and valerie-Hosea Gaines and Lita and discuss PT/OT recommendation for a RW, they confirmed patient does not have a RW at home.  They are agreeable to obtaining one through [facility]. Referral to [employer] [facility] via parachute made.  Wait approval

## 2024-03-21 NOTE — PROGRESS NOTES
"Progress Note - Pulmonary   Ena Ahumada 88 y.o. female MRN: [id number]  Unit/Bed#: -01 Encounter: [id number]    Assessment & Plan:    Acute hypoxic respiratory failure  Aspiration pneumonia  NELA on CKD  Aortic stenosis    Patient is currently on 10 L oxygen saturating at 91 %. Titrate oxygen to maintain saturations >89%.   Continue IV cefepime for day 4 of 5-7 day total course  CXR today appears worsened when compared to 3/17 with increased opacification of R lung  Continue IV solucortef 50mg q12h for 3 more days  Continue xopenex and hypertonic nebulizers TID  Recommend starting diuresis as patient does have crackles and LE edema on exam    Chief Complaint:   \"I want to get better\"    Subjective:   Patient is resting in bed. She reports feeling about the same. She continues to cough but is nonproductive.     Objective:     Vitals: Blood pressure 134/56, pulse 76, temperature 97.5 °F (36.4 °C), temperature source Oral, resp. rate 20, height 5' 2\" (1.575 m), weight 78.8 kg (173 lb 12.8 oz), SpO2 95%, not currently breastfeeding.,Body mass index is 31.79 kg/m².      Intake/Output Summary (Last 24 hours) at 3/21/2024 0848  Last data filed at 3/21/2024 0621  Gross per 24 hour   Intake 476.74 ml   Output 250 ml   Net 226.74 ml       Invasive Devices       Peripheral Intravenous Line  Duration             Peripheral IV Left Antecubital -- days    Peripheral IV 03/21/24 Proximal;Right;Ventral (anterior) Forearm <1 day                    Physical Exam:   General appearance:   Alert and awake, in no acute distress  Head:   Normocephalic, without obvious abnormality, atraumatic  Eyes:   No scleral icterus   Lungs:  Pulmonary effort non labored at rest  Breath sounds: Bilateral crackles. Minimal expiratory wheezes.  Cardiovascular:   Regular rate and rhythm. No murmurs. Capillary refill < 3 seconds.  Abdomen:    No appreciable distension or tenderness  Extremities:   No deformity. No clubbing present. +1 pitting " edema to BLE.   Skin:   Warm and dry. Intact. Color appropriate for ethnicity.  Neurologic:   No acute focal deficits are noted.  Psychiatric:   Normal mood. Answers questions appropriately.          Labs: I have personally reviewed pertinent lab results., CBC:   Lab Results   Component Value Date    WBC 14.02 (H) 03/21/2024    HGB 12.1 03/21/2024    HCT 38.1 03/21/2024    MCV 94 03/21/2024     03/21/2024    RBC 4.04 03/21/2024    MCH 30.0 03/21/2024    MCHC 31.8 03/21/2024    RDW 14.3 03/21/2024    MPV 10.5 03/21/2024    NRBC 0 03/21/2024   , CMP:   Lab Results   Component Value Date    SODIUM 134 (L) 03/21/2024    K 4.0 03/21/2024     03/21/2024    CO2 23 03/21/2024    BUN 65 (H) 03/21/2024    CREATININE 2.51 (H) 03/21/2024    CALCIUM 7.9 (L) 03/21/2024    AST 57 (H) 03/21/2024    ALT 64 (H) 03/21/2024    ALKPHOS 58 03/21/2024    EGFR 16 03/21/2024     Imaging and other studies: I have personally reviewed pertinent reports.   and I have personally reviewed pertinent films in PACS    XR chest portable, 3/21/2024  Increased right lung opacification when compared to 3/17      AL Parker RN FNP-BC  Nurse Practitioner  [facility]

## 2024-03-21 NOTE — PLAN OF CARE
Problem: Potential for Falls  Goal: Patient will remain free of falls  Description: INTERVENTIONS:  - Educate patient/family on patient safety including physical limitations  - Instruct patient to call for assistance with activity   - Consult OT/PT to assist with strengthening/mobility   - Keep Call bell within reach  - Keep bed low and locked with side rails adjusted as appropriate  - Keep care items and personal belongings within reach  - Initiate and maintain comfort rounds  - Make Fall Risk Sign visible to staff  - Offer Toileting every 2 Hours, in advance of need  - Initiate/Maintain bed/chair alarm  - Obtain necessary fall risk management equipment: walker  - Apply yellow socks and bracelet for high fall risk patients  - Consider moving patient to room near nurses station  Outcome: Progressing     Problem: SAFETY ADULT  Goal: Patient will remain free of falls  Description: INTERVENTIONS:  - Educate patient/family on patient safety including physical limitations  - Instruct patient to call for assistance with activity   - Consult OT/PT to assist with strengthening/mobility   - Keep Call bell within reach  - Keep bed low and locked with side rails adjusted as appropriate  - Keep care items and personal belongings within reach  - Initiate and maintain comfort rounds  - Make Fall Risk Sign visible to staff  - Offer Toileting every 2 Hours, in advance of need  - Initiate/Maintain bed/chair alarm  - Obtain necessary fall risk management equipment: walker  - Apply yellow socks and bracelet for high fall risk patients  - Consider moving patient to room near nurses station  Outcome: Progressing  Goal: Maintain or return to baseline ADL function  Description: INTERVENTIONS:  -  Assess patient's ability to carry out ADLs; assess patient's baseline for ADL function and identify physical deficits which impact ability to perform ADLs (bathing, care of mouth/teeth, toileting, grooming, dressing, etc.)  - Assess/evaluate  cause of self-care deficits   - Assess range of motion  - Assess patient's mobility; develop plan if impaired  - Assess patient's need for assistive devices and provide as appropriate  - Encourage maximum independence but intervene and supervise when necessary  - Involve family in performance of ADLs  - Assess for home care needs following discharge   - Consider OT consult to assist with ADL evaluation and planning for discharge  - Provide patient education as appropriate  Outcome: Progressing  Goal: Maintains/Returns to pre admission functional level  Description: INTERVENTIONS:  - Perform AM-PAC 6 Click Basic Mobility/ Daily Activity assessment daily.  - Set and communicate daily mobility goal to care team and patient/family/caregiver.   - Collaborate with rehabilitation services on mobility goals if consulted  - Perform Range of Motion 3 times a day.  - Reposition patient every 2 hours.  - Dangle patient 3 times a day  - Stand patient 3 times a day  - Ambulate patient 3 times a day  - Out of bed to chair 3 times a day   - Out of bed for meals 3 times a day  - Out of bed for toileting  - Record patient progress and toleration of activity level   Outcome: Progressing     Problem: DISCHARGE PLANNING  Goal: Discharge to home or other facility with appropriate resources  Description: INTERVENTIONS:  - Identify barriers to discharge w/patient and caregiver  - Arrange for needed discharge resources and transportation as appropriate  - Identify discharge learning needs (meds, wound care, etc.)  - Arrange for interpretive services to assist at discharge as needed  - Refer to Case Management Department for coordinating discharge planning if the patient needs post-hospital services based on physician/advanced practitioner order or complex needs related to functional status, cognitive ability, or social support system  Outcome: Progressing     Problem: Knowledge Deficit  Goal: Patient/family/caregiver demonstrates  understanding of disease process, treatment plan, medications, and discharge instructions  Description: Complete learning assessment and assess knowledge base.  Interventions:  - Provide teaching at level of understanding  - Provide teaching via preferred learning methods  Outcome: Progressing     Problem: Prexisting or High Potential for Compromised Skin Integrity  Goal: Skin integrity is maintained or improved  Description: INTERVENTIONS:  - Identify patients at risk for skin breakdown  - Assess and monitor skin integrity  - Assess and monitor nutrition and hydration status  - Monitor labs   - Assess for incontinence   - Turn and reposition patient  - Assist with mobility/ambulation  - Relieve pressure over bony prominences  - Avoid friction and shearing  - Provide appropriate hygiene as needed including keeping skin clean and dry  - Evaluate need for skin moisturizer/barrier cream  - Collaborate with interdisciplinary team   - Patient/family teaching  - Consider wound care consult   Outcome: Progressing     Problem: Nutrition/Hydration-ADULT  Goal: Nutrient/Hydration intake appropriate for improving, restoring or maintaining nutritional needs  Description: Monitor and assess patient's nutrition/hydration status for malnutrition. Collaborate with interdisciplinary team and initiate plan and interventions as ordered.  Monitor patient's weight and dietary intake as ordered or per policy. Utilize nutrition screening tool and intervene as necessary. Determine patient's food preferences and provide high-protein, high-caloric foods as appropriate.     INTERVENTIONS:  - Monitor oral intake, urinary output, labs, and treatment plans  - Assess nutrition and hydration status and recommend course of action  - Evaluate amount of meals eaten  - Assist patient with eating if necessary   - Allow adequate time for meals  - Recommend/ encourage appropriate diets, oral nutritional supplements, and vitamin/mineral supplements  -  Order, calculate, and assess calorie counts as needed  - Recommend, monitor, and adjust tube feedings and TPN/PPN based on assessed needs  - Assess need for intravenous fluids  - Provide specific nutrition/hydration education as appropriate  - Include patient/family/caregiver in decisions related to nutrition  Outcome: Progressing     Problem: RESPIRATORY - ADULT  Goal: Achieves optimal ventilation and oxygenation  Description: INTERVENTIONS:  - Assess for changes in respiratory status  - Assess for changes in mentation and behavior  - Position to facilitate oxygenation and minimize respiratory effort  - Oxygen administered by appropriate delivery if ordered  - Initiate smoking cessation education as indicated  - Encourage broncho-pulmonary hygiene including cough, deep breathe, Incentive Spirometry  - Assess the need for suctioning and aspirate as needed  - Assess and instruct to report SOB or any respiratory difficulty  - Respiratory Therapy support as indicated  Outcome: Progressing

## 2024-03-22 LAB
ANION GAP SERPL CALCULATED.3IONS-SCNC: 11 MMOL/L (ref 4–13)
BACTERIA BLD CULT: NORMAL
BACTERIA BLD CULT: NORMAL
BUN SERPL-MCNC: 64 MG/DL (ref 5–25)
CALCIUM SERPL-MCNC: 7.9 MG/DL (ref 8.4–10.2)
CHLORIDE SERPL-SCNC: 103 MMOL/L (ref 96–108)
CO2 SERPL-SCNC: 24 MMOL/L (ref 21–32)
CREAT SERPL-MCNC: 2.52 MG/DL (ref 0.6–1.3)
DME PARACHUTE DELIVERY DATE ACTUAL: NORMAL
DME PARACHUTE DELIVERY DATE REQUESTED: NORMAL
DME PARACHUTE ITEM DESCRIPTION: NORMAL
DME PARACHUTE ORDER STATUS: NORMAL
DME PARACHUTE SUPPLIER NAME: NORMAL
DME PARACHUTE SUPPLIER PHONE: NORMAL
GFR SERPL CREATININE-BSD FRML MDRD: 16 ML/MIN/1.73SQ M
GLUCOSE SERPL-MCNC: 244 MG/DL (ref 65–140)
GLUCOSE SERPL-MCNC: 283 MG/DL (ref 65–140)
GLUCOSE SERPL-MCNC: 303 MG/DL (ref 65–140)
GLUCOSE SERPL-MCNC: 303 MG/DL (ref 65–140)
GLUCOSE SERPL-MCNC: 324 MG/DL (ref 65–140)
GLUCOSE SERPL-MCNC: 331 MG/DL (ref 65–140)
GLUCOSE SERPL-MCNC: 372 MG/DL (ref 65–140)
MAGNESIUM SERPL-MCNC: 2.5 MG/DL (ref 1.9–2.7)
POTASSIUM SERPL-SCNC: 3.6 MMOL/L (ref 3.5–5.3)
SODIUM SERPL-SCNC: 138 MMOL/L (ref 135–147)

## 2024-03-22 PROCEDURE — 99232 SBSQ HOSP IP/OBS MODERATE 35: CPT | Performed by: INTERNAL MEDICINE

## 2024-03-22 PROCEDURE — 83735 ASSAY OF MAGNESIUM: CPT | Performed by: INTERNAL MEDICINE

## 2024-03-22 PROCEDURE — 94640 AIRWAY INHALATION TREATMENT: CPT

## 2024-03-22 PROCEDURE — 94760 N-INVAS EAR/PLS OXIMETRY 1: CPT

## 2024-03-22 PROCEDURE — 99233 SBSQ HOSP IP/OBS HIGH 50: CPT | Performed by: INTERNAL MEDICINE

## 2024-03-22 PROCEDURE — 97116 GAIT TRAINING THERAPY: CPT

## 2024-03-22 PROCEDURE — 80048 BASIC METABOLIC PNL TOTAL CA: CPT | Performed by: INTERNAL MEDICINE

## 2024-03-22 PROCEDURE — 97535 SELF CARE MNGMENT TRAINING: CPT

## 2024-03-22 PROCEDURE — 82948 REAGENT STRIP/BLOOD GLUCOSE: CPT

## 2024-03-22 RX ORDER — INSULIN LISPRO 100 [IU]/ML
2-12 INJECTION, SOLUTION INTRAVENOUS; SUBCUTANEOUS
Status: DISCONTINUED | OUTPATIENT
Start: 2024-03-22 | End: 2024-03-26 | Stop reason: HOSPADM

## 2024-03-22 RX ORDER — INSULIN LISPRO 100 [IU]/ML
6 INJECTION, SOLUTION INTRAVENOUS; SUBCUTANEOUS
Status: DISCONTINUED | OUTPATIENT
Start: 2024-03-22 | End: 2024-03-24

## 2024-03-22 RX ORDER — POTASSIUM CHLORIDE 20 MEQ/1
40 TABLET, EXTENDED RELEASE ORAL ONCE
Status: COMPLETED | OUTPATIENT
Start: 2024-03-22 | End: 2024-03-22

## 2024-03-22 RX ORDER — INSULIN GLARGINE 100 [IU]/ML
3 INJECTION, SOLUTION SUBCUTANEOUS ONCE
Status: COMPLETED | OUTPATIENT
Start: 2024-03-22 | End: 2024-03-22

## 2024-03-22 RX ORDER — INSULIN GLARGINE 100 [IU]/ML
18 INJECTION, SOLUTION SUBCUTANEOUS EVERY MORNING
Status: COMPLETED | OUTPATIENT
Start: 2024-03-23 | End: 2024-03-23

## 2024-03-22 RX ADMIN — INSULIN GLARGINE 15 UNITS: 100 INJECTION, SOLUTION SUBCUTANEOUS at 08:29

## 2024-03-22 RX ADMIN — INSULIN LISPRO 6 UNITS: 100 INJECTION, SOLUTION INTRAVENOUS; SUBCUTANEOUS at 18:23

## 2024-03-22 RX ADMIN — SERTRALINE 100 MG: 100 TABLET, FILM COATED ORAL at 08:33

## 2024-03-22 RX ADMIN — HEPARIN SODIUM 5000 UNITS: 5000 INJECTION INTRAVENOUS; SUBCUTANEOUS at 21:46

## 2024-03-22 RX ADMIN — HYDROCORTISONE SODIUM SUCCINATE 50 MG: 100 INJECTION, POWDER, FOR SOLUTION INTRAMUSCULAR; INTRAVENOUS at 21:45

## 2024-03-22 RX ADMIN — SODIUM CHLORIDE SOLN NEBU 3% 4 ML: 3 NEBU SOLN at 19:40

## 2024-03-22 RX ADMIN — NEBIVOLOL 20 MG: 5 TABLET ORAL at 08:48

## 2024-03-22 RX ADMIN — DEXTRAN 70, GLYCERIN, HYPROMELLOSE 1 DROP: 1; 2; 3 SOLUTION/ DROPS OPHTHALMIC at 18:23

## 2024-03-22 RX ADMIN — GUAIFENESIN 600 MG: 600 TABLET ORAL at 08:34

## 2024-03-22 RX ADMIN — PREGABALIN 50 MG: 25 CAPSULE ORAL at 18:22

## 2024-03-22 RX ADMIN — INSULIN GLARGINE 3 UNITS: 100 INJECTION, SOLUTION SUBCUTANEOUS at 08:56

## 2024-03-22 RX ADMIN — SODIUM CHLORIDE SOLN NEBU 3% 4 ML: 3 NEBU SOLN at 07:40

## 2024-03-22 RX ADMIN — HEPARIN SODIUM 5000 UNITS: 5000 INJECTION INTRAVENOUS; SUBCUTANEOUS at 04:15

## 2024-03-22 RX ADMIN — LEVALBUTEROL HYDROCHLORIDE 1.25 MG: 1.25 SOLUTION RESPIRATORY (INHALATION) at 19:41

## 2024-03-22 RX ADMIN — CEFEPIME HYDROCHLORIDE 1000 MG: 1 INJECTION, SOLUTION INTRAVENOUS at 08:30

## 2024-03-22 RX ADMIN — INSULIN LISPRO 4 UNITS: 100 INJECTION, SOLUTION INTRAVENOUS; SUBCUTANEOUS at 08:41

## 2024-03-22 RX ADMIN — FAMOTIDINE 10 MG: 20 TABLET ORAL at 08:33

## 2024-03-22 RX ADMIN — FUROSEMIDE 80 MG: 10 INJECTION, SOLUTION INTRAMUSCULAR; INTRAVENOUS at 02:19

## 2024-03-22 RX ADMIN — POTASSIUM CHLORIDE 40 MEQ: 1500 TABLET, EXTENDED RELEASE ORAL at 11:10

## 2024-03-22 RX ADMIN — ASPIRIN 81 MG: 81 TABLET, COATED ORAL at 08:32

## 2024-03-22 RX ADMIN — PREGABALIN 50 MG: 25 CAPSULE ORAL at 08:32

## 2024-03-22 RX ADMIN — LEVALBUTEROL HYDROCHLORIDE 1.25 MG: 1.25 SOLUTION RESPIRATORY (INHALATION) at 07:40

## 2024-03-22 RX ADMIN — CEFEPIME HYDROCHLORIDE 1000 MG: 1 INJECTION, SOLUTION INTRAVENOUS at 20:33

## 2024-03-22 RX ADMIN — HYDROCORTISONE SODIUM SUCCINATE 50 MG: 100 INJECTION, POWDER, FOR SOLUTION INTRAMUSCULAR; INTRAVENOUS at 08:31

## 2024-03-22 RX ADMIN — SODIUM CHLORIDE SOLN NEBU 3% 4 ML: 3 NEBU SOLN at 13:55

## 2024-03-22 RX ADMIN — DEXTRAN 70, GLYCERIN, HYPROMELLOSE 1 DROP: 1; 2; 3 SOLUTION/ DROPS OPHTHALMIC at 08:46

## 2024-03-22 RX ADMIN — LEVALBUTEROL HYDROCHLORIDE 1.25 MG: 1.25 SOLUTION RESPIRATORY (INHALATION) at 13:55

## 2024-03-22 RX ADMIN — HEPARIN SODIUM 5000 UNITS: 5000 INJECTION INTRAVENOUS; SUBCUTANEOUS at 14:40

## 2024-03-22 RX ADMIN — INSULIN LISPRO 4 UNITS: 100 INJECTION, SOLUTION INTRAVENOUS; SUBCUTANEOUS at 12:52

## 2024-03-22 RX ADMIN — NIFEDIPINE 90 MG: 30 TABLET, EXTENDED RELEASE ORAL at 08:32

## 2024-03-22 RX ADMIN — PRAVASTATIN SODIUM 40 MG: 40 TABLET ORAL at 18:22

## 2024-03-22 RX ADMIN — FUROSEMIDE 80 MG: 10 INJECTION, SOLUTION INTRAMUSCULAR; INTRAVENOUS at 18:22

## 2024-03-22 RX ADMIN — GUAIFENESIN 600 MG: 600 TABLET ORAL at 18:22

## 2024-03-22 RX ADMIN — FUROSEMIDE 80 MG: 10 INJECTION, SOLUTION INTRAMUSCULAR; INTRAVENOUS at 08:51

## 2024-03-22 RX ADMIN — FERROUS SULFATE TAB 325 MG (65 MG ELEMENTAL FE) 325 MG: 325 (65 FE) TAB at 08:32

## 2024-03-22 NOTE — PLAN OF CARE
Problem: PHYSICAL THERAPY ADULT  Goal: Performs mobility at highest level of function for planned discharge setting.  See evaluation for individualized goals.  Description: Treatment/Interventions: Functional transfer training, LE strengthening/ROM, Elevations, Therapeutic exercise, Endurance training, Patient/family training, Equipment eval/education, Bed mobility, Gait training  Equipment Recommended: Walker (TBD if will need upon DC)       See flowsheet documentation for full assessment, interventions and recommendations.  Outcome: Progressing  Note: Prognosis: Good  Problem List: Decreased endurance, Decreased mobility, Impaired balance  Assessment: Pt received in chair, agreeable to therapy session. Pt demonstrated improved ability to ambulate a further distance with less assistance using the RW. Pt maintained stable SpO2 values throughout her trial while wearing 10L O2 via NC. The patient's AM-PAC Basic Mobility Inpatient Short Form Raw Score is 20. A Raw score of greater than 17 suggests the patient may benefit from discharge to home. Please also refer to the recommendation of the Physical Therapist for safe discharge planning. Based on current status, Level 3 rehab intensity is recommended at time of discharge. Will continue to follow during hospitalization to maximize independence and functional gains.  Barriers to Discharge:  (medical condition)     Rehab Resource Intensity Level, PT: III (Minimum Resource Intensity)    See flowsheet documentation for full assessment.

## 2024-03-22 NOTE — PLAN OF CARE
Problem: Potential for Falls  Goal: Patient will remain free of falls  Description: INTERVENTIONS:  - Educate patient/family on patient safety including physical limitations  - Instruct patient to call for assistance with activity   - Consult OT/PT to assist with strengthening/mobility   - Keep Call bell within reach  - Keep bed low and locked with side rails adjusted as appropriate  - Keep care items and personal belongings within reach  - Initiate and maintain comfort rounds  - Make Fall Risk Sign visible to staff  - Offer Toileting every 2 Hours, in advance of need  - Initiate/Maintain bed alarm  - Obtain necessary fall risk management equipment: non slip socks  - Apply yellow socks and bracelet for high fall risk patients  - Consider moving patient to room near nurses station  Outcome: Progressing     Problem: SAFETY ADULT  Goal: Patient will remain free of falls  Description: INTERVENTIONS:  - Educate patient/family on patient safety including physical limitations  - Instruct patient to call for assistance with activity   - Consult OT/PT to assist with strengthening/mobility   - Keep Call bell within reach  - Keep bed low and locked with side rails adjusted as appropriate  - Keep care items and personal belongings within reach  - Initiate and maintain comfort rounds  - Make Fall Risk Sign visible to staff  - Offer Toileting every 2 Hours, in advance of need  - Initiate/Maintain bed alarm  - Obtain necessary fall risk management equipment: non slip socks  - Apply yellow socks and bracelet for high fall risk patients  - Consider moving patient to room near nurses station  Outcome: Progressing  Goal: Maintain or return to baseline ADL function  Description: INTERVENTIONS:  -  Assess patient's ability to carry out ADLs; assess patient's baseline for ADL function and identify physical deficits which impact ability to perform ADLs (bathing, care of mouth/teeth, toileting, grooming, dressing, etc.)  -  Assess/evaluate cause of self-care deficits   - Assess range of motion  - Assess patient's mobility; develop plan if impaired  - Assess patient's need for assistive devices and provide as appropriate  - Encourage maximum independence but intervene and supervise when necessary  - Involve family in performance of ADLs  - Assess for home care needs following discharge   - Consider OT consult to assist with ADL evaluation and planning for discharge  - Provide patient education as appropriate  Outcome: Progressing  Goal: Maintains/Returns to pre admission functional level  Description: INTERVENTIONS:  - Perform AM-PAC 6 Click Basic Mobility/ Daily Activity assessment daily.  - Set and communicate daily mobility goal to care team and patient/family/caregiver.   - Collaborate with rehabilitation services on mobility goals if consulted  - Perform Range of Motion 5 times a day.  - Reposition patient every 2 hours.  - Dangle patient 3 times a day  - Stand patient 3 times a day  - Ambulate patient 3 times a day  - Out of bed to chair 3 times a day   - Out of bed for meals 3 times a day  - Out of bed for toileting  - Record patient progress and toleration of activity level   Outcome: Progressing     Problem: DISCHARGE PLANNING  Goal: Discharge to home or other facility with appropriate resources  Description: INTERVENTIONS:  - Identify barriers to discharge w/patient and caregiver  - Arrange for needed discharge resources and transportation as appropriate  - Identify discharge learning needs (meds, wound care, etc.)  - Arrange for interpretive services to assist at discharge as needed  - Refer to Case Management Department for coordinating discharge planning if the patient needs post-hospital services based on physician/advanced practitioner order or complex needs related to functional status, cognitive ability, or social support system  Outcome: Progressing     Problem: Knowledge Deficit  Goal: Patient/family/caregiver  demonstrates understanding of disease process, treatment plan, medications, and discharge instructions  Description: Complete learning assessment and assess knowledge base.  Interventions:  - Provide teaching at level of understanding  - Provide teaching via preferred learning methods  Outcome: Progressing     Problem: Prexisting or High Potential for Compromised Skin Integrity  Goal: Skin integrity is maintained or improved  Description: INTERVENTIONS:  - Identify patients at risk for skin breakdown  - Assess and monitor skin integrity  - Assess and monitor nutrition and hydration status  - Monitor labs   - Assess for incontinence   - Turn and reposition patient  - Assist with mobility/ambulation  - Relieve pressure over bony prominences  - Avoid friction and shearing  - Provide appropriate hygiene as needed including keeping skin clean and dry  - Evaluate need for skin moisturizer/barrier cream  - Collaborate with interdisciplinary team   - Patient/family teaching  - Consider wound care consult   Outcome: Progressing     Problem: Nutrition/Hydration-ADULT  Goal: Nutrient/Hydration intake appropriate for improving, restoring or maintaining nutritional needs  Description: Monitor and assess patient's nutrition/hydration status for malnutrition. Collaborate with interdisciplinary team and initiate plan and interventions as ordered.  Monitor patient's weight and dietary intake as ordered or per policy. Utilize nutrition screening tool and intervene as necessary. Determine patient's food preferences and provide high-protein, high-caloric foods as appropriate.     INTERVENTIONS:  - Monitor oral intake, urinary output, labs, and treatment plans  - Assess nutrition and hydration status and recommend course of action  - Evaluate amount of meals eaten  - Assist patient with eating if necessary   - Allow adequate time for meals  - Recommend/ encourage appropriate diets, oral nutritional supplements, and vitamin/mineral  supplements  - Order, calculate, and assess calorie counts as needed  - Recommend, monitor, and adjust tube feedings and TPN/PPN based on assessed needs  - Assess need for intravenous fluids  - Provide specific nutrition/hydration education as appropriate  - Include patient/family/caregiver in decisions related to nutrition  Outcome: Progressing     Problem: RESPIRATORY - ADULT  Goal: Achieves optimal ventilation and oxygenation  Description: INTERVENTIONS:  - Assess for changes in respiratory status  - Assess for changes in mentation and behavior  - Position to facilitate oxygenation and minimize respiratory effort  - Oxygen administered by appropriate delivery if ordered  - Initiate smoking cessation education as indicated  - Encourage broncho-pulmonary hygiene including cough, deep breathe, Incentive Spirometry  - Assess the need for suctioning and aspirate as needed  - Assess and instruct to report SOB or any respiratory difficulty  - Respiratory Therapy support as indicated  Outcome: Progressing

## 2024-03-22 NOTE — PLAN OF CARE
Problem: OCCUPATIONAL THERAPY ADULT  Goal: Performs self-care activities at highest level of function for planned discharge setting.  See evaluation for individualized goals.  Description: Treatment Interventions: ADL retraining, Functional transfer training, Endurance training, Patient/family training, Equipment evaluation/education, Continued evaluation          See flowsheet documentation for full assessment, interventions and recommendations.   Outcome: Progressing  Note: Limitation: Decreased ADL status, Decreased endurance, Decreased self-care trans, Decreased high-level ADLs (Decreased balance)  Prognosis: Good  Assessment: Pt seen for OT tx session with focus on functional balance, functional mobility, ADL status, and transfer safety. Patient agreeable to OT treatment session. Pt received seated OOB to Recliner. Pt completed functional txfers with overall supervision. Pt improved functional mobility to supervision level without use of DME, & pt able to manage O2 line independently. Pt completed LB dressing, UB dressing, & toileting with supervision. Patient continues to be functioning below baseline level, occupational performance remains limited secondary to factors listed above, and pt at increased risk for falls and injury.  The patient's raw score on the AM-PAC Daily Activity inpatient short form is 20, standardized score is 42.03, greater than 39.4. Patients at this level are likely to benefit from DC to home. Please refer to the recommendation of the Occupational Therapist for safe DC planning.  From OT standpoint, recommendation at time of D/C would be DC with Level IV resources. Patient to benefit from continued Occupational Therapy treatment while in the hospital to address deficits as defined above and maximize level of functional independence with ADLs and functional mobility. Pt left seated OOB to Recliner with call bell in reach, tray table in reach, needs met, RN informed.     Rehab Resource  Intensity Level, OT: No post-acute rehabilitation needs

## 2024-03-22 NOTE — OCCUPATIONAL THERAPY NOTE
"  Occupational Therapy Tx Note     Patient Name: Ena Ahumada  Today's Date: 3/22/2024  Problem List  Principal Problem:    Sepsis (HCC)  Active Problems:    Type 2 diabetes mellitus with stage 4 chronic kidney disease, without long-term current use of insulin (HCC)    Anxiety    Stage 3b chronic kidney disease (CKD) ([facility])    Nonrheumatic aortic valve stenosis    Chronic pain syndrome    Pneumonia    Hypokalemia    Acute respiratory failure with hypoxia (HCC)    NELA (acute kidney injury) ([facility])              03/22/24 1437   OT Last Visit   OT Visit Date 03/22/24   Note Type   Note Type Treatment   Pain Assessment   Pain Assessment Tool 0-10   Pain Score No Pain   Restrictions/Precautions   Weight Bearing Precautions Per Order No   Other Precautions O2;Fluid restriction  (10L MFNC ,1500 ML Fluid restriction)   ADL   Eating Assistance 7  Independent   Eating Deficit Beverage management   UB Dressing Assistance 5  Supervision/Setup   UB Dressing Deficit Thread RUE;Thread LUE;Pull around back;Fasteners;Increased time to complete   LB Dressing Assistance 5  Supervision/Setup   LB Dressing Deficit Thread RLE into pants;Thread LLE into pants;Pull up over hips   Toileting Assistance  5  Supervision/Setup   Toileting Deficit Supervison/safety;Increased time to complete;Clothing management down;Clothing management up;Perineal hygiene   Bed Mobility   Additional Comments Pt received sitting OOB to recliner   Transfers   Sit to Stand 6  Modified independent   Additional items Armrests   Stand to Sit 5  Supervision   Additional items Armrests   Toilet transfer 6  Modified independent   Additional items Standard toilet  (Grab bars)   Functional Mobility   Functional Mobility 5  Supervision   Additional Comments Inititally hand hold assist x30 ft, then supervision for additional 30 ft without assist  & pt managing her own O2 line   Subjective   Subjective \"I feel much better!\"   Cognition   Overall Cognitive Status WFL "   Arousal/Participation Alert   Attention Within functional limits   Orientation Level Oriented X4   Memory Within functional limits   Following Commands Follows all commands and directions without difficulty   Activity Tolerance   Activity Tolerance Patient tolerated treatment well   Medical Staff Made Aware RUPERTO Huerta   Assessment   Assessment Pt seen for OT tx session with focus on functional balance, functional mobility, ADL status, and transfer safety. Patient agreeable to OT treatment session. Pt received seated OOB to Recliner. Pt completed functional txfers with overall supervision. Pt improved functional mobility to supervision level without use of DME, & pt able to manage O2 line independently. Pt completed LB dressing, UB dressing, & toileting with supervision. Patient continues to be functioning below baseline level, occupational performance remains limited secondary to factors listed above, and pt at increased risk for falls and injury.  The patient's raw score on the AM-PAC Daily Activity inpatient short form is 20, standardized score is 42.03, greater than 39.4. Patients at this level are likely to benefit from DC to home. Please refer to the recommendation of the Occupational Therapist for safe DC planning.  From OT standpoint, recommendation at time of D/C would be DC with Level IV resources. Patient to benefit from continued Occupational Therapy treatment while in the hospital to address deficits as defined above and maximize level of functional independence with ADLs and functional mobility. Pt left seated OOB to Recliner with call bell in reach, tray table in reach, needs met, RN informed.   Plan   Treatment Interventions ADL retraining;Functional transfer training;Endurance training;Patient/family training;Equipment evaluation/education;Continued evaluation   Goal Expiration Date 03/28/24   OT Treatment Day 1   OT Frequency 2-3x/wk   Discharge Recommendation   Rehab Resource Intensity Level, OT  No post-acute rehabilitation needs   AM-PAC Daily Activity Inpatient   Lower Body Dressing 3   Bathing 3   Toileting 3   Upper Body Dressing 3   Grooming 4   Eating 4   Daily Activity Raw Score 20   Daily Activity Standardized Score (Calc for Raw Score >=11) 42.03   AM-PAC Applied Cognition Inpatient   Following a Speech/Presentation 3   Understanding Ordinary Conversation 4   Taking Medications 4   Remembering Where Things Are Placed or Put Away 4   Remembering List of 4-5 Errands 4   Taking Care of Complicated Tasks 3   Applied Cognition Raw Score 22   Applied Cognition Standardized Score 47.83   End of Consult   Education Provided Yes;Family or social support of family present for education by provider   Patient Position at End of Consult Bedside chair;All needs within reach   Nurse Communication Nurse aware of consult       Felecia Cantrell OTR/L

## 2024-03-22 NOTE — CASE MANAGEMENT
Case Management Discharge Planning Note    Patient name Ena Ahumada  Location /-01 MRN [id number]  : 1935 Date 3/22/2024       Current Admission Date: 3/16/2024  Current Admission Diagnosis:Sepsis ([facility])   Patient Active Problem List    Diagnosis Date Noted    NEAL (acute kidney injury) ([facility]) 2024    Hypokalemia 2024    Acute respiratory failure with hypoxia ([facility]) 2024    Sepsis ([facility]) 2024    Pneumonia 2024    Chronic pain syndrome 2023    Degenerative disc disease, cervical 2023    Elevated parathyroid hormone 2023    Nonrheumatic aortic valve stenosis 2023    Proteinuria 2023    Sacroiliitis ([facility])     Herniated nucleus pulposus, L3-4 right     Lumbar foraminal stenosis     Lumbar radiculopathy     Type 2 diabetes mellitus with hyperglycemia, without long-term current use of insulin ([facility]) 2022    Chronic back pain 10/02/2020    Complex renal cyst 2019    Stage 3b chronic kidney disease (CKD) ([facility]) 02/15/2019    Dependent edema 2018    Osteopenia 2017    Type 2 diabetes mellitus with stage 4 chronic kidney disease, without long-term current use of insulin ([facility]) 2016    Premature ventricular contraction 10/03/2013    Rhinitis 10/12/2012    GERD (gastroesophageal reflux disease) 10/12/2012    Diabetic polyneuropathy ([facility]) 2012    Hypokalemia 2012    Hypertension 2012    Dyslipidemia 2012    Anxiety 2012      LOS (days): 6  Geometric Mean LOS (GMLOS) (days): 5.1  Days to GMLOS:-0.8     OBJECTIVE:  Risk of Unplanned Readmission Score: 21.03         Current admission status: Inpatient   Preferred Pharmacy:   [id number] [place] - [address]  [address]  [place] [address]  Phone: [phone] Fax: [phone]    [id number] [place] - [address]  [address]  [place] [address]  Phone:  [phone] Fax: [phone]    Primary Care Provider: Deanna Castaneda DO    Primary Insurance: BLUE CROSS  REP  Secondary Insurance:     DISCHARGE DETAILS:                                  IMM Given (Date):: 03/22/24  IMM Given to:: Patient

## 2024-03-22 NOTE — PHYSICAL THERAPY NOTE
"                                                                                  PHYSICAL THERAPY TREATMENT NOTE      Patient Name: Ena Ahumada  Today's Date: 3/22/2024       03/22/24 1211   PT Last Visit   PT Visit Date 03/22/24   Note Type   Note Type Treatment   Pain Assessment   Pain Assessment Tool 0-10   Pain Score No Pain   Restrictions/Precautions   Weight Bearing Precautions Per Order No   Other Precautions Chair Alarm;Multiple lines;O2;Telemetry  (10L O2)   General   Chart Reviewed Yes   Additional Pertinent History Sitting in chair - HR 67, SpO2 96%, after amb trial - HR 70, SpO2 92% on 10L   Family/Caregiver Present No   Cognition   Overall Cognitive Status WFL   Arousal/Participation Alert   Attention Within functional limits   Orientation Level Oriented X4   Memory Within functional limits   Following Commands Follows all commands and directions without difficulty   Comments Pt is very pleasant and cooperative   Subjective   Subjective \"Yes, I would like to walk\".   Bed Mobility   Additional Comments Pt received sitting in chair. bed mobility not assessed this session   Transfers   Sit to Stand 5  Supervision   Additional items Armrests   Stand to Sit 5  Supervision   Additional items Armrests;Verbal cues   Ambulation/Elevation   Gait pattern   (reciprocal pattern, cues to increase proximity to the RW, mild difficulty negotiating obstacles with RW)   Gait Assistance 5  Supervision  (intremittent CG assist)   Additional items Assist x 1;Verbal cues   Assistive Device Rolling walker   Distance 20ft x 6   Ambulation/Elevation Additional Comments Pt ambulated within her room using the RW. Pt ambulated a total of 120ft before needing a seated rest break due to mild fatigue.   Balance   Static Sitting Good   Dynamic Sitting Good   Static Standing Fair +   Dynamic Standing Fair   Ambulatory Fair   Endurance Deficit   Endurance Deficit Yes   Activity Tolerance   Activity Tolerance Patient limited by " fatigue   Nurse Made Aware yes   Exercises   Quad Sets Sitting;10 reps;Bilateral   Heelslides Sitting;10 reps;Bilateral   Knee AROM Long Arc Quad Sitting;10 reps;Bilateral   Ankle Pumps Sitting;10 reps;Bilateral   Assessment   Prognosis Good   Problem List Decreased endurance;Decreased mobility;Impaired balance   Assessment Pt received in chair, agreeable to therapy session. Pt demonstrated improved ability to ambulate a further distance with less assistance using the RW. Pt maintained stable SpO2 values throughout her trial while wearing 10L O2 via NC. The patient's AM-PAC Basic Mobility Inpatient Short Form Raw Score is 20. A Raw score of greater than 17 suggests the patient may benefit from discharge to home. Please also refer to the recommendation of the Physical Therapist for safe discharge planning. Based on current status, Level 3 rehab intensity is recommended at time of discharge. Will continue to follow during hospitalization to maximize independence and functional gains.   Barriers to Discharge   (medical condition)   Goals   Patient Goals to be independent again   STG Expiration Date 03/28/24   Short Term Goal #1 Patient will: Perform all bed mobility tasks modified independent to improve pt's independence w/ repositioning for decrease risk of skin breakdown, Perform all transfers modified independent consistently from various height surfaces in order to improve I w/ engagement w/ real-world environments/situations, Ambulate at least 100 ft. +/- LRAD w/ supervision w/o LOB to facilitate return and engagement w/ previous living environment, and Navigate 1 flight of stairs w/ supervision with unilateral handrail to either improve independence w/ entering home and/or so patient can fully access living areas in home   PT Treatment Day 1   Plan   Treatment/Interventions Functional transfer training;LE strengthening/ROM;Therapeutic exercise;Endurance training;Patient/family training;Bed mobility;Gait  training;Compensatory technique education   Progress Progressing toward goals   PT Frequency 3-5x/wk   Discharge Recommendation   Rehab Resource Intensity Level, PT III (Minimum Resource Intensity)   AM-PAC Basic Mobility Inpatient   Turning in Flat Bed Without Bedrails 4   Lying on Back to Sitting on Edge of Flat Bed Without Bedrails 4   Moving Bed to Chair 3   Standing Up From Chair Using Arms 3   Walk in Room 3   Climb 3-5 Stairs With Railing 3   Basic Mobility Inpatient Raw Score 20   Basic Mobility Standardized Score 43.99   [facility] Highest Level Of Mobility   -HLM Goal 6: Walk 10 steps or more   -HLM Achieved 7: Walk 25 feet or more   Education   Education Provided Home exercise program;Mobility training   Patient Demonstrates verbal understanding   End of Consult   Patient Position at End of Consult Bedside chair;Bed/Chair alarm activated;All needs within reach     Francisco Branch, PT

## 2024-03-22 NOTE — PROGRESS NOTES
NEPHROLOGY PROGRESS NOTE   Ena Ahumada 88 y.o. female MRN: [id number]  Unit/Bed#: -01 Encounter: [id number]  Reason for Consult: NELA on CKD IV    88-year-old female with history of CKD stage IV, hypertension, diabetes, aortic valve stenosis, presents with nausea and vomiting x 1 day. Found to have aspiration pneumonia. Nephrology is consulted for management of acute kidney injury.     ASSESSMENT/PLAN:  Acute kidney injury on CKD IV: Suspect contrast associated nephropathy versus prerenal component on diuretic and hyperglycemia. Chronic disease in the setting of diabetes, hypertension, and age-related nephron loss.  -Baseline creatinine: 1.6-1.9.  -Presented with creatinine of 1.6.  -Peak creatinine 2.7 on 3/20.  -Repeat creatinine stable at 2.5.  -Monitor renal function closely while on IV diuretics.  -Follows with Dr. Baumann.  -Status post IV contrast 3/18/2024.  -UA: glucose, +1 ketones, trace blood, +1 protein, 0-1 RBCs/WBCs, 0-3 granular casts and WBC casts.  -CK level 82.  -Renal ultrasound negative for hydro.  -Bladder scan is insignificant.  -Continue to avoid nephrotoxins, hypotension, IV contrast.  -I/O.     Complex left renal cyst: Continue to monitor as an outpatient.  Monitoring imaging every 6 months.     Hypokalemia: Suspected due to poor oral intake. (Resolved)  -Continue to monitor and replace as needed.  Will give oral potassium today as patient is on IV Lasix and potassium is 3.6.  -Mg level previously normal.   -On potassium gluconate 2 tablets 2 times per day as an outpatient.     Blood pressure: Overall acceptable.  -Current medication: Clonidine patch 0.1 mg weekly, Bystolic 20 mg daily, nifedipine 90 mg daily and IV Lasix.  -Home medication: Clonidine 0.1 mg patch weekly, Bystolic 20 mg daily, nifedipine 90 mg daily, torsemide 20 mg daily.  -Recommend avoiding hypotension or high fluctuation in blood pressure.  -Holding parameters placed for systolic blood pressure less than 130  mmHg.     Acute hypoxic respiratory failure/sepsis/aspiration pneumonia:  -RSV/flu/COVID-negative.  -PE study negative for embolus.  -Initial chest x-ray concerning for pneumonia with pleural effusions.  -Repeat chest x-ray: Worsening right infiltrates, especially right upper lung with left base involvement and left pleural effusion, underlying vascular congestion.  -Currently on Lasix 80 mg IV 3 times daily.  -Continue on antibiotics IV steroids.  -Pulmonary team is following.  -Weaning oxygen as tolerated.     Anemia:  -Continue to monitor and transfuse for hemoglobin less than 7.0.     Other: Diabetes, hyperlipidemia, GERD, aortic valve stenosis, chronic pain.    Disposition: Requiring additional stay due to medical needs.    SUBJECTIVE:  Denies current complaints including chest discomfort or shortness of breath.    OBJECTIVE:  Current Weight: Weight - Scale: 76.7 kg (169 lb)  Vitals:    03/22/24 0224 03/22/24 0300 03/22/24 0730 03/22/24 0731   BP: (!) 122/48  141/60 141/60   BP Location:       Pulse: 79  68 74   Resp:   19 17   Temp:   97.5 °F (36.4 °C) 97.5 °F (36.4 °C)   TempSrc:       SpO2: 91%  95% 95%   Weight:  76.7 kg (169 lb)     Height:           Intake/Output Summary (Last 24 hours) at 3/22/2024 0900  Last data filed at 3/22/2024 0418  Gross per 24 hour   Intake 760 ml   Output 1655 ml   Net -895 ml     General: NAD  Skin: warm, dry, intact, no rash  HEENT: Moist mucous membranes, sclera anicteric, normocephalic, atraumatic  Neck: No apparent JVD appreciated  Chest: lung sounds decreased B/L, on O2   CVS:Regular rate and rhythm, no murmer   Abdomen: Soft, round, non-tender, +BS  Extremities: B/L LE edema present  Neuro: alert and oriented  Psych: appropriate mood and affect     Medications:    Current Facility-Administered Medications:     acetaminophen (TYLENOL) tablet 650 mg, 650 mg, Oral, Q6H PRN, Angella Suárez PA-C, 650 mg at 03/18/24 1226    Artificial Tears ophthalmic solution 1 drop, 1 drop, Both  Eyes, BID, Toya Parekh MD, 1 drop at 03/22/24 0846    aspirin (ECOTRIN LOW STRENGTH) EC tablet 81 mg, 81 mg, Oral, Daily, Angella Suárez PA-C, 81 mg at 03/22/24 0832    bisacodyl (DULCOLAX) rectal suppository 10 mg, 10 mg, Rectal, Once, Angella Suárez PA-C    cefepime (MAXIPIME) IVPB (premix in dextrose) 1,000 mg 50 mL, 1,000 mg, Intravenous, Q12H, Angella Suárez PA-C, Last Rate: 100 mL/hr at 03/22/24 0830, 1,000 mg at 03/22/24 0830    cloNIDine (CATAPRES-TTS-1) 0.1 mg/24 hr TD weekly patch, 1 patch, Transdermal, Weekly, Angella Suárez PA-C, 0.1 mg at 03/20/24 0835    famotidine (PEPCID) tablet 10 mg, 10 mg, Oral, Daily, Angella Suárez PA-C, 10 mg at 03/22/24 0833    ferrous sulfate tablet 325 mg, 325 mg, Oral, Once per day on Monday Wednesday Friday, Israel Enriquez MD, 325 mg at 03/22/24 0832    furosemide (LASIX) injection 80 mg, 80 mg, Intravenous, Q8H, Israel Enriquez MD, 80 mg at 03/22/24 0851    guaiFENesin (MUCINEX) 12 hr tablet 600 mg, 600 mg, Oral, BID, Angella Suárez PA-C, 600 mg at 03/22/24 0834    heparin (porcine) subcutaneous injection 5,000 Units, 5,000 Units, Subcutaneous, Q8H [facility], 5,000 Units at 03/22/24 0415    hydrocortisone (Solu-CORTEF) injection 50 mg, 50 mg, Intravenous, Q12H [facility], 50 mg at 03/22/24 0831    [START ON 3/23/2024] insulin glargine (LANTUS) subcutaneous injection 18 Units 0.18 mL, 18 Units, Subcutaneous, QAToya MD    insulin lispro (HumALOG/ADMELOG) 100 units/mL subcutaneous injection 4 Units, 4 Units, Subcutaneous, TID With Meals, Berenice Hollingsworth MD, 4 Units at 03/22/24 0841    levalbuterol (XOPENEX) inhalation solution 1.25 mg, 1.25 mg, Nebulization, TID, Angella Suárez PA-C, 1.25 mg at 03/22/24 0740    nebivolol (BYSTOLIC) tablet 20 mg, 20 mg, Oral, Daily, Angella Suárez PA-C, 20 mg at 03/22/24 0848    NIFEdipine (PROCARDIA XL) 24 hr tablet 90 mg, 90 mg, Oral, Daily, Angella Suárez PA-C, 90 mg at 03/22/24 0832    pravastatin (PRAVACHOL) tablet 40 mg, 40  mg, Oral, Daily With Dinner, Angella Suárez PA-C, 40 mg at 03/21/24 1727    pregabalin (LYRICA) capsule 50 mg, 50 mg, Oral, BID, Angella Suárez PA-C, 50 mg at 03/22/24 0832    sertraline (ZOLOFT) tablet 100 mg, 100 mg, Oral, Daily, Angella Suárez PA-C, 100 mg at 03/22/24 0833    sodium chloride 3 % inhalation solution 4 mL, 4 mL, Nebulization, TID, Angella Suárez PA-C, 4 mL at 03/22/24 0740    transdermal buprenorphine (BUTRANS) 7.5 mcg/hr TD patch 1 patch, 1 patch, Transdermal, Q7 Days, Angella Suárez PA-C, 1 patch at 03/16/24 2002    Laboratory Results:  Results from last 7 days   Lab Units 03/22/24  0413 03/21/24  0435 03/20/24  0448 03/19/24  1708 03/19/24  0555 03/18/24  2116 03/18/24  0316 03/17/24  0606 03/16/24  1221   WBC Thousand/uL  --  14.02* 19.12*  --  16.84*  --  16.74* 16.52*  --    HEMOGLOBIN g/dL  --  12.1 12.6  --  11.2*  --  11.7 11.3*  --    I STAT HEMOGLOBIN g/dl  --   --   --   --   --   --   --   --  15.0   HEMATOCRIT %  --  38.1 40.2  --  36.1  --  38.1 36.1  --    HEMATOCRIT, ISTAT %  --   --   --   --   --   --   --   --  44   PLATELETS Thousands/uL  --  214 197  --  155  --  152 147*  --    SODIUM mmol/L 138 134* 132*   < > 136   < > 138 143  --    POTASSIUM mmol/L 3.6 4.0 3.7   < > 3.7   < > 3.2* 3.1*  --    CHLORIDE mmol/L 103 101 99   < > 104   < > 103 106  --    CO2 mmol/L 24 23 22   < > 25   < > 28 28  --    CO2, I-STAT mmol/L  --   --   --   --   --   --   --   --  29   BUN mg/dL 64* 65* 59*   < > 40*   < > 30* 32*  --    CREATININE mg/dL 2.52* 2.51* 2.74*   < > 2.41*   < > 1.73* 1.43*  --    CALCIUM mg/dL 7.9* 7.9* 8.0*   < > 8.1*   < > 8.1* 8.2*  --    MAGNESIUM mg/dL 2.5  --   --   --   --   --  2.3 2.3  --    PHOSPHORUS mg/dL  --   --   --   --  2.5  --   --  3.1  --    ALK PHOS U/L  --  58  --   --  45  --  47 37  --    ALT U/L  --  64*  --   --  19  --  14 13  --    AST U/L  --  57*  --   --  23  --  19 18  --    GLUCOSE, ISTAT mg/dl  --   --   --   --   --   --   --   --  264*    < > =  values in this interval not displayed.

## 2024-03-22 NOTE — PLAN OF CARE
Problem: RESPIRATORY - ADULT  Goal: Achieves optimal ventilation and oxygenation  Description: INTERVENTIONS:  - Assess for changes in respiratory status  - Assess for changes in mentation and behavior  - Position to facilitate oxygenation and minimize respiratory effort  - Oxygen administered by appropriate delivery if ordered  - Initiate smoking cessation education as indicated  - Encourage broncho-pulmonary hygiene including cough, deep breathe, Incentive Spirometry  - Assess the need for suctioning and aspirate as needed  - Assess and instruct to report SOB or any respiratory difficulty  - Respiratory Therapy support as indicated  Outcome: Progressing     Problem: Nutrition/Hydration-ADULT  Goal: Nutrient/Hydration intake appropriate for improving, restoring or maintaining nutritional needs  Description: Monitor and assess patient's nutrition/hydration status for malnutrition. Collaborate with interdisciplinary team and initiate plan and interventions as ordered.  Monitor patient's weight and dietary intake as ordered or per policy. Utilize nutrition screening tool and intervene as necessary. Determine patient's food preferences and provide high-protein, high-caloric foods as appropriate.     INTERVENTIONS:  - Monitor oral intake, urinary output, labs, and treatment plans  - Assess nutrition and hydration status and recommend course of action  - Evaluate amount of meals eaten  - Assist patient with eating if necessary   - Allow adequate time for meals  - Recommend/ encourage appropriate diets, oral nutritional supplements, and vitamin/mineral supplements  - Order, calculate, and assess calorie counts as needed  - Recommend, monitor, and adjust tube feedings and TPN/PPN based on assessed needs  - Assess need for intravenous fluids  - Provide specific nutrition/hydration education as appropriate  - Include patient/family/caregiver in decisions related to nutrition  Outcome: Progressing

## 2024-03-22 NOTE — PROGRESS NOTES
[facility]  Progress Note  Name: Ena Ahumada I  MRN: [id number]  Unit/Bed#: -01 I Date of Admission: 3/16/2024   Date of Service: 3/22/2024 I Hospital Day: 6    Assessment/Plan   NELA (acute kidney injury) ([facility])  Assessment & Plan  Increasing creatinine  Likely in the setting of sepsis, contrast nephropathy possible ATN  Urinary retention protocol  Monitor I's/O  Daily weights  Consult nephrology  Renal ultrasound  Urinalysis  Lasix as per nephroloigy    Acute respiratory failure with hypoxia (HCC)  Assessment & Plan  Acute respiratory failure with hypoxia presents on admission , requiring nasal cannula O2 supplementation ~ 5 Liters which is new for the patient ; most likely cause of hypoxemia is her RLL Pneumonia as evident on Xray Chest.   Requiring mid flow, stable, clinically improving  Downgrade to MedSurg  Diuresis as per nephrology    Hypokalemia  Assessment & Plan  3/17 Potassium 3.1 ; Magnesium wnl   Possible due to decreased PO Intake  Replete  Daily CMP and revaluate     Pneumonia  Assessment & Plan  RLL Pneumonia on CT chest ; with SIRS criteria and hypoxemia   See sepsis A&P   Supplemental O2 and wean for goal > 90%   Mucinex   Speech eval given possible aspiration etiology   Trend vitals and wbc   Patient started on ceftriaxone, increased wheezing oxygen requirements today.  Patient now on 12 L of oxygen  CTA chest done shows right lung pneumonia.    Pulmonology consult  received steroids for's severe pneumonia-patient improving  Antibiotic-continue cefepime to complete a course of 7 days.  MRSA negative, Vanco discontinued      Chronic pain syndrome  Assessment & Plan  Follows up with pain medicine  Continue buprenorphine    Nonrheumatic aortic valve stenosis  Assessment & Plan  Follows up with cardiology, last echo in April 2023  Echo-LVEF 65%, grade 1 diastolic dysfunction.  Moderate aortic stenosis  Monitor volume status  Continue home medications with blood  pressure parameters    Stage 3b chronic kidney disease (CKD) ([facility])  Assessment & Plan  Lab Results   Component Value Date    EGFR 16 03/22/2024    EGFR 16 03/21/2024    EGFR 14 03/20/2024    CREATININE 2.52 (H) 03/22/2024    CREATININE 2.51 (H) 03/21/2024    CREATININE 2.74 (H) 03/20/2024     Follows up with nephrology as outpatient  Baseline creatinine appears to be 1.6-1.9 as per nephrology, EGFR in mid 20s  Monitor BMP         Anxiety  Assessment & Plan  Continue Zoloft    Type 2 diabetes mellitus with stage 4 chronic kidney disease, without long-term current use of insulin ([facility])  Assessment & Plan  Lab Results   Component Value Date    HGBA1C 8.1 (H) 03/16/2024       Recent Labs     03/22/24  0409 03/22/24  0612 03/22/24  1023 03/22/24  1218   POCGLU 324* 303* 372* 244*       Blood Sugar Average: Last 72 hrs:  (P) [id number] up with endocrine, home regimen-Invokana, glipizide, Januvia    SSI Before meals and at bed time + Low Carb Diet   Received steroids for severe pneumonia and was placed on insulin drip  Switch to subcutaneous Lantus with Humalog  Endocrinology consulted  Monitor blood glucose every 3 hours today          * Sepsis ([facility])  Assessment & Plan  SIRS criteria met : tachycardia, tachypnea, leukocytosis  Suspected source: Pneumonia  CXR: Lower lobe pneumonia  UA not suggestive UTI    CT: None  Continue to trend leukocytosis and fever curve  Procalcitonin: 18  Lactic acid: 2.5-resolved    Will need additional fluid depending on lactate and blood pressure  Blood Cultures pending   Will broaden antibiotic to cefepime, vancomycin discontinued with MRSA negative             VTE Pharmacologic Prophylaxis: VTE Score: 8 High Risk (Score >/= 5) - Pharmacological DVT Prophylaxis Ordered: heparin. Sequential Compression Devices Ordered.    Mobility:   Basic Mobility Inpatient Raw Score: 20  JH-HLM Goal: 6: Walk 10 steps or more  JH-HLM Achieved: 7: Walk 25 feet or more  JH-HLM Goal achieved.  Continue to encourage appropriate mobility.    Patient Centered Rounds: I performed bedside rounds with nursing staff today.   Discussions with Specialists or Other Care Team Provider: Pulmonology    Education and Discussions with Family / Patient:  pt.     Total Time Spent on Date of Encounter in care of patient: 55 mins. This time was spent on one or more of the following: performing physical exam; counseling and coordination of care; obtaining or reviewing history; documenting in the medical record; reviewing/ordering tests, medications or procedures; communicating with other healthcare professionals and discussing with patient's family/caregivers.    Current Length of Stay: 6 day(s)  Current Patient Status: Inpatient   Certification Statement: The patient will continue to require additional inpatient hospital stay due to severe pneumonia, NELA  Discharge Plan: Anticipate discharge in 48-72 hrs to home with home services.    Code Status: Level 1 - Full Code    Subjective:   no acute overnight events   Patient seen and examined bedside. Stated her breathing is not baseline, but feels better. She feels tremulous.,    Objective:     Vitals:   Temp (24hrs), Av.5 °F (36.4 °C), Min:97.4 °F (36.3 °C), Max:97.5 °F (36.4 °C)    Temp:  [97.4 °F (36.3 °C)-97.5 °F (36.4 °C)] 97.5 °F (36.4 °C)  HR:  [68-83] 74  Resp:  [17-19] 17  BP: (122-165)/(48-72) 141/60  SpO2:  [91 %-97 %] 97 %  Body mass index is 30.91 kg/m².     Input and Output Summary (last 24 hours):     Intake/Output Summary (Last 24 hours) at 3/22/2024 1447  Last data filed at 3/22/2024 0901  Gross per 24 hour   Intake 180 ml   Output 1855 ml   Net -1675 ml       Physical Exam:   Physical Exam     Gen.-Patient comfortable on 10 l of O2  Neck- Supple. No thyromegaly or lymphadenopathy  Lungs- Rhonchi with rales  Heart S1-S2, regular rate and rhythm, no murmurs  Abdomen-soft nontender, no organomegaly. Bowel sounds present  Extremities-no cyanosi,  clubbing or  edema  Skin- no rash  Neuro-nonfocal     Additional Data:     Labs:  Results from last 7 days   Lab Units 03/21/24  0435   WBC Thousand/uL 14.02*   HEMOGLOBIN g/dL 12.1   HEMATOCRIT % 38.1   PLATELETS Thousands/uL 214   NEUTROS PCT % 71   LYMPHS PCT % 19   MONOS PCT % 8   EOS PCT % 0     Results from last 7 days   Lab Units 03/22/24  0413 03/21/24  0435   SODIUM mmol/L 138 134*   POTASSIUM mmol/L 3.6 4.0   CHLORIDE mmol/L 103 101   CO2 mmol/L 24 23   BUN mg/dL 64* 65*   CREATININE mg/dL 2.52* 2.51*   ANION GAP mmol/L 11 10   CALCIUM mg/dL 7.9* 7.9*   ALBUMIN g/dL  --  3.1*   TOTAL BILIRUBIN mg/dL  --  0.41   ALK PHOS U/L  --  58   ALT U/L  --  64*   AST U/L  --  57*   GLUCOSE RANDOM mg/dL 331* 160*     Results from last 7 days   Lab Units 03/16/24  1301   INR  1.07     Results from last 7 days   Lab Units 03/22/24  1218 03/22/24  1023 03/22/24  0612 03/22/24  0409 03/22/24  0002 03/21/24  2123 03/21/24  1856 03/21/24  1526 03/21/24  1148 03/21/24  0852 03/21/24  0818 03/21/24  0611   POC GLUCOSE mg/dl 244* 372* 303* 324* 303* 296* 333* 305* 284* 112 41* 100     Results from last 7 days   Lab Units 03/16/24  1620   HEMOGLOBIN A1C % 8.1*     Results from last 7 days   Lab Units 03/21/24  0435 03/18/24  0316 03/17/24  0606 03/16/24  1620 03/16/24  1301   LACTIC ACID mmol/L  --   --   --  1.7 2.5*   PROCALCITONIN ng/ml 9.04* 26.72* 25.97*  --  18.29*       Lines/Drains:  Invasive Devices       Peripheral Intravenous Line  Duration             Peripheral IV 03/20/24 Left Antecubital 2 days    Peripheral IV 03/21/24 Proximal;Right;Ventral (anterior) Forearm 1 day                          Imaging: Reviewed radiology reports from this admission including: chest xray    Recent Cultures (last 7 days):   Results from last 7 days   Lab Units 03/20/24  0448 03/16/24  1301   BLOOD CULTURE   --  No Growth After 5 Days.  No Growth After 5 Days.   LEGIONELLA URINARY ANTIGEN  Negative  --        Last 24 Hours Medication List:    Current Facility-Administered Medications   Medication Dose Route Frequency Provider Last Rate    acetaminophen  650 mg Oral Q6H PRN Angella Suárez PA-C      Artificial Tears  1 drop Both Eyes BID Toya Parekh MD      aspirin  81 mg Oral Daily Angella Suárez PA-C      bisacodyl  10 mg Rectal Once Angella Suárez PA-C      cefepime  1,000 mg Intravenous Q12H Angella Suárez PA-C 1,000 mg (03/22/24 0830)    cloNIDine  1 patch Transdermal Weekly Angella Suárez PA-C      famotidine  10 mg Oral Daily Angella Suárez PA-C      ferrous sulfate  325 mg Oral Once per day on Monday Wednesday Friday Israel Enriquez MD      furosemide  80 mg Intravenous Q8H Israel Enriquez MD      guaiFENesin  600 mg Oral BID Angella Suárez PA-C      heparin (porcine)  5,000 Units Subcutaneous Q8H [facility] Angella Suárez PA-C      hydrocortisone sodium succinate  50 mg Intravenous Q12H [facility] Angella Suárez PA-C      [START ON 3/23/2024] insulin glargine  18 Units Subcutaneous QAM oTya Parekh MD      insulin lispro  2-12 Units Subcutaneous TID AC Berenice Hollingsworth MD      insulin lispro  6 Units Subcutaneous TID With Meals Berenice Hollingsworth MD      levalbuterol  1.25 mg Nebulization TID Angella Suárez PA-C      nebivolol  20 mg Oral Daily Angella Suárez PA-C      NIFEdipine  90 mg Oral Daily Angella Suárez PA-C      pravastatin  40 mg Oral Daily With Dinner Angella Suárez PA-C      pregabalin  50 mg Oral BID Angella Suárez PA-C      sertraline  100 mg Oral Daily Angella Suárez PA-C      sodium chloride  4 mL Nebulization TID Angella Suárez PA-C      transdermal buprenorphine  1 patch Transdermal Q7 Days Angella Suárez PA-C          Today, Patient Was Seen By: Toya Parekh MD    **Please Note: This note may have been constructed using a voice recognition system.**

## 2024-03-22 NOTE — PROGRESS NOTES
"Progress Note - Pulmonary   Ena Ahumada 88 y.o. female MRN: [id number]  Unit/Bed#: -01 Encounter: [id number]    Assessment:  Acute hypoxic respiratory failure  Flash pulmonary edema in setting of CKD  Aspiration pneumonia status post vomiting  Moderate aortic stenosis    Plan:  Continue cefepime x 2 more days, wean hydrocortisone to off  Continue to attempt to wean oxygen with IV diuresis    Chief Complaint:   I am okay    Subjective:   Patient denies any shortness of breath cough or mucus    Objective:     Vitals: Blood pressure 141/60, pulse 74, temperature 97.5 °F (36.4 °C), resp. rate 17, height 5' 2\" (1.575 m), weight 76.7 kg (169 lb), SpO2 97%, not currently breastfeeding.,Body mass index is 30.91 kg/m².      Intake/Output Summary (Last 24 hours) at 3/22/2024 1412  Last data filed at 3/22/2024 0901  Gross per 24 hour   Intake 180 ml   Output 1855 ml   Net -1675 ml       Invasive Devices       Peripheral Intravenous Line  Duration             Peripheral IV 03/20/24 Left Antecubital 2 days    Peripheral IV 03/21/24 Proximal;Right;Ventral (anterior) Forearm 1 day                    Physical Exam: /60   Pulse 74   Temp 97.5 °F (36.4 °C)   Resp 17   Ht 5' 2\" (1.575 m)   Wt 76.7 kg (169 lb)   SpO2 97%   BMI 30.91 kg/m²   General appearance: alert and oriented, in no acute distress  Lungs: clear to auscultation bilaterally  Heart: regular rate and rhythm, S1, S2 normal, no murmur, click, rub or gallop  Abdomen: soft, non-tender; bowel sounds normal; no masses,  no organomegaly  Extremities: extremities normal, warm and well-perfused; no cyanosis, clubbing, or edema  Neurologic: Grossly normal     Labs: CBC: No results found for: \"WBC\", \"HGB\", \"HCT\", \"MCV\", \"PLT\", \"ADJUSTEDWBC\", \"RBC\", \"MCH\", \"MCHC\", \"RDW\", \"MPV\", \"NRBC\", CMP:   Lab Results   Component Value Date    SODIUM 138 03/22/2024    K 3.6 03/22/2024     03/22/2024    CO2 24 03/22/2024    BUN 64 (H) 03/22/2024    CREATININE 2.52 " (H) 03/22/2024    CALCIUM 7.9 (L) 03/22/2024    EGFR 16 03/22/2024     Imaging and other studies: I have personally reviewed pertinent films in PACS

## 2024-03-23 ENCOUNTER — APPOINTMENT (INPATIENT)
Dept: RADIOLOGY | Facility: HOSPITAL | Age: 89
DRG: 871 | End: 2024-03-23
Payer: COMMERCIAL

## 2024-03-23 LAB
ANION GAP SERPL CALCULATED.3IONS-SCNC: 11 MMOL/L (ref 4–13)
ANION GAP SERPL CALCULATED.3IONS-SCNC: 11 MMOL/L (ref 4–13)
BASOPHILS # BLD MANUAL: 0.14 THOUSAND/UL (ref 0–0.1)
BASOPHILS NFR MAR MANUAL: 1 % (ref 0–1)
BUN SERPL-MCNC: 63 MG/DL (ref 5–25)
BUN SERPL-MCNC: 65 MG/DL (ref 5–25)
CALCIUM SERPL-MCNC: 8.1 MG/DL (ref 8.4–10.2)
CALCIUM SERPL-MCNC: 8.5 MG/DL (ref 8.4–10.2)
CHLORIDE SERPL-SCNC: 105 MMOL/L (ref 96–108)
CHLORIDE SERPL-SCNC: 105 MMOL/L (ref 96–108)
CO2 SERPL-SCNC: 27 MMOL/L (ref 21–32)
CO2 SERPL-SCNC: 31 MMOL/L (ref 21–32)
CREAT SERPL-MCNC: 2.26 MG/DL (ref 0.6–1.3)
CREAT SERPL-MCNC: 2.45 MG/DL (ref 0.6–1.3)
EOSINOPHIL # BLD MANUAL: 0 THOUSAND/UL (ref 0–0.4)
EOSINOPHIL NFR BLD MANUAL: 0 % (ref 0–6)
ERYTHROCYTE [DISTWIDTH] IN BLOOD BY AUTOMATED COUNT: 14.6 % (ref 11.6–15.1)
GFR SERPL CREATININE-BSD FRML MDRD: 17 ML/MIN/1.73SQ M
GFR SERPL CREATININE-BSD FRML MDRD: 18 ML/MIN/1.73SQ M
GLUCOSE SERPL-MCNC: 171 MG/DL (ref 65–140)
GLUCOSE SERPL-MCNC: 176 MG/DL (ref 65–140)
GLUCOSE SERPL-MCNC: 215 MG/DL (ref 65–140)
GLUCOSE SERPL-MCNC: 231 MG/DL (ref 65–140)
GLUCOSE SERPL-MCNC: 394 MG/DL (ref 65–140)
HCT VFR BLD AUTO: 35.7 % (ref 34.8–46.1)
HGB BLD-MCNC: 11.3 G/DL (ref 11.5–15.4)
LYMPHOCYTES # BLD AUTO: 19 % (ref 14–44)
LYMPHOCYTES # BLD AUTO: 2.71 THOUSAND/UL (ref 0.6–4.47)
MAGNESIUM SERPL-MCNC: 2.1 MG/DL (ref 1.9–2.7)
MCH RBC QN AUTO: 30.1 PG (ref 26.8–34.3)
MCHC RBC AUTO-ENTMCNC: 31.7 G/DL (ref 31.4–37.4)
MCV RBC AUTO: 95 FL (ref 82–98)
MONOCYTES # BLD AUTO: 0.86 THOUSAND/UL (ref 0–1.22)
MONOCYTES NFR BLD: 6 % (ref 4–12)
NEUTROPHILS # BLD MANUAL: 10.55 THOUSAND/UL (ref 1.85–7.62)
NEUTS SEG NFR BLD AUTO: 74 % (ref 43–75)
PLATELET # BLD AUTO: 230 THOUSANDS/UL (ref 149–390)
PLATELET BLD QL SMEAR: ADEQUATE
PMV BLD AUTO: 10.9 FL (ref 8.9–12.7)
POTASSIUM SERPL-SCNC: 3.3 MMOL/L (ref 3.5–5.3)
POTASSIUM SERPL-SCNC: 3.4 MMOL/L (ref 3.5–5.3)
RBC # BLD AUTO: 3.76 MILLION/UL (ref 3.81–5.12)
RBC MORPH BLD: NORMAL
SODIUM SERPL-SCNC: 143 MMOL/L (ref 135–147)
SODIUM SERPL-SCNC: 147 MMOL/L (ref 135–147)
WBC # BLD AUTO: 14.25 THOUSAND/UL (ref 4.31–10.16)

## 2024-03-23 PROCEDURE — 85027 COMPLETE CBC AUTOMATED: CPT | Performed by: INTERNAL MEDICINE

## 2024-03-23 PROCEDURE — 99233 SBSQ HOSP IP/OBS HIGH 50: CPT | Performed by: INTERNAL MEDICINE

## 2024-03-23 PROCEDURE — 71045 X-RAY EXAM CHEST 1 VIEW: CPT

## 2024-03-23 PROCEDURE — 94664 DEMO&/EVAL PT USE INHALER: CPT

## 2024-03-23 PROCEDURE — 82948 REAGENT STRIP/BLOOD GLUCOSE: CPT

## 2024-03-23 PROCEDURE — 94003 VENT MGMT INPAT SUBQ DAY: CPT

## 2024-03-23 PROCEDURE — 94760 N-INVAS EAR/PLS OXIMETRY 1: CPT

## 2024-03-23 PROCEDURE — 80048 BASIC METABOLIC PNL TOTAL CA: CPT | Performed by: INTERNAL MEDICINE

## 2024-03-23 PROCEDURE — 83735 ASSAY OF MAGNESIUM: CPT | Performed by: INTERNAL MEDICINE

## 2024-03-23 PROCEDURE — 80048 BASIC METABOLIC PNL TOTAL CA: CPT | Performed by: NURSE PRACTITIONER

## 2024-03-23 PROCEDURE — 94668 MNPJ CHEST WALL SBSQ: CPT

## 2024-03-23 PROCEDURE — 94640 AIRWAY INHALATION TREATMENT: CPT

## 2024-03-23 PROCEDURE — 85007 BL SMEAR W/DIFF WBC COUNT: CPT | Performed by: INTERNAL MEDICINE

## 2024-03-23 RX ORDER — POTASSIUM CHLORIDE 20 MEQ/1
40 TABLET, EXTENDED RELEASE ORAL ONCE
Status: COMPLETED | OUTPATIENT
Start: 2024-03-23 | End: 2024-03-23

## 2024-03-23 RX ORDER — TORSEMIDE 20 MG/1
20 TABLET ORAL DAILY
Status: DISCONTINUED | OUTPATIENT
Start: 2024-03-24 | End: 2024-03-26 | Stop reason: HOSPADM

## 2024-03-23 RX ORDER — CEFEPIME HYDROCHLORIDE 1 G/50ML
1000 INJECTION, SOLUTION INTRAVENOUS EVERY 12 HOURS
Status: COMPLETED | OUTPATIENT
Start: 2024-03-23 | End: 2024-03-24

## 2024-03-23 RX ORDER — INSULIN GLARGINE 100 [IU]/ML
18 INJECTION, SOLUTION SUBCUTANEOUS EVERY MORNING
Status: COMPLETED | OUTPATIENT
Start: 2024-03-24 | End: 2024-03-24

## 2024-03-23 RX ADMIN — INSULIN LISPRO 4 UNITS: 100 INJECTION, SOLUTION INTRAVENOUS; SUBCUTANEOUS at 09:04

## 2024-03-23 RX ADMIN — NEBIVOLOL 20 MG: 5 TABLET ORAL at 09:05

## 2024-03-23 RX ADMIN — CEFEPIME HYDROCHLORIDE 1000 MG: 1 INJECTION, SOLUTION INTRAVENOUS at 09:06

## 2024-03-23 RX ADMIN — INSULIN LISPRO 6 UNITS: 100 INJECTION, SOLUTION INTRAVENOUS; SUBCUTANEOUS at 17:32

## 2024-03-23 RX ADMIN — FUROSEMIDE 80 MG: 10 INJECTION, SOLUTION INTRAMUSCULAR; INTRAVENOUS at 02:03

## 2024-03-23 RX ADMIN — SODIUM CHLORIDE SOLN NEBU 3% 4 ML: 3 NEBU SOLN at 20:15

## 2024-03-23 RX ADMIN — HEPARIN SODIUM 5000 UNITS: 5000 INJECTION INTRAVENOUS; SUBCUTANEOUS at 14:19

## 2024-03-23 RX ADMIN — FUROSEMIDE 80 MG: 10 INJECTION, SOLUTION INTRAMUSCULAR; INTRAVENOUS at 17:33

## 2024-03-23 RX ADMIN — HYDROCORTISONE SODIUM SUCCINATE 50 MG: 100 INJECTION, POWDER, FOR SOLUTION INTRAMUSCULAR; INTRAVENOUS at 09:05

## 2024-03-23 RX ADMIN — ASPIRIN 81 MG: 81 TABLET, COATED ORAL at 09:05

## 2024-03-23 RX ADMIN — PREGABALIN 50 MG: 25 CAPSULE ORAL at 17:33

## 2024-03-23 RX ADMIN — POTASSIUM CHLORIDE 40 MEQ: 1500 TABLET, EXTENDED RELEASE ORAL at 19:24

## 2024-03-23 RX ADMIN — GUAIFENESIN 600 MG: 600 TABLET ORAL at 17:33

## 2024-03-23 RX ADMIN — DEXTRAN 70, GLYCERIN, HYPROMELLOSE 1 DROP: 1; 2; 3 SOLUTION/ DROPS OPHTHALMIC at 09:03

## 2024-03-23 RX ADMIN — LEVALBUTEROL HYDROCHLORIDE 1.25 MG: 1.25 SOLUTION RESPIRATORY (INHALATION) at 07:51

## 2024-03-23 RX ADMIN — INSULIN LISPRO 6 UNITS: 100 INJECTION, SOLUTION INTRAVENOUS; SUBCUTANEOUS at 12:40

## 2024-03-23 RX ADMIN — INSULIN LISPRO 10 UNITS: 100 INJECTION, SOLUTION INTRAVENOUS; SUBCUTANEOUS at 12:40

## 2024-03-23 RX ADMIN — PRAVASTATIN SODIUM 40 MG: 40 TABLET ORAL at 17:33

## 2024-03-23 RX ADMIN — INSULIN LISPRO 2 UNITS: 100 INJECTION, SOLUTION INTRAVENOUS; SUBCUTANEOUS at 17:32

## 2024-03-23 RX ADMIN — INSULIN GLARGINE 18 UNITS: 100 INJECTION, SOLUTION SUBCUTANEOUS at 09:06

## 2024-03-23 RX ADMIN — SODIUM CHLORIDE SOLN NEBU 3% 4 ML: 3 NEBU SOLN at 13:57

## 2024-03-23 RX ADMIN — LEVALBUTEROL HYDROCHLORIDE 1.25 MG: 1.25 SOLUTION RESPIRATORY (INHALATION) at 13:57

## 2024-03-23 RX ADMIN — SODIUM CHLORIDE SOLN NEBU 3% 4 ML: 3 NEBU SOLN at 07:51

## 2024-03-23 RX ADMIN — NIFEDIPINE 90 MG: 30 TABLET, EXTENDED RELEASE ORAL at 09:05

## 2024-03-23 RX ADMIN — PREGABALIN 50 MG: 25 CAPSULE ORAL at 09:06

## 2024-03-23 RX ADMIN — CEFEPIME HYDROCHLORIDE 1000 MG: 1 INJECTION, SOLUTION INTRAVENOUS at 19:24

## 2024-03-23 RX ADMIN — POTASSIUM CHLORIDE 40 MEQ: 1500 TABLET, EXTENDED RELEASE ORAL at 12:40

## 2024-03-23 RX ADMIN — SERTRALINE 100 MG: 100 TABLET, FILM COATED ORAL at 09:06

## 2024-03-23 RX ADMIN — DEXTRAN 70, GLYCERIN, HYPROMELLOSE 1 DROP: 1; 2; 3 SOLUTION/ DROPS OPHTHALMIC at 17:32

## 2024-03-23 RX ADMIN — BUPRENORPHINE 1 PATCH: 7.5 PATCH, EXTENDED RELEASE TRANSDERMAL at 19:23

## 2024-03-23 RX ADMIN — HEPARIN SODIUM 5000 UNITS: 5000 INJECTION INTRAVENOUS; SUBCUTANEOUS at 05:14

## 2024-03-23 RX ADMIN — HYDROCORTISONE SODIUM SUCCINATE 50 MG: 100 INJECTION, POWDER, FOR SOLUTION INTRAMUSCULAR; INTRAVENOUS at 21:25

## 2024-03-23 RX ADMIN — HEPARIN SODIUM 5000 UNITS: 5000 INJECTION INTRAVENOUS; SUBCUTANEOUS at 21:25

## 2024-03-23 RX ADMIN — FUROSEMIDE 80 MG: 10 INJECTION, SOLUTION INTRAMUSCULAR; INTRAVENOUS at 09:35

## 2024-03-23 RX ADMIN — LEVALBUTEROL HYDROCHLORIDE 1.25 MG: 1.25 SOLUTION RESPIRATORY (INHALATION) at 20:15

## 2024-03-23 RX ADMIN — INSULIN LISPRO 6 UNITS: 100 INJECTION, SOLUTION INTRAVENOUS; SUBCUTANEOUS at 09:03

## 2024-03-23 RX ADMIN — FAMOTIDINE 10 MG: 20 TABLET ORAL at 09:06

## 2024-03-23 RX ADMIN — GUAIFENESIN 600 MG: 600 TABLET ORAL at 09:06

## 2024-03-23 NOTE — ASSESSMENT & PLAN NOTE
Acute respiratory failure with hypoxia presents on admission , requiring nasal cannula O2 supplementation ~ 5 Liters which is new for the patient ; most likely cause of hypoxemia is her RLL Pneumonia  on admission as evident on Xray Chest.   Requiring mid flow, stable, clinically improving    Currently on 4 L of oxygen

## 2024-03-23 NOTE — ASSESSMENT & PLAN NOTE
Increasing creatinine  Likely in the setting of sepsis, contrast nephropathy possible ATN, possible new baseline  Urinary retention protocol  Monitor I's/O  Daily weights  Consult nephrology  Renal ultrasound  Urinalysis  Lasix as per nephroloigy--> changed to p.o. torsemide 20 mg daily

## 2024-03-23 NOTE — ASSESSMENT & PLAN NOTE
Lab Results   Component Value Date    HGBA1C 8.1 (H) 03/16/2024       Recent Labs     03/23/24  1037 03/23/24  1724 03/24/24  0715 03/24/24  1150   POCGLU 394* 171* 232* 253*       Blood Sugar Average: Last 72 hrs:  (P) [id number] up with endocrine, home regimen-Invokana, glipizide, Januvia    SSI Before meals and at bed time + Low Carb Diet   Received steroids for severe pneumonia and was placed on insulin drip  Switch to subcutaneous Lantus with Humalog  Endocrinology consulted  Monitor blood glucose   Patient received only 1 dose of IV steroids today, received one-time Lantus 18 units, will decrease Humalog to 4 units 3 times daily

## 2024-03-23 NOTE — PLAN OF CARE
Problem: Potential for Falls  Goal: Patient will remain free of falls  Description: INTERVENTIONS:  - Educate patient/family on patient safety including physical limitations  - Instruct patient to call for assistance with activity   - Consult OT/PT to assist with strengthening/mobility   - Keep Call bell within reach  - Keep bed low and locked with side rails adjusted as appropriate  - Keep care items and personal belongings within reach  - Initiate and maintain comfort rounds  - Make Fall Risk Sign visible to staff  - Offer Toileting every 2 Hours, in advance of need  - Initiate/Maintain bed alarm  - Obtain necessary fall risk management equipment: non slip socks  - Apply yellow socks and bracelet for high fall risk patients  - Consider moving patient to room near nurses station  Outcome: Progressing     Problem: SAFETY ADULT  Goal: Patient will remain free of falls  Description: INTERVENTIONS:  - Educate patient/family on patient safety including physical limitations  - Instruct patient to call for assistance with activity   - Consult OT/PT to assist with strengthening/mobility   - Keep Call bell within reach  - Keep bed low and locked with side rails adjusted as appropriate  - Keep care items and personal belongings within reach  - Initiate and maintain comfort rounds  - Make Fall Risk Sign visible to staff  - Offer Toileting every 2 Hours, in advance of need  - Initiate/Maintain bed alarm  - Obtain necessary fall risk management equipment: non slip socks  - Apply yellow socks and bracelet for high fall risk patients  - Consider moving patient to room near nurses station  Outcome: Progressing  Goal: Maintain or return to baseline ADL function  Description: INTERVENTIONS:  -  Assess patient's ability to carry out ADLs; assess patient's baseline for ADL function and identify physical deficits which impact ability to perform ADLs (bathing, care of mouth/teeth, toileting, grooming, dressing, etc.)  -  Assess/evaluate cause of self-care deficits   - Assess range of motion  - Assess patient's mobility; develop plan if impaired  - Assess patient's need for assistive devices and provide as appropriate  - Encourage maximum independence but intervene and supervise when necessary  - Involve family in performance of ADLs  - Assess for home care needs following discharge   - Consider OT consult to assist with ADL evaluation and planning for discharge  - Provide patient education as appropriate  Outcome: Progressing  Goal: Maintains/Returns to pre admission functional level  Description: INTERVENTIONS:  - Perform AM-PAC 6 Click Basic Mobility/ Daily Activity assessment daily.  - Set and communicate daily mobility goal to care team and patient/family/caregiver.   - Collaborate with rehabilitation services on mobility goals if consulted  - Perform Range of Motion 3 times a day.  - Reposition patient every 2 hours.  - Dangle patient 3 times a day  - Stand patient 3 times a day  - Ambulate patient 3 times a day  - Out of bed to chair 3 times a day   - Out of bed for meals 3 times a day  - Out of bed for toileting  - Record patient progress and toleration of activity level   Outcome: Progressing     Problem: Knowledge Deficit  Goal: Patient/family/caregiver demonstrates understanding of disease process, treatment plan, medications, and discharge instructions  Description: Complete learning assessment and assess knowledge base.  Interventions:  - Provide teaching at level of understanding  - Provide teaching via preferred learning methods  Outcome: Progressing     Problem: Prexisting or High Potential for Compromised Skin Integrity  Goal: Skin integrity is maintained or improved  Description: INTERVENTIONS:  - Identify patients at risk for skin breakdown  - Assess and monitor skin integrity  - Assess and monitor nutrition and hydration status  - Monitor labs   - Assess for incontinence   - Turn and reposition patient  - Assist  with mobility/ambulation  - Relieve pressure over bony prominences  - Avoid friction and shearing  - Provide appropriate hygiene as needed including keeping skin clean and dry  - Evaluate need for skin moisturizer/barrier cream  - Collaborate with interdisciplinary team   - Patient/family teaching  - Consider wound care consult   Outcome: Progressing     Problem: Prexisting or High Potential for Compromised Skin Integrity  Goal: Skin integrity is maintained or improved  Description: INTERVENTIONS:  - Identify patients at risk for skin breakdown  - Assess and monitor skin integrity  - Assess and monitor nutrition and hydration status  - Monitor labs   - Assess for incontinence   - Turn and reposition patient  - Assist with mobility/ambulation  - Relieve pressure over bony prominences  - Avoid friction and shearing  - Provide appropriate hygiene as needed including keeping skin clean and dry  - Evaluate need for skin moisturizer/barrier cream  - Collaborate with interdisciplinary team   - Patient/family teaching  - Consider wound care consult   Outcome: Progressing     Problem: Nutrition/Hydration-ADULT  Goal: Nutrient/Hydration intake appropriate for improving, restoring or maintaining nutritional needs  Description: Monitor and assess patient's nutrition/hydration status for malnutrition. Collaborate with interdisciplinary team and initiate plan and interventions as ordered.  Monitor patient's weight and dietary intake as ordered or per policy. Utilize nutrition screening tool and intervene as necessary. Determine patient's food preferences and provide high-protein, high-caloric foods as appropriate.     INTERVENTIONS:  - Monitor oral intake, urinary output, labs, and treatment plans  - Assess nutrition and hydration status and recommend course of action  - Evaluate amount of meals eaten  - Assist patient with eating if necessary   - Allow adequate time for meals  - Recommend/ encourage appropriate diets, oral  nutritional supplements, and vitamin/mineral supplements  - Order, calculate, and assess calorie counts as needed  - Recommend, monitor, and adjust tube feedings and TPN/PPN based on assessed needs  - Assess need for intravenous fluids  - Provide specific nutrition/hydration education as appropriate  - Include patient/family/caregiver in decisions related to nutrition  Outcome: Progressing

## 2024-03-23 NOTE — PLAN OF CARE
Problem: Potential for Falls  Goal: Patient will remain free of falls  Description: INTERVENTIONS:  - Educate patient/family on patient safety including physical limitations  - Instruct patient to call for assistance with activity   - Consult OT/PT to assist with strengthening/mobility   - Keep Call bell within reach  - Keep bed low and locked with side rails adjusted as appropriate  - Keep care items and personal belongings within reach  - Initiate and maintain comfort rounds  - Make Fall Risk Sign visible to staff  - Offer Toileting every 2 Hours, in advance of need  - Initiate/Maintain bed alarm  - Obtain necessary fall risk management equipment: non slip socks  - Apply yellow socks and bracelet for high fall risk patients  - Consider moving patient to room near nurses station  Outcome: Progressing     Problem: SAFETY ADULT  Goal: Patient will remain free of falls  Description: INTERVENTIONS:  - Educate patient/family on patient safety including physical limitations  - Instruct patient to call for assistance with activity   - Consult OT/PT to assist with strengthening/mobility   - Keep Call bell within reach  - Keep bed low and locked with side rails adjusted as appropriate  - Keep care items and personal belongings within reach  - Initiate and maintain comfort rounds  - Make Fall Risk Sign visible to staff  - Offer Toileting every 2 Hours, in advance of need  - Initiate/Maintain bed alarm  - Obtain necessary fall risk management equipment: non slip socks  - Apply yellow socks and bracelet for high fall risk patients  - Consider moving patient to room near nurses station  Outcome: Progressing  Goal: Maintain or return to baseline ADL function  Description: INTERVENTIONS:  -  Assess patient's ability to carry out ADLs; assess patient's baseline for ADL function and identify physical deficits which impact ability to perform ADLs (bathing, care of mouth/teeth, toileting, grooming, dressing, etc.)  -  Assess/evaluate cause of self-care deficits   - Assess range of motion  - Assess patient's mobility; develop plan if impaired  - Assess patient's need for assistive devices and provide as appropriate  - Encourage maximum independence but intervene and supervise when necessary  - Involve family in performance of ADLs  - Assess for home care needs following discharge   - Consider OT consult to assist with ADL evaluation and planning for discharge  - Provide patient education as appropriate  Outcome: Progressing  Goal: Maintains/Returns to pre admission functional level  Description: INTERVENTIONS:  - Perform AM-PAC 6 Click Basic Mobility/ Daily Activity assessment daily.  - Set and communicate daily mobility goal to care team and patient/family/caregiver.   - Collaborate with rehabilitation services on mobility goals if consulted  - Perform Range of Motion 3 times a day.  - Reposition patient every 2 hours.  - Dangle patient 3 times a day  - Stand patient 3 times a day  - Ambulate patient 3 times a day  - Out of bed to chair 3 times a day   - Out of bed for meals 3 times a day  - Out of bed for toileting  - Record patient progress and toleration of activity level   Outcome: Progressing     Problem: SAFETY ADULT  Goal: Patient will remain free of falls  Description: INTERVENTIONS:  - Educate patient/family on patient safety including physical limitations  - Instruct patient to call for assistance with activity   - Consult OT/PT to assist with strengthening/mobility   - Keep Call bell within reach  - Keep bed low and locked with side rails adjusted as appropriate  - Keep care items and personal belongings within reach  - Initiate and maintain comfort rounds  - Make Fall Risk Sign visible to staff  - Offer Toileting every 2 Hours, in advance of need  - Initiate/Maintain bed alarm  - Obtain necessary fall risk management equipment: non slip socks  - Apply yellow socks and bracelet for high fall risk patients  - Consider  moving patient to room near nurses station  Outcome: Progressing  Goal: Maintain or return to baseline ADL function  Description: INTERVENTIONS:  -  Assess patient's ability to carry out ADLs; assess patient's baseline for ADL function and identify physical deficits which impact ability to perform ADLs (bathing, care of mouth/teeth, toileting, grooming, dressing, etc.)  - Assess/evaluate cause of self-care deficits   - Assess range of motion  - Assess patient's mobility; develop plan if impaired  - Assess patient's need for assistive devices and provide as appropriate  - Encourage maximum independence but intervene and supervise when necessary  - Involve family in performance of ADLs  - Assess for home care needs following discharge   - Consider OT consult to assist with ADL evaluation and planning for discharge  - Provide patient education as appropriate  Outcome: Progressing  Goal: Maintains/Returns to pre admission functional level  Description: INTERVENTIONS:  - Perform AM-PAC 6 Click Basic Mobility/ Daily Activity assessment daily.  - Set and communicate daily mobility goal to care team and patient/family/caregiver.   - Collaborate with rehabilitation services on mobility goals if consulted  - Perform Range of Motion 3 times a day.  - Reposition patient every 2 hours.  - Dangle patient 3 times a day  - Stand patient 3 times a day  - Ambulate patient 3 times a day  - Out of bed to chair 3 times a day   - Out of bed for meals 3 times a day  - Out of bed for toileting  - Record patient progress and toleration of activity level   Outcome: Progressing     Problem: DISCHARGE PLANNING  Goal: Discharge to home or other facility with appropriate resources  Description: INTERVENTIONS:  - Identify barriers to discharge w/patient and caregiver  - Arrange for needed discharge resources and transportation as appropriate  - Identify discharge learning needs (meds, wound care, etc.)  - Arrange for interpretive services to  assist at discharge as needed  - Refer to Case Management Department for coordinating discharge planning if the patient needs post-hospital services based on physician/advanced practitioner order or complex needs related to functional status, cognitive ability, or social support system  Outcome: Progressing     Problem: Knowledge Deficit  Goal: Patient/family/caregiver demonstrates understanding of disease process, treatment plan, medications, and discharge instructions  Description: Complete learning assessment and assess knowledge base.  Interventions:  - Provide teaching at level of understanding  - Provide teaching via preferred learning methods  Outcome: Progressing     Problem: Prexisting or High Potential for Compromised Skin Integrity  Goal: Skin integrity is maintained or improved  Description: INTERVENTIONS:  - Identify patients at risk for skin breakdown  - Assess and monitor skin integrity  - Assess and monitor nutrition and hydration status  - Monitor labs   - Assess for incontinence   - Turn and reposition patient  - Assist with mobility/ambulation  - Relieve pressure over bony prominences  - Avoid friction and shearing  - Provide appropriate hygiene as needed including keeping skin clean and dry  - Evaluate need for skin moisturizer/barrier cream  - Collaborate with interdisciplinary team   - Patient/family teaching  - Consider wound care consult   Outcome: Progressing     Problem: Nutrition/Hydration-ADULT  Goal: Nutrient/Hydration intake appropriate for improving, restoring or maintaining nutritional needs  Description: Monitor and assess patient's nutrition/hydration status for malnutrition. Collaborate with interdisciplinary team and initiate plan and interventions as ordered.  Monitor patient's weight and dietary intake as ordered or per policy. Utilize nutrition screening tool and intervene as necessary. Determine patient's food preferences and provide high-protein, high-caloric foods as  appropriate.     INTERVENTIONS:  - Monitor oral intake, urinary output, labs, and treatment plans  - Assess nutrition and hydration status and recommend course of action  - Evaluate amount of meals eaten  - Assist patient with eating if necessary   - Allow adequate time for meals  - Recommend/ encourage appropriate diets, oral nutritional supplements, and vitamin/mineral supplements  - Order, calculate, and assess calorie counts as needed  - Recommend, monitor, and adjust tube feedings and TPN/PPN based on assessed needs  - Assess need for intravenous fluids  - Provide specific nutrition/hydration education as appropriate  - Include patient/family/caregiver in decisions related to nutrition  Outcome: Progressing     Problem: RESPIRATORY - ADULT  Goal: Achieves optimal ventilation and oxygenation  Description: INTERVENTIONS:  - Assess for changes in respiratory status  - Assess for changes in mentation and behavior  - Position to facilitate oxygenation and minimize respiratory effort  - Oxygen administered by appropriate delivery if ordered  - Initiate smoking cessation education as indicated  - Encourage broncho-pulmonary hygiene including cough, deep breathe, Incentive Spirometry  - Assess the need for suctioning and aspirate as needed  - Assess and instruct to report SOB or any respiratory difficulty  - Respiratory Therapy support as indicated  Outcome: Progressing

## 2024-03-23 NOTE — PROGRESS NOTES
[facility]  Progress Note  Name: Ena Ahumada I  MRN: [id number]  Unit/Bed#: -01 I Date of Admission: 3/16/2024   Date of Service: 3/23/2024 I Hospital Day: 7    Assessment/Plan   NELA (acute kidney injury) ([facility])  Assessment & Plan  Increasing creatinine  Likely in the setting of sepsis, contrast nephropathy possible ATN, possible new baseline  Urinary retention protocol  Monitor I's/O  Daily weights  Consult nephrology  Renal ultrasound  Urinalysis  Lasix as per nephroloigy--> changed to p.o.    Acute respiratory failure with hypoxia (HCC)  Assessment & Plan  Acute respiratory failure with hypoxia presents on admission , requiring nasal cannula O2 supplementation ~ 5 Liters which is new for the patient ; most likely cause of hypoxemia is her RLL Pneumonia  on admission as evident on Xray Chest.   Requiring mid flow, stable, clinically improving    Currently on 8 L of oxygen    Hypokalemia  Assessment & Plan  3/17 Potassium 3.1 ; Magnesium wnl   Possible due to decreased PO Intake  Replete  Daily CMP and revaluate     Pneumonia  Assessment & Plan  RLL Pneumonia on CT chest ; with SIRS criteria and hypoxemia   See sepsis A&P   Supplemental O2 and wean for goal > 90%   Mucinex   Speech eval given possible aspiration etiology   Trend vitals and wbc   Patient started on ceftriaxone, increased wheezing oxygen requirements today.  Patient now on 12 L of oxygen  CTA chest done shows right lung pneumonia.    Pulmonology consult  received steroids for's severe pneumonia-patient improving  Antibiotic-continue cefepime to complete a course of 7 days.  MRSA negative, Vanco discontinued  Cefepime to complete 7 days of therapy, requires 3 more doses      Chronic pain syndrome  Assessment & Plan  Follows up with pain medicine  Continue buprenorphine    Nonrheumatic aortic valve stenosis  Assessment & Plan  Follows up with cardiology, last echo in April 2023  Echo-LVEF 65%, grade 1 diastolic  dysfunction.  Moderate aortic stenosis  Monitor volume status  Continue home medications with blood pressure parameters    Stage 3b chronic kidney disease (CKD) ([facility])  Assessment & Plan  Lab Results   Component Value Date    EGFR 17 03/23/2024    EGFR 16 03/22/2024    EGFR 16 03/21/2024    CREATININE 2.45 (H) 03/23/2024    CREATININE 2.52 (H) 03/22/2024    CREATININE 2.51 (H) 03/21/2024     Follows up with nephrology as outpatient  Baseline creatinine appears to be 1.6-1.9 as per nephrology, EGFR in mid 20s  Monitor BMP     ?  Possible new baseline      Anxiety  Assessment & Plan  Continue Zoloft    Type 2 diabetes mellitus with stage 4 chronic kidney disease, without long-term current use of insulin ([facility])  Assessment & Plan  Lab Results   Component Value Date    HGBA1C 8.1 (H) 03/16/2024       Recent Labs     03/22/24  1218 03/22/24  1740 03/23/24  0806 03/23/24  1037   POCGLU 244* 283* 231* 394*         Blood Sugar Average: Last 72 hrs:  (P) [id number] up with endocrine, home regimen-Invokana, glipizide, Januvia    SSI Before meals and at bed time + Low Carb Diet   Received steroids for severe pneumonia and was placed on insulin drip  Switch to subcutaneous Lantus with Humalog  Endocrinology consulted  Monitor blood glucose           * Sepsis ([facility])  Assessment & Plan  SIRS criteria met : tachycardia, tachypnea, leukocytosis  Suspected source: Pneumonia  CXR: Lower lobe pneumonia  UA not suggestive UTI    CT: None  Continue to trend leukocytosis and fever curve  Procalcitonin: 18  Lactic acid: 2.5-resolved    Will need additional fluid depending on lactate and blood pressure  Blood Cultures pending   Will broaden antibiotic to cefepime, vancomycin discontinued with MRSA negative             VTE Pharmacologic Prophylaxis: VTE Score: 8 High Risk (Score >/= 5) - Pharmacological DVT Prophylaxis Ordered: heparin. Sequential Compression Devices Ordered.    Mobility:   Basic Mobility Inpatient Raw Score:  23  JH-HLM Goal: 7: Walk 25 feet or more  JH-HLM Achieved: 7: Walk 25 feet or more  JH-HLM Goal achieved. Continue to encourage appropriate mobility.    Patient Centered Rounds: I performed bedside rounds with nursing staff today.   Discussions with Specialists or Other Care Team Provider: Pulmonology    Education and Discussions with Family / Patient: Updated  (daughter in law) via phone.    Total Time Spent on Date of Encounter in care of patient: 55 mins. This time was spent on one or more of the following: performing physical exam; counseling and coordination of care; obtaining or reviewing history; documenting in the medical record; reviewing/ordering tests, medications or procedures; communicating with other healthcare professionals and discussing with patient's family/caregivers.    Current Length of Stay: 7 day(s)  Current Patient Status: Inpatient   Certification Statement: The patient will continue to require additional inpatient hospital stay due to severe pneumonia, NELA  Discharge Plan: Anticipate discharge in 48-72 hrs to home with home services.    Code Status: Level 1 - Full Code    Subjective:   no acute overnight events   Patient seen and examined bedside. Stated her breathing is not baseline, but feels better. She feels tremulous.,    Objective:     Vitals:   Temp (24hrs), Av.6 °F (36.4 °C), Min:97.5 °F (36.4 °C), Max:97.7 °F (36.5 °C)    Temp:  [97.5 °F (36.4 °C)-97.7 °F (36.5 °C)] 97.7 °F (36.5 °C)  HR:  [72-82] 72  Resp:  [19-20] 19  BP: (143-150)/(57-62) 145/59  SpO2:  [92 %-97 %] 94 %  Body mass index is 29.85 kg/m².     Input and Output Summary (last 24 hours):     Intake/Output Summary (Last 24 hours) at 3/23/2024 1111  Last data filed at 3/23/2024 0218  Gross per 24 hour   Intake 720 ml   Output 1750 ml   Net -1030 ml       Physical Exam:   Physical Exam     Gen.-Patient comfortable on 8 l of O2  Neck- Supple. No thyromegaly or lymphadenopathy  Lungs- Rhonchi with  rales  Heart S1-S2, regular rate and rhythm, no murmurs  Abdomen-soft nontender, no organomegaly. Bowel sounds present  Extremities-no cyanosi,  clubbing or edema  Skin- no rash  Neuro-nonfocal     Additional Data:     Labs:  Results from last 7 days   Lab Units 03/23/24 0215 03/21/24  0435   WBC Thousand/uL 14.25* 14.02*   HEMOGLOBIN g/dL 11.3* 12.1   HEMATOCRIT % 35.7 38.1   PLATELETS Thousands/uL 230 214   NEUTROS PCT %  --  71   LYMPHS PCT %  --  19   LYMPHO PCT % 19  --    MONOS PCT %  --  8   MONO PCT % 6  --    EOS PCT % 0 0     Results from last 7 days   Lab Units 03/23/24  0215 03/22/24  0413 03/21/24  0435   SODIUM mmol/L 143   < > 134*   POTASSIUM mmol/L 3.3*   < > 4.0   CHLORIDE mmol/L 105   < > 101   CO2 mmol/L 27   < > 23   BUN mg/dL 63*   < > 65*   CREATININE mg/dL 2.45*   < > 2.51*   ANION GAP mmol/L 11   < > 10   CALCIUM mg/dL 8.1*   < > 7.9*   ALBUMIN g/dL  --   --  3.1*   TOTAL BILIRUBIN mg/dL  --   --  0.41   ALK PHOS U/L  --   --  58   ALT U/L  --   --  64*   AST U/L  --   --  57*   GLUCOSE RANDOM mg/dL 215*   < > 160*    < > = values in this interval not displayed.     Results from last 7 days   Lab Units 03/16/24  1301   INR  1.07     Results from last 7 days   Lab Units 03/23/24  1037 03/23/24  0806 03/22/24  1740 03/22/24  1218 03/22/24  1023 03/22/24  0612 03/22/24  0409 03/22/24  0002 03/21/24  2123 03/21/24  1856 03/21/24  1526 03/21/24  1148   POC GLUCOSE mg/dl 394* 231* 283* 244* 372* 303* 324* 303* 296* 333* 305* 284*     Results from last 7 days   Lab Units 03/16/24  1620   HEMOGLOBIN A1C % 8.1*     Results from last 7 days   Lab Units 03/21/24  0435 03/18/24  0316 03/17/24  0606 03/16/24  1620 03/16/24  1301   LACTIC ACID mmol/L  --   --   --  1.7 2.5*   PROCALCITONIN ng/ml 9.04* 26.72* 25.97*  --  18.29*       Lines/Drains:  Invasive Devices       Peripheral Intravenous Line  Duration             Peripheral IV 03/20/24 Left Antecubital 3 days                          Imaging:  Reviewed radiology reports from this admission including: chest xray    Recent Cultures (last 7 days):   Results from last 7 days   Lab Units 03/20/24  0448 03/16/24  1301   BLOOD CULTURE   --  No Growth After 5 Days.  No Growth After 5 Days.   LEGIONELLA URINARY ANTIGEN  Negative  --        Last 24 Hours Medication List:   Current Facility-Administered Medications   Medication Dose Route Frequency Provider Last Rate    acetaminophen  650 mg Oral Q6H PRN Angella Suárez PA-C      Artificial Tears  1 drop Both Eyes BID Toya Parekh MD      aspirin  81 mg Oral Daily Angella Suárez PA-C      bisacodyl  10 mg Rectal Once Angella Suárez PA-C      cefepime  1,000 mg Intravenous Q12H Toya Parekh MD      cloNIDine  1 patch Transdermal Weekly Angella Suárez PA-C      famotidine  10 mg Oral Daily Angella Suárez PA-C      ferrous sulfate  325 mg Oral Once per day on Monday Wednesday Friday Israel Enriquez MD      furosemide  80 mg Intravenous Q8H Israel Enriquez MD      guaiFENesin  600 mg Oral BID Angella Suárez PA-C      heparin (porcine)  5,000 Units Subcutaneous Q8H JONO Angella Suárez PA-C      hydrocortisone sodium succinate  50 mg Intravenous Q12H Count includes the [facility] Angella Suárez PA-C      [START ON 3/24/2024] insulin glargine  18 Units Subcutaneous QAM Toya Parekh MD      insulin lispro  2-12 Units Subcutaneous TID AC Berenice Hollinsgworth MD      insulin lispro  6 Units Subcutaneous TID With Meals Berenice Hollingsworth MD      levalbuterol  1.25 mg Nebulization TID Angella Suárez PA-C      nebivolol  20 mg Oral Daily Angella Suárez PA-C      NIFEdipine  90 mg Oral Daily Angella Suárez PA-C      potassium chloride  40 mEq Oral Once Irlanda O'Alexander, KANA      pravastatin  40 mg Oral Daily With Dinner Angella Suárez PA-C      pregabalin  50 mg Oral BID Angella Suárez PA-C      sertraline  100 mg Oral Daily Angella Suárez PA-C      sodium chloride  4 mL Nebulization TID Angella Suárez PA-C      [START ON 3/24/2024] torsemide  20 mg Oral Daily Irlanda O'Alexander, CRNP      transdermal  buprenorphine  1 patch Transdermal Q7 Days Angella Suárez PA-C          Today, Patient Was Seen By: Toya Parekh MD    **Please Note: This note may have been constructed using a voice recognition system.**

## 2024-03-23 NOTE — PROGRESS NOTES
Progress Note - Nephrology   Ena Ahumada 88 y.o. female MRN: [id number]  Unit/Bed#: -01 Encounter: [id number]    83-year-old female with past history of CKD IV, aortic stenosis, hypertension, hyperlipidemia who presented to the ER with nausea and vomiting/.  Nephrology consulted and managing for NELA      ASSESSMENT and PLAN:  Acute kidney injury   Etiology: Suspect EMEKA with contrast exposure on 8/18/2024 with prerenal component in the setting of diuretics as well as hyperglycemia  Baseline creatinine 1.6-1.9  Patient creatinine 1.64 on 3/16/2024  Peak creatinine 2.74 on 3/20/2024  Current creatinine 2.45 today-appears stable  Status post IV diuretics given volume overload-last dose this am   U/O 2.1 liters-weight has improved  On torsemide 20 mg daily as outpatient  Workup:  Urinalysis with micro reports:1 ketones, trace blood, +1 protein, 0-1 RBCs/WBCs, 0-3 granular casts and WBC casts.   CK-82  Renal imaging revealed: No hydronephrosis on renal ultrasound  Bladder scan-and significant  Plan  Avoid nephrotoxins, NSAIDs, and limit IV contrast if possible  Avoid hypotension, perturbations of blood pressure to prevent decreased renal perfusion  Adjust meds to appropriate GFR  Optimize hemodynamic status to avoid delay in renal recovery  Need accurate I/O and daily weights  Monitor BMP daily  Will resume torsemide 20 mg daily starting tomorrow-adjust as needed    Chronic kidney disease IIIB:    Etiology:  Suspect secondary to diabetic kidney disease, hypertensive nephrosclerosis as well as age-related nephron loss  Baseline Creatinine of 1.6-1.9  Patient follows with Dr. Baumann  Recommend follow-up on discharge    Complex left renal cyst-present since 2020  Continue follow-up as outpatient    Blood pressure/Hypertension:  Current blood pressure: Acceptable 145/59  Current medications include: Clonidine patch 0.1 mg weekly, diastolic 20 mg daily, nifedipine 90 mg daily,  Maximize hemodynamics to maintain  MAP >65  Avoid hypotension or fluctuations in blood pressure  Will continue to trend    Hypokalemia  Likely in the setting of IV diuretics  Most recent potassium 3.3-40 mEq p.o. given today  And to repeat BMP later today-1600  Discussed with primary team    Acute hypoxic respiratory failure/sepsis/aspiration pneumonia  PE study was negative  Concern for pneumonia and currently on antibiotics per primary services  Pulmonary following-on  IV steroids  Remains on 7 L nasal cannula  For chest x-ray today-require CT scan for further evaluation    Other medical issues  Diabetes  Dyslipidemia  GERD  Aortic valve stenosis  Chronic pain      Overall plan and\ Recommendations: Discussed with primary team in agreement with the plan as stated below  Last dose IV diuretics this a.m.  Plan to resume outpatient torsemide 20 mg daily starting tomorrow  Treat potassium 3.3 with 40 mEq K-Dur x 1 today  Repeat BMP at 1600 and in am with mg+      Medical records [facility] and care everywhere has been reviewed for this encounter      Disposition:      Review of systems  Patient seen and examined at bedside: Patient overall feeling better.  Still having some shortness of breath  Review of Systems   Constitutional: Negative.  Negative for activity change, appetite change, chills, diaphoresis, fatigue and fever.   HENT: Negative.  Negative for congestion and facial swelling.    Respiratory:  Positive for shortness of breath.    Cardiovascular: Negative.    Gastrointestinal: Negative.    Endocrine: Negative.    Genitourinary: Negative.    Musculoskeletal: Negative.    Skin: Negative.    Allergic/Immunologic: Negative.    Neurological: Negative.    Hematological: Negative.    Psychiatric/Behavioral: Negative.            Physical exam:  Current Weight: Weight - Scale: 74 kg (163 lb 3.2 oz)  Vitals:    03/22/24 2205 03/23/24 0203 03/23/24 0516 03/23/24 0728   BP: 144/57 143/58  145/59   BP Location:  Right arm     Pulse: 78 75  72    Resp:    19   Temp: 97.5 °F (36.4 °C)   97.7 °F (36.5 °C)   TempSrc:       SpO2: 92% 92%  94%   Weight:   74 kg (163 lb 3.2 oz)    Height:           Intake/Output Summary (Last 24 hours) at 3/23/2024 1051  Last data filed at 3/23/2024 0218  Gross per 24 hour   Intake 720 ml   Output 1750 ml   Net -1030 ml       Physical Exam  Vitals and nursing note reviewed.   Constitutional:       Appearance: Normal appearance.      Comments: OOB, NAD, frail-appearing   HENT:      Head: Normocephalic and atraumatic.      Nose: Nose normal.      Mouth/Throat:      Mouth: Mucous membranes are moist.      Pharynx: Oropharynx is clear.   Eyes:      Extraocular Movements: Extraocular movements intact.      Conjunctiva/sclera: Conjunctivae normal.   Cardiovascular:      Rate and Rhythm: Normal rate and regular rhythm.      Pulses: Normal pulses.      Heart sounds: Normal heart sounds.   Pulmonary:      Effort: Pulmonary effort is normal.      Breath sounds: Normal breath sounds.      Comments: Coarse and decreased bases, RLL tubualar,  on oxygen  Abdominal:      General: Bowel sounds are normal.      Palpations: Abdomen is soft.   Musculoskeletal:         General: Normal range of motion.      Cervical back: Normal range of motion and neck supple.   Skin:     General: Skin is warm and dry.   Neurological:      General: No focal deficit present.      Mental Status: She is alert and oriented to person, place, and time.   Psychiatric:         Mood and Affect: Mood normal.         Behavior: Behavior normal.            Medications:    Current Facility-Administered Medications:     acetaminophen (TYLENOL) tablet 650 mg, 650 mg, Oral, Q6H PRN, Angella Suárez PA-C, 650 mg at 03/18/24 1226    Artificial Tears ophthalmic solution 1 drop, 1 drop, Both Eyes, BID, Toya Parekh MD, 1 drop at 03/23/24 0903    aspirin (ECOTRIN LOW STRENGTH) EC tablet 81 mg, 81 mg, Oral, Daily, Angella Suárez PA-C, 81 mg at 03/23/24 0905    bisacodyl (DULCOLAX) rectal  suppository 10 mg, 10 mg, Rectal, Once, Angella Suárez PA-C    cefepime (MAXIPIME) IVPB (premix in dextrose) 1,000 mg 50 mL, 1,000 mg, Intravenous, Q12H, Angella Suárez PA-C, Last Rate: 100 mL/hr at 03/23/24 0906, 1,000 mg at 03/23/24 0906    cloNIDine (CATAPRES-TTS-1) 0.1 mg/24 hr TD weekly patch, 1 patch, Transdermal, Weekly, Angella Suárez PA-C, 0.1 mg at 03/20/24 0835    famotidine (PEPCID) tablet 10 mg, 10 mg, Oral, Daily, Angella Suárez PA-C, 10 mg at 03/23/24 0906    ferrous sulfate tablet 325 mg, 325 mg, Oral, Once per day on Monday Wednesday Friday, Israel Enriquez MD, 325 mg at 03/22/24 0832    furosemide (LASIX) injection 80 mg, 80 mg, Intravenous, Q8H, Israel Enriquez MD, 80 mg at 03/23/24 0935    guaiFENesin (MUCINEX) 12 hr tablet 600 mg, 600 mg, Oral, BID, Angella Suárez PA-C, 600 mg at 03/23/24 0906    heparin (porcine) subcutaneous injection 5,000 Units, 5,000 Units, Subcutaneous, Q8H [facility], Angella Suárez PA-C, 5,000 Units at 03/23/24 0514    hydrocortisone (Solu-CORTEF) injection 50 mg, 50 mg, Intravenous, Q12H [facility], Angella Suárez PA-C, 50 mg at 03/23/24 0905    insulin lispro (HumALOG/ADMELOG) 100 units/mL subcutaneous injection 2-12 Units, 2-12 Units, Subcutaneous, TID AC, 4 Units at 03/23/24 0904 **AND** Fingerstick Glucose (POCT), , , TID AC, Berenice Hollingsworth MD    insulin lispro (HumALOG/ADMELOG) 100 units/mL subcutaneous injection 6 Units, 6 Units, Subcutaneous, TID With Meals, Berenice Hollingsworth MD, 6 Units at 03/23/24 0903    levalbuterol (XOPENEX) inhalation solution 1.25 mg, 1.25 mg, Nebulization, TID, Angella Suárez PA-C, 1.25 mg at 03/23/24 0751    nebivolol (BYSTOLIC) tablet 20 mg, 20 mg, Oral, Daily, Angella Suárez PA-C, 20 mg at 03/23/24 0905    NIFEdipine (PROCARDIA XL) 24 hr tablet 90 mg, 90 mg, Oral, Daily, Angella Suárez PA-C, 90 mg at 03/23/24 0905    pravastatin (PRAVACHOL) tablet 40 mg, 40 mg, Oral, Daily With Dinner, Angella Suárez PA-C, 40 mg at 03/22/24 1822    pregabalin (LYRICA) capsule 50 mg, 50 mg, Oral, BID, Angella  "DOMINGUEZ Suárez, 50 mg at 03/23/24 0906    sertraline (ZOLOFT) tablet 100 mg, 100 mg, Oral, Daily, Angella Suárez PA-C, 100 mg at 03/23/24 0906    sodium chloride 3 % inhalation solution 4 mL, 4 mL, Nebulization, TID, Angella Suárez PA-C, 4 mL at 03/23/24 0751    transdermal buprenorphine (BUTRANS) 7.5 mcg/hr TD patch 1 patch, 1 patch, Transdermal, Q7 Days, Angella Suárez PA-C, 1 patch at 03/16/24 2002    Laboratory Results:  Results from last 7 days   Lab Units 03/23/24  0215 03/22/24  0413 03/21/24  0435 03/20/24  0448 03/19/24  1708 03/19/24  0555 03/18/24  2116 03/18/24  0316 03/17/24  0606 03/16/24  1221 03/16/24  1216   WBC Thousand/uL 14.25*  --  14.02* 19.12*  --  16.84*  --  16.74* 16.52*  --  17.06*   HEMOGLOBIN g/dL 11.3*  --  12.1 12.6  --  11.2*  --  11.7 11.3*  --  14.1   I STAT HEMOGLOBIN g/dl  --   --   --   --   --   --   --   --   --  15.0  --    HEMATOCRIT % 35.7  --  38.1 40.2  --  36.1  --  38.1 36.1  --  45.2   HEMATOCRIT, ISTAT %  --   --   --   --   --   --   --   --   --  44  --    PLATELETS Thousands/uL 230  --  214 197  --  155  --  152 147*  --  176   SODIUM mmol/L 143 138 134* 132* 130* 136 132* 138 143  --  142   POTASSIUM mmol/L 3.3* 3.6 4.0 3.7 3.9 3.7 4.1 3.2* 3.1*  --  3.9   CHLORIDE mmol/L 105 103 101 99 97 104 100 103 106  --  103   CO2 mmol/L 27 24 23 22 23 25 23 28 28  --  28   CO2, I-STAT mmol/L  --   --   --   --   --   --   --   --   --  29  --    BUN mg/dL 63* 64* 65* 59* 55* 40* 37* 30* 32*  --  36*   CREATININE mg/dL 2.45* 2.52* 2.51* 2.74* 2.68* 2.41* 2.26* 1.73* 1.43*  --  1.64*   CALCIUM mg/dL 8.1* 7.9* 7.9* 8.0* 8.1* 8.1* 7.9* 8.1* 8.2*  --  9.2   MAGNESIUM mg/dL 2.1 2.5  --   --   --   --   --  2.3 2.3  --  2.1   PHOSPHORUS mg/dL  --   --   --   --   --  2.5  --   --  3.1  --   --    GLUCOSE, ISTAT mg/dl  --   --   --   --   --   --   --   --   --  264*  --            Portions of the record may have been created with voice recognition software. Occasional wrong word or \"sound a " "like\" substitutions may have occurred due to the inherent limitations of voice recognition software. Read the chart carefully and recognize,   "

## 2024-03-23 NOTE — ASSESSMENT & PLAN NOTE
RLL Pneumonia on CT chest ; with SIRS criteria and hypoxemia   See sepsis A&P   Supplemental O2 and wean for goal > 90%   Mucinex   Speech eval given possible aspiration etiology   Trend vitals and wbc   Patient started on ceftriaxone, increased wheezing oxygen requirements today.  Patient now on 12 L of oxygen  CTA chest done shows right lung pneumonia.    Pulmonology consult  received steroids for's severe pneumonia-patient improving  Antibiotic-continue cefepime to complete a course of 7 days.  MRSA negative, Vanco discontinued  Cefepime to complete 7 days of therapy, requires 2 more doses

## 2024-03-23 NOTE — ASSESSMENT & PLAN NOTE
Lab Results   Component Value Date    EGFR 19 03/24/2024    EGFR 18 03/23/2024    EGFR 17 03/23/2024    CREATININE 2.23 (H) 03/24/2024    CREATININE 2.26 (H) 03/23/2024    CREATININE 2.45 (H) 03/23/2024     Follows up with nephrology as outpatient  Baseline creatinine appears to be 1.6-1.9 as per nephrology, EGFR in mid 20s  Monitor BMP     ?  Possible new baseline

## 2024-03-24 LAB
ANION GAP SERPL CALCULATED.3IONS-SCNC: 9 MMOL/L (ref 4–13)
BUN SERPL-MCNC: 61 MG/DL (ref 5–25)
CALCIUM SERPL-MCNC: 8.4 MG/DL (ref 8.4–10.2)
CHLORIDE SERPL-SCNC: 105 MMOL/L (ref 96–108)
CO2 SERPL-SCNC: 30 MMOL/L (ref 21–32)
CREAT SERPL-MCNC: 2.23 MG/DL (ref 0.6–1.3)
DATE SPECIMEN #1: NORMAL
ERYTHROCYTE [DISTWIDTH] IN BLOOD BY AUTOMATED COUNT: 14.6 % (ref 11.6–15.1)
GFR SERPL CREATININE-BSD FRML MDRD: 19 ML/MIN/1.73SQ M
GLUCOSE SERPL-MCNC: 136 MG/DL (ref 65–140)
GLUCOSE SERPL-MCNC: 232 MG/DL (ref 65–140)
GLUCOSE SERPL-MCNC: 253 MG/DL (ref 65–140)
GLUCOSE SERPL-MCNC: 263 MG/DL (ref 65–140)
HCT VFR BLD AUTO: 35.5 % (ref 34.8–46.1)
HEMOCCULT SP1 STL QL: NEGATIVE
HEMOCCULT SP2 STL QL: NORMAL
HEMOCCULT SP3 STL QL: NORMAL
HGB BLD-MCNC: 11.2 G/DL (ref 11.5–15.4)
MAGNESIUM SERPL-MCNC: 2.1 MG/DL (ref 1.9–2.7)
MCH RBC QN AUTO: 30 PG (ref 26.8–34.3)
MCHC RBC AUTO-ENTMCNC: 31.5 G/DL (ref 31.4–37.4)
MCV RBC AUTO: 95 FL (ref 82–98)
NRBC BLD AUTO-RTO: 0 /100 WBCS
PLATELET # BLD AUTO: 239 THOUSANDS/UL (ref 149–390)
PMV BLD AUTO: 11 FL (ref 8.9–12.7)
POTASSIUM SERPL-SCNC: 3.6 MMOL/L (ref 3.5–5.3)
RBC # BLD AUTO: 3.73 MILLION/UL (ref 3.81–5.12)
SODIUM SERPL-SCNC: 144 MMOL/L (ref 135–147)
WBC # BLD AUTO: 12.51 THOUSAND/UL (ref 4.31–10.16)

## 2024-03-24 PROCEDURE — 85027 COMPLETE CBC AUTOMATED: CPT | Performed by: INTERNAL MEDICINE

## 2024-03-24 PROCEDURE — 80048 BASIC METABOLIC PNL TOTAL CA: CPT | Performed by: INTERNAL MEDICINE

## 2024-03-24 PROCEDURE — 94760 N-INVAS EAR/PLS OXIMETRY 1: CPT

## 2024-03-24 PROCEDURE — 83735 ASSAY OF MAGNESIUM: CPT | Performed by: INTERNAL MEDICINE

## 2024-03-24 PROCEDURE — 94668 MNPJ CHEST WALL SBSQ: CPT

## 2024-03-24 PROCEDURE — 94640 AIRWAY INHALATION TREATMENT: CPT

## 2024-03-24 PROCEDURE — 94664 DEMO&/EVAL PT USE INHALER: CPT

## 2024-03-24 PROCEDURE — 99233 SBSQ HOSP IP/OBS HIGH 50: CPT | Performed by: INTERNAL MEDICINE

## 2024-03-24 PROCEDURE — 82948 REAGENT STRIP/BLOOD GLUCOSE: CPT

## 2024-03-24 RX ORDER — INSULIN LISPRO 100 [IU]/ML
4 INJECTION, SOLUTION INTRAVENOUS; SUBCUTANEOUS
Status: DISCONTINUED | OUTPATIENT
Start: 2024-03-24 | End: 2024-03-26 | Stop reason: HOSPADM

## 2024-03-24 RX ORDER — POTASSIUM CHLORIDE 20 MEQ/1
20 TABLET, EXTENDED RELEASE ORAL ONCE
Status: COMPLETED | OUTPATIENT
Start: 2024-03-24 | End: 2024-03-24

## 2024-03-24 RX ADMIN — SERTRALINE 100 MG: 100 TABLET, FILM COATED ORAL at 08:12

## 2024-03-24 RX ADMIN — HEPARIN SODIUM 5000 UNITS: 5000 INJECTION INTRAVENOUS; SUBCUTANEOUS at 14:37

## 2024-03-24 RX ADMIN — INSULIN LISPRO 6 UNITS: 100 INJECTION, SOLUTION INTRAVENOUS; SUBCUTANEOUS at 08:11

## 2024-03-24 RX ADMIN — NEBIVOLOL 20 MG: 5 TABLET ORAL at 08:12

## 2024-03-24 RX ADMIN — FAMOTIDINE 10 MG: 20 TABLET ORAL at 08:12

## 2024-03-24 RX ADMIN — SODIUM CHLORIDE SOLN NEBU 3% 4 ML: 3 NEBU SOLN at 14:19

## 2024-03-24 RX ADMIN — INSULIN LISPRO 6 UNITS: 100 INJECTION, SOLUTION INTRAVENOUS; SUBCUTANEOUS at 12:09

## 2024-03-24 RX ADMIN — CEFEPIME HYDROCHLORIDE 1000 MG: 1 INJECTION, SOLUTION INTRAVENOUS at 19:54

## 2024-03-24 RX ADMIN — PREGABALIN 50 MG: 25 CAPSULE ORAL at 08:12

## 2024-03-24 RX ADMIN — DEXTRAN 70, GLYCERIN, HYPROMELLOSE 1 DROP: 1; 2; 3 SOLUTION/ DROPS OPHTHALMIC at 08:11

## 2024-03-24 RX ADMIN — SODIUM CHLORIDE SOLN NEBU 3% 4 ML: 3 NEBU SOLN at 20:04

## 2024-03-24 RX ADMIN — LEVALBUTEROL HYDROCHLORIDE 1.25 MG: 1.25 SOLUTION RESPIRATORY (INHALATION) at 14:19

## 2024-03-24 RX ADMIN — DEXTRAN 70, GLYCERIN, HYPROMELLOSE 1 DROP: 1; 2; 3 SOLUTION/ DROPS OPHTHALMIC at 17:00

## 2024-03-24 RX ADMIN — SODIUM CHLORIDE SOLN NEBU 3% 4 ML: 3 NEBU SOLN at 07:41

## 2024-03-24 RX ADMIN — TORSEMIDE 20 MG: 20 TABLET ORAL at 08:12

## 2024-03-24 RX ADMIN — LEVALBUTEROL HYDROCHLORIDE 1.25 MG: 1.25 SOLUTION RESPIRATORY (INHALATION) at 20:04

## 2024-03-24 RX ADMIN — POTASSIUM CHLORIDE 20 MEQ: 1500 TABLET, EXTENDED RELEASE ORAL at 12:09

## 2024-03-24 RX ADMIN — NIFEDIPINE 90 MG: 30 TABLET, EXTENDED RELEASE ORAL at 08:11

## 2024-03-24 RX ADMIN — PRAVASTATIN SODIUM 40 MG: 40 TABLET ORAL at 17:00

## 2024-03-24 RX ADMIN — PREGABALIN 50 MG: 25 CAPSULE ORAL at 17:00

## 2024-03-24 RX ADMIN — GUAIFENESIN 600 MG: 600 TABLET ORAL at 17:00

## 2024-03-24 RX ADMIN — LEVALBUTEROL HYDROCHLORIDE 1.25 MG: 1.25 SOLUTION RESPIRATORY (INHALATION) at 07:41

## 2024-03-24 RX ADMIN — HEPARIN SODIUM 5000 UNITS: 5000 INJECTION INTRAVENOUS; SUBCUTANEOUS at 21:49

## 2024-03-24 RX ADMIN — INSULIN LISPRO 4 UNITS: 100 INJECTION, SOLUTION INTRAVENOUS; SUBCUTANEOUS at 08:12

## 2024-03-24 RX ADMIN — CEFEPIME HYDROCHLORIDE 1000 MG: 1 INJECTION, SOLUTION INTRAVENOUS at 08:11

## 2024-03-24 RX ADMIN — ASPIRIN 81 MG: 81 TABLET, COATED ORAL at 08:12

## 2024-03-24 RX ADMIN — INSULIN LISPRO 4 UNITS: 100 INJECTION, SOLUTION INTRAVENOUS; SUBCUTANEOUS at 17:00

## 2024-03-24 RX ADMIN — INSULIN GLARGINE 18 UNITS: 100 INJECTION, SOLUTION SUBCUTANEOUS at 08:11

## 2024-03-24 RX ADMIN — HEPARIN SODIUM 5000 UNITS: 5000 INJECTION INTRAVENOUS; SUBCUTANEOUS at 05:31

## 2024-03-24 RX ADMIN — HYDROCORTISONE SODIUM SUCCINATE 50 MG: 100 INJECTION, POWDER, FOR SOLUTION INTRAMUSCULAR; INTRAVENOUS at 08:12

## 2024-03-24 RX ADMIN — GUAIFENESIN 600 MG: 600 TABLET ORAL at 08:12

## 2024-03-24 NOTE — PROGRESS NOTES
Former[facility]  Progress Note  Name: Ena Ahumada I  MRN: [id number]  Unit/Bed#: -01 I Date of Admission: 3/16/2024   Date of Service: 3/24/2024 I Hospital Day: 8    Assessment/Plan   NELA (acute kidney injury) (HCC)  Assessment & Plan  Increasing creatinine  Likely in the setting of sepsis, contrast nephropathy possible ATN, possible new baseline  Urinary retention protocol  Monitor I's/O  Daily weights  Consult nephrology  Renal ultrasound  Urinalysis  Lasix as per nephroloigy--> changed to p.o. torsemide 20 mg daily    Acute respiratory failure with hypoxia (HCC)  Assessment & Plan  Acute respiratory failure with hypoxia presents on admission , requiring nasal cannula O2 supplementation ~ 5 Liters which is new for the patient ; most likely cause of hypoxemia is her RLL Pneumonia  on admission as evident on Xray Chest.   Requiring mid flow, stable, clinically improving    Currently on 4 L of oxygen    Hypokalemia  Assessment & Plan  3/17 Potassium 3.1 ; Magnesium wnl   Possible due to decreased PO Intake  Replete  Daily CMP and revaluate     Pneumonia  Assessment & Plan  RLL Pneumonia on CT chest ; with SIRS criteria and hypoxemia   See sepsis A&P   Supplemental O2 and wean for goal > 90%   Mucinex   Speech eval given possible aspiration etiology   Trend vitals and wbc   Patient started on ceftriaxone, increased wheezing oxygen requirements today.  Patient now on 12 L of oxygen  CTA chest done shows right lung pneumonia.    Pulmonology consult  received steroids for's severe pneumonia-patient improving  Antibiotic-continue cefepime to complete a course of 7 days.  MRSA negative, Vanco discontinued  Cefepime to complete 7 days of therapy, requires 2 more doses      Chronic pain syndrome  Assessment & Plan  Follows up with pain medicine  Continue buprenorphine    Nonrheumatic aortic valve stenosis  Assessment & Plan  Follows up with cardiology, last echo in April 2023  Echo-LVEF  65%, grade 1 diastolic dysfunction.  Moderate aortic stenosis  Monitor volume status  Continue home medications with blood pressure parameters    Stage 3b chronic kidney disease (CKD) (Formerly [facility])  Assessment & Plan  Lab Results   Component Value Date    EGFR 19 03/24/2024    EGFR 18 03/23/2024    EGFR 17 03/23/2024    CREATININE 2.23 (H) 03/24/2024    CREATININE 2.26 (H) 03/23/2024    CREATININE 2.45 (H) 03/23/2024     Follows up with nephrology as outpatient  Baseline creatinine appears to be 1.6-1.9 as per nephrology, EGFR in mid 20s  Monitor BMP     ?  Possible new baseline      Anxiety  Assessment & Plan  Continue Zoloft    Type 2 diabetes mellitus with stage 4 chronic kidney disease, without long-term current use of insulin (Former[facility])  Assessment & Plan  Lab Results   Component Value Date    HGBA1C 8.1 (H) 03/16/2024       Recent Labs     03/23/24  1037 03/23/24  1724 03/24/24  0715 03/24/24  1150   POCGLU 394* 171* 232* 253*       Blood Sugar Average: Last 72 hrs:  (P) [id number] up with endocrine, home regimen-Invokana, glipizide, Januvia    SSI Before meals and at bed time + Low Carb Diet   Received steroids for severe pneumonia and was placed on insulin drip  Switch to subcutaneous Lantus with Humalog  Endocrinology consulted  Monitor blood glucose   Patient received only 1 dose of IV steroids today, received one-time Lantus 18 units, will decrease Humalog to 4 units 3 times daily          * Sepsis ([facility])  Assessment & Plan  SIRS criteria met : tachycardia, tachypnea, leukocytosis  Suspected source: Pneumonia  CXR: Lower lobe pneumonia  UA not suggestive UTI    CT: None  Continue to trend leukocytosis and fever curve  Procalcitonin: 18  Lactic acid: 2.5-resolved    Will need additional fluid depending on lactate and blood pressure  Blood Cultures pending   Will broaden antibiotic to cefepime, vancomycin discontinued with MRSA negative               VTE Pharmacologic Prophylaxis: VTE Score: 8 High Risk  (Score >/= 5) - Pharmacological DVT Prophylaxis Ordered: heparin. Sequential Compression Devices Ordered.    Mobility:   Basic Mobility Inpatient Raw Score: 23  JH-HLM Goal: 7: Walk 25 feet or more  JH-HLM Achieved: 7: Walk 25 feet or more  JH-HLM Goal achieved. Continue to encourage appropriate mobility.    Patient Centered Rounds: I performed bedside rounds with nursing staff today.   Discussions with Specialists or Other Care Team Provider: Pulmonology    Education and Discussions with Family / Patient: Updated  (son, daughter, and daughter in law) at bedside.    Total Time Spent on Date of Encounter in care of patient: 55 mins. This time was spent on one or more of the following: performing physical exam; counseling and coordination of care; obtaining or reviewing history; documenting in the medical record; reviewing/ordering tests, medications or procedures; communicating with other healthcare professionals and discussing with patient's family/caregivers.    Current Length of Stay: 8 day(s)  Current Patient Status: Inpatient   Certification Statement: The patient will continue to require additional inpatient hospital stay due to IV steroid, home oxygen evaluation  Discharge Plan: Anticipate discharge tomorrow to home with home services.    Code Status: Level 1 - Full Code    Subjective:   Patient improved with Oxygen requirements, SOB, cough improved. No acute overnight events.    Objective:     Vitals:   Temp (24hrs), Av.5 °F (36.4 °C), Min:97.3 °F (36.3 °C), Max:97.7 °F (36.5 °C)    Temp:  [97.3 °F (36.3 °C)-97.7 °F (36.5 °C)] 97.3 °F (36.3 °C)  HR:  [70-75] 75  Resp:  [19] 19  BP: (154-165)/(54-68) 165/68  SpO2:  [90 %-96 %] 90 %  Body mass index is 29.34 kg/m².     Input and Output Summary (last 24 hours):     Intake/Output Summary (Last 24 hours) at 3/24/2024 1258  Last data filed at 3/24/2024 0701  Gross per 24 hour   Intake 720 ml   Output 600 ml   Net 120 ml       Physical Exam:    Physical Exam     Gen.-Patient comfortable   Neck- Supple. No thyromegaly or lymphadenopathy  Lungs-rhoinchi improved  Heart S1-S2, regular rate and rhythm, no murmurs  Abdomen-soft nontender, no organomegaly. Bowel sounds present  Extremities-no cyanosi,  clubbing or edema  Skin- no rash  Neuro-nonfocal     Additional Data:     Labs:  Results from last 7 days   Lab Units 03/24/24  0337 03/23/24  0215 03/21/24  0435   WBC Thousand/uL 12.51* 14.25* 14.02*   HEMOGLOBIN g/dL 11.2* 11.3* 12.1   HEMATOCRIT % 35.5 35.7 38.1   PLATELETS Thousands/uL 239 230 214   NEUTROS PCT %  --   --  71   LYMPHS PCT %  --   --  19   LYMPHO PCT %  --  19  --    MONOS PCT %  --   --  8   MONO PCT %  --  6  --    EOS PCT %  --  0 0     Results from last 7 days   Lab Units 03/24/24  0337 03/22/24  0413 03/21/24  0435   SODIUM mmol/L 144   < > 134*   POTASSIUM mmol/L 3.6   < > 4.0   CHLORIDE mmol/L 105   < > 101   CO2 mmol/L 30   < > 23   BUN mg/dL 61*   < > 65*   CREATININE mg/dL 2.23*   < > 2.51*   ANION GAP mmol/L 9   < > 10   CALCIUM mg/dL 8.4   < > 7.9*   ALBUMIN g/dL  --   --  3.1*   TOTAL BILIRUBIN mg/dL  --   --  0.41   ALK PHOS U/L  --   --  58   ALT U/L  --   --  64*   AST U/L  --   --  57*   GLUCOSE RANDOM mg/dL 263*   < > 160*    < > = values in this interval not displayed.         Results from last 7 days   Lab Units 03/24/24  1150 03/24/24  0715 03/23/24  1724 03/23/24  1037 03/23/24  0806 03/22/24  1740 03/22/24  1218 03/22/24  1023 03/22/24  0612 03/22/24  0409 03/22/24  0002 03/21/24  2123   POC GLUCOSE mg/dl 253* 232* 171* 394* 231* 283* 244* 372* 303* 324* 303* 296*         Results from last 7 days   Lab Units 03/21/24  0435 03/18/24  0316   PROCALCITONIN ng/ml 9.04* 26.72*       Lines/Drains:  Invasive Devices       Peripheral Intravenous Line  Duration             Peripheral IV 03/23/24 Dorsal (posterior);Right Forearm <1 day                          Imaging: Reviewed radiology reports from this admission including:  chest xray and Personally reviewed the following imaging: chest xray-blunted costophrenic angle on the left side, improved congestion on the right    Recent Cultures (last 7 days):   Results from last 7 days   Lab Units 03/20/24  0448   LEGIONELLA URINARY ANTIGEN  Negative       Last 24 Hours Medication List:   Current Facility-Administered Medications   Medication Dose Route Frequency Provider Last Rate    acetaminophen  650 mg Oral Q6H PRN Angella Suárez PA-C      Artificial Tears  1 drop Both Eyes BID Toya Parekh MD      aspirin  81 mg Oral Daily Angella Suárez PA-C      bisacodyl  10 mg Rectal Once Angella Suárez PA-C      cefepime  1,000 mg Intravenous Q12H Toya Parekh MD 1,000 mg (03/24/24 0811)    cloNIDine  1 patch Transdermal Weekly Angella Suárez PA-C      famotidine  10 mg Oral Daily Angella Suárez PA-C      ferrous sulfate  325 mg Oral Once per day on Monday Wednesday Friday Israel Enriquez MD      guaiFENesin  600 mg Oral BID Angella Suárez PA-C      heparin (porcine)  5,000 Units Subcutaneous Q8H JONO Angella Suárez PA-C      insulin lispro  2-12 Units Subcutaneous TID AC Berenice Hollingsworth MD      insulin lispro  4 Units Subcutaneous TID With Meals Toya Parekh MD      levalbuterol  1.25 mg Nebulization TID Angella Suárez PA-C      nebivolol  20 mg Oral Daily Angella Suárez PA-C      NIFEdipine  90 mg Oral Daily Angella Suárez PA-C      pravastatin  40 mg Oral Daily With Dinner Angella Suárez PA-C      pregabalin  50 mg Oral BID Angella Suárez PA-C      sertraline  100 mg Oral Daily Angella Suárez PA-C      sodium chloride  4 mL Nebulization TID Angella Suárez PA-C      torsemide  20 mg Oral Daily Irlanda O'AlexanderKANA medina      transdermal buprenorphine  1 patch Transdermal Q7 Days Angella Suárez PA-C          Today, Patient Was Seen By: Toya Parekh MD    **Please Note: This note may have been constructed using a voice recognition system.**

## 2024-03-24 NOTE — PROGRESS NOTES
Progress Note - Nephrology   Ena DIVINA Ahumada 88 y.o. female MRN: [id number]  Unit/Bed#: -01 Encounter: [id number]    83-year-old female with past history of CKD IV, aortic stenosis, hypertension, hyperlipidemia who presented to the ER with nausea and vomiting.  Nephrology consulted and managing for NELA        ASSESSMENT and PLAN:  Acute kidney injury   Etiology: Suspect EMEKA with contrast exposure on 8/18/2024 with prerenal component in the setting of diuretics as well as hyperglycemia  Baseline creatinine 1.6-1.9  Patient creatinine 1.64 on 3/16/2024  Peak creatinine 2.74 on 3/20/2024  Current creatinine 2.23-appears stable -may have new baseline  Previous treated with IV lasix-off  On torsemide 20 mg daily as outpatient  Workup:  Urinalysis with micro reports:1 ketones, trace blood, +1 protein, 0-1 RBCs/WBCs, 0-3 granular casts and WBC casts.   CK-82  Renal imaging revealed: No hydronephrosis on renal ultrasound  Bladder scan-and significant  Chest x ray unchanged  Plan  Avoid nephrotoxins, NSAIDs, and limit IV contrast if possible  Avoid hypotension, perturbations of blood pressure to prevent decreased renal perfusion  Adjust meds to appropriate GFR  Optimize hemodynamic status to avoid delay in renal recovery  Need accurate I/O and daily weights  Monitor BMP daily  Will resume torsemide 20 mg daily      Chronic kidney disease IIIB:    Etiology:  Suspect secondary to diabetic kidney disease, hypertensive nephrosclerosis as well as age-related nephron loss  Baseline Creatinine of 1.6-1.9  Patient follows with Dr. Baumann  Recommend follow-up on discharge     Complex left renal cyst-present since 2020  Continue follow-up as outpatient     Blood pressure/Hypertension:  Current blood pressure: Acceptable given age   Current medications include: Clonidine patch 0.1 mg weekly, diastolic 20 mg daily, nifedipine 90 mg daily,torsemide 20 mg daily  Maximize hemodynamics to maintain MAP >65  Avoid hypotension or  fluctuations in blood pressure  Will continue to trend     Hypokalemia  Likely in the setting of IV diuretics  Most recent potassium 3.6  Will give kdur 20eq today  Check BMP in am  Mag 2.1     Acute hypoxic respiratory failure/sepsis/aspiration pneumonia/cardiomyopathy  PE study was negative  Concern for pneumonia and currently on antibiotics per primary services  Pulmonary following-on  IV steroids  Oxygen weaning slowly nowo 4L nasal cannula  For chest x-ray no significant change  Recent echocardiogram reveals EF of 65% with abnormal diastolic function grade 1     Other medical issues  Diabetes  Dyslipidemia  GERD  Aortic valve stenosis  Chronic pain        Overall plan and\ Recommendations: Discussed with primary team in agreement with the plan as stated below  Start Torsemide 20 mg PO today  Treat potassium 3.6 with 20 mEq K-Dur x 1 today  Check BMP in a.m.      Medical records through [facility] and care everywhere has been reviewed for this encounter        Review of systems  Patient seen and examined at bedside: Overall feeling better breath has improved.  Review of Systems   Constitutional: Negative.  Negative for activity change, appetite change, chills, diaphoresis, fatigue and fever.   HENT: Negative.  Negative for congestion and facial swelling.    Respiratory: Negative.     Cardiovascular: Negative.    Gastrointestinal: Negative.    Endocrine: Negative.    Genitourinary: Negative.    Musculoskeletal: Negative.    Skin: Negative.    Allergic/Immunologic: Negative.    Neurological: Negative.    Hematological: Negative.    Psychiatric/Behavioral: Negative.            Physical exam:  Current Weight: Weight - Scale: 72.8 kg (160 lb 6.4 oz)  Vitals:    03/23/24 2135 03/23/24 2300 03/24/24 0336 03/24/24 0718   BP: 154/54   165/68   BP Location:       Pulse: 70   75   Resp:    19   Temp: 97.7 °F (36.5 °C)   (!) 97.3 °F (36.3 °C)   TempSrc:       SpO2: 95% 91%  90%   Weight:   72.8 kg (160 lb 6.4 oz)    Height:            Intake/Output Summary (Last 24 hours) at 3/24/2024 1046  Last data filed at 3/24/2024 0701  Gross per 24 hour   Intake 720 ml   Output 600 ml   Net 120 ml       Physical Exam  Vitals and nursing note reviewed.   Constitutional:       Appearance: Normal appearance.   HENT:      Head: Normocephalic and atraumatic.      Nose: Nose normal.      Mouth/Throat:      Mouth: Mucous membranes are moist.      Pharynx: Oropharynx is clear.   Eyes:      Extraocular Movements: Extraocular movements intact.      Conjunctiva/sclera: Conjunctivae normal.   Cardiovascular:      Rate and Rhythm: Normal rate and regular rhythm.      Pulses: Normal pulses.      Heart sounds: Normal heart sounds.   Pulmonary:      Effort: Pulmonary effort is normal.      Breath sounds: Normal breath sounds.      Comments: Decreased bases right greater than left  Abdominal:      General: Bowel sounds are normal.      Palpations: Abdomen is soft.   Musculoskeletal:         General: Normal range of motion.      Cervical back: Normal range of motion and neck supple.      Comments: No significant lower extremity edema   Skin:     General: Skin is warm and dry.   Neurological:      General: No focal deficit present.      Mental Status: She is alert and oriented to person, place, and time.   Psychiatric:         Mood and Affect: Mood normal.         Behavior: Behavior normal.            Medications:    Current Facility-Administered Medications:     acetaminophen (TYLENOL) tablet 650 mg, 650 mg, Oral, Q6H PRN, Angella Suárez PA-C, 650 mg at 03/18/24 1226    Artificial Tears ophthalmic solution 1 drop, 1 drop, Both Eyes, BID, Toya Parekh MD, 1 drop at 03/24/24 0811    aspirin (ECOTRIN LOW STRENGTH) EC tablet 81 mg, 81 mg, Oral, Daily, Angella Suárez PA-C, 81 mg at 03/24/24 0812    bisacodyl (DULCOLAX) rectal suppository 10 mg, 10 mg, Rectal, Once, Angella Suárez PA-C    cefepime (MAXIPIME) IVPB (premix in dextrose) 1,000 mg 50 mL, 1,000 mg, Intravenous,  Q12H, Toya Parekh MD, Last Rate: 100 mL/hr at 03/24/24 0811, 1,000 mg at 03/24/24 0811    cloNIDine (CATAPRES-TTS-1) 0.1 mg/24 hr TD weekly patch, 1 patch, Transdermal, Weekly, Angella Suárez PA-C, 0.1 mg at 03/20/24 0835    famotidine (PEPCID) tablet 10 mg, 10 mg, Oral, Daily, Angella Suárez PA-C, 10 mg at 03/24/24 0812    ferrous sulfate tablet 325 mg, 325 mg, Oral, Once per day on Monday Wednesday Friday, Israel Enriquez MD, 325 mg at 03/22/24 0832    guaiFENesin (MUCINEX) 12 hr tablet 600 mg, 600 mg, Oral, BID, Angella Suárez PA-C, 600 mg at 03/24/24 0812    heparin (porcine) subcutaneous injection 5,000 Units, 5,000 Units, Subcutaneous, Q8H JONO, Angella Suárez PA-C, 5,000 Units at 03/24/24 0531    insulin lispro (HumALOG/ADMELOG) 100 units/mL subcutaneous injection 2-12 Units, 2-12 Units, Subcutaneous, TID AC, 4 Units at 03/24/24 0812 **AND** Fingerstick Glucose (POCT), , , TID AC, Berenice Hollingsworth MD    insulin lispro (HumALOG/ADMELOG) 100 units/mL subcutaneous injection 6 Units, 6 Units, Subcutaneous, TID With Meals, Berenice Hollingsworth MD, 6 Units at 03/24/24 0811    levalbuterol (XOPENEX) inhalation solution 1.25 mg, 1.25 mg, Nebulization, TID, Angella Suárez PA-C, 1.25 mg at 03/24/24 0741    nebivolol (BYSTOLIC) tablet 20 mg, 20 mg, Oral, Daily, Angella Suárez PA-C, 20 mg at 03/24/24 0812    NIFEdipine (PROCARDIA XL) 24 hr tablet 90 mg, 90 mg, Oral, Daily, Angella Suárez PA-C, 90 mg at 03/24/24 0811    pravastatin (PRAVACHOL) tablet 40 mg, 40 mg, Oral, Daily With Dinner, Angella Suárez PA-C, 40 mg at 03/23/24 1733    pregabalin (LYRICA) capsule 50 mg, 50 mg, Oral, BID, Angella Suárez PA-C, 50 mg at 03/24/24 0812    sertraline (ZOLOFT) tablet 100 mg, 100 mg, Oral, Daily, Angella Suárez PA-C, 100 mg at 03/24/24 0812    sodium chloride 3 % inhalation solution 4 mL, 4 mL, Nebulization, TID, Angella Suárez PA-C, 4 mL at 03/24/24 0741    torsemide (DEMADEX) tablet 20 mg, 20 mg, Oral, Daily, KANA Correa, 20 mg at 03/24/24 0812    transdermal  "buprenorphine (BUTRANS) 7.5 mcg/hr TD patch 1 patch, 1 patch, Transdermal, Q7 Days, Angella Suárez PA-C, 1 patch at 03/23/24 1923    Laboratory Results:  Results from last 7 days   Lab Units 03/24/24  0337 03/23/24  1608 03/23/24  0215 03/22/24  0413 03/21/24  0435 03/20/24  0448 03/19/24  1708 03/19/24  0555 03/18/24  2116 03/18/24  0316   WBC Thousand/uL 12.51*  --  14.25*  --  14.02* 19.12*  --  16.84*  --  16.74*   HEMOGLOBIN g/dL 11.2*  --  11.3*  --  12.1 12.6  --  11.2*  --  11.7   HEMATOCRIT % 35.5  --  35.7  --  38.1 40.2  --  36.1  --  38.1   PLATELETS Thousands/uL 239  --  230  --  214 197  --  155  --  152   SODIUM mmol/L 144 147 143 138 134* 132* 130* 136   < > 138   POTASSIUM mmol/L 3.6 3.4* 3.3* 3.6 4.0 3.7 3.9 3.7   < > 3.2*   CHLORIDE mmol/L 105 105 105 103 101 99 97 104   < > 103   CO2 mmol/L 30 31 27 24 23 22 23 25   < > 28   BUN mg/dL 61* 65* 63* 64* 65* 59* 55* 40*   < > 30*   CREATININE mg/dL 2.23* 2.26* 2.45* 2.52* 2.51* 2.74* 2.68* 2.41*   < > 1.73*   CALCIUM mg/dL 8.4 8.5 8.1* 7.9* 7.9* 8.0* 8.1* 8.1*   < > 8.1*   MAGNESIUM mg/dL 2.1  --  2.1 2.5  --   --   --   --   --  2.3   PHOSPHORUS mg/dL  --   --   --   --   --   --   --  2.5  --   --     < > = values in this interval not displayed.           Portions of the record may have been created with voice recognition software. Occasional wrong word or \"sound a like\" substitutions may have occurred due to the inherent limitations of voice recognition software. Read the chart carefully and recognize,   "

## 2024-03-25 PROBLEM — A41.9 SEPSIS (HCC): Status: RESOLVED | Noted: 2024-03-16 | Resolved: 2024-03-25

## 2024-03-25 PROBLEM — N18.9 CHRONIC KIDNEY DISEASE: Status: ACTIVE | Noted: 2024-03-25

## 2024-03-25 LAB
ANION GAP SERPL CALCULATED.3IONS-SCNC: 8 MMOL/L (ref 4–13)
BASOPHILS # BLD MANUAL: 0 THOUSAND/UL (ref 0–0.1)
BASOPHILS NFR MAR MANUAL: 0 % (ref 0–1)
BUN SERPL-MCNC: 56 MG/DL (ref 5–25)
CALCIUM SERPL-MCNC: 8.4 MG/DL (ref 8.4–10.2)
CHLORIDE SERPL-SCNC: 105 MMOL/L (ref 96–108)
CO2 SERPL-SCNC: 30 MMOL/L (ref 21–32)
CREAT SERPL-MCNC: 2.27 MG/DL (ref 0.6–1.3)
EOSINOPHIL # BLD MANUAL: 1.95 THOUSAND/UL (ref 0–0.4)
EOSINOPHIL NFR BLD MANUAL: 13 % (ref 0–6)
ERYTHROCYTE [DISTWIDTH] IN BLOOD BY AUTOMATED COUNT: 14.7 % (ref 11.6–15.1)
GFR SERPL CREATININE-BSD FRML MDRD: 18 ML/MIN/1.73SQ M
GLUCOSE SERPL-MCNC: 107 MG/DL (ref 65–140)
GLUCOSE SERPL-MCNC: 185 MG/DL (ref 65–140)
GLUCOSE SERPL-MCNC: 237 MG/DL (ref 65–140)
GLUCOSE SERPL-MCNC: 89 MG/DL (ref 65–140)
HCT VFR BLD AUTO: 35.9 % (ref 34.8–46.1)
HGB BLD-MCNC: 11.1 G/DL (ref 11.5–15.4)
LYMPHOCYTES # BLD AUTO: 21 % (ref 14–44)
LYMPHOCYTES # BLD AUTO: 3.31 THOUSAND/UL (ref 0.6–4.47)
MCH RBC QN AUTO: 30.1 PG (ref 26.8–34.3)
MCHC RBC AUTO-ENTMCNC: 30.9 G/DL (ref 31.4–37.4)
MCV RBC AUTO: 97 FL (ref 82–98)
MONOCYTES # BLD AUTO: 0.3 THOUSAND/UL (ref 0–1.22)
MONOCYTES NFR BLD: 2 % (ref 4–12)
NEUTROPHILS # BLD MANUAL: 9.47 THOUSAND/UL (ref 1.85–7.62)
NEUTS BAND NFR BLD MANUAL: 2 % (ref 0–8)
NEUTS SEG NFR BLD AUTO: 61 % (ref 43–75)
PLATELET # BLD AUTO: 250 THOUSANDS/UL (ref 149–390)
PLATELET BLD QL SMEAR: ADEQUATE
PMV BLD AUTO: 10.5 FL (ref 8.9–12.7)
POTASSIUM SERPL-SCNC: 3.2 MMOL/L (ref 3.5–5.3)
RBC # BLD AUTO: 3.69 MILLION/UL (ref 3.81–5.12)
RBC MORPH BLD: NORMAL
SODIUM SERPL-SCNC: 143 MMOL/L (ref 135–147)
VARIANT LYMPHS # BLD AUTO: 1 %
WBC # BLD AUTO: 15.03 THOUSAND/UL (ref 4.31–10.16)

## 2024-03-25 PROCEDURE — 99232 SBSQ HOSP IP/OBS MODERATE 35: CPT | Performed by: INTERNAL MEDICINE

## 2024-03-25 PROCEDURE — 94640 AIRWAY INHALATION TREATMENT: CPT

## 2024-03-25 PROCEDURE — 80048 BASIC METABOLIC PNL TOTAL CA: CPT | Performed by: INTERNAL MEDICINE

## 2024-03-25 PROCEDURE — 94760 N-INVAS EAR/PLS OXIMETRY 1: CPT

## 2024-03-25 PROCEDURE — 94668 MNPJ CHEST WALL SBSQ: CPT

## 2024-03-25 PROCEDURE — 85027 COMPLETE CBC AUTOMATED: CPT | Performed by: INTERNAL MEDICINE

## 2024-03-25 PROCEDURE — 82948 REAGENT STRIP/BLOOD GLUCOSE: CPT

## 2024-03-25 PROCEDURE — 85007 BL SMEAR W/DIFF WBC COUNT: CPT | Performed by: INTERNAL MEDICINE

## 2024-03-25 PROCEDURE — 94664 DEMO&/EVAL PT USE INHALER: CPT

## 2024-03-25 RX ORDER — POTASSIUM CHLORIDE 20 MEQ/1
40 TABLET, EXTENDED RELEASE ORAL ONCE
Status: COMPLETED | OUTPATIENT
Start: 2024-03-25 | End: 2024-03-25

## 2024-03-25 RX ADMIN — SERTRALINE 100 MG: 100 TABLET, FILM COATED ORAL at 09:28

## 2024-03-25 RX ADMIN — GUAIFENESIN 600 MG: 600 TABLET ORAL at 09:31

## 2024-03-25 RX ADMIN — INSULIN LISPRO 4 UNITS: 100 INJECTION, SOLUTION INTRAVENOUS; SUBCUTANEOUS at 12:24

## 2024-03-25 RX ADMIN — DEXTRAN 70, GLYCERIN, HYPROMELLOSE 1 DROP: 1; 2; 3 SOLUTION/ DROPS OPHTHALMIC at 09:33

## 2024-03-25 RX ADMIN — ASPIRIN 81 MG: 81 TABLET, COATED ORAL at 09:28

## 2024-03-25 RX ADMIN — TORSEMIDE 20 MG: 20 TABLET ORAL at 09:29

## 2024-03-25 RX ADMIN — HEPARIN SODIUM 5000 UNITS: 5000 INJECTION INTRAVENOUS; SUBCUTANEOUS at 05:34

## 2024-03-25 RX ADMIN — FERROUS SULFATE TAB 325 MG (65 MG ELEMENTAL FE) 325 MG: 325 (65 FE) TAB at 09:30

## 2024-03-25 RX ADMIN — PREGABALIN 50 MG: 25 CAPSULE ORAL at 09:30

## 2024-03-25 RX ADMIN — DEXTRAN 70, GLYCERIN, HYPROMELLOSE 1 DROP: 1; 2; 3 SOLUTION/ DROPS OPHTHALMIC at 17:34

## 2024-03-25 RX ADMIN — PRAVASTATIN SODIUM 40 MG: 40 TABLET ORAL at 17:35

## 2024-03-25 RX ADMIN — HEPARIN SODIUM 5000 UNITS: 5000 INJECTION INTRAVENOUS; SUBCUTANEOUS at 13:29

## 2024-03-25 RX ADMIN — INSULIN LISPRO 4 UNITS: 100 INJECTION, SOLUTION INTRAVENOUS; SUBCUTANEOUS at 17:35

## 2024-03-25 RX ADMIN — PREGABALIN 50 MG: 25 CAPSULE ORAL at 17:35

## 2024-03-25 RX ADMIN — NEBIVOLOL 20 MG: 5 TABLET ORAL at 09:30

## 2024-03-25 RX ADMIN — NIFEDIPINE 90 MG: 30 TABLET, EXTENDED RELEASE ORAL at 09:30

## 2024-03-25 RX ADMIN — LEVALBUTEROL HYDROCHLORIDE 1.25 MG: 1.25 SOLUTION RESPIRATORY (INHALATION) at 19:10

## 2024-03-25 RX ADMIN — SODIUM CHLORIDE SOLN NEBU 3% 4 ML: 3 NEBU SOLN at 19:09

## 2024-03-25 RX ADMIN — POTASSIUM CHLORIDE 40 MEQ: 1500 TABLET, EXTENDED RELEASE ORAL at 12:23

## 2024-03-25 RX ADMIN — INSULIN LISPRO 2 UNITS: 100 INJECTION, SOLUTION INTRAVENOUS; SUBCUTANEOUS at 17:35

## 2024-03-25 RX ADMIN — GUAIFENESIN 600 MG: 600 TABLET ORAL at 17:35

## 2024-03-25 RX ADMIN — SODIUM CHLORIDE SOLN NEBU 3% 4 ML: 3 NEBU SOLN at 07:44

## 2024-03-25 RX ADMIN — FAMOTIDINE 10 MG: 20 TABLET ORAL at 09:28

## 2024-03-25 RX ADMIN — LEVALBUTEROL HYDROCHLORIDE 1.25 MG: 1.25 SOLUTION RESPIRATORY (INHALATION) at 07:44

## 2024-03-25 RX ADMIN — INSULIN LISPRO 4 UNITS: 100 INJECTION, SOLUTION INTRAVENOUS; SUBCUTANEOUS at 09:33

## 2024-03-25 RX ADMIN — HEPARIN SODIUM 5000 UNITS: 5000 INJECTION INTRAVENOUS; SUBCUTANEOUS at 21:46

## 2024-03-25 RX ADMIN — POTASSIUM CHLORIDE 40 MEQ: 1500 TABLET, EXTENDED RELEASE ORAL at 09:30

## 2024-03-25 RX ADMIN — LEVALBUTEROL HYDROCHLORIDE 1.25 MG: 1.25 SOLUTION RESPIRATORY (INHALATION) at 13:52

## 2024-03-25 RX ADMIN — SODIUM CHLORIDE SOLN NEBU 3% 4 ML: 3 NEBU SOLN at 13:52

## 2024-03-25 NOTE — ASSESSMENT & PLAN NOTE
RLL Pneumonia on CT chest ; with SIRS criteria and hypoxemia   CTA chest done shows right lung pneumonia.    Pulmonology consulted.    received steroids for's severe pneumonia  Patient completed antibiotics

## 2024-03-25 NOTE — ASSESSMENT & PLAN NOTE
Lab Results   Component Value Date    HGBA1C 8.1 (H) 03/16/2024       Recent Labs     03/24/24  1631 03/25/24  0801 03/25/24  1133 03/25/24  1646   POCGLU 136 107 237* 185*         Blood Sugar Average: Last 72 hrs:  (P) [id number] up with endocrine, home regimen-Invokana, glipizide, Januvia    SSI Before meals and at bed time + Low Carb Diet     Continue lispro 4 units 3 times daily with meals and sliding scale insulin

## 2024-03-25 NOTE — PROGRESS NOTES
NEPHROLOGY PROGRESS NOTE   Ena Ahumada 88 y.o. female MRN: [id number]  Unit/Bed#: -01 Encounter: [id number]      HPI/24hr EVENTS:    -88-year-old female past medical history of CKD 4, hypertension, hyperlipidemia, aortic stenosis.  Presented with nausea and vomiting.  Nephrology consult for management of NELA    -Stable renal function    ASSESSMENT/PLAN:    NELA on CKD 4  -Baseline creatinine 1.6-1.9  -Creatinine on admission 1.6, creatinine peaking at 2.74 in 3/20, now downtrending and stable at 2.27  -Etiology: Suspect contrast associated nephropathy versus prerenal azotemia from diuretic hyperglycemia  -UA: Glucose, ketones, trace blood, 1+ protein, 0-1 RBCs and WBCs, 0-3 granular casts and WBC casts  -CK was not elevated  -Renal ultrasound from 3/19/2024 with diffusely echogenic kidneys bilaterally, no hydronephrosis, mildly complex left renal cyst  -Follows with Dr. Baumann as outpatient, should follow-up upon discharge  -Oral diuretics were resumed 3/24 with torsemide 20 mg daily  -Renal function is stable might be new baseline     Complex left kidney cyst  -Follow-up     Hypertension  -Currently on clonidine 0.1 mg patch, Bystolic 20 mg daily, Procardia XL 90 mg daily, torsemide 20 mg daily  -Recent blood pressures are acceptable     Hypokalemia  -Likely diuretic induced, recent K is 3.2, receiving 40 mEq p.o. x 1 this morning, give additional 40 p.o. later this afternoon     Acute hypoxic respiratory failure/sepsis/pneumonia  -Remains on 4 L nasal cannula, ox requirements have down trended since earlier on admission  -Completed course of antibiotics  -Pulmonology consulted on admission     Addition medical problems: DM2, hyperlipidemia, GERD, aortic stenosis, chronic pain    SUBJECTIVE:  Reports she feels well, is getting some sleep overnight, breathing feels stable and at baseline, appetite is good    ROS:  Review of Systems   Constitutional: Negative.    Respiratory: Negative.     Cardiovascular:  Negative.    Gastrointestinal: Negative.    Genitourinary: Negative.    Neurological: Negative.       A complete 10 point review of systems was performed and found to be negative unless otherwise noted above or in the HPI.    OBJECTIVE:  Current Weight: Weight - Scale: 72.8 kg (160 lb 9.6 oz)  Vitals:    03/24/24 1628 03/24/24 1957 03/25/24 0230 03/25/24 0804   BP: 136/54 136/54  149/55   Pulse: 71 69  69   Resp:    18   Temp: 98.5 °F (36.9 °C) 98.5 °F (36.9 °C)  (!) 97.3 °F (36.3 °C)   TempSrc:       SpO2: 93% 93%  97%   Weight:   72.8 kg (160 lb 9.6 oz)    Height:           Intake/Output Summary (Last 24 hours) at 3/25/2024 1030  Last data filed at 3/25/2024 0624  Gross per 24 hour   Intake 240 ml   Output 0 ml   Net 240 ml     Physical Exam  Vitals and nursing note reviewed.   Constitutional:       General: She is not in acute distress.     Appearance: She is not toxic-appearing or diaphoretic.      Comments: Awake sitting in chair on supplemental oxygen   HENT:      Head: Normocephalic and atraumatic.      Nose: Nose normal.      Mouth/Throat:      Mouth: Mucous membranes are dry.   Eyes:      General: No scleral icterus.  Cardiovascular:      Rate and Rhythm: Normal rate and regular rhythm.      Pulses: Normal pulses.   Pulmonary:      Effort: Pulmonary effort is normal. No respiratory distress.      Breath sounds: No wheezing or rales.   Abdominal:      General: Abdomen is flat.   Musculoskeletal:      Cervical back: Neck supple.      Right lower leg: No edema.      Left lower leg: No edema.   Skin:     General: Skin is warm and dry.      Capillary Refill: Capillary refill takes less than 2 seconds.   Neurological:      Mental Status: She is alert and oriented to person, place, and time.          Medications:    Current Facility-Administered Medications:     acetaminophen (TYLENOL) tablet 650 mg, 650 mg, Oral, Q6H PRN, Angella Suárez PA-C, 650 mg at 03/18/24 1226    Artificial Tears ophthalmic solution 1 drop, 1  drop, Both Eyes, BID, Toya Parekh MD, 1 drop at 03/25/24 0933    aspirin (ECOTRIN LOW STRENGTH) EC tablet 81 mg, 81 mg, Oral, Daily, Angella Suárez PA-C, 81 mg at 03/25/24 0928    bisacodyl (DULCOLAX) rectal suppository 10 mg, 10 mg, Rectal, Once, Angella Suárez PA-C    cloNIDine (CATAPRES-TTS-1) 0.1 mg/24 hr TD weekly patch, 1 patch, Transdermal, Weekly, Angella Suárez PA-C, 0.1 mg at 03/20/24 0835    famotidine (PEPCID) tablet 10 mg, 10 mg, Oral, Daily, Angella Suárez PA-C, 10 mg at 03/25/24 0928    ferrous sulfate tablet 325 mg, 325 mg, Oral, Once per day on Monday Wednesday Friday, Israel Enriquez MD, 325 mg at 03/25/24 0930    guaiFENesin (MUCINEX) 12 hr tablet 600 mg, 600 mg, Oral, BID, Angella Suárez PA-C, 600 mg at 03/25/24 0931    heparin (porcine) subcutaneous injection 5,000 Units, 5,000 Units, Subcutaneous, Q8H JONO, Angella Suárez PA-C, 5,000 Units at 03/25/24 0534    insulin lispro (HumALOG/ADMELOG) 100 units/mL subcutaneous injection 2-12 Units, 2-12 Units, Subcutaneous, TID AC, 6 Units at 03/24/24 1209 **AND** Fingerstick Glucose (POCT), , , TID AC, Berenice Hollingsworth MD    insulin lispro (HumALOG/ADMELOG) 100 units/mL subcutaneous injection 4 Units, 4 Units, Subcutaneous, TID With Meals, Toya Parekh MD, 4 Units at 03/25/24 0933    levalbuterol (XOPENEX) inhalation solution 1.25 mg, 1.25 mg, Nebulization, TID, Angella Suárez PA-C, 1.25 mg at 03/25/24 0744    nebivolol (BYSTOLIC) tablet 20 mg, 20 mg, Oral, Daily, Angella Suárez PA-C, 20 mg at 03/25/24 0930    NIFEdipine (PROCARDIA XL) 24 hr tablet 90 mg, 90 mg, Oral, Daily, Angella Suárez PA-C, 90 mg at 03/25/24 0930    potassium chloride (Klor-Con M20) CR tablet 40 mEq, 40 mEq, Oral, Once, KANA Godinez    pravastatin (PRAVACHOL) tablet 40 mg, 40 mg, Oral, Daily With Dinner, Angella Suárez PA-C, 40 mg at 03/24/24 1700    pregabalin (LYRICA) capsule 50 mg, 50 mg, Oral, BID, Angella Suárez PA-C, 50 mg at 03/25/24 0930    sertraline (ZOLOFT) tablet 100 mg, 100 mg, Oral,  Daily, Angella Suárez PA-C, 100 mg at 03/25/24 0928    sodium chloride 3 % inhalation solution 4 mL, 4 mL, Nebulization, TID, Angella Suárez PA-C, 4 mL at 03/25/24 0744    torsemide (DEMADEX) tablet 20 mg, 20 mg, Oral, Daily, Irlanda WALESKA'Alexander, NEPTALINP, 20 mg at 03/25/24 0929    transdermal buprenorphine (BUTRANS) 7.5 mcg/hr TD patch 1 patch, 1 patch, Transdermal, Q7 Days, Angella Suárez PA-C, 1 patch at 03/23/24 1923    Laboratory Results:  Results from last 7 days   Lab Units 03/25/24  0230 03/24/24  0337 03/23/24  1608 03/23/24  0215 03/22/24  0413 03/21/24  0435 03/20/24  0448 03/19/24  1708 03/19/24  0555   WBC Thousand/uL 15.03* 12.51*  --  14.25*  --  14.02* 19.12*  --  16.84*   HEMOGLOBIN g/dL 11.1* 11.2*  --  11.3*  --  12.1 12.6  --  11.2*   HEMATOCRIT % 35.9 35.5  --  35.7  --  38.1 40.2  --  36.1   PLATELETS Thousands/uL 250 239  --  230  --  214 197  --  155   POTASSIUM mmol/L 3.2* 3.6 3.4* 3.3* 3.6 4.0 3.7   < > 3.7   CHLORIDE mmol/L 105 105 105 105 103 101 99   < > 104   CO2 mmol/L 30 30 31 27 24 23 22   < > 25   BUN mg/dL 56* 61* 65* 63* 64* 65* 59*   < > 40*   CREATININE mg/dL 2.27* 2.23* 2.26* 2.45* 2.52* 2.51* 2.74*   < > 2.41*   CALCIUM mg/dL 8.4 8.4 8.5 8.1* 7.9* 7.9* 8.0*   < > 8.1*   MAGNESIUM mg/dL  --  2.1  --  2.1 2.5  --   --   --   --    PHOSPHORUS mg/dL  --   --   --   --   --   --   --   --  2.5    < > = values in this interval not displayed.       I have personally reviewed the blood work as stated above and in my note.  I have personally reviewed chest x-ray imaging studies.  I have personally reviewed internal medicine, pulmonology note.

## 2024-03-25 NOTE — PROGRESS NOTES
"Progress Note - Pulmonary   Ena Ahumada 88 y.o. female MRN: [id number]  Unit/Bed#: -01 Encounter: [id number]    Assessment & Plan:    Acute hypoxic respiratory failure  Aspiration pneumonia  NELA on CKD  Aortic stenosis    Patient oxygenation has significantly improved.  Down to 2 L.  Needs home O2 evaluation prior to discharge  Completed her course of cefepime  Continue xopenex and hypertonic nebulizers TID  Diuresis per primary      Subjective:   Patient is feeling much better.  Offers no new complaints.    Objective:     Vitals: Blood pressure 149/55, pulse 69, temperature (!) 97.3 °F (36.3 °C), resp. rate 18, height 5' 2\" (1.575 m), weight 72.8 kg (160 lb 9.6 oz), SpO2 97%, not currently breastfeeding.,Body mass index is 29.37 kg/m².      Intake/Output Summary (Last 24 hours) at 3/25/2024 1304  Last data filed at 3/25/2024 0624  Gross per 24 hour   Intake 240 ml   Output 0 ml   Net 240 ml       Invasive Devices       Peripheral Intravenous Line  Duration             Peripheral IV 03/23/24 Dorsal (posterior);Right Forearm 1 day                    Physical Exam:   General appearance: Alert and awake, in no acute distress  Head: Normocephalic, without obvious abnormality, atraumatic  Eyes: No scleral icterus   Lungs: Decreased breath sounds  Heart: Regular rate  Abdomen:  No appreciable distension or tenderness  Extremities: No deformity  Skin: Warm and dry  Neurologic: No acute focal deficits are noted           Labs: I have personally reviewed pertinent lab results., CBC:   Lab Results   Component Value Date    WBC 15.03 (H) 03/25/2024    HGB 11.1 (L) 03/25/2024    HCT 35.9 03/25/2024    MCV 97 03/25/2024     03/25/2024    RBC 3.69 (L) 03/25/2024    MCH 30.1 03/25/2024    MCHC 30.9 (L) 03/25/2024    RDW 14.7 03/25/2024    MPV 10.5 03/25/2024   , CMP:   Lab Results   Component Value Date    SODIUM 143 03/25/2024    K 3.2 (L) 03/25/2024     03/25/2024    CO2 30 03/25/2024    BUN 56 (H) " 03/25/2024    CREATININE 2.27 (H) 03/25/2024    CALCIUM 8.4 03/25/2024    EGFR 18 03/25/2024     Imaging and other studies: I have personally reviewed pertinent reports.   and I have personally reviewed pertinent films in PACS

## 2024-03-25 NOTE — PROGRESS NOTES
[facility]  Progress Note  Name: Ena Ahumada I  MRN: [id number]  Unit/Bed#: -01 I Date of Admission: 3/16/2024   Date of Service: 3/25/2024 I Hospital Day: 9    Assessment/Plan   NELA (acute kidney injury) (HCC)  Assessment & Plan  Recent Labs     03/23/24  1608 03/24/24  0337 03/25/24  0230   CREATININE 2.26* 2.23* 2.27*   EGFR 18 19 18     Estimated Creatinine Clearance: 16 mL/min (A) (by C-G formula based on SCr of 2.27 mg/dL (H)).   Baseline creatinine less than 2.  Nephrology on board, appreciate recommendations.  Per nephrology this might be her new baseline  On torsemide 20 mg daily, creatinine stable  Monitor     Acute respiratory failure with hypoxia (HCC)  Assessment & Plan  Acute respiratory failure with hypoxia presents on admission , requiring nasal cannula O2 supplementation ~ 5 Liters which is new for the patient ; most likely cause of hypoxemia is her RLL Pneumonia  on admission as evident on Xray Chest.     On 4 L nasal cannula oxygen this morning, turned the oxygen to 2 L saturating well  Wean off nasal cannula oxygen to maintain saturation greater than 92%  Home O2 eval prior    Hypokalemia  Assessment & Plan  Recent Labs     03/23/24  0215 03/23/24  1608 03/24/24  0337 03/25/24  0230   K 3.3* 3.4* 3.6 3.2*   MG 2.1  --  2.1  --    Replenished.  Monitor    Pneumonia  Assessment & Plan  RLL Pneumonia on CT chest ; with SIRS criteria and hypoxemia   CTA chest done shows right lung pneumonia.    Pulmonology consulted.    received steroids for's severe pneumonia  Patient completed antibiotics        Chronic pain syndrome  Assessment & Plan  Follows up with pain medicine  Continue buprenorphine    Nonrheumatic aortic valve stenosis  Assessment & Plan  Follows up with cardiology, last echo in April 2023  Echo-LVEF 65%, grade 1 diastolic dysfunction.  Moderate aortic stenosis  Monitor volume status  Continue home medications with blood pressure parameters    Stage  3b chronic kidney disease (CKD) ([facility])  Assessment & Plan  Lab Results   Component Value Date    EGFR 18 03/25/2024    EGFR 19 03/24/2024    EGFR 18 03/23/2024    CREATININE 2.27 (H) 03/25/2024    CREATININE 2.23 (H) 03/24/2024    CREATININE 2.26 (H) 03/23/2024     Follows up with nephrology as outpatient  Baseline creatinine appears to be 1.6-1.9 as per nephrology, EGFR in mid 20s  Monitor BMP         Anxiety  Assessment & Plan  Continue Zoloft    Type 2 diabetes mellitus with stage 4 chronic kidney disease, without long-term current use of insulin ([facility])  Assessment & Plan  Lab Results   Component Value Date    HGBA1C 8.1 (H) 03/16/2024       Recent Labs     03/24/24  1631 03/25/24  0801 03/25/24  1133 03/25/24  1646   POCGLU 136 107 237* 185*         Blood Sugar Average: Last 72 hrs:  (P) [id number] up with endocrine, home regimen-Invokana, glipizide, Januvia    SSI Before meals and at bed time + Low Carb Diet     Continue lispro 4 units 3 times daily with meals and sliding scale insulin        * Sepsis ([facility])-resolved as of 3/25/2024  Assessment & Plan  SIRS criteria met : tachycardia, tachypnea, leukocytosis  Suspected source: Pneumonia  CXR: Lower lobe pneumonia  UA not suggestive UTI    CT: None  Continue to trend leukocytosis and fever curve  Procalcitonin: 18  Lactic acid: 2.5-resolved    Will need additional fluid depending on lactate and blood pressure  Blood Cultures pending   Will broaden antibiotic to cefepime, vancomycin discontinued with MRSA negative             VTE Pharmacologic Prophylaxis: VTE Score: 8 High Risk (Score >/= 5) - Pharmacological DVT Prophylaxis Ordered: heparin. Sequential Compression Devices Ordered.    Mobility:   Basic Mobility Inpatient Raw Score: 23  JH-HLM Goal: 7: Walk 25 feet or more  JH-HLM Achieved: 7: Walk 25 feet or more  JH-HLM Goal achieved. Continue to encourage appropriate mobility.    Patient Centered Rounds: I performed bedside rounds with nursing  staff today.   Discussions with Specialists or Other Care Team Provider: roxi jimenez     Education and Discussions with Family / Patient: Updated  (daughter in law) via phone.    Total Time Spent on Date of Encounter in care of patient: 45 mins. This time was spent on one or more of the following: performing physical exam; counseling and coordination of care; obtaining or reviewing history; documenting in the medical record; reviewing/ordering tests, medications or procedures; communicating with other healthcare professionals and discussing with patient's family/caregivers.    Current Length of Stay: 9 day(s)  Current Patient Status: Inpatient   Certification Statement: The patient will continue to require additional inpatient hospital stay due to eitan, resp failure  Discharge Plan: Anticipate discharge tomorrow to home.    Code Status: Level 1 - Full Code    Subjective:     Feeling well    Objective:     Vitals:   Temp (24hrs), Av.9 °F (36.6 °C), Min:97.3 °F (36.3 °C), Max:98.5 °F (36.9 °C)    Temp:  [97.3 °F (36.3 °C)-98.5 °F (36.9 °C)] 97.8 °F (36.6 °C)  HR:  [69-77] 74  Resp:  [18] 18  BP: (136-149)/(54-60) 143/60  SpO2:  [89 %-97 %] 92 %  Body mass index is 29.37 kg/m².     Input and Output Summary (last 24 hours):     Intake/Output Summary (Last 24 hours) at 3/25/2024 1709  Last data filed at 3/25/2024 1506  Gross per 24 hour   Intake 476 ml   Output 0 ml   Net 476 ml       Physical Exam:   Physical Exam  Vitals and nursing note reviewed.   Constitutional:       General: She is not in acute distress.     Appearance: She is not diaphoretic.   HENT:      Head: Normocephalic.   Eyes:      General: No scleral icterus.        Right eye: No discharge.         Left eye: No discharge.   Cardiovascular:      Rate and Rhythm: Normal rate and regular rhythm.   Pulmonary:      Effort: Pulmonary effort is normal. No respiratory distress.      Breath sounds: Normal breath sounds. No wheezing, rhonchi or  rales.   Abdominal:      General: There is no distension.      Palpations: Abdomen is soft.      Tenderness: There is no abdominal tenderness. There is no guarding or rebound.   Musculoskeletal:      Cervical back: Normal range of motion.      Right lower leg: Edema (trace) present.      Left lower leg: Edema (trace) present.   Skin:     General: Skin is warm.   Neurological:      Mental Status: She is alert and oriented to person, place, and time.   Psychiatric:         Mood and Affect: Mood normal.         Behavior: Behavior normal.         Thought Content: Thought content normal.         Judgment: Judgment normal.          Additional Data:     Labs:  Results from last 7 days   Lab Units 03/25/24  0230 03/23/24  0215 03/21/24  0435   WBC Thousand/uL 15.03*   < > 14.02*   HEMOGLOBIN g/dL 11.1*   < > 12.1   HEMATOCRIT % 35.9   < > 38.1   PLATELETS Thousands/uL 250   < > 214   BANDS PCT % 2  --   --    NEUTROS PCT %  --   --  71   LYMPHS PCT %  --   --  19   LYMPHO PCT % 21   < >  --    MONOS PCT %  --   --  8   MONO PCT % 2*   < >  --    EOS PCT % 13*   < > 0    < > = values in this interval not displayed.     Results from last 7 days   Lab Units 03/25/24  0230 03/22/24  0413 03/21/24  0435   SODIUM mmol/L 143   < > 134*   POTASSIUM mmol/L 3.2*   < > 4.0   CHLORIDE mmol/L 105   < > 101   CO2 mmol/L 30   < > 23   BUN mg/dL 56*   < > 65*   CREATININE mg/dL 2.27*   < > 2.51*   ANION GAP mmol/L 8   < > 10   CALCIUM mg/dL 8.4   < > 7.9*   ALBUMIN g/dL  --   --  3.1*   TOTAL BILIRUBIN mg/dL  --   --  0.41   ALK PHOS U/L  --   --  58   ALT U/L  --   --  64*   AST U/L  --   --  57*   GLUCOSE RANDOM mg/dL 89   < > 160*    < > = values in this interval not displayed.         Results from last 7 days   Lab Units 03/25/24  1646 03/25/24  1133 03/25/24  0801 03/24/24  1631 03/24/24  1150 03/24/24  0715 03/23/24  1724 03/23/24  1037 03/23/24  0806 03/22/24  1740 03/22/24  1218 03/22/24  1023   POC GLUCOSE mg/dl 185* 237* 107 136  253* 232* 171* 394* 231* 283* 244* 372*         Results from last 7 days   Lab Units 03/21/24  0435   PROCALCITONIN ng/ml 9.04*       Lines/Drains:  Invasive Devices       Peripheral Intravenous Line  Duration             Peripheral IV 03/23/24 Dorsal (posterior);Right Forearm 2 days                          Imaging: Reviewed radiology reports from this admission including: chest xray    Recent Cultures (last 7 days):   Results from last 7 days   Lab Units 03/20/24  0448   LEGIONELLA URINARY ANTIGEN  Negative       Last 24 Hours Medication List:   Current Facility-Administered Medications   Medication Dose Route Frequency Provider Last Rate    acetaminophen  650 mg Oral Q6H PRN Angella Suárez PA-C      Artificial Tears  1 drop Both Eyes BID Toya Parekh MD      aspirin  81 mg Oral Daily Angella Suárez PA-C      bisacodyl  10 mg Rectal Once Angella Suárez PA-C      cloNIDine  1 patch Transdermal Weekly Angella Suárez PA-C      famotidine  10 mg Oral Daily Angella Suárez PA-C      ferrous sulfate  325 mg Oral Once per day on Monday Wednesday Friday Israel Enriquez MD      guaiFENesin  600 mg Oral BID Angella Suárez PA-C      heparin (porcine)  5,000 Units Subcutaneous Q8H JONO Angella Suárez PA-C      insulin lispro  2-12 Units Subcutaneous TID AC Berenice Hollingsworth MD      insulin lispro  4 Units Subcutaneous TID With Meals Toya Parekh MD      levalbuterol  1.25 mg Nebulization TID Angella Suárez PA-C      nebivolol  20 mg Oral Daily Angella Suárez PA-C      NIFEdipine  90 mg Oral Daily Angella Suárez PA-C      pravastatin  40 mg Oral Daily With Dinner Angella Suárez PA-C      pregabalin  50 mg Oral BID Angella Suárez PA-C      sertraline  100 mg Oral Daily Angella Suárez PA-C      sodium chloride  4 mL Nebulization TID Angella Suárez PA-C      torsemide  20 mg Oral Daily Irlanda O'Alexander, CRNP      transdermal buprenorphine  1 patch Transdermal Q7 Days Angella Suárez PA-C          Today, Patient Was Seen By: Debbi Nelson MD    **Please Note: This note may  have been constructed using a voice recognition system.**

## 2024-03-25 NOTE — PLAN OF CARE
Problem: Potential for Falls  Goal: Patient will remain free of falls  Description: INTERVENTIONS:  - Educate patient/family on patient safety including physical limitations  - Instruct patient to call for assistance with activity   - Consult OT/PT to assist with strengthening/mobility   - Keep Call bell within reach  - Keep bed low and locked with side rails adjusted as appropriate  - Keep care items and personal belongings within reach  - Initiate and maintain comfort rounds  - Make Fall Risk Sign visible to staff  - Offer Toileting every 2 Hours, in advance of need  - Initiate/Maintain bed alarm  - Obtain necessary fall risk management equipment: non slip socks  - Apply yellow socks and bracelet for high fall risk patients  - Consider moving patient to room near nurses station  Outcome: Progressing     Problem: SAFETY ADULT  Goal: Patient will remain free of falls  Description: INTERVENTIONS:  - Educate patient/family on patient safety including physical limitations  - Instruct patient to call for assistance with activity   - Consult OT/PT to assist with strengthening/mobility   - Keep Call bell within reach  - Keep bed low and locked with side rails adjusted as appropriate  - Keep care items and personal belongings within reach  - Initiate and maintain comfort rounds  - Make Fall Risk Sign visible to staff  - Offer Toileting every 2 Hours, in advance of need  - Initiate/Maintain bed alarm  - Obtain necessary fall risk management equipment: non slip socks  - Apply yellow socks and bracelet for high fall risk patients  - Consider moving patient to room near nurses station  Outcome: Progressing  Goal: Maintain or return to baseline ADL function  Description: INTERVENTIONS:  -  Assess patient's ability to carry out ADLs; assess patient's baseline for ADL function and identify physical deficits which impact ability to perform ADLs (bathing, care of mouth/teeth, toileting, grooming, dressing, etc.)  -  Assess/evaluate cause of self-care deficits   - Assess range of motion  - Assess patient's mobility; develop plan if impaired  - Assess patient's need for assistive devices and provide as appropriate  - Encourage maximum independence but intervene and supervise when necessary  - Involve family in performance of ADLs  - Assess for home care needs following discharge   - Consider OT consult to assist with ADL evaluation and planning for discharge  - Provide patient education as appropriate  Outcome: Progressing  Goal: Maintains/Returns to pre admission functional level  Description: INTERVENTIONS:  - Perform AM-PAC 6 Click Basic Mobility/ Daily Activity assessment daily.  - Set and communicate daily mobility goal to care team and patient/family/caregiver.   - Collaborate with rehabilitation services on mobility goals if consulted  - Perform Range of Motion 3 times a day.  - Reposition patient every 2 hours.  - Dangle patient 3 times a day  - Stand patient 3 times a day  - Ambulate patient 3 times a day  - Out of bed to chair 3 times a day   - Out of bed for meals 3 times a day  - Out of bed for toileting  - Record patient progress and toleration of activity level   Outcome: Progressing     Problem: DISCHARGE PLANNING  Goal: Discharge to home or other facility with appropriate resources  Description: INTERVENTIONS:  - Identify barriers to discharge w/patient and caregiver  - Arrange for needed discharge resources and transportation as appropriate  - Identify discharge learning needs (meds, wound care, etc.)  - Arrange for interpretive services to assist at discharge as needed  - Refer to Case Management Department for coordinating discharge planning if the patient needs post-hospital services based on physician/advanced practitioner order or complex needs related to functional status, cognitive ability, or social support system  Outcome: Progressing     Problem: Knowledge Deficit  Goal: Patient/family/caregiver  demonstrates understanding of disease process, treatment plan, medications, and discharge instructions  Description: Complete learning assessment and assess knowledge base.  Interventions:  - Provide teaching at level of understanding  - Provide teaching via preferred learning methods  Outcome: Progressing     Problem: Prexisting or High Potential for Compromised Skin Integrity  Goal: Skin integrity is maintained or improved  Description: INTERVENTIONS:  - Identify patients at risk for skin breakdown  - Assess and monitor skin integrity  - Assess and monitor nutrition and hydration status  - Monitor labs   - Assess for incontinence   - Turn and reposition patient  - Assist with mobility/ambulation  - Relieve pressure over bony prominences  - Avoid friction and shearing  - Provide appropriate hygiene as needed including keeping skin clean and dry  - Evaluate need for skin moisturizer/barrier cream  - Collaborate with interdisciplinary team   - Patient/family teaching  - Consider wound care consult   Outcome: Progressing     Problem: Nutrition/Hydration-ADULT  Goal: Nutrient/Hydration intake appropriate for improving, restoring or maintaining nutritional needs  Description: Monitor and assess patient's nutrition/hydration status for malnutrition. Collaborate with interdisciplinary team and initiate plan and interventions as ordered.  Monitor patient's weight and dietary intake as ordered or per policy. Utilize nutrition screening tool and intervene as necessary. Determine patient's food preferences and provide high-protein, high-caloric foods as appropriate.     INTERVENTIONS:  - Monitor oral intake, urinary output, labs, and treatment plans  - Assess nutrition and hydration status and recommend course of action  - Evaluate amount of meals eaten  - Assist patient with eating if necessary   - Allow adequate time for meals  - Recommend/ encourage appropriate diets, oral nutritional supplements, and vitamin/mineral  supplements  - Order, calculate, and assess calorie counts as needed  - Recommend, monitor, and adjust tube feedings and TPN/PPN based on assessed needs  - Assess need for intravenous fluids  - Provide specific nutrition/hydration education as appropriate  - Include patient/family/caregiver in decisions related to nutrition  Outcome: Progressing     Problem: RESPIRATORY - ADULT  Goal: Achieves optimal ventilation and oxygenation  Description: INTERVENTIONS:  - Assess for changes in respiratory status  - Assess for changes in mentation and behavior  - Position to facilitate oxygenation and minimize respiratory effort  - Oxygen administered by appropriate delivery if ordered  - Initiate smoking cessation education as indicated  - Encourage broncho-pulmonary hygiene including cough, deep breathe, Incentive Spirometry  - Assess the need for suctioning and aspirate as needed  - Assess and instruct to report SOB or any respiratory difficulty  - Respiratory Therapy support as indicated  Outcome: Progressing

## 2024-03-25 NOTE — ASSESSMENT & PLAN NOTE
Lab Results   Component Value Date    EGFR 18 03/25/2024    EGFR 19 03/24/2024    EGFR 18 03/23/2024    CREATININE 2.27 (H) 03/25/2024    CREATININE 2.23 (H) 03/24/2024    CREATININE 2.26 (H) 03/23/2024     Follows up with nephrology as outpatient  Baseline creatinine appears to be 1.6-1.9 as per nephrology, EGFR in mid 20s  Monitor BMP

## 2024-03-25 NOTE — ASSESSMENT & PLAN NOTE
Recent Labs     03/23/24  1608 03/24/24  0337 03/25/24  0230   CREATININE 2.26* 2.23* 2.27*   EGFR 18 19 18     Estimated Creatinine Clearance: 16 mL/min (A) (by C-G formula based on SCr of 2.27 mg/dL (H)).   Baseline creatinine less than 2.  Nephrology on board, appreciate recommendations.  Per nephrology this might be her new baseline  On torsemide 20 mg daily, creatinine stable  Monitor

## 2024-03-25 NOTE — ASSESSMENT & PLAN NOTE
Recent Labs     03/23/24  0215 03/23/24  1608 03/24/24  0337 03/25/24  0230   K 3.3* 3.4* 3.6 3.2*   MG 2.1  --  2.1  --    Replenished.  Monitor

## 2024-03-25 NOTE — PLAN OF CARE
Problem: Potential for Falls  Goal: Patient will remain free of falls  Description: INTERVENTIONS:  - Educate patient/family on patient safety including physical limitations  - Instruct patient to call for assistance with activity   - Consult OT/PT to assist with strengthening/mobility   - Keep Call bell within reach  - Keep bed low and locked with side rails adjusted as appropriate  - Keep care items and personal belongings within reach  - Initiate and maintain comfort rounds  - Make Fall Risk Sign visible to staff  - Offer Toileting every 2 Hours, in advance of need  - Initiate/Maintain bed alarm  - Obtain necessary fall risk management equipment: non slip socks  - Apply yellow socks and bracelet for high fall risk patients  - Consider moving patient to room near nurses station  Outcome: Progressing     Problem: SAFETY ADULT  Goal: Patient will remain free of falls  Description: INTERVENTIONS:  - Educate patient/family on patient safety including physical limitations  - Instruct patient to call for assistance with activity   - Consult OT/PT to assist with strengthening/mobility   - Keep Call bell within reach  - Keep bed low and locked with side rails adjusted as appropriate  - Keep care items and personal belongings within reach  - Initiate and maintain comfort rounds  - Make Fall Risk Sign visible to staff  - Offer Toileting every 2 Hours, in advance of need  - Initiate/Maintain bed alarm  - Obtain necessary fall risk management equipment: non slip socks  - Apply yellow socks and bracelet for high fall risk patients  - Consider moving patient to room near nurses station  Outcome: Progressing  Goal: Maintain or return to baseline ADL function  Description: INTERVENTIONS:  -  Assess patient's ability to carry out ADLs; assess patient's baseline for ADL function and identify physical deficits which impact ability to perform ADLs (bathing, care of mouth/teeth, toileting, grooming, dressing, etc.)  -  Assess/evaluate cause of self-care deficits   - Assess range of motion  - Assess patient's mobility; develop plan if impaired  - Assess patient's need for assistive devices and provide as appropriate  - Encourage maximum independence but intervene and supervise when necessary  - Involve family in performance of ADLs  - Assess for home care needs following discharge   - Consider OT consult to assist with ADL evaluation and planning for discharge  - Provide patient education as appropriate  Outcome: Progressing  Goal: Maintains/Returns to pre admission functional level  Description: INTERVENTIONS:  - Perform AM-PAC 6 Click Basic Mobility/ Daily Activity assessment daily.  - Set and communicate daily mobility goal to care team and patient/family/caregiver.   - Collaborate with rehabilitation services on mobility goals if consulted  - Perform Range of Motion x times a day.  - Reposition patient every x hours.  - Dangle patient xx times a day  - Stand patient x times a day  - Ambulate patient x times a day  - Out of bed to chair x times a day   - Out of bed for meals x times a day  - Out of bed for toileting  - Record patient progress and toleration of activity level   Outcome: Progressing     Problem: DISCHARGE PLANNING  Goal: Discharge to home or other facility with appropriate resources  Description: INTERVENTIONS:  - Identify barriers to discharge w/patient and caregiver  - Arrange for needed discharge resources and transportation as appropriate  - Identify discharge learning needs (meds, wound care, etc.)  - Arrange for interpretive services to assist at discharge as needed  - Refer to Case Management Department for coordinating discharge planning if the patient needs post-hospital services based on physician/advanced practitioner order or complex needs related to functional status, cognitive ability, or social support system  Outcome: Progressing     Problem: Knowledge Deficit  Goal: Patient/family/caregiver  demonstrates understanding of disease process, treatment plan, medications, and discharge instructions  Description: Complete learning assessment and assess knowledge base.  Interventions:  - Provide teaching at level of understanding  - Provide teaching via preferred learning methods  Outcome: Progressing     Problem: Prexisting or High Potential for Compromised Skin Integrity  Goal: Skin integrity is maintained or improved  Description: INTERVENTIONS:  - Identify patients at risk for skin breakdown  - Assess and monitor skin integrity  - Assess and monitor nutrition and hydration status  - Monitor labs   - Assess for incontinence   - Turn and reposition patient  - Assist with mobility/ambulation  - Relieve pressure over bony prominences  - Avoid friction and shearing  - Provide appropriate hygiene as needed including keeping skin clean and dry  - Evaluate need for skin moisturizer/barrier cream  - Collaborate with interdisciplinary team   - Patient/family teaching  - Consider wound care consult   Outcome: Progressing     Problem: Nutrition/Hydration-ADULT  Goal: Nutrient/Hydration intake appropriate for improving, restoring or maintaining nutritional needs  Description: Monitor and assess patient's nutrition/hydration status for malnutrition. Collaborate with interdisciplinary team and initiate plan and interventions as ordered.  Monitor patient's weight and dietary intake as ordered or per policy. Utilize nutrition screening tool and intervene as necessary. Determine patient's food preferences and provide high-protein, high-caloric foods as appropriate.     INTERVENTIONS:  - Monitor oral intake, urinary output, labs, and treatment plans  - Assess nutrition and hydration status and recommend course of action  - Evaluate amount of meals eaten  - Assist patient with eating if necessary   - Allow adequate time for meals  - Recommend/ encourage appropriate diets, oral nutritional supplements, and vitamin/mineral  supplements  - Order, calculate, and assess calorie counts as needed  - Recommend, monitor, and adjust tube feedings and TPN/PPN based on assessed needs  - Assess need for intravenous fluids  - Provide specific nutrition/hydration education as appropriate  - Include patient/family/caregiver in decisions related to nutrition  Outcome: Progressing     Problem: RESPIRATORY - ADULT  Goal: Achieves optimal ventilation and oxygenation  Description: INTERVENTIONS:  - Assess for changes in respiratory status  - Assess for changes in mentation and behavior  - Position to facilitate oxygenation and minimize respiratory effort  - Oxygen administered by appropriate delivery if ordered  - Initiate smoking cessation education as indicated  - Encourage broncho-pulmonary hygiene including cough, deep breathe, Incentive Spirometry  - Assess the need for suctioning and aspirate as needed  - Assess and instruct to report SOB or any respiratory difficulty  - Respiratory Therapy support as indicated  Outcome: Progressing

## 2024-03-25 NOTE — ASSESSMENT & PLAN NOTE
Acute respiratory failure with hypoxia presents on admission , requiring nasal cannula O2 supplementation ~ 5 Liters which is new for the patient ; most likely cause of hypoxemia is her RLL Pneumonia  on admission as evident on Xray Chest.     On 4 L nasal cannula oxygen this morning, turned the oxygen to 2 L saturating well  Wean off nasal cannula oxygen to maintain saturation greater than 92%  Home O2 eval prior

## 2024-03-26 ENCOUNTER — TELEPHONE (OUTPATIENT)
Dept: NEPHROLOGY | Facility: CLINIC | Age: 89
End: 2024-03-26

## 2024-03-26 VITALS
BODY MASS INDEX: 29.28 KG/M2 | WEIGHT: 159.1 LBS | TEMPERATURE: 97.5 F | OXYGEN SATURATION: 94 % | DIASTOLIC BLOOD PRESSURE: 47 MMHG | RESPIRATION RATE: 20 BRPM | HEART RATE: 68 BPM | HEIGHT: 62 IN | SYSTOLIC BLOOD PRESSURE: 125 MMHG

## 2024-03-26 DIAGNOSIS — I10 PRIMARY HYPERTENSION: ICD-10-CM

## 2024-03-26 DIAGNOSIS — E87.6 HYPOKALEMIA: Primary | ICD-10-CM

## 2024-03-26 DIAGNOSIS — R80.9 PROTEINURIA, UNSPECIFIED TYPE: ICD-10-CM

## 2024-03-26 DIAGNOSIS — N18.4 CKD (CHRONIC KIDNEY DISEASE), STAGE IV (HCC): ICD-10-CM

## 2024-03-26 LAB
ANION GAP SERPL CALCULATED.3IONS-SCNC: 7 MMOL/L (ref 4–13)
BUN SERPL-MCNC: 45 MG/DL (ref 5–25)
CALCIUM SERPL-MCNC: 8.7 MG/DL (ref 8.4–10.2)
CHLORIDE SERPL-SCNC: 106 MMOL/L (ref 96–108)
CO2 SERPL-SCNC: 29 MMOL/L (ref 21–32)
CREAT SERPL-MCNC: 2.03 MG/DL (ref 0.6–1.3)
ERYTHROCYTE [DISTWIDTH] IN BLOOD BY AUTOMATED COUNT: 14.9 % (ref 11.6–15.1)
GFR SERPL CREATININE-BSD FRML MDRD: 21 ML/MIN/1.73SQ M
GLUCOSE SERPL-MCNC: 207 MG/DL (ref 65–140)
GLUCOSE SERPL-MCNC: 237 MG/DL (ref 65–140)
GLUCOSE SERPL-MCNC: 246 MG/DL (ref 65–140)
HCT VFR BLD AUTO: 36.2 % (ref 34.8–46.1)
HGB BLD-MCNC: 11.1 G/DL (ref 11.5–15.4)
MCH RBC QN AUTO: 29.6 PG (ref 26.8–34.3)
MCHC RBC AUTO-ENTMCNC: 30.7 G/DL (ref 31.4–37.4)
MCV RBC AUTO: 97 FL (ref 82–98)
PLATELET # BLD AUTO: 252 THOUSANDS/UL (ref 149–390)
PMV BLD AUTO: 10.4 FL (ref 8.9–12.7)
POTASSIUM SERPL-SCNC: 4.5 MMOL/L (ref 3.5–5.3)
RBC # BLD AUTO: 3.75 MILLION/UL (ref 3.81–5.12)
SODIUM SERPL-SCNC: 142 MMOL/L (ref 135–147)
WBC # BLD AUTO: 12.9 THOUSAND/UL (ref 4.31–10.16)

## 2024-03-26 PROCEDURE — 94761 N-INVAS EAR/PLS OXIMETRY MLT: CPT

## 2024-03-26 PROCEDURE — 82948 REAGENT STRIP/BLOOD GLUCOSE: CPT

## 2024-03-26 PROCEDURE — 94760 N-INVAS EAR/PLS OXIMETRY 1: CPT

## 2024-03-26 PROCEDURE — 80048 BASIC METABOLIC PNL TOTAL CA: CPT | Performed by: INTERNAL MEDICINE

## 2024-03-26 PROCEDURE — 99239 HOSP IP/OBS DSCHRG MGMT >30: CPT | Performed by: INTERNAL MEDICINE

## 2024-03-26 PROCEDURE — 85027 COMPLETE CBC AUTOMATED: CPT | Performed by: INTERNAL MEDICINE

## 2024-03-26 PROCEDURE — 97530 THERAPEUTIC ACTIVITIES: CPT

## 2024-03-26 PROCEDURE — 99232 SBSQ HOSP IP/OBS MODERATE 35: CPT | Performed by: INTERNAL MEDICINE

## 2024-03-26 PROCEDURE — 94640 AIRWAY INHALATION TREATMENT: CPT

## 2024-03-26 PROCEDURE — 97116 GAIT TRAINING THERAPY: CPT

## 2024-03-26 RX ORDER — INSULIN GLARGINE 100 [IU]/ML
12 INJECTION, SOLUTION SUBCUTANEOUS EVERY MORNING
Status: DISCONTINUED | OUTPATIENT
Start: 2024-03-26 | End: 2024-03-26 | Stop reason: HOSPADM

## 2024-03-26 RX ORDER — GUAIFENESIN 600 MG/1
600 TABLET, EXTENDED RELEASE ORAL 2 TIMES DAILY
Qty: 14 TABLET | Refills: 0 | Status: SHIPPED | OUTPATIENT
Start: 2024-03-26 | End: 2024-04-02

## 2024-03-26 RX ORDER — FERROUS SULFATE 325(65) MG
325 TABLET ORAL 3 TIMES WEEKLY
Qty: 12 TABLET | Refills: 0 | Status: SHIPPED | OUTPATIENT
Start: 2024-03-27 | End: 2024-04-26

## 2024-03-26 RX ADMIN — SODIUM CHLORIDE SOLN NEBU 3% 4 ML: 3 NEBU SOLN at 07:28

## 2024-03-26 RX ADMIN — SODIUM CHLORIDE SOLN NEBU 3% 4 ML: 3 NEBU SOLN at 12:57

## 2024-03-26 RX ADMIN — HEPARIN SODIUM 5000 UNITS: 5000 INJECTION INTRAVENOUS; SUBCUTANEOUS at 13:05

## 2024-03-26 RX ADMIN — NEBIVOLOL 20 MG: 5 TABLET ORAL at 09:02

## 2024-03-26 RX ADMIN — NIFEDIPINE 90 MG: 30 TABLET, EXTENDED RELEASE ORAL at 09:02

## 2024-03-26 RX ADMIN — TORSEMIDE 20 MG: 20 TABLET ORAL at 09:02

## 2024-03-26 RX ADMIN — LEVALBUTEROL HYDROCHLORIDE 1.25 MG: 1.25 SOLUTION RESPIRATORY (INHALATION) at 07:29

## 2024-03-26 RX ADMIN — PREGABALIN 50 MG: 25 CAPSULE ORAL at 09:02

## 2024-03-26 RX ADMIN — FAMOTIDINE 10 MG: 20 TABLET ORAL at 09:01

## 2024-03-26 RX ADMIN — LEVALBUTEROL HYDROCHLORIDE 1.25 MG: 1.25 SOLUTION RESPIRATORY (INHALATION) at 12:57

## 2024-03-26 RX ADMIN — HEPARIN SODIUM 5000 UNITS: 5000 INJECTION INTRAVENOUS; SUBCUTANEOUS at 05:41

## 2024-03-26 RX ADMIN — INSULIN LISPRO 4 UNITS: 100 INJECTION, SOLUTION INTRAVENOUS; SUBCUTANEOUS at 12:44

## 2024-03-26 RX ADMIN — INSULIN LISPRO 4 UNITS: 100 INJECTION, SOLUTION INTRAVENOUS; SUBCUTANEOUS at 12:42

## 2024-03-26 RX ADMIN — SERTRALINE 100 MG: 100 TABLET, FILM COATED ORAL at 09:02

## 2024-03-26 RX ADMIN — GUAIFENESIN 600 MG: 600 TABLET ORAL at 09:02

## 2024-03-26 RX ADMIN — ASPIRIN 81 MG: 81 TABLET, COATED ORAL at 09:02

## 2024-03-26 RX ADMIN — INSULIN LISPRO 4 UNITS: 100 INJECTION, SOLUTION INTRAVENOUS; SUBCUTANEOUS at 08:59

## 2024-03-26 RX ADMIN — INSULIN GLARGINE 12 UNITS: 100 INJECTION, SOLUTION SUBCUTANEOUS at 12:44

## 2024-03-26 RX ADMIN — DEXTRAN 70, GLYCERIN, HYPROMELLOSE 1 DROP: 1; 2; 3 SOLUTION/ DROPS OPHTHALMIC at 08:59

## 2024-03-26 NOTE — NURSING NOTE
An ambulatory pulse-oxygen evaluation was performed by myself, Rosette Nix RN. The patient was on room air during the evaluation. The patient walked approximately 50 feet and maintained 87% SpO2 for half of the walk. During the second half of the walk, the patient dipped to 83%. The patient had no complaints of fatigue, shortness of breath, lightheadedness, or dizziness. The patient requested to do a couple steps due to having steps at home. The patient did approximately 5 steps and SpO2 dropped to 81%. The patient was ambulated back to her room where supplemental O2 was provided.

## 2024-03-26 NOTE — OCCUPATIONAL THERAPY NOTE
Occupational Therapy Treatment Note    Patient Name: Ena Ahumada  Today's Date: 3/26/2024     03/26/24 1050   OT Last Visit   OT Visit Date 03/26/24   Note Type   Note Type Treatment   Pain Assessment   Pain Assessment Tool 0-10   Pain Score No Pain   Restrictions/Precautions   Weight Bearing Precautions Per Order No   Other Precautions O2;Fluid restriction  (2L NC)   Lifestyle   Reciprocal Relationships Daughter present for session   ADL   Eating Assistance 7  Independent   Grooming Assistance 7  Independent   UB Bathing Assistance 6  Modified Independent   LB Bathing Assistance 6  Modified Independent   UB Dressing Assistance 6  Modified independent   LB Dressing Assistance 6  Modified independent   Toileting Assistance  6  Modified independent   Bed Mobility   Additional Comments Pt seated at EOB upon arrival   Transfers   Sit to Stand 6  Modified independent   Stand to Sit 6  Modified independent   Stand pivot 6  Modified independent   Functional Mobility   Functional Mobility 6  Modified independent   Additional Comments no AD. In hallway, collaborated with respiratory therapy.   Subjective   Subjective I feel good   Cognition   Overall Cognitive Status WFL   Arousal/Participation Alert;Cooperative   Attention Within functional limits   Orientation Level Oriented X4   Memory Within functional limits   Following Commands Follows all commands and directions without difficulty   Activity Tolerance   Activity Tolerance Patient tolerated treatment well   Medical Staff Made Aware RUPERTO Dinero, RT Leslie   Assessment   Assessment Patient participated in Skilled OT session this date with interventions consisting of ADL re training with the use of correct body mechnaics, safety awareness and fall prevention techniques, and  therapeutic activities to: increase activity tolerance . Patient agreeable to OT treatment session, upon arrival patient was found seated at edge of bed.  Treatment session as follows: Pt has  progressed to mod I level. Daughter present for session and states pt is at her functional baseline. See flowsheet for details. The patient's raw score on the AM-PAC Daily Activity inpatient short form is 24, standardized score is 57.54, greater than 39.4. Patients at this level are likely to benefit from DC to home. From OT standpoint, recommendation at time of d/c would be level 4.  Pt left with call bell in reach, tray table in reach, needs met, daughter & RT present.   Plan   OT Treatment Day 2   Discharge Recommendation   Rehab Resource Intensity Level, OT No post-acute rehabilitation needs   AM-PAC Daily Activity Inpatient   Lower Body Dressing 4   Bathing 4   Toileting 4   Upper Body Dressing 4   Grooming 4   Eating 4   Daily Activity Raw Score 24   Daily Activity Standardized Score (Calc for Raw Score >=11) 57.54     Angelina Jones MS, OTR/L

## 2024-03-26 NOTE — PLAN OF CARE
Problem: Potential for Falls  Goal: Patient will remain free of falls  Description: INTERVENTIONS:  - Educate patient/family on patient safety including physical limitations  - Instruct patient to call for assistance with activity   - Consult OT/PT to assist with strengthening/mobility   - Keep Call bell within reach  - Keep bed low and locked with side rails adjusted as appropriate  - Keep care items and personal belongings within reach  - Initiate and maintain comfort rounds  - Make Fall Risk Sign visible to staff  - Offer Toileting every 2 Hours, in advance of need  - Initiate/Maintain bed alarm  - Obtain necessary fall risk management equipment: non slip socks  - Apply yellow socks and bracelet for high fall risk patients  - Consider moving patient to room near nurses station  3/26/2024 1512 by Rosette Nix  Outcome: Adequate for Discharge  3/26/2024 1136 by Rosette Nix  Outcome: Progressing     Problem: SAFETY ADULT  Goal: Patient will remain free of falls  Description: INTERVENTIONS:  - Educate patient/family on patient safety including physical limitations  - Instruct patient to call for assistance with activity   - Consult OT/PT to assist with strengthening/mobility   - Keep Call bell within reach  - Keep bed low and locked with side rails adjusted as appropriate  - Keep care items and personal belongings within reach  - Initiate and maintain comfort rounds  - Make Fall Risk Sign visible to staff  - Offer Toileting every 2 Hours, in advance of need  - Initiate/Maintain bed alarm  - Obtain necessary fall risk management equipment: non slip socks  - Apply yellow socks and bracelet for high fall risk patients  - Consider moving patient to room near nurses station  3/26/2024 1512 by Rosette Nix  Outcome: Adequate for Discharge  3/26/2024 1136 by Rosette Nix  Outcome: Progressing  Goal: Maintain or return to baseline ADL function  Description: INTERVENTIONS:  -  Assess patient's ability to  carry out ADLs; assess patient's baseline for ADL function and identify physical deficits which impact ability to perform ADLs (bathing, care of mouth/teeth, toileting, grooming, dressing, etc.)  - Assess/evaluate cause of self-care deficits   - Assess range of motion  - Assess patient's mobility; develop plan if impaired  - Assess patient's need for assistive devices and provide as appropriate  - Encourage maximum independence but intervene and supervise when necessary  - Involve family in performance of ADLs  - Assess for home care needs following discharge   - Consider OT consult to assist with ADL evaluation and planning for discharge  - Provide patient education as appropriate  3/26/2024 1512 by Rosette Nix  Outcome: Adequate for Discharge  3/26/2024 1136 by Rosette Nix  Outcome: Progressing  Goal: Maintains/Returns to pre admission functional level  Description: INTERVENTIONS:  - Perform AM-PAC 6 Click Basic Mobility/ Daily Activity assessment daily.  - Set and communicate daily mobility goal to care team and patient/family/caregiver.   - Collaborate with rehabilitation services on mobility goals if consulted  - Perform Range of Motion 2 times a day.  - Reposition patient every 2 hours.  - Dangle patient 2 times a day  - Stand patient 2 times a day  - Ambulate patient 2 times a day  - Out of bed to chair 2 times a day   - Out of bed for meals 2 times a day  - Out of bed for toileting  - Record patient progress and toleration of activity level   3/26/2024 1512 by Rosette Nix  Outcome: Adequate for Discharge  3/26/2024 1136 by Rosette Nix  Outcome: Progressing     Problem: DISCHARGE PLANNING  Goal: Discharge to home or other facility with appropriate resources  Description: INTERVENTIONS:  - Identify barriers to discharge w/patient and caregiver  - Arrange for needed discharge resources and transportation as appropriate  - Identify discharge learning needs (meds, wound care, etc.)  - Arrange  for interpretive services to assist at discharge as needed  - Refer to Case Management Department for coordinating discharge planning if the patient needs post-hospital services based on physician/advanced practitioner order or complex needs related to functional status, cognitive ability, or social support system  3/26/2024 1512 by Rosette iNx  Outcome: Adequate for Discharge  3/26/2024 1136 by Rosette Nix  Outcome: Progressing     Problem: Knowledge Deficit  Goal: Patient/family/caregiver demonstrates understanding of disease process, treatment plan, medications, and discharge instructions  Description: Complete learning assessment and assess knowledge base.  Interventions:  - Provide teaching at level of understanding  - Provide teaching via preferred learning methods  3/26/2024 1512 by Rosette Nix  Outcome: Adequate for Discharge  3/26/2024 1136 by Rosette Nix  Outcome: Progressing     Problem: Prexisting or High Potential for Compromised Skin Integrity  Goal: Skin integrity is maintained or improved  Description: INTERVENTIONS:  - Identify patients at risk for skin breakdown  - Assess and monitor skin integrity  - Assess and monitor nutrition and hydration status  - Monitor labs   - Assess for incontinence   - Turn and reposition patient  - Assist with mobility/ambulation  - Relieve pressure over bony prominences  - Avoid friction and shearing  - Provide appropriate hygiene as needed including keeping skin clean and dry  - Evaluate need for skin moisturizer/barrier cream  - Collaborate with interdisciplinary team   - Patient/family teaching  - Consider wound care consult   3/26/2024 1512 by Rosette Nix  Outcome: Adequate for Discharge  3/26/2024 1136 by Rosette Nix  Outcome: Progressing     Problem: Nutrition/Hydration-ADULT  Goal: Nutrient/Hydration intake appropriate for improving, restoring or maintaining nutritional needs  Description: Monitor and assess patient's  nutrition/hydration status for malnutrition. Collaborate with interdisciplinary team and initiate plan and interventions as ordered.  Monitor patient's weight and dietary intake as ordered or per policy. Utilize nutrition screening tool and intervene as necessary. Determine patient's food preferences and provide high-protein, high-caloric foods as appropriate.     INTERVENTIONS:  - Monitor oral intake, urinary output, labs, and treatment plans  - Assess nutrition and hydration status and recommend course of action  - Evaluate amount of meals eaten  - Assist patient with eating if necessary   - Allow adequate time for meals  - Recommend/ encourage appropriate diets, oral nutritional supplements, and vitamin/mineral supplements  - Order, calculate, and assess calorie counts as needed  - Recommend, monitor, and adjust tube feedings and TPN/PPN based on assessed needs  - Assess need for intravenous fluids  - Provide specific nutrition/hydration education as appropriate  - Include patient/family/caregiver in decisions related to nutrition  3/26/2024 1512 by Rosette Nix  Outcome: Adequate for Discharge  3/26/2024 1136 by Rosette Nix  Outcome: Progressing     Problem: OCCUPATIONAL THERAPY ADULT  Goal: Performs self-care activities at highest level of function for planned discharge setting.  See evaluation for individualized goals.  Description: Treatment Interventions: ADL retraining, Functional transfer training, Endurance training, Patient/family training, Equipment evaluation/education, Continued evaluation          See flowsheet documentation for full assessment, interventions and recommendations.   Outcome: Adequate for Discharge     Problem: RESPIRATORY - ADULT  Goal: Achieves optimal ventilation and oxygenation  Description: INTERVENTIONS:  - Assess for changes in respiratory status  - Assess for changes in mentation and behavior  - Position to facilitate oxygenation and minimize respiratory effort  -  Oxygen administered by appropriate delivery if ordered  - Initiate smoking cessation education as indicated  - Encourage broncho-pulmonary hygiene including cough, deep breathe, Incentive Spirometry  - Assess the need for suctioning and aspirate as needed  - Assess and instruct to report SOB or any respiratory difficulty  - Respiratory Therapy support as indicated  3/26/2024 1512 by Rosette Nix  Outcome: Adequate for Discharge  3/26/2024 1136 by Rosette Nix  Outcome: Progressing

## 2024-03-26 NOTE — ASSESSMENT & PLAN NOTE
Lab Results   Component Value Date    EGFR 21 03/26/2024    EGFR 18 03/25/2024    EGFR 19 03/24/2024    CREATININE 2.03 (H) 03/26/2024    CREATININE 2.27 (H) 03/25/2024    CREATININE 2.23 (H) 03/24/2024     Follows up with nephrology as outpatient  Baseline creatinine appears to be 1.6-1.9 as per nephrology, EGFR in mid 20s

## 2024-03-26 NOTE — NURSING NOTE
Patient discharged to home. Patient was wheeled off the unit via a wheelchair by the PCA. Prior to discharge, discharge paperwork, future appointments, change in medications, and supplemental oxygen education was explained. The patient and family verbalized understanding.

## 2024-03-26 NOTE — PHYSICAL THERAPY NOTE
"                                                                                  PHYSICAL THERAPY TREATMENT NOTE      Patient Name: Ena Ahumada  Today's Date: 3/26/2024       03/26/24 1320   PT Last Visit   PT Visit Date 03/26/24   Note Type   Note Type Treatment   Pain Assessment   Pain Assessment Tool 0-10   Pain Score No Pain   Restrictions/Precautions   Weight Bearing Precautions Per Order No   Other Precautions O2;Fluid restriction  (2L O2)   General   Chart Reviewed Yes   Additional Pertinent History Pt tested earlier today and determined need for home O2.   Family/Caregiver Present Yes   Cognition   Overall Cognitive Status WFL   Arousal/Participation Alert;Cooperative   Attention Within functional limits   Orientation Level Oriented X4   Memory Within functional limits   Following Commands Follows all commands and directions without difficulty   Comments Pt received pleasant and cooperative. Pt upset about need for home O2.   Subjective   Subjective \"I will go for a walk\".   Bed Mobility   Additional Comments Not assessed. Pt received sitting in chair.   Transfers   Sit to Stand 6  Modified independent   Additional items Armrests   Stand to Sit 6  Modified independent   Additional items Armrests   Ambulation/Elevation   Gait pattern   (reciprocal pattern, decreased rigoberto, intermittent mild unsteadiness but no LOB)   Gait Assistance 5  Supervision   Additional items Assist x 1   Assistive Device   (no device)   Distance 100ft  (1st trial)   Stair Management Assistance 6  Modified independent   Additional items Verbal cues   Stair Management Technique One rail R  (reciprocal for ascent and step to pattern for descent)   Number of Stairs 3  (x 2 trials)   Ambulation/Elevation Additional Comments 1st ambulation trial, pt wanted to trial on room air in hopes of better O2 saturation numbers. SpO2 85% on room air with mild unsteadiness during trial. Returned to room and placed on 2LO2. SpO2 values remained " >90%. Pt's balance improved and pt ambulated >250ft with 2 standing rest breaks.   Balance   Static Sitting Good   Dynamic Sitting Good   Static Standing Fair +   Dynamic Standing Fair   Ambulatory Fair   Activity Tolerance   Activity Tolerance Patient tolerated treatment well  (mild fatigue)   Assessment   Prognosis Good   Assessment Pt received sitting in chair, agreeable to therapy session. Pt demonstrated improved ability to perform transfers and ambulate a significantly further distance without use of the RW. Pt's gait quality and endurance would improve with the use of the walker, which was reinforced to patient and family. Pt demonstrated stable SpO2 values on 2L O2 via NC. The patient's AM-PAC Basic Mobility Inpatient Short Form Raw Score is 23. A Raw score of greater than 17 suggests the patient may benefit from discharge to home. Please also refer to the recommendation of the Physical Therapist for safe discharge planning. Based on current status, Level 3 rehab intensity, in an outpatient setting, is recommended at time of discharge. Pt has no further acute PT needs and is safe to return home with family supervision and assist when medically stable. Recommended use of RW at home for energy conservation. Pt is D/C from PT services.   Barriers to Discharge   (medical status)   Goals   Patient Goals to go home   Short Term Goal #1 goals met as adequate for D/C   PT Treatment Day 2   Plan   Progress Discontinue PT   PT Frequency   (D/C PT)   Discharge Recommendation   Rehab Resource Intensity Level, PT III (Minimum Resource Intensity)   AM-PAC Basic Mobility Inpatient   Turning in Flat Bed Without Bedrails 4   Lying on Back to Sitting on Edge of Flat Bed Without Bedrails 4   Moving Bed to Chair 4   Standing Up From Chair Using Arms 4   Walk in Room 4   Climb 3-5 Stairs With Railing 3   Basic Mobility Inpatient Raw Score 23   Basic Mobility Standardized Score 50.88   [facility] Level Of Mobility    JH-HLM Goal 7: Walk 25 feet or more   JH-HLM Achieved 8: Walk 250 feet ot more   Education   Education Provided Mobility training;Assistive device  (energy conservation)   Patient Demonstrates verbal understanding   End of Consult   Patient Position at End of Consult Bedside chair;All needs within reach  (family present)     Francisco Branch, PT

## 2024-03-26 NOTE — PROGRESS NOTES
NEPHROLOGY PROGRESS NOTE   Ena Ahumada 88 y.o. female MRN: [id number]  Unit/Bed#: -01 Encounter: [id number]      HPI/24hr EVENTS:    -88-year-old female past medical history of CKD 4, hypertension, hyperlipidemia, aortic stenosis.  Presented with nausea and vomiting.  Nephrology consult for management of NELA     -Stable renal function    ASSESSMENT/PLAN:    NELA on CKD 4  -Baseline creatinine 1.6-1.9  -Creatinine on admission 1.6, creatinine peaking at 2.74 in 3/20, now downtrending and stable at 2.03  -Etiology: Suspect contrast associated nephropathy versus prerenal azotemia from diuretic hyperglycemia  -UA: Glucose, ketones, trace blood, 1+ protein, 0-1 RBCs and WBCs, 0-3 granular casts and WBC casts  -CK was not elevated  -Renal ultrasound from 3/19/2024 with diffusely echogenic kidneys bilaterally, no hydronephrosis, mildly complex left renal cyst  -Follows with Dr. Baumann as outpatient, should follow-up upon discharge  -Oral diuretics were resumed 3/24 with torsemide 20 mg daily  -Renal function is stable might be new baseline     Complex left kidney cyst  -Follow-up as outpatient     Hypertension  -Currently on clonidine 0.1 mg patch, Bystolic 20 mg daily, Procardia XL 90 mg daily, torsemide 20 mg daily  -Recent blood pressures are acceptable     Acute hypoxic respiratory failure/sepsis/pneumonia  -Remains on 1 L nasal cannula, ox requirements have down trended since earlier on admission  -Completed course of antibiotics  -Pulmonology consulted on admission     Addition medical problems: DM2, hyperlipidemia, GERD, aortic stenosis, chronic pain    SUBJECTIVE:  Reports her breathing feels improved, appetite is good, only complaint is arm tremor    ROS:  Review of Systems   Constitutional: Negative.    Respiratory: Negative.     Cardiovascular: Negative.    Gastrointestinal: Negative.    Musculoskeletal: Negative.    Neurological:  Positive for tremors.      A complete 10 point review of systems was  performed and found to be negative unless otherwise noted above or in the HPI.    OBJECTIVE:  Current Weight: Weight - Scale: 72.2 kg (159 lb 1.6 oz)  Vitals:    03/25/24 1910 03/25/24 2026 03/26/24 0421 03/26/24 0728   BP:  138/61  144/52   BP Location:       Pulse:  70  73   Resp:    (!) 24   Temp:  98.5 °F (36.9 °C)  98.5 °F (36.9 °C)   TempSrc:       SpO2: 95% 93%  92%   Weight:   72.2 kg (159 lb 1.6 oz)    Height:           Intake/Output Summary (Last 24 hours) at 3/26/2024 0909  Last data filed at 3/26/2024 0400  Gross per 24 hour   Intake 936 ml   Output 750 ml   Net 186 ml     Physical Exam  Vitals and nursing note reviewed.   Constitutional:       General: She is not in acute distress.     Appearance: She is not toxic-appearing or diaphoretic.      Comments: Awake sitting at the edge of bed on supplemental oxygen   HENT:      Head: Normocephalic and atraumatic.      Nose: Nose normal.      Mouth/Throat:      Mouth: Mucous membranes are moist.   Eyes:      General: No scleral icterus.  Cardiovascular:      Rate and Rhythm: Normal rate.   Pulmonary:      Effort: Pulmonary effort is normal. No respiratory distress.      Breath sounds: No wheezing or rales.   Abdominal:      General: Abdomen is flat.   Musculoskeletal:      Cervical back: Neck supple.      Right lower leg: No edema.      Left lower leg: No edema.   Skin:     General: Skin is warm and dry.      Capillary Refill: Capillary refill takes less than 2 seconds.   Neurological:      Mental Status: She is alert and oriented to person, place, and time.          Medications:    Current Facility-Administered Medications:     acetaminophen (TYLENOL) tablet 650 mg, 650 mg, Oral, Q6H PRN, Angella Suárez PA-C, 650 mg at 03/18/24 1226    Artificial Tears ophthalmic solution 1 drop, 1 drop, Both Eyes, BID, Toya Parekh MD, 1 drop at 03/26/24 0859    aspirin (ECOTRIN LOW STRENGTH) EC tablet 81 mg, 81 mg, Oral, Daily, Angella Suárez PA-C, 81 mg at 03/26/24 0902     bisacodyl (DULCOLAX) rectal suppository 10 mg, 10 mg, Rectal, Once, Angella Suárez PA-C    cloNIDine (CATAPRES-TTS-1) 0.1 mg/24 hr TD weekly patch, 1 patch, Transdermal, Weekly, Angella Suárez PA-C, 0.1 mg at 03/20/24 0835    famotidine (PEPCID) tablet 10 mg, 10 mg, Oral, Daily, Angella Suárez PA-C, 10 mg at 03/26/24 0901    ferrous sulfate tablet 325 mg, 325 mg, Oral, Once per day on Monday Wednesday Friday, Israel Enriquez MD, 325 mg at 03/25/24 0930    guaiFENesin (MUCINEX) 12 hr tablet 600 mg, 600 mg, Oral, BID, Angella Suárez PA-C, 600 mg at 03/26/24 0902    heparin (porcine) subcutaneous injection 5,000 Units, 5,000 Units, Subcutaneous, Q8H JONO, Angella Suárez PA-C, 5,000 Units at 03/26/24 0541    insulin lispro (HumALOG/ADMELOG) 100 units/mL subcutaneous injection 2-12 Units, 2-12 Units, Subcutaneous, TID AC, 4 Units at 03/26/24 0859 **AND** Fingerstick Glucose (POCT), , , TID AC, Berenice Hollingsworth MD    insulin lispro (HumALOG/ADMELOG) 100 units/mL subcutaneous injection 4 Units, 4 Units, Subcutaneous, TID With Meals, Toya Parekh MD, 4 Units at 03/26/24 0859    levalbuterol (XOPENEX) inhalation solution 1.25 mg, 1.25 mg, Nebulization, TID, Angella Suárez PA-C, 1.25 mg at 03/26/24 0729    nebivolol (BYSTOLIC) tablet 20 mg, 20 mg, Oral, Daily, Angella Suárez PA-C, 20 mg at 03/26/24 0902    NIFEdipine (PROCARDIA XL) 24 hr tablet 90 mg, 90 mg, Oral, Daily, Angella Suárez PA-C, 90 mg at 03/26/24 0902    pravastatin (PRAVACHOL) tablet 40 mg, 40 mg, Oral, Daily With Dinner, Angella Suárez PA-C, 40 mg at 03/25/24 1735    pregabalin (LYRICA) capsule 50 mg, 50 mg, Oral, BID, Angella Suárez PA-C, 50 mg at 03/26/24 0902    sertraline (ZOLOFT) tablet 100 mg, 100 mg, Oral, Daily, Angella Suárez PA-C, 100 mg at 03/26/24 0902    sodium chloride 3 % inhalation solution 4 mL, 4 mL, Nebulization, TID, Angella Suárez PA-C, 4 mL at 03/26/24 0728    torsemide (DEMADEX) tablet 20 mg, 20 mg, Oral, Daily, KANA Correa, 20 mg at 03/26/24 0902    transdermal  buprenorphine (BUTRANS) 7.5 mcg/hr TD patch 1 patch, 1 patch, Transdermal, Q7 Days, Angella Suárez PA-C, 1 patch at 03/23/24 1923    Laboratory Results:  Results from last 7 days   Lab Units 03/26/24  0420 03/25/24  0230 03/24/24  0337 03/23/24  1608 03/23/24  0215 03/22/24  0413 03/21/24  0435 03/20/24  0448   WBC Thousand/uL 12.90* 15.03* 12.51*  --  14.25*  --  14.02* 19.12*   HEMOGLOBIN g/dL 11.1* 11.1* 11.2*  --  11.3*  --  12.1 12.6   HEMATOCRIT % 36.2 35.9 35.5  --  35.7  --  38.1 40.2   PLATELETS Thousands/uL 252 250 239  --  230  --  214 197   POTASSIUM mmol/L 4.5 3.2* 3.6 3.4* 3.3* 3.6 4.0 3.7   CHLORIDE mmol/L 106 105 105 105 105 103 101 99   CO2 mmol/L 29 30 30 31 27 24 23 22   BUN mg/dL 45* 56* 61* 65* 63* 64* 65* 59*   CREATININE mg/dL 2.03* 2.27* 2.23* 2.26* 2.45* 2.52* 2.51* 2.74*   CALCIUM mg/dL 8.7 8.4 8.4 8.5 8.1* 7.9* 7.9* 8.0*   MAGNESIUM mg/dL  --   --  2.1  --  2.1 2.5  --   --        I have personally reviewed the blood work as stated above and in my note.  I have personally reviewed internal medicine, pulmonology note.

## 2024-03-26 NOTE — PLAN OF CARE
Problem: Potential for Falls  Goal: Patient will remain free of falls  Description: INTERVENTIONS:  - Educate patient/family on patient safety including physical limitations  - Instruct patient to call for assistance with activity   - Consult OT/PT to assist with strengthening/mobility   - Keep Call bell within reach  - Keep bed low and locked with side rails adjusted as appropriate  - Keep care items and personal belongings within reach  - Initiate and maintain comfort rounds  - Make Fall Risk Sign visible to staff  - Offer Toileting every 2 Hours, in advance of need  - Initiate/Maintain bed alarm  - Obtain necessary fall risk management equipment: non slip socks  - Apply yellow socks and bracelet for high fall risk patients  - Consider moving patient to room near nurses station  Outcome: Progressing     Problem: SAFETY ADULT  Goal: Patient will remain free of falls  Description: INTERVENTIONS:  - Educate patient/family on patient safety including physical limitations  - Instruct patient to call for assistance with activity   - Consult OT/PT to assist with strengthening/mobility   - Keep Call bell within reach  - Keep bed low and locked with side rails adjusted as appropriate  - Keep care items and personal belongings within reach  - Initiate and maintain comfort rounds  - Make Fall Risk Sign visible to staff  - Offer Toileting every 2 Hours, in advance of need  - Initiate/Maintain bed alarm  - Obtain necessary fall risk management equipment: non slip socks  - Apply yellow socks and bracelet for high fall risk patients  - Consider moving patient to room near nurses station  Outcome: Progressing  Goal: Maintain or return to baseline ADL function  Description: INTERVENTIONS:  -  Assess patient's ability to carry out ADLs; assess patient's baseline for ADL function and identify physical deficits which impact ability to perform ADLs (bathing, care of mouth/teeth, toileting, grooming, dressing, etc.)  -  Assess/evaluate cause of self-care deficits   - Assess range of motion  - Assess patient's mobility; develop plan if impaired  - Assess patient's need for assistive devices and provide as appropriate  - Encourage maximum independence but intervene and supervise when necessary  - Involve family in performance of ADLs  - Assess for home care needs following discharge   - Consider OT consult to assist with ADL evaluation and planning for discharge  - Provide patient education as appropriate  Outcome: Progressing  Goal: Maintains/Returns to pre admission functional level  Description: INTERVENTIONS:  - Perform AM-PAC 6 Click Basic Mobility/ Daily Activity assessment daily.  - Set and communicate daily mobility goal to care team and patient/family/caregiver.   - Collaborate with rehabilitation services on mobility goals if consulted  - Perform Range of Motion x times a day.  - Reposition patient every x hours.  - Dangle patient x times a day  - Stand patient x times a day  - Ambulate patient x times a day  - Out of bed to chair x times a day   - Out of bed for meals x times a day  - Out of bed for toileting  - Record patient progress and toleration of activity level   Outcome: Progressing     Problem: DISCHARGE PLANNING  Goal: Discharge to home or other facility with appropriate resources  Description: INTERVENTIONS:  - Identify barriers to discharge w/patient and caregiver  - Arrange for needed discharge resources and transportation as appropriate  - Identify discharge learning needs (meds, wound care, etc.)  - Arrange for interpretive services to assist at discharge as needed  - Refer to Case Management Department for coordinating discharge planning if the patient needs post-hospital services based on physician/advanced practitioner order or complex needs related to functional status, cognitive ability, or social support system  Outcome: Progressing     Problem: Knowledge Deficit  Goal: Patient/family/caregiver  demonstrates understanding of disease process, treatment plan, medications, and discharge instructions  Description: Complete learning assessment and assess knowledge base.  Interventions:  - Provide teaching at level of understanding  - Provide teaching via preferred learning methods  Outcome: Progressing     Problem: Prexisting or High Potential for Compromised Skin Integrity  Goal: Skin integrity is maintained or improved  Description: INTERVENTIONS:  - Identify patients at risk for skin breakdown  - Assess and monitor skin integrity  - Assess and monitor nutrition and hydration status  - Monitor labs   - Assess for incontinence   - Turn and reposition patient  - Assist with mobility/ambulation  - Relieve pressure over bony prominences  - Avoid friction and shearing  - Provide appropriate hygiene as needed including keeping skin clean and dry  - Evaluate need for skin moisturizer/barrier cream  - Collaborate with interdisciplinary team   - Patient/family teaching  - Consider wound care consult   Outcome: Progressing     Problem: Nutrition/Hydration-ADULT  Goal: Nutrient/Hydration intake appropriate for improving, restoring or maintaining nutritional needs  Description: Monitor and assess patient's nutrition/hydration status for malnutrition. Collaborate with interdisciplinary team and initiate plan and interventions as ordered.  Monitor patient's weight and dietary intake as ordered or per policy. Utilize nutrition screening tool and intervene as necessary. Determine patient's food preferences and provide high-protein, high-caloric foods as appropriate.     INTERVENTIONS:  - Monitor oral intake, urinary output, labs, and treatment plans  - Assess nutrition and hydration status and recommend course of action  - Evaluate amount of meals eaten  - Assist patient with eating if necessary   - Allow adequate time for meals  - Recommend/ encourage appropriate diets, oral nutritional supplements, and vitamin/mineral  supplements  - Order, calculate, and assess calorie counts as needed  - Recommend, monitor, and adjust tube feedings and TPN/PPN based on assessed needs  - Assess need for intravenous fluids  - Provide specific nutrition/hydration education as appropriate  - Include patient/family/caregiver in decisions related to nutrition  Outcome: Progressing     Problem: RESPIRATORY - ADULT  Goal: Achieves optimal ventilation and oxygenation  Description: INTERVENTIONS:  - Assess for changes in respiratory status  - Assess for changes in mentation and behavior  - Position to facilitate oxygenation and minimize respiratory effort  - Oxygen administered by appropriate delivery if ordered  - Initiate smoking cessation education as indicated  - Encourage broncho-pulmonary hygiene including cough, deep breathe, Incentive Spirometry  - Assess the need for suctioning and aspirate as needed  - Assess and instruct to report SOB or any respiratory difficulty  - Respiratory Therapy support as indicated  Outcome: Progressing

## 2024-03-26 NOTE — ASSESSMENT & PLAN NOTE
Follows up with cardiology, last echo in April 2023  Echo-LVEF 65%, grade 1 diastolic dysfunction.  Moderate aortic stenosis  Monitor volume status  Continue home medications

## 2024-03-26 NOTE — DISCHARGE SUMMARY
[facility]  Discharge- Ena Ahumada 1935, 88 y.o. female MRN: [id number]  Unit/Bed#: [facility]-01 Encounter: [id number]  Primary Care Provider: Deanna Castaneda DO   Date and time admitted to hospital: 3/16/2024 12:05 PM    NELA (acute kidney injury) (HCC)  Assessment & Plan  Recent Labs     03/24/24  0337 03/25/24  0230 03/26/24  0420   CREATININE 2.23* 2.27* 2.03*   EGFR 19 18 21       Estimated Creatinine Clearance: 17.8 mL/min (A) (by C-G formula based on SCr of 2.03 mg/dL (H)).   Baseline creatinine less than 2.  Nephrology on board, appreciate recommendations.  Per nephrology this might be her new baseline  On torsemide 20 mg daily, creatinine stable  Follow up outpatient with PCP and nephrology    Acute respiratory failure with hypoxia (HCC)  Assessment & Plan  Acute respiratory failure with hypoxia presents on admission , requiring nasal cannula O2 supplementation ~ 5 Liters which is new for the patient ; most likely cause of hypoxemia is her RLL Pneumonia  on admission as evident on Xray Chest.     On 2 L nasal cannula oxygen this morning saturating well.  Patient had home O2 eval, qualified for oxygen at home  Patient will need to follow-up with pulmonology outpatient    Hypokalemia  Assessment & Plan  Recent Labs     03/24/24 0337 03/25/24  0230 03/26/24  0420   K 3.6 3.2* 4.5   MG 2.1  --   --      Replenished.     Pneumonia  Assessment & Plan  RLL Pneumonia on CT chest ; with SIRS criteria and hypoxemia   CTA chest done shows right lung pneumonia.    Pulmonology consulted.    received steroids for's severe pneumonia  Patient completed antibiotics        Chronic pain syndrome  Assessment & Plan  Follows up with pain medicine  Continue buprenorphine    Nonrheumatic aortic valve stenosis  Assessment & Plan  Follows up with cardiology, last echo in April 2023  Echo-LVEF 65%, grade 1 diastolic dysfunction.  Moderate aortic stenosis  Monitor volume status  Continue home  medications     Stage 3b chronic kidney disease (CKD) ([facility])  Assessment & Plan  Lab Results   Component Value Date    EGFR 21 03/26/2024    EGFR 18 03/25/2024    EGFR 19 03/24/2024    CREATININE 2.03 (H) 03/26/2024    CREATININE 2.27 (H) 03/25/2024    CREATININE 2.23 (H) 03/24/2024     Follows up with nephrology as outpatient  Baseline creatinine appears to be 1.6-1.9 as per nephrology, EGFR in mid 20s          Anxiety  Assessment & Plan  Continue Zoloft    Type 2 diabetes mellitus with stage 4 chronic kidney disease, without long-term current use of insulin ([facility])  Assessment & Plan  Lab Results   Component Value Date    HGBA1C 8.1 (H) 03/16/2024       Recent Labs     03/25/24  1133 03/25/24  1646 03/26/24  0725 03/26/24  1122   POCGLU 237* 185* 207* 246*         Blood Sugar Average: Last 72 hrs:  (P) [id number] up with endocrine, home regimen-Invokana, glipizide, Januvia    Continue home med regimen        * Sepsis (HCC)-resolved as of 3/25/2024  Assessment & Plan  SIRS criteria met : tachycardia, tachypnea, leukocytosis  Suspected source: Pneumonia  CXR: Lower lobe pneumonia  UA not suggestive UTI    CT: None  Continue to trend leukocytosis and fever curve  Procalcitonin: 18  Lactic acid: 2.5-resolved    Will need additional fluid depending on lactate and blood pressure  Blood Cultures pending   Will broaden antibiotic to cefepime, vancomycin discontinued with MRSA negative        Medical Problems       Resolved Problems  Date Reviewed: 3/17/2024            Resolved    * (Principal) Sepsis (HCC) 3/25/2024     Resolved by  Debbi Nelson MD        Discharging Physician / Practitioner: Debbi Nelson MD  PCP: Deanna Castaneda DO  Admission Date:   Admission Orders (From admission, onward)       Ordered        03/16/24 1314  INPATIENT ADMISSION  Once                          Discharge Date: 03/26/24    Consultations During Hospital Stay:  PT/OT endocrinology, nephrology,  pulmonology    Procedures Performed:   none    Significant Findings / Test Results:   XR chest portable  Result Date: 3/21/2024  Impression: Worsening right infiltrates especially right upper lung with left base involvement and left pleural effusion. Underlying mild vascular congestion. The study was marked in EPIC for immediate notification.     US kidney and bladder  Result Date: 3/19/2024  Impression: No hydronephrosis. Left-sided renal cysts. 1.1 cm cyst in the lower pole is mildly complex but similar to slightly smaller in size as compared to the study from 2020 although with a more simple appearance at that time. 6-month follow-up is advised.    XR chest portable  Result Date: 3/18/2024  Impression: Extensive groundglass opacity in the right lung compatible with pneumonia with trace effusions.     CTA chest pe study  Result Date: 3/18/2024  Impression: Large focal area of groundglass opacification involving the entirety of the right lung most consistent with pneumonia. No pulmonary embolus.     XR chest 1 view portable  Result Date: 3/16/2024  Impression: Right lower lobe predominant pneumonia.     Incidental Findings:   none     Test Results Pending at Discharge (will require follow up):   none     Outpatient Tests Requested:  none    Complications:  none    Reason for Admission: Fever, weakness, nausea and vomiting.    Hospital Course:   Ena Ahumada is a 88 y.o. female patient who originally presented to the hospital on 3/16/2024 due to fever, weakness, nausea and vomiting.  On presentation chest x-ray showed lower lobe pneumonia patient was started on antibiotics.  Patient oxygen status worsened requiring mid flow nasal cannula at antibiotics were broadened also patient was started on steroids with improvement.  Patient also had volume resuscitation may be she was slightly fluid overloaded, and developed acute kidney injury.  Nephrology was on board eventually patient was started on diuretics with  "improvement.  Function improved also respiratory status much improved.  Patient will need to follow-up with nephrology and pulmonology outpatient.      Please see above list of diagnoses and related plan for additional information.     Condition at Discharge: good    Discharge Day Visit / Exam:   Subjective:    Eager to go home   Vitals: Blood Pressure: (!) 125/47 (03/26/24 1400)  Pulse: 68 (03/26/24 1400)  Temperature: 97.5 °F (36.4 °C) (03/26/24 1400)  Temp Source: Oral (03/25/24 1506)  Respirations: 20 (03/26/24 1400)  Height: 5' 2\" (157.5 cm) (03/16/24 1407)  Weight - Scale: 72.2 kg (159 lb 1.6 oz) (03/26/24 0421)  SpO2: 94 % (03/26/24 1400)  Exam:   Physical Exam  Vitals and nursing note reviewed.   Constitutional:       General: She is not in acute distress.     Appearance: She is not diaphoretic.   HENT:      Head: Normocephalic.   Eyes:      General: No scleral icterus.        Right eye: No discharge.         Left eye: No discharge.   Cardiovascular:      Rate and Rhythm: Normal rate and regular rhythm.   Pulmonary:      Effort: Pulmonary effort is normal. No respiratory distress.      Breath sounds: Normal breath sounds. No wheezing, rhonchi or rales.   Abdominal:      General: There is no distension.      Palpations: Abdomen is soft.      Tenderness: There is no abdominal tenderness. There is no guarding or rebound.   Musculoskeletal:      Cervical back: Normal range of motion.      Right lower leg: No edema.      Left lower leg: No edema.   Skin:     General: Skin is warm.   Neurological:      Mental Status: She is alert and oriented to person, place, and time.   Psychiatric:         Mood and Affect: Mood normal.         Behavior: Behavior normal.         Thought Content: Thought content normal.         Judgment: Judgment normal.          Discussion with Family: Updated  (daughter) at bedside.    Discharge instructions/Information to patient and family:   See after visit summary for " information provided to patient and family.      Provisions for Follow-Up Care:  See after visit summary for information related to follow-up care and any pertinent home health orders.      Mobility at time of Discharge:   Basic Mobility Inpatient Raw Score: 23  JH-HLM Goal: 7: Walk 25 feet or more  JH-HLM Achieved: 8: Walk 250 feet ot more  HLM Goal achieved. Continue to encourage appropriate mobility.     Disposition:   Home    Planned Readmission: no     Discharge Statement:  I spent 40 minutes discharging the patient. This time was spent on the day of discharge. I had direct contact with the patient on the day of discharge. Greater than 50% of the total time was spent examining patient, answering all patient questions, arranging and discussing plan of care with patient as well as directly providing post-discharge instructions.  Additional time then spent on discharge activities.    Discharge Medications:  See after visit summary for reconciled discharge medications provided to patient and/or family.      **Please Note: This note may have been constructed using a voice recognition system**

## 2024-03-26 NOTE — ASSESSMENT & PLAN NOTE
Recent Labs     03/24/24  0337 03/25/24  0230 03/26/24  0420   K 3.6 3.2* 4.5   MG 2.1  --   --      Replenished.

## 2024-03-26 NOTE — ASSESSMENT & PLAN NOTE
Lab Results   Component Value Date    HGBA1C 8.1 (H) 03/16/2024       Recent Labs     03/25/24  1133 03/25/24  1646 03/26/24  0725 03/26/24  1122   POCGLU 237* 185* 207* 246*         Blood Sugar Average: Last 72 hrs:  (P) [id number] up with endocrine, home regimen-Invokana, glipizide, Januvia    Continue home med regimen

## 2024-03-26 NOTE — RESPIRATORY THERAPY NOTE
Home Oxygen Qualifying Test     Patient name: Ena Ahumada        : 1935   Date of Test:  2024  Diagnosis:    Home Oxygen Test:    **Medicare Guidelines require item(s) 1-5 on all ambulatory patients or 1 and 2 on non-ambulatory patients.    1. Baseline SPO2 on Room Air at rest 90 %   If <= 88% on Room Air add O2 via NC to obtain SpO2 >=88%. If LPM needed, document LPM  needed to reach =>88%    SPO2 during exertion on Room Air 83  %  During exertion monitor SPO2. If SPO2 increases >=89%, do not add supplemental oxygen    SPO2 on Oxygen at Rest 94 % at 2 LPM    SPO2 during exertion on Oxygen 92 % at 2 LPM    Test performed during exertion activity.      [x]  Supplemental Home Oxygen is indicated.    []  Client does not qualify for home oxygen.    Respiratory Additional Notes-pt ambulated approx.250 feet    Leslie Dumas, RT

## 2024-03-26 NOTE — TELEPHONE ENCOUNTER
----- Message from KANA Godinez sent at 3/26/2024 12:58 PM EDT -----  Regarding: Followup  Patient was seen at [facility] for NELA, please arrange for post hospitalization followup with Dr. Baumann and repeat RFP to be obtained in 7 to 10 days postdischarge

## 2024-03-26 NOTE — ASSESSMENT & PLAN NOTE
Recent Labs     03/24/24  0337 03/25/24  0230 03/26/24  0420   CREATININE 2.23* 2.27* 2.03*   EGFR 19 18 21       Estimated Creatinine Clearance: 17.8 mL/min (A) (by C-G formula based on SCr of 2.03 mg/dL (H)).   Baseline creatinine less than 2.  Nephrology on board, appreciate recommendations.  Per nephrology this might be her new baseline  On torsemide 20 mg daily, creatinine stable  Follow up outpatient with PCP and nephrology

## 2024-03-26 NOTE — PLAN OF CARE
Problem: OCCUPATIONAL THERAPY ADULT  Goal: Performs self-care activities at highest level of function for planned discharge setting.  See evaluation for individualized goals.  Description:   Outcome: Adequate for Discharge  Note: Limitation: Decreased ADL status, Decreased endurance, Decreased self-care trans, Decreased high-level ADLs (Decreased balance)  Prognosis: Good  Assessment: Patient participated in Skilled OT session this date with interventions consisting of ADL re training with the use of correct body mechnaics, safety awareness and fall prevention techniques, and  therapeutic activities to: increase activity tolerance . Patient agreeable to OT treatment session, upon arrival patient was found seated at edge of bed.  Treatment session as follows: Pt has progressed to mod I level. Daughter present for session and states pt is at her functional baseline. See flowsheet for details. The patient's raw score on the AM-PAC Daily Activity inpatient short form is 24, standardized score is 57.54, greater than 39.4. Patients at this level are likely to benefit from DC to home. From OT standpoint, recommendation at time of d/c would be level 4.  Pt left with call bell in reach, tray table in reach, needs met, daughter & RT present.     Rehab Resource Intensity Level, OT: No post-acute rehabilitation needs

## 2024-03-26 NOTE — CASE MANAGEMENT
Case Management Discharge Planning Note    Patient name Ena Ahumada  Location /-01 MRN [id number]  : 1935 Date 3/26/2024       Current Admission Date: 3/16/2024  Current Admission Diagnosis:Stage 3b chronic kidney disease (CKD) ([facility])   Patient Active Problem List    Diagnosis Date Noted    Chronic kidney disease 2024    NELA (acute kidney injury) ([facility]) 2024    Hypokalemia 2024    Acute respiratory failure with hypoxia ([facility]) 2024    Pneumonia 2024    Chronic pain syndrome 2023    Degenerative disc disease, cervical 2023    Elevated parathyroid hormone 2023    Nonrheumatic aortic valve stenosis 2023    Proteinuria 2023    Sacroiliitis ([facility])     Herniated nucleus pulposus, L3-4 right     Lumbar foraminal stenosis     Lumbar radiculopathy     Type 2 diabetes mellitus with hyperglycemia, without long-term current use of insulin ([facility]) 2022    Chronic back pain 10/02/2020    Complex renal cyst 2019    Stage 3b chronic kidney disease (CKD) ([facility]) 02/15/2019    Dependent edema 2018    Osteopenia 2017    Type 2 diabetes mellitus with stage 4 chronic kidney disease, without long-term current use of insulin ([facility]) 2016    Premature ventricular contraction 10/03/2013    Rhinitis 10/12/2012    GERD (gastroesophageal reflux disease) 10/12/2012    Diabetic polyneuropathy ([facility]) 2012    Hypokalemia 2012    Hypertension 2012    Dyslipidemia 2012    Anxiety 2012      LOS (days): 10  Geometric Mean LOS (GMLOS) (days): 5.1  Days to GMLOS:-4.9     OBJECTIVE:  Risk of Unplanned Readmission Score: 19.6         Current admission status: Inpatient   Preferred Pharmacy:   [id number] [place] - [address]  [address]  [facility] [address]  Phone: [phone] Fax: [phone]    [id number] [place] - [address] [place]  [address]  [place] [address]  Phone: [phone] Fax: [phone]    Primary Care Provider: Deanna Castaneda DO    Primary Insurance: [employer]  Secondary Insurance:     DISCHARGE DETAILS:                                Requested Home Health Care         Home Health Discipline requested:: Nursing, Occupational Therapy, Physical Therapy  Home Health Agency Name:: [facility]  HHA External Referral Reason (only applicable if external HHA name selected): Patient has established relationship with provider  Home Health Follow-Up Provider:: PCP  Home Health Services Needed:: Oxygen Via Nasal Cannula  Oxygen LPM Ordered (if applicable based on home health services needed):: 2 LPM  Homebound Criteria Met:: Requires the Assistance of Another Person for Safe Ambulation or to Leave the Home, Uses an Assist Device (i.e. cane, walker, etc)  Supporting Clincal Findings:: Requires Oxygen, Limited Endurance            Received consult for home O2.  Met with patient and daughter, discussed needfor Home O2and they are agreeable. Referral to [employer] via parachute made. Wait approval.                                           IMM Given (Date):: 03/26/24  IMM Given to:: Patient

## 2024-03-26 NOTE — CASE MANAGEMENT
Case Management Discharge Planning Note    Patient name Ena Ahumada  Location /-01 MRN [id number]  : 1935 Date 3/26/2024       Current Admission Date: 3/16/2024  Current Admission Diagnosis:Stage 3b chronic kidney disease (CKD) ([facility])   Patient Active Problem List    Diagnosis Date Noted    Chronic kidney disease 2024    NELA (acute kidney injury) ([facility]) 2024    Hypokalemia 2024    Acute respiratory failure with hypoxia ([facility]) 2024    Pneumonia 2024    Chronic pain syndrome 2023    Degenerative disc disease, cervical 2023    Elevated parathyroid hormone 2023    Nonrheumatic aortic valve stenosis 2023    Proteinuria 2023    Sacroiliitis ([facility])     Herniated nucleus pulposus, L3-4 right     Lumbar foraminal stenosis     Lumbar radiculopathy     Type 2 diabetes mellitus with hyperglycemia, without long-term current use of insulin ([facility]) 2022    Chronic back pain 10/02/2020    Complex renal cyst 2019    Stage 3b chronic kidney disease (CKD) ([facility]) 02/15/2019    Dependent edema 2018    Osteopenia 2017    Type 2 diabetes mellitus with stage 4 chronic kidney disease, without long-term current use of insulin ([facility]) 2016    Premature ventricular contraction 10/03/2013    Rhinitis 10/12/2012    GERD (gastroesophageal reflux disease) 10/12/2012    Diabetic polyneuropathy ([facility]) 2012    Hypokalemia 2012    Hypertension 2012    Dyslipidemia 2012    Anxiety 2012      LOS (days): 10  Geometric Mean LOS (GMLOS) (days): 5.1  Days to GMLOS:-4.9     OBJECTIVE:  Risk of Unplanned Readmission Score: 19.6         Current admission status: Inpatient   Preferred Pharmacy:   [id number] [place] [facility] [address]  [address]  [place] [address]  Phone: [phone] Fax: [phone]    [id number] [place] [address] [place]  [address]  [place] [address]  Phone: [phone] Fax: [phone]    Primary Care Provider: Deanna Castaneda DO    Primary Insurance: [employer] REP  Secondary Insurance:     DISCHARGE DETAILS:     Home O2 approved via parachute.  Delivered portable O2 tank to patient and her family in patient's room with instructions to call [employer] at discharge to set up home concentrator

## 2024-03-26 NOTE — ASSESSMENT & PLAN NOTE
Acute respiratory failure with hypoxia presents on admission , requiring nasal cannula O2 supplementation ~ 5 Liters which is new for the patient ; most likely cause of hypoxemia is her RLL Pneumonia  on admission as evident on Xray Chest.     On 2 L nasal cannula oxygen this morning saturating well.  Patient had home O2 eval, qualified for oxygen at home  Patient will need to follow-up with pulmonology outpatient

## 2024-03-26 NOTE — QUICK NOTE
Patient not seen, BG logs reviewed. BG was still slightly elevated in upper 250;s range since yesterday after her lantus was d/c since now off steroids.     Would recommend resume lantus 12u with lispro 4u with meals if still remaining inpatient   If discharged today to home, d/c on home regime

## 2024-03-27 ENCOUNTER — TRANSITIONAL CARE MANAGEMENT (OUTPATIENT)
Dept: FAMILY MEDICINE CLINIC | Facility: HOSPITAL | Age: 89
End: 2024-03-27

## 2024-03-27 ENCOUNTER — TELEPHONE (OUTPATIENT)
Age: 89
End: 2024-03-27

## 2024-03-27 ENCOUNTER — HOME CARE VISIT (OUTPATIENT)
Dept: HOME HEALTH SERVICES | Facility: HOME HEALTHCARE | Age: 89
End: 2024-03-27

## 2024-03-27 NOTE — UTILIZATION REVIEW
NOTIFICATION OF ADMISSION DISCHARGE   This is a Notification of Discharge from [facility]. Please be advised that this patient has been discharge from our facility. Below you will find the admission and discharge date and time including the patient’s disposition.   UTILIZATION REVIEW CONTACT:  Debbi Cnaales  Utilization   Network Utilization Review Department  Phone: [phone] [phone] carefully listen to the prompts. All voicemails are confidential.  Email: [email or web address]     ADMISSION INFORMATION  PRESENTATION DATE: 3/16/2024 12:05 PM  OBERVATION ADMISSION DATE:   INPATIENT ADMISSION DATE: 3/16/24  1:14 PM   DISCHARGE DATE: 3/26/2024  4:35 PM   DISPOSITION:Home/Self Care    Network Utilization Review Department  ATTENTION: Please call with any questions or concerns to [phone] and carefully listen to the prompts so that you are directed to the right person. All voicemails are confidential.   For Discharge needs, contact Care Management DC Support Team at [phone] opt. [phone]  Send all requests for admission clinical reviews, approved or denied determinations and any other requests to dedicated fax number below belonging to the campus where the patient is receiving treatment. List of dedicated fax numbers for the Facilities:  FACILITY NAME UR FAX NUMBER   ADMISSION DENIALS (Administrative/Medical Necessity) [phone]   DISCHARGE SUPPORT TEAM ([facility]) [phone]   PARENT CHILD HEALTH (Maternity/NICU/Pediatrics) [phone]   [facility] [phone]   [facility] [phone]   [facility] [phone]   [facility] [phone]   [facility] [phone]   [facility] [phone]   [facility] [phone]   [facility] [phone]    [facility] [phone]   [facility] [phone]   [facility] [phone]   [facility] [phone]

## 2024-03-27 NOTE — TELEPHONE ENCOUNTER
Phone Call from patient's daughter in law. Patient recently discharged from Hospital, and was informed to follow up with Endocrinology. Patient has an appt in May, would like to know if that appt if it's okay to keep May appt or should she be seen sooner. Attempted to connect with Triage Nurse for further instruction. However, triage nurse unavailable. Please contact Talon (Daughter in Law) at [phone] and advise ASAP.

## 2024-03-27 NOTE — TELEPHONE ENCOUNTER
Called and spoke with pt's daughter in law Talon. HFU appt was scheduled on 4/25/24 in the QO with Manny Carbajal. Lab slip was faxed to [facility] in [place]. F: [phone]

## 2024-03-27 NOTE — TELEPHONE ENCOUNTER
Called the patients daughter in law back. I told her that there were no sooner appointments, but I would send a message to Dr. Winter whitaker she thought she should be seen sooner. Please advise and call her back at [phone].

## 2024-03-28 ENCOUNTER — HOME CARE VISIT (OUTPATIENT)
Dept: HOME HEALTH SERVICES | Facility: HOME HEALTHCARE | Age: 89
End: 2024-03-28
Payer: COMMERCIAL

## 2024-03-28 VITALS
HEIGHT: 62 IN | HEART RATE: 76 BPM | OXYGEN SATURATION: 95 % | RESPIRATION RATE: 20 BRPM | SYSTOLIC BLOOD PRESSURE: 120 MMHG | WEIGHT: 160 LBS | BODY MASS INDEX: 29.44 KG/M2 | DIASTOLIC BLOOD PRESSURE: 50 MMHG | TEMPERATURE: 98.4 F

## 2024-03-28 DIAGNOSIS — I10 ESSENTIAL HYPERTENSION: ICD-10-CM

## 2024-03-28 PROCEDURE — 400013 VN SOC

## 2024-03-28 PROCEDURE — G0299 HHS/HOSPICE OF RN EA 15 MIN: HCPCS

## 2024-03-28 RX ORDER — NEBIVOLOL 20 MG/1
TABLET ORAL
Qty: 90 TABLET | Refills: 6 | Status: SHIPPED | OUTPATIENT
Start: 2024-03-28

## 2024-03-29 ENCOUNTER — OFFICE VISIT (OUTPATIENT)
Dept: FAMILY MEDICINE CLINIC | Facility: HOSPITAL | Age: 89
End: 2024-03-29
Payer: MEDICARE

## 2024-03-29 VITALS
SYSTOLIC BLOOD PRESSURE: 128 MMHG | HEART RATE: 72 BPM | HEIGHT: 62 IN | BODY MASS INDEX: 29.26 KG/M2 | TEMPERATURE: 97.6 F | DIASTOLIC BLOOD PRESSURE: 84 MMHG

## 2024-03-29 DIAGNOSIS — Z76.89 ENCOUNTER FOR SUPPORT AND COORDINATION OF TRANSITION OF CARE: Primary | ICD-10-CM

## 2024-03-29 DIAGNOSIS — J96.01 ACUTE RESPIRATORY FAILURE WITH HYPOXIA (HCC): ICD-10-CM

## 2024-03-29 DIAGNOSIS — N17.9 AKI (ACUTE KIDNEY INJURY) (HCC): ICD-10-CM

## 2024-03-29 DIAGNOSIS — J18.9 PNEUMONIA OF RIGHT LOWER LOBE DUE TO INFECTIOUS ORGANISM: ICD-10-CM

## 2024-03-29 LAB
DME PARACHUTE DELIVERY DATE ACTUAL: NORMAL
DME PARACHUTE DELIVERY DATE REQUESTED: NORMAL
DME PARACHUTE ITEM DESCRIPTION: NORMAL
DME PARACHUTE ORDER STATUS: NORMAL
DME PARACHUTE SUPPLIER NAME: NORMAL
DME PARACHUTE SUPPLIER PHONE: NORMAL

## 2024-03-29 PROCEDURE — 99496 TRANSJ CARE MGMT HIGH F2F 7D: CPT | Performed by: NURSE PRACTITIONER

## 2024-03-29 NOTE — PROGRESS NOTES
Name: Ena Ahumada      : 1935      MRN: [id number]  Encounter Provider: KANA Edge  Encounter Date: 3/29/2024   Encounter department: [facility] [address]     Assessment & Plan     1. Encounter for support and coordination of transition of care  Comments:  hospital records reviewed/discussed and TCM updated    2. Acute respiratory failure with hypoxia (HCC)  Assessment & Plan:  Remains O2 dependent s/p admission for RLL pneumonia  Continue O2 @2.5L via nasal cannula until seen by pulmonary next week      3. NELA (acute kidney injury) ([facility])  Assessment & Plan:  Superimposed on known stage 4 CKD  F/U with nephrology as scheduled in . Pneumonia of right lower lobe due to infectious organism  Assessment & Plan:  Clinically improving S/P admission for RLL pneumonia - dc'd to home on 3/26  Maintain O2 via nasal cannula until seen by pulmonary next week      Return for routine f/u scheduled w/PCP in       Here with daughter in law for TCM appt. Admitted to [facility] from 3/16-3/26 for RLL pneumonia, hypoxia, and NELA. Hospital records reviewed/discussed with pt and family. Has been using oxygen since being home. Legs feel weak and she is sleeping more than when in hospital. Pt states she has good appetite and is eating/drinking well. Saw visiting nurse yesterday and will also have speech, PT and OT (DIL states this is not yet scheduled). Pt lives alone in apartment attached to family's home so they are nearby offering support/assistance. Wearing O2 continuously at 2.5L via nasal cannula.   Meds reviewed/discussed w/DIL. She states potassium was dc'd and mucinex and iron were added. All other meds unchanged.   Sees endocrine and pulmonary next week and nephrology later April.         TCM Call       Date and time call was made  3/27/2024  2:29 PM    Hospital care reviewed  Records reviewed    Patient was hospitialized at  [facility]     Date of Admission  03/16/24    Date of discharge  03/26/24    Disposition  Home    Current Symptoms  None          TCM Call       Post hospital issues  Reduced activity    Scheduled for follow up?  Yes    Patients specialists  Nephrologist; Pulmonlolgist; Endocrinologist    Did you obtain your prescribed medications  Yes    Do you need help managing your prescriptions or medications  No    Is transportation to your appointment needed  No    I have advised the patient to call PCP with any new or worsening symptoms  gertrude henao ma    Living Arrangements  Alone    Are you recieving home care services  Yes    Types of home care services  Nurse visit; Home PT    Are you using any community resources  No    Current waiver services  No    Have you fallen in the last 12 months  No    Interperter language line needed  No    Counseling  Patient           Review of Systems   Constitutional:  Negative for chills and fever.   HENT:  Positive for trouble swallowing.    Respiratory: Negative.     Cardiovascular: Negative.    Neurological:  Positive for weakness.       Current Outpatient Medications on File Prior to Visit   Medication Sig    ACCU-CHEK PAUL PLUS test strip     Accu-Chek Guide test strip USE TO TEST TWICE A DAY    Accu-Chek Softclix Lancets lancets     Accu-Chek Softclix Lancets lancets USE AS INSTRUCTED TWICE DAILY    aspirin 81 MG tablet Take 1 tablet by mouth daily    cholecalciferol (VITAMIN D3) 1,000 units tablet Take 2 tablets by mouth daily    cloNIDine (CATAPRES-TTS-1) 0.1 mg/24 hr PLACE 1 PATCH ON THE SKIN ONCE A WEEK AS DIRECTED    Denta 5000 Plus 1.1 % CREA USE TWICE A DAY -SPIT, DONT RINSE AND NOTHING BY MOUTH X 30 MIN    famotidine (PEPCID) 20 mg tablet TAKE 1 TABLET BY MOUTH EVERY DAY    ferrous sulfate 325 (65 Fe) mg tablet Take 1 tablet (325 mg total) by mouth 3 (three) times a week    glipiZIDE (GLUCOTROL) 10 mg tablet TAKE 1 TABLET BY MOUTH IN THE MORNING THEN 2 TABS WITH DINNER    guaiFENesin  "(MUCINEX) 600 mg 12 hr tablet Take 1 tablet (600 mg total) by mouth 2 (two) times a day for 7 days    Invokana 100 MG TAKE 1 TABLET BY MOUTH EVERY DAY BEFORE BREAKFAST    nebivolol (BYSTOLIC) 20 MG tablet TAKE 1 TABLET BY MOUTH EVERY DAY    NIFEdipine (ADALAT CC) 90 mg 24 hr tablet TAKE 1 TABLET BY MOUTH EVERY DAY    pregabalin (LYRICA) 50 mg capsule Take 1 cap once a day for 5 days, then increase to BID x 5 days, then TID    sertraline (ZOLOFT) 100 mg tablet TAKE 1 TABLET BY MOUTH EVERY DAY    simvastatin (ZOCOR) 20 mg tablet TAKE 1 TABLET BY MOUTH EVERYDAY AT BEDTIME    sitaGLIPtin (Januvia) 50 mg tablet TAKE 1 TABLET BY MOUTH EVERY DAY    torsemide (DEMADEX) 20 mg tablet TAKE 1 TABLET BY MOUTH EVERY DAY    transdermal buprenorphine (BUTRANS) 7.5 mcg/hr TD patch Place 1 patch on the skin over 7 days every 7 days For ongoing therapy    glucose blood (Accu-Chek Emma Plus) test strip USE AS INSTRUCTED TWICE DAILY (Patient not taking: Reported on 2/2/2023)    ondansetron (ZOFRAN) 4 mg tablet Take 1 tablet (4 mg total) by mouth every 8 (eight) hours as needed for nausea or vomiting (Patient not taking: Reported on 11/20/2023)       Objective     /84   Pulse 72   Temp 97.6 °F (36.4 °C)   Ht 5' 2\" (1.575 m)   BMI 29.26 kg/m²     Physical Exam  Vitals reviewed.   Constitutional:       General: She is not in acute distress.     Appearance: Normal appearance.   HENT:      Head: Normocephalic.   Eyes:      General: No scleral icterus.  Cardiovascular:      Rate and Rhythm: Normal rate and regular rhythm.      Heart sounds: Murmur (2/6 ALBER) heard.   Pulmonary:      Effort: Pulmonary effort is normal. No respiratory distress.      Breath sounds: Rales (RLL faint rales, cleared w/deep breaths) present.      Comments: No cough  Musculoskeletal:      Right lower leg: No edema.      Left lower leg: No edema.   Skin:     General: Skin is warm and dry.   Neurological:      General: No focal deficit present.      Mental " Status: She is alert and oriented to person, place, and time.   Psychiatric:         Mood and Affect: Mood normal.         Behavior: Behavior normal.       KANA Edge

## 2024-03-29 NOTE — ASSESSMENT & PLAN NOTE
Clinically improving S/P admission for RLL pneumonia - dc'd to home on 3/26  Maintain O2 via nasal cannula until seen by pulmonary next week

## 2024-03-29 NOTE — ASSESSMENT & PLAN NOTE
Remains O2 dependent s/p admission for RLL pneumonia  Continue O2 @2.5L via nasal cannula until seen by pulmonary next week

## 2024-04-01 ENCOUNTER — HOME CARE VISIT (OUTPATIENT)
Dept: HOME HEALTH SERVICES | Facility: HOME HEALTHCARE | Age: 89
End: 2024-04-01
Payer: COMMERCIAL

## 2024-04-01 VITALS
HEART RATE: 70 BPM | DIASTOLIC BLOOD PRESSURE: 42 MMHG | TEMPERATURE: 98.4 F | OXYGEN SATURATION: 99 % | SYSTOLIC BLOOD PRESSURE: 102 MMHG

## 2024-04-01 VITALS — DIASTOLIC BLOOD PRESSURE: 60 MMHG | SYSTOLIC BLOOD PRESSURE: 138 MMHG | HEART RATE: 67 BPM | OXYGEN SATURATION: 96 %

## 2024-04-01 PROCEDURE — G0299 HHS/HOSPICE OF RN EA 15 MIN: HCPCS

## 2024-04-01 PROCEDURE — G0152 HHCP-SERV OF OT,EA 15 MIN: HCPCS | Performed by: OCCUPATIONAL THERAPIST

## 2024-04-02 ENCOUNTER — HOME CARE VISIT (OUTPATIENT)
Dept: HOME HEALTH SERVICES | Facility: HOME HEALTHCARE | Age: 89
End: 2024-04-02
Payer: COMMERCIAL

## 2024-04-02 ENCOUNTER — HOSPITAL ENCOUNTER (EMERGENCY)
Facility: HOSPITAL | Age: 89
Discharge: HOME | End: 2024-04-02
Attending: EMERGENCY MEDICINE
Payer: COMMERCIAL

## 2024-04-02 VITALS
TEMPERATURE: 98.5 F | HEART RATE: 63 BPM | SYSTOLIC BLOOD PRESSURE: 120 MMHG | OXYGEN SATURATION: 92 % | DIASTOLIC BLOOD PRESSURE: 60 MMHG | RESPIRATION RATE: 18 BRPM

## 2024-04-02 VITALS — DIASTOLIC BLOOD PRESSURE: 64 MMHG | OXYGEN SATURATION: 97 % | SYSTOLIC BLOOD PRESSURE: 122 MMHG | HEART RATE: 75 BPM

## 2024-04-02 DIAGNOSIS — E11.9 TYPE 2 DIABETES MELLITUS (HCC): ICD-10-CM

## 2024-04-02 DIAGNOSIS — M54.50 CHRONIC LOW BACK PAIN: ICD-10-CM
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
## 2024-04-02 DIAGNOSIS — R26.2 AMBULATORY DYSFUNCTION: ICD-10-CM

## 2024-04-02 DIAGNOSIS — G89.29 CHRONIC LOW BACK PAIN: ICD-10-CM

## 2024-04-02 LAB
ALBUMIN SERPL BCP-MCNC: 3.4 G/DL (ref 3.5–5)
ALP SERPL-CCNC: 68 U/L (ref 34–104)
ALT SERPL W P-5'-P-CCNC: 17 U/L (ref 7–52)
ANION GAP SERPL CALCULATED.3IONS-SCNC: 5 MMOL/L (ref 4–13)
AST SERPL W P-5'-P-CCNC: 16 U/L (ref 13–39)
BACTERIA UR QL AUTO: NORMAL /HPF
BASOPHILS # BLD AUTO: 0.05 THOUSANDS/ÂΜL (ref 0–0.1)
BASOPHILS NFR BLD AUTO: 0 % (ref 0–1)
BILIRUB SERPL-MCNC: 0.33 MG/DL (ref 0.2–1)
BILIRUB UR QL STRIP: NEGATIVE
BUN SERPL-MCNC: 50 MG/DL (ref 5–25)
CALCIUM ALBUM COR SERPL-MCNC: 9.6 MG/DL (ref 8.3–10.1)
CALCIUM SERPL-MCNC: 9.1 MG/DL (ref 8.4–10.2)
CARDIAC TROPONIN I PNL SERPL HS: 7 NG/L
CHLORIDE SERPL-SCNC: 101 MMOL/L (ref 96–108)
CLARITY UR: CLEAR
CO2 SERPL-SCNC: 33 MMOL/L (ref 21–32)
COLOR UR: ABNORMAL
CREAT SERPL-MCNC: 2.22 MG/DL (ref 0.6–1.3)
EOSINOPHIL # BLD AUTO: 0.37 THOUSAND/ÂΜL (ref 0–0.61)
EOSINOPHIL NFR BLD AUTO: 3 % (ref 0–6)
ERYTHROCYTE [DISTWIDTH] IN BLOOD BY AUTOMATED COUNT: 14 % (ref 11.6–15.1)
GFR SERPL CREATININE-BSD FRML MDRD: 19 ML/MIN/1.73SQ M
GLUCOSE SERPL-MCNC: 267 MG/DL (ref 65–140)
GLUCOSE UR STRIP-MCNC: ABNORMAL MG/DL
HCT VFR BLD AUTO: 35.8 % (ref 34.8–46.1)
HGB BLD-MCNC: 11 G/DL (ref 11.5–15.4)
HGB UR QL STRIP.AUTO: NEGATIVE
IMM GRANULOCYTES # BLD AUTO: 0.07 THOUSAND/UL (ref 0–0.2)
IMM GRANULOCYTES NFR BLD AUTO: 1 % (ref 0–2)
KETONES UR STRIP-MCNC: NEGATIVE MG/DL
LEUKOCYTE ESTERASE UR QL STRIP: ABNORMAL
LYMPHOCYTES # BLD AUTO: 2.56 THOUSANDS/ÂΜL (ref 0.6–4.47)
LYMPHOCYTES NFR BLD AUTO: 23 % (ref 14–44)
MCH RBC QN AUTO: 29.8 PG (ref 26.8–34.3)
MCHC RBC AUTO-ENTMCNC: 30.7 G/DL (ref 31.4–37.4)
MCV RBC AUTO: 97 FL (ref 82–98)
MONOCYTES # BLD AUTO: 1.13 THOUSAND/ÂΜL (ref 0.17–1.22)
MONOCYTES NFR BLD AUTO: 10 % (ref 4–12)
NEUTROPHILS # BLD AUTO: 7.06 THOUSANDS/ÂΜL (ref 1.85–7.62)
NEUTS SEG NFR BLD AUTO: 63 % (ref 43–75)
NITRITE UR QL STRIP: NEGATIVE
NON-SQ EPI CELLS URNS QL MICRO: NORMAL /HPF
NRBC BLD AUTO-RTO: 0 /100 WBCS
PH UR STRIP.AUTO: 5 [PH]
PLATELET # BLD AUTO: 215 THOUSANDS/UL (ref 149–390)
PMV BLD AUTO: 10.4 FL (ref 8.9–12.7)
POTASSIUM SERPL-SCNC: 3.5 MMOL/L (ref 3.5–5.3)
PROT SERPL-MCNC: 5.9 G/DL (ref 6.4–8.4)
PROT UR STRIP-MCNC: NEGATIVE MG/DL
RBC # BLD AUTO: 3.69 MILLION/UL (ref 3.81–5.12)
RBC #/AREA URNS AUTO: NORMAL /HPF
SODIUM SERPL-SCNC: 139 MMOL/L (ref 135–147)
SP GR UR STRIP.AUTO: <1.005 (ref 1–1.03)
UROBILINOGEN UR STRIP-ACNC: <2 MG/DL
WBC # BLD AUTO: 11.24 THOUSAND/UL (ref 4.31–10.16)
WBC #/AREA URNS AUTO: NORMAL /HPF

## 2024-04-02 PROCEDURE — 97167 OT EVAL HIGH COMPLEX 60 MIN: CPT

## 2024-04-02 PROCEDURE — G0151 HHCP-SERV OF PT,EA 15 MIN: HCPCS

## 2024-04-02 PROCEDURE — 93005 ELECTROCARDIOGRAM TRACING: CPT

## 2024-04-02 PROCEDURE — 81001 URINALYSIS AUTO W/SCOPE: CPT | Performed by: EMERGENCY MEDICINE

## 2024-04-02 PROCEDURE — 97163 PT EVAL HIGH COMPLEX 45 MIN: CPT

## 2024-04-02 PROCEDURE — 99285 EMERGENCY DEPT VISIT HI MDM: CPT

## 2024-04-02 PROCEDURE — G0153 HHCP-SVS OF S/L PATH,EA 15MN: HCPCS

## 2024-04-02 PROCEDURE — 99285 EMERGENCY DEPT VISIT HI MDM: CPT | Performed by: EMERGENCY MEDICINE

## 2024-04-02 PROCEDURE — 36415 COLL VENOUS BLD VENIPUNCTURE: CPT | Performed by: EMERGENCY MEDICINE

## 2024-04-02 PROCEDURE — 84484 ASSAY OF TROPONIN QUANT: CPT | Performed by: EMERGENCY MEDICINE

## 2024-04-02 PROCEDURE — 80053 COMPREHEN METABOLIC PANEL: CPT | Performed by: EMERGENCY MEDICINE

## 2024-04-02 PROCEDURE — 85025 COMPLETE CBC W/AUTO DIFF WBC: CPT | Performed by: EMERGENCY MEDICINE

## 2024-04-02 NOTE — Clinical Note
Swallow evaluation completed 4/2/24.    Oropharyngeal swallow appears WFLs.  No overt s/s aspiration observed.  Suspect aspiration pneumonia related to vomiting episode prior to hospital admission.  Pt reports frequent reflux at night.  Reflux precautions reviewed with pt/family and PCP contacted to request adjustment of reflux meds and/or GI consult re: risk for bottom up aspiration.  No ST intervention recommended at this time. Pt and family in agreement.

## 2024-04-02 NOTE — CASE MANAGEMENT
Case Management Progress Note    Patient name Ena Ahumada  Location MINA POOL/MINA MRN [id number]  : 1935 Date 2024       LOS (days): 0  Geometric Mean LOS (GMLOS) (days):   Days to GMLOS:        OBJECTIVE:        Current admission status: Emergency  Preferred Pharmacy:   [id number] - [place] - [address]  [address] [address]  Phone: [phone] Fax: [phone]    [facility] #[id number] - [place] - [address]  [address]  [place] [address]  Phone: [phone] Fax: [phone]    Primary Care Provider: Deanna Castaneda DO    Primary Insurance: MEDICARE  Secondary Insurance:     PROGRESS NOTE: Cm consulted for possible rehab need. PT/OT saw pt and recommend she return home to her HHC. Pt to contact [facility] agency once home to resume scheduled appointments as no services were terminated as pt was not admitted. No Cm needs for DC

## 2024-04-02 NOTE — PHYSICAL THERAPY NOTE
PHYSICAL THERAPY EVALUATION NOTE    Patient Name: Ena Ahumada  Today's Date: 2024  AGE:   88 y.o.  Mrn:   [id number]  ADMIT DX:  Unspecified multiple injuries, initial encounter [T07.XXXA]    Past Medical History:   Diagnosis Date    Anxiety     Aortic valve insufficiency     Arthritis     Cataract of both eyes     Chronic kidney disease     Diabetes mellitus (HCC)     Dysuria     last assessed - [id number]    Edema     last assessed - 2015    GERD (gastroesophageal reflux disease)     last assessed - [id number]    Hyperlipidemia     Hypertension     Low back pain     last assessed - [id number]    Mitral valve disorder     Osteoporosis     last assessed - [id number]    Palpitations      Length Of Stay: 0  PHYSICAL THERAPY EVALUATION :   Patient's identity confirmed via 2 patient identifiers (full name and ) at start of session       24 1320   PT Last Visit   PT Visit Date 24   Note Type   Note type Evaluation   Pain Assessment   Pain Assessment Tool 0-10   Pain Score No Pain   Restrictions/Precautions   Weight Bearing Precautions Per Order No   Other Precautions O2;Fall Risk  (2L NC O2)   Home Living   Type of Home Apartment  (in-law suite)   Home Layout One level;Stairs to enter with rails  (flight to enter)   Home Equipment Walker;Life alert   Additional Comments Pt recently DC'd from hospital 10 days ago. First few days was ambulating without AD. Over the last few days, has required a RW   Prior Function   Level of [place] Independent with ADLs;Independent with functional mobility   Lives With Alone;Family   Receives Help From Family   Falls in the last 6 months 1 to 4   Comments Pt was supposed to get HHPT/OT, son reports they finally were supposed to come today   General   Additional Pertinent History Son reports waxing/waning strength lately. He had to assist her with getting off the toilet, but  then also got into the shower and showered herself yesterday   Family/Caregiver Present Yes  (Son)   Cognition   Overall Cognitive Status WFL   Arousal/Participation Alert   Orientation Level Oriented X4   Memory Within functional limits   Following Commands Follows multistep commands with increased time or repetition   Comments Pt is very pleasant and agreeable to participate in PT eval   RLE Assessment   RLE Assessment WFL   Strength RLE   RLE Overall Strength 4-/5   LLE Assessment   LLE Assessment WFL   Strength LLE   LLE Overall Strength 4-/5   Vision-Basic Assessment   Current Vision Wears glasses all the time   Coordination   Movements are Fluid and Coordinated 0   Coordination and Movement Description occasional BUE tremor, witnessed in person x 1 during session   Bed Mobility   Supine to Sit 5  Supervision   Additional items Assist x 1   Transfers   Sit to Stand 5  Supervision   Additional items Assist x 1   Stand to Sit 5  Supervision   Additional items Assist x 1   Ambulation/Elevation   Gait pattern Decreased foot clearance   Gait Assistance 5  Supervision   Additional items Assist x 1   Assistive Device Rolling walker   Distance 50 ft   Balance   Static Sitting Fair +   Static Standing Fair +   Ambulatory Fair -   Activity Tolerance   Activity Tolerance Patient tolerated treatment well   Medical Staff Made Aware OT Felecia, Dr. Adamson   Nurse Made Aware ED RN Andressa   Assessment   Problem List Decreased strength;Impaired balance;Decreased mobility   Assessment Ena Ahumada is a 88 y.o. Female who presents to [facility] ED on 4/2/2024 from home w/ c/o weakness and diagnosis of generalized weakness. Orders for PT eval and treat received. Pt presents w/ comorbidities of DMII, osteopenia, HTN, GERD, anxiety, chronic back pain. At baseline, pt mobilizes modified I w/ RW vs no AD, and reports + falls in the last 6 months. Upon evaluation, pt presents w/ the following deficits: weakness, impaired balance, and  decreased endurance. Upon eval, pt requires supervision for bed mobility, supervision for transfers, and supervision for gait. Based on this PT evaluation today, patient's discharge recommendation is for Level III. During this admission, pt would benefit from continued skilled inpatient PT in the acute care setting in order to address the abovementioned deficits to maximize function and mobility before DC from acute care.   Goals   Patient Goals to feel better   [facility] Expiration Date 04/12/24   Short Term Goal #1 Patient will: Perform all bed mobility tasks modified independent to improve pt's independence w/ repositioning for decrease risk of skin breakdown, Perform all transfers modified independent consistently from various height surfaces in order to improve I w/ engagement w/ real-world environments/situations, Ambulate at least 150 ft. +/- LRAD modified independent w/o LOB to facilitate return and engagement w/ previous living environment, and Navigate 1 flight of stairs w/ supervision with unilateral handrail to either improve independence w/ entering home and/or so patient can fully access living areas in home   Plan   Treatment/Interventions Functional transfer training;LE strengthening/ROM;Elevations;Therapeutic exercise;Endurance training;Patient/family training;Equipment eval/education;Bed mobility;Gait training   PT Frequency 2-3x/wk   Discharge Recommendation   Rehab Resource Intensity Level, PT III (Minimum Resource Intensity)   Additional Comments Patient would benefit from using RW for functional mobility   AM-PAC Basic Mobility Inpatient   Turning in Flat Bed Without Bedrails 4   Lying on Back to Sitting on Edge of Flat Bed Without Bedrails 3   Moving Bed to Chair 3   Standing Up From Chair Using Arms 3   Walk in Room 3   Climb 3-5 Stairs With Railing 3   Basic Mobility Inpatient Raw Score 19   Basic Mobility Standardized Score 42.48   [facility] Highest Level Of Mobility   [facility] Goal 6: Walk 10  steps or more   -HLM Achieved 7: Walk 25 feet or more   End of Consult   Patient Position at End of Consult Supine  (ED eveline)         The patient's AM-PAC Basic Mobility Inpatient Short Form Raw Score is 19, Standardized Score is 42.48. A standardized score greater than 38.32 (raw score of 16) suggests the patient may benefit from discharge to home which may not coincide with above PT recommendations. However please refer to therapist recommendation for discharge planning given other factors that may influence destination.      Pt would benefit from skilled inpatient PT during this admission in order to facilitate progress towards goals to maximize functional independence    Estela Serrano PT, DPT

## 2024-04-02 NOTE — PLAN OF CARE
Problem: OCCUPATIONAL THERAPY ADULT  Goal: Performs self-care activities at highest level of function for planned discharge setting.  See evaluation for individualized goals.  Description: Treatment Interventions: ADL retraining, Functional transfer training, Endurance training, Patient/family training, Compensatory technique education, Continued evaluation, Energy conservation          See flowsheet documentation for full assessment, interventions and recommendations.   Note: Limitation: Decreased ADL status, Decreased endurance, Decreased self-care trans, Decreased high-level ADLs  Prognosis: Fair  Assessment: Pt is a 88 y.o. female seen for OT evaluation at [facility], admitted 4/2/2024 w/ fall.  OT completed extensive review of pt's medical and social history. Comorbidities affecting pt's functional performance at time of assessment include: DM2, osteopenia, diabetic polyneuropathy, anxiety, chronic O2 use, lumbar stenosis/radiculopathy, chronic pain, PNA. Personal factors affecting pt at time of IE include: steps to enter environment, limited home support, difficulty performing ADLS, difficulty performing IADLS , and environment. Prior to admission, pt was living alone in an in-law suite with family members living in the main house.  Pt was I w/  ADLS and A w/ IADLS, (-0) drove, & required use of RW PTA. Upon evaluation: Pt requires S for bed mobility, S for functional transfers, S for functional mobility, S for UB ADLs and S for LB ADLS 2* the following deficits impacting occupational performance: weakness, decreased strength, and decreased tolerance. Full objective findings from OT assessment regarding body systems outlined above. Pt to benefit from continued skilled OT tx while in the hospital to address deficits as defined above and maximize level of functional independence w/ ADL's and functional mobility. Occupational Performance areas to address include: bathing/shower, toilet hygiene,  dressing, functional mobility, community mobility, and clothing management. Based on findings, pt is of high complexity. The patient's raw score on the AM-PAC Daily Activity inpatient short form is 19, standardized score is 40.22, greater than 39.4. Patients at this level are likely to benefit from DC to home. However, please refer to therapist recommendation for discharge planning given other factors that may influence destination. At this time, OT recommendations at time of discharge are DC with Level III resources.     Rehab Resource Intensity Level, OT: III (Minimum Resource Intensity)

## 2024-04-02 NOTE — OCCUPATIONAL THERAPY NOTE
Occupational Therapy Evaluation     Patient Name: Ena Ahumada  Today's Date: 4/2/2024  Problem List  Active Problems:  There are no active Hospital Problems.    Past Medical History  Past Medical History:   Diagnosis Date    Anxiety     Aortic valve insufficiency     Arthritis     Cataract of both eyes     Chronic kidney disease     Diabetes mellitus (HCC)     Dysuria     last assessed - [id number]    Edema     last assessed - [id number]    GERD (gastroesophageal reflux disease)     last assessed - 30Aug2012    Hyperlipidemia     Hypertension     Low back pain     last assessed - 30Aug2012    Mitral valve disorder     Osteoporosis     last assessed - [id number]    Palpitations      Past Surgical History  Past Surgical History:   Procedure Laterality Date    APPENDECTOMY      CATARACT EXTRACTION Bilateral     COLONOSCOPY      HIP SURGERY      TUBAL LIGATION Bilateral              04/02/24 1319   OT Last Visit   OT Visit Date 04/02/24   Note Type   Note type Evaluation   Pain Assessment   Pain Assessment Tool 0-10   Pain Score No Pain   Restrictions/Precautions   Weight Bearing Precautions Per Order No   Other Precautions O2;Fall Risk  (2L NC -new baseline since prior admissoin 11 days ago)   Home Living   Type of Home Apartment  (IN-law suite - full flight to enter)   Home Layout One level;Stairs to enter with rails   Bathroom Shower/Tub Walk-in shower   Bathroom Toilet Raised   Bathroom Equipment Grab bars in shower;Shower chair   Home Equipment Walker;Life alert  (Life alert that alerts son specifically)   Additional Comments S/pd recent DC from hospital, pt did not require use of RW. However, pt requiring RW last few days 2/2 weakness   Prior Function   Level of [place] Independent with ADLs;Independent with functional mobility;Needs assistance with IADLS   Lives With Family;Alone  (Alone in her in-law suite above her dtrs house)   Receives Help From Family   IADLs Family/Friend/Other provides  "transportation;Family/Friend/Other provides meals;Family/Friend/Other provides medication management   Falls in the last 6 months 1 to 4   Vocational Retired   Comments Pt typically gets Supervision for showering, but son reports pt showered independently this morning without alerting family   General   Family/Caregiver Present Yes   Additional General Comments Son present - son reports pt will be fine & essentially independent one minute, then two hours later need assistance getting off toilet   Subjective   Subjective \"I'm ok\" Pt appears generally lethargic & weak   ADL   Eating Assistance 7  Independent   Grooming Assistance 5  Supervision/Setup   UB Bathing Assistance 5  Supervision/Setup   LB Bathing Assistance 5  Supervision/Setup   UB Dressing Assistance 5  Supervision/Setup   LB Dressing Assistance 5  Supervision/Setup   Toileting Assistance  5  Supervision/Setup   Bed Mobility   Supine to Sit 5  Supervision   Sit to Supine 5  Supervision   Transfers   Sit to Stand 5  Supervision   Stand to Sit 5  Supervision   Functional Mobility   Functional Mobility 5  Supervision   Additional Comments Functional household distance - SpO2 >90%   Additional items Rolling walker   Balance   Static Sitting Good   Dynamic Sitting Fair +   Static Standing Fair +   Dynamic Standing Fair   Ambulatory Fair   Activity Tolerance   Activity Tolerance Patient limited by fatigue   Medical Staff Made Aware PT Estela   Nurse Made Aware DO Juan Diego   RUE Assessment   RUE Assessment WFL   LUE Assessment   LUE Assessment WFL   Cognition   Overall Cognitive Status WFL   Arousal/Participation Alert   Attention Within functional limits   Orientation Level Oriented X4   Memory Within functional limits   Following Commands Follows all commands and directions without difficulty   Assessment   Limitation Decreased ADL status;Decreased endurance;Decreased self-care trans;Decreased high-level ADLs   Prognosis Fair   Assessment Pt is a 88 y.o. female " seen for OT evaluation at [facility], admitted 4/2/2024 w/ fall.  OT completed extensive review of pt's medical and social history. Comorbidities affecting pt's functional performance at time of assessment include: DM2, osteopenia, diabetic polyneuropathy, anxiety, chronic O2 use, lumbar stenosis/radiculopathy, chronic pain, PNA. Personal factors affecting pt at time of IE include: steps to enter environment, limited home support, difficulty performing ADLS, difficulty performing IADLS , and environment. Prior to admission, pt was living alone in an in-law suite with family members living in the main house.  Pt was I w/  ADLS and A w/ IADLS, (-0) drove, & required use of RW PTA. Upon evaluation: Pt requires S for bed mobility, S for functional transfers, S for functional mobility, S for UB ADLs and S for LB ADLS 2* the following deficits impacting occupational performance: weakness, decreased strength, and decreased tolerance. Full objective findings from OT assessment regarding body systems outlined above. Pt to benefit from continued skilled OT tx while in the hospital to address deficits as defined above and maximize level of functional independence w/ ADL's and functional mobility. Occupational Performance areas to address include: bathing/shower, toilet hygiene, dressing, functional mobility, community mobility, and clothing management. Based on findings, pt is of high complexity. The patient's raw score on the AM-PAC Daily Activity inpatient short form is 19, standardized score is 40.22, greater than 39.4. Patients at this level are likely to benefit from DC to home. However, please refer to therapist recommendation for discharge planning given other factors that may influence destination. At this time, OT recommendations at time of discharge are DC with Level III resources.   Goals   Patient Goals Pt wants to feel better   Plan   Treatment Interventions ADL retraining;Functional transfer  training;Endurance training;Patient/family training;Compensatory technique education;Continued evaluation;Energy conservation   Goal Expiration Date 04/12/24   OT Treatment Day 0   OT Frequency 1-2x/wk   Discharge Recommendation   Rehab Resource Intensity Level, OT III (Minimum Resource Intensity)   AM-PAC Daily Activity Inpatient   Lower Body Dressing 3   Bathing 3   Toileting 3   Upper Body Dressing 3   Grooming 3   Eating 4   Daily Activity Raw Score 19   Daily Activity Standardized Score (Calc for Raw Score >=11) 40.22   AM-PAC Applied Cognition Inpatient   Following a Speech/Presentation 4   Understanding Ordinary Conversation 4   Taking Medications 4   Remembering Where Things Are Placed or Put Away 4   Remembering List of 4-5 Errands 4   Taking Care of Complicated Tasks 4   Applied Cognition Raw Score 24   Applied Cognition Standardized Score 62.21   End of Consult   Education Provided Yes;Family or social support of family present for education by provider   Patient Position at End of Consult All needs within reach;Supine   Nurse Communication Nurse aware of consult     Pt will achieve the following goals within 10 days.    *Pt will complete LB bathing and dressing with Mod I & DME PRN.    *Pt will complete toileting w/ Mod I w/ G hygiene/thoroughness using DME PRN    *Pt will perform functional transfers with on/off all surfaces with Mod I using DME as needed w/ G balance/safety.    *Pt will complete item retrieval and light home management with Mod I while demonstrating good safety.    *Pt will improve functional mobility during ADL/IADL/leisure tasks to Mod I using DME as needed w/ G balance/safety.     *Assess DME needs    Felecia Cantrell, OTR/L

## 2024-04-02 NOTE — ED PROVIDER NOTES
"History  Chief Complaint   Patient presents with    Fall     Patient fell this morning around 7 am ,patient states \" my legs gave out and I fell on bathroom floor on buttocks\"     88-year-old female presents after an episode of weakness in the bathroom this morning.  The patient took shower and then states that her legs got weak and she lowered herself to the ground.  The patient has a history of chronic back pain and some weakness in her bilateral legs.  The daughter  in law states that this been worsening over the past 2 months and she is followed by Dr. Meza.  The son states that she was just discharged in the hospital last week after an 11-day stay for aspiration pneumonia.      Fall  Associated symptoms: back pain        Prior to Admission Medications   Prescriptions Last Dose Informant Patient Reported? Taking?   ACCU-CHEK PAUL PLUS test strip  Self Yes No   Accu-Chek Guide test strip  Self No No   Sig: USE TO TEST TWICE A DAY   Accu-Chek Softclix Lancets lancets  Self Yes No   Accu-Chek Softclix Lancets lancets  Self No No   Sig: USE AS INSTRUCTED TWICE DAILY   Denta 5000 Plus 1.1 % CREA  Self Yes No   Sig: USE TWICE A DAY -SPIT, DONT RINSE AND NOTHING BY MOUTH X 30 MIN   Invokana 100 MG  Self No No   Sig: TAKE 1 TABLET BY MOUTH EVERY DAY BEFORE BREAKFAST   NIFEdipine (ADALAT CC) 90 mg 24 hr tablet   No No   Sig: TAKE 1 TABLET BY MOUTH EVERY DAY   aspirin 81 MG tablet  Self Yes No   Sig: Take 1 tablet by mouth daily   cholecalciferol (VITAMIN D3) 1,000 units tablet  Self Yes No   Sig: Take 2 tablets by mouth daily   cloNIDine (CATAPRES-TTS-1) 0.1 mg/24 hr  Self No No   Sig: PLACE 1 PATCH ON THE SKIN ONCE A WEEK AS DIRECTED   famotidine (PEPCID) 20 mg tablet   No No   Sig: TAKE 1 TABLET BY MOUTH EVERY DAY   ferrous sulfate 325 (65 Fe) mg tablet   No No   Sig: Take 1 tablet (325 mg total) by mouth 3 (three) times a week   glipiZIDE (GLUCOTROL) 10 mg tablet   No No   Sig: TAKE 1 TABLET BY MOUTH IN THE MORNING " THEN 2 TABS WITH DINNER   glucose blood (Accu-Chek Emma Plus) test strip  Self No No   Sig: USE AS INSTRUCTED TWICE DAILY   Patient not taking: Reported on 2/2/2023   guaiFENesin (MUCINEX) 600 mg 12 hr tablet   No No   Sig: Take 1 tablet (600 mg total) by mouth 2 (two) times a day for 7 days   nebivolol (BYSTOLIC) 20 MG tablet   No No   Sig: TAKE 1 TABLET BY MOUTH EVERY DAY   ondansetron (ZOFRAN) 4 mg tablet  Self No No   Sig: Take 1 tablet (4 mg total) by mouth every 8 (eight) hours as needed for nausea or vomiting   Patient not taking: Reported on 11/20/2023   oxygen gas   Yes No   Sig: Inhale 2 L/min continuous. VIA NASAL CANULA  Indications: Difficulty Breathing   pregabalin (LYRICA) 50 mg capsule   No No   Sig: Take 1 cap once a day for 5 days, then increase to BID x 5 days, then TID   sertraline (ZOLOFT) 100 mg tablet  Self No No   Sig: TAKE 1 TABLET BY MOUTH EVERY DAY   simvastatin (ZOCOR) 20 mg tablet  Self No No   Sig: TAKE 1 TABLET BY MOUTH EVERYDAY AT BEDTIME   sitaGLIPtin (Januvia) 50 mg tablet  Self No No   Sig: TAKE 1 TABLET BY MOUTH EVERY DAY   torsemide (DEMADEX) 20 mg tablet  Self No No   Sig: TAKE 1 TABLET BY MOUTH EVERY DAY   transdermal buprenorphine (BUTRANS) 7.5 mcg/hr TD patch   No No   Sig: Place 1 patch on the skin over 7 days every 7 days For ongoing therapy      Facility-Administered Medications: None       Past Medical History:   Diagnosis Date    Anxiety     Aortic valve insufficiency     Arthritis     Cataract of both eyes     Chronic kidney disease     Diabetes mellitus (HCC)     Dysuria     last assessed - [id number]    Edema     last assessed - [id number]    GERD (gastroesophageal reflux disease)     last assessed - 30Aug2012    Hyperlipidemia     Hypertension     Low back pain     last assessed - [id number]    Mitral valve disorder     Osteoporosis     last assessed - [id number]    Palpitations        Past Surgical History:   Procedure Laterality Date    APPENDECTOMY      CATARACT  EXTRACTION Bilateral     COLONOSCOPY      HIP SURGERY      TUBAL LIGATION Bilateral        Family History   Problem Relation Age of Onset    Kidney disease Mother         Chronic    No Known Problems Father     Breast cancer Sister     Diabetes Sister     Cancer Sister     Breast cancer Sister     Hypertension Sister     No Known Problems Daughter     No Known Problems Son     No Known Problems Son     No Known Problems Son     No Known Problems Son      I have reviewed and agree with the history as documented.    E-Cigarette/Vaping    E-Cigarette Use Never User      E-Cigarette/Vaping Substances    Nicotine No     THC No     CBD No     Flavoring No     Other No     Unknown No      Social History     Tobacco Use    Smoking status: Never    Smokeless tobacco: Never   Vaping Use    Vaping status: Never Used   Substance Use Topics    Alcohol use: Never    Drug use: Never       Review of Systems   Musculoskeletal:  Positive for back pain.   Neurological:  Positive for tremors.       Physical Exam  Physical Exam  Vitals and nursing note reviewed.   Constitutional:       General: She is not in acute distress.     Appearance: She is well-developed.   HENT:      Head: Normocephalic and atraumatic.      Right Ear: External ear normal.      Left Ear: External ear normal.   Eyes:      General: No scleral icterus.     Conjunctiva/sclera: Conjunctivae normal.      Pupils: Pupils are equal, round, and reactive to light.   Cardiovascular:      Rate and Rhythm: Normal rate and regular rhythm.      Heart sounds: Normal heart sounds.   Pulmonary:      Effort: Pulmonary effort is normal. No respiratory distress.      Breath sounds: Normal breath sounds.   Abdominal:      General: Bowel sounds are normal.      Palpations: Abdomen is soft.      Tenderness: There is no abdominal tenderness. There is no guarding or rebound.   Musculoskeletal:         General: Normal range of motion.      Cervical back: Normal range of motion.   Skin:      General: Skin is warm and dry.      Findings: No rash.   Neurological:      Mental Status: She is alert and oriented to person, place, and time.         Vital Signs  ED Triage Vitals   Temperature Pulse Respirations Blood Pressure SpO2   04/02/24 1108 04/02/24 1108 04/02/24 1108 04/02/24 1108 04/02/24 1108   98.5 °F (36.9 °C) 71 18 125/58 92 %      Temp Source Heart Rate Source Patient Position - Orthostatic VS BP Location FiO2 (%)   04/02/24 1108 04/02/24 1116 04/02/24 1145 04/02/24 1145 --   Oral Monitor Lying - Orthostatic VS Right arm       Pain Score       --                  Vitals:    04/02/24 1230 04/02/24 1245 04/02/24 1300 04/02/24 1315   BP: 124/60 118/56 117/56 120/60   Pulse: 64 64 63 63   Patient Position - Orthostatic VS:             Visual Acuity  Visual Acuity      Flowsheet Row Most Recent Value   L Pupil Size (mm) 3   R Pupil Size (mm) 3            ED Medications  Medications - No data to display    Diagnostic Studies  Results Reviewed       Procedure Component Value Units Date/Time    Urine Microscopic [[id number]]  (Normal) Collected: 04/02/24 1203    Lab Status: Final result Specimen: Urine, Other Updated: 04/02/24 1322     RBC, UA None Seen /hpf      WBC, UA 1-2 /hpf      Epithelial Cells Occasional /hpf      Bacteria, UA Occasional /hpf     UA (URINE) with reflex to Scope [[id number]]  (Abnormal) Collected: 04/02/24 1203    Lab Status: Final result Specimen: Urine, Other Updated: 04/02/24 1235     Color, UA Light Yellow     Clarity, UA Clear     Specific Gravity, UA <1.005     pH, UA 5.0     Leukocytes, UA Trace     Nitrite, UA Negative     Protein, UA Negative mg/dl      Glucose, UA 1000 (1%) mg/dl      Ketones, UA Negative mg/dl      Urobilinogen, UA <2.0 mg/dl      Bilirubin, UA Negative     Occult Blood, UA Negative    HS Troponin I 2hr [[id number]]     Lab Status: No result Specimen: Blood     HS Troponin I 4hr [[id number]]     Lab Status: No result Specimen: Blood     HS Troponin 0hr  (reflex protocol) [[id number]]  (Normal) Collected: 04/02/24 1141    Lab Status: Final result Specimen: Blood from Arm, Left Updated: 04/02/24 1212     hs TnI 0hr 7 ng/L     Comprehensive metabolic panel [[id number]]  (Abnormal) Collected: 04/02/24 1141    Lab Status: Final result Specimen: Blood from Arm, Left Updated: 04/02/24 1205     Sodium 139 mmol/L      Potassium 3.5 mmol/L      Chloride 101 mmol/L      CO2 33 mmol/L      ANION GAP 5 mmol/L      BUN 50 mg/dL      Creatinine 2.22 mg/dL      Glucose 267 mg/dL      Calcium 9.1 mg/dL      Corrected Calcium 9.6 mg/dL      AST 16 U/L      ALT 17 U/L      Alkaline Phosphatase 68 U/L      Total Protein 5.9 g/dL      Albumin 3.4 g/dL      Total Bilirubin 0.33 mg/dL      eGFR 19 ml/min/1.73sq m     Narrative:      National Kidney Disease Foundation guidelines for Chronic Kidney Disease (CKD):     Stage 1 with normal or high GFR (GFR > 90 mL/min/1.73 square meters)    Stage 2 Mild CKD (GFR = 60-89 mL/min/1.73 square meters)    Stage 3A Moderate CKD (GFR = 45-59 mL/min/1.73 square meters)    Stage 3B Moderate CKD (GFR = 30-44 mL/min/1.73 square meters)    Stage 4 Severe CKD (GFR = 15-29 mL/min/1.73 square meters)    Stage 5 End Stage CKD (GFR <15 mL/min/1.73 square meters)  Note: GFR calculation is accurate only with a steady state creatinine    CBC and differential [[id number]]  (Abnormal) Collected: 04/02/24 1141    Lab Status: Final result Specimen: Blood from Arm, Left Updated: 04/02/24 1146     WBC 11.24 Thousand/uL      RBC 3.69 Million/uL      Hemoglobin 11.0 g/dL      Hematocrit 35.8 %      MCV 97 fL      MCH 29.8 pg      MCHC 30.7 g/dL      RDW 14.0 %      MPV 10.4 fL      Platelets 215 Thousands/uL      nRBC 0 /100 WBCs      Neutrophils Relative 63 %      Immature Grans % 1 %      Lymphocytes Relative 23 %      Monocytes Relative 10 %      Eosinophils Relative 3 %      Basophils Relative 0 %      Neutrophils Absolute 7.06 Thousands/µL      Absolute Immature Grans  0.07 Thousand/uL      Absolute Lymphocytes 2.56 Thousands/µL      Absolute Monocytes 1.13 Thousand/µL      Eosinophils Absolute 0.37 Thousand/µL      Basophils Absolute 0.05 Thousands/µL                    No orders to display              Procedures  ECG 12 Lead Documentation Only    Date/Time: 4/2/2024 11:46 AM    Performed by: Stan Adamson DO  Authorized by: Stan Adamson DO    Indications / Diagnosis:  Weakness  ECG reviewed by me, the ED Provider: yes    Patient location:  ED  Previous ECG:     Previous ECG:  Compared to current    Comparison ECG info:  3/16/24    Similarity:  No change  Interpretation:     Interpretation: normal    Rate:     ECG rate:  67    ECG rate assessment: normal    Rhythm:     Rhythm: sinus rhythm    Ectopy:     Ectopy: none    QRS:     QRS axis:  Normal    QRS intervals:  Normal  Conduction:     Conduction: normal    ST segments:     ST segments:  Normal  T waves:     T waves: normal             ED Course  ED Course as of 04/02/24 1423   Tue Apr 02, 2024   1321 Seen and evaluated by physical therapy and Occupational Therapy in the emergency department.  There is no significant functional change that would require inpatient skilled nursing and rehab.  I discussed this with the son who is agreeable with the plan for discharge as patient has home services already established and close primary care follow-up already scheduled.  They understand that they should have a low threshold to return to the emergency department for any worsening or concerning symptoms.  All questions were answered prior to discharge.                                             Medical Decision Making  Primary Survey:   (A) Airway: patent  (B) Breathing: lungs CTA b/l  (C) Circulation: Pulses:   normal  (D) Disabliity:  GCS Total:  15  (E) Expose:  Completed    Portable chest xray not obtained as there was no chest wall tenderness     Will check laboratories EKG to rule out arrhythmia, dehydration, renal  failure, orthostasis.  Plan for PT OT consult for ambulatory evaluation as patient was recently discharged after prolonged hospitalization secondary to aspiration pneumonia.  The patient does have home services which were just recently initiated.    Amount and/or Complexity of Data Reviewed  External Data Reviewed: notes.     Details: Recent discharge summary reviewed  Labs: ordered.             Disposition  Final diagnoses:   Fall from standing, initial encounter   Ambulatory dysfunction   Chronic low back pain   Type 2 diabetes mellitus (HCC)     Time reflects when diagnosis was documented in both MDM as applicable and the Disposition within this note       Time User Action Codes Description Comment    4/2/2024  1:24 PM Stan Adamson Add [W19.XXXA] Fall from standing, initial encounter     4/2/2024  1:24 PM Stan Adamson Add [R26.2] Ambulatory dysfunction     4/2/2024  1:24 PM Stan Adamson Add [M54.50,  G89.29] Chronic low back pain     4/2/2024  1:24 PM Stan Adamson Add [E11.9] Type 2 diabetes mellitus (HCC)           ED Disposition       ED Disposition   Discharge    Condition   Stable    Date/Time   Tue Apr 2, 2024  1:24 PM    Comment   Ena Ahumada discharge to home/self care.                   Follow-up Information       Follow up With Specialties Details Why Contact Info Additional Information    Deanna Castaneda DO Internal Medicine, Family Medicine Schedule an appointment as soon as possible for a visit  For further evaluation, if not improved [address] [place] [address] [phone]        [facility] Emergency Department Emergency Medicine Go to  If symptoms worsen [address] [facility] [address] [phone] [facility] Emergency Department, [address] [place], [place] [address]            Current Discharge Medication List        CONTINUE these medications which have NOT CHANGED     Details   !! ACCU-CHEK PAUL PLUS test strip       !! Accu-Chek Guide test strip USE TO TEST TWICE A DAY  Qty: 100 strip, Refills: 3    Associated Diagnoses: Type 2 diabetes mellitus with stage 3b chronic kidney disease, without long-term current use of insulin ([facility])      !! Accu-Chek Softclix Lancets lancets       !! Accu-Chek Softclix Lancets lancets USE AS INSTRUCTED TWICE DAILY  Qty: 100 each, Refills: 3    Associated Diagnoses: Type 2 diabetes mellitus with stage 3 chronic kidney disease, without long-term current use of insulin ([facility])      aspirin 81 MG tablet Take 1 tablet by mouth daily      cholecalciferol (VITAMIN D3) 1,000 units tablet Take 2 tablets by mouth daily      cloNIDine (CATAPRES-TTS-1) 0.1 mg/24 hr PLACE 1 PATCH ON THE SKIN ONCE A WEEK AS DIRECTED  Qty: 12 patch, Refills: 3    Associated Diagnoses: Essential hypertension      Denta 5000 Plus 1.1 % CREA USE TWICE A DAY -SPIT, DONT RINSE AND NOTHING BY MOUTH X 30 MIN      famotidine (PEPCID) 20 mg tablet TAKE 1 TABLET BY MOUTH EVERY DAY  Qty: 90 tablet, Refills: 1    Associated Diagnoses: Gastroesophageal reflux disease with esophagitis      ferrous sulfate 325 (65 Fe) mg tablet Take 1 tablet (325 mg total) by mouth 3 (three) times a week  Qty: 12 tablet, Refills: 0    Associated Diagnoses: Iron deficiency anemia, unspecified iron deficiency anemia type      glipiZIDE (GLUCOTROL) 10 mg tablet TAKE 1 TABLET BY MOUTH IN THE MORNING THEN 2 TABS WITH DINNER  Qty: 270 tablet, Refills: 2    Associated Diagnoses: Essential hypertension; Type 2 diabetes mellitus with stage 3 chronic kidney disease, without long-term current use of insulin ([facility])      !! glucose blood (Accu-Chek Paul Plus) test strip USE AS INSTRUCTED TWICE DAILY  Qty: 100 each, Refills: 7    Associated Diagnoses: Type 2 diabetes mellitus with stage 3 chronic kidney disease, without long-term current use of insulin ([facility])      guaiFENesin (MUCINEX) 600 mg 12 hr tablet Take 1 tablet (600 mg  total) by mouth 2 (two) times a day for 7 days  Qty: 14 tablet, Refills: 0    Associated Diagnoses: Community acquired pneumonia of right lung, unspecified part of lung      Invokana 100 MG TAKE 1 TABLET BY MOUTH EVERY DAY BEFORE BREAKFAST  Qty: 30 tablet, Refills: 5    Associated Diagnoses: Type 2 diabetes mellitus with stage 4 chronic kidney disease, without long-term current use of insulin ([facility])      nebivolol (BYSTOLIC) 20 MG tablet TAKE 1 TABLET BY MOUTH EVERY DAY  Qty: 90 tablet, Refills: 6    Associated Diagnoses: Essential hypertension      NIFEdipine (ADALAT CC) 90 mg 24 hr tablet TAKE 1 TABLET BY MOUTH EVERY DAY  Qty: 90 tablet, Refills: 2    Associated Diagnoses: Essential hypertension      ondansetron (ZOFRAN) 4 mg tablet Take 1 tablet (4 mg total) by mouth every 8 (eight) hours as needed for nausea or vomiting  Qty: 20 tablet, Refills: 0    Associated Diagnoses: Nausea      oxygen gas Inhale 2 L/min continuous. VIA NASAL CANULA  Indications: Difficulty Breathing      pregabalin (LYRICA) 50 mg capsule Take 1 cap once a day for 5 days, then increase to BID x 5 days, then TID  Qty: 90 capsule, Refills: 2    Associated Diagnoses: Lumbar radiculopathy      sertraline (ZOLOFT) 100 mg tablet TAKE 1 TABLET BY MOUTH EVERY DAY  Qty: 90 tablet, Refills: 1    Associated Diagnoses: Anxiety      simvastatin (ZOCOR) 20 mg tablet TAKE 1 TABLET BY MOUTH EVERYDAY AT BEDTIME  Qty: 90 tablet, Refills: 4    Associated Diagnoses: Dyslipidemia      sitaGLIPtin (Januvia) 50 mg tablet TAKE 1 TABLET BY MOUTH EVERY DAY  Qty: 30 tablet, Refills: 5    Associated Diagnoses: Type 2 diabetes mellitus with stage 3 chronic kidney disease, without long-term current use of insulin ([facility])      torsemide (DEMADEX) 20 mg tablet TAKE 1 TABLET BY MOUTH EVERY DAY  Qty: 90 tablet, Refills: 3    Comments: DX CODE: R60.9  Associated Diagnoses: Dependent edema      transdermal buprenorphine (BUTRANS) 7.5 mcg/hr TD patch Place 1 patch on the skin  over 7 days every 7 days For ongoing therapy  Qty: 4 patch, Refills: 2    Associated Diagnoses: Cervical spondylosis; Chronic pain syndrome       !! - Potential duplicate medications found. Please discuss with provider.          No discharge procedures on file.    PDMP Review         Value Time User    PDMP Reviewed  Yes 2/12/2024  9:06 AM Josey Dodson PA-C            ED Provider  Electronically Signed by             Stan Adamson DO  04/02/24 [id number]

## 2024-04-03 ENCOUNTER — HOME CARE VISIT (OUTPATIENT)
Dept: HOME HEALTH SERVICES | Facility: HOME HEALTHCARE | Age: 89
End: 2024-04-03
Payer: COMMERCIAL

## 2024-04-03 ENCOUNTER — TELEPHONE (OUTPATIENT)
Age: 89
End: 2024-04-03

## 2024-04-03 VITALS — SYSTOLIC BLOOD PRESSURE: 128 MMHG | OXYGEN SATURATION: 96 % | HEART RATE: 60 BPM | DIASTOLIC BLOOD PRESSURE: 60 MMHG

## 2024-04-03 PROCEDURE — G0152 HHCP-SERV OF OT,EA 15 MIN: HCPCS | Performed by: OCCUPATIONAL THERAPIST

## 2024-04-03 NOTE — TELEPHONE ENCOUNTER
Phone call from patient's daughter in law to fax labs to [facility] in [place] (no fax number provided) - Karina to fax lab orders.

## 2024-04-03 NOTE — CASE COMMUNICATION
Pt seen for clinical dysphagia evaluation today.  Oropharyngeal swallow appeared WFLs however per interview with pt, she has been experiencing frequent reflux while sleeping increasing risk for bottom up aspiration. Reflux precautions reviewed with pt and daughter in law.  Please consider adjusting reflux meds and/or referring pt for consult with GI  Thank you.

## 2024-04-04 ENCOUNTER — HOME CARE VISIT (OUTPATIENT)
Dept: HOME HEALTH SERVICES | Facility: HOME HEALTHCARE | Age: 89
End: 2024-04-04
Payer: COMMERCIAL

## 2024-04-04 ENCOUNTER — OFFICE VISIT (OUTPATIENT)
Dept: ENDOCRINOLOGY | Facility: HOSPITAL | Age: 89
End: 2024-04-04
Payer: COMMERCIAL

## 2024-04-04 ENCOUNTER — TELEPHONE (OUTPATIENT)
Dept: FAMILY MEDICINE CLINIC | Facility: HOSPITAL | Age: 89
End: 2024-04-04

## 2024-04-04 VITALS
SYSTOLIC BLOOD PRESSURE: 132 MMHG | OXYGEN SATURATION: 95 % | HEIGHT: 62 IN | BODY MASS INDEX: 29.26 KG/M2 | DIASTOLIC BLOOD PRESSURE: 60 MMHG | HEART RATE: 61 BPM

## 2024-04-04 VITALS
TEMPERATURE: 98.1 F | OXYGEN SATURATION: 97 % | DIASTOLIC BLOOD PRESSURE: 50 MMHG | SYSTOLIC BLOOD PRESSURE: 120 MMHG | HEART RATE: 65 BPM

## 2024-04-04 DIAGNOSIS — E11.65 TYPE 2 DIABETES MELLITUS WITH HYPERGLYCEMIA, WITH LONG-TERM CURRENT USE OF INSULIN (HCC): Primary | ICD-10-CM

## 2024-04-04 DIAGNOSIS — Z79.4 TYPE 2 DIABETES MELLITUS WITH HYPERGLYCEMIA, WITH LONG-TERM CURRENT USE OF INSULIN (HCC): Primary | ICD-10-CM

## 2024-04-04 LAB
ALBUMIN SERPL-MCNC: 3.9 G/DL (ref 3.7–4.7)
ALBUMIN/GLOB SERPL: 1.9 {RATIO} (ref 1.2–2.2)
ALP SERPL-CCNC: 84 IU/L (ref 44–121)
ALT SERPL-CCNC: 18 IU/L (ref 0–32)
AST SERPL-CCNC: 22 IU/L (ref 0–40)
BASOPHILS # BLD AUTO: 0.1 X10E3/UL (ref 0–0.2)
BASOPHILS NFR BLD AUTO: 1 %
BILIRUB SERPL-MCNC: 0.3 MG/DL (ref 0–1.2)
BUN SERPL-MCNC: 44 MG/DL (ref 8–27)
BUN/CREAT SERPL: 21 (ref 12–28)
CALCIUM SERPL-MCNC: 9.4 MG/DL (ref 8.7–10.3)
CHLORIDE SERPL-SCNC: 98 MMOL/L (ref 96–106)
CO2 SERPL-SCNC: 28 MMOL/L (ref 20–29)
CREAT SERPL-MCNC: 2.09 MG/DL (ref 0.57–1)
EGFR: 22 ML/MIN/1.73
EOSINOPHIL # BLD AUTO: 0.5 X10E3/UL (ref 0–0.4)
EOSINOPHIL NFR BLD AUTO: 5 %
ERYTHROCYTE [DISTWIDTH] IN BLOOD BY AUTOMATED COUNT: 13 % (ref 11.7–15.4)
EST. AVERAGE GLUCOSE BLD GHB EST-MCNC: 203 MG/DL
GLOBULIN SER-MCNC: 2.1 G/DL (ref 1.5–4.5)
GLUCOSE SERPL-MCNC: 240 MG/DL (ref 70–99)
HBA1C MFR BLD: 8.7 % (ref 4.8–5.6)
HCT VFR BLD AUTO: 37.5 % (ref 34–46.6)
HGB BLD-MCNC: 12.1 G/DL (ref 11.1–15.9)
IMM GRANULOCYTES # BLD: 0 X10E3/UL (ref 0–0.1)
IMM GRANULOCYTES NFR BLD: 0 %
LYMPHOCYTES # BLD AUTO: 2.7 X10E3/UL (ref 0.7–3.1)
LYMPHOCYTES NFR BLD AUTO: 28 %
MCH RBC QN AUTO: 30.6 PG (ref 26.6–33)
MCHC RBC AUTO-ENTMCNC: 32.3 G/DL (ref 31.5–35.7)
MCV RBC AUTO: 95 FL (ref 79–97)
MONOCYTES # BLD AUTO: 0.7 X10E3/UL (ref 0.1–0.9)
MONOCYTES NFR BLD AUTO: 8 %
NEUTROPHILS # BLD AUTO: 5.5 X10E3/UL (ref 1.4–7)
NEUTROPHILS NFR BLD AUTO: 58 %
PLATELET # BLD AUTO: 220 X10E3/UL (ref 150–450)
POTASSIUM SERPL-SCNC: 3.8 MMOL/L (ref 3.5–5.2)
PROT SERPL-MCNC: 6 G/DL (ref 6–8.5)
RBC # BLD AUTO: 3.96 X10E6/UL (ref 3.77–5.28)
SODIUM SERPL-SCNC: 143 MMOL/L (ref 134–144)
WBC # BLD AUTO: 9.4 X10E3/UL (ref 3.4–10.8)

## 2024-04-04 PROCEDURE — 99214 OFFICE O/P EST MOD 30 MIN: CPT | Performed by: PHYSICIAN ASSISTANT

## 2024-04-04 PROCEDURE — G0299 HHS/HOSPICE OF RN EA 15 MIN: HCPCS

## 2024-04-04 RX ORDER — INSULIN GLARGINE 100 [IU]/ML
5 INJECTION, SOLUTION SUBCUTANEOUS DAILY
Qty: 15 ML | Refills: 1 | Status: SHIPPED | OUTPATIENT
Start: 2024-04-04

## 2024-04-04 RX ORDER — PEN NEEDLE, DIABETIC 32GX 5/32"
NEEDLE, DISPOSABLE MISCELLANEOUS DAILY
Qty: 100 EACH | Refills: 3 | Status: SHIPPED | OUTPATIENT
Start: 2024-04-04

## 2024-04-04 NOTE — TELEPHONE ENCOUNTER
BP appears to be up and down but no longer appearing to get the low readings - con't current meds for now

## 2024-04-04 NOTE — PROGRESS NOTES
Ena Ahumada 88 y.o. female MRN: [id number]    Encounter: [id number]      Assessment/Plan     Assessment:  This is a 88 y.o.-year-old female with type 2 diabetes with neuropathy, microalbuminuria, hypertension and hyperlipidemia.    Plan:  1.  Type 2 diabetes: Most recent hemoglobin A1c was 8.7.  Has been consistently in the eights over the last year.  Blood sugars were better controlled in the hospital, while she was on insulin.  Has noticed higher blood sugars since being discharged.  She is willing to try insulin at this time.  I will start on Lantus 5 units daily.  She will continue with her current dose of glipizide, Invokana, and Januvia.  We did briefly discuss pending on how well she tolerates the insulin and how blood sugars do we may be able to back off on other medications.  Continue with lifestyle modifications and also checking blood sugars daily.  Contact the office with any concerns or questions.  She already has an appointment scheduled for next months which she will keep.  At that time we will see if she is doing well on the insulin.  Make sure to continue checking blood sugars daily and bring it into the office.  No other lab work needed at this time.    2.  Diabetic neuropathy: Stable.  Diabetic foot exam is up-to-date.    3.  Microalbuminuria: Does have chronic kidney disease.  Does follow-up with nephrology.  Continue with current medications.    4.  Hypertension: Normotensive in the office.  Kidney function remains stable.  Continue with current medication.  Will continue to monitor over time.    5.  Hyperlipidemia: Previous lipid panel was doing excellent.  Continue with current medication.  Will continue to monitor over time.    CC: Type 2 diabetes follow-up    History of Present Illness     HPI:  Ena Ahumada is a 88 y.o. year old female with type 2 diabetes for 11 years.  She is on oral agents at home and takes glipizide 10 mg 1 tablet in the morning and 2 tablets in the evening,  Invokana 100 mg daily, and Januvia 50 mg daily. She denies any polyuria, polydipsia, nocturia and blurry vision.  She denies retinopathy, heart attack, stroke, and claudication but does admit to neuropathy and nephropathy.      She was recently admitted to [facility] from March 16 through March 26, 2024 for sepsis due to pneumonia.  Was started on antibiotics.  During her hospitalization endocrinology was consulted and she was switched to insulin during her hospitalization.  Upon discharge she was switched back to her oral medications.  Since being out of the hospital has noticed blood sugars running higher.  She has had discussions in the past and she may need to start on insulin.  Has always been concerned about the needle, but is willing to give it a try.    Hypoglycemic episodes: No.    The patient's last eye exam was in July 2023.  The patient's last foot exam was in August 2023.    Blood Sugar/Glucometer/Pump/CGM review: Checking fasting blood sugar daily.  Unfortunately have 3 blood sugars at office visit today.  In the first she was 136, on the second she was 171, on the third she was 178.    For hypertension she is currently on clonidine patch 0.1 mg once a week, Bystolic 20 mg daily, nifedipine 90 mg daily, torsemide 20 mg daily.  He does follow-up with nephrology.  For hyperlipidemia she is currently on simvastatin 20 mg daily.  Denies any headaches, vision changes, chest pain, MI or strokelike symptoms.     Review of Systems   Constitutional:  Negative for activity change, appetite change, fatigue and unexpected weight change.   HENT:  Negative for sore throat and trouble swallowing.    Eyes:  Negative for visual disturbance.   Respiratory:  Negative for chest tightness and shortness of breath.         Is on portable oxygen   Cardiovascular:  Negative for chest pain, palpitations and leg swelling.   Gastrointestinal:  Negative for abdominal pain, constipation, diarrhea, nausea and vomiting.    Endocrine: Negative for cold intolerance, heat intolerance, polydipsia, polyphagia and polyuria.   Genitourinary:  Negative for frequency.   Musculoskeletal:  Positive for gait problem (Utilizes walker).   Skin:  Negative for wound.   Neurological:  Negative for dizziness, weakness, numbness and headaches.   Psychiatric/Behavioral:  Negative for dysphoric mood and sleep disturbance. The patient is not nervous/anxious.        Historical Information   Past Medical History:   Diagnosis Date   • Anxiety    • Aortic valve insufficiency    • Arthritis    • Cataract of both eyes    • Chronic kidney disease    • Diabetes mellitus (HCC)    • Dysuria     last assessed - 19May2017   • Edema     last assessed - 05Jun2015   • GERD (gastroesophageal reflux disease)     last assessed - 30Aug2012   • Hyperlipidemia    • Hypertension    • Low back pain     last assessed - 30Aug2012   • Mitral valve disorder    • Osteoporosis     last assessed - 29Jul2014   • Palpitations      Past Surgical History:   Procedure Laterality Date   • APPENDECTOMY     • CATARACT EXTRACTION Bilateral    • COLONOSCOPY     • HIP SURGERY     • TUBAL LIGATION Bilateral      Social History   Social History     Substance and Sexual Activity   Alcohol Use Never     Social History     Substance and Sexual Activity   Drug Use Never     Social History     Tobacco Use   Smoking Status Never   Smokeless Tobacco Never     Family History:   Family History   Problem Relation Age of Onset   • Kidney disease Mother         Chronic   • No Known Problems Father    • Breast cancer Sister    • Diabetes Sister    • Cancer Sister    • Breast cancer Sister    • Hypertension Sister    • No Known Problems Daughter    • No Known Problems Son    • No Known Problems Son    • No Known Problems Son    • No Known Problems Son        Meds/Allergies   Current Outpatient Medications   Medication Sig Dispense Refill   • ACCU-CHEK PAUL PLUS test strip      • Accu-Chek Guide test strip USE  TO TEST TWICE A  strip 3   • Accu-Chek Softclix Lancets lancets      • Accu-Chek Softclix Lancets lancets USE AS INSTRUCTED TWICE DAILY 100 each 3   • aspirin 81 MG tablet Take 1 tablet by mouth daily     • cholecalciferol (VITAMIN D3) 1,000 units tablet Take 2 tablets by mouth daily     • cloNIDine (CATAPRES-TTS-1) 0.1 mg/24 hr PLACE 1 PATCH ON THE SKIN ONCE A WEEK AS DIRECTED 12 patch 3   • Denta 5000 Plus 1.1 % CREA USE TWICE A DAY -SPIT, DONT RINSE AND NOTHING BY MOUTH X 30 MIN     • famotidine (PEPCID) 20 mg tablet TAKE 1 TABLET BY MOUTH EVERY DAY 90 tablet 1   • ferrous sulfate 325 (65 Fe) mg tablet Take 1 tablet (325 mg total) by mouth 3 (three) times a week 12 tablet 0   • glipiZIDE (GLUCOTROL) 10 mg tablet TAKE 1 TABLET BY MOUTH IN THE MORNING THEN 2 TABS WITH DINNER 270 tablet 2   • glucose blood (Accu-Chek Emma Plus) test strip USE AS INSTRUCTED TWICE DAILY 100 each 7   • Insulin Glargine Solostar (Lantus SoloStar) 100 UNIT/ML SOPN Inject 0.05 mL (5 Units total) under the skin in the morning 15 mL 1   • Insulin Pen Needle (BD Pen Needle Kathy U/F) 32G X 4 MM MISC Use daily 100 each 3   • Invokana 100 MG TAKE 1 TABLET BY MOUTH EVERY DAY BEFORE BREAKFAST 30 tablet 5   • nebivolol (BYSTOLIC) 20 MG tablet TAKE 1 TABLET BY MOUTH EVERY DAY 90 tablet 6   • NIFEdipine (ADALAT CC) 90 mg 24 hr tablet TAKE 1 TABLET BY MOUTH EVERY DAY 90 tablet 2   • oxygen gas Inhale 2 L/min continuous. VIA NASAL CANULA  Indications: Difficulty Breathing     • pregabalin (LYRICA) 50 mg capsule Take 1 cap once a day for 5 days, then increase to BID x 5 days, then TID 90 capsule 2   • Pseudoephedrine-guaiFENesin (MUCINEX D PO) Take by mouth     • sertraline (ZOLOFT) 100 mg tablet TAKE 1 TABLET BY MOUTH EVERY DAY 90 tablet 1   • simvastatin (ZOCOR) 20 mg tablet TAKE 1 TABLET BY MOUTH EVERYDAY AT BEDTIME 90 tablet 4   • sitaGLIPtin (Januvia) 50 mg tablet TAKE 1 TABLET BY MOUTH EVERY DAY 30 tablet 5   • torsemide (DEMADEX) 20 mg  "tablet TAKE 1 TABLET BY MOUTH EVERY DAY 90 tablet 3   • transdermal buprenorphine (BUTRANS) 7.5 mcg/hr TD patch Place 1 patch on the skin over 7 days every 7 days For ongoing therapy 4 patch 2   • ondansetron (ZOFRAN) 4 mg tablet Take 1 tablet (4 mg total) by mouth every 8 (eight) hours as needed for nausea or vomiting (Patient not taking: Reported on 4/4/2024) 20 tablet 0     No current facility-administered medications for this visit.     No Known Allergies    Objective   Vitals: Blood pressure 132/60, pulse 61, height 5' 2\" (1.575 m), SpO2 95%, not currently breastfeeding.    Physical Exam  Vitals and nursing note reviewed.   Constitutional:       General: She is not in acute distress.     Appearance: Normal appearance. She is not diaphoretic.   HENT:      Head: Normocephalic and atraumatic.   Eyes:      General: No scleral icterus.     Extraocular Movements: Extraocular movements intact.      Conjunctiva/sclera: Conjunctivae normal.      Pupils: Pupils are equal, round, and reactive to light.   Cardiovascular:      Rate and Rhythm: Normal rate and regular rhythm.      Heart sounds: No murmur heard.  Pulmonary:      Effort: Pulmonary effort is normal. No respiratory distress.      Breath sounds: Normal breath sounds. No wheezing.      Comments: Is on portable oxygen  Musculoskeletal:      Cervical back: Normal range of motion.   Lymphadenopathy:      Cervical: No cervical adenopathy.   Neurological:      Mental Status: She is alert and oriented to person, place, and time. Mental status is at baseline.      Sensory: No sensory deficit.      Gait: Gait abnormal (Utilizing walker).   Psychiatric:         Mood and Affect: Mood normal.         Behavior: Behavior normal.         Thought Content: Thought content normal.         The history was obtained from the review of the chart, patient.    Lab Results:   Lab Results   Component Value Date/Time    Hemoglobin A1C 8.7 (H) 04/03/2024 11:28 AM    Hemoglobin A1C 8.1 (H) " 03/16/2024 04:20 PM    Hemoglobin A1C 8.5 (H) 10/13/2023 09:04 AM    Hemoglobin A1C 8.7 (H) 06/23/2023 08:53 AM    WBC 11.24 (H) 04/02/2024 11:41 AM    WBC 12.90 (H) 03/26/2024 04:20 AM    WBC 15.03 (H) 03/25/2024 02:30 AM    White Blood Cell Count 9.4 04/03/2024 11:28 AM    Hemoglobin 12.1 04/03/2024 11:28 AM    Hemoglobin 11.0 (L) 04/02/2024 11:41 AM    Hemoglobin 11.1 (L) 03/26/2024 04:20 AM    Hemoglobin 11.1 (L) 03/25/2024 02:30 AM    Hematocrit 35.8 04/02/2024 11:41 AM    Hematocrit 36.2 03/26/2024 04:20 AM    Hematocrit 35.9 03/25/2024 02:30 AM    HCT 37.5 04/03/2024 11:28 AM    MCV 95 04/03/2024 11:28 AM    MCV 97 04/02/2024 11:41 AM    MCV 97 03/26/2024 04:20 AM    MCV 97 03/25/2024 02:30 AM    Platelet Count 220 04/03/2024 11:28 AM    Platelets 215 04/02/2024 11:41 AM    Platelets 252 03/26/2024 04:20 AM    Platelets 250 03/25/2024 02:30 AM    BUN 44 (H) 04/03/2024 11:28 AM    BUN 50 (H) 04/02/2024 11:41 AM    BUN 45 (H) 03/26/2024 04:20 AM    BUN 56 (H) 03/25/2024 02:30 AM    BUN 32 (H) 11/17/2023 08:53 AM    BUN 38 (H) 06/23/2023 08:54 AM    Potassium 3.8 04/03/2024 11:28 AM    Potassium 3.5 04/02/2024 11:41 AM    Potassium 4.5 03/26/2024 04:20 AM    Potassium 3.2 (L) 03/25/2024 02:30 AM    Potassium 3.5 11/17/2023 08:53 AM    Potassium 4.1 06/23/2023 08:54 AM    Chloride 98 04/03/2024 11:28 AM    Chloride 101 04/02/2024 11:41 AM    Chloride 106 03/26/2024 04:20 AM    Chloride 105 03/25/2024 02:30 AM    Chloride 103 11/17/2023 08:53 AM    Chloride 102 06/23/2023 08:54 AM    CO2 28 04/03/2024 11:28 AM    CO2 33 (H) 04/02/2024 11:41 AM    CO2 29 03/26/2024 04:20 AM    CO2 30 03/25/2024 02:30 AM    CO2 23 11/17/2023 08:53 AM    CO2 25 06/23/2023 08:54 AM    CO2, i-STAT 29 03/16/2024 12:21 PM    Creatinine 2.09 (H) 04/03/2024 11:28 AM    Creatinine 2.22 (H) 04/02/2024 11:41 AM    Creatinine 2.03 (H) 03/26/2024 04:20 AM    Creatinine 2.27 (H) 03/25/2024 02:30 AM    AST 22 04/03/2024 11:28 AM    AST 16  "04/02/2024 11:41 AM    AST 57 (H) 03/21/2024 04:35 AM    AST 23 03/19/2024 05:55 AM    AST 18 11/17/2023 08:53 AM    AST 18 06/23/2023 08:53 AM    ALT 18 04/03/2024 11:28 AM    ALT 17 04/02/2024 11:41 AM    ALT 64 (H) 03/21/2024 04:35 AM    ALT 19 03/19/2024 05:55 AM    ALT 13 11/17/2023 08:53 AM    ALT 13 06/23/2023 08:53 AM    Total Protein 5.9 (L) 04/02/2024 11:41 AM    Total Protein 6.0 (L) 03/21/2024 04:35 AM    Total Protein 5.9 (L) 03/19/2024 05:55 AM    Protein, Total 6.0 04/03/2024 11:28 AM    Protein, Total 6.0 11/17/2023 08:53 AM    Protein, Total 6.5 06/23/2023 08:53 AM    Albumin 3.9 04/03/2024 11:28 AM    Albumin 3.4 (L) 04/02/2024 11:41 AM    Albumin 3.1 (L) 03/21/2024 04:35 AM    Albumin 3.1 (L) 03/19/2024 05:55 AM    Globulin, Total 2.1 04/03/2024 11:28 AM    Globulin, Total 2.1 11/17/2023 08:53 AM    Globulin, Total 1.9 06/23/2023 08:53 AM    HDL 50 06/23/2023 08:52 AM    Triglycerides 139 06/23/2023 08:52 AM       Portions of the record may have been created with voice recognition software. Occasional wrong word or \"sound a like\" substitutions may have occurred due to the inherent limitations of voice recognition software. Read the chart carefully and recognize, using context, where substitutions have occurred.    "

## 2024-04-04 NOTE — TELEPHONE ENCOUNTER
[facility] called with Ena BP readings @ Dr Castaneda's request    4/1  Am  102/42         PM  134/72  4/2  Am   145/83         PM   154/93  4/3  AM    128/60         PM   143/75  4/4  AM    132/60

## 2024-04-04 NOTE — PATIENT INSTRUCTIONS
Continue with current oral medications.    Start Lantus 5 units daily.    Continue to check blood sugar daily.    Call with any concerns or questions.    Follow up next month.

## 2024-04-05 ENCOUNTER — HOME CARE VISIT (OUTPATIENT)
Dept: HOME HEALTH SERVICES | Facility: HOME HEALTHCARE | Age: 89
End: 2024-04-05
Payer: COMMERCIAL

## 2024-04-05 ENCOUNTER — OFFICE VISIT (OUTPATIENT)
Age: 89
End: 2024-04-05
Payer: COMMERCIAL

## 2024-04-05 VITALS
HEIGHT: 62 IN | DIASTOLIC BLOOD PRESSURE: 62 MMHG | TEMPERATURE: 98.5 F | BODY MASS INDEX: 29.26 KG/M2 | OXYGEN SATURATION: 95 % | HEART RATE: 66 BPM | SYSTOLIC BLOOD PRESSURE: 124 MMHG

## 2024-04-05 DIAGNOSIS — J96.01 ACUTE RESPIRATORY FAILURE WITH HYPOXIA (HCC): ICD-10-CM

## 2024-04-05 DIAGNOSIS — J18.9 PNEUMONIA OF RIGHT LOWER LOBE DUE TO INFECTIOUS ORGANISM: Primary | ICD-10-CM

## 2024-04-05 LAB
ATRIAL RATE: 67 BPM
P AXIS: 32 DEGREES
PR INTERVAL: 176 MS
QRS AXIS: -20 DEGREES
QRSD INTERVAL: 88 MS
QT INTERVAL: 406 MS
QTC INTERVAL: 429 MS
T WAVE AXIS: -10 DEGREES
VENTRICULAR RATE: 67 BPM

## 2024-04-05 PROCEDURE — 94618 PULMONARY STRESS TESTING: CPT | Performed by: NURSE PRACTITIONER

## 2024-04-05 PROCEDURE — G0151 HHCP-SERV OF PT,EA 15 MIN: HCPCS

## 2024-04-05 PROCEDURE — 99204 OFFICE O/P NEW MOD 45 MIN: CPT | Performed by: NURSE PRACTITIONER

## 2024-04-05 PROCEDURE — 93010 ELECTROCARDIOGRAM REPORT: CPT | Performed by: INTERNAL MEDICINE

## 2024-04-05 NOTE — PROGRESS NOTES
Pulmonary Follow-Up Note   Ena Ahumada 88 y.o. female MRN: [id number]  4/5/2024      Assessment/Plan:    Diagnoses and all orders for this visit:    Pneumonia of right lower lobe due to infectious organism  Chest exam is clear today and she has no specific breathing complaints. No prior history or pulmonary diagnosis however did have significant workplace exposure to textile particles in remote past.  Obtain PFT  Repeat CT scan in 6-8 weeks    Acute respiratory failure with hypoxia (HCC)  6MWT today demonstrated ongoing need for oxygen at 2L/m during ambulation  Continue O2 at home and titrate to maintain saturations >90%  She has frequent monitoring at home by PT/OT and visiting nurses    Vaccines: up to date    Return in about 2 months (around 6/5/2024).    All of Rosajeferson's questions were answered prior to leaving the office today.  She is aware to call our office with any further questions or concerns.    History of Present Illness   Reason for Visit: HFU  Chief Complaint: none  HPI: Ena Ahumada is a 88 y.o. female who presents to the office today following recent hospitalization 3/16/24-3/26/14 for acute respiratory failure in the setting of sepsis and pneumonia. She has PMHx DM II, CKD stage 4, aortic valve stenosis, HTN, HLD, and GERD. It is felt the pneumonia developed due to aspiration after vomiting and she was on the floor for several hours after vomiting left her weakened, until help arrived. She was treated with broad spectrum antibiotics and prednisone and did require supplemental oxygen upon discharge.    Today she is feeling well and has no report of cough, wheezing, tight chest or shortness of breath. She is wearing O2 at 2L/m and did find it beneficial. She is sleeping well.    Pulmonary history: No prior history of asthma, COPD, pneumonia or bronchitis. Her breathing was always in good health.    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    Tobacco Use: Lifelong nonsmoker  Drug use: none  Alcohol use: none    Advance Directives Status: no advance directive; daughter is health care proxy    Functional status: independent; there is a flight of stairs to her garage apartment.        Review of Systems   Constitutional:  Negative for activity change, chills, fatigue and fever.   HENT:  Negative for congestion and postnasal drip.    Respiratory:  Negative for cough, chest tightness, shortness of breath and wheezing.    Cardiovascular:  Negative for chest pain, palpitations and leg swelling.   Gastrointestinal:  Negative for abdominal pain.   Allergic/Immunologic: Negative for environmental allergies.   All other systems reviewed and are negative.      Historical Information   Past Medical History:   Diagnosis Date    Anxiety     Aortic valve insufficiency     Arthritis     Cataract of both eyes     Chronic kidney disease     Diabetes mellitus (HCC)     Dysuria     last assessed - [id number]    Edema     last assessed - [id number]    GERD (gastroesophageal reflux disease)     last assessed - [id number]    Hyperlipidemia     Hypertension     Low back pain     last assessed - [id number]    Mitral valve disorder     Osteoporosis     last assessed - [id number]    Palpitations      Past Surgical History:   Procedure Laterality Date    APPENDECTOMY      CATARACT EXTRACTION Bilateral     COLONOSCOPY      HIP SURGERY      TUBAL LIGATION Bilateral      Family History   Problem Relation Age of Onset    Kidney disease Mother         Chronic    No Known Problems Father     Breast cancer Sister     Diabetes Sister     Cancer Sister     Breast cancer Sister     Hypertension Sister     No Known Problems Daughter     No Known Problems Son     No Known Problems Son     No Known Problems Son     No Known Problems Son      Social History    Social History     Substance and Sexual Activity   Alcohol Use Never     Social History     Substance and Sexual Activity   Drug Use Never     Social History     Tobacco Use   Smoking Status Never   Smokeless Tobacco Never     E-Cigarette/Vaping    E-Cigarette Use Never User      E-Cigarette/Vaping Substances    Nicotine No     THC No     CBD No     Flavoring No     Other No     Unknown No        Meds/Allergies     Current Outpatient Medications:     ACCU-CHEK PAUL PLUS test strip, , Disp: , Rfl:     Accu-Chek Guide test strip, USE TO TEST TWICE A DAY, Disp: 100 strip, Rfl: 3    Accu-Chek Softclix Lancets lancets, , Disp: , Rfl:     Accu-Chek Softclix Lancets lancets, USE AS INSTRUCTED TWICE DAILY, Disp: 100 each, Rfl: 3    aspirin 81 MG tablet, Take 1 tablet by mouth daily, Disp: , Rfl:     cholecalciferol (VITAMIN D3) 1,000 units tablet, Take 2 tablets by mouth daily, Disp: , Rfl:     cloNIDine (CATAPRES-TTS-1) 0.1 mg/24 hr, PLACE 1 PATCH ON THE SKIN ONCE A WEEK AS DIRECTED, Disp: 12 patch, Rfl: 3    Denta 5000 Plus 1.1 % CREA, USE TWICE A DAY -SPIT, DONT RINSE AND NOTHING BY MOUTH X 30 MIN, Disp: , Rfl:     famotidine (PEPCID) 20 mg tablet, TAKE 1 TABLET BY MOUTH EVERY DAY, Disp: 90 tablet, Rfl: 1    ferrous sulfate 325 (65 Fe) mg tablet, Take 1 tablet (325 mg total) by mouth 3 (three) times a week, Disp: 12 tablet, Rfl: 0    glipiZIDE (GLUCOTROL) 10 mg tablet, TAKE 1 TABLET BY MOUTH IN THE MORNING THEN 2 TABS WITH DINNER, Disp: 270 tablet, Rfl: 2    glucose blood (Accu-Chek Paul Plus) test strip, USE AS INSTRUCTED TWICE DAILY, Disp: 100 each, Rfl: 7    Insulin Glargine Solostar (Lantus SoloStar) 100 UNIT/ML SOPN, Inject 0.05 mL (5 Units total) under the skin in the morning, Disp: 15 mL, Rfl: 1    Insulin Pen Needle (BD Pen Needle Kathy U/F) 32G X 4 MM MISC, Use daily, Disp: 100 each, Rfl: 3    Invokana 100 MG, TAKE 1 TABLET BY MOUTH EVERY DAY BEFORE BREAKFAST, Disp: 30 tablet, Rfl: 5    nebivolol (BYSTOLIC)  "20 MG tablet, TAKE 1 TABLET BY MOUTH EVERY DAY, Disp: 90 tablet, Rfl: 6    NIFEdipine (ADALAT CC) 90 mg 24 hr tablet, TAKE 1 TABLET BY MOUTH EVERY DAY, Disp: 90 tablet, Rfl: 2    oxygen gas, Inhale 2 L/min continuous. VIA NASAL CANULA  Indications: Difficulty Breathing, Disp: , Rfl:     pregabalin (LYRICA) 50 mg capsule, Take 1 cap once a day for 5 days, then increase to BID x 5 days, then TID, Disp: 90 capsule, Rfl: 2    Pseudoephedrine-guaiFENesin (MUCINEX D PO), Take by mouth, Disp: , Rfl:     sertraline (ZOLOFT) 100 mg tablet, TAKE 1 TABLET BY MOUTH EVERY DAY, Disp: 90 tablet, Rfl: 1    simvastatin (ZOCOR) 20 mg tablet, TAKE 1 TABLET BY MOUTH EVERYDAY AT BEDTIME, Disp: 90 tablet, Rfl: 4    sitaGLIPtin (Januvia) 50 mg tablet, TAKE 1 TABLET BY MOUTH EVERY DAY, Disp: 30 tablet, Rfl: 5    torsemide (DEMADEX) 20 mg tablet, TAKE 1 TABLET BY MOUTH EVERY DAY, Disp: 90 tablet, Rfl: 3    transdermal buprenorphine (BUTRANS) 7.5 mcg/hr TD patch, Place 1 patch on the skin over 7 days every 7 days For ongoing therapy, Disp: 4 patch, Rfl: 2  No Known Allergies    Vitals: Blood pressure 124/62, pulse 66, temperature 98.5 °F (36.9 °C), temperature source Tympanic, height 5' 2\" (1.575 m), SpO2 95%, not currently breastfeeding. Body mass index is 29.26 kg/m². Oxygen Therapy  SpO2: 95 %  Oxygen Therapy: Supplemental oxygen  O2 Delivery Method: Nasal cannula  O2 Flow Rate (L/min): 2 L/min      Physical Exam  Vitals reviewed.   Constitutional:       Appearance: Normal appearance. She is normal weight.   HENT:      Head: Normocephalic.      Nose: Nose normal.      Mouth/Throat:      Mouth: Mucous membranes are moist.      Pharynx: Oropharynx is clear.   Eyes:      Conjunctiva/sclera: Conjunctivae normal.      Pupils: Pupils are equal, round, and reactive to light.   Cardiovascular:      Rate and Rhythm: Normal rate and regular rhythm.      Pulses: Normal pulses.      Heart sounds: Normal heart sounds.   Pulmonary:      Effort: " "Pulmonary effort is normal.      Breath sounds: Normal breath sounds.   Abdominal:      General: Abdomen is flat. Bowel sounds are normal.      Palpations: Abdomen is soft.   Musculoskeletal:         General: Normal range of motion.   Skin:     General: Skin is warm and dry.      Capillary Refill: Capillary refill takes less than 2 seconds.   Neurological:      General: No focal deficit present.      Mental Status: She is alert and oriented to person, place, and time. Mental status is at baseline.   Psychiatric:         Mood and Affect: Mood normal.         Behavior: Behavior normal.         Thought Content: Thought content normal.         Judgment: Judgment normal.         Labs:   Lab Results   Component Value Date    WBC 9.4 04/03/2024    HGB 12.1 04/03/2024    HCT 37.5 04/03/2024    MCV 95 04/03/2024     04/03/2024    EOSPCT 5 04/03/2024    EOSABS 0.5 (H) 04/03/2024    SEGSPCT 61 03/25/2024    MONOPCT 8 04/03/2024    MONOABS 0.30 03/25/2024    BANDSPCT 2 03/25/2024     Lab Results   Component Value Date    GLUCOSE 264 (H) 03/16/2024    CALCIUM 9.1 04/02/2024     01/02/2018    K 3.8 04/03/2024    CO2 28 04/03/2024    CL 98 04/03/2024    BUN 44 (H) 04/03/2024    CREATININE 2.09 (H) 04/03/2024     No results found for: \"IGE\", \"RAST\"  Lab Results   Component Value Date    ALT 18 04/03/2024    AST 22 04/03/2024    ALKPHOS 68 04/02/2024    BILITOT 0.3 01/02/2018         Imaging and other studies: I have personally reviewed pertinent reports.   and I have personally reviewed pertinent films in PACS  CXR 3/23/24  Diffuse airspace opacities without significant change.     CTA chest PE study 3/18/24  Large focal area of groundglass opacification involving the entirety of the right lung most consistent with pneumonia.  No pulmonary embolus.    CXR 3/16/24  Right lower lobe predominant pneumonia.    Pulmonary Results (PFTs, PSG): none on record    Today's Testing:     Six Minute Walk Test:   Resting room air " "saturation: 92%; HR 66  Dl scale of dyspnea at start of test: 0    Ambulation testin minute ambulation: saturation 88%; HR 72  O2 at 2L/m applied  She continued to ambulate for another 3 minutes with saturations 95-96%, HR 73    Dl scale of dyspnea at end of test: 2  Reason if test was stopped early: dizziness; leg weakness     Total walk distance completed:  72m    KANA Navarro  [facility]        Portions of the record may have been created with voice recognition software.  Occasional wrong word or \"sound a like\" substitutions may have occurred due to the inherent limitations of voice recognition software.  Read the chart carefully and recognize, using context, where substitutions have occurred or contact the dictating provider.  "

## 2024-04-08 ENCOUNTER — HOME CARE VISIT (OUTPATIENT)
Dept: HOME HEALTH SERVICES | Facility: HOME HEALTHCARE | Age: 89
End: 2024-04-08
Payer: COMMERCIAL

## 2024-04-08 ENCOUNTER — TELEPHONE (OUTPATIENT)
Age: 89
End: 2024-04-08

## 2024-04-08 VITALS — SYSTOLIC BLOOD PRESSURE: 110 MMHG | DIASTOLIC BLOOD PRESSURE: 50 MMHG | OXYGEN SATURATION: 96 % | HEART RATE: 60 BPM

## 2024-04-08 VITALS — OXYGEN SATURATION: 91 %

## 2024-04-08 DIAGNOSIS — J96.01 ACUTE RESPIRATORY FAILURE WITH HYPOXIA (HCC): Primary | ICD-10-CM

## 2024-04-08 PROCEDURE — G0152 HHCP-SERV OF OT,EA 15 MIN: HCPCS | Performed by: OCCUPATIONAL THERAPIST

## 2024-04-08 PROCEDURE — G0299 HHS/HOSPICE OF RN EA 15 MIN: HCPCS

## 2024-04-08 NOTE — TELEPHONE ENCOUNTER
Kaylan with [facility] calls and states she is currently with pt and she is complaining of Dry mouth and nose Kaylan is wondering if we na put in order for Humidification with 02.

## 2024-04-09 ENCOUNTER — HOME CARE VISIT (OUTPATIENT)
Dept: HOME HEALTH SERVICES | Facility: HOME HEALTHCARE | Age: 89
End: 2024-04-09
Payer: COMMERCIAL

## 2024-04-09 LAB
DME PARACHUTE DELIVERY DATE REQUESTED: NORMAL
DME PARACHUTE DELIVERY DATE REQUESTED: NORMAL
DME PARACHUTE ITEM DESCRIPTION: NORMAL
DME PARACHUTE ORDER STATUS: NORMAL
DME PARACHUTE ORDER STATUS: NORMAL
DME PARACHUTE SUPPLIER NAME: NORMAL
DME PARACHUTE SUPPLIER NAME: NORMAL
DME PARACHUTE SUPPLIER PHONE: NORMAL
DME PARACHUTE SUPPLIER PHONE: NORMAL

## 2024-04-09 PROCEDURE — G0151 HHCP-SERV OF PT,EA 15 MIN: HCPCS

## 2024-04-10 ENCOUNTER — HOME CARE VISIT (OUTPATIENT)
Dept: HOME HEALTH SERVICES | Facility: HOME HEALTHCARE | Age: 89
End: 2024-04-10
Payer: COMMERCIAL

## 2024-04-10 VITALS — HEART RATE: 71 BPM | OXYGEN SATURATION: 94 % | SYSTOLIC BLOOD PRESSURE: 142 MMHG | DIASTOLIC BLOOD PRESSURE: 70 MMHG

## 2024-04-10 VITALS — OXYGEN SATURATION: 97 % | SYSTOLIC BLOOD PRESSURE: 110 MMHG | DIASTOLIC BLOOD PRESSURE: 48 MMHG | HEART RATE: 63 BPM

## 2024-04-10 DIAGNOSIS — E78.5 DYSLIPIDEMIA: ICD-10-CM

## 2024-04-10 DIAGNOSIS — R60.9 DEPENDENT EDEMA: ICD-10-CM

## 2024-04-10 PROCEDURE — G0152 HHCP-SERV OF OT,EA 15 MIN: HCPCS | Performed by: OCCUPATIONAL THERAPIST

## 2024-04-10 RX ORDER — SIMVASTATIN 20 MG
TABLET ORAL
Qty: 90 TABLET | Refills: 2 | Status: SHIPPED | OUTPATIENT
Start: 2024-04-10

## 2024-04-11 ENCOUNTER — HOME CARE VISIT (OUTPATIENT)
Dept: HOME HEALTH SERVICES | Facility: HOME HEALTHCARE | Age: 89
End: 2024-04-11
Payer: COMMERCIAL

## 2024-04-11 VITALS
OXYGEN SATURATION: 99 % | TEMPERATURE: 98.2 F | DIASTOLIC BLOOD PRESSURE: 50 MMHG | HEART RATE: 55 BPM | SYSTOLIC BLOOD PRESSURE: 114 MMHG

## 2024-04-11 PROCEDURE — G0299 HHS/HOSPICE OF RN EA 15 MIN: HCPCS

## 2024-04-11 PROCEDURE — G0151 HHCP-SERV OF PT,EA 15 MIN: HCPCS

## 2024-04-11 RX ORDER — TORSEMIDE 20 MG/1
TABLET ORAL
Qty: 90 TABLET | Refills: 0 | Status: SHIPPED | OUTPATIENT
Start: 2024-04-11

## 2024-04-12 ENCOUNTER — TELEPHONE (OUTPATIENT)
Dept: FAMILY MEDICINE CLINIC | Facility: HOSPITAL | Age: 89
End: 2024-04-12

## 2024-04-12 VITALS — OXYGEN SATURATION: 97 % | HEART RATE: 77 BPM | SYSTOLIC BLOOD PRESSURE: 132 MMHG | DIASTOLIC BLOOD PRESSURE: 70 MMHG

## 2024-04-12 DIAGNOSIS — N18.4 TYPE 2 DIABETES MELLITUS WITH STAGE 4 CHRONIC KIDNEY DISEASE, WITHOUT LONG-TERM CURRENT USE OF INSULIN (HCC): Primary | ICD-10-CM

## 2024-04-12 DIAGNOSIS — E11.22 TYPE 2 DIABETES MELLITUS WITH STAGE 4 CHRONIC KIDNEY DISEASE, WITHOUT LONG-TERM CURRENT USE OF INSULIN (HCC): Primary | ICD-10-CM

## 2024-04-12 RX ORDER — BLOOD-GLUCOSE METER
KIT MISCELLANEOUS
Qty: 1 KIT | Refills: 0 | Status: SHIPPED | OUTPATIENT
Start: 2024-04-12

## 2024-04-12 RX ORDER — BLOOD SUGAR DIAGNOSTIC
STRIP MISCELLANEOUS
Qty: 200 EACH | Refills: 3 | Status: SHIPPED | OUTPATIENT
Start: 2024-04-12

## 2024-04-12 RX ORDER — LANCETS 33 GAUGE
EACH MISCELLANEOUS
Qty: 200 EACH | Refills: 3 | Status: SHIPPED | OUTPATIENT
Start: 2024-04-12

## 2024-04-12 NOTE — TELEPHONE ENCOUNTER
Does it occur when she is anxious - significant anxiety can cause tics like that?  Does she have any confusion or lost time/memory issues with the events to indicate seizure?

## 2024-04-12 NOTE — TELEPHONE ENCOUNTER
"Pt's KYLE states that for a couple months pt is having issues with hands fluttering/jerking. States in the past two weeks both pt's arms will quiver and randomly \"jerk upwards\". This is only affecting the arms and hands, not the legs. KYLE would like Dr. Castaneda's advice on what to do next. Should she be seen or imaging ordering first. Please advise  "

## 2024-04-12 NOTE — TELEPHONE ENCOUNTER
Glucometer sent to [facility], anxiety meds take time for full affect if just changed,  I would give her a bit to relax back into a routine and get back to normal. If persists will need further evaluation.  If any seizure symptoms occur (blank stare/shaking seizures/bowel or bladder incontinence/tongue biting) with any episodes go to ED

## 2024-04-15 ENCOUNTER — HOME CARE VISIT (OUTPATIENT)
Dept: HOME HEALTH SERVICES | Facility: HOME HEALTHCARE | Age: 89
End: 2024-04-15
Payer: COMMERCIAL

## 2024-04-15 DIAGNOSIS — B37.2 CUTANEOUS CANDIDIASIS: Primary | ICD-10-CM

## 2024-04-15 PROCEDURE — G0151 HHCP-SERV OF PT,EA 15 MIN: HCPCS

## 2024-04-15 RX ORDER — NYSTATIN 100000 [USP'U]/G
POWDER TOPICAL 3 TIMES DAILY
Qty: 30 G | Refills: 1 | Status: SHIPPED | OUTPATIENT
Start: 2024-04-15

## 2024-04-16 VITALS — DIASTOLIC BLOOD PRESSURE: 70 MMHG | OXYGEN SATURATION: 97 % | SYSTOLIC BLOOD PRESSURE: 138 MMHG | HEART RATE: 72 BPM

## 2024-04-16 DIAGNOSIS — I10 ESSENTIAL HYPERTENSION: ICD-10-CM

## 2024-04-16 RX ORDER — CLONIDINE 0.1 MG/24H
PATCH, EXTENDED RELEASE TRANSDERMAL
Qty: 12 PATCH | Refills: 4 | Status: SHIPPED | OUTPATIENT
Start: 2024-04-16

## 2024-04-18 ENCOUNTER — HOME CARE VISIT (OUTPATIENT)
Dept: HOME HEALTH SERVICES | Facility: HOME HEALTHCARE | Age: 89
End: 2024-04-18
Payer: COMMERCIAL

## 2024-04-18 VITALS
RESPIRATION RATE: 19 BRPM | OXYGEN SATURATION: 97 % | SYSTOLIC BLOOD PRESSURE: 131 MMHG | DIASTOLIC BLOOD PRESSURE: 72 MMHG | TEMPERATURE: 98.1 F | HEART RATE: 69 BPM

## 2024-04-18 DIAGNOSIS — F41.9 ANXIETY: ICD-10-CM

## 2024-04-18 DIAGNOSIS — E11.22 TYPE 2 DIABETES MELLITUS WITH STAGE 3 CHRONIC KIDNEY DISEASE, WITHOUT LONG-TERM CURRENT USE OF INSULIN (HCC): ICD-10-CM

## 2024-04-18 DIAGNOSIS — N18.30 TYPE 2 DIABETES MELLITUS WITH STAGE 3 CHRONIC KIDNEY DISEASE, WITHOUT LONG-TERM CURRENT USE OF INSULIN (HCC): ICD-10-CM

## 2024-04-18 DIAGNOSIS — I10 ESSENTIAL HYPERTENSION: ICD-10-CM

## 2024-04-18 PROCEDURE — G0157 HHC PT ASSISTANT EA 15: HCPCS

## 2024-04-18 RX ORDER — SERTRALINE HYDROCHLORIDE 100 MG/1
100 TABLET, FILM COATED ORAL DAILY
Qty: 90 TABLET | Refills: 2 | Status: SHIPPED | OUTPATIENT
Start: 2024-04-18

## 2024-04-18 RX ORDER — GLIPIZIDE 10 MG/1
TABLET ORAL
Qty: 270 TABLET | Refills: 3 | Status: SHIPPED | OUTPATIENT
Start: 2024-04-18

## 2024-04-18 RX ORDER — NIFEDIPINE 90 MG/1
TABLET, FILM COATED, EXTENDED RELEASE ORAL
Qty: 90 TABLET | Refills: 3 | Status: SHIPPED | OUTPATIENT
Start: 2024-04-18

## 2024-04-22 ENCOUNTER — TELEPHONE (OUTPATIENT)
Dept: FAMILY MEDICINE CLINIC | Facility: HOSPITAL | Age: 89
End: 2024-04-22

## 2024-04-22 NOTE — TELEPHONE ENCOUNTER
Talon called regarding her Mother In Law - she is on oxygen it is drying her nose out inside and bleeds a little  the visiting nurse told them to contact PCP and get a script for a humidifier for her oxygen to connect to the bottle     [facility] / [employer]

## 2024-04-23 LAB
ALBUMIN SERPL-MCNC: 3.8 G/DL (ref 3.7–4.7)
ALBUMIN/CREAT UR: 134 MG/G CREAT (ref 0–29)
ALBUMIN/GLOB SERPL: 1.9 {RATIO} (ref 1.2–2.2)
ALP SERPL-CCNC: 64 IU/L (ref 44–121)
ALT SERPL-CCNC: 14 IU/L (ref 0–32)
APPEARANCE UR: CLEAR
AST SERPL-CCNC: 22 IU/L (ref 0–40)
BACTERIA URNS QL MICRO: NORMAL
BASOPHILS # BLD AUTO: 0 X10E3/UL (ref 0–0.2)
BASOPHILS NFR BLD AUTO: 0 %
BILIRUB SERPL-MCNC: 0.2 MG/DL (ref 0–1.2)
BILIRUB UR QL STRIP: NEGATIVE
BUN SERPL-MCNC: 26 MG/DL (ref 8–27)
BUN SERPL-MCNC: 27 MG/DL (ref 8–27)
BUN/CREAT SERPL: 14 (ref 12–28)
BUN/CREAT SERPL: 14 (ref 12–28)
CALCIUM SERPL-MCNC: 8.8 MG/DL (ref 8.7–10.3)
CALCIUM SERPL-MCNC: 8.9 MG/DL (ref 8.7–10.3)
CASTS URNS QL MICRO: NORMAL /LPF
CHLORIDE SERPL-SCNC: 105 MMOL/L (ref 96–106)
CHLORIDE SERPL-SCNC: 106 MMOL/L (ref 96–106)
CO2 SERPL-SCNC: 29 MMOL/L (ref 20–29)
CO2 SERPL-SCNC: 30 MMOL/L (ref 20–29)
COLOR UR: YELLOW
CREAT SERPL-MCNC: 1.87 MG/DL (ref 0.57–1)
CREAT SERPL-MCNC: 1.89 MG/DL (ref 0.57–1)
CREAT UR-MCNC: 103.7 MG/DL
EGFR: 25 ML/MIN/1.73
EGFR: 26 ML/MIN/1.73
EOSINOPHIL # BLD AUTO: 0.3 X10E3/UL (ref 0–0.4)
EOSINOPHIL NFR BLD AUTO: 4 %
EPI CELLS #/AREA URNS HPF: NORMAL /HPF (ref 0–10)
ERYTHROCYTE [DISTWIDTH] IN BLOOD BY AUTOMATED COUNT: 13.1 % (ref 11.7–15.4)
EST. AVERAGE GLUCOSE BLD GHB EST-MCNC: 183 MG/DL
GLOBULIN SER-MCNC: 2 G/DL (ref 1.5–4.5)
GLUCOSE SERPL-MCNC: 123 MG/DL (ref 70–99)
GLUCOSE SERPL-MCNC: 124 MG/DL (ref 70–99)
GLUCOSE UR QL: ABNORMAL
HBA1C MFR BLD: 8 % (ref 4.8–5.6)
HCT VFR BLD AUTO: 38.3 % (ref 34–46.6)
HGB BLD-MCNC: 12.5 G/DL (ref 11.1–15.9)
HGB UR QL STRIP: NEGATIVE
IMM GRANULOCYTES # BLD: 0 X10E3/UL (ref 0–0.1)
IMM GRANULOCYTES NFR BLD: 0 %
KETONES UR QL STRIP: NEGATIVE
LEUKOCYTE ESTERASE UR QL STRIP: ABNORMAL
LYMPHOCYTES # BLD AUTO: 3.3 X10E3/UL (ref 0.7–3.1)
LYMPHOCYTES NFR BLD AUTO: 42 %
MAGNESIUM SERPL-MCNC: 2.4 MG/DL (ref 1.6–2.3)
MCH RBC QN AUTO: 30.2 PG (ref 26.6–33)
MCHC RBC AUTO-ENTMCNC: 32.6 G/DL (ref 31.5–35.7)
MCV RBC AUTO: 93 FL (ref 79–97)
MICRO URNS: ABNORMAL
MICROALBUMIN UR-MCNC: 138.6 UG/ML
MONOCYTES # BLD AUTO: 0.8 X10E3/UL (ref 0.1–0.9)
MONOCYTES NFR BLD AUTO: 11 %
NEUTROPHILS # BLD AUTO: 3.4 X10E3/UL (ref 1.4–7)
NEUTROPHILS NFR BLD AUTO: 43 %
NITRITE UR QL STRIP: NEGATIVE
PH UR STRIP: 6 [PH] (ref 5–7.5)
PHOSPHATE SERPL-MCNC: 3.5 MG/DL (ref 3–4.3)
PLATELET # BLD AUTO: 174 X10E3/UL (ref 150–450)
POTASSIUM SERPL-SCNC: 3.8 MMOL/L (ref 3.5–5.2)
POTASSIUM SERPL-SCNC: 3.9 MMOL/L (ref 3.5–5.2)
PROT SERPL-MCNC: 5.8 G/DL (ref 6–8.5)
PROT UR QL STRIP: ABNORMAL
PROT UR-MCNC: 61.9 MG/DL
PROT/CREAT UR: 597 MG/G CREAT (ref 0–200)
PTH-INTACT SERPL-MCNC: 85 PG/ML (ref 15–65)
RBC # BLD AUTO: 4.14 X10E6/UL (ref 3.77–5.28)
RBC #/AREA URNS HPF: NORMAL /HPF (ref 0–2)
SODIUM SERPL-SCNC: 147 MMOL/L (ref 134–144)
SODIUM SERPL-SCNC: 150 MMOL/L (ref 134–144)
SP GR UR: 1.02 (ref 1–1.03)
UROBILINOGEN UR STRIP-ACNC: 0.2 MG/DL (ref 0.2–1)
WBC # BLD AUTO: 7.8 X10E3/UL (ref 3.4–10.8)
WBC #/AREA URNS HPF: NORMAL /HPF (ref 0–5)

## 2024-04-24 ENCOUNTER — TELEPHONE (OUTPATIENT)
Dept: NEPHROLOGY | Facility: CLINIC | Age: 89
End: 2024-04-24

## 2024-04-24 ENCOUNTER — HOME CARE VISIT (OUTPATIENT)
Dept: HOME HEALTH SERVICES | Facility: HOME HEALTHCARE | Age: 89
End: 2024-04-24
Payer: COMMERCIAL

## 2024-04-24 VITALS — HEART RATE: 76 BPM | OXYGEN SATURATION: 98 % | DIASTOLIC BLOOD PRESSURE: 76 MMHG | SYSTOLIC BLOOD PRESSURE: 130 MMHG

## 2024-04-24 DIAGNOSIS — I10 PRIMARY HYPERTENSION: ICD-10-CM

## 2024-04-24 DIAGNOSIS — N18.4 CKD (CHRONIC KIDNEY DISEASE), STAGE IV (HCC): Primary | ICD-10-CM

## 2024-04-24 PROBLEM — E11.22 TYPE 2 DIABETES MELLITUS WITH CHRONIC KIDNEY DISEASE, WITH LONG-TERM CURRENT USE OF INSULIN (HCC): Status: ACTIVE | Noted: 2022-08-12

## 2024-04-24 PROBLEM — M89.9 CHRONIC KIDNEY DISEASE-MINERAL AND BONE DISORDER: Status: ACTIVE | Noted: 2024-03-25

## 2024-04-24 PROBLEM — E83.9 CHRONIC KIDNEY DISEASE-MINERAL AND BONE DISORDER: Status: ACTIVE | Noted: 2024-03-25

## 2024-04-24 PROCEDURE — G0151 HHCP-SERV OF PT,EA 15 MIN: HCPCS

## 2024-04-24 NOTE — TELEPHONE ENCOUNTER
Daughter-in-law returning call. Made aware:     Message from Trena Baumann DO sent at 4/23/2024  2:33 PM EDT -----  sNa is elevated. I recommend increased water intake and repeat BMP in 1 week. Thanks    Daughter-in-law verbalized understanding.

## 2024-04-24 NOTE — TELEPHONE ENCOUNTER
Called patient and spoke with Talon went over the above message but Talon requested to please call her back at 9:30 or 10 am.        I will call her back.

## 2024-04-24 NOTE — TELEPHONE ENCOUNTER
----- Message from Trena Baumann DO sent at 4/23/2024  2:33 PM EDT -----  sNa is elevated. I recommend increased water intake and repeat BMP in 1 week. Thanks.

## 2024-04-24 NOTE — PROGRESS NOTES
Assessment and Plan:    Diagnoses and all orders for this visit:    NELA (acute kidney injury) (HCC)    CKD (chronic kidney disease), stage IV (HCC)    Primary hypertension    Other proteinuria    Complex renal cyst    Type 2 diabetes mellitus with chronic kidney disease, with long-term current use of insulin, unspecified CKD stage (HCC)    Acute respiratory failure with hypoxia (HCC)    Chronic kidney disease-mineral and bone disorder          NELA on CKD 4 with proteinuria  -Baseline creatinine 1.6-1.9  -Creatinine on admission 1.6, creatinine peaking at 2.74 in 3/20  -Most recent creatinine is 1.89 from 4/22/2024/GFR 25  -Acute etiology suspected to be due to suspect contrast associated nephropathy versus prerenal azotemia from diuretic hyperglycemia  -Chronic etiology due to age associated nephron loss, DM 2, hypertensive nephrosclerosis  -Renal ultrasound from 3/19/2024 with diffusely echogenic kidneys bilaterally, no hydronephrosis, mildly complex left renal cyst  -Follows with Dr. Baumann as outpatient  -UA from 4/22 shows 1+ protein, glucose, trace leukocyte esterase, no RBCs, no bacteria  -UPCR is 597, microalbumin to creatinine ratio was 134  -Renal function is stable and at baseline, continue current medications, follow-up with repeat lab work prior to next appointment    Hypernatremia  -Recent sodium levels are elevated, most recently 147  -Managed on torsemide 20 mg daily  -Encourage free water intake, repeat bmp next week to eval    Complex left kidney cyst  -Noted on prior imaging in July 2020 ultrasound and again in March ultrasound, further imaging was deferred due to age  -Renal ultrasound from 3/19/2024 with similar but slightly smaller in size complex cyst as compared to prior 2020, recommended for 6-month follow-up     Hypertension  -Currently on clonidine 0.1 mg patch, Bystolic 20 mg daily, Procardia XL 90 mg daily, torsemide 20 mg daily, Invokana 100 mg daily  -120/66, blood pressure stable and  at goal, continue current regimen     Acute hypoxic respiratory failure/sepsis/pneumonia  -Patient had admission in March for acute hypoxic respiratory failure due to sepsis/pneumonia, required supplemental oxygen at discharge has been following with pulmonology as outpatient, currently maintained on supplemental oxygen, recently saw pulmonology     DM 2  -Managed on insulin, Invokana, glipizide  -Recent A1c is 8, has been elevated over the past several months  -Family reports recently started on insulin by endocrinology with improvement in her blood sugars    CKD MBD  -Recent PTH is 85, Phos is 3.5, mag is 2.4, calcium is 8.8  -Continue monitoring    Addition medical problems: DM2, hyperlipidemia, GERD, aortic stenosis, chronic pain    PATIENT INSTRUCTIONS  YOUR RENAL FUNCTION IS STABLE    YOUR BLOOD PRESSURE IS AT GOAL    NO CHANGES TO MEDICATIONS    WE WILL SEE YOU BACK IN THE OFFICE IN 4 MONTHS    REPEAT LABS NEXT WEEK TO RE EVAL YOUR SODIUM LEVEL  Please call the office with any questions or concerns.    AVOID NSAIDS INCLUDING MOTRIN, IBUPROFEN, ADVIL, ALEVE, NAPROXEN      Reason for Visit: No chief complaint on file.    HPI: Ena Ahumada is a 88 y.o. female who is here for follow up of CKD 4, had a recent hospitalization in March for pneumonia, noted to have NELA at that time.  Since her hospitalization she reports she has been feeling well, appetite is stable, she was recently started on insulin with improvement in her blood sugars, she wears supplemental oxygen but denies any shortness of breath at rest or with exertion, she follows with her PCP, pulmonology team.  She had recent lab work done was told her sodium level is high and was instructed to drink more water which she feels she has been doing.  She denies any fever/chills/nausea/vomiting, she is accompanied by her daughter-in-law at today's visit.    ROS:   Review of Systems   Constitutional: Negative.    Respiratory: Negative.     Cardiovascular:  Negative.    Gastrointestinal: Negative.    Genitourinary: Negative.    Neurological: Negative.         A complete 10 point review of systems was performed and found to be negative unless otherwise noted above or in the HPI.    Allergies:   Patient has no known allergies.    Medications:     Current Outpatient Medications:     Accu-Chek Guide test strip, USE TO TEST TWICE A DAY, Disp: 100 strip, Rfl: 3    Accu-Chek Softclix Lancets lancets, USE AS INSTRUCTED TWICE DAILY, Disp: 100 each, Rfl: 3    aspirin 81 MG tablet, Take 1 tablet by mouth daily, Disp: , Rfl:     cholecalciferol (VITAMIN D3) 1,000 units tablet, Take 2 tablets by mouth daily, Disp: , Rfl:     cloNIDine (CATAPRES-TTS-1) 0.1 mg/24 hr, PLACE 1 PATCH ON THE SKIN ONCE A WEEK AS DIRECTED, Disp: 12 patch, Rfl: 4    Denta 5000 Plus 1.1 % CREA, USE TWICE A DAY -SPIT, DONT RINSE AND NOTHING BY MOUTH X 30 MIN, Disp: , Rfl:     famotidine (PEPCID) 20 mg tablet, TAKE 1 TABLET BY MOUTH EVERY DAY, Disp: 90 tablet, Rfl: 1    ferrous sulfate 325 (65 Fe) mg tablet, Take 1 tablet (325 mg total) by mouth 3 (three) times a week, Disp: 12 tablet, Rfl: 0    glipiZIDE (GLUCOTROL) 10 mg tablet, TAKE 1 TABLET BY MOUTH IN THE MORNING THEN 2 TABS WITH DINNER, Disp: 270 tablet, Rfl: 3    Insulin Glargine-yfgn 100 UNIT/ML SOPN, INJECT 0.05 ML (5 UNITS TOTAL) UNDER THE SKIN IN THE MORNING, Disp: , Rfl:     Insulin Pen Needle (BD Pen Needle Kathy U/F) 32G X 4 MM MISC, Use daily, Disp: 100 each, Rfl: 3    Invokana 100 MG, TAKE 1 TABLET BY MOUTH EVERY DAY BEFORE BREAKFAST, Disp: 30 tablet, Rfl: 5    nebivolol (BYSTOLIC) 20 MG tablet, TAKE 1 TABLET BY MOUTH EVERY DAY, Disp: 90 tablet, Rfl: 6    NIFEdipine (ADALAT CC) 90 mg 24 hr tablet, TAKE 1 TABLET BY MOUTH EVERY DAY, Disp: 90 tablet, Rfl: 3    nystatin (MYCOSTATIN) powder, Apply topically 3 (three) times a day, Disp: 30 g, Rfl: 1    oxygen gas, Inhale 2 L/min continuous. VIA NASAL CANULA  Indications: Difficulty Breathing, Disp: ,  Rfl:     pregabalin (LYRICA) 50 mg capsule, Take 1 cap once a day for 5 days, then increase to BID x 5 days, then TID, Disp: 90 capsule, Rfl: 2    sertraline (ZOLOFT) 100 mg tablet, TAKE 1 TABLET BY MOUTH EVERY DAY, Disp: 90 tablet, Rfl: 2    simvastatin (ZOCOR) 20 mg tablet, TAKE 1 TABLET BY MOUTH EVERYDAY AT BEDTIME, Disp: 90 tablet, Rfl: 2    sitaGLIPtin (Januvia) 50 mg tablet, TAKE 1 TABLET BY MOUTH EVERY DAY, Disp: 30 tablet, Rfl: 5    torsemide (DEMADEX) 20 mg tablet, TAKE 1 TABLET BY MOUTH EVERY DAY, Disp: 90 tablet, Rfl: 0    transdermal buprenorphine (BUTRANS) 7.5 mcg/hr TD patch, Place 1 patch on the skin over 7 days every 7 days For ongoing therapy, Disp: 4 patch, Rfl: 2    ACCU-CHEK PAUL PLUS test strip, , Disp: , Rfl:     Accu-Chek Softclix Lancets lancets, , Disp: , Rfl:     Blood Glucose Monitoring Suppl (OneTouch Verio Reflect) w/Device KIT, Check blood sugars twice daily. Please substitute with appropriate alternative as covered by patient's insurance. Dx: E11.65 (Patient not taking: Reported on 4/25/2024), Disp: 1 kit, Rfl: 0    glucose blood (Accu-Chek Paul Plus) test strip, USE AS INSTRUCTED TWICE DAILY (Patient not taking: Reported on 4/25/2024), Disp: 100 each, Rfl: 7    glucose blood (OneTouch Verio) test strip, Check blood sugars twice daily. Please substitute with appropriate alternative as covered by patient's insurance. Dx: E11.65 (Patient not taking: Reported on 4/25/2024), Disp: 200 each, Rfl: 3    OneTouch Delica Lancets 33G MISC, Check blood sugars twice daily. Please substitute with appropriate alternative as covered by patient's insurance. Dx: E11.65 (Patient not taking: Reported on 4/25/2024), Disp: 200 each, Rfl: 3    Pseudoephedrine-guaiFENesin (MUCINEX D PO), Take by mouth (Patient not taking: Reported on 4/25/2024), Disp: , Rfl:     Past Medical History:   Diagnosis Date    Anxiety     Aortic valve insufficiency     Arthritis     Cataract of both eyes     Chronic kidney  "disease     Diabetes mellitus (HCC)     Dysuria     last assessed - [id number]    Edema     last assessed - [id number]    GERD (gastroesophageal reflux disease)     last assessed - [id number]    Hyperlipidemia     Hypertension     Low back pain     last assessed - [id number]    Mitral valve disorder     Osteoporosis     last assessed - [id number]    Palpitations      Past Surgical History:   Procedure Laterality Date    APPENDECTOMY      CATARACT EXTRACTION Bilateral     COLONOSCOPY      HIP SURGERY      TUBAL LIGATION Bilateral      Family History   Problem Relation Age of Onset    Kidney disease Mother         Chronic    No Known Problems Father     Breast cancer Sister     Diabetes Sister     Cancer Sister     Breast cancer Sister     Hypertension Sister     No Known Problems Daughter     No Known Problems Son     No Known Problems Son     No Known Problems Son     No Known Problems Son       reports that she has never smoked. She has never used smokeless tobacco. She reports that she does not drink alcohol and does not use drugs.    Physical Exam:   Vitals:    04/25/24 1520   BP: 120/66   Pulse: 67   SpO2: 96%   Weight: 72.8 kg (160 lb 9.6 oz)   Height: 5' 2\" (1.575 m)     Body mass index is 29.37 kg/m².    Physical Exam  Vitals reviewed.   Constitutional:       General: She is not in acute distress.     Appearance: She is not toxic-appearing or diaphoretic.      Comments: Wearing supplemental oxygen   HENT:      Head: Normocephalic and atraumatic.      Nose: Nose normal.      Mouth/Throat:      Mouth: Mucous membranes are dry.   Eyes:      General: No scleral icterus.  Cardiovascular:      Rate and Rhythm: Normal rate.      Pulses: Normal pulses.   Pulmonary:      Effort: Pulmonary effort is normal. No respiratory distress.      Breath sounds: No wheezing or rales.   Abdominal:      General: Abdomen is flat.   Musculoskeletal:      Cervical back: Neck supple.      Right lower leg: No edema.      Left lower leg: No " edema.   Skin:     General: Skin is warm and dry.      Capillary Refill: Capillary refill takes less than 2 seconds.   Neurological:      Mental Status: She is alert and oriented to person, place, and time.          Procedure:  No results found for this or any previous visit.    Lab Results   Component Value Date    GLUCOSE 264 (H) 03/16/2024    CALCIUM 9.1 04/02/2024     01/02/2018    K 3.8 04/22/2024    CO2 30 (H) 04/22/2024     04/22/2024    BUN 26 04/22/2024    CREATININE 1.89 (H) 04/22/2024       Results from last 7 days   Lab Units 04/22/24  0928 04/22/24  0927   WHITE BLOOD CELL COUNT. x10E3/uL  --  7.8   HEMOGLOBIN. g/dL  --  12.5   HEMATOCRIT. %  --  38.3   PLATELETS. x10E3/uL  --  174   POTASSIUM mmol/L 3.8 3.9   CHLORIDE mmol/L 105 106   CO2 mmol/L 30* 29   BUN mg/dL 26 27   CREATININE mg/dL 1.89* 1.87*   MAGNESIUM mg/dL 2.4*  --        I have personally reviewed the blood work as stated above and in my note.  I have personally reviewed renal ultrasound imaging studies.  I have personally reviewed recent hospitalization, PCP note.

## 2024-04-25 ENCOUNTER — TELEPHONE (OUTPATIENT)
Dept: NEPHROLOGY | Facility: CLINIC | Age: 89
End: 2024-04-25

## 2024-04-25 ENCOUNTER — OFFICE VISIT (OUTPATIENT)
Dept: NEPHROLOGY | Facility: HOSPITAL | Age: 89
End: 2024-04-25
Payer: COMMERCIAL

## 2024-04-25 VITALS
WEIGHT: 160.6 LBS | OXYGEN SATURATION: 96 % | HEIGHT: 62 IN | DIASTOLIC BLOOD PRESSURE: 66 MMHG | HEART RATE: 67 BPM | SYSTOLIC BLOOD PRESSURE: 120 MMHG | BODY MASS INDEX: 29.55 KG/M2

## 2024-04-25 DIAGNOSIS — M89.9 CHRONIC KIDNEY DISEASE-MINERAL AND BONE DISORDER: ICD-10-CM

## 2024-04-25 DIAGNOSIS — R80.8 OTHER PROTEINURIA: ICD-10-CM

## 2024-04-25 DIAGNOSIS — E11.22 TYPE 2 DIABETES MELLITUS WITH CHRONIC KIDNEY DISEASE, WITH LONG-TERM CURRENT USE OF INSULIN, UNSPECIFIED CKD STAGE (HCC): ICD-10-CM

## 2024-04-25 DIAGNOSIS — I10 PRIMARY HYPERTENSION: ICD-10-CM

## 2024-04-25 DIAGNOSIS — K21.00 GASTROESOPHAGEAL REFLUX DISEASE WITH ESOPHAGITIS: ICD-10-CM

## 2024-04-25 DIAGNOSIS — N17.9 AKI (ACUTE KIDNEY INJURY) (HCC): Primary | ICD-10-CM

## 2024-04-25 DIAGNOSIS — Z79.4 TYPE 2 DIABETES MELLITUS WITH CHRONIC KIDNEY DISEASE, WITH LONG-TERM CURRENT USE OF INSULIN, UNSPECIFIED CKD STAGE (HCC): ICD-10-CM

## 2024-04-25 DIAGNOSIS — N18.9 CHRONIC KIDNEY DISEASE-MINERAL AND BONE DISORDER: ICD-10-CM

## 2024-04-25 DIAGNOSIS — E83.9 CHRONIC KIDNEY DISEASE-MINERAL AND BONE DISORDER: ICD-10-CM

## 2024-04-25 DIAGNOSIS — N18.4 CKD (CHRONIC KIDNEY DISEASE), STAGE IV (HCC): ICD-10-CM

## 2024-04-25 DIAGNOSIS — J96.01 ACUTE RESPIRATORY FAILURE WITH HYPOXIA (HCC): ICD-10-CM

## 2024-04-25 DIAGNOSIS — N28.1 COMPLEX RENAL CYST: ICD-10-CM

## 2024-04-25 PROCEDURE — 99214 OFFICE O/P EST MOD 30 MIN: CPT | Performed by: NURSE PRACTITIONER

## 2024-04-25 RX ORDER — INSULIN GLARGINE-YFGN 100 [IU]/ML
INJECTION, SOLUTION SUBCUTANEOUS
COMMUNITY
Start: 2024-04-04

## 2024-04-25 NOTE — TELEPHONE ENCOUNTER
Placed call to pt and spoke with daughter in law, they were just leaving from visit with Manny today in [place]. Talon stated she will contact office to schedule follow up for pt, needs to check calendar to schedule pt. Pt has been added to recall for Dr. Baumann

## 2024-04-25 NOTE — PATIENT INSTRUCTIONS
YOUR RENAL FUNCTION IS STABLE    YOUR BLOOD PRESSURE IS AT GOAL    NO CHANGES TO MEDICATIONS    WE WILL SEE YOU BACK IN THE OFFICE IN 4 MONTHS    REPEAT LABS NEXT WEEK TO RE EVAL YOUR SODIUM LEVEL

## 2024-04-26 ENCOUNTER — HOME CARE VISIT (OUTPATIENT)
Dept: HOME HEALTH SERVICES | Facility: HOME HEALTHCARE | Age: 89
End: 2024-04-26
Payer: COMMERCIAL

## 2024-04-26 PROCEDURE — G0151 HHCP-SERV OF PT,EA 15 MIN: HCPCS

## 2024-04-26 RX ORDER — FAMOTIDINE 20 MG/1
TABLET, FILM COATED ORAL
Qty: 90 TABLET | Refills: 2 | OUTPATIENT
Start: 2024-04-26

## 2024-04-27 VITALS — HEART RATE: 75 BPM | DIASTOLIC BLOOD PRESSURE: 78 MMHG | SYSTOLIC BLOOD PRESSURE: 134 MMHG | OXYGEN SATURATION: 97 %

## 2024-04-30 ENCOUNTER — HOME CARE VISIT (OUTPATIENT)
Dept: HOME HEALTH SERVICES | Facility: HOME HEALTHCARE | Age: 89
End: 2024-04-30
Payer: COMMERCIAL

## 2024-04-30 VITALS — SYSTOLIC BLOOD PRESSURE: 132 MMHG | DIASTOLIC BLOOD PRESSURE: 70 MMHG | HEART RATE: 72 BPM | OXYGEN SATURATION: 97 %

## 2024-04-30 PROCEDURE — G0151 HHCP-SERV OF PT,EA 15 MIN: HCPCS

## 2024-05-01 PROBLEM — J18.9 PNEUMONIA: Status: RESOLVED | Noted: 2024-03-16 | Resolved: 2024-05-01

## 2024-05-02 ENCOUNTER — TELEPHONE (OUTPATIENT)
Age: 89
End: 2024-05-02

## 2024-05-02 ENCOUNTER — HOME CARE VISIT (OUTPATIENT)
Dept: HOME HEALTH SERVICES | Facility: HOME HEALTHCARE | Age: 89
End: 2024-05-02
Payer: COMMERCIAL

## 2024-05-02 VITALS
RESPIRATION RATE: 18 BRPM | OXYGEN SATURATION: 97 % | TEMPERATURE: 97.3 F | SYSTOLIC BLOOD PRESSURE: 178 MMHG | HEART RATE: 62 BPM | DIASTOLIC BLOOD PRESSURE: 67 MMHG

## 2024-05-02 PROCEDURE — G0157 HHC PT ASSISTANT EA 15: HCPCS

## 2024-05-02 NOTE — TELEPHONE ENCOUNTER
Patient daughter  called she would like for  her mother  labs to be fax over to  [facility]  in [place]  she will be  going Saturday. She is a patient of Dr Baumann .    Thank you

## 2024-05-06 ENCOUNTER — OFFICE VISIT (OUTPATIENT)
Dept: ENDOCRINOLOGY | Facility: HOSPITAL | Age: 89
End: 2024-05-06
Payer: COMMERCIAL

## 2024-05-06 ENCOUNTER — HOME CARE VISIT (OUTPATIENT)
Dept: HOME HEALTH SERVICES | Facility: HOME HEALTHCARE | Age: 89
End: 2024-05-06
Payer: COMMERCIAL

## 2024-05-06 VITALS
SYSTOLIC BLOOD PRESSURE: 130 MMHG | DIASTOLIC BLOOD PRESSURE: 70 MMHG | HEIGHT: 62 IN | WEIGHT: 161.2 LBS | HEART RATE: 73 BPM | BODY MASS INDEX: 29.66 KG/M2

## 2024-05-06 DIAGNOSIS — E11.22 TYPE 2 DIABETES MELLITUS WITH STAGE 4 CHRONIC KIDNEY DISEASE, WITH LONG-TERM CURRENT USE OF INSULIN (HCC): Primary | ICD-10-CM

## 2024-05-06 DIAGNOSIS — I10 PRIMARY HYPERTENSION: ICD-10-CM

## 2024-05-06 DIAGNOSIS — N18.4 TYPE 2 DIABETES MELLITUS WITH STAGE 4 CHRONIC KIDNEY DISEASE, WITH LONG-TERM CURRENT USE OF INSULIN (HCC): Primary | ICD-10-CM

## 2024-05-06 DIAGNOSIS — E11.42 DIABETIC POLYNEUROPATHY ASSOCIATED WITH TYPE 2 DIABETES MELLITUS (HCC): ICD-10-CM

## 2024-05-06 DIAGNOSIS — Z79.4 TYPE 2 DIABETES MELLITUS WITH STAGE 4 CHRONIC KIDNEY DISEASE, WITH LONG-TERM CURRENT USE OF INSULIN (HCC): Primary | ICD-10-CM

## 2024-05-06 DIAGNOSIS — E78.5 DYSLIPIDEMIA: ICD-10-CM

## 2024-05-06 PROCEDURE — 99214 OFFICE O/P EST MOD 30 MIN: CPT | Performed by: INTERNAL MEDICINE

## 2024-05-06 PROCEDURE — G0151 HHCP-SERV OF PT,EA 15 MIN: HCPCS

## 2024-05-06 NOTE — PROGRESS NOTES
Ena Ahumada 88 y.o. female MRN: [id number]    Encounter: [id number]      Assessment/Plan     Assessment:  This is a 88 y.o.-year-old female with type 2 diabetes with hyperglycemia.    Plan:  Type 2 DM, insulin requiring   Most recent Hbga1c is 8.0% which is improved from 8.7% after only one month of insulin. She will continue glipizide 10 mg 1 tablet in the morning and 2 tablets in the evening, Invokana 100 mg daily, Januvia 50 mg daily and Lantus 5 units in the morning. She will continue to check her blood sugars once a day. Exercise is difficult due to  back problems and oxygen use but she will continue to work on a healthy diet.      2. Diabetic neuropathy  She denies neuropathic symptoms today. Diabetic foot exam is up-to-date.     3. Microalbuminuria  Does have chronic kidney disease. Had recent follow up with nephrology. Continue with current medications.     CC: Type 2 Diabetes follow up     History of Present Illness     HPI:  Ena Ahumada is a 88 y.o. year old female with type 2 diabetes for 11 years. She is on insulin and oral agents at home and takes glipizide 10 mg 1 tablet in the morning and 2 tablets in the evening, Invokana 100 mg daily, Januvia 50 mg daily and Lantus 5 units in the morning.     She admits to nephropathy. She denies polyuria, polydipsia, nocturia and blurry vision.  She denies neuropathy, retinopathy, heart attack, stroke, and claudication.    She was seen on 4/04/2024 where she was started on insulin, Lantus 5 units daily after being admitted to [facility] from March 16-26th for sepsis due to PNA. During her hospital stay she was switched to insulin and upon discharged trialed back on her oral medications. She continued to have high blood suagrs so she was seen in our office and started on back on insulin. She reports that her sugars have been much better.     Her last eye exam was July 2023 and her last foot exam was August 2023.     Blood  Sugar/Glucometer/Pump/CGM review: Checking fasting blood sugar daily. They range from 90s-130s since being on insulin.     She follows with nephrology for her CKD. Her last visit was 4/25/2024.       Review of Systems   Constitutional:  Negative for fatigue.   Eyes:  Negative for visual disturbance.        Denies blurry vision.    Respiratory:  Negative for chest tightness and shortness of breath.         Wears oxygen.    Cardiovascular:  Negative for chest pain.   Endocrine: Negative for polydipsia, polyphagia and polyuria.   Skin:  Negative for rash and wound.   Neurological:  Negative for dizziness, light-headedness and numbness.        Denies numbness or tingling of her feet.    Psychiatric/Behavioral:  Negative for sleep disturbance.        Historical Information   Past Medical History:   Diagnosis Date    Anxiety     Aortic valve insufficiency     Arthritis     Cataract of both eyes     Chronic kidney disease     Diabetes mellitus (HCC)     Dysuria     last assessed - [id number]    Edema     last assessed - [id number]    GERD (gastroesophageal reflux disease)     last assessed - [id number]    Hyperlipidemia     Hypertension     Low back pain     last assessed - [id number]    Mitral valve disorder     Osteoporosis     last assessed - [id number]    Palpitations      Past Surgical History:   Procedure Laterality Date    APPENDECTOMY      CATARACT EXTRACTION Bilateral     COLONOSCOPY      HIP SURGERY      TUBAL LIGATION Bilateral      Social History   Social History     Substance and Sexual Activity   Alcohol Use Never     Social History     Substance and Sexual Activity   Drug Use Never     Social History     Tobacco Use   Smoking Status Never   Smokeless Tobacco Never     Family History:   Family History   Problem Relation Age of Onset    Kidney disease Mother         Chronic    No Known Problems Father     Breast cancer Sister     Diabetes Sister     Cancer Sister     Breast cancer Sister     Hypertension Sister      No Known Problems Daughter     No Known Problems Son     No Known Problems Son     No Known Problems Son     No Known Problems Son        Meds/Allergies   Current Outpatient Medications   Medication Sig Dispense Refill    Accu-Chek Guide test strip USE TO TEST TWICE A  strip 3    Accu-Chek Softclix Lancets lancets USE AS INSTRUCTED TWICE DAILY 100 each 3    aspirin 81 MG tablet Take 1 tablet by mouth daily      cholecalciferol (VITAMIN D3) 1,000 units tablet Take 2 tablets by mouth daily      cloNIDine (CATAPRES-TTS-1) 0.1 mg/24 hr PLACE 1 PATCH ON THE SKIN ONCE A WEEK AS DIRECTED 12 patch 4    Denta 5000 Plus 1.1 % CREA USE TWICE A DAY -SPIT, DONT RINSE AND NOTHING BY MOUTH X 30 MIN      famotidine (PEPCID) 20 mg tablet TAKE 1 TABLET BY MOUTH EVERY DAY 90 tablet 1    ferrous sulfate 325 (65 Fe) mg tablet Take 1 tablet (325 mg total) by mouth 3 (three) times a week 12 tablet 0    glipiZIDE (GLUCOTROL) 10 mg tablet TAKE 1 TABLET BY MOUTH IN THE MORNING THEN 2 TABS WITH DINNER 270 tablet 3    Insulin Glargine-yfgn 100 UNIT/ML SOPN INJECT 0.05 ML (5 UNITS TOTAL) UNDER THE SKIN IN THE MORNING      Insulin Pen Needle (BD Pen Needle Kathy U/F) 32G X 4 MM MISC Use daily 100 each 3    Invokana 100 MG TAKE 1 TABLET BY MOUTH EVERY DAY BEFORE BREAKFAST 30 tablet 5    nebivolol (BYSTOLIC) 20 MG tablet TAKE 1 TABLET BY MOUTH EVERY DAY 90 tablet 6    NIFEdipine (ADALAT CC) 90 mg 24 hr tablet TAKE 1 TABLET BY MOUTH EVERY DAY 90 tablet 3    nystatin (MYCOSTATIN) powder Apply topically 3 (three) times a day 30 g 1    oxygen gas Inhale 2 L/min continuous. VIA NASAL CANULA  Indications: Difficulty Breathing      pregabalin (LYRICA) 50 mg capsule Take 1 cap once a day for 5 days, then increase to BID x 5 days, then TID 90 capsule 2    sertraline (ZOLOFT) 100 mg tablet TAKE 1 TABLET BY MOUTH EVERY DAY 90 tablet 2    simvastatin (ZOCOR) 20 mg tablet TAKE 1 TABLET BY MOUTH EVERYDAY AT BEDTIME 90 tablet 2    sitaGLIPtin (Januvia) 50  "mg tablet TAKE 1 TABLET BY MOUTH EVERY DAY 30 tablet 5    torsemide (DEMADEX) 20 mg tablet TAKE 1 TABLET BY MOUTH EVERY DAY 90 tablet 0    transdermal buprenorphine (BUTRANS) 7.5 mcg/hr TD patch Place 1 patch on the skin over 7 days every 7 days For ongoing therapy 4 patch 2    ACCU-CHEK PAUL PLUS test strip  (Patient not taking: Reported on 4/25/2024)      Accu-Chek Softclix Lancets lancets  (Patient not taking: Reported on 4/25/2024)      Blood Glucose Monitoring Suppl (OneTouch Verio Reflect) w/Device KIT Check blood sugars twice daily. Please substitute with appropriate alternative as covered by patient's insurance. Dx: E11.65 (Patient not taking: Reported on 4/25/2024) 1 kit 0    glucose blood (Accu-Chek Paul Plus) test strip USE AS INSTRUCTED TWICE DAILY (Patient not taking: Reported on 4/25/2024) 100 each 7    glucose blood (OneTouch Verio) test strip Check blood sugars twice daily. Please substitute with appropriate alternative as covered by patient's insurance. Dx: E11.65 (Patient not taking: Reported on 4/25/2024) 200 each 3    OneTouch Delica Lancets 33G MISC Check blood sugars twice daily. Please substitute with appropriate alternative as covered by patient's insurance. Dx: E11.65 (Patient not taking: Reported on 4/25/2024) 200 each 3    Pseudoephedrine-guaiFENesin (MUCINEX D PO) Take by mouth (Patient not taking: Reported on 4/25/2024)       No current facility-administered medications for this visit.     No Known Allergies    Objective   Vitals: Blood pressure 130/70, pulse 73, height 5' 2\" (1.575 m), weight 73.1 kg (161 lb 3.2 oz), not currently breastfeeding.    Physical Exam  Vitals reviewed.   Constitutional:       Appearance: Normal appearance.   HENT:      Head: Normocephalic and atraumatic.   Neck:      Vascular: No carotid bruit.      Comments: Thyroid normal in size with no palpable nodules  Cardiovascular:      Rate and Rhythm: Normal rate and regular rhythm.      Heart sounds: Normal heart " sounds.   Pulmonary:      Breath sounds: Normal breath sounds.      Comments: Pt wearing oxygen.   Musculoskeletal:         General: No swelling.      Cervical back: Neck supple.      Right lower leg: Edema present.      Left lower leg: Edema present.      Comments: Mild edema to b/l ankles.    Skin:     General: Skin is warm and dry.      Findings: No lesion or rash.   Neurological:      Mental Status: She is alert.         The history was obtained from the review of the chart, patient.    Lab Results:   Lab Results   Component Value Date/Time    Hemoglobin A1C 8.0 (H) 04/22/2024 09:27 AM    Hemoglobin A1C 8.7 (H) 04/03/2024 11:28 AM    Hemoglobin A1C 8.1 (H) 03/16/2024 04:20 PM    Hemoglobin A1C 8.5 (H) 10/13/2023 09:04 AM    WBC 11.24 (H) 04/02/2024 11:41 AM    WBC 12.90 (H) 03/26/2024 04:20 AM    WBC 15.03 (H) 03/25/2024 02:30 AM    White Blood Cell Count 7.8 04/22/2024 09:27 AM    White Blood Cell Count 9.4 04/03/2024 11:28 AM    Hemoglobin 12.5 04/22/2024 09:27 AM    Hemoglobin 12.1 04/03/2024 11:28 AM    Hemoglobin 11.0 (L) 04/02/2024 11:41 AM    Hemoglobin 11.1 (L) 03/26/2024 04:20 AM    Hemoglobin 11.1 (L) 03/25/2024 02:30 AM    Hematocrit 35.8 04/02/2024 11:41 AM    Hematocrit 36.2 03/26/2024 04:20 AM    Hematocrit 35.9 03/25/2024 02:30 AM    HCT 38.3 04/22/2024 09:27 AM    HCT 37.5 04/03/2024 11:28 AM    MCV 93 04/22/2024 09:27 AM    MCV 95 04/03/2024 11:28 AM    MCV 97 04/02/2024 11:41 AM    MCV 97 03/26/2024 04:20 AM    MCV 97 03/25/2024 02:30 AM    Platelet Count 174 04/22/2024 09:27 AM    Platelet Count 220 04/03/2024 11:28 AM    Platelets 215 04/02/2024 11:41 AM    Platelets 252 03/26/2024 04:20 AM    Platelets 250 03/25/2024 02:30 AM    BUN 26 04/22/2024 09:28 AM    BUN 27 04/22/2024 09:27 AM    BUN 44 (H) 04/03/2024 11:28 AM    Potassium 3.8 04/22/2024 09:28 AM    Potassium 3.9 04/22/2024 09:27 AM    Potassium 3.8 04/03/2024 11:28 AM    Chloride 105 04/22/2024 09:28 AM    Chloride 106 04/22/2024  "09:27 AM    Chloride 98 04/03/2024 11:28 AM    CO2 30 (H) 04/22/2024 09:28 AM    CO2 29 04/22/2024 09:27 AM    CO2 28 04/03/2024 11:28 AM    Creatinine 1.89 (H) 04/22/2024 09:28 AM    Creatinine 1.87 (H) 04/22/2024 09:27 AM    Creatinine 2.09 (H) 04/03/2024 11:28 AM    Creatinine 2.22 (H) 04/02/2024 11:41 AM    Creatinine 2.03 (H) 03/26/2024 04:20 AM    Creatinine 2.27 (H) 03/25/2024 02:30 AM    AST 22 04/22/2024 09:27 AM    AST 22 04/03/2024 11:28 AM    AST 16 04/02/2024 11:41 AM    AST 57 (H) 03/21/2024 04:35 AM    AST 23 03/19/2024 05:55 AM    AST 18 11/17/2023 08:53 AM    ALT 14 04/22/2024 09:27 AM    ALT 18 04/03/2024 11:28 AM    ALT 17 04/02/2024 11:41 AM    ALT 64 (H) 03/21/2024 04:35 AM    ALT 19 03/19/2024 05:55 AM    ALT 13 11/17/2023 08:53 AM    Total Protein 5.9 (L) 04/02/2024 11:41 AM    Total Protein 6.0 (L) 03/21/2024 04:35 AM    Total Protein 5.9 (L) 03/19/2024 05:55 AM    Protein, Total 5.8 (L) 04/22/2024 09:27 AM    Protein, Total 6.0 04/03/2024 11:28 AM    Protein, Total 6.0 11/17/2023 08:53 AM    Albumin 3.8 04/22/2024 09:27 AM    Albumin 3.9 04/03/2024 11:28 AM    Albumin 3.4 (L) 04/02/2024 11:41 AM    Albumin 3.1 (L) 03/21/2024 04:35 AM    Albumin 3.1 (L) 03/19/2024 05:55 AM    Globulin, Total 2.0 04/22/2024 09:27 AM    Globulin, Total 2.1 04/03/2024 11:28 AM    Globulin, Total 2.1 11/17/2023 08:53 AM    HDL 50 06/23/2023 08:52 AM    Triglycerides 139 06/23/2023 08:52 AM           Imaging Studies: I have personally reviewed pertinent reports.      Portions of the record may have been created with voice recognition software. Occasional wrong word or \"sound a like\" substitutions may have occurred due to the inherent limitations of voice recognition software. Read the chart carefully and recognize, using context, where substitutions have occurred.    "

## 2024-05-06 NOTE — PATIENT INSTRUCTIONS
Hgba1c is 8.0%. this is improved already with less than 1 month of insulin.     Continue the same invokana, januvia, glipizide, and insulin.     Continue to test blood sugars once daily.     Follow up in 4 months with blood work.

## 2024-05-07 ENCOUNTER — OFFICE VISIT (OUTPATIENT)
Dept: PAIN MEDICINE | Facility: CLINIC | Age: 89
End: 2024-05-07
Payer: COMMERCIAL

## 2024-05-07 ENCOUNTER — DOCUMENTATION (OUTPATIENT)
Dept: NEPHROLOGY | Facility: CLINIC | Age: 89
End: 2024-05-07

## 2024-05-07 ENCOUNTER — OFFICE VISIT (OUTPATIENT)
Dept: FAMILY MEDICINE CLINIC | Facility: HOSPITAL | Age: 89
End: 2024-05-07
Payer: COMMERCIAL

## 2024-05-07 VITALS
OXYGEN SATURATION: 87 % | DIASTOLIC BLOOD PRESSURE: 62 MMHG | HEIGHT: 62 IN | HEART RATE: 66 BPM | BODY MASS INDEX: 29.44 KG/M2 | TEMPERATURE: 97.8 F | SYSTOLIC BLOOD PRESSURE: 102 MMHG | WEIGHT: 160 LBS

## 2024-05-07 VITALS — DIASTOLIC BLOOD PRESSURE: 68 MMHG | OXYGEN SATURATION: 96 % | HEART RATE: 76 BPM | SYSTOLIC BLOOD PRESSURE: 118 MMHG

## 2024-05-07 VITALS
SYSTOLIC BLOOD PRESSURE: 128 MMHG | BODY MASS INDEX: 29.48 KG/M2 | HEART RATE: 70 BPM | DIASTOLIC BLOOD PRESSURE: 72 MMHG | HEIGHT: 62 IN | TEMPERATURE: 98.7 F

## 2024-05-07 DIAGNOSIS — M47.816 LUMBAR SPONDYLOSIS: ICD-10-CM

## 2024-05-07 DIAGNOSIS — M51.16 LUMBAR DISC DISEASE WITH RADICULOPATHY: ICD-10-CM

## 2024-05-07 DIAGNOSIS — R25.8 JERKY BODY MOVEMENTS: Primary | ICD-10-CM

## 2024-05-07 DIAGNOSIS — G89.4 CHRONIC PAIN SYNDROME: ICD-10-CM

## 2024-05-07 DIAGNOSIS — D50.9 IRON DEFICIENCY ANEMIA, UNSPECIFIED IRON DEFICIENCY ANEMIA TYPE: ICD-10-CM

## 2024-05-07 DIAGNOSIS — M48.061 LUMBAR FORAMINAL STENOSIS: Primary | ICD-10-CM

## 2024-05-07 DIAGNOSIS — J96.01 ACUTE RESPIRATORY FAILURE WITH HYPOXIA (HCC): ICD-10-CM

## 2024-05-07 LAB
EXT GLUCOSE BLD: 151
EXTERNAL BUN: 30
EXTERNAL CALCIUM: 8.9
EXTERNAL CHLORIDE: 104
EXTERNAL CO2: 26
EXTERNAL CREATININE: 2.05
EXTERNAL POTASSIUM: 3.9
EXTERNAL SODIUM: 144

## 2024-05-07 PROCEDURE — G2211 COMPLEX E/M VISIT ADD ON: HCPCS | Performed by: NURSE PRACTITIONER

## 2024-05-07 PROCEDURE — 99213 OFFICE O/P EST LOW 20 MIN: CPT | Performed by: PHYSICIAN ASSISTANT

## 2024-05-07 PROCEDURE — 99214 OFFICE O/P EST MOD 30 MIN: CPT | Performed by: NURSE PRACTITIONER

## 2024-05-07 NOTE — PROGRESS NOTES
Name: Ena Ahumada      : 1935      MRN: [id number]  Encounter Provider: KANA Edge  Encounter Date: 2024   Encounter department: [facility] [address]     Assessment & Plan     1. Jerky body movements  Comments:  of BUE - eval further w/EMG as ordered & consult neurology for further eval/treatment  Orders:  -     EMG 2 Limb Upper Extremity; Future  -     Ambulatory Referral to Neurology; Future    2. Chronic pain syndrome  Assessment & Plan:  S/P routine appt this am with plan to dc lyrica and butrans patch  Maintain f/u as recommended      3. Iron deficiency anemia, unspecified iron deficiency anemia type  Assessment & Plan:  Daughter in law states this was diagnosed on recent hospitalization & she is taking Fe supplement 3x/week as advised      4. Acute respiratory failure with hypoxia (HCC)  Assessment & Plan:  Remains O2 dependent s/p 3/2024 hospitalization   Advise they should f/u with pulmonary for further O2 management - has appt scheduled in   Daughter in law states pulm wants mid May repeat CXR - will order as requested    Orders:  -     XR chest pa & lateral; Future; Expected date: 2024           Subjective      Here with daughter who states they were just at pain doctor and lyrica was dc'd. They feel this was making her sleep too much. She also wears a pain patch which was dc'd at appt today - she will remove this in a few days.   She mentioned to family at least a few months ago that fingers were shaking and jerking. Family noted the jerking moved to her forearms and has now gone up to her shoulders. Episodes happen on both sides when it happens. Episodes are brief. No jerking in legs or elsewhere.   Wears oxygen at 2.5 liters and they sometimes take it off to walk her. They note no SOB or trouble when O2 is in the 80s with activity. O2 is in the 90s at rest.        Review of Systems   Constitutional:  Positive for fatigue.   Respiratory:  Negative.     Neurological:  Negative for tremors, weakness and numbness.        Jerking arm movements       Current Outpatient Medications on File Prior to Visit   Medication Sig    ACCU-CHEK PAUL PLUS test strip     Accu-Chek Guide test strip USE TO TEST TWICE A DAY    Accu-Chek Softclix Lancets lancets     Accu-Chek Softclix Lancets lancets USE AS INSTRUCTED TWICE DAILY    aspirin 81 MG tablet Take 1 tablet by mouth daily    cholecalciferol (VITAMIN D3) 1,000 units tablet Take 2 tablets by mouth daily    cloNIDine (CATAPRES-TTS-1) 0.1 mg/24 hr PLACE 1 PATCH ON THE SKIN ONCE A WEEK AS DIRECTED    Denta 5000 Plus 1.1 % CREA USE TWICE A DAY -SPIT, DONT RINSE AND NOTHING BY MOUTH X 30 MIN    famotidine (PEPCID) 20 mg tablet TAKE 1 TABLET BY MOUTH EVERY DAY    ferrous sulfate 325 (65 Fe) mg tablet Take 1 tablet (325 mg total) by mouth 3 (three) times a week    glipiZIDE (GLUCOTROL) 10 mg tablet TAKE 1 TABLET BY MOUTH IN THE MORNING THEN 2 TABS WITH DINNER    glucose blood (Accu-Chek Paul Plus) test strip USE AS INSTRUCTED TWICE DAILY    glucose blood (OneTouch Verio) test strip Check blood sugars twice daily. Please substitute with appropriate alternative as covered by patient's insurance. Dx: E11.65    Insulin Glargine-yfgn 100 UNIT/ML SOPN INJECT 0.05 ML (5 UNITS TOTAL) UNDER THE SKIN IN THE MORNING    Insulin Pen Needle (BD Pen Needle Kathy U/F) 32G X 4 MM MISC Use daily    Invokana 100 MG TAKE 1 TABLET BY MOUTH EVERY DAY BEFORE BREAKFAST    nebivolol (BYSTOLIC) 20 MG tablet TAKE 1 TABLET BY MOUTH EVERY DAY    NIFEdipine (ADALAT CC) 90 mg 24 hr tablet TAKE 1 TABLET BY MOUTH EVERY DAY    OneTouch Delica Lancets 33G MISC Check blood sugars twice daily. Please substitute with appropriate alternative as covered by patient's insurance. Dx: E11.65    oxygen gas Inhale 2.5 L/min continuous VIA NASAL CANULA    sertraline (ZOLOFT) 100 mg tablet TAKE 1 TABLET BY MOUTH EVERY DAY    simvastatin (ZOCOR) 20 mg tablet TAKE 1  "TABLET BY MOUTH EVERYDAY AT BEDTIME    sitaGLIPtin (Januvia) 50 mg tablet TAKE 1 TABLET BY MOUTH EVERY DAY    torsemide (DEMADEX) 20 mg tablet TAKE 1 TABLET BY MOUTH EVERY DAY    Blood Glucose Monitoring Suppl (OneTouch Verio Reflect) w/Device KIT Check blood sugars twice daily. Please substitute with appropriate alternative as covered by patient's insurance. Dx: E11.65 (Patient not taking: Reported on 4/25/2024)    nystatin (MYCOSTATIN) powder Apply topically 3 (three) times a day (Patient not taking: Reported on 5/7/2024)    Pseudoephedrine-guaiFENesin (MUCINEX D PO) Take by mouth (Patient not taking: Reported on 4/25/2024)    [DISCONTINUED] pregabalin (LYRICA) 50 mg capsule Take 1 cap once a day for 5 days, then increase to BID x 5 days, then TID    [DISCONTINUED] transdermal buprenorphine (BUTRANS) 7.5 mcg/hr TD patch Place 1 patch on the skin over 7 days every 7 days For ongoing therapy       Objective     /62   Pulse 66   Temp 97.8 °F (36.6 °C)   Ht 5' 2.4\" (1.585 m)   Wt 72.6 kg (160 lb)   SpO2 (!) 87%   BMI 28.89 kg/m²       Physical Exam  Vitals reviewed.   Constitutional:       General: She is not in acute distress.     Appearance: Normal appearance.   HENT:      Head: Normocephalic.   Pulmonary:      Effort: Pulmonary effort is normal. No respiratory distress.      Comments: Wearing O2 via nasal cannula  Skin:     General: Skin is warm and dry.   Neurological:      General: No focal deficit present.      Mental Status: She is alert. Mental status is at baseline.      Cranial Nerves: Cranial nerves 2-12 are intact. No cranial nerve deficit or facial asymmetry.      Motor: Weakness (generalized, age related; equal BUE resisted strength) present. No tremor or abnormal muscle tone ( strength +4/5).      Comments: No jerking movements noted during 20 min exam   Psychiatric:         Mood and Affect: Mood normal.         KANA Edge    "

## 2024-05-07 NOTE — ASSESSMENT & PLAN NOTE
Remains O2 dependent s/p 3/2024 hospitalization   Advise they should f/u with pulmonary for further O2 management - has appt scheduled in June  Daughter in law states pulm wants mid May repeat CXR - will order as requested

## 2024-05-07 NOTE — PROGRESS NOTES
Assessment:  1. Lumbar foraminal stenosis    2. Lumbar spondylosis    3. Lumbar disc disease with radiculopathy    4. Chronic pain syndrome        Plan:  While the patient was in the office today, I did have a thorough conversation regarding their chronic pain syndrome, medication management, and treatment plan options.    Unfortunately the patient is reporting side effects of sedation with the pregabalin therefore I have recommended the patient discontinue it.    She is also not finding any relief with the Butrans patch.  The patient was advised that she may discontinue the patch approximately 5 days after discontinuing the Lyrica.  If for some reason she does find that the Butrans in fact was beneficial, she was advised to call us at which point we could restart it.    Recommend over-the-counter acetaminophen, not to exceed 3000 mg in a 24-hour period.    I have advised her to consider a bilateral L5 transforaminal epidural steroid injection to address the radicular component of her pain pattern.  I did make the patient aware that we prefer the hemoglobin A1c to be below 8 as steroid injections can elevate blood sugar.    She will be following up with her PCP today and endocrinology as scheduled.    Continue home physical therapy exercises.    The patient was advised to contact the office should their symptoms worsen in the interim. The patient was agreeable and verbalized an understanding.        History of Present Illness:    The patient is a 88 y.o. female last seen on 12/4/2023 who presents for a follow up office visit in regards to chronic low back pain secondary to lumbar spondylosis with foraminal stenosis and radiculopathy.  The patient currently reports persistent low back pain which she presently rates an 8 out of 10 and describes it as an intermittent pressure-like pain across the lumbar spine with radiation into the lateral and posterior aspects of bilateral lower extremities.  Patient has no pain when  she is resting but has immediate pain with standing and walking.  The patient's daughter-in-law who is present on today's visit, states that since the addition of Lyrica the patient has had excessive sleepiness.  The patient reports no improvement with the Lyrica.  Patient also states that the Butrans patch has been of no benefit.  Patient previously underwent sacroiliac joint injections and an L5-S1 interlaminar epidural steroid injection with no relief last year.  She is currently participating in home physical therapy primarily focusing on lower extremity strengthening.    Pain Contract Signed: 12/4/2023  Last Urine Drug Screen: 12/4/2023    I have personally reviewed and/or updated the patient's past medical history, past surgical history, family history, social history, current medications, allergies, and vital signs today.       Review of Systems:    Review of Systems   Respiratory:  Negative for shortness of breath.    Cardiovascular:  Negative for chest pain.   Gastrointestinal:  Negative for constipation, diarrhea, nausea and vomiting.   Musculoskeletal:  Positive for gait problem and joint swelling. Negative for arthralgias and myalgias.   Skin:  Negative for rash.   Neurological:  Positive for weakness. Negative for dizziness and seizures.   All other systems reviewed and are negative.        Past Medical History:   Diagnosis Date   • Anxiety    • Aortic valve insufficiency    • Arthritis    • Cataract of both eyes    • Chronic kidney disease    • Diabetes mellitus (HCC)    • Dysuria     last assessed - 19May2017   • Edema     last assessed - 05Jun2015   • GERD (gastroesophageal reflux disease)     last assessed - 30Aug2012   • Hyperlipidemia    • Hypertension    • Low back pain     last assessed - 30Aug2012   • Mitral valve disorder    • Osteoporosis     last assessed - [id number]   • Palpitations        Past Surgical History:   Procedure Laterality Date   • APPENDECTOMY     • CATARACT EXTRACTION  Bilateral    • COLONOSCOPY     • HIP SURGERY     • TUBAL LIGATION Bilateral        Family History   Problem Relation Age of Onset   • Kidney disease Mother         Chronic   • No Known Problems Father    • Breast cancer Sister    • Diabetes Sister    • Cancer Sister    • Breast cancer Sister    • Hypertension Sister    • No Known Problems Daughter    • No Known Problems Son    • No Known Problems Son    • No Known Problems Son    • No Known Problems Son        Social History     Occupational History   • Not on file   Tobacco Use   • Smoking status: Never   • Smokeless tobacco: Never   Vaping Use   • Vaping status: Never Used   Substance and Sexual Activity   • Alcohol use: Never   • Drug use: Never   • Sexual activity: Not Currently         Current Outpatient Medications:   •  aspirin 81 MG tablet, Take 1 tablet by mouth daily, Disp: , Rfl:   •  cholecalciferol (VITAMIN D3) 1,000 units tablet, Take 2 tablets by mouth daily, Disp: , Rfl:   •  cloNIDine (CATAPRES-TTS-1) 0.1 mg/24 hr, PLACE 1 PATCH ON THE SKIN ONCE A WEEK AS DIRECTED, Disp: 12 patch, Rfl: 4  •  Denta 5000 Plus 1.1 % CREA, USE TWICE A DAY -SPIT, DONT RINSE AND NOTHING BY MOUTH X 30 MIN, Disp: , Rfl:   •  famotidine (PEPCID) 20 mg tablet, TAKE 1 TABLET BY MOUTH EVERY DAY, Disp: 90 tablet, Rfl: 1  •  ferrous sulfate 325 (65 Fe) mg tablet, Take 1 tablet (325 mg total) by mouth 3 (three) times a week, Disp: 12 tablet, Rfl: 0  •  glipiZIDE (GLUCOTROL) 10 mg tablet, TAKE 1 TABLET BY MOUTH IN THE MORNING THEN 2 TABS WITH DINNER, Disp: 270 tablet, Rfl: 3  •  Insulin Glargine-yfgn 100 UNIT/ML SOPN, INJECT 0.05 ML (5 UNITS TOTAL) UNDER THE SKIN IN THE MORNING, Disp: , Rfl:   •  Invokana 100 MG, TAKE 1 TABLET BY MOUTH EVERY DAY BEFORE BREAKFAST, Disp: 30 tablet, Rfl: 5  •  nebivolol (BYSTOLIC) 20 MG tablet, TAKE 1 TABLET BY MOUTH EVERY DAY, Disp: 90 tablet, Rfl: 6  •  NIFEdipine (ADALAT CC) 90 mg 24 hr tablet, TAKE 1 TABLET BY MOUTH EVERY DAY, Disp: 90 tablet,  Rfl: 3  •  nystatin (MYCOSTATIN) powder, Apply topically 3 (three) times a day, Disp: 30 g, Rfl: 1  •  sertraline (ZOLOFT) 100 mg tablet, TAKE 1 TABLET BY MOUTH EVERY DAY, Disp: 90 tablet, Rfl: 2  •  simvastatin (ZOCOR) 20 mg tablet, TAKE 1 TABLET BY MOUTH EVERYDAY AT BEDTIME, Disp: 90 tablet, Rfl: 2  •  sitaGLIPtin (Januvia) 50 mg tablet, TAKE 1 TABLET BY MOUTH EVERY DAY, Disp: 30 tablet, Rfl: 5  •  torsemide (DEMADEX) 20 mg tablet, TAKE 1 TABLET BY MOUTH EVERY DAY, Disp: 90 tablet, Rfl: 0  •  ACCU-CHEK PAUL PLUS test strip, , Disp: , Rfl:   •  Accu-Chek Guide test strip, USE TO TEST TWICE A DAY, Disp: 100 strip, Rfl: 3  •  Accu-Chek Softclix Lancets lancets, , Disp: , Rfl:   •  Accu-Chek Softclix Lancets lancets, USE AS INSTRUCTED TWICE DAILY, Disp: 100 each, Rfl: 3  •  Blood Glucose Monitoring Suppl (OneTouch Verio Reflect) w/Device KIT, Check blood sugars twice daily. Please substitute with appropriate alternative as covered by patient's insurance. Dx: E11.65 (Patient not taking: Reported on 4/25/2024), Disp: 1 kit, Rfl: 0  •  glucose blood (Accu-Chek Paul Plus) test strip, USE AS INSTRUCTED TWICE DAILY (Patient not taking: Reported on 4/25/2024), Disp: 100 each, Rfl: 7  •  glucose blood (OneTouch Verio) test strip, Check blood sugars twice daily. Please substitute with appropriate alternative as covered by patient's insurance. Dx: E11.65 (Patient not taking: Reported on 4/25/2024), Disp: 200 each, Rfl: 3  •  Insulin Pen Needle (BD Pen Needle Kathy U/F) 32G X 4 MM MISC, Use daily, Disp: 100 each, Rfl: 3  •  OneTouch Delica Lancets 33G MISC, Check blood sugars twice daily. Please substitute with appropriate alternative as covered by patient's insurance. Dx: E11.65 (Patient not taking: Reported on 4/25/2024), Disp: 200 each, Rfl: 3  •  oxygen gas, Inhale 2 L/min continuous. VIA NASAL CANULA  Indications: Difficulty Breathing, Disp: , Rfl:   •  Pseudoephedrine-guaiFENesin (MUCINEX D PO), Take by mouth (Patient not  "taking: Reported on 4/25/2024), Disp: , Rfl:     No Known Allergies    Physical Exam:    /72 (BP Location: Left arm, Patient Position: Sitting, Cuff Size: Standard)   Pulse 70   Temp 98.7 °F (37.1 °C)   Ht 5' 2\" (1.575 m)   BMI 29.48 kg/m²     Constitutional:normal, well developed, well nourished, alert, in no distress and non-toxic and no overt pain behavior.  Eyes:anicteric  HEENT:grossly intact  Neck:supple, symmetric, trachea midline and no masses   Pulmonary:even and unlabored on oxygen  Cardiovascular:No edema or pitting edema present  Skin:Normal without rashes or lesions and well hydrated  Psychiatric:Mood and affect appropriate  Neurologic:Cranial Nerves II-XII grossly intact  Musculoskeletal: Gait is slow but stable      Imaging  No orders to display         No orders of the defined types were placed in this encounter.      "

## 2024-05-07 NOTE — ASSESSMENT & PLAN NOTE
Daughter in law states this was diagnosed on recent hospitalization & she is taking Fe supplement 3x/week as advised

## 2024-05-09 ENCOUNTER — HOME CARE VISIT (OUTPATIENT)
Dept: HOME HEALTH SERVICES | Facility: HOME HEALTHCARE | Age: 89
End: 2024-05-09
Payer: COMMERCIAL

## 2024-05-09 VITALS — OXYGEN SATURATION: 99 % | SYSTOLIC BLOOD PRESSURE: 128 MMHG | HEART RATE: 76 BPM | DIASTOLIC BLOOD PRESSURE: 70 MMHG

## 2024-05-09 PROCEDURE — G0151 HHCP-SERV OF PT,EA 15 MIN: HCPCS

## 2024-05-11 ENCOUNTER — NURSE TRIAGE (OUTPATIENT)
Dept: OTHER | Facility: OTHER | Age: 89
End: 2024-05-11

## 2024-05-11 ENCOUNTER — HOSPITAL ENCOUNTER (INPATIENT)
Facility: HOSPITAL | Age: 89
LOS: 3 days | Discharge: HOME | DRG: 392 | End: 2024-05-15
Attending: EMERGENCY MEDICINE | Admitting: INTERNAL MEDICINE
Payer: COMMERCIAL

## 2024-05-11 ENCOUNTER — APPOINTMENT (EMERGENCY)
Dept: CT IMAGING | Facility: HOSPITAL | Age: 89
DRG: 392 | End: 2024-05-11
Payer: COMMERCIAL

## 2024-05-11 DIAGNOSIS — E87.6 HYPOKALEMIA: ICD-10-CM

## 2024-05-11 DIAGNOSIS — R11.14 BILIOUS VOMITING WITH NAUSEA: ICD-10-CM

## 2024-05-11 DIAGNOSIS — R11.2 NAUSEA AND VOMITING: Primary | ICD-10-CM

## 2024-05-11 DIAGNOSIS — N18.9 CHRONIC KIDNEY DISEASE: ICD-10-CM

## 2024-05-11 DIAGNOSIS — I10 ACCELERATED HYPERTENSION: ICD-10-CM

## 2024-05-11 DIAGNOSIS — I10 ESSENTIAL HYPERTENSION: ICD-10-CM

## 2024-05-11 LAB
2HR DELTA HS TROPONIN: 4 NG/L
ALBUMIN SERPL BCP-MCNC: 4.4 G/DL (ref 3.5–5)
ALP SERPL-CCNC: 55 U/L (ref 34–104)
ALT SERPL W P-5'-P-CCNC: 11 U/L (ref 7–52)
ANION GAP SERPL CALCULATED.3IONS-SCNC: 14 MMOL/L (ref 4–13)
AST SERPL W P-5'-P-CCNC: 21 U/L (ref 13–39)
ATRIAL RATE: 70 BPM
BASOPHILS # BLD AUTO: 0.03 THOUSANDS/ÂΜL (ref 0–0.1)
BASOPHILS NFR BLD AUTO: 0 % (ref 0–1)
BILIRUB SERPL-MCNC: 0.67 MG/DL (ref 0.2–1)
BUN SERPL-MCNC: 19 MG/DL (ref 5–25)
CALCIUM SERPL-MCNC: 10.3 MG/DL (ref 8.4–10.2)
CARDIAC TROPONIN I PNL SERPL HS: 13 NG/L
CARDIAC TROPONIN I PNL SERPL HS: 17 NG/L
CHLORIDE SERPL-SCNC: 101 MMOL/L (ref 96–108)
CO2 SERPL-SCNC: 26 MMOL/L (ref 21–32)
CREAT SERPL-MCNC: 1.62 MG/DL (ref 0.6–1.3)
EOSINOPHIL # BLD AUTO: 0.11 THOUSAND/ÂΜL (ref 0–0.61)
EOSINOPHIL NFR BLD AUTO: 1 % (ref 0–6)
ERYTHROCYTE [DISTWIDTH] IN BLOOD BY AUTOMATED COUNT: 13.6 % (ref 11.6–15.1)
GFR SERPL CREATININE-BSD FRML MDRD: 28 ML/MIN/1.73SQ M
GLUCOSE SERPL-MCNC: 120 MG/DL (ref 65–140)
HCT VFR BLD AUTO: 41.8 % (ref 34.8–46.1)
HGB BLD-MCNC: 13 G/DL (ref 11.5–15.4)
IMM GRANULOCYTES # BLD AUTO: 0.04 THOUSAND/UL (ref 0–0.2)
IMM GRANULOCYTES NFR BLD AUTO: 0 % (ref 0–2)
LIPASE SERPL-CCNC: 52 U/L (ref 11–82)
LYMPHOCYTES # BLD AUTO: 4.83 THOUSANDS/ÂΜL (ref 0.6–4.47)
LYMPHOCYTES NFR BLD AUTO: 42 % (ref 14–44)
MCH RBC QN AUTO: 29.2 PG (ref 26.8–34.3)
MCHC RBC AUTO-ENTMCNC: 31.1 G/DL (ref 31.4–37.4)
MCV RBC AUTO: 94 FL (ref 82–98)
MONOCYTES # BLD AUTO: 0.88 THOUSAND/ÂΜL (ref 0.17–1.22)
MONOCYTES NFR BLD AUTO: 8 % (ref 4–12)
NEUTROPHILS # BLD AUTO: 5.64 THOUSANDS/ÂΜL (ref 1.85–7.62)
NEUTS SEG NFR BLD AUTO: 49 % (ref 43–75)
NRBC BLD AUTO-RTO: 0 /100 WBCS
P AXIS: 45 DEGREES
PLATELET # BLD AUTO: 161 THOUSANDS/UL (ref 149–390)
PMV BLD AUTO: 10.3 FL (ref 8.9–12.7)
POTASSIUM SERPL-SCNC: 3 MMOL/L (ref 3.5–5.3)
PR INTERVAL: 158 MS
PROT SERPL-MCNC: 7.4 G/DL (ref 6.4–8.4)
QRS AXIS: -33 DEGREES
QRSD INTERVAL: 88 MS
QT INTERVAL: 448 MS
QTC INTERVAL: 483 MS
RBC # BLD AUTO: 4.45 MILLION/UL (ref 3.81–5.12)
SODIUM SERPL-SCNC: 141 MMOL/L (ref 135–147)
T WAVE AXIS: -4 DEGREES
VENTRICULAR RATE: 70 BPM
WBC # BLD AUTO: 11.53 THOUSAND/UL (ref 4.31–10.16)

## 2024-05-11 PROCEDURE — 93005 ELECTROCARDIOGRAM TRACING: CPT

## 2024-05-11 PROCEDURE — 74176 CT ABD & PELVIS W/O CONTRAST: CPT

## 2024-05-11 PROCEDURE — 80053 COMPREHEN METABOLIC PANEL: CPT | Performed by: EMERGENCY MEDICINE

## 2024-05-11 PROCEDURE — 99284 EMERGENCY DEPT VISIT MOD MDM: CPT

## 2024-05-11 PROCEDURE — 85025 COMPLETE CBC W/AUTO DIFF WBC: CPT | Performed by: EMERGENCY MEDICINE

## 2024-05-11 PROCEDURE — 84484 ASSAY OF TROPONIN QUANT: CPT | Performed by: EMERGENCY MEDICINE

## 2024-05-11 PROCEDURE — 96374 THER/PROPH/DIAG INJ IV PUSH: CPT

## 2024-05-11 PROCEDURE — 83735 ASSAY OF MAGNESIUM: CPT | Performed by: INTERNAL MEDICINE

## 2024-05-11 PROCEDURE — 96361 HYDRATE IV INFUSION ADD-ON: CPT

## 2024-05-11 PROCEDURE — 36415 COLL VENOUS BLD VENIPUNCTURE: CPT

## 2024-05-11 PROCEDURE — 83690 ASSAY OF LIPASE: CPT | Performed by: EMERGENCY MEDICINE

## 2024-05-11 RX ORDER — ONDANSETRON 2 MG/ML
4 INJECTION INTRAMUSCULAR; INTRAVENOUS ONCE
Status: COMPLETED | OUTPATIENT
Start: 2024-05-11 | End: 2024-05-11

## 2024-05-11 RX ADMIN — SODIUM CHLORIDE 1000 ML: 0.9 INJECTION, SOLUTION INTRAVENOUS at 21:25

## 2024-05-11 RX ADMIN — ONDANSETRON 4 MG: 2 INJECTION INTRAMUSCULAR; INTRAVENOUS at 21:22

## 2024-05-11 NOTE — TELEPHONE ENCOUNTER
"Reason for Disposition  • [1] MODERATE vomiting (e.g., 3 - 5 times/day) AND [2] age > 60 years    Answer Assessment - Initial Assessment Questions  1. VOMITING SEVERITY: \"How many times have you vomited in the past 24 hours?\"      - MILD:  1 - 2 times/day     - MODERATE: 3 - 5 times/day, decreased oral intake without significant weight loss or symptoms of dehydration     - SEVERE: 6 or more times/day, vomits everything or nearly everything, with significant weight loss, symptoms of dehydration       Vomited 4 times yesterday and 3 times today       Yellow colored fluid vomit     2. ONSET: \"When did the vomiting begin?\"       Started yesterday AM     3. FLUIDS: \"What fluids or food have you vomited up today?\" \"Have you been able to keep any fluids down?\"      Had a cup of tea and a 16oz bottle of water today and yesterday      Has eaten solid food since yesterday AM     4. ABDOMINAL PAIN: \"Are your having any abdominal pain?\" If yes : \"How bad is it and what does it feel like?\" (e.g., crampy, dull, intermittent, constant)       Denies     5. DIARRHEA: \"Is there any diarrhea?\" If Yes, ask: \"How many times today?\"       Denies     6. CONTACTS: \"Is there anyone else in the family with the same symptoms?\"       Denies     7. CAUSE: \"What do you think is causing your vomiting?\"      Unknown     8. HYDRATION STATUS: \"Any signs of dehydration?\" (e.g., dry mouth [not only dry lips], too weak to stand) \"When did you last urinate?\"      Last urination late afternoon today- yellow in color       Denies weakness, dizziness     9. OTHER SYMPTOMS: \"Do you have any other symptoms?\" (e.g., fever, headache, vertigo, vomiting blood or coffee grounds, recent head injury)      Denies fever or any other new symptoms     10. PREGNANCY: \"Is there any chance you are pregnant?\" \"When was your last menstrual period?\"        N/a    Protocols used: Vomiting-ADULT-AH    "

## 2024-05-11 NOTE — TELEPHONE ENCOUNTER
"Regarding: Vomiting  ----- Message from Valerie Ambriz sent at 5/11/2024  5:37 PM EDT -----  \" My mother in law has been vomiting and hasn't had any fluids. Im concerned\"    "

## 2024-05-11 NOTE — TELEPHONE ENCOUNTER
Informed patient's daughter in law and the patient that due to her age and the amount of vomiting she has had over the last 2 days that she should be evaluated now at the nearest ED or urgent care. Recommended ED evaluation due to capabilities for more in depth testing, patient prefers to start at urgent care. Information given on the [facility] in [place]. Patient's daughter in law verbalized understanding, stated she would discuss with the patient what she would like to do at this time.

## 2024-05-12 PROBLEM — S32.040A CLOSED COMPRESSION FRACTURE OF L4 LUMBAR VERTEBRA, INITIAL ENCOUNTER (HCC): Status: ACTIVE | Noted: 2024-05-12

## 2024-05-12 PROBLEM — R94.31 PROLONGED Q-T INTERVAL ON ECG: Status: ACTIVE | Noted: 2024-05-12

## 2024-05-12 PROBLEM — J96.11 CHRONIC RESPIRATORY FAILURE WITH HYPOXIA (HCC): Status: ACTIVE | Noted: 2024-05-12

## 2024-05-12 PROBLEM — D72.829 LEUKOCYTOSIS: Status: ACTIVE | Noted: 2024-05-12

## 2024-05-12 PROBLEM — R11.2 NAUSEA & VOMITING: Status: ACTIVE | Noted: 2024-05-12

## 2024-05-12 PROBLEM — I16.0 HYPERTENSIVE URGENCY: Status: ACTIVE | Noted: 2024-05-12

## 2024-05-12 PROBLEM — S32.040S CLOSED COMPRESSION FRACTURE OF L4 LUMBAR VERTEBRA, SEQUELA: Status: ACTIVE | Noted: 2024-05-12

## 2024-05-12 LAB
4HR DELTA HS TROPONIN: 5 NG/L
ANION GAP SERPL CALCULATED.3IONS-SCNC: 15 MMOL/L (ref 4–13)
BACTERIA UR QL AUTO: NORMAL /HPF
BILIRUB UR QL STRIP: NEGATIVE
BUN SERPL-MCNC: 19 MG/DL (ref 5–25)
CALCIUM SERPL-MCNC: 9.2 MG/DL (ref 8.4–10.2)
CARDIAC TROPONIN I PNL SERPL HS: 18 NG/L
CHLORIDE SERPL-SCNC: 105 MMOL/L (ref 96–108)
CLARITY UR: CLEAR
CO2 SERPL-SCNC: 23 MMOL/L (ref 21–32)
COLOR UR: YELLOW
CREAT SERPL-MCNC: 1.49 MG/DL (ref 0.6–1.3)
ERYTHROCYTE [DISTWIDTH] IN BLOOD BY AUTOMATED COUNT: 13.8 % (ref 11.6–15.1)
GFR SERPL CREATININE-BSD FRML MDRD: 31 ML/MIN/1.73SQ M
GLUCOSE P FAST SERPL-MCNC: 109 MG/DL (ref 65–99)
GLUCOSE SERPL-MCNC: 109 MG/DL (ref 65–140)
GLUCOSE SERPL-MCNC: 134 MG/DL (ref 65–140)
GLUCOSE SERPL-MCNC: 137 MG/DL (ref 65–140)
GLUCOSE SERPL-MCNC: 146 MG/DL (ref 65–140)
GLUCOSE SERPL-MCNC: 159 MG/DL (ref 65–140)
GLUCOSE SERPL-MCNC: 91 MG/DL (ref 65–140)
GLUCOSE UR STRIP-MCNC: ABNORMAL MG/DL
HCT VFR BLD AUTO: 41.7 % (ref 34.8–46.1)
HGB BLD-MCNC: 12.9 G/DL (ref 11.5–15.4)
HGB UR QL STRIP.AUTO: ABNORMAL
KETONES UR STRIP-MCNC: ABNORMAL MG/DL
LEUKOCYTE ESTERASE UR QL STRIP: NEGATIVE
MAGNESIUM SERPL-MCNC: 2.2 MG/DL (ref 1.9–2.7)
MAGNESIUM SERPL-MCNC: 2.2 MG/DL (ref 1.9–2.7)
MCH RBC QN AUTO: 29.3 PG (ref 26.8–34.3)
MCHC RBC AUTO-ENTMCNC: 30.9 G/DL (ref 31.4–37.4)
MCV RBC AUTO: 95 FL (ref 82–98)
NITRITE UR QL STRIP: NEGATIVE
NON-SQ EPI CELLS URNS QL MICRO: NORMAL /HPF
PH UR STRIP.AUTO: 7 [PH]
PLATELET # BLD AUTO: 175 THOUSANDS/UL (ref 149–390)
PMV BLD AUTO: 10.4 FL (ref 8.9–12.7)
POTASSIUM SERPL-SCNC: 3 MMOL/L (ref 3.5–5.3)
PROT UR STRIP-MCNC: ABNORMAL MG/DL
RBC # BLD AUTO: 4.41 MILLION/UL (ref 3.81–5.12)
RBC #/AREA URNS AUTO: NORMAL /HPF
SARS-COV-2 RNA RESP QL NAA+PROBE: NEGATIVE
SODIUM SERPL-SCNC: 143 MMOL/L (ref 135–147)
SP GR UR STRIP.AUTO: 1.01 (ref 1–1.03)
UROBILINOGEN UR STRIP-ACNC: <2 MG/DL
WBC # BLD AUTO: 11.96 THOUSAND/UL (ref 4.31–10.16)
WBC #/AREA URNS AUTO: NORMAL /HPF

## 2024-05-12 PROCEDURE — 85027 COMPLETE CBC AUTOMATED: CPT | Performed by: INTERNAL MEDICINE

## 2024-05-12 PROCEDURE — 82948 REAGENT STRIP/BLOOD GLUCOSE: CPT

## 2024-05-12 PROCEDURE — 84484 ASSAY OF TROPONIN QUANT: CPT | Performed by: EMERGENCY MEDICINE

## 2024-05-12 PROCEDURE — 81001 URINALYSIS AUTO W/SCOPE: CPT | Performed by: EMERGENCY MEDICINE

## 2024-05-12 PROCEDURE — 83735 ASSAY OF MAGNESIUM: CPT | Performed by: INTERNAL MEDICINE

## 2024-05-12 PROCEDURE — 96376 TX/PRO/DX INJ SAME DRUG ADON: CPT

## 2024-05-12 PROCEDURE — 80048 BASIC METABOLIC PNL TOTAL CA: CPT | Performed by: INTERNAL MEDICINE

## 2024-05-12 PROCEDURE — 99285 EMERGENCY DEPT VISIT HI MDM: CPT | Performed by: EMERGENCY MEDICINE

## 2024-05-12 PROCEDURE — 99222 1ST HOSP IP/OBS MODERATE 55: CPT | Performed by: INTERNAL MEDICINE

## 2024-05-12 PROCEDURE — 36415 COLL VENOUS BLD VENIPUNCTURE: CPT | Performed by: EMERGENCY MEDICINE

## 2024-05-12 PROCEDURE — 96375 TX/PRO/DX INJ NEW DRUG ADDON: CPT

## 2024-05-12 PROCEDURE — 87635 SARS-COV-2 COVID-19 AMP PRB: CPT | Performed by: INTERNAL MEDICINE

## 2024-05-12 RX ORDER — METOPROLOL TARTRATE 1 MG/ML
5 INJECTION, SOLUTION INTRAVENOUS ONCE
Status: COMPLETED | OUTPATIENT
Start: 2024-05-12 | End: 2024-05-12

## 2024-05-12 RX ORDER — INSULIN LISPRO 100 [IU]/ML
2-12 INJECTION, SOLUTION INTRAVENOUS; SUBCUTANEOUS
Status: DISCONTINUED | OUTPATIENT
Start: 2024-05-12 | End: 2024-05-15 | Stop reason: HOSPADM

## 2024-05-12 RX ORDER — DOCUSATE SODIUM 100 MG/1
100 CAPSULE, LIQUID FILLED ORAL 2 TIMES DAILY
Status: DISCONTINUED | OUTPATIENT
Start: 2024-05-12 | End: 2024-05-15 | Stop reason: HOSPADM

## 2024-05-12 RX ORDER — PRAVASTATIN SODIUM 20 MG
20 TABLET ORAL
Status: DISCONTINUED | OUTPATIENT
Start: 2024-05-12 | End: 2024-05-15 | Stop reason: HOSPADM

## 2024-05-12 RX ORDER — HYDRALAZINE HYDROCHLORIDE 20 MG/ML
10 INJECTION INTRAMUSCULAR; INTRAVENOUS EVERY 6 HOURS PRN
Status: DISCONTINUED | OUTPATIENT
Start: 2024-05-12 | End: 2024-05-15 | Stop reason: HOSPADM

## 2024-05-12 RX ORDER — FAMOTIDINE 20 MG/1
20 TABLET, FILM COATED ORAL DAILY
Status: DISCONTINUED | OUTPATIENT
Start: 2024-05-12 | End: 2024-05-14

## 2024-05-12 RX ORDER — NIFEDIPINE 30 MG/1
90 TABLET, EXTENDED RELEASE ORAL DAILY
Status: DISCONTINUED | OUTPATIENT
Start: 2024-05-12 | End: 2024-05-15 | Stop reason: HOSPADM

## 2024-05-12 RX ORDER — ONDANSETRON 2 MG/ML
4 INJECTION INTRAMUSCULAR; INTRAVENOUS ONCE
Status: COMPLETED | OUTPATIENT
Start: 2024-05-12 | End: 2024-05-12

## 2024-05-12 RX ORDER — SODIUM CHLORIDE 9 MG/ML
100 INJECTION, SOLUTION INTRAVENOUS CONTINUOUS
Status: DISCONTINUED | OUTPATIENT
Start: 2024-05-12 | End: 2024-05-13

## 2024-05-12 RX ORDER — POTASSIUM CHLORIDE 20 MEQ/1
40 TABLET, EXTENDED RELEASE ORAL EVERY 4 HOURS
Status: COMPLETED | OUTPATIENT
Start: 2024-05-12 | End: 2024-05-12

## 2024-05-12 RX ORDER — CLONIDINE 0.1 MG/24H
1 PATCH, EXTENDED RELEASE TRANSDERMAL WEEKLY
Status: DISCONTINUED | OUTPATIENT
Start: 2024-05-12 | End: 2024-05-15 | Stop reason: HOSPADM

## 2024-05-12 RX ORDER — HEPARIN SODIUM 5000 [USP'U]/ML
5000 INJECTION, SOLUTION INTRAVENOUS; SUBCUTANEOUS EVERY 8 HOURS SCHEDULED
Status: DISCONTINUED | OUTPATIENT
Start: 2024-05-12 | End: 2024-05-15 | Stop reason: HOSPADM

## 2024-05-12 RX ORDER — NEBIVOLOL 5 MG/1
20 TABLET ORAL DAILY
Status: DISCONTINUED | OUTPATIENT
Start: 2024-05-12 | End: 2024-05-15 | Stop reason: HOSPADM

## 2024-05-12 RX ORDER — INSULIN LISPRO 100 [IU]/ML
1-5 INJECTION, SOLUTION INTRAVENOUS; SUBCUTANEOUS
Status: DISCONTINUED | OUTPATIENT
Start: 2024-05-12 | End: 2024-05-15 | Stop reason: HOSPADM

## 2024-05-12 RX ORDER — METOCLOPRAMIDE HYDROCHLORIDE 5 MG/ML
10 INJECTION INTRAMUSCULAR; INTRAVENOUS ONCE
Status: COMPLETED | OUTPATIENT
Start: 2024-05-12 | End: 2024-05-12

## 2024-05-12 RX ORDER — ACETAMINOPHEN 325 MG/1
650 TABLET ORAL EVERY 6 HOURS PRN
Status: DISCONTINUED | OUTPATIENT
Start: 2024-05-12 | End: 2024-05-15 | Stop reason: HOSPADM

## 2024-05-12 RX ORDER — FERROUS SULFATE 325(65) MG
325 TABLET ORAL 3 TIMES WEEKLY
Status: DISCONTINUED | OUTPATIENT
Start: 2024-05-13 | End: 2024-05-15 | Stop reason: HOSPADM

## 2024-05-12 RX ORDER — POTASSIUM CHLORIDE 14.9 MG/ML
20 INJECTION INTRAVENOUS ONCE
Status: COMPLETED | OUTPATIENT
Start: 2024-05-12 | End: 2024-05-12

## 2024-05-12 RX ADMIN — DOCUSATE SODIUM 100 MG: 100 CAPSULE, LIQUID FILLED ORAL at 12:25

## 2024-05-12 RX ADMIN — METOCLOPRAMIDE 10 MG: 5 INJECTION, SOLUTION INTRAMUSCULAR; INTRAVENOUS at 01:52

## 2024-05-12 RX ADMIN — NIFEDIPINE 90 MG: 30 TABLET, EXTENDED RELEASE ORAL at 08:56

## 2024-05-12 RX ADMIN — ONDANSETRON 4 MG: 2 INJECTION INTRAMUSCULAR; INTRAVENOUS at 00:17

## 2024-05-12 RX ADMIN — NEBIVOLOL 20 MG: 5 TABLET ORAL at 09:57

## 2024-05-12 RX ADMIN — POTASSIUM CHLORIDE 20 MEQ: 14.9 INJECTION, SOLUTION INTRAVENOUS at 02:22

## 2024-05-12 RX ADMIN — SODIUM CHLORIDE 100 ML/HR: 0.9 INJECTION, SOLUTION INTRAVENOUS at 15:38

## 2024-05-12 RX ADMIN — HYDRALAZINE HYDROCHLORIDE 10 MG: 20 INJECTION, SOLUTION INTRAMUSCULAR; INTRAVENOUS at 03:25

## 2024-05-12 RX ADMIN — SODIUM CHLORIDE 100 ML/HR: 0.9 INJECTION, SOLUTION INTRAVENOUS at 04:04

## 2024-05-12 RX ADMIN — CLONIDINE 0.1 MG: 0.1 PATCH, EXTENDED RELEASE TRANSDERMAL at 08:57

## 2024-05-12 RX ADMIN — Medication 2000 UNITS: at 08:56

## 2024-05-12 RX ADMIN — TRIMETHOBENZAMIDE HYDROCHLORIDE 200 MG: 100 INJECTION INTRAMUSCULAR at 06:30

## 2024-05-12 RX ADMIN — FAMOTIDINE 20 MG: 20 TABLET, FILM COATED ORAL at 08:56

## 2024-05-12 RX ADMIN — INSULIN LISPRO 1 UNITS: 100 INJECTION, SOLUTION INTRAVENOUS; SUBCUTANEOUS at 21:30

## 2024-05-12 RX ADMIN — HEPARIN SODIUM 5000 UNITS: 5000 INJECTION, SOLUTION INTRAVENOUS; SUBCUTANEOUS at 21:30

## 2024-05-12 RX ADMIN — ASPIRIN 81 MG: 81 TABLET, COATED ORAL at 08:56

## 2024-05-12 RX ADMIN — HEPARIN SODIUM 5000 UNITS: 5000 INJECTION, SOLUTION INTRAVENOUS; SUBCUTANEOUS at 06:32

## 2024-05-12 RX ADMIN — POTASSIUM CHLORIDE 40 MEQ: 1500 TABLET, EXTENDED RELEASE ORAL at 14:57

## 2024-05-12 RX ADMIN — HEPARIN SODIUM 5000 UNITS: 5000 INJECTION, SOLUTION INTRAVENOUS; SUBCUTANEOUS at 14:57

## 2024-05-12 RX ADMIN — DOCUSATE SODIUM 100 MG: 100 CAPSULE, LIQUID FILLED ORAL at 17:33

## 2024-05-12 RX ADMIN — POTASSIUM CHLORIDE 40 MEQ: 1500 TABLET, EXTENDED RELEASE ORAL at 17:32

## 2024-05-12 RX ADMIN — PRAVASTATIN SODIUM 20 MG: 20 TABLET ORAL at 17:32

## 2024-05-12 RX ADMIN — METOPROLOL TARTRATE 5 MG: 5 INJECTION INTRAVENOUS at 01:49

## 2024-05-12 NOTE — ED PROVIDER NOTES
History  Chief Complaint   Patient presents with    Vomiting     Since 1630 yesterday, no blood, denies fevers/chest pain.  PCP told pt to go to ER.  Able to drink a cup of tea and 16oz. Bottle of water today.      88-year-old female presents for evaluation of multiple episodes of vomiting since last night.  Denies associated abdominal pain.  Denies headache.  Last episode of vomiting was prior to arrival in the emergency department.  Denies chest pain, shortness of breath, fever.  Denies sick contacts.  Past surgical history includes appendectomy.  Last bowel movement was 2 days ago.        Prior to Admission Medications   Prescriptions Last Dose Informant Patient Reported? Taking?   ACCU-CHEK PAUL PLUS test strip  Self Yes No   Accu-Chek Guide test strip  Self No No   Sig: USE TO TEST TWICE A DAY   Accu-Chek Softclix Lancets lancets  Self Yes No   Accu-Chek Softclix Lancets lancets  Self No No   Sig: USE AS INSTRUCTED TWICE DAILY   Blood Glucose Monitoring Suppl (OneTouch Verio Reflect) w/Device KIT   No No   Sig: Check blood sugars twice daily. Please substitute with appropriate alternative as covered by patient's insurance. Dx: E11.65   Denta 5000 Plus 1.1 % CREA  Self Yes No   Sig: USE TWICE A DAY -SPIT, DONT RINSE AND NOTHING BY MOUTH X 30 MIN   Insulin Glargine-yfgn 100 UNIT/ML SOPN   Yes No   Sig: INJECT 0.05 ML (5 UNITS TOTAL) UNDER THE SKIN IN THE MORNING   Insulin Pen Needle (BD Pen Needle Kathy U/F) 32G X 4 MM MISC   No No   Sig: Use daily   Invokana 100 MG  Self No No   Sig: TAKE 1 TABLET BY MOUTH EVERY DAY BEFORE BREAKFAST   NIFEdipine (ADALAT CC) 90 mg 24 hr tablet   No No   Sig: TAKE 1 TABLET BY MOUTH EVERY DAY   OneTouch Delica Lancets 33G MISC   No No   Sig: Check blood sugars twice daily. Please substitute with appropriate alternative as covered by patient's insurance. Dx: E11.65   aspirin 81 MG tablet  Self Yes No   Sig: Take 1 tablet by mouth daily   cholecalciferol (VITAMIN D3) 1,000 units  tablet  Self Yes No   Sig: Take 2 tablets by mouth daily   cloNIDine (CATAPRES-TTS-1) 0.1 mg/24 hr   No No   Sig: PLACE 1 PATCH ON THE SKIN ONCE A WEEK AS DIRECTED   famotidine (PEPCID) 20 mg tablet  Self No No   Sig: TAKE 1 TABLET BY MOUTH EVERY DAY   ferrous sulfate 325 (65 Fe) mg tablet  Self No No   Sig: Take 1 tablet (325 mg total) by mouth 3 (three) times a week   glipiZIDE (GLUCOTROL) 10 mg tablet   No No   Sig: TAKE 1 TABLET BY MOUTH IN THE MORNING THEN 2 TABS WITH DINNER   glucose blood (Accu-Chek Emma Plus) test strip  Self No No   Sig: USE AS INSTRUCTED TWICE DAILY   glucose blood (OneTouch Verio) test strip   No No   Sig: Check blood sugars twice daily. Please substitute with appropriate alternative as covered by patient's insurance. Dx: E11.65   nebivolol (BYSTOLIC) 20 MG tablet  Self No No   Sig: TAKE 1 TABLET BY MOUTH EVERY DAY   oxygen gas  Self Yes No   Sig: Inhale 2.5 L/min continuous VIA NASAL CANULA   sertraline (ZOLOFT) 100 mg tablet   No No   Sig: TAKE 1 TABLET BY MOUTH EVERY DAY   simvastatin (ZOCOR) 20 mg tablet   No No   Sig: TAKE 1 TABLET BY MOUTH EVERYDAY AT BEDTIME   sitaGLIPtin (Januvia) 50 mg tablet  Self No No   Sig: TAKE 1 TABLET BY MOUTH EVERY DAY   torsemide (DEMADEX) 20 mg tablet   No No   Sig: TAKE 1 TABLET BY MOUTH EVERY DAY      Facility-Administered Medications: None       Past Medical History:   Diagnosis Date    Anxiety     Aortic valve insufficiency     Arthritis     Cataract of both eyes     Chronic kidney disease     Diabetes mellitus (HCC)     Dysuria     last assessed - [id number]    Edema     last assessed - [id number]    GERD (gastroesophageal reflux disease)     last assessed - [id number]    Hyperlipidemia     Hypertension     Low back pain     last assessed - [id number]    Mitral valve disorder     Osteoporosis     last assessed - [id number]    Palpitations        Past Surgical History:   Procedure Laterality Date    APPENDECTOMY      CATARACT EXTRACTION Bilateral      COLONOSCOPY      HIP SURGERY      TUBAL LIGATION Bilateral        Family History   Problem Relation Age of Onset    Kidney disease Mother         Chronic    No Known Problems Father     Breast cancer Sister     Diabetes Sister     Cancer Sister     Breast cancer Sister     Hypertension Sister     No Known Problems Daughter     No Known Problems Son     No Known Problems Son     No Known Problems Son     No Known Problems Son      I have reviewed and agree with the history as documented.    E-Cigarette/Vaping    E-Cigarette Use Never User      E-Cigarette/Vaping Substances    Nicotine No     THC No     CBD No     Flavoring No     Other No     Unknown No      Social History     Tobacco Use    Smoking status: Never    Smokeless tobacco: Never   Vaping Use    Vaping status: Never Used   Substance Use Topics    Alcohol use: Never    Drug use: Never       Review of Systems   Constitutional:  Negative for fever.   Gastrointestinal:  Positive for nausea and vomiting.       Physical Exam  Physical Exam  Vitals and nursing note reviewed.   Constitutional:       Appearance: She is well-developed.   HENT:      Head: Normocephalic and atraumatic.      Right Ear: External ear normal.      Left Ear: External ear normal.      Nose: Nose normal.   Eyes:      General: No scleral icterus.     Extraocular Movements: Extraocular movements intact.      Pupils: Pupils are equal, round, and reactive to light.   Cardiovascular:      Rate and Rhythm: Normal rate.   Pulmonary:      Effort: Pulmonary effort is normal. No respiratory distress.   Abdominal:      General: There is no distension.      Tenderness: There is no abdominal tenderness.   Musculoskeletal:         General: No deformity. Normal range of motion.      Cervical back: Normal range of motion.      Comments: 5/5 strength of bilateral upper and lower extremities.  Visual fields intact.   Skin:     Findings: No rash.   Neurological:      General: No focal deficit present.       Mental Status: She is alert and oriented to person, place, and time.   Psychiatric:         Mood and Affect: Mood normal.         Vital Signs  ED Triage Vitals   Temperature Pulse Respirations Blood Pressure SpO2   05/11/24 1914 05/11/24 1914 05/11/24 1914 05/11/24 1914 05/11/24 1914   97.5 °F (36.4 °C) 70 18 (!) 195/86 99 %      Temp Source Heart Rate Source Patient Position - Orthostatic VS BP Location FiO2 (%)   05/11/24 1914 05/11/24 1914 05/12/24 0044 05/11/24 1914 --   Oral Monitor Sitting Right arm       Pain Score       05/11/24 1914       No Pain           Vitals:    05/12/24 0306 05/12/24 0516 05/12/24 0603 05/12/24 0821   BP: (!) 186/81 110/68 110/68 159/59   Pulse:  84 84 75   Patient Position - Orthostatic VS:    Lying         Visual Acuity      ED Medications  Medications   sodium chloride 0.9 % infusion (100 mL/hr Intravenous New Bag 5/12/24 0404)   acetaminophen (TYLENOL) tablet 650 mg (has no administration in time range)   heparin (porcine) subcutaneous injection 5,000 Units (5,000 Units Subcutaneous Given 5/12/24 1457)   trimethobenzamide (TIGAN) IM injection 200 mg (200 mg Intramuscular Given 5/12/24 0630)   aspirin (ECOTRIN LOW STRENGTH) EC tablet 81 mg (81 mg Oral Given 5/12/24 0856)   Cholecalciferol (VITAMIN D3) tablet 2,000 Units (2,000 Units Oral Given 5/12/24 0856)   ferrous sulfate tablet 325 mg (has no administration in time range)   cloNIDine (CATAPRES-TTS-1) 0.1 mg/24 hr TD weekly patch (0.1 mg Transdermal Medication Applied 5/12/24 0857)   famotidine (PEPCID) tablet 20 mg (20 mg Oral Given 5/12/24 0856)   nebivolol (BYSTOLIC) tablet 20 mg (20 mg Oral Given 5/12/24 0957)   NIFEdipine (PROCARDIA XL) 24 hr tablet 90 mg (90 mg Oral Given 5/12/24 0856)   pravastatin (PRAVACHOL) tablet 20 mg (has no administration in time range)   hydrALAZINE (APRESOLINE) injection 10 mg (10 mg Intravenous Given 5/12/24 4495)   insulin lispro (HumALOG/ADMELOG) 100 units/mL subcutaneous injection 2-12 Units  ( Subcutaneous Not Given 5/12/24 1154)   insulin lispro (HumALOG/ADMELOG) 100 units/mL subcutaneous injection 1-5 Units (has no administration in time range)   docusate sodium (COLACE) capsule 100 mg (100 mg Oral Given 5/12/24 1225)   potassium chloride (Klor-Con M20) CR tablet 40 mEq (40 mEq Oral Given 5/12/24 1457)   ondansetron (ZOFRAN) injection 4 mg (4 mg Intravenous Given 5/11/24 2122)   sodium chloride 0.9 % bolus 1,000 mL (0 mL Intravenous Stopped 5/12/24 0040)   ondansetron (ZOFRAN) injection 4 mg (4 mg Intravenous Given 5/12/24 0017)   metoprolol (LOPRESSOR) injection 5 mg (5 mg Intravenous Given 5/12/24 0149)   metoclopramide (REGLAN) injection 10 mg (10 mg Intravenous Given 5/12/24 0152)   potassium chloride 20 mEq IVPB (premix) (20 mEq Intravenous Continue to Inpatient Floor 5/12/24 0247)       Diagnostic Studies  Results Reviewed       Procedure Component Value Units Date/Time    Fingerstick Glucose (POCT) [[id number]]  (Normal) Collected: 05/12/24 0205    Lab Status: Final result Specimen: Blood Updated: 05/12/24 0206     POC Glucose 91 mg/dl     HS Troponin I 4hr [[id number]]  (Normal) Collected: 05/12/24 0039    Lab Status: Final result Specimen: Blood from Arm, Right Updated: 05/12/24 0108     hs TnI 4hr 18 ng/L      Delta 4hr hsTnI 5 ng/L     Urine Microscopic [[id number]]  (Normal) Collected: 05/12/24 0017    Lab Status: Final result Specimen: Urine, Clean Catch Updated: 05/12/24 0039     RBC, UA 1-2 /hpf      WBC, UA 1-2 /hpf      Epithelial Cells Occasional /hpf      Bacteria, UA None Seen /hpf     UA w Reflex to Microscopic w Reflex to Culture [[id number]]  (Abnormal) Collected: 05/12/24 0017    Lab Status: Final result Specimen: Urine, Clean Catch Updated: 05/12/24 0032     Color, UA Yellow     Clarity, UA Clear     Specific Gravity, UA 1.015     pH, UA 7.0     Leukocytes, UA Negative     Nitrite, UA Negative     Protein,  (2+) mg/dl      Glucose,  (3/10%) mg/dl      Ketones, UA 10  (1+) mg/dl      Urobilinogen, UA <2.0 mg/dl      Bilirubin, UA Negative     Occult Blood, UA Trace    HS Troponin I 2hr [[id number]]  (Normal) Collected: 05/11/24 2114    Lab Status: Final result Specimen: Blood from Arm, Right Updated: 05/11/24 2140     hs TnI 2hr 17 ng/L      Delta 2hr hsTnI 4 ng/L     Comprehensive metabolic panel [[id number]]  (Abnormal) Collected: 05/11/24 1920    Lab Status: Final result Specimen: Blood from Arm, Right Updated: 05/11/24 2003     Sodium 141 mmol/L      Potassium 3.0 mmol/L      Chloride 101 mmol/L      CO2 26 mmol/L      ANION GAP 14 mmol/L      BUN 19 mg/dL      Creatinine 1.62 mg/dL      Glucose 120 mg/dL      Calcium 10.3 mg/dL      AST 21 U/L      ALT 11 U/L      Alkaline Phosphatase 55 U/L      Total Protein 7.4 g/dL      Albumin 4.4 g/dL      Total Bilirubin 0.67 mg/dL      eGFR 28 ml/min/1.73sq m     Narrative:      [employer] guidelines for Chronic Kidney Disease (CKD):     Stage 1 with normal or high GFR (GFR > 90 mL/min/1.73 square meters)    Stage 2 Mild CKD (GFR = 60-89 mL/min/1.73 square meters)    Stage 3A Moderate CKD (GFR = 45-59 mL/min/1.73 square meters)    Stage 3B Moderate CKD (GFR = 30-44 mL/min/1.73 square meters)    Stage 4 Severe CKD (GFR = 15-29 mL/min/1.73 square meters)    Stage 5 End Stage CKD (GFR <15 mL/min/1.73 square meters)  Note: GFR calculation is accurate only with a steady state creatinine    Lipase [[id number]]  (Normal) Collected: 05/11/24 1920    Lab Status: Final result Specimen: Blood from Arm, Right Updated: 05/11/24 2003     Lipase 52 u/L     HS Troponin 0hr (reflex protocol) [[id number]]  (Normal) Collected: 05/11/24 1920    Lab Status: Final result Specimen: Blood from Arm, Right Updated: 05/11/24 1948     hs TnI 0hr 13 ng/L     CBC and differential [[id number]]  (Abnormal) Collected: 05/11/24 1920    Lab Status: Final result Specimen: Blood from Arm, Right Updated: 05/11/24 1926     WBC 11.53 Thousand/uL      RBC  4.45 Million/uL      Hemoglobin 13.0 g/dL      Hematocrit 41.8 %      MCV 94 fL      MCH 29.2 pg      MCHC 31.1 g/dL      RDW 13.6 %      MPV 10.3 fL      Platelets 161 Thousands/uL      nRBC 0 /100 WBCs      Segmented % 49 %      Immature Grans % 0 %      Lymphocytes % 42 %      Monocytes % 8 %      Eosinophils Relative 1 %      Basophils Relative 0 %      Absolute Neutrophils 5.64 Thousands/µL      Absolute Immature Grans 0.04 Thousand/uL      Absolute Lymphocytes 4.83 Thousands/µL      Absolute Monocytes 0.88 Thousand/µL      Eosinophils Absolute 0.11 Thousand/µL      Basophils Absolute 0.03 Thousands/µL                    CT abdomen pelvis wo contrast   Final Result by Cory Parker MD (05/12 1010)   1. No acute intra-abdominal pathology.   2. Moderate L4 compression deformity, age indeterminate. MR evaluation may be useful if clinically warranted.            Findings are consistent with the preliminary report from [facility] which was provided shortly after completion of the exam.         Workstation performed: [id number]                    Procedures  Procedures         ED Course             HEART Risk Score      Flowsheet Row Most Recent Value   Heart Score Risk Calculator    History 0 Filed at: 05/12/2024 0129   ECG 1 Filed at: 05/12/2024 0129   Age 2 Filed at: 05/12/2024 0129   Risk Factors 2 Filed at: 05/12/2024 0129   Troponin 1 Filed at: 05/12/2024 0129   HEART Score 6 Filed at: 05/12/2024 0129                                        Medical Decision Making  88-year-old female presenting with nausea and vomiting.  Symptom control, labs, cardiac evaluation although low suspicion of ACS.  CT abdomen pelvis to rule out obstruction.    Amount and/or Complexity of Data Reviewed  Labs: ordered.  Radiology: ordered.    Risk  Prescription drug management.  Decision regarding hospitalization.             Disposition  Final diagnoses:   Nausea and vomiting   Hypokalemia   Chronic kidney disease   Accelerated  hypertension     Time reflects when diagnosis was documented in both MDM as applicable and the Disposition within this note       Time User Action Codes Description Comment    5/12/2024  2:18 AM Kj Hernandez Add [R11.2] Nausea and vomiting     5/12/2024  2:18 AM Kj Hernandez Add [I10] Accelerated hypertension     5/12/2024  2:18 AM Kj Hernandez Add [E87.6] Hypokalemia     5/12/2024  2:18 AM Kj Hernandez Add [N18.9] Chronic kidney disease     5/12/2024  2:19 AM Kj Hernandez Remove [I10] Accelerated hypertension     5/12/2024  2:19 AM Kj Hernandez Add [I10] Accelerated hypertension           ED Disposition       ED Disposition   Admit    Condition   Stable    Date/Time   Sun May 12, 2024 0218    Comment   Case was discussed with Dr. Ramirez and the patient's admission status was agreed to be Admission Status: observation status to the service of Dr. Ramirez .               Follow-up Information    None         Current Discharge Medication List        CONTINUE these medications which have NOT CHANGED    Details   !! ACCU-CHEK PAUL PLUS test strip       !! Accu-Chek Guide test strip USE TO TEST TWICE A DAY  Qty: 100 strip, Refills: 3    Associated Diagnoses: Type 2 diabetes mellitus with stage 3b chronic kidney disease, without long-term current use of insulin (HCC)      !! Accu-Chek Softclix Lancets lancets       !! Accu-Chek Softclix Lancets lancets USE AS INSTRUCTED TWICE DAILY  Qty: 100 each, Refills: 3    Associated Diagnoses: Type 2 diabetes mellitus with stage 3 chronic kidney disease, without long-term current use of insulin (HCC)      aspirin 81 MG tablet Take 1 tablet by mouth daily      Blood Glucose Monitoring Suppl (OneTouch Verio Reflect) w/Device KIT Check blood sugars twice daily. Please substitute with appropriate alternative as covered by patient's insurance. Dx: E11.65  Qty: 1 kit, Refills: 0    Comments: Please substitute with appropriate alternative as covered by  patient's insurance  Associated Diagnoses: Type 2 diabetes mellitus with stage 4 chronic kidney disease, without long-term current use of insulin (HCC)      cholecalciferol (VITAMIN D3) 1,000 units tablet Take 2 tablets by mouth daily      cloNIDine (CATAPRES-TTS-1) 0.1 mg/24 hr PLACE 1 PATCH ON THE SKIN ONCE A WEEK AS DIRECTED  Qty: 12 patch, Refills: 4    Associated Diagnoses: Essential hypertension      Denta 5000 Plus 1.1 % CREA USE TWICE A DAY -SPIT, DONT RINSE AND NOTHING BY MOUTH X 30 MIN      famotidine (PEPCID) 20 mg tablet TAKE 1 TABLET BY MOUTH EVERY DAY  Qty: 90 tablet, Refills: 1    Associated Diagnoses: Gastroesophageal reflux disease with esophagitis      ferrous sulfate 325 (65 Fe) mg tablet Take 1 tablet (325 mg total) by mouth 3 (three) times a week  Qty: 12 tablet, Refills: 0    Associated Diagnoses: Iron deficiency anemia, unspecified iron deficiency anemia type      glipiZIDE (GLUCOTROL) 10 mg tablet TAKE 1 TABLET BY MOUTH IN THE MORNING THEN 2 TABS WITH DINNER  Qty: 270 tablet, Refills: 3    Associated Diagnoses: Essential hypertension; Type 2 diabetes mellitus with stage 3 chronic kidney disease, without long-term current use of insulin ([facility])      !! glucose blood (Accu-Chek Emma Plus) test strip USE AS INSTRUCTED TWICE DAILY  Qty: 100 each, Refills: 7    Associated Diagnoses: Type 2 diabetes mellitus with stage 3 chronic kidney disease, without long-term current use of insulin ([facility])      !! glucose blood (OneTouch Verio) test strip Check blood sugars twice daily. Please substitute with appropriate alternative as covered by patient's insurance. Dx: E11.65  Qty: 200 each, Refills: 3    Comments: Please substitute with appropriate alternative as covered by patient's insurance  Associated Diagnoses: Type 2 diabetes mellitus with stage 4 chronic kidney disease, without long-term current use of insulin ([facility])      Insulin Glargine-yfgn 100 UNIT/ML SOPN INJECT 0.05 ML (5 UNITS TOTAL) UNDER THE SKIN  IN THE MORNING      Insulin Pen Needle (BD Pen Needle Kathy U/F) 32G X 4 MM MISC Use daily  Qty: 100 each, Refills: 3    Associated Diagnoses: Type 2 diabetes mellitus with hyperglycemia, with long-term current use of insulin ([facility])      Invokana 100 MG TAKE 1 TABLET BY MOUTH EVERY DAY BEFORE BREAKFAST  Qty: 30 tablet, Refills: 5    Associated Diagnoses: Type 2 diabetes mellitus with stage 4 chronic kidney disease, without long-term current use of insulin ([facility])      nebivolol (BYSTOLIC) 20 MG tablet TAKE 1 TABLET BY MOUTH EVERY DAY  Qty: 90 tablet, Refills: 6    Associated Diagnoses: Essential hypertension      NIFEdipine (ADALAT CC) 90 mg 24 hr tablet TAKE 1 TABLET BY MOUTH EVERY DAY  Qty: 90 tablet, Refills: 3    Associated Diagnoses: Essential hypertension      !! OneTouch Delica Lancets 33G MISC Check blood sugars twice daily. Please substitute with appropriate alternative as covered by patient's insurance. Dx: E11.65  Qty: 200 each, Refills: 3    Comments: Please substitute with appropriate alternative as covered by patient's insurance  Associated Diagnoses: Type 2 diabetes mellitus with stage 4 chronic kidney disease, without long-term current use of insulin ([facility])      oxygen gas Inhale 2.5 L/min continuous VIA NASAL CANULA      sertraline (ZOLOFT) 100 mg tablet TAKE 1 TABLET BY MOUTH EVERY DAY  Qty: 90 tablet, Refills: 2    Associated Diagnoses: Anxiety      simvastatin (ZOCOR) 20 mg tablet TAKE 1 TABLET BY MOUTH EVERYDAY AT BEDTIME  Qty: 90 tablet, Refills: 2    Associated Diagnoses: Dyslipidemia      sitaGLIPtin (Januvia) 50 mg tablet TAKE 1 TABLET BY MOUTH EVERY DAY  Qty: 30 tablet, Refills: 5    Associated Diagnoses: Type 2 diabetes mellitus with stage 3 chronic kidney disease, without long-term current use of insulin ([facility])      torsemide (DEMADEX) 20 mg tablet TAKE 1 TABLET BY MOUTH EVERY DAY  Qty: 90 tablet, Refills: 0    Associated Diagnoses: Dependent edema       !! - Potential duplicate medications  found. Please discuss with provider.          No discharge procedures on file.    PDMP Review         Value Time User    PDMP Reviewed  Yes 2/12/2024  9:06 AM Josey Dodson PA-C            ED Provider  Electronically Signed by             Arpit Reilly DO  05/12/24 8426

## 2024-05-12 NOTE — H&P
[facility]  H&P  Name: Ena Ahumada 88 y.o. female I MRN: [id number]  Unit/Bed#: -01 I Date of Admission: 5/11/2024   Date of Service: 5/12/2024 I Hospital Day: 0      Assessment/Plan   * Nausea & vomiting  Assessment & Plan  Possibly secondary to viral gastroenteritis  S/p CT a/p preliminary results revealing no acute intra-abdominal process.  Follow-up official results  Serial trop neg  No focal deficits on exam  Check COVID  Clear liquid diet and advance as tolerated  Anti-emetic with IM tigan given qt prolongation    Hypertensive urgency  Assessment & Plan  Likely secondary to having missed oral antihypertensive medications due to GI symptoms  Resumed back on chronic meds with the exception of torsemide given GI losses and on IV fluid hydration  Added as needed IV hydralazine    Hypokalemia  Assessment & Plan  Secondary to GI losses  Status post IV potassium in the ED  Check magnesium level    Prolonged Q-T interval on ECG  Assessment & Plan  Likely secondary to hypokalemia  Telemetry monitoring  Repeat EKG in the am  Repleted potassium accordingly  Avoid QT prolongation agents    Chronic respiratory failure with hypoxia (HCC)  Assessment & Plan  Initiated during last admission in March secondary to pneumonia, is in the process of being weaned off.  At baseline on 2 L as needed    Leukocytosis  Assessment & Plan  Likely reactive  Chronic history since March    Closed compression fracture of L4 lumbar vertebra, sequela  Assessment & Plan  Known history to patient and her family  Baseline chronic low back pain  Denies any recent trauma    Hypercalcemia  Assessment & Plan  Likely secondary to dehydration  Continue IV fluid hydration  Continue to monitor    Stage 3b chronic kidney disease (CKD) (HCC)  Assessment & Plan  Lab Results   Component Value Date    EGFR 28 05/11/2024    EGFR 25 (L) 04/22/2024    EGFR 26 (L) 04/22/2024    CREATININE 1.62 (H) 05/11/2024    CREATININE  2.05 05/03/2024    CREATININE 1.89 (H) 04/22/2024   Creatinine within baseline range  Continue to monitor    Type 2 diabetes mellitus with stage 4 chronic kidney disease, without long-term current use of insulin ([facility])  Assessment & Plan  Lab Results   Component Value Date    HGBA1C 8.0 (H) 04/22/2024       Recent Labs     05/12/24  0205   POCGLU 91       Blood Sugar Average: Last 72 hrs:  (P) 91  Holding lantus given on clears  Place on ISS  Advance to outpt regimen upon advancement of diet       VTE Prophylaxis: Heparin  / sequential compression device   Code Status: Full code  POLST: There is no POLST form on file for this patient (pre-hospital)  Discussion with family: Plan of care discussed with patient and her family    Anticipated Length of Stay:  Patient will be admitted on an Observation basis with an anticipated length of stay of 7 2 midnights.   Justification for Hospital Stay: Nausea, vomiting, hypertensive urgency, electrolyte imbalance    Total Time for Visit, including Counseling / Coordination of Care: 60 minutes.  Greater than 50% of this total time spent on direct patient counseling and coordination of care.    Chief Complaint:   Nausea, vomiting x 2 days    History of Present Illness:    Ena Ahumada is a 88 y.o. female medical history significant for CKD stage III, hypertension, aortic valve stenosis, insulin-dependent diabetes presents to the ER with complaint of nausea and vomiting x 2 days.  Patient reports of multiple episodes of nonbloody, nonbilious vomiting to the point where she was unable to keep any food down.  She also reports that when she took her medications including her antihypertensive medications she subsequently vomited them.  She denies fever, chills, abdominal pain, diarrhea, foreign travel, sick contacts.  She denies any cardiac symptoms including chest pain.  She denies any neurologic symptoms    Upon presentation to the ER, CT abdomen pelvis was done.  Also noted with  elevated blood pressure, was given IV Lopressor.    Review of Systems:    Review of Systems   Gastrointestinal:  Positive for nausea and vomiting.   Musculoskeletal:  Positive for back pain.        Chronic back pain       Past Medical and Surgical History:     Past Medical History:   Diagnosis Date    Anxiety     Aortic valve insufficiency     Arthritis     Cataract of both eyes     Chronic kidney disease     Diabetes mellitus (HCC)     Dysuria     last assessed - 19May2017    Edema     last assessed - 05Jun2015    GERD (gastroesophageal reflux disease)     last assessed - 30Aug2012    Hyperlipidemia     Hypertension     Low back pain     last assessed - 30Aug2012    Mitral valve disorder     Osteoporosis     last assessed - [id number]    Palpitations        Past Surgical History:   Procedure Laterality Date    APPENDECTOMY      CATARACT EXTRACTION Bilateral     COLONOSCOPY      HIP SURGERY      TUBAL LIGATION Bilateral        Meds/Allergies:    Prior to Admission medications    Medication Sig Start Date End Date Taking? Authorizing Provider   ACCU-CHEK PAUL PLUS test strip  1/31/20   Historical Provider, MD   Accu-Chek Guide test strip USE TO TEST TWICE A DAY 1/16/23   Deanna Castaneda DO   Accu-Chek Softclix Lancets lancets  1/28/20   Historical Provider, MD   Accu-Chek Softclix Lancets lancets USE AS INSTRUCTED TWICE DAILY 10/23/23   Deanna Castaneda DO   aspirin 81 MG tablet Take 1 tablet by mouth daily 9/4/12   Historical Provider, MD   Blood Glucose Monitoring Suppl (OneTouch Verio Reflect) w/Device KIT Check blood sugars twice daily. Please substitute with appropriate alternative as covered by patient's insurance. Dx: E11.65  Patient not taking: Reported on 4/25/2024 4/12/24   Deanna Castaneda DO   cholecalciferol (VITAMIN D3) 1,000 units tablet Take 2 tablets by mouth daily 9/21/12   Historical Provider, MD   cloNIDine (CATAPRES-TTS-1) 0.1 mg/24 hr PLACE 1 PATCH ON THE SKIN ONCE A WEEK AS DIRECTED 4/16/24    Deanna Castaneda DO   Denta 5000 Plus 1.1 % CREA USE TWICE A DAY -SPIT, DONT RINSE AND NOTHING BY MOUTH X 30 MIN 2/18/23   Historical Provider, MD   famotidine (PEPCID) 20 mg tablet TAKE 1 TABLET BY MOUTH EVERY DAY 2/10/24   Deanna Castaneda DO   ferrous sulfate 325 (65 Fe) mg tablet Take 1 tablet (325 mg total) by mouth 3 (three) times a week 3/27/24 5/7/24  Debbi Nelson MD   glipiZIDE (GLUCOTROL) 10 mg tablet TAKE 1 TABLET BY MOUTH IN THE MORNING THEN 2 TABS WITH DINNER 4/18/24   Deanna Castaneda DO   glucose blood (Accu-Chek Emma Plus) test strip USE AS INSTRUCTED TWICE DAILY 2/24/21   Deanna Castaneda DO   glucose blood (OneTouch Verio) test strip Check blood sugars twice daily. Please substitute with appropriate alternative as covered by patient's insurance. Dx: E11.65 4/12/24   Deanna Castaneda DO   Insulin Glargine-yfgn 100 UNIT/ML SOPN INJECT 0.05 ML (5 UNITS TOTAL) UNDER THE SKIN IN THE MORNING 4/4/24   Historical Provider, MD   Insulin Pen Needle (BD Pen Needle Kathy U/F) 32G X 4 MM MISC Use daily 4/4/24   Sylvain Love PA-C   Invokana 100 MG TAKE 1 TABLET BY MOUTH EVERY DAY BEFORE BREAKFAST 12/1/23   Deanna Castaneda DO   nebivolol (BYSTOLIC) 20 MG tablet TAKE 1 TABLET BY MOUTH EVERY DAY 3/28/24   Deanna Castaneda DO   NIFEdipine (ADALAT CC) 90 mg 24 hr tablet TAKE 1 TABLET BY MOUTH EVERY DAY 4/18/24   Deanna Castaneda DO   OneTouch Delica Lancets 33G MISC Check blood sugars twice daily. Please substitute with appropriate alternative as covered by patient's insurance. Dx: E11.65 4/12/24   Deanna Castaneda DO   oxygen gas Inhale 2.5 L/min continuous VIA NASAL CANULA    Historical Provider, MD   sertraline (ZOLOFT) 100 mg tablet TAKE 1 TABLET BY MOUTH EVERY DAY 4/18/24   Deanna Castaneda DO   simvastatin (ZOCOR) 20 mg tablet TAKE 1 TABLET BY MOUTH EVERYDAY AT BEDTIME 4/10/24   Deannapocne Castaneda DO   sitaGLIPtin (Januvia) 50 mg tablet TAKE 1 TABLET BY MOUTH EVERY DAY 12/17/23   Deanna  DO Tonya   torsemide (DEMADEX) 20 mg tablet TAKE 1 TABLET BY MOUTH EVERY DAY 4/11/24   Markus Hill MD   nystatin (MYCOSTATIN) powder Apply topically 3 (three) times a day  Patient not taking: Reported on 5/7/2024 4/15/24 5/12/24  Deanna DO Tonya   Pseudoephedrine-guaiFENesin (MUCINEX D PO) Take by mouth  Patient not taking: Reported on 4/25/2024 5/12/24  Historical Provider, MD     I have reviewed home medications with patient family member.    Allergies: No Known Allergies    Social History:     Marital Status: Single   Occupation:   Patient Pre-hospital Living Situation: Resides in an apartment with family nearby independently  Patient Pre-hospital Level of Mobility:   Patient Pre-hospital Diet Restrictions: None  Substance Use History:   Social History     Substance and Sexual Activity   Alcohol Use Never     Social History     Tobacco Use   Smoking Status Never   Smokeless Tobacco Never     Social History     Substance and Sexual Activity   Drug Use Never       Family History:    Family History   Problem Relation Age of Onset    Kidney disease Mother         Chronic    No Known Problems Father     Breast cancer Sister     Diabetes Sister     Cancer Sister     Breast cancer Sister     Hypertension Sister     No Known Problems Daughter     No Known Problems Son     No Known Problems Son     No Known Problems Son     No Known Problems Son        Physical Exam:     Vitals:   Blood Pressure: (!) 186/81 (05/12/24 0306)  Pulse: 71 (05/12/24 0215)  Temperature: 98.4 °F (36.9 °C) (05/12/24 0306)  Temp Source: Oral (05/11/24 1914)  Respirations: 20 (05/12/24 0215)  SpO2: 95 % (05/12/24 0215)    Physical Exam  Cardiovascular:      Rate and Rhythm: Normal rate and regular rhythm.      Pulses: Normal pulses.      Heart sounds: Murmur heard.   Pulmonary:      Effort: Pulmonary effort is normal. No respiratory distress.      Breath sounds: Normal breath sounds. No wheezing or rales.   Abdominal:      General:  Abdomen is flat. Bowel sounds are normal. There is no distension.      Palpations: Abdomen is soft.      Tenderness: There is no abdominal tenderness. There is no guarding.   Musculoskeletal:         General: Normal range of motion.      Cervical back: Normal range of motion and neck supple.      Right lower leg: No edema.      Left lower leg: No edema.   Skin:     General: Skin is warm and dry.   Neurological:      General: No focal deficit present.      Mental Status: She is alert and oriented to person, place, and time. Mental status is at baseline.      Cranial Nerves: No cranial nerve deficit.      Motor: No weakness.      Comments: Chronic hard of hearing         Additional Data:     Lab Results: I have personally reviewed pertinent reports.      Results from last 7 days   Lab Units 05/11/24  1920   WBC Thousand/uL 11.53*   HEMOGLOBIN g/dL 13.0   HEMATOCRIT % 41.8   PLATELETS Thousands/uL 161   SEGS PCT % 49   LYMPHO PCT % 42   MONO PCT % 8   EOS PCT % 1     Results from last 7 days   Lab Units 05/11/24  1920   SODIUM mmol/L 141   POTASSIUM mmol/L 3.0*   CHLORIDE mmol/L 101   CO2 mmol/L 26   BUN mg/dL 19   CREATININE mg/dL 1.62*   ANION GAP mmol/L 14*   CALCIUM mg/dL 10.3*   ALBUMIN g/dL 4.4   TOTAL BILIRUBIN mg/dL 0.67   ALK PHOS U/L 55   ALT U/L 11   AST U/L 21   GLUCOSE RANDOM mg/dL 120         Results from last 7 days   Lab Units 05/12/24  0205   POC GLUCOSE mg/dl 91               Imaging:  Reviewed preliminary results.    CT abdomen pelvis wo contrast    (Results Pending)       EKG, Pathology, and Other Studies Reviewed on Admission:   EKG: Normal sinus rhythm at 70 bpm, left axis deviation, T wave inversion in lead III, aVF, QT prolongation    Allscripts / Epic Records Reviewed: Yes     ** Please Note: This note has been constructed using a voice recognition system. **Normal sinus rhythm

## 2024-05-12 NOTE — UTILIZATION REVIEW
Initial Clinical Review    Admission: Date/Time/Statement: TO ED 5/11/24 - OBSERVATION 5/12/24 AT 0220 - INPATIENT CONVERSION 5/12/24 AT 1417 2ND NAUSEA + VOMITING  WITH HYPOKALEMIA + ELEVATED BP - REQUIRING CONTINUED INPATIENT MANAGEMENT AFTER ED TX AND OBSERVATION + > 2 MIDNITES - CONTINUED IVF, IV HYDRALAZINE PRN, DIET ADVANCEMENT     05/12/24 1418  INPATIENT ADMISSION  Once        Transfer Service: Hospitalist   Question Answer Comment   Level of Care Med Surg    Estimated length of stay More than 2 Midnights    Certification I certify that inpatient services are medically necessary for this patient for a duration of greater than two midnights. See H&P and MD Progress Notes for additional information about the patient's course of treatment.        05/12/24 1417   05/12/24 0220  Place in Observation  Once        Transfer Service: Hospitalist   Question: Level of Care Answer: Med Surg    05/12/24 0220     ED Arrival Information       Expected   -    Arrival   5/11/2024 19:05    Acuity   Urgent              Means of arrival   Wheelchair    Escorted by   Family Member    Service   Hospitalist    Admission type   Emergency              Arrival complaint   vomiting          Chief Complaint   Patient presents with    Vomiting     Since 1630 yesterday, no blood, denies fevers/chest pain.  PCP told pt to go to ER.  Able to drink a cup of tea and 16oz. Bottle of water today.      Initial Presentation:   87 y/o female with PMHx HTN,HLD, Aortic Valve Stenosis, CKD Stage III, DM, Anxiety - presents to [facility] ED on 5/11/24 2nd 2 day history of nausea vomiting (x 8) containing bile.   In ED - BP elevated 195/86 + vomiting medications.    Exam: post nausea + vomiting - no abdominal distention or pain  CT A/P without contrast shows no acute disease.  L4 compression fracture age indeterminate  Labs: WBC 11,530  K+ 3.0  Ca+ 10.3  BUN/ CR 19/ 1.62     Placed in Observation 5/12/24 at  0220 2nd nausea and vomiting - questionable  etiology - infectious gastroenteritis, gastritis versus constipation also  hypertensive urgency, Hypokalemia -      Nausea & vomiting  Possibly secondary to viral gastroenteritis  S/p CT a/p preliminary results revealing no acute intra-abdominal process.  Follow-up official results  Serial trop neg  No focal deficits on exam  Check COVID  Clear liquid diet and advance as tolerated  Anti-emetic with IM tigan given qt prolongation     Hypertensive urgency  Likely secondary to having missed oral antihypertensive medications due to GI symptoms  Resumed back on chronic meds with the exception of torsemide given GI losses and on IV fluid hydration  Added as needed IV hydralazine      5/12/24 - DAY 2:  BP elevated to 199/84 - IV Hydralazine x 1  IVF continued   Diet as tolerated    GI CONSULT:  88  year old female with a past medical history of CKD stage III, hypertension, aortic valve stenosis, diabetes, anxiety, hyperlipidemia presents to the ER with 2 days of nausea vomiting (x8) containing bile.  Had hypertensive urgency on admission as was vomiting medications.  Labs show hypokalemia of 3.  Leukocytosis 12.  CT A/P without contrast shows no acute disease.  L4 compression fracture age indeterminate.  Personally reviewed moderate stool in colon.  Etiology of vomiting unclear.  Differential diagnosis includes infectious gastroenteritis, gastritis versus constipation or hypertensive urgency.     1.  Nausea vomiting (resolved)              -Advance diet as tolerated, IV fluids, antiemetics              -Continue Pepcid 20 mg p.o. daily              -If recurs consider EGD   2.  Hypertensive urgency - resolved, back on PO meds   3.  Constipation - start Colace 100 mg BID   4.  Hypokalemia, NELA - monitor and correct electrolytes and Cr   5.  Leukocytosis - trend    INPATIENT CONVERSION 5/12/24 AT 1417 2ND NAUSEA + VOMITING  WITH HYPOKALEMIA + ELEVATED BP - REQUIRING CONTINUED INPATIENT MANAGEMENT AFTER ED TX AND OBSERVATION  + > 2 MIDNITES - CONTINUED IVF, IV HYDRALAZINE PRN, DIET ADVANCEMENT     ED Triage Vitals   Temperature Pulse Respirations Blood Pressure SpO2   05/11/24 1914 05/11/24 1914 05/11/24 1914 05/11/24 1914 05/11/24 1914   97.5 °F (36.4 °C) 70 18 (!) 195/86 99 %      Temp Source Heart Rate Source Patient Position - Orthostatic VS BP Location FiO2 (%)   05/11/24 1914 05/11/24 1914 05/12/24 0044 05/11/24 1914 --   Oral Monitor Sitting Right arm       Wt Readings from Last 1 Encounters:   05/07/24 72.6 kg (160 lb)     Additional Vital Signs:   Date/Time Temp Pulse Resp BP MAP (mmHg) SpO2 Calculated FIO2 (%) - Nasal Cannula Nasal Cannula O2 Flow Rate (L/min) O2 Device Patient Position - Orthostatic VS   05/12/24 08:21:25 97.8 °F (36.6 °C) 75 16 159/59 92 88 % Abnormal  -- -- None (Room air) Lying   05/12/24 0603 98 °F (36.7 °C) 84 18 110/68 -- -- -- -- -- --   05/12/24 05:16:27 98 °F (36.7 °C) 84 18 110/68 82 94 % -- -- -- --   05/12/24 0514 -- -- -- -- -- -- 28 2 L/min Nasal cannula --   05/12/24 03:06:10 98.4 °F (36.9 °C) -- -- 186/81 Abnormal  116 -- -- -- -- --   05/12/24 0215 -- 71 20 187/79 Abnormal  114 95 % 28 2 L/min Nasal cannula --   05/12/24 0200 -- 69 19 191/80 Abnormal  115 98 % 28 2 L/min Nasal cannula --   05/12/24 0044 -- 67 18 199/84 Abnormal  -- 96 % -- -- Nasal cannula Sitting   05/11/24 2305 -- -- -- -- -- -- -- -- Nasal cannula --   05/11/24 2100 -- 68 14 190/118 Abnormal  150 96 % -- -- -- --   05/11/24 2029 -- 70 -- 198/86 Abnormal  124 100 % -- -- -- --   05/11/24 1914 97.5 °F (36.4 °C) 70 18 195/86 Abnormal  -- 99 % -- -- None (Room air) --      05/11 0701 05/12 0700 05/12 0701  -   P.O.  40   IV Piggyback 999    Total Intake 999 40   Net +999 +40          Pertinent Labs/Diagnostic Test Results:   CT abdomen pelvis wo contrast   Final Result by Cory Parker MD (05/12 1010)   1. No acute intra-abdominal pathology.   2. Moderate L4 compression deformity, age indeterminate. MR evaluation may be useful  if clinically warranted.     Results from last 7 days   Lab Units 05/12/24  0422   SARS-COV-2  Negative     Results from last 7 days   Lab Units 05/12/24  0413 05/11/24 1920   WBC Thousand/uL 11.96* 11.53*   HEMOGLOBIN g/dL 12.9 13.0   HEMATOCRIT % 41.7 41.8   PLATELETS Thousands/uL 175 161   TOTAL NEUT ABS Thousands/µL  --  5.64     Results from last 7 days   Lab Units 05/12/24  0738 05/12/24 0413 05/11/24 1920   SODIUM mmol/L  --  143 141   POTASSIUM mmol/L  --  3.0* 3.0*   CHLORIDE mmol/L  --  105 101   CO2 mmol/L  --  23 26   ANION GAP mmol/L  --  15* 14*   BUN mg/dL  --  19 19   CREATININE mg/dL  --  1.49* 1.62*   EGFR ml/min/1.73sq m  --  31 28   CALCIUM mg/dL  --  9.2 10.3*   MAGNESIUM mg/dL 2.2  --  2.2     Results from last 7 days   Lab Units 05/11/24 1920   AST U/L 21   ALT U/L 11   ALK PHOS U/L 55   TOTAL PROTEIN g/dL 7.4   ALBUMIN g/dL 4.4   TOTAL BILIRUBIN mg/dL 0.67     Results from last 7 days   Lab Units 05/12/24  1127 05/12/24  0639 05/12/24  0205   POC GLUCOSE mg/dl 137 134 91     Results from last 7 days   Lab Units 05/12/24  0413 05/11/24  1920   GLUCOSE RANDOM mg/dL 109 120     Results from last 7 days   Lab Units 05/12/24  0039 05/11/24  2114 05/11/24 1920   HS TNI 0HR ng/L  --   --  13   HS TNI 2HR ng/L  --  17  --    HSTNI D2 ng/L  --  4  --    HS TNI 4HR ng/L 18  --   --    HSTNI D4 ng/L 5  --   --      Results from last 7 days   Lab Units 05/11/24 1920   LIPASE u/L 52     Results from last 7 days   Lab Units 05/12/24  0017   CLARITY UA  Clear   COLOR UA  Yellow   SPEC GRAV UA  1.015   PH UA  7.0   GLUCOSE UA mg/dl 300 (3/10%)*   KETONES UA mg/dl 10 (1+)*   BLOOD UA  Trace*   PROTEIN UA mg/dl 100 (2+)*   NITRITE UA  Negative   BILIRUBIN UA  Negative   UROBILINOGEN UA (BE) mg/dl <2.0   LEUKOCYTES UA  Negative   WBC UA /hpf 1-2   RBC UA /hpf 1-2   BACTERIA UA /hpf None Seen   EPITHELIAL CELLS WET PREP /hpf Occasional     ED Treatment:   Medication Administration from 05/11/2024 1905 to  05/12/2024 0258         Date/Time Order Dose Route Action     05/11/2024 2122 EDT ondansetron (ZOFRAN) injection 4 mg 4 mg Intravenous Given     05/12/2024 0040 EDT sodium chloride 0.9 % bolus 1,000 mL 0 mL Intravenous Stopped     05/11/2024 2125 EDT sodium chloride 0.9 % bolus 1,000 mL 1,000 mL Intravenous New Bag     05/12/2024 0017 EDT ondansetron (ZOFRAN) injection 4 mg 4 mg Intravenous Given     05/12/2024 0149 EDT metoprolol (LOPRESSOR) injection 5 mg 5 mg Intravenous Given     05/12/2024 0152 EDT metoclopramide (REGLAN) injection 10 mg 10 mg Intravenous Given     05/12/2024 0247 EDT potassium chloride 20 mEq IVPB (premix) 20 mEq Intravenous Continue to Inpatient Floor     05/12/2024 0230 EDT potassium chloride 20 mEq IVPB (premix) -- Intravenous Rate/Dose Change     05/12/2024 0222 EDT potassium chloride 20 mEq IVPB (premix) 20 mEq Intravenous New Bag       Past Medical History:   Diagnosis Date    Anxiety     Aortic valve insufficiency     Arthritis     Cataract of both eyes     Chronic kidney disease     Diabetes mellitus (HCC)     Dysuria     last assessed - [id number]    Edema     last assessed - 05Jun2015    GERD (gastroesophageal reflux disease)     last assessed - 30Aug2012    Hyperlipidemia     Hypertension     Low back pain     last assessed - [id number]    Mitral valve disorder     Osteoporosis     last assessed - [id number]    Palpitations      Present on Admission:   Type 2 diabetes mellitus with stage 4 chronic kidney disease, without long-term current use of insulin ([facility])   Hypokalemia   Stage 3b chronic kidney disease (CKD) ([facility])   Hypercalcemia    Admitting Diagnosis: Hypokalemia [E87.6]  Vomiting [R11.10]  Nausea and vomiting [R11.2]  Accelerated hypertension [I10]  Chronic kidney disease [N18.9]    Age/Sex: 88 y.o. female    Admission Orders:  Scheduled Medications:  aspirin, 81 mg, Oral, Daily  Cholecalciferol, 2,000 Units, Oral, Daily  cloNIDine, 1 patch, Transdermal, Weekly  docusate sodium,  100 mg, Oral, BID  famotidine, 20 mg, Oral, Daily  [START ON 5/13/2024] ferrous sulfate, 325 mg, Oral, Once per day on Monday Wednesday Friday  heparin (porcine), 5,000 Units, Subcutaneous, Q8H JONO  insulin lispro, 1-5 Units, Subcutaneous, HS  insulin lispro, 2-12 Units, Subcutaneous, TID AC  nebivolol, 20 mg, Oral, Daily  NIFEdipine, 90 mg, Oral, Daily  pravastatin, 20 mg, Oral, Daily With Dinner    Continuous IV Infusions:  IVF sodium chloride, 100 mL/hr, Intravenous, Continuous    PRN Meds:  acetaminophen, 650 mg, Oral, Q6H PRN  hydrALAZINE, 10 mg, Intravenous, Q6H PRN SBP > 180 - 5/12 X 1 @ 0325   Trimethobenzamide (TIGAN) 200 mg, Intramuscular, Q6H PRN - 5/12 X 1 @ 0630    IP CONSULT TO GASTROENTEROLOGY    Network Utilization Review Department  ATTENTION: Please call with any questions or concerns to [phone] and carefully listen to the prompts so that you are directed to the right person. All voicemails are confidential.   For Discharge needs, contact Care Management DC Support Team at [phone] opt. [phone]  Send all requests for admission clinical reviews, approved or denied determinations and any other requests to dedicated fax number below belonging to the campus where the patient is receiving treatment. List of dedicated fax numbers for the Facilities:  FACILITY NAME UR FAX NUMBER   ADMISSION DENIALS (Administrative/Medical Necessity) [phone]   DISCHARGE SUPPORT TEAM (NETWORK) [phone]   PARENT CHILD HEALTH (Maternity/NICU/Pediatrics) [phone]   [facility] [phone]   [facility] [phone]   [facility] [phone]   [facility] [phone]   [facility] [phone]   [facility] [phone]   [facility] [phone]   [facility] [phone]   Critical access hospital -  [facility] [phone]   [facility] [phone]   [facility] [phone]   [place] [facility] [phone]

## 2024-05-12 NOTE — PLAN OF CARE
Problem: PAIN - ADULT  Goal: Verbalizes/displays adequate comfort level or baseline comfort level  Description: Interventions:  - Encourage patient to monitor pain and request assistance  - Assess pain using appropriate pain scale  - Administer analgesics based on type and severity of pain and evaluate response  - Implement non-pharmacological measures as appropriate and evaluate response  - Consider cultural and social influences on pain and pain management  - Notify physician/advanced practitioner if interventions unsuccessful or patient reports new pain  Outcome: Progressing     Problem: INFECTION - ADULT  Goal: Absence or prevention of progression during hospitalization  Description: INTERVENTIONS:  - Assess and monitor for signs and symptoms of infection  - Monitor lab/diagnostic results  - Monitor all insertion sites, i.e. indwelling lines, tubes, and drains  - Monitor endotracheal if appropriate and nasal secretions for changes in amount and color  - [place] appropriate cooling/warming therapies per order  - Administer medications as ordered  - Instruct and encourage patient and family to use good hand hygiene technique  - Identify and instruct in appropriate isolation precautions for identified infection/condition  Outcome: Progressing  Goal: Absence of fever/infection during neutropenic period  Description: INTERVENTIONS:  - Monitor WBC    Outcome: Progressing     Problem: SAFETY ADULT  Goal: Patient will remain free of falls  Description: INTERVENTIONS:  - Educate patient/family on patient safety including physical limitations  - Instruct patient to call for assistance with activity   - Consult OT/PT to assist with strengthening/mobility   - Keep Call bell within reach  - Keep bed low and locked with side rails adjusted as appropriate  - Keep care items and personal belongings within reach  - Initiate and maintain comfort rounds  - Make Fall Risk Sign visible to staff  - Offer Toileting every 2 Hours,  in advance of need  - Initiate/Maintain bed alarm  - Obtain necessary fall risk management equipment: socks  - Apply yellow socks and bracelet for high fall risk patients  - Consider moving patient to room near nurses station  Outcome: Progressing  Goal: Maintain or return to baseline ADL function  Description: INTERVENTIONS:  -  Assess patient's ability to carry out ADLs; assess patient's baseline for ADL function and identify physical deficits which impact ability to perform ADLs (bathing, care of mouth/teeth, toileting, grooming, dressing, etc.)  - Assess/evaluate cause of self-care deficits   - Assess range of motion  - Assess patient's mobility; develop plan if impaired  - Assess patient's need for assistive devices and provide as appropriate  - Encourage maximum independence but intervene and supervise when necessary  - Involve family in performance of ADLs  - Assess for home care needs following discharge   - Consider OT consult to assist with ADL evaluation and planning for discharge  - Provide patient education as appropriate  Outcome: Progressing  Goal: Maintains/Returns to pre admission functional level  Description: INTERVENTIONS:  - Perform AM-PAC 6 Click Basic Mobility/ Daily Activity assessment daily.  - Set and communicate daily mobility goal to care team and patient/family/caregiver.   - Collaborate with rehabilitation services on mobility goals if consulted  - Perform Range of Motion 3 times a day.  - Reposition patient every 2 hours.  - Dangle patient 3 times a day  - Stand patient 3 times a day  - Ambulate patient 3 times a day  - Out of bed to chair 3 times a day   - Out of bed for meals 3 times a day  - Out of bed for toileting  - Record patient progress and toleration of activity level   Outcome: Progressing     Problem: DISCHARGE PLANNING  Goal: Discharge to home or other facility with appropriate resources  Description: INTERVENTIONS:  - Identify barriers to discharge w/patient and  caregiver  - Arrange for needed discharge resources and transportation as appropriate  - Identify discharge learning needs (meds, wound care, etc.)  - Arrange for interpretive services to assist at discharge as needed  - Refer to Case Management Department for coordinating discharge planning if the patient needs post-hospital services based on physician/advanced practitioner order or complex needs related to functional status, cognitive ability, or social support system  Outcome: Progressing     Problem: Knowledge Deficit  Goal: Patient/family/caregiver demonstrates understanding of disease process, treatment plan, medications, and discharge instructions  Description: Complete learning assessment and assess knowledge base.  Interventions:  - Provide teaching at level of understanding  - Provide teaching via preferred learning methods  Outcome: Progressing     Problem: GASTROINTESTINAL - ADULT  Goal: Minimal or absence of nausea and/or vomiting  Description: INTERVENTIONS:  - Administer IV fluids if ordered to ensure adequate hydration  - Maintain NPO status until nausea and vomiting are resolved  - Nasogastric tube if ordered  - Administer ordered antiemetic medications as needed  - Provide nonpharmacologic comfort measures as appropriate  - Advance diet as tolerated, if ordered  - Consider nutrition services referral to assist patient with adequate nutrition and appropriate food choices  Outcome: Progressing  Goal: Maintains or returns to baseline bowel function  Description: INTERVENTIONS:  - Assess bowel function  - Encourage oral fluids to ensure adequate hydration  - Administer IV fluids if ordered to ensure adequate hydration  - Administer ordered medications as needed  - Encourage mobilization and activity  - Consider nutritional services referral to assist patient with adequate nutrition and appropriate food choices  Outcome: Progressing  Goal: Maintains adequate nutritional intake  Description:  INTERVENTIONS:  - Monitor percentage of each meal consumed  - Identify factors contributing to decreased intake, treat as appropriate  - Assist with meals as needed  - Monitor I&O, weight, and lab values if indicated  - Obtain nutrition services referral as needed  Outcome: Progressing  Goal: Establish and maintain optimal ostomy function  Description: INTERVENTIONS:  - Assess bowel function  - Encourage oral fluids to ensure adequate hydration  - Administer IV fluids if ordered to ensure adequate hydration   - Administer ordered medications as needed  - Encourage mobilization and activity  - Nutrition services referral to assist patient with appropriate food choices  - Assess stoma site  - Consider wound care consult   Outcome: Progressing  Goal: Oral mucous membranes remain intact  Description: INTERVENTIONS  - Assess oral mucosa and hygiene practices  - Implement preventative oral hygiene regimen  - Implement oral medicated treatments as ordered  - Initiate Nutrition services referral as needed  Outcome: Progressing     Problem: MUSCULOSKELETAL - ADULT  Goal: Maintain or return mobility to safest level of function  Description: INTERVENTIONS:  - Assess patient's ability to carry out ADLs; assess patient's baseline for ADL function and identify physical deficits which impact ability to perform ADLs (bathing, care of mouth/teeth, toileting, grooming, dressing, etc.)  - Assess/evaluate cause of self-care deficits   - Assess range of motion  - Assess patient's mobility  - Assess patient's need for assistive devices and provide as appropriate  - Encourage maximum independence but intervene and supervise when necessary  - Involve family in performance of ADLs  - Assess for home care needs following discharge   - Consider OT consult to assist with ADL evaluation and planning for discharge  - Provide patient education as appropriate  Outcome: Progressing  Goal: Maintain proper alignment of affected body  part  Description: INTERVENTIONS:  - Support, maintain and protect limb and body alignment  - Provide patient/ family with appropriate education  Outcome: Progressing     Problem: Nutrition/Hydration-ADULT  Goal: Nutrient/Hydration intake appropriate for improving, restoring or maintaining nutritional needs  Description: Monitor and assess patient's nutrition/hydration status for malnutrition. Collaborate with interdisciplinary team and initiate plan and interventions as ordered.  Monitor patient's weight and dietary intake as ordered or per policy. Utilize nutrition screening tool and intervene as necessary. Determine patient's food preferences and provide high-protein, high-caloric foods as appropriate.     INTERVENTIONS:  - Monitor oral intake, urinary output, labs, and treatment plans  - Assess nutrition and hydration status and recommend course of action  - Evaluate amount of meals eaten  - Assist patient with eating if necessary   - Allow adequate time for meals  - Recommend/ encourage appropriate diets, oral nutritional supplements, and vitamin/mineral supplements  - Order, calculate, and assess calorie counts as needed  - Recommend, monitor, and adjust tube feedings and TPN/PPN based on assessed needs  - Assess need for intravenous fluids  - Provide specific nutrition/hydration education as appropriate  - Include patient/family/caregiver in decisions related to nutrition  Outcome: Progressing

## 2024-05-12 NOTE — ASSESSMENT & PLAN NOTE
Lab Results   Component Value Date    EGFR 28 05/11/2024    EGFR 25 (L) 04/22/2024    EGFR 26 (L) 04/22/2024    CREATININE 1.62 (H) 05/11/2024    CREATININE 2.05 05/03/2024    CREATININE 1.89 (H) 04/22/2024   Creatinine within baseline range  Continue to monitor

## 2024-05-12 NOTE — ED CARE HANDOFF
Emergency Department Sign Out Note        Sign out and transfer of care from Dr. Reilly. See Separate Emergency Department note.     The patient, Ena Ahumada, was evaluated by the previous provider for nausea, vomiting for 2 days, hypertension, inability to tolerate oral medications.    Workup Completed:  Examined, labs reviewed, Reglan IV given twice.  EKG was done.  CT abdomen pelvis was done.  Waiting for result.    ED Course / Workup Pending (followup):  Patient states she is no better than when she came in.  She has not had her antihypertension medications for 2 days except for her clonidine patch which is changed every Wednesday.  Denies headache, diplopia, vertigo, dyspnea, chest pain, palpitations, focal neurologic changes.  Patient has elevated troponin at 13.  2-hour troponin 17 and 4-hour 18.  This is likely due to systolic blood pressure in 190's range.  Patient is unable to be discharged at this time.  We will treat her for her hypokalemia, accelerated hypertension, nausea.  CT scan reveals no acute abnormality.      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  Patient confirms these are all chronic conditions.  There were no other acute abnormalities.     Procedures  Medical Decision Making  Amount and/or Complexity of Data Reviewed  Labs: ordered.  Radiology: ordered.    Risk  Prescription drug management.  Decision regarding hospitalization.            Disposition  Final diagnoses:    Nausea and vomiting   Hypokalemia   Chronic kidney disease   Accelerated hypertension     Time reflects when diagnosis was documented in both MDM as applicable and the Disposition within this note       Time User Action Codes Description Comment    5/12/2024  2:18 AM Kj Hernandez Add [R11.2] Nausea and vomiting     5/12/2024  2:18 AM Kj Hernandez J Add [I10] Accelerated hypertension     5/12/2024  2:18 AM Kj Hernandez Add [E87.6] Hypokalemia     5/12/2024  2:18 AM Kj Hernandez J Add [N18.9] Chronic kidney disease     5/12/2024  2:19 AM Kj Hernandez J Remove [I10] Accelerated hypertension     5/12/2024  2:19 AM Kj Hernandez J Add [I10] Accelerated hypertension           ED Disposition       ED Disposition   Admit    Condition   Stable    Date/Time   Sun May 12, 2024 0218    Comment   Case was discussed with Dr. Ramirez and the patient's admission status was agreed to be Admission Status: observation status to the service of Dr. Ramirez .               Follow-up Information    None       Current Discharge Medication List        CONTINUE these medications which have NOT CHANGED    Details   !! ACCU-CHEK PAUL PLUS test strip       !! Accu-Chek Guide test strip USE TO TEST TWICE A DAY  Qty: 100 strip, Refills: 3    Associated Diagnoses: Type 2 diabetes mellitus with stage 3b chronic kidney disease, without long-term current use of insulin (HCC)      !! Accu-Chek Softclix Lancets lancets       !! Accu-Chek Softclix Lancets lancets USE AS INSTRUCTED TWICE DAILY  Qty: 100 each, Refills: 3    Associated Diagnoses: Type 2 diabetes mellitus with stage 3 chronic kidney disease, without long-term current use of insulin (HCC)      aspirin 81 MG tablet Take 1 tablet by mouth daily      Blood Glucose Monitoring Suppl (OneTouch Verio Reflect) w/Device KIT Check blood sugars twice daily. Please substitute with appropriate alternative as covered by patient's insurance. Dx: E11.65  Qty: 1 kit, Refills: 0     Comments: Please substitute with appropriate alternative as covered by patient's insurance  Associated Diagnoses: Type 2 diabetes mellitus with stage 4 chronic kidney disease, without long-term current use of insulin (HCC)      cholecalciferol (VITAMIN D3) 1,000 units tablet Take 2 tablets by mouth daily      cloNIDine (CATAPRES-TTS-1) 0.1 mg/24 hr PLACE 1 PATCH ON THE SKIN ONCE A WEEK AS DIRECTED  Qty: 12 patch, Refills: 4    Associated Diagnoses: Essential hypertension      Denta 5000 Plus 1.1 % CREA USE TWICE A DAY -SPIT, DONT RINSE AND NOTHING BY MOUTH X 30 MIN      famotidine (PEPCID) 20 mg tablet TAKE 1 TABLET BY MOUTH EVERY DAY  Qty: 90 tablet, Refills: 1    Associated Diagnoses: Gastroesophageal reflux disease with esophagitis      ferrous sulfate 325 (65 Fe) mg tablet Take 1 tablet (325 mg total) by mouth 3 (three) times a week  Qty: 12 tablet, Refills: 0    Associated Diagnoses: Iron deficiency anemia, unspecified iron deficiency anemia type      glipiZIDE (GLUCOTROL) 10 mg tablet TAKE 1 TABLET BY MOUTH IN THE MORNING THEN 2 TABS WITH DINNER  Qty: 270 tablet, Refills: 3    Associated Diagnoses: Essential hypertension; Type 2 diabetes mellitus with stage 3 chronic kidney disease, without long-term current use of insulin ([facility])      !! glucose blood (Accu-Chek Emma Plus) test strip USE AS INSTRUCTED TWICE DAILY  Qty: 100 each, Refills: 7    Associated Diagnoses: Type 2 diabetes mellitus with stage 3 chronic kidney disease, without long-term current use of insulin ([facility])      !! glucose blood (OneTouch Verio) test strip Check blood sugars twice daily. Please substitute with appropriate alternative as covered by patient's insurance. Dx: E11.65  Qty: 200 each, Refills: 3    Comments: Please substitute with appropriate alternative as covered by patient's insurance  Associated Diagnoses: Type 2 diabetes mellitus with stage 4 chronic kidney disease, without long-term current use of insulin ([facility])      Insulin  Glargine-yfgn 100 UNIT/ML SOPN INJECT 0.05 ML (5 UNITS TOTAL) UNDER THE SKIN IN THE MORNING      Insulin Pen Needle (BD Pen Needle Kathy U/F) 32G X 4 MM MISC Use daily  Qty: 100 each, Refills: 3    Associated Diagnoses: Type 2 diabetes mellitus with hyperglycemia, with long-term current use of insulin ([facility])      Invokana 100 MG TAKE 1 TABLET BY MOUTH EVERY DAY BEFORE BREAKFAST  Qty: 30 tablet, Refills: 5    Associated Diagnoses: Type 2 diabetes mellitus with stage 4 chronic kidney disease, without long-term current use of insulin ([facility])      nebivolol (BYSTOLIC) 20 MG tablet TAKE 1 TABLET BY MOUTH EVERY DAY  Qty: 90 tablet, Refills: 6    Associated Diagnoses: Essential hypertension      NIFEdipine (ADALAT CC) 90 mg 24 hr tablet TAKE 1 TABLET BY MOUTH EVERY DAY  Qty: 90 tablet, Refills: 3    Associated Diagnoses: Essential hypertension      !! OneTouch Delica Lancets 33G MISC Check blood sugars twice daily. Please substitute with appropriate alternative as covered by patient's insurance. Dx: E11.65  Qty: 200 each, Refills: 3    Comments: Please substitute with appropriate alternative as covered by patient's insurance  Associated Diagnoses: Type 2 diabetes mellitus with stage 4 chronic kidney disease, without long-term current use of insulin ([facility])      oxygen gas Inhale 2.5 L/min continuous VIA NASAL CANULA      sertraline (ZOLOFT) 100 mg tablet TAKE 1 TABLET BY MOUTH EVERY DAY  Qty: 90 tablet, Refills: 2    Associated Diagnoses: Anxiety      simvastatin (ZOCOR) 20 mg tablet TAKE 1 TABLET BY MOUTH EVERYDAY AT BEDTIME  Qty: 90 tablet, Refills: 2    Associated Diagnoses: Dyslipidemia      sitaGLIPtin (Januvia) 50 mg tablet TAKE 1 TABLET BY MOUTH EVERY DAY  Qty: 30 tablet, Refills: 5    Associated Diagnoses: Type 2 diabetes mellitus with stage 3 chronic kidney disease, without long-term current use of insulin ([facility])      torsemide (DEMADEX) 20 mg tablet TAKE 1 TABLET BY MOUTH EVERY DAY  Qty: 90 tablet, Refills: 0     Associated Diagnoses: Dependent edema       !! - Potential duplicate medications found. Please discuss with provider.        No discharge procedures on file.       ED Provider  Electronically Signed by     Kj Hernandez DO  05/12/24 [id number]

## 2024-05-12 NOTE — ASSESSMENT & PLAN NOTE
Likely secondary to having missed oral antihypertensive medications due to GI symptoms  Resumed back on chronic meds with the exception of torsemide given GI losses and on IV fluid hydration  Added as needed IV hydralazine

## 2024-05-12 NOTE — ASSESSMENT & PLAN NOTE
Lab Results   Component Value Date    HGBA1C 8.0 (H) 04/22/2024       Recent Labs     05/12/24  0205   POCGLU 91       Blood Sugar Average: Last 72 hrs:  (P) 91  Holding lantus given on clears  Place on ISS  Advance to outpt regimen upon advancement of diet

## 2024-05-12 NOTE — ASSESSMENT & PLAN NOTE
Likely secondary to hypokalemia  Telemetry monitoring  Repeat EKG in the am  Repleted potassium accordingly  Avoid QT prolongation agents

## 2024-05-12 NOTE — CONSULTS
Consultation - [facility] Gastroenterology     Ena Ahumada 88 y.o. female MRN: [id number]  Unit/Bed#: -01 Encounter: [id number]    Inpatient consult to gastroenterology  Consult performed by: Sarah Eid PA-C  Consult ordered by: Lester Madrigal MD          ASSESSMENT and PLAN  Ena Ahumada is a 88 y.o. year old female with a past medical history of CKD stage III, hypertension, aortic valve stenosis, diabetes, anxiety, hyperlipidemia presents to the ER with 2 days of nausea vomiting (x8) containing bile. Patient already feeling better and tolerating clear liquids. Had hypertensive urgency on admission as was vomiting medications.  Labs show hypokalemia of 3.  Leukocytosis 12.  CT A/P without contrast shows no acute disease.  L4 compression fracture age indeterminate.  Personally reviewed moderate stool in colon.  Etiology of vomiting unclear.  Differential diagnosis includes infectious gastroenteritis, gastritis versus constipation or hypertensive urgency.    1.  Nausea vomiting (resolved)   -Advance diet as tolerated, IV fluids, antiemetics   -Continue Pepcid 20 mg p.o. daily   -If recurs consider EGD    2.  Hypertensive urgency   -resolved, back on PO meds    3.  Constipation   -start Colace 100 mg BID    4.  Hypokalemia, NELA   -monitor and correct electrolytes and Cr    5.  Leukocytosis   -trend    Chief Complaint   Patient presents with    Vomiting     Since 1630 yesterday, no blood, denies fevers/chest pain.  PCP told pt to go to ER.  Able to drink a cup of tea and 16oz. Bottle of water today.        Physician Requesting Consult: Lester Madrigal MD    HPI    Ena Ahumada is a 88 y.o. year old female with a past medical history of CKD stage III, hypertension, aortic valve stenosis, diabetes, anxiety, hyperlipidemia presents to the ER with 2 days of nausea vomiting (x8) containing bile, no bloody emesis, last episode prior to admission. atient already feeling better and  tolerating clear liquids. She gets rare reflux improved with Pepcid as needed.  Denies NSAID use.  Last bowel movement 3 days ago normally has bowel meant every other day and occasional constipation improved with Colace as needed.  Notes poor appetite but weight stable.  Denies recent sick contacts, antibiotic use, travel overseas, suspicious food intake, migraines or vertigo.  She has never had endoscopy or colonoscopy.      Had hypertensive urgency on admission as was vomiting medications.  Labs show hypokalemia of 3.  Leukocytosis 12.  CT A/P without contrast shows no acute disease.  L4 compression fracture age indeterminate.  Personally reviewed moderate stool in colon.     ROS:   Constitutional: denies fatigue, fever.  HEENT: denies visual disturbance, postnasal drip, sore throat.  Respiratory: denies cough, shortness of breath.  Cardiovascular: denies chest pain, leg swelling.  Gastrointestinal: as noted above in HPI.  : denies difficulty urinating, dysuria.  Musculoskeletal: denies arthralgias, back pain.  Neurological: denies dizziness, syncope.  Psychiatric: denies confusion, anxiety.    Historical Information   Past Medical History:   Diagnosis Date    Anxiety     Aortic valve insufficiency     Arthritis     Cataract of both eyes     Chronic kidney disease     Diabetes mellitus (HCC)     Dysuria     last assessed - [id number]    Edema     last assessed - [id number]    GERD (gastroesophageal reflux disease)     last assessed - [id number]    Hyperlipidemia     Hypertension     Low back pain     last assessed - [id number]    Mitral valve disorder     Osteoporosis     last assessed - [id number]    Palpitations      Past Surgical History:   Procedure Laterality Date    APPENDECTOMY      CATARACT EXTRACTION Bilateral     COLONOSCOPY      HIP SURGERY      TUBAL LIGATION Bilateral      Social History   Social History     Substance and Sexual Activity   Alcohol Use Never     Social History     Substance and Sexual  Activity   Drug Use Never     Social History     Tobacco Use   Smoking Status Never   Smokeless Tobacco Never     Family History   Problem Relation Age of Onset    Kidney disease Mother         Chronic    No Known Problems Father     Breast cancer Sister     Diabetes Sister     Cancer Sister     Breast cancer Sister     Hypertension Sister     No Known Problems Daughter     No Known Problems Son     No Known Problems Son     No Known Problems Son     No Known Problems Son        Meds/Allergies     Current Facility-Administered Medications   Medication Dose Route Frequency    acetaminophen (TYLENOL) tablet 650 mg  650 mg Oral Q6H PRN    aspirin (ECOTRIN LOW STRENGTH) EC tablet 81 mg  81 mg Oral Daily    Cholecalciferol (VITAMIN D3) tablet 2,000 Units  2,000 Units Oral Daily    cloNIDine (CATAPRES-TTS-1) 0.1 mg/24 hr TD weekly patch  1 patch Transdermal Weekly    docusate sodium (COLACE) capsule 100 mg  100 mg Oral BID    famotidine (PEPCID) tablet 20 mg  20 mg Oral Daily    [START ON 5/13/2024] ferrous sulfate tablet 325 mg  325 mg Oral Once per day on Monday Wednesday Friday    heparin (porcine) subcutaneous injection 5,000 Units  5,000 Units Subcutaneous Q8H JONO    hydrALAZINE (APRESOLINE) injection 10 mg  10 mg Intravenous Q6H PRN    insulin lispro (HumALOG/ADMELOG) 100 units/mL subcutaneous injection 1-5 Units  1-5 Units Subcutaneous HS    insulin lispro (HumALOG/ADMELOG) 100 units/mL subcutaneous injection 2-12 Units  2-12 Units Subcutaneous TID AC    nebivolol (BYSTOLIC) tablet 20 mg  20 mg Oral Daily    NIFEdipine (PROCARDIA XL) 24 hr tablet 90 mg  90 mg Oral Daily    pravastatin (PRAVACHOL) tablet 20 mg  20 mg Oral Daily With Dinner    sodium chloride 0.9 % infusion  100 mL/hr Intravenous Continuous    trimethobenzamide (TIGAN) IM injection 200 mg  200 mg Intramuscular Q6H PRN     Medications Prior to Admission   Medication    ACCU-CHEK PAUL PLUS test strip    Accu-Chek Guide test strip    Accu-Chek Softclix  Lancets lancets    Accu-Chek Softclix Lancets lancets    aspirin 81 MG tablet    Blood Glucose Monitoring Suppl (OneTouch Verio Reflect) w/Device KIT    cholecalciferol (VITAMIN D3) 1,000 units tablet    cloNIDine (CATAPRES-TTS-1) 0.1 mg/24 hr    Denta 5000 Plus 1.1 % CREA    famotidine (PEPCID) 20 mg tablet    ferrous sulfate 325 (65 Fe) mg tablet    glipiZIDE (GLUCOTROL) 10 mg tablet    glucose blood (Accu-Chek Emma Plus) test strip    glucose blood (OneTouch Verio) test strip    Insulin Glargine-yfgn 100 UNIT/ML SOPN    Insulin Pen Needle (BD Pen Needle Kathy U/F) 32G X 4 MM MISC    Invokana 100 MG    nebivolol (BYSTOLIC) 20 MG tablet    NIFEdipine (ADALAT CC) 90 mg 24 hr tablet    OneTouch Delica Lancets 33G MISC    oxygen gas    sertraline (ZOLOFT) 100 mg tablet    simvastatin (ZOCOR) 20 mg tablet    sitaGLIPtin (Januvia) 50 mg tablet    torsemide (DEMADEX) 20 mg tablet       No Known Allergies    PHYSICAL EXAM:    Constitutional: Well-developed, no acute distress, elderly  HEENT: normocephalic, mucous membranes moist.  Neck: Supple  Skin: warm and dry  Respiratory: Lungs are clear to auscultation B/L.  Cardiovascular: Heart is regular rate and rhythm.  Gastrointestinal: Soft, nontender, mild distention with normal active bowel sounds.  No masses, guarding, rebound.   Rectal Exam: Deferred.  Extremities: No edema.  Neurologic: Nonfocal. A & O ×3.   Psychiatric: Normal affect.      Lab Results   Component Value Date    GLUCOSE 264 (H) 03/16/2024    CALCIUM 9.2 05/12/2024     01/02/2018    K 3.0 (L) 05/12/2024    CO2 23 05/12/2024     05/12/2024    BUN 19 05/12/2024    CREATININE 1.49 (H) 05/12/2024    CREATININE 1.62 (H) 05/11/2024    CREATININE 2.05 05/03/2024     Lab Results   Component Value Date    WBC 11.96 (H) 05/12/2024    WBC 11.53 (H) 05/11/2024    WBC 7.8 04/22/2024    HGB 12.9 05/12/2024    HGB 13.0 05/11/2024    HGB 12.5 04/22/2024    MCV 95 05/12/2024     05/12/2024      "05/11/2024     04/22/2024     Lab Results   Component Value Date    ALT 11 05/11/2024    ALT 14 04/22/2024    ALT 18 04/03/2024    AST 21 05/11/2024    AST 22 04/22/2024    AST 22 04/03/2024    ALKPHOS 55 05/11/2024    ALKPHOS 68 04/02/2024    ALKPHOS 58 03/21/2024    TBILI 0.67 05/11/2024    TBILI 0.2 04/22/2024    TBILI 0.3 04/03/2024     No results found for: \"AMYLASE\"  Lab Results   Component Value Date    LIPASE 52 05/11/2024     Lab Results   Component Value Date    IRON 17 (L) 03/20/2024    TIBC 179 (L) 03/20/2024    FERRITIN 315 (H) 03/20/2024     Lab Results   Component Value Date    INR 1.07 03/16/2024       CT abdomen pelvis wo contrast    Result Date: 5/12/2024  Narrative: CT ABDOMEN AND PELVIS WITHOUT IV CONTRAST INDICATION: vomiting, SBO?. COMPARISON: CT chest 3/18/2024, CT pelvis 10/29/2022 TECHNIQUE: CT examination of the abdomen and pelvis was performed without intravenous contrast. Multiplanar 2D reformatted images were created from the source data. This examination, like all CT scans performed in the [facility], was performed utilizing techniques to minimize radiation dose exposure, including the use of iterative reconstruction and automated exposure control. Radiation dose length product (DLP) for this visit: 628.89 mGy-cm Enteric Contrast: Not administered. FINDINGS: ABDOMEN LOWER CHEST: No clinically significant abnormality in the visualized lower chest. LIVER/BILIARY TREE: Unremarkable. GALLBLADDER: No calcified gallstones. No pericholecystic inflammatory change. SPLEEN: Unremarkable. PANCREAS: Unremarkable. ADRENAL GLANDS: Unremarkable. KIDNEYS/URETERS: Left lower renal cyst and probable hyperdense cyst in the left mid kidney. No hydronephrosis. STOMACH AND BOWEL: Colonic diverticulosis without findings of acute diverticulitis. Small hiatal hernia. APPENDIX: No findings to suggest appendicitis. ABDOMINOPELVIC CAVITY: No ascites. No pneumoperitoneum. No lymphadenopathy. " VESSELS: Atherosclerosis without abdominal aortic aneurysm. PELVIS REPRODUCTIVE ORGANS: Unremarkable for patient's age. URINARY BLADDER: Unremarkable. ABDOMINAL WALL/INGUINAL REGIONS: Small fat-containing umbilical hernia. BONES: Moderate compression deformity of L4, age indeterminate. Multilevel disc bulging/herniation in the lower lumbar spine, including the L3-4 level with left neuroforaminal stenosis.     Impression: 1. No acute intra-abdominal pathology. 2. Moderate L4 compression deformity, age indeterminate. MR evaluation may be useful if clinically warranted. Findings are consistent with the preliminary report from [facility] which was provided shortly after completion of the exam. Workstation performed: [id number]       Imaging Studies: I have personally reviewed pertinent reports.      Pathology, and Other Studies: I have personally reviewed pertinent reports.      Patient expressed understanding and had all questions and concerns addressed.    Sarah Eid PA-C  05/12/24   10:33 AM     Counseling / Coordination of Care  Total floor / unit time spent today 45 minutes.     This chart was completed in part utilizing [employer] speech voice recognition software. Random word insertions, pronoun errors, and incomplete sentences are an occasional consequence of this system due to software limitations, and ambient noise. Any questions or concerns about the content, text, or information contained within the body of this dictation should be directly addressed to the provider for clarification.

## 2024-05-12 NOTE — ASSESSMENT & PLAN NOTE
Initiated during last admission in March secondary to pneumonia, is in the process of being weaned off.  At baseline on 2 L as needed

## 2024-05-12 NOTE — PLAN OF CARE
Problem: SAFETY ADULT  Goal: Patient will remain free of falls  Description: INTERVENTIONS:  - Educate patient/family on patient safety including physical limitations  - Instruct patient to call for assistance with activity   - Consult OT/PT to assist with strengthening/mobility   - Keep Call bell within reach  - Keep bed low and locked with side rails adjusted as appropriate  - Keep care items and personal belongings within reach  - Initiate and maintain comfort rounds  - Make Fall Risk Sign visible to staff  - Offer Toileting every 2 Hours, in advance of need  - Initiate/Maintain bed alarm  - Obtain necessary fall risk management equipment: socks  - Apply yellow socks and bracelet for high fall risk patients  - Consider moving patient to room near nurses station  Outcome: Progressing     Problem: SAFETY ADULT  Goal: Maintain or return to baseline ADL function  Description: INTERVENTIONS:  -  Assess patient's ability to carry out ADLs; assess patient's baseline for ADL function and identify physical deficits which impact ability to perform ADLs (bathing, care of mouth/teeth, toileting, grooming, dressing, etc.)  - Assess/evaluate cause of self-care deficits   - Assess range of motion  - Assess patient's mobility; develop plan if impaired  - Assess patient's need for assistive devices and provide as appropriate  - Encourage maximum independence but intervene and supervise when necessary  - Involve family in performance of ADLs  - Assess for home care needs following discharge   - Consider OT consult to assist with ADL evaluation and planning for discharge  - Provide patient education as appropriate  Outcome: Progressing     Problem: SAFETY ADULT  Goal: Maintains/Returns to pre admission functional level  Description: INTERVENTIONS:  - Perform AM-PAC 6 Click Basic Mobility/ Daily Activity assessment daily.  - Set and communicate daily mobility goal to care team and patient/family/caregiver.   - Collaborate with  rehabilitation services on mobility goals if consulted  - Perform Range of Motion 3 times a day.  - Reposition patient every 2 hours.  - Dangle patient 3 times a day  - Stand patient 3 times a day  - Ambulate patient 3 times a day  - Out of bed to chair 3 times a day   - Out of bed for meals 3 times a day  - Out of bed for toileting  - Record patient progress and toleration of activity level   Outcome: Progressing     Problem: Knowledge Deficit  Goal: Patient/family/caregiver demonstrates understanding of disease process, treatment plan, medications, and discharge instructions  Description: Complete learning assessment and assess knowledge base.  Interventions:  - Provide teaching at level of understanding  - Provide teaching via preferred learning methods  Outcome: Progressing

## 2024-05-12 NOTE — ASSESSMENT & PLAN NOTE
Possibly secondary to viral gastroenteritis  S/p CT a/p preliminary results revealing no acute intra-abdominal process.  Follow-up official results  Serial trop neg  No focal deficits on exam  Check COVID  Clear liquid diet and advance as tolerated  Anti-emetic with IM tigan given qt prolongation

## 2024-05-12 NOTE — ED NOTES
Patient and family updated regarding delayed CT read. Ct tech verified the scan is in the que to be read.     Andreea Melchor RN  05/12/24 0122

## 2024-05-12 NOTE — PLAN OF CARE
Problem: PAIN - ADULT  Goal: Verbalizes/displays adequate comfort level or baseline comfort level  Description: Interventions:  - Encourage patient to monitor pain and request assistance  - Assess pain using appropriate pain scale  - Administer analgesics based on type and severity of pain and evaluate response  - Implement non-pharmacological measures as appropriate and evaluate response  - Consider cultural and social influences on pain and pain management  - Notify physician/advanced practitioner if interventions unsuccessful or patient reports new pain  Outcome: Progressing     Problem: INFECTION - ADULT  Goal: Absence or prevention of progression during hospitalization  Description: INTERVENTIONS:  - Assess and monitor for signs and symptoms of infection  - Monitor lab/diagnostic results  - Monitor all insertion sites, i.e. indwelling lines, tubes, and drains  - Monitor endotracheal if appropriate and nasal secretions for changes in amount and color  - [place] appropriate cooling/warming therapies per order  - Administer medications as ordered  - Instruct and encourage patient and family to use good hand hygiene technique  - Identify and instruct in appropriate isolation precautions for identified infection/condition  Outcome: Progressing  Goal: Absence of fever/infection during neutropenic period  Description: INTERVENTIONS:  - Monitor WBC    Outcome: Progressing     Problem: SAFETY ADULT  Goal: Patient will remain free of falls  Description: INTERVENTIONS:  - Educate patient/family on patient safety including physical limitations  - Instruct patient to call for assistance with activity   - Consult OT/PT to assist with strengthening/mobility   - Keep Call bell within reach  - Keep bed low and locked with side rails adjusted as appropriate  - Keep care items and personal belongings within reach  - Initiate and maintain comfort rounds  - Make Fall Risk Sign visible to staff  - Offer Toileting every 2 Hours,  in advance of need  - Initiate/Maintain bed alarm  - Obtain necessary fall risk management equipment: socks  - Apply yellow socks and bracelet for high fall risk patients  - Consider moving patient to room near nurses station  Outcome: Progressing  Goal: Maintain or return to baseline ADL function  Description: INTERVENTIONS:  -  Assess patient's ability to carry out ADLs; assess patient's baseline for ADL function and identify physical deficits which impact ability to perform ADLs (bathing, care of mouth/teeth, toileting, grooming, dressing, etc.)  - Assess/evaluate cause of self-care deficits   - Assess range of motion  - Assess patient's mobility; develop plan if impaired  - Assess patient's need for assistive devices and provide as appropriate  - Encourage maximum independence but intervene and supervise when necessary  - Involve family in performance of ADLs  - Assess for home care needs following discharge   - Consider OT consult to assist with ADL evaluation and planning for discharge  - Provide patient education as appropriate  Outcome: Progressing  Goal: Maintains/Returns to pre admission functional level  Description: INTERVENTIONS:  - Perform AM-PAC 6 Click Basic Mobility/ Daily Activity assessment daily.  - Set and communicate daily mobility goal to care team and patient/family/caregiver.   - Collaborate with rehabilitation services on mobility goals if consulted  - Perform Range of Motion 3 times a day.  - Reposition patient every 2 hours.  - Dangle patient 3 times a day  - Stand patient 3 times a day  - Ambulate patient 3 times a day  - Out of bed to chair 3 times a day   - Out of bed for meals 3 times a day  - Out of bed for toileting  - Record patient progress and toleration of activity level   Outcome: Progressing     Problem: DISCHARGE PLANNING  Goal: Discharge to home or other facility with appropriate resources  Description: INTERVENTIONS:  - Identify barriers to discharge w/patient and  caregiver  - Arrange for needed discharge resources and transportation as appropriate  - Identify discharge learning needs (meds, wound care, etc.)  - Arrange for interpretive services to assist at discharge as needed  - Refer to Case Management Department for coordinating discharge planning if the patient needs post-hospital services based on physician/advanced practitioner order or complex needs related to functional status, cognitive ability, or social support system  Outcome: Progressing     Problem: Knowledge Deficit  Goal: Patient/family/caregiver demonstrates understanding of disease process, treatment plan, medications, and discharge instructions  Description: Complete learning assessment and assess knowledge base.  Interventions:  - Provide teaching at level of understanding  - Provide teaching via preferred learning methods  Outcome: Progressing     Problem: GASTROINTESTINAL - ADULT  Goal: Minimal or absence of nausea and/or vomiting  Description: INTERVENTIONS:  - Administer IV fluids if ordered to ensure adequate hydration  - Maintain NPO status until nausea and vomiting are resolved  - Nasogastric tube if ordered  - Administer ordered antiemetic medications as needed  - Provide nonpharmacologic comfort measures as appropriate  - Advance diet as tolerated, if ordered  - Consider nutrition services referral to assist patient with adequate nutrition and appropriate food choices  Outcome: Progressing  Goal: Maintains or returns to baseline bowel function  Description: INTERVENTIONS:  - Assess bowel function  - Encourage oral fluids to ensure adequate hydration  - Administer IV fluids if ordered to ensure adequate hydration  - Administer ordered medications as needed  - Encourage mobilization and activity  - Consider nutritional services referral to assist patient with adequate nutrition and appropriate food choices  Outcome: Progressing  Goal: Maintains adequate nutritional intake  Description:  INTERVENTIONS:  - Monitor percentage of each meal consumed  - Identify factors contributing to decreased intake, treat as appropriate  - Assist with meals as needed  - Monitor I&O, weight, and lab values if indicated  - Obtain nutrition services referral as needed  Outcome: Progressing  Goal: Establish and maintain optimal ostomy function  Description: INTERVENTIONS:  - Assess bowel function  - Encourage oral fluids to ensure adequate hydration  - Administer IV fluids if ordered to ensure adequate hydration   - Administer ordered medications as needed  - Encourage mobilization and activity  - Nutrition services referral to assist patient with appropriate food choices  - Assess stoma site  - Consider wound care consult   Outcome: Progressing  Goal: Oral mucous membranes remain intact  Description: INTERVENTIONS  - Assess oral mucosa and hygiene practices  - Implement preventative oral hygiene regimen  - Implement oral medicated treatments as ordered  - Initiate Nutrition services referral as needed  Outcome: Progressing     Problem: MUSCULOSKELETAL - ADULT  Goal: Maintain or return mobility to safest level of function  Description: INTERVENTIONS:  - Assess patient's ability to carry out ADLs; assess patient's baseline for ADL function and identify physical deficits which impact ability to perform ADLs (bathing, care of mouth/teeth, toileting, grooming, dressing, etc.)  - Assess/evaluate cause of self-care deficits   - Assess range of motion  - Assess patient's mobility  - Assess patient's need for assistive devices and provide as appropriate  - Encourage maximum independence but intervene and supervise when necessary  - Involve family in performance of ADLs  - Assess for home care needs following discharge   - Consider OT consult to assist with ADL evaluation and planning for discharge  - Provide patient education as appropriate  Outcome: Progressing  Goal: Maintain proper alignment of affected body  part  Description: INTERVENTIONS:  - Support, maintain and protect limb and body alignment  - Provide patient/ family with appropriate education  Outcome: Progressing     Problem: Nutrition/Hydration-ADULT  Goal: Nutrient/Hydration intake appropriate for improving, restoring or maintaining nutritional needs  Description: Monitor and assess patient's nutrition/hydration status for malnutrition. Collaborate with interdisciplinary team and initiate plan and interventions as ordered.  Monitor patient's weight and dietary intake as ordered or per policy. Utilize nutrition screening tool and intervene as necessary. Determine patient's food preferences and provide high-protein, high-caloric foods as appropriate.     INTERVENTIONS:  - Monitor oral intake, urinary output, labs, and treatment plans  - Assess nutrition and hydration status and recommend course of action  - Evaluate amount of meals eaten  - Assist patient with eating if necessary   - Allow adequate time for meals  - Recommend/ encourage appropriate diets, oral nutritional supplements, and vitamin/mineral supplements  - Order, calculate, and assess calorie counts as needed  - Recommend, monitor, and adjust tube feedings and TPN/PPN based on assessed needs  - Assess need for intravenous fluids  - Provide specific nutrition/hydration education as appropriate  - Include patient/family/caregiver in decisions related to nutrition  Outcome: Progressing

## 2024-05-12 NOTE — ASSESSMENT & PLAN NOTE
>>ASSESSMENT AND PLAN FOR HYPOKALEMIA WRITTEN ON 5/12/2024  3:26 AM BY OKSANA MACHADO,     Secondary to GI losses  Status post IV potassium in the ED  Check magnesium level

## 2024-05-13 ENCOUNTER — APPOINTMENT (INPATIENT)
Dept: RADIOLOGY | Facility: HOSPITAL | Age: 89
DRG: 392 | End: 2024-05-13
Payer: COMMERCIAL

## 2024-05-13 PROBLEM — E83.52 HYPERCALCEMIA: Status: RESOLVED | Noted: 2019-05-21 | Resolved: 2024-05-13

## 2024-05-13 LAB
ANION GAP SERPL CALCULATED.3IONS-SCNC: 7 MMOL/L (ref 4–13)
ATRIAL RATE: 70 BPM
BASOPHILS # BLD AUTO: 0.03 THOUSANDS/ÂΜL (ref 0–0.1)
BASOPHILS NFR BLD AUTO: 0 % (ref 0–1)
BUN SERPL-MCNC: 19 MG/DL (ref 5–25)
CALCIUM SERPL-MCNC: 8.4 MG/DL (ref 8.4–10.2)
CHLORIDE SERPL-SCNC: 113 MMOL/L (ref 96–108)
CO2 SERPL-SCNC: 24 MMOL/L (ref 21–32)
CREAT SERPL-MCNC: 1.51 MG/DL (ref 0.6–1.3)
EOSINOPHIL # BLD AUTO: 0.15 THOUSAND/ÂΜL (ref 0–0.61)
EOSINOPHIL NFR BLD AUTO: 2 % (ref 0–6)
ERYTHROCYTE [DISTWIDTH] IN BLOOD BY AUTOMATED COUNT: 14.4 % (ref 11.6–15.1)
GFR SERPL CREATININE-BSD FRML MDRD: 30 ML/MIN/1.73SQ M
GLUCOSE SERPL-MCNC: 107 MG/DL (ref 65–140)
GLUCOSE SERPL-MCNC: 115 MG/DL (ref 65–140)
GLUCOSE SERPL-MCNC: 135 MG/DL (ref 65–140)
GLUCOSE SERPL-MCNC: 151 MG/DL (ref 65–140)
GLUCOSE SERPL-MCNC: 174 MG/DL (ref 65–140)
HCT VFR BLD AUTO: 36.7 % (ref 34.8–46.1)
HGB BLD-MCNC: 11.3 G/DL (ref 11.5–15.4)
IMM GRANULOCYTES # BLD AUTO: 0.03 THOUSAND/UL (ref 0–0.2)
IMM GRANULOCYTES NFR BLD AUTO: 0 % (ref 0–2)
LYMPHOCYTES # BLD AUTO: 4.41 THOUSANDS/ÂΜL (ref 0.6–4.47)
LYMPHOCYTES NFR BLD AUTO: 46 % (ref 14–44)
MAGNESIUM SERPL-MCNC: 2.1 MG/DL (ref 1.9–2.7)
MCH RBC QN AUTO: 29.7 PG (ref 26.8–34.3)
MCHC RBC AUTO-ENTMCNC: 30.8 G/DL (ref 31.4–37.4)
MCV RBC AUTO: 96 FL (ref 82–98)
MONOCYTES # BLD AUTO: 0.87 THOUSAND/ÂΜL (ref 0.17–1.22)
MONOCYTES NFR BLD AUTO: 9 % (ref 4–12)
NEUTROPHILS # BLD AUTO: 4.07 THOUSANDS/ÂΜL (ref 1.85–7.62)
NEUTS SEG NFR BLD AUTO: 43 % (ref 43–75)
NRBC BLD AUTO-RTO: 0 /100 WBCS
P AXIS: 45 DEGREES
PLATELET # BLD AUTO: 163 THOUSANDS/UL (ref 149–390)
PMV BLD AUTO: 10.7 FL (ref 8.9–12.7)
POTASSIUM SERPL-SCNC: 3.8 MMOL/L (ref 3.5–5.3)
PR INTERVAL: 158 MS
QRS AXIS: -33 DEGREES
QRSD INTERVAL: 88 MS
QT INTERVAL: 448 MS
QTC INTERVAL: 483 MS
RBC # BLD AUTO: 3.81 MILLION/UL (ref 3.81–5.12)
SODIUM SERPL-SCNC: 144 MMOL/L (ref 135–147)
T WAVE AXIS: -4 DEGREES
VENTRICULAR RATE: 70 BPM
WBC # BLD AUTO: 9.56 THOUSAND/UL (ref 4.31–10.16)

## 2024-05-13 PROCEDURE — 99232 SBSQ HOSP IP/OBS MODERATE 35: CPT

## 2024-05-13 PROCEDURE — 80048 BASIC METABOLIC PNL TOTAL CA: CPT | Performed by: INTERNAL MEDICINE

## 2024-05-13 PROCEDURE — 83735 ASSAY OF MAGNESIUM: CPT | Performed by: INTERNAL MEDICINE

## 2024-05-13 PROCEDURE — 71045 X-RAY EXAM CHEST 1 VIEW: CPT

## 2024-05-13 PROCEDURE — 93010 ELECTROCARDIOGRAM REPORT: CPT | Performed by: INTERNAL MEDICINE

## 2024-05-13 PROCEDURE — 99232 SBSQ HOSP IP/OBS MODERATE 35: CPT | Performed by: INTERNAL MEDICINE

## 2024-05-13 PROCEDURE — 85025 COMPLETE CBC W/AUTO DIFF WBC: CPT | Performed by: INTERNAL MEDICINE

## 2024-05-13 PROCEDURE — 93005 ELECTROCARDIOGRAM TRACING: CPT

## 2024-05-13 PROCEDURE — 82948 REAGENT STRIP/BLOOD GLUCOSE: CPT

## 2024-05-13 RX ORDER — SCOLOPAMINE TRANSDERMAL SYSTEM 1 MG/1
1 PATCH, EXTENDED RELEASE TRANSDERMAL
Status: DISCONTINUED | OUTPATIENT
Start: 2024-05-13 | End: 2024-05-15 | Stop reason: HOSPADM

## 2024-05-13 RX ADMIN — NEBIVOLOL 20 MG: 5 TABLET ORAL at 09:31

## 2024-05-13 RX ADMIN — SODIUM CHLORIDE 100 ML/HR: 0.9 INJECTION, SOLUTION INTRAVENOUS at 01:31

## 2024-05-13 RX ADMIN — HEPARIN SODIUM 5000 UNITS: 5000 INJECTION, SOLUTION INTRAVENOUS; SUBCUTANEOUS at 04:22

## 2024-05-13 RX ADMIN — Medication 2000 UNITS: at 08:35

## 2024-05-13 RX ADMIN — NIFEDIPINE 90 MG: 30 TABLET, EXTENDED RELEASE ORAL at 08:34

## 2024-05-13 RX ADMIN — FERROUS SULFATE TAB 325 MG (65 MG ELEMENTAL FE) 325 MG: 325 (65 FE) TAB at 08:35

## 2024-05-13 RX ADMIN — HEPARIN SODIUM 5000 UNITS: 5000 INJECTION, SOLUTION INTRAVENOUS; SUBCUTANEOUS at 14:28

## 2024-05-13 RX ADMIN — INSULIN LISPRO 2 UNITS: 100 INJECTION, SOLUTION INTRAVENOUS; SUBCUTANEOUS at 18:04

## 2024-05-13 RX ADMIN — INSULIN LISPRO 2 UNITS: 100 INJECTION, SOLUTION INTRAVENOUS; SUBCUTANEOUS at 13:02

## 2024-05-13 RX ADMIN — DOCUSATE SODIUM 100 MG: 100 CAPSULE, LIQUID FILLED ORAL at 08:35

## 2024-05-13 RX ADMIN — HEPARIN SODIUM 5000 UNITS: 5000 INJECTION, SOLUTION INTRAVENOUS; SUBCUTANEOUS at 22:03

## 2024-05-13 RX ADMIN — FAMOTIDINE 20 MG: 20 TABLET, FILM COATED ORAL at 08:35

## 2024-05-13 RX ADMIN — SCOPALAMINE 1 PATCH: 1 PATCH, EXTENDED RELEASE TRANSDERMAL at 10:12

## 2024-05-13 RX ADMIN — ASPIRIN 81 MG: 81 TABLET, COATED ORAL at 08:35

## 2024-05-13 RX ADMIN — DOCUSATE SODIUM 100 MG: 100 CAPSULE, LIQUID FILLED ORAL at 18:00

## 2024-05-13 RX ADMIN — PRAVASTATIN SODIUM 20 MG: 20 TABLET ORAL at 18:00

## 2024-05-13 NOTE — PLAN OF CARE
Problem: PAIN - ADULT  Goal: Verbalizes/displays adequate comfort level or baseline comfort level  Description: Interventions:  - Encourage patient to monitor pain and request assistance  - Assess pain using appropriate pain scale  - Administer analgesics based on type and severity of pain and evaluate response  - Implement non-pharmacological measures as appropriate and evaluate response  - Consider cultural and social influences on pain and pain management  - Notify physician/advanced practitioner if interventions unsuccessful or patient reports new pain  Outcome: Progressing     Problem: INFECTION - ADULT  Goal: Absence or prevention of progression during hospitalization  Description: INTERVENTIONS:  - Assess and monitor for signs and symptoms of infection  - Monitor lab/diagnostic results  - Monitor all insertion sites, i.e. indwelling lines, tubes, and drains  - Monitor endotracheal if appropriate and nasal secretions for changes in amount and color  - [place] appropriate cooling/warming therapies per order  - Administer medications as ordered  - Instruct and encourage patient and family to use good hand hygiene technique  - Identify and instruct in appropriate isolation precautions for identified infection/condition  Outcome: Progressing  Goal: Absence of fever/infection during neutropenic period  Description: INTERVENTIONS:  - Monitor WBC    Outcome: Progressing     Problem: SAFETY ADULT  Goal: Patient will remain free of falls  Description: INTERVENTIONS:  - Educate patient/family on patient safety including physical limitations  - Instruct patient to call for assistance with activity   - Consult OT/PT to assist with strengthening/mobility   - Keep Call bell within reach  - Keep bed low and locked with side rails adjusted as appropriate  - Keep care items and personal belongings within reach  - Initiate and maintain comfort rounds  - Make Fall Risk Sign visible to staff  - Offer Toileting every 2 Hours,  in advance of need  - Initiate/Maintain bed alarm  - Obtain necessary fall risk management equipment: socks  - Apply yellow socks and bracelet for high fall risk patients  - Consider moving patient to room near nurses station  Outcome: Progressing  Goal: Maintain or return to baseline ADL function  Description: INTERVENTIONS:  -  Assess patient's ability to carry out ADLs; assess patient's baseline for ADL function and identify physical deficits which impact ability to perform ADLs (bathing, care of mouth/teeth, toileting, grooming, dressing, etc.)  - Assess/evaluate cause of self-care deficits   - Assess range of motion  - Assess patient's mobility; develop plan if impaired  - Assess patient's need for assistive devices and provide as appropriate  - Encourage maximum independence but intervene and supervise when necessary  - Involve family in performance of ADLs  - Assess for home care needs following discharge   - Consider OT consult to assist with ADL evaluation and planning for discharge  - Provide patient education as appropriate  Outcome: Progressing  Goal: Maintains/Returns to pre admission functional level  Description: INTERVENTIONS:  - Perform AM-PAC 6 Click Basic Mobility/ Daily Activity assessment daily.  - Set and communicate daily mobility goal to care team and patient/family/caregiver.   - Collaborate with rehabilitation services on mobility goals if consulted  - Perform Range of Motion 3 times a day.  - Reposition patient every 2 hours.  - Dangle patient 3 times a day  - Stand patient 3 times a day  - Ambulate patient 3 times a day  - Out of bed to chair 3 times a day   - Out of bed for meals 3 times a day  - Out of bed for toileting  - Record patient progress and toleration of activity level   Outcome: Progressing     Problem: DISCHARGE PLANNING  Goal: Discharge to home or other facility with appropriate resources  Description: INTERVENTIONS:  - Identify barriers to discharge w/patient and  caregiver  - Arrange for needed discharge resources and transportation as appropriate  - Identify discharge learning needs (meds, wound care, etc.)  - Arrange for interpretive services to assist at discharge as needed  - Refer to Case Management Department for coordinating discharge planning if the patient needs post-hospital services based on physician/advanced practitioner order or complex needs related to functional status, cognitive ability, or social support system  Outcome: Progressing     Problem: Knowledge Deficit  Goal: Patient/family/caregiver demonstrates understanding of disease process, treatment plan, medications, and discharge instructions  Description: Complete learning assessment and assess knowledge base.  Interventions:  - Provide teaching at level of understanding  - Provide teaching via preferred learning methods  Outcome: Progressing     Problem: GASTROINTESTINAL - ADULT  Goal: Minimal or absence of nausea and/or vomiting  Description: INTERVENTIONS:  - Administer IV fluids if ordered to ensure adequate hydration  - Maintain NPO status until nausea and vomiting are resolved  - Nasogastric tube if ordered  - Administer ordered antiemetic medications as needed  - Provide nonpharmacologic comfort measures as appropriate  - Advance diet as tolerated, if ordered  - Consider nutrition services referral to assist patient with adequate nutrition and appropriate food choices  Outcome: Progressing  Goal: Maintains or returns to baseline bowel function  Description: INTERVENTIONS:  - Assess bowel function  - Encourage oral fluids to ensure adequate hydration  - Administer IV fluids if ordered to ensure adequate hydration  - Administer ordered medications as needed  - Encourage mobilization and activity  - Consider nutritional services referral to assist patient with adequate nutrition and appropriate food choices  Outcome: Progressing  Goal: Maintains adequate nutritional intake  Description:  INTERVENTIONS:  - Monitor percentage of each meal consumed  - Identify factors contributing to decreased intake, treat as appropriate  - Assist with meals as needed  - Monitor I&O, weight, and lab values if indicated  - Obtain nutrition services referral as needed  Outcome: Progressing  Goal: Establish and maintain optimal ostomy function  Description: INTERVENTIONS:  - Assess bowel function  - Encourage oral fluids to ensure adequate hydration  - Administer IV fluids if ordered to ensure adequate hydration   - Administer ordered medications as needed  - Encourage mobilization and activity  - Nutrition services referral to assist patient with appropriate food choices  - Assess stoma site  - Consider wound care consult   Outcome: Progressing  Goal: Oral mucous membranes remain intact  Description: INTERVENTIONS  - Assess oral mucosa and hygiene practices  - Implement preventative oral hygiene regimen  - Implement oral medicated treatments as ordered  - Initiate Nutrition services referral as needed  Outcome: Progressing     Problem: MUSCULOSKELETAL - ADULT  Goal: Maintain or return mobility to safest level of function  Description: INTERVENTIONS:  - Assess patient's ability to carry out ADLs; assess patient's baseline for ADL function and identify physical deficits which impact ability to perform ADLs (bathing, care of mouth/teeth, toileting, grooming, dressing, etc.)  - Assess/evaluate cause of self-care deficits   - Assess range of motion  - Assess patient's mobility  - Assess patient's need for assistive devices and provide as appropriate  - Encourage maximum independence but intervene and supervise when necessary  - Involve family in performance of ADLs  - Assess for home care needs following discharge   - Consider OT consult to assist with ADL evaluation and planning for discharge  - Provide patient education as appropriate  Outcome: Progressing  Goal: Maintain proper alignment of affected body  part  Description: INTERVENTIONS:  - Support, maintain and protect limb and body alignment  - Provide patient/ family with appropriate education  Outcome: Progressing     Problem: Nutrition/Hydration-ADULT  Goal: Nutrient/Hydration intake appropriate for improving, restoring or maintaining nutritional needs  Description: Monitor and assess patient's nutrition/hydration status for malnutrition. Collaborate with interdisciplinary team and initiate plan and interventions as ordered.  Monitor patient's weight and dietary intake as ordered or per policy. Utilize nutrition screening tool and intervene as necessary. Determine patient's food preferences and provide high-protein, high-caloric foods as appropriate.     INTERVENTIONS:  - Monitor oral intake, urinary output, labs, and treatment plans  - Assess nutrition and hydration status and recommend course of action  - Evaluate amount of meals eaten  - Assist patient with eating if necessary   - Allow adequate time for meals  - Recommend/ encourage appropriate diets, oral nutritional supplements, and vitamin/mineral supplements  - Order, calculate, and assess calorie counts as needed  - Recommend, monitor, and adjust tube feedings and TPN/PPN based on assessed needs  - Assess need for intravenous fluids  - Provide specific nutrition/hydration education as appropriate  - Include patient/family/caregiver in decisions related to nutrition  Outcome: Progressing

## 2024-05-13 NOTE — UTILIZATION REVIEW
SEE INITIAL REVIEW AT BOTTOM      Continued Stay Review    Date: 5/13                          Current Patient Class: Inpatient  Current Level of Care: Med Surg    HPI:88 y.o. female initially admitted on 5/12     Assessment/Plan: Date: 5/13  Day 3: Has surpassed a 2nd midnight with active treatments and services.  Persistent Nausea today. Continue clear liquid diet, advance as tolerated. Possible EGD tomorrow 5/14. NPO at MN   Recheck QTC today. Continue PRN tigan + scopolamine patch.  CXR appears similar to prior, await final read.  PO torsemide held given poor PO intake, consider resuming tomorrow if PO intake improved. SBP much improved.  Persistent nausea + generalized weakness + very poor PO intake today. No BM since admit.    Per GI; Plan for EGD in am 5/14. NPO after MN. Full liquids today.     Vital Signs:   05/13/24 0838 -- -- -- -- -- 90 % -- -- None (Room air)  --   O2 Device: removes oxygen in the morning and used as need it at 05/13/24 0838 05/13/24 07:26:08 97.8 °F (36.6 °C) 76 16 162/64 97 88 % Abnormal  -- -- -- --   05/12/24 2200 -- -- -- -- -- 93 % -- -- -- --     Pertinent Labs/Diagnostic Results:   5/13  PCXR - pend    Results from last 7 days   Lab Units 05/12/24 0422   SARS-COV-2  Negative     Results from last 7 days   Lab Units 05/13/24 0220 05/12/24 0413 05/11/24 1920   WBC Thousand/uL 9.56 11.96* 11.53*   HEMOGLOBIN g/dL 11.3* 12.9 13.0   HEMATOCRIT % 36.7 41.7 41.8   PLATELETS Thousands/uL 163 175 161   TOTAL NEUT ABS Thousands/µL 4.07  --  5.64         Results from last 7 days   Lab Units 05/13/24 0220 05/12/24 0738 05/12/24 0413 05/11/24  1920   SODIUM mmol/L 144  --  143 141   POTASSIUM mmol/L 3.8  --  3.0* 3.0*   CHLORIDE mmol/L 113*  --  105 101   CO2 mmol/L 24  --  23 26   ANION GAP mmol/L 7  --  15* 14*   BUN mg/dL 19  --  19 19   CREATININE mg/dL 1.51*  --  1.49* 1.62*   EGFR ml/min/1.73sq m 30  --  31 28   CALCIUM mg/dL 8.4  --  9.2 10.3*   MAGNESIUM mg/dL 2.1 2.2  --   2.2     Results from last 7 days   Lab Units 05/11/24  1920   AST U/L 21   ALT U/L 11   ALK PHOS U/L 55   TOTAL PROTEIN g/dL 7.4   ALBUMIN g/dL 4.4   TOTAL BILIRUBIN mg/dL 0.67     Results from last 7 days   Lab Units 05/13/24  1124 05/13/24  0722 05/12/24  2040 05/12/24  1612 05/12/24  1127 05/12/24  0639 05/12/24  0205   POC GLUCOSE mg/dl 174* 135 159* 146* 137 134 91     Results from last 7 days   Lab Units 05/13/24  0220 05/12/24  0413 05/11/24  1920   GLUCOSE RANDOM mg/dL 115 109 120       Results from last 7 days   Lab Units 05/12/24  0039 05/11/24  2114 05/11/24 1920   HS TNI 0HR ng/L  --   --  13   HS TNI 2HR ng/L  --  17  --    HSTNI D2 ng/L  --  4  --    HS TNI 4HR ng/L 18  --   --    HSTNI D4 ng/L 5  --   --        Results from last 7 days   Lab Units 05/11/24  1920   LIPASE u/L 52                 Results from last 7 days   Lab Units 05/12/24  0017   CLARITY UA  Clear   COLOR UA  Yellow   SPEC GRAV UA  1.015   PH UA  7.0   GLUCOSE UA mg/dl 300 (3/10%)*   KETONES UA mg/dl 10 (1+)*   BLOOD UA  Trace*   PROTEIN UA mg/dl 100 (2+)*   NITRITE UA  Negative   BILIRUBIN UA  Negative   UROBILINOGEN UA (BE) mg/dl <2.0   LEUKOCYTES UA  Negative   WBC UA /hpf 1-2   RBC UA /hpf 1-2   BACTERIA UA /hpf None Seen   EPITHELIAL CELLS WET PREP /hpf Occasional       Medications:   Scheduled Medications:  aspirin, 81 mg, Oral, Daily  Cholecalciferol, 2,000 Units, Oral, Daily  cloNIDine, 1 patch, Transdermal, Weekly  docusate sodium, 100 mg, Oral, BID  famotidine, 20 mg, Oral, Daily  ferrous sulfate, 325 mg, Oral, Once per day on Monday Wednesday Friday  heparin (porcine), 5,000 Units, Subcutaneous, Q8H JONO  insulin lispro, 1-5 Units, Subcutaneous, HS  insulin lispro, 2-12 Units, Subcutaneous, TID AC  nebivolol, 20 mg, Oral, Daily  NIFEdipine, 90 mg, Oral, Daily  pravastatin, 20 mg, Oral, Daily With Dinner  scopolamine, 1 patch, Transdermal, Q72H      Continuous IV Infusions:   sodium chloride 0.9 % infusion  Rate: 100 mL/hr  Dose: 100 mL/hr  Freq: Continuous Route: IV  Indications of Use: IV Hydration  Last Dose: Stopped (05/13/24 0421)  Start: 05/12/24 0315 End: 05/13/24 0403      PRN Meds:  acetaminophen, 650 mg, Oral, Q6H PRN  hydrALAZINE, 10 mg, Intravenous, Q6H PRN  trimethobenzamide, 200 mg, Intramuscular, Q6H PRN        Discharge Plan: TBD    Network Utilization Review Department  ATTENTION: Please call with any questions or concerns to [phone] and carefully listen to the prompts so that you are directed to the right person. All voicemails are confidential.   For Discharge needs, contact Care Management DC Support Team at [phone] opt. 2  Send all requests for admission clinical reviews, approved or denied determinations and any other requests to dedicated fax number below belonging to the campus where the patient is receiving treatment. List of dedicated fax numbers for the Facilities:  FACILITY NAME UR FAX NUMBER   ADMISSION DENIALS (Administrative/Medical Necessity) [phone]   DISCHARGE SUPPORT TEAM (NETWORK) [phone]   PARENT CHILD HEALTH (Maternity/NICU/Pediatrics) [phone]   [facility] [phone]   [facility] [phone]   [facility] [phone]   [facility] [phone]   [facility] [phone]   [facility] [phone]   [facility] [phone]   [facility] [phone]   [facility] [phone]   [facility] [phone]   [facility] [phone]   [facility] [phone]

## 2024-05-13 NOTE — ASSESSMENT & PLAN NOTE
Initiated during last admission in March secondary to pneumonia, is in the process of being weaned off.  At baseline on 2 L as needed  CXR appears similar to prior, await final read

## 2024-05-13 NOTE — ASSESSMENT & PLAN NOTE
Known history to patient and her family  Baseline chronic low back pain  Denies any recent trauma or change in back pain at this time

## 2024-05-13 NOTE — ASSESSMENT & PLAN NOTE
Likely secondary to having missed oral antihypertensive medications due to GI symptoms  Resumed back on chronic meds:   Clonidine patch weekly  Nebivolol  Nifedipine   PO torsemide held given poor PO intake, consider resuming tomorrow if PO intake improved  Added as needed IV hydralazine  SBP much improved

## 2024-05-13 NOTE — ASSESSMENT & PLAN NOTE
Likely secondary to hypokalemia  Telemetry monitoring  Repeat EKG today  Repleted potassium accordingly  Avoid QT prolongation agents

## 2024-05-13 NOTE — PLAN OF CARE
Problem: SAFETY ADULT  Goal: Patient will remain free of falls  Description: INTERVENTIONS:  - Educate patient/family on patient safety including physical limitations  - Instruct patient to call for assistance with activity   - Consult OT/PT to assist with strengthening/mobility   - Keep Call bell within reach  - Keep bed low and locked with side rails adjusted as appropriate  - Keep care items and personal belongings within reach  - Initiate and maintain comfort rounds  - Make Fall Risk Sign visible to staff  - Offer Toileting every 2 Hours, in advance of need  - Initiate/Maintain bed alarm  - Obtain necessary fall risk management equipment: socks  - Apply yellow socks and bracelet for high fall risk patients  - Consider moving patient to room near nurses station  Outcome: Progressing     Problem: Potential for Falls  Goal: Patient will remain free of falls  Description: INTERVENTIONS:  - Educate patient/family on patient safety including physical limitations  - Instruct patient to call for assistance with activity   - Consult OT/PT to assist with strengthening/mobility   - Keep Call bell within reach  - Keep bed low and locked with side rails adjusted as appropriate  - Keep care items and personal belongings within reach  - Initiate and maintain comfort rounds  - Make Fall Risk Sign visible to staff  - Offer Toileting every 2 Hours, in advance of need  - Initiate/Maintain bed alarm  - Obtain necessary fall risk management equipment: socks  - Apply yellow socks and bracelet for high fall risk patients  - Consider moving patient to room near nurses station  Outcome: Progressing

## 2024-05-13 NOTE — ASSESSMENT & PLAN NOTE
Lab Results   Component Value Date    EGFR 30 05/13/2024    EGFR 31 05/12/2024    EGFR 28 05/11/2024    CREATININE 1.51 (H) 05/13/2024    CREATININE 1.49 (H) 05/12/2024    CREATININE 1.62 (H) 05/11/2024   Creatinine within baseline range  Continue to monitor

## 2024-05-13 NOTE — ASSESSMENT & PLAN NOTE
Possibly secondary to viral gastroenteritis  CT A/P: no acute intra-abdominal pathology   Covid negative   GI consulted   Continue PRN tigan + scopolamine patch   Recheck QTC today   Persistent Nausea today, continue CLD and advance as tolerated  Possible EGD tomorrow, NPO at midnight

## 2024-05-13 NOTE — UTILIZATION REVIEW
NOTIFICATION OF INPATIENT ADMISSION   AUTHORIZATION REQUEST   SERVICING FACILITY:   [place] [address]  Tax ID: [id number]  NPI: [id number] ATTENDING PROVIDER:  Attending Name and NPI#: Natty Bruner Md [[id number]]  Address: [address] [place] [address]  Phone: [phone]   ADMISSION INFORMATION:  Place of Service: Inpatient [facility]  Place of Service Code: 21  Inpatient Admission Date/Time: 5/12/24  2:18 PM  Discharge Date/Time: No discharge date for patient encounter.  Admitting Diagnosis Code/Description:  Hypokalemia [E87.6]  Vomiting [R11.10]  Nausea and vomiting [R11.2]  Accelerated hypertension [I10]  Chronic kidney disease [N18.9]     UTILIZATION REVIEW CONTACT:  Lidia Villalobos, Utilization   Network Utilization Review Department  Phone: [phone]  Fax: [phone]  Email: [email or web address]  Contact for approvals/pending authorizations, clinical reviews, and discharge.     PHYSICIAN ADVISORY SERVICES:  Medical Necessity Denial & [employer] Review  Phone: [phone]  Fax: [phone]  Email: [email or web address]     DISCHARGE SUPPORT TEAM:  For Patients Discharge Needs & Updates  Phone: [phone] opt. 2 Fax: [phone]  Email: [email or web address]

## 2024-05-13 NOTE — PROGRESS NOTES
"Progress note - Gastroenterology   Ena Ahumada 88 y.o. female MRN: [id number]  Unit/Bed#: -01 Encounter: [id number]    ASSESSMENT and PLAN  Ena Ahumada is a 88 y.o. year old female with a past medical history of CKD stage III, hypertension, aortic valve stenosis, diabetes, anxiety, hyperlipidemia presents to the ER with 2 days of nausea vomiting (x8) containing bile.  Hypertensive urgency on admission due to inability to tolerate home medication  Labs show hypokalemia of 3.  Leukocytosis 12.    CT A/P without contrast shows no acute findings.     1.  Nausea/vomiting/anorexia  Continues with nausea today and no appetite  Denies vomiting  No epigastric or abdominal pain  Patient's son reports hospitalization with aspiration pneumonia 3/2024 after episode of vomiting at home  Discussed persistent symptoms with Dr. Murray-recommend proceeding with EGD to rule out erosive disease as etiology  Differential diagnosis includes erosive disease versus viral gastritis versus CKD induced nausea/vomiting  -Plan for EGD in a.m. 5/14  -Okay for full liquids today then n.p.o. after midnight  -Continue famotidine 20 mg daily for now    2.  Hypertensive urgency  Resolved, back on p.o. meds    3.  NELA  Creatinine stable at 1.51, BUN 19  Potassium normal at 3.8  -Encourage increase fluid intake    Chief Complaint   Patient presents with    Vomiting     Since 1630 yesterday, no blood, denies fevers/chest pain.  PCP told pt to go to ER.  Able to drink a cup of tea and 16oz. Bottle of water today.        SUBJECTIVE/HPI   Still with nausea but states improved, no vomiting  Reports no appetite - ate only crackers this a.m.  Denies epigastric or abdominal pain  No GERD symptoms    /64   Pulse 76   Temp 97.8 °F (36.6 °C)   Resp 16   Ht 5' 2\" (1.575 m)   SpO2 90%   BMI 29.26 kg/m²     PHYSICALEXAM  General appearance: alert, appears stated age and cooperative  Eyes: no icterus   Head: Normocephalic, without " obvious abnormality, atraumatic  Lungs: clear to auscultation bilaterally  Heart: regular rate and rhythm, S1, S2 normal, no murmur, click, rub or gallop  Abdomen: soft, non-tender; bowel sounds normal; no masses,  no organomegaly  Extremities: extremities normal, atraumatic, no cyanosis or edema  Neurologic: Grossly normal    Lab Results   Component Value Date    GLUCOSE 264 (H) 03/16/2024    CALCIUM 8.4 05/13/2024     01/02/2018    K 3.8 05/13/2024    CO2 24 05/13/2024     (H) 05/13/2024    BUN 19 05/13/2024    CREATININE 1.51 (H) 05/13/2024     Lab Results   Component Value Date    WBC 9.56 05/13/2024    HGB 11.3 (L) 05/13/2024    HCT 36.7 05/13/2024    MCV 96 05/13/2024     05/13/2024     Lab Results   Component Value Date    ALT 11 05/11/2024    AST 21 05/11/2024    ALKPHOS 55 05/11/2024    BILITOT 0.3 01/02/2018       Lab Results   Component Value Date    LIPASE 52 05/11/2024     Lab Results   Component Value Date    IRON 17 (L) 03/20/2024    TIBC 179 (L) 03/20/2024    FERRITIN 315 (H) 03/20/2024     Lab Results   Component Value Date    INR 1.07 03/16/2024

## 2024-05-13 NOTE — ASSESSMENT & PLAN NOTE
>>ASSESSMENT AND PLAN FOR HYPOKALEMIA WRITTEN ON 5/13/2024 11:44 AM BY PAM STEWART PA-C    Secondary to GI losses  Repleted, now stable   Replete as needed

## 2024-05-13 NOTE — ASSESSMENT & PLAN NOTE
Lab Results   Component Value Date    HGBA1C 8.0 (H) 04/22/2024       Recent Labs     05/12/24  1612 05/12/24 2040 05/13/24  0722 05/13/24  1124   POCGLU 146* 159* 135 174*         Holding lantus given poor PO intake 2/2 nausea  Place on ISS  Advance to outpt regimen upon advancement of diet

## 2024-05-13 NOTE — PROGRESS NOTES
[facility]  Progress Note  Name: Ena Auhmada I  MRN: [id number]  Unit/Bed#: -01 I Date of Admission: 5/11/2024   Date of Service: 5/13/2024 I Hospital Day: 1    Assessment/Plan   * Nausea & vomiting  Assessment & Plan  Possibly secondary to viral gastroenteritis  CT A/P: no acute intra-abdominal pathology   Covid negative   GI consulted   Continue PRN tigan + scopolamine patch   Recheck QTC today   Persistent Nausea today, continue CLD and advance as tolerated  Possible EGD tomorrow, NPO at midnight     Chronic respiratory failure with hypoxia (HCC)  Assessment & Plan  Initiated during last admission in March secondary to pneumonia, is in the process of being weaned off.  At baseline on 2 L as needed  CXR appears similar to prior, await final read    Hypertensive urgency  Assessment & Plan  Likely secondary to having missed oral antihypertensive medications due to GI symptoms  Resumed back on chronic meds:   Clonidine patch weekly  Nebivolol  Nifedipine   PO torsemide held given poor PO intake, consider resuming tomorrow if PO intake improved  Added as needed IV hydralazine  SBP much improved    Prolonged Q-T interval on ECG  Assessment & Plan  Likely secondary to hypokalemia  Telemetry monitoring  Repeat EKG today  Repleted potassium accordingly  Avoid QT prolongation agents    Leukocytosis  Assessment & Plan  Likely reactive  Chronic history since March  Afebrile, not meeting additional SIRS  WBC stable at 9.56 today off abx    Closed compression fracture of L4 lumbar vertebra, sequela  Assessment & Plan  Known history to patient and her family  Baseline chronic low back pain  Denies any recent trauma or change in back pain at this time    Stage 3b chronic kidney disease (CKD) (HCC)  Assessment & Plan  Lab Results   Component Value Date    EGFR 30 05/13/2024    EGFR 31 05/12/2024    EGFR 28 05/11/2024    CREATININE 1.51 (H) 05/13/2024    CREATININE 1.49 (H) 05/12/2024     CREATININE 1.62 (H) 05/11/2024   Creatinine within baseline range  Continue to monitor    Type 2 diabetes mellitus with stage 4 chronic kidney disease, without long-term current use of insulin ([facility])  Assessment & Plan  Lab Results   Component Value Date    HGBA1C 8.0 (H) 04/22/2024       Recent Labs     05/12/24  1612 05/12/24  2040 05/13/24  0722 05/13/24  1124   POCGLU 146* 159* 135 174*         Holding lantus given poor PO intake 2/2 nausea  Place on ISS  Advance to outpt regimen upon advancement of diet    Hypercalcemia-resolved as of 5/13/2024  Assessment & Plan  Likely secondary to dehydration  Continue IV fluid hydration  Continue to monitor  Now stable WNL    Hypokalemia-resolved as of 5/13/2024  Assessment & Plan  Secondary to GI losses  Repleted, now stable   Replete as needed             VTE Pharmacologic Prophylaxis: VTE Score: 4 Moderate Risk (Score 3-4) - Pharmacological DVT Prophylaxis Ordered: heparin.    Mobility:   Basic Mobility Inpatient Raw Score: 21  JH-HLM Goal: 6: Walk 10 steps or more  JH-HLM Achieved: 6: Walk 10 steps or more  JH-HLM Goal achieved. Continue to encourage appropriate mobility.    Patient Centered Rounds: I performed bedside rounds with nursing staff today.   Discussions with Specialists or Other Care Team Provider: GI    Education and Discussions with Family / Patient: Updated  (daughter in law) via phone.    Total Time Spent on Date of Encounter in care of patient: 35 mins. This time was spent on one or more of the following: performing physical exam; counseling and coordination of care; obtaining or reviewing history; documenting in the medical record; reviewing/ordering tests, medications or procedures; communicating with other healthcare professionals and discussing with patient's family/caregivers.    Current Length of Stay: 1 day(s)  Current Patient Status: Inpatient   Certification Statement: The patient will continue to require additional inpatient  hospital stay due to clinical improvement  Discharge Plan: Anticipate discharge in 24-48 hrs to home.    Code Status: Level 1 - Full Code    Subjective:   Persistent nausea + generalized weakness + very poor PO intake today per patient. No BM since admission. Denies any vomiting today. No CP or SOB currently.     Objective:     Vitals:   Temp (24hrs), Av.9 °F (36.6 °C), Min:97.8 °F (36.6 °C), Max:97.9 °F (36.6 °C)    Temp:  [97.8 °F (36.6 °C)-97.9 °F (36.6 °C)] 97.8 °F (36.6 °C)  HR:  [65-76] 76  Resp:  [16-18] 16  BP: (130-162)/(48-69) 162/64  SpO2:  [88 %-96 %] 90 %  Body mass index is 29.26 kg/m².     Input and Output Summary (last 24 hours):     Intake/Output Summary (Last 24 hours) at 2024 1218  Last data filed at 2024 1125  Gross per 24 hour   Intake 3138.33 ml   Output 900 ml   Net 2238.33 ml       Physical Exam:   Physical Exam  Vitals and nursing note reviewed.   Constitutional:       General: She is not in acute distress.     Appearance: She is well-developed. She is ill-appearing.   HENT:      Head: Normocephalic and atraumatic.   Eyes:      General:         Right eye: No discharge.         Left eye: No discharge.      Extraocular Movements: Extraocular movements intact.      Conjunctiva/sclera: Conjunctivae normal.   Cardiovascular:      Rate and Rhythm: Normal rate and regular rhythm.      Heart sounds: No murmur heard.  Pulmonary:      Effort: Pulmonary effort is normal. No respiratory distress.      Breath sounds: Normal breath sounds. No wheezing, rhonchi or rales.      Comments: 90-92% on RA  Abdominal:      General: Bowel sounds are normal. There is no distension.      Palpations: Abdomen is soft.      Tenderness: There is no abdominal tenderness.   Musculoskeletal:      Cervical back: Neck supple.      Right lower leg: No edema.      Left lower leg: No edema.   Skin:     General: Skin is warm and dry.      Capillary Refill: Capillary refill takes less than 2 seconds.   Neurological:       General: No focal deficit present.      Mental Status: She is alert and oriented to person, place, and time. Mental status is at baseline.   Psychiatric:         Mood and Affect: Mood normal.         Behavior: Behavior normal.          Additional Data:     Labs:  Results from last 7 days   Lab Units 05/13/24  0220   WBC Thousand/uL 9.56   HEMOGLOBIN g/dL 11.3*   HEMATOCRIT % 36.7   PLATELETS Thousands/uL 163   SEGS PCT % 43   LYMPHO PCT % 46*   MONO PCT % 9   EOS PCT % 2     Results from last 7 days   Lab Units 05/13/24  0220 05/12/24  0413 05/11/24  1920   SODIUM mmol/L 144   < > 141   POTASSIUM mmol/L 3.8   < > 3.0*   CHLORIDE mmol/L 113*   < > 101   CO2 mmol/L 24   < > 26   BUN mg/dL 19   < > 19   CREATININE mg/dL 1.51*   < > 1.62*   ANION GAP mmol/L 7   < > 14*   CALCIUM mg/dL 8.4   < > 10.3*   ALBUMIN g/dL  --   --  4.4   TOTAL BILIRUBIN mg/dL  --   --  0.67   ALK PHOS U/L  --   --  55   ALT U/L  --   --  11   AST U/L  --   --  21   GLUCOSE RANDOM mg/dL 115   < > 120    < > = values in this interval not displayed.         Results from last 7 days   Lab Units 05/13/24  1124 05/13/24  0722 05/12/24  2040 05/12/24  1612 05/12/24  1127 05/12/24  0639 05/12/24  0205   POC GLUCOSE mg/dl 174* 135 159* 146* 137 134 91               Lines/Drains:  Invasive Devices       Peripheral Intravenous Line  Duration             Peripheral IV 05/11/24 Proximal;Right;Ventral (anterior) Forearm 1 day                          Imaging: Reviewed radiology reports from this admission including: abdominal/pelvic CT and Personally reviewed the following imaging: chest xray    Recent Cultures (last 7 days):         Last 24 Hours Medication List:   Current Facility-Administered Medications   Medication Dose Route Frequency Provider Last Rate    acetaminophen  650 mg Oral Q6H PRN Angiejuan Ramirez DO      aspirin  81 mg Oral Daily Angie Ramirez DO      Cholecalciferol  2,000 Units Oral Daily Angie Ramirez  DO      cloNIDine  1 patch Transdermal Weekly Angie Ramirez, DO      docusate sodium  100 mg Oral BID Sarah Eid PA-C      famotidine  20 mg Oral Daily Angie Ramirez, DO      ferrous sulfate  325 mg Oral Once per day on Monday Wednesday Friday nAgie Ramirez, DO      heparin (porcine)  5,000 Units Subcutaneous Q8H JONO Angie Ramirez, DO      hydrALAZINE  10 mg Intravenous Q6H PRN Angie Ramirez, DO      insulin lispro  1-5 Units Subcutaneous HS Angiejuan Ramirez, DO      insulin lispro  2-12 Units Subcutaneous TID AC Angie Ramirez, DO      nebivolol  20 mg Oral Daily Angie Ramirez, DO      NIFEdipine  90 mg Oral Daily Angie Ramirez, DO      pravastatin  20 mg Oral Daily With Dinner Angie Ramirez, DO      scopolamine  1 patch Transdermal Q72H Angelina Osbonr PA-C      trimethobenzamide  200 mg Intramuscular Q6H PRN Angie Ramirez, DO          Today, Patient Was Seen By: Angelina Osborn PA-C    **Please Note: This note may have been constructed using a voice recognition system.**

## 2024-05-13 NOTE — CASE MANAGEMENT
Case Management Assessment & Discharge Planning Note    Patient name Ena Ahumada  Location /-01 MRN [id number]  : 1935 Date 2024       Current Admission Date: 2024  Current Admission Diagnosis:Nausea & vomiting   Patient Active Problem List    Diagnosis Date Noted    Closed compression fracture of L4 lumbar vertebra, sequela 2024    Leukocytosis 2024    Prolonged Q-T interval on ECG 2024    Nausea & vomiting 2024    Hypertensive urgency 2024    Chronic respiratory failure with hypoxia ([facility]) 2024    Chronic kidney disease-mineral and bone disorder 2024    NELA (acute kidney injury) ([facility]) 2024    Hypokalemia 2024    Acute respiratory failure with hypoxia ([facility]) 2024    Chronic pain syndrome 2023    Degenerative disc disease, cervical 2023    Elevated parathyroid hormone 2023    Nonrheumatic aortic valve stenosis 2023    Proteinuria 2023    Sacroiliitis ([facility])     Herniated nucleus pulposus, L3-4 right     Lumbar foraminal stenosis     Lumbar radiculopathy     Type 2 diabetes mellitus with chronic kidney disease, with long-term current use of insulin ([facility]) 2022    Chronic back pain 10/02/2020    Complex renal cyst 2019    Anemia 02/15/2019    Stage 3b chronic kidney disease (CKD) ([facility]) 02/15/2019    Dependent edema 2018    Osteopenia 2017    Type 2 diabetes mellitus with stage 4 chronic kidney disease, without long-term current use of insulin ([facility]) 2016    Premature ventricular contraction 10/03/2013    Rhinitis 10/12/2012    GERD (gastroesophageal reflux disease) 10/12/2012    Diabetic polyneuropathy ([facility]) 2012    Hypertension 2012    Dyslipidemia 2012    Anxiety 2012      LOS (days): 1  Geometric Mean LOS (GMLOS) (days): 2.6  Days to GMLOS:1.6     OBJECTIVE:    Risk of Unplanned Readmission Score: 21.65         Current admission status:  Inpatient       Preferred Pharmacy:   [id number] - [place] - [address]  [address]  [facility] [place] [address]  Phone: [phone] Fax: [phone]    [id number] - [place] - [address]  [address]  [place] [address]  Phone: [phone] Fax: [phone]    Primary Care Provider: Deanna Castaneda DO    Primary Insurance: BL[employer] CR[employer] REP  Secondary Insurance:     ASSESSMENT:  Active Health Care Proxies       Israel Ahumada Health Care Agent - Son   Primary Phone: [phone] (Home)                 Advance Directives  Does patient have a Health Care POA?: Yes  Does patient have Advance Directives?: Yes  Advance Directives: Living will, Power of  for health care    Readmission Root Cause  30 Day Readmission: No    Patient Information  Admitted from:: Home  Mental Status: Alert  During Assessment patient was accompanied by: Not accompanied during assessment  Assessment information provided by:: Patient  Support Systems: Son, Family members  County of Residence: [place]  [place] [facility] do you live in?: [place]  Home entry access options. Select all that apply.: Stairs  Number of steps to enter home.: One Flight  Type of Current Residence: Other (Comment) (in law suite)  Floor Level: 2  Upon entering residence, is there a bedroom on the main floor (no further steps)?: Yes  Upon entering residence, is there a bathroom on the main floor (no further steps)?: Yes  Living Arrangements: Other (Comment), Lives w/ Son (daughter in-law)  Is patient a ?: No    Activities of Daily Living Prior to Admission  Functional Status: Independent  Completes ADLs independently?: Yes  Ambulates independently?: Yes  Does patient use assisted devices?: Yes  Assisted Devices (DME) used: Home Oxygen concentrator, Straight Cane  DME Company Name (respiratory supplies): [employer]  Does patient currently own DME?: Yes  What DME does the patient  currently own?: Straight Cane, Home Oxygen concentrator  Does patient have a history of Outpatient Therapy (PT/OT)?: No  Does the patient have a history of Short-Term Rehab?: No  Does patient have a history of HHC?: No  Does patient currently have HHC?: No         Patient Information Continued  Does patient have prescription coverage?: Yes  Does patient receive dialysis treatments?: No  Does patient have a history of substance abuse?: No  Does patient have a history of Mental Health Diagnosis?: No    Means of Transportation  [facility]      Social Determinants of Health (SDOH)      Flowsheet Row Most Recent Value   Housing Stability    In the last 12 months, was there a time when you were not able to pay the mortgage or rent on time? N   In the last 12 months, how many places have you lived? 1   In the last 12 months, was there a time when you did not have a steady place to sleep or slept in a shelter (including now)? N   Transportation Needs    In the past 12 months, has lack of transportation kept you from medical appointments or from getting medications? no   In the past 12 months, has lack of transportation kept you from meetings, work, or from getting things needed for daily living? No   Food Insecurity    Within the past 12 months, you worried that your food would run out before you got the money to buy more. Never true   Within the past 12 months, the food you bought just didn't last and you didn't have money to get more. Never true   Utilities    In the past 12 months has the electric, gas, oil, or water company threatened to shut off services in your home? No            DISCHARGE DETAILS:    Discharge planning discussed with:: patient  Freedom of Choice: Yes  Comments - Freedom of Choice: no anticipated dc needs  CM contacted family/caregiver?: No- see comments (reports being in contact with family)  Were Treatment Team discharge recommendations reviewed with  patient/caregiver?: Yes  Did patient/caregiver verbalize understanding of patient care needs?: Yes  Were patient/caregiver advised of the risks associated with not following Treatment Team discharge recommendations?: Yes    Requested Home Health Care         Is the patient interested in HHC at discharge?: No    DME Referral Provided  Referral made for DME?: No    Treatment Team Recommendation: Home  Discharge Destination Plan:: Home  Transport at Discharge : Family     Additional Comments: Met with pt to discuss the role of CM and to discuss any help pt may need prior to dc. Pt lives with her son and daughter in-law in an in-law suite next to their home. Pt lives on the 2nd floor with [address]. Pt performed ADL's indptly pta, pt uses a cane and home 02 through [employer]. Pt has a hx of -HHC. No hx of rehab. No hx of mental health or D&A treatment. Pt's preferred pharmacy is [facility] in [place]. Pt does not drive. Pt's damily will transport home at dc.

## 2024-05-13 NOTE — ASSESSMENT & PLAN NOTE
Likely reactive  Chronic history since March  Afebrile, not meeting additional SIRS  WBC stable at 9.56 today off abx

## 2024-05-14 ENCOUNTER — ANESTHESIA (INPATIENT)
Dept: GASTROENTEROLOGY | Facility: HOSPITAL | Age: 89
DRG: 392 | End: 2024-05-14
Payer: COMMERCIAL

## 2024-05-14 ENCOUNTER — ANESTHESIA EVENT (INPATIENT)
Dept: GASTROENTEROLOGY | Facility: HOSPITAL | Age: 89
DRG: 392 | End: 2024-05-14
Payer: COMMERCIAL

## 2024-05-14 ENCOUNTER — APPOINTMENT (INPATIENT)
Dept: GASTROENTEROLOGY | Facility: HOSPITAL | Age: 89
DRG: 392 | End: 2024-05-14
Payer: COMMERCIAL

## 2024-05-14 LAB
ANION GAP SERPL CALCULATED.3IONS-SCNC: 8 MMOL/L (ref 4–13)
BASOPHILS # BLD AUTO: 0.03 THOUSANDS/ÂΜL (ref 0–0.1)
BASOPHILS NFR BLD AUTO: 0 % (ref 0–1)
BUN SERPL-MCNC: 14 MG/DL (ref 5–25)
CALCIUM SERPL-MCNC: 9 MG/DL (ref 8.4–10.2)
CHLORIDE SERPL-SCNC: 109 MMOL/L (ref 96–108)
CO2 SERPL-SCNC: 25 MMOL/L (ref 21–32)
CREAT SERPL-MCNC: 1.36 MG/DL (ref 0.6–1.3)
EOSINOPHIL # BLD AUTO: 0.35 THOUSAND/ÂΜL (ref 0–0.61)
EOSINOPHIL NFR BLD AUTO: 3 % (ref 0–6)
ERYTHROCYTE [DISTWIDTH] IN BLOOD BY AUTOMATED COUNT: 14.1 % (ref 11.6–15.1)
GFR SERPL CREATININE-BSD FRML MDRD: 34 ML/MIN/1.73SQ M
GLUCOSE SERPL-MCNC: 121 MG/DL (ref 65–140)
GLUCOSE SERPL-MCNC: 125 MG/DL (ref 65–140)
GLUCOSE SERPL-MCNC: 134 MG/DL (ref 65–140)
GLUCOSE SERPL-MCNC: 161 MG/DL (ref 65–140)
GLUCOSE SERPL-MCNC: 164 MG/DL (ref 65–140)
HCT VFR BLD AUTO: 41.3 % (ref 34.8–46.1)
HGB BLD-MCNC: 12.8 G/DL (ref 11.5–15.4)
IMM GRANULOCYTES # BLD AUTO: 0.04 THOUSAND/UL (ref 0–0.2)
IMM GRANULOCYTES NFR BLD AUTO: 0 % (ref 0–2)
LYMPHOCYTES # BLD AUTO: 4.4 THOUSANDS/ÂΜL (ref 0.6–4.47)
LYMPHOCYTES NFR BLD AUTO: 43 % (ref 14–44)
MCH RBC QN AUTO: 29.6 PG (ref 26.8–34.3)
MCHC RBC AUTO-ENTMCNC: 31 G/DL (ref 31.4–37.4)
MCV RBC AUTO: 96 FL (ref 82–98)
MONOCYTES # BLD AUTO: 0.89 THOUSAND/ÂΜL (ref 0.17–1.22)
MONOCYTES NFR BLD AUTO: 9 % (ref 4–12)
NEUTROPHILS # BLD AUTO: 4.46 THOUSANDS/ÂΜL (ref 1.85–7.62)
NEUTS SEG NFR BLD AUTO: 45 % (ref 43–75)
NRBC BLD AUTO-RTO: 0 /100 WBCS
PLATELET # BLD AUTO: 167 THOUSANDS/UL (ref 149–390)
PMV BLD AUTO: 10 FL (ref 8.9–12.7)
POTASSIUM SERPL-SCNC: 3.3 MMOL/L (ref 3.5–5.3)
RBC # BLD AUTO: 4.32 MILLION/UL (ref 3.81–5.12)
SODIUM SERPL-SCNC: 142 MMOL/L (ref 135–147)
WBC # BLD AUTO: 10.17 THOUSAND/UL (ref 4.31–10.16)

## 2024-05-14 PROCEDURE — 99232 SBSQ HOSP IP/OBS MODERATE 35: CPT

## 2024-05-14 PROCEDURE — 43239 EGD BIOPSY SINGLE/MULTIPLE: CPT | Performed by: INTERNAL MEDICINE

## 2024-05-14 PROCEDURE — 80048 BASIC METABOLIC PNL TOTAL CA: CPT | Performed by: INTERNAL MEDICINE

## 2024-05-14 PROCEDURE — 85025 COMPLETE CBC W/AUTO DIFF WBC: CPT | Performed by: INTERNAL MEDICINE

## 2024-05-14 PROCEDURE — 82948 REAGENT STRIP/BLOOD GLUCOSE: CPT

## 2024-05-14 PROCEDURE — 99239 HOSP IP/OBS DSCHRG MGMT >30: CPT | Performed by: PHYSICIAN ASSISTANT

## 2024-05-14 PROCEDURE — 0DB68ZX EXCISION OF STOMACH, VIA NATURAL OR ARTIFICIAL OPENING ENDOSCOPIC, DIAGNOSTIC: ICD-10-PCS | Performed by: INTERNAL MEDICINE

## 2024-05-14 PROCEDURE — 88305 TISSUE EXAM BY PATHOLOGIST: CPT | Performed by: PATHOLOGY

## 2024-05-14 RX ORDER — PROPOFOL 10 MG/ML
INJECTION, EMULSION INTRAVENOUS AS NEEDED
Status: DISCONTINUED | OUTPATIENT
Start: 2024-05-14 | End: 2024-05-14

## 2024-05-14 RX ORDER — PANTOPRAZOLE SODIUM 40 MG/1
40 TABLET, DELAYED RELEASE ORAL
Status: DISCONTINUED | OUTPATIENT
Start: 2024-05-14 | End: 2024-05-15 | Stop reason: HOSPADM

## 2024-05-14 RX ORDER — TORSEMIDE 20 MG/1
20 TABLET ORAL DAILY
Status: DISCONTINUED | OUTPATIENT
Start: 2024-05-15 | End: 2024-05-15 | Stop reason: HOSPADM

## 2024-05-14 RX ORDER — INSULIN GLARGINE 100 [IU]/ML
3 INJECTION, SOLUTION SUBCUTANEOUS
Status: DISCONTINUED | OUTPATIENT
Start: 2024-05-14 | End: 2024-05-15 | Stop reason: HOSPADM

## 2024-05-14 RX ORDER — FAMOTIDINE 20 MG/1
20 TABLET, FILM COATED ORAL
Status: DISCONTINUED | OUTPATIENT
Start: 2024-05-14 | End: 2024-05-15 | Stop reason: HOSPADM

## 2024-05-14 RX ORDER — SODIUM CHLORIDE, SODIUM LACTATE, POTASSIUM CHLORIDE, CALCIUM CHLORIDE 600; 310; 30; 20 MG/100ML; MG/100ML; MG/100ML; MG/100ML
INJECTION, SOLUTION INTRAVENOUS CONTINUOUS PRN
Status: DISCONTINUED | OUTPATIENT
Start: 2024-05-14 | End: 2024-05-14

## 2024-05-14 RX ORDER — POTASSIUM CHLORIDE 20 MEQ/1
20 TABLET, EXTENDED RELEASE ORAL ONCE
Status: COMPLETED | OUTPATIENT
Start: 2024-05-14 | End: 2024-05-14

## 2024-05-14 RX ADMIN — DOCUSATE SODIUM 100 MG: 100 CAPSULE, LIQUID FILLED ORAL at 17:28

## 2024-05-14 RX ADMIN — TRIMETHOBENZAMIDE HYDROCHLORIDE 200 MG: 100 INJECTION INTRAMUSCULAR at 08:22

## 2024-05-14 RX ADMIN — NEBIVOLOL 20 MG: 5 TABLET ORAL at 08:21

## 2024-05-14 RX ADMIN — POTASSIUM CHLORIDE 20 MEQ: 1500 TABLET, EXTENDED RELEASE ORAL at 11:02

## 2024-05-14 RX ADMIN — INSULIN LISPRO 1 UNITS: 100 INJECTION, SOLUTION INTRAVENOUS; SUBCUTANEOUS at 23:45

## 2024-05-14 RX ADMIN — PROPOFOL 100 MG: 10 INJECTION, EMULSION INTRAVENOUS at 09:31

## 2024-05-14 RX ADMIN — NIFEDIPINE 90 MG: 30 TABLET, EXTENDED RELEASE ORAL at 08:22

## 2024-05-14 RX ADMIN — PROPOFOL 50 MG: 10 INJECTION, EMULSION INTRAVENOUS at 09:32

## 2024-05-14 RX ADMIN — SODIUM CHLORIDE, SODIUM LACTATE, POTASSIUM CHLORIDE, AND CALCIUM CHLORIDE: .6; .31; .03; .02 INJECTION, SOLUTION INTRAVENOUS at 09:29

## 2024-05-14 RX ADMIN — HEPARIN SODIUM 5000 UNITS: 5000 INJECTION, SOLUTION INTRAVENOUS; SUBCUTANEOUS at 23:45

## 2024-05-14 RX ADMIN — HEPARIN SODIUM 5000 UNITS: 5000 INJECTION, SOLUTION INTRAVENOUS; SUBCUTANEOUS at 13:50

## 2024-05-14 RX ADMIN — FAMOTIDINE 20 MG: 20 TABLET, FILM COATED ORAL at 23:45

## 2024-05-14 RX ADMIN — INSULIN GLARGINE 3 UNITS: 100 INJECTION, SOLUTION SUBCUTANEOUS at 23:48

## 2024-05-14 RX ADMIN — PANTOPRAZOLE SODIUM 40 MG: 40 TABLET, DELAYED RELEASE ORAL at 12:29

## 2024-05-14 RX ADMIN — INSULIN LISPRO 2 UNITS: 100 INJECTION, SOLUTION INTRAVENOUS; SUBCUTANEOUS at 17:28

## 2024-05-14 RX ADMIN — PRAVASTATIN SODIUM 20 MG: 20 TABLET ORAL at 17:28

## 2024-05-14 NOTE — ASSESSMENT & PLAN NOTE
Likely secondary to having missed oral antihypertensive medications due to GI symptoms  Resumed back on chronic meds:   Clonidine patch weekly  Nebivolol  Nifedipine   PO torsemide held given poor PO intake, likely resume tomorrow  Added as needed IV hydralazine  SBP much improved

## 2024-05-14 NOTE — PLAN OF CARE
Problem: PAIN - ADULT  Goal: Verbalizes/displays adequate comfort level or baseline comfort level  Description: Interventions:  - Encourage patient to monitor pain and request assistance  - Assess pain using appropriate pain scale  - Administer analgesics based on type and severity of pain and evaluate response  - Implement non-pharmacological measures as appropriate and evaluate response  - Consider cultural and social influences on pain and pain management  - Notify physician/advanced practitioner if interventions unsuccessful or patient reports new pain  Outcome: Progressing     Problem: INFECTION - ADULT  Goal: Absence or prevention of progression during hospitalization  Description: INTERVENTIONS:  - Assess and monitor for signs and symptoms of infection  - Monitor lab/diagnostic results  - Monitor all insertion sites, i.e. indwelling lines, tubes, and drains  - Monitor endotracheal if appropriate and nasal secretions for changes in amount and color  - [place] appropriate cooling/warming therapies per order  - Administer medications as ordered  - Instruct and encourage patient and family to use good hand hygiene technique  - Identify and instruct in appropriate isolation precautions for identified infection/condition  Outcome: Progressing  Goal: Absence of fever/infection during neutropenic period  Description: INTERVENTIONS:  - Monitor WBC    Outcome: Progressing     Problem: SAFETY ADULT  Goal: Patient will remain free of falls  Description: INTERVENTIONS:  - Educate patient/family on patient safety including physical limitations  - Instruct patient to call for assistance with activity   - Consult OT/PT to assist with strengthening/mobility   - Keep Call bell within reach  - Keep bed low and locked with side rails adjusted as appropriate  - Keep care items and personal belongings within reach  - Initiate and maintain comfort rounds  - Make Fall Risk Sign visible to staff  - Offer Toileting every 2 Hours,  in advance of need  - Initiate/Maintain bed alarm  - Obtain necessary fall risk management equipment: socks  - Apply yellow socks and bracelet for high fall risk patients  - Consider moving patient to room near nurses station  Outcome: Progressing  Goal: Maintain or return to baseline ADL function  Description: INTERVENTIONS:  -  Assess patient's ability to carry out ADLs; assess patient's baseline for ADL function and identify physical deficits which impact ability to perform ADLs (bathing, care of mouth/teeth, toileting, grooming, dressing, etc.)  - Assess/evaluate cause of self-care deficits   - Assess range of motion  - Assess patient's mobility; develop plan if impaired  - Assess patient's need for assistive devices and provide as appropriate  - Encourage maximum independence but intervene and supervise when necessary  - Involve family in performance of ADLs  - Assess for home care needs following discharge   - Consider OT consult to assist with ADL evaluation and planning for discharge  - Provide patient education as appropriate  Outcome: Progressing  Goal: Maintains/Returns to pre admission functional level  Description: INTERVENTIONS:  - Perform AM-PAC 6 Click Basic Mobility/ Daily Activity assessment daily.  - Set and communicate daily mobility goal to care team and patient/family/caregiver.   - Collaborate with rehabilitation services on mobility goals if consulted  - Perform Range of Motion 3 times a day.  - Reposition patient every 2 hours.  - Dangle patient 3 times a day  - Stand patient 3 times a day  - Ambulate patient 3 times a day  - Out of bed to chair 3 times a day   - Out of bed for meals 3 times a day  - Out of bed for toileting  - Record patient progress and toleration of activity level   Outcome: Progressing     Problem: DISCHARGE PLANNING  Goal: Discharge to home or other facility with appropriate resources  Description: INTERVENTIONS:  - Identify barriers to discharge w/patient and  caregiver  - Arrange for needed discharge resources and transportation as appropriate  - Identify discharge learning needs (meds, wound care, etc.)  - Arrange for interpretive services to assist at discharge as needed  - Refer to Case Management Department for coordinating discharge planning if the patient needs post-hospital services based on physician/advanced practitioner order or complex needs related to functional status, cognitive ability, or social support system  Outcome: Progressing     Problem: Knowledge Deficit  Goal: Patient/family/caregiver demonstrates understanding of disease process, treatment plan, medications, and discharge instructions  Description: Complete learning assessment and assess knowledge base.  Interventions:  - Provide teaching at level of understanding  - Provide teaching via preferred learning methods  Outcome: Progressing     Problem: GASTROINTESTINAL - ADULT  Goal: Minimal or absence of nausea and/or vomiting  Description: INTERVENTIONS:  - Administer IV fluids if ordered to ensure adequate hydration  - Maintain NPO status until nausea and vomiting are resolved  - Nasogastric tube if ordered  - Administer ordered antiemetic medications as needed  - Provide nonpharmacologic comfort measures as appropriate  - Advance diet as tolerated, if ordered  - Consider nutrition services referral to assist patient with adequate nutrition and appropriate food choices  Outcome: Progressing  Goal: Maintains or returns to baseline bowel function  Description: INTERVENTIONS:  - Assess bowel function  - Encourage oral fluids to ensure adequate hydration  - Administer IV fluids if ordered to ensure adequate hydration  - Administer ordered medications as needed  - Encourage mobilization and activity  - Consider nutritional services referral to assist patient with adequate nutrition and appropriate food choices  Outcome: Progressing  Goal: Maintains adequate nutritional intake  Description:  INTERVENTIONS:  - Monitor percentage of each meal consumed  - Identify factors contributing to decreased intake, treat as appropriate  - Assist with meals as needed  - Monitor I&O, weight, and lab values if indicated  - Obtain nutrition services referral as needed  Outcome: Progressing  Goal: Establish and maintain optimal ostomy function  Description: INTERVENTIONS:  - Assess bowel function  - Encourage oral fluids to ensure adequate hydration  - Administer IV fluids if ordered to ensure adequate hydration   - Administer ordered medications as needed  - Encourage mobilization and activity  - Nutrition services referral to assist patient with appropriate food choices  - Assess stoma site  - Consider wound care consult   Outcome: Progressing  Goal: Oral mucous membranes remain intact  Description: INTERVENTIONS  - Assess oral mucosa and hygiene practices  - Implement preventative oral hygiene regimen  - Implement oral medicated treatments as ordered  - Initiate Nutrition services referral as needed  Outcome: Progressing     Problem: MUSCULOSKELETAL - ADULT  Goal: Maintain or return mobility to safest level of function  Description: INTERVENTIONS:  - Assess patient's ability to carry out ADLs; assess patient's baseline for ADL function and identify physical deficits which impact ability to perform ADLs (bathing, care of mouth/teeth, toileting, grooming, dressing, etc.)  - Assess/evaluate cause of self-care deficits   - Assess range of motion  - Assess patient's mobility  - Assess patient's need for assistive devices and provide as appropriate  - Encourage maximum independence but intervene and supervise when necessary  - Involve family in performance of ADLs  - Assess for home care needs following discharge   - Consider OT consult to assist with ADL evaluation and planning for discharge  - Provide patient education as appropriate  Outcome: Progressing  Goal: Maintain proper alignment of affected body  part  Description: INTERVENTIONS:  - Support, maintain and protect limb and body alignment  - Provide patient/ family with appropriate education  Outcome: Progressing     Problem: Nutrition/Hydration-ADULT  Goal: Nutrient/Hydration intake appropriate for improving, restoring or maintaining nutritional needs  Description: Monitor and assess patient's nutrition/hydration status for malnutrition. Collaborate with interdisciplinary team and initiate plan and interventions as ordered.  Monitor patient's weight and dietary intake as ordered or per policy. Utilize nutrition screening tool and intervene as necessary. Determine patient's food preferences and provide high-protein, high-caloric foods as appropriate.     INTERVENTIONS:  - Monitor oral intake, urinary output, labs, and treatment plans  - Assess nutrition and hydration status and recommend course of action  - Evaluate amount of meals eaten  - Assist patient with eating if necessary   - Allow adequate time for meals  - Recommend/ encourage appropriate diets, oral nutritional supplements, and vitamin/mineral supplements  - Order, calculate, and assess calorie counts as needed  - Recommend, monitor, and adjust tube feedings and TPN/PPN based on assessed needs  - Assess need for intravenous fluids  - Provide specific nutrition/hydration education as appropriate  - Include patient/family/caregiver in decisions related to nutrition  Outcome: Progressing     Problem: Potential for Falls  Goal: Patient will remain free of falls  Description: INTERVENTIONS:  - Educate patient/family on patient safety including physical limitations  - Instruct patient to call for assistance with activity   - Consult OT/PT to assist with strengthening/mobility   - Keep Call bell within reach  - Keep bed low and locked with side rails adjusted as appropriate  - Keep care items and personal belongings within reach  - Initiate and maintain comfort rounds  - Make Fall Risk Sign visible to  staff  - Offer Toileting every 2 Hours, in advance of need  - Initiate/Maintain bed alarm  - Obtain necessary fall risk management equipment: socks  - Apply yellow socks and bracelet for high fall risk patients  - Consider moving patient to room near nurses station  Outcome: Progressing

## 2024-05-14 NOTE — ASSESSMENT & PLAN NOTE
Lab Results   Component Value Date    HGBA1C 8.0 (H) 04/22/2024       Recent Labs     05/13/24 2023 05/14/24  0735 05/14/24  1113 05/14/24  1613   POCGLU 107 125 121 161*         Resume Lantus reduced to 3 units as patient now tolerating p.o. intake  Place on ISS  Advance to outpt regimen upon advancement of diet

## 2024-05-14 NOTE — ASSESSMENT & PLAN NOTE
Lab Results   Component Value Date    EGFR 34 05/14/2024    EGFR 30 05/13/2024    EGFR 31 05/12/2024    CREATININE 1.36 (H) 05/14/2024    CREATININE 1.51 (H) 05/13/2024    CREATININE 1.49 (H) 05/12/2024   Creatinine within baseline range  Continue to monitor

## 2024-05-14 NOTE — PROGRESS NOTES
[facility]  Progress Note  Name: Ena Ahumada I  MRN: [id number]  Unit/Bed#: -01 I Date of Admission: 5/11/2024   Date of Service: 5/14/2024 I Hospital Day: 2    Assessment/Plan   * Nausea & vomiting  Assessment & Plan  Possibly secondary to viral gastroenteritis  CT A/P: no acute intra-abdominal pathology   Covid negative   GI consulted   Continue PRN tigan + scopolamine patch   EGD with mild esophagitis   Initiated on protonix 40 mg daily   Diet advanced, if no further nausea anticipate dc tomorrow morning    Chronic respiratory failure with hypoxia (HCC)  Assessment & Plan  Initiated during last admission in March secondary to pneumonia, is in the process of being weaned off.  At baseline on 2 L as needed  CXR unremarkable    Hypertensive urgency  Assessment & Plan  Likely secondary to having missed oral antihypertensive medications due to GI symptoms  Resumed back on chronic meds:   Clonidine patch weekly  Nebivolol  Nifedipine   PO torsemide held given poor PO intake, likely resume tomorrow  Added as needed IV hydralazine  SBP much improved    Prolonged Q-T interval on ECG  Assessment & Plan  Likely secondary to hypokalemia  Telemetry monitoring  Repeat EKG today  Repleted potassium accordingly  Avoid QT prolongation agents    Leukocytosis  Assessment & Plan  Likely reactive  Chronic history since March  Afebrile, not meeting additional SIRS    Closed compression fracture of L4 lumbar vertebra, sequela  Assessment & Plan  Known history to patient and her family  Baseline chronic low back pain  Denies any recent trauma or change in back pain at this time    Stage 3b chronic kidney disease (CKD) (HCC)  Assessment & Plan  Lab Results   Component Value Date    EGFR 34 05/14/2024    EGFR 30 05/13/2024    EGFR 31 05/12/2024    CREATININE 1.36 (H) 05/14/2024    CREATININE 1.51 (H) 05/13/2024    CREATININE 1.49 (H) 05/12/2024   Creatinine within baseline range  Continue to  monitor    Type 2 diabetes mellitus with stage 4 chronic kidney disease, without long-term current use of insulin ([facility])  Assessment & Plan  Lab Results   Component Value Date    HGBA1C 8.0 (H) 2024       Recent Labs     24  0735 24  1113 24  1613   POCGLU 107 125 121 161*         Resume Lantus reduced to 3 units as patient now tolerating p.o. intake  Place on ISS  Advance to outpt regimen upon advancement of diet               VTE Pharmacologic Prophylaxis: VTE Score: 4 Moderate Risk (Score 3-4) - Pharmacological DVT Prophylaxis Ordered: heparin.    Mobility:   Basic Mobility Inpatient Raw Score: 19  JH-HLM Goal: 6: Walk 10 steps or more  JH-HLM Achieved: 7: Walk 25 feet or more  JH-HLM Goal achieved. Continue to encourage appropriate mobility.    Patient Centered Rounds: I performed bedside rounds with nursing staff today.   Discussions with Specialists or Other Care Team Provider: GI     Education and Discussions with Family / Patient: Updated  (son and daughter in law) at bedside.    Total Time Spent on Date of Encounter in care of patient: 35 mins. This time was spent on one or more of the following: performing physical exam; counseling and coordination of care; obtaining or reviewing history; documenting in the medical record; reviewing/ordering tests, medications or procedures; communicating with other healthcare professionals and discussing with patient's family/caregivers.    Current Length of Stay: 2 day(s)  Current Patient Status: Inpatient   Certification Statement: The patient will continue to require additional inpatient hospital stay due to monitoring   Discharge Plan: Anticipate discharge tomorrow to home.    Code Status: Level 1 - Full Code    Subjective:   Patient much improved today, no further N/V. Eating dinner so far without complaints.     Objective:     Vitals:   Temp (24hrs), Av.9 °F (36.6 °C), Min:97.5 °F (36.4 °C), Max:98.5 °F (36.9  °C)    Temp:  [97.5 °F (36.4 °C)-98.5 °F (36.9 °C)] 97.7 °F (36.5 °C)  HR:  [58-73] 69  Resp:  [14-18] 16  BP: (159-207)/(70-89) 176/73  SpO2:  [92 %-97 %] 94 %  Body mass index is 29.26 kg/m².     Input and Output Summary (last 24 hours):     Intake/Output Summary (Last 24 hours) at 5/14/2024 1800  Last data filed at 5/14/2024 1333  Gross per 24 hour   Intake 930 ml   Output 400 ml   Net 530 ml       Physical Exam:   Physical Exam  Vitals and nursing note reviewed.   Constitutional:       General: She is not in acute distress.     Appearance: She is well-developed. She is not ill-appearing.   HENT:      Head: Normocephalic and atraumatic.   Eyes:      General:         Right eye: No discharge.         Left eye: No discharge.      Extraocular Movements: Extraocular movements intact.      Conjunctiva/sclera: Conjunctivae normal.   Cardiovascular:      Rate and Rhythm: Normal rate and regular rhythm.      Heart sounds: No murmur heard.  Pulmonary:      Effort: Pulmonary effort is normal. No respiratory distress.      Breath sounds: Normal breath sounds. No wheezing, rhonchi or rales.   Abdominal:      General: Bowel sounds are normal. There is no distension.      Palpations: Abdomen is soft.      Tenderness: There is no abdominal tenderness.   Musculoskeletal:      Cervical back: Neck supple.      Right lower leg: No edema.      Left lower leg: No edema.   Skin:     General: Skin is warm and dry.      Capillary Refill: Capillary refill takes less than 2 seconds.   Neurological:      General: No focal deficit present.      Mental Status: She is alert and oriented to person, place, and time. Mental status is at baseline.      Cranial Nerves: No cranial nerve deficit.   Psychiatric:         Mood and Affect: Mood normal.         Behavior: Behavior normal.          Additional Data:     Labs:  Results from last 7 days   Lab Units 05/14/24  0322   WBC Thousand/uL 10.17*   HEMOGLOBIN g/dL 12.8   HEMATOCRIT % 41.3   PLATELETS  Thousands/uL 167   SEGS PCT % 45   LYMPHO PCT % 43   MONO PCT % 9   EOS PCT % 3     Results from last 7 days   Lab Units 05/14/24  0322 05/12/24  0413 05/11/24  1920   SODIUM mmol/L 142   < > 141   POTASSIUM mmol/L 3.3*   < > 3.0*   CHLORIDE mmol/L 109*   < > 101   CO2 mmol/L 25   < > 26   BUN mg/dL 14   < > 19   CREATININE mg/dL 1.36*   < > 1.62*   ANION GAP mmol/L 8   < > 14*   CALCIUM mg/dL 9.0   < > 10.3*   ALBUMIN g/dL  --   --  4.4   TOTAL BILIRUBIN mg/dL  --   --  0.67   ALK PHOS U/L  --   --  55   ALT U/L  --   --  11   AST U/L  --   --  21   GLUCOSE RANDOM mg/dL 134   < > 120    < > = values in this interval not displayed.         Results from last 7 days   Lab Units 05/14/24  1613 05/14/24  1113 05/14/24  0735 05/13/24  2023 05/13/24  1616 05/13/24  1124 05/13/24  0722 05/12/24  2040 05/12/24  1612 05/12/24  1127 05/12/24  0639 05/12/24  0205   POC GLUCOSE mg/dl 161* 121 125 107 151* 174* 135 159* 146* 137 134 91               Lines/Drains:  Invasive Devices       Peripheral Intravenous Line  Duration             Peripheral IV 05/11/24 Proximal;Right;Ventral (anterior) Forearm 2 days                          Imaging: No pertinent imaging reviewed.    Recent Cultures (last 7 days):         Last 24 Hours Medication List:   Current Facility-Administered Medications   Medication Dose Route Frequency Provider Last Rate    acetaminophen  650 mg Oral Q6H PRN Angie Ramirez DO      aspirin  81 mg Oral Daily Angie Ramirez DO      Cholecalciferol  2,000 Units Oral Daily Angie Ramirez DO      cloNIDine  1 patch Transdermal Weekly Angie Ramirez DO      docusate sodium  100 mg Oral BID Sarah Eid PA-C      famotidine  20 mg Oral HS Maggie Dawkins MD      ferrous sulfate  325 mg Oral Once per day on Monday Wednesday Friday Angie Ramirez DO      heparin (porcine)  5,000 Units Subcutaneous Q8H [facility] Angie Ramirez DO      hydrALAZINE  10 mg Intravenous Q6H  PRN Angie Francisunmi James, DO      insulin lispro  1-5 Units Subcutaneous HS Angie Kaylin Ramirez, DO      insulin lispro  2-12 Units Subcutaneous TID AC Angie Ramirez, DO      nebivolol  20 mg Oral Daily Angie Francisunrodrigo Ramirez, DO      NIFEdipine  90 mg Oral Daily Angie Francisunrodrigo aRmirez, DO      pantoprazole  40 mg Oral Early Morning Angelina Osborn PA-C      pravastatin  20 mg Oral Daily With Dinner Angie Ramirez, DO      scopolamine  1 patch Transdermal Q72H Angleina Osborn PA-C      trimethobenzamide  200 mg Intramuscular Q12H PRN Angie Ramirez, DO          Today, Patient Was Seen By: Angelina Osborn PA-C    **Please Note: This note may have been constructed using a voice recognition system.**

## 2024-05-14 NOTE — ASSESSMENT & PLAN NOTE
Possibly secondary to viral gastroenteritis  CT A/P: no acute intra-abdominal pathology   Covid negative   GI consulted   Continue PRN tigan + scopolamine patch   EGD with mild esophagitis   Initiated on protonix 40 mg daily   Diet advanced, if no further nausea anticipate dc tomorrow morning

## 2024-05-14 NOTE — PLAN OF CARE
Problem: PAIN - ADULT  Goal: Verbalizes/displays adequate comfort level or baseline comfort level  Description: Interventions:  - Encourage patient to monitor pain and request assistance  - Assess pain using appropriate pain scale  - Administer analgesics based on type and severity of pain and evaluate response  - Implement non-pharmacological measures as appropriate and evaluate response  - Consider cultural and social influences on pain and pain management  - Notify physician/advanced practitioner if interventions unsuccessful or patient reports new pain  Outcome: Progressing     Problem: INFECTION - ADULT  Goal: Absence or prevention of progression during hospitalization  Description: INTERVENTIONS:  - Assess and monitor for signs and symptoms of infection  - Monitor lab/diagnostic results  - Monitor all insertion sites, i.e. indwelling lines, tubes, and drains  - Monitor endotracheal if appropriate and nasal secretions for changes in amount and color  - [place] appropriate cooling/warming therapies per order  - Administer medications as ordered  - Instruct and encourage patient and family to use good hand hygiene technique  - Identify and instruct in appropriate isolation precautions for identified infection/condition  Outcome: Progressing  Goal: Absence of fever/infection during neutropenic period  Description: INTERVENTIONS:  - Monitor WBC    Outcome: Progressing     Problem: SAFETY ADULT  Goal: Patient will remain free of falls  Description: INTERVENTIONS:  - Educate patient/family on patient safety including physical limitations  - Instruct patient to call for assistance with activity   - Consult OT/PT to assist with strengthening/mobility   - Keep Call bell within reach  - Keep bed low and locked with side rails adjusted as appropriate  - Keep care items and personal belongings within reach  - Initiate and maintain comfort rounds  - Make Fall Risk Sign visible to staff  - Offer Toileting every 2 Hours,  in advance of need  - Initiate/Maintain bed alarm  - Obtain necessary fall risk management equipment: socks  - Apply yellow socks and bracelet for high fall risk patients  - Consider moving patient to room near nurses station  Outcome: Progressing  Goal: Maintain or return to baseline ADL function  Description: INTERVENTIONS:  -  Assess patient's ability to carry out ADLs; assess patient's baseline for ADL function and identify physical deficits which impact ability to perform ADLs (bathing, care of mouth/teeth, toileting, grooming, dressing, etc.)  - Assess/evaluate cause of self-care deficits   - Assess range of motion  - Assess patient's mobility; develop plan if impaired  - Assess patient's need for assistive devices and provide as appropriate  - Encourage maximum independence but intervene and supervise when necessary  - Involve family in performance of ADLs  - Assess for home care needs following discharge   - Consider OT consult to assist with ADL evaluation and planning for discharge  - Provide patient education as appropriate  Outcome: Progressing  Goal: Maintains/Returns to pre admission functional level  Description: INTERVENTIONS:  - Perform AM-PAC 6 Click Basic Mobility/ Daily Activity assessment daily.  - Set and communicate daily mobility goal to care team and patient/family/caregiver.   - Collaborate with rehabilitation services on mobility goals if consulted  - Perform Range of Motion 3 times a day.  - Reposition patient every 2 hours.  - Dangle patient 3 times a day  - Stand patient 3 times a day  - Ambulate patient 3 times a day  - Out of bed to chair 3 times a day   - Out of bed for meals 3 times a day  - Out of bed for toileting  - Record patient progress and toleration of activity level   Outcome: Progressing     Problem: DISCHARGE PLANNING  Goal: Discharge to home or other facility with appropriate resources  Description: INTERVENTIONS:  - Identify barriers to discharge w/patient and  caregiver  - Arrange for needed discharge resources and transportation as appropriate  - Identify discharge learning needs (meds, wound care, etc.)  - Arrange for interpretive services to assist at discharge as needed  - Refer to Case Management Department for coordinating discharge planning if the patient needs post-hospital services based on physician/advanced practitioner order or complex needs related to functional status, cognitive ability, or social support system  Outcome: Progressing     Problem: Knowledge Deficit  Goal: Patient/family/caregiver demonstrates understanding of disease process, treatment plan, medications, and discharge instructions  Description: Complete learning assessment and assess knowledge base.  Interventions:  - Provide teaching at level of understanding  - Provide teaching via preferred learning methods  Outcome: Progressing     Problem: GASTROINTESTINAL - ADULT  Goal: Minimal or absence of nausea and/or vomiting  Description: INTERVENTIONS:  - Administer IV fluids if ordered to ensure adequate hydration  - Maintain NPO status until nausea and vomiting are resolved  - Nasogastric tube if ordered  - Administer ordered antiemetic medications as needed  - Provide nonpharmacologic comfort measures as appropriate  - Advance diet as tolerated, if ordered  - Consider nutrition services referral to assist patient with adequate nutrition and appropriate food choices  Outcome: Progressing  Goal: Maintains or returns to baseline bowel function  Description: INTERVENTIONS:  - Assess bowel function  - Encourage oral fluids to ensure adequate hydration  - Administer IV fluids if ordered to ensure adequate hydration  - Administer ordered medications as needed  - Encourage mobilization and activity  - Consider nutritional services referral to assist patient with adequate nutrition and appropriate food choices  Outcome: Progressing  Goal: Maintains adequate nutritional intake  Description:  INTERVENTIONS:  - Monitor percentage of each meal consumed  - Identify factors contributing to decreased intake, treat as appropriate  - Assist with meals as needed  - Monitor I&O, weight, and lab values if indicated  - Obtain nutrition services referral as needed  Outcome: Progressing  Goal: Establish and maintain optimal ostomy function  Description: INTERVENTIONS:  - Assess bowel function  - Encourage oral fluids to ensure adequate hydration  - Administer IV fluids if ordered to ensure adequate hydration   - Administer ordered medications as needed  - Encourage mobilization and activity  - Nutrition services referral to assist patient with appropriate food choices  - Assess stoma site  - Consider wound care consult   Outcome: Progressing  Goal: Oral mucous membranes remain intact  Description: INTERVENTIONS  - Assess oral mucosa and hygiene practices  - Implement preventative oral hygiene regimen  - Implement oral medicated treatments as ordered  - Initiate Nutrition services referral as needed  Outcome: Progressing     Problem: MUSCULOSKELETAL - ADULT  Goal: Maintain or return mobility to safest level of function  Description: INTERVENTIONS:  - Assess patient's ability to carry out ADLs; assess patient's baseline for ADL function and identify physical deficits which impact ability to perform ADLs (bathing, care of mouth/teeth, toileting, grooming, dressing, etc.)  - Assess/evaluate cause of self-care deficits   - Assess range of motion  - Assess patient's mobility  - Assess patient's need for assistive devices and provide as appropriate  - Encourage maximum independence but intervene and supervise when necessary  - Involve family in performance of ADLs  - Assess for home care needs following discharge   - Consider OT consult to assist with ADL evaluation and planning for discharge  - Provide patient education as appropriate  Outcome: Progressing  Goal: Maintain proper alignment of affected body  part  Description: INTERVENTIONS:  - Support, maintain and protect limb and body alignment  - Provide patient/ family with appropriate education  Outcome: Progressing     Problem: Nutrition/Hydration-ADULT  Goal: Nutrient/Hydration intake appropriate for improving, restoring or maintaining nutritional needs  Description: Monitor and assess patient's nutrition/hydration status for malnutrition. Collaborate with interdisciplinary team and initiate plan and interventions as ordered.  Monitor patient's weight and dietary intake as ordered or per policy. Utilize nutrition screening tool and intervene as necessary. Determine patient's food preferences and provide high-protein, high-caloric foods as appropriate.     INTERVENTIONS:  - Monitor oral intake, urinary output, labs, and treatment plans  - Assess nutrition and hydration status and recommend course of action  - Evaluate amount of meals eaten  - Assist patient with eating if necessary   - Allow adequate time for meals  - Recommend/ encourage appropriate diets, oral nutritional supplements, and vitamin/mineral supplements  - Order, calculate, and assess calorie counts as needed  - Recommend, monitor, and adjust tube feedings and TPN/PPN based on assessed needs  - Assess need for intravenous fluids  - Provide specific nutrition/hydration education as appropriate  - Include patient/family/caregiver in decisions related to nutrition  Outcome: Progressing     Problem: Potential for Falls  Goal: Patient will remain free of falls  Description: INTERVENTIONS:  - Educate patient/family on patient safety including physical limitations  - Instruct patient to call for assistance with activity   - Consult OT/PT to assist with strengthening/mobility   - Keep Call bell within reach  - Keep bed low and locked with side rails adjusted as appropriate  - Keep care items and personal belongings within reach  - Initiate and maintain comfort rounds  - Make Fall Risk Sign visible to  staff  - Offer Toileting every 2 Hours, in advance of need  - Initiate/Maintain bed alarm  - Obtain necessary fall risk management equipment: socks  - Apply yellow socks and bracelet for high fall risk patients  - Consider moving patient to room near nurses station  Outcome: Progressing

## 2024-05-14 NOTE — ANESTHESIA POSTPROCEDURE EVALUATION
Post-Op Assessment Note    CV Status:  Stable  Pain Score: 0    Pain management: adequate       Mental Status:  Alert and sleepy   Hydration Status:  Euvolemic   PONV Controlled:  Controlled   Airway Patency:  Patent     Post Op Vitals Reviewed: Yes    No anethesia notable event occurred.    Staff: CRNA               /70 (05/14/24 0939)    Temp      Pulse 60 (05/14/24 0939)   Resp 16 (05/14/24 0939)    SpO2 93 % (05/14/24 0939)

## 2024-05-14 NOTE — ANESTHESIA PREPROCEDURE EVALUATION
Procedure:  EGD    Relevant Problems   CARDIO   (+) Hypertension   (+) Hypertensive urgency   (+) Nonrheumatic aortic valve stenosis   (+) Premature ventricular contraction      ENDO   (+) Type 2 diabetes mellitus with chronic kidney disease, with long-term current use of insulin (HCC)   (+) Type 2 diabetes mellitus with stage 4 chronic kidney disease, without long-term current use of insulin (HCC)      GI/HEPATIC   (+) GERD (gastroesophageal reflux disease)      /RENAL   (+) NELA (acute kidney injury) (HCC)   (+) Chronic kidney disease-mineral and bone disorder   (+) Stage 3b chronic kidney disease (CKD) (HCC)      HEMATOLOGY   (+) Anemia      MUSCULOSKELETAL   (+) Chronic back pain   (+) Degenerative disc disease, cervical   (+) Sacroiliitis (HCC)      NEURO/PSYCH   (+) Anxiety   (+) Chronic back pain   (+) Chronic pain syndrome   (+) Diabetic polyneuropathy (HCC)      PULMONARY   (+) Chronic respiratory failure with hypoxia (HCC)      Other   (+) Dyslipidemia        Physical Exam    Airway    Mallampati score: III  TM Distance: <3 FB  Neck ROM: full     Dental   Comment: None loose     Cardiovascular      Pulmonary      Other Findings  post-pubertal.      ECHO (4/2023)    Left Ventricle: Left ventricular cavity size is normal. There is mild concentric hypertrophy. The left ventricular ejection fraction is 65%. Systolic function is normal. Wall motion is normal. Diastolic function is mildly abnormal, consistent with grade I (abnormal) relaxation.    Right Ventricle: Right ventricular cavity size is normal. Systolic function is normal.    Left Atrium: The atrium is mildly dilated.    Right Atrium: The atrium is mildly dilated.    Aortic Valve: There is mild regurgitation. There is moderate stenosis.    Tricuspid Valve: There is mild regurgitation. The right ventricular systolic pressure is moderately elevated. The estimated right ventricular systolic pressure is 50.00 mmHg.    Prior TTE study available for  comparison. Prior study date: 8/16/2019. Changes noted when compared to prior study. Changes include: Aortic stenosis is now present..    Anesthesia Plan  ASA Score- 3     Anesthesia Type-          Additional Monitors:     Airway Plan:     Comment: NPO after MN.       Plan Factors-Exercise tolerance (METS): <4 METS.    Chart reviewed.    Patient summary reviewed.    Patient is not a current smoker.              Induction- intravenous.    Postoperative Plan-     Informed Consent- Anesthetic plan and risks discussed with patient.  I personally reviewed this patient with the CRNA. Discussed and agreed on the Anesthesia Plan with the CRNA..

## 2024-05-14 NOTE — QUICK NOTE
GI Procedure note    More details and pictures of procedure in the procedure tab in chart review.    IMPRESSION:  The duodenum appeared normal.  Moderate erythematous mucosa in the antrum; performed cold forceps biopsy to rule out H. pylori  2 cm hiatal hernia  Grade A esophagitis      RECOMMENDATION:   Await pathology results     Continue pantoprazole 40 mg once daily.  Advance diet as tolerated.  Start with clear liquids.  Will need to continue outpatient PPI.       Stefan Keenan MD     
18-Jul-2023 02:05

## 2024-05-15 ENCOUNTER — TRANSITIONAL CARE MANAGEMENT (OUTPATIENT)
Dept: FAMILY MEDICINE CLINIC | Facility: HOSPITAL | Age: 89
End: 2024-05-15

## 2024-05-15 ENCOUNTER — TELEPHONE (OUTPATIENT)
Dept: FAMILY MEDICINE CLINIC | Facility: HOSPITAL | Age: 89
End: 2024-05-15

## 2024-05-15 VITALS
OXYGEN SATURATION: 96 % | DIASTOLIC BLOOD PRESSURE: 66 MMHG | RESPIRATION RATE: 22 BRPM | HEIGHT: 62 IN | HEART RATE: 58 BPM | BODY MASS INDEX: 29.26 KG/M2 | SYSTOLIC BLOOD PRESSURE: 178 MMHG | TEMPERATURE: 97.9 F

## 2024-05-15 DIAGNOSIS — E11.22 TYPE 2 DIABETES MELLITUS WITH STAGE 4 CHRONIC KIDNEY DISEASE, WITHOUT LONG-TERM CURRENT USE OF INSULIN (HCC): Primary | ICD-10-CM

## 2024-05-15 DIAGNOSIS — N18.4 TYPE 2 DIABETES MELLITUS WITH STAGE 4 CHRONIC KIDNEY DISEASE, WITHOUT LONG-TERM CURRENT USE OF INSULIN (HCC): Primary | ICD-10-CM

## 2024-05-15 DIAGNOSIS — E87.6 HYPOKALEMIA: ICD-10-CM

## 2024-05-15 LAB
ANION GAP SERPL CALCULATED.3IONS-SCNC: 7 MMOL/L (ref 4–13)
ATRIAL RATE: 72 BPM
BASOPHILS # BLD AUTO: 0.04 THOUSANDS/ÂΜL (ref 0–0.1)
BASOPHILS NFR BLD AUTO: 0 % (ref 0–1)
BUN SERPL-MCNC: 16 MG/DL (ref 5–25)
CALCIUM SERPL-MCNC: 8.9 MG/DL (ref 8.4–10.2)
CHLORIDE SERPL-SCNC: 108 MMOL/L (ref 96–108)
CO2 SERPL-SCNC: 27 MMOL/L (ref 21–32)
CREAT SERPL-MCNC: 1.38 MG/DL (ref 0.6–1.3)
EOSINOPHIL # BLD AUTO: 0.36 THOUSAND/ÂΜL (ref 0–0.61)
EOSINOPHIL NFR BLD AUTO: 3 % (ref 0–6)
ERYTHROCYTE [DISTWIDTH] IN BLOOD BY AUTOMATED COUNT: 13.9 % (ref 11.6–15.1)
GFR SERPL CREATININE-BSD FRML MDRD: 34 ML/MIN/1.73SQ M
GLUCOSE SERPL-MCNC: 121 MG/DL (ref 65–140)
GLUCOSE SERPL-MCNC: 129 MG/DL (ref 65–140)
HCT VFR BLD AUTO: 39.9 % (ref 34.8–46.1)
HGB BLD-MCNC: 12.3 G/DL (ref 11.5–15.4)
IMM GRANULOCYTES # BLD AUTO: 0.08 THOUSAND/UL (ref 0–0.2)
IMM GRANULOCYTES NFR BLD AUTO: 1 % (ref 0–2)
LYMPHOCYTES # BLD AUTO: 5.15 THOUSANDS/ÂΜL (ref 0.6–4.47)
LYMPHOCYTES NFR BLD AUTO: 43 % (ref 14–44)
MCH RBC QN AUTO: 29 PG (ref 26.8–34.3)
MCHC RBC AUTO-ENTMCNC: 30.8 G/DL (ref 31.4–37.4)
MCV RBC AUTO: 94 FL (ref 82–98)
MONOCYTES # BLD AUTO: 1 THOUSAND/ÂΜL (ref 0.17–1.22)
MONOCYTES NFR BLD AUTO: 8 % (ref 4–12)
NEUTROPHILS # BLD AUTO: 5.41 THOUSANDS/ÂΜL (ref 1.85–7.62)
NEUTS SEG NFR BLD AUTO: 45 % (ref 43–75)
NRBC BLD AUTO-RTO: 0 /100 WBCS
P AXIS: 52 DEGREES
PLATELET # BLD AUTO: 181 THOUSANDS/UL (ref 149–390)
PMV BLD AUTO: 10.1 FL (ref 8.9–12.7)
POTASSIUM SERPL-SCNC: 3.1 MMOL/L (ref 3.5–5.3)
PR INTERVAL: 150 MS
QRS AXIS: -20 DEGREES
QRSD INTERVAL: 82 MS
QT INTERVAL: 424 MS
QTC INTERVAL: 464 MS
RBC # BLD AUTO: 4.24 MILLION/UL (ref 3.81–5.12)
SODIUM SERPL-SCNC: 142 MMOL/L (ref 135–147)
T WAVE AXIS: -28 DEGREES
VENTRICULAR RATE: 72 BPM
WBC # BLD AUTO: 12.04 THOUSAND/UL (ref 4.31–10.16)

## 2024-05-15 PROCEDURE — 99232 SBSQ HOSP IP/OBS MODERATE 35: CPT | Performed by: INTERNAL MEDICINE

## 2024-05-15 PROCEDURE — 99239 HOSP IP/OBS DSCHRG MGMT >30: CPT | Performed by: PHYSICIAN ASSISTANT

## 2024-05-15 PROCEDURE — 85025 COMPLETE CBC W/AUTO DIFF WBC: CPT | Performed by: INTERNAL MEDICINE

## 2024-05-15 PROCEDURE — 93010 ELECTROCARDIOGRAM REPORT: CPT | Performed by: INTERNAL MEDICINE

## 2024-05-15 PROCEDURE — 82948 REAGENT STRIP/BLOOD GLUCOSE: CPT

## 2024-05-15 PROCEDURE — 80048 BASIC METABOLIC PNL TOTAL CA: CPT | Performed by: INTERNAL MEDICINE

## 2024-05-15 RX ORDER — FAMOTIDINE 20 MG/1
20 TABLET, FILM COATED ORAL DAILY
Qty: 30 TABLET | Refills: 0 | Status: SHIPPED | OUTPATIENT
Start: 2024-05-15 | End: 2024-05-28

## 2024-05-15 RX ORDER — PANTOPRAZOLE SODIUM 40 MG/1
40 TABLET, DELAYED RELEASE ORAL
Qty: 30 TABLET | Refills: 0 | Status: SHIPPED | OUTPATIENT
Start: 2024-05-16 | End: 2024-05-28

## 2024-05-15 RX ORDER — POTASSIUM CHLORIDE 20 MEQ/1
40 TABLET, EXTENDED RELEASE ORAL ONCE
Status: COMPLETED | OUTPATIENT
Start: 2024-05-15 | End: 2024-05-15

## 2024-05-15 RX ORDER — NEBIVOLOL 20 MG/1
40 TABLET ORAL DAILY
Qty: 90 TABLET | Refills: 0 | Status: SHIPPED | OUTPATIENT
Start: 2024-05-15

## 2024-05-15 RX ORDER — SCOLOPAMINE TRANSDERMAL SYSTEM 1 MG/1
1 PATCH, EXTENDED RELEASE TRANSDERMAL
Qty: 12 PATCH | Refills: 0 | Status: SHIPPED | OUTPATIENT
Start: 2024-05-15

## 2024-05-15 RX ADMIN — PANTOPRAZOLE SODIUM 40 MG: 40 TABLET, DELAYED RELEASE ORAL at 06:30

## 2024-05-15 RX ADMIN — FERROUS SULFATE TAB 325 MG (65 MG ELEMENTAL FE) 325 MG: 325 (65 FE) TAB at 08:34

## 2024-05-15 RX ADMIN — NEBIVOLOL 20 MG: 5 TABLET ORAL at 08:33

## 2024-05-15 RX ADMIN — DOCUSATE SODIUM 100 MG: 100 CAPSULE, LIQUID FILLED ORAL at 08:34

## 2024-05-15 RX ADMIN — TORSEMIDE 20 MG: 20 TABLET ORAL at 08:34

## 2024-05-15 RX ADMIN — NIFEDIPINE 90 MG: 30 TABLET, EXTENDED RELEASE ORAL at 08:33

## 2024-05-15 RX ADMIN — ASPIRIN 81 MG: 81 TABLET, COATED ORAL at 08:34

## 2024-05-15 RX ADMIN — POTASSIUM CHLORIDE 40 MEQ: 1500 TABLET, EXTENDED RELEASE ORAL at 08:34

## 2024-05-15 RX ADMIN — Medication 2000 UNITS: at 08:34

## 2024-05-15 RX ADMIN — TRIMETHOBENZAMIDE HYDROCHLORIDE 200 MG: 100 INJECTION INTRAMUSCULAR at 08:34

## 2024-05-15 RX ADMIN — HEPARIN SODIUM 5000 UNITS: 5000 INJECTION, SOLUTION INTRAVENOUS; SUBCUTANEOUS at 06:30

## 2024-05-15 NOTE — ASSESSMENT & PLAN NOTE
Lab Results   Component Value Date    EGFR 34 05/15/2024    EGFR 34 05/14/2024    EGFR 30 05/13/2024    CREATININE 1.38 (H) 05/15/2024    CREATININE 1.36 (H) 05/14/2024    CREATININE 1.51 (H) 05/13/2024   Creatinine within baseline range  Continue to monitor

## 2024-05-15 NOTE — PLAN OF CARE
Problem: PAIN - ADULT  Goal: Verbalizes/displays adequate comfort level or baseline comfort level  Description: Interventions:  - Encourage patient to monitor pain and request assistance  - Assess pain using appropriate pain scale  - Administer analgesics based on type and severity of pain and evaluate response  - Implement non-pharmacological measures as appropriate and evaluate response  - Consider cultural and social influences on pain and pain management  - Notify physician/advanced practitioner if interventions unsuccessful or patient reports new pain  Outcome: Progressing     Problem: INFECTION - ADULT  Goal: Absence or prevention of progression during hospitalization  Description: INTERVENTIONS:  - Assess and monitor for signs and symptoms of infection  - Monitor lab/diagnostic results  - Monitor all insertion sites, i.e. indwelling lines, tubes, and drains  - Monitor endotracheal if appropriate and nasal secretions for changes in amount and color  - [place] appropriate cooling/warming therapies per order  - Administer medications as ordered  - Instruct and encourage patient and family to use good hand hygiene technique  - Identify and instruct in appropriate isolation precautions for identified infection/condition  Outcome: Progressing  Goal: Absence of fever/infection during neutropenic period  Description: INTERVENTIONS:  - Monitor WBC    Outcome: Progressing     Problem: SAFETY ADULT  Goal: Patient will remain free of falls  Description: INTERVENTIONS:  - Educate patient/family on patient safety including physical limitations  - Instruct patient to call for assistance with activity   - Consult OT/PT to assist with strengthening/mobility   - Keep Call bell within reach  - Keep bed low and locked with side rails adjusted as appropriate  - Keep care items and personal belongings within reach  - Initiate and maintain comfort rounds  - Make Fall Risk Sign visible to staff  - Offer Toileting every 2 Hours,  in advance of need  - Initiate/Maintain bed alarm  - Obtain necessary fall risk management equipment: socks  - Apply yellow socks and bracelet for high fall risk patients  - Consider moving patient to room near nurses station  Outcome: Progressing  Goal: Maintain or return to baseline ADL function  Description: INTERVENTIONS:  -  Assess patient's ability to carry out ADLs; assess patient's baseline for ADL function and identify physical deficits which impact ability to perform ADLs (bathing, care of mouth/teeth, toileting, grooming, dressing, etc.)  - Assess/evaluate cause of self-care deficits   - Assess range of motion  - Assess patient's mobility; develop plan if impaired  - Assess patient's need for assistive devices and provide as appropriate  - Encourage maximum independence but intervene and supervise when necessary  - Involve family in performance of ADLs  - Assess for home care needs following discharge   - Consider OT consult to assist with ADL evaluation and planning for discharge  - Provide patient education as appropriate  Outcome: Progressing  Goal: Maintains/Returns to pre admission functional level  Description: INTERVENTIONS:  - Perform AM-PAC 6 Click Basic Mobility/ Daily Activity assessment daily.  - Set and communicate daily mobility goal to care team and patient/family/caregiver.   - Collaborate with rehabilitation services on mobility goals if consulted  - Perform Range of Motion 3 times a day.  - Reposition patient every 2 hours.  - Dangle patient 3 times a day  - Stand patient 3 times a day  - Ambulate patient 3 times a day  - Out of bed to chair 3 times a day   - Out of bed for meals 3 times a day  - Out of bed for toileting  - Record patient progress and toleration of activity level   Outcome: Progressing     Problem: DISCHARGE PLANNING  Goal: Discharge to home or other facility with appropriate resources  Description: INTERVENTIONS:  - Identify barriers to discharge w/patient and  caregiver  - Arrange for needed discharge resources and transportation as appropriate  - Identify discharge learning needs (meds, wound care, etc.)  - Arrange for interpretive services to assist at discharge as needed  - Refer to Case Management Department for coordinating discharge planning if the patient needs post-hospital services based on physician/advanced practitioner order or complex needs related to functional status, cognitive ability, or social support system  Outcome: Progressing     Problem: Knowledge Deficit  Goal: Patient/family/caregiver demonstrates understanding of disease process, treatment plan, medications, and discharge instructions  Description: Complete learning assessment and assess knowledge base.  Interventions:  - Provide teaching at level of understanding  - Provide teaching via preferred learning methods  Outcome: Progressing     Problem: GASTROINTESTINAL - ADULT  Goal: Minimal or absence of nausea and/or vomiting  Description: INTERVENTIONS:  - Administer IV fluids if ordered to ensure adequate hydration  - Maintain NPO status until nausea and vomiting are resolved  - Nasogastric tube if ordered  - Administer ordered antiemetic medications as needed  - Provide nonpharmacologic comfort measures as appropriate  - Advance diet as tolerated, if ordered  - Consider nutrition services referral to assist patient with adequate nutrition and appropriate food choices  Outcome: Progressing  Goal: Maintains or returns to baseline bowel function  Description: INTERVENTIONS:  - Assess bowel function  - Encourage oral fluids to ensure adequate hydration  - Administer IV fluids if ordered to ensure adequate hydration  - Administer ordered medications as needed  - Encourage mobilization and activity  - Consider nutritional services referral to assist patient with adequate nutrition and appropriate food choices  Outcome: Progressing  Goal: Maintains adequate nutritional intake  Description:  INTERVENTIONS:  - Monitor percentage of each meal consumed  - Identify factors contributing to decreased intake, treat as appropriate  - Assist with meals as needed  - Monitor I&O, weight, and lab values if indicated  - Obtain nutrition services referral as needed  Outcome: Progressing  Goal: Establish and maintain optimal ostomy function  Description: INTERVENTIONS:  - Assess bowel function  - Encourage oral fluids to ensure adequate hydration  - Administer IV fluids if ordered to ensure adequate hydration   - Administer ordered medications as needed  - Encourage mobilization and activity  - Nutrition services referral to assist patient with appropriate food choices  - Assess stoma site  - Consider wound care consult   Outcome: Progressing  Goal: Oral mucous membranes remain intact  Description: INTERVENTIONS  - Assess oral mucosa and hygiene practices  - Implement preventative oral hygiene regimen  - Implement oral medicated treatments as ordered  - Initiate Nutrition services referral as needed  Outcome: Progressing     Problem: MUSCULOSKELETAL - ADULT  Goal: Maintain or return mobility to safest level of function  Description: INTERVENTIONS:  - Assess patient's ability to carry out ADLs; assess patient's baseline for ADL function and identify physical deficits which impact ability to perform ADLs (bathing, care of mouth/teeth, toileting, grooming, dressing, etc.)  - Assess/evaluate cause of self-care deficits   - Assess range of motion  - Assess patient's mobility  - Assess patient's need for assistive devices and provide as appropriate  - Encourage maximum independence but intervene and supervise when necessary  - Involve family in performance of ADLs  - Assess for home care needs following discharge   - Consider OT consult to assist with ADL evaluation and planning for discharge  - Provide patient education as appropriate  Outcome: Progressing  Goal: Maintain proper alignment of affected body  part  Description: INTERVENTIONS:  - Support, maintain and protect limb and body alignment  - Provide patient/ family with appropriate education  Outcome: Progressing     Problem: Nutrition/Hydration-ADULT  Goal: Nutrient/Hydration intake appropriate for improving, restoring or maintaining nutritional needs  Description: Monitor and assess patient's nutrition/hydration status for malnutrition. Collaborate with interdisciplinary team and initiate plan and interventions as ordered.  Monitor patient's weight and dietary intake as ordered or per policy. Utilize nutrition screening tool and intervene as necessary. Determine patient's food preferences and provide high-protein, high-caloric foods as appropriate.     INTERVENTIONS:  - Monitor oral intake, urinary output, labs, and treatment plans  - Assess nutrition and hydration status and recommend course of action  - Evaluate amount of meals eaten  - Assist patient with eating if necessary   - Allow adequate time for meals  - Recommend/ encourage appropriate diets, oral nutritional supplements, and vitamin/mineral supplements  - Order, calculate, and assess calorie counts as needed  - Recommend, monitor, and adjust tube feedings and TPN/PPN based on assessed needs  - Assess need for intravenous fluids  - Provide specific nutrition/hydration education as appropriate  - Include patient/family/caregiver in decisions related to nutrition  Outcome: Progressing     Problem: Potential for Falls  Goal: Patient will remain free of falls  Description: INTERVENTIONS:  - Educate patient/family on patient safety including physical limitations  - Instruct patient to call for assistance with activity   - Consult OT/PT to assist with strengthening/mobility   - Keep Call bell within reach  - Keep bed low and locked with side rails adjusted as appropriate  - Keep care items and personal belongings within reach  - Initiate and maintain comfort rounds  - Make Fall Risk Sign visible to  staff  - Offer Toileting every 2 Hours, in advance of need  - Initiate/Maintain bed alarm  - Obtain necessary fall risk management equipment: socks  - Apply yellow socks and bracelet for high fall risk patients  - Consider moving patient to room near nurses station  Outcome: Progressing

## 2024-05-15 NOTE — ASSESSMENT & PLAN NOTE
Initiated during last admission in March secondary to pneumonia, is in the process of being weaned off.  At baseline on 2 L as needed  CXR unremarkable

## 2024-05-15 NOTE — PLAN OF CARE
Problem: PAIN - ADULT  Goal: Verbalizes/displays adequate comfort level or baseline comfort level  Description: Interventions:  - Encourage patient to monitor pain and request assistance  - Assess pain using appropriate pain scale  - Administer analgesics based on type and severity of pain and evaluate response  - Implement non-pharmacological measures as appropriate and evaluate response  - Consider cultural and social influences on pain and pain management  - Notify physician/advanced practitioner if interventions unsuccessful or patient reports new pain  Outcome: Progressing     Problem: INFECTION - ADULT  Goal: Absence or prevention of progression during hospitalization  Description: INTERVENTIONS:  - Assess and monitor for signs and symptoms of infection  - Monitor lab/diagnostic results  - Monitor all insertion sites, i.e. indwelling lines, tubes, and drains  - Monitor endotracheal if appropriate and nasal secretions for changes in amount and color  - [place] appropriate cooling/warming therapies per order  - Administer medications as ordered  - Instruct and encourage patient and family to use good hand hygiene technique  - Identify and instruct in appropriate isolation precautions for identified infection/condition  Outcome: Progressing  Goal: Absence of fever/infection during neutropenic period  Description: INTERVENTIONS:  - Monitor WBC    Outcome: Progressing     Problem: SAFETY ADULT  Goal: Patient will remain free of falls  Description: INTERVENTIONS:  - Educate patient/family on patient safety including physical limitations  - Instruct patient to call for assistance with activity   - Consult OT/PT to assist with strengthening/mobility   - Keep Call bell within reach  - Keep bed low and locked with side rails adjusted as appropriate  - Keep care items and personal belongings within reach  - Initiate and maintain comfort rounds  - Make Fall Risk Sign visible to staff  - Offer Toileting every 2 Hours,  in advance of need  - Initiate/Maintain bed alarm  - Obtain necessary fall risk management equipment: socks  - Apply yellow socks and bracelet for high fall risk patients  - Consider moving patient to room near nurses station  Outcome: Progressing  Goal: Maintain or return to baseline ADL function  Description: INTERVENTIONS:  -  Assess patient's ability to carry out ADLs; assess patient's baseline for ADL function and identify physical deficits which impact ability to perform ADLs (bathing, care of mouth/teeth, toileting, grooming, dressing, etc.)  - Assess/evaluate cause of self-care deficits   - Assess range of motion  - Assess patient's mobility; develop plan if impaired  - Assess patient's need for assistive devices and provide as appropriate  - Encourage maximum independence but intervene and supervise when necessary  - Involve family in performance of ADLs  - Assess for home care needs following discharge   - Consider OT consult to assist with ADL evaluation and planning for discharge  - Provide patient education as appropriate  Outcome: Progressing  Goal: Maintains/Returns to pre admission functional level  Description: INTERVENTIONS:  - Perform AM-PAC 6 Click Basic Mobility/ Daily Activity assessment daily.  - Set and communicate daily mobility goal to care team and patient/family/caregiver.   - Collaborate with rehabilitation services on mobility goals if consulted  - Perform Range of Motion 3 times a day.  - Reposition patient every 2 hours.  - Dangle patient 3 times a day  - Stand patient 3 times a day  - Ambulate patient 3 times a day  - Out of bed to chair 3 times a day   - Out of bed for meals 3 times a day  - Out of bed for toileting  - Record patient progress and toleration of activity level   Outcome: Progressing     Problem: DISCHARGE PLANNING  Goal: Discharge to home or other facility with appropriate resources  Description: INTERVENTIONS:  - Identify barriers to discharge w/patient and  caregiver  - Arrange for needed discharge resources and transportation as appropriate  - Identify discharge learning needs (meds, wound care, etc.)  - Arrange for interpretive services to assist at discharge as needed  - Refer to Case Management Department for coordinating discharge planning if the patient needs post-hospital services based on physician/advanced practitioner order or complex needs related to functional status, cognitive ability, or social support system  Outcome: Progressing     Problem: Knowledge Deficit  Goal: Patient/family/caregiver demonstrates understanding of disease process, treatment plan, medications, and discharge instructions  Description: Complete learning assessment and assess knowledge base.  Interventions:  - Provide teaching at level of understanding  - Provide teaching via preferred learning methods  Outcome: Progressing     Problem: GASTROINTESTINAL - ADULT  Goal: Minimal or absence of nausea and/or vomiting  Description: INTERVENTIONS:  - Administer IV fluids if ordered to ensure adequate hydration  - Maintain NPO status until nausea and vomiting are resolved  - Nasogastric tube if ordered  - Administer ordered antiemetic medications as needed  - Provide nonpharmacologic comfort measures as appropriate  - Advance diet as tolerated, if ordered  - Consider nutrition services referral to assist patient with adequate nutrition and appropriate food choices  Outcome: Progressing  Goal: Maintains or returns to baseline bowel function  Description: INTERVENTIONS:  - Assess bowel function  - Encourage oral fluids to ensure adequate hydration  - Administer IV fluids if ordered to ensure adequate hydration  - Administer ordered medications as needed  - Encourage mobilization and activity  - Consider nutritional services referral to assist patient with adequate nutrition and appropriate food choices  Outcome: Progressing  Goal: Maintains adequate nutritional intake  Description:  INTERVENTIONS:  - Monitor percentage of each meal consumed  - Identify factors contributing to decreased intake, treat as appropriate  - Assist with meals as needed  - Monitor I&O, weight, and lab values if indicated  - Obtain nutrition services referral as needed  Outcome: Progressing  Goal: Establish and maintain optimal ostomy function  Description: INTERVENTIONS:  - Assess bowel function  - Encourage oral fluids to ensure adequate hydration  - Administer IV fluids if ordered to ensure adequate hydration   - Administer ordered medications as needed  - Encourage mobilization and activity  - Nutrition services referral to assist patient with appropriate food choices  - Assess stoma site  - Consider wound care consult   Outcome: Progressing  Goal: Oral mucous membranes remain intact  Description: INTERVENTIONS  - Assess oral mucosa and hygiene practices  - Implement preventative oral hygiene regimen  - Implement oral medicated treatments as ordered  - Initiate Nutrition services referral as needed  Outcome: Progressing     Problem: MUSCULOSKELETAL - ADULT  Goal: Maintain or return mobility to safest level of function  Description: INTERVENTIONS:  - Assess patient's ability to carry out ADLs; assess patient's baseline for ADL function and identify physical deficits which impact ability to perform ADLs (bathing, care of mouth/teeth, toileting, grooming, dressing, etc.)  - Assess/evaluate cause of self-care deficits   - Assess range of motion  - Assess patient's mobility  - Assess patient's need for assistive devices and provide as appropriate  - Encourage maximum independence but intervene and supervise when necessary  - Involve family in performance of ADLs  - Assess for home care needs following discharge   - Consider OT consult to assist with ADL evaluation and planning for discharge  - Provide patient education as appropriate  Outcome: Progressing  Goal: Maintain proper alignment of affected body  part  Description: INTERVENTIONS:  - Support, maintain and protect limb and body alignment  - Provide patient/ family with appropriate education  Outcome: Progressing     Problem: Nutrition/Hydration-ADULT  Goal: Nutrient/Hydration intake appropriate for improving, restoring or maintaining nutritional needs  Description: Monitor and assess patient's nutrition/hydration status for malnutrition. Collaborate with interdisciplinary team and initiate plan and interventions as ordered.  Monitor patient's weight and dietary intake as ordered or per policy. Utilize nutrition screening tool and intervene as necessary. Determine patient's food preferences and provide high-protein, high-caloric foods as appropriate.     INTERVENTIONS:  - Monitor oral intake, urinary output, labs, and treatment plans  - Assess nutrition and hydration status and recommend course of action  - Evaluate amount of meals eaten  - Assist patient with eating if necessary   - Allow adequate time for meals  - Recommend/ encourage appropriate diets, oral nutritional supplements, and vitamin/mineral supplements  - Order, calculate, and assess calorie counts as needed  - Recommend, monitor, and adjust tube feedings and TPN/PPN based on assessed needs  - Assess need for intravenous fluids  - Provide specific nutrition/hydration education as appropriate  - Include patient/family/caregiver in decisions related to nutrition  Outcome: Progressing     Problem: Potential for Falls  Goal: Patient will remain free of falls  Description: INTERVENTIONS:  - Educate patient/family on patient safety including physical limitations  - Instruct patient to call for assistance with activity   - Consult OT/PT to assist with strengthening/mobility   - Keep Call bell within reach  - Keep bed low and locked with side rails adjusted as appropriate  - Keep care items and personal belongings within reach  - Initiate and maintain comfort rounds  - Make Fall Risk Sign visible to  staff  - Offer Toileting every 2 Hours, in advance of need  - Initiate/Maintain bed alarm  - Obtain necessary fall risk management equipment: socks  - Apply yellow socks and bracelet for high fall risk patients  - Consider moving patient to room near nurses station  Outcome: Progressing

## 2024-05-15 NOTE — ASSESSMENT & PLAN NOTE
Likely secondary to having missed oral antihypertensive medications due to GI symptoms  Resumed back on chronic meds:   Clonidine patch weekly  Nebivolol - increase dose given persistently elevated rates  Nifedipine   PO torsemide held given poor PO intake, resumed 5/15  Added as needed IV hydralazine  SBP much improved

## 2024-05-15 NOTE — DISCHARGE SUMMARY
[facility]  Discharge- Ena Ahumada 1935, 88 y.o. female MRN: [id number]  Unit/Bed#: -01 Encounter: [id number]  Primary Care Provider: Deanna Castaneda DO   Date and time admitted to hospital: 5/11/2024  8:16 PM    Chronic respiratory failure with hypoxia (HCC)  Assessment & Plan  Initiated during last admission in March secondary to pneumonia, is in the process of being weaned off.  At baseline on 2 L as needed  CXR unremarkable    Hypertensive urgency  Assessment & Plan  Likely secondary to having missed oral antihypertensive medications due to GI symptoms  Resumed back on chronic meds:   Clonidine patch weekly  Nebivolol - increase dose given persistently elevated rates  Nifedipine   PO torsemide held given poor PO intake, resumed 5/15  Added as needed IV hydralazine  SBP much improved    Prolonged Q-T interval on ECG  Assessment & Plan  Likely secondary to hypokalemia  Telemetry monitoring  Most recent   Repleted potassium accordingly  Avoid QT prolongation agents    Leukocytosis  Assessment & Plan  Likely reactive  Chronic history since March  Afebrile, not meeting additional SIRS    Closed compression fracture of L4 lumbar vertebra, sequela  Assessment & Plan  Known history to patient and her family  Baseline chronic low back pain  Denies any recent trauma or change in back pain at this time    CKD (chronic kidney disease), stage IV (HCC)  Assessment & Plan  Lab Results   Component Value Date    EGFR 34 05/15/2024    EGFR 34 05/14/2024    EGFR 30 05/13/2024    CREATININE 1.38 (H) 05/15/2024    CREATININE 1.36 (H) 05/14/2024    CREATININE 1.51 (H) 05/13/2024   Creatinine within baseline range  Continue to monitor    Type 2 diabetes mellitus with stage 4 chronic kidney disease, without long-term current use of insulin (HCC)  Assessment & Plan  Lab Results   Component Value Date    HGBA1C 8.0 (H) 04/22/2024       Recent Labs     05/14/24  1113 05/14/24  1613  05/14/24  2019 05/15/24  0823   POCGLU 121 161* 164* 129       Resume Lantus reduced to 3 units as patient now tolerating PO intake  Place on ISS  Advance to outpt regimen upon advancement of diet    * Nausea & vomiting  Assessment & Plan  Possibly secondary to viral gastroenteritis  CT A/P: no acute intra-abdominal pathology   Covid negative   Pepcid and scopolamine added  GI consulted   Continue PRN tigan + scopolamine patch   EGD with mild esophagitis   Initiated on protonix 40 mg daily   Diet advanced, discharge as patient is tolerating diet        Medical Problems       Resolved Problems  Date Reviewed: 5/15/2024            Resolved    Hypokalemia 5/13/2024     Resolved by  Angelina Osborn PA-C    Hypercalcemia 5/13/2024     Resolved by  Angelina Osborn PA-C        Discharging Physician / Practitioner: Angella Suárez PA-C  PCP: Deanna Castaneda DO  Admission Date:   Admission Orders (From admission, onward)       Ordered        05/12/24 1417  INPATIENT ADMISSION  Once            05/12/24 0220  Place in Observation  Once                          Discharge Date: 05/15/24    Consultations During Hospital Stay:  Gastroenterology    Procedures Performed:   EGD 5/14:The duodenum appeared normal. Moderate erythematous mucosa in the antrum; performed cold forceps biopsy to rule out H. Pylori 2 cm hiatal hernia. Grade A esophagitis    Significant Findings / Test Results:   XR chest portable   Final Result by Pool Hull MD (05/14 0604)      No acute cardiopulmonary disease.            Workstation performed: [id number]         CT abdomen pelvis wo contrast   Final Result by Cory Parker MD (05/12 1010)   1. No acute intra-abdominal pathology.   2. Moderate L4 compression deformity, age indeterminate. MR evaluation may be useful if clinically warranted.            Findings are consistent with the preliminary report from [facility] which was provided shortly after completion of the exam.  "        Workstation performed: [id number]               Incidental Findings:   none     Test Results Pending at Discharge (will require follow up):   EGD biopsy results     Outpatient Tests Requested:  none    Complications:  none    Reason for Admission: Nausea/vomiting    Hospital Course:   Ena Ahumada is a 88 y.o. female patient with PMH of CKD 3, hypertension, AV stenosis, insulin-dependent diabetes who originally presented to the hospital on 5/11/2024 due to intractable nausea/vomiting x 2 days with inability to take p.o.  CT A/P on admission was unremarkable for intra-abdominal abnormality however did show age-indeterminate compression fracture of L4-per patient/family, this was known finding, and patient denied any changes to chronic back pain.  GI was consulted and patient was treated supportively with gentle fluid and antiemetics.  EGD was performed on 5/14 which showed mild esophagitis and hiatal hernia.  Patient was initiated on p.o. Protonix which will be continued on discharge.  Patient was treated with gradual diet advancement and was tolerating p.o. prior to discharge without any further nausea/vomiting or additional complaints.  Patient cleared from GI standpoint, will need outpatient follow-up.  All labs and vital stable.  Patient will be discharged home.     Please see above list of diagnoses and related plan for additional information.     Condition at Discharge: stable    Discharge Day Visit / Exam:   Subjective: Patient reports some mild nausea this morning.  Otherwise, is doing well.  Vitals: Blood Pressure: (!) 178/66 (05/15/24 0828)  Pulse: 58 (05/15/24 0828)  Temperature: 97.9 °F (36.6 °C) (05/15/24 0828)  Temp Source: Oral (05/15/24 0828)  Respirations: 22 (05/15/24 0828)  Height: 5' 2\" (157.5 cm) (05/12/24 0603)  SpO2: 96 % (05/15/24 0828)  Exam:   Physical Exam  Vitals and nursing note reviewed.   Constitutional:       General: She is not in acute distress.     Appearance: Normal " appearance. She is well-developed.   HENT:      Head: Normocephalic and atraumatic.   Eyes:      General: No scleral icterus.     Conjunctiva/sclera: Conjunctivae normal.   Cardiovascular:      Rate and Rhythm: Normal rate and regular rhythm.      Heart sounds: Murmur heard.   Pulmonary:      Effort: Pulmonary effort is normal.      Breath sounds: No wheezing, rhonchi or rales.   Abdominal:      General: There is no distension.      Palpations: Abdomen is soft.   Skin:     General: Skin is warm and dry.   Neurological:      Mental Status: She is alert. Mental status is at baseline.   Psychiatric:         Mood and Affect: Mood normal.        Discussion with Family: Patient declined call to .     Discharge instructions/Information to patient and family:   See after visit summary for information provided to patient and family.      Provisions for Follow-Up Care:  See after visit summary for information related to follow-up care and any pertinent home health orders.      Mobility at time of Discharge:   Basic Mobility Inpatient Raw Score: 18  JH-HLM Goal: 6: Walk 10 steps or more  JH-HLM Achieved: 6: Walk 10 steps or more  HLM Goal achieved. Continue to encourage appropriate mobility.     Disposition:   Home    Planned Readmission: none     Discharge Statement:  I spent 45 minutes discharging the patient. This time was spent on the day of discharge. I had direct contact with the patient on the day of discharge. Greater than 50% of the total time was spent examining patient, answering all patient questions, arranging and discussing plan of care with patient as well as directly providing post-discharge instructions.  Additional time then spent on discharge activities.    Discharge Medications:  See after visit summary for reconciled discharge medications provided to patient and/or family.      **Please Note: This note may have been constructed using a voice recognition system**

## 2024-05-15 NOTE — ASSESSMENT & PLAN NOTE
Lab Results   Component Value Date    HGBA1C 8.0 (H) 04/22/2024       Recent Labs     05/14/24  1113 05/14/24  1613 05/14/24  2019 05/15/24  0823   POCGLU 121 161* 164* 129       Resume Lantus reduced to 3 units as patient now tolerating PO intake  Place on ISS  Advance to outpt regimen upon advancement of diet

## 2024-05-15 NOTE — ASSESSMENT & PLAN NOTE
Possibly secondary to viral gastroenteritis  CT A/P: no acute intra-abdominal pathology   Covid negative   GI consulted   Continue PRN tigan + scopolamine patch   EGD with mild esophagitis   Initiated on protonix 40 mg daily   Diet advanced, discharge as patient is tolerating diet

## 2024-05-15 NOTE — TELEPHONE ENCOUNTER
Daughter in law asked about ordering a blood test for     cardiac enzymes        Follow up blood test test from hospital visit        Tcm with corazon  5/28    Regular cardio appt   5/30      thanks

## 2024-05-15 NOTE — ASSESSMENT & PLAN NOTE
Possibly secondary to viral gastroenteritis  CT A/P: no acute intra-abdominal pathology   Covid negative   Pepcid and scopolamine added  GI consulted   Continue PRN tigan + scopolamine patch   EGD with mild esophagitis   Initiated on protonix 40 mg daily   Diet advanced, discharge as patient is tolerating diet

## 2024-05-15 NOTE — ASSESSMENT & PLAN NOTE
Likely secondary to hypokalemia  Telemetry monitoring  Most recent   Repleted potassium accordingly  Avoid QT prolongation agents

## 2024-05-15 NOTE — PROGRESS NOTES
Progress note - Gastroenterology   Ena Ahumada 88 y.o. female MRN: [id number]  Unit/Bed#: -01 Encounter: [id number]    ASSESSMENT and PLAN    88 y.o. year old female with a past medical history of CKD stage III, hypertension, aortic valve stenosis, diabetes, anxiety, hyperlipidemia presents to the ER with 2 days of nausea vomiting (x8) containing bile.  Hypertensive urgency on admission due to inability to tolerate home medication  Labs show hypokalemia of 3.  Leukocytosis 12.    CT A/P without contrast shows no acute findings.      Differential diagnosis includes erosive disease versus viral gastritis versus CKD induced nausea/vomiting    1.  Nausea/vomiting/anorexia    On exam, patient denies abdominal pain, nausea or vomiting.  Patient's daughter-in-law is in the room.  Patient will be discharged today.  Follow-up with GI will be set up through our office.    EGD 5/14:  The duodenum appeared normal, Moderate erythematous mucosa in the antrum; performed cold forceps biopsy to rule out H. Pylori, 2 cm hiatal hernia,Grade A esophagitis    Recommendation, continue pantoprazole 40 mg once daily.  Continue outpatient PPI.    Message will be sent to office in order for patient to be set up with a follow-up appointment with GI provider.     2.  Hypertensive urgency  Resolved, back on p.o. meds     3.  NELA  Creatinine stable at 1.38  -Encourage increase fluid intake         Chief Complaint   Patient presents with    Vomiting     Since 1630 yesterday, no blood, denies fevers/chest pain.  PCP told pt to go to ER.  Able to drink a cup of tea and 16oz. Bottle of water today.        SUBJECTIVE/HPI   Patient is out of bed to the chair.  Patient's daughter-in-law (Talon) who takes care of all her medical appointments etc. is also present.  Patient denies abdominal pain, nausea or vomiting.  Patient states she is eating meals well.  States she is having normal bowel movement.  Discussed with patient and her  "daughter-in-law that the GI office will contact her for a follow-up appointment.    BP (!) 178/66 (BP Location: Left arm)   Pulse 58   Temp 97.9 °F (36.6 °C) (Oral)   Resp 22   Ht 5' 2\" (1.575 m)   SpO2 96%   BMI 29.26 kg/m²     PHYSICALEXAM  General appearance: alert, appears stated age and cooperative  Eyes: PERLLA, EOMI, no icterus   Head: Normocephalic, without obvious abnormality, atraumatic  Lungs: clear to auscultation bilaterally  Heart: regular rate and rhythm, S1, S2 normal, no murmur, click, rub or gallop  Abdomen: soft, non-tender; bowel sounds normal; no masses,  no organomegaly  Extremities: extremities normal, atraumatic, no cyanosis or edema  Neurologic: Grossly normal    Lab Results   Component Value Date    GLUCOSE 264 (H) 03/16/2024    CALCIUM 8.9 05/15/2024     01/02/2018    K 3.1 (L) 05/15/2024    CO2 27 05/15/2024     05/15/2024    BUN 16 05/15/2024    CREATININE 1.38 (H) 05/15/2024     Lab Results   Component Value Date    WBC 12.04 (H) 05/15/2024    HGB 12.3 05/15/2024    HCT 39.9 05/15/2024    MCV 94 05/15/2024     05/15/2024     Lab Results   Component Value Date    ALT 11 05/11/2024    AST 21 05/11/2024    ALKPHOS 55 05/11/2024    BILITOT 0.3 01/02/2018     No results found for: \"AMYLASE\"  Lab Results   Component Value Date    LIPASE 52 05/11/2024     Lab Results   Component Value Date    IRON 17 (L) 03/20/2024    TIBC 179 (L) 03/20/2024    FERRITIN 315 (H) 03/20/2024     Lab Results   Component Value Date    INR 1.07 03/16/2024     "

## 2024-05-16 DIAGNOSIS — Z71.89 COMPLEX CARE COORDINATION: Primary | ICD-10-CM

## 2024-05-16 PROCEDURE — 88305 TISSUE EXAM BY PATHOLOGIST: CPT | Performed by: PATHOLOGY

## 2024-05-16 NOTE — UTILIZATION REVIEW
NOTIFICATION OF ADMISSION DISCHARGE   This is a Notification of Discharge from [facility]. Please be advised that this patient has been discharge from our facility. Below you will find the admission and discharge date and time including the patient’s disposition.   UTILIZATION REVIEW CONTACT:  Lidia Villalobos  Utilization   Network Utilization Review Department  Phone: [phone] x carefully listen to the prompts. All voicemails are confidential.  Email: [email or web address]     ADMISSION INFORMATION  PRESENTATION DATE: 5/11/2024  8:16 PM  OBERVATION ADMISSION DATE:   INPATIENT ADMISSION DATE: 5/12/24  2:18 PM   DISCHARGE DATE: 5/15/2024 11:48 AM   DISPOSITION:Home/Self Care    Network Utilization Review Department  ATTENTION: Please call with any questions or concerns to [phone] and carefully listen to the prompts so that you are directed to the right person. All voicemails are confidential.   For Discharge needs, contact Care Management DC Support Team at [phone] opt. 2  Send all requests for admission clinical reviews, approved or denied determinations and any other requests to dedicated fax number below belonging to the campus where the patient is receiving treatment. List of dedicated fax numbers for the Facilities:  FACILITY NAME UR FAX NUMBER   ADMISSION DENIALS (Administrative/Medical Necessity) [phone]   DISCHARGE SUPPORT TEAM ([facility]) [phone]   PARENT CHILD HEALTH (Maternity/NICU/Pediatrics) [phone]   [facility] [phone]   [facility] [phone]   [facility] [phone]   [facility] [phone]   [facility] [phone]   [facility] [phone]   [facility] [phone]   [facility] [phone]    [facility] [phone]   [facility] [phone]   [facility] [phone]   [facility] [phone]                  Attestation signed by Natty Bruner MD at 5/15/2024  4:08 PM     Discharge Summary Attending Attestation      I was the supervising/collaborating physician on 5/15/2024.  I acknowledge the AP's documentation and services provided. I was available by phone, if needed, for consultation.              [facility]  Discharge- Ena Ahumada 1935, 88 y.o. female MRN: [id number]  Unit/Bed#: -01 Encounter: [id number]  Primary Care Provider: Deanna Castaneda DO   Date and time admitted to hospital: 5/11/2024  8:16 PM     Chronic respiratory failure with hypoxia (HCC)  Assessment & Plan  Initiated during last admission in March secondary to pneumonia, is in the process of being weaned off.  At baseline on 2 L as needed  CXR unremarkable     Hypertensive urgency  Assessment & Plan  Likely secondary to having missed oral antihypertensive medications due to GI symptoms  Resumed back on chronic meds:   Clonidine patch weekly  Nebivolol - increase dose given persistently elevated rates  Nifedipine   PO torsemide held given poor PO intake, resumed 5/15  Added as needed IV hydralazine  SBP much improved     Prolonged Q-T interval on ECG  Assessment & Plan  Likely secondary to hypokalemia  Telemetry monitoring  Most recent   Repleted potassium accordingly  Avoid QT prolongation agents     Leukocytosis  Assessment & Plan  Likely reactive  Chronic history since March  Afebrile, not meeting additional SIRS     Closed compression fracture of L4 lumbar vertebra, sequela  Assessment & Plan  Known history to patient and her family  Baseline chronic low back pain  Denies any recent trauma or change in back pain at this time     CKD (chronic kidney  disease), stage IV (HCC)  Assessment & Plan        Lab Results   Component Value Date     EGFR 34 05/15/2024     EGFR 34 05/14/2024     EGFR 30 05/13/2024     CREATININE 1.38 (H) 05/15/2024     CREATININE 1.36 (H) 05/14/2024     CREATININE 1.51 (H) 05/13/2024   Creatinine within baseline range  Continue to monitor     Type 2 diabetes mellitus with stage 4 chronic kidney disease, without long-term current use of insulin ([facility])  Assessment & Plan        Lab Results   Component Value Date     HGBA1C 8.0 (H) 04/22/2024                Recent Labs     05/14/24  1113 05/14/24  1613 05/14/24  2019 05/15/24  0823   POCGLU 121 161* 164* 129         Resume Lantus reduced to 3 units as patient now tolerating PO intake  Place on ISS  Advance to outpt regimen upon advancement of diet     * Nausea & vomiting  Assessment & Plan  Possibly secondary to viral gastroenteritis  CT A/P: no acute intra-abdominal pathology   Covid negative   Pepcid and scopolamine added  GI consulted   Continue PRN tigan + scopolamine patch   EGD with mild esophagitis   Initiated on protonix 40 mg daily   Diet advanced, discharge as patient is tolerating diet           Medical Problems         Resolved Problems  Date Reviewed: 5/15/2024              Resolved     Hypokalemia 5/13/2024       Resolved by  Angelina Osborn PA-C     Hypercalcemia 5/13/2024       Resolved by  Angelina Osborn PA-C         Discharging Physician / Practitioner: Angella Suárez PA-C  PCP: Deanna Castaneda DO  Admission Date:   Admission Orders (From admission, onward)          Ordered         05/12/24 1417   INPATIENT ADMISSION  Once             05/12/24 0220   Place in Observation  Once                               Discharge Date: 05/15/24     Consultations During Hospital Stay:  Gastroenterology     Procedures Performed:   EGD 5/14:The duodenum appeared normal. Moderate erythematous mucosa in the antrum; performed cold forceps biopsy to rule out H. Pylori 2 cm  hiatal hernia. Grade A esophagitis     Significant Findings / Test Results:   XR chest portable   Final Result by Pool Hull MD (05/14 0604)       No acute cardiopulmonary disease.               Workstation performed: [id number]           CT abdomen pelvis wo contrast   Final Result by Cory Parker MD (05/12 1010)   1. No acute intra-abdominal pathology.   2. Moderate L4 compression deformity, age indeterminate. MR evaluation may be useful if clinically warranted.               Findings are consistent with the preliminary report from [facility] which was provided shortly after completion of the exam.           Workstation performed: [id number]                    Incidental Findings:   none      Test Results Pending at Discharge (will require follow up):   EGD biopsy results     Outpatient Tests Requested:  none     Complications:  none     Reason for Admission: Nausea/vomiting     Hospital Course:   Ena Ahumada is a 88 y.o. female patient with PMH of CKD 3, hypertension, AV stenosis, insulin-dependent diabetes who originally presented to the hospital on 5/11/2024 due to intractable nausea/vomiting x 2 days with inability to take p.o.  CT A/P on admission was unremarkable for intra-abdominal abnormality however did show age-indeterminate compression fracture of L4-per patient/family, this was known finding, and patient denied any changes to chronic back pain.  GI was consulted and patient was treated supportively with gentle fluid and antiemetics.  EGD was performed on 5/14 which showed mild esophagitis and hiatal hernia.  Patient was initiated on p.o. Protonix which will be continued on discharge.  Patient was treated with gradual diet advancement and was tolerating p.o. prior to discharge without any further nausea/vomiting or additional complaints.  Patient cleared from GI standpoint, will need outpatient follow-up.  All labs and vital stable.  Patient will be discharged home.      Please  "see above list of diagnoses and related plan for additional information.      Condition at Discharge: stable     Discharge Day Visit / Exam:   Subjective: Patient reports some mild nausea this morning.  Otherwise, is doing well.  Vitals: Blood Pressure: (!) 178/66 (05/15/24 0828)  Pulse: 58 (05/15/24 0828)  Temperature: 97.9 °F (36.6 °C) (05/15/24 0828)  Temp Source: Oral (05/15/24 0828)  Respirations: 22 (05/15/24 0828)  Height: 5' 2\" (157.5 cm) (05/12/24 0603)  SpO2: 96 % (05/15/24 0828)  Exam:   Physical Exam  Vitals and nursing note reviewed.   Constitutional:       General: She is not in acute distress.     Appearance: Normal appearance. She is well-developed.   HENT:      Head: Normocephalic and atraumatic.   Eyes:      General: No scleral icterus.     Conjunctiva/sclera: Conjunctivae normal.   Cardiovascular:      Rate and Rhythm: Normal rate and regular rhythm.      Heart sounds: Murmur heard.   Pulmonary:      Effort: Pulmonary effort is normal.      Breath sounds: No wheezing, rhonchi or rales.   Abdominal:      General: There is no distension.      Palpations: Abdomen is soft.   Skin:     General: Skin is warm and dry.   Neurological:      Mental Status: She is alert. Mental status is at baseline.   Psychiatric:         Mood and Affect: Mood normal.         Discussion with Family: Patient declined call to .      Discharge instructions/Information to patient and family:   See after visit summary for information provided to patient and family.       Provisions for Follow-Up Care:  See after visit summary for information related to follow-up care and any pertinent home health orders.       Mobility at time of Discharge:   Basic Mobility Inpatient Raw Score: 18  JH-HLM Goal: 6: Walk 10 steps or more  JH-HLM Achieved: 6: Walk 10 steps or more  HLM Goal achieved. Continue to encourage appropriate mobility.     Disposition:   Home     Planned Readmission: none     Discharge Statement:  I spent 45 " minutes discharging the patient. This time was spent on the day of discharge. I had direct contact with the patient on the day of discharge. Greater than 50% of the total time was spent examining patient, answering all patient questions, arranging and discussing plan of care with patient as well as directly providing post-discharge instructions.  Additional time then spent on discharge activities.     Discharge Medications:  See after visit summary for reconciled discharge medications provided to patient and/or family.       **Please Note: This note may have been constructed using a voice recognition system**              Cosigned by: Natty Bruner MD at 5/15/2024  4:08 PM     Revision History

## 2024-05-17 ENCOUNTER — PATIENT OUTREACH (OUTPATIENT)
Dept: FAMILY MEDICINE CLINIC | Facility: HOSPITAL | Age: 89
End: 2024-05-17

## 2024-05-17 NOTE — PROGRESS NOTES
Outpatient Care Management Note:    New [facility] referral received. Patient was hospitalized from 5/11-5/15/24 with nausea/vomiting X 2 days thought to be possibly related to viral gastroenteritis. She was treated with fluids and antiemetics. EGD completed which showed mild esophagitis and hiatal hernia. She was started on protonix and diet advanced. She is to follow up with PCP, GI and endocrine.     CM called Ena and spoke with her daughter in law, Talon. She is listed on patient's communication form.     Talon states that Ena is doing well. She is no longer having issues with nausea/vomiting. She is eating without difficulty. Talon states she does need to reschedule several tests that Ena missed while she was hospitalized.    She declined completing a med review and denies any questions. Talon denies any needs. YENY will close the referral Talon will keep my contact information and will call if I can be of assistance.

## 2024-05-22 ENCOUNTER — HOSPITAL ENCOUNTER (OUTPATIENT)
Dept: RADIOLOGY | Facility: HOSPITAL | Age: 89
Discharge: HOME | End: 2024-05-22
Payer: COMMERCIAL

## 2024-05-22 DIAGNOSIS — J96.01 ACUTE RESPIRATORY FAILURE WITH HYPOXIA (HCC): ICD-10-CM

## 2024-05-22 LAB
BUN SERPL-MCNC: 31 MG/DL (ref 8–27)
BUN/CREAT SERPL: 15 (ref 12–28)
CALCIUM SERPL-MCNC: 9 MG/DL (ref 8.7–10.3)
CHLORIDE SERPL-SCNC: 102 MMOL/L (ref 96–106)
CO2 SERPL-SCNC: 24 MMOL/L (ref 20–29)
CREAT SERPL-MCNC: 2.06 MG/DL (ref 0.57–1)
EGFR: 23 ML/MIN/1.73
GLUCOSE SERPL-MCNC: 197 MG/DL (ref 70–99)
MAGNESIUM SERPL-MCNC: 2.2 MG/DL (ref 1.6–2.3)
POTASSIUM SERPL-SCNC: 3.5 MMOL/L (ref 3.5–5.2)
SODIUM SERPL-SCNC: 141 MMOL/L (ref 134–144)

## 2024-05-22 PROCEDURE — 71046 X-RAY EXAM CHEST 2 VIEWS: CPT

## 2024-05-23 ENCOUNTER — TELEPHONE (OUTPATIENT)
Dept: FAMILY MEDICINE CLINIC | Facility: HOSPITAL | Age: 89
End: 2024-05-23

## 2024-05-23 DIAGNOSIS — E87.6 HYPOKALEMIA: Primary | ICD-10-CM

## 2024-05-23 PROBLEM — N17.9 AKI (ACUTE KIDNEY INJURY) (HCC): Status: RESOLVED | Noted: 2024-03-19 | Resolved: 2024-05-23

## 2024-05-23 RX ORDER — POTASSIUM CHLORIDE 750 MG/1
10 TABLET, EXTENDED RELEASE ORAL DAILY
Qty: 30 TABLET | Refills: 2 | Status: SHIPPED | OUTPATIENT
Start: 2024-05-23

## 2024-05-23 NOTE — TELEPHONE ENCOUNTER
I can't tell you equivalent dose for OTC potassium. I would recommend starting OTC potassium 99 mg - most common  low dose potassium OTC available.  Check BW in 1 wk

## 2024-05-23 NOTE — TELEPHONE ENCOUNTER
Talon called in regarding lab results. Advised of results per PCP notes. Talon would like to know what the equivalent would be for OTC potassium as it is not covered by insurance if ordered RX. Please advise.

## 2024-05-23 NOTE — TELEPHONE ENCOUNTER
----- Message from Deanna Castaneda DO sent at 5/23/2024  6:59 AM EDT -----  Please notify pt that her potassium was slightly better but is still low nml, her magnesium was nml, her kidney tests were up a bit - I recommend a low dose potassium supplement and rx was sent to pharmacy.  Will need to check potassium levels in 1 wk - order in Epic (nonfasting)

## 2024-05-28 ENCOUNTER — OFFICE VISIT (OUTPATIENT)
Dept: FAMILY MEDICINE CLINIC | Facility: HOSPITAL | Age: 89
End: 2024-05-28
Payer: COMMERCIAL

## 2024-05-28 ENCOUNTER — OFFICE VISIT (OUTPATIENT)
Dept: GASTROENTEROLOGY | Facility: CLINIC | Age: 89
End: 2024-05-28
Payer: COMMERCIAL

## 2024-05-28 VITALS
WEIGHT: 150.8 LBS | HEART RATE: 67 BPM | HEIGHT: 62 IN | BODY MASS INDEX: 27.75 KG/M2 | TEMPERATURE: 97.5 F | DIASTOLIC BLOOD PRESSURE: 84 MMHG | SYSTOLIC BLOOD PRESSURE: 142 MMHG

## 2024-05-28 VITALS
SYSTOLIC BLOOD PRESSURE: 138 MMHG | BODY MASS INDEX: 27.6 KG/M2 | HEIGHT: 62 IN | DIASTOLIC BLOOD PRESSURE: 80 MMHG | WEIGHT: 150 LBS

## 2024-05-28 DIAGNOSIS — Z76.89 ENCOUNTER FOR SUPPORT AND COORDINATION OF TRANSITION OF CARE: Primary | ICD-10-CM

## 2024-05-28 DIAGNOSIS — R21 RASH: ICD-10-CM

## 2024-05-28 DIAGNOSIS — K21.9 GASTROESOPHAGEAL REFLUX DISEASE, UNSPECIFIED WHETHER ESOPHAGITIS PRESENT: Primary | ICD-10-CM

## 2024-05-28 DIAGNOSIS — J96.11 CHRONIC RESPIRATORY FAILURE WITH HYPOXIA (HCC): ICD-10-CM

## 2024-05-28 DIAGNOSIS — R11.2 NAUSEA AND VOMITING: ICD-10-CM

## 2024-05-28 DIAGNOSIS — K59.00 CONSTIPATION, UNSPECIFIED CONSTIPATION TYPE: ICD-10-CM

## 2024-05-28 DIAGNOSIS — R11.2 NAUSEA AND VOMITING, UNSPECIFIED VOMITING TYPE: ICD-10-CM

## 2024-05-28 PROBLEM — J96.01 ACUTE RESPIRATORY FAILURE WITH HYPOXIA (HCC): Status: RESOLVED | Noted: 2024-03-17 | Resolved: 2024-05-28

## 2024-05-28 PROCEDURE — G2211 COMPLEX E/M VISIT ADD ON: HCPCS | Performed by: NURSE PRACTITIONER

## 2024-05-28 PROCEDURE — 99495 TRANSJ CARE MGMT MOD F2F 14D: CPT | Performed by: NURSE PRACTITIONER

## 2024-05-28 PROCEDURE — 99214 OFFICE O/P EST MOD 30 MIN: CPT | Performed by: NURSE PRACTITIONER

## 2024-05-28 RX ORDER — CLOTRIMAZOLE AND BETAMETHASONE DIPROPIONATE 10; .64 MG/G; MG/G
CREAM TOPICAL 2 TIMES DAILY
Qty: 45 G | Refills: 0 | Status: SHIPPED | OUTPATIENT
Start: 2024-05-28

## 2024-05-28 RX ORDER — FAMOTIDINE 20 MG/1
20 TABLET, FILM COATED ORAL
Qty: 30 TABLET | Refills: 6 | Status: SHIPPED | OUTPATIENT
Start: 2024-05-28

## 2024-05-28 RX ORDER — PANTOPRAZOLE SODIUM 20 MG/1
20 TABLET, DELAYED RELEASE ORAL DAILY
Qty: 30 TABLET | Refills: 6 | Status: SHIPPED | OUTPATIENT
Start: 2024-05-28

## 2024-05-28 NOTE — PROGRESS NOTES
Ambulatory Visit  Name: Ena Ahumada      : 1935      MRN: [id number]  Encounter Provider: KANA Edge  Encounter Date: 2024   Encounter department: [facility] [address]     Assessment & Plan   1. Encounter for support and coordination of transition of care  2. Nausea and vomiting, unspecified vomiting type  Comments:  hospital records reviewed/discussed & TCM updated  Assessment & Plan:  Clinically improved s/p inpatient hospitalization  Maintain meds as recommended by GI  3. Rash  Comments:  use lotrisone prn for probable cutaneous candidiasis  Orders:  -     clotrimazole-betamethasone (LOTRISONE) 1-0.05 % cream; Apply topically 2 (two) times a day  4. Chronic respiratory failure with hypoxia (HCC)  Assessment & Plan:  Clinically stable using O2 at bedtime only  F/U with pulm as scheduled in July    Return for appt w/PCP as scheduled in July       History of Present Illness       Here with daughter in law for TCM appt. Was admitted from -5/15 for nausea & vomiting due to presumed gastroenteritis. Had EGD showing mild esophagitis. Saw GI this morning and meds were adjusted - taking pantoprazole in the morning and famotidine in evening.   She and daughter deny concerns other than rash on lower abdomen she noted this morning. No creams applied.         TCM Call       Date and time call was made  5/15/2024  4:38 PM    Hospital care reviewed  Records reviewed    Patient was hospitialized at  [facility]    Date of Admission  24    Date of discharge  05/15/24    Diagnosis  nausea    vomting    Disposition  Home    Were the patients medications reviewed and updated  Yes    Current Symptoms  Neausea    Neausea severity  Mild          TCM Call       Post hospital issues  Reduced activity    Should patient be enrolled in anticoag monitoring?  No    Scheduled for follow up?  Yes    Patients specialists  Other (comment)    Other specialists names  GI     Referrals needed  GI    Did you obtain your prescribed medications  Yes    Do you need help managing your prescriptions or medications  No    Is transportation to your appointment needed  No    I have advised the patient to call PCP with any new or worsening symptoms  gertrude henao ma    Living Arrangements  Family members    Support System  Family    Do you have social support  Yes, as much as I need    Are you recieving any outpatient services  No    Are you recieving home care services  No    Types of home care services  Nurse visit; Home PT    Are you using any community resources  No    Current waiver services  No    Have you fallen in the last 12 months  No    Interperter language line needed  No    Counseling  Family            Review of Systems   Constitutional: Negative.    Respiratory: Negative.     Cardiovascular: Negative.    Gastrointestinal: Negative.    Skin:  Positive for rash.       Past Medical History:   Diagnosis Date    Acute respiratory failure with hypoxia (HCC) 03/17/2024    Anxiety     Aortic valve insufficiency     Cataract of both eyes     Chronic kidney disease     Edema     last assessed - [id number]    GERD (gastroesophageal reflux disease)     last assessed - [id number]    Hypertension     Low back pain     last assessed - [id number]    Mitral valve disorder     Osteoporosis     last assessed - [id number]    Palpitations      Past Surgical History:   Procedure Laterality Date    APPENDECTOMY      CATARACT EXTRACTION Bilateral     COLONOSCOPY      HIP SURGERY      TUBAL LIGATION Bilateral      Family History   Problem Relation Age of Onset    Kidney disease Mother         Chronic    No Known Problems Father     Breast cancer Sister     Diabetes Sister     Cancer Sister     Breast cancer Sister     Hypertension Sister     No Known Problems Daughter     No Known Problems Son     No Known Problems Son     No Known Problems Son     No Known Problems Son      Social History     Tobacco Use     Smoking status: Never    Smokeless tobacco: Never   Vaping Use    Vaping status: Never Used   Substance and Sexual Activity    Alcohol use: Never    Drug use: Never    Sexual activity: Not Currently     Current Outpatient Medications on File Prior to Visit   Medication Sig    ACCU-CHEK PAUL PLUS test strip     Accu-Chek Guide test strip USE TO TEST TWICE A DAY    Accu-Chek Softclix Lancets lancets     Accu-Chek Softclix Lancets lancets USE AS INSTRUCTED TWICE DAILY    aspirin 81 MG tablet Take 1 tablet by mouth daily    Blood Glucose Monitoring Suppl (OneTouch Verio Reflect) w/Device KIT Check blood sugars twice daily. Please substitute with appropriate alternative as covered by patient's insurance. Dx: E11.65    cholecalciferol (VITAMIN D3) 1,000 units tablet Take 2 tablets by mouth daily    cloNIDine (CATAPRES-TTS-1) 0.1 mg/24 hr PLACE 1 PATCH ON THE SKIN ONCE A WEEK AS DIRECTED    Denta 5000 Plus 1.1 % CREA USE TWICE A DAY -SPIT, DONT RINSE AND NOTHING BY MOUTH X 30 MIN    ferrous sulfate 325 (65 Fe) mg tablet Take 1 tablet (325 mg total) by mouth 3 (three) times a week    glipiZIDE (GLUCOTROL) 10 mg tablet TAKE 1 TABLET BY MOUTH IN THE MORNING THEN 2 TABS WITH DINNER    glucose blood (Accu-Chek Paul Plus) test strip USE AS INSTRUCTED TWICE DAILY    glucose blood (OneTouch Verio) test strip Check blood sugars twice daily. Please substitute with appropriate alternative as covered by patient's insurance. Dx: E11.65    Insulin Glargine-yfgn 100 UNIT/ML SOPN INJECT 0.05 ML (5 UNITS TOTAL) UNDER THE SKIN IN THE MORNING    Insulin Pen Needle (BD Pen Needle Kathy U/F) 32G X 4 MM MISC Use daily    Invokana 100 MG TAKE 1 TABLET BY MOUTH EVERY DAY BEFORE BREAKFAST    nebivolol (BYSTOLIC) 20 MG tablet Take 2 tablets (40 mg total) by mouth daily    NIFEdipine (ADALAT CC) 90 mg 24 hr tablet TAKE 1 TABLET BY MOUTH EVERY DAY    OneTouch Delica Lancets 33G MISC Check blood sugars twice daily. Please substitute with appropriate  "alternative as covered by patient's insurance. Dx: E11.65    oxygen gas Inhale 2.5 L/min continuous VIA NASAL CANULA. At night only    potassium chloride (Klor-Con M10) 10 mEq tablet Take 1 tablet (10 mEq total) by mouth daily    scopolamine (TRANSDERM-SCOP) 1 mg/3 days TD 72 hr patch Place 1 patch on the skin over 72 hours every third day as needed (nausea)    sertraline (ZOLOFT) 100 mg tablet TAKE 1 TABLET BY MOUTH EVERY DAY    simvastatin (ZOCOR) 20 mg tablet TAKE 1 TABLET BY MOUTH EVERYDAY AT BEDTIME    sitaGLIPtin (Januvia) 50 mg tablet TAKE 1 TABLET BY MOUTH EVERY DAY    torsemide (DEMADEX) 20 mg tablet TAKE 1 TABLET BY MOUTH EVERY DAY    [DISCONTINUED] famotidine (PEPCID) 20 mg tablet TAKE 1 TABLET BY MOUTH EVERY DAY    [DISCONTINUED] famotidine (PEPCID) 20 mg tablet Take 1 tablet (20 mg total) by mouth daily    [DISCONTINUED] pantoprazole (PROTONIX) 40 mg tablet Take 1 tablet (40 mg total) by mouth daily in the early morning     No Known Allergies  Immunization History   Administered Date(s) Administered    COVID-19 MODERNA VACC 0.5 ML IM 01/28/2021, 02/26/2021, 11/17/2021    COVID-19 Pfizer Vac BIVALENT Zechariah-sucrose 12 Yr+ IM 10/12/2022    INFLUENZA 10/30/2015, 10/13/2016, 10/09/2017    Influenza Split High Dose Preservative Free IM 09/06/2012, 10/29/2013, 10/22/2014, 10/30/2015, 10/13/2016, 10/09/2017    Influenza, high dose seasonal 0.7 mL 10/16/2018, 09/24/2019, 10/02/2020, 11/03/2021, 09/20/2022, 10/20/2023    Pneumococcal Conjugate 13-Valent 11/13/2015    Pneumococcal Polysaccharide PPV23 10/29/2013    TD (adult) Preservative Free 10/26/2012     Objective     /84   Pulse 67   Temp 97.5 °F (36.4 °C)   Ht 5' 2\" (1.575 m)   Wt 68.4 kg (150 lb 12.8 oz)   BMI 27.58 kg/m²       Physical Exam  Vitals reviewed.   Constitutional:       General: She is not in acute distress.     Appearance: Normal appearance.   HENT:      Head: Normocephalic.   Eyes:      General: No scleral icterus.  Cardiovascular: "      Rate and Rhythm: Normal rate and regular rhythm.      Heart sounds: Murmur heard.   Pulmonary:      Effort: Pulmonary effort is normal. No respiratory distress.      Breath sounds: Normal breath sounds.   Abdominal:      General: Bowel sounds are normal.      Palpations: Abdomen is soft.      Tenderness: There is no abdominal tenderness. There is no guarding or rebound.   Skin:     General: Skin is warm and dry.          Neurological:      General: No focal deficit present.      Mental Status: She is alert and oriented to person, place, and time.   Psychiatric:         Mood and Affect: Mood normal.         Behavior: Behavior normal.         Administrative Statements   I have spent a total time of 20 minutes on 05/28/24 In caring for this patient including Diagnostic results, Instructions for management, Patient and family education, Impressions, Counseling / Coordination of care, Documenting in the medical record, Reviewing / ordering tests, medicine, procedures  , and Obtaining or reviewing history  .

## 2024-05-29 NOTE — PROGRESS NOTES
[facility] - Outpatient Consultation  Ena Ahumada 88 y.o. female MRN: [id number]  Encounter: [id number]    ASSESSMENT AND PLAN:      1. Nausea and vomiting  Patient states her nausea and vomiting symptoms have resolved since discharge from the hospital.  She continues to use the scopolamine patch every third day.  Discussed not reapplying scopolamine patch once current patch timeframe is complete and see if patient resumes nausea symptoms.  Can continue scopolamine if symptoms occur if not can use as needed.     - famotidine (PEPCID) 20 mg tablet; Take 1 tablet (20 mg total) by mouth daily at bedtime  Dispense: 30 tablet; Refill: 6    2. Gastroesophageal reflux disease, unspecified whether esophagitis present  Patient states since being discharged her acid reflux symptoms are under control with pantoprazole 40 mg daily.  Discussed with patient and her daughter-in-law to decrease pantoprazole to 20 mg daily and monitor acid reflux symptoms.  Instructed to continue famotidine 20 mg later in the evening or at bedtime.  Discussed if symptoms were to increase after lowering dose recommended going back to the 40 mg pantoprazole in the a.m.    - pantoprazole (PROTONIX) 20 mg tablet; Take 1 tablet (20 mg total) by mouth daily  Dispense: 30 tablet; Refill: 6    3. Constipation, unspecified constipation type  Discussed with patient adequate fiber and fluids.  Also noted MiraLAX can be used as needed.  Patient states he is currently having daily normal bowel movements.    4.  Esophagitis  Reviewed results of EGD from 5/14 during an admission and recommend continued use of pantoprazole however can reduce from 40 mg to 20 mg as long as patient remains symptom-free and acid reflux symptoms under control.  Can discuss further tapering of dose with follow-up appointment.    Followup Appointment: 2 to 3  months  ______________________________________________________________________    Chief Complaint   Patient presents with    Follow-up     Pt was in hospital two weeks ago, just had stomach bug, no issues now       HPI:   88-year-old female with past medical history of CKD stage IV, anxiety, anemia, type 2 diabetes, GERD, hypertension and nonrheumatic aortic valve stenosis presents for follow-up hospitalization at [facility] from 5/11-5/15.  Patient was treated for nausea and vomiting and ? Gastroenteritis.  Due to patient's QT prolongation, treatment for nausea was with scopolamine transdermal patch.  Patient states she is feeling much better since being discharged.  On exam, patient denies nausea, vomiting or abdominal pain.  States her acid reflux symptoms are well-controlled with Protonix 40 mg and famotidine 20 mg at bedtime.  Patient states she is having daily normal bowel movements.  Denies hematochezia or melena.  Non-smoker, denies EtOH use.  EGD on 5/14 noted grade a esophagitis, 2 cm hiatal hernia, recommended continued PPI.    Historical Information   Past Medical History:   Diagnosis Date    Acute respiratory failure with hypoxia (HCC) 03/17/2024    Anxiety     Aortic valve insufficiency     Cataract of both eyes     Chronic kidney disease     Edema     last assessed - 05Jun2015    GERD (gastroesophageal reflux disease)     last assessed - [id number]    Hypertension     Low back pain     last assessed - [id number]    Mitral valve disorder     Osteoporosis     last assessed - [id number]    Palpitations      Past Surgical History:   Procedure Laterality Date    APPENDECTOMY      CATARACT EXTRACTION Bilateral     COLONOSCOPY      HIP SURGERY      TUBAL LIGATION Bilateral      Social History     Substance and Sexual Activity   Alcohol Use Never     Social History     Substance and Sexual Activity   Drug Use Never     Social History     Tobacco Use   Smoking Status Never   Smokeless Tobacco  "Never     Family History   Problem Relation Age of Onset    Kidney disease Mother         Chronic    No Known Problems Father     Breast cancer Sister     Diabetes Sister     Cancer Sister     Breast cancer Sister     Hypertension Sister     No Known Problems Daughter     No Known Problems Son     No Known Problems Son     No Known Problems Son     No Known Problems Son        Meds/Allergies     Current Outpatient Medications:     ACCU-CHEK PAUL PLUS test strip    Accu-Chek Guide test strip    Accu-Chek Softclix Lancets lancets    Accu-Chek Softclix Lancets lancets    aspirin 81 MG tablet    Blood Glucose Monitoring Suppl (OneTouch Verio Reflect) w/Device KIT    cholecalciferol (VITAMIN D3) 1,000 units tablet    cloNIDine (CATAPRES-TTS-1) 0.1 mg/24 hr    Denta 5000 Plus 1.1 % CREA    famotidine (PEPCID) 20 mg tablet    glipiZIDE (GLUCOTROL) 10 mg tablet    glucose blood (Accu-Chek Paul Plus) test strip    glucose blood (OneTouch Verio) test strip    Insulin Glargine-yfgn 100 UNIT/ML SOPN    Insulin Pen Needle (BD Pen Needle Kathy U/F) 32G X 4 MM MISC    Invokana 100 MG    nebivolol (BYSTOLIC) 20 MG tablet    NIFEdipine (ADALAT CC) 90 mg 24 hr tablet    OneTouch Delica Lancets 33G MISC    oxygen gas    pantoprazole (PROTONIX) 20 mg tablet    potassium chloride (Klor-Con M10) 10 mEq tablet    scopolamine (TRANSDERM-SCOP) 1 mg/3 days TD 72 hr patch    sertraline (ZOLOFT) 100 mg tablet    simvastatin (ZOCOR) 20 mg tablet    sitaGLIPtin (Januvia) 50 mg tablet    torsemide (DEMADEX) 20 mg tablet    clotrimazole-betamethasone (LOTRISONE) 1-0.05 % cream    ferrous sulfate 325 (65 Fe) mg tablet    No Known Allergies    PHYSICAL EXAM:    Blood pressure 138/80, height 5' 2\" (1.575 m), weight 68 kg (150 lb), not currently breastfeeding. Body mass index is 27.44 kg/m².    Normal exam    General Appearance: NAD, cooperative, alert  Eyes: Anicteric, PERRLA, EOMI  ENT:  Normocephalic, atraumatic, normal mucosa.    Neck:  Supple, " symmetrical, trachea midline,   Resp:  Clear to auscultation bilaterally; no rales, rhonchi or wheezing; respirations unlabored   CV:  S1 S2, Regular rate and rhythm; no murmur, rub, or gallop.  GI:  Soft, non-tender, non-distended; normal bowel sounds; no masses, no organomegaly   Rectal: Deferred  Musculoskeletal: No cyanosis, clubbing or edema. Normal ROM.  Skin:  No jaundice, rashes, or lesions   Heme/Lymph: No palpable cervical lymphadenopathy  Psych: Normal affect, good eye contact  Neuro: No gross deficits, AAOx3    Lab Results:   Lab Results   Component Value Date    WBC 12.04 (H) 05/15/2024    HGB 12.3 05/15/2024    HCT 39.9 05/15/2024    MCV 94 05/15/2024     05/15/2024     Lab Results   Component Value Date     01/02/2018    K 3.5 05/22/2024     05/22/2024    CO2 24 05/22/2024    BUN 31 (H) 05/22/2024    CREATININE 2.06 (H) 05/22/2024    GLUCOSE 264 (H) 03/16/2024    GLUF 109 (H) 05/12/2024    CALCIUM 8.9 05/15/2024    CORRECTEDCA 9.6 04/02/2024    AST 21 05/11/2024    ALT 11 05/11/2024    ALKPHOS 55 05/11/2024    PROT 6.6 01/02/2018    BILITOT 0.3 01/02/2018    EGFR 23 (L) 05/22/2024     Lab Results   Component Value Date    IRON 17 (L) 03/20/2024    TIBC 179 (L) 03/20/2024    FERRITIN 315 (H) 03/20/2024     Lab Results   Component Value Date    LIPASE 52 05/11/2024       Radiology Results:   XR chest pa & lateral    Result Date: 5/22/2024  Narrative: CHEST INDICATION:   Acute respiratory failure with hypoxia. COMPARISON:  None. EXAM PERFORMED/VIEWS:  XR CHEST PA & LATERAL FINDINGS: Cardiomediastinal silhouette appears unremarkable. The lungs are clear.  No pneumothorax or pleural effusion. Osseous structures appear within normal limits for patient age. Mild dextroconvex scoliosis of the thoracic spine     Impression: No acute cardiopulmonary disease. Mild scoliosis. No significant change from 5/13/2024. Electronically signed: 05/22/2024 01:39 PM Castillo Bustillos MD    EGD    Result  Date: 5/14/2024  Narrative: Table formatting from the original result was not included. [facility] [address] [facility] [address] [phone] DATE OF SERVICE: 5/14/24 PHYSICIAN(S): Attending: Stefan Keenan MD Fellow: No Staff Documented INDICATION: Bilious vomiting with nausea POST-OP DIAGNOSIS: See the impression below. PREPROCEDURE: Informed consent was obtained for the procedure, including sedation.  Risks of perforation, hemorrhage, adverse drug reaction and aspiration were discussed. The patient was placed in the left lateral decubitus position. Patient was explained about the risks and benefits of the procedure. Risks including but not limited to bleeding, infection, and perforation were explained in detail. Also explained about less than 100% sensitivity with the exam and other alternatives. PROCEDURE: EGD DETAILS OF PROCEDURE: Patient was taken to the procedure room where a time out was performed to confirm correct patient and correct procedure. The patient underwent monitored anesthesia care, which was administered by an anesthesia professional. The patient's blood pressure, heart rate, level of consciousness, respirations, oxygen, ECG and ETCO2 were monitored throughout the procedure. The scope was introduced through the mouth and advanced to the second part of the duodenum. Retroflexion was performed in the fundus. The patient experienced no blood loss. The procedure was not difficult. The patient tolerated the procedure well. There were no apparent adverse events. ANESTHESIA INFORMATION: ASA: III Anesthesia Type: Anesthesia type not filed in the log. MEDICATIONS: No administrations occurring from 0920 to 0922 on 05/14/24 FINDINGS: The duodenum appeared normal. Moderate erythematous mucosa in the antrum; performed cold forceps biopsy to rule out H. pylori 2 cm hiatal hernia - GE junction 35 cm from the incisors, diaphragmatic impression 37 cm from the incisors:   Hill classification: Grade II Grade A esophagitis with mucosal breaks measuring less than 5 mm not continuous between folds, covering less than 75% of the circumference SPECIMENS: ID Type Source Tests Collected by Time Destination 1 : Biopsy R/O H. Pylori Tissue Stomach TISSUE EXAM Stefan Keenan MD 5/14/2024  9:32 AM      Impression: The duodenum appeared normal. Moderate erythematous mucosa in the antrum; performed cold forceps biopsy to rule out H. pylori 2 cm hiatal hernia Grade A esophagitis RECOMMENDATION:  Await pathology results Continue pantoprazole 40 mg once daily.  Advance diet as tolerated.  Start with clear liquids.  Will need to continue outpatient PPI.  Stefan Keenan MD     XR chest portable    Result Date: 5/14/2024  Narrative: XR CHEST PORTABLE INDICATION: hypoxia. COMPARISON: 3/23/2024 FINDINGS: Clear lungs. No pneumothorax or pleural effusion. Improved appearance of the right upper lobe. Cardiomegaly. Bones are unremarkable for age. Normal upper abdomen.     Impression: No acute cardiopulmonary disease. Workstation performed: [id number]     CT abdomen pelvis wo contrast    Result Date: 5/12/2024  Narrative: CT ABDOMEN AND PELVIS WITHOUT IV CONTRAST INDICATION: vomiting, SBO?. COMPARISON: CT chest 3/18/2024, CT pelvis 10/29/2022 TECHNIQUE: CT examination of the abdomen and pelvis was performed without intravenous contrast. Multiplanar 2D reformatted images were created from the source data. This examination, like all CT scans performed in the [facility], was performed utilizing techniques to minimize radiation dose exposure, including the use of iterative reconstruction and automated exposure control. Radiation dose length product (DLP) for this visit: 628.89 mGy-cm Enteric Contrast: Not administered. FINDINGS: ABDOMEN LOWER CHEST: No clinically significant abnormality in the visualized lower chest. LIVER/BILIARY TREE: Unremarkable. GALLBLADDER: No calcified gallstones. No  pericholecystic inflammatory change. SPLEEN: Unremarkable. PANCREAS: Unremarkable. ADRENAL GLANDS: Unremarkable. KIDNEYS/URETERS: Left lower renal cyst and probable hyperdense cyst in the left mid kidney. No hydronephrosis. STOMACH AND BOWEL: Colonic diverticulosis without findings of acute diverticulitis. Small hiatal hernia. APPENDIX: No findings to suggest appendicitis. ABDOMINOPELVIC CAVITY: No ascites. No pneumoperitoneum. No lymphadenopathy. VESSELS: Atherosclerosis without abdominal aortic aneurysm. PELVIS REPRODUCTIVE ORGANS: Unremarkable for patient's age. URINARY BLADDER: Unremarkable. ABDOMINAL WALL/INGUINAL REGIONS: Small fat-containing umbilical hernia. BONES: Moderate compression deformity of L4, age indeterminate. Multilevel disc bulging/herniation in the lower lumbar spine, including the L3-4 level with left neuroforaminal stenosis.     Impression: 1. No acute intra-abdominal pathology. 2. Moderate L4 compression deformity, age indeterminate. MR evaluation may be useful if clinically warranted. Findings are consistent with the preliminary report from [facility] which was provided shortly after completion of the exam. Workstation performed: [id number]         REVIEW OF SYSTEMS:    CONSTITUTIONAL: Denies any fever, chills, rigors, and weight loss.  HEENT: No earache or tinnitus. Denies hearing loss or visual disturbances.  CARDIOVASCULAR: No chest pain or palpitations.   RESPIRATORY: Denies any cough, hemoptysis, shortness of breath or dyspnea on exertion.  GASTROINTESTINAL: As noted in the History of Present Illness.   GENITOURINARY: No problems with urination. Denies any hematuria or dysuria.  NEUROLOGIC: No dizziness or vertigo, denies headaches.   MUSCULOSKELETAL: Denies any muscle or joint pain.   SKIN: Denies skin rashes or itching.   ENDOCRINE: Denies excessive thirst. Denies intolerance to heat or cold.  PSYCHOSOCIAL: Denies depression or anxiety. Denies any recent memory loss.

## 2024-05-30 ENCOUNTER — OFFICE VISIT (OUTPATIENT)
Dept: CARDIOLOGY CLINIC | Facility: CLINIC | Age: 89
End: 2024-05-30
Payer: COMMERCIAL

## 2024-05-30 VITALS
HEIGHT: 62 IN | WEIGHT: 151 LBS | HEART RATE: 64 BPM | BODY MASS INDEX: 27.79 KG/M2 | DIASTOLIC BLOOD PRESSURE: 64 MMHG | SYSTOLIC BLOOD PRESSURE: 156 MMHG

## 2024-05-30 DIAGNOSIS — R60.9 DEPENDENT EDEMA: ICD-10-CM

## 2024-05-30 DIAGNOSIS — E78.5 DYSLIPIDEMIA: ICD-10-CM

## 2024-05-30 DIAGNOSIS — I49.3 PREMATURE VENTRICULAR CONTRACTION: ICD-10-CM

## 2024-05-30 DIAGNOSIS — I35.0 NONRHEUMATIC AORTIC VALVE STENOSIS: Primary | ICD-10-CM

## 2024-05-30 DIAGNOSIS — I10 PRIMARY HYPERTENSION: ICD-10-CM

## 2024-05-30 DIAGNOSIS — E11.22 TYPE 2 DIABETES MELLITUS WITH STAGE 3B CHRONIC KIDNEY DISEASE, WITH LONG-TERM CURRENT USE OF INSULIN (HCC): ICD-10-CM

## 2024-05-30 DIAGNOSIS — N18.32 TYPE 2 DIABETES MELLITUS WITH STAGE 3B CHRONIC KIDNEY DISEASE, WITH LONG-TERM CURRENT USE OF INSULIN (HCC): ICD-10-CM

## 2024-05-30 DIAGNOSIS — Z79.4 TYPE 2 DIABETES MELLITUS WITH STAGE 3B CHRONIC KIDNEY DISEASE, WITH LONG-TERM CURRENT USE OF INSULIN (HCC): ICD-10-CM

## 2024-05-30 PROCEDURE — 99214 OFFICE O/P EST MOD 30 MIN: CPT | Performed by: INTERNAL MEDICINE

## 2024-05-30 NOTE — PATIENT INSTRUCTIONS
Low-Sodium Diet   AMBULATORY CARE:   A low-sodium diet  limits foods that are high in sodium (salt). You will need to follow a low-sodium diet if you have high blood pressure, kidney disease, or heart failure. You may also need to follow this diet if you have a condition that is causing your body to retain (hold) extra fluid. You may need to limit the amount of sodium you eat in a day to 1,500 to 2,000 mg. Ask your healthcare provider how much sodium you can have each day.  How to use food labels to choose foods that are low in sodium:  Read food labels to find the amount of sodium they contain. The amount of sodium is listed in milligrams (mg). The % Daily Value (DV) column tells you how much of your daily needs are met by 1 serving of the food for each nutrient listed. Choose foods that have less than 5% of the DV of sodium. These foods are considered low in sodium. Foods that have 20% or more of the DV of sodium are considered high in sodium. Some food labels may also list any of the following terms that tell you about the sodium content in the food:  Sodium-free:  Less than 5 mg in each serving    Very low sodium:  35 mg of sodium or less in each serving    Low sodium:  140 mg of sodium or less in each serving    Reduced sodium:  At least 25% less sodium in each serving than the regular type    Light in sodium:  50% less sodium in each serving    Unsalted or no added salt:  No extra salt is added during processing (the food may still contain sodium)       Foods to avoid:  Salty foods are high in sodium. You should avoid the following:  Processed foods:      Mixes for cornbread, biscuits, cake, and pudding     Instant foods, such as potatoes, cereals, noodles, and rice     Packaged foods, such as bread stuffing, rice and pasta mixes, snack dip mixes, and macaroni and cheese     Canned foods, such as canned vegetables, soups, broths, sauces, and vegetable or tomato juice    Snack foods, such as salted chips,  popcorn, pretzels, pork rinds, salted crackers, and salted nuts    Frozen foods, such as dinners, entrees, vegetables with sauces, and breaded meats    Sauerkraut, pickled vegetables, and other foods prepared in brine    Meats and cheeses:      Smoked or cured meat, such as corned beef, piper, ham, hot dogs, and sausage    Canned meats or spreads, such as potted meats, sardines, anchovies, and imitation seafood    Deli or lunch meats, such as bologna, ham, turkey, and roast beef    Processed cheese, such as American cheese and cheese spreads    Condiments, sauces, and seasonings:      Salt (¼ teaspoon of salt contains 575 mg of sodium)    Seasonings made with salt, such as garlic salt, celery salt, onion salt, and seasoned salt    Regular soy sauce, barbecue sauce, teriyaki sauce, steak sauce, Worcestershire sauce, and most flavored vinegars    Canned gravy and mixes     Regular condiments, such as mustard, ketchup, and salad dressings    Pickles and olives    Meat tenderizers and monosodium glutamate (MSG)    Foods to include:  Read the food label to find the exact amount of sodium in each serving.  Bread and cereal:  Try to choose breads with less than 80 mg of sodium per serving.     Bread, roll, damon, tortilla, or unsalted crackers.    Ready-to-eat cereals with less than 5% DV of sodium (examples include shredded wheat and puffed rice)    Pasta    Vegetables and fruits:      Unsalted fresh, frozen, or canned vegetables    Fresh, frozen, or canned fruits    Fruit juice    Dairy:  One serving has about 150 mg of sodium.    Milk, all types    Yogurt    Hard cheese, such as cheddar, Swiss, Furman yenny, or mozzarella    Meat and other protein foods:  Some raw meats may have added sodium.     Plain meats, fish, and poultry     Eggs    Other foods:      Homemade pudding    Unsalted nuts, popcorn, or pretzels    Unsalted butter or margarine    Ways to get less sodium:  If you are used to the flavor of salt, it will  take time to get used to low-sodium foods. Your healthcare provider or dietitian can help you create a plan for lowering sodium. The plan will be specific to your needs and your family's needs. You may focus on 1 or 2 changes each week, such as the following:  Add spices and herbs to foods instead of salt during cooking.  Use salt-free seasonings to add flavor to foods. Examples include onion powder, garlic powder, basil, desai powder, paprika, and parsley. Try lemon or lime juice or vinegar to give foods a tart flavor. Use hot peppers, pepper, or cayenne pepper to add a spicy flavor.    Do not keep a salt shaker at your kitchen table.  This may help keep you from adding salt to food at the table. A teaspoon of salt has 2,300 mg of sodium. It may take time to get used to enjoying the natural flavor of food instead of adding salt. Talk to your healthcare provider before you use salt substitutes. Some salt substitutes have a high amount of a chemical called potassium chloride. This form of potassium needs to be avoided if you have kidney disease.    Choose low-sodium foods at restaurants.  Meals from restaurants are often high in sodium. Some restaurants have nutrition information on the menu that tells you the amount of sodium in their foods. If possible, ask for your food to be prepared with less, or no salt.    Shop for unsalted or low-sodium foods and snacks at the grocery store.  Examples include unsalted or low-sodium broths, soups, and canned vegetables. Choose fresh or frozen vegetables instead. Choose unsalted nuts or seeds or fresh fruits or vegetables as snacks. Read food labels and choose salt-free, very low-sodium, or low-sodium foods. You can also rinse canned vegetables under running water to remove extra sodium before you cook them.    © Copyright [employer] 2023 Information is for End User's use only and may not be sold, redistributed or otherwise used for commercial purposes.  The above information is  an  only. It is not intended as medical advice for individual conditions or treatments. Talk to your doctor, nurse or pharmacist before following any medical regimen to see if it is safe and effective for you.

## 2024-05-30 NOTE — PROGRESS NOTES
Cardiology Follow Up    Ena Ahumada  1935  [id number]  [facility] [place] [id number]  [facility] [address]  [facility] [id number] [phone]    1. Nonrheumatic aortic valve stenosis        2. Primary hypertension        3. Premature ventricular contraction        4. Type 2 diabetes mellitus with stage 3b chronic kidney disease, with long-term current use of insulin ([facility])        5. Dyslipidemia        6. Dependent edema              Discussion/Summary:    1.  HTN - Her blood pressure has improved with up titrating her therapy and changing HCTZ to torsemide.  In addition to her diuretic she remains on a clonidine patch, Bystolic and nifedipine.  Recently after her hospitalization for a GI illness her blood pressure accelerated.  Her Bystolic is now at 40 mg daily.  Need updated blood work this week, and if her creatinine is improved we will consider adding another agent.  She follows her blood pressure at home.  She will be back to see us in 4 months.    2.  Dyslipidemia - Controlled on simvastatin.  She has blood work followed regularly by her PCP.    3.  PVCs - Asymptomatic and no issues from this standpoint.    4.  Edema - This has been controlled on torsemide 20 mg daily.  She should follow a low-sodium diet.  She is having blood work followed closely.    5.  Moderate aortic stenosis - This was seen on an echocardiogram last year.  An updated 1 has been ordered and she will be having this in the near future.      Interval History:     Ena is here for a routine follow-up.  She has 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 but has been asymptomatic as of late. With her blood pressure running high at times we had made some changes over the last few appointments, which included switching her from atenolol to Bystolic.  We tried decreasing her nifedipine due to lower extremity edema, but had to go back up on this with her  blood pressure accelerating.  At some point she was also started on a clonidine patch.  She has developed worsening chronic kidney disease and follows with Nephrology.     A few years back I changed her diuretic over to torsemide, from HCTZ.  Her blood pressure then improved.  I last saw her a year ago and prior to that it was 2-1/2 years since she was last seen by us.  Last year I repeated echocardiogram which showed progression of valvular disease as she had normal LV systolic function with moderate aortic stenosis.  Her blood pressure was under control then.  Last year we did have to increase her torsemide to 20 mg daily due to some edema, which improved.  More recently and earlier this month she was in the hospital for gastroenteritis with nausea and vomiting.  She was not able to stomach a lot of her pills and therefore her blood pressure accelerated.  She is back on her medications but her blood pressure remained somewhat elevated.  Recently one of her physicians increased her Bystolic to 40 mg daily.  With her recent illness her creatinine luzmaria to 2.  She is having repeat blood work this week.    Overall Ena tells me she feels well.  She has recovered from her hospitalization and is asymptomatic from a cardiac standpoint.  She denies chest pain or any symptoms of angina.  She denies any worsening shortness of breath.  She denies any shortness of breath at rest.  No edema.  No orthopnea or PND.  She denies palpitations, lightheadedness or any syncope.      Patient Active Problem List   Diagnosis    Rhinitis    Premature ventricular contraction    Osteopenia    Hypertension    GERD (gastroesophageal reflux disease)    Dyslipidemia    Diabetic polyneuropathy (HCC)    Anxiety    Dependent edema    Anemia    CKD (chronic kidney disease), stage IV (HCC)    Complex renal cyst    Chronic back pain    Type 2 diabetes mellitus with chronic kidney disease, with long-term current use of insulin ([facility])    Herniated  nucleus pulposus, L3-4 right    Lumbar foraminal stenosis    Lumbar radiculopathy    Sacroiliitis (HCC)    Proteinuria    Nonrheumatic aortic valve stenosis    Elevated parathyroid hormone    Chronic pain syndrome    Degenerative disc disease, cervical    Hypokalemia    Chronic kidney disease-mineral and bone disorder    Closed compression fracture of L4 lumbar vertebra, sequela    Leukocytosis    Prolonged Q-T interval on ECG    Nausea & vomiting    Hypertensive urgency    Chronic respiratory failure with hypoxia (HCC)     Past Medical History:   Diagnosis Date    Acute respiratory failure with hypoxia (HCC) 03/17/2024    Anxiety     Aortic valve insufficiency     Cataract of both eyes     Chronic kidney disease     Edema     last assessed - [id number]    GERD (gastroesophageal reflux disease)     last assessed - [id number]    Hypertension     Low back pain     last assessed - [id number]    Mitral valve disorder     Osteoporosis     last assessed - [id number]    Palpitations      Social History     Socioeconomic History    Marital status: Single     Spouse name: Not on file    Number of children: Not on file    Years of education: Not on file    Highest education level: Not on file   Occupational History    Not on file   Tobacco Use    Smoking status: Never    Smokeless tobacco: Never   Vaping Use    Vaping status: Never Used   Substance and Sexual Activity    Alcohol use: Never    Drug use: Never    Sexual activity: Not Currently   Other Topics Concern    Not on file   Social History Narrative    Living with relatives (other than parents)    Marital History -      Social Determinants of Health     Financial Resource Strain: Low Risk  (7/22/2023)    Overall Financial Resource Strain (CARDIA)     Difficulty of Paying Living Expenses: Not very hard   Food Insecurity: No Food Insecurity (5/13/2024)    Hunger Vital Sign     Worried About Running Out of Food in the Last Year: Never true     Ran Out of Food in the  Last Year: Never true   Transportation Needs: No Transportation Needs (5/13/2024)    PRAPARE - Transportation     Lack of Transportation (Medical): No     Lack of Transportation (Non-Medical): No   Physical Activity: Not on file   Stress: Not on file   Social Connections: Not on file   Intimate Partner Violence: Not on file   Housing Stability: Low Risk  (5/13/2024)    Housing Stability Vital Sign     Unable to Pay for Housing in the Last Year: No     Number of Places Lived in the Last Year: 1     Unstable Housing in the Last Year: No      Family History   Problem Relation Age of Onset    Kidney disease Mother         Chronic    No Known Problems Father     Breast cancer Sister     Diabetes Sister     Cancer Sister     Breast cancer Sister     Hypertension Sister     No Known Problems Daughter     No Known Problems Son     No Known Problems Son     No Known Problems Son     No Known Problems Son      Past Surgical History:   Procedure Laterality Date    APPENDECTOMY      CATARACT EXTRACTION Bilateral     COLONOSCOPY      HIP SURGERY      TUBAL LIGATION Bilateral        Current Outpatient Medications:     ACCU-CHEK PAUL PLUS test strip, , Disp: , Rfl:     Accu-Chek Guide test strip, USE TO TEST TWICE A DAY, Disp: 100 strip, Rfl: 3    Accu-Chek Softclix Lancets lancets, , Disp: , Rfl:     Accu-Chek Softclix Lancets lancets, USE AS INSTRUCTED TWICE DAILY, Disp: 100 each, Rfl: 3    aspirin 81 MG tablet, Take 1 tablet by mouth daily, Disp: , Rfl:     Blood Glucose Monitoring Suppl (OneTouch Verio Reflect) w/Device KIT, Check blood sugars twice daily. Please substitute with appropriate alternative as covered by patient's insurance. Dx: E11.65, Disp: 1 kit, Rfl: 0    cholecalciferol (VITAMIN D3) 1,000 units tablet, Take 2 tablets by mouth daily, Disp: , Rfl:     cloNIDine (CATAPRES-TTS-1) 0.1 mg/24 hr, PLACE 1 PATCH ON THE SKIN ONCE A WEEK AS DIRECTED, Disp: 12 patch, Rfl: 4    clotrimazole-betamethasone (LOTRISONE)  1-0.05 % cream, Apply topically 2 (two) times a day, Disp: 45 g, Rfl: 0    Denta 5000 Plus 1.1 % CREA, USE TWICE A DAY -SPIT, DONT RINSE AND NOTHING BY MOUTH X 30 MIN, Disp: , Rfl:     famotidine (PEPCID) 20 mg tablet, Take 1 tablet (20 mg total) by mouth daily at bedtime, Disp: 30 tablet, Rfl: 6    ferrous sulfate 325 (65 Fe) mg tablet, Take 1 tablet (325 mg total) by mouth 3 (three) times a week, Disp: 12 tablet, Rfl: 0    glipiZIDE (GLUCOTROL) 10 mg tablet, TAKE 1 TABLET BY MOUTH IN THE MORNING THEN 2 TABS WITH DINNER, Disp: 270 tablet, Rfl: 3    glucose blood (Accu-Chek Emma Plus) test strip, USE AS INSTRUCTED TWICE DAILY, Disp: 100 each, Rfl: 7    glucose blood (OneTouch Verio) test strip, Check blood sugars twice daily. Please substitute with appropriate alternative as covered by patient's insurance. Dx: E11.65, Disp: 200 each, Rfl: 3    Insulin Glargine-yfgn 100 UNIT/ML SOPN, INJECT 0.05 ML (5 UNITS TOTAL) UNDER THE SKIN IN THE MORNING, Disp: , Rfl:     Insulin Pen Needle (BD Pen Needle Kathy U/F) 32G X 4 MM MISC, Use daily, Disp: 100 each, Rfl: 3    Invokana 100 MG, TAKE 1 TABLET BY MOUTH EVERY DAY BEFORE BREAKFAST, Disp: 30 tablet, Rfl: 5    nebivolol (BYSTOLIC) 20 MG tablet, Take 2 tablets (40 mg total) by mouth daily, Disp: 90 tablet, Rfl: 0    NIFEdipine (ADALAT CC) 90 mg 24 hr tablet, TAKE 1 TABLET BY MOUTH EVERY DAY, Disp: 90 tablet, Rfl: 3    OneTouch Delica Lancets 33G MISC, Check blood sugars twice daily. Please substitute with appropriate alternative as covered by patient's insurance. Dx: E11.65, Disp: 200 each, Rfl: 3    oxygen gas, Inhale 2.5 L/min continuous VIA NASAL CANULA. At night only, Disp: , Rfl:     pantoprazole (PROTONIX) 20 mg tablet, Take 1 tablet (20 mg total) by mouth daily, Disp: 30 tablet, Rfl: 6    potassium chloride (Klor-Con M10) 10 mEq tablet, Take 1 tablet (10 mEq total) by mouth daily, Disp: 30 tablet, Rfl: 2    scopolamine (TRANSDERM-SCOP) 1 mg/3 days TD 72 hr patch, Place  1 patch on the skin over 72 hours every third day as needed (nausea), Disp: 12 patch, Rfl: 0    sertraline (ZOLOFT) 100 mg tablet, TAKE 1 TABLET BY MOUTH EVERY DAY, Disp: 90 tablet, Rfl: 2    simvastatin (ZOCOR) 20 mg tablet, TAKE 1 TABLET BY MOUTH EVERYDAY AT BEDTIME, Disp: 90 tablet, Rfl: 2    sitaGLIPtin (Januvia) 50 mg tablet, TAKE 1 TABLET BY MOUTH EVERY DAY, Disp: 30 tablet, Rfl: 5    torsemide (DEMADEX) 20 mg tablet, TAKE 1 TABLET BY MOUTH EVERY DAY, Disp: 90 tablet, Rfl: 0  No Known Allergies    Labs:  Lab Results   Component Value Date     01/02/2018    K 3.5 05/22/2024     05/22/2024    CO2 24 05/22/2024    BUN 31 (H) 05/22/2024    CREATININE 2.06 (H) 05/22/2024    CREATININE 1.38 (H) 05/15/2024    CREATININE 1.47 (H) 01/02/2018    GLUCOSE 264 (H) 03/16/2024    GLUCOSE 145 (H) 01/02/2018    CALCIUM 8.9 05/15/2024    CALCIUM 9.8 01/02/2018     Lab Results   Component Value Date    WBC 12.04 (H) 05/15/2024    WBC 11.7 (H) 01/02/2018    HGB 12.3 05/15/2024    HGB 11.2 01/02/2018    HCT 39.9 05/15/2024    HCT 34.5 01/02/2018    MCV 94 05/15/2024    MCV 90 01/02/2018     05/15/2024     01/02/2018     Lab Results   Component Value Date    CHOL 168 01/02/2018    TRIG 139 06/23/2023    HDL 50 06/23/2023     Imaging:    ECHO (4/26/2023):    Left Ventricle: Left ventricular cavity size is normal. There is mild concentric hypertrophy. The left ventricular ejection fraction is 65%. Systolic function is normal. Wall motion is normal. Diastolic function is mildly abnormal, consistent with grade I (abnormal) relaxation.    Right Ventricle: Right ventricular cavity size is normal. Systolic function is normal.    Left Atrium: The atrium is mildly dilated.    Right Atrium: The atrium is mildly dilated.    Aortic Valve: There is mild regurgitation. There is moderate stenosis.    Tricuspid Valve: There is mild regurgitation. The right ventricular systolic pressure is moderately elevated. The  "estimated right ventricular systolic pressure is 50.00 mmHg.    Prior TTE study available for comparison. Prior study date: 8/16/2019. Changes noted when compared to prior study. Changes include: Aortic stenosis is now present..      Review of Systems:  Review of Systems   Constitutional: Negative.    HENT: Negative.     Eyes: Negative.    Respiratory: Negative.     Cardiovascular: Negative.    Gastrointestinal: Negative.    Musculoskeletal: Negative.    Skin: Negative.    Allergic/Immunologic: Negative.    Neurological: Negative.    Hematological: Negative.    Psychiatric/Behavioral: Negative.     All other systems reviewed and are negative.    Vitals:    05/30/24 0825   BP: 156/64   BP Location: Right arm   Patient Position: Sitting   Cuff Size: Standard   Pulse: 64   Weight: 68.5 kg (151 lb)   Height: 5' 2\" (1.575 m)     Physical Exam  Vitals and nursing note reviewed.   Constitutional:       Appearance: She is well-developed.   HENT:      Head: Normocephalic and atraumatic.   Eyes:      General: No scleral icterus.        Right eye: No discharge.         Left eye: No discharge.      Pupils: Pupils are equal, round, and reactive to light.   Neck:      Thyroid: No thyromegaly.      Vascular: No JVD.   Cardiovascular:      Rate and Rhythm: Normal rate and regular rhythm. No extrasystoles are present.     Pulses: Normal pulses.      Heart sounds: S1 normal and S2 normal. Murmur heard.      Systolic murmur is present with a grade of 3/6.      No friction rub. No gallop.   Pulmonary:      Effort: Pulmonary effort is normal. No respiratory distress.      Breath sounds: Normal breath sounds. No wheezing, rhonchi or rales.   Abdominal:      General: Bowel sounds are normal. There is no distension.      Palpations: Abdomen is soft.      Tenderness: There is no abdominal tenderness.   Musculoskeletal:         General: No tenderness or deformity. Normal range of motion.      Cervical back: Normal range of motion and neck " supple.   Skin:     General: Skin is warm and dry.      Findings: No rash.   Neurological:      Mental Status: She is alert and oriented to person, place, and time.      Cranial Nerves: No cranial nerve deficit.   Psychiatric:         Thought Content: Thought content normal.         Judgment: Judgment normal.     Counseling / Coordination of Care  Total office / unit time spent today 25 minutes.  Greater than 50% of total time was spent with the patient and / or family counseling and / or coordination of care.

## 2024-06-01 LAB
BUN SERPL-MCNC: 41 MG/DL (ref 8–27)
BUN/CREAT SERPL: 25 (ref 12–28)
CALCIUM SERPL-MCNC: 9.2 MG/DL (ref 8.7–10.3)
CHLORIDE SERPL-SCNC: 101 MMOL/L (ref 96–106)
CO2 SERPL-SCNC: 24 MMOL/L (ref 20–29)
CREAT SERPL-MCNC: 1.66 MG/DL (ref 0.57–1)
EGFR: 29 ML/MIN/1.73
GLUCOSE SERPL-MCNC: 172 MG/DL (ref 70–99)
POTASSIUM SERPL-SCNC: 3.5 MMOL/L (ref 3.5–5.2)
SODIUM SERPL-SCNC: 142 MMOL/L (ref 134–144)

## 2024-06-04 ENCOUNTER — TELEPHONE (OUTPATIENT)
Dept: FAMILY MEDICINE CLINIC | Facility: HOSPITAL | Age: 89
End: 2024-06-04

## 2024-06-04 NOTE — TELEPHONE ENCOUNTER
Spoke to jimbo, patients daughter, understands to continue with current regimen and will review at next visit

## 2024-06-04 NOTE — TELEPHONE ENCOUNTER
----- Message from Deanna Castaneda DO sent at 6/2/2024  8:48 PM EDT -----  Please notify pt/daughter that her kidney tests were improved from last labs but still just above her baseline, her potassium is still very low normal at 3.5 (nml range is 3.5 to 5.0). con't all current meds and will review at next appt.

## 2024-06-09 LAB
ALBUMIN SERPL-MCNC: 4.2 G/DL (ref 3.7–4.7)
ALBUMIN/GLOB SERPL: 1.9 {RATIO} (ref 1.2–2.2)
ALP SERPL-CCNC: 80 IU/L (ref 44–121)
ALT SERPL-CCNC: 16 IU/L (ref 0–32)
AST SERPL-CCNC: 20 IU/L (ref 0–40)
BILIRUB SERPL-MCNC: 0.2 MG/DL (ref 0–1.2)
BUN SERPL-MCNC: 36 MG/DL (ref 8–27)
BUN/CREAT SERPL: 19 (ref 12–28)
CALCIUM SERPL-MCNC: 9.1 MG/DL (ref 8.7–10.3)
CHLORIDE SERPL-SCNC: 101 MMOL/L (ref 96–106)
CHOLEST SERPL-MCNC: 185 MG/DL (ref 100–199)
CO2 SERPL-SCNC: 25 MMOL/L (ref 20–29)
CREAT SERPL-MCNC: 1.94 MG/DL (ref 0.57–1)
EGFR: 24 ML/MIN/1.73
EST. AVERAGE GLUCOSE BLD GHB EST-MCNC: 166 MG/DL
GLOBULIN SER-MCNC: 2.2 G/DL (ref 1.5–4.5)
GLUCOSE SERPL-MCNC: 239 MG/DL (ref 70–99)
HBA1C MFR BLD: 7.4 % (ref 4.8–5.6)
HDLC SERPL-MCNC: 48 MG/DL
LDLC SERPL CALC-MCNC: 113 MG/DL (ref 0–99)
LDLC/HDLC SERPL: 2.4 RATIO (ref 0–3.2)
POTASSIUM SERPL-SCNC: 3.4 MMOL/L (ref 3.5–5.2)
PROT SERPL-MCNC: 6.4 G/DL (ref 6–8.5)
SL AMB VLDL CHOLESTEROL CALC: 24 MG/DL (ref 5–40)
SODIUM SERPL-SCNC: 143 MMOL/L (ref 134–144)
TRIGL SERPL-MCNC: 138 MG/DL (ref 0–149)
TSH SERPL DL<=0.005 MIU/L-ACNC: 2.76 UIU/ML (ref 0.45–4.5)

## 2024-06-10 ENCOUNTER — TELEPHONE (OUTPATIENT)
Age: 89
End: 2024-06-10

## 2024-06-10 DIAGNOSIS — B37.2 CUTANEOUS CANDIDIASIS: Primary | ICD-10-CM

## 2024-06-10 RX ORDER — TERBINAFINE HYDROCHLORIDE 250 MG/1
250 TABLET ORAL DAILY
Qty: 10 TABLET | Refills: 0 | Status: ON HOLD | OUTPATIENT
Start: 2024-06-10 | End: 2024-06-20

## 2024-06-10 NOTE — TELEPHONE ENCOUNTER
Received call from KYLE regarding rash that patient had appointment for with Feliciano. The cream that was prescribed is not working and the rash is spreading and itchy. Please advise.

## 2024-06-10 NOTE — TELEPHONE ENCOUNTER
Script for oral antifungal sent to pharmacy - if this does not help, she will need to see dermatology

## 2024-06-14 ENCOUNTER — HOSPITAL ENCOUNTER (OUTPATIENT)
Dept: CT IMAGING | Facility: HOSPITAL | Age: 89
Discharge: HOME | End: 2024-06-14
Payer: COMMERCIAL

## 2024-06-14 ENCOUNTER — HOSPITAL ENCOUNTER (OUTPATIENT)
Dept: PULMONOLOGY | Facility: HOSPITAL | Age: 89
End: 2024-06-14
Payer: COMMERCIAL

## 2024-06-14 DIAGNOSIS — J96.01 ACUTE RESPIRATORY FAILURE WITH HYPOXIA (HCC): ICD-10-CM

## 2024-06-14 DIAGNOSIS — J18.9 PNEUMONIA OF RIGHT LOWER LOBE DUE TO INFECTIOUS ORGANISM: ICD-10-CM

## 2024-06-14 PROCEDURE — 94726 PLETHYSMOGRAPHY LUNG VOLUMES: CPT | Performed by: INTERNAL MEDICINE

## 2024-06-14 PROCEDURE — 94729 DIFFUSING CAPACITY: CPT | Performed by: INTERNAL MEDICINE

## 2024-06-14 PROCEDURE — 94726 PLETHYSMOGRAPHY LUNG VOLUMES: CPT

## 2024-06-14 PROCEDURE — 94060 EVALUATION OF WHEEZING: CPT | Performed by: INTERNAL MEDICINE

## 2024-06-14 PROCEDURE — 71250 CT THORAX DX C-: CPT

## 2024-06-14 PROCEDURE — 94060 EVALUATION OF WHEEZING: CPT

## 2024-06-14 PROCEDURE — 94760 N-INVAS EAR/PLS OXIMETRY 1: CPT

## 2024-06-14 PROCEDURE — 94729 DIFFUSING CAPACITY: CPT

## 2024-06-14 RX ORDER — ALBUTEROL SULFATE 2.5 MG/3ML
2.5 SOLUTION RESPIRATORY (INHALATION) ONCE
Status: COMPLETED | OUTPATIENT
Start: 2024-06-14 | End: 2024-06-14

## 2024-06-14 RX ADMIN — ALBUTEROL SULFATE 2.5 MG: 2.5 SOLUTION RESPIRATORY (INHALATION) at 12:02

## 2024-06-17 ENCOUNTER — OFFICE VISIT (OUTPATIENT)
Age: 89
End: 2024-06-17
Payer: COMMERCIAL

## 2024-06-17 VITALS
WEIGHT: 149 LBS | OXYGEN SATURATION: 92 % | DIASTOLIC BLOOD PRESSURE: 60 MMHG | HEIGHT: 62 IN | BODY MASS INDEX: 27.42 KG/M2 | HEART RATE: 84 BPM | SYSTOLIC BLOOD PRESSURE: 132 MMHG | TEMPERATURE: 98.4 F

## 2024-06-17 DIAGNOSIS — J96.11 CHRONIC RESPIRATORY FAILURE WITH HYPOXIA (HCC): Primary | ICD-10-CM

## 2024-06-17 DIAGNOSIS — J18.9 PNEUMONIA OF RIGHT LOWER LOBE DUE TO INFECTIOUS ORGANISM: ICD-10-CM

## 2024-06-17 PROCEDURE — 99214 OFFICE O/P EST MOD 30 MIN: CPT | Performed by: NURSE PRACTITIONER

## 2024-06-17 PROCEDURE — 94618 PULMONARY STRESS TESTING: CPT | Performed by: NURSE PRACTITIONER

## 2024-06-17 NOTE — ASSESSMENT & PLAN NOTE
Although no formal radiology report is available today I did review recent imaging with Ena and complete resolution of pneumonia is identified. I will call with any new findings when the report is finalized.

## 2024-06-17 NOTE — PROGRESS NOTES
Pulmonary Follow-Up Note   Ena Ahumada 88 y.o. female MRN: [id number]  6/17/2024      Assessment/Plan:    Problem List Items Addressed This Visit       RESOLVED: Pneumonia     Although no formal radiology report is available today I did review recent imaging with Ena and complete resolution of pneumonia is identified. I will call with any new findings when the report is finalized.         RESOLVED: Chronic respiratory failure with hypoxia (HCC) - Primary     A 6-minute walk test was repeated today and patient no longer needs supplemental oxygen. I will order discontinue and return of equipment to DME company.         Relevant Orders    POCT 6 minute walk (Completed)    Discontinue home oxygen     Vaccines: up to date    Return if symptoms worsen or fail to improve.    All of Ena's questions were answered prior to leaving the office today.  She is aware to call our office with any further questions or concerns.    History of Present Illness   Reason for Visit: Follow up  Chief Complaint: history of pneumonia, acute hypoxia with respiratory failure  HPI: Ena Ahumada is a 88 y.o. female who presents to the office today for reevaluation and to review test results.    She has mild cough but does not feel it is significant; no mucus production. No shortness of breath or wheezing. She is sleeping well with no real interruption. She had recent overnight hospital stay in May due to vomiting and was told to continue O2 overnight. During the day has not been wearing O2.    Review of Systems   Constitutional:  Negative for appetite change and fever.   HENT:  Negative for ear pain, postnasal drip, rhinorrhea, sneezing, sore throat and trouble swallowing.    Respiratory:  Negative for cough, chest tightness, shortness of breath and wheezing.    Cardiovascular:  Negative for chest pain.   Musculoskeletal:  Positive for myalgias.   Neurological:  Negative for headaches.   All other systems reviewed and are  negative.    Answers submitted by the patient for this visit:  Pulmonology Questionnaire (Submitted on 6/13/2024)  Chief Complaint: Primary symptoms  When did you first notice your symptoms?: more than 1 month ago  How often do your symptoms occur?: rarely  Since you first noticed this problem, how has it changed?: resolved  Do you have shortness of breath that occurs with effort or exertion?: No  Do you have ear congestion?: No  Do you have heartburn?: No  Do you have fatigue?: No  Do you have nasal congestion?: No  Do you have shortness of breath when lying flat?: No  Do you have shortness of breath when you wake up?: No  Do you have sweats?: No  Have you experienced weight loss?: No  Which of the following makes your symptoms worse?: nothing      Historical Information   Past Medical History:   Diagnosis Date    Acute respiratory failure with hypoxia (HCC) 03/17/2024    Anxiety     Aortic valve insufficiency     Cataract of both eyes     Chronic kidney disease     Edema     last assessed - [id number]    GERD (gastroesophageal reflux disease)     last assessed - [id number]    Hypertension     Low back pain     last assessed - [id number]    Mitral valve disorder     Osteoporosis     last assessed - [id number]    Palpitations      Past Surgical History:   Procedure Laterality Date    APPENDECTOMY      CATARACT EXTRACTION Bilateral     COLONOSCOPY      HIP SURGERY      TUBAL LIGATION Bilateral      Family History   Problem Relation Age of Onset    Kidney disease Mother         Chronic    No Known Problems Father     Breast cancer Sister     Diabetes Sister     Cancer Sister     Breast cancer Sister     Hypertension Sister     No Known Problems Daughter     No Known Problems Son     No Known Problems Son     No Known Problems Son     No Known Problems Son      Social History   Social History     Substance and Sexual Activity   Alcohol Use Never     Social History     Substance and Sexual Activity   Drug Use Never      Social History     Tobacco Use   Smoking Status Never   Smokeless Tobacco Never     E-Cigarette/Vaping    E-Cigarette Use Never User      E-Cigarette/Vaping Substances    Nicotine No     THC No     CBD No     Flavoring No     Other No     Unknown No        Meds/Allergies     Current Outpatient Medications:     ACCU-CHEK PAUL PLUS test strip, , Disp: , Rfl:     Accu-Chek Guide test strip, USE TO TEST TWICE A DAY, Disp: 100 strip, Rfl: 3    Accu-Chek Softclix Lancets lancets, , Disp: , Rfl:     Accu-Chek Softclix Lancets lancets, USE AS INSTRUCTED TWICE DAILY, Disp: 100 each, Rfl: 3    aspirin 81 MG tablet, Take 1 tablet by mouth daily, Disp: , Rfl:     Blood Glucose Monitoring Suppl (OneTouch Verio Reflect) w/Device KIT, Check blood sugars twice daily. Please substitute with appropriate alternative as covered by patient's insurance. Dx: E11.65, Disp: 1 kit, Rfl: 0    cholecalciferol (VITAMIN D3) 1,000 units tablet, Take 2 tablets by mouth daily, Disp: , Rfl:     cloNIDine (CATAPRES-TTS-1) 0.1 mg/24 hr, PLACE 1 PATCH ON THE SKIN ONCE A WEEK AS DIRECTED, Disp: 12 patch, Rfl: 4    clotrimazole-betamethasone (LOTRISONE) 1-0.05 % cream, Apply topically 2 (two) times a day, Disp: 45 g, Rfl: 0    Denta 5000 Plus 1.1 % CREA, USE TWICE A DAY -SPIT, DONT RINSE AND NOTHING BY MOUTH X 30 MIN, Disp: , Rfl:     famotidine (PEPCID) 20 mg tablet, Take 1 tablet (20 mg total) by mouth daily at bedtime, Disp: 30 tablet, Rfl: 6    ferrous sulfate 325 (65 Fe) mg tablet, Take 1 tablet (325 mg total) by mouth 3 (three) times a week, Disp: 12 tablet, Rfl: 0    glipiZIDE (GLUCOTROL) 10 mg tablet, TAKE 1 TABLET BY MOUTH IN THE MORNING THEN 2 TABS WITH DINNER, Disp: 270 tablet, Rfl: 3    glucose blood (Accu-Chek Paul Plus) test strip, USE AS INSTRUCTED TWICE DAILY, Disp: 100 each, Rfl: 7    glucose blood (OneTouch Verio) test strip, Check blood sugars twice daily. Please substitute with appropriate alternative as covered by patient's  "insurance. Dx: E11.65, Disp: 200 each, Rfl: 3    Insulin Glargine-yfgn 100 UNIT/ML SOPN, INJECT 0.05 ML (5 UNITS TOTAL) UNDER THE SKIN IN THE MORNING, Disp: , Rfl:     Insulin Pen Needle (BD Pen Needle Kathy U/F) 32G X 4 MM MISC, Use daily, Disp: 100 each, Rfl: 3    Invokana 100 MG, TAKE 1 TABLET BY MOUTH EVERY DAY BEFORE BREAKFAST, Disp: 30 tablet, Rfl: 5    nebivolol (BYSTOLIC) 20 MG tablet, Take 2 tablets (40 mg total) by mouth daily, Disp: 90 tablet, Rfl: 0    NIFEdipine (ADALAT CC) 90 mg 24 hr tablet, TAKE 1 TABLET BY MOUTH EVERY DAY, Disp: 90 tablet, Rfl: 3    OneTouch Delica Lancets 33G MISC, Check blood sugars twice daily. Please substitute with appropriate alternative as covered by patient's insurance. Dx: E11.65, Disp: 200 each, Rfl: 3    oxygen gas, Inhale 2.5 L/min continuous VIA NASAL CANULA. At night only, Disp: , Rfl:     pantoprazole (PROTONIX) 20 mg tablet, Take 1 tablet (20 mg total) by mouth daily, Disp: 30 tablet, Rfl: 6    potassium chloride (Klor-Con M10) 10 mEq tablet, Take 1 tablet (10 mEq total) by mouth daily, Disp: 30 tablet, Rfl: 2    scopolamine (TRANSDERM-SCOP) 1 mg/3 days TD 72 hr patch, Place 1 patch on the skin over 72 hours every third day as needed (nausea), Disp: 12 patch, Rfl: 0    sertraline (ZOLOFT) 100 mg tablet, TAKE 1 TABLET BY MOUTH EVERY DAY, Disp: 90 tablet, Rfl: 2    simvastatin (ZOCOR) 20 mg tablet, TAKE 1 TABLET BY MOUTH EVERYDAY AT BEDTIME, Disp: 90 tablet, Rfl: 2    sitaGLIPtin (Januvia) 50 mg tablet, TAKE 1 TABLET BY MOUTH EVERY DAY, Disp: 30 tablet, Rfl: 5    terbinafine (LamISIL) 250 mg tablet, Take 1 tablet (250 mg total) by mouth daily for 10 days, Disp: 10 tablet, Rfl: 0    torsemide (DEMADEX) 20 mg tablet, TAKE 1 TABLET BY MOUTH EVERY DAY, Disp: 90 tablet, Rfl: 0  No Known Allergies    Vitals: Blood pressure 132/60, pulse 84, temperature 98.4 °F (36.9 °C), temperature source Tympanic, height 5' 2\" (1.575 m), weight 67.6 kg (149 lb), SpO2 92%, not currently " "breastfeeding. Body mass index is 27.25 kg/m². Oxygen Therapy  SpO2: 92 %  Oxygen Therapy: None (Room air)      Physical Exam  Vitals reviewed.   Constitutional:       Appearance: Normal appearance.   HENT:      Head: Normocephalic.      Nose: Nose normal.      Mouth/Throat:      Mouth: Mucous membranes are moist.      Pharynx: Oropharynx is clear.   Cardiovascular:      Rate and Rhythm: Normal rate and regular rhythm.      Pulses: Normal pulses.      Heart sounds: Normal heart sounds.   Pulmonary:      Effort: Pulmonary effort is normal.      Breath sounds: Normal breath sounds.   Musculoskeletal:         General: Normal range of motion.   Skin:     General: Skin is warm.      Capillary Refill: Capillary refill takes less than 2 seconds.   Neurological:      General: No focal deficit present.      Mental Status: She is alert and oriented to person, place, and time.   Psychiatric:         Mood and Affect: Mood normal.         Behavior: Behavior normal.         Labs:   Lab Results   Component Value Date    WBC 12.04 (H) 05/15/2024    HGB 12.3 05/15/2024    HCT 39.9 05/15/2024    MCV 94 05/15/2024     05/15/2024    EOSPCT 3 05/15/2024    EOSABS 0.36 05/15/2024    SEGSPCT 61 03/25/2024    MONOPCT 8 05/15/2024    MONOABS 0.30 03/25/2024    BANDSPCT 2 03/25/2024     Lab Results   Component Value Date    GLUCOSE 264 (H) 03/16/2024    CALCIUM 8.9 05/15/2024     01/02/2018    K 3.4 (L) 06/08/2024    CO2 25 06/08/2024     06/08/2024    BUN 36 (H) 06/08/2024    CREATININE 1.94 (H) 06/08/2024     No results found for: \"IGE\", \"RAST\"  Lab Results   Component Value Date    ALT 16 06/08/2024    AST 20 06/08/2024    ALKPHOS 55 05/11/2024    BILITOT 0.3 01/02/2018         Imaging and other studies: I have personally reviewed pertinent reports.   and I have personally reviewed pertinent films in PACS  CT chest 6/14/24  No formal evaluation available at this time; on my read pneumonia is resolved. No further " "groundglass opacification.    Pulmonary Results (PFTs, PSG): I have personally reviewed pertinent reports.    PFT 6/14/24  FEV1/FVC Ratio: 80%  FEV1: 1.21 L74% predicted  Forced Vital Capacity: 1.51 L           70% predicted  After administration of bronchodilator: Improvement in both FEV1 and FVC     Lung volumes: Total Lung Capacity 77% predicted Residual volume 77% predicted     DLCO corrected for patients hemoglobin level: 67% predicted     Flow volume loop: Able to interpret      Interpretation:     Spirometry with reduced vital capacity, at least in part related to patient unable to perform maneuvers with proper exhalation time  There is improvement after administration of bronchodilators  Mild restriction on lung volumes  Mildly reduced diffusion capacity    POC 6 minute walk test:  Resting room air saturation: 94%; HR 71  Dl scale of dyspnea at start of test: 0    Ambulation testing:  Patient ambulated 6 minutes with no desaturation; HR 71-77    Dl scale of dyspnea at end of test: 1  Total walk distance completed:  190m      KANA Navarro  [facility]        Portions of the record may have been created with voice recognition software.  Occasional wrong word or \"sound a like\" substitutions may have occurred due to the inherent limitations of voice recognition software.  Read the chart carefully and recognize, using context, where substitutions have occurred or contact the dictating provider.  "

## 2024-06-17 NOTE — ASSESSMENT & PLAN NOTE
A 6-minute walk test was repeated today and patient no longer needs supplemental oxygen. I will order discontinue and return of equipment to DME company.

## 2024-06-19 DIAGNOSIS — K21.9 GASTROESOPHAGEAL REFLUX DISEASE, UNSPECIFIED WHETHER ESOPHAGITIS PRESENT: ICD-10-CM

## 2024-06-19 RX ORDER — PANTOPRAZOLE SODIUM 20 MG/1
20 TABLET, DELAYED RELEASE ORAL DAILY
Qty: 90 TABLET | Refills: 1 | Status: SHIPPED | OUTPATIENT
Start: 2024-06-19

## 2024-06-21 ENCOUNTER — APPOINTMENT (EMERGENCY)
Dept: RADIOLOGY | Facility: HOSPITAL | Age: 89
DRG: 542 | End: 2024-06-21
Payer: COMMERCIAL

## 2024-06-21 ENCOUNTER — APPOINTMENT (EMERGENCY)
Dept: CT IMAGING | Facility: HOSPITAL | Age: 89
DRG: 542 | End: 2024-06-21
Payer: COMMERCIAL

## 2024-06-21 ENCOUNTER — HOSPITAL ENCOUNTER (INPATIENT)
Facility: HOSPITAL | Age: 89
LOS: 2 days | Discharge: NURSING, REHAB OR LONG-TERM CARE | DRG: 542 | End: 2024-06-24
Attending: EMERGENCY MEDICINE | Admitting: INTERNAL MEDICINE
Payer: COMMERCIAL

## 2024-06-21 DIAGNOSIS — S32.592A CLOSED FRACTURE OF LEFT INFERIOR PUBIC RAMUS (HCC): ICD-10-CM

## 2024-06-21 DIAGNOSIS — S32.512A CLOSED FRACTURE OF LEFT SUPERIOR PUBIC RAMUS (HCC): ICD-10-CM

## 2024-06-21 DIAGNOSIS — S32.509A PUBIC BONE FRACTURE (HCC): ICD-10-CM

## 2024-06-21 DIAGNOSIS — M25.552 ACUTE HIP PAIN, LEFT: ICD-10-CM
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
## 2024-06-21 DIAGNOSIS — S32.592A CLOSED FRACTURE OF MULTIPLE PUBIC RAMI, LEFT, INITIAL ENCOUNTER (HCC): ICD-10-CM

## 2024-06-21 LAB
ANION GAP SERPL CALCULATED.3IONS-SCNC: 9 MMOL/L (ref 4–13)
APTT PPP: 23 SECONDS (ref 23–37)
BASOPHILS # BLD MANUAL: 0.17 THOUSAND/UL (ref 0–0.1)
BASOPHILS NFR MAR MANUAL: 1 % (ref 0–1)
BUN SERPL-MCNC: 42 MG/DL (ref 5–25)
CALCIUM SERPL-MCNC: 9.4 MG/DL (ref 8.4–10.2)
CARDIAC TROPONIN I PNL SERPL HS: 10 NG/L
CHLORIDE SERPL-SCNC: 103 MMOL/L (ref 96–108)
CO2 SERPL-SCNC: 28 MMOL/L (ref 21–32)
CREAT SERPL-MCNC: 2.21 MG/DL (ref 0.6–1.3)
EOSINOPHIL # BLD MANUAL: 0.51 THOUSAND/UL (ref 0–0.4)
EOSINOPHIL NFR BLD MANUAL: 3 % (ref 0–6)
ERYTHROCYTE [DISTWIDTH] IN BLOOD BY AUTOMATED COUNT: 14.1 % (ref 11.6–15.1)
GFR SERPL CREATININE-BSD FRML MDRD: 19 ML/MIN/1.73SQ M
GLUCOSE SERPL-MCNC: 78 MG/DL (ref 65–140)
GLUCOSE SERPL-MCNC: 85 MG/DL (ref 65–140)
HCT VFR BLD AUTO: 41.5 % (ref 34.8–46.1)
HGB BLD-MCNC: 13.1 G/DL (ref 11.5–15.4)
INR PPP: 1.06 (ref 0.84–1.19)
LYMPHOCYTES # BLD AUTO: 42 % (ref 14–44)
LYMPHOCYTES # BLD AUTO: 7.44 THOUSAND/UL (ref 0.6–4.47)
MCH RBC QN AUTO: 29.7 PG (ref 26.8–34.3)
MCHC RBC AUTO-ENTMCNC: 31.6 G/DL (ref 31.4–37.4)
MCV RBC AUTO: 94 FL (ref 82–98)
MONOCYTES # BLD AUTO: 0.85 THOUSAND/UL (ref 0–1.22)
MONOCYTES NFR BLD: 5 % (ref 4–12)
NEUTROPHILS # BLD MANUAL: 7.94 THOUSAND/UL (ref 1.85–7.62)
NEUTS BAND NFR BLD MANUAL: 1 % (ref 0–8)
NEUTS SEG NFR BLD AUTO: 46 % (ref 43–75)
PLATELET # BLD AUTO: 240 THOUSANDS/UL (ref 149–390)
PLATELET BLD QL SMEAR: ADEQUATE
PMV BLD AUTO: 9.4 FL (ref 8.9–12.7)
POTASSIUM SERPL-SCNC: 3.7 MMOL/L (ref 3.5–5.3)
PROTHROMBIN TIME: 14.2 SECONDS (ref 11.6–14.5)
RBC # BLD AUTO: 4.41 MILLION/UL (ref 3.81–5.12)
RBC MORPH BLD: NORMAL
SODIUM SERPL-SCNC: 140 MMOL/L (ref 135–147)
VARIANT LYMPHS # BLD AUTO: 2 %
WBC # BLD AUTO: 16.9 THOUSAND/UL (ref 4.31–10.16)

## 2024-06-21 PROCEDURE — 84484 ASSAY OF TROPONIN QUANT: CPT

## 2024-06-21 PROCEDURE — 71045 X-RAY EXAM CHEST 1 VIEW: CPT

## 2024-06-21 PROCEDURE — 72192 CT PELVIS W/O DYE: CPT

## 2024-06-21 PROCEDURE — 99285 EMERGENCY DEPT VISIT HI MDM: CPT

## 2024-06-21 PROCEDURE — 99223 1ST HOSP IP/OBS HIGH 75: CPT | Performed by: INTERNAL MEDICINE

## 2024-06-21 PROCEDURE — 72170 X-RAY EXAM OF PELVIS: CPT

## 2024-06-21 PROCEDURE — 85730 THROMBOPLASTIN TIME PARTIAL: CPT

## 2024-06-21 PROCEDURE — 36415 COLL VENOUS BLD VENIPUNCTURE: CPT

## 2024-06-21 PROCEDURE — 80048 BASIC METABOLIC PNL TOTAL CA: CPT

## 2024-06-21 PROCEDURE — 85610 PROTHROMBIN TIME: CPT

## 2024-06-21 PROCEDURE — 93005 ELECTROCARDIOGRAM TRACING: CPT

## 2024-06-21 PROCEDURE — 85027 COMPLETE CBC AUTOMATED: CPT

## 2024-06-21 PROCEDURE — 85007 BL SMEAR W/DIFF WBC COUNT: CPT

## 2024-06-21 PROCEDURE — 73552 X-RAY EXAM OF FEMUR 2/>: CPT

## 2024-06-21 PROCEDURE — 82948 REAGENT STRIP/BLOOD GLUCOSE: CPT

## 2024-06-21 RX ORDER — INSULIN LISPRO 100 [IU]/ML
1-5 INJECTION, SOLUTION INTRAVENOUS; SUBCUTANEOUS
Status: DISCONTINUED | OUTPATIENT
Start: 2024-06-21 | End: 2024-06-24 | Stop reason: HOSPADM

## 2024-06-21 RX ORDER — HEPARIN SODIUM 5000 [USP'U]/ML
5000 INJECTION, SOLUTION INTRAVENOUS; SUBCUTANEOUS EVERY 8 HOURS SCHEDULED
Status: DISCONTINUED | OUTPATIENT
Start: 2024-06-22 | End: 2024-06-24 | Stop reason: HOSPADM

## 2024-06-21 RX ORDER — HYDROMORPHONE HCL IN WATER/PF 6 MG/30 ML
0.2 PATIENT CONTROLLED ANALGESIA SYRINGE INTRAVENOUS EVERY 6 HOURS PRN
Status: DISCONTINUED | OUTPATIENT
Start: 2024-06-21 | End: 2024-06-24 | Stop reason: HOSPADM

## 2024-06-21 RX ORDER — ACETAMINOPHEN 325 MG/1
650 TABLET ORAL ONCE
Status: COMPLETED | OUTPATIENT
Start: 2024-06-21 | End: 2024-06-21

## 2024-06-21 RX ORDER — INSULIN LISPRO 100 [IU]/ML
1-5 INJECTION, SOLUTION INTRAVENOUS; SUBCUTANEOUS
Status: DISCONTINUED | OUTPATIENT
Start: 2024-06-22 | End: 2024-06-24 | Stop reason: HOSPADM

## 2024-06-21 RX ORDER — ACETAMINOPHEN 325 MG/1
650 TABLET ORAL EVERY 6 HOURS PRN
Status: DISCONTINUED | OUTPATIENT
Start: 2024-06-21 | End: 2024-06-24 | Stop reason: HOSPADM

## 2024-06-21 RX ORDER — OXYCODONE HYDROCHLORIDE 5 MG/1
5 TABLET ORAL EVERY 6 HOURS PRN
Status: DISCONTINUED | OUTPATIENT
Start: 2024-06-21 | End: 2024-06-24 | Stop reason: HOSPADM

## 2024-06-21 RX ADMIN — OXYCODONE HYDROCHLORIDE 5 MG: 5 TABLET ORAL at 20:43

## 2024-06-21 RX ADMIN — ACETAMINOPHEN 650 MG: 325 TABLET, FILM COATED ORAL at 16:38

## 2024-06-21 NOTE — ED PROVIDER NOTES
History  Chief Complaint   Patient presents with    Fall     Pt arrives via EMS for eval of R hip pain after fall, NO headstrike, NO LOC, pt is on ASA daily     The patient is an 88-year-old female presenting for evaluation of left hip pain.  The patient was getting out of the car when she lost her balance falling onto her left side/hip.  She denies head strike and loss of consciousness.  She takes baby aspirin daily.  She has been unable to walk due to the left hip pain.  She is brought in by EMS for further evaluation.  She denies headache, lightheadedness/dizziness, neck pain, CP/SOB, palpitations, abdominal pain, and N/V.  On arrival, her pulse ox is 88 to 89%, she notes a history of COPD and O2 use at home.  Will place 2 L nasal cannula home O2.      History provided by:  Patient   used: No    Fall  Associated symptoms: no back pain        Prior to Admission Medications   Prescriptions Last Dose Informant Patient Reported? Taking?   ACCU-CHEK PAUL PLUS test strip  Self Yes No   Accu-Chek Guide test strip  Self No No   Sig: USE TO TEST TWICE A DAY   Accu-Chek Softclix Lancets lancets  Self Yes No   Accu-Chek Softclix Lancets lancets  Self No No   Sig: USE AS INSTRUCTED TWICE DAILY   Blood Glucose Monitoring Suppl (OneTouch Verio Reflect) w/Device KIT  Self No No   Sig: Check blood sugars twice daily. Please substitute with appropriate alternative as covered by patient's insurance. Dx: E11.65   Denta 5000 Plus 1.1 % CREA  Self Yes No   Sig: USE TWICE A DAY -SPIT, DONT RINSE AND NOTHING BY MOUTH X 30 MIN   Insulin Glargine-yfgn 100 UNIT/ML SOPN  Self Yes No   Sig: INJECT 0.05 ML (5 UNITS TOTAL) UNDER THE SKIN IN THE MORNING   Insulin Pen Needle (BD Pen Needle Kathy U/F) 32G X 4 MM MISC  Self No No   Sig: Use daily   Invokana 100 MG  Self No No   Sig: TAKE 1 TABLET BY MOUTH EVERY DAY BEFORE BREAKFAST   NIFEdipine (ADALAT CC) 90 mg 24 hr tablet  Self No No   Sig: TAKE 1 TABLET BY MOUTH EVERY DAY    OneTouch Delica Lancets 33G MISC  Self No No   Sig: Check blood sugars twice daily. Please substitute with appropriate alternative as covered by patient's insurance. Dx: E11.65   aspirin 81 MG tablet  Self Yes No   Sig: Take 1 tablet by mouth daily   cholecalciferol (VITAMIN D3) 1,000 units tablet  Self Yes No   Sig: Take 2 tablets by mouth daily   cloNIDine (CATAPRES-TTS-1) 0.1 mg/24 hr  Self No No   Sig: PLACE 1 PATCH ON THE SKIN ONCE A WEEK AS DIRECTED   clotrimazole-betamethasone (LOTRISONE) 1-0.05 % cream  Self No No   Sig: Apply topically 2 (two) times a day   famotidine (PEPCID) 20 mg tablet  Self No No   Sig: Take 1 tablet (20 mg total) by mouth daily at bedtime   ferrous sulfate 325 (65 Fe) mg tablet  Self No No   Sig: Take 1 tablet (325 mg total) by mouth 3 (three) times a week   glipiZIDE (GLUCOTROL) 10 mg tablet  Self No No   Sig: TAKE 1 TABLET BY MOUTH IN THE MORNING THEN 2 TABS WITH DINNER   glucose blood (Accu-Chek Emma Plus) test strip  Self No No   Sig: USE AS INSTRUCTED TWICE DAILY   glucose blood (OneTouch Verio) test strip  Self No No   Sig: Check blood sugars twice daily. Please substitute with appropriate alternative as covered by patient's insurance. Dx: E11.65   nebivolol (BYSTOLIC) 20 MG tablet  Self No No   Sig: Take 2 tablets (40 mg total) by mouth daily   oxygen gas  Self Yes No   Sig: Inhale 2.5 L/min continuous VIA NASAL CANULA. At night only   pantoprazole (PROTONIX) 20 mg tablet   No No   Sig: TAKE 1 TABLET BY MOUTH EVERY DAY   potassium chloride (Klor-Con M10) 10 mEq tablet  Self No No   Sig: Take 1 tablet (10 mEq total) by mouth daily   scopolamine (TRANSDERM-SCOP) 1 mg/3 days TD 72 hr patch  Self No No   Sig: Place 1 patch on the skin over 72 hours every third day as needed (nausea)   sertraline (ZOLOFT) 100 mg tablet  Self No No   Sig: TAKE 1 TABLET BY MOUTH EVERY DAY   simvastatin (ZOCOR) 20 mg tablet  Self No No   Sig: TAKE 1 TABLET BY MOUTH EVERYDAY AT BEDTIME   sitaGLIPtin  (Januvia) 50 mg tablet  Self No No   Sig: TAKE 1 TABLET BY MOUTH EVERY DAY   terbinafine (LamISIL) 250 mg tablet   No No   Sig: Take 1 tablet (250 mg total) by mouth daily for 10 days   torsemide (DEMADEX) 20 mg tablet  Self No No   Sig: TAKE 1 TABLET BY MOUTH EVERY DAY      Facility-Administered Medications: None       Past Medical History:   Diagnosis Date    Acute respiratory failure with hypoxia (HCC) 03/17/2024    Anxiety     Aortic valve insufficiency     Cataract of both eyes     Chronic kidney disease     Edema     last assessed - 05Jun2015    GERD (gastroesophageal reflux disease)     last assessed - 30Aug2012    Hypertension     Low back pain     last assessed - 30Aug2012    Mitral valve disorder     Osteoporosis     last assessed - [id number]    Palpitations        Past Surgical History:   Procedure Laterality Date    APPENDECTOMY      CATARACT EXTRACTION Bilateral     COLONOSCOPY      HIP SURGERY      TUBAL LIGATION Bilateral        Family History   Problem Relation Age of Onset    Kidney disease Mother         Chronic    No Known Problems Father     Breast cancer Sister     Diabetes Sister     Cancer Sister     Breast cancer Sister     Hypertension Sister     No Known Problems Daughter     No Known Problems Son     No Known Problems Son     No Known Problems Son     No Known Problems Son      I have reviewed and agree with the history as documented.    E-Cigarette/Vaping    E-Cigarette Use Never User      E-Cigarette/Vaping Substances    Nicotine No     THC No     CBD No     Flavoring No     Other No     Unknown No      Social History     Tobacco Use    Smoking status: Never    Smokeless tobacco: Never   Vaping Use    Vaping status: Never Used   Substance Use Topics    Alcohol use: Never    Drug use: Never       Review of Systems   Musculoskeletal:  Positive for arthralgias (Left hip). Negative for back pain and joint swelling.   All other systems reviewed and are negative.      Physical Exam  Physical  Exam  Vitals and nursing note reviewed.   Constitutional:       General: She is not in acute distress.     Appearance: Normal appearance. She is not ill-appearing, toxic-appearing or diaphoretic.   HENT:      Head: Normocephalic and atraumatic. No contusion or laceration.      Jaw: There is normal jaw occlusion. No tenderness, swelling, pain on movement or malocclusion.      Right Ear: No hemotympanum.      Left Ear: No hemotympanum.      Nose: Nose normal. No nasal deformity or signs of injury.      Mouth/Throat:      Lips: Pink.      Mouth: Mucous membranes are moist.      Pharynx: Oropharynx is clear. Uvula midline.   Eyes:      General: Lids are normal. Vision grossly intact. Gaze aligned appropriately.      Extraocular Movements: Extraocular movements intact.      Right eye: No nystagmus.      Left eye: No nystagmus.      Pupils: Pupils are equal, round, and reactive to light.   Neck:      Trachea: Phonation normal. No abnormal tracheal secretions.   Cardiovascular:      Rate and Rhythm: Normal rate and regular rhythm.      Pulses:           Radial pulses are 2+ on the right side and 2+ on the left side.        Posterior tibial pulses are 2+ on the right side and 2+ on the left side.      Heart sounds: Normal heart sounds, S1 normal and S2 normal.   Pulmonary:      Effort: Pulmonary effort is normal. No tachypnea or respiratory distress.      Breath sounds: Normal breath sounds and air entry.   Abdominal:      Palpations: Abdomen is soft.      Tenderness: There is no abdominal tenderness.   Musculoskeletal:      Cervical back: Full passive range of motion without pain and neck supple. No bony tenderness. No spinous process tenderness.      Thoracic back: No bony tenderness.      Lumbar back: No bony tenderness.      Left hip: Tenderness (Anterolateral thigh) and bony tenderness present. Decreased range of motion. Decreased strength.      Left upper leg: Bony tenderness (Proximal femur) present. No swelling,  edema or deformity.      Right knee: Normal.      Left knee: No swelling, deformity, ecchymosis or bony tenderness. Decreased range of motion.      Right lower leg: Normal.      Left lower leg: Normal.   Skin:     General: Skin is warm and dry.      Capillary Refill: Capillary refill takes less than 2 seconds.      Findings: No rash or wound.   Neurological:      General: No focal deficit present.      Mental Status: She is alert and oriented to person, place, and time. Mental status is at baseline.      Cranial Nerves: Cranial nerves 2-12 are intact.      Sensory: Sensation is intact.      Motor: Motor function is intact.      Gait: Gait is intact.   Psychiatric:         Behavior: Behavior is cooperative.         Vital Signs  ED Triage Vitals   Temperature Pulse Respirations Blood Pressure SpO2   06/21/24 1547 06/21/24 1547 06/21/24 1547 06/21/24 1547 06/21/24 1547   98.7 °F (37.1 °C) 72 20 124/99 (!) 89 %      Temp Source Heart Rate Source Patient Position - Orthostatic VS BP Location FiO2 (%)   06/21/24 1547 06/21/24 1547 06/21/24 1547 06/21/24 1547 --   Temporal Monitor Sitting Left arm       Pain Score       06/21/24 1638       2           Vitals:    06/21/24 1547 06/21/24 1655   BP: 124/99 153/66   Pulse: 72 74   Patient Position - Orthostatic VS: Sitting Lying         Visual Acuity      ED Medications  Medications   acetaminophen (TYLENOL) tablet 650 mg (650 mg Oral Given 6/21/24 1638)       Diagnostic Studies  Results Reviewed       Procedure Component Value Units Date/Time    CBC and differential [[id number]]  (Abnormal) Collected: 06/21/24 1624    Lab Status: Final result Specimen: Blood from Arm, Right Updated: 06/21/24 1723     WBC 16.90 Thousand/uL      RBC 4.41 Million/uL      Hemoglobin 13.1 g/dL      Hematocrit 41.5 %      MCV 94 fL      MCH 29.7 pg      MCHC 31.6 g/dL      RDW 14.1 %      MPV 9.4 fL      Platelets 240 Thousands/uL     Narrative:      This is an appended report.  These results have  been appended to a previously verified report.    Manual Differential(PHLEBS Do Not Order) [[id number]]  (Abnormal) Collected: 06/21/24 1624    Lab Status: Final result Specimen: Blood from Arm, Right Updated: 06/21/24 1723     Segmented % 46 %      Bands % 1 %      Lymphocytes % 42 %      Monocytes % 5 %      Eosinophils % 3 %      Basophils % 1 %      Atypical Lymphocytes % 2 %      Absolute Neutrophils 7.94 Thousand/uL      Absolute Lymphocytes 7.44 Thousand/uL      Absolute Monocytes 0.85 Thousand/uL      Absolute Eosinophils 0.51 Thousand/uL      Absolute Basophils 0.17 Thousand/uL      Total Counted --     RBC Morphology Normal     Platelet Estimate Adequate    RBC Morphology Reflex Test [[id number]] Collected: 06/21/24 1624    Lab Status: In process Specimen: Blood from Arm, Right Updated: 06/21/24 1723    HS Troponin 0hr (reflex protocol) [[id number]]  (Normal) Collected: 06/21/24 1624    Lab Status: Final result Specimen: Blood from Arm, Right Updated: 06/21/24 1714     hs TnI 0hr 10 ng/L     HS Troponin I 2hr [[id number]]     Lab Status: No result Specimen: Blood     Basic metabolic panel [[id number]]  (Abnormal) Collected: 06/21/24 1624    Lab Status: Final result Specimen: Blood from Arm, Right Updated: 06/21/24 1647     Sodium 140 mmol/L      Potassium 3.7 mmol/L      Chloride 103 mmol/L      CO2 28 mmol/L      ANION GAP 9 mmol/L      BUN 42 mg/dL      Creatinine 2.21 mg/dL      Glucose 85 mg/dL      Calcium 9.4 mg/dL      eGFR 19 ml/min/1.73sq m     Narrative:      [employer] Kid[employer] guidelines for Chronic Kidney Disease (CKD):     Stage 1 with normal or high GFR (GFR > 90 mL/min/1.73 square meters)    Stage 2 Mild CKD (GFR = 60-89 mL/min/1.73 square meters)    Stage 3A Moderate CKD (GFR = 45-59 mL/min/1.73 square meters)    Stage 3B Moderate CKD (GFR = 30-44 mL/min/1.73 square meters)    Stage 4 Severe CKD (GFR = 15-29 mL/min/1.73 square meters)    Stage 5 End Stage CKD (GFR <15 mL/min/1.73  square meters)  Note: GFR calculation is accurate only with a steady state creatinine    Protime-INR [[id number]]  (Normal) Collected: 06/21/24 1624    Lab Status: Final result Specimen: Blood from Arm, Right Updated: 06/21/24 1645     Protime 14.2 seconds      INR 1.06    APTT [[id number]]  (Normal) Collected: 06/21/24 1624    Lab Status: Final result Specimen: Blood from Arm, Right Updated: 06/21/24 1645     PTT 23 seconds                    XR chest 1 view portable   Final Result by Aurelia Bullock MD (06/21 1651)      No acute displaced fracture.      Increased bibasilar lung markings correspond with bronchiectasis on CT.            Workstation performed: [id number]         XR femur 2 views LEFT   ED Interpretation by KANA Munguia (06/21 1638)   No acute fracture identified by me.      XR pelvis ap only 1 or 2 vw   ED Interpretation by KANA Munguia (06/21 1638)   No acute fracture identified by me.      CT pelvis wo contrast    (Results Pending)              Procedures  ECG 12 Lead Documentation Only    Date/Time: 6/21/2024 5:08 PM    Performed by: KANA Munguia  Authorized by: KANA Munguia    Indications / Diagnosis:  Hypoxia  ECG reviewed by me, the ED Provider: yes    Patient location:  ED  Previous ECG:     Previous ECG:  Compared to current    Comparison ECG info:  May 13, 2024    Similarity:  No change    Comparison to cardiac monitor: Yes    Interpretation:     Interpretation: non-specific    Rate:     ECG rate:  75    ECG rate assessment: normal    Rhythm:     Rhythm: sinus rhythm    Ectopy:     Ectopy: none    QRS:     QRS axis:  Left    QRS intervals:  Normal  Conduction:     Conduction: normal    ST segments:     ST segments:  Normal  T waves:     T waves: non-specific and inverted      Inverted:  III, aVR, aVF and V1  Comments:      Normal sinus rhythm, leftward axis, normal intervals, no acute change from baseline when compared to prior           ED Course  ED Course as of  06/21/24 1744   Fri Jun 21, 2024   1650 GFR, Calculated: 19  Baseline    1658 XR chest 1 view portable  IMPRESSION:     No acute displaced fracture.     Increased bibasilar lung markings correspond with bronchiectasis on CT.     1719 hs TnI 0hr: 10  No CP or SOB, will delta   1741 Signed out to oncoming attending provider Dr. Doll.  Workup pending delta troponin and CT pelvis to further evaluate for occult pelvic or left hip fracture.  Patient currently resting comfortably without complaint of pain at rest.  Left hip pain on palpation or trying to range the left hip.  Pending CT results, if no fracture, plan for ambulatory trial.  If unstable or unable to bear weight due to severe pain, will discuss with Sujey for PT consult and observation stay.  If occult fracture, will discuss with orthopedics and admit for pain control and Ortho consult.                                             Medical Decision Making  DDx including but not limited to: Left hip fracture, hip dislocation, arthritis, sprain, strain    Patient presents for left hip pain.  Has notable tenderness to the anterolateral left proximal thigh and notably decreased range of motion of left hip secondary to pain.  X-ray reads without definitive fracture.  CT pelvis ordered to further evaluate for occult fracture.  Patient noted to be hypoxic, however she notes that she has COPD and recently her chronic O2 was discontinued by pulmonary.  She has no chest pain or shortness of breath, has had no recent fevers.  She has a chronic cough with thin clear sputum which has not changed recently.  Chest x-ray with bronchiectasis, no focal consolidation to suggest pneumonia.  Labs obtained as part of preoperative planning for possible left hip fracture, with finding of leukocytosis with abnormal differential.  Will have her follow-up with primary care for further evaluation of abnormal differential.      See ED course for further MDM and disposition  discussion.      Problems Addressed:  Acute hip pain, left: acute illness or injury  Fall, initial encounter: acute illness or injury    Amount and/or Complexity of Data Reviewed  Independent Historian: caregiver  Labs: ordered. Decision-making details documented in ED Course.  Radiology: ordered and independent interpretation performed. Decision-making details documented in ED Course.  ECG/medicine tests: ordered and independent interpretation performed.    Risk  OTC drugs.             Disposition  Final diagnoses:   Fall, initial encounter   Acute hip pain, left     Time reflects when diagnosis was documented in both MDM as applicable and the Disposition within this note       Time User Action Codes Description Comment    6/21/2024  5:43 PM Isela Magdaleno [W19.XXXA] Fall, initial encounter     6/21/2024  5:44 PM Isela Magdaleno Add [M25.552] Acute hip pain, left           ED Disposition       None          Follow-up Information       Follow up With Specialties Details Why Contact Info    Deanna Castaneda DO Internal Medicine, Family Medicine Schedule an appointment as soon as possible for a visit in 1 week for follow up regarding your abnormal complete blood count [address] [place] [id number] [phone]              Patient's Medications   Discharge Prescriptions    No medications on file       No discharge procedures on file.    PDMP Review         Value Time User    PDMP Reviewed  Yes 2/12/2024  9:06 AM Josey Dodson PA-C            ED Provider  Electronically Signed by             KANA Munguia  06/21/24 0199

## 2024-06-22 LAB
ANION GAP SERPL CALCULATED.3IONS-SCNC: 8 MMOL/L (ref 4–13)
BACTERIA UR QL AUTO: NORMAL /HPF
BASOPHILS # BLD AUTO: 0.04 THOUSANDS/ÂΜL (ref 0–0.1)
BASOPHILS NFR BLD AUTO: 0 % (ref 0–1)
BILIRUB UR QL STRIP: NEGATIVE
BUN SERPL-MCNC: 35 MG/DL (ref 5–25)
CALCIUM SERPL-MCNC: 9 MG/DL (ref 8.4–10.2)
CHLORIDE SERPL-SCNC: 103 MMOL/L (ref 96–108)
CLARITY UR: CLEAR
CO2 SERPL-SCNC: 29 MMOL/L (ref 21–32)
COLOR UR: YELLOW
CREAT SERPL-MCNC: 2 MG/DL (ref 0.6–1.3)
EOSINOPHIL # BLD AUTO: 0.12 THOUSAND/ÂΜL (ref 0–0.61)
EOSINOPHIL NFR BLD AUTO: 1 % (ref 0–6)
ERYTHROCYTE [DISTWIDTH] IN BLOOD BY AUTOMATED COUNT: 14 % (ref 11.6–15.1)
GFR SERPL CREATININE-BSD FRML MDRD: 21 ML/MIN/1.73SQ M
GLUCOSE SERPL-MCNC: 144 MG/DL (ref 65–140)
GLUCOSE SERPL-MCNC: 173 MG/DL (ref 65–140)
GLUCOSE SERPL-MCNC: 277 MG/DL (ref 65–140)
GLUCOSE SERPL-MCNC: 315 MG/DL (ref 65–140)
GLUCOSE UR STRIP-MCNC: ABNORMAL MG/DL
HCT VFR BLD AUTO: 37.3 % (ref 34.8–46.1)
HGB BLD-MCNC: 11.8 G/DL (ref 11.5–15.4)
HGB UR QL STRIP.AUTO: NEGATIVE
IMM GRANULOCYTES # BLD AUTO: 0.09 THOUSAND/UL (ref 0–0.2)
IMM GRANULOCYTES NFR BLD AUTO: 1 % (ref 0–2)
KETONES UR STRIP-MCNC: ABNORMAL MG/DL
LEUKOCYTE ESTERASE UR QL STRIP: NEGATIVE
LYMPHOCYTES # BLD AUTO: 4.77 THOUSANDS/ÂΜL (ref 0.6–4.47)
LYMPHOCYTES NFR BLD AUTO: 41 % (ref 14–44)
MCH RBC QN AUTO: 29.4 PG (ref 26.8–34.3)
MCHC RBC AUTO-ENTMCNC: 31.6 G/DL (ref 31.4–37.4)
MCV RBC AUTO: 93 FL (ref 82–98)
MONOCYTES # BLD AUTO: 1.12 THOUSAND/ÂΜL (ref 0.17–1.22)
MONOCYTES NFR BLD AUTO: 10 % (ref 4–12)
NEUTROPHILS # BLD AUTO: 5.64 THOUSANDS/ÂΜL (ref 1.85–7.62)
NEUTS SEG NFR BLD AUTO: 47 % (ref 43–75)
NITRITE UR QL STRIP: NEGATIVE
NON-SQ EPI CELLS URNS QL MICRO: NORMAL /HPF
NRBC BLD AUTO-RTO: 0 /100 WBCS
PH UR STRIP.AUTO: 6 [PH]
PLATELET # BLD AUTO: 207 THOUSANDS/UL (ref 149–390)
PMV BLD AUTO: 9.1 FL (ref 8.9–12.7)
POTASSIUM SERPL-SCNC: 3 MMOL/L (ref 3.5–5.3)
PROT UR STRIP-MCNC: ABNORMAL MG/DL
RBC # BLD AUTO: 4.01 MILLION/UL (ref 3.81–5.12)
RBC #/AREA URNS AUTO: NORMAL /HPF
SODIUM SERPL-SCNC: 140 MMOL/L (ref 135–147)
SP GR UR STRIP.AUTO: 1.02 (ref 1–1.03)
UROBILINOGEN UR STRIP-ACNC: <2 MG/DL
WBC # BLD AUTO: 11.78 THOUSAND/UL (ref 4.31–10.16)
WBC #/AREA URNS AUTO: NORMAL /HPF

## 2024-06-22 PROCEDURE — 97163 PT EVAL HIGH COMPLEX 45 MIN: CPT

## 2024-06-22 PROCEDURE — 82948 REAGENT STRIP/BLOOD GLUCOSE: CPT

## 2024-06-22 PROCEDURE — 81001 URINALYSIS AUTO W/SCOPE: CPT | Performed by: INTERNAL MEDICINE

## 2024-06-22 PROCEDURE — 80048 BASIC METABOLIC PNL TOTAL CA: CPT | Performed by: INTERNAL MEDICINE

## 2024-06-22 PROCEDURE — 99232 SBSQ HOSP IP/OBS MODERATE 35: CPT | Performed by: INTERNAL MEDICINE

## 2024-06-22 PROCEDURE — 85025 COMPLETE CBC W/AUTO DIFF WBC: CPT | Performed by: INTERNAL MEDICINE

## 2024-06-22 PROCEDURE — 99222 1ST HOSP IP/OBS MODERATE 55: CPT | Performed by: ORTHOPAEDIC SURGERY

## 2024-06-22 RX ORDER — ASPIRIN 81 MG/1
81 TABLET, CHEWABLE ORAL DAILY
Status: DISCONTINUED | OUTPATIENT
Start: 2024-06-22 | End: 2024-06-24 | Stop reason: HOSPADM

## 2024-06-22 RX ORDER — PANTOPRAZOLE SODIUM 20 MG/1
20 TABLET, DELAYED RELEASE ORAL DAILY
Status: DISCONTINUED | OUTPATIENT
Start: 2024-06-22 | End: 2024-06-24 | Stop reason: HOSPADM

## 2024-06-22 RX ORDER — NEBIVOLOL 5 MG/1
40 TABLET ORAL DAILY
Status: DISCONTINUED | OUTPATIENT
Start: 2024-06-22 | End: 2024-06-24 | Stop reason: HOSPADM

## 2024-06-22 RX ORDER — POTASSIUM CHLORIDE 20 MEQ/1
40 TABLET, EXTENDED RELEASE ORAL EVERY 4 HOURS
Status: COMPLETED | OUTPATIENT
Start: 2024-06-22 | End: 2024-06-22

## 2024-06-22 RX ORDER — FAMOTIDINE 20 MG/1
20 TABLET, FILM COATED ORAL
Status: DISCONTINUED | OUTPATIENT
Start: 2024-06-22 | End: 2024-06-24 | Stop reason: HOSPADM

## 2024-06-22 RX ORDER — PRAVASTATIN SODIUM 40 MG
40 TABLET ORAL
Status: DISCONTINUED | OUTPATIENT
Start: 2024-06-22 | End: 2024-06-24 | Stop reason: HOSPADM

## 2024-06-22 RX ORDER — SODIUM CHLORIDE 9 MG/ML
75 INJECTION, SOLUTION INTRAVENOUS CONTINUOUS
Status: DISPENSED | OUTPATIENT
Start: 2024-06-22 | End: 2024-06-22

## 2024-06-22 RX ORDER — GUAIFENESIN 600 MG/1
600 TABLET, EXTENDED RELEASE ORAL 2 TIMES DAILY
Status: DISCONTINUED | OUTPATIENT
Start: 2024-06-22 | End: 2024-06-24 | Stop reason: HOSPADM

## 2024-06-22 RX ORDER — SERTRALINE HYDROCHLORIDE 100 MG/1
100 TABLET, FILM COATED ORAL DAILY
Status: DISCONTINUED | OUTPATIENT
Start: 2024-06-22 | End: 2024-06-24 | Stop reason: HOSPADM

## 2024-06-22 RX ORDER — NIFEDIPINE 30 MG/1
90 TABLET, EXTENDED RELEASE ORAL DAILY
Status: DISCONTINUED | OUTPATIENT
Start: 2024-06-22 | End: 2024-06-24 | Stop reason: HOSPADM

## 2024-06-22 RX ADMIN — FAMOTIDINE 20 MG: 20 TABLET, FILM COATED ORAL at 21:07

## 2024-06-22 RX ADMIN — ASPIRIN 81 MG CHEWABLE TABLET 81 MG: 81 TABLET CHEWABLE at 08:02

## 2024-06-22 RX ADMIN — SODIUM CHLORIDE 75 ML/HR: 0.9 INJECTION, SOLUTION INTRAVENOUS at 09:14

## 2024-06-22 RX ADMIN — HEPARIN SODIUM 5000 UNITS: 5000 INJECTION INTRAVENOUS; SUBCUTANEOUS at 05:05

## 2024-06-22 RX ADMIN — HEPARIN SODIUM 5000 UNITS: 5000 INJECTION INTRAVENOUS; SUBCUTANEOUS at 13:13

## 2024-06-22 RX ADMIN — POTASSIUM CHLORIDE 40 MEQ: 1500 TABLET, EXTENDED RELEASE ORAL at 13:12

## 2024-06-22 RX ADMIN — INSULIN LISPRO 3 UNITS: 100 INJECTION, SOLUTION INTRAVENOUS; SUBCUTANEOUS at 16:40

## 2024-06-22 RX ADMIN — NEBIVOLOL 40 MG: 5 TABLET ORAL at 08:41

## 2024-06-22 RX ADMIN — PANTOPRAZOLE SODIUM 20 MG: 20 TABLET, DELAYED RELEASE ORAL at 08:02

## 2024-06-22 RX ADMIN — INSULIN LISPRO 1 UNITS: 100 INJECTION, SOLUTION INTRAVENOUS; SUBCUTANEOUS at 21:07

## 2024-06-22 RX ADMIN — SERTRALINE HYDROCHLORIDE 100 MG: 100 TABLET ORAL at 08:02

## 2024-06-22 RX ADMIN — GUAIFENESIN 600 MG: 600 TABLET ORAL at 09:13

## 2024-06-22 RX ADMIN — GUAIFENESIN 600 MG: 600 TABLET ORAL at 21:07

## 2024-06-22 RX ADMIN — INSULIN LISPRO 3 UNITS: 100 INJECTION, SOLUTION INTRAVENOUS; SUBCUTANEOUS at 11:27

## 2024-06-22 RX ADMIN — POTASSIUM CHLORIDE 40 MEQ: 1500 TABLET, EXTENDED RELEASE ORAL at 09:13

## 2024-06-22 RX ADMIN — OXYCODONE HYDROCHLORIDE 5 MG: 5 TABLET ORAL at 12:36

## 2024-06-22 RX ADMIN — PRAVASTATIN SODIUM 40 MG: 40 TABLET ORAL at 16:40

## 2024-06-22 RX ADMIN — HYDROMORPHONE HYDROCHLORIDE 0.2 MG: 0.2 INJECTION, SOLUTION INTRAMUSCULAR; INTRAVENOUS; SUBCUTANEOUS at 08:01

## 2024-06-22 RX ADMIN — NIFEDIPINE 90 MG: 30 TABLET, EXTENDED RELEASE ORAL at 08:02

## 2024-06-22 RX ADMIN — OXYCODONE HYDROCHLORIDE 5 MG: 5 TABLET ORAL at 19:22

## 2024-06-22 RX ADMIN — HEPARIN SODIUM 5000 UNITS: 5000 INJECTION INTRAVENOUS; SUBCUTANEOUS at 21:07

## 2024-06-22 NOTE — PLAN OF CARE
Problem: Potential for Falls  Goal: Patient will remain free of falls  Description: INTERVENTIONS:  - Educate patient/family on patient safety including physical limitations  - Instruct patient to call for assistance with activity   - Consult OT/PT to assist with strengthening/mobility   - Keep Call bell within reach  - Keep bed low and locked with side rails adjusted as appropriate  - Keep care items and personal belongings within reach  - Initiate and maintain comfort rounds  - Make Fall Risk Sign visible to staff  - Offer Toileting every 2 Hours, in advance of need  - Initiate/Maintain bed alarm  - Obtain necessary fall risk management equipment  - Apply yellow socks and bracelet for high fall risk patients  - Consider moving patient to room near nurses station  Outcome: Progressing     Problem: PAIN - ADULT  Goal: Verbalizes/displays adequate comfort level or baseline comfort level  Description: Interventions:  - Encourage patient to monitor pain and request assistance  - Assess pain using appropriate pain scale  - Administer analgesics based on type and severity of pain and evaluate response  - Implement non-pharmacological measures as appropriate and evaluate response  - Consider cultural and social influences on pain and pain management  - Notify physician/advanced practitioner if interventions unsuccessful or patient reports new pain  Outcome: Progressing

## 2024-06-22 NOTE — PLAN OF CARE
Problem: Potential for Falls  Goal: Patient will remain free of falls  Description: INTERVENTIONS:  - Educate patient/family on patient safety including physical limitations  - Instruct patient to call for assistance with activity   - Consult OT/PT to assist with strengthening/mobility   - Keep Call bell within reach  - Keep bed low and locked with side rails adjusted as appropriate  - Keep care items and personal belongings within reach  - Initiate and maintain comfort rounds  - Make Fall Risk Sign visible to staff  - Offer Toileting every 2 Hours, in advance of need  - Initiate/Maintain bed alarm  - Obtain necessary fall risk management equipment: yellow socks, bed pan  - Apply yellow socks and bracelet for high fall risk patients  - Consider moving patient to room near nurses station  Outcome: Progressing     Problem: PAIN - ADULT  Goal: Verbalizes/displays adequate comfort level or baseline comfort level  Description: Interventions:  - Encourage patient to monitor pain and request assistance  - Assess pain using appropriate pain scale  - Administer analgesics based on type and severity of pain and evaluate response  - Implement non-pharmacological measures as appropriate and evaluate response  - Consider cultural and social influences on pain and pain management  - Notify physician/advanced practitioner if interventions unsuccessful or patient reports new pain  Outcome: Progressing     Problem: INFECTION - ADULT  Goal: Absence or prevention of progression during hospitalization  Description: INTERVENTIONS:  - Assess and monitor for signs and symptoms of infection  - Monitor lab/diagnostic results  - Monitor all insertion sites, i.e. indwelling lines, tubes, and drains  - Monitor endotracheal if appropriate and nasal secretions for changes in amount and color  - Venetie appropriate cooling/warming therapies per order  - Administer medications as ordered  - Instruct and encourage patient and family to use good  hand hygiene technique  - Identify and instruct in appropriate isolation precautions for identified infection/condition  Outcome: Progressing  Goal: Absence of fever/infection during neutropenic period  Description: INTERVENTIONS:  - Monitor WBC    Outcome: Progressing     Problem: SAFETY ADULT  Goal: Patient will remain free of falls  Description: INTERVENTIONS:  - Educate patient/family on patient safety including physical limitations  - Instruct patient to call for assistance with activity   - Consult OT/PT to assist with strengthening/mobility   - Keep Call bell within reach  - Keep bed low and locked with side rails adjusted as appropriate  - Keep care items and personal belongings within reach  - Initiate and maintain comfort rounds  - Make Fall Risk Sign visible to staff  - Offer Toileting every 2 Hours, in advance of need  - Initiate/Maintain bed alarm  - Obtain necessary fall risk management equipment: yellow socks, bed pan  - Apply yellow socks and bracelet for high fall risk patients  - Consider moving patient to room near nurses station  Outcome: Progressing  Goal: Maintain or return to baseline ADL function  Description: INTERVENTIONS:  -  Assess patient's ability to carry out ADLs; assess patient's baseline for ADL function and identify physical deficits which impact ability to perform ADLs (bathing, care of mouth/teeth, toileting, grooming, dressing, etc.)  - Assess/evaluate cause of self-care deficits   - Assess range of motion  - Assess patient's mobility; develop plan if impaired  - Assess patient's need for assistive devices and provide as appropriate  - Encourage maximum independence but intervene and supervise when necessary  - Involve family in performance of ADLs  - Assess for home care needs following discharge   - Consider OT consult to assist with ADL evaluation and planning for discharge  - Provide patient education as appropriate  Outcome: Progressing  Goal: Maintains/Returns to pre  admission functional level  Description: INTERVENTIONS:  - Perform AM-PAC 6 Click Basic Mobility/ Daily Activity assessment daily.  - Set and communicate daily mobility goal to care team and patient/family/caregiver.   - Collaborate with rehabilitation services on mobility goals if consulted  - Perform Range of Motion 2 times a day.  - Reposition patient every 2 hours.  - Dangle patient 2 times a day  - Stand patient 2 times a day  - Ambulate patient 2 times a day  - Out of bed to chair 2 times a day   - Out of bed for meals 2 times a day  - Out of bed for toileting  - Record patient progress and toleration of activity level   Outcome: Progressing     Problem: DISCHARGE PLANNING  Goal: Discharge to home or other facility with appropriate resources  Description: INTERVENTIONS:  - Identify barriers to discharge w/patient and caregiver  - Arrange for needed discharge resources and transportation as appropriate  - Identify discharge learning needs (meds, wound care, etc.)  - Arrange for interpretive services to assist at discharge as needed  - Refer to Case Management Department for coordinating discharge planning if the patient needs post-hospital services based on physician/advanced practitioner order or complex needs related to functional status, cognitive ability, or social support system  Outcome: Progressing     Problem: Knowledge Deficit  Goal: Patient/family/caregiver demonstrates understanding of disease process, treatment plan, medications, and discharge instructions  Description: Complete learning assessment and assess knowledge base.  Interventions:  - Provide teaching at level of understanding  - Provide teaching via preferred learning methods  Outcome: Progressing     Problem: MUSCULOSKELETAL - ADULT  Goal: Maintain or return mobility to safest level of function  Description: INTERVENTIONS:  - Assess patient's ability to carry out ADLs; assess patient's baseline for ADL function and identify physical  deficits which impact ability to perform ADLs (bathing, care of mouth/teeth, toileting, grooming, dressing, etc.)  - Assess/evaluate cause of self-care deficits   - Assess range of motion  - Assess patient's mobility  - Assess patient's need for assistive devices and provide as appropriate  - Encourage maximum independence but intervene and supervise when necessary  - Involve family in performance of ADLs  - Assess for home care needs following discharge   - Consider OT consult to assist with ADL evaluation and planning for discharge  - Provide patient education as appropriate  Outcome: Progressing  Goal: Maintain proper alignment of affected body part  Description: INTERVENTIONS:  - Support, maintain and protect limb and body alignment  - Provide patient/ family with appropriate education  Outcome: Progressing

## 2024-06-22 NOTE — PROGRESS NOTES
"[facility]  Progress Note  Name: Ena Ahumada I  MRN: [id number]  Unit/Bed#: -01 I Date of Admission: 6/21/2024   Date of Service: 6/22/2024 I Hospital Day: 0    Assessment & Plan   Leukocytosis  Assessment & Plan  Noted to have a WBC count 16.9  Suspect likely reactive  No obvious source of infection  Currently afebrile  Will monitor closely off antibiotics  WBC trending down   Will order UA  Trend WBC and fever curve    Closed compression fracture of L4 lumbar vertebra, sequela  Assessment & Plan  Has history of old L4 compression fracture  CT showed Unchanged old moderate compression fracture of L4 with unchanged 4 mm of retropulsion.   PT/OT  Outpatient follow-up with spine orthopedics    Type 2 diabetes mellitus with chronic kidney disease, with long-term current use of insulin ([facility])  Assessment & Plan  Hold home p.o. agents  Sliding scale insulin  Monitor blood glucose      CKD (chronic kidney disease), stage IV ([facility])  Assessment & Plan  Baseline creatinine approximately 1.6-2.1  Creatinine currently 2.2  IV fluids  Trend BMP  Avoid hypotension    GERD (gastroesophageal reflux disease)  Assessment & Plan  Continue PPI    Hypertension  Assessment & Plan  Continue beta-blocker and clonidine  BP currently controlled    * Closed fracture of multiple pubic rami, left, initial encounter ([facility])  Assessment & Plan  Initially presented after mechanical fall at home  CT scan revealed nondisplaced fracture of the left pubic bone as well as \" Mildly comminuted mildly displaced fracture of the left superior pubic ramus not extending into the acetabulum. Minimally displaced fracture of the left inferior pubic ramus\"  Patient reports difficulty ambulating since  Will admit for pain control  PT/OT  Will consult orthopedic surgery for further evaluation  Do not anticipate any surgical intervention necessary             Labs & Imaging: I have personally reviewed pertinent reports.  " "    VTE Prophylaxis: in place.    Code Status:   Level 1 - Full Code    Patient Centered Rounds: I have performed bedside rounds with nursing staff today.    Mobility:   Basic Mobility Inpatient Raw Score: 7  JH-HLM Goal: 2: Bed activities/Dependent transfer  JH-HLM Achieved: 2: Bed activities/Dependent transfer  JH-HLM Goal NOT achieved. Continue with multidisciplinary rounding and encourage appropriate mobility to improve upon JH-HLM goals.    Discussions with Specialists or Other Care Team Provider: RN    Education and Discussions with Family / Patient: Son in phone    Total Time Spent on Date of Encounter in care of patient: 35 mins. This time was spent on one or more of the following: performing physical exam; counseling and coordination of care; obtaining or reviewing history; documenting in the medical record; reviewing/ordering tests, medications or procedures; communicating with other healthcare professionals and discussing with patient's family/caregivers.    Current Length of Stay: 0 day(s)    Current Patient Status: Observation   Certification Statement: The patient will continue to require additional inpatient hospital stay due to see my assessment and plan.     Subjective:   Patient is seen and examined at bedside.  No new complaints.  Pain is well-controlled.  Afebrile  All other ROS are negative.    Objective:    Vitals: Blood pressure 167/61, pulse 76, temperature (!) 96.6 °F (35.9 °C), resp. rate 18, height 5' 2\" (1.575 m), weight 66.9 kg (147 lb 7.8 oz), SpO2 90%, not currently breastfeeding.,Body mass index is 26.98 kg/m².  SPO2 RA Rest      Flowsheet Row ED to Hosp-Admission (Current) from 6/21/2024 in [facility] Med Surg Unit   SpO2 90 %   SpO2 Activity At Rest   O2 Device Nasal cannula   O2 Flow Rate --          I&O: No intake or output data in the 24 hours ending 06/22/24 0952    Physical Exam:    General- Alert, lying comfortably in bed. Not in any acute distress.  Neck- " Supple, No JVD  CVS- regular, S1 and S2 normal  Chest- Bilateral Air entry, No rhochi, crackles or wheezing present.  Abdomen- soft, nontender, not distended, no guarding or rigidity, BS+  Extremities-  No pedal edema, No calf tenderness  CNS-   Alert, awake and orientedx3. No focal deficits present.    Invasive Devices       Peripheral Intravenous Line  Duration             Peripheral IV 06/21/24 Right Antecubital <1 day                          Social History  reviewed  Family History   Problem Relation Age of Onset    Kidney disease Mother         Chronic    No Known Problems Father     Breast cancer Sister     Diabetes Sister     Cancer Sister     Breast cancer Sister     Hypertension Sister     No Known Problems Daughter     No Known Problems Son     No Known Problems Son     No Known Problems Son     No Known Problems Son     reviewed    Meds:  Current Facility-Administered Medications   Medication Dose Route Frequency Provider Last Rate Last Admin    acetaminophen (TYLENOL) tablet 650 mg  650 mg Oral Q6H PRN Lenny Langford MD        aspirin chewable tablet 81 mg  81 mg Oral Daily Lenny Langford MD   81 mg at 06/22/24 0802    famotidine (PEPCID) tablet 20 mg  20 mg Oral HS Lenny Langford MD        guaiFENesin (MUCINEX) 12 hr tablet 600 mg  600 mg Oral BID Lester GABBY Madrigal MD   600 mg at 06/22/24 0913    heparin (porcine) subcutaneous injection 5,000 Units  5,000 Units Subcutaneous Q8H JONO Lenny Langford MD   5,000 Units at 06/22/24 0505    HYDROmorphone HCl (DILAUDID) injection 0.2 mg  0.2 mg Intravenous Q6H PRN Lenny Langford MD   0.2 mg at 06/22/24 0801    insulin lispro (HumALOG/ADMELOG) 100 units/mL subcutaneous injection 1-5 Units  1-5 Units Subcutaneous TID AC Lenny Langford MD        insulin lispro (HumALOG/ADMELOG) 100 units/mL subcutaneous injection 1-5 Units  1-5 Units Subcutaneous HS Lenny Langford MD        nebivolol (BYSTOLIC) tablet 40 mg  40 mg Oral Daily Lenny Langford MD   40 mg at 06/22/24 0841     NIFEdipine (PROCARDIA XL) 24 hr tablet 90 mg  90 mg Oral Daily Lenny Langford MD   90 mg at 06/22/24 0802    oxyCODONE (ROXICODONE) IR tablet 5 mg  5 mg Oral Q6H PRN Lenny Langford MD   5 mg at 06/21/24 2043    oxyCODONE (ROXICODONE) split tablet 2.5 mg  2.5 mg Oral Q6H PRN Lenny Langford MD        pantoprazole (PROTONIX) EC tablet 20 mg  20 mg Oral Daily Lenny Langford MD   20 mg at 06/22/24 0802    potassium chloride (Klor-Con M20) CR tablet 40 mEq  40 mEq Oral Q4H Lester Madrigal MD   40 mEq at 06/22/24 0913    pravastatin (PRAVACHOL) tablet 40 mg  40 mg Oral Daily With Dinner Lenny Langford MD        sertraline (ZOLOFT) tablet 100 mg  100 mg Oral Daily Lenny Langford MD   100 mg at 06/22/24 0802    sodium chloride 0.9 % infusion  75 mL/hr Intravenous Continuous Lester Madrigal MD 75 mL/hr at 06/22/24 0914 75 mL/hr at 06/22/24 0914        Medications Prior to Admission:     ACCU-CHEK EMMA PLUS test strip    Accu-Chek Guide test strip    Accu-Chek Softclix Lancets lancets    Accu-Chek Softclix Lancets lancets    aspirin 81 MG tablet    Blood Glucose Monitoring Suppl (OneTouch Verio Reflect) w/Device KIT    cholecalciferol (VITAMIN D3) 1,000 units tablet    cloNIDine (CATAPRES-TTS-1) 0.1 mg/24 hr    clotrimazole-betamethasone (LOTRISONE) 1-0.05 % cream    Denta 5000 Plus 1.1 % CREA    famotidine (PEPCID) 20 mg tablet    ferrous sulfate 325 (65 Fe) mg tablet    glipiZIDE (GLUCOTROL) 10 mg tablet    glucose blood (Accu-Chek Emma Plus) test strip    glucose blood (OneTouch Verio) test strip    Insulin Glargine-yfgn 100 UNIT/ML SOPN    Insulin Pen Needle (BD Pen Needle Kathy U/F) 32G X 4 MM MISC    Invokana 100 MG    nebivolol (BYSTOLIC) 20 MG tablet    NIFEdipine (ADALAT CC) 90 mg 24 hr tablet    OneTouch Delica Lancets 33G MISC    oxygen gas    pantoprazole (PROTONIX) 20 mg tablet    potassium chloride (Klor-Con M10) 10 mEq tablet    scopolamine (TRANSDERM-SCOP) 1 mg/3 days TD 72 hr patch    sertraline (ZOLOFT) 100 mg  tablet    simvastatin (ZOCOR) 20 mg tablet    sitaGLIPtin (Januvia) 50 mg tablet    [] terbinafine (LamISIL) 250 mg tablet    torsemide (DEMADEX) 20 mg tablet    Labs:  Results from last 7 days   Lab Units 24  0455 24  1624   WBC Thousand/uL 11.78* 16.90*   HEMOGLOBIN g/dL 11.8 13.1   HEMATOCRIT % 37.3 41.5   PLATELETS Thousands/uL 207 240   SEGS PCT % 47  --    LYMPHO PCT % 41 42   MONO PCT % 10 5   EOS PCT % 1 3     Results from last 7 days   Lab Units 24  0455 24  1624   POTASSIUM mmol/L 3.0* 3.7   CHLORIDE mmol/L 103 103   CO2 mmol/L 29 28   BUN mg/dL 35* 42*   CREATININE mg/dL 2.00* 2.21*   CALCIUM mg/dL 9.0 9.4     Lab Results   Component Value Date    CKTOTAL 82 2024     Results from last 7 days   Lab Units 24  1624   INR  1.06     Lab Results   Component Value Date    BLOODCX No Growth After 5 Days. 2024    BLOODCX No Growth After 5 Days. 2024         Imaging:  Results for orders placed during the hospital encounter of 24    XR chest 1 view portable    Narrative  XR CHEST PORTABLE    INDICATION: Fall onto L side.    COMPARISON: Chest CT 2024, CXR 2024.    FINDINGS:    No acute disease. Increased interstitial markings due to bronchiectasis per comparison with CT. No pneumothorax or pleural effusion.    Normal cardiomediastinal silhouette.    No acute displaced fracture. Mild curvature of the spine.    Normal upper abdomen.    Impression  No acute displaced fracture.    Increased bibasilar lung markings correspond with bronchiectasis on CT.        Workstation performed: [id number]    Results for orders placed during the hospital encounter of 24    XR chest pa & lateral    Narrative  CHEST    INDICATION:   Acute respiratory failure with hypoxia.    COMPARISON:  None.    EXAM PERFORMED/VIEWS:  XR CHEST PA & LATERAL    FINDINGS:    Cardiomediastinal silhouette appears unremarkable.    The lungs are clear.  No pneumothorax or pleural  effusion.    Osseous structures appear within normal limits for patient age. Mild dextroconvex scoliosis of the thoracic spine    Impression  No acute cardiopulmonary disease.  Mild scoliosis.  No significant change from 5/13/2024.          Electronically signed: 05/22/2024 01:39 PM Castillo Bustillos MD      Last 24 Hours Medication List:   Current Facility-Administered Medications   Medication Dose Route Frequency Provider Last Rate    acetaminophen  650 mg Oral Q6H PRN Lenny Langford MD      aspirin  81 mg Oral Daily Lenny Langford MD      famotidine  20 mg Oral HS Lenny Langford MD      guaiFENesin  600 mg Oral BID Lester Madrigal MD      heparin (porcine)  5,000 Units Subcutaneous Q8H JONO Lenny Langford MD      HYDROmorphone  0.2 mg Intravenous Q6H PRN Lenny Langford MD      insulin lispro  1-5 Units Subcutaneous TID AC Lenny Langford MD      insulin lispro  1-5 Units Subcutaneous HS Lenny Langford MD      nebivolol  40 mg Oral Daily Lenny Langford MD      NIFEdipine  90 mg Oral Daily Lenny Langford MD      oxyCODONE  5 mg Oral Q6H PRN Lenny Langford MD      oxyCODONE  2.5 mg Oral Q6H PRN Lenny Langford MD      pantoprazole  20 mg Oral Daily Lenny Langford MD      potassium chloride  40 mEq Oral Q4H Lester Madrigal MD      pravastatin  40 mg Oral Daily With Dinner Lenny Langford MD      sertraline  100 mg Oral Daily Lenny Langford MD      sodium chloride  75 mL/hr Intravenous Continuous Lester Madrigal MD 75 mL/hr (06/22/24 0914)        Today, Patient Was Seen By: Lester Madrigal MD    ** Please Note: Dictation voice to text software may have been used in the creation of this document. **

## 2024-06-22 NOTE — ED CARE HANDOFF
Emergency Department Sign Out Note        Sign out and transfer of care from [facility]. See Separate Emergency Department note.     The patient, Ena Ahumada, was evaluated by the previous provider for fall, left hip pain.    Workup Completed:  Xray pelvis possible pubic rami fx    ED Course / Workup Pending (followup):  Ct confirms left pubic rami fx - patient has too much pain, unable to bear weight                                     Procedures  Medical Decision Making  Amount and/or Complexity of Data Reviewed  Labs: ordered.  Radiology: ordered and independent interpretation performed.    Risk  OTC drugs.  Decision regarding hospitalization.            Disposition  Final diagnoses:   Fall, initial encounter   Acute hip pain, left   Closed fracture of left inferior pubic ramus (HCC)   Closed fracture of left superior pubic ramus (HCC)   Pubic bone fracture (HCC)     Time reflects when diagnosis was documented in both MDM as applicable and the Disposition within this note       Time User Action Codes Description Comment    6/21/2024  5:43 PM Isela Magdaleno [W19.XXXA] Fall, initial encounter     6/21/2024  5:44 PM Isela Magdaleno Add [M25.552] Acute hip pain, left     6/21/2024  7:04 PM Bill Doll Add [S32.592A] Closed fracture of left inferior pubic ramus (HCC)     6/21/2024  7:04 PM Bill Doll [S32.512A] Closed fracture of left superior pubic ramus (HCC)     6/21/2024  7:04 PM Bill Doll [S32.509A] Pubic bone fracture (HCC)     6/21/2024  8:11 PM Lenny Langford Add [S32.592A] Closed fracture of multiple pubic rami, left, initial encounter (HCC)           ED Disposition       ED Disposition   Admit    Condition   Stable    Date/Time   Fri Jun 21, 2024  7:40 PM    Comment   Case was discussed with CAMELIA Nelson and the patient's admission status was agreed to be Admission Status: observation status to the service of Dr. CAMELIA Nelson .               Follow-up Information       Follow up With  Specialties Details Why Contact Info    Deanna Castaneda DO Internal Medicine, Family Medicine Schedule an appointment as soon as possible for a visit in 1 week for follow up regarding your abnormal complete blood count [address] [place] [id number] [phone]            Current Discharge Medication List        CONTINUE these medications which have NOT CHANGED    Details   !! ACCU-CHEK PAUL PLUS test strip       !! Accu-Chek Guide test strip USE TO TEST TWICE A DAY  Qty: 100 strip, Refills: 3    Associated Diagnoses: Type 2 diabetes mellitus with stage 3b chronic kidney disease, without long-term current use of insulin ([facility])      !! Accu-Chek Softclix Lancets lancets       !! Accu-Chek Softclix Lancets lancets USE AS INSTRUCTED TWICE DAILY  Qty: 100 each, Refills: 3    Associated Diagnoses: Type 2 diabetes mellitus with stage 3 chronic kidney disease, without long-term current use of insulin ([facility])      aspirin 81 MG tablet Take 1 tablet by mouth daily      Blood Glucose Monitoring Suppl (OneTouch Verio Reflect) w/Device KIT Check blood sugars twice daily. Please substitute with appropriate alternative as covered by patient's insurance. Dx: E11.65  Qty: 1 kit, Refills: 0    Comments: Please substitute with appropriate alternative as covered by patient's insurance  Associated Diagnoses: Type 2 diabetes mellitus with stage 4 chronic kidney disease, without long-term current use of insulin ([facility])      cholecalciferol (VITAMIN D3) 1,000 units tablet Take 2 tablets by mouth daily      cloNIDine (CATAPRES-TTS-1) 0.1 mg/24 hr PLACE 1 PATCH ON THE SKIN ONCE A WEEK AS DIRECTED  Qty: 12 patch, Refills: 4    Associated Diagnoses: Essential hypertension      clotrimazole-betamethasone (LOTRISONE) 1-0.05 % cream Apply topically 2 (two) times a day  Qty: 45 g, Refills: 0    Associated Diagnoses: Rash      Denta 5000 Plus 1.1 % CREA USE TWICE A DAY -SPIT, DONT RINSE AND NOTHING BY MOUTH X 30 MIN       famotidine (PEPCID) 20 mg tablet Take 1 tablet (20 mg total) by mouth daily at bedtime  Qty: 30 tablet, Refills: 6    Associated Diagnoses: Nausea and vomiting      ferrous sulfate 325 (65 Fe) mg tablet Take 1 tablet (325 mg total) by mouth 3 (three) times a week  Qty: 12 tablet, Refills: 0    Associated Diagnoses: Iron deficiency anemia, unspecified iron deficiency anemia type      glipiZIDE (GLUCOTROL) 10 mg tablet TAKE 1 TABLET BY MOUTH IN THE MORNING THEN 2 TABS WITH DINNER  Qty: 270 tablet, Refills: 3    Associated Diagnoses: Essential hypertension; Type 2 diabetes mellitus with stage 3 chronic kidney disease, without long-term current use of insulin ([facility])      !! glucose blood (Accu-Chek Emma Plus) test strip USE AS INSTRUCTED TWICE DAILY  Qty: 100 each, Refills: 7    Associated Diagnoses: Type 2 diabetes mellitus with stage 3 chronic kidney disease, without long-term current use of insulin ([facility])      !! glucose blood (OneTouch Verio) test strip Check blood sugars twice daily. Please substitute with appropriate alternative as covered by patient's insurance. Dx: E11.65  Qty: 200 each, Refills: 3    Comments: Please substitute with appropriate alternative as covered by patient's insurance  Associated Diagnoses: Type 2 diabetes mellitus with stage 4 chronic kidney disease, without long-term current use of insulin ([facility])      Insulin Glargine-yfgn 100 UNIT/ML SOPN INJECT 0.05 ML (5 UNITS TOTAL) UNDER THE SKIN IN THE MORNING      Insulin Pen Needle (BD Pen Needle Kathy U/F) 32G X 4 MM MISC Use daily  Qty: 100 each, Refills: 3    Associated Diagnoses: Type 2 diabetes mellitus with hyperglycemia, with long-term current use of insulin ([facility])      Invokana 100 MG TAKE 1 TABLET BY MOUTH EVERY DAY BEFORE BREAKFAST  Qty: 30 tablet, Refills: 5    Associated Diagnoses: Type 2 diabetes mellitus with stage 4 chronic kidney disease, without long-term current use of insulin ([facility])      nebivolol (BYSTOLIC) 20 MG tablet Take 2  tablets (40 mg total) by mouth daily  Qty: 90 tablet, Refills: 0    Associated Diagnoses: Essential hypertension      NIFEdipine (ADALAT CC) 90 mg 24 hr tablet TAKE 1 TABLET BY MOUTH EVERY DAY  Qty: 90 tablet, Refills: 3    Associated Diagnoses: Essential hypertension      !! OneTouch Delica Lancets 33G MISC Check blood sugars twice daily. Please substitute with appropriate alternative as covered by patient's insurance. Dx: E11.65  Qty: 200 each, Refills: 3    Comments: Please substitute with appropriate alternative as covered by patient's insurance  Associated Diagnoses: Type 2 diabetes mellitus with stage 4 chronic kidney disease, without long-term current use of insulin ([facility])      oxygen gas Inhale 2.5 L/min continuous VIA NASAL CANULA. At night only      pantoprazole (PROTONIX) 20 mg tablet TAKE 1 TABLET BY MOUTH EVERY DAY  Qty: 90 tablet, Refills: 1    Associated Diagnoses: Gastroesophageal reflux disease, unspecified whether esophagitis present      potassium chloride (Klor-Con M10) 10 mEq tablet Take 1 tablet (10 mEq total) by mouth daily  Qty: 30 tablet, Refills: 2    Associated Diagnoses: Hypokalemia      scopolamine (TRANSDERM-SCOP) 1 mg/3 days TD 72 hr patch Place 1 patch on the skin over 72 hours every third day as needed (nausea)  Qty: 12 patch, Refills: 0    Associated Diagnoses: Nausea and vomiting      sertraline (ZOLOFT) 100 mg tablet TAKE 1 TABLET BY MOUTH EVERY DAY  Qty: 90 tablet, Refills: 2    Associated Diagnoses: Anxiety      simvastatin (ZOCOR) 20 mg tablet TAKE 1 TABLET BY MOUTH EVERYDAY AT BEDTIME  Qty: 90 tablet, Refills: 2    Associated Diagnoses: Dyslipidemia      sitaGLIPtin (Januvia) 50 mg tablet TAKE 1 TABLET BY MOUTH EVERY DAY  Qty: 30 tablet, Refills: 5    Associated Diagnoses: Type 2 diabetes mellitus with stage 3 chronic kidney disease, without long-term current use of insulin ([facility])      torsemide (DEMADEX) 20 mg tablet TAKE 1 TABLET BY MOUTH EVERY DAY  Qty: 90 tablet, Refills: 0     Associated Diagnoses: Dependent edema       !! - Potential duplicate medications found. Please discuss with provider.        STOP taking these medications       terbinafine (LamISIL) 250 mg tablet Comments:   Reason for Stopping:             No discharge procedures on file.       ED Provider  Electronically Signed by     Bill Doll DO  06/21/24 1183

## 2024-06-22 NOTE — ASSESSMENT & PLAN NOTE
Baseline creatinine approximately 1.6-2.1  Creatinine currently 2.2  IV fluids  Trend BMP  Avoid hypotension

## 2024-06-22 NOTE — CONSULTS
Orthopedics   Ena Ahumada 88 y.o. female MRN: [id number]  Unit/Bed#: -01      Chief Complaint:   left anterior pelvis pain    HPI: 2  88 y.o.female status post fall complaining or left groin pain.  She notes pain.  She notes no numbness or tingling.  No loss of bowel or bladder function.  No significant back pain.  She denies any other injuries in the past of this area.  She notes she has no pain in the groin or hip.    Review Of Systems:   Skin: Normal  Neuro: See HPI  Musculoskeletal: See HPI  14 point review of systems negative except as stated above     Past Medical History:   Past Medical History:   Diagnosis Date    Acute respiratory failure with hypoxia (HCC) 03/17/2024    Anxiety     Aortic valve insufficiency     Cataract of both eyes     Chronic kidney disease     Edema     last assessed - [id number]    GERD (gastroesophageal reflux disease)     last assessed - [id number]    Hypertension     Low back pain     last assessed - [id number]    Mitral valve disorder     Osteoporosis     last assessed - [id number]    Palpitations        Past Surgical History:   Past Surgical History:   Procedure Laterality Date    APPENDECTOMY      CATARACT EXTRACTION Bilateral     COLONOSCOPY      HIP SURGERY      TUBAL LIGATION Bilateral        Family History:  Family history reviewed and non-contributory  Family History   Problem Relation Age of Onset    Kidney disease Mother         Chronic    No Known Problems Father     Breast cancer Sister     Diabetes Sister     Cancer Sister     Breast cancer Sister     Hypertension Sister     No Known Problems Daughter     No Known Problems Son     No Known Problems Son     No Known Problems Son     No Known Problems Son        Social History:  Social History     Socioeconomic History    Marital status: Single     Spouse name: Not on file    Number of children: Not on file    Years of education: Not on file    Highest education level: Not on file   Occupational History    Not on  file   Tobacco Use    Smoking status: Never    Smokeless tobacco: Never   Vaping Use    Vaping status: Never Used   Substance and Sexual Activity    Alcohol use: Never    Drug use: Never    Sexual activity: Not Currently   Other Topics Concern    Not on file   Social History Narrative    Living with relatives (other than parents)    Marital History -      Social Determinants of Health     Financial Resource Strain: Low Risk  (7/22/2023)    Overall Financial Resource Strain (CARDIA)     Difficulty of Paying Living Expenses: Not very hard   Food Insecurity: No Food Insecurity (6/22/2024)    Hunger Vital Sign     Worried About Running Out of Food in the Last Year: Never true     Ran Out of Food in the Last Year: Never true   Transportation Needs: No Transportation Needs (6/22/2024)    PRAPARE - Transportation     Lack of Transportation (Medical): No     Lack of Transportation (Non-Medical): No   Physical Activity: Not on file   Stress: Not on file   Social Connections: Not on file   Intimate Partner Violence: Not on file   Housing Stability: Low Risk  (6/22/2024)    Housing Stability Vital Sign     Unable to Pay for Housing in the Last Year: No     Number of Times Moved in the Last Year: 0     Homeless in the Last Year: No       Allergies:   No Known Allergies        Labs:  0   Lab Value Date/Time    HCT 37.3 06/22/2024 0455    HCT 41.5 06/21/2024 1624    HCT 39.9 05/15/2024 0248    HCT 34.5 01/02/2018 0851    HCT 34.8 12/16/2016 0900    HGB 11.8 06/22/2024 0455    HGB 13.1 06/21/2024 1624    HGB 12.3 05/15/2024 0248    HGB 11.2 01/02/2018 0851    HGB 11.4 12/16/2016 0900    INR 1.06 06/21/2024 1624    WBC 11.78 (H) 06/22/2024 0455    WBC 16.90 (H) 06/21/2024 1624    WBC 12.04 (H) 05/15/2024 0248    WBC 11.7 (H) 01/02/2018 0851    WBC 9.9 12/16/2016 0900       Meds:    Current Facility-Administered Medications:     acetaminophen (TYLENOL) tablet 650 mg, 650 mg, Oral, Q6H PRN, Lenny Langford MD    aspirin  chewable tablet 81 mg, 81 mg, Oral, Daily, Lenny Langford MD, 81 mg at 06/22/24 0802    famotidine (PEPCID) tablet 20 mg, 20 mg, Oral, HS, Lenny Langford MD    guaiFENesin (MUCINEX) 12 hr tablet 600 mg, 600 mg, Oral, BID, Lester Madrigal MD, 600 mg at 06/22/24 0913    heparin (porcine) subcutaneous injection 5,000 Units, 5,000 Units, Subcutaneous, Q8H JONO, Lenny Langford MD, 5,000 Units at 06/22/24 0505    HYDROmorphone HCl (DILAUDID) injection 0.2 mg, 0.2 mg, Intravenous, Q6H PRN, Lenny Langford MD, 0.2 mg at 06/22/24 0801    insulin lispro (HumALOG/ADMELOG) 100 units/mL subcutaneous injection 1-5 Units, 1-5 Units, Subcutaneous, TID AC, 3 Units at 06/22/24 1127 **AND** Fingerstick Glucose (POCT), , , TID AC, Lenny Langford MD    insulin lispro (HumALOG/ADMELOG) 100 units/mL subcutaneous injection 1-5 Units, 1-5 Units, Subcutaneous, HS, Lenny Langford MD    nebivolol (BYSTOLIC) tablet 40 mg, 40 mg, Oral, Daily, Lenny Langford MD, 40 mg at 06/22/24 0841    NIFEdipine (PROCARDIA XL) 24 hr tablet 90 mg, 90 mg, Oral, Daily, Lenny Langford MD, 90 mg at 06/22/24 0802    oxyCODONE (ROXICODONE) IR tablet 5 mg, 5 mg, Oral, Q6H PRN, Lenny Langford MD, 5 mg at 06/21/24 2043    oxyCODONE (ROXICODONE) split tablet 2.5 mg, 2.5 mg, Oral, Q6H PRN, Lenny Langford MD    pantoprazole (PROTONIX) EC tablet 20 mg, 20 mg, Oral, Daily, Lenny Langford MD, 20 mg at 06/22/24 0802    potassium chloride (Klor-Con M20) CR tablet 40 mEq, 40 mEq, Oral, Q4H, Lester Madrigal MD, 40 mEq at 06/22/24 0913    pravastatin (PRAVACHOL) tablet 40 mg, 40 mg, Oral, Daily With Dinner, Lenny Langford MD    sertraline (ZOLOFT) tablet 100 mg, 100 mg, Oral, Daily, Lenny Langford MD, 100 mg at 06/22/24 0802    sodium chloride 0.9 % infusion, 75 mL/hr, Intravenous, Continuous, Lester Madrigal MD, Last Rate: 75 mL/hr at 06/22/24 0914, 75 mL/hr at 06/22/24 0914    Blood Culture:   Lab Results   Component Value Date    BLOODCX No Growth After 5 Days. 03/16/2024    BLOODCX No  "Growth After 5 Days. 03/16/2024       Wound Culture:   No results found for: \"WOUNDCULT\"    Ins and Outs:  No intake/output data recorded.          Physical Exam:   /61   Pulse 76   Temp (!) 96.6 °F (35.9 °C)   Resp 18   Ht 5' 2\" (1.575 m)   Wt 66.9 kg (147 lb 7.8 oz)   SpO2 90%   BMI 26.98 kg/m²   Gen: Alert and oriented to person, place, time  HEENT: EOMI, eyes clear, moist mucus membranes, hearing intact  Respiratory: Bilateral chest rise. No audible wheezing found  Cardiovascular: Regular Rate and Rhythm  Abdomen: soft nontender/nondistended  Musculoskeletal: bilateral lower extremity  Skin intact  Tender to palpation over pubic rami  Leg lengths equal  Painful hip motion  Sensation intact L1-S1  Positive knee flexion/extension ankle dorsi/plantar flexion, EHL/FHL. Pt guarding due to pain  2+P pulse  Negative log roll and heel strike    Radiology:   I personally reviewed the films.  X-rays pelvis and CT pelvis shows right superior and inferior pubic rami fracture  [unfilled]    Assessment:  88 y.o.female S/P fall with left superior and inferior pubic rami fracture  Plan:   Weight bearing as tolerated  Analgesics for pain  DVT ppx  PT/OT  Dispo: Ok for discharge from ortho perspective      Aleks Sampson DO"

## 2024-06-22 NOTE — ASSESSMENT & PLAN NOTE
Has history of old L4 compression fracture  CT showed Unchanged old moderate compression fracture of L4 with unchanged 4 mm of retropulsion.   PT/OT  Outpatient follow-up with spine orthopedics

## 2024-06-22 NOTE — PHYSICAL THERAPY NOTE
PHYSICAL THERAPY EVALUATION  NAME: Ena Ahumada  AGE:   88 y.o.  MRN:  [id number]  ADMIT DX: Pubic bone fracture (HCC) [S32.509A]    PMH:   Past Medical History:   Diagnosis Date    Acute respiratory failure with hypoxia (HCC) 03/17/2024    Anxiety     Aortic valve insufficiency     Cataract of both eyes     Chronic kidney disease     Edema     last assessed - 05Jun2015    GERD (gastroesophageal reflux disease)     last assessed - [id number]    Hypertension     Low back pain     last assessed - 30Aug2012    Mitral valve disorder     Osteoporosis     last assessed - [id number]    Palpitations      LENGTH OF STAY: 0        06/22/24 1354   PT Last Visit   PT Visit Date 06/22/24   Note Type   Note type Evaluation   Pain Assessment   Pain Assessment Tool 0-10   Pain Score 10 - Worst Possible Pain   Pain Location/Orientation Orientation: Left;Location: Pelvis   Hospital Pain Intervention(s) Repositioned;Ambulation/increased activity   Restrictions/Precautions   Weight Bearing Precautions Per Order Yes   LLE Weight Bearing Per Order (S)  WBAT  (per ortho note)   Other Precautions Chair Alarm;Bed Alarm;WBS;Multiple lines;Fall Risk;Pain;Hard of hearing   Home Living   Type of Home Apartment  (in-law suite attached to son's home; separate entrance with [address])   Home Layout One level;Stairs to enter with rails  (2nd floor in law suite; [address] with bilateral handrails)   Home Equipment Walker;Cane   Additional Comments Ambulates independently without AD at baseline.   Prior Function   Level of [place] Independent with ADLs;Independent with functional mobility;Needs assistance with IADLS   Lives With Family  (family lives in attached house)   IADLs Independent with meal prep;Independent with medication management;Family/Friend/Other provides transportation  (DIL drives pt to grocery store and appointments)   Falls in the last 6 months 1 to 4  (2)   General   Family/Caregiver Present No   Cognition   Overall Cognitive  "Status WFL  (? distracted by pain during mobility; difficulty following motor commands)   Arousal/Participation Cooperative   Orientation Level Oriented to person;Oriented to place;Oriented to situation   Memory Decreased recall of precautions;Decreased short term memory   Following Commands Follows one step commands with increased time or repetition   Comments Pt identified by name and .   Subjective   Subjective Agrees to PT evaluation and is pleasant and cooperative throughout session.  \"You're doing so much work, I hope I can do this.\"   RLE Assessment   RLE Assessment X   Strength RLE   RLE Overall Strength 4-/5  (functionally)   LLE Assessment   LLE Assessment X   Strength LLE   LLE Overall Strength 2+/5  (functionally)   Bed Mobility   Supine to Sit 2  Maximal assistance   Additional items Assist x 1;HOB elevated;Bedrails;Increased time required;Verbal cues;LE management   Sit to Supine Unable to assess  (left OOB in chair post session with alarm intact)   Transfers   Sit to Stand 3  Moderate assistance   Additional items Assist x 1;Increased time required;Verbal cues   Stand to Sit 3  Moderate assistance   Additional items Assist x 1;Increased time required;Verbal cues   Stand pivot 3  Moderate assistance   Additional items Assist x 1;Increased time required;Verbal cues  (with RW; shuffling/sliding LLE; transfer toward right side)   Additional Comments wide CONNIE with standing; decreased weight bearing on LLE; poor proximity to RW; difficulty following commands in standing due to pain   Ambulation/Elevation   Gait pattern Not appropriate   Balance   Static Sitting Fair +   Dynamic Sitting Fair -   Static Standing Poor +   Dynamic Standing Poor  (with RW)   Endurance Deficit   Endurance Deficit Yes   Endurance Deficit Description limited standing tolerance   Activity Tolerance   Activity Tolerance Patient limited by pain   Nurse Made Aware Per RN, pt appropriate to evaluate; updated on status   Assessment "   Prognosis Fair   Problem List Decreased strength;Decreased endurance;Impaired balance;Decreased mobility;Decreased safety awareness;Orthopedic restrictions;Pain   Assessment Pt is a 88 y.o. female seen for PT evaluation s/p admit to [facility] on 8/18/2022 w/ Closed fracture of multiple pubic rami, left, initial encounter (HCC).  Order placed for PT. Comorbidities affecting pt's physical performance at time of assessment include: DM, HTN, and neuropathy. Personal factors affecting pt at time of IE include: anxiety, steps to enter environment, advanced age, inability to perform IADLs, inability to perform ADLs, inability to ambulate household distances, and recent fall(s). Prior to admission, pt was independent w/ all functional mobility w/ out AD, lived in one floor environment, had [address] (2) railing, and lived in an attached in-law suite on son's home . Upon evaluation: Pt requires max A for bed mobility, mod A for sit to stand, and mod A for SPT with RW.   (Please find full objective findings from PT assessment regarding body systems outlined above). Impairments and limitations also listed above, especially due to  weakness, decreased ROM, impaired balance, decreased endurance, pain, decreased activity tolerance, fall risk, and orthopedic restrictions.  Pt's clinical presentation is currently unstable/unpredictable seen in pt's presentation of uncontrolled pain, fall risk, and significant decline in functional mobility compared to baseline.  Pt to benefit from continued skilled PT tx while in hospital and upon DC to address deficits as defined above and maximize level of functional mobility.  Recommend  progression of transfers and ambulation as appropriate .   Goals   Patient Goals to get OOB to the chair   STG Expiration Date 07/02/24   Short Term Goal #1 Pt will be able to: (1) perform bed mobility with min A to promote upright posture and OOB mobility (2) perform sit to stand with min A to decrease  burden of care (3) ambulate at least 100` with min A and RW to increase activity tolerance (4) increase standing balance by 1 grade to decrease risk of falls   PT Treatment Day 0   Plan   Treatment/Interventions Functional transfer training;LE strengthening/ROM;Therapeutic exercise;Endurance training;Patient/family training;Equipment eval/education;Bed mobility;Gait training   PT Frequency 4-6x/wk   Discharge Recommendation   Rehab Resource Intensity Level, PT II (Moderate Resource Intensity)   Equipment Recommended Walker   Walker Package Recommended Wheeled walker   [facility] Basic Mobility Inpatient   Turning in Flat Bed Without Bedrails 2   Lying on Back to Sitting on Edge of Flat Bed Without Bedrails 2   Moving Bed to Chair 2   Standing Up From Chair Using Arms 2   Walk in Room 1   Climb 3-5 Stairs With Railing 1   Basic Mobility Inpatient Raw Score 10   Turning Head Towards Sound 4   Follow Simple Instructions 3   Low Function Basic Mobility Raw Score  17   Low Function Basic Mobility Standardized Score  27.46   [facility] Highest Level Of Mobility   -[facility] Goal 4: Move to chair/commode   -[facility] Achieved 4: Move to chair/commode   End of Consult   Patient Position at End of Consult Bedside chair;Bed/Chair alarm activated;All needs within reach     The patient's [facility] Basic Mobility Inpatient Short Form Raw Score is 10, Standardized Score is  .  A Raw Score of less than 16 suggests the patient may benefit from discharge to post-acute rehabilitation services. However please refer to therapist recommendation for discharge planning given other factors that may influence destination.     Adapted from Luis MURCIA, Elie J, Cody J, Chantal MCDONALD. [employer] [facility] “6-Clicks” Basic Mobility and Daily Activity Scores With Discharge Destination. Physical Therapy, 2021;101:1-9. DOI: [email or web address]      Renetta Ceja, PT,DPT

## 2024-06-22 NOTE — UTILIZATION REVIEW
Initial Clinical Review    WAS OBSERVATION 6/21/24 @ 1940 CONVERTED TO INPATIENT ADMISSION 6/22/24 @ 1009 DUE TO CONTINUED STAY REQUIRED TO CARE FOR PATIENT WITH DX: FX OF PUBIC RAMI.    Admission: Date/Time/Statement:   Admission Orders (From admission, onward)       Ordered        06/22/24 1009  INPATIENT ADMISSION  Once            06/21/24 1940  Place in Observation  Once                          Orders Placed This Encounter   Procedures    INPATIENT ADMISSION     Standing Status:   Standing     Number of Occurrences:   1     Order Specific Question:   Level of Care     Answer:   Med Surg [16]     Order Specific Question:   Estimated length of stay     Answer:   More than 2 Midnights     Order Specific Question:   Certification     Answer:   I certify that inpatient services are medically necessary for this patient for a duration of greater than two midnights. See H&P and MD Progress Notes for additional information about the patient's course of treatment.     ED Arrival Information       Expected   -    Arrival   6/21/2024 15:44    Acuity   Urgent              Means of arrival   Ambulance    Escorted by   [facility]    Service   Hospitalist    Admission type   Emergency              Arrival complaint   fall, left hip pain             Chief Complaint   Patient presents with    Fall     Pt arrives via EMS for eval of R hip pain after fall, NO headstrike, NO LOC, pt is on ASA daily       Initial Presentation: 88 y.o. female to ED via EMS from home  Present to ED after fall. She reports she was getting out of her car earlier today and lost her balance and fell on her left hip. Since then she has been having difficulty walking and associated left hip pain.   PMHX: CKD, type 2 diabetes, hypertension   Admitted to OBS with DX: Closed fracture of multiple pubic rami, left, initial encounter s/p Fall  on exam: tachypnea; RA sat 89% - placed on O2 @ 2L via nc sat 94%; WBC 16.90; Cr 2.21 (baseline 1.6-2.1)  CT scan  "revealed nondisplaced fracture of the left pubic bone as well as \" Mildly comminuted mildly displaced fracture of the left superior pubic ramus not extending into the acetabulum. Minimally displaced fracture of the left inferior pubic ramus\"  PLAN: cont ivf; monitor labs; pain control (see below); PT/ OT eval - tx; Ortho consult; Accuchecks with ssic      Anticipated Length of Stay/Certification Statement: Patient will be admitted on an Observation basis with an anticipated length of stay of  < 2 midnights.   Justification for Hospital Stay: Ambulatory dysfunction       Date: 6/22/24   -  CHANGED TO INPATIENT      WBCs elevated - monitor / trend; hypertensive; Pain 7-10/10  Plan: cont ivf; monitor labs; pain control (see below); f/u PT/ OT recom; Ortho consult; Accuchecks with ssic; f/u UA; CM consulted    ORTHO CONSULT   Assessment: 88 y.o.female S/P fall with left superior and inferior pubic rami fracture. Exam: Tender to palpation over pubic rami.  Leg lengths equal. Painful hip motion. Sensation intact L1-S1. Positive knee flexion/extension ankle dorsi/plantar flexion, EHL/FHL. Pt guarding due to pain  Plan: Weight bearing as tolerated; Analgesics for pain; DVT ppx; PT/OT      Date: 6/23/24      Day 3: Has surpassed a 2nd midnight with active treatments and services.  Patient has lower back pain. Pain 7/10;  Orthopedic consult = No surgical intervention necessary. Weightbearing as tolerated. PT recommended rehab.  UA is negative, chest x-ray is unremarkable ; Creatinine improved to 1.59 with IV fluids. Back to baseline.  Plan: cont ivf; monitor labs; pain control (see below); Accuchecks with ssic; CM consulted for placement      ED Triage Vitals   Temperature Pulse Respirations Blood Pressure SpO2 Pain Score   06/21/24 1547 06/21/24 1547 06/21/24 1547 06/21/24 1547 06/21/24 1547 06/21/24 1638   98.7 °F (37.1 °C) 72 20 124/99 (!) 89 % 2     Weight (last 2 days)       Date/Time Weight    06/23/24 0527 68.5 " (151.02)    06/22/24 0549 66.9 (147.49)    06/21/24 2036 66.7 (147.05)            Vital Signs (last 3 days)       Date/Time Temp Pulse Resp BP MAP (mmHg) SpO2 Calculated FIO2 (%) - Nasal Cannula Nasal Cannula O2 Flow Rate (L/min) O2 Device Patient Position - Orthostatic VS Pain    06/23/24 0800 -- -- -- -- -- -- 28 2 L/min Nasal cannula -- 7 06/23/24 07:27:28 96.4 °F (35.8 °C) 72 -- 116/75 89 95 % -- -- -- -- --    06/22/24 22:50:22 -- 68 -- 107/81 90 89 % -- -- -- -- --    06/22/24 2249 99 °F (37.2 °C) -- -- -- -- -- -- -- -- -- --    06/22/24 1922 -- -- -- -- -- -- -- -- -- -- 7 06/22/24 1915 -- -- -- -- -- -- -- -- -- -- 7 06/22/24 1514 97.6 °F (36.4 °C) 73 -- 146/56 86 88 % -- -- -- -- --    06/22/24 1354 -- -- -- -- -- -- -- -- -- -- 10 - Worst Possible Pain    06/22/24 0900 -- -- -- -- -- -- 28 2 L/min Nasal cannula -- 5    06/22/24 08:16:17 -- 76 -- 167/61 96 90 % -- -- -- -- --    06/22/24 0801 -- -- -- -- -- -- -- -- -- -- 7 06/22/24 07:55:54 96.6 °F (35.9 °C) 73 18 211/177 188 89 % -- -- -- -- --    06/21/24 2100 -- 78 16 -- -- 92 % 28 2 L/min Nasal cannula -- --    06/21/24 2043 98.4 °F (36.9 °C) -- -- 138/78 98 -- -- -- -- Lying 3    06/21/24 20:38:24 -- -- -- 138/78 98 -- -- -- -- -- --    06/21/24 2022 -- -- -- -- -- -- -- -- -- -- 2    06/21/24 2000 -- 74 20 167/72 103 92 % 28 2 L/min Nasal cannula -- --    06/21/24 1900 -- 74 20 163/91 120 94 % 28 2 L/min Nasal cannula -- --    06/21/24 1655 -- 74 20 153/66 95 93 % -- -- None (Room air) Lying --    06/21/24 1638 -- -- -- -- -- -- -- -- -- -- 2    06/21/24 1630 -- -- -- -- -- -- -- -- None (Room air) -- --    06/21/24 1547 98.7 °F (37.1 °C) 72 20 124/99 108 89 % -- -- None (Room air) Sitting --              Pertinent Labs/Diagnostic Test Results:   Radiology:  CT pelvis wo contrast   Final Interpretation by Alecia Bello MD (06/21 1837)   Left pubic bone fractures as above, acute.   Unchanged old moderate compression fracture of L4 with  unchanged 4 mm of retropulsion.   Chronic findings, as per the body of the report.               Workstation performed: [id number]         XR chest 1 view portable   Final Interpretation by Aurelia Bullock MD (06/21 1651)      No acute displaced fracture.      Increased bibasilar lung markings correspond with bronchiectasis on CT.            Workstation performed: [id number]         XR femur 2 views LEFT   ED Interpretation by KANA Munguia (06/21 1638)   No acute fracture identified by me.      Final Interpretation by Uzair Gillette MD (06/22 0916)      Left lower pelvic ring fractures.      Workstation performed: [id number]         XR pelvis ap only 1 or 2 vw   ED Interpretation by KANA Munguia (06/21 1638)   No acute fracture identified by me.      Final Interpretation by Uzair Gillette MD (06/22 0915)      Left lower pelvic ring fractures as noted subsequent CT same day.         Workstation performed: [id number]              Results from last 7 days   Lab Units 06/23/24  0525 06/22/24  0455 06/21/24  1624   WBC Thousand/uL 11.30* 11.78* 16.90*   HEMOGLOBIN g/dL 11.1* 11.8 13.1   HEMATOCRIT % 35.8 37.3 41.5   PLATELETS Thousands/uL 184 207 240   TOTAL NEUT ABS Thousands/µL 6.49 5.64  --    BANDS PCT %  --   --  1        Results from last 7 days   Lab Units 06/23/24  0525 06/22/24  0455 06/21/24  1624   SODIUM mmol/L 139 140 140   POTASSIUM mmol/L 4.0 3.0* 3.7   CHLORIDE mmol/L 106 103 103   CO2 mmol/L 27 29 28   ANION GAP mmol/L 6 8 9   BUN mg/dL 26* 35* 42*   CREATININE mg/dL 1.59* 2.00* 2.21*   EGFR ml/min/1.73sq m 28 21 19   CALCIUM mg/dL 8.3* 9.0 9.4   MAGNESIUM mg/dL 2.1  --   --         Results from last 7 days   Lab Units 06/22/24  2101 06/22/24  1634 06/22/24  1029 06/21/24  2058   POC GLUCOSE mg/dl 173* 277* 315* 78     Results from last 7 days   Lab Units 06/23/24  0525 06/22/24  0455 06/21/24  1624   GLUCOSE RANDOM mg/dL 175* 144* 85        Results from last 7 days   Lab Units  06/21/24  1624   HS TNI 0HR ng/L 10        Results from last 7 days   Lab Units 06/21/24  1624   PROTIME seconds 14.2   INR  1.06   PTT seconds 23              ED Treatment-Medication Administration from 06/21/2024 1543 to 06/21/2024 2021         Date/Time Order Dose Route Action     06/21/2024 1638 acetaminophen (TYLENOL) tablet 650 mg 650 mg Oral Given            Past Medical History:   Diagnosis Date    Acute respiratory failure with hypoxia ([facility]) 03/17/2024    Anxiety     Aortic valve insufficiency     Cataract of both eyes     Chronic kidney disease     Edema     last assessed - 05Jun2015    GERD (gastroesophageal reflux disease)     last assessed - 30Aug2012    Hypertension     Low back pain     last assessed - 30Aug2012    Mitral valve disorder     Osteoporosis     last assessed - [id number]    Palpitations      Present on Admission:   CKD (chronic kidney disease), stage IV ([facility])   Hypertension   GERD (gastroesophageal reflux disease)   Leukocytosis      Admitting Diagnosis: Pubic bone fracture ([facility]) [S32.509A]    Age/Sex: 88 y.o. female    Admission Orders: SCDs; I/o; Daily wts; Consistent Carbohydrate Diet. Blood glucose checks ACHS.      Scheduled Medications:  aspirin, 81 mg, Oral, Daily  famotidine, 20 mg, Oral, HS  guaiFENesin, 600 mg, Oral, BID  heparin (porcine), 5,000 Units, Subcutaneous, Q8H JONO  insulin lispro, 1-5 Units, Subcutaneous, TID AC  insulin lispro, 1-5 Units, Subcutaneous, HS  nebivolol, 40 mg, Oral, Daily  NIFEdipine, 90 mg, Oral, Daily  pantoprazole, 20 mg, Oral, Daily  potassium chloride, 40 mEq, Oral, Q4H  pravastatin, 40 mg, Oral, Daily With Dinner  sertraline, 100 mg, Oral, Daily      Continuous IV Infusions:  sodium chloride, 75 mL/hr, Intravenous, Continuous      PRN Meds:  acetaminophen, 650 mg, Oral, Q6H PRN  HYDROmorphone, 0.2 mg, Intravenous, Q6H PRN  (6/22 recd x1)   oxyCODONE, 5 mg, Oral, Q6H PRN  (6/21 recd x1)  (6/22 recd x2)  (6/23 recd x1 so far today)   oxyCODONE, 2.5  mg, Oral, Q6H PRN        IP CONSULT TO ORTHOPEDIC SURGERY  IP CONSULT TO CASE MANAGEMENT    Network Utilization Review Department  ATTENTION: Please call with any questions or concerns to [phone] and carefully listen to the prompts so that you are directed to the right person. All voicemails are confidential.   For Discharge needs, contact Care Management DC Support Team at [phone] opt. 2  Send all requests for admission clinical reviews, approved or denied determinations and any other requests to dedicated fax number below belonging to the [facility] where the patient is receiving treatment. List of dedicated fax numbers for the Facilities:  FACILITY NAME UR FAX NUMBER   ADMISSION DENIALS (Administrative/Medical Necessity) [phone]   DISCHARGE SUPPORT TEAM (NETWORK) [phone]   PARENT CHILD HEALTH (Maternity/NICU/Pediatrics) [phone]   [facility] [phone]   [facility] [phone]   [facility] [phone]   [facility] [phone]   [facility] [phone]   [facility] [phone]   [facility] [phone]   [facility] [phone]   [facility] [phone]   [facility] [phone]   [facility] [phone]   [facility] [phone]

## 2024-06-22 NOTE — PLAN OF CARE
Problem: PHYSICAL THERAPY ADULT  Goal: Performs mobility at highest level of function for planned discharge setting.  See evaluation for individualized goals.  Description: Treatment/Interventions: Functional transfer training, LE strengthening/ROM, Therapeutic exercise, Endurance training, Patient/family training, Equipment eval/education, Bed mobility, Gait training  Equipment Recommended: Walker       See flowsheet documentation for full assessment, interventions and recommendations.  Note: Prognosis: Fair  Problem List: Decreased strength, Decreased endurance, Impaired balance, Decreased mobility, Decreased safety awareness, Orthopedic restrictions, Pain  Assessment: Pt is a 88 y.o. female seen for PT evaluation s/p admit to [facility] on 8/18/2022 w/ Closed fracture of multiple pubic rami, left, initial encounter (HCC).  Order placed for PT. Comorbidities affecting pt's physical performance at time of assessment include: DM, HTN, and neuropathy. Personal factors affecting pt at time of IE include: anxiety, steps to enter environment, advanced age, inability to perform IADLs, inability to perform ADLs, inability to ambulate household distances, and recent fall(s). Prior to admission, pt was independent w/ all functional mobility w/ out AD, lived in one floor environment, had 17 STEPHEN (2) railing, and lived in an attached in-law suite on son's home . Upon evaluation: Pt requires max A for bed mobility, mod A for sit to stand, and mod A for SPT with RW.   (Please find full objective findings from PT assessment regarding body systems outlined above). Impairments and limitations also listed above, especially due to  weakness, decreased ROM, impaired balance, decreased endurance, pain, decreased activity tolerance, fall risk, and orthopedic restrictions.  Pt's clinical presentation is currently unstable/unpredictable seen in pt's presentation of uncontrolled pain, fall risk, and significant decline in  functional mobility compared to baseline.  Pt to benefit from continued skilled PT tx while in hospital and upon DC to address deficits as defined above and maximize level of functional mobility.  Recommend  progression of transfers and ambulation as appropriate .        Rehab Resource Intensity Level, PT: II (Moderate Resource Intensity)    See flowsheet documentation for full assessment.

## 2024-06-22 NOTE — PLAN OF CARE
Problem: Potential for Falls  Goal: Patient will remain free of falls  Description: INTERVENTIONS:  - Educate patient/family on patient safety including physical limitations  - Instruct patient to call for assistance with activity   - Consult OT/PT to assist with strengthening/mobility   - Keep Call bell within reach  - Keep bed low and locked with side rails adjusted as appropriate  - Keep care items and personal belongings within reach  - Initiate and maintain comfort rounds  - Make Fall Risk Sign visible to staff  - Offer Toileting every 2 Hours, in advance of need  - Initiate/Maintain bed alarm  - Obtain necessary fall risk management equipment: yellow socks, walker, commode  - Apply yellow socks and bracelet for high fall risk patients  - Consider moving patient to room near nurses station  Outcome: Progressing     Problem: PAIN - ADULT  Goal: Verbalizes/displays adequate comfort level or baseline comfort level  Description: Interventions:  - Encourage patient to monitor pain and request assistance  - Assess pain using appropriate pain scale  - Administer analgesics based on type and severity of pain and evaluate response  - Implement non-pharmacological measures as appropriate and evaluate response  - Consider cultural and social influences on pain and pain management  - Notify physician/advanced practitioner if interventions unsuccessful or patient reports new pain  Outcome: Progressing     Problem: INFECTION - ADULT  Goal: Absence or prevention of progression during hospitalization  Description: INTERVENTIONS:  - Assess and monitor for signs and symptoms of infection  - Monitor lab/diagnostic results  - Monitor all insertion sites, i.e. indwelling lines, tubes, and drains  - Monitor endotracheal if appropriate and nasal secretions for changes in amount and color  - Rebecca appropriate cooling/warming therapies per order  - Administer medications as ordered  - Instruct and encourage patient and family to  use good hand hygiene technique  - Identify and instruct in appropriate isolation precautions for identified infection/condition  Outcome: Progressing  Goal: Absence of fever/infection during neutropenic period  Description: INTERVENTIONS:  - Monitor WBC    Outcome: Progressing     Problem: SAFETY ADULT  Goal: Patient will remain free of falls  Description: INTERVENTIONS:  - Educate patient/family on patient safety including physical limitations  - Instruct patient to call for assistance with activity   - Consult OT/PT to assist with strengthening/mobility   - Keep Call bell within reach  - Keep bed low and locked with side rails adjusted as appropriate  - Keep care items and personal belongings within reach  - Initiate and maintain comfort rounds  - Make Fall Risk Sign visible to staff  - Offer Toileting every 2 Hours, in advance of need  - Initiate/Maintain bed alarm  - Obtain necessary fall risk management equipment: yellow socks, commode, walker  - Apply yellow socks and bracelet for high fall risk patients  - Consider moving patient to room near nurses station  Outcome: Progressing  Goal: Maintain or return to baseline ADL function  Description: INTERVENTIONS:  -  Assess patient's ability to carry out ADLs; assess patient's baseline for ADL function and identify physical deficits which impact ability to perform ADLs (bathing, care of mouth/teeth, toileting, grooming, dressing, etc.)  - Assess/evaluate cause of self-care deficits   - Assess range of motion  - Assess patient's mobility; develop plan if impaired  - Assess patient's need for assistive devices and provide as appropriate  - Encourage maximum independence but intervene and supervise when necessary  - Involve family in performance of ADLs  - Assess for home care needs following discharge   - Consider OT consult to assist with ADL evaluation and planning for discharge  - Provide patient education as appropriate  Outcome: Progressing  Goal:  Maintains/Returns to pre admission functional level  Description: INTERVENTIONS:  - Perform AM-PAC 6 Click Basic Mobility/ Daily Activity assessment daily.  - Set and communicate daily mobility goal to care team and patient/family/caregiver.   - Collaborate with rehabilitation services on mobility goals if consulted  - Perform Range of Motion 2 times a day.  - Reposition patient every 2 hours.  - Dangle patient 2 times a day  - Stand patient 2 times a day  - Ambulate patient 2 times a day  - Out of bed to chair 2 times a day   - Out of bed for meals 2 times a day  - Out of bed for toileting  - Record patient progress and toleration of activity level   Outcome: Progressing     Problem: DISCHARGE PLANNING  Goal: Discharge to home or other facility with appropriate resources  Description: INTERVENTIONS:  - Identify barriers to discharge w/patient and caregiver  - Arrange for needed discharge resources and transportation as appropriate  - Identify discharge learning needs (meds, wound care, etc.)  - Arrange for interpretive services to assist at discharge as needed  - Refer to Case Management Department for coordinating discharge planning if the patient needs post-hospital services based on physician/advanced practitioner order or complex needs related to functional status, cognitive ability, or social support system  Outcome: Progressing     Problem: Knowledge Deficit  Goal: Patient/family/caregiver demonstrates understanding of disease process, treatment plan, medications, and discharge instructions  Description: Complete learning assessment and assess knowledge base.  Interventions:  - Provide teaching at level of understanding  - Provide teaching via preferred learning methods  Outcome: Progressing     Problem: MUSCULOSKELETAL - ADULT  Goal: Maintain or return mobility to safest level of function  Description: INTERVENTIONS:  - Assess patient's ability to carry out ADLs; assess patient's baseline for ADL function  and identify physical deficits which impact ability to perform ADLs (bathing, care of mouth/teeth, toileting, grooming, dressing, etc.)  - Assess/evaluate cause of self-care deficits   - Assess range of motion  - Assess patient's mobility  - Assess patient's need for assistive devices and provide as appropriate  - Encourage maximum independence but intervene and supervise when necessary  - Involve family in performance of ADLs  - Assess for home care needs following discharge   - Consider OT consult to assist with ADL evaluation and planning for discharge  - Provide patient education as appropriate  Outcome: Progressing  Goal: Maintain proper alignment of affected body part  Description: INTERVENTIONS:  - Support, maintain and protect limb and body alignment  - Provide patient/ family with appropriate education  Outcome: Progressing

## 2024-06-22 NOTE — ASSESSMENT & PLAN NOTE
"Initially presented after mechanical fall at home  CT scan revealed nondisplaced fracture of the left pubic bone as well as \" Mildly comminuted mildly displaced fracture of the left superior pubic ramus not extending into the acetabulum. Minimally displaced fracture of the left inferior pubic ramus\"  Patient reports difficulty ambulating since  Will admit for pain control  PT/OT  Will consult orthopedic surgery for further evaluation  Do not anticipate any surgical intervention necessary  "

## 2024-06-22 NOTE — ASSESSMENT & PLAN NOTE
Noted to have a WBC count 16.9  Suspect likely reactive  No obvious source of infection  Currently afebrile  Will monitor closely off antibiotics  WBC trending down   Will order UA  Trend WBC and fever curve

## 2024-06-22 NOTE — H&P
"[facility]  H&P  Name: Ena Ahumada 88 y.o. female I MRN: [id number]  Unit/Bed#: ED 05 I Date of Admission: 6/21/2024   Date of Service: 6/21/2024 I Hospital Day: 0      Assessment & Plan   * Closed fracture of multiple pubic rami, left, initial encounter ([facility])  Assessment & Plan  Initially presented after mechanical fall at home  CT scan revealed nondisplaced fracture of the left pubic bone as well as \" Mildly comminuted mildly displaced fracture of the left superior pubic ramus not extending into the acetabulum. Minimally displaced fracture of the left inferior pubic ramus\"  Patient reports difficulty ambulating since  Will admit for pain control  PT/OT  Will consult orthopedic surgery for further evaluation  Do not anticipate any surgical intervention necessary    Leukocytosis  Assessment & Plan  Noted to have a WBC count 16.9  Suspect likely reactive  No obvious source of infection  Currently afebrile  Will monitor closely off antibiotics  Repeat WBC count in the morning    Type 2 diabetes mellitus with chronic kidney disease, with long-term current use of insulin ([facility])  Assessment & Plan  Hold home p.o. agents  Sliding scale insulin  Monitor blood glucose        CKD (chronic kidney disease), stage IV ([facility])  Assessment & Plan  Baseline creatinine approximately 1.6-2.1  Creatinine currently 2.2  Will continue to monitor    GERD (gastroesophageal reflux disease)  Assessment & Plan  Continue PPI    Hypertension  Assessment & Plan  Continue beta-blocker and clonidine  BP currently controlled           VTE Prophylaxis: Heparin  / sequential compression device   Code Status: full code  POLST: There is no POLST form on file for this patient (pre-hospital)  Discussion with family: pt    Anticipated Length of Stay:  Patient will be admitted on an Observation basis with an anticipated length of stay of  < 2 midnights.   Justification for Hospital Stay: Ambulatory dysfunction    Total " Time for Visit, including Counseling / Coordination of Care: 60 minutes.  Greater than 50% of this total time spent on direct patient counseling and coordination of care.    Chief Complaint:   Fall    History of Present Illness:    Ena Ahumada is a 88 y.o. female with past medical history significant CKD, type 2 diabetes, hypertension initially presented after a fall.  She reports she was getting out of her car earlier today and lost her balance and fell on her left hip.  Since then she has been having difficulty walking and associated left hip pain.  Otherwise denies any acute complaints.  Denies chest pain, shortness of breath, palpitation, abdominal pain, nausea, vomiting, diarrhea, constipation or any other complaints.    Review of Systems:    Review of Systems   Constitutional:  Negative for activity change, appetite change, chills, diaphoresis, fever and unexpected weight change.   HENT:  Negative for congestion, facial swelling and rhinorrhea.    Eyes:  Negative for photophobia and visual disturbance.   Respiratory:  Negative for cough, shortness of breath and wheezing.    Cardiovascular:  Negative for chest pain and palpitations.   Gastrointestinal:  Negative for abdominal pain, blood in stool, constipation, diarrhea, nausea and vomiting.   Genitourinary:  Negative for decreased urine volume, difficulty urinating, dysuria, flank pain, frequency, hematuria and urgency.   Musculoskeletal:  Negative for arthralgias, back pain, joint swelling and myalgias.   Neurological:  Negative for dizziness, syncope, facial asymmetry, light-headedness, numbness and headaches.   Psychiatric/Behavioral:  Negative for confusion and decreased concentration. The patient is not nervous/anxious.        Past Medical and Surgical History:     Past Medical History:   Diagnosis Date    Acute respiratory failure with hypoxia (HCC) 03/17/2024    Anxiety     Aortic valve insufficiency     Cataract of both eyes     Chronic kidney  disease     Edema     last assessed - [id number]    GERD (gastroesophageal reflux disease)     last assessed - 30Aug2012    Hypertension     Low back pain     last assessed - 30Aug2012    Mitral valve disorder     Osteoporosis     last assessed - [id number]    Palpitations        Past Surgical History:   Procedure Laterality Date    APPENDECTOMY      CATARACT EXTRACTION Bilateral     COLONOSCOPY      HIP SURGERY      TUBAL LIGATION Bilateral        Meds/Allergies:    Prior to Admission medications    Medication Sig Start Date End Date Taking? Authorizing Provider   ACCU-CHEK PAUL PLUS test strip  1/31/20   Historical Provider, MD   Accu-Chek Guide test strip USE TO TEST TWICE A DAY 1/16/23   Deanna Castaneda DO   Accu-Chek Softclix Lancets lancets  1/28/20   Historical Provider, MD   Accu-Chek Softclix Lancets lancets USE AS INSTRUCTED TWICE DAILY 10/23/23   Deanna Castaneda DO   aspirin 81 MG tablet Take 1 tablet by mouth daily 9/4/12   Historical Provider, MD   Blood Glucose Monitoring Suppl (OneTouch Verio Reflect) w/Device KIT Check blood sugars twice daily. Please substitute with appropriate alternative as covered by patient's insurance. Dx: E11.65 4/12/24   Deanna Castaneda DO   cholecalciferol (VITAMIN D3) 1,000 units tablet Take 2 tablets by mouth daily 9/21/12   Historical Provider, MD   cloNIDine (CATAPRES-TTS-1) 0.1 mg/24 hr PLACE 1 PATCH ON THE SKIN ONCE A WEEK AS DIRECTED 4/16/24   Deanna Castaneda DO   clotrimazole-betamethasone (LOTRISONE) 1-0.05 % cream Apply topically 2 (two) times a day 5/28/24   KANA Edge   Denta 5000 Plus 1.1 % CREA USE TWICE A DAY -SPIT, DONT RINSE AND NOTHING BY MOUTH X 30 MIN 2/18/23   Historical Provider, MD   famotidine (PEPCID) 20 mg tablet Take 1 tablet (20 mg total) by mouth daily at bedtime 5/28/24   KANA Pimentel   ferrous sulfate 325 (65 Fe) mg tablet Take 1 tablet (325 mg total) by mouth 3 (three) times a week 3/27/24 6/17/24  Debbi Nelson MD    glipiZIDE (GLUCOTROL) 10 mg tablet TAKE 1 TABLET BY MOUTH IN THE MORNING THEN 2 TABS WITH DINNER 4/18/24   Deanna Castaneda DO   glucose blood (Accu-Chek Emma Plus) test strip USE AS INSTRUCTED TWICE DAILY 2/24/21   Deanna Castaneda, DO   glucose blood (OneTouch Verio) test strip Check blood sugars twice daily. Please substitute with appropriate alternative as covered by patient's insurance. Dx: E11.65 4/12/24   Deanna Castaneda DO   Insulin Glargine-yfgn 100 UNIT/ML SOPN INJECT 0.05 ML (5 UNITS TOTAL) UNDER THE SKIN IN THE MORNING 4/4/24   Historical Provider, MD   Insulin Pen Needle (BD Pen Needle Kathy U/F) 32G X 4 MM MISC Use daily 4/4/24   Sylvain Love PA-C   Invokana 100 MG TAKE 1 TABLET BY MOUTH EVERY DAY BEFORE BREAKFAST 12/1/23   Deanna Castaneda DO   nebivolol (BYSTOLIC) 20 MG tablet Take 2 tablets (40 mg total) by mouth daily 5/15/24   Angella Suárez PA-C   NIFEdipine (ADALAT CC) 90 mg 24 hr tablet TAKE 1 TABLET BY MOUTH EVERY DAY 4/18/24   Deanna Castaneda DO   OneTouch Delica Lancets 33G MISC Check blood sugars twice daily. Please substitute with appropriate alternative as covered by patient's insurance. Dx: E11.65 4/12/24   Deanna Castaneda DO   oxygen gas Inhale 2.5 L/min continuous VIA NASAL CANULA. At night only    Historical Provider, MD   pantoprazole (PROTONIX) 20 mg tablet TAKE 1 TABLET BY MOUTH EVERY DAY 6/19/24   KANA Pimentel   potassium chloride (Klor-Con M10) 10 mEq tablet Take 1 tablet (10 mEq total) by mouth daily 5/23/24   Deanna Castaneda DO   scopolamine (TRANSDERM-SCOP) 1 mg/3 days TD 72 hr patch Place 1 patch on the skin over 72 hours every third day as needed (nausea) 5/15/24   Angella Suárez PA-C   sertraline (ZOLOFT) 100 mg tablet TAKE 1 TABLET BY MOUTH EVERY DAY 4/18/24   Deanna Castaneda DO   simvastatin (ZOCOR) 20 mg tablet TAKE 1 TABLET BY MOUTH EVERYDAY AT BEDTIME 4/10/24   Deannaponce Castaneda DO   sitaGLIPtin (Januvia) 50 mg tablet TAKE 1 TABLET BY MOUTH EVERY DAY  12/17/23   Deanna Castaneda DO   terbinafine (LamISIL) 250 mg tablet Take 1 tablet (250 mg total) by mouth daily for 10 days 6/10/24 6/20/24  KANA Edge   torsemide (DEMADEX) 20 mg tablet TAKE 1 TABLET BY MOUTH EVERY DAY 4/11/24   Markus Hill MD     I have reviewed home medications with patient personally.    Allergies: No Known Allergies    Social History:     Marital Status: Single     Patient Pre-hospital Living Situation: home  Patient Pre-hospital Level of Mobility: independent  Patient Pre-hospital Diet Restrictions: dm  Substance Use History:   Social History     Substance and Sexual Activity   Alcohol Use Never     Social History     Tobacco Use   Smoking Status Never   Smokeless Tobacco Never     Social History     Substance and Sexual Activity   Drug Use Never       Family History:    Family History   Problem Relation Age of Onset    Kidney disease Mother         Chronic    No Known Problems Father     Breast cancer Sister     Diabetes Sister     Cancer Sister     Breast cancer Sister     Hypertension Sister     No Known Problems Daughter     No Known Problems Son     No Known Problems Son     No Known Problems Son     No Known Problems Son        Physical Exam:     Vitals:   Blood Pressure: 163/91 (06/21/24 1900)  Pulse: 74 (06/21/24 1900)  Temperature: 98.7 °F (37.1 °C) (06/21/24 1547)  Temp Source: Temporal (06/21/24 1547)  Respirations: 20 (06/21/24 1900)  SpO2: 94 % (06/21/24 1900)    Physical Exam  Constitutional:       General: She is not in acute distress.     Appearance: She is well-developed. She is not diaphoretic.   HENT:      Head: Normocephalic and atraumatic.      Nose: Nose normal.      Mouth/Throat:      Mouth: Oropharynx is clear and moist.      Pharynx: No oropharyngeal exudate.   Eyes:      General: No scleral icterus.        Right eye: No discharge.         Left eye: No discharge.      Extraocular Movements: EOM normal.      Conjunctiva/sclera: Conjunctivae normal.       Pupils: Pupils are equal, round, and reactive to light.   Neck:      Thyroid: No thyromegaly.      Vascular: No JVD.   Cardiovascular:      Rate and Rhythm: Normal rate and regular rhythm.      Heart sounds: Normal heart sounds. No murmur heard.     No friction rub. No gallop.   Pulmonary:      Effort: Pulmonary effort is normal. No respiratory distress.      Breath sounds: Normal breath sounds. No wheezing or rales.   Chest:      Chest wall: No tenderness.   Abdominal:      General: Bowel sounds are normal. There is no distension.      Palpations: Abdomen is soft.      Tenderness: There is no abdominal tenderness. There is no guarding or rebound.   Musculoskeletal:         General: No tenderness, deformity or edema. Normal range of motion.      Cervical back: Normal range of motion and neck supple.   Skin:     General: Skin is warm and dry.      Findings: No erythema or rash.   Neurological:      Mental Status: She is alert. Mental status is at baseline.      Cranial Nerves: No cranial nerve deficit.      Sensory: No sensory deficit.      Motor: No abnormal muscle tone.      Coordination: Coordination normal.   Psychiatric:         Mood and Affect: Mood and affect normal.             Additional Data:     Lab Results: I have personally reviewed pertinent reports.      Results from last 7 days   Lab Units 06/21/24  1624   WBC Thousand/uL 16.90*   HEMOGLOBIN g/dL 13.1   HEMATOCRIT % 41.5   PLATELETS Thousands/uL 240   BANDS PCT % 1   LYMPHO PCT % 42   MONO PCT % 5   EOS PCT % 3     Results from last 7 days   Lab Units 06/21/24  1624   SODIUM mmol/L 140   POTASSIUM mmol/L 3.7   CHLORIDE mmol/L 103   CO2 mmol/L 28   BUN mg/dL 42*   CREATININE mg/dL 2.21*   ANION GAP mmol/L 9   CALCIUM mg/dL 9.4   GLUCOSE RANDOM mg/dL 85     Results from last 7 days   Lab Units 06/21/24  1624   INR  1.06                   Imaging: I have personally reviewed pertinent reports.      CT pelvis wo contrast   Final Result by Alecia Bello,  MD (06/21 1837)   Left pubic bone fractures as above, acute.   Unchanged old moderate compression fracture of L4 with unchanged 4 mm of retropulsion.   Chronic findings, as per the body of the report.               Workstation performed: [id number]         XR chest 1 view portable   Final Result by Aurelia Bullock MD (06/21 1651)      No acute displaced fracture.      Increased bibasilar lung markings correspond with bronchiectasis on CT.            Workstation performed: [id number]         XR femur 2 views LEFT   ED Interpretation by KANA Munguia (06/21 1638)   No acute fracture identified by me.      XR pelvis ap only 1 or 2 vw   ED Interpretation by KANA Munguia (06/21 1638)   No acute fracture identified by me.          EKG, Pathology, and Other Studies Reviewed on Admission:   EKG: reviewed    Allscripts / Epic Records Reviewed: Yes     ** Please Note: This note has been constructed using a voice recognition system. **

## 2024-06-22 NOTE — PHYSICAL THERAPY NOTE
Physical Therapy Cancellation Note         06/22/24 2406   Note Type   Note type Cancelled Session   Cancel Reasons Other   Additional Comments PT orders noted and chart reviewed.  Pt currently pending final read of L femur x-ray as well as ortho consult regarding L pubic rami fracture.  Will continue to follow and evaluate as appropriate.     NERI MetzgerT

## 2024-06-22 NOTE — CASE MANAGEMENT
Case Management Assessment & Discharge Planning Note    Patient name Ena Ahumada  Location /-01 MRN [id number]  : 1935 Date 2024       Current Admission Date: 2024  Current Admission Diagnosis:Closed fracture of multiple pubic rami, left, initial encounter ([facility])   Patient Active Problem List    Diagnosis Date Noted Date Diagnosed    Closed fracture of multiple pubic rami, left, initial encounter ([facility]) 2024     Closed compression fracture of L4 lumbar vertebra, sequela 2024     Leukocytosis 2024     Prolonged Q-T interval on ECG 2024     Nausea & vomiting 2024     Hypertensive urgency 2024     Chronic kidney disease-mineral and bone disorder 2024     Chronic pain syndrome 2023     Degenerative disc disease, cervical 2023     Elevated parathyroid hormone 2023     Nonrheumatic aortic valve stenosis 2023     Proteinuria 2023     Sacroiliitis ([facility])      Herniated nucleus pulposus, L3-4 right      Lumbar foraminal stenosis      Lumbar radiculopathy      Type 2 diabetes mellitus with chronic kidney disease, with long-term current use of insulin ([facility]) 2022     Chronic back pain 10/02/2020     Complex renal cyst 2019     Anemia 02/15/2019     CKD (chronic kidney disease), stage IV ([facility]) 02/15/2019     Dependent edema 2018     Osteopenia 2017     Premature ventricular contraction 10/03/2013     Rhinitis 10/12/2012     GERD (gastroesophageal reflux disease) 10/12/2012     Diabetic polyneuropathy ([facility]) 2012     Hypokalemia 2012     Hypertension 2012     Dyslipidemia 2012     Anxiety 2012       LOS (days): 0  Geometric Mean LOS (GMLOS) (days):   Days to GMLOS:     OBJECTIVE:          Current admission status: Observation       Preferred Pharmacy:   [id number] - [place] - [address] [facility] [address] [address]  Phone:  [phone] Fax: [phone]    [id number] - [place] - [address]  [address] [place] [address]  Phone: [phone] Fax: [phone]    Primary Care Provider: Deanna Castaneda DO    Primary Insurance: BLUE CROSS [employer] REP  Secondary Insurance:     ASSESSMENT:  Active Health Care Proxies       Israel Ahumada Health Care Agent - Son   Primary Phone: [phone] (Home)                 Advance Directives  Does patient have a Health Care POA?: Yes  Does patient have Advance Directives?: Yes  Advance Directives: Living will, Power of  for health care  Primary Contact: Israel Ahumada: son    Readmission Root Cause  30 Day Readmission: No    Patient Information  Admitted from:: Home  Mental Status: Alert  During Assessment patient was accompanied by: Not accompanied during assessment  Assessment information provided by:: Patient  Primary Caregiver: Self  Support Systems: Self, Children, Family members  County of Residence: [place]  What city do you live in?: [place]  Home entry access options. Select all that apply.: Stairs  Number of steps to enter home.:  (17 steps)  Type of Current Residence: Other (Comment) (in-law suite on 2nd floor attached to son and dtr-in-law's house)  Is patient a ?: No    Activities of Daily Living Prior to Admission  Functional Status: Independent  Completes ADLs independently?: Yes  Ambulates independently?: Yes  Does patient use assisted devices?: Yes  Assisted Devices (DME) used: Home Oxygen concentrator, Portable Oxygen tanks  DME Company Name (respiratory supplies): [employer]  Does patient currently own DME?: Yes  What DME does the patient currently own?: Straight Cane, Home Oxygen concentrator, Portable Oxygen tanks  Does patient have a history of Outpatient Therapy (PT/OT)?: No  Does the patient have a history of Short-Term Rehab?: No  Does patient have a history of HHC?: No    Patient Information Continued  Income  Source: Pension/half-way  Does patient have prescription coverage?: Yes  Does patient receive dialysis treatments?: No  Does patient have a history of substance abuse?: No  Does patient have a history of Mental Health Diagnosis?: No    Means of Transportation  Means of Transport to Appts:: Family transport      Social Determinants of Health (SDOH)      Flowsheet Row Most Recent Value   Housing Stability    In the last 12 months, was there a time when you were not able to pay the mortgage or rent on time? N   In the past 12 months, how many times have you moved where you were living? 0   At any time in the past 12 months, were you homeless or living in a shelter (including now)? N   Transportation Needs    In the past 12 months, has lack of transportation kept you from medical appointments or from getting medications? no   In the past 12 months, has lack of transportation kept you from meetings, work, or from getting things needed for daily living? No   Food Insecurity    Within the past 12 months, you worried that your food would run out before you got the money to buy more. Never true   Within the past 12 months, the food you bought just didn't last and you didn't have money to get more. Never true   Utilities    In the past 12 months has the electric, gas, oil, or water company threatened to shut off services in your home? No            DISCHARGE DETAILS:    Additional Comments: Met with Pt. Pt presents AA&Ox3. Discussed role of case management. Pt lives in an-in-law suite attached to son and dtr-in-law's house. In-law suite is on 2nd floor, [address]. Independent PTA. Owns cane and has home oxygen through [employer]. Pt's son is POA and Pt has living will. Pt uses [facility] pharmacy in [place], able to afford medications. Pt's family does grocery shopping and provides transportation. Hx of [facility]. Denies hx of SNF/MH/D&A. Await PT/OT recs for discharge planning. CM to follow.

## 2024-06-23 ENCOUNTER — APPOINTMENT (INPATIENT)
Dept: RADIOLOGY | Facility: HOSPITAL | Age: 89
DRG: 542 | End: 2024-06-23
Payer: COMMERCIAL

## 2024-06-23 LAB
ALBUMIN SERPL BCG-MCNC: 3.3 G/DL (ref 3.5–5)
ALP SERPL-CCNC: 37 U/L (ref 34–104)
ALT SERPL W P-5'-P-CCNC: 7 U/L (ref 7–52)
ANION GAP SERPL CALCULATED.3IONS-SCNC: 6 MMOL/L (ref 4–13)
AST SERPL W P-5'-P-CCNC: 12 U/L (ref 13–39)
ATRIAL RATE: 75 BPM
BASOPHILS # BLD AUTO: 0.04 THOUSANDS/ÂΜL (ref 0–0.1)
BASOPHILS NFR BLD AUTO: 0 % (ref 0–1)
BILIRUB SERPL-MCNC: 0.56 MG/DL (ref 0.2–1)
BUN SERPL-MCNC: 26 MG/DL (ref 5–25)
CALCIUM ALBUM COR SERPL-MCNC: 8.9 MG/DL (ref 8.3–10.1)
CALCIUM SERPL-MCNC: 8.3 MG/DL (ref 8.4–10.2)
CHLORIDE SERPL-SCNC: 106 MMOL/L (ref 96–108)
CO2 SERPL-SCNC: 27 MMOL/L (ref 21–32)
CREAT SERPL-MCNC: 1.59 MG/DL (ref 0.6–1.3)
EOSINOPHIL # BLD AUTO: 0.36 THOUSAND/ÂΜL (ref 0–0.61)
EOSINOPHIL NFR BLD AUTO: 3 % (ref 0–6)
ERYTHROCYTE [DISTWIDTH] IN BLOOD BY AUTOMATED COUNT: 14.5 % (ref 11.6–15.1)
GFR SERPL CREATININE-BSD FRML MDRD: 28 ML/MIN/1.73SQ M
GLUCOSE SERPL-MCNC: 156 MG/DL (ref 65–140)
GLUCOSE SERPL-MCNC: 175 MG/DL (ref 65–140)
GLUCOSE SERPL-MCNC: 189 MG/DL (ref 65–140)
GLUCOSE SERPL-MCNC: 203 MG/DL (ref 65–140)
HCT VFR BLD AUTO: 35.8 % (ref 34.8–46.1)
HGB BLD-MCNC: 11.1 G/DL (ref 11.5–15.4)
IMM GRANULOCYTES # BLD AUTO: 0.07 THOUSAND/UL (ref 0–0.2)
IMM GRANULOCYTES NFR BLD AUTO: 1 % (ref 0–2)
LYMPHOCYTES # BLD AUTO: 3.29 THOUSANDS/ÂΜL (ref 0.6–4.47)
LYMPHOCYTES NFR BLD AUTO: 29 % (ref 14–44)
MAGNESIUM SERPL-MCNC: 2.1 MG/DL (ref 1.9–2.7)
MCH RBC QN AUTO: 29.3 PG (ref 26.8–34.3)
MCHC RBC AUTO-ENTMCNC: 31 G/DL (ref 31.4–37.4)
MCV RBC AUTO: 95 FL (ref 82–98)
MONOCYTES # BLD AUTO: 1.05 THOUSAND/ÂΜL (ref 0.17–1.22)
MONOCYTES NFR BLD AUTO: 9 % (ref 4–12)
NEUTROPHILS # BLD AUTO: 6.49 THOUSANDS/ÂΜL (ref 1.85–7.62)
NEUTS SEG NFR BLD AUTO: 58 % (ref 43–75)
NRBC BLD AUTO-RTO: 0 /100 WBCS
P AXIS: 28 DEGREES
PLATELET # BLD AUTO: 184 THOUSANDS/UL (ref 149–390)
PMV BLD AUTO: 9.6 FL (ref 8.9–12.7)
POTASSIUM SERPL-SCNC: 4 MMOL/L (ref 3.5–5.3)
PR INTERVAL: 162 MS
PROCALCITONIN SERPL-MCNC: 0.08 NG/ML
PROT SERPL-MCNC: 5.7 G/DL (ref 6.4–8.4)
QRS AXIS: -37 DEGREES
QRSD INTERVAL: 92 MS
QT INTERVAL: 434 MS
QTC INTERVAL: 484 MS
RBC # BLD AUTO: 3.79 MILLION/UL (ref 3.81–5.12)
SODIUM SERPL-SCNC: 139 MMOL/L (ref 135–147)
T WAVE AXIS: -20 DEGREES
VENTRICULAR RATE: 75 BPM
WBC # BLD AUTO: 11.3 THOUSAND/UL (ref 4.31–10.16)

## 2024-06-23 PROCEDURE — 84145 PROCALCITONIN (PCT): CPT | Performed by: INTERNAL MEDICINE

## 2024-06-23 PROCEDURE — 80053 COMPREHEN METABOLIC PANEL: CPT | Performed by: INTERNAL MEDICINE

## 2024-06-23 PROCEDURE — 83735 ASSAY OF MAGNESIUM: CPT | Performed by: INTERNAL MEDICINE

## 2024-06-23 PROCEDURE — 93010 ELECTROCARDIOGRAM REPORT: CPT | Performed by: INTERNAL MEDICINE

## 2024-06-23 PROCEDURE — 71045 X-RAY EXAM CHEST 1 VIEW: CPT

## 2024-06-23 PROCEDURE — 99232 SBSQ HOSP IP/OBS MODERATE 35: CPT | Performed by: INTERNAL MEDICINE

## 2024-06-23 PROCEDURE — 87040 BLOOD CULTURE FOR BACTERIA: CPT | Performed by: INTERNAL MEDICINE

## 2024-06-23 PROCEDURE — 82948 REAGENT STRIP/BLOOD GLUCOSE: CPT

## 2024-06-23 PROCEDURE — 85025 COMPLETE CBC W/AUTO DIFF WBC: CPT | Performed by: INTERNAL MEDICINE

## 2024-06-23 RX ORDER — POLYETHYLENE GLYCOL 3350 17 G/17G
17 POWDER, FOR SOLUTION ORAL DAILY PRN
Status: DISCONTINUED | OUTPATIENT
Start: 2024-06-23 | End: 2024-06-24 | Stop reason: HOSPADM

## 2024-06-23 RX ORDER — CEFTRIAXONE 1 G/50ML
1000 INJECTION, SOLUTION INTRAVENOUS EVERY 24 HOURS
Status: DISCONTINUED | OUTPATIENT
Start: 2024-06-23 | End: 2024-06-24 | Stop reason: HOSPADM

## 2024-06-23 RX ORDER — AZITHROMYCIN 500 MG/1
500 TABLET, FILM COATED ORAL EVERY 24 HOURS
Status: DISCONTINUED | OUTPATIENT
Start: 2024-06-23 | End: 2024-06-24 | Stop reason: HOSPADM

## 2024-06-23 RX ADMIN — INSULIN LISPRO 1 UNITS: 100 INJECTION, SOLUTION INTRAVENOUS; SUBCUTANEOUS at 16:46

## 2024-06-23 RX ADMIN — NEBIVOLOL 40 MG: 5 TABLET ORAL at 08:44

## 2024-06-23 RX ADMIN — SERTRALINE HYDROCHLORIDE 100 MG: 100 TABLET ORAL at 08:44

## 2024-06-23 RX ADMIN — PRAVASTATIN SODIUM 40 MG: 40 TABLET ORAL at 16:46

## 2024-06-23 RX ADMIN — GUAIFENESIN 600 MG: 600 TABLET ORAL at 23:29

## 2024-06-23 RX ADMIN — NIFEDIPINE 90 MG: 30 TABLET, EXTENDED RELEASE ORAL at 08:44

## 2024-06-23 RX ADMIN — PANTOPRAZOLE SODIUM 20 MG: 20 TABLET, DELAYED RELEASE ORAL at 08:44

## 2024-06-23 RX ADMIN — ASPIRIN 81 MG CHEWABLE TABLET 81 MG: 81 TABLET CHEWABLE at 08:44

## 2024-06-23 RX ADMIN — GUAIFENESIN 600 MG: 600 TABLET ORAL at 08:44

## 2024-06-23 RX ADMIN — HYDROMORPHONE HYDROCHLORIDE 0.2 MG: 0.2 INJECTION, SOLUTION INTRAMUSCULAR; INTRAVENOUS; SUBCUTANEOUS at 13:48

## 2024-06-23 RX ADMIN — INSULIN LISPRO 1 UNITS: 100 INJECTION, SOLUTION INTRAVENOUS; SUBCUTANEOUS at 08:46

## 2024-06-23 RX ADMIN — FAMOTIDINE 20 MG: 20 TABLET, FILM COATED ORAL at 23:29

## 2024-06-23 RX ADMIN — HEPARIN SODIUM 5000 UNITS: 5000 INJECTION INTRAVENOUS; SUBCUTANEOUS at 13:40

## 2024-06-23 RX ADMIN — OXYCODONE HYDROCHLORIDE 5 MG: 5 TABLET ORAL at 08:44

## 2024-06-23 RX ADMIN — HEPARIN SODIUM 5000 UNITS: 5000 INJECTION INTRAVENOUS; SUBCUTANEOUS at 05:08

## 2024-06-23 RX ADMIN — HEPARIN SODIUM 5000 UNITS: 5000 INJECTION INTRAVENOUS; SUBCUTANEOUS at 23:25

## 2024-06-23 RX ADMIN — INSULIN LISPRO 1 UNITS: 100 INJECTION, SOLUTION INTRAVENOUS; SUBCUTANEOUS at 11:46

## 2024-06-23 RX ADMIN — CEFTRIAXONE 1000 MG: 1 INJECTION, SOLUTION INTRAVENOUS at 23:30

## 2024-06-23 RX ADMIN — AZITHROMYCIN DIHYDRATE 500 MG: 500 TABLET ORAL at 23:29

## 2024-06-23 RX ADMIN — INSULIN LISPRO 1 UNITS: 100 INJECTION, SOLUTION INTRAVENOUS; SUBCUTANEOUS at 23:27

## 2024-06-23 NOTE — ASSESSMENT & PLAN NOTE
Noted to have a WBC count 16.9  Suspect likely reactive  No obvious source of infection  Currently afebrile  Will monitor closely off antibiotics  WBC trending down   UA is negative, chest x-ray is unremarkable  Trend WBC and fever curve

## 2024-06-23 NOTE — ASSESSMENT & PLAN NOTE
"Initially presented after mechanical fall at home  CT scan revealed nondisplaced fracture of the left pubic bone as well as \" Mildly comminuted mildly displaced fracture of the left superior pubic ramus not extending into the acetabulum. Minimally displaced fracture of the left inferior pubic ramus\"  Patient reports difficulty ambulating since  Pain management as needed  PT/OT  Orthopedic consult appreciated. No surgical intervention necessary  Weightbearing as tolerated  PT recommended rehab.  "

## 2024-06-23 NOTE — PLAN OF CARE
Problem: Potential for Falls  Goal: Patient will remain free of falls  Description: INTERVENTIONS:  - Educate patient/family on patient safety including physical limitations  - Instruct patient to call for assistance with activity   - Consult OT/PT to assist with strengthening/mobility   - Keep Call bell within reach  - Keep bed low and locked with side rails adjusted as appropriate  - Keep care items and personal belongings within reach  - Initiate and maintain comfort rounds  - Make Fall Risk Sign visible to staff  - Offer Toileting every 2 Hours, in advance of need  - Initiate/Maintain alarm  - Obtain necessary fall risk management equipment:   - Apply yellow socks and bracelet for high fall risk patients  - Consider moving patient to room near nurses station  Outcome: Progressing     Problem: PAIN - ADULT  Goal: Verbalizes/displays adequate comfort level or baseline comfort level  Description: Interventions:  - Encourage patient to monitor pain and request assistance  - Assess pain using appropriate pain scale  - Administer analgesics based on type and severity of pain and evaluate response  - Implement non-pharmacological measures as appropriate and evaluate response  - Consider cultural and social influences on pain and pain management  - Notify physician/advanced practitioner if interventions unsuccessful or patient reports new pain  Outcome: Progressing     Problem: INFECTION - ADULT  Goal: Absence or prevention of progression during hospitalization  Description: INTERVENTIONS:  - Assess and monitor for signs and symptoms of infection  - Monitor lab/diagnostic results  - Monitor all insertion sites, i.e. indwelling lines, tubes, and drains  - Monitor endotracheal if appropriate and nasal secretions for changes in amount and color  - [place] appropriate cooling/warming therapies per order  - Administer medications as ordered  - Instruct and encourage patient and family to use good hand hygiene  technique  - Identify and instruct in appropriate isolation precautions for identified infection/condition  Outcome: Progressing  Goal: Absence of fever/infection during neutropenic period  Description: INTERVENTIONS:  - Monitor WBC    Outcome: Progressing     Problem: SAFETY ADULT  Goal: Patient will remain free of falls  Description: INTERVENTIONS:  - Educate patient/family on patient safety including physical limitations  - Instruct patient to call for assistance with activity   - Consult OT/PT to assist with strengthening/mobility   - Keep Call bell within reach  - Keep bed low and locked with side rails adjusted as appropriate  - Keep care items and personal belongings within reach  - Initiate and maintain comfort rounds  - Make Fall Risk Sign visible to staff  - Offer Toileting every 2 Hours, in advance of need  - Initiate/Maintain alarm  - Obtain necessary fall risk management equipment:   - Apply yellow socks and bracelet for high fall risk patients  - Consider moving patient to room near nurses station  Outcome: Progressing  Goal: Maintain or return to baseline ADL function  Description: INTERVENTIONS:  - Educate patient/family on patient safety including physical limitations  - Instruct patient to call for assistance with activity   - Consult OT/PT to assist with strengthening/mobility   - Keep Call bell within reach  - Keep bed low and locked with side rails adjusted as appropriate  - Keep care items and personal belongings within reach  - Initiate and maintain comfort rounds  - Make Fall Risk Sign visible to staff  - Offer Toileting every 2 Hours, in advance of need  - Initiate/Maintain alarm  - Obtain necessary fall risk management equipment:   - Apply yellow socks and bracelet for high fall risk patients  - Consider moving patient to room near nurses station  Outcome: Progressing  Goal: Maintains/Returns to pre admission functional level  Description: INTERVENTIONS:  - Perform AM-PAC 6 Click Basic  Mobility/ Daily Activity assessment daily.  - Set and communicate daily mobility goal to care team and patient/family/caregiver.   - Collaborate with rehabilitation services on mobility goals if consulted  - Perform Range of Motion 6 times a day.  - Reposition patient every 2 hours.  - Dangle patient 4 times a day  - Stand patient 4 times a day  - Ambulate patient 4 times a day  - Out of bed to chair 3 times a day   - Out of bed for meals 3 times a day  - Out of bed for toileting  - Record patient progress and toleration of activity level   Outcome: Progressing     Problem: DISCHARGE PLANNING  Goal: Discharge to home or other facility with appropriate resources  Description: INTERVENTIONS:  - Identify barriers to discharge w/patient and caregiver  - Arrange for needed discharge resources and transportation as appropriate  - Identify discharge learning needs (meds, wound care, etc.)  - Arrange for interpretive services to assist at discharge as needed  - Refer to Case Management Department for coordinating discharge planning if the patient needs post-hospital services based on physician/advanced practitioner order or complex needs related to functional status, cognitive ability, or social support system  Outcome: Progressing     Problem: Knowledge Deficit  Goal: Patient/family/caregiver demonstrates understanding of disease process, treatment plan, medications, and discharge instructions  Description: Complete learning assessment and assess knowledge base.  Interventions:  - Provide teaching at level of understanding  - Provide teaching via preferred learning methods  Outcome: Progressing     Problem: MUSCULOSKELETAL - ADULT  Goal: Maintain or return mobility to safest level of function  Description: INTERVENTIONS:  - Assess patient's ability to carry out ADLs; assess patient's baseline for ADL function and identify physical deficits which impact ability to perform ADLs (bathing, care of mouth/teeth, toileting,  grooming, dressing, etc.)  - Assess/evaluate cause of self-care deficits   - Assess range of motion  - Assess patient's mobility  - Assess patient's need for assistive devices and provide as appropriate  - Encourage maximum independence but intervene and supervise when necessary  - Involve family in performance of ADLs  - Assess for home care needs following discharge   - Consider OT consult to assist with ADL evaluation and planning for discharge  - Provide patient education as appropriate  Outcome: Progressing  Goal: Maintain proper alignment of affected body part  Description: INTERVENTIONS:  - Support, maintain and protect limb and body alignment  - Provide patient/ family with appropriate education  Outcome: Progressing     Problem: Prexisting or High Potential for Compromised Skin Integrity  Goal: Skin integrity is maintained or improved  Description: INTERVENTIONS:  - Identify patients at risk for skin breakdown  - Assess and monitor skin integrity  - Assess and monitor nutrition and hydration status  - Monitor labs   - Assess for incontinence   - Turn and reposition patient  - Assist with mobility/ambulation  - Relieve pressure over bony prominences  - Avoid friction and shearing  - Provide appropriate hygiene as needed including keeping skin clean and dry  - Evaluate need for skin moisturizer/barrier cream  - Collaborate with interdisciplinary team   - Patient/family teaching  - Consider wound care consult   Outcome: Progressing

## 2024-06-23 NOTE — PROGRESS NOTES
"[facility]  Progress Note  Name: Ena Ahumada I  MRN: [id number]  Unit/Bed#: -01 I Date of Admission: 6/21/2024   Date of Service: 6/23/2024 I Hospital Day: 1    Assessment & Plan   Leukocytosis  Assessment & Plan  Noted to have a WBC count 16.9  Suspect likely reactive  No obvious source of infection  Currently afebrile  Will monitor closely off antibiotics  WBC trending down   UA is negative, chest x-ray is unremarkable  Trend WBC and fever curve    Closed compression fracture of L4 lumbar vertebra, sequela  Assessment & Plan  Has history of old L4 compression fracture  CT showed Unchanged old moderate compression fracture of L4 with unchanged 4 mm of retropulsion.   PT/OT  Outpatient follow-up with spine orthopedics    Type 2 diabetes mellitus with chronic kidney disease, with long-term current use of insulin ([facility])  Assessment & Plan  Hold home p.o. agents  Sliding scale insulin  Monitor blood glucose      CKD (chronic kidney disease), stage IV ([facility])  Assessment & Plan  Baseline creatinine approximately 1.6-2.1  Creatinine currently 2.2  Creatinine improved to 1.59 with IV fluids  Back to baseline.  Trend BMP  Avoid hypotension    GERD (gastroesophageal reflux disease)  Assessment & Plan  Continue PPI    Hypertension  Assessment & Plan  Continue beta-blocker and clonidine  BP currently controlled    * Closed fracture of multiple pubic rami, left, initial encounter ([facility])  Assessment & Plan  Initially presented after mechanical fall at home  CT scan revealed nondisplaced fracture of the left pubic bone as well as \" Mildly comminuted mildly displaced fracture of the left superior pubic ramus not extending into the acetabulum. Minimally displaced fracture of the left inferior pubic ramus\"  Patient reports difficulty ambulating since  Pain management as needed  PT/OT  Orthopedic consult appreciated. No surgical intervention necessary  Weightbearing as tolerated  PT recommended " "rehab.             Labs & Imaging: I have personally reviewed pertinent reports.      VTE Prophylaxis: in place.    Code Status:   Level 1 - Full Code    Patient Centered Rounds: I have performed bedside rounds with nursing staff today.    Mobility:   Basic Mobility Inpatient Raw Score: 12  JH-HLM Goal: 4: Move to chair/commode  JH-HLM Achieved: 4: Move to chair/commode  JH-HLM Goal NOT achieved. Continue with multidisciplinary rounding and encourage appropriate mobility to improve upon JH-HLM goals.    Discussions with Specialists or Other Care Team Provider: Orthopedics    Education and Discussions with Family / Patient: Son on phone    Total Time Spent on Date of Encounter in care of patient: 35 mins. This time was spent on one or more of the following: performing physical exam; counseling and coordination of care; obtaining or reviewing history; documenting in the medical record; reviewing/ordering tests, medications or procedures; communicating with other healthcare professionals and discussing with patient's family/caregivers.    Current Length of Stay: 1 day(s)    Current Patient Status: Inpatient   Certification Statement: The patient will continue to require additional inpatient hospital stay due to see my assessment and plan.     Subjective:   Patient is seen and examined at bedside.  Denies any new complaints.  Patient has lower back pain.  Afebrile  All other ROS are negative.    Objective:    Vitals: Blood pressure 116/75, pulse 72, temperature (!) 96.4 °F (35.8 °C), resp. rate 18, height 5' 2\" (1.575 m), weight 68.5 kg (151 lb 0.2 oz), SpO2 95%, not currently breastfeeding.,Body mass index is 27.62 kg/m².  SPO2 RA Rest      Flowsheet Row ED to Hosp-Admission (Current) from 6/21/2024 in [facility] Med [facility]   SpO2 95 %   SpO2 Activity At Rest   O2 Device Nasal cannula   O2 Flow Rate --          I&O:   Intake/Output Summary (Last 24 hours) at 6/23/2024 0742  Last data filed at " 6/23/2024 0528  Gross per 24 hour   Intake 900 ml   Output 1045 ml   Net -145 ml       Physical Exam:    General- Alert, lying comfortably in bed. Not in any acute distress.  Neck- Supple, No JVD  CVS- regular, S1 and S2 normal  Chest- Bilateral Air entry, decreased at bases  Abdomen- soft, nontender, not distended, no guarding or rigidity, BS+  Extremities-  No pedal edema, No calf tenderness  Moving all 4 extremities  CNS-   Alert, awake and orientedx3. No focal deficits present.    Invasive Devices       Peripheral Intravenous Line  Duration             Peripheral IV 06/21/24 Right Antecubital 1 day                          Social History  reviewed  Family History   Problem Relation Age of Onset    Kidney disease Mother         Chronic    No Known Problems Father     Breast cancer Sister     Diabetes Sister     Cancer Sister     Breast cancer Sister     Hypertension Sister     No Known Problems Daughter     No Known Problems Son     No Known Problems Son     No Known Problems Son     No Known Problems Son     reviewed    Meds:  Current Facility-Administered Medications   Medication Dose Route Frequency Provider Last Rate Last Admin    acetaminophen (TYLENOL) tablet 650 mg  650 mg Oral Q6H PRN Lenny Langford MD        aspirin chewable tablet 81 mg  81 mg Oral Daily Lenny Langford MD   81 mg at 06/22/24 0802    famotidine (PEPCID) tablet 20 mg  20 mg Oral HS Lenny Langford MD   20 mg at 06/22/24 2107    guaiFENesin (MUCINEX) 12 hr tablet 600 mg  600 mg Oral BID Lester Madrigal MD   600 mg at 06/22/24 2107    heparin (porcine) subcutaneous injection 5,000 Units  5,000 Units Subcutaneous Q8H [facility] Lenny Langford MD   5,000 Units at 06/23/24 0508    HYDROmorphone HCl (DILAUDID) injection 0.2 mg  0.2 mg Intravenous Q6H PRN Lenny Langford MD   0.2 mg at 06/22/24 0801    insulin lispro (HumALOG/ADMELOG) 100 units/mL subcutaneous injection 1-5 Units  1-5 Units Subcutaneous TID AC Lenny Langford MD   3 Units at 06/22/24 1640     insulin lispro (HumALOG/ADMELOG) 100 units/mL subcutaneous injection 1-5 Units  1-5 Units Subcutaneous HS Lenny Langford MD   1 Units at 06/22/24 2107    nebivolol (BYSTOLIC) tablet 40 mg  40 mg Oral Daily Lenny Langford MD   40 mg at 06/22/24 0841    NIFEdipine (PROCARDIA XL) 24 hr tablet 90 mg  90 mg Oral Daily Lenny Langford MD   90 mg at 06/22/24 0802    oxyCODONE (ROXICODONE) IR tablet 5 mg  5 mg Oral Q6H PRN Lenny Langford MD   5 mg at 06/22/24 1922    oxyCODONE (ROXICODONE) split tablet 2.5 mg  2.5 mg Oral Q6H PRN Lenny Langford MD        pantoprazole (PROTONIX) EC tablet 20 mg  20 mg Oral Daily Lenny Langford MD   20 mg at 06/22/24 0802    pravastatin (PRAVACHOL) tablet 40 mg  40 mg Oral Daily With Dinner Lenny Langford MD   40 mg at 06/22/24 1640    sertraline (ZOLOFT) tablet 100 mg  100 mg Oral Daily Lenny Langford MD   100 mg at 06/22/24 0802        Medications Prior to Admission:     ACCU-CHEK PAUL PLUS test strip    Accu-Chek Guide test strip    Accu-Chek Softclix Lancets lancets    Accu-Chek Softclix Lancets lancets    aspirin 81 MG tablet    Blood Glucose Monitoring Suppl (OneTouch Verio Reflect) w/Device KIT    cholecalciferol (VITAMIN D3) 1,000 units tablet    cloNIDine (CATAPRES-TTS-1) 0.1 mg/24 hr    clotrimazole-betamethasone (LOTRISONE) 1-0.05 % cream    Denta 5000 Plus 1.1 % CREA    famotidine (PEPCID) 20 mg tablet    ferrous sulfate 325 (65 Fe) mg tablet    glipiZIDE (GLUCOTROL) 10 mg tablet    glucose blood (Accu-Chek Paul Plus) test strip    glucose blood (OneTouch Verio) test strip    Insulin Glargine-yfgn 100 UNIT/ML SOPN    Insulin Pen Needle (BD Pen Needle Kathy U/F) 32G X 4 MM MISC    Invokana 100 MG    nebivolol (BYSTOLIC) 20 MG tablet    NIFEdipine (ADALAT CC) 90 mg 24 hr tablet    OneTouch Delica Lancets 33G MISC    oxygen gas    pantoprazole (PROTONIX) 20 mg tablet    potassium chloride (Klor-Con M10) 10 mEq tablet    scopolamine (TRANSDERM-SCOP) 1 mg/3 days TD 72 hr patch    sertraline  (ZOLOFT) 100 mg tablet    simvastatin (ZOCOR) 20 mg tablet    sitaGLIPtin (Januvia) 50 mg tablet    [] terbinafine (LamISIL) 250 mg tablet    torsemide (DEMADEX) 20 mg tablet    Labs:  Results from last 7 days   Lab Units 24  1624   WBC Thousand/uL 11.30* 11.78* 16.90*   HEMOGLOBIN g/dL 11.1* 11.8 13.1   HEMATOCRIT % 35.8 37.3 41.5   PLATELETS Thousands/uL 184 207 240   SEGS PCT % 58 47  --    LYMPHO PCT % 29 41 42   MONO PCT % 9 10 5   EOS PCT % 3 1 3     Results from last 7 days   Lab Units 24  1624   POTASSIUM mmol/L 4.0 3.0* 3.7   CHLORIDE mmol/L 106 103 103   CO2 mmol/L 27 29 28   BUN mg/dL 26* 35* 42*   CREATININE mg/dL 1.59* 2.00* 2.21*   CALCIUM mg/dL 8.3* 9.0 9.4   ALK PHOS U/L 37  --   --    ALT U/L 7  --   --    AST U/L 12*  --   --      Lab Results   Component Value Date    CKTOTAL 82 2024     Results from last 7 days   Lab Units 24  1624   INR  1.06     Lab Results   Component Value Date    BLOODCX No Growth After 5 Days. 2024    BLOODCX No Growth After 5 Days. 2024         Imaging:  Results for orders placed during the hospital encounter of 24    XR chest 1 view portable    Narrative  XR CHEST PORTABLE    INDICATION: Fall onto L side.    COMPARISON: Chest CT 2024, CXR 2024.    FINDINGS:    No acute disease. Increased interstitial markings due to bronchiectasis per comparison with CT. No pneumothorax or pleural effusion.    Normal cardiomediastinal silhouette.    No acute displaced fracture. Mild curvature of the spine.    Normal upper abdomen.    Impression  No acute displaced fracture.    Increased bibasilar lung markings correspond with bronchiectasis on CT.        Workstation performed: [id number]    Results for orders placed during the hospital encounter of 24    XR chest pa & lateral    Narrative  CHEST    INDICATION:   Acute respiratory failure with hypoxia.    COMPARISON:   None.    EXAM PERFORMED/VIEWS:  XR CHEST PA & LATERAL    FINDINGS:    Cardiomediastinal silhouette appears unremarkable.    The lungs are clear.  No pneumothorax or pleural effusion.    Osseous structures appear within normal limits for patient age. Mild dextroconvex scoliosis of the thoracic spine    Impression  No acute cardiopulmonary disease.  Mild scoliosis.  No significant change from 5/13/2024.          Electronically signed: 05/22/2024 01:39 PM Castillo Bustillos MD      Last 24 Hours Medication List:   Current Facility-Administered Medications   Medication Dose Route Frequency Provider Last Rate    acetaminophen  650 mg Oral Q6H PRN Lenny Langford MD      aspirin  81 mg Oral Daily Lenny Langford MD      famotidine  20 mg Oral HS Lenny Langford MD      guaiFENesin  600 mg Oral BID Lester Madrigal MD      heparin (porcine)  5,000 Units Subcutaneous Q8H JONO Lenny Langford MD      HYDROmorphone  0.2 mg Intravenous Q6H PRN Lenny Langford MD      insulin lispro  1-5 Units Subcutaneous TID AC Lenny Langford MD      insulin lispro  1-5 Units Subcutaneous HS Lenny Langford MD      nebivolol  40 mg Oral Daily Lenny Langford MD      NIFEdipine  90 mg Oral Daily Lenny Langford MD      oxyCODONE  5 mg Oral Q6H PRN Lenny Langford MD      oxyCODONE  2.5 mg Oral Q6H PRN Lenny Langford MD      pantoprazole  20 mg Oral Daily Lenny Langford MD      pravastatin  40 mg Oral Daily With Dinner Lenny Langford MD      sertraline  100 mg Oral Daily Lenny Langford MD          Today, Patient Was Seen By: Lester Madrigal MD    ** Please Note: Dictation voice to text software may have been used in the creation of this document. **

## 2024-06-23 NOTE — ASSESSMENT & PLAN NOTE
Baseline creatinine approximately 1.6-2.1  Creatinine currently 2.2  Creatinine improved to 1.59 with IV fluids  Back to baseline.  Trend BMP  Avoid hypotension

## 2024-06-24 VITALS
HEIGHT: 62 IN | HEART RATE: 68 BPM | BODY MASS INDEX: 27.59 KG/M2 | DIASTOLIC BLOOD PRESSURE: 55 MMHG | TEMPERATURE: 96.6 F | SYSTOLIC BLOOD PRESSURE: 132 MMHG | WEIGHT: 149.91 LBS | OXYGEN SATURATION: 92 % | RESPIRATION RATE: 17 BRPM

## 2024-06-24 LAB
ANION GAP SERPL CALCULATED.3IONS-SCNC: 8 MMOL/L (ref 4–13)
BASOPHILS # BLD AUTO: 0.03 THOUSANDS/ÂΜL (ref 0–0.1)
BASOPHILS NFR BLD AUTO: 0 % (ref 0–1)
BUN SERPL-MCNC: 20 MG/DL (ref 5–25)
CALCIUM SERPL-MCNC: 8.3 MG/DL (ref 8.4–10.2)
CHLORIDE SERPL-SCNC: 105 MMOL/L (ref 96–108)
CO2 SERPL-SCNC: 23 MMOL/L (ref 21–32)
CREAT SERPL-MCNC: 1.59 MG/DL (ref 0.6–1.3)
EOSINOPHIL # BLD AUTO: 0.29 THOUSAND/ÂΜL (ref 0–0.61)
EOSINOPHIL NFR BLD AUTO: 2 % (ref 0–6)
ERYTHROCYTE [DISTWIDTH] IN BLOOD BY AUTOMATED COUNT: 14.4 % (ref 11.6–15.1)
FLUAV RNA RESP QL NAA+PROBE: NEGATIVE
FLUBV RNA RESP QL NAA+PROBE: NEGATIVE
GFR SERPL CREATININE-BSD FRML MDRD: 28 ML/MIN/1.73SQ M
GLUCOSE SERPL-MCNC: 158 MG/DL (ref 65–140)
GLUCOSE SERPL-MCNC: 184 MG/DL (ref 65–140)
GLUCOSE SERPL-MCNC: 191 MG/DL (ref 65–140)
GLUCOSE SERPL-MCNC: 216 MG/DL (ref 65–140)
HCT VFR BLD AUTO: 35.1 % (ref 34.8–46.1)
HGB BLD-MCNC: 11.1 G/DL (ref 11.5–15.4)
IMM GRANULOCYTES # BLD AUTO: 0.05 THOUSAND/UL (ref 0–0.2)
IMM GRANULOCYTES NFR BLD AUTO: 0 % (ref 0–2)
L PNEUMO1 AG UR QL IA.RAPID: NEGATIVE
LYMPHOCYTES # BLD AUTO: 3.19 THOUSANDS/ÂΜL (ref 0.6–4.47)
LYMPHOCYTES NFR BLD AUTO: 26 % (ref 14–44)
MCH RBC QN AUTO: 29.8 PG (ref 26.8–34.3)
MCHC RBC AUTO-ENTMCNC: 31.6 G/DL (ref 31.4–37.4)
MCV RBC AUTO: 94 FL (ref 82–98)
MONOCYTES # BLD AUTO: 1.06 THOUSAND/ÂΜL (ref 0.17–1.22)
MONOCYTES NFR BLD AUTO: 9 % (ref 4–12)
NEUTROPHILS # BLD AUTO: 7.66 THOUSANDS/ÂΜL (ref 1.85–7.62)
NEUTS SEG NFR BLD AUTO: 63 % (ref 43–75)
NRBC BLD AUTO-RTO: 0 /100 WBCS
PLATELET # BLD AUTO: 177 THOUSANDS/UL (ref 149–390)
PMV BLD AUTO: 9.6 FL (ref 8.9–12.7)
POTASSIUM SERPL-SCNC: 3.8 MMOL/L (ref 3.5–5.3)
PROCALCITONIN SERPL-MCNC: 0.07 NG/ML
RBC # BLD AUTO: 3.73 MILLION/UL (ref 3.81–5.12)
RSV RNA RESP QL NAA+PROBE: NEGATIVE
S PNEUM AG UR QL: NEGATIVE
SARS-COV-2 RNA RESP QL NAA+PROBE: NEGATIVE
SODIUM SERPL-SCNC: 136 MMOL/L (ref 135–147)
WBC # BLD AUTO: 12.28 THOUSAND/UL (ref 4.31–10.16)

## 2024-06-24 PROCEDURE — 97167 OT EVAL HIGH COMPLEX 60 MIN: CPT

## 2024-06-24 PROCEDURE — 0241U HB NFCT DS VIR RESP RNA 4 TRGT: CPT | Performed by: INTERNAL MEDICINE

## 2024-06-24 PROCEDURE — 92610 EVALUATE SWALLOWING FUNCTION: CPT

## 2024-06-24 PROCEDURE — 99239 HOSP IP/OBS DSCHRG MGMT >30: CPT | Performed by: INTERNAL MEDICINE

## 2024-06-24 PROCEDURE — 87449 NOS EACH ORGANISM AG IA: CPT | Performed by: INTERNAL MEDICINE

## 2024-06-24 PROCEDURE — 84145 PROCALCITONIN (PCT): CPT | Performed by: INTERNAL MEDICINE

## 2024-06-24 PROCEDURE — 85025 COMPLETE CBC W/AUTO DIFF WBC: CPT | Performed by: INTERNAL MEDICINE

## 2024-06-24 PROCEDURE — 97530 THERAPEUTIC ACTIVITIES: CPT

## 2024-06-24 PROCEDURE — 82948 REAGENT STRIP/BLOOD GLUCOSE: CPT

## 2024-06-24 PROCEDURE — 80048 BASIC METABOLIC PNL TOTAL CA: CPT | Performed by: INTERNAL MEDICINE

## 2024-06-24 RX ORDER — CEFDINIR 300 MG/1
300 CAPSULE ORAL EVERY 12 HOURS SCHEDULED
Start: 2024-06-25 | End: 2024-06-30

## 2024-06-24 RX ADMIN — INSULIN LISPRO 2 UNITS: 100 INJECTION, SOLUTION INTRAVENOUS; SUBCUTANEOUS at 08:02

## 2024-06-24 RX ADMIN — HYDROMORPHONE HYDROCHLORIDE 0.2 MG: 0.2 INJECTION, SOLUTION INTRAMUSCULAR; INTRAVENOUS; SUBCUTANEOUS at 18:14

## 2024-06-24 RX ADMIN — HEPARIN SODIUM 5000 UNITS: 5000 INJECTION INTRAVENOUS; SUBCUTANEOUS at 05:26

## 2024-06-24 RX ADMIN — ASPIRIN 81 MG CHEWABLE TABLET 81 MG: 81 TABLET CHEWABLE at 08:03

## 2024-06-24 RX ADMIN — PANTOPRAZOLE SODIUM 20 MG: 20 TABLET, DELAYED RELEASE ORAL at 08:03

## 2024-06-24 RX ADMIN — PRAVASTATIN SODIUM 40 MG: 40 TABLET ORAL at 16:49

## 2024-06-24 RX ADMIN — OXYCODONE HYDROCHLORIDE 5 MG: 5 TABLET ORAL at 06:24

## 2024-06-24 RX ADMIN — SERTRALINE HYDROCHLORIDE 100 MG: 100 TABLET ORAL at 08:02

## 2024-06-24 RX ADMIN — HEPARIN SODIUM 5000 UNITS: 5000 INJECTION INTRAVENOUS; SUBCUTANEOUS at 13:21

## 2024-06-24 RX ADMIN — NEBIVOLOL 40 MG: 5 TABLET ORAL at 08:02

## 2024-06-24 RX ADMIN — INSULIN LISPRO 1 UNITS: 100 INJECTION, SOLUTION INTRAVENOUS; SUBCUTANEOUS at 11:49

## 2024-06-24 RX ADMIN — NIFEDIPINE 90 MG: 30 TABLET, EXTENDED RELEASE ORAL at 08:02

## 2024-06-24 RX ADMIN — INSULIN LISPRO 1 UNITS: 100 INJECTION, SOLUTION INTRAVENOUS; SUBCUTANEOUS at 16:49

## 2024-06-24 RX ADMIN — OXYCODONE HYDROCHLORIDE 5 MG: 5 TABLET ORAL at 13:21

## 2024-06-24 RX ADMIN — GUAIFENESIN 600 MG: 600 TABLET ORAL at 08:03

## 2024-06-24 NOTE — CASE MANAGEMENT
Case Management Discharge Planning Note    Patient name Ena Ahumada  Location /-01 MRN [id number]  : 1935 Date 2024       Current Admission Date: 2024  Current Admission Diagnosis:Closed fracture of multiple pubic rami, left, initial encounter ([facility])   Patient Active Problem List    Diagnosis Date Noted Date Diagnosed    Closed fracture of multiple pubic rami, left, initial encounter ([facility]) 2024     Closed compression fracture of L4 lumbar vertebra, sequela 2024     Leukocytosis 2024     Prolonged Q-T interval on ECG 2024     Nausea & vomiting 2024     Hypertensive urgency 2024     Chronic kidney disease-mineral and bone disorder 2024     Chronic pain syndrome 2023     Degenerative disc disease, cervical 2023     Elevated parathyroid hormone 2023     Nonrheumatic aortic valve stenosis 2023     Proteinuria 2023     Sacroiliitis ([facility])      Herniated nucleus pulposus, L3-4 right      Lumbar foraminal stenosis      Lumbar radiculopathy      Type 2 diabetes mellitus with chronic kidney disease, with long-term current use of insulin ([facility]) 2022     Chronic back pain 10/02/2020     Complex renal cyst 2019     Anemia 02/15/2019     CKD (chronic kidney disease), stage IV ([facility]) 02/15/2019     Dependent edema 2018     Osteopenia 2017     Premature ventricular contraction 10/03/2013     Rhinitis 10/12/2012     GERD (gastroesophageal reflux disease) 10/12/2012     Diabetic polyneuropathy ([facility]) 2012     Hypokalemia 2012     Hypertension 2012     Dyslipidemia 2012     Anxiety 2012       LOS (days): 2  Geometric Mean LOS (GMLOS) (days): 2.8  Days to GMLOS:0.7     OBJECTIVE:  Risk of Unplanned Readmission Score: 29.98         Current admission status: Inpatient   Preferred Pharmacy:   [id number] - [place] - [address]  [address]  [place] [address]  Phone: [phone] Fax: [phone]    [id number] - [place] - [address]  [address] [place] [address]  Phone: [phone] Fax: [phone]    Primary Care Provider: Deanna Castaneda DO    Primary Insurance: BLUE CROSS  REP  Secondary Insurance:     DISCHARGE DETAILS:                                                                                                               Facility Insurance Auth Number: [id number]

## 2024-06-24 NOTE — PLAN OF CARE
Problem: Potential for Falls  Goal: Patient will remain free of falls  Description: INTERVENTIONS:  - Educate patient/family on patient safety including physical limitations  - Instruct patient to call for assistance with activity   - Consult OT/PT to assist with strengthening/mobility   - Keep Call bell within reach  - Keep bed low and locked with side rails adjusted as appropriate  - Keep care items and personal belongings within reach  - Initiate and maintain comfort rounds  - Make Fall Risk Sign visible to staff  - Offer Toileting every 2 Hours, in advance of need  - Initiate/Maintain alarm  - Obtain necessary fall risk management equipment:   - Apply yellow socks and bracelet for high fall risk patients  - Consider moving patient to room near nurses station  Outcome: Progressing     Problem: PAIN - ADULT  Goal: Verbalizes/displays adequate comfort level or baseline comfort level  Description: Interventions:  - Encourage patient to monitor pain and request assistance  - Assess pain using appropriate pain scale  - Administer analgesics based on type and severity of pain and evaluate response  - Implement non-pharmacological measures as appropriate and evaluate response  - Consider cultural and social influences on pain and pain management  - Notify physician/advanced practitioner if interventions unsuccessful or patient reports new pain  Outcome: Progressing     Problem: INFECTION - ADULT  Goal: Absence or prevention of progression during hospitalization  Description: INTERVENTIONS:  - Assess and monitor for signs and symptoms of infection  - Monitor lab/diagnostic results  - Monitor all insertion sites, i.e. indwelling lines, tubes, and drains  - Monitor endotracheal if appropriate and nasal secretions for changes in amount and color  - Wann appropriate cooling/warming therapies per order  - Administer medications as ordered  - Instruct and encourage patient and family to use good hand hygiene  technique  - Identify and instruct in appropriate isolation precautions for identified infection/condition  Outcome: Progressing  Goal: Absence of fever/infection during neutropenic period  Description: INTERVENTIONS:  - Monitor WBC    Outcome: Progressing     Problem: SAFETY ADULT  Goal: Patient will remain free of falls  Description: INTERVENTIONS:  - Educate patient/family on patient safety including physical limitations  - Instruct patient to call for assistance with activity   - Consult OT/PT to assist with strengthening/mobility   - Keep Call bell within reach  - Keep bed low and locked with side rails adjusted as appropriate  - Keep care items and personal belongings within reach  - Initiate and maintain comfort rounds  - Make Fall Risk Sign visible to staff  - Offer Toileting every 2 Hours, in advance of need  - Initiate/Maintain alarm  - Obtain necessary fall risk management equipment:   - Apply yellow socks and bracelet for high fall risk patients  - Consider moving patient to room near nurses station  Outcome: Progressing  Goal: Maintain or return to baseline ADL function  Description: INTERVENTIONS:  - Educate patient/family on patient safety including physical limitations  - Instruct patient to call for assistance with activity   - Consult OT/PT to assist with strengthening/mobility   - Keep Call bell within reach  - Keep bed low and locked with side rails adjusted as appropriate  - Keep care items and personal belongings within reach  - Initiate and maintain comfort rounds  - Make Fall Risk Sign visible to staff  - Offer Toileting every 2 Hours, in advance of need  - Initiate/Maintain alarm  - Obtain necessary fall risk management equipment:   - Apply yellow socks and bracelet for high fall risk patients  - Consider moving patient to room near nurses station  Outcome: Progressing  Goal: Maintains/Returns to pre admission functional level  Description: INTERVENTIONS:  - Perform AM-PAC 6 Click Basic  Mobility/ Daily Activity assessment daily.  - Set and communicate daily mobility goal to care team and patient/family/caregiver.   - Collaborate with rehabilitation services on mobility goals if consulted  - Perform Range of Motion 2 times a day.  - Reposition patient every 2 hours.  - Dangle patient 2 times a day  - Stand patient 2 times a day  - Ambulate patient 2 times a day  - Out of bed to chair 2 times a day   - Out of bed for meals 2 times a day  - Out of bed for toileting  - Record patient progress and toleration of activity level   Outcome: Progressing

## 2024-06-24 NOTE — NURSING NOTE
Report for this patient given to Kayleigh from [employer]. Spoke to the receiving nurse about chief compliant, treatments utilized, orientation status, elimination habits, scans and workup performed, rehab recommendations and home life. No further questions were asked at this time. Call back number provided. Continuing care...

## 2024-06-24 NOTE — PLAN OF CARE
Problem: PHYSICAL THERAPY ADULT  Goal: Performs mobility at highest level of function for planned discharge setting.  See evaluation for individualized goals.  Description: Treatment/Interventions: Functional transfer training, LE strengthening/ROM, Therapeutic exercise, Endurance training, Patient/family training, Equipment eval/education, Bed mobility, Gait training  Equipment Recommended: Walker       See flowsheet documentation for full assessment, interventions and recommendations.  Outcome: Not Progressing  Note: Prognosis: Fair  Problem List: Decreased strength, Decreased range of motion, Decreased endurance, Impaired balance, Decreased mobility, Pain, Obesity  Assessment: Patient was received supine in bed. Reports no pain at rest. Pain 9/10 when standing and attempting to ambulate. Patient required max A x 2 for bed mobility and transfers. Unable to amb due to inability to bear weight through the LLE despite verbal and tactile cues for sequencing and proper use of RW. Patient is extremely limited by pain. She needed repeated verbal and tactile cues for technique. Patient is not at her functional baseline and is at risk for falls. Continue tot recommend level 2 resources. Moderate intensity. The patient's AM-PAC Basic Mobility Inpatient Short Form Raw Score is 9. A Raw score of less than or equal to 17 suggests the patient may benefit from discharge to post-acute rehabilitation services. Please also refer to the recommendation of the Physical Therapist for safe discharge planning.  Barriers to Discharge: Inaccessible home environment, Decreased caregiver support     Rehab Resource Intensity Level, PT: II (Moderate Resource Intensity)    See flowsheet documentation for full assessment.

## 2024-06-24 NOTE — PLAN OF CARE
Problem: OCCUPATIONAL THERAPY ADULT  Goal: Performs self-care activities at highest level of function for planned discharge setting.  See evaluation for individualized goals.  Description:   Outcome: Progressing  Note: Limitation: Decreased ADL status, Decreased Safe judgement during ADL, Decreased cognition, Decreased endurance, Decreased self-care trans, Decreased high-level ADLs  Prognosis: Good  Assessment: Pt is a 88 y.o. female seen for OT evaluation at [facility], admitted 6/21/2024 w/ Closed fracture of multiple pubic rami, left, initial encounter (HCC).  OT completed extensive review of pt's medical and social history. Comorbidities affecting pt's functional performance at time of assessment include: HTN, CKD, DM type 2, hx compression fx, osteopenia, polyneuropathy, anxiety, chronic back pain, lumbar radiculopathy. Personal factors affecting pt at time of IE include:steps to enter environment, difficulty performing ADLS, difficulty performing IADLS , and health management . Prior to admission, pt was living in 2nd floor in law suite; [address] with bilateral handrails  and was independent with ADL/functional mobility; assisted as needed for IADL by family. Upon evaluation, pt presents to OT below baseline due to the following performance deficits: weakness, decreased strength, decreased balance, decreased tolerance, decreased safety awareness, increased pain, and orthopedic restrictions. Pt to benefit from continued skilled OT tx while in the hospital to address deficits as defined above and maximize level of functional independence w ADL's and functional mobility. Occupational Performance areas to address include: eating, bathing/shower, toilet hygiene, dressing, functional mobility, and functional transfers, bed mobility . The patient's raw score on the AM-PAC Daily Activity inpatient short form is 16, standardized score is 35.96, less than 39.4. Patients at this level are likely to benefit from DC  to post-acute rehabilitation services. Based on findings, pt is of high complexity, due to medical comorbidities. Pt seen as a co-tx with PT due to the patient's co-morbidities, clinically unstable presentation, and present impairments which are a regression from the patient's baseline. At this time, OT recommendations at time of discharge are level 2.     Rehab Resource Intensity Level, OT: II (Moderate Resource Intensity)

## 2024-06-24 NOTE — ASSESSMENT & PLAN NOTE
"Continue with nifedipine, nebivolol, torsemide   /60 (BP Location: Right arm)   Pulse 71   Temp (!) 96.6 °F (35.9 °C) (Temporal)   Resp 18   Ht 5' 2\" (1.575 m)   Wt 68 kg (149 lb 14.6 oz)   SpO2 92%   BMI 27.42 kg/m²     "

## 2024-06-24 NOTE — CASE MANAGEMENT
Case Management Discharge Planning Note    Patient name Ena Ahumada  Location /-01 MRN [id number]  : 1935 Date 2024       Current Admission Date: 2024  Current Admission Diagnosis:Closed fracture of multiple pubic rami, left, initial encounter ([facility])   Patient Active Problem List    Diagnosis Date Noted Date Diagnosed    Closed fracture of multiple pubic rami, left, initial encounter ([facility]) 2024     Closed compression fracture of L4 lumbar vertebra, sequela 2024     Leukocytosis 2024     Prolonged Q-T interval on ECG 2024     Nausea & vomiting 2024     Hypertensive urgency 2024     Chronic kidney disease-mineral and bone disorder 2024     Chronic pain syndrome 2023     Degenerative disc disease, cervical 2023     Elevated parathyroid hormone 2023     Nonrheumatic aortic valve stenosis 2023     Proteinuria 2023     Sacroiliitis ([facility])      Herniated nucleus pulposus, L3-4 right      Lumbar foraminal stenosis      Lumbar radiculopathy      Type 2 diabetes mellitus with chronic kidney disease, with long-term current use of insulin ([facility]) 2022     Chronic back pain 10/02/2020     Complex renal cyst 2019     Anemia 02/15/2019     CKD (chronic kidney disease), stage IV ([facility]) 02/15/2019     Dependent edema 2018     Osteopenia 2017     Premature ventricular contraction 10/03/2013     Rhinitis 10/12/2012     GERD (gastroesophageal reflux disease) 10/12/2012     Diabetic polyneuropathy ([facility]) 2012     Hypokalemia 2012     Hypertension 2012     Dyslipidemia 2012     Anxiety 2012       LOS (days): 2  Geometric Mean LOS (GMLOS) (days): 2.8  Days to GMLOS:0.6     OBJECTIVE:  Risk of Unplanned Readmission Score: 29.98         Current admission status: Inpatient   Preferred Pharmacy:   [id number] - [place] - [address]  [address]  [place] [address]  Phone: [phone] Fax: [phone]    [id number] - [place] - [address]  [address] [place] [address]  Phone: [phone] Fax: [phone]    Primary Care Provider: Deanna Castaneda DO    Primary Insurance: [employer]  Secondary Insurance:     DISCHARGE DETAILS:     CM received a confirmed  time of 7p. CM notified facility via MD Marisol and RN via secure chat and pt in person.           Transport at Discharge : [facility]        Transported by (Company and Unit #): [facility]  ETA of Transport (Date): 06/24/24  ETA of Transport (Time): 1900     Transfer Mode: Stretcher        IMM Given (Date):: 06/24/24 (1228p)  IMM Given to:: Family  Family notified:: KYLE Costello Facility Name, City & State : [facility]  Receiving Facility/Agency Phone Number: [phone]  Facility/Agency Fax Number: [phone]

## 2024-06-24 NOTE — DISCHARGE SUMMARY
"[facility]  Discharge- Ena Ahumada 1935, 88 y.o. female MRN: [id number]  Unit/Bed#: -01 Encounter: [id number]  Primary Care Provider: Deanna Castaneda DO   Date and time admitted to hospital: 6/21/2024  3:47 PM    Leukocytosis  Assessment & Plan  Noted to have a WBC count 16.9  Recent Labs     06/22/24  0455 06/23/24  0525 06/24/24  0520   WBC 11.78* 11.30* 12.28*      Suspect likely reactive  Trended down    Closed compression fracture of L4 lumbar vertebra, sequela  Assessment & Plan  Has history of old L4 compression fracture  CT showed Unchanged old moderate compression fracture of L4 with unchanged 4 mm of retropulsion.   PT/OT  Outpatient follow-up with spine orthopedics    Pneumonia involving right lung  Assessment & Plan  Cxr- New right base opacity, suspicious for pneumonia, with trace right effusion.     Discharged on po cefdinir 300 mg BID for 5 days  Recent Labs     06/23/24  0525 06/24/24  0520   PROCALCITONI 0.08 0.07          Type 2 diabetes mellitus with chronic kidney disease, with long-term current use of insulin (HCC)  Assessment & Plan  Recent Labs     06/23/24 2014 06/24/24  0715 06/24/24  1040   POCGLU 189* 216* 158*      Lab Results   Component Value Date    HGBA1C 7.4 (H) 06/08/2024      Resume home januvia, glipizide and invokana     CKD (chronic kidney disease), stage IV ([facility])  Assessment & Plan  Baseline creatinine approximately 1.6-2.1  Recent Labs     06/22/24  0455 06/23/24  0525 06/24/24  0520   CREATININE 2.00* 1.59* 1.59*   EGFR 21 28 28     Estimated Creatinine Clearance: 22.1 mL/min (A) (by C-G formula based on SCr of 1.59 mg/dL (H)).     At baseline currently     GERD (gastroesophageal reflux disease)  Assessment & Plan  Continue PPI    Hypertension  Assessment & Plan  Continue with nifedipine, nebivolol, torsemide   /60 (BP Location: Right arm)   Pulse 71   Temp (!) 96.6 °F (35.9 °C) (Temporal)   Resp 18   Ht 5' 2\" (1.575 " "m)   Wt 68 kg (149 lb 14.6 oz)   SpO2 92%   BMI 27.42 kg/m²       * Closed fracture of multiple pubic rami, left, initial encounter (HCC)  Assessment & Plan  Initially presented after mechanical fall at home  Pubic rami fx due to age related osteoporosis a/e/b multiple pubic rami fx. after low level fall, hx of osteopenia treated with Orthopedic consult, PT consult, Vitamin D. (pt unable to take calcium due to hypercalcemia hx.)   CT scan revealed nondisplaced fracture of the left pubic bone as well as \" Mildly comminuted mildly displaced fracture of the left superior pubic ramus not extending into the acetabulum. Minimally displaced fracture of the left inferior pubic ramus\"  Patient reports difficulty ambulating since  Pain management as needed  PT/OT  Orthopedic consult appreciated. No surgical intervention necessary  Weightbearing as tolerated  PT recommended rehab.              Medical Problems       Resolved Problems  Date Reviewed: 6/24/2024   None         Admission Date:   Admission Orders (From admission, onward)       Ordered        06/22/24 1009  INPATIENT ADMISSION  Once            06/21/24 1940  Place in Observation  Once                            Admitting Diagnosis: Pubic bone fracture (HCC) [S32.509A]    HPI: as per, Lenny Langford MD , on 6/21 Ena Ahumada is a 88 y.o. female with past medical history significant CKD, type 2 diabetes, hypertension initially presented after a fall.  She reports she was getting out of her car earlier today and lost her balance and fell on her left hip.  Since then she has been having difficulty walking and associated left hip pain.  Otherwise denies any acute complaints.  Denies chest pain, shortness of breath, palpitation, abdominal pain, nausea, vomiting, diarrhea, constipation or any other complaints     Procedures Performed:   Orders Placed This Encounter   Procedures    ED ECG Documentation Only       Summary of Hospital Course: Patient presented with " fall complaining of left groin pain.  Workup in ED revealed left superior and inferior pubic rami fracture.  Seen and evaluated by Ortho.  Recommended weightbearing as tolerated and analgesia for pain as needed.  No further inpatient Ortho recommendations.  Stable for discharge.  Evaluated by PT/OT who recommended rehab.  Patient agreeable.    Significant Findings, Care, Treatment and Services Provided: see above     Complications: none    Condition at Discharge: stable         Discharge instructions/Information to patient and family:   See after visit summary for information provided to patient and family.      Provisions for Follow-Up Care:  See after visit summary for information related to follow-up care and any pertinent home health orders.      PCP: Deanna Castaneda DO    Disposition: Short-term rehab at [facility]     Planned Readmission: No    Discharge Statement   I spent 36 minutes discharging the patient. This time was spent on the day of discharge. I had direct contact with the patient on the day of discharge. Additional documentation is required if more than 30 minutes were spent on discharge.     Discharge Medications:  See after visit summary for reconciled discharge medications provided to patient and family.

## 2024-06-24 NOTE — PLAN OF CARE
Problem: Potential for Falls  Goal: Patient will remain free of falls  Description: INTERVENTIONS:  - Educate patient/family on patient safety including physical limitations  - Instruct patient to call for assistance with activity   - Consult OT/PT to assist with strengthening/mobility   - Keep Call bell within reach  - Keep bed low and locked with side rails adjusted as appropriate  - Keep care items and personal belongings within reach  - Initiate and maintain comfort rounds  - Make Fall Risk Sign visible to staff  - Offer Toileting every 2 Hours, in advance of need  - Initiate/Maintain alarm  - Obtain necessary fall risk management equipment:   - Apply yellow socks and bracelet for high fall risk patients  - Consider moving patient to room near nurses station  Outcome: Progressing     Problem: PAIN - ADULT  Goal: Verbalizes/displays adequate comfort level or baseline comfort level  Description: Interventions:  - Encourage patient to monitor pain and request assistance  - Assess pain using appropriate pain scale  - Administer analgesics based on type and severity of pain and evaluate response  - Implement non-pharmacological measures as appropriate and evaluate response  - Consider cultural and social influences on pain and pain management  - Notify physician/advanced practitioner if interventions unsuccessful or patient reports new pain  Outcome: Progressing     Problem: SAFETY ADULT  Goal: Patient will remain free of falls  Description: INTERVENTIONS:  - Educate patient/family on patient safety including physical limitations  - Instruct patient to call for assistance with activity   - Consult OT/PT to assist with strengthening/mobility   - Keep Call bell within reach  - Keep bed low and locked with side rails adjusted as appropriate  - Keep care items and personal belongings within reach  - Initiate and maintain comfort rounds  - Make Fall Risk Sign visible to staff  - Offer Toileting every 2 Hours, in advance  of need  - Initiate/Maintain alarm  - Obtain necessary fall risk management equipment:   - Apply yellow socks and bracelet for high fall risk patients  - Consider moving patient to room near nurses station  Outcome: Progressing

## 2024-06-24 NOTE — OCCUPATIONAL THERAPY NOTE
Occupational Therapy Evaluation      Ena MURCIA Brandyn    6/24/2024    Principal Problem:    Closed fracture of multiple pubic rami, left, initial encounter ([facility])  Active Problems:    Hypertension    GERD (gastroesophageal reflux disease)    CKD (chronic kidney disease), stage IV ([facility])    Type 2 diabetes mellitus with chronic kidney disease, with long-term current use of insulin ([facility])    Closed compression fracture of L4 lumbar vertebra, sequela    Leukocytosis      Past Medical History:   Diagnosis Date    Acute respiratory failure with hypoxia ([facility]) 03/17/2024    Anxiety     Aortic valve insufficiency     Cataract of both eyes     Chronic kidney disease     Edema     last assessed - 05Jun2015    GERD (gastroesophageal reflux disease)     last assessed - 30Aug2012    Hypertension     Low back pain     last assessed - 30Aug2012    Mitral valve disorder     Osteoporosis     last assessed - [id number]    Palpitations        Past Surgical History:   Procedure Laterality Date    APPENDECTOMY      CATARACT EXTRACTION Bilateral     COLONOSCOPY      HIP SURGERY      TUBAL LIGATION Bilateral         06/24/24 1011   OT Last Visit   OT Visit Date 06/24/24   Note Type   Note type Evaluation   Pain Assessment   Pain Assessment Tool 0-10   Pain Score 9   Pain Location/Orientation Orientation: Left;Location: Leg;Location: Pelvis   Hospital Pain Intervention(s) Medication (See MAR);Repositioned   Restrictions/Precautions   Weight Bearing Precautions Per Order Yes   LLE Weight Bearing Per Order WBAT   Other Precautions Pain;Fall Risk;Bed Alarm   Home Living   Type of Home Apartment  (in-law suite attached to son's home; separate entrance with [address])   Home Layout One level;Stairs to enter with rails  (2nd floor in law suite; [address] with bilateral handrails)   Home Equipment Walker;Cane  (does not use at baseline)   Prior Function   Level of [place] Independent with ADLs;Independent with functional mobility;Needs assistance  "with ADLs   Lives With Family  (family lives in attached house)   IADLs Independent with driving;Independent with meal prep;Independent with medication management  (DIL drives pt to grocery store and appointments)   Falls in the last 6 months 1 to 4  (2)   Subjective   Subjective \"I want to get to the chair\"   ADL   Eating Assistance 7  Independent   Grooming Assistance 5  Supervision/Setup   UB Bathing Assistance 4  Minimal Assistance   LB Bathing Assistance 2  Maximal Assistance   UB Dressing Assistance 4  Minimal Assistance   LB Dressing Assistance 2  Maximal Assistance   Toileting Assistance  2  Maximal Assistance   Bed Mobility   Supine to Sit 2  Maximal assistance   Additional items Assist x 2;LE management;Bedrails;HOB elevated   Sit to Supine 2  Maximal assistance   Additional items Assist x 2;LE management;Bedrails   Transfers   Sit to Stand 2  Maximal assistance   Additional items Assist x 2   Stand to Sit 2  Maximal assistance   Additional items Assist x 2   Stand pivot Unable to assess  (Pt pain increased to 9/10 with standing, unable to tolerate bearing weight on LLE for transfer.)   Activity Tolerance   Activity Tolerance Patient limited by pain   Medical Staff Made Aware PT Diane   Nurse Made Aware EHSAN Cheema   RUE Assessment   RUE Assessment WFL   LUE Assessment   LUE Assessment WFL   Cognition   Overall Cognitive Status WFL   Arousal/Participation Alert   Attention Within functional limits   Orientation Level Oriented X4   Memory Decreased short term memory   Following Commands Follows one step commands with increased time or repetition   Assessment   Limitation Decreased ADL status;Decreased Safe judgement during ADL;Decreased cognition;Decreased endurance;Decreased self-care trans;Decreased high-level ADLs   Prognosis Good   Assessment Pt is a 88 y.o. female seen for OT evaluation at [facility], admitted 6/21/2024 w/ Closed fracture of multiple pubic rami, left, initial encounter (HCC).  OT " completed extensive review of pt's medical and social history. Comorbidities affecting pt's functional performance at time of assessment include: HTN, CKD, DM type 2, hx compression fx, osteopenia, polyneuropathy, anxiety, chronic back pain, lumbar radiculopathy. Personal factors affecting pt at time of IE include:steps to enter environment, difficulty performing ADLS, difficulty performing IADLS , and health management . Prior to admission, pt was living in 2nd floor in law suite; [address] with bilateral handrails  and was independent with ADL/functional mobility; assisted as needed for IADL by family. Upon evaluation, pt presents to OT below baseline due to the following performance deficits: weakness, decreased strength, decreased balance, decreased tolerance, decreased safety awareness, increased pain, and orthopedic restrictions. Pt to benefit from continued skilled OT tx while in the hospital to address deficits as defined above and maximize level of functional independence w ADL's and functional mobility. Occupational Performance areas to address include: eating, bathing/shower, toilet hygiene, dressing, functional mobility, and functional transfers, bed mobility . The patient's raw score on the AM-PAC Daily Activity inpatient short form is 16, standardized score is 35.96, less than 39.4. Patients at this level are likely to benefit from DC to post-acute rehabilitation services. Based on findings, pt is of high complexity, due to medical comorbidities. Pt seen as a co-tx with PT due to the patient's co-morbidities, clinically unstable presentation, and present impairments which are a regression from the patient's baseline. At this time, OT recommendations at time of discharge are level 2.   Goals   Patient Goals to get OOB to the chair   Plan   Treatment Interventions ADL retraining;Functional transfer training;UE strengthening/ROM;Endurance training;Cognitive reorientation;Patient/family training;Equipment  evaluation/education;Compensatory technique education;Continued evaluation;Energy conservation   Goal Expiration Date 07/04/24   OT Frequency 3-5x/wk   Discharge Recommendation   Rehab Resource Intensity Level, OT II (Moderate Resource Intensity)   AM-PAC Daily Activity Inpatient   Lower Body Dressing 2   Bathing 2   Toileting 2   Upper Body Dressing 3   Grooming 3   Eating 4   Daily Activity Raw Score 16   Daily Activity Standardized Score (Calc for Raw Score >=11) 35.96     Pt will achieve the following goals within 10 days.    *Pt will complete grooming with independence.    *Pt will complete UB bathing and dressing with independence.    *Pt will complete LB bathing and dressing with modified independence .    *Pt will complete toileting (hygiene and clothing management) with modified independence    *Pt will complete bed mobility with modified independence, with bed flat and no side rail to prep for purposeful tasks    *Pt will perform functional transfers with modified independence in order to complete ADL routine.    *Pt will increase standing tolerance to 5 minutes in order to complete ADL routine.    *Pt will complete item retrieval and light home management with supervision while demonstrating good safety.    *Pt will demonstrate increased activity tolerance in order to complete ADL routine.    *Pt will participate in cognitive assessment to determine level of safety for returning home    *Pt will participate in UE therapeutic exercise in order to maximize strength for ADL transfers.    *Pt will sit on EOB for 10+ minutes for increased safety with seated activity tolerance during ADL tasks.    *Pt will identify 3-5 fall risks to ensure safety upon discharge.    Angelina Jones MS, OTR/L

## 2024-06-24 NOTE — PHYSICAL THERAPY NOTE
"                                                                                  PHYSICAL THERAPY NOTE       06/24/24 1010   PT Last Visit   PT Visit Date 06/24/24   Note Type   Note Type Treatment   Pain Assessment   Pain Assessment Tool 0-10   Pain Score 9   Pain Location/Orientation Orientation: Left;Location: Leg;Location: Pelvis   Hospital Pain Intervention(s) Medication (See MAR);Repositioned   Restrictions/Precautions   Weight Bearing Precautions Per Order Yes   LLE Weight Bearing Per Order WBAT   Other Precautions Pain;Fall Risk;Bed Alarm   General   Chart Reviewed Yes   Response to Previous Treatment Patient with no complaints from previous session.   Family/Caregiver Present No   Cognition   Overall Cognitive Status WFL   Arousal/Participation Alert   Attention Within functional limits   Orientation Level Oriented X4   Memory Decreased short term memory   Following Commands Follows one step commands with increased time or repetition   Subjective   Subjective \"I got out of bed Saturday.\"   Bed Mobility   Supine to Sit 2  Maximal assistance   Additional items Assist x 2;HOB elevated;Bedrails;Increased time required;Verbal cues;LE management   Sit to Supine 2  Maximal assistance   Additional items Assist x 2;HOB elevated;Bedrails;Increased time required;Verbal cues;LE management   Additional Comments Patient with high pain levels with LLE movement. Once seated EOB, patient pushing posteriorly. Verbal cues to correct. Patient then able to scoot self towards EOB shifting hips one at a time.   Transfers   Sit to Stand 2  Maximal assistance   Additional items Assist x 2;Armrests;Increased time required;Verbal cues  (Demonstration provided for transfer technique and hand placement.)   Stand to Sit 2  Maximal assistance   Additional items Assist x 2;Armrests;Increased time required;Verbal cues   Additional Comments Stood for approx 3 minutes with mod A x 2. Attempted to amb. Patient only able to slightly advancing " her LLE. Unable to bear weight through the LLE to advance the RLE.   Balance   Static Sitting Fair +   Dynamic Sitting Fair   Static Standing Poor   Dynamic Standing Poor -   Endurance Deficit   Endurance Deficit Yes   Endurance Deficit Description Limited by pain   Activity Tolerance   Activity Tolerance Patient limited by pain   Nurse Made Aware Deni MOSER   Assessment   Prognosis Fair   Problem List Decreased strength;Decreased range of motion;Decreased endurance;Impaired balance;Decreased mobility;Pain;Obesity   Assessment Patient was received supine in bed. Reports no pain at rest. Pain 9/10 when standing and attempting to ambulate. Patient required max A x 2 for bed mobility and transfers. Unable to amb due to inability to bear weight through the LLE despite verbal and tactile cues for sequencing and proper use of RW. Patient is extremely limited by pain. She needed repeated verbal and tactile cues for technique. Patient is not at her functional baseline and is at risk for falls. Continue tot recommend level 2 resources. Moderate intensity. The patient's AM-PAC Basic Mobility Inpatient Short Form Raw Score is 9. A Raw score of less than or equal to 17 suggests the patient may benefit from discharge to post-acute rehabilitation services. Please also refer to the recommendation of the Physical Therapist for safe discharge planning.   Barriers to Discharge Inaccessible home environment;Decreased caregiver support   Goals   Patient Goals To have less pain   STG Expiration Date 07/02/24   PT Treatment Day 1   Plan   Treatment/Interventions Functional transfer training;LE strengthening/ROM;Therapeutic exercise;Endurance training;Patient/family training;Equipment eval/education;Bed mobility;Gait training;Spoke to nursing;OT   Progress No functional improvements   PT Frequency 4-6x/wk   Discharge Recommendation   Rehab Resource Intensity Level, PT II (Moderate Resource Intensity)   Equipment Recommended Walker    Walker Package Recommended Wheeled walker   AM-PAC Basic Mobility Inpatient   Turning in Flat Bed Without Bedrails 2   Lying on Back to Sitting on Edge of Flat Bed Without Bedrails 2   Moving Bed to Chair 1   Standing Up From Chair Using Arms 2   Walk in Room 1   Climb 3-5 Stairs With Railing 1   Basic Mobility Inpatient Raw Score 9   Turning Head Towards Sound 4   Follow Simple Instructions 3   Low Function Basic Mobility Raw Score  16   Low Function Basic Mobility Standardized Score  25.72   [facility] Highest Level Of Mobility   -[facility] Goal 3: Sit at edge of bed   -[employer] Achieved 5: Stand (1 or more minutes)   Education   Education Provided Mobility training;Assistive device;Other   Patient Reinforcement needed   End of Consult   Patient Position at End of Consult Supine;Bed/Chair alarm activated;All needs within reach   Caitie Lopez            Patient Name: Ena MURCIA Brandyn  Today's Date: 6/24/2024

## 2024-06-24 NOTE — ASSESSMENT & PLAN NOTE
"Initially presented after mechanical fall at home  Pubic rami fx due to age related osteoporosis a/e/b multiple pubic rami fx. after low level fall, hx of osteopenia treated with Orthopedic consult, PT consult, Vitamin D. (pt unable to take calcium due to hypercalcemia hx.)   CT scan revealed nondisplaced fracture of the left pubic bone as well as \" Mildly comminuted mildly displaced fracture of the left superior pubic ramus not extending into the acetabulum. Minimally displaced fracture of the left inferior pubic ramus\"  Patient reports difficulty ambulating since  Pain management as needed  PT/OT  Orthopedic consult appreciated. No surgical intervention necessary  Weightbearing as tolerated  PT recommended rehab.  "

## 2024-06-24 NOTE — ASSESSMENT & PLAN NOTE
Baseline creatinine approximately 1.6-2.1  Recent Labs     06/22/24  0455 06/23/24  0525 06/24/24  0520   CREATININE 2.00* 1.59* 1.59*   EGFR 21 28 28     Estimated Creatinine Clearance: 22.1 mL/min (A) (by C-G formula based on SCr of 1.59 mg/dL (H)).     At baseline currently

## 2024-06-24 NOTE — CASE MANAGEMENT
[facility] received request for authorization from Care Manager.  Authorization request submitted for: [facility]  Facility Name: [facility] NPI: [id number]  Facility MD:  Dr Rondon   NPI: [id number]  Authorization initiated by contacting insurance:  [employer]  Via: Phone #   Clinicals submitted verbally to Nurse     [facility] has received APPROVED authorization.  Insurance:   [place] [employer]  Authorization received for: [facility]  Facility: [facility]   Authorization #: [id number]  Start of Care: 6/24  Next Review Date: 6/28  Continued Stay Care Coordinator: none given  Submit next review to: Call [phone]  Transport auth #: [id number]     Care Manager notified: Lynette Dumas    Please reach out to CM for updates on any clinical information.

## 2024-06-24 NOTE — SPEECH THERAPY NOTE
Speech Language/Pathology    Speech-Language Pathology Bedside Swallow Evaluation      Patient Name: Ena Ahumada    Today's Date: 6/24/2024     Problem List  Principal Problem:    Closed fracture of multiple pubic rami, left, initial encounter ([facility])  Active Problems:    Hypertension    GERD (gastroesophageal reflux disease)    CKD (chronic kidney disease), stage IV ([facility])    Type 2 diabetes mellitus with chronic kidney disease, with long-term current use of insulin ([facility])    Pneumonia involving right lung    Closed compression fracture of L4 lumbar vertebra, sequela    Leukocytosis      Past Medical History  Past Medical History:   Diagnosis Date    Acute respiratory failure with hypoxia ([facility]) 03/17/2024    Anxiety     Aortic valve insufficiency     Cataract of both eyes     Chronic kidney disease     Edema     last assessed - 05Jun2015    GERD (gastroesophageal reflux disease)     last assessed - [id number]    Hypertension     Low back pain     last assessed - [id number]    Mitral valve disorder     Osteoporosis     last assessed - [id number]    Palpitations        Past Surgical History  Past Surgical History:   Procedure Laterality Date    APPENDECTOMY      CATARACT EXTRACTION Bilateral     COLONOSCOPY      HIP SURGERY      TUBAL LIGATION Bilateral        Summary   Pt presented with functional appearing oral and pharyngeal stage swallowing skills with materials administered today. Adequate bite strength w/ complete labial seal for retrieval and oral containment. Timely mastication, cohesive bolus formation, and complete transfers. No significant oral residue. Swallows suspected fairly prompt. No overt s/s aspiration across trials. Pt denies concerns for dysphagia/aspiration at this time. Education provided on strategies to optimize swallow safety and s/s dysphagia/aspiration to notify medical team of should they occur. Pt verbalizes understanding and denies questions/concerns at this time.     Silent aspiration  cannot be excluded without instrumentation, if ongoing concerns for aspiration 2/2 respiratory status, consider MBS to further assess/rule-out.       Risk/s for Aspiration: Mild      Recommended Diet: regular diet and thin liquids   Recommended Form of Meds: whole with liquid   Aspiration precautions and swallowing strategies: upright posture, only feed when fully alert, and slow rate of feeding  Other Recommendations: Continue frequent oral care      Current Medical Status  Pt is a 88 y.o. female who presented to  [facility]  with past medical history significant CKD, type 2 diabetes, hypertension initially presented after a fall. She reports she was getting out of her car earlier today and lost her balance and fell on her left hip. Since then she has been having difficulty walking and associated left hip pain. Otherwise denies any acute complaints. Denies chest pain, shortness of breath, palpitation, abdominal pain, nausea, vomiting, diarrhea, constipation or any other complaints.     Current Precautions:      Allergies:  No known food allergies    Past medical history:  Please see H&P for details    Special Studies:  CXR 6/23: New right base opacity, suspicious for pneumonia, with trace right effusion.     CT pelvis 6/21: Left pubic bone fractures as above, acute.  Unchanged old moderate compression fracture of L4 with unchanged 4 mm of retropulsion.  Chronic findings, as per the body of the report.    CXR 6/21: No acute displaced fracture.     Increased bibasilar lung markings correspond with bronchiectasis on CT.      Social/Education/Vocational Hx:  Pt lives with family    Swallow Information   Current Risks for Dysphagia & Aspiration:  age  Current Symptoms/Concerns:  ?pna, new RL opacity  Current Diet: regular diet and thin liquids   Baseline Diet: regular diet and thin liquids      Baseline Assessment   Behavior/Cognition: alert  Speech/Language Status: able to participate in conversation and  able to follow commands  Patient Positioning: upright in bed  Pain Status/Interventions/Response to Interventions:  No report of or nonverbal indications of pain.       Swallow Mechanism Exam  Facial: symmetrical  Labial: WFL  Lingual: WFL  Velum: symmetrical  Mandible: adequate ROM  Dentition: adequate  Vocal quality:clear/adequate   Volitional Cough: strong/productive   Respiratory Status: on RA      Consistencies Assessed and Performance   Consistencies Administered: thin liquids, puree, soft solids, and hard solids    Oral Stage: [facility]  Mastication was adequate with the materials administered today.  Bolus formation and transfer were functional with no significant oral residue noted.  No overt s/s reduced oral control.    Pharyngeal Stage: WFL  Swallow Mechanics:  Swallowing initiation appeared prompt.  Laryngeal rise was palpated and judged to be within functional limits.  No coughing, throat clearing, change in vocal quality or respiratory status noted today.     Esophageal Concerns: none reported    Strategies and Efficacy: -     Summary and Recommendations (see above)    Results Reviewed with: patient, RN, and MD     Treatment Recommended: Not at this time. If ongoing concerns for aspiration given RN opacity, consider MBS to rule-out/further assess

## 2024-06-24 NOTE — Clinical Note
Patient was received supine in bed. Reports no pain at rest. Pain 9/10 when standing and attempting to ambulate. Patient required max A x 2 for bed mobility and transfers. Unable to amb due to inability to bear weight through the LLE despite verbal and tactile cues for sequencing and proper use of RW. Patient is extremely limited by pain. She needed repeated verbal and tactile cues for technique. Patient is not at her functional baseline and is at risk for falls. Continue tot recommend level 2 resources. Moderate intensity. The patient's AM-PAC Basic Mobility Inpatient Short Form Raw Score is 9. A Raw score of less than or equal to 17 suggests the patient may benefit from discharge to post-acute rehabilitation services. Please also refer to the recommendation of the Physical Therapist for safe discharge planning.

## 2024-06-24 NOTE — ASSESSMENT & PLAN NOTE
Noted to have a WBC count 16.9  Recent Labs     06/22/24  0455 06/23/24  0525 06/24/24  0520   WBC 11.78* 11.30* 12.28*      Suspect likely reactive  Trended down

## 2024-06-24 NOTE — ASSESSMENT & PLAN NOTE
Recent Labs     06/23/24 2014 06/24/24  0715 06/24/24  1040   POCGLU 189* 216* 158*      Lab Results   Component Value Date    HGBA1C 7.4 (H) 06/08/2024      Resume home januvia, glipizide and invokana

## 2024-06-24 NOTE — PROGRESS NOTES
Patient:    MRN:  [id number]    Marisol Request ID:  [id number]    Level of care reserved:  Skilled Nursing Facility    Partner Reserved:  [facility], [place] [address] [phone]    Clinical needs requested:    Geography searched:  15 miles around [id number]    Start of Service:    Request sent:  8:25am EDT on 6/24/2024 by Lynette Dumas    Partner reserved:  12:31pm EDT on 6/24/2024 by Lynette Dumas    Choice list shared:  11:51am EDT on 6/24/2024 by Lynette Dumas

## 2024-06-24 NOTE — ASSESSMENT & PLAN NOTE
Cxr- New right base opacity, suspicious for pneumonia, with trace right effusion.     Discharged on po cefdinir 300 mg BID for 5 days  Recent Labs     06/23/24  0525 06/24/24  0520   PROCALCITONI 0.08 0.07

## 2024-06-24 NOTE — CASE MANAGEMENT
Case Management Discharge Planning Note    Patient name Ena Ahumada  Location /-01 MRN [id number]  : 1935 Date 2024       Current Admission Date: 2024  Current Admission Diagnosis:Closed fracture of multiple pubic rami, left, initial encounter ([facility])   Patient Active Problem List    Diagnosis Date Noted Date Diagnosed    Closed fracture of multiple pubic rami, left, initial encounter ([facility]) 2024     Closed compression fracture of L4 lumbar vertebra, sequela 2024     Leukocytosis 2024     Prolonged Q-T interval on ECG 2024     Nausea & vomiting 2024     Hypertensive urgency 2024     Chronic kidney disease-mineral and bone disorder 2024     Chronic pain syndrome 2023     Degenerative disc disease, cervical 2023     Elevated parathyroid hormone 2023     Nonrheumatic aortic valve stenosis 2023     Proteinuria 2023     Sacroiliitis ([facility])      Herniated nucleus pulposus, L3-4 right      Lumbar foraminal stenosis      Lumbar radiculopathy      Type 2 diabetes mellitus with chronic kidney disease, with long-term current use of insulin ([facility]) 2022     Chronic back pain 10/02/2020     Complex renal cyst 2019     Anemia 02/15/2019     CKD (chronic kidney disease), stage IV ([facility]) 02/15/2019     Dependent edema 2018     Osteopenia 2017     Premature ventricular contraction 10/03/2013     Rhinitis 10/12/2012     GERD (gastroesophageal reflux disease) 10/12/2012     Diabetic polyneuropathy ([facility]) 2012     Hypokalemia 2012     Hypertension 2012     Dyslipidemia 2012     Anxiety 2012       LOS (days): 2  Geometric Mean LOS (GMLOS) (days): 2.8  Days to GMLOS:0.7     OBJECTIVE:  Risk of Unplanned Readmission Score: 29.98         Current admission status: Inpatient   Preferred Pharmacy:   [facility]/pharmacy #[id number] - [place] - [address]  [address]  [address]  [place] [address]  Phone: [phone] Fax: [phone]    [id number] - [place] - [address]  [address] [place] [address]  Phone: [phone] Fax: [phone]    Primary Care Provider: Deanna Castaneda DO    Primary Insurance: BLUE CROSS  REP  Secondary Insurance:     DISCHARGE DETAILS:        CM received auth for pt to go to . CM updated MD via secure chat and facility via Aidin. CM requested BLS transport via [employer]. Awaiting confirmed  time.      IMM reviewed with  KYLE Costello ,  KYLE Costello  agrees with discharge determination.                                                                            IMM Given (Date):: 06/24/24 (1228p)  IMM Given to:: Family  Family notified:: KYLE Costello

## 2024-06-24 NOTE — CASE MANAGEMENT
Case Management Discharge Planning Note    Patient name Ena Ahumada  Location /-01 MRN [id number]  : 1935 Date 2024       Current Admission Date: 2024  Current Admission Diagnosis:Closed fracture of multiple pubic rami, left, initial encounter ([facility])   Patient Active Problem List    Diagnosis Date Noted Date Diagnosed    Closed fracture of multiple pubic rami, left, initial encounter ([facility]) 2024     Closed compression fracture of L4 lumbar vertebra, sequela 2024     Leukocytosis 2024     Prolonged Q-T interval on ECG 2024     Nausea & vomiting 2024     Hypertensive urgency 2024     Chronic kidney disease-mineral and bone disorder 2024     Chronic pain syndrome 2023     Degenerative disc disease, cervical 2023     Elevated parathyroid hormone 2023     Nonrheumatic aortic valve stenosis 2023     Proteinuria 2023     Sacroiliitis ([facility])      Herniated nucleus pulposus, L3-4 right      Lumbar foraminal stenosis      Lumbar radiculopathy      Type 2 diabetes mellitus with chronic kidney disease, with long-term current use of insulin ([facility]) 2022     Chronic back pain 10/02/2020     Complex renal cyst 2019     Anemia 02/15/2019     CKD (chronic kidney disease), stage IV ([facility]) 02/15/2019     Dependent edema 2018     Osteopenia 2017     Premature ventricular contraction 10/03/2013     Rhinitis 10/12/2012     GERD (gastroesophageal reflux disease) 10/12/2012     Diabetic polyneuropathy ([facility]) 2012     Hypokalemia 2012     Hypertension 2012     Dyslipidemia 2012     Anxiety 2012       LOS (days): 2  Geometric Mean LOS (GMLOS) (days): 2.8  Days to GMLOS:0.7     OBJECTIVE:  Risk of Unplanned Readmission Score: 29.98         Current admission status: Inpatient   Preferred Pharmacy:   [facility]/pharmacy #[id number] - [place] - [address]  [address]  [address]  [place] [address]  Phone: [phone] Fax: [phone]    [id number] [place] - [address]  [address] [place] [address]  Phone: [phone] Fax: [phone]    Primary Care Provider: Deanna Castaneda DO    Primary Insurance: BLUE CROSS [facility] REP  Secondary Insurance:     DISCHARGE DETAILS:        CM met with pt and spoke with dtr over phone to discuss STR choices. Dtr selected [facility] as pt deferred to her for choice. CM reserved [facility] in [facility] and requested DC support start auth.

## 2024-06-25 ENCOUNTER — NURSING HOME VISIT (OUTPATIENT)
Dept: FAMILY MEDICINE CLINIC | Facility: CLINIC | Age: 89
End: 2024-06-25
Payer: COMMERCIAL

## 2024-06-25 DIAGNOSIS — N18.4 CKD (CHRONIC KIDNEY DISEASE), STAGE IV (HCC): ICD-10-CM

## 2024-06-25 DIAGNOSIS — Z79.4 TYPE 2 DIABETES MELLITUS WITH STAGE 3B CHRONIC KIDNEY DISEASE, WITH LONG-TERM CURRENT USE OF INSULIN (HCC): ICD-10-CM

## 2024-06-25 DIAGNOSIS — S32.040S CLOSED COMPRESSION FRACTURE OF L4 LUMBAR VERTEBRA, SEQUELA: ICD-10-CM

## 2024-06-25 DIAGNOSIS — N18.32 TYPE 2 DIABETES MELLITUS WITH STAGE 3B CHRONIC KIDNEY DISEASE, WITH LONG-TERM CURRENT USE OF INSULIN (HCC): ICD-10-CM

## 2024-06-25 DIAGNOSIS — I10 PRIMARY HYPERTENSION: ICD-10-CM

## 2024-06-25 DIAGNOSIS — E11.22 TYPE 2 DIABETES MELLITUS WITH STAGE 3B CHRONIC KIDNEY DISEASE, WITH LONG-TERM CURRENT USE OF INSULIN (HCC): ICD-10-CM

## 2024-06-25 DIAGNOSIS — S32.592A CLOSED FRACTURE OF MULTIPLE PUBIC RAMI, LEFT, INITIAL ENCOUNTER (HCC): Primary | ICD-10-CM

## 2024-06-25 DIAGNOSIS — I35.0 NONRHEUMATIC AORTIC VALVE STENOSIS: ICD-10-CM

## 2024-06-25 PROCEDURE — 99306 1ST NF CARE HIGH MDM 50: CPT | Performed by: FAMILY MEDICINE

## 2024-06-25 NOTE — PROGRESS NOTES
[facility]  [address] [place] [address]  Facility: [facility]    NAME: Ena Ahumada  AGE: 88 y.o. SEX: female    DATE OF ENCOUNTER: 6/25/2024    Code status:  Full Code    Assessment and Plan     1. Closed fracture of multiple pubic rami, left, initial encounter ([facility])  2. Closed compression fracture of L4 lumbar vertebra, sequela  3. Type 2 diabetes mellitus with stage 3b chronic kidney disease, with long-term current use of insulin ([facility])  4. Nonrheumatic aortic valve stenosis  5. Primary hypertension  6. CKD (chronic kidney disease), stage IV ([facility])      All medications and routine orders were reviewed and updated as needed.    Plan discussed with: Patient    Chief Complaint     Seen for admission at Nursing Home    History of Present Illness     88-year-old female here after sustaining a fall resulting in a fracture of the left inferior and superior pubic ramus.  She notes significant low back pain particularly with any type of weightbearing or movement.  Also noted is an L4 compression fracture which is age-indeterminate.  Patient has a history of chronic low back pain including lumbar spinal stenosis and foraminal stenosis as well as herniated disks.  There is significant degenerative disease throughout the lumbar spine.  She reports that her bowels have not moved in 5 days.  She denies any dysuria.  She gets some dyspnea but her main complaint is uncontrolled pain.  There is a history of type 2 diabetes as well as hypertension with episodes of hypertensive urgency.  She has a robust medical regimen.  She lives at home in a one-story home.  Denies any swallowing issues.  Her vision is stable.  He gets urgency but denies incontinence.    HISTORY:  Past Medical History:   Diagnosis Date    Acute respiratory failure with hypoxia ([facility]) 03/17/2024    Anxiety     Aortic valve insufficiency     Cataract of both eyes     Chronic kidney disease     Edema     last assessed - [id number]    GERD  (gastroesophageal reflux disease)     last assessed - [id number]    Hypertension     Low back pain     last assessed - [id number]    Mitral valve disorder     Osteoporosis     last assessed - [id number]    Palpitations      Past Surgical History:   Procedure Laterality Date    APPENDECTOMY      CATARACT EXTRACTION Bilateral     COLONOSCOPY      HIP SURGERY      TUBAL LIGATION Bilateral      Family History   Problem Relation Age of Onset    Kidney disease Mother         Chronic    No Known Problems Father     Breast cancer Sister     Diabetes Sister     Cancer Sister     Breast cancer Sister     Hypertension Sister     No Known Problems Daughter     No Known Problems Son     No Known Problems Son     No Known Problems Son     No Known Problems Son      Social History     Socioeconomic History    Marital status: Single     Spouse name: Not on file    Number of children: Not on file    Years of education: Not on file    Highest education level: Not on file   Occupational History    Not on file   Tobacco Use    Smoking status: Never    Smokeless tobacco: Never   Vaping Use    Vaping status: Never Used   Substance and Sexual Activity    Alcohol use: Never    Drug use: Never    Sexual activity: Not Currently   Other Topics Concern    Not on file   Social History Narrative    Living with relatives (other than parents)    Marital History -      Social Determinants of Health     Financial Resource Strain: Low Risk  (7/22/2023)    Overall Financial Resource Strain (CARDIA)     Difficulty of Paying Living Expenses: Not very hard   Food Insecurity: No Food Insecurity (6/22/2024)    Hunger Vital Sign     Worried About Running Out of Food in the Last Year: Never true     Ran Out of Food in the Last Year: Never true   Transportation Needs: No Transportation Needs (6/22/2024)    PRAPARE - Transportation     Lack of Transportation (Medical): No     Lack of Transportation (Non-Medical): No   Physical Activity: Not on file    Stress: Not on file   Social Connections: Not on file   Intimate Partner Violence: Not on file   Housing Stability: Low Risk  (6/22/2024)    Housing Stability Vital Sign     Unable to Pay for Housing in the Last Year: No     Number of Times Moved in the Last Year: 0     Homeless in the Last Year: No       Allergies:  No Known Allergies    Review of Systems     Review of Systems   Constitutional:  Negative for activity change, appetite change, chills, diaphoresis, fatigue and unexpected weight change.   HENT:  Negative for congestion, ear discharge, ear pain, hearing loss, nosebleeds and rhinorrhea.    Eyes:  Negative for pain, redness, itching and visual disturbance.   Respiratory:  Negative for cough, choking, chest tightness and shortness of breath.    Cardiovascular:  Negative for chest pain and leg swelling.   Gastrointestinal:  Positive for constipation. Negative for abdominal pain, blood in stool, diarrhea and nausea.   Endocrine: Negative for cold intolerance, polydipsia and polyphagia.   Genitourinary:  Negative for dysuria, frequency, hematuria and urgency.   Musculoskeletal:  Positive for arthralgias and back pain. Negative for gait problem, joint swelling, neck pain and neck stiffness.   Skin:  Negative for color change and rash.   Allergic/Immunologic: Negative for environmental allergies and food allergies.   Neurological:  Positive for weakness. Negative for dizziness, tremors, seizures, speech difficulty, numbness and headaches.   Hematological:  Negative for adenopathy. Bruises/bleeds easily.   Psychiatric/Behavioral:  Negative for behavioral problems, dysphoric mood, hallucinations and self-injury.        Medications and orders     All medications reviewed and updated in [facility] EMR.      Objective     Vitals: per nursing home record    Physical Exam  Constitutional:       Appearance: Normal appearance. She is well-developed.   HENT:      Head: Normocephalic and atraumatic.      Right Ear:  External ear normal.      Left Ear: External ear normal.      Mouth/Throat:      Mouth: Mucous membranes are moist.      Pharynx: Oropharynx is clear. No oropharyngeal exudate.   Eyes:      General: No scleral icterus.        Right eye: No discharge.         Left eye: No discharge.      Extraocular Movements: Extraocular movements intact.      Conjunctiva/sclera: Conjunctivae normal.      Pupils: Pupils are equal, round, and reactive to light.   Neck:      Thyroid: No thyromegaly.   Cardiovascular:      Rate and Rhythm: Normal rate. Rhythm irregular.      Heart sounds: Normal heart sounds. No murmur heard.     No gallop.   Pulmonary:      Effort: Pulmonary effort is normal. No respiratory distress.      Breath sounds: Normal breath sounds. No wheezing or rales.   Abdominal:      General: Bowel sounds are normal.      Palpations: Abdomen is soft. There is no mass.      Tenderness: There is no guarding or rebound.   Musculoskeletal:         General: Tenderness present. No deformity. Normal range of motion.      Cervical back: Normal range of motion and neck supple.   Lymphadenopathy:      Cervical: No cervical adenopathy.   Skin:     General: Skin is warm and dry.      Findings: No rash.   Neurological:      Mental Status: She is alert and oriented to person, place, and time.      Cranial Nerves: No cranial nerve deficit.      Motor: Weakness present.      Coordination: Coordination normal.      Deep Tendon Reflexes: Reflexes are normal and symmetric. Reflexes normal.   Psychiatric:         Mood and Affect: Mood normal.         Behavior: Behavior normal.         Thought Content: Thought content normal.         Judgment: Judgment normal.         Pertinent Laboratory/Diagnostic Studies:   The following labs/studies were reviewed please see chart or hospital paperwork for details.  Diagnostic studies from the hospital were reviewed    - Admit for PT OT and medical therapy.  She will need something for pain and we will  schedule tramadol every 6 hours.  We will add to her bowel regimen.  We will monitor her labs.  She will benefit from gait training to improve her safety awareness and muscular conditioning.  She will require follow-up with the spine surgeon.    Israel Vance,   6/25/2024 11:44 AM

## 2024-06-28 ENCOUNTER — NURSING HOME VISIT (OUTPATIENT)
Dept: FAMILY MEDICINE CLINIC | Facility: CLINIC | Age: 89
End: 2024-06-28
Payer: COMMERCIAL

## 2024-06-28 DIAGNOSIS — E11.22 TYPE 2 DIABETES MELLITUS WITH STAGE 3B CHRONIC KIDNEY DISEASE, WITH LONG-TERM CURRENT USE OF INSULIN (HCC): ICD-10-CM

## 2024-06-28 DIAGNOSIS — S32.592A CLOSED FRACTURE OF MULTIPLE PUBIC RAMI, LEFT, INITIAL ENCOUNTER (HCC): ICD-10-CM

## 2024-06-28 DIAGNOSIS — Z79.4 TYPE 2 DIABETES MELLITUS WITH STAGE 3B CHRONIC KIDNEY DISEASE, WITH LONG-TERM CURRENT USE OF INSULIN (HCC): ICD-10-CM

## 2024-06-28 DIAGNOSIS — S32.040S CLOSED COMPRESSION FRACTURE OF L4 LUMBAR VERTEBRA, SEQUELA: Primary | ICD-10-CM

## 2024-06-28 DIAGNOSIS — N18.32 TYPE 2 DIABETES MELLITUS WITH STAGE 3B CHRONIC KIDNEY DISEASE, WITH LONG-TERM CURRENT USE OF INSULIN (HCC): ICD-10-CM

## 2024-06-28 DIAGNOSIS — I35.0 NONRHEUMATIC AORTIC VALVE STENOSIS: ICD-10-CM

## 2024-06-28 DIAGNOSIS — N18.4 CKD (CHRONIC KIDNEY DISEASE), STAGE IV (HCC): ICD-10-CM

## 2024-06-28 PROCEDURE — 99308 SBSQ NF CARE LOW MDM 20: CPT | Performed by: FAMILY MEDICINE

## 2024-06-28 NOTE — PROGRESS NOTES
[facility]  [address] [place] [address]  Facility: [facility]    NAME: Ena Ahumada  AGE: 88 y.o. SEX: female    DATE OF ENCOUNTER: 6/28/2024    Code status:  Full Code    Assessment and Plan     1. Closed compression fracture of L4 lumbar vertebra, sequela  2. Closed fracture of multiple pubic rami, left, initial encounter ([facility])  3. Type 2 diabetes mellitus with stage 3b chronic kidney disease, with long-term current use of insulin ([facility])  4. Nonrheumatic aortic valve stenosis  5. CKD (chronic kidney disease), stage IV ([facility])      All medications and routine orders were reviewed and updated as needed.    Plan discussed with: Patient    Chief Complaint     Interim evaluation    History of Present Illness     Patient reports her pain control is much improved with the scheduled tramadol.  Bowels are sluggish however.  She is observed ambulating with her walker more effectively today than she has any other time since coming here.  She is denying any dyspnea.  He is very happy with the progress that she has made over the last 2 days.  No dysuria.  Tights at baseline.  Her family is welcome to bring her food.    The following portions of the patient's history were reviewed and updated as appropriate: current medications, past family history, past medical history, past social history, past surgical history and problem list.    Allergies:  No Known Allergies    Review of Systems     Review of Systems   Constitutional:  Negative for activity change, appetite change, chills, diaphoresis, fatigue and unexpected weight change.   HENT:  Negative for congestion, ear discharge, ear pain, hearing loss, nosebleeds and rhinorrhea.    Eyes:  Negative for pain, redness, itching and visual disturbance.   Respiratory:  Negative for cough, choking, chest tightness and shortness of breath.    Cardiovascular:  Negative for chest pain and leg swelling.   Gastrointestinal:  Negative for abdominal pain,  blood in stool, constipation, diarrhea and nausea.   Endocrine: Negative for cold intolerance, polydipsia and polyphagia.   Genitourinary:  Negative for dysuria, frequency, hematuria and urgency.   Musculoskeletal:  Positive for arthralgias and back pain. Negative for gait problem, joint swelling, neck pain and neck stiffness.   Skin:  Negative for color change and rash.   Allergic/Immunologic: Negative for environmental allergies and food allergies.   Neurological:  Positive for weakness. Negative for dizziness, tremors, seizures, speech difficulty, numbness and headaches.   Hematological:  Negative for adenopathy. Does not bruise/bleed easily.   Psychiatric/Behavioral:  Negative for behavioral problems, dysphoric mood, hallucinations and self-injury.        Medications and orders     All medications reviewed and updated in [facility] EMR.      Objective     Vitals: per nursing home records    Physical Exam  Constitutional:       Appearance: Normal appearance. She is well-developed.   HENT:      Head: Normocephalic and atraumatic.      Right Ear: External ear normal.      Left Ear: External ear normal.      Mouth/Throat:      Mouth: Mucous membranes are moist.      Pharynx: Oropharynx is clear. No oropharyngeal exudate.   Eyes:      General: No scleral icterus.        Right eye: No discharge.         Left eye: No discharge.      Extraocular Movements: Extraocular movements intact.      Conjunctiva/sclera: Conjunctivae normal.      Pupils: Pupils are equal, round, and reactive to light.   Neck:      Thyroid: No thyromegaly.   Cardiovascular:      Rate and Rhythm: Normal rate and regular rhythm.      Heart sounds: Murmur heard.      No gallop.   Pulmonary:      Effort: Pulmonary effort is normal. No respiratory distress.      Breath sounds: Normal breath sounds. No wheezing or rales.   Abdominal:      General: Bowel sounds are normal.      Palpations: Abdomen is soft. There is no mass.      Tenderness: There is no  guarding or rebound.   Musculoskeletal:         General: Tenderness present. No deformity. Normal range of motion.      Cervical back: Normal range of motion and neck supple.   Lymphadenopathy:      Cervical: No cervical adenopathy.   Skin:     General: Skin is warm and dry.      Findings: No rash.   Neurological:      Mental Status: She is alert and oriented to person, place, and time.      Cranial Nerves: No cranial nerve deficit.      Motor: Weakness present.      Coordination: Coordination normal.      Deep Tendon Reflexes: Reflexes are normal and symmetric. Reflexes normal.   Psychiatric:         Mood and Affect: Mood normal.         Behavior: Behavior normal.         Thought Content: Thought content normal.         Judgment: Judgment normal.         Pertinent Laboratory/Diagnostic Studies:     The following studies were reviewed please see chart or hospital paperwork for details.    Space for lab dictation new diagnostic studies    - Continue with scheduled tramadol.  Add to her bowel regimen.  Also add Mucinex to help break up the mucus    Israel Vance,   6/28/2024 4:19 PM

## 2024-06-29 LAB
BACTERIA BLD CULT: NORMAL
BACTERIA BLD CULT: NORMAL

## 2024-07-02 ENCOUNTER — NURSING HOME VISIT (OUTPATIENT)
Dept: FAMILY MEDICINE CLINIC | Facility: CLINIC | Age: 89
End: 2024-07-02
Payer: COMMERCIAL

## 2024-07-02 DIAGNOSIS — N18.32 TYPE 2 DIABETES MELLITUS WITH STAGE 3B CHRONIC KIDNEY DISEASE, WITH LONG-TERM CURRENT USE OF INSULIN (HCC): ICD-10-CM

## 2024-07-02 DIAGNOSIS — E11.22 TYPE 2 DIABETES MELLITUS WITH STAGE 3B CHRONIC KIDNEY DISEASE, WITH LONG-TERM CURRENT USE OF INSULIN (HCC): ICD-10-CM

## 2024-07-02 DIAGNOSIS — S32.592A CLOSED FRACTURE OF MULTIPLE PUBIC RAMI, LEFT, INITIAL ENCOUNTER (HCC): Primary | ICD-10-CM

## 2024-07-02 DIAGNOSIS — Z79.4 TYPE 2 DIABETES MELLITUS WITH STAGE 3B CHRONIC KIDNEY DISEASE, WITH LONG-TERM CURRENT USE OF INSULIN (HCC): ICD-10-CM

## 2024-07-02 DIAGNOSIS — I10 PRIMARY HYPERTENSION: ICD-10-CM

## 2024-07-02 DIAGNOSIS — N18.4 CKD (CHRONIC KIDNEY DISEASE), STAGE IV (HCC): ICD-10-CM

## 2024-07-02 DIAGNOSIS — S32.040S CLOSED COMPRESSION FRACTURE OF L4 LUMBAR VERTEBRA, SEQUELA: ICD-10-CM

## 2024-07-02 DIAGNOSIS — I35.0 NONRHEUMATIC AORTIC VALVE STENOSIS: ICD-10-CM

## 2024-07-02 PROCEDURE — 99308 SBSQ NF CARE LOW MDM 20: CPT | Performed by: FAMILY MEDICINE

## 2024-07-02 NOTE — PROGRESS NOTES
[facility]  [address] [place] [address]  Facility: [facility]    NAME: Ena Ahumada  AGE: 88 y.o. SEX: female    DATE OF ENCOUNTER: 7/2/2024    Code status:  Full Code    Assessment and Plan     1. Closed fracture of multiple pubic rami, left, initial encounter ([facility])  2. Closed compression fracture of L4 lumbar vertebra, sequela  3. Type 2 diabetes mellitus with stage 3b chronic kidney disease, with long-term current use of insulin ([facility])  4. Nonrheumatic aortic valve stenosis  5. Primary hypertension  6. CKD (chronic kidney disease), stage IV ([facility])      All medications and routine orders were reviewed and updated as needed.    Plan discussed with: Patient    Chief Complaint     Interim evaluation    History of Present Illness     Patient continues to do well with her therapy.  Her pain is come under improved control with the use of scheduled pain medicine.  Bowels are moving.  Denies any dysuria.  She has improved muscular conditioning.  She is becoming more independent with her activities of daily living.  No swallowing issues.    The following portions of the patient's history were reviewed and updated as appropriate: current medications, past family history, past medical history, past social history, past surgical history and problem list.    Allergies:  No Known Allergies    Review of Systems     Review of Systems   Constitutional:  Negative for activity change, appetite change, chills, diaphoresis, fatigue and unexpected weight change.   HENT:  Negative for congestion, ear discharge, ear pain, hearing loss, nosebleeds and rhinorrhea.    Eyes:  Negative for pain, redness, itching and visual disturbance.   Respiratory:  Negative for cough, choking, chest tightness and shortness of breath.    Cardiovascular:  Negative for chest pain and leg swelling.   Gastrointestinal:  Negative for abdominal pain, blood in stool, constipation, diarrhea and nausea.   Endocrine: Negative for  cold intolerance, polydipsia and polyphagia.   Genitourinary:  Negative for dysuria, frequency, hematuria and urgency.   Musculoskeletal:  Positive for arthralgias and back pain. Negative for gait problem, joint swelling, neck pain and neck stiffness.   Skin:  Negative for color change and rash.   Allergic/Immunologic: Negative for environmental allergies and food allergies.   Neurological:  Positive for weakness. Negative for dizziness, tremors, seizures, speech difficulty, numbness and headaches.   Hematological:  Negative for adenopathy. Does not bruise/bleed easily.   Psychiatric/Behavioral:  Negative for behavioral problems, dysphoric mood, hallucinations and self-injury.        Medications and orders     All medications reviewed and updated in [facility] EMR.      Objective     Vitals: per nursing home records    Physical Exam  Constitutional:       Appearance: Normal appearance. She is well-developed.   HENT:      Head: Normocephalic and atraumatic.      Right Ear: External ear normal.      Left Ear: External ear normal.      Mouth/Throat:      Mouth: Mucous membranes are moist.      Pharynx: Oropharynx is clear. No oropharyngeal exudate.   Eyes:      General: No scleral icterus.        Right eye: No discharge.         Left eye: No discharge.      Extraocular Movements: Extraocular movements intact.      Conjunctiva/sclera: Conjunctivae normal.      Pupils: Pupils are equal, round, and reactive to light.   Neck:      Thyroid: No thyromegaly.   Cardiovascular:      Rate and Rhythm: Normal rate. Rhythm irregular.      Heart sounds: Murmur heard.      No gallop.   Pulmonary:      Effort: Pulmonary effort is normal. No respiratory distress.      Breath sounds: Normal breath sounds. No wheezing or rales.   Abdominal:      General: Bowel sounds are normal.      Palpations: Abdomen is soft. There is no mass.      Tenderness: There is no guarding or rebound.   Musculoskeletal:         General: Tenderness present.  No deformity. Normal range of motion.      Cervical back: Normal range of motion and neck supple.   Lymphadenopathy:      Cervical: No cervical adenopathy.   Skin:     General: Skin is warm and dry.      Findings: No rash.   Neurological:      Mental Status: She is alert and oriented to person, place, and time.      Cranial Nerves: No cranial nerve deficit.      Coordination: Coordination normal.      Deep Tendon Reflexes: Reflexes are normal and symmetric. Reflexes normal.   Psychiatric:         Mood and Affect: Mood normal.         Behavior: Behavior normal.         Thought Content: Thought content normal.         Judgment: Judgment normal.         Pertinent Laboratory/Diagnostic Studies:     The following studies were reviewed please see chart or hospital paperwork for details.    Space for lab dictation no new diagnostics    - Maintain the aggressive diabetes and hypertension regimen.  Continue with therapy    Israel Vance DO  7/2/2024 11:27 AM

## 2024-07-05 ENCOUNTER — NURSING HOME VISIT (OUTPATIENT)
Dept: FAMILY MEDICINE CLINIC | Facility: CLINIC | Age: 89
End: 2024-07-05
Payer: COMMERCIAL

## 2024-07-05 DIAGNOSIS — N18.32 TYPE 2 DIABETES MELLITUS WITH STAGE 3B CHRONIC KIDNEY DISEASE, WITH LONG-TERM CURRENT USE OF INSULIN (HCC): ICD-10-CM

## 2024-07-05 DIAGNOSIS — S32.040S CLOSED COMPRESSION FRACTURE OF L4 LUMBAR VERTEBRA, SEQUELA: ICD-10-CM

## 2024-07-05 DIAGNOSIS — Z79.4 TYPE 2 DIABETES MELLITUS WITH STAGE 3B CHRONIC KIDNEY DISEASE, WITH LONG-TERM CURRENT USE OF INSULIN (HCC): ICD-10-CM

## 2024-07-05 DIAGNOSIS — E11.22 TYPE 2 DIABETES MELLITUS WITH STAGE 3B CHRONIC KIDNEY DISEASE, WITH LONG-TERM CURRENT USE OF INSULIN (HCC): ICD-10-CM

## 2024-07-05 DIAGNOSIS — I16.0 HYPERTENSIVE URGENCY: Primary | ICD-10-CM

## 2024-07-05 DIAGNOSIS — S32.592A CLOSED FRACTURE OF MULTIPLE PUBIC RAMI, LEFT, INITIAL ENCOUNTER (HCC): ICD-10-CM

## 2024-07-05 DIAGNOSIS — I35.0 NONRHEUMATIC AORTIC VALVE STENOSIS: ICD-10-CM

## 2024-07-05 PROCEDURE — 99308 SBSQ NF CARE LOW MDM 20: CPT | Performed by: FAMILY MEDICINE

## 2024-07-05 NOTE — PROGRESS NOTES
[facility]  [address] [place] [address]  Facility: [facility]    NAME: Ena Ahumada  AGE: 88 y.o. SEX: female    DATE OF ENCOUNTER: 7/5/2024    Code status:  Full Code    Assessment and Plan     1. Hypertensive urgency  2. Nonrheumatic aortic valve stenosis  3. Type 2 diabetes mellitus with stage 3b chronic kidney disease, with long-term current use of insulin ([facility])  4. Closed fracture of multiple pubic rami, left, initial encounter ([facility])  5. Closed compression fracture of L4 lumbar vertebra, sequela      All medications and routine orders were reviewed and updated as needed.    Plan discussed with: Patient    Chief Complaint     Interim evaluation    History of Present Illness     The patient's blood pressure has been uncontrolled.  She has a history of CKD and was previously on an ARB but no longer is.  She is taking clonidine in addition to a beta-blocker.  She reports her pain is under sufficient control.  She is performing well in therapy.  Blood sugars have been okay.    The following portions of the patient's history were reviewed and updated as appropriate: current medications, past family history, past medical history, past social history, past surgical history and problem list.    Allergies:  No Known Allergies    Review of Systems     Review of Systems   Constitutional:  Negative for activity change, appetite change, chills, diaphoresis, fatigue and unexpected weight change.   HENT:  Negative for congestion, ear discharge, ear pain, hearing loss, nosebleeds and rhinorrhea.    Eyes:  Negative for pain, redness, itching and visual disturbance.   Respiratory:  Negative for cough, choking, chest tightness and shortness of breath.    Cardiovascular:  Negative for chest pain and leg swelling.   Gastrointestinal:  Negative for abdominal pain, blood in stool, constipation, diarrhea and nausea.   Endocrine: Negative for cold intolerance, polydipsia and polyphagia.    Genitourinary:  Negative for dysuria, frequency, hematuria and urgency.   Musculoskeletal:  Positive for back pain and gait problem. Negative for arthralgias, joint swelling, neck pain and neck stiffness.   Skin:  Negative for color change and rash.   Allergic/Immunologic: Negative for environmental allergies and food allergies.   Neurological:  Positive for weakness. Negative for dizziness, tremors, seizures, speech difficulty, numbness and headaches.   Hematological:  Negative for adenopathy. Does not bruise/bleed easily.   Psychiatric/Behavioral:  Negative for behavioral problems, dysphoric mood, hallucinations and self-injury.        Medications and orders     All medications reviewed and updated in [facility].      Objective     Vitals: per nursing home records    Physical Exam  Constitutional:       Appearance: Normal appearance. She is well-developed.   HENT:      Head: Normocephalic and atraumatic.      Right Ear: External ear normal.      Left Ear: External ear normal.      Mouth/Throat:      Mouth: Mucous membranes are moist.      Pharynx: Oropharynx is clear. No oropharyngeal exudate.   Eyes:      General: No scleral icterus.        Right eye: No discharge.         Left eye: No discharge.      Extraocular Movements: Extraocular movements intact.      Conjunctiva/sclera: Conjunctivae normal.      Pupils: Pupils are equal, round, and reactive to light.   Neck:      Thyroid: No thyromegaly.   Cardiovascular:      Rate and Rhythm: Normal rate. Rhythm irregular.      Heart sounds: Normal heart sounds. No murmur heard.     No gallop.   Pulmonary:      Effort: Pulmonary effort is normal. No respiratory distress.      Breath sounds: Normal breath sounds. No wheezing or rales.   Abdominal:      General: Bowel sounds are normal.      Palpations: Abdomen is soft. There is no mass.      Tenderness: There is no guarding or rebound.   Musculoskeletal:         General: No tenderness or deformity. Normal range of  motion.      Cervical back: Normal range of motion and neck supple.      Right lower leg: Edema present.      Left lower leg: Edema present.   Lymphadenopathy:      Cervical: No cervical adenopathy.   Skin:     General: Skin is warm and dry.      Findings: No rash.   Neurological:      Mental Status: She is alert and oriented to person, place, and time.      Cranial Nerves: No cranial nerve deficit.      Coordination: Coordination normal.      Deep Tendon Reflexes: Reflexes are normal and symmetric. Reflexes normal.   Psychiatric:         Mood and Affect: Mood normal.         Behavior: Behavior normal.         Thought Content: Thought content normal.         Judgment: Judgment normal.         Pertinent Laboratory/Diagnostic Studies:     The following studies were reviewed please see chart or hospital paperwork for details.    Space for lab dictation no new diagnostics    - Increase Catapres TTS to 0.2 mg per 24 hours    Israel Vance DO  7/5/2024 12:14 PM

## 2024-07-09 ENCOUNTER — NURSING HOME VISIT (OUTPATIENT)
Dept: FAMILY MEDICINE CLINIC | Facility: CLINIC | Age: 89
End: 2024-07-09
Payer: COMMERCIAL

## 2024-07-09 DIAGNOSIS — N18.4 CKD (CHRONIC KIDNEY DISEASE), STAGE IV (HCC): ICD-10-CM

## 2024-07-09 DIAGNOSIS — I10 PRIMARY HYPERTENSION: ICD-10-CM

## 2024-07-09 DIAGNOSIS — S32.592A CLOSED FRACTURE OF MULTIPLE PUBIC RAMI, LEFT, INITIAL ENCOUNTER (HCC): Primary | ICD-10-CM

## 2024-07-09 DIAGNOSIS — N18.32 TYPE 2 DIABETES MELLITUS WITH STAGE 3B CHRONIC KIDNEY DISEASE, WITH LONG-TERM CURRENT USE OF INSULIN (HCC): ICD-10-CM

## 2024-07-09 DIAGNOSIS — E11.22 TYPE 2 DIABETES MELLITUS WITH STAGE 3B CHRONIC KIDNEY DISEASE, WITH LONG-TERM CURRENT USE OF INSULIN (HCC): ICD-10-CM

## 2024-07-09 DIAGNOSIS — I35.0 NONRHEUMATIC AORTIC VALVE STENOSIS: ICD-10-CM

## 2024-07-09 DIAGNOSIS — S32.040S CLOSED COMPRESSION FRACTURE OF L4 LUMBAR VERTEBRA, SEQUELA: ICD-10-CM

## 2024-07-09 DIAGNOSIS — Z79.4 TYPE 2 DIABETES MELLITUS WITH STAGE 3B CHRONIC KIDNEY DISEASE, WITH LONG-TERM CURRENT USE OF INSULIN (HCC): ICD-10-CM

## 2024-07-09 PROCEDURE — 99308 SBSQ NF CARE LOW MDM 20: CPT | Performed by: FAMILY MEDICINE

## 2024-07-09 NOTE — PROGRESS NOTES
[facility]  [address] [place] [address]  Facility: [facility]    NAME: Ena Ahumada  AGE: 88 y.o. SEX: female    DATE OF ENCOUNTER: 7/9/2024    Code status:  Full Code    Assessment and Plan     1. Closed fracture of multiple pubic rami, left, initial encounter ([facility])  2. Closed compression fracture of L4 lumbar vertebra, sequela  3. Type 2 diabetes mellitus with stage 3b chronic kidney disease, with long-term current use of insulin ([facility])  4. Primary hypertension  5. Nonrheumatic aortic valve stenosis  6. CKD (chronic kidney disease), stage IV ([facility])      All medications and routine orders were reviewed and updated as needed.    Plan discussed with: Patient    Chief Complaint     Interim evaluation    History of Present Illness     The patient is continuing to make progress with her therapy.  Her blood pressures come under improved control.  She had lab work which was essentially unchanged from the hospital.  Her bowels are stable.  She is hopeful that she can be discharged later in the week to home.  She has improved with her independent performance of activities of daily living.    The following portions of the patient's history were reviewed and updated as appropriate: current medications, past family history, past medical history, past social history, past surgical history and problem list.    Allergies:  No Known Allergies    Review of Systems     Review of Systems   Constitutional:  Negative for activity change, appetite change, chills, diaphoresis, fatigue and unexpected weight change.   HENT:  Negative for congestion, ear discharge, ear pain, hearing loss, nosebleeds and rhinorrhea.    Eyes:  Negative for pain, redness, itching and visual disturbance.   Respiratory:  Negative for cough, choking, chest tightness and shortness of breath.    Cardiovascular:  Negative for chest pain and leg swelling.   Gastrointestinal:  Negative for abdominal pain, blood in stool,  constipation, diarrhea and nausea.   Endocrine: Negative for cold intolerance, polydipsia and polyphagia.   Genitourinary:  Negative for dysuria, frequency, hematuria and urgency.   Musculoskeletal:  Positive for back pain. Negative for arthralgias, gait problem, joint swelling, neck pain and neck stiffness.   Skin:  Negative for color change and rash.   Allergic/Immunologic: Negative for environmental allergies and food allergies.   Neurological:  Positive for weakness. Negative for dizziness, tremors, seizures, speech difficulty, numbness and headaches.   Hematological:  Negative for adenopathy. Does not bruise/bleed easily.   Psychiatric/Behavioral:  Negative for behavioral problems, dysphoric mood, hallucinations and self-injury.        Medications and orders     All medications reviewed and updated in [facility] EMR.      Objective     Vitals: per nursing home records    Physical Exam  Constitutional:       Appearance: Normal appearance. She is well-developed.   HENT:      Head: Normocephalic and atraumatic.      Right Ear: External ear normal.      Left Ear: External ear normal.      Mouth/Throat:      Mouth: Mucous membranes are moist.      Pharynx: Oropharynx is clear. No oropharyngeal exudate.   Eyes:      General: No scleral icterus.        Right eye: No discharge.         Left eye: No discharge.      Extraocular Movements: Extraocular movements intact.      Conjunctiva/sclera: Conjunctivae normal.      Pupils: Pupils are equal, round, and reactive to light.   Neck:      Thyroid: No thyromegaly.   Cardiovascular:      Rate and Rhythm: Normal rate. Rhythm irregular.      Heart sounds: Murmur heard.      No gallop.   Pulmonary:      Effort: Pulmonary effort is normal. No respiratory distress.      Breath sounds: Normal breath sounds. No wheezing or rales.   Abdominal:      General: Bowel sounds are normal.      Palpations: Abdomen is soft. There is no mass.      Tenderness: There is no guarding or rebound.    Musculoskeletal:         General: No tenderness or deformity. Normal range of motion.      Cervical back: Normal range of motion and neck supple.      Right lower leg: Edema present.      Left lower leg: Edema present.   Lymphadenopathy:      Cervical: No cervical adenopathy.   Skin:     General: Skin is warm and dry.      Findings: No rash.   Neurological:      Mental Status: She is alert and oriented to person, place, and time.      Cranial Nerves: No cranial nerve deficit.      Motor: Weakness present.      Coordination: Coordination normal.      Deep Tendon Reflexes: Reflexes are normal and symmetric. Reflexes normal.   Psychiatric:         Mood and Affect: Mood normal.         Behavior: Behavior normal.         Thought Content: Thought content normal.         Judgment: Judgment normal.         Pertinent Laboratory/Diagnostic Studies:     The following studies were reviewed please see chart or hospital paperwork for details.    Space for lab dictation labs are stable    - Maintain the current regimen    Israel Vance DO  7/9/2024 12:21 PM

## 2024-07-10 ENCOUNTER — HOME HEALTH ADMISSION (OUTPATIENT)
Dept: HOME HEALTH SERVICES | Facility: HOME HEALTHCARE | Age: 89
End: 2024-07-10
Payer: COMMERCIAL

## 2024-07-12 ENCOUNTER — NURSING HOME VISIT (OUTPATIENT)
Dept: FAMILY MEDICINE CLINIC | Facility: CLINIC | Age: 89
End: 2024-07-12
Payer: COMMERCIAL

## 2024-07-12 ENCOUNTER — TRANSITIONAL CARE MANAGEMENT (OUTPATIENT)
Dept: FAMILY MEDICINE CLINIC | Facility: HOSPITAL | Age: 89
End: 2024-07-12

## 2024-07-12 ENCOUNTER — TELEPHONE (OUTPATIENT)
Age: 89
End: 2024-07-12

## 2024-07-12 ENCOUNTER — HOME CARE VISIT (OUTPATIENT)
Dept: HOME HEALTH SERVICES | Facility: HOME HEALTHCARE | Age: 89
End: 2024-07-12

## 2024-07-12 DIAGNOSIS — Z79.4 TYPE 2 DIABETES MELLITUS WITH STAGE 3B CHRONIC KIDNEY DISEASE, WITH LONG-TERM CURRENT USE OF INSULIN (HCC): ICD-10-CM

## 2024-07-12 DIAGNOSIS — I10 PRIMARY HYPERTENSION: ICD-10-CM

## 2024-07-12 DIAGNOSIS — N18.32 TYPE 2 DIABETES MELLITUS WITH STAGE 3B CHRONIC KIDNEY DISEASE, WITH LONG-TERM CURRENT USE OF INSULIN (HCC): ICD-10-CM

## 2024-07-12 DIAGNOSIS — E11.22 TYPE 2 DIABETES MELLITUS WITH STAGE 3B CHRONIC KIDNEY DISEASE, WITH LONG-TERM CURRENT USE OF INSULIN (HCC): ICD-10-CM

## 2024-07-12 DIAGNOSIS — S32.592A CLOSED FRACTURE OF MULTIPLE PUBIC RAMI, LEFT, INITIAL ENCOUNTER (HCC): Primary | ICD-10-CM

## 2024-07-12 DIAGNOSIS — I35.0 NONRHEUMATIC AORTIC VALVE STENOSIS: ICD-10-CM

## 2024-07-12 DIAGNOSIS — S32.040S CLOSED COMPRESSION FRACTURE OF L4 LUMBAR VERTEBRA, SEQUELA: ICD-10-CM

## 2024-07-12 PROCEDURE — 99315 NF DSCHRG MGMT 30 MIN/LESS: CPT | Performed by: FAMILY MEDICINE

## 2024-07-12 NOTE — TELEPHONE ENCOUNTER
Advised that patient continues on the medications that were prescribed until Ena comes in for an appt and then Dr. Castaneda can go over everything when she comes in for her appt.

## 2024-07-12 NOTE — PROGRESS NOTES
[facility]  [address] [place] [address]  Facility: [facility]    NAME: Ena Ahumada  AGE: 88 y.o. SEX: female    DATE OF ENCOUNTER: 7/12/2024    Code status:  Full Code    Assessment and Plan     1. Closed fracture of multiple pubic rami, left, initial encounter ([facility])  2. Closed compression fracture of L4 lumbar vertebra, sequela  3. Nonrheumatic aortic valve stenosis  4. Primary hypertension  5. Type 2 diabetes mellitus with stage 3b chronic kidney disease, with long-term current use of insulin ([facility])      All medications and routine orders were reviewed and updated as needed.    Plan discussed with: Patient    Chief Complaint     Interim evaluation    History of Present Illness     The patient will be discharged later today.  She reports that her pain is under sufficient control.  Bowel habits are stable.  No difficulty with swallowing.  She is concerned about her blood pressure readings.  We did adjust her clonidine.  She will follow-up with nephrology and her primary physician.    The following portions of the patient's history were reviewed and updated as appropriate: current medications, past family history, past medical history, past social history, past surgical history and problem list.    Allergies:  No Known Allergies    Review of Systems     Review of Systems   Constitutional:  Negative for activity change, appetite change, chills, diaphoresis, fatigue and unexpected weight change.   HENT:  Negative for congestion, ear discharge, ear pain, hearing loss, nosebleeds and rhinorrhea.    Eyes:  Negative for pain, redness, itching and visual disturbance.   Respiratory:  Negative for cough, choking, chest tightness and shortness of breath.    Cardiovascular:  Negative for chest pain and leg swelling.   Gastrointestinal:  Negative for abdominal pain, blood in stool, constipation, diarrhea and nausea.   Endocrine: Negative for cold intolerance, polydipsia and polyphagia.    Genitourinary:  Negative for dysuria, frequency, hematuria and urgency.   Musculoskeletal:  Positive for arthralgias and back pain. Negative for gait problem, joint swelling, neck pain and neck stiffness.   Skin:  Negative for color change and rash.   Allergic/Immunologic: Negative for environmental allergies and food allergies.   Neurological:  Positive for weakness. Negative for dizziness, tremors, seizures, speech difficulty, numbness and headaches.   Hematological:  Negative for adenopathy. Does not bruise/bleed easily.   Psychiatric/Behavioral:  Negative for behavioral problems, dysphoric mood, hallucinations and self-injury.        Medications and orders     All medications reviewed and updated in [facility] EMR.      Objective     Vitals: per nursing home records    Physical Exam  Constitutional:       Appearance: Normal appearance. She is well-developed.   HENT:      Head: Normocephalic and atraumatic.      Right Ear: External ear normal.      Left Ear: External ear normal.      Mouth/Throat:      Mouth: Mucous membranes are moist.      Pharynx: Oropharynx is clear. No oropharyngeal exudate.   Eyes:      General: No scleral icterus.        Right eye: No discharge.         Left eye: No discharge.      Extraocular Movements: Extraocular movements intact.      Conjunctiva/sclera: Conjunctivae normal.      Pupils: Pupils are equal, round, and reactive to light.   Neck:      Thyroid: No thyromegaly.   Cardiovascular:      Rate and Rhythm: Normal rate and regular rhythm.      Heart sounds: Murmur heard.      No gallop.   Pulmonary:      Effort: Pulmonary effort is normal. No respiratory distress.      Breath sounds: Normal breath sounds. No wheezing or rales.   Abdominal:      General: Bowel sounds are normal.      Palpations: Abdomen is soft. There is no mass.      Tenderness: There is no guarding or rebound.   Musculoskeletal:         General: Tenderness present. No deformity. Normal range of motion.       Cervical back: Normal range of motion and neck supple.   Lymphadenopathy:      Cervical: No cervical adenopathy.   Skin:     General: Skin is warm and dry.      Findings: No rash.   Neurological:      Mental Status: She is alert and oriented to person, place, and time.      Cranial Nerves: No cranial nerve deficit.      Coordination: Coordination normal.      Deep Tendon Reflexes: Reflexes are normal and symmetric. Reflexes normal.   Psychiatric:         Mood and Affect: Mood normal.         Behavior: Behavior normal.         Thought Content: Thought content normal.         Judgment: Judgment normal.         Pertinent Laboratory/Diagnostic Studies:     The following studies were reviewed please see chart or hospital paperwork for details.    Space for lab dictation no new diagnostics    - Discharge planning    Israel Vance DO  7/12/2024 1:06 PM

## 2024-07-12 NOTE — TELEPHONE ENCOUNTER
Patient Daughter in law calling states patient was discharged from [facility]. Pt was given script to fill for RX: Tramadol 50 mg, Rx: Atorvastatin 10 mg and Rx: Alogliptin Benzoate 12.5mg. Prescribed by Dr. Wills. Pt PCP is Dr. Castaneda. Inquiring if should fill mediation prescribed.    Please review and advise.  Thank You.

## 2024-07-13 DIAGNOSIS — I10 ESSENTIAL HYPERTENSION: ICD-10-CM

## 2024-07-15 ENCOUNTER — TELEPHONE (OUTPATIENT)
Age: 89
End: 2024-07-15

## 2024-07-15 ENCOUNTER — HOME CARE VISIT (OUTPATIENT)
Dept: HOME HEALTH SERVICES | Facility: HOME HEALTHCARE | Age: 89
End: 2024-07-15
Payer: COMMERCIAL

## 2024-07-15 VITALS — OXYGEN SATURATION: 94 % | SYSTOLIC BLOOD PRESSURE: 134 MMHG | DIASTOLIC BLOOD PRESSURE: 70 MMHG | HEART RATE: 61 BPM

## 2024-07-15 PROCEDURE — 400013 VN SOC

## 2024-07-15 PROCEDURE — G0151 HHCP-SERV OF PT,EA 15 MIN: HCPCS

## 2024-07-15 RX ORDER — NEBIVOLOL 20 MG/1
40 TABLET ORAL DAILY
Qty: 90 TABLET | Refills: 1 | Status: SHIPPED | OUTPATIENT
Start: 2024-07-15

## 2024-07-16 DIAGNOSIS — E11.22 TYPE 2 DIABETES MELLITUS WITH STAGE 3B CHRONIC KIDNEY DISEASE, WITHOUT LONG-TERM CURRENT USE OF INSULIN (HCC): ICD-10-CM

## 2024-07-16 DIAGNOSIS — E11.22 TYPE 2 DIABETES MELLITUS WITH STAGE 4 CHRONIC KIDNEY DISEASE, WITHOUT LONG-TERM CURRENT USE OF INSULIN (HCC): ICD-10-CM

## 2024-07-16 DIAGNOSIS — N18.32 TYPE 2 DIABETES MELLITUS WITH STAGE 3B CHRONIC KIDNEY DISEASE, WITHOUT LONG-TERM CURRENT USE OF INSULIN (HCC): ICD-10-CM

## 2024-07-16 DIAGNOSIS — N18.4 TYPE 2 DIABETES MELLITUS WITH STAGE 4 CHRONIC KIDNEY DISEASE, WITHOUT LONG-TERM CURRENT USE OF INSULIN (HCC): ICD-10-CM

## 2024-07-16 RX ORDER — BLOOD SUGAR DIAGNOSTIC
STRIP MISCELLANEOUS
Qty: 200 STRIP | Refills: 1 | Status: SHIPPED | OUTPATIENT
Start: 2024-07-16

## 2024-07-16 NOTE — TELEPHONE ENCOUNTER
Reason for call:   [x] Refill   [] Prior Auth  [] Other:     Office:   [x] PCP/Provider -Deanna Castaneda/[facility] [address]    [] Specialty/Provider -       Does the patient have enough for 3 days?   [] Yes   [x] No - Send as HP to POD

## 2024-07-17 ENCOUNTER — HOME CARE VISIT (OUTPATIENT)
Dept: HOME HEALTH SERVICES | Facility: HOME HEALTHCARE | Age: 89
End: 2024-07-17
Payer: COMMERCIAL

## 2024-07-17 VITALS — DIASTOLIC BLOOD PRESSURE: 70 MMHG | HEART RATE: 82 BPM | OXYGEN SATURATION: 95 % | SYSTOLIC BLOOD PRESSURE: 138 MMHG

## 2024-07-17 PROCEDURE — G0151 HHCP-SERV OF PT,EA 15 MIN: HCPCS

## 2024-07-18 ENCOUNTER — OFFICE VISIT (OUTPATIENT)
Dept: OBGYN CLINIC | Facility: CLINIC | Age: 89
End: 2024-07-18
Payer: COMMERCIAL

## 2024-07-18 ENCOUNTER — APPOINTMENT (OUTPATIENT)
Dept: RADIOLOGY | Facility: AMBULARY SURGERY CENTER | Age: 89
End: 2024-07-18
Attending: STUDENT IN AN ORGANIZED HEALTH CARE EDUCATION/TRAINING PROGRAM
Payer: COMMERCIAL

## 2024-07-18 VITALS — HEIGHT: 62 IN | WEIGHT: 149.91 LBS | BODY MASS INDEX: 27.59 KG/M2

## 2024-07-18 DIAGNOSIS — S32.592A CLOSED FRACTURE OF LEFT INFERIOR PUBIC RAMUS (HCC): ICD-10-CM

## 2024-07-18 DIAGNOSIS — S32.592A CLOSED FRACTURE OF MULTIPLE PUBIC RAMI, LEFT, INITIAL ENCOUNTER (HCC): Primary | ICD-10-CM

## 2024-07-18 DIAGNOSIS — S32.592A CLOSED FRACTURE OF MULTIPLE PUBIC RAMI, LEFT, INITIAL ENCOUNTER (HCC): ICD-10-CM

## 2024-07-18 PROCEDURE — 99203 OFFICE O/P NEW LOW 30 MIN: CPT | Performed by: STUDENT IN AN ORGANIZED HEALTH CARE EDUCATION/TRAINING PROGRAM

## 2024-07-18 PROCEDURE — 72190 X-RAY EXAM OF PELVIS: CPT

## 2024-07-18 NOTE — PROGRESS NOTES
Orthopaedics Office Visit - new Patient Visit    ASSESSMENT/PLAN:    Assessment:   Mildly comminuted mildly displaced fracture of the left superior pubic ramus   Minimally displaced fracture of the left inferior pubic ramus, DOI: 6/21/24    Plan:   X-rays reviewed and discussed with patient revealing  minimally displaced fractures of inferior and superior pubic rami. Interval callus formation noted at the fracture sites. Degenerative changes diffusely throughout the lumbar spine with moderate bilateral hip osteoarthritis.  No other acute fracture or dislocation noted.  Pt to be weightbearing as tolerated to LLE  ROM as tolerated to LLE  Discussed with patient nonoperative management of superior and inferior left pubic rami fracture with oral anti-inflammatories, PT, ice.  Patient was agreeable and all questions answered.  Pt to begin physical therapy for strength and mobility training, new script given   Pt to continue at home analgesic regimen with Tylenol   Pt to follow up in 8 weeks for repeat x-ray and re-evaluation          _____________________________________________________  CHIEF COMPLAINT:  Chief Complaint   Patient presents with    Pelvis - Fracture         SUBJECTIVE:  Ena Ahumada is a 88 y.o. female who presents status post mechanical fall 6/21/24 (4 weeks ago). She reports she was getting out of her car and lost her balance and fell on her left hip. She states most of the pain is concentrated in the groin region. Pain is made worse with ambulation and weightbearing and relieved with rest. She has tried tylenol and tramadol for pain control with moderate relief of her symptoms.  She offers no additional complaints at this time.  She denies any numbness or paresthesias.    PAST MEDICAL HISTORY:  Past Medical History:   Diagnosis Date    Acute respiratory failure with hypoxia (HCC) 03/17/2024    Anxiety     Aortic valve insufficiency     Cataract of both eyes     Chronic kidney disease     Edema      last assessed - [id number]    GERD (gastroesophageal reflux disease)     last assessed - [id number]    Hypertension     Low back pain     last assessed - [id number]    Mitral valve disorder     Osteoporosis     last assessed - [id number]    Palpitations        PAST SURGICAL HISTORY:  Past Surgical History:   Procedure Laterality Date    APPENDECTOMY      CATARACT EXTRACTION Bilateral     COLONOSCOPY      HIP SURGERY      TUBAL LIGATION Bilateral        FAMILY HISTORY:  Family History   Problem Relation Age of Onset    Kidney disease Mother         Chronic    No Known Problems Father     Breast cancer Sister     Diabetes Sister     Cancer Sister     Breast cancer Sister     Hypertension Sister     No Known Problems Daughter     No Known Problems Son     No Known Problems Son     No Known Problems Son     No Known Problems Son        SOCIAL HISTORY:  Social History     Tobacco Use    Smoking status: Never    Smokeless tobacco: Never   Vaping Use    Vaping status: Never Used   Substance Use Topics    Alcohol use: Never    Drug use: Never       MEDICATIONS:    Current Outpatient Medications:     ACCU-CHEK PAUL PLUS test strip, , Disp: , Rfl:     Accu-Chek Softclix Lancets lancets, , Disp: , Rfl:     Accu-Chek Softclix Lancets lancets, USE AS INSTRUCTED TWICE DAILY, Disp: 100 each, Rfl: 3    acetaminophen (TYLENOL) 325 mg tablet, Take 975 mg by mouth 3 (three) times a day. per [facility]  Indications: Pain, Disp: , Rfl:     Alogliptin Benzoate 12.5 MG TABS, Take 1 tablet by mouth in the morning. Indications: Type 2 Diabetes, Disp: , Rfl:     aspirin 81 MG tablet, Take 1 tablet by mouth daily, Disp: , Rfl:     atorvastatin (LIPITOR) 10 mg tablet, Take 10 mg by mouth daily. per latest dci   Indications: High Amount of Fats in the Blood, Disp: , Rfl:     bisacodyl (Dulcolax) 10 mg suppository, Insert 10 mg into the rectum daily as needed for constipation. rectally every 24 hours as needed for constipation per  latest [facility]  Indications: Constipation, Disp: , Rfl:     Blood Glucose Monitoring Suppl (OneTouch Verio Reflect) w/Device KIT, Check blood sugars twice daily. Please substitute with appropriate alternative as covered by patient's insurance. Dx: E11.65, Disp: 1 kit, Rfl: 0    canagliflozin (Invokana) 100 mg, Take 1 tablet (100 mg total) by mouth daily before breakfast, Disp: 100 tablet, Rfl: 1    cholecalciferol (VITAMIN D3) 1,000 units tablet, Take 2 tablets by mouth daily, Disp: , Rfl:     cloNIDine (CATAPRES-TTS-1) 0.1 mg/24 hr, PLACE 1 PATCH ON THE SKIN ONCE A WEEK AS DIRECTED, Disp: 12 patch, Rfl: 4    clotrimazole-betamethasone (LOTRISONE) 1-0.05 % cream, Apply topically 2 (two) times a day, Disp: 45 g, Rfl: 0    Denta 5000 Plus 1.1 % CREA, USE TWICE A DAY -SPIT, DONT RINSE AND NOTHING BY MOUTH X 30 MIN, Disp: , Rfl:     famotidine (PEPCID) 20 mg tablet, Take 1 tablet (20 mg total) by mouth daily at bedtime, Disp: 30 tablet, Rfl: 6    glipiZIDE (GLUCOTROL) 10 mg tablet, TAKE 1 TABLET BY MOUTH IN THE MORNING THEN 2 TABS WITH DINNER, Disp: 270 tablet, Rfl: 3    glucose 40 %, Take 15 g by mouth if needed for low blood sugar. Indications: for hypoglycemia, Disp: , Rfl:     glucose blood (Accu-Chek Emma Plus) test strip, USE AS INSTRUCTED TWICE DAILY, Disp: 100 each, Rfl: 7    glucose blood (Accu-Chek Guide) test strip, USE TO TEST TWICE A DAY, Disp: 200 strip, Rfl: 1    glucose blood (OneTouch Verio) test strip, Check blood sugars twice daily. Please substitute with appropriate alternative as covered by patient's insurance. Dx: E11.65, Disp: 200 each, Rfl: 3    guaiFENesin (Mucinex) 600 mg 12 hr tablet, Take 600 mg by mouth every 12 (twelve) hours. per [facility]  Indications: congestion, Disp: , Rfl:     Insulin Glargine-yfgn 100 UNIT/ML SOPN, INJECT 0.05 ML (5 UNITS TOTAL) UNDER THE SKIN IN THE MORNING, Disp: , Rfl:     Insulin Pen Needle (BD Pen Needle Kathy U/F) 32G X 4 MM MISC,  Use daily, Disp: 100 each, Rfl: 3    nebivolol (BYSTOLIC) 20 MG tablet, Take 2 tablets (40 mg total) by mouth daily, Disp: 90 tablet, Rfl: 1    NIFEdipine (ADALAT CC) 90 mg 24 hr tablet, TAKE 1 TABLET BY MOUTH EVERY DAY, Disp: 90 tablet, Rfl: 3    OneTouch Delica Lancets 33G MISC, Check blood sugars twice daily. Please substitute with appropriate alternative as covered by patient's insurance. Dx: E11.65, Disp: 200 each, Rfl: 3    oxygen gas, Inhale 2.5 L/min continuous VIA NASAL CANULA. At night only, Disp: , Rfl:     pantoprazole (PROTONIX) 20 mg tablet, TAKE 1 TABLET BY MOUTH EVERY DAY, Disp: 90 tablet, Rfl: 1    polyethylene glycol (GLYCOLAX) 17 GM/SCOOP powder, Take 17 g by mouth daily as needed (constipation). 17 gm daily as needed for constipation  Indications: Constipation, Disp: , Rfl:     potassium chloride (Klor-Con M10) 10 mEq tablet, Take 1 tablet (10 mEq total) by mouth daily, Disp: 30 tablet, Rfl: 2    scopolamine (TRANSDERM-SCOP) 1 mg/3 days TD 72 hr patch, Place 1 patch on the skin over 72 hours every third day as needed (nausea), Disp: 12 patch, Rfl: 0    Sennosides (Senna) 8.6 MG CAPS, Take 2 capsules by mouth daily at bedtime. per latest [facility]  Indications: constipation, Disp: , Rfl:     sertraline (ZOLOFT) 100 mg tablet, TAKE 1 TABLET BY MOUTH EVERY DAY, Disp: 90 tablet, Rfl: 2    sitaGLIPtin (Januvia) 50 mg tablet, TAKE 1 TABLET BY MOUTH EVERY DAY, Disp: 30 tablet, Rfl: 5    torsemide (DEMADEX) 20 mg tablet, TAKE 1 TABLET BY MOUTH EVERY DAY, Disp: 90 tablet, Rfl: 0    traMADol (ULTRAM) 50 mg tablet, Take 50 mg by mouth every 6 (six) hours. per [facility]  Indications: Pain, Disp: , Rfl:     ferrous sulfate 325 (65 Fe) mg tablet, Take 1 tablet (325 mg total) by mouth 3 (three) times a week, Disp: 12 tablet, Rfl: 0    ALLERGIES:  No Known Allergies    REVIEW OF SYSTEMS:  MSK: Left hip pain  Neuro: None  Pertinent items are otherwise noted in HPI.  A comprehensive  "review of systems was otherwise negative.    LABS:  HgA1c:   Lab Results   Component Value Date    HGBA1C 7.4 (H) 06/08/2024     BMP:   Lab Results   Component Value Date    GLUCOSE 264 (H) 03/16/2024    CALCIUM 8.3 (L) 06/24/2024     01/02/2018    K 3.8 06/24/2024    CO2 23 06/24/2024     06/24/2024    BUN 20 06/24/2024    CREATININE 1.59 (H) 06/24/2024     CBC: No components found for: \"CBC\"    _____________________________________________________  PHYSICAL EXAMINATION:  Vital signs: There were no vitals taken for this visit.  General: No acute distress, awake and alert  Psychiatric: Mood and affect appear appropriate  HEENT: Trachea Midline, No torticollis, no apparent facial trauma  Cardiovascular: No audible murmurs; Extremities appear perfused  Pulmonary: No audible wheezing or stridor  Skin: No open lesions; see further details (if any) below    MUSCULOSKELETAL EXAMINATION:  Extremities: Left lower extremity    The left lower extremity was exposed and inspected. Visible skin intact without erythema, ecchymosis, effusion or obvious osseous deformity. TTP over groin region of left hip. Pt able to range from 0-115 degrees of hip flexion. No pain with hip internal or external rotation. Pt able to straight leg raise. Negative stinchfield. Sensation intact to superficial peroneal, deep peroneal, sural, saphenous, plantar nerve distributions. Motor intact to extensor hallux longus, tibialis anterior, gastrocnemius muscles, extensor mechanism intact. Limb is well perfused. Brisk capillary refill in all 5 digits. Compartments soft and compressible.       _____________________________________________________  STUDIES REVIEWED:  I personally reviewed the images and interpretation is as follows:  X-rays pelvis reviewed revealing minimally displaced fractures of inferior and superior pubic rami.  Interval callus formation noted at the fracture sites. Degenerative changes diffusely throughout the lumbar spine " with moderate bilateral hip osteoarthritis.  No other acute fracture or dislocation noted.    PROCEDURES PERFORMED:  Procedures    Siva Madera PA-C

## 2024-07-19 ENCOUNTER — HOME CARE VISIT (OUTPATIENT)
Dept: HOME HEALTH SERVICES | Facility: HOME HEALTHCARE | Age: 89
End: 2024-07-19
Payer: COMMERCIAL

## 2024-07-22 ENCOUNTER — HOME CARE VISIT (OUTPATIENT)
Dept: HOME HEALTH SERVICES | Facility: HOME HEALTHCARE | Age: 89
End: 2024-07-22
Payer: COMMERCIAL

## 2024-07-22 PROCEDURE — G0151 HHCP-SERV OF PT,EA 15 MIN: HCPCS

## 2024-07-23 VITALS — HEART RATE: 76 BPM | OXYGEN SATURATION: 97 % | SYSTOLIC BLOOD PRESSURE: 130 MMHG | DIASTOLIC BLOOD PRESSURE: 70 MMHG

## 2024-07-23 DIAGNOSIS — E11.22 TYPE 2 DIABETES MELLITUS WITH STAGE 3 CHRONIC KIDNEY DISEASE, WITHOUT LONG-TERM CURRENT USE OF INSULIN (HCC): ICD-10-CM

## 2024-07-23 DIAGNOSIS — N18.30 TYPE 2 DIABETES MELLITUS WITH STAGE 3 CHRONIC KIDNEY DISEASE, WITHOUT LONG-TERM CURRENT USE OF INSULIN (HCC): ICD-10-CM

## 2024-07-23 PROCEDURE — G0180 MD CERTIFICATION HHA PATIENT: HCPCS | Performed by: FAMILY MEDICINE

## 2024-07-23 RX ORDER — SITAGLIPTIN 50 MG/1
TABLET, FILM COATED ORAL
Qty: 30 TABLET | Refills: 5 | Status: SHIPPED | OUTPATIENT
Start: 2024-07-23

## 2024-07-24 PROBLEM — J18.9 PNEUMONIA INVOLVING RIGHT LUNG: Status: RESOLVED | Noted: 2024-03-16 | Resolved: 2024-07-24

## 2024-07-25 ENCOUNTER — HOME CARE VISIT (OUTPATIENT)
Dept: HOME HEALTH SERVICES | Facility: HOME HEALTHCARE | Age: 89
End: 2024-07-25
Payer: COMMERCIAL

## 2024-07-25 ENCOUNTER — OFFICE VISIT (OUTPATIENT)
Dept: FAMILY MEDICINE CLINIC | Facility: HOSPITAL | Age: 89
End: 2024-07-25
Payer: COMMERCIAL

## 2024-07-25 VITALS
SYSTOLIC BLOOD PRESSURE: 126 MMHG | HEART RATE: 72 BPM | BODY MASS INDEX: 27.38 KG/M2 | DIASTOLIC BLOOD PRESSURE: 66 MMHG | WEIGHT: 148.8 LBS | HEIGHT: 62 IN | TEMPERATURE: 98 F

## 2024-07-25 VITALS — SYSTOLIC BLOOD PRESSURE: 138 MMHG | OXYGEN SATURATION: 97 % | HEART RATE: 75 BPM | DIASTOLIC BLOOD PRESSURE: 58 MMHG

## 2024-07-25 DIAGNOSIS — Z79.4 TYPE 2 DIABETES MELLITUS WITH STAGE 3B CHRONIC KIDNEY DISEASE, WITH LONG-TERM CURRENT USE OF INSULIN (HCC): ICD-10-CM

## 2024-07-25 DIAGNOSIS — S32.592A CLOSED FRACTURE OF MULTIPLE PUBIC RAMI, LEFT, INITIAL ENCOUNTER (HCC): ICD-10-CM

## 2024-07-25 DIAGNOSIS — E11.22 TYPE 2 DIABETES MELLITUS WITH STAGE 3 CHRONIC KIDNEY DISEASE, WITHOUT LONG-TERM CURRENT USE OF INSULIN (HCC): ICD-10-CM

## 2024-07-25 DIAGNOSIS — N18.30 TYPE 2 DIABETES MELLITUS WITH STAGE 3 CHRONIC KIDNEY DISEASE, WITHOUT LONG-TERM CURRENT USE OF INSULIN (HCC): ICD-10-CM

## 2024-07-25 DIAGNOSIS — I10 ESSENTIAL HYPERTENSION: ICD-10-CM

## 2024-07-25 DIAGNOSIS — S32.040S CLOSED COMPRESSION FRACTURE OF L4 LUMBAR VERTEBRA, SEQUELA: ICD-10-CM

## 2024-07-25 DIAGNOSIS — J18.9 PNEUMONIA OF RIGHT LOWER LOBE DUE TO INFECTIOUS ORGANISM: ICD-10-CM

## 2024-07-25 DIAGNOSIS — E11.22 TYPE 2 DIABETES MELLITUS WITH STAGE 3B CHRONIC KIDNEY DISEASE, WITH LONG-TERM CURRENT USE OF INSULIN (HCC): ICD-10-CM

## 2024-07-25 DIAGNOSIS — N18.4 CKD (CHRONIC KIDNEY DISEASE), STAGE IV (HCC): ICD-10-CM

## 2024-07-25 DIAGNOSIS — N18.32 TYPE 2 DIABETES MELLITUS WITH STAGE 3B CHRONIC KIDNEY DISEASE, WITH LONG-TERM CURRENT USE OF INSULIN (HCC): ICD-10-CM

## 2024-07-25 DIAGNOSIS — Z09 HOSPITAL DISCHARGE FOLLOW-UP: Primary | ICD-10-CM

## 2024-07-25 DIAGNOSIS — I10 PRIMARY HYPERTENSION: ICD-10-CM

## 2024-07-25 DIAGNOSIS — D72.829 LEUKOCYTOSIS, UNSPECIFIED TYPE: ICD-10-CM

## 2024-07-25 PROBLEM — I16.0 HYPERTENSIVE URGENCY: Status: RESOLVED | Noted: 2024-05-12 | Resolved: 2024-07-25

## 2024-07-25 PROCEDURE — G0152 HHCP-SERV OF OT,EA 15 MIN: HCPCS | Performed by: OCCUPATIONAL THERAPIST

## 2024-07-25 PROCEDURE — 99495 TRANSJ CARE MGMT MOD F2F 14D: CPT | Performed by: INTERNAL MEDICINE

## 2024-07-25 RX ORDER — GLIPIZIDE 10 MG/1
TABLET ORAL
Start: 2024-07-25

## 2024-07-25 NOTE — ASSESSMENT & PLAN NOTE
Lab Results   Component Value Date    EGFR 28 06/24/2024    EGFR 28 06/23/2024    EGFR 21 06/22/2024    CREATININE 1.59 (H) 06/24/2024    CREATININE 1.59 (H) 06/23/2024    CREATININE 2.00 (H) 06/22/2024   Stable, following with Nephro, urged con't decrease in NSAID use, will follow

## 2024-07-25 NOTE — ASSESSMENT & PLAN NOTE
Progressing nicely, just saw Ortho and has f/u with preceding Xray in 8 wks, using Tramadol prn pain - is weaning down and hopes to be down to 1 tab a day next week - encouraged to con't doing so d/t CKD and importance of avoiding NSAIDs, will follow as well

## 2024-07-25 NOTE — ASSESSMENT & PLAN NOTE
Lab Results   Component Value Date    HGBA1C 7.4 (H) 06/08/2024   DM type 2 with CKD stage IV and insulin use - uncontrolled but acceptable given her age with A1C 7.4 - con't tx and f/u as per RAZA Younger on DM foot exam 8/23 and eye exam 7/23 - 2 yrs, on a statin but no ACE/ARB, will follow as well

## 2024-07-25 NOTE — PROGRESS NOTES
Transition of Care Visit  Name: Ena Ahumada      : 1935      MRN: [id number]  Encounter Provider: Deanna Castaneda DO  Encounter Date: 2024   Encounter department: [facility] CARE [address]     Assessment & Plan   1. Hospital discharge follow-up  2. Closed fracture of multiple pubic rami, left, initial encounter ([facility])  Assessment & Plan:  Progressing nicely, just saw Ortho and has f/u with preceding Xray in 8 wks, using Tramadol prn pain - is weaning down and hopes to be down to 1 tab a day next week - encouraged to con't doing so d/t CKD and importance of avoiding NSAIDs, will follow as well  3. Closed compression fracture of L4 lumbar vertebra, sequela  Assessment & Plan:  Saw Ortho last week, currently in PT, using Tylenol and Tramadol prn, will follow  4. Leukocytosis, unspecified type  Assessment & Plan:  Felt to be reactive and had trended down but mildly elevated on discharge, will repeat with labs in 3 mos if not done by then, call with any s/sx of infection  5. CKD (chronic kidney disease), stage IV ([facility])  Assessment & Plan:  Lab Results   Component Value Date    EGFR 28 2024    EGFR 28 2024    EGFR 21 2024    CREATININE 1.59 (H) 2024    CREATININE 1.59 (H) 2024    CREATININE 2.00 (H) 2024   Stable, following with Nephro, urged con't decrease in NSAID use, will follow  6. Pneumonia of right lower lobe due to infectious organism  Assessment & Plan:  Currently w/o resp symptoms and pulm exam nml, call with F/C/SOB/relapse in cough  7. Type 2 diabetes mellitus with stage 3 chronic kidney disease, without long-term current use of insulin ([facility])  -     glipiZIDE (GLUCOTROL) 10 mg tablet; TAKE 2 TABLETS BY MOUTH IN THE MORNING THEN 1 TAB WITH DINNER  8. 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 Essential hypertension  -     glipiZIDE (GLUCOTROL) 10 mg tablet; TAKE 2 TABLETS BY MOUTH IN THE MORNING THEN 1 TAB WITH DINNER  10. Primary hypertension  Assessment & Plan:  Bp at goal, con't current meds, recheck in 3 mos         History of Present Illness     Transitional Care Management Review:   Ena Ahumada is a 88 y.o. female here for TCM follow up.  Pt was admitted to [facility] from 6/21/24 to 6/24/24 and was then at [facility] from 6/24/24 till 7/12/24.  Records were reviewed by myself in detail and events are summarized below.     During the TCM phone call patient stated:  TCM Call       Date and time call was made  7/12/2024  4:44 PM    Hospital care reviewed  Records reviewed    Patient was hospitialized at  [facility]    Date of Admission  06/21/24    Date of discharge  07/12/24    Diagnosis  Closed fracture of multiple pubic rami, left    Disposition  Home    Were the patients medications reviewed and updated  Yes    Current Symptoms  None    Neausea severity  Mild          TCM Call       Post hospital issues  None    Should patient be enrolled in anticoag monitoring?  No    Scheduled for follow up?  Yes    Patients specialists  Other (comment)    Other specialists names  GI    Referrals needed  GI    Did you obtain your prescribed medications  Yes    Do you need help managing your prescriptions or medications  No    Is transportation to your appointment needed  No    I have advised the patient to call PCP with any new or worsening symptoms  Naina Birmingham MA    Living Arrangements  Family members    Support System  Family    Do you have social support  Yes, as much as I need    Are you recieving any outpatient services  No    Are you  recieving home care services  No    Types of home care services  Nurse visit; Home PT    Are you using any community resources  No    Current waiver services  No    Have you fallen in the last 12 months  No    Interperter language line needed  No    Counseling  Family          HPI Pt presented to [facility] ED on 6/21/24 with L hip pain after a fall getting out of the car.  She denies hitting head or LOC.  In ED O2 sat was 89% on RA, /99 and RR 20.  Exam notable for pain L anterolateral thigh and hip with decrease in strength and ROM of LLE.  W/u in ED showed WBC 16.90, H/H 42/2.21 and rest of labs were wnl.  Imaging revealed L lower pelvic ring fracture.  CXR showed increased bibasilar markings. ECG showed NS T wave changes and inversion of T waves in III, aVR, aVF, and VI.  Pt was admitted for closed L pubic rami fracture.  Ortho was consulted and a CT pelvis was done.  CT showed:  Nondisplaced fracture of the body of the left pubic bone. Mildly comminuted mildly displaced fracture of the left superior pubic ramus not extending into the acetabulum. Minimally displaced fracture of the left inferior pubic ramus. There was also an old moderate compression fx of L4 with stable 4 mm of retropulsion.  Pain meds were given and PT/OT was consulted. It was recommended she f/u with [facility] for her old compression fx.  Her leukocytosis was felt to be reactive but was monitored and did trend down.  Her CKD stage IV was monitored and Cr did trend down with gentle IVF.  Her OP DM meds were con't and she was covered with SSI.  PT/OT did recommended skilled PT and pt was discharged to [facility] on 6/24/24.  A CXR was done on 6/23/24 d/t cough and a new R basilar opacity was noted and pt was discharged on Cefdinir x 5 days.  Med list reviewed.  Pt did PT at [facility].  Her pain was tx with Tramadol.  She improved clinically and was able to ambulate home on 7/12/24.  Med list reviewed    Pt has been adjusting since  "discharge home. She is using a cane as of yesterday but prior to that was using a walker.  She did see OP Ortho last week (Dr. Deleon)  on 7/18/24.  It was encouraged she WBAT and con't OP PT and f/u in 8wks with an Xray as well. She did PT and has had benefit with that.  She notes some pain in her L groin.  She notes no radiation to her LE and denies numbness/tingling.  She is using 3 Tylenol and 2 Tramadol a day.  She is gradually weaning off Tramadol and is going to try 1 Tramadol a day.   She notes her sugars have been a bit up and down. She is taking her meds as directed.        Review of Systems   Constitutional:  Negative for chills and fever.   HENT:  Negative for congestion and sore throat.    Eyes:  Negative for pain and visual disturbance.   Respiratory:  Negative for cough and shortness of breath.    Cardiovascular:  Positive for leg swelling. Negative for chest pain and palpitations.   Gastrointestinal:  Negative for abdominal pain, constipation, diarrhea, nausea and vomiting.   Genitourinary:  Negative for difficulty urinating and dysuria.   Musculoskeletal:  Positive for arthralgias and gait problem.   Skin:  Negative for rash and wound.   Neurological:  Negative for dizziness and headaches.   Hematological:  Does not bruise/bleed easily.   Psychiatric/Behavioral:  Negative for behavioral problems and confusion.      Objective     /66   Pulse 72   Temp 98 °F (36.7 °C)   Ht 5' 2\" (1.575 m)   Wt 67.5 kg (148 lb 12.8 oz)   BMI 27.22 kg/m²     Physical Exam  Vitals and nursing note reviewed.   Constitutional:       General: She is not in acute distress.     Appearance: She is well-developed. She is not ill-appearing.   HENT:      Head: Normocephalic and atraumatic.      Right Ear: External ear normal.      Left Ear: External ear normal.   Eyes:      General:         Right eye: No discharge.         Left eye: No discharge.      Conjunctiva/sclera: Conjunctivae normal.   Neck:      Thyroid: No " thyromegaly.      Trachea: No tracheal deviation.   Cardiovascular:      Rate and Rhythm: Normal rate and regular rhythm.      Heart sounds: Murmur heard.   Pulmonary:      Effort: Pulmonary effort is normal. No respiratory distress.      Breath sounds: Normal breath sounds. No wheezing, rhonchi or rales.   Musculoskeletal:         General: No deformity or signs of injury.      Cervical back: Neck supple.   Skin:     General: Skin is warm and dry.      Coloration: Skin is not pale.      Findings: No bruising or rash.   Neurological:      General: No focal deficit present.      Mental Status: She is alert. Mental status is at baseline.      Motor: No abnormal muscle tone.      Gait: Gait abnormal.      Comments: Ambulates with cane   Psychiatric:         Mood and Affect: Mood normal.         Behavior: Behavior normal.         Thought Content: Thought content normal.         Judgment: Judgment normal.       Medications have been reviewed by provider in current encounter    Administrative Statements

## 2024-07-25 NOTE — ASSESSMENT & PLAN NOTE
Felt to be reactive and had trended down but mildly elevated on discharge, will repeat with labs in 3 mos if not done by then, call with any s/sx of infection

## 2024-07-26 ENCOUNTER — HOME CARE VISIT (OUTPATIENT)
Dept: HOME HEALTH SERVICES | Facility: HOME HEALTHCARE | Age: 89
End: 2024-07-26
Payer: COMMERCIAL

## 2024-07-26 PROCEDURE — G0151 HHCP-SERV OF PT,EA 15 MIN: HCPCS

## 2024-07-28 VITALS — DIASTOLIC BLOOD PRESSURE: 70 MMHG | SYSTOLIC BLOOD PRESSURE: 110 MMHG | HEART RATE: 78 BPM | OXYGEN SATURATION: 96 %

## 2024-07-29 ENCOUNTER — TELEPHONE (OUTPATIENT)
Dept: FAMILY MEDICINE CLINIC | Facility: HOSPITAL | Age: 89
End: 2024-07-29

## 2024-07-29 ENCOUNTER — HOME CARE VISIT (OUTPATIENT)
Dept: HOME HEALTH SERVICES | Facility: HOME HEALTHCARE | Age: 89
End: 2024-07-29
Payer: COMMERCIAL

## 2024-07-29 VITALS — HEART RATE: 77 BPM | DIASTOLIC BLOOD PRESSURE: 80 MMHG | OXYGEN SATURATION: 98 % | SYSTOLIC BLOOD PRESSURE: 140 MMHG

## 2024-07-29 PROCEDURE — G0151 HHCP-SERV OF PT,EA 15 MIN: HCPCS

## 2024-07-29 NOTE — TELEPHONE ENCOUNTER
Please advise pt to take meds as previously directed and con't monitoring home BP     Cassie reported to me that her SBP was 200 this morning.  She denies symptom and I checked it at 1200: 130/70.  Son Hosea is asking whether she should be taking her BP meds later today.  Could you please advise?

## 2024-07-30 ENCOUNTER — TELEPHONE (OUTPATIENT)
Age: 89
End: 2024-07-30

## 2024-07-30 DIAGNOSIS — E78.5 DYSLIPIDEMIA: Primary | ICD-10-CM

## 2024-07-30 RX ORDER — ATORVASTATIN CALCIUM 10 MG/1
10 TABLET, FILM COATED ORAL DAILY
Qty: 90 TABLET | Refills: 2 | Status: SHIPPED | OUTPATIENT
Start: 2024-07-30

## 2024-07-30 NOTE — TELEPHONE ENCOUNTER
Patient called the RX Refill Line. Message is being forwarded to the office.     Patient is requesting refills for this medication.      [facility]/pharmacy #[id number] - [place] - [address] [address]     Please contact patient at   [phone]

## 2024-07-31 ENCOUNTER — HOME CARE VISIT (OUTPATIENT)
Dept: HOME HEALTH SERVICES | Facility: HOME HEALTHCARE | Age: 89
End: 2024-07-31
Payer: COMMERCIAL

## 2024-07-31 VITALS — OXYGEN SATURATION: 92 % | DIASTOLIC BLOOD PRESSURE: 80 MMHG | HEART RATE: 78 BPM | SYSTOLIC BLOOD PRESSURE: 134 MMHG

## 2024-07-31 PROCEDURE — G0151 HHCP-SERV OF PT,EA 15 MIN: HCPCS

## 2024-08-07 ENCOUNTER — HOME CARE VISIT (OUTPATIENT)
Dept: HOME HEALTH SERVICES | Facility: HOME HEALTHCARE | Age: 89
End: 2024-08-07
Payer: COMMERCIAL

## 2024-08-07 VITALS — HEART RATE: 70 BPM | OXYGEN SATURATION: 98 % | SYSTOLIC BLOOD PRESSURE: 120 MMHG | DIASTOLIC BLOOD PRESSURE: 70 MMHG

## 2024-08-07 DIAGNOSIS — I10 ESSENTIAL HYPERTENSION: ICD-10-CM

## 2024-08-07 PROCEDURE — G0151 HHCP-SERV OF PT,EA 15 MIN: HCPCS

## 2024-08-08 RX ORDER — NEBIVOLOL 20 MG/1
40 TABLET ORAL DAILY
Qty: 180 TABLET | Refills: 1 | Status: SHIPPED | OUTPATIENT
Start: 2024-08-08

## 2024-08-16 ENCOUNTER — HOME CARE VISIT (OUTPATIENT)
Dept: HOME HEALTH SERVICES | Facility: HOME HEALTHCARE | Age: 89
End: 2024-08-16
Payer: COMMERCIAL

## 2024-08-16 PROCEDURE — G0151 HHCP-SERV OF PT,EA 15 MIN: HCPCS

## 2024-08-18 VITALS — OXYGEN SATURATION: 93 % | HEART RATE: 78 BPM | SYSTOLIC BLOOD PRESSURE: 130 MMHG | DIASTOLIC BLOOD PRESSURE: 70 MMHG

## 2024-08-20 ENCOUNTER — HOME CARE VISIT (OUTPATIENT)
Dept: HOME HEALTH SERVICES | Facility: HOME HEALTHCARE | Age: 89
End: 2024-08-20
Payer: COMMERCIAL

## 2024-08-20 VITALS — SYSTOLIC BLOOD PRESSURE: 132 MMHG | HEART RATE: 71 BPM | DIASTOLIC BLOOD PRESSURE: 66 MMHG | OXYGEN SATURATION: 97 %

## 2024-08-20 DIAGNOSIS — R60.9 DEPENDENT EDEMA: ICD-10-CM

## 2024-08-20 PROCEDURE — G0151 HHCP-SERV OF PT,EA 15 MIN: HCPCS

## 2024-08-21 RX ORDER — TORSEMIDE 20 MG/1
TABLET ORAL
Qty: 100 TABLET | Refills: 1 | Status: SHIPPED | OUTPATIENT
Start: 2024-08-21

## 2024-08-22 ENCOUNTER — HOME CARE VISIT (OUTPATIENT)
Dept: HOME HEALTH SERVICES | Facility: HOME HEALTHCARE | Age: 89
End: 2024-08-22
Payer: COMMERCIAL

## 2024-08-22 PROCEDURE — G0151 HHCP-SERV OF PT,EA 15 MIN: HCPCS

## 2024-08-23 VITALS — SYSTOLIC BLOOD PRESSURE: 144 MMHG | DIASTOLIC BLOOD PRESSURE: 80 MMHG | HEART RATE: 78 BPM | OXYGEN SATURATION: 97 %

## 2024-09-05 ENCOUNTER — TELEPHONE (OUTPATIENT)
Dept: NEPHROLOGY | Facility: CLINIC | Age: 89
End: 2024-09-05

## 2024-09-06 NOTE — TELEPHONE ENCOUNTER
Sent [employer] message to call and schedule follow up with Dr. Baumann or AP. 2x attempt. (Recall list)

## 2024-09-11 ENCOUNTER — OFFICE VISIT (OUTPATIENT)
Dept: ENDOCRINOLOGY | Facility: HOSPITAL | Age: 89
End: 2024-09-11
Payer: COMMERCIAL

## 2024-09-11 VITALS
WEIGHT: 145 LBS | HEIGHT: 62 IN | HEART RATE: 73 BPM | DIASTOLIC BLOOD PRESSURE: 68 MMHG | OXYGEN SATURATION: 97 % | BODY MASS INDEX: 26.68 KG/M2 | SYSTOLIC BLOOD PRESSURE: 152 MMHG

## 2024-09-11 DIAGNOSIS — E11.42 DIABETIC POLYNEUROPATHY ASSOCIATED WITH TYPE 2 DIABETES MELLITUS (HCC): ICD-10-CM

## 2024-09-11 DIAGNOSIS — M81.8 OTHER OSTEOPOROSIS WITHOUT CURRENT PATHOLOGICAL FRACTURE: ICD-10-CM

## 2024-09-11 DIAGNOSIS — E78.5 DYSLIPIDEMIA: ICD-10-CM

## 2024-09-11 DIAGNOSIS — E11.22 TYPE 2 DIABETES MELLITUS WITH STAGE 3B CHRONIC KIDNEY DISEASE, WITH LONG-TERM CURRENT USE OF INSULIN (HCC): Primary | ICD-10-CM

## 2024-09-11 DIAGNOSIS — N18.32 TYPE 2 DIABETES MELLITUS WITH STAGE 3B CHRONIC KIDNEY DISEASE, WITH LONG-TERM CURRENT USE OF INSULIN (HCC): Primary | ICD-10-CM

## 2024-09-11 DIAGNOSIS — Z79.4 TYPE 2 DIABETES MELLITUS WITH STAGE 3B CHRONIC KIDNEY DISEASE, WITH LONG-TERM CURRENT USE OF INSULIN (HCC): Primary | ICD-10-CM

## 2024-09-11 DIAGNOSIS — I10 PRIMARY HYPERTENSION: ICD-10-CM

## 2024-09-11 LAB
ALBUMIN SERPL-MCNC: 4.1 G/DL (ref 3.7–4.7)
ALP SERPL-CCNC: 128 IU/L (ref 44–121)
ALT SERPL-CCNC: 11 IU/L (ref 0–32)
AST SERPL-CCNC: 21 IU/L (ref 0–40)
BILIRUB SERPL-MCNC: 0.2 MG/DL (ref 0–1.2)
BUN SERPL-MCNC: 30 MG/DL (ref 8–27)
BUN/CREAT SERPL: 15 (ref 12–28)
CALCIUM SERPL-MCNC: 9.1 MG/DL (ref 8.7–10.3)
CHLORIDE SERPL-SCNC: 104 MMOL/L (ref 96–106)
CHOLEST SERPL-MCNC: 188 MG/DL (ref 100–199)
CO2 SERPL-SCNC: 26 MMOL/L (ref 20–29)
CREAT SERPL-MCNC: 1.94 MG/DL (ref 0.57–1)
EGFR: 24 ML/MIN/1.73
EST. AVERAGE GLUCOSE BLD GHB EST-MCNC: 163 MG/DL
GLOBULIN SER-MCNC: 2.2 G/DL (ref 1.5–4.5)
GLUCOSE SERPL-MCNC: 151 MG/DL (ref 70–99)
HBA1C MFR BLD: 7.3 % (ref 4.8–5.6)
HDLC SERPL-MCNC: 44 MG/DL
LDLC SERPL CALC-MCNC: 120 MG/DL (ref 0–99)
LDLC/HDLC SERPL: 2.7 RATIO (ref 0–3.2)
POTASSIUM SERPL-SCNC: 4.1 MMOL/L (ref 3.5–5.2)
PROT SERPL-MCNC: 6.3 G/DL (ref 6–8.5)
SL AMB VLDL CHOLESTEROL CALC: 24 MG/DL (ref 5–40)
SODIUM SERPL-SCNC: 145 MMOL/L (ref 134–144)
TRIGL SERPL-MCNC: 136 MG/DL (ref 0–149)
TSH SERPL DL<=0.005 MIU/L-ACNC: 2.88 UIU/ML (ref 0.45–4.5)

## 2024-09-11 PROCEDURE — 99214 OFFICE O/P EST MOD 30 MIN: CPT | Performed by: INTERNAL MEDICINE

## 2024-09-11 NOTE — PROGRESS NOTES
9/11/2024    Assessment & Plan      Diagnoses and all orders for this visit:    Type 2 diabetes mellitus with stage 3b chronic kidney disease, with long-term current use of insulin (HCC)  -     Comprehensive metabolic panel; Future  -     Hemoglobin A1C; Future  -     Comprehensive metabolic panel  -     Hemoglobin A1C    Primary hypertension  -     Comprehensive metabolic panel; Future  -     Hemoglobin A1C; Future  -     Comprehensive metabolic panel  -     Hemoglobin A1C    Dyslipidemia  -     Comprehensive metabolic panel; Future  -     Hemoglobin A1C; Future  -     Comprehensive metabolic panel  -     Hemoglobin A1C    Other osteoporosis without current pathological fracture  -     DXA bone density spine hip and pelvis; Future  -     Vitamin D 25 hydroxy  -     Magnesium  -     Phosphorus  -     PTH, intact  -     Comprehensive metabolic panel; Future  -     Hemoglobin A1C; Future  -     Comprehensive metabolic panel  -     Hemoglobin A1C    Diabetic polyneuropathy associated with type 2 diabetes mellitus (HCC)          Assessment & Plan  1. Type 2 diabetes, insulin requiring.  There is no recent hemoglobin A1c as that is still pending. Based on her blood sugar record, blood sugars seem to be doing okay. She will continue her current dosages of Januvia 50 mg daily, glipizide 10 mg twice in the morning and once at dinner, Semglee insulin 5 units once a day, and Invokana 100 mg daily. She will continue to test her blood sugars once or twice a day.    2. Diabetic neuropathy.  She has no significant neuropathic symptoms. A diabetic foot exam was performed in the office today.    3. Chronic Kidney Disease (CKD) stage IV.  She will follow up with nephrology in December 2024.    4. Hypertension.  Blood pressure is mildly high in the office today. She will continue the same torsemide 20 mg daily, Bystolic 40 mg daily, nifedipine 90 mg daily, and Catapres (clonidine) 0.1 mg patch once a week. She will follow up with  primary care and nephrology.    5. Hyperlipidemia.  The most recent lipid profile is fair. She will continue the same atorvastatin 10 mg daily.    6. Osteoporosis.  She had a recent fracture of the left pubis after a fall from a standing height, which is a fragility fracture. She has a history of an old compression fracture of L4 about 15 years ago and was on Fosamax treatment many years ago, which was discontinued. She has not been treating her osteoporosis with anything other than vitamin D replacement. She was reportedly told to stop calcium by somebody about 3-4 years ago, likely due to nephrology concerns. For now, she will continue the same vitamin D 1000 units twice daily. To evaluate for secondary causes of osteoporosis, magnesium, phosphorus, 25-hydroxy vitamin D level, and intact parathyroid hormone level will be checked. A DEXA scan will be done as she has not had one since 2019. She was advised to continue regular physical therapy. She is following up with orthopedics regarding her pelvic fracture.    Follow-up  She will follow up in 6 months with preceding hemoglobin A1c and CMP.    I have spent a total time of 40 minutes in caring for this patient on the day of the visit/encounter including Diagnostic results, Prognosis, Risks and benefits of tx options, Instructions for management, Patient and family education, Importance of tx compliance, Risk factor reductions, Impressions, Counseling / Coordination of care, Documenting in the medical record, Reviewing / ordering tests, medicine, procedures  , and Obtaining or reviewing history  .      CC: Diabetes type II, blood pressure, lipid, osteoporosis follow-up      History of Present Illness    HPI: Ena Ahumada is an 88-year-old female with type 2 diabetes, neuropathy, and CKD stage IV diagnosed 12 years ago, hypertension, and hyperlipidemia, here for a follow-up visit. She is accompanied by an adult male.    Her current medication regimen includes  Invokana 100 mg daily, glipizide 10 mg (2 tablets in the morning and 1 at dinner), Semglee insulin 5 units daily, and Januvia 50 mg daily.She reports frequent urination, including 2 to 3 times during the night, and occasional feelings of hunger.   She experiences blurry vision when transitioning from sitting to standing. She has an upcoming appointment with her ophthalmologist this month.   She does not experience numbness or tingling in her feet and has no foot wounds. Her last foot exam was conducted in this office a year ago.     She is on several medications for her blood pressure, including Clonidine TTS 0.1 mg patch weekly, Bystolic 40 mg daily, nifedipine 90 mg daily, and torsemide 20 mg daily. She does not frequently experience headaches.    She is currently taking atorvastatin 10 mg daily for her cholesterol. She reports no chest pain or shortness of breath.    In June 2024, she sustained a fall while exiting her car, resulting in a fracture of her left pelvis. She has not had a recent DEXA scan. She was previously on Fosamax for osteoporosis but discontinued it over 15 years ago. She is currently taking vitamin D 1000 units twice daily. She was advised to stop taking calcium supplements 3 to 4 years ago. She has a scheduled appointment with her cardiologist later this month. Since her last visit in January 2024, she has been hospitalized three times for various reasons, each time resulting in changes to her medication regimen. Her most recent hospitalization was due to her pelvic fracture, after which she underwent rehabilitation. The doctor at the rehab facility adjusted some of her medications. She is not currently experiencing any back pain and is managing well with her left hip pain. She uses a cane for walking long distances but does not require it at home.     She reports no history of retinopathy, heart attack, stroke, or claudication. She does not have any kidney stones. She does not experience  numbness or tingling around her mouth. She has tingling in her right thigh and sometimes it hurts.    Blood Sugar/Glucometer/Pump/CGM review: She monitors her blood sugar levels once or twice daily in the afternoon, which typically range from the mid to upper 100s. She does not experience hypoglycemia.       Historical Information   Past Medical History:   Diagnosis Date    Acute respiratory failure with hypoxia (HCC) 03/17/2024    Anxiety     Aortic valve insufficiency     Cataract of both eyes     Edema     last assessed - [id number]    GERD (gastroesophageal reflux disease)     last assessed - [id number]    Hypertension     Hypertensive urgency 05/12/2024    Mitral valve disorder     Osteoporosis     last assessed - [id number]    Palpitations      Past Surgical History:   Procedure Laterality Date    APPENDECTOMY      CATARACT EXTRACTION Bilateral     COLONOSCOPY      HIP SURGERY      TUBAL LIGATION Bilateral      Social History   Social History     Substance and Sexual Activity   Alcohol Use Never     Social History     Substance and Sexual Activity   Drug Use Never     Social History     Tobacco Use   Smoking Status Never   Smokeless Tobacco Never     Family History:   Family History   Problem Relation Age of Onset    Kidney disease Mother         Chronic    No Known Problems Father     Breast cancer Sister     Diabetes Sister     Cancer Sister     Breast cancer Sister     Hypertension Sister     No Known Problems Daughter     No Known Problems Son     No Known Problems Son     No Known Problems Son     No Known Problems Son        Meds/Allergies   Current Outpatient Medications   Medication Sig Dispense Refill    ACCU-CHEK PAUL PLUS test strip       Accu-Chek Softclix Lancets lancets       Accu-Chek Softclix Lancets lancets USE AS INSTRUCTED TWICE DAILY 100 each 3    acetaminophen (TYLENOL) 325 mg tablet Take 975 mg by mouth 3 (three) times a day. per [facility]  Indications: Pain      aspirin 81  MG tablet Take 1 tablet by mouth daily      atorvastatin (LIPITOR) 10 mg tablet Take 1 tablet (10 mg total) by mouth daily per latest dci 90 tablet 2    Blood Glucose Monitoring Suppl (OneTouch Verio Reflect) w/Device KIT Check blood sugars twice daily. Please substitute with appropriate alternative as covered by patient's insurance. Dx: E11.65 1 kit 0    canagliflozin (Invokana) 100 mg Take 1 tablet (100 mg total) by mouth daily before breakfast 100 tablet 1    cholecalciferol (VITAMIN D3) 1,000 units tablet Take 2 tablets by mouth daily      cloNIDine (CATAPRES-TTS-1) 0.1 mg/24 hr PLACE 1 PATCH ON THE SKIN ONCE A WEEK AS DIRECTED 12 patch 4    clotrimazole-betamethasone (LOTRISONE) 1-0.05 % cream Apply topically 2 (two) times a day 45 g 0    Denta 5000 Plus 1.1 % CREA USE TWICE A DAY -SPIT, DONT RINSE AND NOTHING BY MOUTH X 30 MIN      famotidine (PEPCID) 20 mg tablet Take 1 tablet (20 mg total) by mouth daily at bedtime 30 tablet 6    glipiZIDE (GLUCOTROL) 10 mg tablet TAKE 2 TABLETS BY MOUTH IN THE MORNING THEN 1 TAB WITH DINNER      glucose 40 % Take 15 g by mouth if needed for low blood sugar. Indications: for hypoglycemia      glucose blood (Accu-Chek Emma Plus) test strip USE AS INSTRUCTED TWICE DAILY 100 each 7    glucose blood (Accu-Chek Guide) test strip USE TO TEST TWICE A  strip 1    glucose blood (OneTouch Verio) test strip Check blood sugars twice daily. Please substitute with appropriate alternative as covered by patient's insurance. Dx: E11.65 200 each 3    Insulin Glargine-yfgn 100 UNIT/ML SOPN INJECT 0.05 ML (5 UNITS TOTAL) UNDER THE SKIN IN THE MORNING      Insulin Pen Needle (BD Pen Needle Kathy U/F) 32G X 4 MM MISC Use daily 100 each 3    Januvia 50 MG tablet TAKE 1 TABLET BY MOUTH EVERY DAY 30 tablet 5    nebivolol (BYSTOLIC) 20 MG tablet TAKE 2 TABLETS (40 MG TOTAL) BY MOUTH DAILY. 180 tablet 1    NIFEdipine (ADALAT CC) 90 mg 24 hr tablet TAKE 1 TABLET BY MOUTH EVERY DAY 90 tablet 3     "OneTouch Delica Lancets 33G MISC Check blood sugars twice daily. Please substitute with appropriate alternative as covered by patient's insurance. Dx: E11.65 200 each 3    oxygen gas Inhale 2.5 L/min continuous VIA NASAL CANULA. At night only      pantoprazole (PROTONIX) 20 mg tablet TAKE 1 TABLET BY MOUTH EVERY DAY 90 tablet 1    polyethylene glycol (GLYCOLAX) 17 GM/SCOOP powder Take 17 g by mouth daily as needed (constipation). 17 gm daily as needed for constipation  Indications: Constipation      potassium chloride (Klor-Con M10) 10 mEq tablet Take 1 tablet (10 mEq total) by mouth daily 30 tablet 2    Sennosides (Senna) 8.6 MG CAPS Take 2 capsules by mouth daily at bedtime. per latest dci from the [facility]  Indications: constipation      sertraline (ZOLOFT) 100 mg tablet TAKE 1 TABLET BY MOUTH EVERY DAY 90 tablet 2    torsemide (DEMADEX) 20 mg tablet TAKE 1 TABLET BY MOUTH EVERY  tablet 1    ferrous sulfate 325 (65 Fe) mg tablet Take 1 tablet (325 mg total) by mouth 3 (three) times a week 12 tablet 0     No current facility-administered medications for this visit.     No Known Allergies    Objective   Vitals: Blood pressure 152/68, pulse 73, height 5' 2\" (1.575 m), weight 65.8 kg (145 lb), SpO2 97%, not currently breastfeeding.  Invasive Devices       None                   Physical Exam    Clear to auscultation in the lungs.  Heart exhibits a regular rate and rhythm. No murmurs.  No CVA tenderness. No spinous process tenderness. No lower extremity edema, no ulcerations of the feet. Dorsalis pedis and posterior tibialis pulses are 1+. Vibration sensation is slightly diminished to the first toe DIP joint bilaterally. Monofilament sensation is absent to the heels bilaterally, diminished to the left first and fifth metatarsophalangeal joint, right fifth metatarsophalangeal joint, but was otherwise intact bilaterally.  Patient's shoes and socks removed.    Right Foot/Ankle   Right Foot Inspection  Skin Exam: " skin normal and skin intact. No dry skin, no warmth, no callus, no erythema, no maceration, no abnormal color, no pre-ulcer, no ulcer and no callus.     Toe Exam: No swelling and  no right toe deformity    Sensory   Vibration: diminished  Monofilament testing: diminished    Vascular  The right DP pulse is 1+. The right PT pulse is 1+.     Left Foot/Ankle  Left Foot Inspection  Skin Exam: skin normal and skin intact. No dry skin, no warmth, no erythema, no maceration, normal color, no pre-ulcer, no ulcer and no callus.     Toe Exam: No swelling and no left toe deformity.     Sensory   Vibration: diminished  Monofilament testing: diminished    Vascular  The left DP pulse is 1+. The left PT pulse is 1+.     Assign Risk Category  No deformity present  Loss of protective sensation  Weak pulses  Risk: 2        The history was obtained from the review of the chart and from the patient.    Lab Results:    Most recent Alc is  Lab Results   Component Value Date    HGBA1C 7.3 (H) 09/10/2024           Blood work performed on 9/10/2024 showed a CMP with a glucose of 151 fasting, BUN 30, creatinine 1.94, GFR 24, sodium 145, alkaline phosphatase 128, but was otherwise normal.    TSH is 2.88.    Lab Results   Component Value Date    CREATININE 1.94 (H) 09/10/2024    CREATININE 1.59 (H) 06/24/2024    CREATININE 1.59 (H) 06/23/2024    BUN 30 (H) 09/10/2024     01/02/2018    K 4.1 09/10/2024     09/10/2024    CO2 26 09/10/2024     eGFR   Date Value Ref Range Status   09/10/2024 24 (L) >59 mL/min/1.73 Final   06/24/2024 28 ml/min/1.73sq m Final     Total cholesterol 188, LDL cholesterol 120.    Lab Results   Component Value Date    CHOL 168 01/02/2018    HDL 44 09/10/2024    TRIG 136 09/10/2024    CHOLHDL 3.5 06/23/2023       Lab Results   Component Value Date    ALT 11 09/10/2024    AST 21 09/10/2024    ALKPHOS 37 06/23/2024    BILITOT 0.3 01/02/2018       Lab Results   Component Value Date    TSH 2.880 09/10/2024              Future Appointments   Date Time Provider Department Center   9/18/2024  8:30 AM [facility] DEXA [facility] 1 QU WIC Dexa [facility]   9/19/2024  9:45 AM Aleks Deleon DO [facility]TH[facility]   9/24/2024 10:40 AM Markus Hill MD CARD [facility]   10/17/2024  3:40 PM Deanna Castaneda DO [facility]   12/10/2024  3:00 PM KANA Ray NEPH [facility]   3/19/2025  9:20 AM Evelyn Max MD ENDO [facility]

## 2024-09-11 NOTE — PATIENT INSTRUCTIONS
I await the hgba1c level.     For now , no change in diabetes medicines.     Continue to test blood sugars 1-2 times a day.     We'll order blood work for evaluating the osteoporosis.     We'll order the dexs scan.     Follow up in 6 months with blood work.

## 2024-09-17 LAB
LEFT EYE DIABETIC RETINOPATHY: NORMAL
RIGHT EYE DIABETIC RETINOPATHY: NORMAL

## 2024-09-18 ENCOUNTER — HOSPITAL ENCOUNTER (OUTPATIENT)
Dept: BONE DENSITY | Facility: IMAGING CENTER | Age: 89
Discharge: HOME | End: 2024-09-18
Payer: COMMERCIAL

## 2024-09-18 VITALS — WEIGHT: 144.4 LBS | BODY MASS INDEX: 26.57 KG/M2 | HEIGHT: 62 IN

## 2024-09-18 DIAGNOSIS — M81.8 OTHER OSTEOPOROSIS WITHOUT CURRENT PATHOLOGICAL FRACTURE: ICD-10-CM

## 2024-09-18 PROCEDURE — 77080 DXA BONE DENSITY AXIAL: CPT

## 2024-09-19 ENCOUNTER — APPOINTMENT (OUTPATIENT)
Dept: RADIOLOGY | Facility: AMBULARY SURGERY CENTER | Age: 89
End: 2024-09-19
Attending: STUDENT IN AN ORGANIZED HEALTH CARE EDUCATION/TRAINING PROGRAM
Payer: COMMERCIAL

## 2024-09-19 ENCOUNTER — OFFICE VISIT (OUTPATIENT)
Dept: OBGYN CLINIC | Facility: CLINIC | Age: 89
End: 2024-09-19
Payer: COMMERCIAL

## 2024-09-19 DIAGNOSIS — S32.592A CLOSED FRACTURE OF MULTIPLE PUBIC RAMI, LEFT, INITIAL ENCOUNTER (HCC): Primary | ICD-10-CM

## 2024-09-19 DIAGNOSIS — S32.592A CLOSED FRACTURE OF MULTIPLE PUBIC RAMI, LEFT, INITIAL ENCOUNTER (HCC): ICD-10-CM

## 2024-09-19 PROCEDURE — 99213 OFFICE O/P EST LOW 20 MIN: CPT | Performed by: STUDENT IN AN ORGANIZED HEALTH CARE EDUCATION/TRAINING PROGRAM

## 2024-09-19 PROCEDURE — 72190 X-RAY EXAM OF PELVIS: CPT

## 2024-09-19 NOTE — PROGRESS NOTES
Orthopaedics Office Visit - new Patient Visit    ASSESSMENT/PLAN:    Assessment:   Mildly comminuted mildly displaced fracture of the left superior pubic ramus   Minimally displaced fracture of the left inferior pubic ramus, DOI: 6/21/24    Plan:   X-rays reviewed and discussed with patient revealing maintained alignment of pt's previous minimally displaced fractures of inferior and superior pubic rami. Interval callus formation noted at the fracture sites. Degenerative changes diffusely throughout the lumbar spine with moderate bilateral hip osteoarthritis.  No other acute fracture or dislocation noted.  Pt to be weightbearing as tolerated to LLE  ROM as tolerated to LLE  Discussed with patient continued nonoperative management of superior and inferior left pubic rami fracture with oral anti-inflammatories, PT, ice.  Patient was agreeable and all questions answered.  Pt to continue HEP, she has completed PT at this time.  Pt to continue at home analgesic regimen with Tylenol   Pt to follow up as needed        _____________________________________________________  CHIEF COMPLAINT:  No chief complaint on file.        SUBJECTIVE:  Ena Ahumada is a 88 y.o. female who presents status post mechanical fall 6/21/24 (4 weeks ago). She reports she was getting out of her car and lost her balance and fell on her left hip. She states most of the pain is concentrated in the groin region. Pain is made worse with ambulation and weightbearing and relieved with rest. She has tried tylenol and tramadol for pain control with moderate relief of her symptoms.  She offers no additional complaints at this time.  She denies any numbness or paresthesias.    Interval history 9/19/2024  Patient presents approximately 3 months status post, mildly comminuted mildly displaced fracture of the left superior pubic ramus and minimally displaced fracture of the left inferior pubic ramus.  She states overall she is doing well and her pain in the  left lower extremity has begun to subside.  She has been able to work with PT and is overall with her progression. She has tried tylenol and tramadol for pain control with moderate relief of her symptoms.  She offers no additional complaints at this time.  She denies any numbness or paresthesias.    PAST MEDICAL HISTORY:  Past Medical History:   Diagnosis Date    Acute respiratory failure with hypoxia (HCC) 03/17/2024    Anxiety     Aortic valve insufficiency     Cataract of both eyes     Edema     last assessed - [id number]    GERD (gastroesophageal reflux disease)     last assessed - [id number]    Hypertension     Hypertensive urgency 05/12/2024    Mitral valve disorder     Osteoporosis     last assessed - [id number]    Palpitations        PAST SURGICAL HISTORY:  Past Surgical History:   Procedure Laterality Date    APPENDECTOMY      CATARACT EXTRACTION Bilateral     COLONOSCOPY      HIP SURGERY      TUBAL LIGATION Bilateral        FAMILY HISTORY:  Family History   Problem Relation Age of Onset    Kidney disease Mother         Chronic    No Known Problems Father     Breast cancer Sister     Diabetes Sister     Cancer Sister     Breast cancer Sister     Hypertension Sister     No Known Problems Daughter     No Known Problems Son     No Known Problems Son     No Known Problems Son     No Known Problems Son        SOCIAL HISTORY:  Social History     Tobacco Use    Smoking status: Never    Smokeless tobacco: Never   Vaping Use    Vaping status: Never Used   Substance Use Topics    Alcohol use: Never    Drug use: Never       MEDICATIONS:    Current Outpatient Medications:     ACCU-CHEK PAUL PLUS test strip, , Disp: , Rfl:     Accu-Chek Softclix Lancets lancets, , Disp: , Rfl:     Accu-Chek Softclix Lancets lancets, USE AS INSTRUCTED TWICE DAILY, Disp: 100 each, Rfl: 3    acetaminophen (TYLENOL) 325 mg tablet, Take 975 mg by mouth 3 (three) times a day. per [facility]  Indications: Pain, Disp: , Rfl:      aspirin 81 MG tablet, Take 1 tablet by mouth daily, Disp: , Rfl:     atorvastatin (LIPITOR) 10 mg tablet, Take 1 tablet (10 mg total) by mouth daily per latest dci, Disp: 90 tablet, Rfl: 2    Blood Glucose Monitoring Suppl (OneTouch Verio Reflect) w/Device KIT, Check blood sugars twice daily. Please substitute with appropriate alternative as covered by patient's insurance. Dx: E11.65, Disp: 1 kit, Rfl: 0    canagliflozin (Invokana) 100 mg, Take 1 tablet (100 mg total) by mouth daily before breakfast, Disp: 100 tablet, Rfl: 1    cholecalciferol (VITAMIN D3) 1,000 units tablet, Take 2 tablets by mouth daily, Disp: , Rfl:     cloNIDine (CATAPRES-TTS-1) 0.1 mg/24 hr, PLACE 1 PATCH ON THE SKIN ONCE A WEEK AS DIRECTED, Disp: 12 patch, Rfl: 4    clotrimazole-betamethasone (LOTRISONE) 1-0.05 % cream, Apply topically 2 (two) times a day, Disp: 45 g, Rfl: 0    Denta 5000 Plus 1.1 % CREA, USE TWICE A DAY -SPIT, DONT RINSE AND NOTHING BY MOUTH X 30 MIN, Disp: , Rfl:     famotidine (PEPCID) 20 mg tablet, Take 1 tablet (20 mg total) by mouth daily at bedtime, Disp: 30 tablet, Rfl: 6    ferrous sulfate 325 (65 Fe) mg tablet, Take 1 tablet (325 mg total) by mouth 3 (three) times a week, Disp: 12 tablet, Rfl: 0    glipiZIDE (GLUCOTROL) 10 mg tablet, TAKE 2 TABLETS BY MOUTH IN THE MORNING THEN 1 TAB WITH DINNER, Disp: , Rfl:     glucose 40 %, Take 15 g by mouth if needed for low blood sugar. Indications: for hypoglycemia, Disp: , Rfl:     glucose blood (Accu-Chek Emma Plus) test strip, USE AS INSTRUCTED TWICE DAILY, Disp: 100 each, Rfl: 7    glucose blood (Accu-Chek Guide) test strip, USE TO TEST TWICE A DAY, Disp: 200 strip, Rfl: 1    glucose blood (OneTouch Verio) test strip, Check blood sugars twice daily. Please substitute with appropriate alternative as covered by patient's insurance. Dx: E11.65, Disp: 200 each, Rfl: 3    Insulin Glargine-yfgn 100 UNIT/ML SOPN, INJECT 0.05 ML (5 UNITS TOTAL) UNDER THE SKIN IN THE MORNING, Disp:  , Rfl:     Insulin Pen Needle (BD Pen Needle Kathy U/F) 32G X 4 MM MISC, Use daily, Disp: 100 each, Rfl: 3    Januvia 50 MG tablet, TAKE 1 TABLET BY MOUTH EVERY DAY, Disp: 30 tablet, Rfl: 5    nebivolol (BYSTOLIC) 20 MG tablet, TAKE 2 TABLETS (40 MG TOTAL) BY MOUTH DAILY., Disp: 180 tablet, Rfl: 1    NIFEdipine (ADALAT CC) 90 mg 24 hr tablet, TAKE 1 TABLET BY MOUTH EVERY DAY, Disp: 90 tablet, Rfl: 3    OneTouch Delica Lancets 33G MISC, Check blood sugars twice daily. Please substitute with appropriate alternative as covered by patient's insurance. Dx: E11.65, Disp: 200 each, Rfl: 3    oxygen gas, Inhale 2.5 L/min continuous VIA NASAL CANULA. At night only, Disp: , Rfl:     pantoprazole (PROTONIX) 20 mg tablet, TAKE 1 TABLET BY MOUTH EVERY DAY, Disp: 90 tablet, Rfl: 1    polyethylene glycol (GLYCOLAX) 17 GM/SCOOP powder, Take 17 g by mouth daily as needed (constipation). 17 gm daily as needed for constipation  Indications: Constipation, Disp: , Rfl:     potassium chloride (Klor-Con M10) 10 mEq tablet, Take 1 tablet (10 mEq total) by mouth daily, Disp: 30 tablet, Rfl: 2    Sennosides (Senna) 8.6 MG CAPS, Take 2 capsules by mouth daily at bedtime. per [facility]  Indications: constipation, Disp: , Rfl:     sertraline (ZOLOFT) 100 mg tablet, TAKE 1 TABLET BY MOUTH EVERY DAY, Disp: 90 tablet, Rfl: 2    torsemide (DEMADEX) 20 mg tablet, TAKE 1 TABLET BY MOUTH EVERY DAY, Disp: 100 tablet, Rfl: 1    ALLERGIES:  No Known Allergies    REVIEW OF SYSTEMS:  MSK: Left hip pain  Neuro: None  Pertinent items are otherwise noted in HPI.  A comprehensive review of systems was otherwise negative.    LABS:  HgA1c:   Lab Results   Component Value Date    HGBA1C 7.3 (H) 09/10/2024     BMP:   Lab Results   Component Value Date    GLUCOSE 264 (H) 03/16/2024    CALCIUM 8.3 (L) 06/24/2024     01/02/2018    K 4.1 09/10/2024    CO2 26 09/10/2024     09/10/2024    BUN 30 (H) 09/10/2024    CREATININE 1.94 (H)  "09/10/2024     CBC: No components found for: \"CBC\"    _____________________________________________________  PHYSICAL EXAMINATION:  Vital signs: There were no vitals taken for this visit.  General: No acute distress, awake and alert  Psychiatric: Mood and affect appear appropriate  HEENT: Trachea Midline, No torticollis, no apparent facial trauma  Cardiovascular: No audible murmurs; Extremities appear perfused  Pulmonary: No audible wheezing or stridor  Skin: No open lesions; see further details (if any) below    MUSCULOSKELETAL EXAMINATION:  Extremities: Left lower extremity    The left lower extremity was exposed and inspected. Visible skin intact without erythema, ecchymosis, effusion or obvious osseous deformity. No  TTP over groin region of left hip. Pt able to range from 0-115 degrees of hip flexion. No pain with deep flexion of the hip. No pain with hip internal or external rotation. Pt able to straight leg raise.  No pain with lateral compression of the pelvis negative stinchfield. Sensation intact to superficial peroneal, deep peroneal, sural, saphenous, plantar nerve distributions. Motor intact to extensor hallux longus, tibialis anterior, gastrocnemius muscles, extensor mechanism intact. Limb is well perfused. Brisk capillary refill in all 5 digits. Compartments soft and compressible.       _____________________________________________________  STUDIES REVIEWED:  I personally reviewed the images and interpretation is as follows:  X-rays pelvis reviewed revealing maintained alignment of pt's previous minimally displaced fractures of inferior and superior pubic rami. Interval callus formation noted at the fracture sites. Degenerative changes diffusely throughout the lumbar spine with moderate bilateral hip osteoarthritis.  No other acute fracture or dislocation noted.    PROCEDURES PERFORMED:  Procedures    Siva Madera PA-C   "

## 2024-09-24 ENCOUNTER — OFFICE VISIT (OUTPATIENT)
Dept: CARDIOLOGY CLINIC | Facility: CLINIC | Age: 89
End: 2024-09-24
Payer: COMMERCIAL

## 2024-09-24 VITALS
BODY MASS INDEX: 26.68 KG/M2 | WEIGHT: 145 LBS | DIASTOLIC BLOOD PRESSURE: 83 MMHG | SYSTOLIC BLOOD PRESSURE: 177 MMHG | HEART RATE: 74 BPM | HEIGHT: 62 IN

## 2024-09-24 DIAGNOSIS — R60.9 DEPENDENT EDEMA: ICD-10-CM

## 2024-09-24 DIAGNOSIS — I35.0 NONRHEUMATIC AORTIC VALVE STENOSIS: ICD-10-CM

## 2024-09-24 DIAGNOSIS — I10 PRIMARY HYPERTENSION: Primary | ICD-10-CM

## 2024-09-24 DIAGNOSIS — I49.3 PREMATURE VENTRICULAR CONTRACTION: ICD-10-CM

## 2024-09-24 DIAGNOSIS — E78.5 DYSLIPIDEMIA: ICD-10-CM

## 2024-09-24 PROCEDURE — 99214 OFFICE O/P EST MOD 30 MIN: CPT | Performed by: INTERNAL MEDICINE

## 2024-09-24 NOTE — PATIENT INSTRUCTIONS
"Patient Education     Low-sodium diet   The Basics   Written by the doctors and editors at [facility]   What is sodium? -- This is the main ingredient in table salt. It is also found in lots of foods. The body needs a very small amount of sodium to work normally, but most people eat much more sodium than their body needs.  Who should eat less sodium? -- Nearly everyone eats too much sodium. The average American takes in 3400 milligrams of sodium each day. Experts say that most people should have no more than 2300 milligrams a day.  Some people with certain health conditions should follow a low-sodium diet. Ask your doctor how much sodium you should have.  Why should I eat less sodium? -- Reducing the amount of sodium you eat can have lots of health benefits:   It can lower your blood pressure. This means that it can help lower your risk of stroke, heart attack, kidney damage, and lots of other health problems.   It can reduce the amount of fluid in your body, which means that your heart doesn't have to work as hard.   It can keep the kidneys from having to work too hard. This is especially important in people who have kidney disease.   It can reduce swelling in the ankles and belly, which can be uncomfortable and make it hard to move.   It can lower the chances of forming kidney stones.   It can help keep your bones strong.  Which foods have the most sodium? -- Processed foods have the most sodium. These foods usually come in cans, boxes, jars, and bags. They tend to have a lot of sodium even if they don't taste salty. In fact, many sweet foods have a lot of sodium in them. The only way to know for sure how much sodium is in a food is to check the label (figure 1).  Here are some examples of foods that often have too much sodium:   Canned soups   Rice and noodle mixes   Sauces, dressings, and condiments (such as ketchup and mustard)   Pre-made frozen meals (also called \"TV dinners\")   Deli meats, hot dogs, and " "cheeses   Smoked, cured, or pickled foods   Salted snack foods and nuts   Restaurant meals  What should I do to reduce the amount of sodium in my diet? -- Many people think that eating a low-sodium diet just means not adding salt to their food. But this is not true. Not adding salt at the table or when cooking will help a little. But almost all of the sodium you eat is already in the food you buy at the grocery store or at restaurants (figure 2).  Here are some tips to help you eat less sodium:   Avoid processed foods when possible. This is the most important thing you can do to eat less sodium. Processed foods include most foods that are sold in cans, boxes, jars, and bags.   Instead of buying pre-made, processed foods, buy fresh or fresh-frozen fruits and vegetables. (\"Fresh-frozen\" means that the food is frozen without anything added to it.)   Buy meats, fish, chicken, and turkey that are fresh instead of canned or sold at the deli counter. (Meats sold at the deli counter are high in sodium.)   Try to eat at restaurants less often.   When possible, try to make meals from scratch at home using fresh ingredients.   If you do buy canned or packaged foods, choose ones that are labeled \"sodium free\" or \"very low sodium\" (table 1). Or choose foods that have less than 400 milligrams of sodium in each serving. The amount of sodium in each serving appears on the nutrition label (figure 1).  The table has some examples of foods to avoid and foods to choose instead (table 2).  Whatever changes you make, make them slowly. Choose 1 thing to do differently, and do that for a while. If you can keep doing that change easily, add another change. For instance, if you usually eat green beans from a can, try buying fresh or fresh-frozen green beans and cooking them at home without adding salt. If that works for you, keep doing it. Then, choose another thing to change.  If you try making a change and it doesn't work right away, don't " "give up. See if you can reduce sodium in other ways. The important thing is to take small steps and to keep doing the changes that work for you.  Can I still eat out at restaurants sometimes? -- One of best ways to limit your sodium is to only eat out at restaurants every once in a while. When you do eat out, try to choose places that offer healthier choices and fresh ingredients.  No matter where you eat, when choosing your food:   Ask your  if your meal can be made without salt.   Avoid foods that come with sauces or dips.   Choose plain grilled meats or fish and steamed vegetables.   Ask for oil and vinegar for your salad, rather than dressing.   If a meal you really want has more sodium than you should have, consider saving half of it to eat another day.  What if food just does not taste as good without sodium? -- Starting a low-sodium diet can be hard. The good news is that your taste buds can get used to having less sodium. But you have to give them some time to adjust.  It can also help to try other ways to add flavor to your foods. Try things like herbs, spices, lemon juice, and vinegar.  What about salt substitutes? -- Flavoring your food with a salt substitute is a good way to reduce how much salt you eat. But check with your doctor or nurse before trying this. Some salt substitutes can be dangerous if you have certain health problems or take certain medicines.  Do medicines have sodium? -- Yes, some medicines contain sodium. If you are buying medicines you can get without a prescription, look to see how much sodium they have. Avoid products that have \"sodium carbonate\" or \"sodium bicarbonate\" unless your doctor prescribes them. (Sodium bicarbonate is baking soda.)  All topics are updated as new evidence becomes available and our peer review process is complete.  This topic retrieved from [employer] on: Mar 22, 2024.  Topic 24701 Version 14.0  Release: 32.2.4 - C32.80  © 2024 [employer] and/or its " "affiliates. All rights reserved.  figure 1: Food labels can be tricky     To figure out how much sodium you are eating, check the label to find out how much sodium is in 1 serving. If you are having more than 1 serving, multiply that amount by the number of servings you plan to eat. For instance, if you are going to eat this whole can of soup, you should multiply 850 by 2. That means that you will be having 1700 milligrams of sodium. That's more sodium than many people are supposed to have in 1 day.  Graphic 37142 Version 8.0  figure 2: Sources of sodium in your diet     Graphic 20252 Version 2.0  table 1: A guide to common nutrient claims and what they mean  Salt/sodium-free  Less than 5 mg of sodium per serving   Very low sodium  35 mg or less of sodium per serving   Low sodium  140 mg or less of sodium per serving   Reduced sodium  At least 25% less sodium than the regular product   Light or lite in sodium  At least 50% less sodium than the regular product   No salt added or unsalted  No salt is added during processing, but these products may not be salt/sodium-free unless stated   mg: milligram; %: percent.  Graphic 35549 Version 7.0  table 2: Ways to cut down on salt (sodium)  Avoid these foods  Try these foods instead    Cured and smoked foods like piper, sausage, smoked fish and meats, hot dogs, ham, lunch meats, corned beef, and pickles Fresh turkey, chicken, and lean beef   Canned fish (tuna, sardines) Unsalted tuna or sardines   Canned meats Fresh unprocessed meats, vegetable protein, and fish  Frozen and canned meats, vegetable protein, and fish that are labeled \"low-sodium\"   Salted pretzels, crackers, potato chips, tortilla chips, and nuts Low-sodium and unsalted versions of these foods   Most cheeses Low-sodium cheeses (check label for actual sodium content)   Sauces (tomato and cream, etc), tomato juices Low-sodium versions of these foods, such as low-sodium tomato juice   Processed, instant, and " "convenience foods like frozen dinners, packaged meals, canned soups, and boxed pasta blends Cook and freeze your own low-sodium meals, soups, and broths    If you do need to use convenience or processed foods, read the labels. Choose items with 140 to 200 mg of sodium per serving.    For an entire convenience meal (frozen dinner), try to find options with less than 500 to 600 mg of sodium.    If you used canned foods, look for those labeled \"sodium-free.\" Or you can rinse the canned food under water to lower the sodium content.   Fast foods and foods prepared at restaurants (unless without cheese, sauces, or added salt) Fresh foods and foods with sauces on the side  Request that food be prepared without cheese or added salt   Graphic 46797 Version 10.0  Consumer Information Use and Disclaimer   Disclaimer: This generalized information is a limited summary of diagnosis, treatment, and/or medication information. It is not meant to be comprehensive and should be used as a tool to help the user understand and/or assess potential diagnostic and treatment options. It does NOT include all information about conditions, treatments, medications, side effects, or risks that may apply to a specific patient. It is not intended to be medical advice or a substitute for the medical advice, diagnosis, or treatment of a health care provider based on the health care provider's examination and assessment of a patient's specific and unique circumstances. Patients must speak with a health care provider for complete information about their health, medical questions, and treatment options, including any risks or benefits regarding use of medications. This information does not endorse any treatments or medications as safe, effective, or approved for treating a specific patient. Up[employer] Inc[employer] and its affiliates disclaim any warranty or liability relating to this information or the use thereof.The use of this information is governed by the " Terms of Use, available at https://www.[employer].com/en/know/clinical-effectiveness-terms. 2024© [employer] and its affiliates and/or licensors. All rights reserved.  Copyright   © 2024 [employer] and/or its affiliates. All rights reserved.

## 2024-09-24 NOTE — PROGRESS NOTES
Cardiology Follow Up    Ena Ahumada  1935  [id number]  [facility] LINAJONELLE Mclaughlin[address]  [facility] [address]  [place] [id number] [phone]    1. Primary hypertension  Echo complete w/ contrast if indicated      2. Nonrheumatic aortic valve stenosis  Echo complete w/ contrast if indicated      3. Premature ventricular contraction        4. Dependent edema        5. Dyslipidemia            Discussion/Summary:    1.  HTN - Her blood pressure has improved with up titrating her therapy and changing HCTZ to torsemide.  In addition to her diuretic she remains on a clonidine patch, Bystolic and nifedipine.  In May after her hospitalization for a GI illness her blood pressure accelerated.  Her Bystolic is now at 40 mg daily.  With her multiple hospitalizations this year, her blood pressure has been running higher.  It seems as if her morning blood pressures run high and then it normalizes in the afternoon.  I have asked her to take both her Bystolic and nifedipine at night and she will continue the torsemide in the morning.  She is also on a clonidine patch.  If her blood pressure remains elevated I will add spironolactone.    2.  Dyslipidemia - Controlled on simvastatin.  She has blood work followed regularly by her PCP.    3.  PVCs - Asymptomatic and no issues from this standpoint.    4.  Edema - This has been controlled on torsemide 20 mg daily.  She should follow a low-sodium diet.  She is having blood work followed closely.    5.  Moderate aortic stenosis - This was seen on an echocardiogram in April 2023.  We ordered an updated 1 today.  We will see her back for follow-up in 4 months.      Interval History:     Ena is here for a routine follow-up.  She has 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 but has been asymptomatic as of late.  With her blood pressure running high at times we had made some changes over the last few  appointments, which included switching her from atenolol to Bystolic.  We tried decreasing her nifedipine due to lower extremity edema, but had to go back up on this with her blood pressure accelerating.  At some point she was also started on a clonidine patch.  She has developed worsening chronic kidney disease and follows with Nephrology.     A few years back I changed her diuretic over to torsemide, from HCTZ.  Her blood pressure then improved.  After a visit in April 2023 I repeated an echocardiogram which showed progression of valvular disease as she had normal LV systolic function with moderate aortic stenosis.  Her blood pressure was under control then.  Last year we did have to increase her torsemide to 20 mg daily due to some edema, which improved.      More recently in May of this year she was in the hospital for gastroenteritis with nausea and vomiting.  She was not able to stomach a lot of her pills and therefore her blood pressure accelerated.  She is back on her medications but her blood pressure remained somewhat elevated.  Recently one of her physicians increased her Bystolic to 40 mg daily.  An updated echocardiogram was ordered but due to her hospitalizations she was not able to fit this in.  She was in the hospital again in June with a fall and pelvic fracture requiring rehabilitation.    Overall Ena tells me she feels well.  However, since her hospitalizations her blood pressure has been running high particularly in the morning.  When she takes it in the morning prior to her medications it is elevated with systolic blood pressures range between 150-170.  It then falls into a normal range later in the afternoon.  She denies any cardiac symptoms.  She is euvolemic today.  No edema.  She denies chest pain or any symptoms of angina.  No shortness of breath or any orthopnea/PND.  She denies palpitations, lightheadedness or syncope.      Patient Active Problem List   Diagnosis    Rhinitis     Premature ventricular contraction    Osteopenia    Hypertension    GERD (gastroesophageal reflux disease)    Dyslipidemia    Diabetic polyneuropathy ([facility])    Anxiety    Dependent edema    Other osteoporosis without current pathological fracture    Anemia    CKD (chronic kidney disease), stage IV ([facility])    Complex renal cyst    Chronic back pain    Type 2 diabetes mellitus with chronic kidney disease, with long-term current use of insulin ([facility])    Herniated nucleus pulposus, L3-4 right    Lumbar foraminal stenosis    Lumbar radiculopathy    Sacroiliitis ([facility])    Proteinuria    Nonrheumatic aortic valve stenosis    Elevated parathyroid hormone    Chronic pain syndrome    Degenerative disc disease, cervical    Hypokalemia    Chronic kidney disease-mineral and bone disorder    Closed compression fracture of L4 lumbar vertebra, sequela    Leukocytosis    Prolonged Q-T interval on ECG    Nausea & vomiting    Closed fracture of multiple pubic rami, left, initial encounter ([facility])     Past Medical History:   Diagnosis Date    Acute respiratory failure with hypoxia ([facility]) 03/17/2024    Anxiety     Aortic valve insufficiency     Cataract of both eyes     Edema     last assessed - [id number]    GERD (gastroesophageal reflux disease)     last assessed - [id number]    Hypertension     Hypertensive urgency 05/12/2024    Mitral valve disorder     Osteoporosis     last assessed - [id number]    Palpitations      Social History     Socioeconomic History    Marital status: Single     Spouse name: Not on file    Number of children: Not on file    Years of education: Not on file    Highest education level: Not on file   Occupational History    Not on file   Tobacco Use    Smoking status: Never    Smokeless tobacco: Never   Vaping Use    Vaping status: Never Used   Substance and Sexual Activity    Alcohol use: Never    Drug use: Never    Sexual activity: Not Currently   Other Topics Concern    Not on file   Social History Narrative    Living  with relatives (other than parents)    Marital History -      Social Determinants of Health     Financial Resource Strain: Low Risk  (7/22/2023)    Overall Financial Resource Strain (CARDIA)     Difficulty of Paying Living Expenses: Not very hard   Food Insecurity: No Food Insecurity (6/22/2024)    Hunger Vital Sign     Worried About Running Out of Food in the Last Year: Never true     Ran Out of Food in the Last Year: Never true   Transportation Needs: No Transportation Needs (6/22/2024)    PRAPARE - Transportation     Lack of Transportation (Medical): No     Lack of Transportation (Non-Medical): No   Physical Activity: Not on file   Stress: Not on file   Social Connections: Not on file   Intimate Partner Violence: Not on file   Housing Stability: Low Risk  (6/22/2024)    Housing Stability Vital Sign     Unable to Pay for Housing in the Last Year: No     Number of Times Moved in the Last Year: 0     Homeless in the Last Year: No      Family History   Problem Relation Age of Onset    Kidney disease Mother         Chronic    No Known Problems Father     Breast cancer Sister     Diabetes Sister     Cancer Sister     Breast cancer Sister     Hypertension Sister     No Known Problems Daughter     No Known Problems Son     No Known Problems Son     No Known Problems Son     No Known Problems Son      Past Surgical History:   Procedure Laterality Date    APPENDECTOMY      CATARACT EXTRACTION Bilateral     COLONOSCOPY      HIP SURGERY      TUBAL LIGATION Bilateral        Current Outpatient Medications:     ACCU-CHEK PAUL PLUS test strip, , Disp: , Rfl:     Accu-Chek Softclix Lancets lancets, , Disp: , Rfl:     Accu-Chek Softclix Lancets lancets, USE AS INSTRUCTED TWICE DAILY, Disp: 100 each, Rfl: 3    acetaminophen (TYLENOL) 325 mg tablet, Take 975 mg by mouth 3 (three) times a day. per [facility]  Indications: Pain, Disp: , Rfl:     aspirin 81 MG tablet, Take 1 tablet by mouth daily, Disp: , Rfl:      atorvastatin (LIPITOR) 10 mg tablet, Take 1 tablet (10 mg total) by mouth daily per latest dci, Disp: 90 tablet, Rfl: 2    Blood Glucose Monitoring Suppl (OneTouch Verio Reflect) w/Device KIT, Check blood sugars twice daily. Please substitute with appropriate alternative as covered by patient's insurance. Dx: E11.65, Disp: 1 kit, Rfl: 0    canagliflozin (Invokana) 100 mg, Take 1 tablet (100 mg total) by mouth daily before breakfast, Disp: 100 tablet, Rfl: 1    cholecalciferol (VITAMIN D3) 1,000 units tablet, Take 2 tablets by mouth daily, Disp: , Rfl:     cloNIDine (CATAPRES-TTS-1) 0.1 mg/24 hr, PLACE 1 PATCH ON THE SKIN ONCE A WEEK AS DIRECTED, Disp: 12 patch, Rfl: 4    famotidine (PEPCID) 20 mg tablet, Take 1 tablet (20 mg total) by mouth daily at bedtime, Disp: 30 tablet, Rfl: 6    ferrous sulfate 325 (65 Fe) mg tablet, Take 1 tablet (325 mg total) by mouth 3 (three) times a week, Disp: 12 tablet, Rfl: 0    glipiZIDE (GLUCOTROL) 10 mg tablet, TAKE 2 TABLETS BY MOUTH IN THE MORNING THEN 1 TAB WITH DINNER, Disp: , Rfl:     glucose blood (Accu-Chek Emma Plus) test strip, USE AS INSTRUCTED TWICE DAILY, Disp: 100 each, Rfl: 7    glucose blood (Accu-Chek Guide) test strip, USE TO TEST TWICE A DAY, Disp: 200 strip, Rfl: 1    glucose blood (OneTouch Verio) test strip, Check blood sugars twice daily. Please substitute with appropriate alternative as covered by patient's insurance. Dx: E11.65, Disp: 200 each, Rfl: 3    Insulin Glargine-yfgn 100 UNIT/ML SOPN, INJECT 0.05 ML (5 UNITS TOTAL) UNDER THE SKIN IN THE MORNING, Disp: , Rfl:     Insulin Pen Needle (BD Pen Needle Kathy U/F) 32G X 4 MM MISC, Use daily, Disp: 100 each, Rfl: 3    Januvia 50 MG tablet, TAKE 1 TABLET BY MOUTH EVERY DAY, Disp: 30 tablet, Rfl: 5    nebivolol (BYSTOLIC) 20 MG tablet, TAKE 2 TABLETS (40 MG TOTAL) BY MOUTH DAILY., Disp: 180 tablet, Rfl: 1    NIFEdipine (ADALAT CC) 90 mg 24 hr tablet, TAKE 1 TABLET BY MOUTH EVERY DAY, Disp: 90 tablet, Rfl:  3    OneTouch Delica Lancets 33G MISC, Check blood sugars twice daily. Please substitute with appropriate alternative as covered by patient's insurance. Dx: E11.65, Disp: 200 each, Rfl: 3    oxygen gas, Inhale 2.5 L/min continuous VIA NASAL CANULA. At night only, Disp: , Rfl:     pantoprazole (PROTONIX) 20 mg tablet, TAKE 1 TABLET BY MOUTH EVERY DAY, Disp: 90 tablet, Rfl: 1    polyethylene glycol (GLYCOLAX) 17 GM/SCOOP powder, Take 17 g by mouth daily as needed (constipation). 17 gm daily as needed for constipation  Indications: Constipation, Disp: , Rfl:     potassium chloride (Klor-Con M10) 10 mEq tablet, Take 1 tablet (10 mEq total) by mouth daily, Disp: 30 tablet, Rfl: 2    Sennosides (Senna) 8.6 MG CAPS, Take 2 capsules by mouth daily at bedtime. per latest dci from the [place]  Indications: constipation, Disp: , Rfl:     sertraline (ZOLOFT) 100 mg tablet, TAKE 1 TABLET BY MOUTH EVERY DAY, Disp: 90 tablet, Rfl: 2    torsemide (DEMADEX) 20 mg tablet, TAKE 1 TABLET BY MOUTH EVERY DAY, Disp: 100 tablet, Rfl: 1  No Known Allergies    Labs:  Lab Results   Component Value Date     01/02/2018    K 4.1 09/10/2024     09/10/2024    CO2 26 09/10/2024    BUN 30 (H) 09/10/2024    CREATININE 1.94 (H) 09/10/2024    CREATININE 1.59 (H) 06/24/2024    CREATININE 1.47 (H) 01/02/2018    GLUCOSE 264 (H) 03/16/2024    GLUCOSE 145 (H) 01/02/2018    CALCIUM 8.3 (L) 06/24/2024    CALCIUM 9.8 01/02/2018     Lab Results   Component Value Date    WBC 12.28 (H) 06/24/2024    WBC 11.7 (H) 01/02/2018    HGB 11.1 (L) 06/24/2024    HGB 11.2 01/02/2018    HCT 35.1 06/24/2024    HCT 34.5 01/02/2018    MCV 94 06/24/2024    MCV 90 01/02/2018     06/24/2024     01/02/2018     Lab Results   Component Value Date    CHOL 168 01/02/2018    TRIG 136 09/10/2024    HDL 44 09/10/2024     Imaging:    ECHO (4/26/2023):    Left Ventricle: Left ventricular cavity size is normal. There is mild concentric hypertrophy. The left  "ventricular ejection fraction is 65%. Systolic function is normal. Wall motion is normal. Diastolic function is mildly abnormal, consistent with grade I (abnormal) relaxation.    Right Ventricle: Right ventricular cavity size is normal. Systolic function is normal.    Left Atrium: The atrium is mildly dilated.    Right Atrium: The atrium is mildly dilated.    Aortic Valve: There is mild regurgitation. There is moderate stenosis.    Tricuspid Valve: There is mild regurgitation. The right ventricular systolic pressure is moderately elevated. The estimated right ventricular systolic pressure is 50.00 mmHg.    Prior TTE study available for comparison. Prior study date: 8/16/2019. Changes noted when compared to prior study. Changes include: Aortic stenosis is now present..      Review of Systems:  Review of Systems   Constitutional: Negative.    HENT: Negative.     Eyes: Negative.    Respiratory: Negative.     Cardiovascular: Negative.    Gastrointestinal: Negative.    Musculoskeletal: Negative.    Skin: Negative.    Allergic/Immunologic: Negative.    Neurological: Negative.    Hematological: Negative.    Psychiatric/Behavioral: Negative.     All other systems reviewed and are negative.    Vitals:    09/24/24 1048   BP: (!) 177/83   BP Location: Left arm   Patient Position: Sitting   Cuff Size: Standard   Pulse: 74   Weight: 65.8 kg (145 lb)   Height: 5' 1.5\" (1.562 m)     Physical Exam  Vitals and nursing note reviewed.   Constitutional:       Appearance: She is well-developed.   HENT:      Head: Normocephalic and atraumatic.   Eyes:      General: No scleral icterus.        Right eye: No discharge.         Left eye: No discharge.      Pupils: Pupils are equal, round, and reactive to light.   Neck:      Thyroid: No thyromegaly.      Vascular: No JVD.   Cardiovascular:      Rate and Rhythm: Normal rate and regular rhythm. No extrasystoles are present.     Pulses: Normal pulses.      Heart sounds: S1 normal and S2 normal. " Murmur heard.      Systolic murmur is present with a grade of 3/6.      No friction rub. No gallop.   Pulmonary:      Effort: Pulmonary effort is normal. No respiratory distress.      Breath sounds: Normal breath sounds. No wheezing, rhonchi or rales.   Abdominal:      General: Bowel sounds are normal. There is no distension.      Palpations: Abdomen is soft.      Tenderness: There is no abdominal tenderness.   Musculoskeletal:         General: No tenderness or deformity. Normal range of motion.      Cervical back: Normal range of motion and neck supple.   Skin:     General: Skin is warm and dry.      Findings: No rash.   Neurological:      Mental Status: She is alert and oriented to person, place, and time.      Cranial Nerves: No cranial nerve deficit.   Psychiatric:         Thought Content: Thought content normal.         Judgment: Judgment normal.     Counseling / Coordination of Care  Total office / unit time spent today 25 minutes.  Greater than 50% of total time was spent with the patient and / or family counseling and / or coordination of care.

## 2024-10-02 ENCOUNTER — TELEPHONE (OUTPATIENT)
Age: 89
End: 2024-10-02

## 2024-10-02 NOTE — TELEPHONE ENCOUNTER
Patients daughter in law calling, she would like all lab slips to be mailed to her home address ASAP.  Please advise

## 2024-10-03 ENCOUNTER — HOSPITAL ENCOUNTER (OUTPATIENT)
Dept: NON INVASIVE DIAGNOSTICS | Age: 89
Discharge: HOME | End: 2024-10-03
Payer: COMMERCIAL

## 2024-10-03 VITALS
DIASTOLIC BLOOD PRESSURE: 83 MMHG | SYSTOLIC BLOOD PRESSURE: 177 MMHG | HEART RATE: 67 BPM | HEIGHT: 62 IN | WEIGHT: 145 LBS | BODY MASS INDEX: 26.68 KG/M2

## 2024-10-03 DIAGNOSIS — I35.0 NONRHEUMATIC AORTIC VALVE STENOSIS: ICD-10-CM

## 2024-10-03 DIAGNOSIS — I10 PRIMARY HYPERTENSION: ICD-10-CM

## 2024-10-03 PROCEDURE — 93306 TTE W/DOPPLER COMPLETE: CPT

## 2024-10-03 PROCEDURE — 93306 TTE W/DOPPLER COMPLETE: CPT | Performed by: INTERNAL MEDICINE

## 2024-10-04 LAB
AORTIC ROOT: 3.1 CM
AORTIC VALVE MEAN VELOCITY: 18.5 M/S
APICAL FOUR CHAMBER EJECTION FRACTION: 70 %
AV AREA BY CONTINUOUS VTI: 1.2 CM2
AV AREA PEAK VELOCITY: 1 CM2
AV LVOT MEAN GRADIENT: 2 MMHG
AV LVOT PEAK GRADIENT: 3 MMHG
AV MEAN GRADIENT: 16 MMHG
AV PEAK GRADIENT: 32 MMHG
AV VALVE AREA: 1.2 CM2
AV VELOCITY RATIO: 0.31
BSA FOR ECHO PROCEDURE: 1.66 M2
DOP CALC AO PEAK VEL: 2.85 M/S
DOP CALC AO VTI: 55.52 CM
DOP CALC LVOT AREA: 3.14 CM2
DOP CALC LVOT CARDIAC INDEX: 2.68 L/MIN/M2
DOP CALC LVOT CARDIAC OUTPUT: 4.44 L/MIN
DOP CALC LVOT DIAMETER: 2 CM
DOP CALC LVOT PEAK VEL VTI: 21.28 CM
DOP CALC LVOT PEAK VEL: 0.88 M/S
DOP CALC LVOT STROKE INDEX: 38.6 ML/M2
DOP CALC LVOT STROKE VOLUME: 64
DOP CALC MV VTI: 22.91 CM
E WAVE DECELERATION TIME: 179 MS
E/A RATIO: 0.49
FRACTIONAL SHORTENING: 32 (ref 28–44)
INTERVENTRICULAR SEPTUM IN DIASTOLE (PARASTERNAL SHORT AXIS VIEW): 1.3 CM
INTERVENTRICULAR SEPTUM: 1.3 CM (ref 0.6–1.1)
LAAS-AP2: 16.5 CM2
LAAS-AP4: 17.4 CM2
LEFT ATRIUM SIZE: 3.9 CM
LEFT ATRIUM VOLUME (MOD BIPLANE): 46 ML
LEFT ATRIUM VOLUME INDEX (MOD BIPLANE): 27.7 ML/M2
LEFT INTERNAL DIMENSION IN SYSTOLE: 2.8 CM (ref 2.1–4)
LEFT VENTRICLE DIASTOLIC VOLUME (MOD BIPLANE): 90 ML
LEFT VENTRICLE DIASTOLIC VOLUME INDEX (MOD BIPLANE): 54.2 ML/M2
LEFT VENTRICLE SYSTOLIC VOLUME (MOD BIPLANE): 29 ML
LEFT VENTRICLE SYSTOLIC VOLUME INDEX (MOD BIPLANE): 17.5 ML/M2
LEFT VENTRICULAR INTERNAL DIMENSION IN DIASTOLE: 4.1 CM (ref 3.5–6)
LEFT VENTRICULAR POSTERIOR WALL IN END DIASTOLE: 1 CM
LEFT VENTRICULAR STROKE VOLUME: 47 ML
LV EF: 68 %
LVSV (TEICH): 47 ML
MV E'TISSUE VEL-LAT: 4 CM/S
MV E'TISSUE VEL-SEP: 4 CM/S
MV MEAN GRADIENT: 2 MMHG
MV PEAK A VEL: 1.18 M/S
MV PEAK E VEL: 58 CM/S
MV PEAK GRADIENT: 6 MMHG
MV STENOSIS PRESSURE HALF TIME: 52 MS
MV VALVE AREA BY CONTINUITY EQUATION: 2.92 CM2
MV VALVE AREA P 1/2 METHOD: 4.2
RIGHT ATRIAL 2D VOLUME: 45 ML
RIGHT ATRIUM AREA SYSTOLE A4C: 17.2 CM2
RIGHT VENTRICLE ID DIMENSION: 3.9 CM
SL CV LEFT ATRIUM LENGTH A2C: 5 CM
SL CV LV EF: 55
SL CV PED ECHO LEFT VENTRICLE DIASTOLIC VOLUME (MOD BIPLANE) 2D: 76 ML
SL CV PED ECHO LEFT VENTRICLE SYSTOLIC VOLUME (MOD BIPLANE) 2D: 29 ML
TR MAX PG: 35 MMHG
TR PEAK VELOCITY: 3 M/S
TRICUSPID ANNULAR PLANE SYSTOLIC EXCURSION: 2.1 CM
TRICUSPID VALVE PEAK REGURGITATION VELOCITY: 2.96 M/S

## 2024-10-05 LAB
25(OH)D3+25(OH)D2 SERPL-MCNC: 57.7 NG/ML (ref 30–100)
MAGNESIUM SERPL-MCNC: 2.4 MG/DL (ref 1.6–2.3)
PHOSPHATE SERPL-MCNC: 3.9 MG/DL (ref 3–4.3)
PTH-INTACT SERPL-MCNC: 107 PG/ML (ref 15–65)

## 2024-10-07 DIAGNOSIS — R11.2 NAUSEA AND VOMITING: ICD-10-CM

## 2024-10-08 RX ORDER — FAMOTIDINE 20 MG/1
20 TABLET, FILM COATED ORAL
Qty: 90 TABLET | Refills: 1 | Status: SHIPPED | OUTPATIENT
Start: 2024-10-08

## 2024-10-10 ENCOUNTER — TELEPHONE (OUTPATIENT)
Age: 89
End: 2024-10-10

## 2024-10-10 ENCOUNTER — TELEPHONE (OUTPATIENT)
Dept: NEPHROLOGY | Facility: CLINIC | Age: 89
End: 2024-10-10

## 2024-10-10 NOTE — TELEPHONE ENCOUNTER
Called to see if patient wanted to reschedule to a sooner appointment that opened with Lidia Waller in [facility] Friday 10/11.    Too soon of a notice, unable.

## 2024-10-10 NOTE — TELEPHONE ENCOUNTER
[facility] calling to get an appt scheduled for a 40 minute appointment related to osteopetrosis. Call was transferred to clerical staff.

## 2024-10-17 ENCOUNTER — OFFICE VISIT (OUTPATIENT)
Dept: FAMILY MEDICINE CLINIC | Facility: HOSPITAL | Age: 89
End: 2024-10-17
Payer: COMMERCIAL

## 2024-10-17 ENCOUNTER — TELEPHONE (OUTPATIENT)
Dept: ADMINISTRATIVE | Facility: OTHER | Age: 89
End: 2024-10-17

## 2024-10-17 VITALS
DIASTOLIC BLOOD PRESSURE: 90 MMHG | BODY MASS INDEX: 27.02 KG/M2 | WEIGHT: 146.8 LBS | TEMPERATURE: 98 F | HEIGHT: 62 IN | HEART RATE: 73 BPM | OXYGEN SATURATION: 94 % | SYSTOLIC BLOOD PRESSURE: 130 MMHG

## 2024-10-17 DIAGNOSIS — I35.0 NONRHEUMATIC AORTIC VALVE STENOSIS: ICD-10-CM

## 2024-10-17 DIAGNOSIS — J96.11 CHRONIC RESPIRATORY FAILURE WITH HYPOXIA (HCC): ICD-10-CM

## 2024-10-17 DIAGNOSIS — Z23 ENCOUNTER FOR IMMUNIZATION: ICD-10-CM

## 2024-10-17 DIAGNOSIS — Z00.00 MEDICARE ANNUAL WELLNESS VISIT, SUBSEQUENT: ICD-10-CM

## 2024-10-17 DIAGNOSIS — I10 PRIMARY HYPERTENSION: ICD-10-CM

## 2024-10-17 DIAGNOSIS — Z79.4 TYPE 2 DIABETES MELLITUS WITH STAGE 3B CHRONIC KIDNEY DISEASE, WITH LONG-TERM CURRENT USE OF INSULIN (HCC): Primary | ICD-10-CM

## 2024-10-17 DIAGNOSIS — N18.32 TYPE 2 DIABETES MELLITUS WITH STAGE 3B CHRONIC KIDNEY DISEASE, WITH LONG-TERM CURRENT USE OF INSULIN (HCC): Primary | ICD-10-CM

## 2024-10-17 DIAGNOSIS — N39.3 FEMALE STRESS INCONTINENCE: ICD-10-CM

## 2024-10-17 DIAGNOSIS — E11.22 TYPE 2 DIABETES MELLITUS WITH STAGE 3B CHRONIC KIDNEY DISEASE, WITH LONG-TERM CURRENT USE OF INSULIN (HCC): Primary | ICD-10-CM

## 2024-10-17 DIAGNOSIS — M80.00XD AGE-RELATED OSTEOPOROSIS WITH CURRENT PATHOLOGICAL FRACTURE WITH ROUTINE HEALING: ICD-10-CM

## 2024-10-17 DIAGNOSIS — S32.592A CLOSED FRACTURE OF MULTIPLE PUBIC RAMI, LEFT, INITIAL ENCOUNTER (HCC): ICD-10-CM

## 2024-10-17 PROCEDURE — 90662 IIV NO PRSV INCREASED AG IM: CPT

## 2024-10-17 PROCEDURE — 99214 OFFICE O/P EST MOD 30 MIN: CPT | Performed by: INTERNAL MEDICINE

## 2024-10-17 PROCEDURE — G0439 PPPS, SUBSEQ VISIT: HCPCS | Performed by: INTERNAL MEDICINE

## 2024-10-17 PROCEDURE — G0008 ADMIN INFLUENZA VIRUS VAC: HCPCS

## 2024-10-17 NOTE — ASSESSMENT & PLAN NOTE
Lab Results   Component Value Date    HGBA1C 7.3 (H) 09/10/2024   DM type 2 with hyperglycemia/nephropathy- CKD stage IV and long term insulin use - uncontrolled but acceptable given age at 7.3 - cont meds/labs and f/u as per RAZA Younger on DM foot exam (9/24) and eye exam (7/23 -0 2 yrs), she is on a statin and ASA but no ACE/ARB, she follows with Nephro    Orders:    Ambulatory referral to Endocrinology; Future

## 2024-10-17 NOTE — ASSESSMENT & PLAN NOTE
Saw Ortho for f/u and Xrays were done - WBAT and f/u prn, call with recurrent falls, discussed osteoporosis and Dexa results - see below        Vaginal Yeast Infection, Adult    Vaginal yeast infection is a condition that causes vaginal discharge as well as soreness, swelling, and redness (inflammation) of the vagina. This is a common condition. Some women get this infection frequently.  What are the causes?  This condition is caused by a change in the normal balance of the yeast (candida) and bacteria that live in the vagina. This change causes an overgrowth of yeast, which causes the inflammation.  What increases the risk?  The condition is more likely to develop in women who:  · Take antibiotic medicines.  · Have diabetes.  · Take birth control pills.  · Are pregnant.  · Douche often.  · Have a weak body defense system (immune system).  · Have been taking steroid medicines for a long time.  · Frequently wear tight clothing.  What are the signs or symptoms?  Symptoms of this condition include:  · White, thick, creamy vaginal discharge.  · Swelling, itching, redness, and irritation of the vagina. The lips of the vagina (vulva) may be affected as well.  · Pain or a burning feeling while urinating.  · Pain during sex.  How is this diagnosed?  This condition is diagnosed based on:  · Your medical history.  · A physical exam.  · A pelvic exam. Your health care provider will examine a sample of your vaginal discharge under a microscope. Your health care provider may send this sample for testing to confirm the diagnosis.  How is this treated?  This condition is treated with medicine. Medicines may be over-the-counter or prescription. You may be told to use one or more of the following:  · Medicine that is taken by mouth (orally).  · Medicine that is applied as a cream (topically).  · Medicine that is inserted directly into the vagina (suppository).  Follow these instructions at home:    Lifestyle  · Do not have sex until your health care provider approves. Tell your sex partner that you have a yeast infection. That person should go to his or her health care  provider and ask if they should also be treated.  · Do not wear tight clothes, such as pantyhose or tight pants.  · Wear breathable cotton underwear.  General instructions  · Take or apply over-the-counter and prescription medicines only as told by your health care provider.  · Eat more yogurt. This may help to keep your yeast infection from returning.  · Do not use tampons until your health care provider approves.  · Try taking a sitz bath to help with discomfort. This is a warm water bath that is taken while you are sitting down. The water should only come up to your hips and should cover your buttocks. Do this 3-4 times per day or as told by your health care provider.  · Do not douche.  · If you have diabetes, keep your blood sugar levels under control.  · Keep all follow-up visits as told by your health care provider. This is important.  Contact a health care provider if:  · You have a fever.  · Your symptoms go away and then return.  · Your symptoms do not get better with treatment.  · Your symptoms get worse.  · You have new symptoms.  · You develop blisters in or around your vagina.  · You have blood coming from your vagina and it is not your menstrual period.  · You develop pain in your abdomen.  Summary  · Vaginal yeast infection is a condition that causes discharge as well as soreness, swelling, and redness (inflammation) of the vagina.  · This condition is treated with medicine. Medicines may be over-the-counter or prescription.  · Take or apply over-the-counter and prescription medicines only as told by your health care provider.  · Do not douche. Do not have sex or use tampons until your health care provider approves.  · Contact a health care provider if your symptoms do not get better with treatment or your symptoms go away and then return.  This information is not intended to replace advice given to you by your health care provider. Make sure you discuss any questions you have with your health care  provider.  Document Revised: 07/17/2020 Document Reviewed: 05/06/2019  Elsevier Patient Education © 2020 [employer]

## 2024-10-17 NOTE — PROGRESS NOTES
Ambulatory Visit  Name: Ena Ahumada      : 1935      MRN: [id number]  Encounter Provider: Deanna Castaneda DO  Encounter Date: 10/17/2024   Encounter department: [facility] PRIMARY CARE [address]     Assessment & Plan  Type 2 diabetes mellitus with stage 3b chronic kidney disease, with long-term current use of insulin ([facility])    Lab Results   Component Value Date    HGBA1C 7.3 (H) 09/10/2024   DM type 2 with hyperglycemia/nephropathy- CKD stage IV and long term insulin use - uncontrolled but acceptable given age at 7.3 - cont meds/labs and f/u as per Endo, UTD on DM foot exam () and eye exam ( -0 2 yrs), she is on a statin and ASA but no ACE/ARB, she follows with Nephro    Orders:    Ambulatory referral to Endocrinology; Future    Closed fracture of multiple pubic rami, left, initial encounter ([facility])  Saw Ortho for f/u and Xrays were done - WBAT and f/u prn, call with recurrent falls, discussed osteoporosis and Dexa results - see below       Nonrheumatic aortic valve stenosis  Just saw Cardio and had Echo, con't f/u as per Cardio, call with CV symptoms       Primary hypertension  Morning BP's still elevated with average SBP in the 170's, afternoon BP good, urged pt/family to drop BP readings off to Cardio as Dr. Hill did mention poss adding Spironolactone if BP not better, discussed how only one physician/office should adjust BP and would defer to Cardio at this time, will follow       Age-related osteoporosis with current pathological fracture with routine healing  On Vit D but not on Ca d/t  h/o hypercalcemia, has f/u with Endo to discuss further tx       Chronic respiratory failure with hypoxia ([facility])  Saw Pulm and 6MWT was nml and O2 stopped, pt was admitted and reportedly restarted on qhs O2 2 L via NC, asking when this can be stopped - overnight pulse ox testing ordered  Orders:    Pulse oximetry overnight    Encounter for immunization    Orders:    influenza vaccine,  high-dose, PF 0.5 mL (Fluzone High Dose)    Medicare annual wellness visit, subsequent         BMI 27.0-27.9,adult  Healthy diet and keeping active encouraged       Female stress incontinence  Kegel exercises and lifestyle changes encouraged, call with s/sx of UTI's         Depression Screening and Follow-up Plan: Patient was screened for depression during today's encounter. They screened negative with a PHQ-2 score of 0.    Falls Plan of Care: balance, strength, and gait training instructions were provided. Home safety education provided.     Urinary Incontinence Plan of Care: counseling topics discussed: practice Kegel (pelvic floor strengthening) exercises, use restroom every 2 hours, limit alcohol, caffeine, spicy foods, and acidic foods, limiting fluid intake 3-4 hours before bed and preventing constipation.       Preventive health issues were discussed with patient, and age appropriate screening tests were ordered as noted in patient's After Visit Summary. Personalized health advice and appropriate referrals for health education or preventive services given if needed, as noted in patient's After Visit Summary.    History of Present Illness     HPI  Pt here for follow up appt/BW results and AWV    BW results were d/w pt in detail: FBS/A1C 151/7.3, BUN/Cr 30/1.94 (GFR 24), na 145, Alk Phos 128, , rest of CMP/TSH/FLP were wnl     Def of controlled vs uncontrolled DM was reviewed.  Diet was reviewed - wgt .  She is taking her DM meds as directed by Endo - she saw Dr. Max in Sept - OV note reviewed.  No meds were changed.  She con't to check her BS once a day.  F/u BW was ordered at Endo appt.  She is UTD on DM foot exam (9/24) and eye exam (9/24).  She has f/u with Nephro in Dec for her CKD.  She is on statin but no ACE or ARB.     Pt saw Ortho (Dr. Deleon) in Sept for f/u recent L pubic ramus fx - OV note reviewed.  Xrays were done and reviewed.  She was told WBAT and f/u prn.  She notes no pain in  pelvis/groin. She still has some low back pain.     Pt saw Cardio (Dr. Hill) in Sept for f/u HTN/AS/PVC's - OV note reviewed.  Her BP was high and the timing of when she was to take which BP meds was changed around. Discussed adding Spironolactone if BP remains elevated.  Home BP readings reviewed and am readings still averaging around 170's. Afternoon BP readings look great.  An Echo was ordered and was done early Oct - EF 55% and mild AS, mild MR and mild TR was noted.     Dexa scan was ordered by Endo and results showed progressive loss in BMD at both the L hip and lumbar spine. She has f/u with Endo to discuss this further.     Pt and family asking about getting off O2 via NC qhs. She saw Pulm and it was stopped in June but she was  hospitalized with fracture the following week and was told levels dropped at night again and was restated on O2 qhs. She is using 2L via NC qhs and is asking about getting off of it.      Patient Care Team:  Deanna Castaneda DO as PCP - General  Evelyn Max MD as PCP - Endocrinology (Endocrinology)  MD Trena Miller DO (Nephrology)  Anup Liang MD (Ophthalmology)  Balta Serna MD (Dermatology)  Ethel Arthur RD as Diabetes Educator (Dietician)  Sylvain Love PA-C (Endocrinology)  KANA Godinez as Nurse Practitioner (Nephrology)    Review of Systems   Constitutional:  Negative for chills, fever and unexpected weight change.   HENT:  Negative for congestion and trouble swallowing.    Eyes:  Negative for pain and visual disturbance.   Respiratory:  Negative for cough, shortness of breath and wheezing.    Cardiovascular:  Negative for chest pain, palpitations and leg swelling.   Gastrointestinal:  Negative for abdominal pain, blood in stool, constipation, diarrhea, nausea and vomiting.   Genitourinary:  Negative for difficulty urinating, dysuria, vaginal bleeding and vaginal pain.   Musculoskeletal:  Positive for back pain. Negative  for gait problem and neck pain.   Skin:  Negative for rash and wound.   Neurological:  Negative for dizziness and headaches.   Hematological:  Does not bruise/bleed easily.   Psychiatric/Behavioral:  Positive for sleep disturbance. Negative for confusion and dysphoric mood.      Medical History Reviewed by provider this encounter:  Tobacco  Allergies  Meds  Problems  Med Hx  Surg Hx  Fam Hx       Annual Wellness Visit Questionnaire   Ena is here for her Subsequent Wellness visit. Last Medicare Wellness visit information reviewed, patient interviewed and updates made to the record today.      Health Risk Assessment:   Patient rates overall health as good. Patient feels that their physical health rating is slightly worse. Patient is satisfied with their life. Eyesight was rated as slightly worse. Hearing was rated as same. Patient feels that their emotional and mental health rating is slightly better. Patients states they are never, rarely angry. Patient states they are sometimes unusually tired/fatigued. Pain experienced in the last 7 days has been some. Patient's pain rating has been 5/10. Patient states that she has experienced no weight loss or gain in last 6 months. Physical health worse with recent pelvic fx and continued back pain     Depression Screening:   PHQ-2 Score: 0      Fall Risk Screening:   In the past year, patient has experienced: history of falling in past year    Number of falls: 1  Injured during fall?: Yes    Feels unsteady when standing or walking?: Yes    Worried about falling?: Yes      Urinary Incontinence Screening:   Patient has leaked urine accidently in the last six months. She notes stress incontinence    Home Safety:  Patient does not have trouble with stairs inside or outside of their home. Patient has working smoke alarms and has working carbon monoxide detector. Home safety hazards include: none.     Nutrition:   Current diet is Diabetic.     Medications:   Patient is  currently taking over-the-counter supplements. OTC medications include: see medication list. Patient is able to manage medications.     Activities of Daily Living (ADLs)/Instrumental Activities of Daily Living (IADLs):   Walk and transfer into and out of bed and chair?: Yes  Dress and groom yourself?: Yes    Bathe or shower yourself?: Yes    Feed yourself? Yes  Do your laundry/housekeeping?: Yes  Manage your money, pay your bills and track your expenses?: Yes  Make your own meals?: Yes    Do your own shopping?: Yes    Durable Medical Equipment Suppliers  [employer]    Previous Hospitalizations:   Any hospitalizations or ED visits within the last 12 months?: Yes    How many hospitalizations have you had in the last year?: 3-4    Advance Care Planning:   Living will: No    Durable POA for healthcare: No    Advanced directive: No      Cognitive Screening:   Provider or family/friend/caregiver concerned regarding cognition?: No    PREVENTIVE SCREENINGS      Cardiovascular Screening:    General: Screening Current, Risks and Benefits Discussed and History Lipid Disorder      Diabetes Screening:     General: History Diabetes, Risks and Benefits Discussed and Screening Current      Colorectal Cancer Screening:     General: Screening Not Indicated and Risks and Benefits Discussed      Breast Cancer Screening:     General: Risks and Benefits Discussed and Screening Not Indicated      Cervical Cancer Screening:    General: Screening Not Indicated and Risks and Benefits Discussed      Osteoporosis Screening:    General: History Osteoporosis, Risks and Benefits Discussed and Screening Current      Abdominal Aortic Aneurysm (AAA) Screening:        General: Risks and Benefits Discussed and Screening Not Indicated      Lung Cancer Screening:     General: Screening Not Indicated and Risks and Benefits Discussed      Hepatitis C Screening:    General: Risks and Benefits Discussed and Screening Not Indicated    Screening, Brief  Intervention, and Referral to Treatment (SBIRT)    Screening  Typical number of drinks in a day: 0  Typical number of drinks in a week: 1  Interpretation: Low risk drinking behavior.    AUDIT-C Screenin) How often did you have a drink containing alcohol in the past year? 2 to 4 times a month  2) How many drinks did you have on a typical day when you were drinking in the past year? 0  3) How often did you have 6 or more drinks on one occasion in the past year? never    AUDIT-C Score: 2  Interpretation: Score 0-2 (female): Negative screen for alcohol misuse    Single Item Drug Screening:  How often have you used an illegal drug (including marijuana) or a prescription medication for non-medical reasons in the past year? never    Single Item Drug Screen Score: 0  Interpretation: Negative screen for possible drug use disorder    Other Counseling Topics:   Regular weightbearing exercise and calcium and vitamin D intake.     Social Determinants of Health     Financial Resource Strain: Low Risk  (2023)    Overall Financial Resource Strain (CARDIA)     Difficulty of Paying Living Expenses: Not very hard   Food Insecurity: No Food Insecurity (10/14/2024)    Hunger Vital Sign     Worried About Running Out of Food in the Last Year: Never true     Ran Out of Food in the Last Year: Never true   Transportation Needs: No Transportation Needs (10/14/2024)    PRAPARE - Transportation     Lack of Transportation (Medical): No     Lack of Transportation (Non-Medical): No   Housing Stability: Low Risk  (10/14/2024)    Housing Stability Vital Sign     Unable to Pay for Housing in the Last Year: No     Number of Times Moved in the Last Year: 0     Homeless in the Last Year: No   Utilities: Not At Risk (10/14/2024)    [facility] Utilities     Threatened with loss of utilities: No     Vision Screening    Right eye Left eye Both eyes   Without correction      With correction 20/30 20/40 20/25       Objective     /90   Pulse 73    "Temp 98 °F (36.7 °C)   Ht 5' 1.5\" (1.562 m)   Wt 66.6 kg (146 lb 12.8 oz)   SpO2 94%   BMI 27.29 kg/m²     Physical Exam  Vitals and nursing note reviewed.   Constitutional:       General: She is not in acute distress.     Appearance: She is well-developed. She is not ill-appearing.   HENT:      Head: Normocephalic and atraumatic.      Right Ear: Tympanic membrane and external ear normal. There is no impacted cerumen.      Left Ear: Tympanic membrane and external ear normal. There is no impacted cerumen.      Mouth/Throat:      Mouth: Mucous membranes are moist.      Pharynx: Oropharynx is clear. No oropharyngeal exudate.   Eyes:      General:         Right eye: No discharge.         Left eye: No discharge.      Conjunctiva/sclera: Conjunctivae normal.   Neck:      Thyroid: No thyromegaly.      Trachea: No tracheal deviation.   Cardiovascular:      Rate and Rhythm: Normal rate and regular rhythm.      Heart sounds: Murmur heard.   Pulmonary:      Effort: Pulmonary effort is normal. No respiratory distress.      Breath sounds: Normal breath sounds. No wheezing, rhonchi or rales.   Abdominal:      General: There is no distension.      Palpations: Abdomen is soft.      Tenderness: There is no abdominal tenderness. There is no guarding or rebound.   Musculoskeletal:         General: No deformity or signs of injury.      Cervical back: Neck supple.   Lymphadenopathy:      Cervical: No cervical adenopathy.   Skin:     General: Skin is warm and dry.      Coloration: Skin is not pale.      Findings: No rash.   Neurological:      General: No focal deficit present.      Mental Status: She is alert. Mental status is at baseline.      Motor: No abnormal muscle tone.      Gait: Gait normal.   Psychiatric:         Mood and Affect: Mood normal.         Behavior: Behavior normal.         Thought Content: Thought content normal.         Judgment: Judgment normal.         "

## 2024-10-17 NOTE — ASSESSMENT & PLAN NOTE
Saw Pulm and 6MWT was nml and O2 stopped, pt was admitted and reportedly restarted on qhs O2 2 L via NC, asking when this can be stopped - overnight pulse ox testing ordered  Orders:    Pulse oximetry overnight

## 2024-10-17 NOTE — TELEPHONE ENCOUNTER
Upon review of the In Basket request we have found that the patient has aged out of the measure.     Any additional questions or concerns should be emailed to the Practice Liaisons via the appropriate education email address, please do not reply via In Basket.    Thank you  Balta Gil MA   [facility]

## 2024-10-17 NOTE — PATIENT INSTRUCTIONS
Medicare Preventive Visit Patient Instructions  Thank you for completing your Welcome to Medicare Visit or Medicare Annual Wellness Visit today. Your next wellness visit will be due in one year (10/18/2025).  The screening/preventive services that you may require over the next 5-10 years are detailed below. Some tests may not apply to you based off risk factors and/or age. Screening tests ordered at today's visit but not completed yet may show as past due. Also, please note that scanned in results may not display below.  Preventive Screenings:  Service Recommendations Previous Testing/Comments   Colorectal Cancer Screening  * Colonoscopy    * Fecal Occult Blood Test (FOBT)/Fecal Immunochemical Test (FIT)  * Fecal DNA/Cologuard Test  * Flexible Sigmoidoscopy Age: 45-75 years old   Colonoscopy: every 10 years (may be performed more frequently if at higher risk)  OR  FOBT/FIT: every 1 year  OR  Cologuard: every 3 years  OR  Sigmoidoscopy: every 5 years  Screening may be recommended earlier than age 45 if at higher risk for colorectal cancer. Also, an individualized decision between you and your healthcare provider will decide whether screening between the ages of 76-85 would be appropriate. Colonoscopy: Not on file  FOBT/FIT: 03/21/2024  Cologuard: Not on file  Sigmoidoscopy: Not on file    Screening Not Indicated  Risks and Benefits Discussed     Breast Cancer Screening Age: 40+ years old  Frequency: every 1-2 years  Not required if history of left and right mastectomy Mammogram: 10/01/2021    Risks and Benefits Discussed  Screening Not Indicated   Cervical Cancer Screening Between the ages of 21-29, pap smear recommended once every 3 years.   Between the ages of 30-65, can perform pap smear with HPV co-testing every 5 years.   Recommendations may differ for women with a history of total hysterectomy, cervical cancer, or abnormal pap smears in past. Pap Smear: Not on file    Screening Not Indicated  Risks and Benefits  Problem: MOBILITY - ADULT  Goal: Maintain or return to baseline ADL function  Description: INTERVENTIONS:  -  Assess patient's ability to carry out ADLs; assess patient's baseline for ADL function and identify physical deficits which impact ability to perform ADLs (bathing, care of mouth/teeth, toileting, grooming, dressing, etc )  - Assess/evaluate cause of self-care deficits   - Assess range of motion  - Assess patient's mobility; develop plan if impaired  - Assess patient's need for assistive devices and provide as appropriate  - Encourage maximum independence but intervene and supervise when necessary  - Involve family in performance of ADLs  - Assess for home care needs following discharge   - Consider OT consult to assist with ADL evaluation and planning for discharge  - Provide patient education as appropriate  Outcome: Progressing  Goal: Maintains/Returns to pre admission functional level  Description: INTERVENTIONS:  - Perform BMAT or MOVE assessment daily    - Set and communicate daily mobility goal to care team and patient/family/caregiver  - Collaborate with rehabilitation services on mobility goals if consulted  - Perform Range of Motion 4 times a day  - Reposition patient every 2 hours    - Dangle patient 4 times a day  - Stand patient 4 times a day  - Ambulate patient 4 times a day  - Out of bed to chair 4 times a day   - Out of bed for meals 4 times a day  - Out of bed for toileting  - Record patient progress and toleration of activity level   Outcome: Progressing     Problem: Prexisting or High Potential for Compromised Skin Integrity  Goal: Skin integrity is maintained or improved  Description: INTERVENTIONS:  - Identify patients at risk for skin breakdown  - Assess and monitor skin integrity  - Assess and monitor nutrition and hydration status  - Monitor labs   - Assess for incontinence   - Turn and reposition patient  - Assist with mobility/ambulation  - Relieve pressure over bony Discussed   Hepatitis C Screening Once for adults born between 1945 and 1965  More frequently in patients at high risk for Hepatitis C Hep C Antibody: Not on file    Risks and Benefits Discussed  Screening Not Indicated   Diabetes Screening 1-2 times per year if you're at risk for diabetes or have pre-diabetes Fasting glucose: 109 mg/dL (5/12/2024)  A1C: 7.3 % (9/10/2024)  History Diabetes  Risks and Benefits Discussed  Screening Current   Cholesterol Screening Once every 5 years if you don't have a lipid disorder. May order more often based on risk factors. Lipid panel: 09/10/2024    Screening Current  Risks and Benefits Discussed  History Lipid Disorder     Other Preventive Screenings Covered by Medicare:  Abdominal Aortic Aneurysm (AAA) Screening: covered once if your at risk. You're considered to be at risk if you have a family history of AAA.  Lung Cancer Screening: covers low dose CT scan once per year if you meet all of the following conditions: (1) Age 55-77; (2) No signs or symptoms of lung cancer; (3) Current smoker or have quit smoking within the last 15 years; (4) You have a tobacco smoking history of at least 20 pack years (packs per day multiplied by number of years you smoked); (5) You get a written order from a healthcare provider.  Glaucoma Screening: covered annually if you're considered high risk: (1) You have diabetes OR (2) Family history of glaucoma OR (3)  aged 50 and older OR (4)  American aged 65 and older  Osteoporosis Screening: covered every 2 years if you meet one of the following conditions: (1) You're estrogen deficient and at risk for osteoporosis based off medical history and other findings; (2) Have a vertebral abnormality; (3) On glucocorticoid therapy for more than 3 months; (4) Have primary hyperparathyroidism; (5) On osteoporosis medications and need to assess response to drug therapy.   Last bone density test (DXA Scan): 09/18/2024.  HIV Screening:  prominences  - Avoid friction and shearing  - Provide appropriate hygiene as needed including keeping skin clean and dry  - Evaluate need for skin moisturizer/barrier cream  - Collaborate with interdisciplinary team   - Patient/family teaching  - Consider wound care consult   Outcome: Progressing     Problem: PAIN - ADULT  Goal: Verbalizes/displays adequate comfort level or baseline comfort level  Description: Interventions:  - Encourage patient to monitor pain and request assistance  - Assess pain using appropriate pain scale  - Administer analgesics based on type and severity of pain and evaluate response  - Implement non-pharmacological measures as appropriate and evaluate response  - Consider cultural and social influences on pain and pain management  - Notify physician/advanced practitioner if interventions unsuccessful or patient reports new pain  Outcome: Progressing     Problem: INFECTION - ADULT  Goal: Absence or prevention of progression during hospitalization  Description: INTERVENTIONS:  - Assess and monitor for signs and symptoms of infection  - Monitor lab/diagnostic results  - Monitor all insertion sites, i e  indwelling lines, tubes, and drains  - Monitor endotracheal if appropriate and nasal secretions for changes in amount and color  - Sharon appropriate cooling/warming therapies per order  - Administer medications as ordered  - Instruct and encourage patient and family to use good hand hygiene technique  - Identify and instruct in appropriate isolation precautions for identified infection/condition  Outcome: Progressing     Problem: SAFETY ADULT  Goal: Maintain or return to baseline ADL function  Description: INTERVENTIONS:  -  Assess patient's ability to carry out ADLs; assess patient's baseline for ADL function and identify physical deficits which impact ability to perform ADLs (bathing, care of mouth/teeth, toileting, grooming, dressing, etc )  - Assess/evaluate cause of self-care deficits - Assess range of motion  - Assess patient's mobility; develop plan if impaired  - Assess patient's need for assistive devices and provide as appropriate  - Encourage maximum independence but intervene and supervise when necessary  - Involve family in performance of ADLs  - Assess for home care needs following discharge   - Consider OT consult to assist with ADL evaluation and planning for discharge  - Provide patient education as appropriate  Outcome: Progressing  Goal: Maintains/Returns to pre admission functional level  Description: INTERVENTIONS:  - Perform BMAT or MOVE assessment daily    - Set and communicate daily mobility goal to care team and patient/family/caregiver  - Collaborate with rehabilitation services on mobility goals if consulted  - Perform Range of Motion 4 times a day  - Reposition patient every 2 hours    - Dangle patient 4 times a day  - Stand patient 4 times a day  - Ambulate patient 4 times a day  - Out of bed to chair 4 times a day   - Out of bed for meals 4 times a day  - Out of bed for toileting  - Record patient progress and toleration of activity level   Outcome: Progressing  Goal: Patient will remain free of falls  Description: INTERVENTIONS:  - Educate patient/family on patient safety including physical limitations  - Instruct patient to call for assistance with activity   - Consult OT/PT to assist with strengthening/mobility   - Keep Call bell within reach  - Keep bed low and locked with side rails adjusted as appropriate  - Keep care items and personal belongings within reach  - Initiate and maintain comfort rounds  - Make Fall Risk Sign visible to staff  - Offer Toileting every 2 Hours, in advance of need  - Initiate/Maintain bed alarm  - Obtain necessary fall risk management equipment:   - Apply yellow socks and bracelet for high fall risk patients  - Consider moving patient to room near nurses station  Outcome: Progressing     Problem: Nutrition/Hydration-ADULT  Goal: covered annually if you're between the age of 15-65. Also covered annually if you are younger than 15 and older than 65 with risk factors for HIV infection. For pregnant patients, it is covered up to 3 times per pregnancy.    Immunizations:  Immunization Recommendations   Influenza Vaccine Annual influenza vaccination during flu season is recommended for all persons aged >= 6 months who do not have contraindications   Pneumococcal Vaccine   * Pneumococcal conjugate vaccine = PCV13 (Prevnar 13), PCV15 (Vaxneuvance), PCV20 (Prevnar 20)  * Pneumococcal polysaccharide vaccine = PPSV23 (Pneumovax) Adults 19-65 yo with certain risk factors or if 65+ yo  If never received any pneumonia vaccine: recommend Prevnar 20 (PCV20)  Give PCV20 if previously received 1 dose of PCV13 or PPSV23   Hepatitis B Vaccine 3 dose series if at intermediate or high risk (ex: diabetes, end stage renal disease, liver disease)   Respiratory syncytial virus (RSV) Vaccine - COVERED BY MEDICARE PART D  * RSVPreF3 (Arexvy) CDC recommends that adults 60 years of age and older may receive a single dose of RSV vaccine using shared clinical decision-making (SCDM)   Tetanus (Td) Vaccine - COST NOT COVERED BY MEDICARE PART B Following completion of primary series, a booster dose should be given every 10 years to maintain immunity against tetanus. Td may also be given as tetanus wound prophylaxis.   Tdap Vaccine - COST NOT COVERED BY MEDICARE PART B Recommended at least once for all adults. For pregnant patients, recommended with each pregnancy.   Shingles Vaccine (Shingrix) - COST NOT COVERED BY MEDICARE PART B  2 shot series recommended in those 19 years and older who have or will have weakened immune systems or those 50 years and older     Health Maintenance Due:  There are no preventive care reminders to display for this patient.  Immunizations Due:      Topic Date Due   • COVID-19 Vaccine (5 - 2023-24 season) 09/01/2024     Advance Directives   What  Nutrient/Hydration intake appropriate for improving, restoring or maintaining nutritional needs  Description: Monitor and assess patient's nutrition/hydration status for malnutrition  Collaborate with interdisciplinary team and initiate plan and interventions as ordered  Monitor patient's weight and dietary intake as ordered or per policy  Utilize nutrition screening tool and intervene as necessary  Determine patient's food preferences and provide high-protein, high-caloric foods as appropriate       INTERVENTIONS:  - Monitor oral intake, urinary output, labs, and treatment plans  - Assess nutrition and hydration status and recommend course of action  - Evaluate amount of meals eaten  - Assist patient with eating if necessary   - Allow adequate time for meals  - Recommend/ encourage appropriate diets, oral nutritional supplements, and vitamin/mineral supplements  - Order, calculate, and assess calorie counts as needed  - Recommend, monitor, and adjust tube feedings and TPN/PPN based on assessed needs  - Assess need for intravenous fluids  - Provide specific nutrition/hydration education as appropriate  - Include patient/family/caregiver in decisions related to nutrition  Outcome: Progressing are advance directives?  Advance directives are legal documents that state your wishes and plans for medical care. These plans are made ahead of time in case you lose your ability to make decisions for yourself. Advance directives can apply to any medical decision, such as the treatments you want, and if you want to donate organs.   What are the types of advance directives?  There are many types of advance directives, and each state has rules about how to use them. You may choose a combination of any of the following:  Living will:  This is a written record of the treatment you want. You can also choose which treatments you do not want, which to limit, and which to stop at a certain time. This includes surgery, medicine, IV fluid, and tube feedings.   Durable power of  for healthcare (DPAHC):  This is a written record that states who you want to make healthcare choices for you when you are unable to make them for yourself. This person, called a proxy, is usually a family member or a friend. You may choose more than 1 proxy.  Do not resuscitate (DNR) order:  A DNR order is used in case your heart stops beating or you stop breathing. It is a request not to have certain forms of treatment, such as CPR. A DNR order may be included in other types of advance directives.  Medical directive:  This covers the care that you want if you are in a coma, near death, or unable to make decisions for yourself. You can list the treatments you want for each condition. Treatment may include pain medicine, surgery, blood transfusions, dialysis, IV or tube feedings, and a ventilator (breathing machine).  Values history:  This document has questions about your views, beliefs, and how you feel and think about life. This information can help others choose the care that you would choose.  Why are advance directives important?  An advance directive helps you control your care. Although spoken wishes may be used, it is better to have  your wishes written down. Spoken wishes can be misunderstood, or not followed. Treatments may be given even if you do not want them. An advance directive may make it easier for your family to make difficult choices about your care.   Fall Prevention    Fall prevention  includes ways to make your home and other areas safer. It also includes ways you can move more carefully to prevent a fall. Health conditions that cause changes in your blood pressure, vision, or muscle strength and coordination may increase your risk for falls. Medicines may also increase your risk for falls if they make you dizzy, weak, or sleepy.   Fall prevention tips:   Stand or sit up slowly.    Use assistive devices as directed.    Wear shoes that fit well and have soles that .    Wear a personal alarm.    Stay active.    Manage your medical conditions.    Home Safety Tips:  Add items to prevent falls in the bathroom.    Keep paths clear.    Install bright lights in your home.    Keep items you use often on shelves within reach.    Paint or place reflective tape on the edges of your stairs.    Urinary Incontinence   Urinary incontinence (UI)  is when you lose control of your bladder. UI develops because your bladder cannot store or empty urine properly. The 3 most common types of UI are stress incontinence, urge incontinence, or both.  Medicines:   May be given to help strengthen your bladder control. Report any side effects of medication to your healthcare provider.  Do pelvic muscle exercises often:  Your pelvic muscles help you stop urinating. Squeeze these muscles tight for 5 seconds, then relax for 5 seconds. Gradually work up to squeezing for 10 seconds. Do 3 sets of 15 repetitions a day, or as directed. This will help strengthen your pelvic muscles and improve bladder control.  Train your bladder:  Go to the bathroom at set times, such as every 2 hours, even if you do not feel the urge to go. You can also try to hold your urine when  you feel the urge to go. For example, hold your urine for 5 minutes when you feel the urge to go. As that becomes easier, hold your urine for 10 minutes.   Self-care:   Keep a UI record.  Write down how often you leak urine and how much you leak. Make a note of what you were doing when you leaked urine.  Drink liquids as directed. You may need to limit the amount of liquid you drink to help control your urine leakage. Do not drink any liquid right before you go to bed. Limit or do not have drinks that contain caffeine or alcohol.   Prevent constipation.  Eat a variety of high-fiber foods. Good examples are high-fiber cereals, beans, vegetables, and whole-grain breads. Walking is the best way to trigger your intestines to have a bowel movement.  Exercise regularly and maintain a healthy weight.  Weight loss and exercise will decrease pressure on your bladder and help you control your leakage.   Use a catheter as directed  to help empty your bladder. A catheter is a tiny, plastic tube that is put into your bladder to drain your urine.   Go to behavior therapy as directed.  Behavior therapy may be used to help you learn to control your urge to urinate.    Weight Management   Why it is important to manage your weight:  Being overweight increases your risk of health conditions such as heart disease, high blood pressure, type 2 diabetes, and certain types of cancer. It can also increase your risk for osteoarthritis, sleep apnea, and other respiratory problems. Aim for a slow, steady weight loss. Even a small amount of weight loss can lower your risk of health problems.  How to lose weight safely:  A safe and healthy way to lose weight is to eat fewer calories and get regular exercise. You can lose up about 1 pound a week by decreasing the number of calories you eat by 500 calories each day.   Healthy meal plan for weight management:  A healthy meal plan includes a variety of foods, contains fewer calories, and helps you  stay healthy. A healthy meal plan includes the following:  Eat whole-grain foods more often.  A healthy meal plan should contain fiber. Fiber is the part of grains, fruits, and vegetables that is not broken down by your body. Whole-grain foods are healthy and provide extra fiber in your diet. Some examples of whole-grain foods are whole-wheat breads and pastas, oatmeal, brown rice, and bulgur.  Eat a variety of vegetables every day.  Include dark, leafy greens such as spinach, kale, amrit greens, and mustard greens. Eat yellow and orange vegetables such as carrots, sweet potatoes, and winter squash.   Eat a variety of fruits every day.  Choose fresh or canned fruit (canned in its own juice or light syrup) instead of juice. Fruit juice has very little or no fiber.  Eat low-fat dairy foods.  Drink fat-free (skim) milk or 1% milk. Eat fat-free yogurt and low-fat cottage cheese. Try low-fat cheeses such as mozzarella and other reduced-fat cheeses.  Choose meat and other protein foods that are low in fat.  Choose beans or other legumes such as split peas or lentils. Choose fish, skinless poultry (chicken or turkey), or lean cuts of red meat (beef or pork). Before you cook meat or poultry, cut off any visible fat.   Use less fat and oil.  Try baking foods instead of frying them. Add less fat, such as margarine, sour cream, regular salad dressing and mayonnaise to foods. Eat fewer high-fat foods. Some examples of high-fat foods include french fries, doughnuts, ice cream, and cakes.  Eat fewer sweets.  Limit foods and drinks that are high in sugar. This includes candy, cookies, regular soda, and sweetened drinks.  Exercise:  Exercise at least 30 minutes per day on most days of the week. Some examples of exercise include walking, biking, dancing, and swimming. You can also fit in more physical activity by taking the stairs instead of the elevator or parking farther away from stores. Ask your healthcare provider about the  best exercise plan for you.      © Copyright [employer] 2018 Information is for End User's use only and may not be sold, redistributed or otherwise used for commercial purposes. All illustrations and images included in CareNotes® are the copyrighted property of [employer] or [employer]      Medicare Preventive Visit Patient Instructions  Thank you for completing your Welcome to Medicare Visit or Medicare Annual Wellness Visit today. Your next wellness visit will be due in one year (10/18/2025).  The screening/preventive services that you may require over the next 5-10 years are detailed below. Some tests may not apply to you based off risk factors and/or age. Screening tests ordered at today's visit but not completed yet may show as past due. Also, please note that scanned in results may not display below.  Preventive Screenings:  Service Recommendations Previous Testing/Comments   Colorectal Cancer Screening  * Colonoscopy    * Fecal Occult Blood Test (FOBT)/Fecal Immunochemical Test (FIT)  * Fecal DNA/Cologuard Test  * Flexible Sigmoidoscopy Age: 45-75 years old   Colonoscopy: every 10 years (may be performed more frequently if at higher risk)  OR  FOBT/FIT: every 1 year  OR  Cologuard: every 3 years  OR  Sigmoidoscopy: every 5 years  Screening may be recommended earlier than age 45 if at higher risk for colorectal cancer. Also, an individualized decision between you and your healthcare provider will decide whether screening between the ages of 76-85 would be appropriate. Colonoscopy: Not on file  FOBT/FIT: 03/21/2024  Cologuard: Not on file  Sigmoidoscopy: Not on file    Screening Not Indicated  Risks and Benefits Discussed     Breast Cancer Screening Age: 40+ years old  Frequency: every 1-2 years  Not required if history of left and right mastectomy Mammogram: 10/01/2021    Risks and Benefits Discussed  Screening Not Indicated   Cervical Cancer Screening Between the ages of 21-29, pap smear  recommended once every 3 years.   Between the ages of 30-65, can perform pap smear with HPV co-testing every 5 years.   Recommendations may differ for women with a history of total hysterectomy, cervical cancer, or abnormal pap smears in past. Pap Smear: Not on file    Screening Not Indicated  Risks and Benefits Discussed   Hepatitis C Screening Once for adults born between 1945 and 1965  More frequently in patients at high risk for Hepatitis C Hep C Antibody: Not on file    Risks and Benefits Discussed  Screening Not Indicated   Diabetes Screening 1-2 times per year if you're at risk for diabetes or have pre-diabetes Fasting glucose: 109 mg/dL (5/12/2024)  A1C: 7.3 % (9/10/2024)  History Diabetes  Risks and Benefits Discussed  Screening Current   Cholesterol Screening Once every 5 years if you don't have a lipid disorder. May order more often based on risk factors. Lipid panel: 09/10/2024    Screening Current  Risks and Benefits Discussed  History Lipid Disorder     Other Preventive Screenings Covered by Medicare:  Abdominal Aortic Aneurysm (AAA) Screening: covered once if your at risk. You're considered to be at risk if you have a family history of AAA.  Lung Cancer Screening: covers low dose CT scan once per year if you meet all of the following conditions: (1) Age 55-77; (2) No signs or symptoms of lung cancer; (3) Current smoker or have quit smoking within the last 15 years; (4) You have a tobacco smoking history of at least 20 pack years (packs per day multiplied by number of years you smoked); (5) You get a written order from a healthcare provider.  Glaucoma Screening: covered annually if you're considered high risk: (1) You have diabetes OR (2) Family history of glaucoma OR (3)  aged 50 and older OR (4)  American aged 65 and older  Osteoporosis Screening: covered every 2 years if you meet one of the following conditions: (1) You're estrogen deficient and at risk for osteoporosis based  off medical history and other findings; (2) Have a vertebral abnormality; (3) On glucocorticoid therapy for more than 3 months; (4) Have primary hyperparathyroidism; (5) On osteoporosis medications and need to assess response to drug therapy.   Last bone density test (DXA Scan): 09/18/2024.  HIV Screening: covered annually if you're between the age of 15-65. Also covered annually if you are younger than 15 and older than 65 with risk factors for HIV infection. For pregnant patients, it is covered up to 3 times per pregnancy.    Immunizations:  Immunization Recommendations   Influenza Vaccine Annual influenza vaccination during flu season is recommended for all persons aged >= 6 months who do not have contraindications   Pneumococcal Vaccine   * Pneumococcal conjugate vaccine = PCV13 (Prevnar 13), PCV15 (Vaxneuvance), PCV20 (Prevnar 20)  * Pneumococcal polysaccharide vaccine = PPSV23 (Pneumovax) Adults 19-65 yo with certain risk factors or if 65+ yo  If never received any pneumonia vaccine: recommend Prevnar 20 (PCV20)  Give PCV20 if previously received 1 dose of PCV13 or PPSV23   Hepatitis B Vaccine 3 dose series if at intermediate or high risk (ex: diabetes, end stage renal disease, liver disease)   Respiratory syncytial virus (RSV) Vaccine - COVERED BY MEDICARE PART D  * RSVPreF3 (Arexvy) CDC recommends that adults 60 years of age and older may receive a single dose of RSV vaccine using shared clinical decision-making (SCDM)   Tetanus (Td) Vaccine - COST NOT COVERED BY MEDICARE PART B Following completion of primary series, a booster dose should be given every 10 years to maintain immunity against tetanus. Td may also be given as tetanus wound prophylaxis.   Tdap Vaccine - COST NOT COVERED BY MEDICARE PART B Recommended at least once for all adults. For pregnant patients, recommended with each pregnancy.   Shingles Vaccine (Shingrix) - COST NOT COVERED BY MEDICARE PART B  2 shot series recommended in those 19  years and older who have or will have weakened immune systems or those 50 years and older     Health Maintenance Due:  There are no preventive care reminders to display for this patient.  Immunizations Due:      Topic Date Due   • COVID-19 Vaccine (5 - 2023-24 season) 09/01/2024     Advance Directives   What are advance directives?  Advance directives are legal documents that state your wishes and plans for medical care. These plans are made ahead of time in case you lose your ability to make decisions for yourself. Advance directives can apply to any medical decision, such as the treatments you want, and if you want to donate organs.   What are the types of advance directives?  There are many types of advance directives, and each state has rules about how to use them. You may choose a combination of any of the following:  Living will:  This is a written record of the treatment you want. You can also choose which treatments you do not want, which to limit, and which to stop at a certain time. This includes surgery, medicine, IV fluid, and tube feedings.   Durable power of  for healthcare (DPAHC):  This is a written record that states who you want to make healthcare choices for you when you are unable to make them for yourself. This person, called a proxy, is usually a family member or a friend. You may choose more than 1 proxy.  Do not resuscitate (DNR) order:  A DNR order is used in case your heart stops beating or you stop breathing. It is a request not to have certain forms of treatment, such as CPR. A DNR order may be included in other types of advance directives.  Medical directive:  This covers the care that you want if you are in a coma, near death, or unable to make decisions for yourself. You can list the treatments you want for each condition. Treatment may include pain medicine, surgery, blood transfusions, dialysis, IV or tube feedings, and a ventilator (breathing machine).  Values history:  This  document has questions about your views, beliefs, and how you feel and think about life. This information can help others choose the care that you would choose.  Why are advance directives important?  An advance directive helps you control your care. Although spoken wishes may be used, it is better to have your wishes written down. Spoken wishes can be misunderstood, or not followed. Treatments may be given even if you do not want them. An advance directive may make it easier for your family to make difficult choices about your care.   Fall Prevention    Fall prevention  includes ways to make your home and other areas safer. It also includes ways you can move more carefully to prevent a fall. Health conditions that cause changes in your blood pressure, vision, or muscle strength and coordination may increase your risk for falls. Medicines may also increase your risk for falls if they make you dizzy, weak, or sleepy.   Fall prevention tips:   Stand or sit up slowly.    Use assistive devices as directed.    Wear shoes that fit well and have soles that .    Wear a personal alarm.    Stay active.    Manage your medical conditions.    Home Safety Tips:  Add items to prevent falls in the bathroom.    Keep paths clear.    Install bright lights in your home.    Keep items you use often on shelves within reach.    Paint or place reflective tape on the edges of your stairs.    Urinary Incontinence   Urinary incontinence (UI)  is when you lose control of your bladder. UI develops because your bladder cannot store or empty urine properly. The 3 most common types of UI are stress incontinence, urge incontinence, or both.  Medicines:   May be given to help strengthen your bladder control. Report any side effects of medication to your healthcare provider.  Do pelvic muscle exercises often:  Your pelvic muscles help you stop urinating. Squeeze these muscles tight for 5 seconds, then relax for 5 seconds. Gradually work up to  squeezing for 10 seconds. Do 3 sets of 15 repetitions a day, or as directed. This will help strengthen your pelvic muscles and improve bladder control.  Train your bladder:  Go to the bathroom at set times, such as every 2 hours, even if you do not feel the urge to go. You can also try to hold your urine when you feel the urge to go. For example, hold your urine for 5 minutes when you feel the urge to go. As that becomes easier, hold your urine for 10 minutes.   Self-care:   Keep a UI record.  Write down how often you leak urine and how much you leak. Make a note of what you were doing when you leaked urine.  Drink liquids as directed. You may need to limit the amount of liquid you drink to help control your urine leakage. Do not drink any liquid right before you go to bed. Limit or do not have drinks that contain caffeine or alcohol.   Prevent constipation.  Eat a variety of high-fiber foods. Good examples are high-fiber cereals, beans, vegetables, and whole-grain breads. Walking is the best way to trigger your intestines to have a bowel movement.  Exercise regularly and maintain a healthy weight.  Weight loss and exercise will decrease pressure on your bladder and help you control your leakage.   Use a catheter as directed  to help empty your bladder. A catheter is a tiny, plastic tube that is put into your bladder to drain your urine.   Go to behavior therapy as directed.  Behavior therapy may be used to help you learn to control your urge to urinate.    Weight Management   Why it is important to manage your weight:  Being overweight increases your risk of health conditions such as heart disease, high blood pressure, type 2 diabetes, and certain types of cancer. It can also increase your risk for osteoarthritis, sleep apnea, and other respiratory problems. Aim for a slow, steady weight loss. Even a small amount of weight loss can lower your risk of health problems.  How to lose weight safely:  A safe and healthy  way to lose weight is to eat fewer calories and get regular exercise. You can lose up about 1 pound a week by decreasing the number of calories you eat by 500 calories each day.   Healthy meal plan for weight management:  A healthy meal plan includes a variety of foods, contains fewer calories, and helps you stay healthy. A healthy meal plan includes the following:  Eat whole-grain foods more often.  A healthy meal plan should contain fiber. Fiber is the part of grains, fruits, and vegetables that is not broken down by your body. Whole-grain foods are healthy and provide extra fiber in your diet. Some examples of whole-grain foods are whole-wheat breads and pastas, oatmeal, brown rice, and bulgur.  Eat a variety of vegetables every day.  Include dark, leafy greens such as spinach, kale, amrit greens, and mustard greens. Eat yellow and orange vegetables such as carrots, sweet potatoes, and winter squash.   Eat a variety of fruits every day.  Choose fresh or canned fruit (canned in its own juice or light syrup) instead of juice. Fruit juice has very little or no fiber.  Eat low-fat dairy foods.  Drink fat-free (skim) milk or 1% milk. Eat fat-free yogurt and low-fat cottage cheese. Try low-fat cheeses such as mozzarella and other reduced-fat cheeses.  Choose meat and other protein foods that are low in fat.  Choose beans or other legumes such as split peas or lentils. Choose fish, skinless poultry (chicken or turkey), or lean cuts of red meat (beef or pork). Before you cook meat or poultry, cut off any visible fat.   Use less fat and oil.  Try baking foods instead of frying them. Add less fat, such as margarine, sour cream, regular salad dressing and mayonnaise to foods. Eat fewer high-fat foods. Some examples of high-fat foods include french fries, doughnuts, ice cream, and cakes.  Eat fewer sweets.  Limit foods and drinks that are high in sugar. This includes candy, cookies, regular soda, and sweetened  "drinks.  Exercise:  Exercise at least 30 minutes per day on most days of the week. Some examples of exercise include walking, biking, dancing, and swimming. You can also fit in more physical activity by taking the stairs instead of the elevator or parking farther away from stores. Ask your healthcare provider about the best exercise plan for you.      © Copyright [employer] 2018 Information is for End User's use only and may not be sold, redistributed or otherwise used for commercial purposes. All illustrations and images included in CareNotes® are the copyrighted property of [employer] or [employer]      Patient Education     Urinary incontinence in females   The Basics   Written by the doctors and editors at [facility]   What is urinary incontinence? -- Urinary incontinence is the medical term for when a person leaks urine or loses bladder control.  Incontinence is a very common problem, but it is not a normal part of aging. If you have this problem, there are treatments that can help. There are also things that you can do on your own to stop or reduce urine leakage so you don't have to \"just live with it.\"  What are the symptoms of incontinence? -- There are different types of incontinence. Each causes different symptoms. The 3 most common types are:   Stress incontinence - With stress incontinence, you leak urine when you laugh, cough, sneeze, or do anything that \"stresses\" the belly. Stress incontinence is most common in females, especially those who have had a baby.   Urgency incontinence - With urgency incontinence, you feel a strong need to urinate all of a sudden. This is also known as \"urge incontinence.\" Often, the \"urge\" is so strong that you can't make it to the bathroom in time. \"Overactive bladder\" is another term for having a sudden, frequent urge to urinate. People with overactive bladder might or might not actually leak urine.   Mixed incontinence - With mixed incontinence, you have " "symptoms of both stress and urgency incontinence.  Is there anything I can do on my own to feel better? -- Yes. Here are some things that can help reduce urine leaks:   Reduce the amount of liquid that you drink, especially a few hours before bed.   Cut down on any foods or drinks that make your symptoms worse. Some people find that alcohol, caffeine, or spicy or acidic foods irritate the bladder.   Try to lose weight, if you are overweight. Your doctor or nurse can help you do this in a healthy way.   If you have diabetes, keep your blood sugar as close to your goal level as possible.   If you take medicines called diuretics, plan ahead. These medicines increase the need to urinate. Try to take them when you know you will be near a bathroom for a few hours. If you keep having problems with leakage because of diuretics, ask your doctor if you can take a lower dose or switch to a different medicine.  These techniques can also help improve bladder control:   Bladder retraining - During bladder retraining, you go to the bathroom at scheduled times. For instance, you might decide that you will go every hour. Make yourself go every hour, even if you don't feel like you need to. Try to wait the whole hour, even if you need to go sooner. Then, once you get used to going every hour, increase the amount of time you wait in between bathroom visits. Over time, you might be able to \"retrain\" your bladder to wait 3 or 4 hours between bathroom visits.   Pelvic floor muscle training - This involves learning exercises to strengthen and relax your pelvic muscles. These include the muscles that control the flow of urine and bowel movements. When done right, these exercises can help. But people often do them wrong. Ask your doctor or nurse how to do them right. Your doctor might suggest working with a physical therapist who has special training in these exercises.  Should I see my doctor or nurse? -- Yes. Your doctor or nurse can find " out what might be causing your incontinence. They can also suggest ways to relieve the problem.  When you speak to your doctor or nurse, ask if any of the medicines you take could be causing your symptoms. Some medicines can cause incontinence or make it worse.  Some people choose to wear pads or special underwear. These can help if you accidentally leak urine once in a while. But they can also cause skin irritation if you use them a lot. If you have incontinence, ask your doctor or nurse how to treat it.  How is incontinence treated? -- The treatment options differ depending on what type of incontinence you have. Some of the options include:   Medicines to relax the bladder   Surgery to repair the tissues that support the bladder or to improve the flow of urine   Electrical stimulation of the nerves that relax the bladder  Urinary incontinence is more common in people who have been through menopause. (Menopause is when you stop having monthly periods). Some people have vaginal dryness after menopause. If this is the case for you, a treatment called vaginal estrogen might help.  What will my life be like? -- Many people with incontinence can regain bladder control or at least reduce the amount of leakage they have. The most important thing is to tell your doctor or nurse. Then, work with them to find an approach that helps you.  All topics are updated as new evidence becomes available and our peer review process is complete.  This topic retrieved from [employer] on: Feb 26, 2024.  Topic 12516 Version 18.0  Release: 32.2.4 - C32.56  © 2024 UpToDate, Inc. and/or its affiliates. All rights reserved.  figure 1: Location of the bladder     This drawing shows the side view of a woman's body. The bladder is in front of the vagina. The urethra is the tube that carries urine from the bladder out of the body.  Graphic 776395 Version 1.0  Consumer Information Use and Disclaimer   Disclaimer: This generalized information is a  "limited summary of diagnosis, treatment, and/or medication information. It is not meant to be comprehensive and should be used as a tool to help the user understand and/or assess potential diagnostic and treatment options. It does NOT include all information about conditions, treatments, medications, side effects, or risks that may apply to a specific patient. It is not intended to be medical advice or a substitute for the medical advice, diagnosis, or treatment of a health care provider based on the health care provider's examination and assessment of a patient's specific and unique circumstances. Patients must speak with a health care provider for complete information about their health, medical questions, and treatment options, including any risks or benefits regarding use of medications. This information does not endorse any treatments or medications as safe, effective, or approved for treating a specific patient. UpToDate, Inc. and its affiliates disclaim any warranty or liability relating to this information or the use thereof.The use of this information is governed by the Terms of Use, available at https://www.[employer]com/en/know/clinical-effectiveness-terms. 2024© UpToDate, Inc. and its affiliates and/or licensors. All rights reserved.  Copyright   © 2024 UpToDate, Inc. and/or its affiliates. All rights reserved.    Patient Education     Pelvic floor muscle exercises   The Basics   Written by the doctors and editors at [employer]   What is the pelvic floor? -- The \"pelvic floor\" is the name for the muscles that support the organs in the pelvis. These organs include the bladder and rectum. In the female pelvis, they also include the uterus (figure 1).  What are pelvic floor muscle exercises? -- These are exercises that can make your pelvic floor muscles stronger. They involve learning ways to tighten and relax specific muscles.  Pelvic floor muscle exercises can help keep you from leaking urine, gas, or " "bowel movements. They can also help with a condition called \"pelvic organ prolapse.\" This is when the organs in the lower belly drop down and press against or bulge into the vagina.  One way to strengthen your pelvic floor muscles is to do exercises. These are also known as \"Kegel\" exercises.  How do I do pelvic floor muscle exercises? -- If you want to try pelvic floor muscle exercises, start by talking to your doctor or nurse. They can talk to you about whether these exercises can help you. They can also teach you how to do them correctly.  You will need to learn which muscles to tighten and relax. It is sometimes hard to figure out the right muscles.  Some ways you can practice:   People with female or male anatomy - Squeeze the muscles you would use to avoid passing gas.   People with female anatomy - Put a finger inside your vagina and squeeze the muscles around your finger. Or you can imagine that you are sitting on a marble and have to pick it up using your vagina.   People with male anatomy - Squeeze the muscles that control the flow of urine. These exercises might help reduce urine leaks in people who have had surgery to treat prostate cancer or an enlarged prostate.  No matter how you learn to do pelvic floor muscle exercises, know that the muscles involved are not in your belly, thighs, or buttocks.  After you learn which muscles to tighten and relax, you can do the exercises in any position (standing, sitting, or lying down).  Should I see a physical therapist? -- Your doctor or nurse might suggest working with a physical therapist who has special training in pelvic floor issues. They can check your muscle strength and teach you specific exercises.  How often should I do the exercises? -- A common approach is to try to do a set of the exercises 3 times a day.  For each set, do the following about 10 times:   Squeeze your pelvic muscles.   Hold the muscles tight for about 10 seconds.   Relax the muscles " "completely.  Keep up this routine for at least a few months. You will probably notice results, but it might take a few weeks to several months.  How do pelvic floor muscle exercises help? -- Pelvic floor muscle exercises can help:   Prevent urine leaks in people who have \"stress incontinence\" - This means that they leak urine when they cough, laugh, sneeze, or strain.   Control sudden urges to urinate - These happen to people with \"urinary urgency\" or \"urge incontinence.\"   Control the release of gas or bowel movements   Improve symptoms caused by pelvic organ prolapse - These can include pressure or bulging in the vagina. If you have these symptoms, see your doctor or nurse to find out the cause.  It might take a few months of doing the exercises regularly before you notice them working. If you have been doing pelvic floor muscle exercises for several months and they don't seem to be making a difference, tell your doctor or nurse. They might suggest seeing a physical therapist or trying other treatments for your condition.  All topics are updated as new evidence becomes available and our peer review process is complete.  This topic retrieved from [employer] on: Feb 26, 2024.  Topic 18966 Version 15.0  Release: 32.2.4 - C32.56  © 2024 UpToDate, Inc. and/or its affiliates. All rights reserved.  figure 1: Pelvic floor muscles     The \"pelvic floor\" is a group of muscles that support the organs in the pelvis. In females, these organs include the uterus, bladder, and rectum.  Graphic 830849 Version 2.0  Consumer Information Use and Disclaimer   Disclaimer: This generalized information is a limited summary of diagnosis, treatment, and/or medication information. It is not meant to be comprehensive and should be used as a tool to help the user understand and/or assess potential diagnostic and treatment options. It does NOT include all information about conditions, treatments, medications, side effects, or risks that may " apply to a specific patient. It is not intended to be medical advice or a substitute for the medical advice, diagnosis, or treatment of a health care provider based on the health care provider's examination and assessment of a patient's specific and unique circumstances. Patients must speak with a health care provider for complete information about their health, medical questions, and treatment options, including any risks or benefits regarding use of medications. This information does not endorse any treatments or medications as safe, effective, or approved for treating a specific patient. UpToDate, Inc. and its affiliates disclaim any warranty or liability relating to this information or the use thereof.The use of this information is governed by the Terms of Use, available at https://www.[employer].com/en/know/clinical-effectiveness-terms. 2024© UpToDate, Inc. and its affiliates and/or licensors. All rights reserved.  Copyright   © 2024 UpToDate, Inc. and/or its affiliates. All rights reserved.    Patient Education     Type 2 diabetes   The Basics   Written by the doctors and editors at [employer]   What is type 2 diabetes? -- This is a disorder that disrupts the way the body uses sugar. It is sometimes called type 2 diabetes mellitus.  All of the cells in the body need sugar to work normally. Sugar gets into the cells with the help of a hormone called insulin. Insulin is made by the pancreas, an organ in the belly. If there is not enough insulin, or if cells in the body don't respond normally to insulin, sugar builds up in the blood. That is what happens to people with diabetes.  There are 2 different types of diabetes:   In type 1 diabetes, the pancreas makes little or no insulin.   In type 2 diabetes, the pancreas still makes some insulin, but the cells in the body stop responding normally. Eventually, the pancreas cannot make enough insulin to keep up.  Having excess body weight or obesity increases a  "person's risk of developing type 2 diabetes. But people without excess body weight can get diabetes, too.  What are the symptoms of type 2 diabetes? -- Type 2 diabetes usually causes no symptoms. When symptoms do happen, they include:   Needing to urinate often   Intense thirst   Blurry vision  Can diabetes lead to other health problems? -- Yes. Type 2 diabetes might not make you feel sick. But if it is not managed, it can lead to serious problems over time, such as:   Heart attacks   Strokes   Kidney disease   Vision problems (or even blindness)   Pain or loss of feeling in the hands and feet   Needing to have fingers, toes, or other body parts removed (amputated)  How do I know if I have type 2 diabetes? -- Your doctor or nurse can do a blood test. There are 2 tests that can be used for this. Both involve measuring the amount of sugar in your blood, called your \"blood sugar\" or \"blood glucose\":   One of the tests measures your blood sugar at the time the blood sample is taken. This test is done in the morning. You can't eat or drink anything except water for at least 8 hours before the test.   The other test shows what your average blood sugar has been for the past 2 to 3 months. This blood test is called \"hemoglobin A1C\" or just \"A1C.\" It can be checked at any time of the day, even if you have recently eaten.  How is type 2 diabetes treated? -- The goals of treatment are to manage your blood sugar and lower the risk of future problems that can happen in people with diabetes.  Treatment might include:   Lifestyle changes - This is an important part of managing diabetes. It includes eating healthy foods and getting plenty of physical activity.   Medicines - There are a few medicines that help lower blood sugar. Some people need to take pills that help the body make more insulin or that help insulin do its job. Others need insulin shots.  Depending on what medicines you take, you might need to check your blood sugar " "regularly at home. But not everyone with type 2 diabetes needs to do this. Your doctor or nurse will tell you if you should be checking your blood sugar, and when and how to do this.  Sometimes, people with type 2 diabetes also need medicines to help prevent problems caused by the disease. For instance, medicines used to lower blood pressure can reduce the chances of a heart attack or stroke.   General medical care - It's also important to take care of other areas of your health. This includes watching your blood pressure and cholesterol levels. You should also get certain vaccines, such as vaccines to protect against the flu and coronavirus disease 2019 (\"COVID-19\"). Some people also need a vaccine to prevent pneumonia.  Can type 2 diabetes be prevented? -- Yes. To lower your chances of getting type 2 diabetes, the most important thing you can do is eat a healthy diet and get plenty of physical activity. This can help you lose weight if you are overweight. But eating well and being active are also good for your overall health. Even gentle activity, like walking, has benefits.  If you smoke, quitting can also lower your risk of type 2 diabetes. Quitting smoking can be difficult, but your doctor or nurse can help.  All topics are updated as new evidence becomes available and our peer review process is complete.  This topic retrieved from [employer] on: Apr 24, 2024.  Topic 12880 Version 23.0  Release: 32.3.2 - C32.113  © 2024 UpToDate, Inc. and/or its affiliates. All rights reserved.  Consumer Information Use and Disclaimer   Disclaimer: This generalized information is a limited summary of diagnosis, treatment, and/or medication information. It is not meant to be comprehensive and should be used as a tool to help the user understand and/or assess potential diagnostic and treatment options. It does NOT include all information about conditions, treatments, medications, side effects, or risks that may apply to a specific " patient. It is not intended to be medical advice or a substitute for the medical advice, diagnosis, or treatment of a health care provider based on the health care provider's examination and assessment of a patient's specific and unique circumstances. Patients must speak with a health care provider for complete information about their health, medical questions, and treatment options, including any risks or benefits regarding use of medications. This information does not endorse any treatments or medications as safe, effective, or approved for treating a specific patient. UpToDate, Inc. and its affiliates disclaim any warranty or liability relating to this information or the use thereof.The use of this information is governed by the Terms of Use, available at https://www.woltersRunscopeuwer.com/en/know/clinical-effectiveness-terms. 2024© UpToDate, Inc. and its affiliates and/or licensors. All rights reserved.  Copyright   © 2024 UpToDate, Inc. and/or its affiliates. All rights reserved.

## 2024-10-17 NOTE — ASSESSMENT & PLAN NOTE
Morning BP's still elevated with average SBP in the 170's, afternoon BP good, urged pt/family to drop BP readings off to Cardio as Dr. Hill did mention poss adding Spironolactone if BP not better, discussed how only one physician/office should adjust BP and would defer to Cardio at this time, will follow

## 2024-10-17 NOTE — TELEPHONE ENCOUNTER
Upon review of the In Basket request we were able to locate, review, and update the patient chart as requested for Diabetic Eye Exam.    Any additional questions or concerns should be emailed to the Practice Liaisons via the appropriate education email address, please do not reply via In Basket.    Thank you  Balta Gil MA   [facility]

## 2024-10-17 NOTE — TELEPHONE ENCOUNTER
----- Message from Rebecca PIERRE sent at 10/17/2024 10:11 AM EDT -----  Regarding: dm eye exam  10/17/24 10:12 AM    Hello, our patient Ena Ahumada has had Diabetic Eye Exam completed/performed. Please assist in updating the patient chart by pulling the document from the Media Tab. The date of service is 09/17/2024.     Thank you,  Rebecca Avila MA  [facility]

## 2024-10-18 ENCOUNTER — TELEPHONE (OUTPATIENT)
Dept: CARDIOLOGY CLINIC | Facility: CLINIC | Age: 89
End: 2024-10-18

## 2024-10-18 NOTE — TELEPHONE ENCOUNTER
Called pt daughter in law Talon. She is not sure of the dates from the first page, only the second page where it shows where the med change was made on her LOV from 9/24/24. She says that 9/25 is the date where the med change is starred on the paper. Ena is having trouble with logging her BPS and it is confusing to her per Talon.

## 2024-10-18 NOTE — TELEPHONE ENCOUNTER
Daughter dropped off a BP log for review due to high BP's.      Please call her daughter Talon to advise after Dr. Hill's review.      Logs were faxed to [facility] faxing to be scanned into the chart.

## 2024-10-24 ENCOUNTER — TELEPHONE (OUTPATIENT)
Age: 89
End: 2024-10-24

## 2024-10-24 DIAGNOSIS — J96.11 CHRONIC RESPIRATORY FAILURE WITH HYPOXIA (HCC): Primary | ICD-10-CM

## 2024-10-24 NOTE — TELEPHONE ENCOUNTER
Talon called on behalf of the patient. She was asking about scheduling an RALPH test because patient was put on oxygen in the hospital, but she does not want to use the oxygen anymore. Patients PCP ordered an RALPH test but was ordered incorrectly and did not send to the DME company. Can we please place a new RALPH order to DME company for the patient.

## 2024-11-01 LAB
DME PARACHUTE DELIVERY DATE EXPECTED: NORMAL
DME PARACHUTE DELIVERY DATE REQUESTED: NORMAL
DME PARACHUTE ITEM DESCRIPTION: NORMAL
DME PARACHUTE ORDER STATUS: NORMAL
DME PARACHUTE SUPPLIER NAME: NORMAL
DME PARACHUTE SUPPLIER PHONE: NORMAL

## 2024-11-04 LAB
DME PARACHUTE DELIVERY DATE ACTUAL: NORMAL
DME PARACHUTE DELIVERY DATE EXPECTED: NORMAL
DME PARACHUTE DELIVERY DATE REQUESTED: NORMAL
DME PARACHUTE ITEM DESCRIPTION: NORMAL
DME PARACHUTE ORDER STATUS: NORMAL
DME PARACHUTE SUPPLIER NAME: NORMAL
DME PARACHUTE SUPPLIER PHONE: NORMAL

## 2024-11-07 ENCOUNTER — TELEPHONE (OUTPATIENT)
Dept: PULMONOLOGY | Facility: CLINIC | Age: 89
End: 2024-11-07

## 2024-11-07 DIAGNOSIS — J96.11 CHRONIC RESPIRATORY FAILURE WITH HYPOXIA (HCC): Primary | ICD-10-CM

## 2024-11-07 DIAGNOSIS — N28.1 COMPLEX RENAL CYST: Primary | ICD-10-CM

## 2024-11-07 NOTE — TELEPHONE ENCOUNTER
Discussed overnight oximetry results with Talon - recommend 2L/m via nasal cannula during the night for Alexandra verbalized understanding.

## 2024-11-11 DIAGNOSIS — G47.34 NOCTURNAL HYPOXEMIA: Primary | ICD-10-CM

## 2024-11-15 ENCOUNTER — TELEPHONE (OUTPATIENT)
Dept: FAMILY MEDICINE CLINIC | Facility: HOSPITAL | Age: 89
End: 2024-11-15

## 2024-11-15 NOTE — TELEPHONE ENCOUNTER
----- Message from Deanna Castaneda DO sent at 11/7/2024  3:40 PM EST -----  Please notify pt that radiology has reminded  me that she is overdue for her f/u kidney US to further eval her kidney cyst.  Order placed and she can call central scheduling to schedule this US.  ----- Message -----  From: Claudia Brown  Sent: 11/7/2024   1:58 PM EST  To: Deanna Castaneda DO        Dear Provider,    Our records indicate that your patient was recommended to have a follow up exam based on a prior imaging study.    This letter is to notify you that your patient has not returned to a [facility]'s site for the recommended study. Enclosed, please find a copy of the patient report for your review. The Radiologist's recommendation can be found in the impression section at the bottom of the report.     If you have further questions, please feel free to contact us at [phone]. If you need assistance in making a scheduled appointment for your patient, please contact [facility] Scheduling at [phone].    Thank You,      Sylvain Valverde M.D.  , Department of Radiology

## 2024-11-19 DIAGNOSIS — N18.32 TYPE 2 DIABETES MELLITUS WITH STAGE 3B CHRONIC KIDNEY DISEASE, WITH LONG-TERM CURRENT USE OF INSULIN (HCC): Primary | ICD-10-CM

## 2024-11-19 DIAGNOSIS — E11.22 TYPE 2 DIABETES MELLITUS WITH STAGE 3B CHRONIC KIDNEY DISEASE, WITH LONG-TERM CURRENT USE OF INSULIN (HCC): Primary | ICD-10-CM

## 2024-11-19 DIAGNOSIS — Z79.4 TYPE 2 DIABETES MELLITUS WITH STAGE 3B CHRONIC KIDNEY DISEASE, WITH LONG-TERM CURRENT USE OF INSULIN (HCC): Primary | ICD-10-CM

## 2024-11-19 RX ORDER — INSULIN GLARGINE-YFGN 100 [IU]/ML
INJECTION, SOLUTION SUBCUTANEOUS
Status: CANCELLED | OUTPATIENT
Start: 2024-11-19

## 2024-11-19 RX ORDER — INSULIN GLARGINE-YFGN 100 [IU]/ML
INJECTION, SOLUTION SUBCUTANEOUS
Qty: 100 ML | Refills: 2 | Status: SHIPPED | OUTPATIENT
Start: 2024-11-19

## 2024-12-05 ENCOUNTER — HOSPITAL ENCOUNTER (OUTPATIENT)
Dept: ULTRASOUND IMAGING | Facility: HOSPITAL | Age: 89
End: 2024-12-05
Payer: COMMERCIAL

## 2024-12-05 DIAGNOSIS — N28.1 COMPLEX RENAL CYST: ICD-10-CM

## 2024-12-05 PROCEDURE — 76775 US EXAM ABDO BACK WALL LIM: CPT

## 2024-12-10 ENCOUNTER — TELEPHONE (OUTPATIENT)
Dept: NEPHROLOGY | Facility: CLINIC | Age: 89
End: 2024-12-10

## 2024-12-10 ENCOUNTER — RESULTS FOLLOW-UP (OUTPATIENT)
Dept: FAMILY MEDICINE CLINIC | Facility: HOSPITAL | Age: 89
End: 2024-12-10

## 2024-12-10 ENCOUNTER — OFFICE VISIT (OUTPATIENT)
Dept: NEPHROLOGY | Facility: HOSPITAL | Age: 89
End: 2024-12-10
Payer: COMMERCIAL

## 2024-12-10 VITALS
DIASTOLIC BLOOD PRESSURE: 72 MMHG | BODY MASS INDEX: 27.23 KG/M2 | SYSTOLIC BLOOD PRESSURE: 168 MMHG | HEIGHT: 62 IN | WEIGHT: 148 LBS

## 2024-12-10 DIAGNOSIS — R80.9 PROTEINURIA, UNSPECIFIED TYPE: ICD-10-CM

## 2024-12-10 DIAGNOSIS — R60.9 DEPENDENT EDEMA: ICD-10-CM

## 2024-12-10 DIAGNOSIS — E83.9 CHRONIC KIDNEY DISEASE-MINERAL AND BONE DISORDER: ICD-10-CM

## 2024-12-10 DIAGNOSIS — E87.6 HYPOKALEMIA: ICD-10-CM

## 2024-12-10 DIAGNOSIS — N18.4 CKD (CHRONIC KIDNEY DISEASE), STAGE IV (HCC): Primary | ICD-10-CM

## 2024-12-10 DIAGNOSIS — N18.32 TYPE 2 DIABETES MELLITUS WITH STAGE 3B CHRONIC KIDNEY DISEASE, WITH LONG-TERM CURRENT USE OF INSULIN (HCC): ICD-10-CM

## 2024-12-10 DIAGNOSIS — Z79.4 TYPE 2 DIABETES MELLITUS WITH STAGE 3B CHRONIC KIDNEY DISEASE, WITH LONG-TERM CURRENT USE OF INSULIN (HCC): ICD-10-CM

## 2024-12-10 DIAGNOSIS — E11.22 TYPE 2 DIABETES MELLITUS WITH STAGE 3B CHRONIC KIDNEY DISEASE, WITH LONG-TERM CURRENT USE OF INSULIN (HCC): ICD-10-CM

## 2024-12-10 DIAGNOSIS — I12.9 BENIGN HYPERTENSION WITH CKD (CHRONIC KIDNEY DISEASE) STAGE IV (HCC): ICD-10-CM

## 2024-12-10 DIAGNOSIS — N28.1 COMPLEX RENAL CYST: ICD-10-CM

## 2024-12-10 DIAGNOSIS — N18.4 BENIGN HYPERTENSION WITH CKD (CHRONIC KIDNEY DISEASE) STAGE IV (HCC): ICD-10-CM

## 2024-12-10 DIAGNOSIS — N18.9 CHRONIC KIDNEY DISEASE-MINERAL AND BONE DISORDER: ICD-10-CM

## 2024-12-10 DIAGNOSIS — M89.9 CHRONIC KIDNEY DISEASE-MINERAL AND BONE DISORDER: ICD-10-CM

## 2024-12-10 PROCEDURE — 99214 OFFICE O/P EST MOD 30 MIN: CPT | Performed by: NURSE PRACTITIONER

## 2024-12-10 NOTE — ASSESSMENT & PLAN NOTE
Lab Results   Component Value Date    EGFR 24 (L) 09/10/2024    EGFR 28 06/24/2024    EGFR 28 06/23/2024    CREATININE 1.94 (H) 09/10/2024    CREATININE 1.59 (H) 06/24/2024    CREATININE 1.59 (H) 06/23/2024   Baseline creatinine 1.6-1.9.  Most recent creatinine 1.9 on 9/10/2024.  GFR 24.  Sodium level minimally elevated at 145, discussed increasing fluid intake.  Follows with Dr. Baumann.  Etiology of chronic disease suspected due to diabetic nephropathy, hypertensive nephrosclerosis, and age-related nephron loss.  Last recent renal ultrasound shows diffuse echogenic kidneys bilaterally, negative for hydro, mildly complex left renal cyst.  Most recent UA: June 2024 2+ protein, glucose, trace ketones.   Will check lab work prior to next follow-up appointment.  Declined kidney smart class.  Declining ACM visit at this time but provided booklet.

## 2024-12-10 NOTE — ASSESSMENT & PLAN NOTE
Lab Results   Component Value Date    HGBA1C 7.3 (H) 09/10/2024     Continue routine follow-up with endocrinology.  On Invokana, glipizide, Januvia, and insulin.

## 2024-12-10 NOTE — PROGRESS NOTES
Name: Ena Ahumada      : 1935      MRN: [id number]  Encounter Provider: KANA Ray  Encounter Date: 12/10/2024   Encounter department: [facility]  :  Assessment & Plan  CKD (chronic kidney disease), stage IV (HCC)  Lab Results   Component Value Date    EGFR 24 (L) 09/10/2024    EGFR 28 2024    EGFR 28 2024    CREATININE 1.94 (H) 09/10/2024    CREATININE 1.59 (H) 2024    CREATININE 1.59 (H) 2024   Baseline creatinine 1.6-1.9.  Most recent creatinine 1.9 on 9/10/2024.  GFR 24.  Sodium level minimally elevated at 145, discussed increasing fluid intake.  Follows with Dr. Baumann.  Etiology of chronic disease suspected due to diabetic nephropathy, hypertensive nephrosclerosis, and age-related nephron loss.  Last recent renal ultrasound shows diffuse echogenic kidneys bilaterally, negative for hydro, mildly complex left renal cyst.  Most recent UA: 2024 2+ protein, glucose, trace ketones.   Will check lab work prior to next follow-up appointment.  Declined kidney smart class.  Declining ACM visit at this time but provided booklet.     Complex renal cyst  Renal ultrasound obtained 2024. Stable renal cyst. No additional imaging warranted at this time.   Prior renal ultrasound 3/19/2024 showed 1.1 cm cyst in the lower pole of the left kidney, recommended to follow-up in 6 months.  Hypokalemia  Potassium level remains stable.  Will continue to monitor.   Proteinuria, unspecified type  Likely due to hypertension and diabetes.  Check UA, UPCR, urine micro/albumin creatinine ratio prior to next appointment.  Maintained on statin.  May benefit from ACE/ARB although patient has advanced CKD.  Benign hypertension with CKD (chronic kidney disease) stage IV (HCC)  Bp above goal. Reports readings at home above goal in the 160-180's.  Current medication: Clonidine patch 0.1 mg, Bystolic 40 mg daily, nifedipine 90 mg daily, torsemide 20 mg daily.  Recommend  avoiding high salt diet.  Goal blood pressure less than 130/90 due to advanced age and comorbidities.  Cardiology managing hypertension. Will message Dr. Hill's office. Instructed to keep log of BP two times per day at home over the next week and send to his office.     Type 2 diabetes mellitus with stage 3b chronic kidney disease, with long-term current use of insulin ([facility])    Lab Results   Component Value Date    HGBA1C 7.3 (H) 09/10/2024     Continue routine follow-up with endocrinology.  On Invokana, glipizide, Januvia, and insulin.    Chronic kidney disease-mineral and bone disorder  Continue to follow PTH, mag, Phos, and calcium.  Recent , Phos 3.9, mag 2.4, vitamin D level acceptable.  On vitamin D 2000 units daily, will reduce to 1000 units daily.  Would benefit from addition of calcitriol if PTH continue to trend upward.    Dependent edema  Continue torsemide 20 mg daily.  Continue low-sodium diet.  May attempt a leg elevation and compression if needed.        Other: GERD, aortic stenosis, chronic pain, hyperlipidemia, chronic nighttime O2.      History of Present Illness   HPI  Ena Ahumada is a 89 y.o. female who presents for routine follow up. She presents today with her daughter in law. She reports doing well. She ambulates with a cane. She denies chest pain or shortness of breathe. She denies NSAID use. She is urinating well. She is compliant with her home medications. She is following a low salt diet. She reports high BP readings in the 160-180's at home. She is following wit her cardiologist. She has had some weight loss over the past year and reports three hospitalizations.  History obtained from: patient    Review of Systems   All other systems reviewed and are negative.    Current Outpatient Medications on File Prior to Visit   Medication Sig Dispense Refill    ACCU-CHEK PAUL PLUS test strip       Accu-Chek Softclix Lancets lancets       Accu-Chek Softclix Lancets lancets USE AS  INSTRUCTED TWICE DAILY 100 each 3    acetaminophen (TYLENOL) 325 mg tablet Take 975 mg by mouth 3 (three) times a day. per [facility]  Indications: Pain (Patient taking differently: Take 975 mg by mouth every 6 (six) hours as needed per [facility])      aspirin 81 MG tablet Take 1 tablet by mouth daily      atorvastatin (LIPITOR) 10 mg tablet Take 1 tablet (10 mg total) by mouth daily per latest dci 90 tablet 2    Blood Glucose Monitoring Suppl (OneTouch Verio Reflect) w/Device KIT Check blood sugars twice daily. Please substitute with appropriate alternative as covered by patient's insurance. Dx: E11.65 1 kit 0    canagliflozin (Invokana) 100 mg Take 1 tablet (100 mg total) by mouth daily before breakfast 100 tablet 1    cholecalciferol (VITAMIN D3) 1,000 units tablet Take 1 tablet by mouth daily      cloNIDine (CATAPRES-TTS-1) 0.1 mg/24 hr PLACE 1 PATCH ON THE SKIN ONCE A WEEK AS DIRECTED 12 patch 4    famotidine (PEPCID) 20 mg tablet TAKE 1 TABLET BY MOUTH AT BEDTIME 90 tablet 1    ferrous sulfate 325 (65 Fe) mg tablet Take 1 tablet (325 mg total) by mouth 3 (three) times a week 12 tablet 0    glipiZIDE (GLUCOTROL) 10 mg tablet TAKE 2 TABLETS BY MOUTH IN THE MORNING THEN 1 TAB WITH DINNER      Januvia 50 MG tablet TAKE 1 TABLET BY MOUTH EVERY DAY 30 tablet 5    nebivolol (BYSTOLIC) 20 MG tablet TAKE 2 TABLETS (40 MG TOTAL) BY MOUTH DAILY. 180 tablet 1    NIFEdipine (ADALAT CC) 90 mg 24 hr tablet TAKE 1 TABLET BY MOUTH EVERY DAY 90 tablet 3    oxygen gas Inhale 2.5 L/min continuous VIA NASAL CANULA. At night only      pantoprazole (PROTONIX) 20 mg tablet TAKE 1 TABLET BY MOUTH EVERY DAY 90 tablet 1    potassium chloride (Klor-Con M10) 10 mEq tablet Take 1 tablet (10 mEq total) by mouth daily 30 tablet 2    Sennosides (Senna) 8.6 MG CAPS Take 2 capsules by mouth daily at bedtime. per [facility]  Indications: constipation (Patient taking differently: Take 2 capsules by  "mouth daily as needed per [facility])      sertraline (ZOLOFT) 100 mg tablet TAKE 1 TABLET BY MOUTH EVERY DAY 90 tablet 2    torsemide (DEMADEX) 20 mg tablet TAKE 1 TABLET BY MOUTH EVERY  tablet 1    glucose blood (Accu-Chek Emma Plus) test strip USE AS INSTRUCTED TWICE DAILY 100 each 7    glucose blood (Accu-Chek Guide) test strip USE TO TEST TWICE A  strip 1    glucose blood (OneTouch Verio) test strip Check blood sugars twice daily. Please substitute with appropriate alternative as covered by patient's insurance. Dx: E11.65 200 each 3    Insulin Glargine-yfgn 100 UNIT/ML SOPN Inject 0.05 mL (5 Units total) uncer the skin in the morning 100 mL 2    Insulin Pen Needle (BD Pen Needle Kathy U/F) 32G X 4 MM MISC Use daily 100 each 3    OneTouch Delica Lancets 33G MISC Check blood sugars twice daily. Please substitute with appropriate alternative as covered by patient's insurance. Dx: E11.65 200 each 3    polyethylene glycol (GLYCOLAX) 17 GM/SCOOP powder Take 17 g by mouth daily as needed (constipation). 17 gm daily as needed for constipation  Indications: Constipation       No current facility-administered medications on file prior to visit.         Objective   /72 (Patient Position: Sitting, Cuff Size: Standard)   Ht 5' 2\" (1.575 m)   Wt 67.1 kg (148 lb)   BMI 27.07 kg/m²      Physical Exam  Vitals reviewed.   HENT:      Head: Normocephalic.      Nose: Nose normal.      Mouth/Throat:      Mouth: Mucous membranes are moist.   Cardiovascular:      Heart sounds: Normal heart sounds.   Pulmonary:      Breath sounds: Normal breath sounds.   Musculoskeletal:      Right lower leg: No edema.      Left lower leg: No edema.   Skin:     General: Skin is warm.   Neurological:      General: No focal deficit present.      Mental Status: She is alert. Mental status is at baseline.   Psychiatric:         Mood and Affect: Mood normal.           "

## 2024-12-10 NOTE — ASSESSMENT & PLAN NOTE
Continue to follow PTH, mag, Phos, and calcium.  Recent , Phos 3.9, mag 2.4, vitamin D level acceptable.  On vitamin D 2000 units daily, will reduce to 1000 units daily.  Would benefit from addition of calcitriol if PTH continue to trend upward.

## 2024-12-10 NOTE — ASSESSMENT & PLAN NOTE
Bp above goal. Reports readings at home above goal in the 160-180's.  Current medication: Clonidine patch 0.1 mg, Bystolic 40 mg daily, nifedipine 90 mg daily, torsemide 20 mg daily.  Recommend avoiding high salt diet.  Goal blood pressure less than 130/90 due to advanced age and comorbidities.  Cardiology managing hypertension. Will message Dr. Hill's office. Instructed to keep log of BP two times per day at home over the next week and send to his office.

## 2024-12-10 NOTE — TELEPHONE ENCOUNTER
----- Message from KANA Meadows sent at 12/10/2024  1:48 PM EST -----  Please mail patient ACP booklet. Thank you

## 2024-12-10 NOTE — PATIENT INSTRUCTIONS
You are here today for routine follow-up on your kidneys.  Your kidney function remains very stable.  Please continue to hydrate with water.  We recommend a low-salt diet to assist with your blood pressure.  Please avoid any NSAIDs which include ibuprofen, Advil, and Aleve.  I will message Dr. Hill today in regards to your elevated blood pressure readings.  Please keep track of your blood pressures 2 times per day and send them to Dr. Hill in the next 1 to 2 weeks.  Please call Dr. Hill if you are noticing any shortness of breath, increased swelling in the legs, or unexplained weight gain.    We have decreased your vitamin D today to 1 tablet or 1000 units daily.    We will check urine studies and blood work prior to your next appointment with us in 4 months.

## 2024-12-10 NOTE — ASSESSMENT & PLAN NOTE
Renal ultrasound obtained 12/5/2024. Stable renal cyst. No additional imaging warranted at this time.   Prior renal ultrasound 3/19/2024 showed 1.1 cm cyst in the lower pole of the left kidney, recommended to follow-up in 6 months.

## 2024-12-10 NOTE — ASSESSMENT & PLAN NOTE
Continue torsemide 20 mg daily.  Continue low-sodium diet.  May attempt a leg elevation and compression if needed.

## 2024-12-10 NOTE — ASSESSMENT & PLAN NOTE
Likely due to hypertension and diabetes.  Check UA, UPCR, urine micro/albumin creatinine ratio prior to next appointment.  Maintained on statin.  May benefit from ACE/ARB although patient has advanced CKD.

## 2024-12-16 ENCOUNTER — RESULTS FOLLOW-UP (OUTPATIENT)
Dept: FAMILY MEDICINE CLINIC | Facility: HOSPITAL | Age: 89
End: 2024-12-16

## 2024-12-16 ENCOUNTER — HOSPITAL ENCOUNTER (OUTPATIENT)
Dept: RADIOLOGY | Facility: HOSPITAL | Age: 89
Discharge: HOME | End: 2024-12-16
Payer: COMMERCIAL

## 2024-12-16 ENCOUNTER — OFFICE VISIT (OUTPATIENT)
Dept: FAMILY MEDICINE CLINIC | Facility: HOSPITAL | Age: 89
End: 2024-12-16
Payer: COMMERCIAL

## 2024-12-16 VITALS
SYSTOLIC BLOOD PRESSURE: 145 MMHG | OXYGEN SATURATION: 97 % | TEMPERATURE: 97.7 F | HEART RATE: 72 BPM | HEIGHT: 62 IN | WEIGHT: 146 LBS | DIASTOLIC BLOOD PRESSURE: 83 MMHG | BODY MASS INDEX: 26.87 KG/M2

## 2024-12-16 DIAGNOSIS — G89.4 CHRONIC PAIN SYNDROME: ICD-10-CM

## 2024-12-16 DIAGNOSIS — M48.061 LUMBAR FORAMINAL STENOSIS: Primary | ICD-10-CM

## 2024-12-16 DIAGNOSIS — N18.4 BENIGN HYPERTENSION WITH CKD (CHRONIC KIDNEY DISEASE) STAGE IV (HCC): ICD-10-CM

## 2024-12-16 DIAGNOSIS — M48.061 LUMBAR FORAMINAL STENOSIS: ICD-10-CM

## 2024-12-16 DIAGNOSIS — I12.9 BENIGN HYPERTENSION WITH CKD (CHRONIC KIDNEY DISEASE) STAGE IV (HCC): ICD-10-CM

## 2024-12-16 DIAGNOSIS — N18.4 CKD (CHRONIC KIDNEY DISEASE), STAGE IV (HCC): ICD-10-CM

## 2024-12-16 PROCEDURE — G2211 COMPLEX E/M VISIT ADD ON: HCPCS | Performed by: INTERNAL MEDICINE

## 2024-12-16 PROCEDURE — 99214 OFFICE O/P EST MOD 30 MIN: CPT | Performed by: INTERNAL MEDICINE

## 2024-12-16 PROCEDURE — 72110 X-RAY EXAM L-2 SPINE 4/>VWS: CPT

## 2024-12-16 RX ORDER — LIDOCAINE 50 MG/G
1 PATCH TOPICAL DAILY
Start: 2024-12-16

## 2024-12-16 NOTE — PROGRESS NOTES
Name: Ena Ahumada      : 1935      MRN: [id number]  Encounter Provider: Deanna Castaneda DO  Encounter Date: 2024   Encounter department: [facility] C[facility] [address]   :  Assessment & Plan  Lumbar foraminal stenosis  Acute intermittent pain on chronic pain with certain activities, limiting her activities, no red flag radicular symptoms and exam was normal, will check Xray to ensure no fractures or acute pathology, pt is deferring PT, has had injections with Pain Mgt w/o benefit, has failed Lyrica (didn't work) and had sedation with Lyrica, can try Cymbalta in future but would wean of Setraline and switch over first, urged to try lidocaine patch - she has in past and said it didn't stick - urged no lotions and use tape over it if needed, re-eval in 6 wks, radicular symptoms reviewed - urged to call if they occur or if new/worse symptoms occur  Orders:    XR spine lumbar minimum 4 views non injury; Future    Chronic pain syndrome  Trial lidocaine patch, if not better T/C Norco   Orders:    lidocaine (Lidoderm) 5 %; Apply 1 patch topically over 12 hours daily Remove & Discard patch within 12 hours or as directed by MD    CKD (chronic kidney disease), stage IV (HCC)  Lab Results   Component Value Date    EGFR 24 (L) 09/10/2024    EGFR 28 2024    EGFR 28 2024    CREATININE 1.94 (H) 09/10/2024    CREATININE 1.59 (H) 2024    CREATININE 1.59 (H) 2024   Just saw Uro, BP been up a bit, pt reports home readings have been up but is taking in am before even takes BP meds, urged to check 1-2 hrs AFTER taking her BP meds, is going to drop readings off to Cardio as they are adjusting her BP meds, will follow       Benign hypertension with CKD (chronic kidney disease) stage IV (HCC)  Lab Results   Component Value Date    EGFR 24 (L) 09/10/2024    EGFR 28 2024    EGFR 28 2024    CREATININE 1.94 (H) 09/10/2024    CREATININE 1.59 (H) 2024     CREATININE 1.59 (H) 06/23/2024   BP been up a bit, pt reports home readings have been up but is taking in am before even takes BP meds, urged to check 1-2 hrs AFTER taking her BP meds, is going to drop readings off to Cardio as they are adjusting her BP meds, will follow              BW 9/24 (7.3)  DM eye exam 7/23 - 2 yrs  DM foot exam 9/24  History of Present Illness     HPI Pt here for an acute visit    Pt noting intermittent back pain.  Pain is L low back and does not radiate.  She notes no pain radiating down her leg and notes no numbness/tingling/weakness/falls/loss of bowel or bladder incontinence/saddle anesthesia.  Pain is better with sitting and worse with walking or standing.  Currently she is taking tylenol w/o great benefit.  Currently her pain is a 8/10.  She has done Lyrica and had sedation and Gabapentin was stopped d/t lack of benefit. She cannot do Cymbalta d/t being on a SSRI.  She saw Pain Mgt last year and had injections which did not help. She is deferring PT.     Pt saw Uro NP (Dorie Vásquez) last week for f/u CKD stage IV and a L complex renal cyst  - OV note reviewed.  Repeat renal US 12/5/24 showed this cyst was stable.  NO further f/u was recommended.  BP was elevated at that visit and reportedly pt had noted elevated BP at home as well.  As per note Uro was going to reach out to Cardio for BP med adjustment.  Last Cardio note Dr. Hill mentioned poss adding Spironolactone if BP was not better.  She notes no symptoms of HA's/dizziness/double vision/CP.            Review of Systems   Constitutional:  Negative for chills and fever.   Eyes:  Negative for pain and visual disturbance.   Respiratory:  Negative for cough and shortness of breath.    Cardiovascular:  Negative for chest pain and palpitations.   Gastrointestinal:  Negative for abdominal pain, diarrhea and nausea.   Genitourinary:  Negative for difficulty urinating and dysuria.   Musculoskeletal:  Positive for back pain and gait  "problem.   Skin:  Negative for rash and wound.   Neurological:  Negative for dizziness and headaches.   Psychiatric/Behavioral:  The patient is nervous/anxious.         Vivid dreams and sleep walking       Objective   /83 (BP Location: Left arm, Patient Position: Sitting, Cuff Size: Standard)   Pulse 72   Temp 97.7 °F (36.5 °C) (Tympanic)   Ht 5' 2\" (1.575 m)   Wt 66.2 kg (146 lb)   SpO2 97%   BMI 26.70 kg/m²      Physical Exam  Vitals and nursing note reviewed.   Constitutional:       General: She is not in acute distress.     Appearance: She is well-developed. She is not ill-appearing.   HENT:      Head: Normocephalic and atraumatic.      Right Ear: External ear normal.      Left Ear: External ear normal.   Eyes:      General:         Right eye: No discharge.         Left eye: No discharge.      Conjunctiva/sclera: Conjunctivae normal.   Neck:      Thyroid: No thyromegaly.      Trachea: No tracheal deviation.   Cardiovascular:      Rate and Rhythm: Normal rate and regular rhythm.      Heart sounds: Normal heart sounds. No murmur heard.  Pulmonary:      Effort: Pulmonary effort is normal. No respiratory distress.      Breath sounds: Normal breath sounds. No wheezing, rhonchi or rales.   Musculoskeletal:         General: No tenderness, deformity or signs of injury.      Cervical back: Neck supple.   Skin:     General: Skin is warm and dry.      Coloration: Skin is not pale.      Findings: No rash.   Neurological:      General: No focal deficit present.      Mental Status: She is alert. Mental status is at baseline.      Sensory: No sensory deficit.      Motor: No weakness or abnormal muscle tone.      Gait: Gait normal.      Deep Tendon Reflexes: Reflexes normal.      Comments: 5/5 B/L LE muscle strength, 2/4 patellar DTR B/L LE, sensation intact to light touch, neg SLR test B/L LE, nml gait but does use a cane   Psychiatric:         Mood and Affect: Mood normal.         Behavior: Behavior normal.        "  Thought Content: Thought content normal.         Judgment: Judgment normal.

## 2024-12-16 NOTE — ASSESSMENT & PLAN NOTE
Acute intermittent pain on chronic pain with certain activities, limiting her activities, no red flag radicular symptoms and exam was normal, will check Xray to ensure no fractures or acute pathology, pt is deferring PT, has had injections with Pain Mgt w/o benefit, has failed Lyrica (didn't work) and had sedation with Lyrica, can try Cymbalta in future but would wean of Setraline and switch over first, urged to try lidocaine patch - she has in past and said it didn't stick - urged no lotions and use tape over it if needed, re-eval in 6 wks, radicular symptoms reviewed - urged to call if they occur or if new/worse symptoms occur  Orders:    XR spine lumbar minimum 4 views non injury; Future

## 2024-12-16 NOTE — ASSESSMENT & PLAN NOTE
Lab Results   Component Value Date    EGFR 24 (L) 09/10/2024    EGFR 28 06/24/2024    EGFR 28 06/23/2024    CREATININE 1.94 (H) 09/10/2024    CREATININE 1.59 (H) 06/24/2024    CREATININE 1.59 (H) 06/23/2024   Just saw Uro, BP been up a bit, pt reports home readings have been up but is taking in am before even takes BP meds, urged to check 1-2 hrs AFTER taking her BP meds, is going to drop readings off to Cardio as they are adjusting her BP meds, will follow

## 2024-12-16 NOTE — ASSESSMENT & PLAN NOTE
Trial lidocaine patch, if not better T/C Norco   Orders:    lidocaine (Lidoderm) 5 %; Apply 1 patch topically over 12 hours daily Remove & Discard patch within 12 hours or as directed by MD

## 2024-12-16 NOTE — ASSESSMENT & PLAN NOTE
Lab Results   Component Value Date    EGFR 24 (L) 09/10/2024    EGFR 28 06/24/2024    EGFR 28 06/23/2024    CREATININE 1.94 (H) 09/10/2024    CREATININE 1.59 (H) 06/24/2024    CREATININE 1.59 (H) 06/23/2024   BP been up a bit, pt reports home readings have been up but is taking in am before even takes BP meds, urged to check 1-2 hrs AFTER taking her BP meds, is going to drop readings off to Cardio as they are adjusting her BP meds, will follow

## 2025-01-02 DIAGNOSIS — I10 PRIMARY HYPERTENSION: Primary | ICD-10-CM

## 2025-01-02 RX ORDER — HYDRALAZINE HYDROCHLORIDE 25 MG/1
25 TABLET, FILM COATED ORAL 3 TIMES DAILY
Qty: 270 TABLET | Refills: 3 | Status: SHIPPED | OUTPATIENT
Start: 2025-01-02

## 2025-01-02 RX ORDER — CLONIDINE 0.2 MG/24H
1 PATCH, EXTENDED RELEASE TRANSDERMAL WEEKLY
Qty: 12 PATCH | Refills: 3 | Status: SHIPPED | OUTPATIENT
Start: 2025-01-02

## 2025-01-02 NOTE — TELEPHONE ENCOUNTER
Called, spoke to pt's DIL. Message relayed as given. Rx to pharm of choice forwarded to provider for approval.

## 2025-01-02 NOTE — TELEPHONE ENCOUNTER
This patient's daughter-in-law sent in a recording of the patient's blood pressures which have been persistently high.  Can you help get her started on hydralazine 25 mg 3 times a day and increase the clonidine patch to 0.2 mg / 24 hours?  Please let them know that the prescription changes will be called in as well.  Thank you.

## 2025-01-06 DIAGNOSIS — K21.9 GASTROESOPHAGEAL REFLUX DISEASE, UNSPECIFIED WHETHER ESOPHAGITIS PRESENT: ICD-10-CM

## 2025-01-07 DIAGNOSIS — K21.9 GASTROESOPHAGEAL REFLUX DISEASE, UNSPECIFIED WHETHER ESOPHAGITIS PRESENT: ICD-10-CM

## 2025-01-07 RX ORDER — PANTOPRAZOLE SODIUM 20 MG/1
20 TABLET, DELAYED RELEASE ORAL DAILY
Qty: 90 TABLET | Refills: 1 | Status: SHIPPED | OUTPATIENT
Start: 2025-01-07

## 2025-01-07 NOTE — TELEPHONE ENCOUNTER
Deanna Castaneda [facility] [address]    pantoprazole (PROTONIX) 20 mg tablet  - daily   ( Medication under historical provider)   Please review if refill is appropriate      Pharmacy  [facility]/pharmacy #[id number] - [place] - [address] [address]    No HP needed

## 2025-01-14 NOTE — ASSESSMENT & PLAN NOTE
BP great today, con't current meds, Nifedipine refilled upon request today, healthy diet/exercise/wgt loss encouraged Attending Discharge Physical Examination:

## 2025-01-25 DIAGNOSIS — I10 ESSENTIAL HYPERTENSION: ICD-10-CM

## 2025-01-25 DIAGNOSIS — E11.22 TYPE 2 DIABETES MELLITUS WITH STAGE 3 CHRONIC KIDNEY DISEASE, WITHOUT LONG-TERM CURRENT USE OF INSULIN (HCC): ICD-10-CM

## 2025-01-25 DIAGNOSIS — N18.30 TYPE 2 DIABETES MELLITUS WITH STAGE 3 CHRONIC KIDNEY DISEASE, WITHOUT LONG-TERM CURRENT USE OF INSULIN (HCC): ICD-10-CM

## 2025-01-27 ENCOUNTER — OFFICE VISIT (OUTPATIENT)
Dept: ENDOCRINOLOGY | Facility: HOSPITAL | Age: OVER 89
End: 2025-01-27
Payer: COMMERCIAL

## 2025-01-27 ENCOUNTER — TELEPHONE (OUTPATIENT)
Dept: ENDOCRINOLOGY | Facility: HOSPITAL | Age: OVER 89
End: 2025-01-27

## 2025-01-27 VITALS
HEIGHT: 62 IN | WEIGHT: 149 LBS | DIASTOLIC BLOOD PRESSURE: 70 MMHG | BODY MASS INDEX: 27.42 KG/M2 | SYSTOLIC BLOOD PRESSURE: 128 MMHG | HEART RATE: 63 BPM

## 2025-01-27 DIAGNOSIS — Z79.4 TYPE 2 DIABETES MELLITUS WITH STAGE 3B CHRONIC KIDNEY DISEASE, WITH LONG-TERM CURRENT USE OF INSULIN (HCC): Primary | ICD-10-CM

## 2025-01-27 DIAGNOSIS — N18.32 TYPE 2 DIABETES MELLITUS WITH STAGE 3B CHRONIC KIDNEY DISEASE, WITH LONG-TERM CURRENT USE OF INSULIN (HCC): Primary | ICD-10-CM

## 2025-01-27 DIAGNOSIS — M81.0 AGE-RELATED OSTEOPOROSIS WITHOUT CURRENT PATHOLOGICAL FRACTURE: ICD-10-CM

## 2025-01-27 DIAGNOSIS — E11.22 TYPE 2 DIABETES MELLITUS WITH STAGE 3B CHRONIC KIDNEY DISEASE, WITH LONG-TERM CURRENT USE OF INSULIN (HCC): Primary | ICD-10-CM

## 2025-01-27 LAB — SL AMB POCT HEMOGLOBIN AIC: 7.3 (ref ?–6.5)

## 2025-01-27 PROCEDURE — 99215 OFFICE O/P EST HI 40 MIN: CPT | Performed by: PHYSICIAN ASSISTANT

## 2025-01-27 PROCEDURE — 83036 HEMOGLOBIN GLYCOSYLATED A1C: CPT | Performed by: PHYSICIAN ASSISTANT

## 2025-01-27 RX ORDER — SITAGLIPTIN 50 MG/1
50 TABLET, FILM COATED ORAL DAILY
Qty: 30 TABLET | Refills: 5 | Status: SHIPPED | OUTPATIENT
Start: 2025-01-27

## 2025-01-27 RX ORDER — NEBIVOLOL 20 MG/1
40 TABLET ORAL DAILY
Qty: 60 TABLET | Refills: 5 | Status: SHIPPED | OUTPATIENT
Start: 2025-01-27

## 2025-01-27 NOTE — PROGRESS NOTES
Ena Ahumada 89 y.o. female MRN: [id number]    Encounter: [id number]      Assessment & Plan     Assessment:  This is a 89 y.o.-year-old female with type 2 diabetes with neuropathy, microalbuminuria, hypertension and hyperlipidemia.     Plan:  1.  Type 2 diabetes: Most recent hemoglobin A1c was was 7.3.  Kos levels doing well, so will not make any adjustments to her medications at this time.  She will continue with Januvia 50 mg daily, glipizide 10 mg twice a day, Semglee 5 units daily, Invokana 100 mg daily.  Continue checking blood sugar on a daily basis.  Contact the office with any concerns or questions.  Follow-up in 6 months with labwork completed prior to visit.     2.  Diabetic neuropathy: Stable.  Diabetic foot exam is up-to-date.     3.  Microalbuminuria: Does have chronic kidney disease.  Does follow-up with nephrology.  Continue with current medications.    4.  Osteoporosis: DEXA scan did reveal bone density within osteoporosis range.  She already has had a hip fracture from from standing height.  No secondary causes for her osteoporosis.  Did discuss treatment options and she is willing to start Prolia at this time.  We will see if her insurance covers the medication and then go from there.  She will need lab work prior to having her first injection.     5.  Hypertension: Normotensive in the office.  Kidney function remains stable.  Continue with current medication.  Will continue to monitor over time.     6.  Hyperlipidemia: Previous lipid panel was doing excellent.  Continue with current medication.  Will continue to monitor over time.    CC: Type 2 diabetes and osteoporosis follow-up     History of Present Illness     HPI:  Ena Ahumada is a 89 year old female with type 2 diabetes for 11 years.  She is on oral agents at home and takes Januvia 50 mg daily, glipizide 10 mg twice a day, Semglee 5 units daily, Invokana 100 mg daily. She denies any polyuria, polydipsia, nocturia and blurry  vision.  She denies retinopathy, heart attack, stroke, and claudication but does admit to neuropathy and nephropathy.  Is doing well today.  Has not had any issues with her glucose levels.  Is checking fasting glucose levels daily and has not had any significant hyper or hypoglycemia.     Hypoglycemic episodes: No.     The patient's last eye exam was in September 2024.  The patient's last foot exam was in September 2024.     Blood Sugar/Glucometer/Pump/CGM review: Currently checking glucose levels daily.  Average glucose levels are typically between 120 and 150.  Does have the occasional outliers.  Lowest was 96 with highest 171.     For hypertension she is currently on clonidine patch 0.2 mg once a week, hydralazine 25 mg 3 times a day, Bystolic 40 mg daily, nifedipine 90 mg daily, torsemide 20 mg daily.  He does follow-up with nephrology.  For hyperlipidemia she is currently on atorvastatin 10 mg daily.  Denies any headaches, vision changes, chest pain, MI or strokelike symptoms.    In June 2024, she sustained a fall while exiting her car, resulting in a fracture of her left pelvis.  At the time of injury had not had a recent DEXA scan. She was previously on Fosamax for osteoporosis but discontinued it over 15 years ago. She is currently taking vitamin D 1000 units twice daily. She was advised to stop taking calcium supplements 3 to 4 years ago.  Most recent DEXA scan was completed September 2024 which revealed T-score of lumbar spine -2.3, left total hip at -2.0, and left femoral neck at -2.5.  Secondary workup did show mildly elevated PTH, but this could be due to recent fracture.  Ultrasound of the kidney did not show any nephrolithiasis.  Otherwise secondary workup was unremarkable     Review of Systems   Constitutional:  Negative for activity change, appetite change, fatigue and unexpected weight change.   HENT:  Negative for sore throat and trouble swallowing.    Eyes:  Negative for visual disturbance.    Respiratory:  Negative for chest tightness and shortness of breath.    Cardiovascular:  Negative for chest pain, palpitations and leg swelling.   Gastrointestinal:  Negative for abdominal pain, constipation, diarrhea, nausea and vomiting.   Endocrine: Negative for cold intolerance, heat intolerance, polydipsia, polyphagia and polyuria.   Genitourinary:  Negative for frequency.   Musculoskeletal:  Positive for gait problem (Utilizes a cane).   Skin:  Negative for wound.   Neurological:  Negative for dizziness, weakness, numbness and headaches.   Psychiatric/Behavioral:  Negative for dysphoric mood and sleep disturbance. The patient is not nervous/anxious.        Historical Information   Past Medical History:   Diagnosis Date   • Acute respiratory failure with hypoxia (HCC) 03/17/2024   • Anxiety    • Aortic valve insufficiency    • Cataract of both eyes    • Edema     last assessed - 05Jun2015   • GERD (gastroesophageal reflux disease)     last assessed - 30Aug2012   • Hypertensive urgency 05/12/2024   • Palpitations      Past Surgical History:   Procedure Laterality Date   • APPENDECTOMY     • CATARACT EXTRACTION Bilateral    • COLONOSCOPY     • HIP SURGERY     • TUBAL LIGATION Bilateral      Social History   Social History     Substance and Sexual Activity   Alcohol Use Never     Social History     Substance and Sexual Activity   Drug Use Never     Social History     Tobacco Use   Smoking Status Never   Smokeless Tobacco Never     Family History:   Family History   Problem Relation Age of Onset   • Kidney disease Mother         Chronic   • No Known Problems Father    • Breast cancer Sister    • Diabetes Sister    • Cancer Sister    • Breast cancer Sister    • Hypertension Sister    • No Known Problems Daughter    • No Known Problems Son    • No Known Problems Son    • No Known Problems Son    • No Known Problems Son        Meds/Allergies   Current Outpatient Medications   Medication Sig Dispense Refill   •  ACCU-CHEK PAUL PLUS test strip      • Accu-Chek Softclix Lancets lancets      • Accu-Chek Softclix Lancets lancets USE AS INSTRUCTED TWICE DAILY 100 each 3   • acetaminophen (TYLENOL) 325 mg tablet Take 975 mg by mouth 3 (three) times a day. per latest dci from [facility]  Indications: Pain     • aspirin 81 MG tablet Take 1 tablet by mouth daily     • atorvastatin (LIPITOR) 10 mg tablet Take 1 tablet (10 mg total) by mouth daily per latest dci 90 tablet 2   • canagliflozin (Invokana) 100 mg Take 1 tablet (100 mg total) by mouth daily before breakfast 100 tablet 1   • cholecalciferol (VITAMIN D3) 1,000 units tablet Take 1 tablet by mouth daily     • cloNIDine (CATAPRES-TTS-2) 0.2 mg/24 hr Place 1 patch (0.2 mg total) on the skin over 7 days once a week 12 patch 3   • famotidine (PEPCID) 20 mg tablet TAKE 1 TABLET BY MOUTH AT BEDTIME 90 tablet 1   • ferrous sulfate 325 (65 Fe) mg tablet Take 1 tablet (325 mg total) by mouth 3 (three) times a week 12 tablet 0   • glipiZIDE (GLUCOTROL) 10 mg tablet TAKE 2 TABLETS BY MOUTH IN THE MORNING THEN 1 TAB WITH DINNER     • glucose blood (Accu-Chek Paul Plus) test strip USE AS INSTRUCTED TWICE DAILY 100 each 7   • hydrALAZINE (APRESOLINE) 25 mg tablet Take 1 tablet (25 mg total) by mouth 3 (three) times a day 270 tablet 3   • Insulin Glargine-yfgn 100 UNIT/ML SOPN Inject 0.05 mL (5 Units total) uncer the skin in the morning 100 mL 2   • lidocaine (Lidoderm) 5 % Apply 1 patch topically over 12 hours daily Remove & Discard patch within 12 hours or as directed by MD     • NIFEdipine (ADALAT CC) 90 mg 24 hr tablet TAKE 1 TABLET BY MOUTH EVERY DAY 90 tablet 3   • oxygen gas Inhale 2.5 L/min continuous VIA NASAL CANULA. At night only     • pantoprazole (PROTONIX) 20 mg tablet TAKE 1 TABLET BY MOUTH EVERY DAY 90 tablet 1   • polyethylene glycol (GLYCOLAX) 17 GM/SCOOP powder Take 17 g by mouth daily as needed (constipation). 17 gm daily as needed for constipation  Indications:  "Constipation     • potassium chloride (Klor-Con M10) 10 mEq tablet Take 1 tablet (10 mEq total) by mouth daily 30 tablet 2   • sertraline (ZOLOFT) 100 mg tablet TAKE 1 TABLET BY MOUTH EVERY DAY 90 tablet 2   • torsemide (DEMADEX) 20 mg tablet TAKE 1 TABLET BY MOUTH EVERY  tablet 1   • Blood Glucose Monitoring Suppl (OneTouch Verio Reflect) w/Device KIT Check blood sugars twice daily. Please substitute with appropriate alternative as covered by patient's insurance. Dx: E11.65 (Patient not taking: Reported on 1/27/2025) 1 kit 0   • glucose blood (Accu-Chek Guide) test strip USE TO TEST TWICE A DAY (Patient not taking: Reported on 1/27/2025) 200 strip 1   • glucose blood (OneTouch Verio) test strip Check blood sugars twice daily. Please substitute with appropriate alternative as covered by patient's insurance. Dx: E11.65 (Patient not taking: Reported on 1/27/2025) 200 each 3   • Insulin Pen Needle (BD Pen Needle Kathy U/F) 32G X 4 MM MISC Use daily 100 each 3   • Januvia 50 MG tablet TAKE 1 TABLET BY MOUTH EVERY DAY 30 tablet 5   • nebivolol (BYSTOLIC) 20 MG tablet TAKE 2 TABLETS (40 MG TOTAL) BY MOUTH DAILY. 60 tablet 5   • OneTouch Delica Lancets 33G MISC Check blood sugars twice daily. Please substitute with appropriate alternative as covered by patient's insurance. Dx: E11.65 (Patient not taking: Reported on 1/27/2025) 200 each 3   • pantoprazole (PROTONIX) 20 mg tablet Take 1 tablet (20 mg total) by mouth daily (Patient not taking: Reported on 1/27/2025) 90 tablet 1   • Sennosides (Senna) 8.6 MG CAPS Take 2 capsules by mouth daily at bedtime. per [facility]  Indications: constipation (Patient not taking: Reported on 1/27/2025)       No current facility-administered medications for this visit.     No Known Allergies    Objective   Vitals: Blood pressure 128/70, pulse 63, height 5' 2\" (1.575 m), weight 67.6 kg (149 lb), not currently breastfeeding.    Physical Exam  Vitals and nursing note " reviewed.   Constitutional:       General: She is not in acute distress.     Appearance: Normal appearance. She is not diaphoretic.   HENT:      Head: Normocephalic and atraumatic.   Eyes:      General: No scleral icterus.     Extraocular Movements: Extraocular movements intact.      Conjunctiva/sclera: Conjunctivae normal.      Pupils: Pupils are equal, round, and reactive to light.   Cardiovascular:      Rate and Rhythm: Normal rate and regular rhythm.      Heart sounds: No murmur heard.  Pulmonary:      Effort: Pulmonary effort is normal. No respiratory distress.      Breath sounds: Normal breath sounds. No wheezing.      Comments: Is on portable oxygen  Musculoskeletal:      Cervical back: Normal range of motion.      Right lower leg: No edema.      Left lower leg: No edema.   Lymphadenopathy:      Cervical: No cervical adenopathy.   Neurological:      Mental Status: She is alert. Mental status is at baseline.      Sensory: No sensory deficit.      Gait: Gait abnormal (Utilizing a cane).   Psychiatric:         Mood and Affect: Mood normal.         Behavior: Behavior normal.         Thought Content: Thought content normal.         The history was obtained from the review of the chart, patient.    Lab Results:   Lab Results   Component Value Date/Time    Hemoglobin A1C 7.3 (A) 01/27/2025 09:35 AM    Hemoglobin A1C 7.3 (H) 09/10/2024 07:52 AM    Hemoglobin A1C 7.4 (H) 06/08/2024 10:12 AM    Hemoglobin A1C 8.0 (H) 04/22/2024 09:27 AM    WBC 12.28 (H) 06/24/2024 05:20 AM    WBC 11.30 (H) 06/23/2024 05:25 AM    WBC 11.78 (H) 06/22/2024 04:55 AM    Hemoglobin 11.1 (L) 06/24/2024 05:20 AM    Hemoglobin 11.1 (L) 06/23/2024 05:25 AM    Hemoglobin 11.8 06/22/2024 04:55 AM    Hematocrit 35.1 06/24/2024 05:20 AM    Hematocrit 35.8 06/23/2024 05:25 AM    Hematocrit 37.3 06/22/2024 04:55 AM    MCV 94 06/24/2024 05:20 AM    MCV 95 06/23/2024 05:25 AM    MCV 93 06/22/2024 04:55 AM    Platelets 177 06/24/2024 05:20 AM     Platelets 184 06/23/2024 05:25 AM    Platelets 207 06/22/2024 04:55 AM    BUN 30 (H) 09/10/2024 07:52 AM    BUN 20 06/24/2024 05:20 AM    BUN 26 (H) 06/23/2024 05:25 AM    BUN 35 (H) 06/22/2024 04:55 AM    BUN 36 (H) 06/08/2024 10:12 AM    BUN 41 (H) 05/31/2024 09:36 AM    Potassium 4.1 09/10/2024 07:52 AM    Potassium 3.8 06/24/2024 05:20 AM    Potassium 4.0 06/23/2024 05:25 AM    Potassium 3.0 (L) 06/22/2024 04:55 AM    Potassium 3.4 (L) 06/08/2024 10:12 AM    Potassium 3.5 05/31/2024 09:36 AM    Chloride 104 09/10/2024 07:52 AM    Chloride 105 06/24/2024 05:20 AM    Chloride 106 06/23/2024 05:25 AM    Chloride 103 06/22/2024 04:55 AM    Chloride 101 06/08/2024 10:12 AM    Chloride 101 05/31/2024 09:36 AM    CO2 26 09/10/2024 07:52 AM    CO2 23 06/24/2024 05:20 AM    CO2 27 06/23/2024 05:25 AM    CO2 29 06/22/2024 04:55 AM    CO2 25 06/08/2024 10:12 AM    CO2 24 05/31/2024 09:36 AM    Creatinine 1.94 (H) 09/10/2024 07:52 AM    Creatinine 1.59 (H) 06/24/2024 05:20 AM    Creatinine 1.59 (H) 06/23/2024 05:25 AM    Creatinine 2.00 (H) 06/22/2024 04:55 AM    AST 21 09/10/2024 07:52 AM    AST 12 (L) 06/23/2024 05:25 AM    AST 20 06/08/2024 10:12 AM    AST 21 05/11/2024 07:20 PM    AST 22 04/22/2024 09:27 AM    ALT 11 09/10/2024 07:52 AM    ALT 7 06/23/2024 05:25 AM    ALT 16 06/08/2024 10:12 AM    ALT 11 05/11/2024 07:20 PM    ALT 14 04/22/2024 09:27 AM    Total Protein 5.7 (L) 06/23/2024 05:25 AM    Total Protein 7.4 05/11/2024 07:20 PM    Protein, Total 6.3 09/10/2024 07:52 AM    Protein, Total 6.4 06/08/2024 10:12 AM    Protein, Total 5.8 (L) 04/22/2024 09:27 AM    Albumin 4.1 09/10/2024 07:52 AM    Albumin 3.3 (L) 06/23/2024 05:25 AM    Albumin 4.2 06/08/2024 10:12 AM    Albumin 4.4 05/11/2024 07:20 PM    Albumin 3.8 04/22/2024 09:27 AM    Albumin 3.4 (L) 04/02/2024 11:41 AM    Globulin, Total 2.2 09/10/2024 07:52 AM    Globulin, Total 2.2 06/08/2024 10:12 AM    Globulin, Total 2.0 04/22/2024 09:27 AM    HDL 44  "09/10/2024 07:52 AM    HDL 48 06/08/2024 10:12 AM    Triglycerides 136 09/10/2024 07:52 AM    Triglycerides 138 06/08/2024 10:12 AM           Imaging Studies: Results Review Statement: No pertinent imaging studies reviewed.    Portions of the record may have been created with voice recognition software. Occasional wrong word or \"sound a like\" substitutions may have occurred due to the inherent limitations of voice recognition software. Read the chart carefully and recognize, using context, where substitutions have occurred.    "

## 2025-01-27 NOTE — PATIENT INSTRUCTIONS
Continue with diabetes medications and checking blood sugar daily.    Call with any concerns or questions.    We will work on getting Prolia approved. Lab work needs to be completed before first injection.    Follow up in 6 months with labs.

## 2025-01-29 DIAGNOSIS — I10 PRIMARY HYPERTENSION: ICD-10-CM

## 2025-01-29 RX ORDER — HYDRALAZINE HYDROCHLORIDE 50 MG/1
50 TABLET, FILM COATED ORAL 3 TIMES DAILY
Qty: 270 TABLET | Refills: 3 | Status: SHIPPED | OUTPATIENT
Start: 2025-01-29

## 2025-02-04 DIAGNOSIS — F41.9 ANXIETY: ICD-10-CM

## 2025-02-04 DIAGNOSIS — R60.9 DEPENDENT EDEMA: ICD-10-CM

## 2025-02-04 RX ORDER — SERTRALINE HYDROCHLORIDE 100 MG/1
100 TABLET, FILM COATED ORAL DAILY
Qty: 90 TABLET | Refills: 1 | Status: SHIPPED | OUTPATIENT
Start: 2025-02-04

## 2025-02-05 DIAGNOSIS — I10 ESSENTIAL HYPERTENSION: ICD-10-CM

## 2025-02-05 RX ORDER — NIFEDIPINE 90 MG/1
90 TABLET, FILM COATED, EXTENDED RELEASE ORAL DAILY
Qty: 90 TABLET | Refills: 1 | Status: SHIPPED | OUTPATIENT
Start: 2025-02-05

## 2025-02-05 RX ORDER — TORSEMIDE 20 MG/1
20 TABLET ORAL DAILY
Qty: 30 TABLET | Refills: 5 | Status: SHIPPED | OUTPATIENT
Start: 2025-02-05

## 2025-02-07 NOTE — TELEPHONE ENCOUNTER
I would be fine utilizing Reclast.  I do not know if we can assist in seeing with the cost for that will be though.

## 2025-02-07 NOTE — TELEPHONE ENCOUNTER
Patients daughter Talon calling back, relayed the above message and she states her mother may not be happy with paying that price twice a year. She states the doctor mentioned a once a year injection at the office visit and she's asking if benefits can be looked into for that so they can compare.  Please advise

## 2025-02-07 NOTE — TELEPHONE ENCOUNTER
Spoke to daughter. She was unable to write down the information. She will call back when she can.     Patient needs lab work before her 1st injection. Also, her benefit information for this was never gone over. She has a 20% copay for the Prolia which is about $280-$300. No deductible applies. Prior auth was done and approved.

## 2025-02-09 DIAGNOSIS — E11.22 TYPE 2 DIABETES MELLITUS WITH STAGE 4 CHRONIC KIDNEY DISEASE, WITHOUT LONG-TERM CURRENT USE OF INSULIN (HCC): ICD-10-CM

## 2025-02-09 DIAGNOSIS — N18.4 TYPE 2 DIABETES MELLITUS WITH STAGE 4 CHRONIC KIDNEY DISEASE, WITHOUT LONG-TERM CURRENT USE OF INSULIN (HCC): ICD-10-CM

## 2025-02-10 ENCOUNTER — OFFICE VISIT (OUTPATIENT)
Dept: FAMILY MEDICINE CLINIC | Facility: HOSPITAL | Age: OVER 89
End: 2025-02-10
Payer: COMMERCIAL

## 2025-02-10 VITALS
SYSTOLIC BLOOD PRESSURE: 146 MMHG | BODY MASS INDEX: 27.86 KG/M2 | WEIGHT: 151.4 LBS | OXYGEN SATURATION: 97 % | HEART RATE: 68 BPM | DIASTOLIC BLOOD PRESSURE: 68 MMHG | TEMPERATURE: 97.5 F | HEIGHT: 62 IN

## 2025-02-10 DIAGNOSIS — J96.11 CHRONIC RESPIRATORY FAILURE WITH HYPOXIA (HCC): ICD-10-CM

## 2025-02-10 DIAGNOSIS — N18.32 TYPE 2 DIABETES MELLITUS WITH STAGE 3B CHRONIC KIDNEY DISEASE, WITH LONG-TERM CURRENT USE OF INSULIN (HCC): ICD-10-CM

## 2025-02-10 DIAGNOSIS — I12.9 BENIGN HYPERTENSION WITH CKD (CHRONIC KIDNEY DISEASE) STAGE IV (HCC): Primary | ICD-10-CM

## 2025-02-10 DIAGNOSIS — N18.4 BENIGN HYPERTENSION WITH CKD (CHRONIC KIDNEY DISEASE) STAGE IV (HCC): Primary | ICD-10-CM

## 2025-02-10 DIAGNOSIS — E11.22 TYPE 2 DIABETES MELLITUS WITH STAGE 3B CHRONIC KIDNEY DISEASE, WITH LONG-TERM CURRENT USE OF INSULIN (HCC): ICD-10-CM

## 2025-02-10 DIAGNOSIS — Z79.4 TYPE 2 DIABETES MELLITUS WITH STAGE 3B CHRONIC KIDNEY DISEASE, WITH LONG-TERM CURRENT USE OF INSULIN (HCC): ICD-10-CM

## 2025-02-10 DIAGNOSIS — M46.1 SACROILIITIS (HCC): ICD-10-CM

## 2025-02-10 PROCEDURE — 99214 OFFICE O/P EST MOD 30 MIN: CPT | Performed by: INTERNAL MEDICINE

## 2025-02-10 NOTE — ASSESSMENT & PLAN NOTE
Lab Results   Component Value Date    HGBA1C 7.3 (A) 01/27/2025   DM type 2 with hyperglycemia, nephropathy/CKD stage 3b and polyneuropathy - uncontrolled but acceptable given age with A1C 7.3 - con't meds and labs as per RAZA Younger on DM foot exam 9/24 and eye exam 9/24, on a statin but no ACE/ARB, will follow

## 2025-02-10 NOTE — PROGRESS NOTES
Name: Ena Ahumada      : 1935      MRN: [id number]  Encounter Provider: Deanna Castaneda DO  Encounter Date: 2/10/2025   Encounter department: [facility] CARE [address]   :  Assessment & Plan  Benign hypertension with CKD (chronic kidney disease) stage IV (HCC)  Lab Results   Component Value Date    EGFR 24 (L) 09/10/2024    EGFR 28 2024    EGFR 28 2024    CREATININE 1.94 (H) 09/10/2024    CREATININE 1.59 (H) 2024    CREATININE 1.59 (H) 2024   BP still a bit high but acceptable given her age and CKD, she is reporting readings to Cardio and they are adjusting her meds accordingly via message, urged to bring home BP cuff to next appt to check accuracy, cont meds as per Cardio and re-eval in 4 mos or so         Type 2 diabetes mellitus with stage 3b chronic kidney disease, with long-term current use of insulin ([facility])    Lab Results   Component Value Date    HGBA1C 7.3 (A) 2025   DM type 2 with hyperglycemia, nephropathy/CKD stage 3b and polyneuropathy - uncontrolled but acceptable given age with A1C 7.3 - con't meds and labs as per RAZA Younger on DM foot exam  and eye exam , on a statin but no ACE/ARB, will follow     Sacroiliitis (HCC)  Chronic LBP unchanged, recent Xrays reviewed, con't lidocaine patch and Tylenol prn, not following with pain mgt, call with new/worse symptoms     Chronic respiratory failure with hypoxia (HCC)  No current s/sx of resp failure and not on home O2, call with resp symptoms         Dexa  - osteoporosis       Depression Screening and Follow-up Plan: Patient was screened for depression during today's encounter. They screened negative with a PHQ-2 score of 0.    DM labs  (A1C 7.3)  DM foot exam   DM eye exam       History of Present Illness   HPI Pt here for follow up appt    Last visit in Dec pts BP was a bit elevated but pt had not taken her BP meds prior to appt.  She had no meds changed but was  told to f/u for a BP check today.  BP at goal today and meds were reviewed Cardio started pt on hydralazine in Jan 25.  BP readings were sent in and BP was still high and hydralazine was increased to 50 mg tid.  She denies missing doses of meds or SE with the meds.  She does check her BP outside the office and reports home readings are still high - readings reviewed and average 160's/60's.  She notes no frequent Ha's/dizziness/double vision/CP.     Pt saw Carisa FARRIS (Fabien Love) in Jan for f/u of her DM - OV note reviewed. Her A1C from 1/2025 was good at 7.3.  No changes were made to her medications. Def of controlled vs uncontrolled DM was reviewed.  Diet was reviewed - wgt up another 5 lbs from Dec 24.  She is taking her DM meds as directed.  She is checking her sugars once daily fasting in the am - average 130's - 150's.  She is UTD on DM foot exam (9/24) and eye exam (9/24).  She follows with Nephro for her CKD stage 3b and saw CROW Vásquez in Dec of 24.  She is on statin but she is not on an ACE or an ARB.     Review of Systems   Constitutional:  Negative for chills and fever.   HENT:  Negative for congestion and sore throat.    Eyes:  Negative for pain.        Vision gets fuzzy when she stands intermittently   Respiratory:  Negative for cough, shortness of breath and wheezing.    Cardiovascular:  Negative for chest pain, palpitations and leg swelling.   Gastrointestinal:  Negative for abdominal pain, blood in stool, constipation, diarrhea, nausea and vomiting.   Genitourinary:  Negative for difficulty urinating and dysuria.   Musculoskeletal:  Positive for back pain. Negative for gait problem and neck pain.   Skin:  Negative for rash and wound.   Neurological:  Negative for dizziness, light-headedness and headaches.   Hematological:  Does not bruise/bleed easily.   Psychiatric/Behavioral:  Negative for confusion and dysphoric mood.        Objective   /68 (BP Location: Left arm, Patient Position: Sitting,  "Cuff Size: Standard)   Pulse 68   Temp 97.5 °F (36.4 °C) (Tympanic)   Ht 5' 2\" (1.575 m)   Wt 68.7 kg (151 lb 6.4 oz)   SpO2 97%   BMI 27.69 kg/m²      Physical Exam  Vitals and nursing note reviewed.   Constitutional:       General: She is not in acute distress.     Appearance: She is well-developed. She is not ill-appearing.   HENT:      Head: Normocephalic and atraumatic.      Right Ear: External ear normal. There is no impacted cerumen.      Left Ear: External ear normal. There is no impacted cerumen.      Mouth/Throat:      Mouth: Mucous membranes are moist.      Pharynx: Oropharynx is clear. No oropharyngeal exudate.   Eyes:      General:         Right eye: No discharge.         Left eye: No discharge.      Conjunctiva/sclera: Conjunctivae normal.   Neck:      Thyroid: No thyromegaly.      Trachea: No tracheal deviation.   Cardiovascular:      Rate and Rhythm: Normal rate and regular rhythm.      Heart sounds: Murmur heard.   Pulmonary:      Effort: Pulmonary effort is normal. No respiratory distress.      Breath sounds: Normal breath sounds. No wheezing, rhonchi or rales.   Abdominal:      General: There is no distension.      Palpations: Abdomen is soft.      Tenderness: There is no abdominal tenderness. There is no guarding or rebound.   Musculoskeletal:         General: No deformity or signs of injury.      Cervical back: Neck supple.   Lymphadenopathy:      Cervical: No cervical adenopathy.   Skin:     General: Skin is warm and dry.      Coloration: Skin is not pale.      Findings: No rash.   Neurological:      General: No focal deficit present.      Mental Status: She is alert. Mental status is at baseline.      Motor: No abnormal muscle tone.      Gait: Gait normal.   Psychiatric:         Mood and Affect: Mood normal.         Behavior: Behavior normal.         Thought Content: Thought content normal.         Judgment: Judgment normal.         "

## 2025-02-10 NOTE — ASSESSMENT & PLAN NOTE
Lab Results   Component Value Date    EGFR 24 (L) 09/10/2024    EGFR 28 06/24/2024    EGFR 28 06/23/2024    CREATININE 1.94 (H) 09/10/2024    CREATININE 1.59 (H) 06/24/2024    CREATININE 1.59 (H) 06/23/2024   BP still a bit high but acceptable given her age and CKD, she is reporting readings to Cardio and they are adjusting her meds accordingly via message, urged to bring home BP cuff to next appt to check accuracy, cont meds as per Cardio and re-eval in 4 mos or so

## 2025-02-10 NOTE — ASSESSMENT & PLAN NOTE
Chronic LBP unchanged, recent Xrays reviewed, con't lidocaine patch and Tylenol prn, not following with pain mgt, call with new/worse symptoms

## 2025-02-11 RX ORDER — CANAGLIFLOZIN 100 MG/1
100 TABLET, FILM COATED ORAL
Qty: 30 TABLET | Refills: 6 | Status: SHIPPED | OUTPATIENT
Start: 2025-02-11

## 2025-02-19 ENCOUNTER — TELEPHONE (OUTPATIENT)
Dept: ENDOCRINOLOGY | Facility: HOSPITAL | Age: OVER 89
End: 2025-02-19

## 2025-02-19 DIAGNOSIS — I10 ESSENTIAL HYPERTENSION: ICD-10-CM

## 2025-02-19 NOTE — TELEPHONE ENCOUNTER
I called and spoke to the pharmacist at [facility] and he stated that the patient was able to get her Invokana at another [facility] on 2/11/2025

## 2025-02-20 ENCOUNTER — TELEPHONE (OUTPATIENT)
Age: OVER 89
End: 2025-02-20

## 2025-02-20 ENCOUNTER — NURSE TRIAGE (OUTPATIENT)
Age: OVER 89
End: 2025-02-20

## 2025-02-20 RX ORDER — NEBIVOLOL 20 MG/1
40 TABLET ORAL DAILY
Qty: 180 TABLET | Refills: 2 | Status: SHIPPED | OUTPATIENT
Start: 2025-02-20

## 2025-02-20 NOTE — TELEPHONE ENCOUNTER
Received a call from Louise the daughter, and rechecked BP and it is gone up and decided to bring her to urgent care, they are on there way.

## 2025-02-20 NOTE — TELEPHONE ENCOUNTER
Received call from patient's daughter Louise regarding symptoms. Received verbal consent from patient to discuss with Louise, and call was then disconnected by patient.

## 2025-02-20 NOTE — TELEPHONE ENCOUNTER
Patient's daughter calling back. Patient went to urgent care today. SBP was 160s at urgent care, but provider did not seem concerned about her symptoms. Per daughter, the patient's facial swelling has resolved, and lower extremity swelling is chronic. She does not have any shortness of breath, or rash.   Her daughter does not want to schedule an appointment at this time, wants to monitor her over the next 24 hours and call back to schedule if symptoms worsen.

## 2025-02-20 NOTE — TELEPHONE ENCOUNTER
"Reason for Disposition  • MODERATE swelling of both ankles (e.g., swelling extends up to the knees) AND new-onset or getting worse    Answer Assessment - Initial Assessment Questions  Reason for Conversation: facial puffiness, along with ankles/feet swelling  Louise the daughter called Ena is staying with her and does not normal stay with her. The other daughter is away currently. Today has noticed some swelling in her face, eyelids,facial swelling, also having ankles/feet swelling. Denies any respiratory issues, advised continue to monitor, if worsen or any respiratory issues report to ED.   Recently changed the Hydralazine medication  If have additional questions please call Louise LOPES daughter at [phone]  Ena is with her currently.  No opening sooner for qu.    VS/Weight: (Note: Please include date/time vitals/weight were measured)    /61  HR 62,65    Pain: denies     Risk Factors: Premature ventricular contraction,nonheumatic aortic valve stenosis    Recent relevant testing and date of testing: n/a    Medication: hydralazine 50 mg tid, torsemide 20 mg qd,Nifedipine 90 mg qd,    Upcoming Office Visit: 4/4/2025    Last Office Visit: 9/24/2024       1. ONSET: \"When did the swelling start?\" (e.g., minutes, hours, days)      Left/right ankle swelling, left is worse     2. LOCATION: \"What part of the leg is swollen?\"  \"Are both legs swollen or just one leg?\"      Left ankle up to calf area, also noticing some facial sweling cheeks puffy  Denies any respiratory issue  3. SEVERITY: \"How bad is the swelling?\" (e.g., localized; mild, moderate, severe)      Moderate   4. REDNESS: \"Does the swelling look red or infected?\"      Denies   5. PAIN: \"Is the swelling painful to touch?\" If Yes, ask: \"How painful is it?\"   (Scale 1-10; mild, moderate or severe)      Denies   6. FEVER: \"Do you have a fever?\" If Yes, ask: \"What is it, how was it measured, and when did it start?\"       Denies   7. CAUSE: \"What do you think " "is causing the leg swelling?\"      Denies   8. MEDICAL HISTORY: \"Do you have a history of blood clots (e.g., DVT), cancer, heart failure, kidney disease, or liver failure?\"      Premature ventricular contraction  9. RECURRENT SYMPTOM: \"Have you had leg swelling before?\" If Yes, ask: \"When was the last time?\" \"What happened that time?\"      Unsure   10. OTHER SYMPTOMS: \"Do you have any other symptoms?\" (e.g., chest pain, difficulty breathing)        Denies    Protocols used: Leg Swelling and Edema-Adult-[place]    "

## 2025-02-20 NOTE — TELEPHONE ENCOUNTER
Called and spoke to Louise the daughter, I advised that had spoken to Dr Hill would like her to have an appt no openings in QU, recommended urgent care, daughter is a nurse and will monitor for now. Will see if can do nurse visit for BP check.

## 2025-02-21 DIAGNOSIS — I10 PRIMARY HYPERTENSION: ICD-10-CM

## 2025-02-21 RX ORDER — HYDRALAZINE HYDROCHLORIDE 50 MG/1
50 TABLET, FILM COATED ORAL 3 TIMES DAILY
Qty: 270 TABLET | Refills: 0 | Status: SHIPPED | OUTPATIENT
Start: 2025-02-21

## 2025-02-21 NOTE — TELEPHONE ENCOUNTER
Patient needs updated blood work. Please place orders. A courtesy refill was provided.   HGB  RBC  WBC

## 2025-03-01 LAB
25(OH)D3+25(OH)D2 SERPL-MCNC: 44 NG/ML (ref 30–100)
ALBUMIN SERPL-MCNC: 4.2 G/DL (ref 3.7–4.7)
ALBUMIN SERPL-MCNC: 4.2 G/DL (ref 3.7–4.7)
ALP SERPL-CCNC: 71 IU/L (ref 44–121)
ALP SERPL-CCNC: 72 IU/L (ref 44–121)
ALT SERPL-CCNC: 14 IU/L (ref 0–32)
ALT SERPL-CCNC: 16 IU/L (ref 0–32)
AST SERPL-CCNC: 20 IU/L (ref 0–40)
AST SERPL-CCNC: 21 IU/L (ref 0–40)
BILIRUB SERPL-MCNC: 0.3 MG/DL (ref 0–1.2)
BILIRUB SERPL-MCNC: 0.3 MG/DL (ref 0–1.2)
BUN SERPL-MCNC: 31 MG/DL (ref 8–27)
BUN SERPL-MCNC: 33 MG/DL (ref 8–27)
BUN/CREAT SERPL: 16 (ref 12–28)
BUN/CREAT SERPL: 17 (ref 12–28)
CALCIUM SERPL-MCNC: 9 MG/DL (ref 8.7–10.3)
CALCIUM SERPL-MCNC: 9 MG/DL (ref 8.7–10.3)
CHLORIDE SERPL-SCNC: 103 MMOL/L (ref 96–106)
CHLORIDE SERPL-SCNC: 104 MMOL/L (ref 96–106)
CHOLEST SERPL-MCNC: 147 MG/DL (ref 100–199)
CHOLEST/HDLC SERPL: 3.4 RATIO (ref 0–4.4)
CO2 SERPL-SCNC: 26 MMOL/L (ref 20–29)
CO2 SERPL-SCNC: 26 MMOL/L (ref 20–29)
CREAT SERPL-MCNC: 1.96 MG/DL (ref 0.57–1)
CREAT SERPL-MCNC: 1.97 MG/DL (ref 0.57–1)
EGFR: 24 ML/MIN/1.73
EGFR: 24 ML/MIN/1.73
EST. AVERAGE GLUCOSE BLD GHB EST-MCNC: 166 MG/DL
EST. AVERAGE GLUCOSE BLD GHB EST-MCNC: 169 MG/DL
GLOBULIN SER-MCNC: 1.9 G/DL (ref 1.5–4.5)
GLOBULIN SER-MCNC: 2.1 G/DL (ref 1.5–4.5)
GLUCOSE SERPL-MCNC: 158 MG/DL (ref 70–99)
GLUCOSE SERPL-MCNC: 158 MG/DL (ref 70–99)
HBA1C MFR BLD: 7.4 % (ref 4.8–5.6)
HBA1C MFR BLD: 7.5 % (ref 4.8–5.6)
HDLC SERPL-MCNC: 43 MG/DL
LDLC SERPL CALC-MCNC: 81 MG/DL (ref 0–99)
POTASSIUM SERPL-SCNC: 3.9 MMOL/L (ref 3.5–5.2)
POTASSIUM SERPL-SCNC: 3.9 MMOL/L (ref 3.5–5.2)
PROT SERPL-MCNC: 6.1 G/DL (ref 6–8.5)
PROT SERPL-MCNC: 6.3 G/DL (ref 6–8.5)
PTH-INTACT SERPL-MCNC: 91 PG/ML (ref 15–65)
SL AMB VLDL CHOLESTEROL CALC: 23 MG/DL (ref 5–40)
SODIUM SERPL-SCNC: 144 MMOL/L (ref 134–144)
SODIUM SERPL-SCNC: 146 MMOL/L (ref 134–144)
TRIGL SERPL-MCNC: 132 MG/DL (ref 0–149)

## 2025-03-03 ENCOUNTER — RESULTS FOLLOW-UP (OUTPATIENT)
Dept: ENDOCRINOLOGY | Facility: HOSPITAL | Age: OVER 89
End: 2025-03-03

## 2025-03-03 ENCOUNTER — TELEPHONE (OUTPATIENT)
Age: OVER 89
End: 2025-03-03

## 2025-03-03 DIAGNOSIS — I10 PRIMARY HYPERTENSION: Primary | ICD-10-CM

## 2025-03-03 RX ORDER — SPIRONOLACTONE 25 MG/1
25 TABLET ORAL DAILY
Qty: 90 TABLET | Refills: 3 | Status: SHIPPED | OUTPATIENT
Start: 2025-03-03

## 2025-03-03 NOTE — TELEPHONE ENCOUNTER
Caller: Jack     Doctor: Dr. Hill     Reason for call: Returning call from [facility]: attempted to transfer call was unsuccessful.     Call back#: [phone]

## 2025-03-05 ENCOUNTER — CLINICAL SUPPORT (OUTPATIENT)
Dept: ENDOCRINOLOGY | Facility: HOSPITAL | Age: OVER 89
End: 2025-03-05
Payer: COMMERCIAL

## 2025-03-05 DIAGNOSIS — M81.0 AGE-RELATED OSTEOPOROSIS WITHOUT CURRENT PATHOLOGICAL FRACTURE: Primary | ICD-10-CM

## 2025-03-05 PROCEDURE — 96372 THER/PROPH/DIAG INJ SC/IM: CPT

## 2025-03-05 NOTE — PROGRESS NOTES
Assessment/Plan:    Ena Ahumada came into the [facility] Endocrinology Office today 03/05/25 to receive Prolia injection.      Verbal consent obtained.  Consent given by: patient    patient states patient has been medically healthy with no underlining concerns/complications.      Ena Ahumada presents with no symptoms today.       All instructions were reviewed with the patient.    If the patient should have any questions/concerns, advised patient to contacted [facility] Endocrinology Office.       Subjective:     History provided by: patient    Patient ID: Ena Ahumada is a 89 y.o. female      Objective:    There were no vitals filed for this visit.    Patient tolerated the injection well without any complications.  Injection site/s Left arm.  Medication was provided by the office (buy and bill).    Patient signed consent form yes   Patient signed ABN form yes (If no patient is not a medicare patient).   Patient waited 15 minutes after injection yes (This only applies to patient's receiving first time injection).       Last Visit: 2/19/2025  Next visit:7/29/2025

## 2025-03-15 LAB
BUN SERPL-MCNC: 28 MG/DL (ref 8–27)
BUN/CREAT SERPL: 15 (ref 12–28)
CALCIUM SERPL-MCNC: 8.1 MG/DL (ref 8.7–10.3)
CHLORIDE SERPL-SCNC: 105 MMOL/L (ref 96–106)
CO2 SERPL-SCNC: 20 MMOL/L (ref 20–29)
CREAT SERPL-MCNC: 1.91 MG/DL (ref 0.57–1)
EGFR: 25 ML/MIN/1.73
GLUCOSE SERPL-MCNC: 145 MG/DL (ref 70–99)
POTASSIUM SERPL-SCNC: 4.9 MMOL/L (ref 3.5–5.2)
SODIUM SERPL-SCNC: 140 MMOL/L (ref 134–144)

## 2025-03-18 ENCOUNTER — CLINICAL SUPPORT (OUTPATIENT)
Dept: CARDIOLOGY CLINIC | Facility: CLINIC | Age: OVER 89
End: 2025-03-18
Payer: COMMERCIAL

## 2025-03-18 VITALS
HEART RATE: 70 BPM | BODY MASS INDEX: 27.79 KG/M2 | WEIGHT: 151 LBS | DIASTOLIC BLOOD PRESSURE: 60 MMHG | HEIGHT: 62 IN | SYSTOLIC BLOOD PRESSURE: 170 MMHG

## 2025-03-18 DIAGNOSIS — I10 PRIMARY HYPERTENSION: Primary | ICD-10-CM

## 2025-03-18 PROCEDURE — 99211 OFF/OP EST MAY X REQ PHY/QHP: CPT

## 2025-03-18 NOTE — PROGRESS NOTES
Pt presents today for a BP check with her home cuff under the supervision of Dr. Ventura.    Pt states she has had very varying BP's and was instructed to buy a new cuff and bring it for a comparison to a manual BP.   Pt has brought a list of home BP's for review.  Pt notes continuing elevated BP's but denies symptoms today.    Proper technique for BP measurement reviewed w/ pt.     Meds and allergies reviewed:  ~Clonidine patch is applied on Fridays x7 days  ~AM: Hydralazine, Nifedipine, Spironolactone.  ~Mid: Hydralazine  ~PM:  Hydralazine, Nebivolol    Vitals obtained:  Home cuff - 184/73, 70  Manual, after appx 10 mins - 170/60, 70    BP reviewed by Dr. Ventura - would recommend increasing Hydralazine to 100 mg tid however please have pt follow-up as directed by her primary Cardiologist Dr. Hill.   Will forward to Dr. Hill for review.

## 2025-03-31 DIAGNOSIS — R11.2 NAUSEA AND VOMITING: ICD-10-CM

## 2025-04-01 RX ORDER — FAMOTIDINE 20 MG/1
20 TABLET, FILM COATED ORAL
Qty: 30 TABLET | Refills: 5 | Status: SHIPPED | OUTPATIENT
Start: 2025-04-01

## 2025-04-02 ENCOUNTER — TELEPHONE (OUTPATIENT)
Dept: NEPHROLOGY | Facility: CLINIC | Age: OVER 89
End: 2025-04-02

## 2025-04-02 NOTE — TELEPHONE ENCOUNTER
Placed call to pt and spoke to daughter in law Talon - reminded lab orders in the system to have completed prior to upcoming office visit - thanked for call

## 2025-04-07 ENCOUNTER — TELEPHONE (OUTPATIENT)
Dept: NEPHROLOGY | Facility: CLINIC | Age: OVER 89
End: 2025-04-07

## 2025-04-07 ENCOUNTER — OFFICE VISIT (OUTPATIENT)
Dept: NEPHROLOGY | Facility: HOSPITAL | Age: OVER 89
End: 2025-04-07
Payer: COMMERCIAL

## 2025-04-07 VITALS
DIASTOLIC BLOOD PRESSURE: 80 MMHG | BODY MASS INDEX: 26.13 KG/M2 | WEIGHT: 142 LBS | SYSTOLIC BLOOD PRESSURE: 200 MMHG | HEIGHT: 62 IN

## 2025-04-07 DIAGNOSIS — R80.8 OTHER PROTEINURIA: ICD-10-CM

## 2025-04-07 DIAGNOSIS — Z79.4 TYPE 2 DIABETES MELLITUS WITH STAGE 4 CHRONIC KIDNEY DISEASE, WITH LONG-TERM CURRENT USE OF INSULIN (HCC): ICD-10-CM

## 2025-04-07 DIAGNOSIS — N18.4 TYPE 2 DIABETES MELLITUS WITH STAGE 4 CHRONIC KIDNEY DISEASE, WITH LONG-TERM CURRENT USE OF INSULIN (HCC): ICD-10-CM

## 2025-04-07 DIAGNOSIS — I12.9 BENIGN HYPERTENSION WITH CKD (CHRONIC KIDNEY DISEASE) STAGE IV (HCC): Primary | ICD-10-CM

## 2025-04-07 DIAGNOSIS — I10 PRIMARY HYPERTENSION: Primary | ICD-10-CM

## 2025-04-07 DIAGNOSIS — E87.6 HYPOKALEMIA: ICD-10-CM

## 2025-04-07 DIAGNOSIS — N18.4 BENIGN HYPERTENSION WITH CKD (CHRONIC KIDNEY DISEASE) STAGE IV (HCC): Primary | ICD-10-CM

## 2025-04-07 DIAGNOSIS — E11.22 TYPE 2 DIABETES MELLITUS WITH STAGE 4 CHRONIC KIDNEY DISEASE, WITH LONG-TERM CURRENT USE OF INSULIN (HCC): ICD-10-CM

## 2025-04-07 DIAGNOSIS — R79.89 ELEVATED PARATHYROID HORMONE: ICD-10-CM

## 2025-04-07 DIAGNOSIS — N28.1 COMPLEX RENAL CYST: ICD-10-CM

## 2025-04-07 LAB
ALBUMIN SERPL-MCNC: 4.3 G/DL (ref 3.7–4.7)
ALBUMIN/CREAT UR: 424 MG/G CREAT (ref 0–29)
APPEARANCE UR: CLEAR
BACTERIA URNS QL MICRO: NORMAL
BILIRUB UR QL STRIP: NEGATIVE
BUN SERPL-MCNC: 46 MG/DL (ref 8–27)
BUN/CREAT SERPL: 20 (ref 12–28)
CALCIUM SERPL-MCNC: 8.3 MG/DL (ref 8.7–10.3)
CASTS URNS QL MICRO: NORMAL /LPF
CHLORIDE SERPL-SCNC: 105 MMOL/L (ref 96–106)
CO2 SERPL-SCNC: 20 MMOL/L (ref 20–29)
COLOR UR: YELLOW
CREAT SERPL-MCNC: 2.35 MG/DL (ref 0.57–1)
CREAT UR-MCNC: 73 MG/DL
EGFR: 19 ML/MIN/1.73
EPI CELLS #/AREA URNS HPF: NORMAL /HPF (ref 0–10)
GLUCOSE SERPL-MCNC: 152 MG/DL (ref 70–99)
GLUCOSE UR QL: ABNORMAL
HGB UR QL STRIP: NEGATIVE
KETONES UR QL STRIP: NEGATIVE
LEUKOCYTE ESTERASE UR QL STRIP: NEGATIVE
MICRO URNS: ABNORMAL
MICROALBUMIN UR-MCNC: 309.6 UG/ML
NITRITE UR QL STRIP: NEGATIVE
PH UR STRIP: 6 [PH] (ref 5–7.5)
PHOSPHATE SERPL-MCNC: 4.2 MG/DL (ref 3–4.3)
POTASSIUM SERPL-SCNC: 4.5 MMOL/L (ref 3.5–5.2)
PROT UR QL STRIP: ABNORMAL
PROT UR-MCNC: 74.3 MG/DL
PROT/CREAT UR: 1018 MG/G CREAT (ref 0–200)
PTH-INTACT SERPL-MCNC: 287 PG/ML (ref 15–65)
RBC #/AREA URNS HPF: NORMAL /HPF (ref 0–2)
SODIUM SERPL-SCNC: 140 MMOL/L (ref 134–144)
SP GR UR: 1.02 (ref 1–1.03)
UROBILINOGEN UR STRIP-ACNC: 0.2 MG/DL (ref 0.2–1)
WBC #/AREA URNS HPF: NORMAL /HPF (ref 0–5)

## 2025-04-07 PROCEDURE — 99214 OFFICE O/P EST MOD 30 MIN: CPT | Performed by: INTERNAL MEDICINE

## 2025-04-07 NOTE — TELEPHONE ENCOUNTER
"Pt's daughter in law Jack called in and stated they had went to the lab to have labs completed. Jack stated the lab was not able to draw the cortisol level given that order will have to be put in as a separate order. The lab stated it should only be \"Cortisol level\" blood work. Pt's daughter in law also stated all labs due for 4/7 and 7/1 will have to be put in separately. When lab slip was printed out both dates were shown on the slip and they were not able to complete the blood work that is due for today. Please enter labs separately for pt to complete and a new script for Cortisol level.   "

## 2025-04-07 NOTE — ASSESSMENT & PLAN NOTE
- on januvia, glipizide, invokana, insulin glargine, last A1C 7.4 as of Feb. 2025, needs improved sugar control to slow down progression of CKD.   -follows with endocrinology

## 2025-04-07 NOTE — ASSESSMENT & PLAN NOTE
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

## 2025-04-07 NOTE — ASSESSMENT & PLAN NOTE
in setting of type 2 DM - UpCr a stable at 597 mg/g as of April 2024  -off lisinopril  -microalbumin in the urine can also be seen with physiologic aging of the kidney  -monitor proteinuria  -not on ACEi, now with high normal potassium on aldactone

## 2025-04-07 NOTE — PROGRESS NOTES
NEPHROLOGY OUTPATIENT PROGRESS NOTE   Ena Ahumada 89 y.o. female MRN: [id number]  DATE: 4/7/2025  Reason for visit:   Chief Complaint   Patient presents with    Follow-up    Chronic Kidney Disease        Patient Instructions   Benign hypertension with CKD (chronic kidney disease) stage IV ([facility])  chronic kidney disease stage 4 in the setting of Diabetes mellitus type 2 as well as hypertensive nephrosclerosis +/- physiologic aging of the kidney   -last sCr 1.91 as of 3/14/25. Had been stable in the 1.6-1.9 range since July 2018, eGFR in mid 20s and stable  -latest UA with microscopy shows +glucose, trace ketones, 2+ protein, no RBCs, 1-2 WBCs with last UpCr 597mg/g with microalb:Cr 134mg/g  -avoid nonsteroidals (ibuprofen, advil, motrin, aleve, naproxen, indomethacin, celebrex, toradol)  -may take tylenol  -please stay well hydrated  -kidney smart education refused in the past.   - BP elevated for age/CKD in office  -monitor BP at home and call office with home readings  -continue clonidine 0.2mg weekly patch, bystolic 40mg after dinner, nifedipine 90mg daily, hydralazine 50mg three times daily, aldactone 25mg daily, torsemide 20mg daily  -off lisinopril   -off HCTZ 25mg daily  Type 2 diabetes mellitus with stage 4 chronic kidney disease, with long-term current use of insulin ([facility])  - on januvia, glipizide, invokana, insulin glargine, last A1C 7.4 as of Feb. 2025, needs improved sugar control to slow down progression of CKD.   -follows with endocrinology    Complex renal cyst  - stable in size as of Dec. 2024 ultrasound, will defer further imaging in light of age   Elevated parathyroid hormone  likely d/t secondary hyperparathyroidism of renal origin - PTH 81 as of last month, monitor PTH, mild elevation noted   Hypokalemia  - off HCTZ, K 4.9, Is on torsemide 20mg daily. Improved on aldactone.  -monitor BMP  Other proteinuria  in setting of type 2 DM - UpCr a stable at 597 mg/g as of April 2024  -off  lisinopril  -microalbumin in the urine can also be seen with physiologic aging of the kidney  -monitor proteinuria  -not on ACEi, now with high normal potassium on aldactone       RTC in 3-4 months.  Obtain blood and urine testing when able, preferably before appt with Dr. Hill this week.  Obtain renal artery duplex when able.       Assessment & Plan  Benign hypertension with CKD (chronic kidney disease) stage IV ([facility])  chronic kidney disease stage 4 in the setting of Diabetes mellitus type 2 as well as hypertensive nephrosclerosis +/- physiologic aging of the kidney   -last sCr 1.91 as of 3/14/25. Had been stable in the 1.6-1.9 range since July 2018, eGFR in mid 20s and stable  -latest UA with microscopy shows +glucose, trace ketones, 2+ protein, no RBCs, 1-2 WBCs with last UpCr 597mg/g with microalb:Cr 134mg/g  -avoid nonsteroidals (ibuprofen, advil, motrin, aleve, naproxen, indomethacin, celebrex, toradol)  -may take tylenol  -please stay well hydrated  -kidney smart education refused in the past.   - BP elevated for age/CKD in office  -monitor BP at home and call office with home readings  -continue clonidine 0.2mg weekly patch, bystolic 40mg after dinner, nifedipine 90mg daily, hydralazine 50mg three times daily, aldactone 25mg daily, torsemide 20mg daily  -off lisinopril   -off HCTZ 25mg daily  Type 2 diabetes mellitus with stage 4 chronic kidney disease, with long-term current use of insulin ([facility])  - on januvia, glipizide, invokana, insulin glargine, last A1C 7.4 as of Feb. 2025, needs improved sugar control to slow down progression of CKD.   -follows with endocrinology    Complex renal cyst  - stable in size as of Dec. 2024 ultrasound, will defer further imaging in light of age   Elevated parathyroid hormone  likely d/t secondary hyperparathyroidism of renal origin - PTH 81 as of last month, monitor PTH, mild elevation noted   Hypokalemia  - off HCTZ, K 4.9, Is on torsemide 20mg daily. Improved on  "aldactone.  -monitor BMP  Other proteinuria  in setting of type 2 DM - UpCr a stable at 597 mg/g as of April 2024  -off lisinopril  -microalbumin in the urine can also be seen with physiologic aging of the kidney  -monitor proteinuria  -not on ACEi, now with high normal potassium on aldactone       RTC in 3-4 months.  Obtain blood and urine testing when able, preferably before appt with Dr. Hill this week.  Obtain renal artery duplex when able.     SUBJECTIVE / INTERVAL HISTORY:  89 y.o. female presents in follow up of CKD.     Ena Ahumada had been admitted June 2024 for fall, May 2024 for stomach bug and March 2024 for aspiration PNA.     Denies NSAID use.  BP at home has been a bit higher, systolic 150s-160s.   Blood sugars running higher at home. Now on insulin.   Has cut back on salt.   Drinking more water, 48oz a day.     Review of Systems   Constitutional:  Negative for chills and fever.        Facial flushing   HENT:  Negative for sore throat and trouble swallowing.    Eyes:  Negative for visual disturbance.   Respiratory:  Negative for cough and shortness of breath.    Cardiovascular:  Negative for chest pain and leg swelling.   Gastrointestinal:  Negative for abdominal pain, constipation, diarrhea, nausea and vomiting.   Endocrine: Negative for polyuria.   Genitourinary:  Negative for difficulty urinating, dysuria and hematuria.   Musculoskeletal:  Negative for back pain and neck pain.   Skin:  Negative for rash.   Neurological:  Negative for dizziness, light-headedness and headaches.   Psychiatric/Behavioral:  The patient is not nervous/anxious.        OBJECTIVE:  BP (!) 200/80 (BP Location: Left arm, Patient Position: Sitting, Cuff Size: Adult)   Ht 5' 2\" (1.575 m)   Wt 64.4 kg (142 lb)   BMI 25.97 kg/m²  Body mass index is 25.97 kg/m².    Physical exam:  Physical Exam  Vitals and nursing note reviewed.   Constitutional:       General: She is not in acute distress.     Appearance: Normal " appearance. She is well-developed. She is not diaphoretic.      Comments: frail   HENT:      Head: Normocephalic and atraumatic.      Nose: Nose normal.      Mouth/Throat:      Mouth: Mucous membranes are moist.      Pharynx: No oropharyngeal exudate.   Eyes:      General: No scleral icterus.        Right eye: No discharge.         Left eye: No discharge.      Comments: eyeglasses   Neck:      Thyroid: No thyromegaly.   Cardiovascular:      Rate and Rhythm: Normal rate and regular rhythm.      Heart sounds: No murmur heard.  Pulmonary:      Effort: Pulmonary effort is normal. No respiratory distress.      Breath sounds: Normal breath sounds. No wheezing.   Abdominal:      General: Bowel sounds are normal. There is no distension.      Palpations: Abdomen is soft.   Musculoskeletal:         General: No swelling. Normal range of motion.      Cervical back: Normal range of motion and neck supple.   Skin:     General: Skin is warm and dry.      Coloration: Skin is pale. Skin is not jaundiced.      Findings: No rash.   Neurological:      General: No focal deficit present.      Mental Status: She is alert.      Motor: No abnormal muscle tone.      Comments: awake   Psychiatric:         Mood and Affect: Mood normal.         Behavior: Behavior normal.         Medications:    Current Outpatient Medications:     ACCU-CHEK PAUL PLUS test strip, , Disp: , Rfl:     Accu-Chek Softclix Lancets lancets, , Disp: , Rfl:     Accu-Chek Softclix Lancets lancets, USE AS INSTRUCTED TWICE DAILY, Disp: 100 each, Rfl: 3    acetaminophen (TYLENOL) 325 mg tablet, Take 975 mg by mouth 3 (three) times a day. per [facility]  Indications: Pain, Disp: , Rfl:     aspirin 81 MG tablet, Take 1 tablet by mouth daily, Disp: , Rfl:     atorvastatin (LIPITOR) 10 mg tablet, Take 1 tablet (10 mg total) by mouth daily per latest dci, Disp: 90 tablet, Rfl: 2    cholecalciferol (VITAMIN D3) 1,000 units tablet, Take 1 tablet by mouth daily,  Disp: , Rfl:     cloNIDine (CATAPRES-TTS-2) 0.2 mg/24 hr, Place 1 patch (0.2 mg total) on the skin over 7 days once a week, Disp: 12 patch, Rfl: 3    famotidine (PEPCID) 20 mg tablet, TAKE 1 TABLET BY MOUTH EVERYDAY AT BEDTIME, Disp: 30 tablet, Rfl: 5    ferrous sulfate 325 (65 Fe) mg tablet, Take 1 tablet (325 mg total) by mouth 3 (three) times a week, Disp: 12 tablet, Rfl: 0    glipiZIDE (GLUCOTROL) 10 mg tablet, TAKE 2 TABLETS BY MOUTH IN THE MORNING THEN 1 TAB WITH DINNER, Disp: , Rfl:     glucose blood (Accu-Chek Emma Plus) test strip, USE AS INSTRUCTED TWICE DAILY, Disp: 100 each, Rfl: 7    hydrALAZINE (APRESOLINE) 50 mg tablet, TAKE 1 TABLET BY MOUTH THREE TIMES A DAY, Disp: 270 tablet, Rfl: 0    Insulin Glargine-yfgn 100 UNIT/ML SOPN, Inject 0.05 mL (5 Units total) uncer the skin in the morning, Disp: 100 mL, Rfl: 2    Insulin Pen Needle (BD Pen Needle Kathy U/F) 32G X 4 MM MISC, Use daily, Disp: 100 each, Rfl: 3    Invokana 100 MG, TAKE 1 TABLET BY MOUTH DAILY BEFORE BREAKFAST., Disp: 30 tablet, Rfl: 6    Januvia 50 MG tablet, TAKE 1 TABLET BY MOUTH EVERY DAY, Disp: 30 tablet, Rfl: 5    lidocaine (Lidoderm) 5 %, Apply 1 patch topically over 12 hours daily Remove & Discard patch within 12 hours or as directed by MD, Disp: , Rfl:     nebivolol (BYSTOLIC) 20 MG tablet, TAKE 2 TABLETS (40 MG TOTAL) BY MOUTH DAILY., Disp: 180 tablet, Rfl: 2    NIFEdipine (ADALAT CC) 90 mg 24 hr tablet, TAKE 1 TABLET BY MOUTH EVERY DAY, Disp: 90 tablet, Rfl: 1    oxygen gas, Inhale 2.5 L/min continuous VIA NASAL CANULA. At night only, Disp: , Rfl:     pantoprazole (PROTONIX) 20 mg tablet, TAKE 1 TABLET BY MOUTH EVERY DAY, Disp: 90 tablet, Rfl: 1    polyethylene glycol (GLYCOLAX) 17 GM/SCOOP powder, Take 17 g by mouth daily as needed (constipation). 17 gm daily as needed for constipation  Indications: Constipation, Disp: , Rfl:     Sennosides (Senna) 8.6 MG CAPS, Take 2 capsules by mouth daily as needed (constipation) per latest  [facility], Disp: , Rfl:     sertraline (ZOLOFT) 100 mg tablet, TAKE 1 TABLET BY MOUTH EVERY DAY, Disp: 90 tablet, Rfl: 1    spironolactone (ALDACTONE) 25 mg tablet, Take 1 tablet (25 mg total) by mouth daily, Disp: 90 tablet, Rfl: 3    torsemide (DEMADEX) 20 mg tablet, TAKE 1 TABLET BY MOUTH EVERY DAY, Disp: 30 tablet, Rfl: 5    Current Facility-Administered Medications:     denosumab (PROLIA) subcutaneous injection 60 mg, 60 mg, Subcutaneous, Q6 Months, , 60 mg at 03/05/25 0939    Allergies:  Allergies as of 04/07/2025    (No Known Allergies)       The following portions of the patient's history were reviewed and updated as appropriate: past family history, past surgical history and problem list.    Laboratory Results:  Lab Results   Component Value Date    SODIUM 140 03/14/2025    K 4.9 03/14/2025     03/14/2025    CO2 20 03/14/2025    BUN 28 (H) 03/14/2025    CREATININE 1.91 (H) 03/14/2025    GLUC 145 (H) 03/14/2025    CALCIUM 8.3 (L) 06/24/2024        Lab Results   Component Value Date    CALCIUM 8.3 (L) 06/24/2024    PHOS 2.5 03/19/2024       Portions of the record may have been created with voice recognition software.  Occasional wrong word or \"sound a like\" substitutions may have occurred due to the inherent limitations of voice recognition software.  Read the chart carefully and recognize, using context, where substitutions have occurred.  "

## 2025-04-07 NOTE — PATIENT INSTRUCTIONS
Benign hypertension with CKD (chronic kidney disease) stage IV ([facility])  chronic kidney disease stage 4 in the setting of Diabetes mellitus type 2 as well as hypertensive nephrosclerosis +/- physiologic aging of the kidney   -last sCr 1.91 as of 3/14/25. Had been stable in the 1.6-1.9 range since July 2018, eGFR in mid 20s and stable  -latest UA with microscopy shows +glucose, trace ketones, 2+ protein, no RBCs, 1-2 WBCs with last UpCr 597mg/g with microalb:Cr 134mg/g  -avoid nonsteroidals (ibuprofen, advil, motrin, aleve, naproxen, indomethacin, celebrex, toradol)  -may take tylenol  -please stay well hydrated  -kidney smart education refused in the past.   - BP elevated for age/CKD in office  -monitor BP at home and call office with home readings  -continue clonidine 0.2mg weekly patch, bystolic 40mg after dinner, nifedipine 90mg daily, hydralazine 50mg three times daily, aldactone 25mg daily, torsemide 20mg daily  -off lisinopril   -off HCTZ 25mg daily  Type 2 diabetes mellitus with stage 4 chronic kidney disease, with long-term current use of insulin ([facility])  - on januvia, glipizide, invokana, insulin glargine, last A1C 7.4 as of Feb. 2025, needs improved sugar control to slow down progression of CKD.   -follows with endocrinology    Complex renal cyst  - stable in size as of Dec. 2024 ultrasound, will defer further imaging in light of age   Elevated parathyroid hormone  likely d/t secondary hyperparathyroidism of renal origin - PTH 81 as of last month, monitor PTH, mild elevation noted   Hypokalemia  - off HCTZ, K 4.9, Is on torsemide 20mg daily. Improved on aldactone.  -monitor BMP  Other proteinuria  in setting of type 2 DM - UpCr a stable at 597 mg/g as of April 2024  -off lisinopril  -microalbumin in the urine can also be seen with physiologic aging of the kidney  -monitor proteinuria  -not on ACEi, now with high normal potassium on aldactone       RTC in 3-4 months.  Obtain blood and urine testing when able,  preferably before appt with Dr. Hill this week.  Obtain renal artery duplex when able.

## 2025-04-07 NOTE — ASSESSMENT & PLAN NOTE
chronic kidney disease stage 4 in the setting of Diabetes mellitus type 2 as well as hypertensive nephrosclerosis +/- physiologic aging of the kidney   -last sCr 1.91 as of 3/14/25. Had been stable in the 1.6-1.9 range since July 2018, eGFR in mid 20s and stable  -latest UA with microscopy shows +glucose, trace ketones, 2+ protein, no RBCs, 1-2 WBCs with last UpCr 597mg/g with microalb:Cr 134mg/g  -avoid nonsteroidals (ibuprofen, advil, motrin, aleve, naproxen, indomethacin, celebrex, toradol)  -may take tylenol  -please stay well hydrated  -kidney smart education refused in the past.   - BP elevated for age/CKD in office  -monitor BP at home and call office with home readings  -continue clonidine 0.2mg weekly patch, bystolic 40mg after dinner, nifedipine 90mg daily, hydralazine 50mg three times daily, aldactone 25mg daily, torsemide 20mg daily  -off lisinopril   -off HCTZ 25mg daily

## 2025-04-08 ENCOUNTER — OFFICE VISIT (OUTPATIENT)
Dept: FAMILY MEDICINE CLINIC | Facility: HOSPITAL | Age: OVER 89
End: 2025-04-08
Payer: COMMERCIAL

## 2025-04-08 VITALS
DIASTOLIC BLOOD PRESSURE: 78 MMHG | HEIGHT: 62 IN | BODY MASS INDEX: 26.17 KG/M2 | HEART RATE: 70 BPM | WEIGHT: 142.2 LBS | TEMPERATURE: 98 F | SYSTOLIC BLOOD PRESSURE: 148 MMHG | OXYGEN SATURATION: 98 %

## 2025-04-08 DIAGNOSIS — N18.30 TYPE 2 DIABETES MELLITUS WITH STAGE 3 CHRONIC KIDNEY DISEASE, WITHOUT LONG-TERM CURRENT USE OF INSULIN (HCC): ICD-10-CM

## 2025-04-08 DIAGNOSIS — I12.9 BENIGN HYPERTENSION WITH CKD (CHRONIC KIDNEY DISEASE) STAGE IV (HCC): ICD-10-CM

## 2025-04-08 DIAGNOSIS — N18.4 BENIGN HYPERTENSION WITH CKD (CHRONIC KIDNEY DISEASE) STAGE IV (HCC): ICD-10-CM

## 2025-04-08 DIAGNOSIS — N18.4 CKD (CHRONIC KIDNEY DISEASE), STAGE IV (HCC): Primary | ICD-10-CM

## 2025-04-08 DIAGNOSIS — E11.22 TYPE 2 DIABETES MELLITUS WITH STAGE 3 CHRONIC KIDNEY DISEASE, WITHOUT LONG-TERM CURRENT USE OF INSULIN (HCC): ICD-10-CM

## 2025-04-08 DIAGNOSIS — F41.9 ANXIETY: ICD-10-CM

## 2025-04-08 DIAGNOSIS — N39.46 MIXED STRESS AND URGE INCONTINENCE: ICD-10-CM

## 2025-04-08 PROCEDURE — 99214 OFFICE O/P EST MOD 30 MIN: CPT | Performed by: INTERNAL MEDICINE

## 2025-04-08 RX ORDER — BLOOD SUGAR DIAGNOSTIC
STRIP MISCELLANEOUS
Qty: 200 EACH | Refills: 3 | Status: SHIPPED | OUTPATIENT
Start: 2025-04-08

## 2025-04-08 RX ORDER — LANCETS 33 GAUGE
EACH MISCELLANEOUS
Qty: 200 EACH | Refills: 3 | Status: SHIPPED | OUTPATIENT
Start: 2025-04-08

## 2025-04-08 NOTE — ASSESSMENT & PLAN NOTE
Lab Results   Component Value Date    EGFR 19 (L) 04/04/2025    EGFR 25 (L) 03/14/2025    EGFR 24 (L) 02/28/2025    CREATININE 2.35 (H) 04/04/2025    CREATININE 1.91 (H) 03/14/2025    CREATININE 1.97 (H) 02/28/2025   Following with Renal, not using NSAIDs, importance of BP/BS control reviewed, urged to keep hydrated, will follow

## 2025-04-08 NOTE — ASSESSMENT & PLAN NOTE
Mood well controlled with current Sertraline, con't current regimen, recheck in 4-6 mos or sooner if new/worse mood occurs

## 2025-04-08 NOTE — TELEPHONE ENCOUNTER
Patients daughter calling in and is very upset that the lab orders were not faxed to [facility]. She is there now and requested to be faxed. Faxed via [facility] but that could take 48 hours to go through.  Please send orders to [facility] in [place]. Cortisol is supposed to be done between 7-9 AM and the patient may not be able to get it today.  Please advise

## 2025-04-08 NOTE — ASSESSMENT & PLAN NOTE
Lab Results   Component Value Date    EGFR 19 (L) 04/04/2025    EGFR 25 (L) 03/14/2025    EGFR 24 (L) 02/28/2025    CREATININE 2.35 (H) 04/04/2025    CREATININE 1.91 (H) 03/14/2025    CREATININE 1.97 (H) 02/28/2025   BP still high today, has been having BP meds adjusted by Cardio so will allow Cardio to con't,  BP has been lower at home but still uncontrolled, has w/u for secondary HTN started by Nephro, will follow

## 2025-04-08 NOTE — ASSESSMENT & PLAN NOTE
Lifestyle changes, Kegel exercises, medication therapy and pelvic floor therapy reviewed, pt will try lifestyle changes with more regular toileting schedule and try Kegel exercise, advised to call if wishes to try pelvic floor therapy or trial of Myrbetriq (SE reviewed), call with any concern for UTI, will follow

## 2025-04-08 NOTE — PROGRESS NOTES
Name: Ena Ahumada      : 1935      MRN: [id number]  Encounter Provider: Deanna Castaneda DO  Encounter Date: 2025   Encounter department: [facility] [address]   :  Assessment & Plan  CKD (chronic kidney disease), stage IV ([facility])  Lab Results   Component Value Date    EGFR 19 (L) 2025    EGFR 25 (L) 2025    EGFR 24 (L) 2025    CREATININE 2.35 (H) 2025    CREATININE 1.91 (H) 2025    CREATININE 1.97 (H) 2025   Following with Renal, not using NSAIDs, importance of BP/BS control reviewed, urged to keep hydrated, will follow         Benign hypertension with CKD (chronic kidney disease) stage IV ([facility])  Lab Results   Component Value Date    EGFR 19 (L) 2025    EGFR 25 (L) 2025    EGFR 24 (L) 2025    CREATININE 2.35 (H) 2025    CREATININE 1.91 (H) 2025    CREATININE 1.97 (H) 2025   BP still high today, has been having BP meds adjusted by Cardio so will allow Cardio to con't,  BP has been lower at home but still uncontrolled, has w/u for secondary HTN started by Nephro, will follow         Anxiety  Mood well controlled with current Sertraline, con't current regimen, recheck in 4-6 mos or sooner if new/worse mood occurs       Type 2 diabetes mellitus with stage 3 chronic kidney disease, without long-term current use of insulin ([facility])    Lab Results   Component Value Date    HGBA1C 7.4 (H) 2025   Following with Endo, congratulated on getting A1C down, importance of BS control from CKD reviewed,  she is UTD on DM foot exam () and eye exam (), she is not on an ACE/ARB but is on a statin, will follow with Endo    Orders:    glucose blood (OneTouch Verio) test strip; Check blood sugars twice daily. Please substitute with appropriate alternative as covered by patient's insurance. Dx: E11.65    OneTouch Delica Lancets 33G MISC; Check blood sugars twice daily. Please substitute with appropriate  alternative as covered by patient's insurance. Dx: E11.65    Mixed stress and urge incontinence  Lifestyle changes, Kegel exercises, medication therapy and pelvic floor therapy reviewed, pt will try lifestyle changes with more regular toileting schedule and try Kegel exercise, advised to call if wishes to try pelvic floor therapy or trial of Myrbetriq (SE reviewed), call with any concern for UTI, will follow             Falls Plan of Care: Recommended assistive device to help with gait and balance. Home safety education provided.     Urinary Incontinence Plan of Care: counseling topics discussed: practice Kegel (pelvic floor strengthening) exercises, use restroom every 2 hours, limit alcohol, caffeine, spicy foods, and acidic foods, limiting fluid intake 3-4 hours before bed, preventing constipation, taking fluid pills at a time when you can get to bathroom easily and improving blood sugar control.     Dexa 9/24 - osteoporosis - on Prolia    BW 2/25 (A1C 7.4)  DM foot exam 9/24  DM eye exam 9/24  Ur microalbumin:Cr 4/25      History of Present Illness   HPI Pt here for follow up appt    Pt saw Nephro (Dr. Baumann) yesterday for f/u CKD stage IV - OV note reviewed.  BP was up at that visit.  She is not on an ACE/ARB. She was told to avoid NSAIDs and keep hydrated. BW/urine studies and renal artery US were ordered for eval of secondary causes of HTN.  She was told to f/u in 3-4 mos.    Pt was seen in Cardio office for BP check. She brought her home BP cuff in and home cuff read 184/73 and in office cuff 170/60.  Her Hydralazine was increased to 100 mg tid.  All other BP meds were continued.  Pt has f/u with Dr. Hill later this week (4/11/25).  She reports BP today was 138/63 but other readings have been 150-160's/70's.  She notes no HA's/dizziness/double vision/flushing/CP.        She con't to take her Sertraline daily as directed for mood. She notes no down/sad mood and denies significant anxiety. She feels sleep  "overall is good. She has had some awakenings to use the bathroom.      She has started to have urinary incontinence over the past year.  She notes both urge and stress incontinence.  She was told to do Kegel exercises but has not been doing those.        Review of Systems   Constitutional:  Negative for chills and fever.   HENT:  Negative for congestion and trouble swallowing.    Eyes:  Negative for pain and visual disturbance.   Respiratory:  Negative for cough, shortness of breath and wheezing.    Cardiovascular:  Negative for chest pain and palpitations.   Gastrointestinal:  Negative for abdominal pain, blood in stool, constipation, diarrhea, nausea and vomiting.   Genitourinary:  Positive for urgency. Negative for difficulty urinating, dysuria, flank pain, hematuria, vaginal bleeding and vaginal pain.   Musculoskeletal:  Negative for back pain and gait problem.   Skin:  Negative for rash and wound.   Neurological:  Negative for dizziness and headaches.   Hematological:  Does not bruise/bleed easily.   Psychiatric/Behavioral:  Negative for confusion.        Objective   /78 (BP Location: Left arm, Patient Position: Sitting, Cuff Size: Standard)   Pulse 70   Temp 98 °F (36.7 °C)   Ht 5' 2\" (1.575 m)   Wt 64.5 kg (142 lb 3.2 oz)   SpO2 98%   BMI 26.01 kg/m²      Physical Exam  Vitals and nursing note reviewed.   Constitutional:       General: She is not in acute distress.     Appearance: She is well-developed. She is not ill-appearing.   HENT:      Head: Normocephalic and atraumatic.      Right Ear: External ear normal.      Left Ear: External ear normal.   Eyes:      General:         Right eye: No discharge.         Left eye: No discharge.      Conjunctiva/sclera: Conjunctivae normal.   Neck:      Thyroid: No thyromegaly.      Trachea: No tracheal deviation.   Cardiovascular:      Rate and Rhythm: Normal rate and regular rhythm.      Heart sounds: Murmur heard.   Pulmonary:      Effort: Pulmonary effort " is normal. No respiratory distress.      Breath sounds: Normal breath sounds. No wheezing, rhonchi or rales.   Abdominal:      General: There is no distension.      Palpations: Abdomen is soft.      Tenderness: There is no abdominal tenderness. There is no guarding or rebound.   Musculoskeletal:         General: No deformity or signs of injury.      Cervical back: Neck supple.   Skin:     General: Skin is warm and dry.      Coloration: Skin is not pale.      Findings: No bruising or rash.   Neurological:      General: No focal deficit present.      Mental Status: She is alert. Mental status is at baseline.      Motor: No abnormal muscle tone.      Gait: Gait abnormal.      Comments: Ambulates with cane   Psychiatric:         Mood and Affect: Mood normal.         Behavior: Behavior normal.         Thought Content: Thought content normal.         Judgment: Judgment normal.

## 2025-04-09 LAB — CORTIS AM PEAK SERPL-MCNC: 21.9 UG/DL (ref 6.2–19.4)

## 2025-04-11 ENCOUNTER — OFFICE VISIT (OUTPATIENT)
Dept: CARDIOLOGY CLINIC | Facility: CLINIC | Age: OVER 89
End: 2025-04-11
Payer: COMMERCIAL

## 2025-04-11 VITALS
HEIGHT: 62 IN | HEART RATE: 70 BPM | SYSTOLIC BLOOD PRESSURE: 160 MMHG | WEIGHT: 144 LBS | DIASTOLIC BLOOD PRESSURE: 78 MMHG | BODY MASS INDEX: 26.5 KG/M2

## 2025-04-11 DIAGNOSIS — I12.9 BENIGN HYPERTENSION WITH CKD (CHRONIC KIDNEY DISEASE) STAGE IV (HCC): Primary | ICD-10-CM

## 2025-04-11 DIAGNOSIS — N18.4 BENIGN HYPERTENSION WITH CKD (CHRONIC KIDNEY DISEASE) STAGE IV (HCC): Primary | ICD-10-CM

## 2025-04-11 DIAGNOSIS — E78.5 DYSLIPIDEMIA: ICD-10-CM

## 2025-04-11 DIAGNOSIS — I10 PRIMARY HYPERTENSION: ICD-10-CM

## 2025-04-11 DIAGNOSIS — I49.3 PREMATURE VENTRICULAR CONTRACTION: ICD-10-CM

## 2025-04-11 DIAGNOSIS — I35.0 NONRHEUMATIC AORTIC VALVE STENOSIS: ICD-10-CM

## 2025-04-11 PROCEDURE — 99214 OFFICE O/P EST MOD 30 MIN: CPT | Performed by: INTERNAL MEDICINE

## 2025-04-11 RX ORDER — HYDRALAZINE HYDROCHLORIDE 100 MG/1
100 TABLET, FILM COATED ORAL 3 TIMES DAILY
Qty: 270 TABLET | Refills: 3 | Status: SHIPPED | OUTPATIENT
Start: 2025-04-11

## 2025-04-11 NOTE — PROGRESS NOTES
Cardiology Follow Up    Ena Ahumada  1935  [id number]  [facility]  Trevon Jones  [facility] [address]  [facility] [id number] [phone]    1. Benign hypertension with CKD (chronic kidney disease) stage IV ([facility])        2. Primary hypertension  hydrALAZINE (APRESOLINE) 100 MG tablet      3. Nonrheumatic aortic valve stenosis        4. Premature ventricular contraction        5. Dyslipidemia            Discussion/Summary:    1.  HTN - Her blood pressure initially improved with up titrating her therapy and changing HCTZ to torsemide.  In addition to her diuretic she remains on a clonidine patch, Bystolic and nifedipine.  However after her 2 hospitalizations last year it was persistently running high.  We added hydralazine and spironolactone.  Nephrology is following her closely and is doing a secondary hypertensive workup.  We will follow this closely.  For now going to have her go up on hydralazine to 100 mg 3 times a day.  She will continue to follow her blood pressure at home.  We did talk about a low-sodium diet.    2.  Dyslipidemia - Controlled on simvastatin.  She has blood work followed regularly by her PCP.    3.  PVCs - Asymptomatic and no issues from this standpoint.    4.  Edema - This has been controlled on torsemide 20 mg daily.  She should follow a low-sodium diet.  She does have blood work followed closely and follows with nephrology.    5.  Moderate aortic stenosis - This was seen on an echocardiogram in April 2023.  Repeat one from this past October did not show any significant change.  We will see her back in 6 months and repeat an echocardiogram after that visit.      Interval History:     Ean is here f for follow-up given her cardiac history and risk factors.  She has 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 but has been asymptomatic as of late.  Her more active issue has been uncontrolled hypertension  as of late.  She also has dyslipidemia, mild to moderate aortic stenosis and chronic lower extremity edema.  She has developed worsening chronic kidney disease and follows with Nephrology.     A few years back I changed her diuretic over to torsemide, from HCTZ.  Her blood pressure then improved.  After a visit in April 2023 I repeated an echocardiogram which showed progression of valvular disease as she had normal LV systolic function with moderate aortic stenosis.  Her blood pressure was under control then.  We did increase her torsemide to 20 mg daily due to increasing edema, which overall has improved.    More recently in May 2024 she was in the hospital for gastroenteritis with nausea and vomiting.  She was not able to stomach a lot of her pills and therefore her blood pressure accelerated.  Recently one of her physicians increased her Bystolic to 40 mg daily.  She was in the hospital again in June with a fall and pelvic fracture requiring rehabilitation.  Through this time and up until her last follow-up her blood pressure continued to run high.  At first it seemed to be just her morning blood pressures so we changed the timing of her medications, but then overall her blood pressure persistently stayed elevated.  We added hydralazine which has now been uptitrated to 50 mg 3 times daily.  We also added spironolactone 25 mg daily.  With her blood pressure continuing to remain elevated nephrology is now putting her through a secondary hypertension workup.  We did repeat an echocardiogram after our last visit which did not show any significant change.    Overall Ena tells me she feels well.  She denies any cardiac symptoms.  Her edema has been under good control.  She denies any other signs or symptoms of CHF.  No shortness of breath.  She denies chest pains or any symptoms of angina.  She denies palpitations, lightheadedness or syncope.      Patient Active Problem List   Diagnosis    Rhinitis    Premature  ventricular contraction    Benign hypertension with CKD (chronic kidney disease) stage IV ([facility])    GERD (gastroesophageal reflux disease)    Dyslipidemia    Diabetic polyneuropathy ([facility])    Anxiety    Dependent edema    Age-related osteoporosis with current pathological fracture with routine healing    Anemia    CKD (chronic kidney disease), stage IV ([facility])    Complex renal cyst    Chronic back pain    Type 2 diabetes mellitus with chronic kidney disease, with long-term current use of insulin ([facility])    Herniated nucleus pulposus, L3-4 right    Lumbar foraminal stenosis    Lumbar radiculopathy    Sacroiliitis ([facility])    Proteinuria    Nonrheumatic aortic valve stenosis    Elevated parathyroid hormone    Chronic pain syndrome    Degenerative disc disease, cervical    Hypokalemia    Chronic kidney disease-mineral and bone disorder    Closed compression fracture of L4 lumbar vertebra, sequela    Leukocytosis    Prolonged Q-T interval on ECG    Nausea & vomiting    Closed fracture of multiple pubic rami, left, initial encounter ([facility])    Chronic respiratory failure with hypoxia ([facility])    Nocturnal hypoxemia    Mixed stress and urge incontinence     Past Medical History:   Diagnosis Date    Acute respiratory failure with hypoxia ([facility]) 03/17/2024    Anxiety     Aortic valve insufficiency     Cataract of both eyes     GERD (gastroesophageal reflux disease)     last assessed - [id number]    Hypertensive urgency 05/12/2024    Palpitations      Social History     Socioeconomic History    Marital status: Single     Spouse name: Not on file    Number of children: Not on file    Years of education: Not on file    Highest education level: Not on file   Occupational History    Not on file   Tobacco Use    Smoking status: Never    Smokeless tobacco: Never   Vaping Use    Vaping status: Never Used   Substance and Sexual Activity    Alcohol use: Never    Drug use: Never    Sexual activity: Not Currently   Other Topics Concern    Not on file    Social History Narrative    Living with relatives (other than parents)    Marital History -      Social Drivers of Health     Financial Resource Strain: Low Risk  (7/22/2023)    Overall Financial Resource Strain (CARDIA)     Difficulty of Paying Living Expenses: Not very hard   Food Insecurity: No Food Insecurity (10/14/2024)    Nursing - Inadequate Food Risk Classification     Worried About Running Out of Food in the Last Year: Never true     Ran Out of Food in the Last Year: Never true     Ran Out of Food in the Last Year: Not on file   Transportation Needs: No Transportation Needs (10/14/2024)    PRAPARE - Transportation     Lack of Transportation (Medical): No     Lack of Transportation (Non-Medical): No   Physical Activity: Not on file   Stress: Not on file   Social Connections: Not on file   Intimate Partner Violence: Not on file   Housing Stability: Low Risk  (10/14/2024)    Housing Stability Vital Sign     Unable to Pay for Housing in the Last Year: No     Number of Times Moved in the Last Year: 0     Homeless in the Last Year: No      Family History   Problem Relation Age of Onset    Kidney disease Mother         Chronic    No Known Problems Father     Breast cancer Sister     Diabetes Sister     Cancer Sister     Breast cancer Sister     Hypertension Sister     No Known Problems Daughter     No Known Problems Son     No Known Problems Son     No Known Problems Son     No Known Problems Son      Past Surgical History:   Procedure Laterality Date    APPENDECTOMY      CATARACT EXTRACTION Bilateral     COLONOSCOPY      HIP SURGERY      TUBAL LIGATION Bilateral        Current Outpatient Medications:     acetaminophen (TYLENOL) 325 mg tablet, Take 975 mg by mouth 3 (three) times a day. per [facility]  Indications: Pain, Disp: , Rfl:     aspirin 81 MG tablet, Take 1 tablet by mouth daily, Disp: , Rfl:     atorvastatin (LIPITOR) 10 mg tablet, Take 1 tablet (10 mg total) by mouth daily per  latest dci, Disp: 90 tablet, Rfl: 2    cholecalciferol (VITAMIN D3) 1,000 units tablet, Take 1 tablet by mouth daily, Disp: , Rfl:     cloNIDine (CATAPRES-TTS-2) 0.2 mg/24 hr, Place 1 patch (0.2 mg total) on the skin over 7 days once a week, Disp: 12 patch, Rfl: 3    famotidine (PEPCID) 20 mg tablet, TAKE 1 TABLET BY MOUTH EVERYDAY AT BEDTIME, Disp: 30 tablet, Rfl: 5    ferrous sulfate 325 (65 Fe) mg tablet, Take 1 tablet (325 mg total) by mouth 3 (three) times a week, Disp: 12 tablet, Rfl: 0    glipiZIDE (GLUCOTROL) 10 mg tablet, TAKE 2 TABLETS BY MOUTH IN THE MORNING THEN 1 TAB WITH DINNER, Disp: , Rfl:     glucose blood (Accu-Chek Emma Plus) test strip, USE AS INSTRUCTED TWICE DAILY, Disp: 100 each, Rfl: 7    glucose blood (OneTouch Verio) test strip, Check blood sugars twice daily. Please substitute with appropriate alternative as covered by patient's insurance. Dx: E11.65, Disp: 200 each, Rfl: 3    hydrALAZINE (APRESOLINE) 100 MG tablet, Take 1 tablet (100 mg total) by mouth 3 (three) times a day, Disp: 270 tablet, Rfl: 3    Insulin Glargine-yfgn 100 UNIT/ML SOPN, Inject 0.05 mL (5 Units total) uncer the skin in the morning, Disp: 100 mL, Rfl: 2    Insulin Pen Needle (BD Pen Needle Kathy U/F) 32G X 4 MM MISC, Use daily, Disp: 100 each, Rfl: 3    Invokana 100 MG, TAKE 1 TABLET BY MOUTH DAILY BEFORE BREAKFAST., Disp: 30 tablet, Rfl: 6    Januvia 50 MG tablet, TAKE 1 TABLET BY MOUTH EVERY DAY, Disp: 30 tablet, Rfl: 5    lidocaine (Lidoderm) 5 %, Apply 1 patch topically over 12 hours daily Remove & Discard patch within 12 hours or as directed by MD, Disp: , Rfl:     nebivolol (BYSTOLIC) 20 MG tablet, TAKE 2 TABLETS (40 MG TOTAL) BY MOUTH DAILY., Disp: 180 tablet, Rfl: 2    NIFEdipine (ADALAT CC) 90 mg 24 hr tablet, TAKE 1 TABLET BY MOUTH EVERY DAY, Disp: 90 tablet, Rfl: 1    OneTouch Delica Lancets 33G MISC, Check blood sugars twice daily. Please substitute with appropriate alternative as covered by patient's  insurance. Dx: E11.65, Disp: 200 each, Rfl: 3    oxygen gas, Inhale 2.5 L/min continuous VIA NASAL CANULA. At night only, Disp: , Rfl:     pantoprazole (PROTONIX) 20 mg tablet, TAKE 1 TABLET BY MOUTH EVERY DAY, Disp: 90 tablet, Rfl: 1    polyethylene glycol (GLYCOLAX) 17 GM/SCOOP powder, Take 17 g by mouth daily as needed (constipation). 17 gm daily as needed for constipation  Indications: Constipation, Disp: , Rfl:     Sennosides (Senna) 8.6 MG CAPS, Take 2 capsules by mouth daily as needed (constipation) per latest dci from the [place], Disp: , Rfl:     sertraline (ZOLOFT) 100 mg tablet, TAKE 1 TABLET BY MOUTH EVERY DAY, Disp: 90 tablet, Rfl: 1    spironolactone (ALDACTONE) 25 mg tablet, Take 1 tablet (25 mg total) by mouth daily, Disp: 90 tablet, Rfl: 3    torsemide (DEMADEX) 20 mg tablet, TAKE 1 TABLET BY MOUTH EVERY DAY, Disp: 30 tablet, Rfl: 5    Current Facility-Administered Medications:     denosumab (PROLIA) subcutaneous injection 60 mg, 60 mg, Subcutaneous, Q6 Months, , 60 mg at 03/05/25 0939  No Known Allergies    Labs:  Lab Results   Component Value Date     01/02/2018    K 4.5 04/04/2025     04/04/2025    CO2 20 04/04/2025    BUN 46 (H) 04/04/2025    CREATININE 2.35 (H) 04/04/2025    CREATININE 1.59 (H) 06/24/2024    CREATININE 1.47 (H) 01/02/2018    GLUCOSE 264 (H) 03/16/2024    GLUCOSE 145 (H) 01/02/2018    CALCIUM 8.3 (L) 06/24/2024    CALCIUM 9.8 01/02/2018     Lab Results   Component Value Date    WBC 12.28 (H) 06/24/2024    WBC 11.7 (H) 01/02/2018    HGB 11.1 (L) 06/24/2024    HGB 11.2 01/02/2018    HCT 35.1 06/24/2024    HCT 34.5 01/02/2018    MCV 94 06/24/2024    MCV 90 01/02/2018     06/24/2024     01/02/2018     Lab Results   Component Value Date    CHOL 168 01/02/2018    TRIG 132 02/28/2025    HDL 43 02/28/2025     Imaging:    ECHO (4/26/2023):    Left Ventricle: Left ventricular cavity size is normal. There is mild concentric hypertrophy. The left ventricular  "ejection fraction is 65%. Systolic function is normal. Wall motion is normal. Diastolic function is mildly abnormal, consistent with grade I (abnormal) relaxation.    Right Ventricle: Right ventricular cavity size is normal. Systolic function is normal.    Left Atrium: The atrium is mildly dilated.    Right Atrium: The atrium is mildly dilated.    Aortic Valve: There is mild regurgitation. There is moderate stenosis.    Tricuspid Valve: There is mild regurgitation. The right ventricular systolic pressure is moderately elevated. The estimated right ventricular systolic pressure is 50.00 mmHg.    Prior TTE study available for comparison. Prior study date: 8/16/2019. Changes noted when compared to prior study. Changes include: Aortic stenosis is now present..      Review of Systems:  Review of Systems   Constitutional: Negative.    HENT: Negative.     Eyes: Negative.    Respiratory: Negative.     Cardiovascular: Negative.    Gastrointestinal: Negative.    Musculoskeletal: Negative.    Skin: Negative.    Allergic/Immunologic: Negative.    Neurological: Negative.    Hematological: Negative.    Psychiatric/Behavioral: Negative.     All other systems reviewed and are negative.    Vitals:    04/11/25 0839   BP: 160/78   BP Location: Left arm   Patient Position: Sitting   Cuff Size: Standard   Pulse: 70   Weight: 65.3 kg (144 lb)   Height: 5' 2\" (1.575 m)     Physical Exam  Vitals and nursing note reviewed.   Constitutional:       Appearance: She is well-developed.   HENT:      Head: Normocephalic and atraumatic.   Eyes:      General: No scleral icterus.        Right eye: No discharge.         Left eye: No discharge.      Pupils: Pupils are equal, round, and reactive to light.   Neck:      Thyroid: No thyromegaly.      Vascular: No JVD.   Cardiovascular:      Rate and Rhythm: Normal rate and regular rhythm. No extrasystoles are present.     Pulses: Normal pulses.      Heart sounds: S1 normal and S2 normal. Murmur heard.      " Systolic murmur is present with a grade of 3/6.      No friction rub. No gallop.   Pulmonary:      Effort: Pulmonary effort is normal. No respiratory distress.      Breath sounds: Normal breath sounds. No wheezing, rhonchi or rales.   Abdominal:      General: Bowel sounds are normal. There is no distension.      Palpations: Abdomen is soft.      Tenderness: There is no abdominal tenderness.   Musculoskeletal:         General: No tenderness or deformity. Normal range of motion.      Cervical back: Normal range of motion and neck supple.   Skin:     General: Skin is warm and dry.      Findings: No rash.   Neurological:      Mental Status: She is alert and oriented to person, place, and time.      Cranial Nerves: No cranial nerve deficit.   Psychiatric:         Thought Content: Thought content normal.         Judgment: Judgment normal.     Counseling / Coordination of Care  Total office / unit time spent today 25 minutes.  Greater than 50% of total time was spent with the patient and / or family counseling and / or coordination of care.

## 2025-04-11 NOTE — LETTER
April 11, 2025     Trena Baumann DO  [address]  [address]  [facility] [address]    Patient: Ena Ahumada   YOB: 1935   Date of Visit: 4/11/2025       Dear Dr. Trena Baumann DO:    Thank you for referring Ena Ahumada to me for evaluation. Below are my notes for this consultation.    If you have questions, please do not hesitate to call me. I look forward to following your patient along with you.         Sincerely,        Markus Hill MD        CC: No Recipients    Markus Hill MD  4/11/2025  9:05 AM  Signed                                             Cardiology Follow Up    Ena Ahumada  1935  [id number]  [facility] [place]  [address]  [address]  [facility] [address]  [phone]  [phone]    1. Benign hypertension with CKD (chronic kidney disease) stage IV (HCC)        2. Primary hypertension  hydrALAZINE (APRESOLINE) 100 MG tablet      3. Nonrheumatic aortic valve stenosis        4. Premature ventricular contraction        5. Dyslipidemia            Discussion/Summary:    1.  HTN - Her blood pressure initially improved with up titrating her therapy and changing HCTZ to torsemide.  In addition to her diuretic she remains on a clonidine patch, Bystolic and nifedipine.  However after her 2 hospitalizations last year it was persistently running high.  We added hydralazine and spironolactone.  Nephrology is following her closely and is doing a secondary hypertensive workup.  We will follow this closely.  For now going to have her go up on hydralazine to 100 mg 3 times a day.  She will continue to follow her blood pressure at home.  We did talk about a low-sodium diet.    2.  Dyslipidemia - Controlled on simvastatin.  She has blood work followed regularly by her PCP.    3.  PVCs - Asymptomatic and no issues from this standpoint.    4.  Edema - This has been controlled on torsemide 20 mg daily.  She should follow a low-sodium diet.  She does  have blood work followed closely and follows with nephrology.    5.  Moderate aortic stenosis - This was seen on an echocardiogram in April 2023.  Repeat one from this past October did not show any significant change.  We will see her back in 6 months and repeat an echocardiogram after that visit.      Interval History:     Ena is here f for follow-up given her cardiac history and risk factors.  She has 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 but has been asymptomatic as of late.  Her more active issue has been uncontrolled hypertension as of late.  She also has dyslipidemia, mild to moderate aortic stenosis and chronic lower extremity edema.  She has developed worsening chronic kidney disease and follows with Nephrology.     A few years back I changed her diuretic over to torsemide, from HCTZ.  Her blood pressure then improved.  After a visit in April 2023 I repeated an echocardiogram which showed progression of valvular disease as she had normal LV systolic function with moderate aortic stenosis.  Her blood pressure was under control then.  We did increase her torsemide to 20 mg daily due to increasing edema, which overall has improved.    More recently in May 2024 she was in the hospital for gastroenteritis with nausea and vomiting.  She was not able to stomach a lot of her pills and therefore her blood pressure accelerated.  Recently one of her physicians increased her Bystolic to 40 mg daily.  She was in the hospital again in June with a fall and pelvic fracture requiring rehabilitation.  Through this time and up until her last follow-up her blood pressure continued to run high.  At first it seemed to be just her morning blood pressures so we changed the timing of her medications, but then overall her blood pressure persistently stayed elevated.  We added hydralazine which has now been uptitrated to 50 mg 3 times daily.  We also added spironolactone 25 mg daily.  With her blood pressure continuing to  remain elevated nephrology is now putting her through a secondary hypertension workup.  We did repeat an echocardiogram after our last visit which did not show any significant change.    Overall Ena tells me she feels well.  She denies any cardiac symptoms.  Her edema has been under good control.  She denies any other signs or symptoms of CHF.  No shortness of breath.  She denies chest pains or any symptoms of angina.  She denies palpitations, lightheadedness or syncope.      Patient Active Problem List   Diagnosis   • Rhinitis   • Premature ventricular contraction   • Benign hypertension with CKD (chronic kidney disease) stage IV ([facility])   • GERD (gastroesophageal reflux disease)   • Dyslipidemia   • Diabetic polyneuropathy ([facility])   • Anxiety   • Dependent edema   • Age-related osteoporosis with current pathological fracture with routine healing   • Anemia   • CKD (chronic kidney disease), stage IV ([facility])   • Complex renal cyst   • Chronic back pain   • Type 2 diabetes mellitus with chronic kidney disease, with long-term current use of insulin ([facility])   • Herniated nucleus pulposus, L3-4 right   • Lumbar foraminal stenosis   • Lumbar radiculopathy   • Sacroiliitis ([facility])   • Proteinuria   • Nonrheumatic aortic valve stenosis   • Elevated parathyroid hormone   • Chronic pain syndrome   • Degenerative disc disease, cervical   • Hypokalemia   • Chronic kidney disease-mineral and bone disorder   • Closed compression fracture of L4 lumbar vertebra, sequela   • Leukocytosis   • Prolonged Q-T interval on ECG   • Nausea & vomiting   • Closed fracture of multiple pubic rami, left, initial encounter ([facility])   • Chronic respiratory failure with hypoxia ([facility])   • Nocturnal hypoxemia   • Mixed stress and urge incontinence     Past Medical History:   Diagnosis Date   • Acute respiratory failure with hypoxia ([facility]) 03/17/2024   • Anxiety    • Aortic valve insufficiency    • Cataract of both eyes    • GERD (gastroesophageal reflux  disease)     last assessed - [id number]   • Hypertensive urgency 05/12/2024   • Palpitations      Social History     Socioeconomic History   • Marital status: Single     Spouse name: Not on file   • Number of children: Not on file   • Years of education: Not on file   • Highest education level: Not on file   Occupational History   • Not on file   Tobacco Use   • Smoking status: Never   • Smokeless tobacco: Never   Vaping Use   • Vaping status: Never Used   Substance and Sexual Activity   • Alcohol use: Never   • Drug use: Never   • Sexual activity: Not Currently   Other Topics Concern   • Not on file   Social History Narrative    Living with relatives (other than parents)    Marital History -      Social Drivers of Health     Financial Resource Strain: Low Risk  (7/22/2023)    Overall Financial Resource Strain (CARDIA)    • Difficulty of Paying Living Expenses: Not very hard   Food Insecurity: No Food Insecurity (10/14/2024)    Nursing - Inadequate Food Risk Classification    • Worried About Running Out of Food in the Last Year: Never true    • Ran Out of Food in the Last Year: Never true    • Ran Out of Food in the Last Year: Not on file   Transportation Needs: No Transportation Needs (10/14/2024)    PRAPARE - Transportation    • Lack of Transportation (Medical): No    • Lack of Transportation (Non-Medical): No   Physical Activity: Not on file   Stress: Not on file   Social Connections: Not on file   Intimate Partner Violence: Not on file   Housing Stability: Low Risk  (10/14/2024)    Housing Stability Vital Sign    • Unable to Pay for Housing in the Last Year: No    • Number of Times Moved in the Last Year: 0    • Homeless in the Last Year: No      Family History   Problem Relation Age of Onset   • Kidney disease Mother         Chronic   • No Known Problems Father    • Breast cancer Sister    • Diabetes Sister    • Cancer Sister    • Breast cancer Sister    • Hypertension Sister    • No Known Problems  Daughter    • No Known Problems Son    • No Known Problems Son    • No Known Problems Son    • No Known Problems Son      Past Surgical History:   Procedure Laterality Date   • APPENDECTOMY     • CATARACT EXTRACTION Bilateral    • COLONOSCOPY     • HIP SURGERY     • TUBAL LIGATION Bilateral        Current Outpatient Medications:   •  acetaminophen (TYLENOL) 325 mg tablet, Take 975 mg by mouth 3 (three) times a day. per latest dci from the [place]  Indications: Pain, Disp: , Rfl:   •  aspirin 81 MG tablet, Take 1 tablet by mouth daily, Disp: , Rfl:   •  atorvastatin (LIPITOR) 10 mg tablet, Take 1 tablet (10 mg total) by mouth daily per latest dci, Disp: 90 tablet, Rfl: 2  •  cholecalciferol (VITAMIN D3) 1,000 units tablet, Take 1 tablet by mouth daily, Disp: , Rfl:   •  cloNIDine (CATAPRES-TTS-2) 0.2 mg/24 hr, Place 1 patch (0.2 mg total) on the skin over 7 days once a week, Disp: 12 patch, Rfl: 3  •  famotidine (PEPCID) 20 mg tablet, TAKE 1 TABLET BY MOUTH EVERYDAY AT BEDTIME, Disp: 30 tablet, Rfl: 5  •  ferrous sulfate 325 (65 Fe) mg tablet, Take 1 tablet (325 mg total) by mouth 3 (three) times a week, Disp: 12 tablet, Rfl: 0  •  glipiZIDE (GLUCOTROL) 10 mg tablet, TAKE 2 TABLETS BY MOUTH IN THE MORNING THEN 1 TAB WITH DINNER, Disp: , Rfl:   •  glucose blood (Accu-Chek Emma Plus) test strip, USE AS INSTRUCTED TWICE DAILY, Disp: 100 each, Rfl: 7  •  glucose blood (OneTouch Verio) test strip, Check blood sugars twice daily. Please substitute with appropriate alternative as covered by patient's insurance. Dx: E11.65, Disp: 200 each, Rfl: 3  •  hydrALAZINE (APRESOLINE) 100 MG tablet, Take 1 tablet (100 mg total) by mouth 3 (three) times a day, Disp: 270 tablet, Rfl: 3  •  Insulin Glargine-yfgn 100 UNIT/ML SOPN, Inject 0.05 mL (5 Units total) uncer the skin in the morning, Disp: 100 mL, Rfl: 2  •  Insulin Pen Needle (BD Pen Needle Kathy U/F) 32G X 4 MM MISC, Use daily, Disp: 100 each, Rfl: 3  •  Invokana 100 MG, TAKE 1  TABLET BY MOUTH DAILY BEFORE BREAKFAST., Disp: 30 tablet, Rfl: 6  •  Januvia 50 MG tablet, TAKE 1 TABLET BY MOUTH EVERY DAY, Disp: 30 tablet, Rfl: 5  •  lidocaine (Lidoderm) 5 %, Apply 1 patch topically over 12 hours daily Remove & Discard patch within 12 hours or as directed by MD, Disp: , Rfl:   •  nebivolol (BYSTOLIC) 20 MG tablet, TAKE 2 TABLETS (40 MG TOTAL) BY MOUTH DAILY., Disp: 180 tablet, Rfl: 2  •  NIFEdipine (ADALAT CC) 90 mg 24 hr tablet, TAKE 1 TABLET BY MOUTH EVERY DAY, Disp: 90 tablet, Rfl: 1  •  OneTouch Delica Lancets 33G MISC, Check blood sugars twice daily. Please substitute with appropriate alternative as covered by patient's insurance. Dx: E11.65, Disp: 200 each, Rfl: 3  •  oxygen gas, Inhale 2.5 L/min continuous VIA NASAL CANULA. At night only, Disp: , Rfl:   •  pantoprazole (PROTONIX) 20 mg tablet, TAKE 1 TABLET BY MOUTH EVERY DAY, Disp: 90 tablet, Rfl: 1  •  polyethylene glycol (GLYCOLAX) 17 GM/SCOOP powder, Take 17 g by mouth daily as needed (constipation). 17 gm daily as needed for constipation  Indications: Constipation, Disp: , Rfl:   •  Sennosides (Senna) 8.6 MG CAPS, Take 2 capsules by mouth daily as needed (constipation) per latest dci from the [place], Disp: , Rfl:   •  sertraline (ZOLOFT) 100 mg tablet, TAKE 1 TABLET BY MOUTH EVERY DAY, Disp: 90 tablet, Rfl: 1  •  spironolactone (ALDACTONE) 25 mg tablet, Take 1 tablet (25 mg total) by mouth daily, Disp: 90 tablet, Rfl: 3  •  torsemide (DEMADEX) 20 mg tablet, TAKE 1 TABLET BY MOUTH EVERY DAY, Disp: 30 tablet, Rfl: 5    Current Facility-Administered Medications:   •  denosumab (PROLIA) subcutaneous injection 60 mg, 60 mg, Subcutaneous, Q6 Months, , 60 mg at 03/05/25 0939  No Known Allergies    Labs:  Lab Results   Component Value Date     01/02/2018    K 4.5 04/04/2025     04/04/2025    CO2 20 04/04/2025    BUN 46 (H) 04/04/2025    CREATININE 2.35 (H) 04/04/2025    CREATININE 1.59 (H) 06/24/2024    CREATININE 1.47 (H)  01/02/2018    GLUCOSE 264 (H) 03/16/2024    GLUCOSE 145 (H) 01/02/2018    CALCIUM 8.3 (L) 06/24/2024    CALCIUM 9.8 01/02/2018     Lab Results   Component Value Date    WBC 12.28 (H) 06/24/2024    WBC 11.7 (H) 01/02/2018    HGB 11.1 (L) 06/24/2024    HGB 11.2 01/02/2018    HCT 35.1 06/24/2024    HCT 34.5 01/02/2018    MCV 94 06/24/2024    MCV 90 01/02/2018     06/24/2024     01/02/2018     Lab Results   Component Value Date    CHOL 168 01/02/2018    TRIG 132 02/28/2025    HDL 43 02/28/2025     Imaging:    ECHO (4/26/2023):  •  Left Ventricle: Left ventricular cavity size is normal. There is mild concentric hypertrophy. The left ventricular ejection fraction is 65%. Systolic function is normal. Wall motion is normal. Diastolic function is mildly abnormal, consistent with grade I (abnormal) relaxation.  •  Right Ventricle: Right ventricular cavity size is normal. Systolic function is normal.  •  Left Atrium: The atrium is mildly dilated.  •  Right Atrium: The atrium is mildly dilated.  •  Aortic Valve: There is mild regurgitation. There is moderate stenosis.  •  Tricuspid Valve: There is mild regurgitation. The right ventricular systolic pressure is moderately elevated. The estimated right ventricular systolic pressure is 50.00 mmHg.  •  Prior TTE study available for comparison. Prior study date: 8/16/2019. Changes noted when compared to prior study. Changes include: Aortic stenosis is now present..      Review of Systems:  Review of Systems   Constitutional: Negative.    HENT: Negative.     Eyes: Negative.    Respiratory: Negative.     Cardiovascular: Negative.    Gastrointestinal: Negative.    Musculoskeletal: Negative.    Skin: Negative.    Allergic/Immunologic: Negative.    Neurological: Negative.    Hematological: Negative.    Psychiatric/Behavioral: Negative.     All other systems reviewed and are negative.    Vitals:    04/11/25 0839   BP: 160/78   BP Location: Left arm   Patient Position: Sitting  "  Cuff Size: Standard   Pulse: 70   Weight: 65.3 kg (144 lb)   Height: 5' 2\" (1.575 m)     Physical Exam  Vitals and nursing note reviewed.   Constitutional:       Appearance: She is well-developed.   HENT:      Head: Normocephalic and atraumatic.   Eyes:      General: No scleral icterus.        Right eye: No discharge.         Left eye: No discharge.      Pupils: Pupils are equal, round, and reactive to light.   Neck:      Thyroid: No thyromegaly.      Vascular: No JVD.   Cardiovascular:      Rate and Rhythm: Normal rate and regular rhythm. No extrasystoles are present.     Pulses: Normal pulses.      Heart sounds: S1 normal and S2 normal. Murmur heard.      Systolic murmur is present with a grade of 3/6.      No friction rub. No gallop.   Pulmonary:      Effort: Pulmonary effort is normal. No respiratory distress.      Breath sounds: Normal breath sounds. No wheezing, rhonchi or rales.   Abdominal:      General: Bowel sounds are normal. There is no distension.      Palpations: Abdomen is soft.      Tenderness: There is no abdominal tenderness.   Musculoskeletal:         General: No tenderness or deformity. Normal range of motion.      Cervical back: Normal range of motion and neck supple.   Skin:     General: Skin is warm and dry.      Findings: No rash.   Neurological:      Mental Status: She is alert and oriented to person, place, and time.      Cranial Nerves: No cranial nerve deficit.   Psychiatric:         Thought Content: Thought content normal.         Judgment: Judgment normal.     Counseling / Coordination of Care  Total office / unit time spent today 25 minutes.  Greater than 50% of total time was spent with the patient and / or family counseling and / or coordination of care.   "

## 2025-04-11 NOTE — PATIENT INSTRUCTIONS
"Patient Education     Low-sodium diet   The Basics   Written by the doctors and editors at [facility]   What is sodium? -- This is the main ingredient in table salt. It is also found in lots of foods. The body needs a very small amount of sodium to work normally, but most people eat much more sodium than their body needs.  Who should eat less sodium? -- Nearly everyone eats too much sodium. The average American takes in 3400 milligrams of sodium each day. Experts say that most people should have no more than 2300 milligrams a day.  Some people with certain health conditions should follow a low-sodium diet. Ask your doctor how much sodium you should have.  Why should I eat less sodium? -- Reducing the amount of sodium you eat can have lots of health benefits:   It can lower your blood pressure. This means that it can help lower your risk of stroke, heart attack, kidney damage, and lots of other health problems.   It can reduce the amount of fluid in your body, which means that your heart doesn't have to work as hard.   It can keep the kidneys from having to work too hard. This is especially important in people who have kidney disease.   It can reduce swelling in the ankles and belly, which can be uncomfortable and make it hard to move.   It can lower the chances of forming kidney stones.   It can help keep your bones strong.  Which foods have the most sodium? -- Processed foods have the most sodium. These foods usually come in cans, boxes, jars, and bags. They tend to have a lot of sodium even if they don't taste salty. In fact, many sweet foods have a lot of sodium in them. The only way to know for sure how much sodium is in a food is to check the label (figure 1).  Here are some examples of foods that often have too much sodium:   Canned soups   Rice and noodle mixes   Sauces, dressings, and condiments (such as ketchup and mustard)   Pre-made frozen meals (also called \"TV dinners\")   Deli meats, hot dogs, and " "cheeses   Smoked, cured, or pickled foods   Salted snack foods and nuts   Restaurant meals  What should I do to reduce the amount of sodium in my diet? -- Many people think that eating a low-sodium diet just means not adding salt to their food. But this is not true. Not adding salt at the table or when cooking will help a little. But almost all of the sodium you eat is already in the food you buy at the grocery store or at restaurants (figure 2).  Here are some tips to help you eat less sodium:   Avoid processed foods when possible. This is the most important thing you can do to eat less sodium. Processed foods include most foods that are sold in cans, boxes, jars, and bags.   Instead of buying pre-made, processed foods, buy fresh or fresh-frozen fruits and vegetables. (\"Fresh-frozen\" means that the food is frozen without anything added to it.)   Buy meats, fish, chicken, and turkey that are fresh instead of canned or sold at the deli counter. (Meats sold at the deli counter are high in sodium.)   Try to eat at restaurants less often.   When possible, try to make meals from scratch at home using fresh ingredients.   If you do buy canned or packaged foods, choose ones that are labeled \"sodium free\" or \"very low sodium\" (table 1). Or choose foods that have less than 400 milligrams of sodium in each serving. The amount of sodium in each serving appears on the nutrition label (figure 1).  The table has some examples of foods to avoid and foods to choose instead (table 2).  Whatever changes you make, make them slowly. Choose 1 thing to do differently, and do that for a while. If you can keep doing that change easily, add another change. For instance, if you usually eat green beans from a can, try buying fresh or fresh-frozen green beans and cooking them at home without adding salt. If that works for you, keep doing it. Then, choose another thing to change.  If you try making a change and it doesn't work right away, don't " "give up. See if you can reduce sodium in other ways. The important thing is to take small steps and to keep doing the changes that work for you.  Can I still eat out at restaurants sometimes? -- One of best ways to limit your sodium is to only eat out at restaurants every once in a while. When you do eat out, try to choose places that offer healthier choices and fresh ingredients.  No matter where you eat, when choosing your food:   Ask your  if your meal can be made without salt.   Avoid foods that come with sauces or dips.   Choose plain grilled meats or fish and steamed vegetables.   Ask for oil and vinegar for your salad, rather than dressing.   If a meal you really want has more sodium than you should have, consider saving half of it to eat another day.  What if food just does not taste as good without sodium? -- Starting a low-sodium diet can be hard. The good news is that your taste buds can get used to having less sodium. But you have to give them some time to adjust.  It can also help to try other ways to add flavor to your foods. Try things like herbs, spices, lemon juice, and vinegar.  What about salt substitutes? -- Flavoring your food with a salt substitute is a good way to reduce how much salt you eat. But check with your doctor or nurse before trying this. Some salt substitutes can be dangerous if you have certain health problems or take certain medicines.  Do medicines have sodium? -- Yes, some medicines contain sodium. If you are buying medicines you can get without a prescription, look to see how much sodium they have. Avoid products that have \"sodium carbonate\" or \"sodium bicarbonate\" unless your doctor prescribes them. (Sodium bicarbonate is baking soda.)  All topics are updated as new evidence becomes available and our peer review process is complete.  This topic retrieved from [employer] on: Mar 22, 2024.  Topic 55553 Version 14.0  Release: 32.2.4 - C32.80  © 2024 Up[employer], Inc. and/or its " "affiliates. All rights reserved.  figure 1: Food labels can be tricky     To figure out how much sodium you are eating, check the label to find out how much sodium is in 1 serving. If you are having more than 1 serving, multiply that amount by the number of servings you plan to eat. For instance, if you are going to eat this whole can of soup, you should multiply 850 by 2. That means that you will be having 1700 milligrams of sodium. That's more sodium than many people are supposed to have in 1 day.  Graphic 90339 Version 8.0  figure 2: Sources of sodium in your diet     Graphic 79894 Version 2.0  table 1: A guide to common nutrient claims and what they mean  Salt/sodium-free  Less than 5 mg of sodium per serving   Very low sodium  35 mg or less of sodium per serving   Low sodium  140 mg or less of sodium per serving   Reduced sodium  At least 25% less sodium than the regular product   Light or lite in sodium  At least 50% less sodium than the regular product   No salt added or unsalted  No salt is added during processing, but these products may not be salt/sodium-free unless stated   mg: milligram; %: percent.  Graphic 16854 Version 7.0  table 2: Ways to cut down on salt (sodium)  Avoid these foods  Try these foods instead    Cured and smoked foods like piper, sausage, smoked fish and meats, hot dogs, ham, lunch meats, corned beef, and pickles Fresh turkey, chicken, and lean beef   Canned fish (tuna, sardines) Unsalted tuna or sardines   Canned meats Fresh unprocessed meats, vegetable protein, and fish  Frozen and canned meats, vegetable protein, and fish that are labeled \"low-sodium\"   Salted pretzels, crackers, potato chips, tortilla chips, and nuts Low-sodium and unsalted versions of these foods   Most cheeses Low-sodium cheeses (check label for actual sodium content)   Sauces (tomato and cream, etc), tomato juices Low-sodium versions of these foods, such as low-sodium tomato juice   Processed, instant, and " "convenience foods like frozen dinners, packaged meals, canned soups, and boxed pasta blends Cook and freeze your own low-sodium meals, soups, and broths    If you do need to use convenience or processed foods, read the labels. Choose items with 140 to 200 mg of sodium per serving.    For an entire convenience meal (frozen dinner), try to find options with less than 500 to 600 mg of sodium.    If you used canned foods, look for those labeled \"sodium-free.\" Or you can rinse the canned food under water to lower the sodium content.   Fast foods and foods prepared at restaurants (unless without cheese, sauces, or added salt) Fresh foods and foods with sauces on the side  Request that food be prepared without cheese or added salt   Graphic 10003 Version 10.0  Consumer Information Use and Disclaimer   Disclaimer: This generalized information is a limited summary of diagnosis, treatment, and/or medication information. It is not meant to be comprehensive and should be used as a tool to help the user understand and/or assess potential diagnostic and treatment options. It does NOT include all information about conditions, treatments, medications, side effects, or risks that may apply to a specific patient. It is not intended to be medical advice or a substitute for the medical advice, diagnosis, or treatment of a health care provider based on the health care provider's examination and assessment of a patient's specific and unique circumstances. Patients must speak with a health care provider for complete information about their health, medical questions, and treatment options, including any risks or benefits regarding use of medications. This information does not endorse any treatments or medications as safe, effective, or approved for treating a specific patient. UpToDate, Inc. and its affiliates disclaim any warranty or liability relating to this information or the use thereof.The use of this information is governed by the " Terms of Use, available at https://www.[employer] Wirelessuwer.com/en/know/clinical-effectiveness-terms. 2024© [employer] and its affiliates and/or licensors. All rights reserved.  Copyright   © 2024 [employer] and/or its affiliates. All rights reserved.

## 2025-04-15 ENCOUNTER — HOSPITAL ENCOUNTER (OUTPATIENT)
Dept: NON INVASIVE DIAGNOSTICS | Age: OVER 89
Discharge: HOME | End: 2025-04-15
Payer: COMMERCIAL

## 2025-04-15 DIAGNOSIS — N18.4 BENIGN HYPERTENSION WITH CKD (CHRONIC KIDNEY DISEASE) STAGE IV (HCC): ICD-10-CM

## 2025-04-15 DIAGNOSIS — I12.9 BENIGN HYPERTENSION WITH CKD (CHRONIC KIDNEY DISEASE) STAGE IV (HCC): ICD-10-CM

## 2025-04-15 LAB
ALDOST SERPL-MCNC: 27 NG/DL (ref 0–30)
METANEPH FREE SERPL-MCNC: <25 PG/ML (ref 0–88)
NORMETANEPHRINE SERPL-MCNC: 160 PG/ML (ref 0–297.2)
RENIN PLAS-CCNC: 0.54 NG/ML/HR (ref 0.17–5.38)

## 2025-04-15 PROCEDURE — 93975 VASCULAR STUDY: CPT

## 2025-04-15 PROCEDURE — 93975 VASCULAR STUDY: CPT | Performed by: SURGERY

## 2025-04-16 LAB
5OH-INDOLEACETATE 24H UR-MCNC: 2.7 MG/L
5OH-INDOLEACETATE 24H UR-MRATE: 4.1 MG/24 HR (ref 0–14.9)

## 2025-04-17 ENCOUNTER — RESULTS FOLLOW-UP (OUTPATIENT)
Dept: OTHER | Facility: HOSPITAL | Age: OVER 89
End: 2025-04-17

## 2025-04-18 LAB
DOPAMINE 24H UR-MRATE: 86 UG/24 HR (ref 0–510)
DOPAMINE UR-MCNC: 48 UG/L
EPINEPH 24H UR-MRATE: <5 UG/24 HR (ref 0–20)
EPINEPH UR-MCNC: <3 UG/L
NOREPINEPH 24H UR-MRATE: <27 UG/24 HR (ref 0–135)
NOREPINEPH UR-MCNC: <15 UG/L
VMA 24H UR-MRATE: 2 MG/24 HR (ref 0–7.5)
VMA UR-MCNC: 1.1 MG/L

## 2025-04-22 DIAGNOSIS — E78.5 DYSLIPIDEMIA: ICD-10-CM

## 2025-04-22 RX ORDER — ATORVASTATIN CALCIUM 10 MG/1
10 TABLET, FILM COATED ORAL DAILY
Qty: 90 TABLET | Refills: 1 | Status: SHIPPED | OUTPATIENT
Start: 2025-04-22

## 2025-04-28 DIAGNOSIS — I10 ESSENTIAL HYPERTENSION: ICD-10-CM

## 2025-04-28 DIAGNOSIS — E11.22 TYPE 2 DIABETES MELLITUS WITH STAGE 3 CHRONIC KIDNEY DISEASE, WITHOUT LONG-TERM CURRENT USE OF INSULIN (HCC): ICD-10-CM

## 2025-04-28 DIAGNOSIS — E78.5 DYSLIPIDEMIA: ICD-10-CM

## 2025-04-28 DIAGNOSIS — N18.30 TYPE 2 DIABETES MELLITUS WITH STAGE 3 CHRONIC KIDNEY DISEASE, WITHOUT LONG-TERM CURRENT USE OF INSULIN (HCC): ICD-10-CM

## 2025-04-29 DIAGNOSIS — N18.30 TYPE 2 DIABETES MELLITUS WITH STAGE 3 CHRONIC KIDNEY DISEASE, WITHOUT LONG-TERM CURRENT USE OF INSULIN (HCC): ICD-10-CM

## 2025-04-29 DIAGNOSIS — I10 ESSENTIAL HYPERTENSION: ICD-10-CM

## 2025-04-29 DIAGNOSIS — E11.22 TYPE 2 DIABETES MELLITUS WITH STAGE 3 CHRONIC KIDNEY DISEASE, WITHOUT LONG-TERM CURRENT USE OF INSULIN (HCC): ICD-10-CM

## 2025-04-29 RX ORDER — GLIPIZIDE 10 MG/1
TABLET ORAL
Qty: 90 TABLET | Refills: 11 | OUTPATIENT
Start: 2025-04-29

## 2025-04-29 RX ORDER — ATORVASTATIN CALCIUM 10 MG/1
10 TABLET, FILM COATED ORAL DAILY
Qty: 90 TABLET | Refills: 0 | OUTPATIENT
Start: 2025-04-29

## 2025-04-29 RX ORDER — GLIPIZIDE 10 MG/1
TABLET ORAL
Qty: 90 TABLET | Refills: 1 | Status: SHIPPED | OUTPATIENT
Start: 2025-04-29

## 2025-05-06 DIAGNOSIS — Z79.4 TYPE 2 DIABETES MELLITUS WITH HYPERGLYCEMIA, WITH LONG-TERM CURRENT USE OF INSULIN (HCC): ICD-10-CM

## 2025-05-06 DIAGNOSIS — E11.65 TYPE 2 DIABETES MELLITUS WITH HYPERGLYCEMIA, WITH LONG-TERM CURRENT USE OF INSULIN (HCC): ICD-10-CM

## 2025-05-07 RX ORDER — PEN NEEDLE, DIABETIC 32GX 5/32"
NEEDLE, DISPOSABLE MISCELLANEOUS DAILY
Qty: 100 EACH | Refills: 1 | Status: SHIPPED | OUTPATIENT
Start: 2025-05-07

## 2025-05-15 ENCOUNTER — TELEPHONE (OUTPATIENT)
Dept: NEPHROLOGY | Facility: CLINIC | Age: OVER 89
End: 2025-05-15

## 2025-05-15 NOTE — TELEPHONE ENCOUNTER
Left a voicemail.   There was a change with Lorrie's template- she will now start in the office at 1:00 pm when she is rounding at the hospitals in the morning and her last patient will be at 330.    Moved from 4 pm to 3:30 pm time slot- need to confirm with patient.

## 2025-05-21 DIAGNOSIS — N18.30 TYPE 2 DIABETES MELLITUS WITH STAGE 3 CHRONIC KIDNEY DISEASE, WITHOUT LONG-TERM CURRENT USE OF INSULIN (HCC): ICD-10-CM

## 2025-05-21 DIAGNOSIS — I10 ESSENTIAL HYPERTENSION: ICD-10-CM

## 2025-05-21 DIAGNOSIS — E11.22 TYPE 2 DIABETES MELLITUS WITH STAGE 3 CHRONIC KIDNEY DISEASE, WITHOUT LONG-TERM CURRENT USE OF INSULIN (HCC): ICD-10-CM

## 2025-05-22 RX ORDER — GLIPIZIDE 10 MG/1
TABLET ORAL
Qty: 270 TABLET | Refills: 1 | Status: SHIPPED | OUTPATIENT
Start: 2025-05-22

## 2025-07-01 ENCOUNTER — TELEPHONE (OUTPATIENT)
Dept: NEPHROLOGY | Facility: CLINIC | Age: OVER 89
End: 2025-07-01

## 2025-07-02 DIAGNOSIS — I10 PRIMARY HYPERTENSION: ICD-10-CM

## 2025-07-02 RX ORDER — HYDRALAZINE HYDROCHLORIDE 100 MG/1
100 TABLET, FILM COATED ORAL 3 TIMES DAILY
Qty: 270 TABLET | Refills: 0 | Status: SHIPPED | OUTPATIENT
Start: 2025-07-02

## 2025-07-03 DIAGNOSIS — E11.65 TYPE 2 DIABETES MELLITUS WITH HYPERGLYCEMIA, WITH LONG-TERM CURRENT USE OF INSULIN (HCC): ICD-10-CM

## 2025-07-03 DIAGNOSIS — Z79.4 TYPE 2 DIABETES MELLITUS WITH HYPERGLYCEMIA, WITH LONG-TERM CURRENT USE OF INSULIN (HCC): ICD-10-CM

## 2025-07-03 LAB
ALBUMIN/CREAT UR: 163 MG/G CREAT (ref 0–29)
APPEARANCE UR: CLEAR
BACTERIA URNS QL MICRO: NORMAL
BILIRUB UR QL STRIP: NEGATIVE
BUN SERPL-MCNC: 48 MG/DL (ref 8–27)
BUN/CREAT SERPL: 17 (ref 12–28)
CALCIUM SERPL-MCNC: 8.8 MG/DL (ref 8.7–10.3)
CASTS URNS QL MICRO: NORMAL /LPF
CHLORIDE SERPL-SCNC: 104 MMOL/L (ref 96–106)
CO2 SERPL-SCNC: 20 MMOL/L (ref 20–29)
COLOR UR: YELLOW
CREAT SERPL-MCNC: 2.88 MG/DL (ref 0.57–1)
CREAT UR-MCNC: 63.2 MG/DL
EGFR: 15 ML/MIN/1.73
EPI CELLS #/AREA URNS HPF: NORMAL /HPF (ref 0–10)
GLUCOSE SERPL-MCNC: 152 MG/DL (ref 70–99)
GLUCOSE UR QL: ABNORMAL
HGB UR QL STRIP: NEGATIVE
KETONES UR QL STRIP: NEGATIVE
LEUKOCYTE ESTERASE UR QL STRIP: NEGATIVE
MICRO URNS: ABNORMAL
MICROALBUMIN UR-MCNC: 103.2 UG/ML
NITRITE UR QL STRIP: NEGATIVE
PH UR STRIP: 7 [PH] (ref 5–7.5)
POTASSIUM SERPL-SCNC: 5.6 MMOL/L (ref 3.5–5.2)
PROT UR QL STRIP: ABNORMAL
PROT UR-MCNC: 39.5 MG/DL
PROT/CREAT UR: 625 MG/G CREAT (ref 0–200)
RBC #/AREA URNS HPF: NORMAL /HPF (ref 0–2)
SODIUM SERPL-SCNC: 141 MMOL/L (ref 134–144)
SP GR UR: 1.02 (ref 1–1.03)
UROBILINOGEN UR STRIP-ACNC: 0.2 MG/DL (ref 0.2–1)
WBC #/AREA URNS HPF: NORMAL /HPF (ref 0–5)

## 2025-07-03 NOTE — ASSESSMENT & PLAN NOTE
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 Baumann   Baseline creatinine previously 1.6-1.9.unfortunately with higher creatinine of 2.35 in April and worsening to 2.88 as of 7/2 --concern for CKD progression  UA bland aside from proteinuria  Recent imaging: Renal ultrasound in December with diffuse increased echogenicity compatible with medical renal disease.  Also noted 1.1 cm stable left lower pole proteinaceous or hemorrhagic cyst  Plan:  Kidney smart refused in the past, declined again today. Thinks she would be interested in dialysis if renal function continues to significantly worsen  Avoid NSAIDs and caution with IV contrast  Continue to monitor blood pressure and glucose to prevent worsening of CKD  Repeat labs now. Follow up in 3 months with labs prior to visit

## 2025-07-03 NOTE — ASSESSMENT & PLAN NOTE
BP today 118/54  Current regimen: Hydralazine 100 mg 3 times daily, Nebivolol 40 mg daily, nifedipine 90 mg daily, clonidine 0.2 mg patch once weekly, torsemide 20 mg daily  Holding Aldactone 25 mg daily  Renal artery duplex : No evidence of significant arterial occlusive disease in left or right main renal artery. Inadequate parenchymal flow noted b/l.  Augustin 29, renin 0.541. metaneprhine free plasma < 25, normetanephrine free plasma 160 -- Reached out to Dr. Baumann to discuss potential further workup.   Recommend low salt diet and exercise  Avoid hypotension

## 2025-07-03 NOTE — ASSESSMENT & PLAN NOTE
Lab Results   Component Value Date    HGBA1C 7.4 (H) 02/28/2025   Encourage strict glycemic control to prevent progression of CKD

## 2025-07-03 NOTE — PROGRESS NOTES
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 Baumann   Baseline creatinine previously 1.6-1.9.unfortunately with higher creatinine of 2.35 in April and worsening to 2.88 as of  --concern for CKD progression  UA bland aside from proteinuria  Recent imaging: Renal ultrasound in December with diffuse increased echogenicity compatible with medical renal disease.  Also noted 1.1 cm stable left lower pole proteinaceous or hemorrhagic cyst  Plan:  Kidney smart refused in the past, declined again today. Thinks she would be interested in dialysis if renal function continues to significantly worsen  Avoid NSAIDs and caution with IV contrast  Continue to monitor blood pressure and glucose to prevent worsening of CKD  Repeat labs now. Follow up in 3 months with labs prior to visit   Hyperkalemia  With hypokalemia in the past.  Most recent potassium 5.6  She is on spironolactone 25 mg daily- instructed to hold on 7/3. She has been holding   Low potassium diet- instructions given  Will repeat labs now.   Benign hypertension with CKD (chronic kidney disease) stage IV (HCC)  BP today 118/54  Current regimen: Hydralazine 100 mg 3 times daily, Nebivolol 40 mg daily, nifedipine 90 mg daily, clonidine 0.2 mg patch once weekly, torsemide 20 mg daily  Holding Aldactone 25 mg daily  Renal artery duplex : No evidence of significant arterial occlusive disease in left or right main renal artery. Inadequate parenchymal flow noted b/l.  Augustin 29, renin 0.541. metaneprhine free plasma < 25, normetanephrine free plasma 160 -- Reached out to Dr. Baumann to discuss potential further workup.   Recommend low salt diet and exercise  Avoid  hypotension   Proteinuria, unspecified type  Most recent albumin creatinine ratio 163, improved from April  Most recent protein creatinine ratio 625, also improved from April  Previously on Aldactone, holding now in setting of hyperkalemia   Chronic kidney disease-mineral and bone disorder  On vitamin D 1000 units daily    Most recent Phos 4.2  Most recent -will repeat  Type 2 diabetes mellitus with stage 4 chronic kidney disease, with long-term current use of insulin ([facility])    Lab Results   Component Value Date    HGBA1C 7.4 (H) 02/28/2025   Encourage strict glycemic control to prevent progression of CKD      History of Present Illness   HPI  Ena Ahumada is a 89 y.o. female who presents for CKD/HTN follow up. Since her last visit she has been doing good. No recent illnesses or changes to her health. She notes intermittent thigh pain, potentially could be muscle cramps. Daughter in law believes she needs to hydrate more. Checking labs now as above, will also check mag. Encouraged her to further follow up with PCP regarding this for further workup. Otherwise no concerns today. Unfortunately with worsening renal function as above, continues to decline kidney smart. Agreeable to go for labs to further assess potassium and kidney function. Believes she would want dialysis if necessary. All questions and concerns addressed today.     History obtained from: pt and daughter in law    Review of Systems   Constitutional:  Negative for appetite change and fatigue.   Respiratory:  Negative for chest tightness and shortness of breath.    Cardiovascular:  Negative for chest pain.   Gastrointestinal:  Negative for abdominal pain, diarrhea, nausea and vomiting.   Genitourinary:  Negative for decreased urine volume, difficulty urinating, dysuria and hematuria.   Neurological:  Negative for dizziness, light-headedness and headaches.   Psychiatric/Behavioral:  Negative for confusion.      Medications Ordered Prior to  "Encounter[1]   Social History[2]     Objective   /54   Pulse 68   Ht 5' 2\" (1.575 m)   Wt 64.4 kg (142 lb)   SpO2 93%   BMI 25.97 kg/m²      Physical Exam  Vitals reviewed.   Constitutional:       Appearance: Normal appearance.     Cardiovascular:      Rate and Rhythm: Normal rate and regular rhythm.   Pulmonary:      Effort: Pulmonary effort is normal. No respiratory distress.      Breath sounds: Normal breath sounds.     Musculoskeletal:      Right lower leg: No edema.      Left lower leg: No edema.     Skin:     General: Skin is warm and dry.     Neurological:      General: No focal deficit present.      Mental Status: She is alert.     Psychiatric:         Mood and Affect: Mood normal.         Behavior: Behavior normal.              [1]   Current Outpatient Medications on File Prior to Visit   Medication Sig Dispense Refill    acetaminophen (TYLENOL) 325 mg tablet Take 975 mg by mouth in the morning and 975 mg in the evening and 975 mg before bedtime. [facility]      aspirin 81 MG tablet Take 1 tablet by mouth in the morning.      atorvastatin (LIPITOR) 10 mg tablet TAKE 1 TABLET (10 MG TOTAL) BY MOUTH DAILY PER LATEST DCI 90 tablet 1    cholecalciferol (VITAMIN D3) 1,000 units tablet Take 1 tablet by mouth in the morning.      cloNIDine (CATAPRES-TTS-2) 0.2 mg/24 hr Place 1 patch (0.2 mg total) on the skin over 7 days once a week 12 patch 3    famotidine (PEPCID) 20 mg tablet TAKE 1 TABLET BY MOUTH EVERYDAY AT BEDTIME 30 tablet 5    glipiZIDE (GLUCOTROL) 10 mg tablet TAKE 2 TABLETS BY MOUTH IN THE MORNING THEN 1 TAB WITH DINNER 270 tablet 1    glucose blood (Accu-Chek Emma Plus) test strip USE AS INSTRUCTED TWICE DAILY 100 each 7    glucose blood (OneTouch Verio) test strip Check blood sugars twice daily. Please substitute with appropriate alternative as covered by patient's insurance. Dx: E11.65 200 each 3    hydrALAZINE (APRESOLINE) 100 MG tablet TAKE 1 TABLET BY MOUTH 3 TIMES " A DAY. 270 tablet 0    Insulin Glargine-yfgn 100 UNIT/ML SOPN Inject 0.05 mL (5 Units total) uncer the skin in the morning 100 mL 2    Insulin Pen Needle (BD Pen Needle Kathy 2nd Gen) 32G X 4 MM MISC USE DAILY AS DIRECTED 100 each 3    Invokana 100 MG TAKE 1 TABLET BY MOUTH DAILY BEFORE BREAKFAST. 30 tablet 6    Januvia 50 MG tablet TAKE 1 TABLET BY MOUTH EVERY DAY 30 tablet 5    lidocaine (Lidoderm) 5 % Apply 1 patch topically over 12 hours daily Remove & Discard patch within 12 hours or as directed by MD      nebivolol (BYSTOLIC) 20 MG tablet TAKE 2 TABLETS (40 MG TOTAL) BY MOUTH DAILY. 180 tablet 2    NIFEdipine (ADALAT CC) 90 mg 24 hr tablet TAKE 1 TABLET BY MOUTH EVERY DAY 90 tablet 1    OneTouch Delica Lancets 33G MISC Check blood sugars twice daily. Please substitute with appropriate alternative as covered by patient's insurance. Dx: E11.65 200 each 3    oxygen gas Inhale 2.5 L/min continuous VIA NASAL CANULA. At night only      pantoprazole (PROTONIX) 20 mg tablet TAKE 1 TABLET BY MOUTH EVERY DAY 90 tablet 1    polyethylene glycol (GLYCOLAX) 17 GM/SCOOP powder Take 17 g by mouth daily as needed (constipation) 17 gm daily as needed for constipation      Sennosides (Senna) 8.6 MG CAPS Take 2 capsules by mouth daily as needed (constipation) per [facility]      sertraline (ZOLOFT) 100 mg tablet TAKE 1 TABLET BY MOUTH EVERY DAY 90 tablet 1    torsemide (DEMADEX) 20 mg tablet TAKE 1 TABLET BY MOUTH EVERY DAY 30 tablet 5    ferrous sulfate 325 (65 Fe) mg tablet Take 1 tablet (325 mg total) by mouth 3 (three) times a week 12 tablet 0    spironolactone (ALDACTONE) 25 mg tablet Take 1 tablet (25 mg total) by mouth daily (Patient not taking: Reported on 7/8/2025) 90 tablet 3     Current Facility-Administered Medications on File Prior to Visit   Medication Dose Route Frequency Provider Last Rate Last Admin    denosumab (PROLIA) subcutaneous injection 60 mg  60 mg Subcutaneous Q6 Months    60 mg at  03/05/25 0939   [2]   Social History  Tobacco Use    Smoking status: Never    Smokeless tobacco: Never   Vaping Use    Vaping status: Never Used   Substance and Sexual Activity    Alcohol use: Never    Drug use: Never    Sexual activity: Not Currently

## 2025-07-03 NOTE — ASSESSMENT & PLAN NOTE
Most recent albumin creatinine ratio 163, improved from April  Most recent protein creatinine ratio 625, also improved from April  Previously on Aldactone, holding now in setting of hyperkalemia

## 2025-07-06 RX ORDER — PEN NEEDLE, DIABETIC 32GX 5/32"
NEEDLE, DISPOSABLE MISCELLANEOUS DAILY
Qty: 100 EACH | Refills: 3 | Status: SHIPPED | OUTPATIENT
Start: 2025-07-06

## 2025-07-08 ENCOUNTER — TELEPHONE (OUTPATIENT)
Dept: NEPHROLOGY | Facility: CLINIC | Age: OVER 89
End: 2025-07-08

## 2025-07-08 ENCOUNTER — OFFICE VISIT (OUTPATIENT)
Dept: NEPHROLOGY | Facility: HOSPITAL | Age: OVER 89
End: 2025-07-08
Payer: COMMERCIAL

## 2025-07-08 VITALS
BODY MASS INDEX: 26.13 KG/M2 | DIASTOLIC BLOOD PRESSURE: 54 MMHG | WEIGHT: 142 LBS | HEIGHT: 62 IN | HEART RATE: 68 BPM | SYSTOLIC BLOOD PRESSURE: 118 MMHG | OXYGEN SATURATION: 93 %

## 2025-07-08 DIAGNOSIS — E87.6 HYPOKALEMIA: ICD-10-CM

## 2025-07-08 DIAGNOSIS — N18.4 CKD (CHRONIC KIDNEY DISEASE), STAGE IV (HCC): ICD-10-CM

## 2025-07-08 DIAGNOSIS — N18.4 BENIGN HYPERTENSION WITH CKD (CHRONIC KIDNEY DISEASE) STAGE IV (HCC): Primary | ICD-10-CM

## 2025-07-08 DIAGNOSIS — E11.22 TYPE 2 DIABETES MELLITUS WITH STAGE 4 CHRONIC KIDNEY DISEASE, WITH LONG-TERM CURRENT USE OF INSULIN (HCC): ICD-10-CM

## 2025-07-08 DIAGNOSIS — E83.9 CHRONIC KIDNEY DISEASE-MINERAL AND BONE DISORDER: ICD-10-CM

## 2025-07-08 DIAGNOSIS — Z79.4 TYPE 2 DIABETES MELLITUS WITH STAGE 4 CHRONIC KIDNEY DISEASE, WITH LONG-TERM CURRENT USE OF INSULIN (HCC): ICD-10-CM

## 2025-07-08 DIAGNOSIS — N18.9 CHRONIC KIDNEY DISEASE-MINERAL AND BONE DISORDER: ICD-10-CM

## 2025-07-08 DIAGNOSIS — M89.9 CHRONIC KIDNEY DISEASE-MINERAL AND BONE DISORDER: ICD-10-CM

## 2025-07-08 DIAGNOSIS — R80.9 PROTEINURIA, UNSPECIFIED TYPE: ICD-10-CM

## 2025-07-08 DIAGNOSIS — E87.5 HYPERKALEMIA: ICD-10-CM

## 2025-07-08 DIAGNOSIS — N18.4 TYPE 2 DIABETES MELLITUS WITH STAGE 4 CHRONIC KIDNEY DISEASE, WITH LONG-TERM CURRENT USE OF INSULIN (HCC): ICD-10-CM

## 2025-07-08 DIAGNOSIS — I12.9 BENIGN HYPERTENSION WITH CKD (CHRONIC KIDNEY DISEASE) STAGE IV (HCC): Primary | ICD-10-CM

## 2025-07-08 PROCEDURE — 99214 OFFICE O/P EST MOD 30 MIN: CPT

## 2025-07-08 NOTE — PATIENT INSTRUCTIONS
Please go for repeat labs at your earliest convenience  Please follow  a low potassium diet (instructions attached to back of this packet)   Avoid all NSAIDs (ibuprofen, Motrin, Aleve, Advil, Naproxen...), but it is okay to take Tylenol as needed for pain  Monitor blood pressure and heart rate at home  Call or message through [employer] if blood pressure is consistently more than 140/90 or less than 110/60s  Follow a low sodium diet   Maintain control of blood pressure and blood sugar levels to prevent the worsening of chronic kidney disease    Follow up in 3 months with repeat labs 1-2 weeks prior to your appointment

## 2025-07-08 NOTE — TELEPHONE ENCOUNTER
----- Message from Lorrie Santana PA-C sent at 7/8/2025  4:17 PM EDT -----  Hi, pt seen in office today. Please schedule for follow up in 3 months with Dr. Baumann     Thank you!

## 2025-07-09 NOTE — TELEPHONE ENCOUNTER
LVM for patient to schedule 3 month follow up with Dr. Baumann in the [facility] office this is the first attempt.

## 2025-07-10 LAB
BUN SERPL-MCNC: 37 MG/DL (ref 8–27)
BUN/CREAT SERPL: 16 (ref 12–28)
CALCIUM SERPL-MCNC: 8 MG/DL (ref 8.7–10.3)
CHLORIDE SERPL-SCNC: 101 MMOL/L (ref 96–106)
CO2 SERPL-SCNC: 20 MMOL/L (ref 20–29)
CREAT SERPL-MCNC: 2.32 MG/DL (ref 0.57–1)
EGFR: 20 ML/MIN/1.73
GLUCOSE SERPL-MCNC: 150 MG/DL (ref 70–99)
MAGNESIUM SERPL-MCNC: 2.2 MG/DL (ref 1.6–2.3)
POTASSIUM SERPL-SCNC: 4.7 MMOL/L (ref 3.5–5.2)
SODIUM SERPL-SCNC: 136 MMOL/L (ref 134–144)

## 2025-07-12 DIAGNOSIS — F41.9 ANXIETY: ICD-10-CM

## 2025-07-14 RX ORDER — SERTRALINE HYDROCHLORIDE 100 MG/1
100 TABLET, FILM COATED ORAL DAILY
Qty: 30 TABLET | Refills: 5 | Status: SHIPPED | OUTPATIENT
Start: 2025-07-14

## 2025-07-15 ENCOUNTER — TELEPHONE (OUTPATIENT)
Age: OVER 89
End: 2025-07-15

## 2025-07-17 ENCOUNTER — TELEPHONE (OUTPATIENT)
Dept: ENDOCRINOLOGY | Facility: HOSPITAL | Age: OVER 89
End: 2025-07-17

## 2025-07-21 DIAGNOSIS — N18.30 TYPE 2 DIABETES MELLITUS WITH STAGE 3 CHRONIC KIDNEY DISEASE, WITHOUT LONG-TERM CURRENT USE OF INSULIN (HCC): ICD-10-CM

## 2025-07-21 DIAGNOSIS — E11.22 TYPE 2 DIABETES MELLITUS WITH STAGE 3 CHRONIC KIDNEY DISEASE, WITHOUT LONG-TERM CURRENT USE OF INSULIN (HCC): ICD-10-CM

## 2025-07-21 DIAGNOSIS — I10 ESSENTIAL HYPERTENSION: ICD-10-CM

## 2025-07-21 DIAGNOSIS — R60.9 DEPENDENT EDEMA: ICD-10-CM

## 2025-07-23 RX ORDER — SITAGLIPTIN 50 MG/1
50 TABLET, FILM COATED ORAL DAILY
Qty: 30 TABLET | Refills: 5 | Status: SHIPPED | OUTPATIENT
Start: 2025-07-23

## 2025-07-23 RX ORDER — TORSEMIDE 20 MG/1
20 TABLET ORAL DAILY
Qty: 30 TABLET | Refills: 5 | Status: SHIPPED | OUTPATIENT
Start: 2025-07-23

## 2025-07-23 RX ORDER — NIFEDIPINE 90 MG/1
90 TABLET, FILM COATED, EXTENDED RELEASE ORAL DAILY
Qty: 30 TABLET | Refills: 5 | Status: SHIPPED | OUTPATIENT
Start: 2025-07-23

## 2025-08-18 ENCOUNTER — OFFICE VISIT (OUTPATIENT)
Dept: FAMILY MEDICINE CLINIC | Facility: HOSPITAL | Age: OVER 89
End: 2025-08-18
Payer: COMMERCIAL

## 2025-08-18 VITALS
SYSTOLIC BLOOD PRESSURE: 170 MMHG | TEMPERATURE: 98.2 F | WEIGHT: 155.6 LBS | BODY MASS INDEX: 28.63 KG/M2 | HEIGHT: 62 IN | DIASTOLIC BLOOD PRESSURE: 69 MMHG | HEART RATE: 74 BPM | OXYGEN SATURATION: 94 %

## 2025-08-18 DIAGNOSIS — E11.22 TYPE 2 DIABETES MELLITUS WITH STAGE 4 CHRONIC KIDNEY DISEASE, WITH LONG-TERM CURRENT USE OF INSULIN (HCC): Primary | ICD-10-CM

## 2025-08-18 DIAGNOSIS — Z79.4 TYPE 2 DIABETES MELLITUS WITH STAGE 4 CHRONIC KIDNEY DISEASE, WITH LONG-TERM CURRENT USE OF INSULIN (HCC): Primary | ICD-10-CM

## 2025-08-18 DIAGNOSIS — I35.0 NONRHEUMATIC AORTIC VALVE STENOSIS: ICD-10-CM

## 2025-08-18 DIAGNOSIS — F41.9 ANXIETY: ICD-10-CM

## 2025-08-18 DIAGNOSIS — N18.4 BENIGN HYPERTENSION WITH CKD (CHRONIC KIDNEY DISEASE) STAGE IV (HCC): ICD-10-CM

## 2025-08-18 DIAGNOSIS — N18.4 TYPE 2 DIABETES MELLITUS WITH STAGE 4 CHRONIC KIDNEY DISEASE, WITH LONG-TERM CURRENT USE OF INSULIN (HCC): Primary | ICD-10-CM

## 2025-08-18 DIAGNOSIS — R60.9 DEPENDENT EDEMA: ICD-10-CM

## 2025-08-18 DIAGNOSIS — I12.9 BENIGN HYPERTENSION WITH CKD (CHRONIC KIDNEY DISEASE) STAGE IV (HCC): ICD-10-CM

## 2025-08-18 LAB — SL AMB POCT HEMOGLOBIN AIC: 6.3 (ref ?–6.5)

## 2025-08-18 PROCEDURE — 99214 OFFICE O/P EST MOD 30 MIN: CPT | Performed by: INTERNAL MEDICINE

## 2025-08-18 PROCEDURE — 83036 HEMOGLOBIN GLYCOSYLATED A1C: CPT | Performed by: INTERNAL MEDICINE
